# Patient Record
Sex: MALE | Race: WHITE | NOT HISPANIC OR LATINO | Employment: FULL TIME | URBAN - METROPOLITAN AREA
[De-identification: names, ages, dates, MRNs, and addresses within clinical notes are randomized per-mention and may not be internally consistent; named-entity substitution may affect disease eponyms.]

---

## 2019-02-21 ENCOUNTER — CONVERSION ENCOUNTER (OUTPATIENT)
Dept: SURGERY | Facility: CLINIC | Age: 54
End: 2019-02-21

## 2019-02-21 ENCOUNTER — HOSPITAL ENCOUNTER (OUTPATIENT)
Dept: ONCOLOGY | Facility: HOSPITAL | Age: 54
Discharge: HOME | End: 2019-02-21
Attending: THORACIC SURGERY (CARDIOTHORACIC VASCULAR SURGERY) | Admitting: THORACIC SURGERY (CARDIOTHORACIC VASCULAR SURGERY)

## 2019-02-25 ENCOUNTER — HOSPITAL ENCOUNTER (OUTPATIENT)
Dept: ONCOLOGY | Facility: HOSPITAL | Age: 54
Discharge: HOME | End: 2019-02-25
Attending: THORACIC SURGERY (CARDIOTHORACIC VASCULAR SURGERY) | Admitting: THORACIC SURGERY (CARDIOTHORACIC VASCULAR SURGERY)

## 2019-02-25 ENCOUNTER — HOSPITAL ENCOUNTER (OUTPATIENT)
Dept: RESPIRATORY THERAPY | Facility: HOSPITAL | Age: 54
Discharge: HOME | End: 2019-02-25
Attending: THORACIC SURGERY (CARDIOTHORACIC VASCULAR SURGERY) | Admitting: THORACIC SURGERY (CARDIOTHORACIC VASCULAR SURGERY)

## 2019-02-25 LAB — GLUCOSE BLD-MCNC: 105 MG/DL (ref 70–105)

## 2019-03-12 ENCOUNTER — CONVERSION ENCOUNTER (OUTPATIENT)
Dept: SURGERY | Facility: CLINIC | Age: 54
End: 2019-03-12

## 2019-03-15 ENCOUNTER — HOSPITAL ENCOUNTER (OUTPATIENT)
Dept: LAB | Facility: HOSPITAL | Age: 54
Discharge: HOME | End: 2019-03-15
Attending: THORACIC SURGERY (CARDIOTHORACIC VASCULAR SURGERY) | Admitting: THORACIC SURGERY (CARDIOTHORACIC VASCULAR SURGERY)

## 2019-03-15 LAB
ANION GAP SERPL CALC-SCNC: 15.5 MMOL/L (ref 10–20)
BUN SERPL-MCNC: 8 MG/DL (ref 8–20)
BUN/CREAT SERPL: 8.9 (ref 6.2–20.3)
CALCIUM SERPL-MCNC: 9.1 MG/DL (ref 8.9–10.3)
CHLORIDE SERPL-SCNC: 100 MMOL/L (ref 101–111)
CONV CO2: 25 MMOL/L (ref 22–32)
CREAT UR-MCNC: 0.9 MG/DL (ref 0.7–1.2)
GLUCOSE SERPL-MCNC: 92 MG/DL (ref 65–99)
POTASSIUM SERPL-SCNC: 3.5 MMOL/L (ref 3.6–5.1)
SODIUM SERPL-SCNC: 137 MMOL/L (ref 136–144)

## 2019-03-18 ENCOUNTER — HOSPITAL ENCOUNTER (OUTPATIENT)
Dept: GASTROENTEROLOGY | Facility: HOSPITAL | Age: 54
Setting detail: HOSPITAL OUTPATIENT SURGERY
Discharge: HOME | End: 2019-03-18
Attending: THORACIC SURGERY (CARDIOTHORACIC VASCULAR SURGERY) | Admitting: THORACIC SURGERY (CARDIOTHORACIC VASCULAR SURGERY)

## 2019-03-20 ENCOUNTER — HOSPITAL ENCOUNTER (OUTPATIENT)
Dept: PREOP | Facility: HOSPITAL | Age: 54
Setting detail: HOSPITAL OUTPATIENT SURGERY
Discharge: HOME | End: 2019-03-20
Attending: THORACIC SURGERY (CARDIOTHORACIC VASCULAR SURGERY) | Admitting: THORACIC SURGERY (CARDIOTHORACIC VASCULAR SURGERY)

## 2019-03-20 ENCOUNTER — HOSPITAL ENCOUNTER (OUTPATIENT)
Dept: MRI IMAGING | Facility: HOSPITAL | Age: 54
End: 2019-03-20
Attending: THORACIC SURGERY (CARDIOTHORACIC VASCULAR SURGERY) | Admitting: THORACIC SURGERY (CARDIOTHORACIC VASCULAR SURGERY)

## 2019-03-20 LAB
ABO + RH BLD: NORMAL
ARMBAND: NORMAL
BLD COMPONENT TYPE: NORMAL
BLD GP AB SCN SERPL QL: NEGATIVE
CROSSMATCH EXPIRATION: NORMAL

## 2019-03-26 ENCOUNTER — CLINICAL SUPPORT (OUTPATIENT)
Dept: ONCOLOGY | Facility: HOSPITAL | Age: 54
End: 2019-03-26

## 2019-03-26 ENCOUNTER — HOSPITAL ENCOUNTER (OUTPATIENT)
Dept: ONCOLOGY | Facility: CLINIC | Age: 54
Setting detail: INFUSION SERIES
Discharge: HOME | End: 2019-03-26
Attending: INTERNAL MEDICINE | Admitting: INTERNAL MEDICINE

## 2019-03-26 ENCOUNTER — HOSPITAL ENCOUNTER (OUTPATIENT)
Dept: ONCOLOGY | Facility: HOSPITAL | Age: 54
Discharge: HOME | End: 2019-03-26
Attending: INTERNAL MEDICINE | Admitting: INTERNAL MEDICINE

## 2019-03-26 LAB
ALBUMIN SERPL-MCNC: 4.3 G/DL (ref 3.5–4.8)
ALBUMIN/GLOB SERPL: 1.6 {RATIO} (ref 1–1.7)
ALP SERPL-CCNC: 68 IU/L (ref 32–91)
ALT SERPL-CCNC: 41 IU/L (ref 17–63)
ANION GAP SERPL CALC-SCNC: 15.7 MMOL/L (ref 10–20)
AST SERPL-CCNC: 36 IU/L (ref 15–41)
BILIRUB SERPL-MCNC: 0.5 MG/DL (ref 0.3–1.2)
BUN SERPL-MCNC: 7 MG/DL (ref 8–20)
BUN/CREAT SERPL: 7.8 (ref 6.2–20.3)
CALCIUM SERPL-MCNC: 9.3 MG/DL (ref 8.9–10.3)
CHLORIDE SERPL-SCNC: 100 MMOL/L (ref 101–111)
CONV CO2: 24 MMOL/L (ref 22–32)
CONV TOTAL PROTEIN: 7 G/DL (ref 6.1–7.9)
CREAT UR-MCNC: 0.9 MG/DL (ref 0.7–1.2)
GLOBULIN UR ELPH-MCNC: 2.7 G/DL (ref 2.5–3.8)
GLUCOSE SERPL-MCNC: 97 MG/DL (ref 65–99)
POTASSIUM SERPL-SCNC: 3.7 MMOL/L (ref 3.6–5.1)
SODIUM SERPL-SCNC: 136 MMOL/L (ref 136–144)

## 2019-03-26 NOTE — PROGRESS NOTES
PATIENTS ONCOLOGY RECORD LOCATED IN [facility]      Subjective     Name:  EPI MCBRIDE     Date:  2019  Address:  [address] [place] [address]  Home: [unfilled]  :  1965 AGE:  53 y.o.        RECORDS OBTAINED:  Patients Oncology Record is located in [facility]

## 2019-04-03 ENCOUNTER — HOSPITAL ENCOUNTER (OUTPATIENT)
Dept: RADIATION ONCOLOGY | Facility: HOSPITAL | Age: 54
Setting detail: RECURRING SERIES
End: 2019-04-03
Attending: RADIOLOGY | Admitting: RADIOLOGY

## 2019-04-05 ENCOUNTER — HOSPITAL ENCOUNTER (OUTPATIENT)
Dept: MRI IMAGING | Facility: HOSPITAL | Age: 54
Discharge: HOME | End: 2019-04-05
Attending: INTERNAL MEDICINE | Admitting: INTERNAL MEDICINE

## 2019-04-10 ENCOUNTER — HOSPITAL ENCOUNTER (OUTPATIENT)
Dept: ONCOLOGY | Facility: CLINIC | Age: 54
Setting detail: INFUSION SERIES
Discharge: HOME | End: 2019-04-10
Attending: INTERNAL MEDICINE | Admitting: INTERNAL MEDICINE

## 2019-04-10 ENCOUNTER — CLINICAL SUPPORT (OUTPATIENT)
Dept: ONCOLOGY | Facility: HOSPITAL | Age: 54
End: 2019-04-10

## 2019-04-10 NOTE — PROGRESS NOTES
PATIENTS ONCOLOGY RECORD LOCATED IN [facility]      Subjective     Name:  EPI MCBRIDE     Date:  04/10/2019  Address:  [address] [place] [address]  Home: [unfilled]  :  1965 AGE:  53 y.o.        RECORDS OBTAINED:  Patients Oncology Record is located in [facility]

## 2019-04-11 ENCOUNTER — HOSPITAL ENCOUNTER (OUTPATIENT)
Dept: ONCOLOGY | Facility: CLINIC | Age: 54
Setting detail: INFUSION SERIES
Discharge: HOME | End: 2019-04-11
Attending: INTERNAL MEDICINE | Admitting: INTERNAL MEDICINE

## 2019-04-11 ENCOUNTER — CLINICAL SUPPORT (OUTPATIENT)
Dept: ONCOLOGY | Facility: HOSPITAL | Age: 54
End: 2019-04-11

## 2019-04-11 NOTE — PROGRESS NOTES
PATIENTS ONCOLOGY RECORD LOCATED IN [facility]      Subjective     Name:  EPI MCBRIDE     Date:  2019  Address:  [address] [place] [address]  Home: [unfilled]  :  1965 AGE:  53 y.o.        RECORDS OBTAINED:  Patients Oncology Record is located in [facility]

## 2019-04-17 ENCOUNTER — HOSPITAL ENCOUNTER (OUTPATIENT)
Dept: ONCOLOGY | Facility: HOSPITAL | Age: 54
Discharge: HOME | End: 2019-04-17
Attending: INTERNAL MEDICINE | Admitting: INTERNAL MEDICINE

## 2019-04-17 ENCOUNTER — HOSPITAL ENCOUNTER (OUTPATIENT)
Dept: ONCOLOGY | Facility: CLINIC | Age: 54
Setting detail: INFUSION SERIES
Discharge: HOME | End: 2019-04-17
Attending: INTERNAL MEDICINE | Admitting: INTERNAL MEDICINE

## 2019-04-17 ENCOUNTER — CLINICAL SUPPORT (OUTPATIENT)
Dept: ONCOLOGY | Facility: HOSPITAL | Age: 54
End: 2019-04-17

## 2019-04-17 LAB
ALBUMIN SERPL-MCNC: 3.7 G/DL (ref 3.5–4.8)
ALBUMIN/GLOB SERPL: 1.4 {RATIO} (ref 1–1.7)
ALP SERPL-CCNC: 52 IU/L (ref 32–91)
ALT SERPL-CCNC: 35 IU/L (ref 17–63)
ANION GAP SERPL CALC-SCNC: 19.9 MMOL/L (ref 10–20)
AST SERPL-CCNC: 38 IU/L (ref 15–41)
BILIRUB SERPL-MCNC: 0.6 MG/DL (ref 0.3–1.2)
BUN SERPL-MCNC: 7 MG/DL (ref 8–20)
BUN/CREAT SERPL: 7.8 (ref 6.2–20.3)
CALCIUM SERPL-MCNC: 9.3 MG/DL (ref 8.9–10.3)
CHLORIDE SERPL-SCNC: 99 MMOL/L (ref 101–111)
CONV CO2: 20 MMOL/L (ref 22–32)
CONV TOTAL PROTEIN: 6.3 G/DL (ref 6.1–7.9)
CREAT BLDA-MCNC: 0.7 MG/DL (ref 0.6–1.3)
CREAT UR-MCNC: 0.9 MG/DL (ref 0.7–1.2)
GLOBULIN UR ELPH-MCNC: 2.6 G/DL (ref 2.5–3.8)
GLUCOSE SERPL-MCNC: 184 MG/DL (ref 65–99)
MAGNESIUM SERPL-MCNC: 1.9 MG/DL (ref 1.8–2.5)
POTASSIUM SERPL-SCNC: 3.9 MMOL/L (ref 3.6–5.1)
SODIUM SERPL-SCNC: 135 MMOL/L (ref 136–144)

## 2019-04-17 NOTE — PROGRESS NOTES
PATIENTS ONCOLOGY RECORD LOCATED IN [facility]      Subjective     Name:  EPI MCBRIDE     Date:  2019  Address:  [address] [place] [address]  Home: [unfilled]  :  1965 AGE:  53 y.o.        RECORDS OBTAINED:  Patients Oncology Record is located in [facility]

## 2019-04-18 ENCOUNTER — CLINICAL SUPPORT (OUTPATIENT)
Dept: ONCOLOGY | Facility: HOSPITAL | Age: 54
End: 2019-04-18

## 2019-04-18 ENCOUNTER — HOSPITAL ENCOUNTER (OUTPATIENT)
Dept: ONCOLOGY | Facility: CLINIC | Age: 54
Setting detail: INFUSION SERIES
Discharge: HOME | End: 2019-04-18
Attending: INTERNAL MEDICINE | Admitting: INTERNAL MEDICINE

## 2019-04-18 NOTE — PROGRESS NOTES
PATIENTS ONCOLOGY RECORD LOCATED IN [facility]      Subjective     Name:  EPI MCBRIDE     Date:  2019  Address:  [address] [place] [address]  Home: [unfilled]  :  1965 AGE:  53 y.o.        RECORDS OBTAINED:  Patients Oncology Record is located in [facility]

## 2019-04-22 ENCOUNTER — HOSPITAL ENCOUNTER (OUTPATIENT)
Dept: ONCOLOGY | Facility: HOSPITAL | Age: 54
Discharge: HOME | End: 2019-04-22
Attending: INTERNAL MEDICINE | Admitting: INTERNAL MEDICINE

## 2019-04-22 ENCOUNTER — HOSPITAL ENCOUNTER (OUTPATIENT)
Dept: ONCOLOGY | Facility: CLINIC | Age: 54
Setting detail: INFUSION SERIES
Discharge: HOME | End: 2019-04-22
Attending: INTERNAL MEDICINE | Admitting: INTERNAL MEDICINE

## 2019-04-22 ENCOUNTER — CLINICAL SUPPORT (OUTPATIENT)
Dept: ONCOLOGY | Facility: HOSPITAL | Age: 54
End: 2019-04-22

## 2019-04-22 LAB
ANION GAP SERPL CALC-SCNC: 15.2 MMOL/L (ref 10–20)
BUN SERPL-MCNC: 19 MG/DL (ref 8–20)
BUN/CREAT SERPL: 17.3 (ref 6.2–20.3)
CALCIUM SERPL-MCNC: 9 MG/DL (ref 8.9–10.3)
CHLORIDE SERPL-SCNC: 94 MMOL/L (ref 101–111)
CONV CO2: 26 MMOL/L (ref 22–32)
CREAT UR-MCNC: 1.1 MG/DL (ref 0.7–1.2)
GLUCOSE SERPL-MCNC: 109 MG/DL (ref 65–99)
MAGNESIUM SERPL-MCNC: 2.1 MG/DL (ref 1.8–2.5)
POTASSIUM SERPL-SCNC: 3.2 MMOL/L (ref 3.6–5.1)
SODIUM SERPL-SCNC: 132 MMOL/L (ref 136–144)

## 2019-04-22 NOTE — PROGRESS NOTES
PATIENTS ONCOLOGY RECORD LOCATED IN [facility]      Subjective     Name:  EPI MCBRIDE     Date:  2019  Address:  [address] [place] [address]  Home: [unfilled]  :  1965 AGE:  53 y.o.        RECORDS OBTAINED:  Patients Oncology Record is located in [facility]

## 2019-05-06 ENCOUNTER — HOSPITAL ENCOUNTER (OUTPATIENT)
Dept: ONCOLOGY | Facility: CLINIC | Age: 54
Setting detail: INFUSION SERIES
Discharge: HOME | End: 2019-05-06
Attending: INTERNAL MEDICINE | Admitting: INTERNAL MEDICINE

## 2019-05-06 ENCOUNTER — HOSPITAL ENCOUNTER (OUTPATIENT)
Dept: ONCOLOGY | Facility: HOSPITAL | Age: 54
Discharge: HOME | End: 2019-05-06
Attending: INTERNAL MEDICINE | Admitting: INTERNAL MEDICINE

## 2019-05-06 ENCOUNTER — CLINICAL SUPPORT (OUTPATIENT)
Dept: ONCOLOGY | Facility: HOSPITAL | Age: 54
End: 2019-05-06

## 2019-05-06 NOTE — PROGRESS NOTES
PATIENTS ONCOLOGY RECORD LOCATED IN [facility]      Subjective     Name:  EPI MCBRIDE     Date:  2019  Address:  [address] [place] [address]  Home: [unfilled]  :  1965 AGE:  53 y.o.        RECORDS OBTAINED:  Patients Oncology Record is located in [facility]

## 2019-05-08 ENCOUNTER — CLINICAL SUPPORT (OUTPATIENT)
Dept: ONCOLOGY | Facility: HOSPITAL | Age: 54
End: 2019-05-08

## 2019-05-08 ENCOUNTER — HOSPITAL ENCOUNTER (OUTPATIENT)
Dept: ONCOLOGY | Facility: CLINIC | Age: 54
Setting detail: INFUSION SERIES
Discharge: HOME | End: 2019-05-08
Attending: INTERNAL MEDICINE | Admitting: INTERNAL MEDICINE

## 2019-05-08 ENCOUNTER — HOSPITAL ENCOUNTER (OUTPATIENT)
Dept: ONCOLOGY | Facility: HOSPITAL | Age: 54
Discharge: HOME | End: 2019-05-08
Attending: INTERNAL MEDICINE | Admitting: INTERNAL MEDICINE

## 2019-05-08 LAB — CREAT BLDA-MCNC: 1 MG/DL (ref 0.6–1.3)

## 2019-05-08 NOTE — PROGRESS NOTES
PATIENTS ONCOLOGY RECORD LOCATED IN [facility]      Subjective     Name:  EPI MCBRIDE     Date:  2019  Address:  [address] [place] [address]  Home: [unfilled]  :  1965 AGE:  53 y.o.        RECORDS OBTAINED:  Patients Oncology Record is located in [facility]

## 2019-05-09 ENCOUNTER — HOSPITAL ENCOUNTER (OUTPATIENT)
Dept: ONCOLOGY | Facility: CLINIC | Age: 54
Setting detail: INFUSION SERIES
Discharge: HOME | End: 2019-05-09
Attending: INTERNAL MEDICINE | Admitting: INTERNAL MEDICINE

## 2019-05-09 ENCOUNTER — CLINICAL SUPPORT (OUTPATIENT)
Dept: ONCOLOGY | Facility: HOSPITAL | Age: 54
End: 2019-05-09

## 2019-05-09 LAB
ALBUMIN SERPL-MCNC: 3.6 G/DL (ref 3.5–4.8)
ALBUMIN/GLOB SERPL: 1.3 {RATIO} (ref 1–1.7)
ALP SERPL-CCNC: 57 IU/L (ref 32–91)
ALT SERPL-CCNC: 20 IU/L (ref 17–63)
ANION GAP SERPL CALC-SCNC: 14 MMOL/L (ref 10–20)
AST SERPL-CCNC: 19 IU/L (ref 15–41)
BILIRUB SERPL-MCNC: 0.3 MG/DL (ref 0.3–1.2)
BUN SERPL-MCNC: 12 MG/DL (ref 8–20)
BUN/CREAT SERPL: 12 (ref 6.2–20.3)
CALCIUM SERPL-MCNC: 8.7 MG/DL (ref 8.9–10.3)
CHLORIDE SERPL-SCNC: 101 MMOL/L (ref 101–111)
CONV CO2: 22 MMOL/L (ref 22–32)
CONV TOTAL PROTEIN: 6.3 G/DL (ref 6.1–7.9)
CREAT UR-MCNC: 1 MG/DL (ref 0.7–1.2)
GLOBULIN UR ELPH-MCNC: 2.7 G/DL (ref 2.5–3.8)
GLUCOSE SERPL-MCNC: 160 MG/DL (ref 65–99)
MAGNESIUM SERPL-MCNC: 1.8 MG/DL (ref 1.8–2.5)
POTASSIUM SERPL-SCNC: 4 MMOL/L (ref 3.6–5.1)
SODIUM SERPL-SCNC: 133 MMOL/L (ref 136–144)

## 2019-05-09 NOTE — PROGRESS NOTES
PATIENTS ONCOLOGY RECORD LOCATED IN [facility]      Subjective     Name:  EPI MCBRIDE     Date:  2019  Address:  [address] [place] [address]  Home: [unfilled]  :  1965 AGE:  53 y.o.        RECORDS OBTAINED:  Patients Oncology Record is located in [facility]

## 2019-05-15 ENCOUNTER — HOSPITAL ENCOUNTER (OUTPATIENT)
Dept: ONCOLOGY | Facility: HOSPITAL | Age: 54
Discharge: HOME | End: 2019-05-15
Attending: NURSE PRACTITIONER | Admitting: NURSE PRACTITIONER

## 2019-05-15 ENCOUNTER — CLINICAL SUPPORT (OUTPATIENT)
Dept: ONCOLOGY | Facility: HOSPITAL | Age: 54
End: 2019-05-15

## 2019-05-15 ENCOUNTER — HOSPITAL ENCOUNTER (OUTPATIENT)
Dept: ONCOLOGY | Facility: CLINIC | Age: 54
Setting detail: INFUSION SERIES
Discharge: HOME | End: 2019-05-15
Attending: INTERNAL MEDICINE | Admitting: INTERNAL MEDICINE

## 2019-05-15 LAB
ANION GAP SERPL CALC-SCNC: 15.2 MMOL/L (ref 10–20)
BUN SERPL-MCNC: 25 MG/DL (ref 8–20)
BUN/CREAT SERPL: 14.7 (ref 6.2–20.3)
CALCIUM SERPL-MCNC: 8.7 MG/DL (ref 8.9–10.3)
CHLORIDE SERPL-SCNC: 92 MMOL/L (ref 101–111)
CONV CO2: 26 MMOL/L (ref 22–32)
CREAT UR-MCNC: 1.7 MG/DL (ref 0.7–1.2)
GLUCOSE SERPL-MCNC: 109 MG/DL (ref 65–99)
MAGNESIUM SERPL-MCNC: 1.6 MG/DL (ref 1.8–2.5)
POTASSIUM SERPL-SCNC: 3.2 MMOL/L (ref 3.6–5.1)
SODIUM SERPL-SCNC: 130 MMOL/L (ref 136–144)

## 2019-05-16 ENCOUNTER — CLINICAL SUPPORT (OUTPATIENT)
Dept: ONCOLOGY | Facility: HOSPITAL | Age: 54
End: 2019-05-16

## 2019-05-16 ENCOUNTER — HOSPITAL ENCOUNTER (OUTPATIENT)
Dept: ONCOLOGY | Facility: CLINIC | Age: 54
Setting detail: INFUSION SERIES
Discharge: HOME | End: 2019-05-16
Attending: INTERNAL MEDICINE | Admitting: INTERNAL MEDICINE

## 2019-05-16 NOTE — PROGRESS NOTES
PATIENTS ONCOLOGY RECORD LOCATED IN [facility]      Subjective     Name:  EPI MCBRIDE     Date:  2019  Address:   [address] [place] [address]  Home: [unfilled]  :  1965 AGE:  53 y.o.        RECORDS OBTAINED:  Patients Oncology Record is located in [facility]

## 2019-05-17 ENCOUNTER — HOSPITAL ENCOUNTER (OUTPATIENT)
Dept: ONCOLOGY | Facility: CLINIC | Age: 54
Setting detail: INFUSION SERIES
Discharge: HOME | End: 2019-05-17
Attending: INTERNAL MEDICINE | Admitting: INTERNAL MEDICINE

## 2019-05-17 ENCOUNTER — HOSPITAL ENCOUNTER (OUTPATIENT)
Dept: ONCOLOGY | Facility: HOSPITAL | Age: 54
Discharge: HOME | End: 2019-05-17
Attending: INTERNAL MEDICINE | Admitting: INTERNAL MEDICINE

## 2019-05-17 ENCOUNTER — CLINICAL SUPPORT (OUTPATIENT)
Dept: ONCOLOGY | Facility: HOSPITAL | Age: 54
End: 2019-05-17

## 2019-05-17 LAB
ANION GAP SERPL CALC-SCNC: 16.2 MMOL/L (ref 10–20)
BUN SERPL-MCNC: 20 MG/DL (ref 8–20)
BUN/CREAT SERPL: 14.3 (ref 6.2–20.3)
CALCIUM SERPL-MCNC: 8.4 MG/DL (ref 8.9–10.3)
CHLORIDE SERPL-SCNC: 91 MMOL/L (ref 101–111)
CONV CO2: 24 MMOL/L (ref 22–32)
CREAT UR-MCNC: 1.4 MG/DL (ref 0.7–1.2)
GLUCOSE SERPL-MCNC: 105 MG/DL (ref 65–99)
MAGNESIUM SERPL-MCNC: 1.3 MG/DL (ref 1.8–2.5)
POTASSIUM SERPL-SCNC: 3.2 MMOL/L (ref 3.6–5.1)
SODIUM SERPL-SCNC: 128 MMOL/L (ref 136–144)

## 2019-05-17 NOTE — PROGRESS NOTES
PATIENTS ONCOLOGY RECORD LOCATED IN [facility]      Subjective     Name:  EPI MCBRIDE     Date:  2019  Address:   [address] [place] [address]  Home: [unfilled]  :  1965 AGE:  53 y.o.        RECORDS OBTAINED:  Patients Oncology Record is located in [facility]

## 2019-05-20 ENCOUNTER — CLINICAL SUPPORT (OUTPATIENT)
Dept: ONCOLOGY | Facility: HOSPITAL | Age: 54
End: 2019-05-20

## 2019-05-20 ENCOUNTER — HOSPITAL ENCOUNTER (OUTPATIENT)
Dept: ONCOLOGY | Facility: HOSPITAL | Age: 54
Discharge: HOME | End: 2019-05-20
Attending: INTERNAL MEDICINE | Admitting: INTERNAL MEDICINE

## 2019-05-20 ENCOUNTER — HOSPITAL ENCOUNTER (OUTPATIENT)
Dept: ONCOLOGY | Facility: CLINIC | Age: 54
Setting detail: INFUSION SERIES
Discharge: HOME | End: 2019-05-20
Attending: INTERNAL MEDICINE | Admitting: INTERNAL MEDICINE

## 2019-05-20 LAB
ANION GAP SERPL CALC-SCNC: 15.5 MMOL/L (ref 10–20)
BUN SERPL-MCNC: 10 MG/DL (ref 8–20)
BUN/CREAT SERPL: 8.3 (ref 6.2–20.3)
CALCIUM SERPL-MCNC: 8.5 MG/DL (ref 8.9–10.3)
CHLORIDE SERPL-SCNC: 97 MMOL/L (ref 101–111)
CONV CO2: 24 MMOL/L (ref 22–32)
CREAT BLDA-MCNC: 1.2 MG/DL (ref 0.6–1.3)
CREAT UR-MCNC: 1.2 MG/DL (ref 0.7–1.2)
GLUCOSE SERPL-MCNC: 118 MG/DL (ref 65–99)
POTASSIUM SERPL-SCNC: 3.5 MMOL/L (ref 3.6–5.1)
SODIUM SERPL-SCNC: 133 MMOL/L (ref 136–144)

## 2019-05-20 NOTE — PROGRESS NOTES
PATIENTS ONCOLOGY RECORD LOCATED IN [facility]      Subjective     Name:  EPI MCBRIDE     Date:  2019  Address:   [address] [place] [address]  Home: [unfilled]  :  1965 AGE:  53 y.o.        RECORDS OBTAINED:  Patients Oncology Record is located in [facility]

## 2019-05-22 ENCOUNTER — HOSPITAL ENCOUNTER (OUTPATIENT)
Dept: ONCOLOGY | Facility: HOSPITAL | Age: 54
Discharge: HOME | End: 2019-05-22
Attending: INTERNAL MEDICINE | Admitting: INTERNAL MEDICINE

## 2019-05-22 ENCOUNTER — CLINICAL SUPPORT (OUTPATIENT)
Dept: ONCOLOGY | Facility: HOSPITAL | Age: 54
End: 2019-05-22

## 2019-05-22 ENCOUNTER — HOSPITAL ENCOUNTER (OUTPATIENT)
Dept: ONCOLOGY | Facility: CLINIC | Age: 54
Setting detail: INFUSION SERIES
Discharge: HOME | End: 2019-05-22
Attending: INTERNAL MEDICINE | Admitting: INTERNAL MEDICINE

## 2019-05-22 LAB
ANION GAP SERPL CALC-SCNC: 16.8 MMOL/L (ref 10–20)
BUN SERPL-MCNC: 12 MG/DL (ref 8–20)
BUN/CREAT SERPL: 9.2 (ref 6.2–20.3)
CALCIUM SERPL-MCNC: 8.8 MG/DL (ref 8.9–10.3)
CHLORIDE SERPL-SCNC: 94 MMOL/L (ref 101–111)
CONV CO2: 26 MMOL/L (ref 22–32)
CREAT UR-MCNC: 1.3 MG/DL (ref 0.7–1.2)
GLUCOSE SERPL-MCNC: 98 MG/DL (ref 65–99)
MAGNESIUM SERPL-MCNC: 1.6 MG/DL (ref 1.8–2.5)
POTASSIUM SERPL-SCNC: 3.8 MMOL/L (ref 3.6–5.1)
SODIUM SERPL-SCNC: 133 MMOL/L (ref 136–144)

## 2019-05-23 ENCOUNTER — HOSPITAL ENCOUNTER (OUTPATIENT)
Dept: ONCOLOGY | Facility: HOSPITAL | Age: 54
Discharge: HOME | End: 2019-05-23
Attending: INTERNAL MEDICINE | Admitting: INTERNAL MEDICINE

## 2019-05-23 ENCOUNTER — CLINICAL SUPPORT (OUTPATIENT)
Dept: ONCOLOGY | Facility: HOSPITAL | Age: 54
End: 2019-05-23

## 2019-05-23 ENCOUNTER — HOSPITAL ENCOUNTER (OUTPATIENT)
Dept: ONCOLOGY | Facility: CLINIC | Age: 54
Setting detail: INFUSION SERIES
Discharge: HOME | End: 2019-05-23
Attending: INTERNAL MEDICINE | Admitting: INTERNAL MEDICINE

## 2019-05-23 LAB — MAGNESIUM SERPL-MCNC: 1.7 MG/DL (ref 1.8–2.5)

## 2019-05-23 NOTE — PROGRESS NOTES
PATIENTS ONCOLOGY RECORD LOCATED IN [facility]      Subjective     Name:  EPI MCBRIDE     Date:  2019  Address:   [address] [place] [address]  Home: [unfilled]  :  1965 AGE:  53 y.o.        RECORDS OBTAINED:  Patients Oncology Record is located in [facility]

## 2019-05-24 ENCOUNTER — CLINICAL SUPPORT (OUTPATIENT)
Dept: ONCOLOGY | Facility: HOSPITAL | Age: 54
End: 2019-05-24

## 2019-05-24 ENCOUNTER — HOSPITAL ENCOUNTER (OUTPATIENT)
Dept: RADIATION ONCOLOGY | Facility: HOSPITAL | Age: 54
Discharge: HOME | End: 2019-05-24
Attending: RADIOLOGY | Admitting: RADIOLOGY

## 2019-05-24 ENCOUNTER — HOSPITAL ENCOUNTER (OUTPATIENT)
Dept: ONCOLOGY | Facility: CLINIC | Age: 54
Setting detail: INFUSION SERIES
Discharge: HOME | End: 2019-05-24
Attending: INTERNAL MEDICINE | Admitting: INTERNAL MEDICINE

## 2019-05-26 ENCOUNTER — HOSPITAL ENCOUNTER (OUTPATIENT)
Dept: LAB | Facility: HOSPITAL | Age: 54
Discharge: HOME | End: 2019-05-26
Attending: INTERNAL MEDICINE | Admitting: INTERNAL MEDICINE

## 2019-05-26 LAB
ANION GAP SERPL CALC-SCNC: 19 MMOL/L (ref 10–20)
BUN SERPL-MCNC: 12 MG/DL (ref 8–20)
BUN/CREAT SERPL: 9.2 (ref 6.2–20.3)
CALCIUM SERPL-MCNC: 8.7 MG/DL (ref 8.9–10.3)
CHLORIDE SERPL-SCNC: 96 MMOL/L (ref 101–111)
CONV CO2: 25 MMOL/L (ref 22–32)
CREAT UR-MCNC: 1.3 MG/DL (ref 0.7–1.2)
GLUCOSE SERPL-MCNC: 97 MG/DL (ref 65–99)
POTASSIUM SERPL-SCNC: 4 MMOL/L (ref 3.6–5.1)
SODIUM SERPL-SCNC: 136 MMOL/L (ref 136–144)

## 2019-05-28 ENCOUNTER — CONVERSION ENCOUNTER (OUTPATIENT)
Dept: SURGERY | Facility: CLINIC | Age: 54
End: 2019-05-28

## 2019-06-04 ENCOUNTER — HOSPITAL ENCOUNTER (OUTPATIENT)
Dept: ONCOLOGY | Facility: CLINIC | Age: 54
Setting detail: INFUSION SERIES
Discharge: HOME | End: 2019-06-04
Attending: INTERNAL MEDICINE | Admitting: INTERNAL MEDICINE

## 2019-06-04 ENCOUNTER — HOSPITAL ENCOUNTER (OUTPATIENT)
Dept: ONCOLOGY | Facility: HOSPITAL | Age: 54
Discharge: HOME | End: 2019-06-04
Attending: INTERNAL MEDICINE | Admitting: INTERNAL MEDICINE

## 2019-06-04 VITALS
SYSTOLIC BLOOD PRESSURE: 131 MMHG | HEART RATE: 84 BPM | OXYGEN SATURATION: 97 % | BODY MASS INDEX: 23.62 KG/M2 | WEIGHT: 190 LBS | HEIGHT: 75 IN | DIASTOLIC BLOOD PRESSURE: 85 MMHG

## 2019-06-04 VITALS
HEIGHT: 75 IN | WEIGHT: 194 LBS | WEIGHT: 194 LBS | DIASTOLIC BLOOD PRESSURE: 94 MMHG | SYSTOLIC BLOOD PRESSURE: 143 MMHG | OXYGEN SATURATION: 98 % | BODY MASS INDEX: 24.12 KG/M2 | BODY MASS INDEX: 24.12 KG/M2 | DIASTOLIC BLOOD PRESSURE: 89 MMHG | OXYGEN SATURATION: 96 % | HEART RATE: 76 BPM | HEART RATE: 75 BPM | SYSTOLIC BLOOD PRESSURE: 138 MMHG | HEIGHT: 75 IN

## 2019-06-04 LAB
ALBUMIN SERPL-MCNC: 3.7 G/DL (ref 3.5–4.8)
ALBUMIN/GLOB SERPL: 1.6 {RATIO} (ref 1–1.7)
ALP SERPL-CCNC: 58 IU/L (ref 32–91)
ALT SERPL-CCNC: 20 IU/L (ref 17–63)
ANION GAP SERPL CALC-SCNC: 16.6 MMOL/L (ref 10–20)
AST SERPL-CCNC: 22 IU/L (ref 15–41)
BILIRUB SERPL-MCNC: 0.3 MG/DL (ref 0.3–1.2)
BUN SERPL-MCNC: 16 MG/DL (ref 8–20)
BUN/CREAT SERPL: 12.3 (ref 6.2–20.3)
CALCIUM SERPL-MCNC: 8.8 MG/DL (ref 8.9–10.3)
CHLORIDE SERPL-SCNC: 98 MMOL/L (ref 101–111)
CONV CO2: 22 MMOL/L (ref 22–32)
CONV TOTAL PROTEIN: 6 G/DL (ref 6.1–7.9)
CREAT UR-MCNC: 1.3 MG/DL (ref 0.7–1.2)
GLOBULIN UR ELPH-MCNC: 2.3 G/DL (ref 2.5–3.8)
GLUCOSE SERPL-MCNC: 85 MG/DL (ref 65–99)
MAGNESIUM SERPL-MCNC: 1.7 MG/DL (ref 1.8–2.5)
POTASSIUM SERPL-SCNC: 3.6 MMOL/L (ref 3.6–5.1)
SODIUM SERPL-SCNC: 133 MMOL/L (ref 136–144)

## 2019-06-06 NOTE — PROGRESS NOTES
Visit Type:  Consult  Referring Provider:  Dr. Cleaning  Primary Provider:  Lyle Bone MD    CC:  Follow up CT chest .    History of Present Illness:  Patient is seen in follow-up evaluation of his right lower lobe lung cancer with metastatic disease to the subcarinal and right paratracheal regions.  He has completed 45 Gy of radiation therapy any is completed 2 cycles of platinum base chemotherapy.  He   has had some renal dysfunction with a creatinine of 1.7.  He presents with a follow-up CT scan I personally examined.  There is been a great response both of the tumor and of the lymph nodes.  The radiology report confirms this.  The patient has no fevers   chills night sweats no cough no hemoptysis no significant pleuritic chest pain.  Weight is stable.  He has tolerated therapy relatively well but he is tired.  He has developed renal insufficiency.  The patient is a former smoker continuing to stay smoke-free  I personally discussed this case with Medical Oncology and with radiation oncology.  Both specialists agree that we should proceed with surgical resection at this time.  We are planning additional carboplatin and base chemotherapy following surgical   resection.  Impression lung cancer 2. chemo radiation therapy 3. Renal insufficiency  Plan follow-up in 2 weeks      Past Surgical History:     Reviewed history from 03/12/2019 and no changes required:        CT guided lung biopsy 3-8-19         mediport placement 3-20-19 Dr. Fuchs        Bronch with EBUS castrejon needle BX 3-18-19    Family History Summary:      Reviewed history Last on 03/12/2019 and no changes required:05/28/2019  Sister - Has Family History of Other Cancer - Entered On: 2/21/2019      Social History:     Reviewed history from 03/12/2019 and no changes required:        Patient is a former smoker.        Alcohol Use: Y                Vital Signs:    Patient Profile:    53 Years Old Male  Height:     75 inches  Weight:     190  pounds  BMI:        23.75     O2 Sat:     97 %  Temp:       98.0 degrees F oral  Pulse rate: 84 / minute  Pulse rhythm:   regular  BP Sittin / 85  (left arm)    Cuff size:  regular      Problems: Active problems were reviewed with the patient during this visit.  Medications: Medications were reviewed with the patient during this visit.  Allergies: Allergies were reviewed with the patient during this visit.  No Known Allergy.        Vitals Entered By: Leslee Tenorio CMA (May 28, 2019 3:24 PM)    Active Medications (reviewed today):  METOPROLOL SUCCINATE  MG ORAL TABLET EXTENDED RELEASE 24 HOUR (METOPROLOL SUCCINATE) Take one tablet daily  ESCITALOPRAM OXALATE 20 MG ORAL TABLET (ESCITALOPRAM OXALATE) ONE PILL A DAY  AMLODIPINE BESYLATE 10 MG ORAL TABLET (AMLODIPINE BESYLATE) ONE DAILY  BUPROPION HCL ER (XL) 300 MG ORAL TABLET EXTENDED RELEASE 24 HOUR (BUPROPION HCL) one tablet by mouth in am  PANTOPRAZOLE SODIUM 40 MG ORAL TABLET DELAYED RELEASE (PANTOPRAZOLE SODIUM) Take one by mouth daily  PRAVASTATIN SODIUM 10 MG ORAL TABLET (PRAVASTATIN SODIUM) ONE BY MOUTH AT BEDTIME    Current Allergies (reviewed today):  No known allergies      Risk Factors:     Smoked Tobacco Use:  Former smoker  Alcohol use:  yes    Previous Tobacco Use: Signed On 2019  Smoked Tobacco Use:  Former smoker    Previous Alcohol Use: Signed On 2019  Alcohol use:  yes          Blood Pressure:  Today's BP: 131/85 mm Hg            Plan   Updated Medication List:   POTASSIUM CHLORIDE LÁZARO ER 20 MEQ ORAL TABLET EXTENDED RELEASE (POTASSIUM CHLORIDE LÁZARO CR) one daily  FOLIC ACID 1 MG ORAL TABLET (FOLIC ACID) one tablet by mouth daily  METOPROLOL SUCCINATE  MG ORAL TABLET EXTENDED RELEASE 24 HOUR (METOPROLOL SUCCINATE) Take one tablet daily  ESCITALOPRAM OXALATE 20 MG ORAL TABLET (ESCITALOPRAM OXALATE) ONE PILL A DAY  AMLODIPINE BESYLATE 10 MG ORAL TABLET (AMLODIPINE BESYLATE) ONE DAILY  BUPROPION HCL ER (XL) 300 MG  ORAL TABLET EXTENDED RELEASE 24 HOUR (BUPROPION HCL) one tablet by mouth in am  PANTOPRAZOLE SODIUM 40 MG ORAL TABLET DELAYED RELEASE (PANTOPRAZOLE SODIUM) Take one by mouth daily  PRAVASTATIN SODIUM 10 MG ORAL TABLET (PRAVASTATIN SODIUM) ONE BY MOUTH AT BEDTIME    New Orders:   [id number] Vst-Est Level III [CPT-33431]        Plan   Updated Medication List:   POTASSIUM CHLORIDE LÁZARO ER 20 MEQ ORAL TABLET EXTENDED RELEASE (POTASSIUM CHLORIDE LÁZARO CR) one daily  FOLIC ACID 1 MG ORAL TABLET (FOLIC ACID) one tablet by mouth daily  METOPROLOL SUCCINATE  MG ORAL TABLET EXTENDED RELEASE 24 HOUR (METOPROLOL SUCCINATE) Take one tablet daily  ESCITALOPRAM OXALATE 20 MG ORAL TABLET (ESCITALOPRAM OXALATE) ONE PILL A DAY  AMLODIPINE BESYLATE 10 MG ORAL TABLET (AMLODIPINE BESYLATE) ONE DAILY  BUPROPION HCL ER (XL) 300 MG ORAL TABLET EXTENDED RELEASE 24 HOUR (BUPROPION HCL) one tablet by mouth in am  PANTOPRAZOLE SODIUM 40 MG ORAL TABLET DELAYED RELEASE (PANTOPRAZOLE SODIUM) Take one by mouth daily  PRAVASTATIN SODIUM 10 MG ORAL TABLET (PRAVASTATIN SODIUM) ONE BY MOUTH AT BEDTIME    New Orders:   [id number] Vst-Est Level III [[id number]]        Surgery Worksheet         ]      Electronically signed by Terrance Fuchs MD on 05/28/2019 at 3:56 PM  ________________________________________________________________________       Disclaimer: Converted Note message may not contain all data elements that existed in the legacy source system. Please see [employer] System for the original note details.

## 2019-06-19 ENCOUNTER — HOSPITAL ENCOUNTER (OUTPATIENT)
Dept: GENERAL RADIOLOGY | Facility: HOSPITAL | Age: 54
Discharge: HOME | End: 2019-06-19
Admitting: THORACIC SURGERY (CARDIOTHORACIC VASCULAR SURGERY)

## 2019-06-19 ENCOUNTER — APPOINTMENT (OUTPATIENT)
Dept: PREADMISSION TESTING | Facility: HOSPITAL | Age: 54
End: 2019-06-19

## 2019-06-19 VITALS
OXYGEN SATURATION: 96 % | SYSTOLIC BLOOD PRESSURE: 130 MMHG | WEIGHT: 193.5 LBS | HEIGHT: 75 IN | BODY MASS INDEX: 24.06 KG/M2 | DIASTOLIC BLOOD PRESSURE: 83 MMHG | HEART RATE: 76 BPM

## 2019-06-19 LAB
ANION GAP SERPL CALCULATED.3IONS-SCNC: 13 MMOL/L (ref 10–20)
APTT PPP: 24.1 SECONDS (ref 24–31)
BASOPHILS # BLD AUTO: 0.1 10*3/MM3 (ref 0–0.2)
BASOPHILS NFR BLD AUTO: 1.2 % (ref 0–1.5)
BILIRUB UR QL STRIP: NEGATIVE
BUN BLD-MCNC: 12 MG/DL (ref 8–20)
BUN/CREAT SERPL: 10 (ref 6.2–20.3)
CALCIUM SPEC-SCNC: 9.3 MG/DL (ref 8.9–10.3)
CHLORIDE SERPL-SCNC: 103 MMOL/L (ref 101–111)
CLARITY UR: CLEAR
CO2 SERPL-SCNC: 23 MMOL/L (ref 22–32)
COLOR UR: YELLOW
CREAT BLD-MCNC: 1.2 MG/DL (ref 0.7–1.2)
DEPRECATED RDW RBC AUTO: 52.9 FL (ref 37–54)
EOSINOPHIL # BLD AUTO: 0.3 10*3/MM3 (ref 0–0.4)
EOSINOPHIL NFR BLD AUTO: 5 % (ref 0.3–6.2)
ERYTHROCYTE [DISTWIDTH] IN BLOOD BY AUTOMATED COUNT: 15.7 % (ref 12.3–15.4)
GFR SERPL CREATININE-BSD FRML MDRD: 63 ML/MIN/1.73
GLUCOSE BLD-MCNC: 90 MG/DL (ref 65–99)
GLUCOSE UR STRIP-MCNC: NEGATIVE MG/DL
HCT VFR BLD AUTO: 31.9 % (ref 37.5–51)
HGB BLD-MCNC: 11 G/DL (ref 13–17.7)
HGB UR QL STRIP.AUTO: NEGATIVE
INR PPP: 1.04 (ref 0.9–1.1)
KETONES UR QL STRIP: NEGATIVE
LEUKOCYTE ESTERASE UR QL STRIP.AUTO: NEGATIVE
LYMPHOCYTES # BLD AUTO: 1.1 10*3/MM3 (ref 0.7–3.1)
LYMPHOCYTES NFR BLD AUTO: 18.1 % (ref 19.6–45.3)
MCH RBC QN AUTO: 33.7 PG (ref 26.6–33)
MCHC RBC AUTO-ENTMCNC: 34.4 G/DL (ref 31.5–35.7)
MCV RBC AUTO: 98 FL (ref 79–97)
MONOCYTES # BLD AUTO: 0.8 10*3/MM3 (ref 0.1–0.9)
MONOCYTES NFR BLD AUTO: 12.8 % (ref 5–12)
NEUTROPHILS # BLD AUTO: 3.8 10*3/MM3 (ref 1.7–7)
NEUTROPHILS NFR BLD AUTO: 62.9 % (ref 42.7–76)
NITRITE UR QL STRIP: NEGATIVE
NRBC BLD AUTO-RTO: 0 /100 WBC (ref 0–0.2)
PH UR STRIP.AUTO: 5.5 [PH] (ref 5–8)
PLATELET # BLD AUTO: 235 10*3/MM3 (ref 140–450)
PMV BLD AUTO: 7 FL (ref 6–12)
POTASSIUM BLD-SCNC: 3.7 MMOL/L (ref 3.6–5.1)
PROT UR QL STRIP: NEGATIVE
PROTHROMBIN TIME: 10.7 SECONDS (ref 9.6–11.7)
RBC # BLD AUTO: 3.25 10*6/MM3 (ref 4.14–5.8)
SODIUM BLD-SCNC: 139 MMOL/L (ref 136–144)
SP GR UR STRIP: 1.01 (ref 1–1.03)
UROBILINOGEN UR QL STRIP: NORMAL
WBC NRBC COR # BLD: 6.1 10*3/MM3 (ref 3.4–10.8)

## 2019-06-19 PROCEDURE — 80053 COMPREHEN METABOLIC PANEL: CPT | Performed by: THORACIC SURGERY (CARDIOTHORACIC VASCULAR SURGERY)

## 2019-06-19 PROCEDURE — 85025 COMPLETE CBC W/AUTO DIFF WBC: CPT | Performed by: THORACIC SURGERY (CARDIOTHORACIC VASCULAR SURGERY)

## 2019-06-19 PROCEDURE — 36415 COLL VENOUS BLD VENIPUNCTURE: CPT | Performed by: THORACIC SURGERY (CARDIOTHORACIC VASCULAR SURGERY)

## 2019-06-19 PROCEDURE — 71046 X-RAY EXAM CHEST 2 VIEWS: CPT

## 2019-06-19 PROCEDURE — 85610 PROTHROMBIN TIME: CPT | Performed by: THORACIC SURGERY (CARDIOTHORACIC VASCULAR SURGERY)

## 2019-06-19 PROCEDURE — 81003 URINALYSIS AUTO W/O SCOPE: CPT | Performed by: THORACIC SURGERY (CARDIOTHORACIC VASCULAR SURGERY)

## 2019-06-19 PROCEDURE — 85730 THROMBOPLASTIN TIME PARTIAL: CPT | Performed by: THORACIC SURGERY (CARDIOTHORACIC VASCULAR SURGERY)

## 2019-06-19 RX ORDER — BUPROPION HYDROCHLORIDE 150 MG/1
300 TABLET, EXTENDED RELEASE ORAL 2 TIMES DAILY
COMMUNITY
End: 2021-03-08

## 2019-06-19 RX ORDER — PANTOPRAZOLE SODIUM 40 MG/1
40 TABLET, DELAYED RELEASE ORAL DAILY PRN
COMMUNITY

## 2019-06-19 RX ORDER — METOPROLOL SUCCINATE 100 MG/1
100 TABLET, EXTENDED RELEASE ORAL DAILY
COMMUNITY

## 2019-06-19 RX ORDER — ESCITALOPRAM OXALATE 20 MG/1
20 TABLET ORAL DAILY
COMMUNITY
End: 2019-09-05

## 2019-06-19 RX ORDER — AMLODIPINE BESYLATE 10 MG/1
10 TABLET ORAL DAILY
COMMUNITY
End: 2022-12-05

## 2019-06-19 RX ORDER — POTASSIUM CHLORIDE 750 MG/1
10 TABLET, EXTENDED RELEASE ORAL DAILY
COMMUNITY
End: 2019-09-05

## 2019-06-19 RX ORDER — PRAVASTATIN SODIUM 10 MG
10 TABLET ORAL NIGHTLY
COMMUNITY
End: 2022-12-05

## 2019-06-20 ENCOUNTER — TELEPHONE (OUTPATIENT)
Dept: RADIATION ONCOLOGY | Facility: HOSPITAL | Age: 54
End: 2019-06-20

## 2019-06-20 LAB
ALBUMIN SERPL-MCNC: 4.1 G/DL (ref 3.5–4.8)
ALBUMIN/GLOB SERPL: 1.8 G/DL (ref 1–1.7)
ALP SERPL-CCNC: 55 U/L (ref 32–91)
ALT SERPL W P-5'-P-CCNC: 22 U/L (ref 17–63)
ANION GAP SERPL CALCULATED.3IONS-SCNC: 15 MMOL/L (ref 10–20)
AST SERPL-CCNC: 25 U/L (ref 15–41)
BILIRUB SERPL-MCNC: 0.4 MG/DL (ref 0.3–1.2)
BUN BLD-MCNC: 13 MG/DL (ref 8–20)
BUN/CREAT SERPL: 10.8 (ref 6.2–20.3)
CALCIUM SPEC-SCNC: 9.4 MG/DL (ref 8.9–10.3)
CHLORIDE SERPL-SCNC: 102 MMOL/L (ref 101–111)
CO2 SERPL-SCNC: 21 MMOL/L (ref 22–32)
CREAT BLD-MCNC: 1.2 MG/DL (ref 0.7–1.2)
GFR SERPL CREATININE-BSD FRML MDRD: 63 ML/MIN/1.73
GLOBULIN UR ELPH-MCNC: 2.3 GM/DL (ref 2.5–3.8)
GLUCOSE BLD-MCNC: 84 MG/DL (ref 65–99)
POTASSIUM BLD-SCNC: 3.8 MMOL/L (ref 3.6–5.1)
PROT SERPL-MCNC: 6.4 G/DL (ref 6.1–7.9)
SODIUM BLD-SCNC: 138 MMOL/L (ref 136–144)

## 2019-06-25 ENCOUNTER — APPOINTMENT (OUTPATIENT)
Dept: LAB | Facility: HOSPITAL | Age: 54
End: 2019-06-25

## 2019-06-25 LAB
ABO GROUP BLD: NORMAL
BLD GP AB SCN SERPL QL: NEGATIVE
RH BLD: POSITIVE
T&S EXPIRATION DATE: NORMAL

## 2019-06-25 PROCEDURE — 86850 RBC ANTIBODY SCREEN: CPT | Performed by: THORACIC SURGERY (CARDIOTHORACIC VASCULAR SURGERY)

## 2019-06-25 PROCEDURE — 86901 BLOOD TYPING SEROLOGIC RH(D): CPT

## 2019-06-25 PROCEDURE — 36415 COLL VENOUS BLD VENIPUNCTURE: CPT | Performed by: THORACIC SURGERY (CARDIOTHORACIC VASCULAR SURGERY)

## 2019-06-25 PROCEDURE — 86923 COMPATIBILITY TEST ELECTRIC: CPT

## 2019-06-25 PROCEDURE — 86900 BLOOD TYPING SEROLOGIC ABO: CPT | Performed by: THORACIC SURGERY (CARDIOTHORACIC VASCULAR SURGERY)

## 2019-06-25 PROCEDURE — 86900 BLOOD TYPING SEROLOGIC ABO: CPT

## 2019-06-25 PROCEDURE — 86901 BLOOD TYPING SEROLOGIC RH(D): CPT | Performed by: THORACIC SURGERY (CARDIOTHORACIC VASCULAR SURGERY)

## 2019-06-26 ENCOUNTER — ANESTHESIA EVENT (OUTPATIENT)
Dept: PERIOP | Facility: HOSPITAL | Age: 54
End: 2019-06-26

## 2019-06-26 ENCOUNTER — ANESTHESIA (OUTPATIENT)
Dept: PERIOP | Facility: HOSPITAL | Age: 54
End: 2019-06-26

## 2019-06-26 ENCOUNTER — HOSPITAL ENCOUNTER (INPATIENT)
Facility: HOSPITAL | Age: 54
LOS: 2 days | Discharge: HOME | End: 2019-06-28
Attending: THORACIC SURGERY (CARDIOTHORACIC VASCULAR SURGERY) | Admitting: THORACIC SURGERY (CARDIOTHORACIC VASCULAR SURGERY)

## 2019-06-26 DIAGNOSIS — C34.11 MALIGNANT NEOPLASM OF UPPER LOBE OF RIGHT LUNG (HCC): ICD-10-CM

## 2019-06-26 DIAGNOSIS — C34.90 LUNG CANCER (HCC): ICD-10-CM

## 2019-06-26 DIAGNOSIS — C34.31 CANCER OF LOWER LOBE OF RIGHT LUNG (HCC): ICD-10-CM

## 2019-06-26 LAB
ABO + RH BLD: NORMAL
ABO + RH BLD: NORMAL
ALBUMIN SERPL-MCNC: 3 G/DL (ref 3.5–4.8)
ALBUMIN/GLOB SERPL: 1.7 G/DL (ref 1–1.7)
ALP SERPL-CCNC: 37 U/L (ref 32–91)
ALT SERPL W P-5'-P-CCNC: 16 U/L (ref 17–63)
ANION GAP SERPL CALCULATED.3IONS-SCNC: 8 MMOL/L (ref 10–20)
AST SERPL-CCNC: 21 U/L (ref 15–41)
BH BB BLOOD EXPIRATION DATE: NORMAL
BH BB BLOOD EXPIRATION DATE: NORMAL
BH BB BLOOD TYPE BARCODE: 6200
BH BB BLOOD TYPE BARCODE: 6200
BH BB DISPENSE STATUS: NORMAL
BH BB DISPENSE STATUS: NORMAL
BH BB PRODUCT CODE: NORMAL
BH BB PRODUCT CODE: NORMAL
BH BB UNIT NUMBER: NORMAL
BH BB UNIT NUMBER: NORMAL
BILIRUB SERPL-MCNC: 0.6 MG/DL (ref 0.3–1.2)
BUN BLD-MCNC: 12 MG/DL (ref 8–20)
BUN/CREAT SERPL: 15 (ref 6.2–20.3)
CALCIUM SPEC-SCNC: 6.6 MG/DL (ref 8.9–10.3)
CHLORIDE SERPL-SCNC: 112 MMOL/L (ref 101–111)
CO2 SERPL-SCNC: 20 MMOL/L (ref 22–32)
CREAT BLD-MCNC: 0.8 MG/DL (ref 0.7–1.2)
DEPRECATED RDW RBC AUTO: 53.4 FL (ref 37–54)
ERYTHROCYTE [DISTWIDTH] IN BLOOD BY AUTOMATED COUNT: 15.8 % (ref 12.3–15.4)
GFR SERPL CREATININE-BSD FRML MDRD: 101 ML/MIN/1.73
GLOBULIN UR ELPH-MCNC: 1.8 GM/DL (ref 2.5–3.8)
GLUCOSE BLD-MCNC: 120 MG/DL (ref 65–99)
HCT VFR BLD AUTO: 23.3 % (ref 37.5–51)
HGB BLD-MCNC: 8 G/DL (ref 13–17.7)
MAGNESIUM SERPL-MCNC: 1.2 MG/DL (ref 1.8–2.5)
MAGNESIUM SERPL-MCNC: 2.1 MG/DL (ref 1.8–2.5)
MCH RBC QN AUTO: 33.9 PG (ref 26.6–33)
MCHC RBC AUTO-ENTMCNC: 34.4 G/DL (ref 31.5–35.7)
MCV RBC AUTO: 98.3 FL (ref 79–97)
PHOSPHATE SERPL-MCNC: 3.2 MG/DL (ref 2.4–4.7)
PLATELET # BLD AUTO: 154 10*3/MM3 (ref 140–450)
PMV BLD AUTO: 6.9 FL (ref 6–12)
POTASSIUM BLD-SCNC: 3 MMOL/L (ref 3.6–5.1)
POTASSIUM BLD-SCNC: 4.4 MMOL/L (ref 3.6–5.1)
PROT SERPL-MCNC: 4.8 G/DL (ref 6.1–7.9)
RBC # BLD AUTO: 2.37 10*6/MM3 (ref 4.14–5.8)
SODIUM BLD-SCNC: 140 MMOL/L (ref 136–144)
UNIT  ABO: NORMAL
UNIT  ABO: NORMAL
UNIT  RH: NORMAL
UNIT  RH: NORMAL
WBC NRBC COR # BLD: 8.1 10*3/MM3 (ref 3.4–10.8)

## 2019-06-26 PROCEDURE — 07B70ZX EXCISION OF THORAX LYMPHATIC, OPEN APPROACH, DIAGNOSTIC: ICD-10-PCS | Performed by: THORACIC SURGERY (CARDIOTHORACIC VASCULAR SURGERY)

## 2019-06-26 PROCEDURE — 88305 TISSUE EXAM BY PATHOLOGIST: CPT | Performed by: THORACIC SURGERY (CARDIOTHORACIC VASCULAR SURGERY)

## 2019-06-26 PROCEDURE — C9290 INJ, BUPIVACAINE LIPOSOME: HCPCS | Performed by: THORACIC SURGERY (CARDIOTHORACIC VASCULAR SURGERY)

## 2019-06-26 PROCEDURE — 88331 PATH CONSLTJ SURG 1 BLK 1SPC: CPT | Performed by: THORACIC SURGERY (CARDIOTHORACIC VASCULAR SURGERY)

## 2019-06-26 PROCEDURE — 84100 ASSAY OF PHOSPHORUS: CPT | Performed by: NURSE PRACTITIONER

## 2019-06-26 PROCEDURE — 0BJK4ZZ INSPECTION OF RIGHT LUNG, PERCUTANEOUS ENDOSCOPIC APPROACH: ICD-10-PCS | Performed by: THORACIC SURGERY (CARDIOTHORACIC VASCULAR SURGERY)

## 2019-06-26 PROCEDURE — 83735 ASSAY OF MAGNESIUM: CPT | Performed by: NURSE PRACTITIONER

## 2019-06-26 PROCEDURE — 25010000002 MAGNESIUM SULFATE 2 GM/50ML SOLUTION: Performed by: NURSE PRACTITIONER

## 2019-06-26 PROCEDURE — 94799 UNLISTED PULMONARY SVC/PX: CPT

## 2019-06-26 PROCEDURE — 25010000002 PROPOFOL 10 MG/ML EMULSION: Performed by: ANESTHESIOLOGY

## 2019-06-26 PROCEDURE — P9041 ALBUMIN (HUMAN),5%, 50ML: HCPCS | Performed by: ANESTHESIOLOGY

## 2019-06-26 PROCEDURE — 85027 COMPLETE CBC AUTOMATED: CPT | Performed by: NURSE PRACTITIONER

## 2019-06-26 PROCEDURE — 88309 TISSUE EXAM BY PATHOLOGIST: CPT | Performed by: THORACIC SURGERY (CARDIOTHORACIC VASCULAR SURGERY)

## 2019-06-26 PROCEDURE — 25010000002 KETOROLAC TROMETHAMINE PER 15 MG: Performed by: ANESTHESIOLOGY

## 2019-06-26 PROCEDURE — 25010000002 NEOSTIGMINE 10 MG/10ML SOLUTION: Performed by: ANESTHESIOLOGY

## 2019-06-26 PROCEDURE — 38746 REMOVE THORACIC LYMPH NODES: CPT | Performed by: THORACIC SURGERY (CARDIOTHORACIC VASCULAR SURGERY)

## 2019-06-26 PROCEDURE — 97163 PT EVAL HIGH COMPLEX 45 MIN: CPT

## 2019-06-26 PROCEDURE — 32505 WEDGE RESECT OF LUNG INITIAL: CPT | Performed by: THORACIC SURGERY (CARDIOTHORACIC VASCULAR SURGERY)

## 2019-06-26 PROCEDURE — 25010000002 ALBUMIN HUMAN 5% PER 50 ML: Performed by: ANESTHESIOLOGY

## 2019-06-26 PROCEDURE — 25010000003 CEFAZOLIN PER 500 MG: Performed by: ANESTHESIOLOGY

## 2019-06-26 PROCEDURE — 0BTF0ZZ RESECTION OF RIGHT LOWER LUNG LOBE, OPEN APPROACH: ICD-10-PCS | Performed by: THORACIC SURGERY (CARDIOTHORACIC VASCULAR SURGERY)

## 2019-06-26 PROCEDURE — 25010000002 HYDROMORPHONE PER 4 MG: Performed by: ANESTHESIOLOGY

## 2019-06-26 PROCEDURE — 25010000002 FENTANYL CITRATE (PF) 100 MCG/2ML SOLUTION: Performed by: ANESTHESIOLOGY

## 2019-06-26 PROCEDURE — 25010000002 DEXAMETHASONE PER 1 MG: Performed by: ANESTHESIOLOGY

## 2019-06-26 PROCEDURE — 25010000003 BUPIVACAINE LIPOSOME 1.3 % SUSPENSION 20 ML VIAL: Performed by: THORACIC SURGERY (CARDIOTHORACIC VASCULAR SURGERY)

## 2019-06-26 PROCEDURE — C2615 SEALANT, PULMONARY, LIQUID: HCPCS | Performed by: THORACIC SURGERY (CARDIOTHORACIC VASCULAR SURGERY)

## 2019-06-26 PROCEDURE — 0BBD0ZZ EXCISION OF RIGHT MIDDLE LUNG LOBE, OPEN APPROACH: ICD-10-PCS | Performed by: THORACIC SURGERY (CARDIOTHORACIC VASCULAR SURGERY)

## 2019-06-26 PROCEDURE — 25010000002 ONDANSETRON PER 1 MG: Performed by: ANESTHESIOLOGY

## 2019-06-26 PROCEDURE — 88307 TISSUE EXAM BY PATHOLOGIST: CPT | Performed by: THORACIC SURGERY (CARDIOTHORACIC VASCULAR SURGERY)

## 2019-06-26 PROCEDURE — 80053 COMPREHEN METABOLIC PANEL: CPT | Performed by: NURSE PRACTITIONER

## 2019-06-26 PROCEDURE — 88311 DECALCIFY TISSUE: CPT | Performed by: THORACIC SURGERY (CARDIOTHORACIC VASCULAR SURGERY)

## 2019-06-26 PROCEDURE — 84132 ASSAY OF SERUM POTASSIUM: CPT | Performed by: NURSE PRACTITIONER

## 2019-06-26 PROCEDURE — 25010000002 SUCCINYLCHOLINE PER 20 MG: Performed by: ANESTHESIOLOGY

## 2019-06-26 PROCEDURE — 88304 TISSUE EXAM BY PATHOLOGIST: CPT | Performed by: THORACIC SURGERY (CARDIOTHORACIC VASCULAR SURGERY)

## 2019-06-26 PROCEDURE — 32480 PARTIAL REMOVAL OF LUNG: CPT | Performed by: THORACIC SURGERY (CARDIOTHORACIC VASCULAR SURGERY)

## 2019-06-26 DEVICE — ARTICULATING RELOAD WITH TRI-STAPLE TECHNOLOGY
Type: IMPLANTABLE DEVICE | Site: CHEST | Status: FUNCTIONAL
Brand: ENDO GIA

## 2019-06-26 DEVICE — ARTICULATION RELOAD WITH TRI-STAPLE TECHNOLOGY
Type: IMPLANTABLE DEVICE | Site: CHEST | Status: FUNCTIONAL
Brand: ENDO GIA

## 2019-06-26 DEVICE — CURVED TIP INTELLIGENT RELOAD AND INTRODUCER
Type: IMPLANTABLE DEVICE | Site: CHEST | Status: FUNCTIONAL
Brand: TRI-STAPLE 2.0

## 2019-06-26 DEVICE — SEALANT PLURAL AIRLEAK PROGEL W/APPL 4ML: Type: IMPLANTABLE DEVICE | Site: CHEST | Status: FUNCTIONAL

## 2019-06-26 RX ORDER — DIPHENHYDRAMINE HCL 25 MG
25 TABLET ORAL
Status: DISCONTINUED | OUTPATIENT
Start: 2019-06-26 | End: 2019-06-26

## 2019-06-26 RX ORDER — SODIUM CHLORIDE 0.9 % (FLUSH) 0.9 %
3-10 SYRINGE (ML) INJECTION AS NEEDED
Status: DISCONTINUED | OUTPATIENT
Start: 2019-06-26 | End: 2019-06-28 | Stop reason: HOSPADM

## 2019-06-26 RX ORDER — SODIUM CHLORIDE 0.9 % (FLUSH) 0.9 %
3 SYRINGE (ML) INJECTION AS NEEDED
Status: DISCONTINUED | OUTPATIENT
Start: 2019-06-26 | End: 2019-06-26 | Stop reason: HOSPADM

## 2019-06-26 RX ORDER — HYDRALAZINE HYDROCHLORIDE 20 MG/ML
5 INJECTION INTRAMUSCULAR; INTRAVENOUS
Status: DISCONTINUED | OUTPATIENT
Start: 2019-06-26 | End: 2019-06-26

## 2019-06-26 RX ORDER — MORPHINE SULFATE 4 MG/ML
4 INJECTION, SOLUTION INTRAMUSCULAR; INTRAVENOUS EVERY 4 HOURS PRN
Status: DISCONTINUED | OUTPATIENT
Start: 2019-06-26 | End: 2019-06-28 | Stop reason: HOSPADM

## 2019-06-26 RX ORDER — EPHEDRINE SULFATE 50 MG/ML
5 INJECTION, SOLUTION INTRAVENOUS ONCE AS NEEDED
Status: COMPLETED | OUTPATIENT
Start: 2019-06-26 | End: 2019-06-26

## 2019-06-26 RX ORDER — METOPROLOL SUCCINATE 50 MG/1
100 TABLET, EXTENDED RELEASE ORAL DAILY
Status: DISCONTINUED | OUTPATIENT
Start: 2019-06-26 | End: 2019-06-26

## 2019-06-26 RX ORDER — NALOXONE HCL 0.4 MG/ML
0.4 VIAL (ML) INJECTION
Status: DISCONTINUED | OUTPATIENT
Start: 2019-06-26 | End: 2019-06-28 | Stop reason: HOSPADM

## 2019-06-26 RX ORDER — SODIUM CHLORIDE, SODIUM LACTATE, POTASSIUM CHLORIDE, CALCIUM CHLORIDE 600; 310; 30; 20 MG/100ML; MG/100ML; MG/100ML; MG/100ML
9 INJECTION, SOLUTION INTRAVENOUS CONTINUOUS
Status: DISCONTINUED | OUTPATIENT
Start: 2019-06-26 | End: 2019-06-28 | Stop reason: HOSPADM

## 2019-06-26 RX ORDER — SODIUM CHLORIDE 9 MG/ML
75 INJECTION, SOLUTION INTRAVENOUS CONTINUOUS
Status: DISPENSED | OUTPATIENT
Start: 2019-06-26 | End: 2019-06-27

## 2019-06-26 RX ORDER — ONDANSETRON 2 MG/ML
4 INJECTION INTRAMUSCULAR; INTRAVENOUS EVERY 6 HOURS PRN
Status: DISCONTINUED | OUTPATIENT
Start: 2019-06-26 | End: 2019-06-28 | Stop reason: HOSPADM

## 2019-06-26 RX ORDER — SODIUM CHLORIDE 9 MG/ML
INJECTION, SOLUTION INTRAVENOUS CONTINUOUS PRN
Status: DISCONTINUED | OUTPATIENT
Start: 2019-06-26 | End: 2019-06-26 | Stop reason: SURG

## 2019-06-26 RX ORDER — GLYCOPYRROLATE 0.2 MG/ML
INJECTION INTRAMUSCULAR; INTRAVENOUS AS NEEDED
Status: DISCONTINUED | OUTPATIENT
Start: 2019-06-26 | End: 2019-06-26 | Stop reason: SURG

## 2019-06-26 RX ORDER — PROMETHAZINE HYDROCHLORIDE 25 MG/1
25 TABLET ORAL ONCE AS NEEDED
Status: DISCONTINUED | OUTPATIENT
Start: 2019-06-26 | End: 2019-06-26

## 2019-06-26 RX ORDER — ONDANSETRON 2 MG/ML
4 INJECTION INTRAMUSCULAR; INTRAVENOUS ONCE AS NEEDED
Status: DISCONTINUED | OUTPATIENT
Start: 2019-06-26 | End: 2019-06-26

## 2019-06-26 RX ORDER — HYDROMORPHONE HCL 110MG/55ML
0.5 PATIENT CONTROLLED ANALGESIA SYRINGE INTRAVENOUS
Status: DISCONTINUED | OUTPATIENT
Start: 2019-06-26 | End: 2019-06-26

## 2019-06-26 RX ORDER — SODIUM CHLORIDE, SODIUM LACTATE, POTASSIUM CHLORIDE, CALCIUM CHLORIDE 600; 310; 30; 20 MG/100ML; MG/100ML; MG/100ML; MG/100ML
1000 INJECTION, SOLUTION INTRAVENOUS CONTINUOUS
Status: DISCONTINUED | OUTPATIENT
Start: 2019-06-26 | End: 2019-06-26

## 2019-06-26 RX ORDER — IBUPROFEN 600 MG/1
600 TABLET ORAL EVERY 8 HOURS SCHEDULED
Status: DISCONTINUED | OUTPATIENT
Start: 2019-06-26 | End: 2019-06-28 | Stop reason: HOSPADM

## 2019-06-26 RX ORDER — MAGNESIUM SULFATE HEPTAHYDRATE 40 MG/ML
2 INJECTION, SOLUTION INTRAVENOUS ONCE
Status: COMPLETED | OUTPATIENT
Start: 2019-06-26 | End: 2019-06-26

## 2019-06-26 RX ORDER — SODIUM CHLORIDE 0.9 % (FLUSH) 0.9 %
1-10 SYRINGE (ML) INJECTION AS NEEDED
Status: DISCONTINUED | OUTPATIENT
Start: 2019-06-26 | End: 2019-06-26 | Stop reason: HOSPADM

## 2019-06-26 RX ORDER — SODIUM CHLORIDE 0.9 % (FLUSH) 0.9 %
3 SYRINGE (ML) INJECTION EVERY 12 HOURS SCHEDULED
Status: DISCONTINUED | OUTPATIENT
Start: 2019-06-26 | End: 2019-06-28 | Stop reason: HOSPADM

## 2019-06-26 RX ORDER — PROMETHAZINE HYDROCHLORIDE 25 MG/1
25 SUPPOSITORY RECTAL ONCE AS NEEDED
Status: DISCONTINUED | OUTPATIENT
Start: 2019-06-26 | End: 2019-06-26

## 2019-06-26 RX ORDER — KETOROLAC TROMETHAMINE 30 MG/ML
INJECTION, SOLUTION INTRAMUSCULAR; INTRAVENOUS AS NEEDED
Status: DISCONTINUED | OUTPATIENT
Start: 2019-06-26 | End: 2019-06-26 | Stop reason: SURG

## 2019-06-26 RX ORDER — FENTANYL CITRATE 50 UG/ML
INJECTION, SOLUTION INTRAMUSCULAR; INTRAVENOUS AS NEEDED
Status: DISCONTINUED | OUTPATIENT
Start: 2019-06-26 | End: 2019-06-26 | Stop reason: SURG

## 2019-06-26 RX ORDER — ACETAMINOPHEN 325 MG/1
650 TABLET ORAL ONCE AS NEEDED
Status: DISCONTINUED | OUTPATIENT
Start: 2019-06-26 | End: 2019-06-26

## 2019-06-26 RX ORDER — LIDOCAINE HYDROCHLORIDE 10 MG/ML
0.5 INJECTION, SOLUTION INFILTRATION; PERINEURAL ONCE AS NEEDED
Status: DISCONTINUED | OUTPATIENT
Start: 2019-06-26 | End: 2019-06-26 | Stop reason: HOSPADM

## 2019-06-26 RX ORDER — ROCURONIUM BROMIDE 10 MG/ML
INJECTION, SOLUTION INTRAVENOUS AS NEEDED
Status: DISCONTINUED | OUTPATIENT
Start: 2019-06-26 | End: 2019-06-26 | Stop reason: SURG

## 2019-06-26 RX ORDER — ALBUMIN, HUMAN INJ 5% 5 %
SOLUTION INTRAVENOUS CONTINUOUS PRN
Status: DISCONTINUED | OUTPATIENT
Start: 2019-06-26 | End: 2019-06-26 | Stop reason: SURG

## 2019-06-26 RX ORDER — CEFAZOLIN SODIUM 1 G/3ML
INJECTION, POWDER, FOR SOLUTION INTRAMUSCULAR; INTRAVENOUS AS NEEDED
Status: DISCONTINUED | OUTPATIENT
Start: 2019-06-26 | End: 2019-06-26 | Stop reason: SURG

## 2019-06-26 RX ORDER — ONDANSETRON 2 MG/ML
INJECTION INTRAMUSCULAR; INTRAVENOUS AS NEEDED
Status: DISCONTINUED | OUTPATIENT
Start: 2019-06-26 | End: 2019-06-26 | Stop reason: SURG

## 2019-06-26 RX ORDER — ACETAMINOPHEN 650 MG/1
650 SUPPOSITORY RECTAL ONCE AS NEEDED
Status: DISCONTINUED | OUTPATIENT
Start: 2019-06-26 | End: 2019-06-26

## 2019-06-26 RX ORDER — POTASSIUM CHLORIDE 20 MEQ/1
40 TABLET, EXTENDED RELEASE ORAL ONCE
Status: COMPLETED | OUTPATIENT
Start: 2019-06-26 | End: 2019-06-26

## 2019-06-26 RX ORDER — PROPOFOL 10 MG/ML
VIAL (ML) INTRAVENOUS AS NEEDED
Status: DISCONTINUED | OUTPATIENT
Start: 2019-06-26 | End: 2019-06-26 | Stop reason: SURG

## 2019-06-26 RX ORDER — HYDROCODONE BITARTRATE AND ACETAMINOPHEN 7.5; 325 MG/1; MG/1
2 TABLET ORAL EVERY 4 HOURS PRN
Status: DISCONTINUED | OUTPATIENT
Start: 2019-06-26 | End: 2019-06-28 | Stop reason: HOSPADM

## 2019-06-26 RX ORDER — SUCCINYLCHOLINE CHLORIDE 20 MG/ML
INJECTION INTRAMUSCULAR; INTRAVENOUS AS NEEDED
Status: DISCONTINUED | OUTPATIENT
Start: 2019-06-26 | End: 2019-06-26 | Stop reason: SURG

## 2019-06-26 RX ORDER — LABETALOL HYDROCHLORIDE 5 MG/ML
5 INJECTION, SOLUTION INTRAVENOUS
Status: DISCONTINUED | OUTPATIENT
Start: 2019-06-26 | End: 2019-06-26

## 2019-06-26 RX ORDER — MIDAZOLAM HYDROCHLORIDE 1 MG/ML
2 INJECTION INTRAMUSCULAR; INTRAVENOUS
Status: DISCONTINUED | OUTPATIENT
Start: 2019-06-26 | End: 2019-06-26 | Stop reason: HOSPADM

## 2019-06-26 RX ORDER — LIDOCAINE HYDROCHLORIDE 20 MG/ML
INJECTION, SOLUTION EPIDURAL; INFILTRATION; INTRACAUDAL; PERINEURAL AS NEEDED
Status: DISCONTINUED | OUTPATIENT
Start: 2019-06-26 | End: 2019-06-26 | Stop reason: SURG

## 2019-06-26 RX ORDER — ONDANSETRON 4 MG/1
4 TABLET, FILM COATED ORAL EVERY 6 HOURS PRN
Status: DISCONTINUED | OUTPATIENT
Start: 2019-06-26 | End: 2019-06-28 | Stop reason: HOSPADM

## 2019-06-26 RX ORDER — ACETAMINOPHEN 325 MG/1
650 TABLET ORAL EVERY 4 HOURS PRN
Status: DISCONTINUED | OUTPATIENT
Start: 2019-06-26 | End: 2019-06-28 | Stop reason: HOSPADM

## 2019-06-26 RX ORDER — LIDOCAINE HYDROCHLORIDE 10 MG/ML
0.5 INJECTION, SOLUTION EPIDURAL; INFILTRATION; INTRACAUDAL; PERINEURAL ONCE AS NEEDED
Status: DISCONTINUED | OUTPATIENT
Start: 2019-06-26 | End: 2019-06-26 | Stop reason: HOSPADM

## 2019-06-26 RX ORDER — FLUMAZENIL 0.1 MG/ML
0.2 INJECTION INTRAVENOUS AS NEEDED
Status: DISCONTINUED | OUTPATIENT
Start: 2019-06-26 | End: 2019-06-26

## 2019-06-26 RX ORDER — HEPARIN SODIUM 5000 [USP'U]/ML
5000 INJECTION, SOLUTION INTRAVENOUS; SUBCUTANEOUS EVERY 12 HOURS SCHEDULED
Status: DISCONTINUED | OUTPATIENT
Start: 2019-06-27 | End: 2019-06-28 | Stop reason: HOSPADM

## 2019-06-26 RX ORDER — DIPHENHYDRAMINE HYDROCHLORIDE 50 MG/ML
12.5 INJECTION INTRAMUSCULAR; INTRAVENOUS
Status: DISCONTINUED | OUTPATIENT
Start: 2019-06-26 | End: 2019-06-26

## 2019-06-26 RX ORDER — NALOXONE HCL 0.4 MG/ML
0.2 VIAL (ML) INJECTION AS NEEDED
Status: DISCONTINUED | OUTPATIENT
Start: 2019-06-26 | End: 2019-06-26

## 2019-06-26 RX ORDER — PANTOPRAZOLE SODIUM 40 MG/1
40 TABLET, DELAYED RELEASE ORAL DAILY
Status: DISCONTINUED | OUTPATIENT
Start: 2019-06-26 | End: 2019-06-28 | Stop reason: HOSPADM

## 2019-06-26 RX ORDER — FENTANYL CITRATE 50 UG/ML
50 INJECTION, SOLUTION INTRAMUSCULAR; INTRAVENOUS
Status: DISCONTINUED | OUTPATIENT
Start: 2019-06-26 | End: 2019-06-26 | Stop reason: HOSPADM

## 2019-06-26 RX ORDER — PROMETHAZINE HYDROCHLORIDE 25 MG/ML
12.5 INJECTION, SOLUTION INTRAMUSCULAR; INTRAVENOUS ONCE AS NEEDED
Status: DISCONTINUED | OUTPATIENT
Start: 2019-06-26 | End: 2019-06-26

## 2019-06-26 RX ORDER — POTASSIUM CHLORIDE 750 MG/1
10 TABLET, EXTENDED RELEASE ORAL DAILY
Status: DISCONTINUED | OUTPATIENT
Start: 2019-06-26 | End: 2019-06-28 | Stop reason: HOSPADM

## 2019-06-26 RX ORDER — MORPHINE SULFATE 4 MG/ML
2 INJECTION, SOLUTION INTRAMUSCULAR; INTRAVENOUS EVERY 4 HOURS PRN
Status: DISCONTINUED | OUTPATIENT
Start: 2019-06-26 | End: 2019-06-28 | Stop reason: HOSPADM

## 2019-06-26 RX ORDER — NEOSTIGMINE METHYLSULFATE 1 MG/ML
INJECTION, SOLUTION INTRAVENOUS AS NEEDED
Status: DISCONTINUED | OUTPATIENT
Start: 2019-06-26 | End: 2019-06-26 | Stop reason: SURG

## 2019-06-26 RX ORDER — DEXAMETHASONE SODIUM PHOSPHATE 4 MG/ML
INJECTION, SOLUTION INTRA-ARTICULAR; INTRALESIONAL; INTRAMUSCULAR; INTRAVENOUS; SOFT TISSUE AS NEEDED
Status: DISCONTINUED | OUTPATIENT
Start: 2019-06-26 | End: 2019-06-26 | Stop reason: SURG

## 2019-06-26 RX ADMIN — SODIUM CHLORIDE 500 ML: 0.9 INJECTION, SOLUTION INTRAVENOUS at 16:41

## 2019-06-26 RX ADMIN — FENTANYL CITRATE 125 MCG: 50 INJECTION, SOLUTION INTRAMUSCULAR; INTRAVENOUS at 08:10

## 2019-06-26 RX ADMIN — ONDANSETRON 4 MG: 2 INJECTION INTRAMUSCULAR; INTRAVENOUS at 08:15

## 2019-06-26 RX ADMIN — ROCURONIUM BROMIDE 15 MG: 10 INJECTION, SOLUTION INTRAVENOUS at 09:15

## 2019-06-26 RX ADMIN — ONDANSETRON 4 MG: 2 INJECTION INTRAMUSCULAR; INTRAVENOUS at 10:00

## 2019-06-26 RX ADMIN — FENTANYL CITRATE 100 MCG: 50 INJECTION, SOLUTION INTRAMUSCULAR; INTRAVENOUS at 10:43

## 2019-06-26 RX ADMIN — ROCURONIUM BROMIDE 5 MG: 10 INJECTION, SOLUTION INTRAVENOUS at 07:40

## 2019-06-26 RX ADMIN — LIDOCAINE HYDROCHLORIDE 50 MG: 20 INJECTION, SOLUTION EPIDURAL; INFILTRATION; INTRACAUDAL; PERINEURAL at 07:40

## 2019-06-26 RX ADMIN — ROCURONIUM BROMIDE 25 MG: 10 INJECTION, SOLUTION INTRAVENOUS at 08:38

## 2019-06-26 RX ADMIN — SODIUM CHLORIDE 75 ML/HR: 900 INJECTION, SOLUTION INTRAVENOUS at 12:38

## 2019-06-26 RX ADMIN — HYDROMORPHONE HYDROCHLORIDE 0.5 MG: 2 INJECTION INTRAMUSCULAR; INTRAVENOUS; SUBCUTANEOUS at 11:55

## 2019-06-26 RX ADMIN — ROCURONIUM BROMIDE 35 MG: 10 INJECTION, SOLUTION INTRAVENOUS at 07:55

## 2019-06-26 RX ADMIN — ALBUMIN HUMAN: 0.05 INJECTION, SOLUTION INTRAVENOUS at 08:15

## 2019-06-26 RX ADMIN — DEXAMETHASONE SODIUM PHOSPHATE 8 MG: 4 INJECTION, SOLUTION INTRAMUSCULAR; INTRAVENOUS at 08:15

## 2019-06-26 RX ADMIN — FENTANYL CITRATE 125 MCG: 50 INJECTION, SOLUTION INTRAMUSCULAR; INTRAVENOUS at 08:15

## 2019-06-26 RX ADMIN — HYDROCODONE BITARTRATE AND ACETAMINOPHEN 2 TABLET: 7.5; 325 TABLET ORAL at 12:41

## 2019-06-26 RX ADMIN — POTASSIUM CHLORIDE 10 MEQ: 750 TABLET, EXTENDED RELEASE ORAL at 12:36

## 2019-06-26 RX ADMIN — METOPROLOL SUCCINATE 50 MG: 50 TABLET, FILM COATED, EXTENDED RELEASE ORAL at 12:36

## 2019-06-26 RX ADMIN — SUCCINYLCHOLINE CHLORIDE 120 MG: 20 INJECTION, SOLUTION INTRAMUSCULAR; INTRAVENOUS at 07:40

## 2019-06-26 RX ADMIN — IBUPROFEN 600 MG: 400 TABLET ORAL at 21:40

## 2019-06-26 RX ADMIN — FENTANYL CITRATE 125 MCG: 50 INJECTION, SOLUTION INTRAMUSCULAR; INTRAVENOUS at 08:45

## 2019-06-26 RX ADMIN — CEFAZOLIN SODIUM 2 G: 1 INJECTION, POWDER, FOR SOLUTION INTRAMUSCULAR; INTRAVENOUS at 07:40

## 2019-06-26 RX ADMIN — SODIUM CHLORIDE: 0.9 INJECTION, SOLUTION INTRAVENOUS at 07:50

## 2019-06-26 RX ADMIN — SODIUM CHLORIDE, PRESERVATIVE FREE 3 ML: 5 INJECTION INTRAVENOUS at 14:53

## 2019-06-26 RX ADMIN — POTASSIUM CHLORIDE 40 MEQ: 20 TABLET, EXTENDED RELEASE ORAL at 16:40

## 2019-06-26 RX ADMIN — EPHEDRINE SULFATE 12.5 MG: 50 INJECTION, SOLUTION INTRAVENOUS at 07:48

## 2019-06-26 RX ADMIN — SODIUM CHLORIDE, PRESERVATIVE FREE 3 ML: 5 INJECTION INTRAVENOUS at 12:37

## 2019-06-26 RX ADMIN — EPHEDRINE SULFATE 12.5 MG: 50 INJECTION, SOLUTION INTRAVENOUS at 07:43

## 2019-06-26 RX ADMIN — ALBUMIN HUMAN: 0.05 INJECTION, SOLUTION INTRAVENOUS at 08:05

## 2019-06-26 RX ADMIN — KETOROLAC TROMETHAMINE 30 MG: 30 INJECTION, SOLUTION INTRAMUSCULAR at 08:15

## 2019-06-26 RX ADMIN — NEOSTIGMINE METHYLSULFATE 3 MG: 1 INJECTION, SOLUTION INTRAVENOUS at 10:04

## 2019-06-26 RX ADMIN — EPHEDRINE SULFATE 12.5 MG: 50 INJECTION, SOLUTION INTRAVENOUS at 08:25

## 2019-06-26 RX ADMIN — FENTANYL CITRATE 125 MCG: 50 INJECTION, SOLUTION INTRAMUSCULAR; INTRAVENOUS at 07:40

## 2019-06-26 RX ADMIN — PANTOPRAZOLE SODIUM 40 MG: 40 TABLET, DELAYED RELEASE ORAL at 12:36

## 2019-06-26 RX ADMIN — GLYCOPYRROLATE 0.3 MG: 0.2 INJECTION, SOLUTION INTRAMUSCULAR; INTRAVENOUS at 10:04

## 2019-06-26 RX ADMIN — SODIUM CHLORIDE, SODIUM LACTATE, POTASSIUM CHLORIDE, AND CALCIUM CHLORIDE 1000 ML: .6; .31; .03; .02 INJECTION, SOLUTION INTRAVENOUS at 06:45

## 2019-06-26 RX ADMIN — SODIUM CHLORIDE 75 ML/HR: 900 INJECTION, SOLUTION INTRAVENOUS at 21:40

## 2019-06-26 RX ADMIN — MAGNESIUM SULFATE HEPTAHYDRATE 2 G: 40 INJECTION, SOLUTION INTRAVENOUS at 16:41

## 2019-06-26 RX ADMIN — PROPOFOL 200 MG: 10 INJECTION, EMULSION INTRAVENOUS at 07:40

## 2019-06-26 NOTE — ANESTHESIA PREPROCEDURE EVALUATION
Anesthesia Evaluation     Patient summary reviewed and Nursing notes reviewed   NPO Solid Status: > 8 hours  NPO Liquid Status: > 8 hours           Airway   Mallampati: III  TM distance: >3 FB  Neck ROM: full  No difficulty expected  Dental      Pulmonary - normal exam   (+) lung cancer,   Cardiovascular - normal exam    (+) hypertension well controlled, hyperlipidemia,       Neuro/Psych  (+) psychiatric history Depression,     GI/Hepatic/Renal/Endo      Musculoskeletal     Abdominal  - normal exam   Substance History      OB/GYN          Other      history of cancer    ROS/Med Hx Other: Hx lung ca; s/p CTX/RTX  Hgb 11.0; other labs reviewed  Telemetry NSR                Anesthesia Plan    ASA 3     general     intravenous induction   Anesthetic plan, all risks, benefits, and alternatives have been provided, discussed and informed consent has been obtained with: patient.  Use of blood products discussed with patient  Consented to blood products.

## 2019-06-26 NOTE — ANESTHESIA POSTPROCEDURE EVALUATION
Patient: Jc Driscoll    Procedure Summary     Date:  06/26/19 Room / Location:  [facility] OR  / [facility]IN OR    Anesthesia Start:  0731 Anesthesia Stop:  1059    Procedure:  RIGHT VATS, OPEN LOWER LOBECTOMY WITH LYMPH NODE DISECTION, WEDGE RESECTION OF RIGHT MIDDLE LOBE (Right Chest) Diagnosis:  (MEDIASTINAL LYMPHADENOPATHY, LUNG CANCER, RIGHT LOWER LOBE, PULMONARY NODULE, RIGHTLOWER)    Surgeon:  Terrance Fuchs MD Provider:  Matthew Chaney MD    Anesthesia Type:  general ASA Status:  3          Anesthesia Type: general  Last vitals  BP   117/76 (06/26/19 0730)   Temp   97.5 °F (36.4 °C) (06/26/19 0730)   Pulse   71 (06/26/19 0730)   Resp   18 (06/26/19 0730)     SpO2   95 % (06/26/19 0730)     Post Anesthesia Care and Evaluation    Patient location during evaluation: PACU  Patient participation: complete - patient participated  Level of consciousness: awake  Pain management: adequate  Airway patency: patent  Anesthetic complications: No anesthetic complications  PONV Status: none  Cardiovascular status: acceptable  Respiratory status: acceptable  Hydration status: acceptable    Comments: Patient seen and examined postoperatively; vital signs stable; SpO2 greater than or equal to 90%; cardiopulmonary status stable; nausea/vomiting adequately controlled; pain adequately controlled; no apparent anesthesia complications; patient discharged from anesthesia care when discharge criteria were met

## 2019-06-27 ENCOUNTER — APPOINTMENT (OUTPATIENT)
Dept: GENERAL RADIOLOGY | Facility: HOSPITAL | Age: 54
End: 2019-06-27

## 2019-06-27 PROBLEM — R33.9 RETENTION OF URINE: Status: ACTIVE | Noted: 2019-06-27

## 2019-06-27 LAB
ANION GAP SERPL CALCULATED.3IONS-SCNC: 13.2 MMOL/L (ref 10–20)
BUN BLD-MCNC: 10 MG/DL (ref 8–20)
BUN/CREAT SERPL: 10 (ref 6.2–20.3)
CALCIUM SPEC-SCNC: 8.2 MG/DL (ref 8.9–10.3)
CHLORIDE SERPL-SCNC: 106 MMOL/L (ref 101–111)
CO2 SERPL-SCNC: 22 MMOL/L (ref 22–32)
CREAT BLD-MCNC: 1 MG/DL (ref 0.7–1.2)
DEPRECATED RDW RBC AUTO: 54.3 FL (ref 37–54)
ERYTHROCYTE [DISTWIDTH] IN BLOOD BY AUTOMATED COUNT: 15.9 % (ref 12.3–15.4)
GFR SERPL CREATININE-BSD FRML MDRD: 78 ML/MIN/1.73
GLUCOSE BLD-MCNC: 122 MG/DL (ref 65–99)
HCT VFR BLD AUTO: 27.4 % (ref 37.5–51)
HGB BLD-MCNC: 9.3 G/DL (ref 13–17.7)
LAB AP CASE REPORT: NORMAL
LAB AP SYNOPTIC CHECKLIST: NORMAL
Lab: NORMAL
MCH RBC QN AUTO: 33.8 PG (ref 26.6–33)
MCHC RBC AUTO-ENTMCNC: 34 G/DL (ref 31.5–35.7)
MCV RBC AUTO: 99.3 FL (ref 79–97)
PATH REPORT.FINAL DX SPEC: NORMAL
PATH REPORT.GROSS SPEC: NORMAL
PLATELET # BLD AUTO: 189 10*3/MM3 (ref 140–450)
PMV BLD AUTO: 6.8 FL (ref 6–12)
POTASSIUM BLD-SCNC: 4.2 MMOL/L (ref 3.6–5.1)
RBC # BLD AUTO: 2.76 10*6/MM3 (ref 4.14–5.8)
SODIUM BLD-SCNC: 137 MMOL/L (ref 136–144)
WBC NRBC COR # BLD: 9.3 10*3/MM3 (ref 3.4–10.8)

## 2019-06-27 PROCEDURE — 51703 INSERT BLADDER CATH COMPLEX: CPT

## 2019-06-27 PROCEDURE — 25010000002 HEPARIN (PORCINE) PER 1000 UNITS: Performed by: THORACIC SURGERY (CARDIOTHORACIC VASCULAR SURGERY)

## 2019-06-27 PROCEDURE — 51798 US URINE CAPACITY MEASURE: CPT

## 2019-06-27 PROCEDURE — 25010000002 MORPHINE PER 10 MG: Performed by: THORACIC SURGERY (CARDIOTHORACIC VASCULAR SURGERY)

## 2019-06-27 PROCEDURE — 94799 UNLISTED PULMONARY SVC/PX: CPT

## 2019-06-27 PROCEDURE — 85027 COMPLETE CBC AUTOMATED: CPT | Performed by: THORACIC SURGERY (CARDIOTHORACIC VASCULAR SURGERY)

## 2019-06-27 PROCEDURE — 25010000002 HYDROMORPHONE PER 4 MG: Performed by: NURSE PRACTITIONER

## 2019-06-27 PROCEDURE — 80048 BASIC METABOLIC PNL TOTAL CA: CPT | Performed by: THORACIC SURGERY (CARDIOTHORACIC VASCULAR SURGERY)

## 2019-06-27 PROCEDURE — 51701 INSERT BLADDER CATHETER: CPT

## 2019-06-27 PROCEDURE — 99024 POSTOP FOLLOW-UP VISIT: CPT | Performed by: THORACIC SURGERY (CARDIOTHORACIC VASCULAR SURGERY)

## 2019-06-27 PROCEDURE — 71045 X-RAY EXAM CHEST 1 VIEW: CPT

## 2019-06-27 RX ORDER — MORPHINE SULFATE 4 MG/ML
2 INJECTION, SOLUTION INTRAMUSCULAR; INTRAVENOUS ONCE
Status: DISCONTINUED | OUTPATIENT
Start: 2019-06-27 | End: 2019-06-28 | Stop reason: HOSPADM

## 2019-06-27 RX ORDER — BACITRACIN, NEOMYCIN, POLYMYXIN B 400; 3.5; 5 [USP'U]/G; MG/G; [USP'U]/G
OINTMENT TOPICAL ONCE AS NEEDED
Status: COMPLETED | OUTPATIENT
Start: 2019-06-27 | End: 2019-06-27

## 2019-06-27 RX ORDER — TAMSULOSIN HYDROCHLORIDE 0.4 MG/1
0.4 CAPSULE ORAL DAILY
Status: DISCONTINUED | OUTPATIENT
Start: 2019-06-27 | End: 2019-06-28 | Stop reason: HOSPADM

## 2019-06-27 RX ORDER — HYDROMORPHONE HCL 110MG/55ML
1 PATIENT CONTROLLED ANALGESIA SYRINGE INTRAVENOUS ONCE
Status: COMPLETED | OUTPATIENT
Start: 2019-06-27 | End: 2019-06-27

## 2019-06-27 RX ADMIN — HYDROMORPHONE HYDROCHLORIDE 1 MG: 2 INJECTION INTRAMUSCULAR; INTRAVENOUS; SUBCUTANEOUS at 05:39

## 2019-06-27 RX ADMIN — HEPARIN SODIUM 5000 UNITS: 5000 INJECTION INTRAVENOUS; SUBCUTANEOUS at 08:31

## 2019-06-27 RX ADMIN — SODIUM CHLORIDE, PRESERVATIVE FREE 3 ML: 5 INJECTION INTRAVENOUS at 08:35

## 2019-06-27 RX ADMIN — PANTOPRAZOLE SODIUM 40 MG: 40 TABLET, DELAYED RELEASE ORAL at 08:31

## 2019-06-27 RX ADMIN — IBUPROFEN 600 MG: 400 TABLET ORAL at 22:36

## 2019-06-27 RX ADMIN — HYDROCODONE BITARTRATE AND ACETAMINOPHEN 2 TABLET: 7.5; 325 TABLET ORAL at 15:30

## 2019-06-27 RX ADMIN — TAMSULOSIN HYDROCHLORIDE 0.4 MG: 0.4 CAPSULE ORAL at 15:33

## 2019-06-27 RX ADMIN — HEPARIN SODIUM 5000 UNITS: 5000 INJECTION INTRAVENOUS; SUBCUTANEOUS at 22:36

## 2019-06-27 RX ADMIN — BACITRACIN ZINC, NEOMYCIN, POLYMYXIN B: 400; 3.5; 5 OINTMENT TOPICAL at 22:51

## 2019-06-27 RX ADMIN — HYDROCODONE BITARTRATE AND ACETAMINOPHEN 2 TABLET: 7.5; 325 TABLET ORAL at 08:31

## 2019-06-27 RX ADMIN — SODIUM CHLORIDE, PRESERVATIVE FREE 3 ML: 5 INJECTION INTRAVENOUS at 22:37

## 2019-06-27 RX ADMIN — IBUPROFEN 600 MG: 400 TABLET ORAL at 15:32

## 2019-06-27 RX ADMIN — SODIUM CHLORIDE, PRESERVATIVE FREE 3 ML: 5 INJECTION INTRAVENOUS at 08:34

## 2019-06-27 RX ADMIN — MORPHINE SULFATE 2 MG: 4 INJECTION INTRAVENOUS at 02:42

## 2019-06-27 RX ADMIN — POTASSIUM CHLORIDE 10 MEQ: 750 TABLET, EXTENDED RELEASE ORAL at 08:30

## 2019-06-27 RX ADMIN — HYDROCODONE BITARTRATE AND ACETAMINOPHEN 2 TABLET: 7.5; 325 TABLET ORAL at 01:51

## 2019-06-28 ENCOUNTER — APPOINTMENT (OUTPATIENT)
Dept: GENERAL RADIOLOGY | Facility: HOSPITAL | Age: 54
End: 2019-06-28

## 2019-06-28 VITALS
RESPIRATION RATE: 15 BRPM | OXYGEN SATURATION: 96 % | BODY MASS INDEX: 23.46 KG/M2 | DIASTOLIC BLOOD PRESSURE: 82 MMHG | SYSTOLIC BLOOD PRESSURE: 134 MMHG | HEIGHT: 75 IN | TEMPERATURE: 97.6 F | HEART RATE: 77 BPM | WEIGHT: 188.71 LBS

## 2019-06-28 PROCEDURE — 99024 POSTOP FOLLOW-UP VISIT: CPT | Performed by: PHYSICIAN ASSISTANT

## 2019-06-28 PROCEDURE — 71045 X-RAY EXAM CHEST 1 VIEW: CPT

## 2019-06-28 PROCEDURE — 99024 POSTOP FOLLOW-UP VISIT: CPT | Performed by: THORACIC SURGERY (CARDIOTHORACIC VASCULAR SURGERY)

## 2019-06-28 PROCEDURE — 0TJB8ZZ INSPECTION OF BLADDER, VIA NATURAL OR ARTIFICIAL OPENING ENDOSCOPIC: ICD-10-PCS | Performed by: INTERNAL MEDICINE

## 2019-06-28 PROCEDURE — 25010000002 HEPARIN (PORCINE) PER 1000 UNITS: Performed by: THORACIC SURGERY (CARDIOTHORACIC VASCULAR SURGERY)

## 2019-06-28 RX ORDER — ACETAMINOPHEN 325 MG/1
650 TABLET ORAL EVERY 4 HOURS PRN
COMMUNITY
Start: 2019-06-28 | End: 2021-06-01

## 2019-06-28 RX ORDER — IBUPROFEN 600 MG/1
600 TABLET ORAL EVERY 6 HOURS PRN
COMMUNITY
Start: 2019-06-28 | End: 2021-06-01

## 2019-06-28 RX ADMIN — SODIUM CHLORIDE, PRESERVATIVE FREE 3 ML: 5 INJECTION INTRAVENOUS at 09:38

## 2019-06-28 RX ADMIN — POTASSIUM CHLORIDE 10 MEQ: 750 TABLET, EXTENDED RELEASE ORAL at 09:35

## 2019-06-28 RX ADMIN — HEPARIN SODIUM 5000 UNITS: 5000 INJECTION INTRAVENOUS; SUBCUTANEOUS at 09:36

## 2019-06-28 RX ADMIN — PANTOPRAZOLE SODIUM 40 MG: 40 TABLET, DELAYED RELEASE ORAL at 09:35

## 2019-06-28 RX ADMIN — HYDROCODONE BITARTRATE AND ACETAMINOPHEN 2 TABLET: 7.5; 325 TABLET ORAL at 11:31

## 2019-06-28 RX ADMIN — HYDROCODONE BITARTRATE AND ACETAMINOPHEN 2 TABLET: 7.5; 325 TABLET ORAL at 05:38

## 2019-06-28 RX ADMIN — IBUPROFEN 600 MG: 400 TABLET ORAL at 05:38

## 2019-06-28 RX ADMIN — TAMSULOSIN HYDROCHLORIDE 0.4 MG: 0.4 CAPSULE ORAL at 09:35

## 2019-06-28 RX ADMIN — IBUPROFEN 600 MG: 400 TABLET ORAL at 14:38

## 2019-06-28 RX ADMIN — HEPARIN SODIUM (PORCINE) LOCK FLUSH IV SOLN 100 UNIT/ML 500 UNITS: 100 SOLUTION at 14:39

## 2019-06-29 ENCOUNTER — READMISSION MANAGEMENT (OUTPATIENT)
Dept: CALL CENTER | Facility: HOSPITAL | Age: 54
End: 2019-06-29

## 2019-06-29 NOTE — OUTREACH NOTE
Prep Survey      Responses   Facility patient discharged from?  [facility]   Is patient eligible?  Yes   Discharge diagnosis  Right lower lobe lung cancer,              RIGHT VATS, OPEN LOWER LOBECTOMY WITH LYMPH NODE DISECTION, WEDGE RESECTION OF RIGHT MIDDLE LOBE   Does the patient have one of the following disease processes/diagnoses(primary or secondary)?  Cardiothoracic surgery   Does the patient have Home health ordered?  No   Is there a DME ordered?  No   Prep survey completed?  Yes          Bere Escalona RN

## 2019-07-01 ENCOUNTER — READMISSION MANAGEMENT (OUTPATIENT)
Dept: CALL CENTER | Facility: HOSPITAL | Age: 54
End: 2019-07-01

## 2019-07-01 NOTE — PROGRESS NOTES
Case Management Discharge Note    Final Note: Routine D/C Home.               Final Discharge Disposition Code: 01 - home or self-care

## 2019-07-01 NOTE — OUTREACH NOTE
CT Surgery Week 1 Survey      Responses   Facility patient discharged from?  [facility]   Does the patient have one of the following disease processes/diagnoses(primary or secondary)?  Cardiothoracic surgery   Is there a successful TCM telephone encounter documented?  No   Week 1 attempt successful?  Yes   Call start time  1406   Call end time  1411   Discharge diagnosis  Right lower lobe lung cancer,              RIGHT VATS, OPEN LOWER LOBECTOMY WITH LYMPH NODE DISECTION, WEDGE RESECTION OF RIGHT MIDDLE LOBE   Meds reviewed with patient/caregiver?  Yes   Is the patient having any side effects they believe may be caused by any medication additions or changes?  No   Does the patient have all medications related to this admission filled (includes all antibiotics, pain medications, cardiac medications, etc.)  Yes   Is the patient taking all medications as directed (includes completed medication regime)?  Yes   Does the patient have a primary care provider?   Yes   Does the patient have an appointment scheduled with their C/T surgeon?  Yes   Has the patient kept scheduled appointments due by today?  N/A   Has home health visited the patient within 72 hours of discharge?  N/A   Psychosocial issues?  No   Did the patient receive a copy of their discharge instructions?  Yes   Nursing interventions  Reviewed instructions with patient   What is the patient's perception of their health status since discharge?  Improving   Nursing interventions  Nurse provided patient education   Is the patient/caregiver able to teach back normal signs of recovery?  Nausea and lack of appetite, Constipation   Is the patient /caregiver able to teach back basic post-op care?  Continue use of incentive spirometry at least 1 week post discharge, Practice 'cough and deep breath', Lifting as instructed by MD in discharge instructions, Keep incision areas clean, dry and protected, Take showers only when approved by MD-sponge bathe until then   Is the  patient/caregiver able to teach back signs and symptoms of incisional infection?  Increased redness, swelling or pain at the incisonal site, Increased drainage or bleeding, Incisional warmth, Pus or odor from incision, Fever   Is the patient/caregiver able to teach back steps to recovery at home?  Make a list of questions for surgeon's appointment, Eat a well-balance diet, Rest and rebuild strength, gradually increase activity, Set small, achievable goals for return to baseline health   Is the patient/caregiver able to teach back the hierarchy of who to call/visit for symptoms/problems? PCP, Specialist, Home health nurse, Urgent Care, ED, 911  Yes   Additional teach back comments  He is doing well, incisions are healed.  Difficult to sleep on his back but he is doing ok.   Week 1 call completed?  Yes            Nicole Neil RN

## 2019-07-08 ENCOUNTER — READMISSION MANAGEMENT (OUTPATIENT)
Dept: CALL CENTER | Facility: HOSPITAL | Age: 54
End: 2019-07-08

## 2019-07-08 NOTE — OUTREACH NOTE
CT Surgery Week 2 Survey      Responses   Facility patient discharged from?  [facility]   Does the patient have one of the following disease processes/diagnoses(primary or secondary)?  Cardiothoracic surgery   Week 2 attempt successful?  No   Rescheduled  Revoked   Revoke  Decline to participate          Flaquita Morton LPN

## 2019-07-09 ENCOUNTER — OFFICE VISIT (OUTPATIENT)
Dept: SURGERY | Facility: CLINIC | Age: 54
End: 2019-07-09

## 2019-07-09 VITALS
TEMPERATURE: 98.9 F | HEIGHT: 75 IN | DIASTOLIC BLOOD PRESSURE: 76 MMHG | OXYGEN SATURATION: 97 % | BODY MASS INDEX: 24.1 KG/M2 | HEART RATE: 100 BPM | WEIGHT: 193.8 LBS | SYSTOLIC BLOOD PRESSURE: 124 MMHG

## 2019-07-09 DIAGNOSIS — C34.31 CANCER OF LOWER LOBE OF RIGHT LUNG (HCC): Primary | ICD-10-CM

## 2019-07-09 PROBLEM — J32.4 CHRONIC PANSINUSITIS: Status: ACTIVE | Noted: 2019-04-08

## 2019-07-09 PROBLEM — R59.0 MEDIASTINAL LYMPHADENOPATHY: Status: ACTIVE | Noted: 2019-03-12

## 2019-07-09 PROBLEM — K22.10 EROSIVE ESOPHAGITIS: Status: ACTIVE | Noted: 2019-07-09

## 2019-07-09 PROBLEM — K21.9 GASTROESOPHAGEAL REFLUX DISEASE: Status: ACTIVE | Noted: 2019-02-21

## 2019-07-09 PROBLEM — E78.5 HYPERLIPIDEMIA: Status: ACTIVE | Noted: 2017-10-05

## 2019-07-09 PROBLEM — K57.90 DIVERTICULOSIS: Status: ACTIVE | Noted: 2019-07-09

## 2019-07-09 PROBLEM — F32.A DEPRESSION: Status: ACTIVE | Noted: 2019-02-21

## 2019-07-09 PROBLEM — C34.91 NON-SMALL CELL CARCINOMA OF LUNG, RIGHT: Status: ACTIVE | Noted: 2019-04-08

## 2019-07-09 PROBLEM — R91.8 LUNG MASS: Status: ACTIVE | Noted: 2019-02-21

## 2019-07-09 PROBLEM — K44.9 HIATAL HERNIA: Status: ACTIVE | Noted: 2019-07-09

## 2019-07-09 PROCEDURE — 99024 POSTOP FOLLOW-UP VISIT: CPT | Performed by: THORACIC SURGERY (CARDIOTHORACIC VASCULAR SURGERY)

## 2019-07-09 RX ORDER — HYDROCODONE BITARTRATE AND ACETAMINOPHEN 5; 325 MG/1; MG/1
1 TABLET ORAL AS NEEDED
COMMUNITY
Start: 2019-07-03 | End: 2021-06-01

## 2019-07-09 RX ORDER — TAMSULOSIN HYDROCHLORIDE 0.4 MG/1
1 CAPSULE ORAL
Refills: 0 | COMMUNITY
Start: 2019-06-28 | End: 2019-09-05

## 2019-07-09 NOTE — PROGRESS NOTES
Patient seen postop follow-up after right lower lobectomy.  Pathology reveals N 2+ residual disease.  He received neoadjuvant chemotherapy.  He is healed up well.  Plan is to follow-up with medical oncology.  We will follow-up with repeat CT scan in 4 months with me.

## 2019-07-15 ENCOUNTER — APPOINTMENT (OUTPATIENT)
Dept: LAB | Facility: HOSPITAL | Age: 54
End: 2019-07-15

## 2019-07-15 ENCOUNTER — OFFICE VISIT (OUTPATIENT)
Dept: ONCOLOGY | Facility: CLINIC | Age: 54
End: 2019-07-15

## 2019-07-15 VITALS
RESPIRATION RATE: 16 BRPM | TEMPERATURE: 97.8 F | SYSTOLIC BLOOD PRESSURE: 124 MMHG | DIASTOLIC BLOOD PRESSURE: 79 MMHG | WEIGHT: 190.8 LBS | HEART RATE: 90 BPM | BODY MASS INDEX: 23.72 KG/M2 | HEIGHT: 75 IN

## 2019-07-15 DIAGNOSIS — C34.31 CANCER OF LOWER LOBE OF RIGHT LUNG (HCC): Primary | ICD-10-CM

## 2019-07-15 LAB
ALBUMIN SERPL-MCNC: 3.1 G/DL (ref 3.5–4.8)
ALBUMIN/GLOB SERPL: 0.8 G/DL (ref 1–1.7)
ALP SERPL-CCNC: 110 U/L (ref 32–91)
ALT SERPL W P-5'-P-CCNC: 27 U/L (ref 17–63)
ANION GAP SERPL CALCULATED.3IONS-SCNC: 15.6 MMOL/L (ref 5–15)
AST SERPL-CCNC: 18 U/L (ref 15–41)
BASOPHILS # BLD AUTO: 0.06 10*3/MM3 (ref 0–0.2)
BASOPHILS NFR BLD AUTO: 0.8 % (ref 0–1.5)
BILIRUB SERPL-MCNC: 0.6 MG/DL (ref 0.3–1.2)
BUN BLD-MCNC: 10 MG/DL (ref 8–20)
BUN/CREAT SERPL: 10 (ref 6.2–20.3)
CALCIUM SPEC-SCNC: 9.1 MG/DL (ref 8.9–10.3)
CHLORIDE SERPL-SCNC: 95 MMOL/L (ref 101–111)
CO2 SERPL-SCNC: 24 MMOL/L (ref 22–32)
CREAT BLD-MCNC: 1 MG/DL (ref 0.7–1.2)
DEPRECATED RDW RBC AUTO: 42.9 FL (ref 37–54)
EOSINOPHIL # BLD AUTO: 0.74 10*3/MM3 (ref 0–0.4)
EOSINOPHIL NFR BLD AUTO: 10.3 % (ref 0.3–6.2)
ERYTHROCYTE [DISTWIDTH] IN BLOOD BY AUTOMATED COUNT: 12.4 % (ref 12.3–15.4)
GFR SERPL CREATININE-BSD FRML MDRD: 78 ML/MIN/1.73
GLOBULIN UR ELPH-MCNC: 3.8 GM/DL (ref 2.5–3.8)
GLUCOSE BLD-MCNC: 95 MG/DL (ref 65–99)
HCT VFR BLD AUTO: 28 % (ref 37.5–51)
HGB BLD-MCNC: 9.4 G/DL (ref 13–17.7)
LYMPHOCYTES # BLD AUTO: 0.81 10*3/MM3 (ref 0.7–3.1)
LYMPHOCYTES NFR BLD AUTO: 11.3 % (ref 19.6–45.3)
MAGNESIUM SERPL-MCNC: 1.8 MG/DL (ref 1.8–2.5)
MCH RBC QN AUTO: 33.2 PG (ref 26.6–33)
MCHC RBC AUTO-ENTMCNC: 33.6 G/DL (ref 31.5–35.7)
MCV RBC AUTO: 98.9 FL (ref 79–97)
MONOCYTES # BLD AUTO: 1.14 10*3/MM3 (ref 0.1–0.9)
MONOCYTES NFR BLD AUTO: 15.9 % (ref 5–12)
NEUTROPHILS # BLD AUTO: 4.41 10*3/MM3 (ref 1.7–7)
NEUTROPHILS NFR BLD AUTO: 61.7 % (ref 42.7–76)
PLATELET # BLD AUTO: 424 10*3/MM3 (ref 140–450)
PMV BLD AUTO: 8.1 FL (ref 6–12)
POTASSIUM BLD-SCNC: 3.6 MMOL/L (ref 3.6–5.1)
PROT SERPL-MCNC: 6.9 G/DL (ref 6.1–7.9)
RBC # BLD AUTO: 2.83 10*6/MM3 (ref 4.14–5.8)
SODIUM BLD-SCNC: 131 MMOL/L (ref 136–144)
WBC NRBC COR # BLD: 7.16 10*3/MM3 (ref 3.4–10.8)

## 2019-07-15 PROCEDURE — 85025 COMPLETE CBC W/AUTO DIFF WBC: CPT | Performed by: INTERNAL MEDICINE

## 2019-07-15 PROCEDURE — 80053 COMPREHEN METABOLIC PANEL: CPT | Performed by: INTERNAL MEDICINE

## 2019-07-15 PROCEDURE — 99215 OFFICE O/P EST HI 40 MIN: CPT | Performed by: INTERNAL MEDICINE

## 2019-07-15 PROCEDURE — 36415 COLL VENOUS BLD VENIPUNCTURE: CPT | Performed by: INTERNAL MEDICINE

## 2019-07-15 PROCEDURE — 83735 ASSAY OF MAGNESIUM: CPT | Performed by: INTERNAL MEDICINE

## 2019-07-15 NOTE — PROGRESS NOTES
Hematology/Oncology Outpatient Follow Up    PATIENT NAME:Jc Driscoll  :1965  MRN: [id number]  PRIMARY CARE PHYSICIAN: Reshma Alvarenga MD  REFERRING PHYSICIAN: Reshma Alvarenga MD    Chief Complaint   Patient presents with   • Follow-up     Lung cancer S/P right lower lobectomy with mediastinal lymph node dissection.   • Follow-up     anemia   • Follow-up     pathology        HISTORY OF PRESENT ILLNESS:   This is a 53-year-old male who developed left shoulder pain and for that reason he had a chest x-ray done on 19 which showed a 2.7 cm noncalcified right lower lobe nodule was identified.  For that reason a CT scan of the chest was recommended.  Review of his records shows patient had the CT scan on 19 done without contrast.  This basically showed a lobulated noncalcified mass in the posterior aspect of the right lower lobe measuring 3 cm close to the pleural surface.  There is a calcified 1.2 mm nodule in the lateral aspect of the right lower lobe consistent with a granuloma.  There were also calcified subcarinal and right hilar nodules.  There is a lower right side tracheal nodule measuring 1 cm.  Patient had a PET/CT scan done on 19 which showed increased metabolic activity on the right lower lobe mass that measures 2.5 cm with SUV of 4.4.  There was also increased metabolic activity in the subcarinal space measuring 2.8 cm in size with SUV of 3.4, increased activity in an enlarged right paratracheal lymph node that measures 1.9 cm, SUV of 5, also suspicious for metastatic adenopathy.  Patient had a CT-guided biopsy of the right lower lobe mass on 3/8/19.  This showed adenocarcinoma, TTF-1 positive.  On 3/18/19 patient underwent endoscopic ultrasound and biopsy of a subcarinal lymph node.  This was positive for malignant cells.  Patient was then taken back to surgery on 3/20/18 and had left Mediport placement by Dr. Fuchs.  He has been referred for further evaluation and  management of his stage 3 adenocarcinoma of the right lung.  He was accompanied by his spouse for the appointment on 3/26/19.  Patient was scheduled to have a brain MRI for complete staging, but he could not do this due to claustrophobia.  He is willing to try with the help of angiolytics.    • Patient had brain MRI done on 4/5/19.  He states it did not show any evidence of metastatic disease.  Evidence of chronic sinusitis was noted.    • Patient was seen by Dr. Escalona who has recommended concurrent chemotherapy and radiation.  Patient is scheduled to begin on 4/15/19.   • 4/17/19 - Patient was initiated on combined chemotherapy and radiation with Cisplatin and Alimta.  Patient received cycle 1.      • 5/8/19 - Patient received cycle 2 of chemotherapy with Cisplatin and Alimta.   • 5/15/19 - Chemistry panel showed creatinine of 1.7.  WBC 1.8, hemoglobin 11.6, platelet count 72,000.   • 5/20/19 - BUN 10, creatinine 1.2, potassium 3.5.    • 5/23/19 - CT scan of the chest with contrast showed interval decrease in size of the right lower lobe pulmonary nodule now measuring 2.1 cm in size.  There was no mediastinal or hilar adenopathy identified.  • 6/26/2019 patient underwent right lower lobectomy with hilar and mediastinal lymph node dissection performed by Dr. Terrance Mckeon.  • Pathology revealed residual residual 2.2 cm invasive moderately differentiated adenocarcinoma.  There were a total of 16 lymph nodes evaluated that 7 had metastatic disease.  There was evidence of lymphovascular invasion, extranodal involvement.  All the surgical margins were negative and distance of the closest margin was 2.5 cm.  Logic stage is T1c N2 M0.  • Patient is here today accompanied by his spouse for this appointment.  He continues to complain of some postop discomfort, numbness but his skin is intact.  There is no drainage.  Has had follow-up with Dr. Fuchs.    Past Medical History:   Diagnosis Date   • Dupuytren's contracture  of both hands    • Elevated cholesterol    • Hypertension    • Lung cancer (CMS/HCC)     right lung and in lymph nodes x2   • Retention of urine 6/27/2019       Past Surgical History:   Procedure Laterality Date   • BRONCHOSCOPY  03/18/2019    EBUS MONTERO NEEDLE BX   • COLONOSCOPY     • DUPUYTREN CONTRACTURE RELEASE Bilateral    • LUNG BIOPSY  03/08/2019    CT GUIDED   • PORTACATH PLACEMENT  03/20/2019    DR LONGORIA   • THORACOSCOPY Right 6/26/2019    Procedure: RIGHT VATS, OPEN LOWER LOBECTOMY WITH LYMPH NODE DISECTION, WEDGE RESECTION OF RIGHT MIDDLE LOBE;  Surgeon: Terrance Longoria MD;  Location: [facility] MAIN OR;  Service: Cardiothoracic         Current Outpatient Medications:   •  acetaminophen (TYLENOL) 325 MG tablet, Take 2 tablets by mouth Every 4 (Four) Hours As Needed for Mild Pain ., Disp: , Rfl:   •  amLODIPine (NORVASC) 10 MG tablet, Take 10 mg by mouth Daily., Disp: , Rfl:   •  buPROPion SR (WELLBUTRIN SR) 150 MG 12 hr tablet, Take 300 mg by mouth 2 (Two) Times a Day., Disp: , Rfl:   •  escitalopram (LEXAPRO) 20 MG tablet, Take 20 mg by mouth Daily., Disp: , Rfl:   •  metoprolol succinate XL (TOPROL-XL) 100 MG 24 hr tablet, Take 100 mg by mouth Daily., Disp: , Rfl:   •  pantoprazole (PROTONIX) 40 MG EC tablet, Take 40 mg by mouth Daily., Disp: , Rfl:   •  potassium chloride (K-DUR,KLOR-CON) 10 MEQ CR tablet, Take 10 mEq by mouth Daily., Disp: , Rfl:   •  pravastatin (PRAVACHOL) 10 MG tablet, Take 10 mg by mouth Daily., Disp: , Rfl:   •  HYDROcodone-acetaminophen (NORCO) 5-325 MG per tablet, Take 1 tablet by mouth., Disp: , Rfl:   •  ibuprofen (ADVIL,MOTRIN) 600 MG tablet, Take 1 tablet by mouth Every 6 (Six) Hours As Needed for Mild Pain ., Disp: , Rfl:   •  tamsulosin (FLOMAX) 0.4 MG capsule 24 hr capsule, Take 1 capsule by mouth every night at bedtime., Disp: , Rfl: 0    No Known Allergies    Family History   Problem Relation Age of Onset   • Cancer Sister        Cancer-related family history includes  "Cancer in his sister.    Social History     Tobacco Use   • Smoking status: Former Smoker   • Smokeless tobacco: Never Used   Substance Use Topics   • Alcohol use: Yes     Alcohol/week: 1.2 oz     Types: 2 Cans of beer per week   • Drug use: No       I have reviewed the history of present illness, past medical history, family history, social history, lab results, all notes and other records since the patient was last seen on Visit date not found.    SUBJECTIVE:  The patient is here today for follow up.  He is accompanied by his wife.  He states that he had right lower lobectomy with mediastinal lymph node dissection. with Dr. Fuchs on 6/26/2019.  He states that his is still sore but denies signs and symptoms of infection.          REVIEW OF SYSTEMS:  Review of Systems   Constitutional: Negative for chills and fever.   HENT: Negative for ear pain, mouth sores, nosebleeds and sore throat.    Eyes: Negative for photophobia and visual disturbance.   Respiratory: Negative for wheezing and stridor.    Cardiovascular: Negative for chest pain and palpitations.   Gastrointestinal: Negative for abdominal pain, diarrhea, nausea and vomiting.   Endocrine: Negative for cold intolerance and heat intolerance.   Genitourinary: Negative for dysuria and hematuria.   Musculoskeletal: Negative for joint swelling and neck stiffness.   Skin: Negative for color change and rash.        Incisions from right lower lobectomy with mediastinal lymph node dissection.   Neurological: Negative for seizures and syncope.   Hematological: Negative for adenopathy.        No obvious bleeding   Psychiatric/Behavioral: Negative for agitation, confusion and hallucinations.       OBJECTIVE:    Vitals:    07/15/19 1301   BP: 124/79   Pulse: 90   Resp: 16   Temp: 97.8 °F (36.6 °C)   Weight: 86.5 kg (190 lb 12.8 oz)   Height: 190.5 cm (75\")   PainSc:   6   PainLoc: Chest       ECOG  (1) Restricted in physically strenuous activity, ambulatory and able to do " work of light nature    Physical Exam   Constitutional: He is oriented to person, place, and time. No distress.   HENT:   Head: Normocephalic and atraumatic.   Eyes: Conjunctivae and EOM are normal. Right eye exhibits no discharge. Left eye exhibits no discharge. No scleral icterus.   Neck: Normal range of motion. Neck supple. No thyromegaly present.   Cardiovascular: Normal rate, regular rhythm and normal heart sounds. Exam reveals no gallop and no friction rub.   Pulmonary/Chest: Effort normal. No stridor. No respiratory distress. He has no wheezes. He exhibits laceration.   Healing incisions from right lower lobectomy with mediastinal lymph node dissection.  Dry and intact.   Abdominal: Soft. Bowel sounds are normal. He exhibits no mass. There is no tenderness. There is no rebound and no guarding.   Musculoskeletal: Normal range of motion. He exhibits no tenderness.   Lymphadenopathy:     He has no cervical adenopathy.   Neurological: He is alert and oriented to person, place, and time. He exhibits normal muscle tone.   Skin: Skin is warm. No rash noted. He is not diaphoretic. No erythema.   Psychiatric: He has a normal mood and affect. His behavior is normal.   Nursing note and vitals reviewed.      RECENT LABS  WBC   Date Value Ref Range Status   07/03/2019 8.99 4.5 - 11.0 10*3/uL Final     RBC   Date Value Ref Range Status   07/03/2019 3.24 (L) 4.5 - 5.9 10*6/uL Final     Hemoglobin   Date Value Ref Range Status   07/03/2019 10.4 (L) 13.5 - 17.5 g/dL Final     Hematocrit   Date Value Ref Range Status   07/03/2019 32.8 (L) 41.0 - 53.0 % Final     MCV   Date Value Ref Range Status   07/03/2019 101.2 (H) 80.0 - 100.0 fL Final     MCH   Date Value Ref Range Status   07/03/2019 32.1 26.0 - 34.0 pg Final     MCHC   Date Value Ref Range Status   07/03/2019 31.7 31.0 - 37.0 g/dL Final     RDW   Date Value Ref Range Status   07/03/2019 15.2 12.0 - 16.8 % Final     RDW-SD   Date Value Ref Range Status   06/27/2019 54.3  (H) 37.0 - 54.0 fl Final     MPV   Date Value Ref Range Status   07/03/2019 9.0 6.7 - 10.8 fL Final     Platelets   Date Value Ref Range Status   07/03/2019 302 140 - 440 10*3/uL Final     Neutrophil Rel %   Date Value Ref Range Status   07/03/2019 60.0 45 - 80 % Final     Lymphocyte Rel %   Date Value Ref Range Status   07/03/2019 14.8 (L) 15 - 50 % Final     Monocyte Rel %   Date Value Ref Range Status   07/03/2019 12.2 0 - 15 % Final     Eosinophil %   Date Value Ref Range Status   07/03/2019 10.4 (H) 0 - 7 % Final     Basophil Rel %   Date Value Ref Range Status   07/03/2019 1.7 0 - 2 % Final     Neutrophils Absolute   Date Value Ref Range Status   07/03/2019 5.39 1.5 - 7.5 /uL Final   07/03/2019 5.39 2.0 - 8.8 10*3/uL Final     Lymphocytes Absolute   Date Value Ref Range Status   07/03/2019 1.33 0.7 - 5.5 10*3/uL Final     Monocytes Absolute   Date Value Ref Range Status   07/03/2019 1.10 0.0 - 1.7 10*3/uL Final     Eosinophils Absolute   Date Value Ref Range Status   07/03/2019 0.93 (H) 0.0 - 0.8 10*3/uL Final     Basophils Absolute   Date Value Ref Range Status   07/03/2019 0.15 0.0 - 0.2 10*3/uL Final     nRBC   Date Value Ref Range Status   07/03/2019 0 0 /100(WBC) Final   06/19/2019 0.0 0.0 - 0.2 /100 WBC Final       Lab Results   Component Value Date    GLUCOSE 122 (H) 06/27/2019    BUN 14 07/03/2019    CREATININE 1.0 07/03/2019    EGFRIFNONA 78 06/27/2019    EGFRIFAFRI >60 05/20/2019    BCR 14.0 07/03/2019    K 5.3 07/03/2019    CO2 25 07/03/2019    CALCIUM 9.3 07/03/2019    ALBUMIN 4.1 07/03/2019    LABIL2 1.4 07/03/2019    AST 29 07/03/2019    ALT 32 07/03/2019         Assessment/Plan     Cancer of lower lobe of right lung (CMS/HCC)  - CBC & Differential  - Comprehensive Metabolic Panel  - Magnesium      ASSESSMENT:    1. Adenocarcinoma of the right lung, T1c N2 M0, consistent with clinical stage 3A disease.     2. S/P combined chemotherapy and radiation with Cisplatin and Alimta.    3. Status post  right lower lobectomy with mediastinal and hilar lymph node dissection with residual disease.  pT1 cN2 M0.  Patient with high risk features including lymphovascular invasion, extranodal capsular extension and persistent multiple lymph node disease.  4. Chemo-induced constipation, chemo-induced nausea, chemo-induced fatigue. Resolved  5. Monitoring for drug toxicity.   6. Hypokalemia secondary to chemotherapy. Resolved    7. Acute kidney injury. Resolved  8. Postop anemia      Discussion:     He  has had right lobectomy with mediastinal and hilar lymph node dissection.  Unfortunately he has still a significant amount of residual disease left.  So he has high risk features including lymphovascular invasion, extranodal capsular extension of disease and multiple lymph node involvement.  Patient had 2 cycles of cis-platinum and Alimta prior to surgery.  Since he has a significant amount of residual disease I have recommended switch therapy to cis-platinum and Taxotere.  Would recommend cis-platinum at 75 mg/m² IV day 1, Taxotere at 75 mg/m² IV day 1 both administered every 21 days for at least 4 cycles.  I have also recommended that he follows up with Dr. Escalona to discuss if there is any other rule for radiation given his high risk disease.  We discussed the side effects of chemotherapy to include but not limited to:    Chemotherapy side effects include, but not limited to, nausea, vomiting, bone marrow suppression, which can result in blood, platelet transfusion. There is also risk of permanent bone marrow destruction, which can cause myelodysplastic syndrome or leukemia years down the line. There is risk of infection which can result in hospitalization and even death. There is also risk of fatigue, asthenia, alopecia which could become permanent. Chemo will help to reduce risk of relapse of cancer, but does not eliminate risk completely.  Weekly Taxotere can cause hypersensitivity reaction, skin toxicity, peripheral  neuropathy.  Patient had received cis-platinum in the past.  Reviewed risk of nephrotoxicity, ototoxicity, electrolyte abnormalities.    To discuss possibility of enrollment in a clinical trial.  I have given him referral to see Dr. Brady the [facility], [place] for an evaluation as well.  The role of maintenance immunotherapy so far the indication is for patients with all resected stage III lung cancer that have responded to combined chemotherapy and radiation.  She may qualify for clinical trial with immunotherapy.  Await Dr. Brady's recommendation.      PLAN:    His postop anemia of recommended ferrous sulfate 325 mg p.o. twice a day.  I have given him referral to see  To discuss clinical trials that he may qualify for  Plan to see him again in 2 weeks  I will order cis-platinum and Taxotere and get authorization through his insurance but will hold onto his next visit in 2 weeks.  Patient and his spouse have  asked patient which have all been answered to the best of my abilities         I have reviewed labs results, imaging, vitals, and medications with the patient today. Will follow up in 2 weeks with me.          Patient verbalized understanding and is in agreement of the above plan.  Part of this document was scribed by Antionette James RN, BSN.        Much of the above report is an electronic transcription//translation of the spoken language to printed text using Dragon Software. As such, the subtleties and finesse of the spoken language may permit erroneous, or at times, nonsensical words or phrases to be inadvertently transcribed; thus changes may be made at a later date to rectify these errors.

## 2019-07-17 ENCOUNTER — TELEPHONE (OUTPATIENT)
Dept: RADIATION ONCOLOGY | Facility: HOSPITAL | Age: 54
End: 2019-07-17

## 2019-07-23 ENCOUNTER — OFFICE VISIT (OUTPATIENT)
Dept: RADIATION ONCOLOGY | Facility: HOSPITAL | Age: 54
End: 2019-07-23

## 2019-07-23 VITALS
TEMPERATURE: 98.6 F | HEIGHT: 75 IN | OXYGEN SATURATION: 95 % | DIASTOLIC BLOOD PRESSURE: 73 MMHG | BODY MASS INDEX: 23.6 KG/M2 | HEART RATE: 86 BPM | SYSTOLIC BLOOD PRESSURE: 111 MMHG | RESPIRATION RATE: 20 BRPM | WEIGHT: 189.8 LBS

## 2019-07-23 DIAGNOSIS — C34.31 CANCER OF LOWER LOBE OF RIGHT LUNG (HCC): ICD-10-CM

## 2019-07-23 PROCEDURE — G0463 HOSPITAL OUTPT CLINIC VISIT: HCPCS | Performed by: RADIOLOGY

## 2019-07-23 RX ORDER — FERROUS SULFATE 325(65) MG
325 TABLET ORAL
Status: ON HOLD | COMMUNITY
End: 2020-02-17

## 2019-07-30 ENCOUNTER — OFFICE VISIT (OUTPATIENT)
Dept: ONCOLOGY | Facility: CLINIC | Age: 54
End: 2019-07-30

## 2019-07-30 ENCOUNTER — APPOINTMENT (OUTPATIENT)
Dept: LAB | Facility: HOSPITAL | Age: 54
End: 2019-07-30

## 2019-07-30 ENCOUNTER — HOSPITAL ENCOUNTER (OUTPATIENT)
Dept: ONCOLOGY | Facility: HOSPITAL | Age: 54
Discharge: HOME | End: 2019-07-30
Admitting: INTERNAL MEDICINE

## 2019-07-30 VITALS
SYSTOLIC BLOOD PRESSURE: 103 MMHG | BODY MASS INDEX: 23.25 KG/M2 | WEIGHT: 187 LBS | RESPIRATION RATE: 18 BRPM | TEMPERATURE: 97.4 F | DIASTOLIC BLOOD PRESSURE: 65 MMHG | HEIGHT: 75 IN | HEART RATE: 84 BPM

## 2019-07-30 DIAGNOSIS — C34.31 CANCER OF LOWER LOBE OF RIGHT LUNG (HCC): Primary | ICD-10-CM

## 2019-07-30 DIAGNOSIS — Z45.2 ENCOUNTER FOR CARE RELATED TO VASCULAR ACCESS PORT: Primary | ICD-10-CM

## 2019-07-30 LAB
ALBUMIN SERPL-MCNC: 3 G/DL (ref 3.5–4.8)
ALBUMIN/GLOB SERPL: 0.8 G/DL (ref 1–1.7)
ALP SERPL-CCNC: 100 U/L (ref 32–91)
ALT SERPL W P-5'-P-CCNC: 39 U/L (ref 17–63)
ANION GAP SERPL CALCULATED.3IONS-SCNC: 16.1 MMOL/L (ref 5–15)
AST SERPL-CCNC: 27 U/L (ref 15–41)
BASOPHILS # BLD AUTO: 0.08 10*3/MM3 (ref 0–0.2)
BASOPHILS NFR BLD AUTO: 1.1 % (ref 0–1.5)
BILIRUB SERPL-MCNC: 0.3 MG/DL (ref 0.3–1.2)
BUN BLD-MCNC: 9 MG/DL (ref 8–20)
BUN/CREAT SERPL: 9 (ref 6.2–20.3)
CALCIUM SPEC-SCNC: 9 MG/DL (ref 8.9–10.3)
CHLORIDE SERPL-SCNC: 96 MMOL/L (ref 101–111)
CO2 SERPL-SCNC: 24 MMOL/L (ref 22–32)
CREAT BLD-MCNC: 1 MG/DL (ref 0.7–1.2)
DEPRECATED RDW RBC AUTO: 41.4 FL (ref 37–54)
EOSINOPHIL # BLD AUTO: 0.88 10*3/MM3 (ref 0–0.4)
EOSINOPHIL NFR BLD AUTO: 12.2 % (ref 0.3–6.2)
ERYTHROCYTE [DISTWIDTH] IN BLOOD BY AUTOMATED COUNT: 12.6 % (ref 12.3–15.4)
GFR SERPL CREATININE-BSD FRML MDRD: 78 ML/MIN/1.73
GLOBULIN UR ELPH-MCNC: 3.6 GM/DL (ref 2.5–3.8)
GLUCOSE BLD-MCNC: 94 MG/DL (ref 65–99)
HCT VFR BLD AUTO: 28.9 % (ref 37.5–51)
HGB BLD-MCNC: 9.4 G/DL (ref 13–17.7)
LYMPHOCYTES # BLD AUTO: 0.95 10*3/MM3 (ref 0.7–3.1)
LYMPHOCYTES NFR BLD AUTO: 13.1 % (ref 19.6–45.3)
MCH RBC QN AUTO: 30.7 PG (ref 26.6–33)
MCHC RBC AUTO-ENTMCNC: 32.5 G/DL (ref 31.5–35.7)
MCV RBC AUTO: 94.4 FL (ref 79–97)
MONOCYTES # BLD AUTO: 0.94 10*3/MM3 (ref 0.1–0.9)
MONOCYTES NFR BLD AUTO: 13 % (ref 5–12)
NEUTROPHILS # BLD AUTO: 4.39 10*3/MM3 (ref 1.7–7)
NEUTROPHILS NFR BLD AUTO: 60.6 % (ref 42.7–76)
PLATELET # BLD AUTO: 488 10*3/MM3 (ref 140–450)
PMV BLD AUTO: 8.2 FL (ref 6–12)
POTASSIUM BLD-SCNC: 4.1 MMOL/L (ref 3.6–5.1)
PROT SERPL-MCNC: 6.6 G/DL (ref 6.1–7.9)
RBC # BLD AUTO: 3.06 10*6/MM3 (ref 4.14–5.8)
SODIUM BLD-SCNC: 132 MMOL/L (ref 136–144)
WBC NRBC COR # BLD: 7.24 10*3/MM3 (ref 3.4–10.8)

## 2019-07-30 PROCEDURE — 85025 COMPLETE CBC W/AUTO DIFF WBC: CPT | Performed by: INTERNAL MEDICINE

## 2019-07-30 PROCEDURE — 96523 IRRIG DRUG DELIVERY DEVICE: CPT

## 2019-07-30 PROCEDURE — 99215 OFFICE O/P EST HI 40 MIN: CPT | Performed by: INTERNAL MEDICINE

## 2019-07-30 PROCEDURE — 80053 COMPREHEN METABOLIC PANEL: CPT | Performed by: NURSE PRACTITIONER

## 2019-07-30 RX ORDER — AMMONIUM LACTATE 12 G/100G
CREAM TOPICAL AS NEEDED
Qty: 1 EACH | Refills: 0 | Status: SHIPPED | OUTPATIENT
Start: 2019-07-30 | End: 2019-09-05

## 2019-07-30 RX ORDER — SODIUM CHLORIDE 0.9 % (FLUSH) 0.9 %
10 SYRINGE (ML) INJECTION AS NEEDED
Status: DISCONTINUED | OUTPATIENT
Start: 2019-07-30 | End: 2019-07-31 | Stop reason: HOSPADM

## 2019-07-30 RX ORDER — SODIUM CHLORIDE 0.9 % (FLUSH) 0.9 %
10 SYRINGE (ML) INJECTION AS NEEDED
Status: CANCELLED | OUTPATIENT
Start: 2019-07-30

## 2019-07-30 RX ADMIN — HEPARIN 500 UNITS: 100 SYRINGE at 15:56

## 2019-07-30 RX ADMIN — Medication 30 ML: at 14:42

## 2019-07-30 NOTE — PROGRESS NOTES
Hematology/Oncology Outpatient Follow Up    PATIENT NAME:Jc Driscoll  :1965  MRN: [id number]  PRIMARY CARE PHYSICIAN: Reshma Alvarenga MD  REFERRING PHYSICIAN: Azucena Gaspar,*    Chief Complaint   Patient presents with   • Follow-up     Lung cancer        HISTORY OF PRESENT ILLNESS:   This is a 53-year-old male who developed left shoulder pain and for that reason he had a chest x-ray done on 19 which showed a 2.7 cm noncalcified right lower lobe nodule was identified.  For that reason a CT scan of the chest was recommended.  Review of his records shows patient had the CT scan on 19 done without contrast.  This basically showed a lobulated noncalcified mass in the posterior aspect of the right lower lobe measuring 3 cm close to the pleural surface.  There is a calcified 1.2 mm nodule in the lateral aspect of the right lower lobe consistent with a granuloma.  There were also calcified subcarinal and right hilar nodules.  There is a lower right side tracheal nodule measuring 1 cm.  Patient had a PET/CT scan done on 19 which showed increased metabolic activity on the right lower lobe mass that measures 2.5 cm with SUV of 4.4.  There was also increased metabolic activity in the subcarinal space measuring 2.8 cm in size with SUV of 3.4, increased activity in an enlarged right paratracheal lymph node that measures 1.9 cm, SUV of 5, also suspicious for metastatic adenopathy.  Patient had a CT-guided biopsy of the right lower lobe mass on 3/8/19.  This showed adenocarcinoma, TTF-1 positive.  On 3/18/19 patient underwent endoscopic ultrasound and biopsy of a subcarinal lymph node.  This was positive for malignant cells.  Patient was then taken back to surgery on 3/20/18 and had left Mediport placement by Dr. Fuchs.  He has been referred for further evaluation and management of his stage 3 adenocarcinoma of the right lung.  He was accompanied by his spouse for the appointment on 3/26/19.   Patient was scheduled to have a brain MRI for complete staging, but he could not do this due to claustrophobia.  He is willing to try with the help of angiolytics.    • Patient had brain MRI done on 4/5/19.  He states it did not show any evidence of metastatic disease.  Evidence of chronic sinusitis was noted.    • Patient was seen by Dr. Escalona who has recommended concurrent chemotherapy and radiation.  Patient is scheduled to begin on 4/15/19.   • 4/17/19 - Patient was initiated on combined chemotherapy and radiation with Cisplatin and Alimta.  Patient received cycle 1.      • 5/8/19 - Patient received cycle 2 of chemotherapy with Cisplatin and Alimta.   • 5/15/19 - Chemistry panel showed creatinine of 1.7.  WBC 1.8, hemoglobin 11.6, platelet count 72,000.   • 5/20/19 - BUN 10, creatinine 1.2, potassium 3.5.    • 5/23/19 - CT scan of the chest with contrast showed interval decrease in size of the right lower lobe pulmonary nodule now measuring 2.1 cm in size.  There was no mediastinal or hilar adenopathy identified.  • 6/26/2019 patient underwent right lower lobectomy with hilar and mediastinal lymph node dissection performed by Dr. Terrance Mckeon.  • Pathology revealed residual residual 2.2 cm invasive moderately differentiated adenocarcinoma.  There were a total of 16 lymph nodes evaluated that 7 had metastatic disease.  There was evidence of lymphovascular invasion, extranodal involvement.  All the surgical margins were negative and distance of the closest margin was 2.5 cm.  Logic stage is T1c N2 M0.  • Patient is here today accompanied by his spouse for this appointment.  He continues to complain of some postop discomfort, numbness but his skin is intact.  There is no drainage.  Has had follow-up with Dr. Fuchs.    Past Medical History:   Diagnosis Date   • Dupuytren's contracture of both hands    • Elevated cholesterol    • Hypertension    • Lung cancer (CMS/HCC)     right lung and in lymph nodes x2   •  Retention of urine 6/27/2019       Past Surgical History:   Procedure Laterality Date   • BRONCHOSCOPY  03/18/2019    EBUS MONTERO NEEDLE BX   • COLONOSCOPY     • DUPUYTREN CONTRACTURE RELEASE Bilateral    • LUNG BIOPSY  03/08/2019    CT GUIDED   • PORTACATH PLACEMENT  03/20/2019    DR LONGORIA   • THORACOSCOPY Right 6/26/2019    Procedure: RIGHT VATS, OPEN LOWER LOBECTOMY WITH LYMPH NODE DISECTION, WEDGE RESECTION OF RIGHT MIDDLE LOBE;  Surgeon: Terrance Longoria MD;  Location: [facility] OR;  Service: Cardiothoracic         Current Outpatient Medications:   •  acetaminophen (TYLENOL) 325 MG tablet, Take 2 tablets by mouth Every 4 (Four) Hours As Needed for Mild Pain ., Disp: , Rfl:   •  amLODIPine (NORVASC) 10 MG tablet, Take 10 mg by mouth Daily., Disp: , Rfl:   •  buPROPion SR (WELLBUTRIN SR) 150 MG 12 hr tablet, Take 300 mg by mouth 2 (Two) Times a Day., Disp: , Rfl:   •  escitalopram (LEXAPRO) 20 MG tablet, Take 20 mg by mouth Daily., Disp: , Rfl:   •  ferrous sulfate 325 (65 FE) MG tablet, Take 325 mg by mouth Daily With Breakfast., Disp: , Rfl:   •  HYDROcodone-acetaminophen (NORCO) 5-325 MG per tablet, Take 1 tablet by mouth., Disp: , Rfl:   •  metoprolol succinate XL (TOPROL-XL) 100 MG 24 hr tablet, Take 100 mg by mouth Daily., Disp: , Rfl:   •  pantoprazole (PROTONIX) 40 MG EC tablet, Take 40 mg by mouth Daily., Disp: , Rfl:   •  potassium chloride (K-DUR,KLOR-CON) 10 MEQ CR tablet, Take 10 mEq by mouth Daily., Disp: , Rfl:   •  pravastatin (PRAVACHOL) 10 MG tablet, Take 10 mg by mouth Daily., Disp: , Rfl:   •  ibuprofen (ADVIL,MOTRIN) 600 MG tablet, Take 1 tablet by mouth Every 6 (Six) Hours As Needed for Mild Pain ., Disp: , Rfl:   •  tamsulosin (FLOMAX) 0.4 MG capsule 24 hr capsule, Take 1 capsule by mouth every night at bedtime., Disp: , Rfl: 0  No current facility-administered medications for this visit.     Facility-Administered Medications Ordered in Other Visits:   •  heparin flush (porcine) 100  UNIT/ML injection 500 Units, 500 Units, Intravenous, Chasity TIERNEY Ifeoma Roseline, MD  •  sodium chloride 0.9 % flush 10 mL, 10 mL, Intravenous, Chasity TIERNEY Ifeoma Roseline, MD, 30 mL at 07/30/19 1442    No Known Allergies    Family History   Problem Relation Age of Onset   • Cancer Sister        Cancer-related family history includes Cancer in his sister.    Social History     Tobacco Use   • Smoking status: Former Smoker   • Smokeless tobacco: Never Used   Substance Use Topics   • Alcohol use: Yes     Alcohol/week: 1.2 oz     Types: 2 Cans of beer per week   • Drug use: No       I have reviewed the history of present illness, past medical history, family history, social history, lab results, all notes and other records since the patient was last seen on Visit date not found.    SUBJECTIVE:  The patient is here today for follow up.  He is accompanied by his wife.  He states that he continues to have difficulty with his appetite.  He states that he will drink an Ensure supplement drink as needed.  He stated that he continues to have a lack of energy.          REVIEW OF SYSTEMS:  Review of Systems   Constitutional: Positive for appetite change and fatigue. Negative for chills and fever.   HENT: Negative for ear pain, mouth sores, nosebleeds and sore throat.    Eyes: Negative for photophobia and visual disturbance.   Respiratory: Negative for wheezing and stridor.    Cardiovascular: Negative for chest pain and palpitations.   Gastrointestinal: Negative for abdominal pain, diarrhea, nausea and vomiting.   Endocrine: Negative for cold intolerance and heat intolerance.   Genitourinary: Negative for dysuria and hematuria.   Musculoskeletal: Negative for joint swelling and neck stiffness.   Skin: Negative for color change and rash.        Incisions from right lower lobectomy with mediastinal lymph node dissection.   Neurological: Negative for seizures and syncope.   Hematological: Negative for adenopathy.        No obvious  "bleeding   Psychiatric/Behavioral: Negative for agitation, confusion and hallucinations.       OBJECTIVE:    Vitals:    07/30/19 1416   BP: 103/65   Pulse: 84   Resp: 18   Temp: 97.4 °F (36.3 °C)   Weight: 84.8 kg (187 lb)   Height: 190.5 cm (75\")   PainSc: 0-No pain       ECOG  (1) Restricted in physically strenuous activity, ambulatory and able to do work of light nature    Physical Exam   Constitutional: He is oriented to person, place, and time. No distress.   HENT:   Head: Normocephalic and atraumatic.   Eyes: Conjunctivae and EOM are normal. Right eye exhibits no discharge. Left eye exhibits no discharge. No scleral icterus.   Neck: Normal range of motion. Neck supple. No thyromegaly present.   Cardiovascular: Normal rate, regular rhythm and normal heart sounds. Exam reveals no gallop and no friction rub.   Pulmonary/Chest: Effort normal. No stridor. No respiratory distress. He has no wheezes. He exhibits laceration.   Healing incisions from right lower lobectomy with mediastinal lymph node dissection.  Dry and intact.   Abdominal: Soft. Bowel sounds are normal. He exhibits no mass. There is no tenderness. There is no rebound and no guarding.   Musculoskeletal: Normal range of motion. He exhibits no tenderness.   Lymphadenopathy:     He has no cervical adenopathy.   Neurological: He is alert and oriented to person, place, and time. He exhibits normal muscle tone.   Skin: Skin is warm. No rash noted. He is not diaphoretic. No erythema.   Psychiatric: He has a normal mood and affect. His behavior is normal.   Nursing note and vitals reviewed.      RECENT LABS  WBC   Date Value Ref Range Status   07/30/2019 7.24 3.40 - 10.80 10*3/mm3 Final   07/03/2019 8.99 4.5 - 11.0 10*3/uL Final     RBC   Date Value Ref Range Status   07/30/2019 3.06 (L) 4.14 - 5.80 10*6/mm3 Final   07/03/2019 3.24 (L) 4.5 - 5.9 10*6/uL Final     Hemoglobin   Date Value Ref Range Status   07/30/2019 9.4 (L) 13.0 - 17.7 g/dL Final   07/03/2019 " 10.4 (L) 13.5 - 17.5 g/dL Final     Hematocrit   Date Value Ref Range Status   07/30/2019 28.9 (L) 37.5 - 51.0 % Final   07/03/2019 32.8 (L) 41.0 - 53.0 % Final     MCV   Date Value Ref Range Status   07/30/2019 94.4 79.0 - 97.0 fL Final   07/03/2019 101.2 (H) 80.0 - 100.0 fL Final     MCH   Date Value Ref Range Status   07/30/2019 30.7 26.6 - 33.0 pg Final   07/03/2019 32.1 26.0 - 34.0 pg Final     MCHC   Date Value Ref Range Status   07/30/2019 32.5 31.5 - 35.7 g/dL Final   07/03/2019 31.7 31.0 - 37.0 g/dL Final     RDW   Date Value Ref Range Status   07/30/2019 12.6 12.3 - 15.4 % Final   07/03/2019 15.2 12.0 - 16.8 % Final     RDW-SD   Date Value Ref Range Status   07/30/2019 41.4 37.0 - 54.0 fl Final     MPV   Date Value Ref Range Status   07/30/2019 8.2 6.0 - 12.0 fL Final   07/03/2019 9.0 6.7 - 10.8 fL Final     Platelets   Date Value Ref Range Status   07/30/2019 488 (H) 140 - 450 10*3/mm3 Final   07/03/2019 302 140 - 440 10*3/uL Final     Neutrophil Rel %   Date Value Ref Range Status   07/03/2019 60.0 45 - 80 % Final     Neutrophil %   Date Value Ref Range Status   07/30/2019 60.6 42.7 - 76.0 % Final     Lymphocyte Rel %   Date Value Ref Range Status   07/03/2019 14.8 (L) 15 - 50 % Final     Lymphocyte %   Date Value Ref Range Status   07/30/2019 13.1 (L) 19.6 - 45.3 % Final     Monocyte Rel %   Date Value Ref Range Status   07/03/2019 12.2 0 - 15 % Final     Monocyte %   Date Value Ref Range Status   07/30/2019 13.0 (H) 5.0 - 12.0 % Final     Eosinophil %   Date Value Ref Range Status   07/30/2019 12.2 (H) 0.3 - 6.2 % Final   07/03/2019 10.4 (H) 0 - 7 % Final     Basophil Rel %   Date Value Ref Range Status   07/03/2019 1.7 0 - 2 % Final     Basophil %   Date Value Ref Range Status   07/30/2019 1.1 0.0 - 1.5 % Final     Neutrophils Absolute   Date Value Ref Range Status   07/03/2019 5.39 1.5 - 7.5 /uL Final   07/03/2019 5.39 2.0 - 8.8 10*3/uL Final     Neutrophils, Absolute   Date Value Ref Range Status    07/30/2019 4.39 1.70 - 7.00 10*3/mm3 Final     Lymphocytes Absolute   Date Value Ref Range Status   07/03/2019 1.33 0.7 - 5.5 10*3/uL Final     Lymphocytes, Absolute   Date Value Ref Range Status   07/30/2019 0.95 0.70 - 3.10 10*3/mm3 Final     Monocytes Absolute   Date Value Ref Range Status   07/03/2019 1.10 0.0 - 1.7 10*3/uL Final     Monocytes, Absolute   Date Value Ref Range Status   07/30/2019 0.94 (H) 0.10 - 0.90 10*3/mm3 Final     Eosinophils Absolute   Date Value Ref Range Status   07/03/2019 0.93 (H) 0.0 - 0.8 10*3/uL Final     Eosinophils, Absolute   Date Value Ref Range Status   07/30/2019 0.88 (H) 0.00 - 0.40 10*3/mm3 Final     Basophils Absolute   Date Value Ref Range Status   07/03/2019 0.15 0.0 - 0.2 10*3/uL Final     Basophils, Absolute   Date Value Ref Range Status   07/30/2019 0.08 0.00 - 0.20 10*3/mm3 Final     nRBC   Date Value Ref Range Status   07/03/2019 0 0 /100(WBC) Final   06/19/2019 0.0 0.0 - 0.2 /100 WBC Final       Lab Results   Component Value Date    GLUCOSE 95 07/15/2019    BUN 10 07/15/2019    CREATININE 1.00 07/15/2019    EGFRIFNONA 78 07/15/2019    EGFRIFAFRI >60 05/20/2019    BCR 10.0 07/15/2019    K 3.6 07/15/2019    CO2 24.0 07/15/2019    CALCIUM 9.1 07/15/2019    ALBUMIN 3.10 (L) 07/15/2019    LABIL2 1.4 07/03/2019    AST 18 07/15/2019    ALT 27 07/15/2019         Assessment/Plan     Cancer of lower lobe of right lung (CMS/HCC)  - CBC & Differential  - Comprehensive Metabolic Panel  - CBC Auto Differential      ASSESSMENT:    1. Adenocarcinoma of the right lung, T1c N2 M0, consistent with clinical stage 3A disease.     2. S/P combined chemotherapy and radiation with Cisplatin and Alimta.    3. Status post right lower lobectomy with mediastinal and hilar lymph node dissection with residual disease.  pT1 cN2 M0.  Patient with high risk features including lymphovascular invasion, extranodal capsular extension and persistent multiple lymph node disease.  4. Chemo-induced  constipation, chemo-induced nausea, chemo-induced fatigue. Resolved  5. Monitoring for drug toxicity.   6. Hypokalemia secondary to chemotherapy. Resolved    7. Acute kidney injury. Resolved  8. Postop anemia  9. Rash on both hands...guerra surfaces      Discussion:     He  has had right lobectomy with mediastinal and hilar lymph node dissection.  Unfortunately he has still a significant amount of residual disease left.  There are high risk features including lymphovascular invasion, extranodal capsular extension of disease and multiple lymph node involvement.  Patient had 2 cycles of cis-platinum and Alimta prior to surgery.  Since he has a significant amount of residual disease I have recommended switch therapy to cis-platinum and Taxotere.  Would recommend cis-platinum at 75 mg/m² IV day 1, Taxotere at 75 mg/m² IV day 1 both administered every 21 days for at least 4 cycles.  Dr. Escalona has not recommended additional radiation therapy.  We discussed the side effects of chemotherapy to include but not limited to:    Chemotherapy side effects include, but not limited to, nausea, vomiting, bone marrow suppression, which can result in blood, platelet transfusion. There is also risk of permanent bone marrow destruction, which can cause myelodysplastic syndrome or leukemia years down the line. There is risk of infection which can result in hospitalization and even death. There is also risk of fatigue, asthenia, alopecia which could become permanent. Chemo will help to reduce risk of relapse of cancer, but does not eliminate risk completely.  Weekly Taxotere can cause hypersensitivity reaction, skin toxicity, peripheral neuropathy.  Patient had received cis-platinum in the past.  Reviewed risk of nephrotoxicity, ototoxicity, electrolyte abnormalities.    We discussed possibility of enrollment in a clinical trial.  I have given him referral to see Dr. Brady the [facility] for an evaluation as well. Patient may be  a candidate to treat immunotherapy on clinical trial basis.  Will await Dr. Brady's recommendations.    He has postop anemia have asked patient to begin iron sulfate 325 mg twice a day      PLAN:    His postop anemia of recommended ferrous sulfate 325 mg p.o. twice a day.  I have given him referral to see  To discuss clinical trials that he may qualify for  Plan to see him again in 2 weeks  I will order cis-platinum and Taxotere and get authorization through his insurance but will hold onto his next visit in 2 weeks.  Patient and his spouse have  asked patient which have all been answered to the best of my abilities         I have reviewed labs results, imaging, vitals, and medications with the patient today. Will follow up in 2 weeks with me.          Patient verbalized understanding and is in agreement of the above plan.  Part of this document was scribed by Antionette James RN, BSN.      Face-to-face evaluation has been completed on patient.  Notes have been reviewed and edited.  I have also discussed his case with Shayy Montemayor nurse practitioner.  As above.    Much of the above report is an electronic transcription//translation of the spoken language to printed text using Dragon Software. As such, the subtleties and finesse of the spoken language may permit erroneous, or at times, nonsensical words or phrases to be inadvertently transcribed; thus changes may be made at a later date to rectify these errors.

## 2019-08-14 ENCOUNTER — TELEPHONE (OUTPATIENT)
Dept: ONCOLOGY | Facility: CLINIC | Age: 54
End: 2019-08-14

## 2019-08-15 ENCOUNTER — APPOINTMENT (OUTPATIENT)
Dept: ONCOLOGY | Facility: CLINIC | Age: 54
End: 2019-08-15

## 2019-08-15 ENCOUNTER — APPOINTMENT (OUTPATIENT)
Dept: LAB | Facility: HOSPITAL | Age: 54
End: 2019-08-15

## 2019-08-15 ENCOUNTER — APPOINTMENT (OUTPATIENT)
Dept: ONCOLOGY | Facility: HOSPITAL | Age: 54
End: 2019-08-15

## 2019-08-15 NOTE — PROGRESS NOTES
Hematology/Oncology Outpatient Follow Up    PATIENT NAME:Jc Driscoll  :1965  MRN: [id number]  PRIMARY CARE PHYSICIAN: Reshma Alvarenga MD  REFERRING PHYSICIAN: Reshma Alvarenga MD    No chief complaint on file.       HISTORY OF PRESENT ILLNESS:   This is a 53-year-old male who developed left shoulder pain and for that reason he had a chest x-ray done on 19 which showed a 2.7 cm noncalcified right lower lobe nodule was identified.  For that reason a CT scan of the chest was recommended.  Review of his records shows patient had the CT scan on 19 done without contrast.  This basically showed a lobulated noncalcified mass in the posterior aspect of the right lower lobe measuring 3 cm close to the pleural surface.  There is a calcified 1.2 mm nodule in the lateral aspect of the right lower lobe consistent with a granuloma.  There were also calcified subcarinal and right hilar nodules.  There is a lower right side tracheal nodule measuring 1 cm.  Patient had a PET/CT scan done on 19 which showed increased metabolic activity on the right lower lobe mass that measures 2.5 cm with SUV of 4.4.  There was also increased metabolic activity in the subcarinal space measuring 2.8 cm in size with SUV of 3.4, increased activity in an enlarged right paratracheal lymph node that measures 1.9 cm, SUV of 5, also suspicious for metastatic adenopathy.  Patient had a CT-guided biopsy of the right lower lobe mass on 3/8/19.  This showed adenocarcinoma, TTF-1 positive.  On 3/18/19 patient underwent endoscopic ultrasound and biopsy of a subcarinal lymph node.  This was positive for malignant cells.  Patient was then taken back to surgery on 3/20/18 and had left Mediport placement by Dr. Fuchs.  He has been referred for further evaluation and management of his stage 3 adenocarcinoma of the right lung.  He was accompanied by his spouse for the appointment on 3/26/19.  Patient was scheduled to have a brain MRI  for complete staging, but he could not do this due to claustrophobia.  He is willing to try with the help of angiolytics.    • Patient had brain MRI done on 4/5/19.  He states it did not show any evidence of metastatic disease.  Evidence of chronic sinusitis was noted.    • Patient was seen by Dr. Escalona who has recommended concurrent chemotherapy and radiation.  Patient is scheduled to begin on 4/15/19.   • 4/17/19 - Patient was initiated on combined chemotherapy and radiation with Cisplatin and Alimta.  Patient received cycle 1.      • 5/8/19 - Patient received cycle 2 of chemotherapy with Cisplatin and Alimta.   • 5/15/19 - Chemistry panel showed creatinine of 1.7.  WBC 1.8, hemoglobin 11.6, platelet count 72,000.   • 5/20/19 – BUN 10, creatinine 1.2, potassium 3.5.    • 5/23/19 – CT scan of the chest with contrast showed interval decrease in size of the right lower lobe pulmonary nodule now measuring 2.1 cm in size.  There was no mediastinal or hilar adenopathy identified.  • 6/26/2019 patient underwent right lower lobectomy with hilar and mediastinal lymph node dissection performed by Dr. Terrance Mckeon.  • Pathology revealed residual residual 2.2 cm invasive moderately differentiated adenocarcinoma.  There were a total of 16 lymph nodes evaluated that 7 had metastatic disease.  There was evidence of lymphovascular invasion, extranodal involvement.  All the surgical margins were negative and distance of the closest margin was 2.5 cm.  Logic stage is T1c N2 M0.  • Patient is here today accompanied by his spouse for this appointment.  He continues to complain of some postop discomfort, numbness but his skin is intact.  There is no drainage.  Has had follow-up with Dr. Fuchs.    Past Medical History:   Diagnosis Date   • Dupuytren's contracture of both hands    • Elevated cholesterol    • Hypertension    • Lung cancer (CMS/HCC)     right lung and in lymph nodes x2   • Retention of urine 6/27/2019       Past  Surgical History:   Procedure Laterality Date   • BRONCHOSCOPY  03/18/2019    EBUS MONTERO NEEDLE BX   • COLONOSCOPY     • DUPUYTREN CONTRACTURE RELEASE Bilateral    • LUNG BIOPSY  03/08/2019    CT GUIDED   • PORTACATH PLACEMENT  03/20/2019    DR LONGORIA   • THORACOSCOPY Right 6/26/2019    Procedure: RIGHT VATS, OPEN LOWER LOBECTOMY WITH LYMPH NODE DISECTION, WEDGE RESECTION OF RIGHT MIDDLE LOBE;  Surgeon: Terrance Longoria MD;  Location: [facility] MAIN OR;  Service: Cardiothoracic         Current Outpatient Medications:   •  acetaminophen (TYLENOL) 325 MG tablet, Take 2 tablets by mouth Every 4 (Four) Hours As Needed for Mild Pain ., Disp: , Rfl:   •  amLODIPine (NORVASC) 10 MG tablet, Take 10 mg by mouth Daily., Disp: , Rfl:   •  ammonium lactate (AMLACTIN) 12 % cream, Apply  topically to the appropriate area as directed As Needed for Dry Skin., Disp: 1 each, Rfl: 0  •  buPROPion SR (WELLBUTRIN SR) 150 MG 12 hr tablet, Take 300 mg by mouth 2 (Two) Times a Day., Disp: , Rfl:   •  escitalopram (LEXAPRO) 20 MG tablet, Take 20 mg by mouth Daily., Disp: , Rfl:   •  ferrous sulfate 325 (65 FE) MG tablet, Take 325 mg by mouth Daily With Breakfast., Disp: , Rfl:   •  HYDROcodone-acetaminophen (NORCO) 5-325 MG per tablet, Take 1 tablet by mouth., Disp: , Rfl:   •  ibuprofen (ADVIL,MOTRIN) 600 MG tablet, Take 1 tablet by mouth Every 6 (Six) Hours As Needed for Mild Pain ., Disp: , Rfl:   •  metoprolol succinate XL (TOPROL-XL) 100 MG 24 hr tablet, Take 100 mg by mouth Daily., Disp: , Rfl:   •  pantoprazole (PROTONIX) 40 MG EC tablet, Take 40 mg by mouth Daily., Disp: , Rfl:   •  potassium chloride (K-DUR,KLOR-CON) 10 MEQ CR tablet, Take 10 mEq by mouth Daily., Disp: , Rfl:   •  pravastatin (PRAVACHOL) 10 MG tablet, Take 10 mg by mouth Daily., Disp: , Rfl:   •  tamsulosin (FLOMAX) 0.4 MG capsule 24 hr capsule, Take 1 capsule by mouth every night at bedtime., Disp: , Rfl: 0    No Known Allergies    Family History   Problem  Relation Age of Onset   • Cancer Sister        Cancer-related family history includes Cancer in his sister.    Social History     Tobacco Use   • Smoking status: Former Smoker   • Smokeless tobacco: Never Used   Substance Use Topics   • Alcohol use: Yes     Alcohol/week: 1.2 oz     Types: 2 Cans of beer per week   • Drug use: No       I have reviewed the history of present illness, past medical history, family history, social history, lab results, all notes and other records since the patient was last seen on Visit date not found.    SUBJECTIVE:  The patient is here today for follow up.  He is accompanied by his wife.  He states that he continues to have difficulty with his appetite.  He states that he will drink an Ensure supplement drink as needed.  He stated that he continues to have a lack of energy.          REVIEW OF SYSTEMS:  Review of Systems   Constitutional: Positive for appetite change and fatigue. Negative for chills and fever.   HENT: Negative for ear pain, mouth sores, nosebleeds and sore throat.    Eyes: Negative for photophobia and visual disturbance.   Respiratory: Negative for wheezing and stridor.    Cardiovascular: Negative for chest pain and palpitations.   Gastrointestinal: Negative for abdominal pain, diarrhea, nausea and vomiting.   Endocrine: Negative for cold intolerance and heat intolerance.   Genitourinary: Negative for dysuria and hematuria.   Musculoskeletal: Negative for joint swelling and neck stiffness.   Skin: Negative for color change and rash.        Incisions from right lower lobectomy with mediastinal lymph node dissection.   Neurological: Negative for seizures and syncope.   Hematological: Negative for adenopathy.        No obvious bleeding   Psychiatric/Behavioral: Negative for agitation, confusion and hallucinations.       OBJECTIVE:    There were no vitals filed for this visit.    ECOG  (1) Restricted in physically strenuous activity, ambulatory and able to do work of light  nature    Physical Exam   Constitutional: He is oriented to person, place, and time. No distress.   HENT:   Head: Normocephalic and atraumatic.   Eyes: Conjunctivae and EOM are normal. Right eye exhibits no discharge. Left eye exhibits no discharge. No scleral icterus.   Neck: Normal range of motion. Neck supple. No thyromegaly present.   Cardiovascular: Normal rate, regular rhythm and normal heart sounds. Exam reveals no gallop and no friction rub.   Pulmonary/Chest: Effort normal. No stridor. No respiratory distress. He has no wheezes. He exhibits laceration.   Healing incisions from right lower lobectomy with mediastinal lymph node dissection.  Dry and intact.   Abdominal: Soft. Bowel sounds are normal. He exhibits no mass. There is no tenderness. There is no rebound and no guarding.   Musculoskeletal: Normal range of motion. He exhibits no tenderness.   Lymphadenopathy:     He has no cervical adenopathy.   Neurological: He is alert and oriented to person, place, and time. He exhibits normal muscle tone.   Skin: Skin is warm. No rash noted. He is not diaphoretic. No erythema.   Psychiatric: He has a normal mood and affect. His behavior is normal.   Nursing note and vitals reviewed.      RECENT LABS  WBC   Date Value Ref Range Status   07/30/2019 7.24 3.40 - 10.80 10*3/mm3 Final   07/03/2019 8.99 4.5 - 11.0 10*3/uL Final     RBC   Date Value Ref Range Status   07/30/2019 3.06 (L) 4.14 - 5.80 10*6/mm3 Final   07/03/2019 3.24 (L) 4.5 - 5.9 10*6/uL Final     Hemoglobin   Date Value Ref Range Status   07/30/2019 9.4 (L) 13.0 - 17.7 g/dL Final   07/03/2019 10.4 (L) 13.5 - 17.5 g/dL Final     Hematocrit   Date Value Ref Range Status   07/30/2019 28.9 (L) 37.5 - 51.0 % Final   07/03/2019 32.8 (L) 41.0 - 53.0 % Final     MCV   Date Value Ref Range Status   07/30/2019 94.4 79.0 - 97.0 fL Final   07/03/2019 101.2 (H) 80.0 - 100.0 fL Final     MCH   Date Value Ref Range Status   07/30/2019 30.7 26.6 - 33.0 pg Final    07/03/2019 32.1 26.0 - 34.0 pg Final     MCHC   Date Value Ref Range Status   07/30/2019 32.5 31.5 - 35.7 g/dL Final   07/03/2019 31.7 31.0 - 37.0 g/dL Final     RDW   Date Value Ref Range Status   07/30/2019 12.6 12.3 - 15.4 % Final   07/03/2019 15.2 12.0 - 16.8 % Final     RDW-SD   Date Value Ref Range Status   07/30/2019 41.4 37.0 - 54.0 fl Final     MPV   Date Value Ref Range Status   07/30/2019 8.2 6.0 - 12.0 fL Final   07/03/2019 9.0 6.7 - 10.8 fL Final     Platelets   Date Value Ref Range Status   07/30/2019 488 (H) 140 - 450 10*3/mm3 Final   07/03/2019 302 140 - 440 10*3/uL Final     Neutrophil Rel %   Date Value Ref Range Status   07/03/2019 60.0 45 - 80 % Final     Neutrophil %   Date Value Ref Range Status   07/30/2019 60.6 42.7 - 76.0 % Final     Lymphocyte Rel %   Date Value Ref Range Status   07/03/2019 14.8 (L) 15 - 50 % Final     Lymphocyte %   Date Value Ref Range Status   07/30/2019 13.1 (L) 19.6 - 45.3 % Final     Monocyte Rel %   Date Value Ref Range Status   07/03/2019 12.2 0 - 15 % Final     Monocyte %   Date Value Ref Range Status   07/30/2019 13.0 (H) 5.0 - 12.0 % Final     Eosinophil %   Date Value Ref Range Status   07/30/2019 12.2 (H) 0.3 - 6.2 % Final   07/03/2019 10.4 (H) 0 - 7 % Final     Basophil Rel %   Date Value Ref Range Status   07/03/2019 1.7 0 - 2 % Final     Basophil %   Date Value Ref Range Status   07/30/2019 1.1 0.0 - 1.5 % Final     Neutrophils Absolute   Date Value Ref Range Status   07/03/2019 5.39 1.5 - 7.5 /uL Final   07/03/2019 5.39 2.0 - 8.8 10*3/uL Final     Neutrophils, Absolute   Date Value Ref Range Status   07/30/2019 4.39 1.70 - 7.00 10*3/mm3 Final     Lymphocytes Absolute   Date Value Ref Range Status   07/03/2019 1.33 0.7 - 5.5 10*3/uL Final     Lymphocytes, Absolute   Date Value Ref Range Status   07/30/2019 0.95 0.70 - 3.10 10*3/mm3 Final     Monocytes Absolute   Date Value Ref Range Status   07/03/2019 1.10 0.0 - 1.7 10*3/uL Final     Monocytes, Absolute    Date Value Ref Range Status   07/30/2019 0.94 (H) 0.10 - 0.90 10*3/mm3 Final     Eosinophils Absolute   Date Value Ref Range Status   07/03/2019 0.93 (H) 0.0 - 0.8 10*3/uL Final     Eosinophils, Absolute   Date Value Ref Range Status   07/30/2019 0.88 (H) 0.00 - 0.40 10*3/mm3 Final     Basophils Absolute   Date Value Ref Range Status   07/03/2019 0.15 0.0 - 0.2 10*3/uL Final     Basophils, Absolute   Date Value Ref Range Status   07/30/2019 0.08 0.00 - 0.20 10*3/mm3 Final     nRBC   Date Value Ref Range Status   07/03/2019 0 0 /100(WBC) Final   06/19/2019 0.0 0.0 - 0.2 /100 WBC Final       Lab Results   Component Value Date    GLUCOSE 94 07/30/2019    BUN 9 07/30/2019    CREATININE 1.00 07/30/2019    EGFRIFNONA 78 07/30/2019    EGFRIFAFRI >60 05/20/2019    BCR 9.0 07/30/2019    K 4.1 07/30/2019    CO2 24.0 07/30/2019    CALCIUM 9.0 07/30/2019    ALBUMIN 3.00 (L) 07/30/2019    LABIL2 1.4 07/03/2019    AST 27 07/30/2019    ALT 39 07/30/2019         Assessment/Plan     There are no diagnoses linked to this encounter.    ASSESSMENT:    1. Adenocarcinoma of the right lung, T1c N2 M0, consistent with clinical stage 3A disease.     2. S/P combined chemotherapy and radiation with Cisplatin and Alimta.    3. Status post right lower lobectomy with mediastinal and hilar lymph node dissection with residual disease.  pT1 cN2 M0.  Patient with high risk features including lymphovascular invasion, extranodal capsular extension and persistent multiple lymph node disease.  4. Chemo-induced constipation, chemo-induced nausea, chemo-induced fatigue. Resolved  5. Monitoring for drug toxicity.   6. Hypokalemia secondary to chemotherapy. Resolved    7. Acute kidney injury. Resolved  8. Postop anemia  9. Rash on both hands...guerra surfaces      Discussion:     He  has had right lobectomy with mediastinal and hilar lymph node dissection.  Unfortunately he has still a significant amount of residual disease left.  There are high risk  features including lymphovascular invasion, extranodal capsular extension of disease and multiple lymph node involvement.  Patient had 2 cycles of cis-platinum and Alimta prior to surgery.  Since he has a significant amount of residual disease I have recommended switch therapy to cis-platinum and Taxotere.  Would recommend cis-platinum at 75 mg/m² IV day 1, Taxotere at 75 mg/m² IV day 1 both administered every 21 days for at least 4 cycles.  Dr. Escalona has not recommended additional radiation therapy.  We discussed the side effects of chemotherapy to include but not limited to:    Chemotherapy side effects include, but not limited to, nausea, vomiting, bone marrow suppression, which can result in blood, platelet transfusion. There is also risk of permanent bone marrow destruction, which can cause myelodysplastic syndrome or leukemia years down the line. There is risk of infection which can result in hospitalization and even death. There is also risk of fatigue, asthenia, alopecia which could become permanent. Chemo will help to reduce risk of relapse of cancer, but does not eliminate risk completely.  Weekly Taxotere can cause hypersensitivity reaction, skin toxicity, peripheral neuropathy.  Patient had received cis-platinum in the past.  Reviewed risk of nephrotoxicity, ototoxicity, electrolyte abnormalities.    We discussed possibility of enrollment in a clinical trial.  I have given him referral to see Dr. Brady the [facility] for an evaluation as well. Patient may be a candidate to treat immunotherapy on clinical trial basis.  Will await Dr. Brady's recommendations.    He has postop anemia have asked patient to begin iron sulfate 325 mg twice a day      PLAN:    His postop anemia of recommended ferrous sulfate 325 mg p.o. twice a day.  I have given him referral to see  To discuss clinical trials that he may qualify for  Plan to see him again in 2 weeks  I will order cis-platinum and  Taxotere and get authorization through his insurance but will hold onto his next visit in 2 weeks.  Patient and his spouse have  asked patient which have all been answered to the best of my abilities         I have reviewed labs results, imaging, vitals, and medications with the patient today. Will follow up in 2 weeks with me.          Patient verbalized understanding and is in agreement of the above plan.  Part of this document was scribed by Antionette James RN, BSN.      Face-to-face evaluation has been completed on patient.  Notes have been reviewed and edited.  I have also discussed his case with Shayy Montemayor nurse practitioner.  As above.    Much of the above report is an electronic transcription//translation of the spoken language to printed text using Dragon Software. As such, the subtleties and finesse of the spoken language may permit erroneous, or at times, nonsensical words or phrases to be inadvertently transcribed; thus changes may be made at a later date to rectify these errors.

## 2019-08-16 ENCOUNTER — TELEPHONE (OUTPATIENT)
Dept: ONCOLOGY | Facility: CLINIC | Age: 54
End: 2019-08-16

## 2019-08-16 ENCOUNTER — OFFICE VISIT (OUTPATIENT)
Dept: ONCOLOGY | Facility: CLINIC | Age: 54
End: 2019-08-16

## 2019-08-16 ENCOUNTER — HOSPITAL ENCOUNTER (OUTPATIENT)
Dept: ONCOLOGY | Facility: HOSPITAL | Age: 54
Discharge: HOME | End: 2019-08-16

## 2019-08-16 ENCOUNTER — APPOINTMENT (OUTPATIENT)
Dept: LAB | Facility: HOSPITAL | Age: 54
End: 2019-08-16

## 2019-08-16 VITALS
TEMPERATURE: 97.5 F | RESPIRATION RATE: 20 BRPM | SYSTOLIC BLOOD PRESSURE: 128 MMHG | HEART RATE: 96 BPM | WEIGHT: 190.4 LBS | BODY MASS INDEX: 24.43 KG/M2 | DIASTOLIC BLOOD PRESSURE: 84 MMHG | HEIGHT: 74 IN

## 2019-08-16 DIAGNOSIS — O28.0 LOW MATERNAL SERUM VITAMIN B12: Primary | ICD-10-CM

## 2019-08-16 DIAGNOSIS — E53.8 LOW SERUM VITAMIN B12: ICD-10-CM

## 2019-08-16 DIAGNOSIS — C34.31 CANCER OF LOWER LOBE OF RIGHT LUNG (HCC): Primary | ICD-10-CM

## 2019-08-16 PROCEDURE — 99215 OFFICE O/P EST HI 40 MIN: CPT | Performed by: INTERNAL MEDICINE

## 2019-08-16 PROCEDURE — G0463 HOSPITAL OUTPT CLINIC VISIT: HCPCS | Performed by: INTERNAL MEDICINE

## 2019-08-16 RX ORDER — VILAZODONE HYDROCHLORIDE 40 MG/1
40 TABLET ORAL DAILY
Refills: 0 | Status: ON HOLD | COMMUNITY
Start: 2019-08-08 | End: 2020-02-17

## 2019-08-16 NOTE — TELEPHONE ENCOUNTER
I called pt and let him know his b12 level is borderline low so NP has ordered an additional test (MMA). I let him know that he could come prior to his next MD appt to have that drawn or he could have the lab drawn at his N/V. I let him know that if he does want it drawn prior to N/V, he could give our office a call back so we could get him scheduled for a lab appt.

## 2019-08-16 NOTE — TELEPHONE ENCOUNTER
----- Message from MULUGETA Garcia sent at 8/16/2019  1:10 PM EDT -----  Patient had a borderline B12 level on lab work sent from [facility] facility.  I am entering an MMA to see if he is truly B12 deficient.  The patient can either have it drawn at his next appointment or prior to.  Thank you.

## 2019-08-16 NOTE — PROGRESS NOTES
Hematology/Oncology Outpatient Follow Up    PATIENT NAME:Jc Driscoll  :1965  MRN: [id number]  PRIMARY CARE PHYSICIAN: Reshma Alvarenga MD  REFERRING PHYSICIAN: Reshma Alvarenga MD    Chief Complaint   Patient presents with   • Follow-up     Lung cancer   • Follow-up     chemotherapy        HISTORY OF PRESENT ILLNESS:   This is a 53-year-old male who developed left shoulder pain and for that reason he had a chest x-ray done on 19 which showed a 2.7 cm noncalcified right lower lobe nodule was identified.  For that reason a CT scan of the chest was recommended.  Review of his records shows patient had the CT scan on 19 done without contrast.  This basically showed a lobulated noncalcified mass in the posterior aspect of the right lower lobe measuring 3 cm close to the pleural surface.  There is a calcified 1.2 mm nodule in the lateral aspect of the right lower lobe consistent with a granuloma.  There were also calcified subcarinal and right hilar nodules.  There is a lower right side tracheal nodule measuring 1 cm.  Patient had a PET/CT scan done on 19 which showed increased metabolic activity on the right lower lobe mass that measures 2.5 cm with SUV of 4.4.  There was also increased metabolic activity in the subcarinal space measuring 2.8 cm in size with SUV of 3.4, increased activity in an enlarged right paratracheal lymph node that measures 1.9 cm, SUV of 5, also suspicious for metastatic adenopathy.  Patient had a CT-guided biopsy of the right lower lobe mass on 3/8/19.  This showed adenocarcinoma, TTF-1 positive.  On 3/18/19 patient underwent endoscopic ultrasound and biopsy of a subcarinal lymph node.  This was positive for malignant cells.  Patient was then taken back to surgery on 3/20/18 and had left Mediport placement by Dr. Fuchs.  He has been referred for further evaluation and management of his stage 3 adenocarcinoma of the right lung.  He was accompanied by his spouse  for the appointment on 3/26/19.  Patient was scheduled to have a brain MRI for complete staging, but he could not do this due to claustrophobia.  He is willing to try with the help of angiolytics.    • Patient had brain MRI done on 4/5/19.  He states it did not show any evidence of metastatic disease.  Evidence of chronic sinusitis was noted.    • Patient was seen by Dr. Escalona who has recommended concurrent chemotherapy and radiation.  Patient is scheduled to begin on 4/15/19.   • 4/17/19 - Patient was initiated on combined chemotherapy and radiation with Cisplatin and Alimta.  Patient received cycle 1.      • 5/8/19 - Patient received cycle 2 of chemotherapy with Cisplatin and Alimta.   • 5/15/19 - Chemistry panel showed creatinine of 1.7.  WBC 1.8, hemoglobin 11.6, platelet count 72,000.   • 5/20/19 - BUN 10, creatinine 1.2, potassium 3.5.    • 5/23/19 - CT scan of the chest with contrast showed interval decrease in size of the right lower lobe pulmonary nodule now measuring 2.1 cm in size.  There was no mediastinal or hilar adenopathy identified.  • 6/26/2019 patient underwent right lower lobectomy with hilar and mediastinal lymph node dissection performed by Dr. Terrance Mckeon.  • Pathology revealed residual residual 2.2 cm invasive moderately differentiated adenocarcinoma.  There were a total of 16 lymph nodes evaluated that 7 had metastatic disease.  There was evidence of lymphovascular invasion, extranodal involvement.  All the surgical margins were negative and distance of the closest margin was 2.5 cm.  Logic stage is T1c N2 M0.  • Patient is here today accompanied by his spouse for this appointment.  He continues to complain of some postop discomfort, numbness but his skin is intact.  There is no drainage.  Has had follow-up with Dr. Fuchs.  • 8/14/2019-seen by Dr. Maria at the [facility].  Dr. Maria has recommended immunotherapy with durvalumab off label use as patient has not had significant  response to neoadjuvant chemotherapy and radiation.  Patient was given the option to have immunotherapy at the [facility] vs [place] and he has chosen to stay in [place]    Past Medical History:   Diagnosis Date   • Dupuytren's contracture of both hands    • Elevated cholesterol    • Hypertension    • Lung cancer (CMS/HCC)     right lung and in lymph nodes x2   • Retention of urine 6/27/2019       Past Surgical History:   Procedure Laterality Date   • BRONCHOSCOPY  03/18/2019    EBUS MONTERO NEEDLE BX   • COLONOSCOPY     • DUPUYTREN CONTRACTURE RELEASE Bilateral    • LUNG BIOPSY  03/08/2019    CT GUIDED   • PORTACATH PLACEMENT  03/20/2019    DR LONGORIA   • THORACOSCOPY Right 6/26/2019    Procedure: RIGHT VATS, OPEN LOWER LOBECTOMY WITH LYMPH NODE DISECTION, WEDGE RESECTION OF RIGHT MIDDLE LOBE;  Surgeon: Terrance Longoria MD;  Location: [facility] OR;  Service: Cardiothoracic         Current Outpatient Medications:   •  amLODIPine (NORVASC) 10 MG tablet, Take 10 mg by mouth Daily., Disp: , Rfl:   •  ammonium lactate (AMLACTIN) 12 % cream, Apply  topically to the appropriate area as directed As Needed for Dry Skin., Disp: 1 each, Rfl: 0  •  buPROPion SR (WELLBUTRIN SR) 150 MG 12 hr tablet, Take 300 mg by mouth 2 (Two) Times a Day., Disp: , Rfl:   •  ferrous sulfate 325 (65 FE) MG tablet, Take 325 mg by mouth Daily With Breakfast., Disp: , Rfl:   •  HYDROcodone-acetaminophen (NORCO) 5-325 MG per tablet, Take 1 tablet by mouth., Disp: , Rfl:   •  metoprolol succinate XL (TOPROL-XL) 100 MG 24 hr tablet, Take 100 mg by mouth Daily., Disp: , Rfl:   •  pantoprazole (PROTONIX) 40 MG EC tablet, Take 40 mg by mouth Daily., Disp: , Rfl:   •  pravastatin (PRAVACHOL) 10 MG tablet, Take 10 mg by mouth Daily., Disp: , Rfl:   •  acetaminophen (TYLENOL) 325 MG tablet, Take 2 tablets by mouth Every 4 (Four) Hours As Needed for Mild Pain ., Disp: , Rfl:   •  escitalopram (LEXAPRO) 20 MG tablet, Take 20 mg by mouth Daily., Disp: , Rfl:   •   ibuprofen (ADVIL,MOTRIN) 600 MG tablet, Take 1 tablet by mouth Every 6 (Six) Hours As Needed for Mild Pain ., Disp: , Rfl:   •  potassium chloride (K-DUR,KLOR-CON) 10 MEQ CR tablet, Take 10 mEq by mouth Daily., Disp: , Rfl:   •  tamsulosin (FLOMAX) 0.4 MG capsule 24 hr capsule, Take 1 capsule by mouth every night at bedtime., Disp: , Rfl: 0  •  VIIBRYD 40 MG tablet tablet, Take 40 mg by mouth Daily., Disp: , Rfl: 0    No Known Allergies    Family History   Problem Relation Age of Onset   • Cancer Sister        Cancer-related family history includes Cancer in his sister.    Social History     Tobacco Use   • Smoking status: Former Smoker   • Smokeless tobacco: Never Used   Substance Use Topics   • Alcohol use: Yes     Alcohol/week: 1.2 oz     Types: 2 Cans of beer per week   • Drug use: No       I have reviewed the history of present illness, past medical history, family history, social history, lab results, all notes and other records since the patient was last seen on Visit date not found.    SUBJECTIVE:  The patient is here today for follow up.  He is accompanied by his wife.  He states that he recently went to the [facility] and saw Dr. Maria.         REVIEW OF SYSTEMS:  Review of Systems   Constitutional: Negative for appetite change, chills, fatigue and fever.   HENT: Negative for ear pain, mouth sores, nosebleeds and sore throat.    Eyes: Negative for photophobia and visual disturbance.   Respiratory: Negative for wheezing and stridor.    Cardiovascular: Negative for chest pain and palpitations.   Gastrointestinal: Negative for abdominal pain, diarrhea, nausea and vomiting.   Endocrine: Negative for cold intolerance and heat intolerance.   Genitourinary: Negative for dysuria and hematuria.   Musculoskeletal: Negative for joint swelling and neck stiffness.   Skin: Negative for color change and rash.        Incisions from right lower lobectomy with mediastinal lymph node dissection.   Neurological:  "Negative for seizures and syncope.   Hematological: Negative for adenopathy.        No obvious bleeding   Psychiatric/Behavioral: Negative for agitation, confusion and hallucinations.       OBJECTIVE:    Vitals:    08/16/19 1045   BP: 128/84   Pulse: 96   Resp: 20   Temp: 97.5 °F (36.4 °C)   Weight: 86.4 kg (190 lb 6.4 oz)   Height: 188 cm (74\")   PainSc: 0-No pain       ECOG  (1) Restricted in physically strenuous activity, ambulatory and able to do work of light nature    Physical Exam   Constitutional: He is oriented to person, place, and time. No distress.   HENT:   Head: Normocephalic and atraumatic.   Eyes: Conjunctivae and EOM are normal. Right eye exhibits no discharge. Left eye exhibits no discharge. No scleral icterus.   Neck: Normal range of motion. Neck supple. No thyromegaly present.   Cardiovascular: Normal rate, regular rhythm and normal heart sounds. Exam reveals no gallop and no friction rub.   Pulmonary/Chest: Effort normal. No stridor. No respiratory distress. He has no wheezes. He exhibits laceration.   Healing incisions from right lower lobectomy with mediastinal lymph node dissection.  Dry and intact.   Abdominal: Soft. Bowel sounds are normal. He exhibits no mass. There is no tenderness. There is no rebound and no guarding.   Musculoskeletal: Normal range of motion. He exhibits no tenderness.   Lymphadenopathy:     He has no cervical adenopathy.   Neurological: He is alert and oriented to person, place, and time. He exhibits normal muscle tone.   Skin: Skin is warm. No rash noted. He is not diaphoretic. No erythema.   Psychiatric: He has a normal mood and affect. His behavior is normal.   Nursing note and vitals reviewed.      RECENT LABS  WBC   Date Value Ref Range Status   07/30/2019 7.24 3.40 - 10.80 10*3/mm3 Final   07/03/2019 8.99 4.5 - 11.0 10*3/uL Final     RBC   Date Value Ref Range Status   07/30/2019 3.06 (L) 4.14 - 5.80 10*6/mm3 Final   07/03/2019 3.24 (L) 4.5 - 5.9 10*6/uL Final "     Hemoglobin   Date Value Ref Range Status   07/30/2019 9.4 (L) 13.0 - 17.7 g/dL Final   07/03/2019 10.4 (L) 13.5 - 17.5 g/dL Final     Hematocrit   Date Value Ref Range Status   07/30/2019 28.9 (L) 37.5 - 51.0 % Final   07/03/2019 32.8 (L) 41.0 - 53.0 % Final     MCV   Date Value Ref Range Status   07/30/2019 94.4 79.0 - 97.0 fL Final   07/03/2019 101.2 (H) 80.0 - 100.0 fL Final     MCH   Date Value Ref Range Status   07/30/2019 30.7 26.6 - 33.0 pg Final   07/03/2019 32.1 26.0 - 34.0 pg Final     MCHC   Date Value Ref Range Status   07/30/2019 32.5 31.5 - 35.7 g/dL Final   07/03/2019 31.7 31.0 - 37.0 g/dL Final     RDW   Date Value Ref Range Status   07/30/2019 12.6 12.3 - 15.4 % Final   07/03/2019 15.2 12.0 - 16.8 % Final     RDW-SD   Date Value Ref Range Status   07/30/2019 41.4 37.0 - 54.0 fl Final     MPV   Date Value Ref Range Status   07/30/2019 8.2 6.0 - 12.0 fL Final   07/03/2019 9.0 6.7 - 10.8 fL Final     Platelets   Date Value Ref Range Status   07/30/2019 488 (H) 140 - 450 10*3/mm3 Final   07/03/2019 302 140 - 440 10*3/uL Final     Neutrophil Rel %   Date Value Ref Range Status   07/03/2019 60.0 45 - 80 % Final     Neutrophil %   Date Value Ref Range Status   07/30/2019 60.6 42.7 - 76.0 % Final     Lymphocyte Rel %   Date Value Ref Range Status   07/03/2019 14.8 (L) 15 - 50 % Final     Lymphocyte %   Date Value Ref Range Status   07/30/2019 13.1 (L) 19.6 - 45.3 % Final     Monocyte Rel %   Date Value Ref Range Status   07/03/2019 12.2 0 - 15 % Final     Monocyte %   Date Value Ref Range Status   07/30/2019 13.0 (H) 5.0 - 12.0 % Final     Eosinophil %   Date Value Ref Range Status   07/30/2019 12.2 (H) 0.3 - 6.2 % Final   07/03/2019 10.4 (H) 0 - 7 % Final     Basophil Rel %   Date Value Ref Range Status   07/03/2019 1.7 0 - 2 % Final     Basophil %   Date Value Ref Range Status   07/30/2019 1.1 0.0 - 1.5 % Final     Neutrophils Absolute   Date Value Ref Range Status   07/03/2019 5.39 1.5 - 7.5 /uL  Final   07/03/2019 5.39 2.0 - 8.8 10*3/uL Final     Neutrophils, Absolute   Date Value Ref Range Status   07/30/2019 4.39 1.70 - 7.00 10*3/mm3 Final     Lymphocytes Absolute   Date Value Ref Range Status   07/03/2019 1.33 0.7 - 5.5 10*3/uL Final     Lymphocytes, Absolute   Date Value Ref Range Status   07/30/2019 0.95 0.70 - 3.10 10*3/mm3 Final     Monocytes Absolute   Date Value Ref Range Status   07/03/2019 1.10 0.0 - 1.7 10*3/uL Final     Monocytes, Absolute   Date Value Ref Range Status   07/30/2019 0.94 (H) 0.10 - 0.90 10*3/mm3 Final     Eosinophils Absolute   Date Value Ref Range Status   07/03/2019 0.93 (H) 0.0 - 0.8 10*3/uL Final     Eosinophils, Absolute   Date Value Ref Range Status   07/30/2019 0.88 (H) 0.00 - 0.40 10*3/mm3 Final     Basophils Absolute   Date Value Ref Range Status   07/03/2019 0.15 0.0 - 0.2 10*3/uL Final     Basophils, Absolute   Date Value Ref Range Status   07/30/2019 0.08 0.00 - 0.20 10*3/mm3 Final     nRBC   Date Value Ref Range Status   07/03/2019 0 0 /100(WBC) Final   06/19/2019 0.0 0.0 - 0.2 /100 WBC Final       Lab Results   Component Value Date    GLUCOSE 94 07/30/2019    BUN 9 07/30/2019    CREATININE 1.00 07/30/2019    EGFRIFNONA 78 07/30/2019    EGFRIFAFRI >60 05/20/2019    BCR 9.0 07/30/2019    K 4.1 07/30/2019    CO2 24.0 07/30/2019    CALCIUM 9.0 07/30/2019    ALBUMIN 3.00 (L) 07/30/2019    LABIL2 1.4 07/03/2019    AST 27 07/30/2019    ALT 39 07/30/2019         Assessment/Plan     Cancer of lower lobe of right lung (CMS/HCC)  - CT Chest With Contrast      ASSESSMENT:    1. Adenocarcinoma of the right lung, T1c N2 M0, consistent with clinical stage 3A disease.     2. S/P combined chemotherapy and radiation with Cisplatin and Alimta.    3. Status post right lower lobectomy with mediastinal and hilar lymph node dissection with residual disease.  pT1 cN2 M0.  Patient with high risk features including lymphovascular invasion, extranodal capsular extension and persistent  multiple lymph node disease.  4. Chemo-induced constipation, chemo-induced nausea, chemo-induced fatigue. Resolved  5. Consolidation treatment with immunotherapy  6. Hypokalemia secondary to chemotherapy. Resolved    7. Acute kidney injury. Resolved  8. Postop anemia  9. Rash on both hands...guerra surfaces; improved      Discussion:     He  has had right lobectomy with mediastinal and hilar lymph node dissection.  Unfortunately he has still a significant amount of residual disease left.  There are high risk features including lymphovascular invasion, extranodal capsular extension of disease and multiple lymph node involvement.  Patient had 2 cycles of cis-platinum and Alimta prior to surgery.  Dr. Escalona has not recommended additional radiation therapy.  Patient is interested in immunotherapy understanding that it is an off label use.  Also discussed clinical trials where durvalumab has been used as consolidation therapy and was shown to improve progression free survivorship as much as 1 year or more in patients with unresected stage III lung cancer, unselected for PDL 1 mutation, but had responded to combination of chemotherapy and radiation.      We discussed the side effects of immunotherapy to include but not limited to:  Diarrhea, fatigue, pruritus and rash.  Also included pulmonary toxicity, hepatotoxicity, nephrotoxicity as well as neurotoxicity. There is potential for endocrine and metabolic abnormalities including hyperglycemia, hypertriglyceridemia, hyponatremia, phosphorus abnormalities, hypothyroidism or hyperthyroidism.  There could also be pancytopenia, risk of infection and peripheral neuropathy.  Discussed lab monitoring that will be needed while on continuous immunotherapy and medicines to help with supportive care.        We discussed possibility of enrollment in a clinical trial.  No clinical trial for him to participate in at the [facility].    He has postop anemia have asked patient to  begin iron sulfate 325 mg twice a day      PLAN:    He will continue ferrous sulfate 325 mg p.o. twice a day.  Will prescribe nivolumab consolidation therapy  Plan to see him again in 3 weeks  Restaging CT scan of the chest with contrast next week    Patient and his spouse have  asked patient which have all been answered to the best of my abilities         I have reviewed labs results, imaging, vitals, and medications with the patient today. Will follow up in 3 weeks with me.          Patient verbalized understanding and is in agreement of the above plan.  Part of this document was scribed by Antionette James RN, BSN.      Face-to-face evaluation has been completed on patient.  Notes have been reviewed and edited.  I have also discussed his case with Shayy Montemayor nurse practitioner.  As above.    Much of the above report is an electronic transcription//translation of the spoken language to printed text using Dragon Software. As such, the subtleties and finesse of the spoken language may permit erroneous, or at times, nonsensical words or phrases to be inadvertently transcribed; thus changes may be made at a later date to rectify these errors.

## 2019-08-19 ENCOUNTER — TELEPHONE (OUTPATIENT)
Dept: SURGERY | Facility: CLINIC | Age: 54
End: 2019-08-19

## 2019-08-19 NOTE — TELEPHONE ENCOUNTER
Mr Driscoll wants to know when he can return to work. His [employer] paperwork was filled out through 08/16/19, but he mundo a lot of heavy lifting and wants to know if this will be OK to go back to work. His surgery was on 06/26/19. Please call him at [phone]

## 2019-08-20 ENCOUNTER — OFFICE VISIT (OUTPATIENT)
Dept: SURGERY | Facility: CLINIC | Age: 54
End: 2019-08-20

## 2019-08-20 VITALS
DIASTOLIC BLOOD PRESSURE: 79 MMHG | TEMPERATURE: 97 F | SYSTOLIC BLOOD PRESSURE: 121 MMHG | OXYGEN SATURATION: 95 % | WEIGHT: 190 LBS | BODY MASS INDEX: 24.39 KG/M2 | HEART RATE: 82 BPM

## 2019-08-20 DIAGNOSIS — C34.31 CANCER OF LOWER LOBE OF RIGHT LUNG (HCC): Primary | ICD-10-CM

## 2019-08-20 PROBLEM — D64.9 NORMOCYTIC ANEMIA: Status: ACTIVE | Noted: 2019-08-08

## 2019-08-20 PROCEDURE — 99024 POSTOP FOLLOW-UP VISIT: CPT | Performed by: THORACIC SURGERY (CARDIOTHORACIC VASCULAR SURGERY)

## 2019-08-20 NOTE — PROGRESS NOTES
Patient is seen in postoperative follow-up after his lobectomy and lymph node dissection.  He is seen for return to work questions.  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  I think given his full thoracotomy that he should wait another month before engaging in full work activity.  He does not have the option of a light activity employment.

## 2019-08-26 ENCOUNTER — TELEPHONE (OUTPATIENT)
Dept: ONCOLOGY | Facility: CLINIC | Age: 54
End: 2019-08-26

## 2019-08-26 NOTE — TELEPHONE ENCOUNTER
Received call from patient wife.  She states CT scan, scheduled for 9-4-19 needs to be cancelled and scheduled at [facility] per their insurance company.  She also wants to know if the chemo has been approved.  ofelia Leesa

## 2019-08-29 DIAGNOSIS — C34.91 NON-SMALL CELL CARCINOMA OF LUNG, RIGHT (HCC): ICD-10-CM

## 2019-08-29 DIAGNOSIS — C34.31 CANCER OF LOWER LOBE OF RIGHT LUNG (HCC): ICD-10-CM

## 2019-08-29 RX ORDER — SODIUM CHLORIDE 9 MG/ML
250 INJECTION, SOLUTION INTRAVENOUS ONCE
Status: CANCELLED | OUTPATIENT
Start: 2019-09-09

## 2019-08-30 ENCOUNTER — TELEPHONE (OUTPATIENT)
Dept: ONCOLOGY | Facility: CLINIC | Age: 54
End: 2019-08-30

## 2019-08-30 NOTE — TELEPHONE ENCOUNTER
Patient states he had emergency come up and will not be able to make it in to for his immunotherapy today.  He states he wants to reschedule for next Wednesday 9-4-19.  Pharmacy and nursing notified and gigi/bill Sykes

## 2019-09-04 ENCOUNTER — HOSPITAL ENCOUNTER (OUTPATIENT)
Dept: PET IMAGING | Facility: HOSPITAL | Age: 54
Discharge: HOME | End: 2019-09-04
Admitting: INTERNAL MEDICINE

## 2019-09-04 DIAGNOSIS — C34.31 CANCER OF LOWER LOBE OF RIGHT LUNG (HCC): ICD-10-CM

## 2019-09-04 LAB — CREAT BLDA-MCNC: 1 MG/DL (ref 0.6–1.3)

## 2019-09-04 PROCEDURE — 0 IOPAMIDOL PER 1 ML: Performed by: INTERNAL MEDICINE

## 2019-09-04 PROCEDURE — 71260 CT THORAX DX C+: CPT

## 2019-09-04 PROCEDURE — 82565 ASSAY OF CREATININE: CPT

## 2019-09-04 RX ADMIN — IOPAMIDOL 100 ML: 755 INJECTION, SOLUTION INTRAVENOUS at 09:15

## 2019-09-05 ENCOUNTER — HOSPITAL ENCOUNTER (OUTPATIENT)
Dept: ONCOLOGY | Facility: HOSPITAL | Age: 54
Discharge: HOME | End: 2019-09-05

## 2019-09-05 ENCOUNTER — APPOINTMENT (OUTPATIENT)
Dept: LAB | Facility: HOSPITAL | Age: 54
End: 2019-09-05

## 2019-09-05 ENCOUNTER — OFFICE VISIT (OUTPATIENT)
Dept: ONCOLOGY | Facility: CLINIC | Age: 54
End: 2019-09-05

## 2019-09-05 VITALS
TEMPERATURE: 97.5 F | WEIGHT: 194.4 LBS | HEIGHT: 75 IN | SYSTOLIC BLOOD PRESSURE: 137 MMHG | BODY MASS INDEX: 24.17 KG/M2 | HEART RATE: 83 BPM | RESPIRATION RATE: 18 BRPM | DIASTOLIC BLOOD PRESSURE: 85 MMHG

## 2019-09-05 DIAGNOSIS — C34.80 MALIGNANT NEOPLASM OF OVERLAPPING SITES OF LUNG, UNSPECIFIED LATERALITY (HCC): Primary | ICD-10-CM

## 2019-09-05 PROCEDURE — 99214 OFFICE O/P EST MOD 30 MIN: CPT | Performed by: INTERNAL MEDICINE

## 2019-09-05 NOTE — PROGRESS NOTES
Hematology/Oncology Outpatient Follow Up    PATIENT NAME:Jc Driscoll  :1965  MRN: [id number]  PRIMARY CARE PHYSICIAN: Reshma Alvarenga MD  REFERRING PHYSICIAN: Reshma Alvarenga MD    Chief Complaint   Patient presents with   • Follow-up     Lung cancer        HISTORY OF PRESENT ILLNESS:     Past Medical History:   Diagnosis Date   • Dupuytren's contracture of both hands    • Elevated cholesterol    • Hypertension    • Lung cancer (CMS/HCC)     right lung and in lymph nodes x2   • Retention of urine 2019       Past Surgical History:   Procedure Laterality Date   • BRONCHOSCOPY  2019    EBUS MONTERO NEEDLE BX   • COLONOSCOPY     • DUPUYTREN CONTRACTURE RELEASE Bilateral    • LUNG BIOPSY  2019    CT GUIDED   • PORTACATH PLACEMENT  2019    DR LONGORIA   • THORACOSCOPY Right 2019    Procedure: RIGHT VATS, OPEN LOWER LOBECTOMY WITH LYMPH NODE DISECTION, WEDGE RESECTION OF RIGHT MIDDLE LOBE;  Surgeon: Terrance Longoria MD;  Location: [facility] MAIN OR;  Service: Cardiothoracic         Current Outpatient Medications:   •  amLODIPine (NORVASC) 10 MG tablet, Take 10 mg by mouth Daily., Disp: , Rfl:   •  buPROPion SR (WELLBUTRIN SR) 150 MG 12 hr tablet, Take 300 mg by mouth 2 (Two) Times a Day., Disp: , Rfl:   •  ferrous sulfate 325 (65 FE) MG tablet, Take 325 mg by mouth Daily With Breakfast., Disp: , Rfl:   •  metoprolol succinate XL (TOPROL-XL) 100 MG 24 hr tablet, Take 100 mg by mouth Daily., Disp: , Rfl:   •  pantoprazole (PROTONIX) 40 MG EC tablet, Take 40 mg by mouth Daily., Disp: , Rfl:   •  pravastatin (PRAVACHOL) 10 MG tablet, Take 10 mg by mouth Daily., Disp: , Rfl:   •  VIIBRYD 40 MG tablet tablet, Take 40 mg by mouth Daily., Disp: , Rfl: 0  •  acetaminophen (TYLENOL) 325 MG tablet, Take 2 tablets by mouth Every 4 (Four) Hours As Needed for Mild Pain ., Disp: , Rfl:   •  HYDROcodone-acetaminophen (NORCO) 5-325 MG per tablet, Take 1 tablet by mouth., Disp: , Rfl:   •   ibuprofen (ADVIL,MOTRIN) 600 MG tablet, Take 1 tablet by mouth Every 6 (Six) Hours As Needed for Mild Pain ., Disp: , Rfl:     No Known Allergies    Family History   Problem Relation Age of Onset   • Cancer Sister    • Cancer Mother    • Lung cancer Father        Cancer-related family history includes Cancer in his mother and sister; Lung cancer in his father.    Social History     Tobacco Use   • Smoking status: Former Smoker     Types: Cigarettes     Last attempt to quit: 9/5/2009     Years since quitting: 10.0   • Smokeless tobacco: Never Used   Substance Use Topics   • Alcohol use: Yes     Alcohol/week: 1.2 oz     Types: 2 Cans of beer per week     Frequency: Monthly or less     Drinks per session: 1 or 2   • Drug use: No       I have reviewed the history of present illness, past medical history, family history, social history, lab results, all notes and other records since the patient was last seen on 8/30/2019.    SUBJECTIVE:  The patient is here for a follow up appointment.  He is accompanied with his wife for this appointment.  He states that he is feeling well and eating well.            REVIEW OF SYSTEMS:  Review of Systems   Constitutional: Negative for chills and fever.   HENT: Negative for ear pain, mouth sores, nosebleeds and sore throat.    Eyes: Negative for photophobia and visual disturbance.   Respiratory: Negative for wheezing and stridor.    Cardiovascular: Negative for chest pain and palpitations.   Gastrointestinal: Negative for abdominal pain, diarrhea, nausea and vomiting.   Endocrine: Negative for cold intolerance and heat intolerance.   Genitourinary: Negative for dysuria and hematuria.   Musculoskeletal: Negative for joint swelling and neck stiffness.   Skin: Negative for color change and rash.   Neurological: Negative for seizures and syncope.   Hematological: Negative for adenopathy.        No obvious bleeding   Psychiatric/Behavioral: Negative for agitation, confusion and  "hallucinations.       OBJECTIVE:    Vitals:    09/05/19 1552   BP: 137/85   Pulse: 83   Resp: 18   Temp: 97.5 °F (36.4 °C)   Weight: 88.2 kg (194 lb 6.4 oz)   Height: 190.5 cm (75\")   PainSc: 0-No pain       ECOG  {[facility] ECOG Status:51476}    Physical Exam   Constitutional: He is oriented to person, place, and time. No distress.   HENT:   Head: Normocephalic and atraumatic.   Eyes: Conjunctivae and EOM are normal. Right eye exhibits no discharge. Left eye exhibits no discharge. No scleral icterus.   Neck: Normal range of motion. Neck supple. No thyromegaly present.   Cardiovascular: Normal rate, regular rhythm and normal heart sounds. Exam reveals no gallop and no friction rub.   Pulmonary/Chest: Effort normal. No stridor. No respiratory distress. He has no wheezes.   Abdominal: Soft. Bowel sounds are normal. He exhibits no mass. There is no tenderness. There is no rebound and no guarding.   Musculoskeletal: Normal range of motion. He exhibits no tenderness.   Lymphadenopathy:     He has no cervical adenopathy.   Neurological: He is alert and oriented to person, place, and time. He exhibits normal muscle tone.   Skin: Skin is warm. No rash noted. He is not diaphoretic. No erythema.   Psychiatric: He has a normal mood and affect. His behavior is normal.   Nursing note and vitals reviewed.      RECENT LABS  WBC   Date Value Ref Range Status   07/30/2019 7.24 3.40 - 10.80 10*3/mm3 Final   07/03/2019 8.99 4.5 - 11.0 10*3/uL Final     RBC   Date Value Ref Range Status   07/30/2019 3.06 (L) 4.14 - 5.80 10*6/mm3 Final   07/03/2019 3.24 (L) 4.5 - 5.9 10*6/uL Final     Hemoglobin   Date Value Ref Range Status   07/30/2019 9.4 (L) 13.0 - 17.7 g/dL Final   07/03/2019 10.4 (L) 13.5 - 17.5 g/dL Final     Hematocrit   Date Value Ref Range Status   07/30/2019 28.9 (L) 37.5 - 51.0 % Final   07/03/2019 32.8 (L) 41.0 - 53.0 % Final     MCV   Date Value Ref Range Status   07/30/2019 94.4 79.0 - 97.0 fL Final   07/03/2019 " 101.2 (H) 80.0 - 100.0 fL Final     MCH   Date Value Ref Range Status   07/30/2019 30.7 26.6 - 33.0 pg Final   07/03/2019 32.1 26.0 - 34.0 pg Final     MCHC   Date Value Ref Range Status   07/30/2019 32.5 31.5 - 35.7 g/dL Final   07/03/2019 31.7 31.0 - 37.0 g/dL Final     RDW   Date Value Ref Range Status   07/30/2019 12.6 12.3 - 15.4 % Final   07/03/2019 15.2 12.0 - 16.8 % Final     RDW-SD   Date Value Ref Range Status   07/30/2019 41.4 37.0 - 54.0 fl Final     MPV   Date Value Ref Range Status   07/30/2019 8.2 6.0 - 12.0 fL Final   07/03/2019 9.0 6.7 - 10.8 fL Final     Platelets   Date Value Ref Range Status   07/30/2019 488 (H) 140 - 450 10*3/mm3 Final   07/03/2019 302 140 - 440 10*3/uL Final     Neutrophil Rel %   Date Value Ref Range Status   07/03/2019 60.0 45 - 80 % Final     Neutrophil %   Date Value Ref Range Status   07/30/2019 60.6 42.7 - 76.0 % Final     Lymphocyte Rel %   Date Value Ref Range Status   07/03/2019 14.8 (L) 15 - 50 % Final     Lymphocyte %   Date Value Ref Range Status   07/30/2019 13.1 (L) 19.6 - 45.3 % Final     Monocyte Rel %   Date Value Ref Range Status   07/03/2019 12.2 0 - 15 % Final     Monocyte %   Date Value Ref Range Status   07/30/2019 13.0 (H) 5.0 - 12.0 % Final     Eosinophil %   Date Value Ref Range Status   07/30/2019 12.2 (H) 0.3 - 6.2 % Final   07/03/2019 10.4 (H) 0 - 7 % Final     Basophil Rel %   Date Value Ref Range Status   07/03/2019 1.7 0 - 2 % Final     Basophil %   Date Value Ref Range Status   07/30/2019 1.1 0.0 - 1.5 % Final     Neutrophils Absolute   Date Value Ref Range Status   07/03/2019 5.39 1.5 - 7.5 /uL Final   07/03/2019 5.39 2.0 - 8.8 10*3/uL Final     Neutrophils, Absolute   Date Value Ref Range Status   07/30/2019 4.39 1.70 - 7.00 10*3/mm3 Final     Lymphocytes Absolute   Date Value Ref Range Status   07/03/2019 1.33 0.7 - 5.5 10*3/uL Final     Lymphocytes, Absolute   Date Value Ref Range Status   07/30/2019 0.95 0.70 - 3.10 10*3/mm3 Final      Monocytes Absolute   Date Value Ref Range Status   07/03/2019 1.10 0.0 - 1.7 10*3/uL Final     Monocytes, Absolute   Date Value Ref Range Status   07/30/2019 0.94 (H) 0.10 - 0.90 10*3/mm3 Final     Eosinophils Absolute   Date Value Ref Range Status   07/03/2019 0.93 (H) 0.0 - 0.8 10*3/uL Final     Eosinophils, Absolute   Date Value Ref Range Status   07/30/2019 0.88 (H) 0.00 - 0.40 10*3/mm3 Final     Basophils Absolute   Date Value Ref Range Status   07/03/2019 0.15 0.0 - 0.2 10*3/uL Final     Basophils, Absolute   Date Value Ref Range Status   07/30/2019 0.08 0.00 - 0.20 10*3/mm3 Final     nRBC   Date Value Ref Range Status   07/03/2019 0 0 /100(WBC) Final   06/19/2019 0.0 0.0 - 0.2 /100 WBC Final       Lab Results   Component Value Date    GLUCOSE 94 07/30/2019    BUN 9 07/30/2019    CREATININE 1.00 09/04/2019    EGFRIFNONA 78 07/30/2019    EGFRIFAFRI >60 05/20/2019    BCR 9.0 07/30/2019    K 4.1 07/30/2019    CO2 24.0 07/30/2019    CALCIUM 9.0 07/30/2019    ALBUMIN 3.00 (L) 07/30/2019    LABIL2 1.4 07/03/2019    AST 27 07/30/2019    ALT 39 07/30/2019         Assessment/Plan     There are no diagnoses linked to this encounter.    ASSESSMENT:    1.     PLAN:     1.          I have reviewed labs results, imaging, vitals, and medications with the patient today. Will follow up in *** months with ***.         Patient verbalized understanding and is in agreement of the above plan.    Part of this document was scribed by Antionette James, RN, BSN.      Much of the above report is an electronic transcription/translation of the spoken language to printed text using Dragon Software. As such, the subtleties and finesse of the spoken language may permit erroneous, or at times, nonsensical words or phrases to be inadvertently transcribed; thus changes may be made at a later date to rectify these errors.

## 2019-09-05 NOTE — PROGRESS NOTES
Hematology/Oncology Outpatient Follow Up    PATIENT NAME:Jc Driscoll  :1965  MRN: [id number]  PRIMARY CARE PHYSICIAN: Reshma Alvarenga MD  REFERRING PHYSICIAN: Reshma Alvarenga MD    Chief Complaint   Patient presents with   • Follow-up     Lung cancer        HISTORY OF PRESENT ILLNESS:   This is a 53-year-old male who developed left shoulder pain and for that reason he had a chest x-ray done on 19 which showed a 2.7 cm noncalcified right lower lobe nodule was identified.  For that reason a CT scan of the chest was recommended.  Review of his records shows patient had the CT scan on 19 done without contrast.  This basically showed a lobulated noncalcified mass in the posterior aspect of the right lower lobe measuring 3 cm close to the pleural surface.  There is a calcified 1.2 mm nodule in the lateral aspect of the right lower lobe consistent with a granuloma.  There were also calcified subcarinal and right hilar nodules.  There is a lower right side tracheal nodule measuring 1 cm.  Patient had a PET/CT scan done on 19 which showed increased metabolic activity on the right lower lobe mass that measures 2.5 cm with SUV of 4.4.  There was also increased metabolic activity in the subcarinal space measuring 2.8 cm in size with SUV of 3.4, increased activity in an enlarged right paratracheal lymph node that measures 1.9 cm, SUV of 5, also suspicious for metastatic adenopathy.  Patient had a CT-guided biopsy of the right lower lobe mass on 3/8/19.  This showed adenocarcinoma, TTF-1 positive.  On 3/18/19 patient underwent endoscopic ultrasound and biopsy of a subcarinal lymph node.  This was positive for malignant cells.  Patient was then taken back to surgery on 3/20/18 and had left Mediport placement by Dr. Fuchs.  He has been referred for further evaluation and management of his stage 3 adenocarcinoma of the right lung.  He was accompanied by his spouse for the appointment on 3/26/19.   Patient was scheduled to have a brain MRI for complete staging, but he could not do this due to claustrophobia.  He is willing to try with the help of angiolytics.    • Patient had brain MRI done on 4/5/19.  He states it did not show any evidence of metastatic disease.  Evidence of chronic sinusitis was noted.    • Patient was seen by Dr. Escalona who has recommended concurrent chemotherapy and radiation.  Patient is scheduled to begin on 4/15/19.   • 4/17/19 - Patient was initiated on combined chemotherapy and radiation with Cisplatin and Alimta.  Patient received cycle 1.      • 5/8/19 - Patient received cycle 2 of chemotherapy with Cisplatin and Alimta.   • 5/15/19 - Chemistry panel showed creatinine of 1.7.  WBC 1.8, hemoglobin 11.6, platelet count 72,000.   • 5/20/19 - BUN 10, creatinine 1.2, potassium 3.5.    • 5/23/19 - CT scan of the chest with contrast showed interval decrease in size of the right lower lobe pulmonary nodule now measuring 2.1 cm in size.  There was no mediastinal or hilar adenopathy identified.  • 6/26/2019 patient underwent right lower lobectomy with hilar and mediastinal lymph node dissection performed by Dr. Terrance Mckeon.  • Pathology revealed residual residual 2.2 cm invasive moderately differentiated adenocarcinoma.  There were a total of 16 lymph nodes evaluated that 7 had metastatic disease.  There was evidence of lymphovascular invasion, extranodal involvement.  All the surgical margins were negative and distance of the closest margin was 2.5 cm.  Logic stage is T1c N2 M0.  • Patient is here today accompanied by his spouse for this appointment.  He continues to complain of some postop discomfort, numbness but his skin is intact.  There is no drainage.  Has had follow-up with Dr. Fuchs.  • 8/14/2019-seen by Dr. Maria at the [facility].  Dr. Maria has recommended immunotherapy with durvalumab off label use as patient has not had significant response to neoadjuvant  chemotherapy and radiation.  Patient was given the option to have immunotherapy at the [facility] vs [place] and he has chosen to stay in [place]  • 9/4/2019 patient had CT scan of the chest this shows a moderate size right pleural effusion.  There are areas of groundglass opacification in the right upper lobe without any evidence of significant septal thickening.  Volume loss noted there is also moderate pleural effusion.  There is no suspicious recurrent malignancy.  There were nonenlarged residual mediastinal lymph nodes.    Past Medical History:   Diagnosis Date   • Dupuytren's contracture of both hands    • Elevated cholesterol    • Hypertension    • Lung cancer (CMS/HCC)     right lung and in lymph nodes x2   • Retention of urine 6/27/2019       Past Surgical History:   Procedure Laterality Date   • BRONCHOSCOPY  03/18/2019    EBUS MONTERO NEEDLE BX   • COLONOSCOPY     • DUPUYTREN CONTRACTURE RELEASE Bilateral    • LUNG BIOPSY  03/08/2019    CT GUIDED   • PORTACATH PLACEMENT  03/20/2019    DR LONGORIA   • THORACOSCOPY Right 6/26/2019    Procedure: RIGHT VATS, OPEN LOWER LOBECTOMY WITH LYMPH NODE DISECTION, WEDGE RESECTION OF RIGHT MIDDLE LOBE;  Surgeon: Terrance Longoria MD;  Location: [facility] OR;  Service: Cardiothoracic         Current Outpatient Medications:   •  amLODIPine (NORVASC) 10 MG tablet, Take 10 mg by mouth Daily., Disp: , Rfl:   •  buPROPion SR (WELLBUTRIN SR) 150 MG 12 hr tablet, Take 300 mg by mouth 2 (Two) Times a Day., Disp: , Rfl:   •  ferrous sulfate 325 (65 FE) MG tablet, Take 325 mg by mouth Daily With Breakfast., Disp: , Rfl:   •  metoprolol succinate XL (TOPROL-XL) 100 MG 24 hr tablet, Take 100 mg by mouth Daily., Disp: , Rfl:   •  pantoprazole (PROTONIX) 40 MG EC tablet, Take 40 mg by mouth Daily., Disp: , Rfl:   •  pravastatin (PRAVACHOL) 10 MG tablet, Take 10 mg by mouth Daily., Disp: , Rfl:   •  VIIBRYD 40 MG tablet tablet, Take 40 mg by mouth Daily., Disp: , Rfl: 0  •  acetaminophen  (TYLENOL) 325 MG tablet, Take 2 tablets by mouth Every 4 (Four) Hours As Needed for Mild Pain ., Disp: , Rfl:   •  HYDROcodone-acetaminophen (NORCO) 5-325 MG per tablet, Take 1 tablet by mouth., Disp: , Rfl:   •  ibuprofen (ADVIL,MOTRIN) 600 MG tablet, Take 1 tablet by mouth Every 6 (Six) Hours As Needed for Mild Pain ., Disp: , Rfl:     No Known Allergies    Family History   Problem Relation Age of Onset   • Cancer Sister    • Cancer Mother    • Lung cancer Father        Cancer-related family history includes Cancer in his mother and sister; Lung cancer in his father.    Social History     Tobacco Use   • Smoking status: Former Smoker     Types: Cigarettes     Last attempt to quit: 9/5/2009     Years since quitting: 10.0   • Smokeless tobacco: Never Used   Substance Use Topics   • Alcohol use: Yes     Alcohol/week: 1.2 oz     Types: 2 Cans of beer per week     Frequency: Monthly or less     Drinks per session: 1 or 2   • Drug use: No       I have reviewed the history of present illness, past medical history, family history, social history, lab results, all notes and other records since the patient was last seen on Visit date not found.    SUBJECTIVE:  The patient is here for a follow up appointment.  He is accompanied with his wife for this appointment.  He states that he is feeling well and eating well.  His mood has also improved          REVIEW OF SYSTEMS:  Review of Systems   Constitutional: Negative for appetite change, chills, fatigue and fever.   HENT: Negative for ear pain, mouth sores, nosebleeds and sore throat.    Eyes: Negative for photophobia and visual disturbance.   Respiratory: Negative for wheezing and stridor.    Cardiovascular: Negative for chest pain and palpitations.   Gastrointestinal: Negative for abdominal pain, diarrhea, nausea and vomiting.   Endocrine: Negative for cold intolerance and heat intolerance.   Genitourinary: Negative for dysuria and hematuria.   Musculoskeletal: Negative for  "joint swelling and neck stiffness.   Skin: Negative for color change and rash.        Incisions from right lower lobectomy with mediastinal lymph node dissection.   Neurological: Negative for seizures and syncope.   Hematological: Negative for adenopathy.        No obvious bleeding   Psychiatric/Behavioral: Negative for agitation, confusion and hallucinations.       OBJECTIVE:    Vitals:    09/05/19 1552   BP: 137/85   Pulse: 83   Resp: 18   Temp: 97.5 °F (36.4 °C)   Weight: 88.2 kg (194 lb 6.4 oz)   Height: 190.5 cm (75\")   PainSc: 0-No pain       ECOG  (1) Restricted in physically strenuous activity, ambulatory and able to do work of light nature    Physical Exam   Constitutional: He is oriented to person, place, and time. No distress.   HENT:   Head: Normocephalic and atraumatic.   Eyes: Conjunctivae and EOM are normal. Right eye exhibits no discharge. Left eye exhibits no discharge. No scleral icterus.   Neck: Normal range of motion. Neck supple. No thyromegaly present.   Cardiovascular: Normal rate, regular rhythm and normal heart sounds. Exam reveals no gallop and no friction rub.   Pulmonary/Chest: Effort normal. No stridor. No respiratory distress. He has no wheezes. He exhibits laceration.   Healing incisions from right lower lobectomy with mediastinal lymph node dissection.  Dry and intact.   Abdominal: Soft. Bowel sounds are normal. He exhibits no mass. There is no tenderness. There is no rebound and no guarding.   Musculoskeletal: Normal range of motion. He exhibits no tenderness.   Lymphadenopathy:     He has no cervical adenopathy.   Neurological: He is alert and oriented to person, place, and time. He exhibits normal muscle tone.   Skin: Skin is warm. No rash noted. He is not diaphoretic. No erythema.   Psychiatric: He has a normal mood and affect. His behavior is normal.   Nursing note and vitals reviewed.      RECENT LABS  WBC   Date Value Ref Range Status   07/30/2019 7.24 3.40 - 10.80 10*3/mm3 " Final   07/03/2019 8.99 4.5 - 11.0 10*3/uL Final     RBC   Date Value Ref Range Status   07/30/2019 3.06 (L) 4.14 - 5.80 10*6/mm3 Final   07/03/2019 3.24 (L) 4.5 - 5.9 10*6/uL Final     Hemoglobin   Date Value Ref Range Status   07/30/2019 9.4 (L) 13.0 - 17.7 g/dL Final   07/03/2019 10.4 (L) 13.5 - 17.5 g/dL Final     Hematocrit   Date Value Ref Range Status   07/30/2019 28.9 (L) 37.5 - 51.0 % Final   07/03/2019 32.8 (L) 41.0 - 53.0 % Final     MCV   Date Value Ref Range Status   07/30/2019 94.4 79.0 - 97.0 fL Final   07/03/2019 101.2 (H) 80.0 - 100.0 fL Final     MCH   Date Value Ref Range Status   07/30/2019 30.7 26.6 - 33.0 pg Final   07/03/2019 32.1 26.0 - 34.0 pg Final     MCHC   Date Value Ref Range Status   07/30/2019 32.5 31.5 - 35.7 g/dL Final   07/03/2019 31.7 31.0 - 37.0 g/dL Final     RDW   Date Value Ref Range Status   07/30/2019 12.6 12.3 - 15.4 % Final   07/03/2019 15.2 12.0 - 16.8 % Final     RDW-SD   Date Value Ref Range Status   07/30/2019 41.4 37.0 - 54.0 fl Final     MPV   Date Value Ref Range Status   07/30/2019 8.2 6.0 - 12.0 fL Final   07/03/2019 9.0 6.7 - 10.8 fL Final     Platelets   Date Value Ref Range Status   07/30/2019 488 (H) 140 - 450 10*3/mm3 Final   07/03/2019 302 140 - 440 10*3/uL Final     Neutrophil Rel %   Date Value Ref Range Status   07/03/2019 60.0 45 - 80 % Final     Neutrophil %   Date Value Ref Range Status   07/30/2019 60.6 42.7 - 76.0 % Final     Lymphocyte Rel %   Date Value Ref Range Status   07/03/2019 14.8 (L) 15 - 50 % Final     Lymphocyte %   Date Value Ref Range Status   07/30/2019 13.1 (L) 19.6 - 45.3 % Final     Monocyte Rel %   Date Value Ref Range Status   07/03/2019 12.2 0 - 15 % Final     Monocyte %   Date Value Ref Range Status   07/30/2019 13.0 (H) 5.0 - 12.0 % Final     Eosinophil %   Date Value Ref Range Status   07/30/2019 12.2 (H) 0.3 - 6.2 % Final   07/03/2019 10.4 (H) 0 - 7 % Final     Basophil Rel %   Date Value Ref Range Status   07/03/2019 1.7 0 -  2 % Final     Basophil %   Date Value Ref Range Status   07/30/2019 1.1 0.0 - 1.5 % Final     Neutrophils Absolute   Date Value Ref Range Status   07/03/2019 5.39 1.5 - 7.5 /uL Final   07/03/2019 5.39 2.0 - 8.8 10*3/uL Final     Neutrophils, Absolute   Date Value Ref Range Status   07/30/2019 4.39 1.70 - 7.00 10*3/mm3 Final     Lymphocytes Absolute   Date Value Ref Range Status   07/03/2019 1.33 0.7 - 5.5 10*3/uL Final     Lymphocytes, Absolute   Date Value Ref Range Status   07/30/2019 0.95 0.70 - 3.10 10*3/mm3 Final     Monocytes Absolute   Date Value Ref Range Status   07/03/2019 1.10 0.0 - 1.7 10*3/uL Final     Monocytes, Absolute   Date Value Ref Range Status   07/30/2019 0.94 (H) 0.10 - 0.90 10*3/mm3 Final     Eosinophils Absolute   Date Value Ref Range Status   07/03/2019 0.93 (H) 0.0 - 0.8 10*3/uL Final     Eosinophils, Absolute   Date Value Ref Range Status   07/30/2019 0.88 (H) 0.00 - 0.40 10*3/mm3 Final     Basophils Absolute   Date Value Ref Range Status   07/03/2019 0.15 0.0 - 0.2 10*3/uL Final     Basophils, Absolute   Date Value Ref Range Status   07/30/2019 0.08 0.00 - 0.20 10*3/mm3 Final     nRBC   Date Value Ref Range Status   07/03/2019 0 0 /100(WBC) Final   06/19/2019 0.0 0.0 - 0.2 /100 WBC Final       Lab Results   Component Value Date    GLUCOSE 94 07/30/2019    BUN 9 07/30/2019    CREATININE 1.00 09/04/2019    EGFRIFNONA 78 07/30/2019    EGFRIFAFRI >60 05/20/2019    BCR 9.0 07/30/2019    K 4.1 07/30/2019    CO2 24.0 07/30/2019    CALCIUM 9.0 07/30/2019    ALBUMIN 3.00 (L) 07/30/2019    LABIL2 1.4 07/03/2019    AST 27 07/30/2019    ALT 39 07/30/2019         Assessment/Plan     There are no diagnoses linked to this encounter.    ASSESSMENT:    1. Adenocarcinoma of the right lung, T1c N2 M0, consistent with clinical stage 3A disease.     2. S/P combined chemotherapy and radiation with Cisplatin and Alimta.    3. Status post right lower lobectomy with mediastinal and hilar lymph node dissection  with residual disease.  pT1 cN2 M0.  Patient with high risk features including lymphovascular invasion, extranodal capsular extension and persistent multiple lymph node disease.  4. Chemo-induced constipation, chemo-induced nausea, chemo-induced fatigue. Resolved  5. Consolidation treatment with immunotherapy  6. Hypokalemia secondary to chemotherapy. Resolved    7. Acute kidney injury. Resolved  8. Postop anemia  9. Rash on both hands...guerra surfaces; improved      Discussion:     He  has had right lobectomy with mediastinal and hilar lymph node dissection.  Unfortunately he has still a significant amount of residual disease left.  There are high risk features including lymphovascular invasion, extranodal capsular extension of disease and multiple lymph node involvement.  Patient had 2 cycles of cis-platinum and Alimta prior to surgery.  Dr. Escalona has not recommended additional radiation therapy.  Patient is interested in immunotherapy understanding that it is an off label use.  Also discussed clinical trials where durvalumab has been used as consolidation therapy and was shown to improve progression free survivorship as much as 1 year or more in patients with unresected stage III lung cancer, unselected for PDL 1 mutation, but had responded to combination of chemotherapy and radiation.      We discussed the side effects of immunotherapy to include but not limited to:  Diarrhea, fatigue, pruritus and rash.  Also included pulmonary toxicity, hepatotoxicity, nephrotoxicity as well as neurotoxicity. There is potential for endocrine and metabolic abnormalities including hyperglycemia, hypertriglyceridemia, hyponatremia, phosphorus abnormalities, hypothyroidism or hyperthyroidism.  There could also be pancytopenia, risk of infection and peripheral neuropathy.  Discussed lab monitoring that will be needed while on continuous immunotherapy and medicines to help with supportive care.        We discussed possibility of  enrollment in a clinical trial.  No clinical trial for him to participate in at the [facility].    He has postop anemia have asked patient to begin iron sulfate 325 mg twice a day      PLAN:  I have reviewed his CT scan results with patient  He will continue ferrous sulfate 325 mg p.o. twice a day.  Proceed with nivolumab consolidation therapy  Plan to see him again in 3 weeks  Restaging CT scan of the chest in 3 months from now    Patient and his spouse have  asked patient which have all been answered to the best of my abilities         I have reviewed labs results, imaging, vitals, and medications with the patient today. Will follow up in 3 weeks with me.          Patient verbalized understanding and is in agreement of the above plan.  Part of this document was scribed by Antionette James RN, BSN.        Much of the above report is an electronic transcription//translation of the spoken language to printed text using Dragon Software. As such, the subtleties and finesse of the spoken language may permit erroneous, or at times, nonsensical words or phrases to be inadvertently transcribed; thus changes may be made at a later date to rectify these errors.

## 2019-09-09 ENCOUNTER — HOSPITAL ENCOUNTER (OUTPATIENT)
Dept: ONCOLOGY | Facility: HOSPITAL | Age: 54
Setting detail: INFUSION SERIES
Discharge: HOME | End: 2019-09-09

## 2019-09-09 VITALS
SYSTOLIC BLOOD PRESSURE: 123 MMHG | DIASTOLIC BLOOD PRESSURE: 82 MMHG | BODY MASS INDEX: 23.95 KG/M2 | TEMPERATURE: 98 F | WEIGHT: 192.6 LBS | RESPIRATION RATE: 18 BRPM | HEART RATE: 85 BPM | HEIGHT: 75 IN

## 2019-09-09 DIAGNOSIS — Z45.2 ENCOUNTER FOR CARE RELATED TO VASCULAR ACCESS PORT: ICD-10-CM

## 2019-09-09 DIAGNOSIS — C34.31 CANCER OF LOWER LOBE OF RIGHT LUNG (HCC): ICD-10-CM

## 2019-09-09 DIAGNOSIS — E53.8 LOW SERUM VITAMIN B12: Primary | ICD-10-CM

## 2019-09-09 DIAGNOSIS — C34.91 NON-SMALL CELL CARCINOMA OF LUNG, RIGHT (HCC): ICD-10-CM

## 2019-09-09 LAB
ALBUMIN SERPL-MCNC: 3.5 G/DL (ref 3.5–4.8)
ALBUMIN/GLOB SERPL: 1.3 G/DL (ref 1–1.7)
ALP SERPL-CCNC: 81 U/L (ref 32–91)
ALT SERPL W P-5'-P-CCNC: 25 U/L (ref 17–63)
ANION GAP SERPL CALCULATED.3IONS-SCNC: 12.8 MMOL/L (ref 5–15)
AST SERPL-CCNC: 25 U/L (ref 15–41)
BASOPHILS # BLD AUTO: 0.03 10*3/MM3 (ref 0–0.2)
BASOPHILS NFR BLD AUTO: 0.5 % (ref 0–1.5)
BILIRUB SERPL-MCNC: 0.4 MG/DL (ref 0.3–1.2)
BUN BLD-MCNC: 9 MG/DL (ref 8–20)
BUN/CREAT SERPL: 8.2 (ref 6.2–20.3)
CALCIUM SPEC-SCNC: 8.6 MG/DL (ref 8.9–10.3)
CHLORIDE SERPL-SCNC: 102 MMOL/L (ref 101–111)
CO2 SERPL-SCNC: 27 MMOL/L (ref 22–32)
CREAT BLD-MCNC: 1.1 MG/DL (ref 0.7–1.2)
DEPRECATED RDW RBC AUTO: 46 FL (ref 37–54)
EOSINOPHIL # BLD AUTO: 0.34 10*3/MM3 (ref 0–0.4)
EOSINOPHIL NFR BLD AUTO: 5.5 % (ref 0.3–6.2)
ERYTHROCYTE [DISTWIDTH] IN BLOOD BY AUTOMATED COUNT: 13.8 % (ref 12.3–15.4)
GFR SERPL CREATININE-BSD FRML MDRD: 69 ML/MIN/1.73
GLOBULIN UR ELPH-MCNC: 2.8 GM/DL (ref 2.5–3.8)
GLUCOSE BLD-MCNC: 113 MG/DL (ref 65–99)
HCT VFR BLD AUTO: 36.6 % (ref 37.5–51)
HGB BLD-MCNC: 11.7 G/DL (ref 13–17.7)
LYMPHOCYTES # BLD AUTO: 1.03 10*3/MM3 (ref 0.7–3.1)
LYMPHOCYTES NFR BLD AUTO: 16.5 % (ref 19.6–45.3)
MCH RBC QN AUTO: 29.8 PG (ref 26.6–33)
MCHC RBC AUTO-ENTMCNC: 32 G/DL (ref 31.5–35.7)
MCV RBC AUTO: 93.4 FL (ref 79–97)
MONOCYTES # BLD AUTO: 0.68 10*3/MM3 (ref 0.1–0.9)
MONOCYTES NFR BLD AUTO: 10.9 % (ref 5–12)
NEUTROPHILS # BLD AUTO: 4.15 10*3/MM3 (ref 1.7–7)
NEUTROPHILS NFR BLD AUTO: 66.6 % (ref 42.7–76)
PLATELET # BLD AUTO: 257 10*3/MM3 (ref 140–450)
PMV BLD AUTO: 8.6 FL (ref 6–12)
POTASSIUM BLD-SCNC: 3.8 MMOL/L (ref 3.6–5.1)
PROT SERPL-MCNC: 6.3 G/DL (ref 6.1–7.9)
RBC # BLD AUTO: 3.92 10*6/MM3 (ref 4.14–5.8)
SODIUM BLD-SCNC: 138 MMOL/L (ref 136–144)
T4 FREE SERPL-MCNC: 0.76 NG/DL (ref 0.58–1.64)
TSH SERPL DL<=0.05 MIU/L-ACNC: 2.98 UIU/ML (ref 0.34–5.6)
WBC NRBC COR # BLD: 6.23 10*3/MM3 (ref 3.4–10.8)

## 2019-09-09 PROCEDURE — 84443 ASSAY THYROID STIM HORMONE: CPT | Performed by: INTERNAL MEDICINE

## 2019-09-09 PROCEDURE — 85025 COMPLETE CBC W/AUTO DIFF WBC: CPT | Performed by: INTERNAL MEDICINE

## 2019-09-09 PROCEDURE — 25010000002 DURVALUMAB 120 MG/2.4ML SOLUTION 2.4 ML VIAL: Performed by: INTERNAL MEDICINE

## 2019-09-09 PROCEDURE — 80053 COMPREHEN METABOLIC PANEL: CPT | Performed by: INTERNAL MEDICINE

## 2019-09-09 PROCEDURE — 96413 CHEMO IV INFUSION 1 HR: CPT | Performed by: INTERNAL MEDICINE

## 2019-09-09 PROCEDURE — 25010000002 DURVALUMAB 500 MG/10ML SOLUTION 10 ML VIAL: Performed by: INTERNAL MEDICINE

## 2019-09-09 PROCEDURE — 84439 ASSAY OF FREE THYROXINE: CPT | Performed by: INTERNAL MEDICINE

## 2019-09-09 RX ORDER — SODIUM CHLORIDE 0.9 % (FLUSH) 0.9 %
10 SYRINGE (ML) INJECTION AS NEEDED
Status: CANCELLED | OUTPATIENT
Start: 2019-09-09

## 2019-09-09 RX ORDER — SODIUM CHLORIDE 0.9 % (FLUSH) 0.9 %
10 SYRINGE (ML) INJECTION AS NEEDED
Status: DISCONTINUED | OUTPATIENT
Start: 2019-09-09 | End: 2019-09-10 | Stop reason: HOSPADM

## 2019-09-09 RX ORDER — SODIUM CHLORIDE 9 MG/ML
250 INJECTION, SOLUTION INTRAVENOUS ONCE
Status: DISCONTINUED | OUTPATIENT
Start: 2019-09-09 | End: 2019-09-10 | Stop reason: HOSPADM

## 2019-09-09 RX ADMIN — HEPARIN 500 UNITS: 100 SYRINGE at 15:46

## 2019-09-09 RX ADMIN — SODIUM CHLORIDE, PRESERVATIVE FREE 10 ML: 5 INJECTION INTRAVENOUS at 15:45

## 2019-09-09 RX ADMIN — SODIUM CHLORIDE 860 MG: 900 INJECTION, SOLUTION INTRAVENOUS at 14:34

## 2019-09-09 NOTE — PROGRESS NOTES
Discussed patient's treatment with Dr. Gaspar.  Per Dr. Gaspar, patient is to receive Durvalumab (Imfinzi) as indicated in patient's treatment plan.

## 2019-09-11 LAB
Lab: ABNORMAL
METHYLMALONATE SERPL-SCNC: 423 NMOL/L (ref 0–378)

## 2019-09-12 PROBLEM — E53.8 B12 DEFICIENCY: Status: ACTIVE | Noted: 2019-09-12

## 2019-09-19 DIAGNOSIS — C34.91 NON-SMALL CELL CARCINOMA OF LUNG, RIGHT (HCC): ICD-10-CM

## 2019-09-19 DIAGNOSIS — E53.8 B12 DEFICIENCY: ICD-10-CM

## 2019-09-19 DIAGNOSIS — C34.31 CANCER OF LOWER LOBE OF RIGHT LUNG (HCC): ICD-10-CM

## 2019-09-19 RX ORDER — CYANOCOBALAMIN 1000 UG/ML
1000 INJECTION, SOLUTION INTRAMUSCULAR; SUBCUTANEOUS ONCE
Status: CANCELLED | OUTPATIENT
Start: 2019-09-26

## 2019-09-19 RX ORDER — CYANOCOBALAMIN 1000 UG/ML
1000 INJECTION, SOLUTION INTRAMUSCULAR; SUBCUTANEOUS ONCE
Status: CANCELLED | OUTPATIENT
Start: 2019-09-23

## 2019-09-19 RX ORDER — CYANOCOBALAMIN 1000 UG/ML
1000 INJECTION, SOLUTION INTRAMUSCULAR; SUBCUTANEOUS ONCE
Status: CANCELLED | OUTPATIENT
Start: 2019-09-27

## 2019-09-19 RX ORDER — SODIUM CHLORIDE 9 MG/ML
250 INJECTION, SOLUTION INTRAVENOUS ONCE
Status: CANCELLED | OUTPATIENT
Start: 2019-09-23

## 2019-09-19 RX ORDER — CYANOCOBALAMIN 1000 UG/ML
1000 INJECTION, SOLUTION INTRAMUSCULAR; SUBCUTANEOUS ONCE
Status: CANCELLED | OUTPATIENT
Start: 2019-10-23

## 2019-09-23 ENCOUNTER — HOSPITAL ENCOUNTER (OUTPATIENT)
Dept: ONCOLOGY | Facility: HOSPITAL | Age: 54
Setting detail: INFUSION SERIES
Discharge: HOME | End: 2019-09-23

## 2019-09-23 VITALS
TEMPERATURE: 97.9 F | RESPIRATION RATE: 18 BRPM | HEIGHT: 75 IN | BODY MASS INDEX: 23.96 KG/M2 | WEIGHT: 192.7 LBS | HEART RATE: 83 BPM | DIASTOLIC BLOOD PRESSURE: 81 MMHG | SYSTOLIC BLOOD PRESSURE: 120 MMHG

## 2019-09-23 DIAGNOSIS — C34.91 NON-SMALL CELL CARCINOMA OF LUNG, RIGHT (HCC): ICD-10-CM

## 2019-09-23 DIAGNOSIS — C34.31 CANCER OF LOWER LOBE OF RIGHT LUNG (HCC): Primary | ICD-10-CM

## 2019-09-23 DIAGNOSIS — E53.8 B12 DEFICIENCY: ICD-10-CM

## 2019-09-23 DIAGNOSIS — Z45.2 ENCOUNTER FOR CARE RELATED TO VASCULAR ACCESS PORT: ICD-10-CM

## 2019-09-23 LAB
ALBUMIN SERPL-MCNC: 3.6 G/DL (ref 3.5–4.8)
ALBUMIN/GLOB SERPL: 1.3 G/DL (ref 1–1.7)
ALP SERPL-CCNC: 73 U/L (ref 32–91)
ALT SERPL W P-5'-P-CCNC: 26 U/L (ref 17–63)
ANION GAP SERPL CALCULATED.3IONS-SCNC: 12.7 MMOL/L (ref 5–15)
AST SERPL-CCNC: 25 U/L (ref 15–41)
BASOPHILS # BLD AUTO: 0.05 10*3/MM3 (ref 0–0.2)
BASOPHILS NFR BLD AUTO: 0.8 % (ref 0–1.5)
BILIRUB SERPL-MCNC: 0.4 MG/DL (ref 0.3–1.2)
BUN BLD-MCNC: 14 MG/DL (ref 8–20)
BUN/CREAT SERPL: 12.7 (ref 6.2–20.3)
CALCIUM SPEC-SCNC: 8.8 MG/DL (ref 8.9–10.3)
CHLORIDE SERPL-SCNC: 102 MMOL/L (ref 101–111)
CO2 SERPL-SCNC: 26 MMOL/L (ref 22–32)
CREAT BLD-MCNC: 1.1 MG/DL (ref 0.7–1.2)
DEPRECATED RDW RBC AUTO: 45.1 FL (ref 37–54)
EOSINOPHIL # BLD AUTO: 0.29 10*3/MM3 (ref 0–0.4)
EOSINOPHIL NFR BLD AUTO: 4.7 % (ref 0.3–6.2)
ERYTHROCYTE [DISTWIDTH] IN BLOOD BY AUTOMATED COUNT: 13.9 % (ref 12.3–15.4)
GFR SERPL CREATININE-BSD FRML MDRD: 69 ML/MIN/1.73
GLOBULIN UR ELPH-MCNC: 2.8 GM/DL (ref 2.5–3.8)
GLUCOSE BLD-MCNC: 158 MG/DL (ref 65–99)
HCT VFR BLD AUTO: 37.9 % (ref 37.5–51)
HGB BLD-MCNC: 12.2 G/DL (ref 13–17.7)
LYMPHOCYTES # BLD AUTO: 1.01 10*3/MM3 (ref 0.7–3.1)
LYMPHOCYTES NFR BLD AUTO: 16.2 % (ref 19.6–45.3)
MCH RBC QN AUTO: 29.5 PG (ref 26.6–33)
MCHC RBC AUTO-ENTMCNC: 32.2 G/DL (ref 31.5–35.7)
MCV RBC AUTO: 91.5 FL (ref 79–97)
MONOCYTES # BLD AUTO: 0.67 10*3/MM3 (ref 0.1–0.9)
MONOCYTES NFR BLD AUTO: 10.8 % (ref 5–12)
NEUTROPHILS # BLD AUTO: 4.21 10*3/MM3 (ref 1.7–7)
NEUTROPHILS NFR BLD AUTO: 67.5 % (ref 42.7–76)
PLATELET # BLD AUTO: 287 10*3/MM3 (ref 140–450)
PMV BLD AUTO: 8.7 FL (ref 6–12)
POTASSIUM BLD-SCNC: 3.7 MMOL/L (ref 3.6–5.1)
PROT SERPL-MCNC: 6.4 G/DL (ref 6.1–7.9)
RBC # BLD AUTO: 4.14 10*6/MM3 (ref 4.14–5.8)
SODIUM BLD-SCNC: 137 MMOL/L (ref 136–144)
WBC NRBC COR # BLD: 6.23 10*3/MM3 (ref 3.4–10.8)

## 2019-09-23 PROCEDURE — 96413 CHEMO IV INFUSION 1 HR: CPT | Performed by: INTERNAL MEDICINE

## 2019-09-23 PROCEDURE — 25010000002 DURVALUMAB 500 MG/10ML SOLUTION 10 ML VIAL: Performed by: NURSE PRACTITIONER

## 2019-09-23 PROCEDURE — 25010000002 DURVALUMAB 120 MG/2.4ML SOLUTION 2.4 ML VIAL: Performed by: NURSE PRACTITIONER

## 2019-09-23 PROCEDURE — 25010000002 CYANOCOBALAMIN PER 1000 MCG: Performed by: INTERNAL MEDICINE

## 2019-09-23 PROCEDURE — 85025 COMPLETE CBC W/AUTO DIFF WBC: CPT | Performed by: NURSE PRACTITIONER

## 2019-09-23 PROCEDURE — 80053 COMPREHEN METABOLIC PANEL: CPT | Performed by: NURSE PRACTITIONER

## 2019-09-23 PROCEDURE — 96372 THER/PROPH/DIAG INJ SC/IM: CPT | Performed by: INTERNAL MEDICINE

## 2019-09-23 RX ORDER — SODIUM CHLORIDE 0.9 % (FLUSH) 0.9 %
10 SYRINGE (ML) INJECTION AS NEEDED
Status: DISCONTINUED | OUTPATIENT
Start: 2019-09-23 | End: 2019-09-24 | Stop reason: HOSPADM

## 2019-09-23 RX ORDER — CYANOCOBALAMIN 1000 UG/ML
1000 INJECTION, SOLUTION INTRAMUSCULAR; SUBCUTANEOUS ONCE
Status: COMPLETED | OUTPATIENT
Start: 2019-09-23 | End: 2019-09-23

## 2019-09-23 RX ORDER — SODIUM CHLORIDE 0.9 % (FLUSH) 0.9 %
10 SYRINGE (ML) INJECTION AS NEEDED
Status: CANCELLED | OUTPATIENT
Start: 2019-09-23

## 2019-09-23 RX ADMIN — HEPARIN 500 UNITS: 100 SYRINGE at 13:29

## 2019-09-23 RX ADMIN — CYANOCOBALAMIN 1000 MCG: 1000 INJECTION, SOLUTION INTRAMUSCULAR; SUBCUTANEOUS at 13:33

## 2019-09-23 RX ADMIN — SODIUM CHLORIDE 860 MG: 900 INJECTION, SOLUTION INTRAVENOUS at 12:17

## 2019-09-23 RX ADMIN — Medication 10 ML: at 13:28

## 2019-09-23 NOTE — PROGRESS NOTES
Patient to office today for cycle 2 imfinzi. Patient states that he is doing well with this chemo and has no complaints. Patient states that he is going back to work tomorrow.

## 2019-09-23 NOTE — PROGRESS NOTES
Patient going back to work tomorrow and can't come for subsequent b12 injection. Patient will come back on 9-25-19 and 9-26-19 for b12 injections. Dr Gaspar aware and ok with that.

## 2019-09-25 ENCOUNTER — HOSPITAL ENCOUNTER (OUTPATIENT)
Dept: ONCOLOGY | Facility: HOSPITAL | Age: 54
Discharge: HOME | End: 2019-09-25
Admitting: NURSE PRACTITIONER

## 2019-09-25 VITALS
RESPIRATION RATE: 18 BRPM | HEART RATE: 87 BPM | WEIGHT: 195.8 LBS | HEIGHT: 75 IN | BODY MASS INDEX: 24.34 KG/M2 | TEMPERATURE: 98.4 F | DIASTOLIC BLOOD PRESSURE: 70 MMHG | SYSTOLIC BLOOD PRESSURE: 124 MMHG

## 2019-09-25 DIAGNOSIS — E53.8 B12 DEFICIENCY: Primary | ICD-10-CM

## 2019-09-25 PROCEDURE — 96372 THER/PROPH/DIAG INJ SC/IM: CPT | Performed by: INTERNAL MEDICINE

## 2019-09-25 PROCEDURE — 25010000002 CYANOCOBALAMIN PER 1000 MCG: Performed by: INTERNAL MEDICINE

## 2019-09-25 RX ORDER — CYANOCOBALAMIN 1000 UG/ML
1000 INJECTION, SOLUTION INTRAMUSCULAR; SUBCUTANEOUS ONCE
Status: COMPLETED | OUTPATIENT
Start: 2019-09-25 | End: 2019-09-25

## 2019-09-25 RX ADMIN — CYANOCOBALAMIN 1000 MCG: 1000 INJECTION, SOLUTION INTRAMUSCULAR; SUBCUTANEOUS at 14:07

## 2019-09-26 ENCOUNTER — APPOINTMENT (OUTPATIENT)
Dept: LAB | Facility: HOSPITAL | Age: 54
End: 2019-09-26

## 2019-09-26 ENCOUNTER — APPOINTMENT (OUTPATIENT)
Dept: ONCOLOGY | Facility: CLINIC | Age: 54
End: 2019-09-26

## 2019-09-26 ENCOUNTER — HOSPITAL ENCOUNTER (OUTPATIENT)
Dept: ONCOLOGY | Facility: HOSPITAL | Age: 54
Discharge: HOME | End: 2019-09-26
Admitting: NURSE PRACTITIONER

## 2019-09-26 ENCOUNTER — OFFICE VISIT (OUTPATIENT)
Dept: ONCOLOGY | Facility: CLINIC | Age: 54
End: 2019-09-26

## 2019-09-26 VITALS
TEMPERATURE: 98.5 F | HEART RATE: 79 BPM | BODY MASS INDEX: 24.45 KG/M2 | SYSTOLIC BLOOD PRESSURE: 131 MMHG | WEIGHT: 196.6 LBS | DIASTOLIC BLOOD PRESSURE: 87 MMHG | HEIGHT: 75 IN | RESPIRATION RATE: 18 BRPM

## 2019-09-26 DIAGNOSIS — E53.8 B12 DEFICIENCY: ICD-10-CM

## 2019-09-26 DIAGNOSIS — C34.31 CANCER OF LOWER LOBE OF RIGHT LUNG (HCC): Primary | ICD-10-CM

## 2019-09-26 DIAGNOSIS — J32.4 CHRONIC PANSINUSITIS: ICD-10-CM

## 2019-09-26 PROCEDURE — 25010000002 CYANOCOBALAMIN PER 1000 MCG: Performed by: INTERNAL MEDICINE

## 2019-09-26 PROCEDURE — 99215 OFFICE O/P EST HI 40 MIN: CPT | Performed by: INTERNAL MEDICINE

## 2019-09-26 PROCEDURE — 96372 THER/PROPH/DIAG INJ SC/IM: CPT | Performed by: INTERNAL MEDICINE

## 2019-09-26 RX ORDER — ONDANSETRON HYDROCHLORIDE 8 MG/1
TABLET, FILM COATED ORAL
Refills: 3 | COMMUNITY
Start: 2019-09-12 | End: 2021-06-01

## 2019-09-26 RX ORDER — CYANOCOBALAMIN 1000 UG/ML
1000 INJECTION, SOLUTION INTRAMUSCULAR; SUBCUTANEOUS ONCE
Status: COMPLETED | OUTPATIENT
Start: 2019-09-26 | End: 2019-09-26

## 2019-09-26 RX ORDER — CYANOCOBALAMIN 1000 UG/ML
1000 INJECTION, SOLUTION INTRAMUSCULAR; SUBCUTANEOUS ONCE
Status: DISCONTINUED | OUTPATIENT
Start: 2019-09-26 | End: 2019-09-26

## 2019-09-26 RX ADMIN — CYANOCOBALAMIN 1000 MCG: 1000 INJECTION, SOLUTION INTRAMUSCULAR; SUBCUTANEOUS at 16:05

## 2019-09-26 NOTE — PROGRESS NOTES
Hematology/Oncology Outpatient Follow Up    PATIENT NAME:Jc Driscoll  :1965  MRN: [id number]  PRIMARY CARE PHYSICIAN: Reshma Alvarenga MD  REFERRING PHYSICIAN: Reshma Alvarenga MD    Chief Complaint   Patient presents with   • Follow-up     Malignant neoplasm of overlapping sites of lung, unspecified laterality        HISTORY OF PRESENT ILLNESS:   This is a 54 y.o. who developed left shoulder pain and for that reason he had a chest x-ray done on 19 which showed a 2.7 cm noncalcified right lower lobe nodule was identified.  For that reason a CT scan of the chest was recommended.  Review of his records shows patient had the CT scan on 19 done without contrast.  This basically showed a lobulated noncalcified mass in the posterior aspect of the right lower lobe measuring 3 cm close to the pleural surface.  There is a calcified 1.2 mm nodule in the lateral aspect of the right lower lobe consistent with a granuloma.  There were also calcified subcarinal and right hilar nodules.  There is a lower right side tracheal nodule measuring 1 cm.  Patient had a PET/CT scan done on 19 which showed increased metabolic activity on the right lower lobe mass that measures 2.5 cm with SUV of 4.4.  There was also increased metabolic activity in the subcarinal space measuring 2.8 cm in size with SUV of 3.4, increased activity in an enlarged right paratracheal lymph node that measures 1.9 cm, SUV of 5, also suspicious for metastatic adenopathy.  Patient had a CT-guided biopsy of the right lower lobe mass on 3/8/19.  This showed adenocarcinoma, TTF-1 positive.  On 3/18/19 patient underwent endoscopic ultrasound and biopsy of a subcarinal lymph node.  This was positive for malignant cells.  Patient was then taken back to surgery on 3/20/18 and had left Mediport placement by Dr. Fuchs.  He has been referred for further evaluation and management of his stage 3 adenocarcinoma of the right lung.  He was  accompanied by his spouse for the appointment on 3/26/19.  Patient was scheduled to have a brain MRI for complete staging, but he could not do this due to claustrophobia.  He is willing to try with the help of angiolytics.    • Patient had brain MRI done on 4/5/19.  He states it did not show any evidence of metastatic disease.  Evidence of chronic sinusitis was noted.    • Patient was seen by Dr. Escalona who has recommended concurrent chemotherapy and radiation.  Patient is scheduled to begin on 4/15/19.   • 4/17/19 - Patient was initiated on combined chemotherapy and radiation with Cisplatin and Alimta.  Patient received cycle 1.      • 5/8/19 - Patient received cycle 2 of chemotherapy with Cisplatin and Alimta.   • 5/15/19 - Chemistry panel showed creatinine of 1.7.  WBC 1.8, hemoglobin 11.6, platelet count 72,000.   • 5/20/19 - BUN 10, creatinine 1.2, potassium 3.5.    • 5/23/19 - CT scan of the chest with contrast showed interval decrease in size of the right lower lobe pulmonary nodule now measuring 2.1 cm in size.  There was no mediastinal or hilar adenopathy identified.  • 6/26/2019 patient underwent right lower lobectomy with hilar and mediastinal lymph node dissection performed by Dr. Terrance Mckeon.  • Pathology revealed residual residual 2.2 cm invasive moderately differentiated adenocarcinoma.  There were a total of 16 lymph nodes evaluated that 7 had metastatic disease.  There was evidence of lymphovascular invasion, extranodal involvement.  All the surgical margins were negative and distance of the closest margin was 2.5 cm.  Logic stage is T1c N2 M0.  • Patient is here today accompanied by his spouse for this appointment.  He continues to complain of some postop discomfort, numbness but his skin is intact.  There is no drainage.  Has had follow-up with Dr. Fuchs.  • 8/14/2019-seen by Dr. Maria at the [facility].  Dr. Maria has recommended immunotherapy with durvalumab off label use as  patient has not had significant response to neoadjuvant chemotherapy and radiation.  Patient was given the option to have immunotherapy at the [facility] vs [place] and he has chosen to stay in [place]  • 8/21/19 - Started cycle 1 day 1 Imfinzi  • 9/4/2019 patient had CT scan of the chest this shows a moderate size right pleural effusion.  There are areas of groundglass opacification in the right upper lobe without any evidence of significant septal thickening.  Volume loss noted there is also moderate pleural effusion.  There is no suspicious recurrent malignancy.  There were nonenlarged residual mediastinal lymph nodes.  • 9/9/19 - WBC 6.23, Hgb 11.7 Platelets 257,000, , BUN 9, Cr 1.1,Vitamin B12 318, Ferritin 437  • 9/23/19 - received cycle 2 day 1 Imfinzi    Past Medical History:   Diagnosis Date   • Dupuytren's contracture of both hands    • Elevated cholesterol    • History of colon polyps    • Hypertension    • Lung cancer (CMS/HCC)     right lung and in lymph nodes x2   • Retention of urine 6/27/2019       Past Surgical History:   Procedure Laterality Date   • BRONCHOSCOPY  03/18/2019    EBUS MONTERO NEEDLE BX   • COLONOSCOPY     • DUPUYTREN CONTRACTURE RELEASE Bilateral    • LUNG BIOPSY  03/08/2019    CT GUIDED   • PORTACATH PLACEMENT  03/20/2019    DR LONGORIA   • THORACOSCOPY Right 6/26/2019    Procedure: RIGHT VATS, OPEN LOWER LOBECTOMY WITH LYMPH NODE DISECTION, WEDGE RESECTION OF RIGHT MIDDLE LOBE;  Surgeon: Terrance Longoria MD;  Location: [facility];  Service: Cardiothoracic         Current Outpatient Medications:   •  amLODIPine (NORVASC) 10 MG tablet, Take 10 mg by mouth Daily., Disp: , Rfl:   •  buPROPion SR (WELLBUTRIN SR) 150 MG 12 hr tablet, Take 300 mg by mouth 2 (Two) Times a Day., Disp: , Rfl:   •  ferrous sulfate 325 (65 FE) MG tablet, Take 325 mg by mouth Daily With Breakfast., Disp: , Rfl:   •  metoprolol succinate XL (TOPROL-XL) 100 MG 24 hr tablet, Take 100 mg by mouth Daily., Disp:  , Rfl:   •  pantoprazole (PROTONIX) 40 MG EC tablet, Take 40 mg by mouth Daily., Disp: , Rfl:   •  pravastatin (PRAVACHOL) 10 MG tablet, Take 10 mg by mouth Daily., Disp: , Rfl:   •  VIIBRYD 40 MG tablet tablet, Take 40 mg by mouth Daily., Disp: , Rfl: 0  •  acetaminophen (TYLENOL) 325 MG tablet, Take 2 tablets by mouth Every 4 (Four) Hours As Needed for Mild Pain ., Disp: , Rfl:   •  HYDROcodone-acetaminophen (NORCO) 5-325 MG per tablet, Take 1 tablet by mouth., Disp: , Rfl:   •  ibuprofen (ADVIL,MOTRIN) 600 MG tablet, Take 1 tablet by mouth Every 6 (Six) Hours As Needed for Mild Pain ., Disp: , Rfl:   •  ondansetron (ZOFRAN) 8 MG tablet, TAKE 1 TABLET BY MOUTH EVERY 8 HOURS AS NEEDED NAUSEA, Disp: , Rfl: 3  No current facility-administered medications for this visit.     No Known Allergies    Family History   Problem Relation Age of Onset   • Cancer Sister    • Cancer Mother    • Lung cancer Father        Cancer-related family history includes Cancer in his mother and sister; Lung cancer in his father.    Social History     Tobacco Use   • Smoking status: Former Smoker     Types: Cigarettes     Last attempt to quit: 9/5/2009     Years since quitting: 10.0   • Smokeless tobacco: Never Used   Substance Use Topics   • Alcohol use: Yes     Alcohol/week: 1.2 oz     Types: 2 Cans of beer per week     Frequency: Monthly or less     Drinks per session: 1 or 2   • Drug use: No       I have reviewed the history of present illness, past medical history, family history, social history, lab results, all notes and other records since the patient was last seen on 9/5/19.     SUBJECTIVE:  The patient is here for a follow up appointment accompanied with his wife.  He had some mild fatigue and mild nausea the day after immunotherapy. Otherwise energy level is good. Walked at least six miles a day last week while on vacation.         REVIEW OF SYSTEMS:  Review of Systems   Constitutional: Positive for appetite change ( increased)  "and unexpected weight change ( gained 4 lbs ). Negative for chills, fatigue and fever.   HENT: Positive for sinus pressure ( chronic ) and sinus pain ( chronic ). Negative for ear pain, mouth sores, nosebleeds and sore throat.    Eyes: Negative for photophobia and visual disturbance.   Respiratory: Positive for cough ( mild ). Negative for wheezing and stridor.    Cardiovascular: Negative for chest pain, palpitations and leg swelling.   Gastrointestinal: Positive for nausea ( mild after treatment ). Negative for abdominal pain, diarrhea and vomiting.   Endocrine: Negative for cold intolerance and heat intolerance.   Genitourinary: Negative for dysuria and hematuria.   Musculoskeletal: Positive for arthralgias ( chronic ) and back pain ( chronic ). Negative for gait problem, joint swelling, neck pain and neck stiffness.   Skin: Negative for color change and rash.   Neurological: Negative for seizures and syncope.   Hematological: Negative for adenopathy.        No obvious bleeding   Psychiatric/Behavioral: Negative for agitation, confusion and hallucinations.     OBJECTIVE:  Vitals:    09/26/19 1514   BP: 131/87   Pulse: 79   Resp: 18   Temp: 98.5 °F (36.9 °C)   Weight: 89.2 kg (196 lb 9.6 oz)   Height: 190.5 cm (75\")   PainSc: 0-No pain     ECOG  Eastern Cooperative Oncology Group (ECOG, [place], World Health Organization) performance scale  0 Fully active; no performance restrictions.  1 Strenuous physical activity restricted; fully ambulatory and able to carry out light work.  2 Capable of all self-care but unable to carry out any work activities. Up and about >50% of waking hours.  3 Capable of only limited self-care; confined to bed or chair >50% of waking hours.  4 Completely disabled; cannot carry out any self-care; totally confined to bed or chair.    Physical Exam   Constitutional: He is oriented to person, place, and time. No distress.   HENT:   Head: Normocephalic and atraumatic.   Eyes: Conjunctivae and " EOM are normal. Right eye exhibits no discharge. Left eye exhibits no discharge. No scleral icterus.   Neck: Normal range of motion. Neck supple. No thyromegaly present.   Cardiovascular: Normal rate, regular rhythm and normal heart sounds. Exam reveals no gallop and no friction rub.   Pulmonary/Chest: Effort normal. No stridor. No respiratory distress. He has no wheezes. He exhibits laceration.   Healed incisions from right lower lobectomy with mediastinal lymph node dissection   Abdominal: Soft. Bowel sounds are normal. He exhibits no mass. There is no tenderness. There is no rebound and no guarding.   Musculoskeletal: Normal range of motion. He exhibits no tenderness.   Lymphadenopathy:     He has no cervical adenopathy.   Neurological: He is alert and oriented to person, place, and time. He exhibits normal muscle tone.   Skin: Skin is warm. No rash noted. He is not diaphoretic. No erythema.   Psychiatric: He has a normal mood and affect. His behavior is normal.   Nursing note and vitals reviewed.      RECENT LABS  WBC   Date Value Ref Range Status   09/23/2019 6.23 3.40 - 10.80 10*3/mm3 Final   07/03/2019 8.99 4.5 - 11.0 10*3/uL Final     RBC   Date Value Ref Range Status   09/23/2019 4.14 4.14 - 5.80 10*6/mm3 Final   07/03/2019 3.24 (L) 4.5 - 5.9 10*6/uL Final     Hemoglobin   Date Value Ref Range Status   09/23/2019 12.2 (L) 13.0 - 17.7 g/dL Final   07/03/2019 10.4 (L) 13.5 - 17.5 g/dL Final     Hematocrit   Date Value Ref Range Status   09/23/2019 37.9 37.5 - 51.0 % Final   07/03/2019 32.8 (L) 41.0 - 53.0 % Final     MCV   Date Value Ref Range Status   09/23/2019 91.5 79.0 - 97.0 fL Final   07/03/2019 101.2 (H) 80.0 - 100.0 fL Final     MCH   Date Value Ref Range Status   09/23/2019 29.5 26.6 - 33.0 pg Final   07/03/2019 32.1 26.0 - 34.0 pg Final     MCHC   Date Value Ref Range Status   09/23/2019 32.2 31.5 - 35.7 g/dL Final   07/03/2019 31.7 31.0 - 37.0 g/dL Final     RDW   Date Value Ref Range Status    09/23/2019 13.9 12.3 - 15.4 % Final   07/03/2019 15.2 12.0 - 16.8 % Final     RDW-SD   Date Value Ref Range Status   09/23/2019 45.1 37.0 - 54.0 fl Final     MPV   Date Value Ref Range Status   09/23/2019 8.7 6.0 - 12.0 fL Final   07/03/2019 9.0 6.7 - 10.8 fL Final     Platelets   Date Value Ref Range Status   09/23/2019 287 140 - 450 10*3/mm3 Final   07/03/2019 302 140 - 440 10*3/uL Final     Neutrophil Rel %   Date Value Ref Range Status   07/03/2019 60.0 45 - 80 % Final     Neutrophil %   Date Value Ref Range Status   09/23/2019 67.5 42.7 - 76.0 % Final     Lymphocyte Rel %   Date Value Ref Range Status   07/03/2019 14.8 (L) 15 - 50 % Final     Lymphocyte %   Date Value Ref Range Status   09/23/2019 16.2 (L) 19.6 - 45.3 % Final     Monocyte Rel %   Date Value Ref Range Status   07/03/2019 12.2 0 - 15 % Final     Monocyte %   Date Value Ref Range Status   09/23/2019 10.8 5.0 - 12.0 % Final     Eosinophil %   Date Value Ref Range Status   09/23/2019 4.7 0.3 - 6.2 % Final   07/03/2019 10.4 (H) 0 - 7 % Final     Basophil Rel %   Date Value Ref Range Status   07/03/2019 1.7 0 - 2 % Final     Basophil %   Date Value Ref Range Status   09/23/2019 0.8 0.0 - 1.5 % Final     Neutrophils Absolute   Date Value Ref Range Status   07/03/2019 5.39 1.5 - 7.5 /uL Final   07/03/2019 5.39 2.0 - 8.8 10*3/uL Final     Neutrophils, Absolute   Date Value Ref Range Status   09/23/2019 4.21 1.70 - 7.00 10*3/mm3 Final     Lymphocytes Absolute   Date Value Ref Range Status   07/03/2019 1.33 0.7 - 5.5 10*3/uL Final     Lymphocytes, Absolute   Date Value Ref Range Status   09/23/2019 1.01 0.70 - 3.10 10*3/mm3 Final     Monocytes Absolute   Date Value Ref Range Status   07/03/2019 1.10 0.0 - 1.7 10*3/uL Final     Monocytes, Absolute   Date Value Ref Range Status   09/23/2019 0.67 0.10 - 0.90 10*3/mm3 Final     Eosinophils Absolute   Date Value Ref Range Status   07/03/2019 0.93 (H) 0.0 - 0.8 10*3/uL Final     Eosinophils, Absolute   Date  Value Ref Range Status   09/23/2019 0.29 0.00 - 0.40 10*3/mm3 Final     Basophils Absolute   Date Value Ref Range Status   07/03/2019 0.15 0.0 - 0.2 10*3/uL Final     Basophils, Absolute   Date Value Ref Range Status   09/23/2019 0.05 0.00 - 0.20 10*3/mm3 Final     nRBC   Date Value Ref Range Status   07/03/2019 0 0 /100(WBC) Final   06/19/2019 0.0 0.0 - 0.2 /100 WBC Final       Lab Results   Component Value Date    GLUCOSE 158 (H) 09/23/2019    BUN 14 09/23/2019    CREATININE 1.10 09/23/2019    EGFRIFNONA 69 09/23/2019    EGFRIFAFRI >60 05/20/2019    BCR 12.7 09/23/2019    K 3.7 09/23/2019    CO2 26.0 09/23/2019    CALCIUM 8.8 (L) 09/23/2019    ALBUMIN 3.60 09/23/2019    LABIL2 1.4 07/03/2019    AST 25 09/23/2019    ALT 26 09/23/2019         Assessment/Plan     Cancer of lower lobe of right lung (CMS/HCC)    B12 deficiency    Chronic pansinusitis  - Ambulatory Referral to ENT (Otolaryngology)      ASSESSMENT:  1. Adenocarcinoma of the right lung, T1c N2 M0, consistent with clinical stage 3A disease.     2. S/P combined chemotherapy and radiation with cisplatin and Alimta.    3. Status post right lower lobectomy with mediastinal and hilar lymph node dissection with residual disease.  pT1 cN2 M0.  Patient with high risk features including lymphovascular invasion, extranodal capsular extension and persistent multiple lymph node disease.  4. Chemo-induced constipation, chemo-induced nausea, chemo-induced fatigue. Resolved  5. Consolidation treatment with immunotherapy with Imfinzi   6. Hypokalemia secondary to chemotherapy. Resolved    7. Acute kidney injury. Resolved  8. Postop anemia  9. Rash on both hands of palmar surfaces; Resolved. Mild peeling at times.     PLAN:  1. Continue ferrous sulfate 325 mg p.o. twice a day  2. Continue Imfinzi consolidation therapy  3. Continue B12 injections day 3/3 today, then monthly  4. OK for flu shot and pneumonia vaccines - patient to get free at [facility]   5. CT scan of the  chest 12/5/19 at [facility]   6. Refer to ENT for evaluation for sinus surgery   7. OK for colonoscopy   8. RTC in 3 weeks     Electronically signed by MULUGETA Porras, 09/26/19, 3:29 PM.         Discussed with Np,MULUGETA Coleman.  Patient seen and evaluated. Face-to-face evaluation the patient completed.  Notes reviewed and edited.  As above  I have reviewed his CT scan results with patient  He will continue ferrous sulfate 325 mg p.o. twice a day.  Continue with Imfinzi onsolidation therapy  Plan to see him again in 3 weeks  Restaging CT scan of the chest in 3 months from now    We discussed possibility of enrollment in a clinical trial.  No clinical trial for him to participate in at the [facility].    He has postop anemia have asked patient to begin iron sulfate 325 mg twice a day    Patient and his spouse have  asked patient which have all been answered to the best of my abilities    I have reviewed labs results, imaging, vitals, and medications with the patient today. Will follow up in 3 weeks with me.      Patient verbalized understanding and is in agreement of the above plan.  Part of this document was scribed by Antionette James, RN, BSN.        Much of the above report is an electronic transcription//translation of the spoken language to printed text using Dragon Software. As such, the subtleties and finesse of the spoken language may permit erroneous, or at times, nonsensical words or phrases to be inadvertently transcribed; thus changes may be made at a later date to rectify these errors.

## 2019-10-03 DIAGNOSIS — C34.91 NON-SMALL CELL CARCINOMA OF LUNG, RIGHT (HCC): ICD-10-CM

## 2019-10-03 DIAGNOSIS — C34.31 CANCER OF LOWER LOBE OF RIGHT LUNG (HCC): ICD-10-CM

## 2019-10-03 RX ORDER — SODIUM CHLORIDE 9 MG/ML
250 INJECTION, SOLUTION INTRAVENOUS ONCE
Status: CANCELLED | OUTPATIENT
Start: 2019-10-07

## 2019-10-07 ENCOUNTER — APPOINTMENT (OUTPATIENT)
Dept: ONCOLOGY | Facility: HOSPITAL | Age: 54
End: 2019-10-07

## 2019-10-07 ENCOUNTER — HOSPITAL ENCOUNTER (OUTPATIENT)
Dept: ONCOLOGY | Facility: HOSPITAL | Age: 54
Setting detail: INFUSION SERIES
Discharge: HOME | End: 2019-10-07

## 2019-10-07 ENCOUNTER — TELEPHONE (OUTPATIENT)
Dept: ONCOLOGY | Facility: CLINIC | Age: 54
End: 2019-10-07

## 2019-10-07 VITALS
HEIGHT: 75 IN | HEART RATE: 74 BPM | DIASTOLIC BLOOD PRESSURE: 77 MMHG | TEMPERATURE: 98.3 F | SYSTOLIC BLOOD PRESSURE: 123 MMHG | RESPIRATION RATE: 18 BRPM | BODY MASS INDEX: 23.87 KG/M2 | WEIGHT: 192 LBS

## 2019-10-07 DIAGNOSIS — C34.91 NON-SMALL CELL CARCINOMA OF LUNG, RIGHT (HCC): ICD-10-CM

## 2019-10-07 DIAGNOSIS — Z45.2 ENCOUNTER FOR CARE RELATED TO VASCULAR ACCESS PORT: ICD-10-CM

## 2019-10-07 DIAGNOSIS — C34.31 CANCER OF LOWER LOBE OF RIGHT LUNG (HCC): Primary | ICD-10-CM

## 2019-10-07 LAB
ALBUMIN SERPL-MCNC: 3.8 G/DL (ref 3.5–4.8)
ALBUMIN/GLOB SERPL: 1.2 G/DL (ref 1–1.7)
ALP SERPL-CCNC: 64 U/L (ref 32–91)
ALT SERPL W P-5'-P-CCNC: 29 U/L (ref 17–63)
ANION GAP SERPL CALCULATED.3IONS-SCNC: 12.2 MMOL/L (ref 5–15)
AST SERPL-CCNC: 24 U/L (ref 15–41)
BASOPHILS # BLD AUTO: 0.06 10*3/MM3 (ref 0–0.2)
BASOPHILS NFR BLD AUTO: 1 % (ref 0–1.5)
BILIRUB SERPL-MCNC: 0.6 MG/DL (ref 0.3–1.2)
BUN BLD-MCNC: 11 MG/DL (ref 8–20)
BUN/CREAT SERPL: 11 (ref 6.2–20.3)
CALCIUM SPEC-SCNC: 9 MG/DL (ref 8.9–10.3)
CHLORIDE SERPL-SCNC: 103 MMOL/L (ref 101–111)
CO2 SERPL-SCNC: 28 MMOL/L (ref 22–32)
CREAT BLD-MCNC: 1 MG/DL (ref 0.7–1.2)
DEPRECATED RDW RBC AUTO: 47.7 FL (ref 37–54)
EOSINOPHIL # BLD AUTO: 0.29 10*3/MM3 (ref 0–0.4)
EOSINOPHIL NFR BLD AUTO: 5 % (ref 0.3–6.2)
ERYTHROCYTE [DISTWIDTH] IN BLOOD BY AUTOMATED COUNT: 14.8 % (ref 12.3–15.4)
GFR SERPL CREATININE-BSD FRML MDRD: 78 ML/MIN/1.73
GLOBULIN UR ELPH-MCNC: 3.1 GM/DL (ref 2.5–3.8)
GLUCOSE BLD-MCNC: 89 MG/DL (ref 65–99)
HCT VFR BLD AUTO: 38.3 % (ref 37.5–51)
HGB BLD-MCNC: 12.8 G/DL (ref 13–17.7)
LYMPHOCYTES # BLD AUTO: 0.93 10*3/MM3 (ref 0.7–3.1)
LYMPHOCYTES NFR BLD AUTO: 16.1 % (ref 19.6–45.3)
MCH RBC QN AUTO: 30.2 PG (ref 26.6–33)
MCHC RBC AUTO-ENTMCNC: 33.4 G/DL (ref 31.5–35.7)
MCV RBC AUTO: 90.3 FL (ref 79–97)
MONOCYTES # BLD AUTO: 0.72 10*3/MM3 (ref 0.1–0.9)
MONOCYTES NFR BLD AUTO: 12.4 % (ref 5–12)
NEUTROPHILS # BLD AUTO: 3.79 10*3/MM3 (ref 1.7–7)
NEUTROPHILS NFR BLD AUTO: 65.5 % (ref 42.7–76)
PLATELET # BLD AUTO: 254 10*3/MM3 (ref 140–450)
PMV BLD AUTO: 8.4 FL (ref 6–12)
POTASSIUM BLD-SCNC: 4.2 MMOL/L (ref 3.6–5.1)
PROT SERPL-MCNC: 6.9 G/DL (ref 6.1–7.9)
RBC # BLD AUTO: 4.24 10*6/MM3 (ref 4.14–5.8)
SODIUM BLD-SCNC: 139 MMOL/L (ref 136–144)
WBC NRBC COR # BLD: 5.79 10*3/MM3 (ref 3.4–10.8)

## 2019-10-07 PROCEDURE — 25010000002 DURVALUMAB 50 MG/ML SOLUTION 10 ML VIAL: Performed by: INTERNAL MEDICINE

## 2019-10-07 PROCEDURE — 96413 CHEMO IV INFUSION 1 HR: CPT | Performed by: INTERNAL MEDICINE

## 2019-10-07 PROCEDURE — 85025 COMPLETE CBC W/AUTO DIFF WBC: CPT | Performed by: NURSE PRACTITIONER

## 2019-10-07 PROCEDURE — 80053 COMPREHEN METABOLIC PANEL: CPT | Performed by: NURSE PRACTITIONER

## 2019-10-07 PROCEDURE — 25010000002 DURVALUMAB 50 MG/ML SOLUTION 2.4 ML VIAL: Performed by: INTERNAL MEDICINE

## 2019-10-07 RX ORDER — SODIUM CHLORIDE 0.9 % (FLUSH) 0.9 %
10 SYRINGE (ML) INJECTION AS NEEDED
Status: DISCONTINUED | OUTPATIENT
Start: 2019-10-07 | End: 2019-10-08 | Stop reason: HOSPADM

## 2019-10-07 RX ORDER — SODIUM CHLORIDE 0.9 % (FLUSH) 0.9 %
10 SYRINGE (ML) INJECTION AS NEEDED
Status: CANCELLED | OUTPATIENT
Start: 2019-10-07

## 2019-10-07 RX ADMIN — Medication 10 ML: at 11:21

## 2019-10-07 RX ADMIN — SODIUM CHLORIDE 860 MG: 900 INJECTION, SOLUTION INTRAVENOUS at 10:12

## 2019-10-07 RX ADMIN — Medication 500 UNITS: at 11:21

## 2019-10-07 NOTE — TELEPHONE ENCOUNTER
Benefit Review.  Patient has active [place] coverage.  He is eligible for [place] copay assistance but has met his 2019 ded/oop.

## 2019-10-16 NOTE — PROGRESS NOTES
Hematology/Oncology Outpatient Follow Up    PATIENT NAME:Jc Driscoll  :1965  MRN: [id number]  PRIMARY CARE PHYSICIAN: Reshma Alvarenga MD  REFERRING PHYSICIAN: Reshma Alvarenga MD    No chief complaint on file.       HISTORY OF PRESENT ILLNESS:   This is a 54 y.o. who developed left shoulder pain and for that reason he had a chest x-ray done on 19 which showed a 2.7 cm noncalcified right lower lobe nodule was identified.  For that reason a CT scan of the chest was recommended.  Review of his records shows patient had the CT scan on 19 done without contrast.  This basically showed a lobulated noncalcified mass in the posterior aspect of the right lower lobe measuring 3 cm close to the pleural surface.  There is a calcified 1.2 mm nodule in the lateral aspect of the right lower lobe consistent with a granuloma.  There were also calcified subcarinal and right hilar nodules.  There is a lower right side tracheal nodule measuring 1 cm.  Patient had a PET/CT scan done on 19 which showed increased metabolic activity on the right lower lobe mass that measures 2.5 cm with SUV of 4.4.  There was also increased metabolic activity in the subcarinal space measuring 2.8 cm in size with SUV of 3.4, increased activity in an enlarged right paratracheal lymph node that measures 1.9 cm, SUV of 5, also suspicious for metastatic adenopathy.  Patient had a CT-guided biopsy of the right lower lobe mass on 3/8/19.  This showed adenocarcinoma, TTF-1 positive.  On 3/18/19 patient underwent endoscopic ultrasound and biopsy of a subcarinal lymph node.  This was positive for malignant cells.  Patient was then taken back to surgery on 3/20/18 and had left Mediport placement by Dr. Fuchs.  He has been referred for further evaluation and management of his stage 3 adenocarcinoma of the right lung.  He was accompanied by his spouse for the appointment on 3/26/19.  Patient was scheduled to have a brain MRI for  complete staging, but he could not do this due to claustrophobia.  He is willing to try with the help of angiolytics.    • Patient had brain MRI done on 4/5/19.  He states it did not show any evidence of metastatic disease.  Evidence of chronic sinusitis was noted.    • Patient was seen by Dr. Escalona who has recommended concurrent chemotherapy and radiation.  Patient is scheduled to begin on 4/15/19.   • 4/17/19 - Patient was initiated on combined chemotherapy and radiation with Cisplatin and Alimta.  Patient received cycle 1.      • 5/8/19 - Patient received cycle 2 of chemotherapy with Cisplatin and Alimta.   • 5/15/19 - Chemistry panel showed creatinine of 1.7.  WBC 1.8, hemoglobin 11.6, platelet count 72,000.   • 5/20/19 – BUN 10, creatinine 1.2, potassium 3.5.    • 5/23/19 – CT scan of the chest with contrast showed interval decrease in size of the right lower lobe pulmonary nodule now measuring 2.1 cm in size.  There was no mediastinal or hilar adenopathy identified.  • 6/26/2019 patient underwent right lower lobectomy with hilar and mediastinal lymph node dissection performed by Dr. Terrance Mckeon.  • Pathology revealed residual residual 2.2 cm invasive moderately differentiated adenocarcinoma.  There were a total of 16 lymph nodes evaluated that 7 had metastatic disease.  There was evidence of lymphovascular invasion, extranodal involvement.  All the surgical margins were negative and distance of the closest margin was 2.5 cm.  Logic stage is T1c N2 M0.  • Patient is here today accompanied by his spouse for this appointment.  He continues to complain of some postop discomfort, numbness but his skin is intact.  There is no drainage.  Has had follow-up with Dr. Fuchs.  • 8/14/2019-seen by Dr. Maria at the [facility].  Dr. Maria has recommended immunotherapy with durvalumab off label use as patient has not had significant response to neoadjuvant chemotherapy and radiation.  Patient was given the  option to have immunotherapy at the [facility] vs [place] and he has chosen to stay in [place]  • 8/21/19 - Started cycle 1 day 1 Imfinzi  • 9/4/2019 patient had CT scan of the chest this shows a moderate size right pleural effusion.  There are areas of groundglass opacification in the right upper lobe without any evidence of significant septal thickening.  Volume loss noted there is also moderate pleural effusion.  There is no suspicious recurrent malignancy.  There were nonenlarged residual mediastinal lymph nodes.  • 9/9/19 - WBC 6.23, Hgb 11.7 Platelets 257,000, , BUN 9, Cr 1.1,Vitamin B12 318, Ferritin 437  • 9/23/19 - received cycle 2 day 1 Imfinzi    Past Medical History:   Diagnosis Date   • Dupuytren's contracture of both hands    • Elevated cholesterol    • History of colon polyps    • Hypertension    • Lung cancer (CMS/HCC)     right lung and in lymph nodes x2   • Retention of urine 6/27/2019       Past Surgical History:   Procedure Laterality Date   • BRONCHOSCOPY  03/18/2019    EBUS MONTERO NEEDLE BX   • COLONOSCOPY     • DUPUYTREN CONTRACTURE RELEASE Bilateral    • LUNG BIOPSY  03/08/2019    CT GUIDED   • PORTACATH PLACEMENT  03/20/2019    DR LONGORIA   • THORACOSCOPY Right 6/26/2019    Procedure: RIGHT VATS, OPEN LOWER LOBECTOMY WITH LYMPH NODE DISECTION, WEDGE RESECTION OF RIGHT MIDDLE LOBE;  Surgeon: Terrance Longoria MD;  Location: [facility] OR;  Service: Cardiothoracic         Current Outpatient Medications:   •  acetaminophen (TYLENOL) 325 MG tablet, Take 2 tablets by mouth Every 4 (Four) Hours As Needed for Mild Pain ., Disp: , Rfl:   •  amLODIPine (NORVASC) 10 MG tablet, Take 10 mg by mouth Daily., Disp: , Rfl:   •  buPROPion SR (WELLBUTRIN SR) 150 MG 12 hr tablet, Take 300 mg by mouth 2 (Two) Times a Day., Disp: , Rfl:   •  ferrous sulfate 325 (65 FE) MG tablet, Take 325 mg by mouth Daily With Breakfast., Disp: , Rfl:   •  HYDROcodone-acetaminophen (NORCO) 5-325 MG per tablet, Take 1 tablet  by mouth., Disp: , Rfl:   •  ibuprofen (ADVIL,MOTRIN) 600 MG tablet, Take 1 tablet by mouth Every 6 (Six) Hours As Needed for Mild Pain ., Disp: , Rfl:   •  metoprolol succinate XL (TOPROL-XL) 100 MG 24 hr tablet, Take 100 mg by mouth Daily., Disp: , Rfl:   •  ondansetron (ZOFRAN) 8 MG tablet, TAKE 1 TABLET BY MOUTH EVERY 8 HOURS AS NEEDED NAUSEA, Disp: , Rfl: 3  •  pantoprazole (PROTONIX) 40 MG EC tablet, Take 40 mg by mouth Daily., Disp: , Rfl:   •  pravastatin (PRAVACHOL) 10 MG tablet, Take 10 mg by mouth Daily., Disp: , Rfl:   •  VIIBRYD 40 MG tablet tablet, Take 40 mg by mouth Daily., Disp: , Rfl: 0    No Known Allergies    Family History   Problem Relation Age of Onset   • Cancer Sister    • Cancer Mother    • Lung cancer Father        Cancer-related family history includes Cancer in his mother and sister; Lung cancer in his father.    Social History     Tobacco Use   • Smoking status: Former Smoker     Types: Cigarettes     Last attempt to quit: 9/5/2009     Years since quitting: 10.1   • Smokeless tobacco: Never Used   Substance Use Topics   • Alcohol use: Yes     Alcohol/week: 1.2 oz     Types: 2 Cans of beer per week     Frequency: Monthly or less     Drinks per session: 1 or 2   • Drug use: No       I have reviewed the history of present illness, past medical history, family history, social history, lab results, all notes and other records since the patient was last seen on 9/5/19.     SUBJECTIVE:  The patient is here for a follow up appointment accompanied with his wife.  He had some mild fatigue and mild nausea the day after immunotherapy. Otherwise energy level is good. Walked at least six miles a day last week while on vacation.         REVIEW OF SYSTEMS:  Review of Systems   Constitutional: Positive for appetite change ( increased) and unexpected weight change ( gained 4 lbs ). Negative for chills, fatigue and fever.   HENT: Positive for sinus pressure ( chronic ) and sinus pain ( chronic ). Negative  for ear pain, mouth sores, nosebleeds and sore throat.    Eyes: Negative for photophobia and visual disturbance.   Respiratory: Positive for cough ( mild ). Negative for wheezing and stridor.    Cardiovascular: Negative for chest pain, palpitations and leg swelling.   Gastrointestinal: Positive for nausea ( mild after treatment ). Negative for abdominal pain, diarrhea and vomiting.   Endocrine: Negative for cold intolerance and heat intolerance.   Genitourinary: Negative for dysuria and hematuria.   Musculoskeletal: Positive for arthralgias ( chronic ) and back pain ( chronic ). Negative for gait problem, joint swelling, neck pain and neck stiffness.   Skin: Negative for color change and rash.   Neurological: Negative for seizures and syncope.   Hematological: Negative for adenopathy.        No obvious bleeding   Psychiatric/Behavioral: Negative for agitation, confusion and hallucinations.     OBJECTIVE:  There were no vitals filed for this visit.  ECOG  Eastern Cooperative Oncology Group (ECOG, [place], World Health Organization) performance scale  0 Fully active; no performance restrictions.  1 Strenuous physical activity restricted; fully ambulatory and able to carry out light work.  2 Capable of all self-care but unable to carry out any work activities. Up and about >50% of waking hours.  3 Capable of only limited self-care; confined to bed or chair >50% of waking hours.  4 Completely disabled; cannot carry out any self-care; totally confined to bed or chair.    Physical Exam   Constitutional: He is oriented to person, place, and time. No distress.   HENT:   Head: Normocephalic and atraumatic.   Eyes: Conjunctivae and EOM are normal. Right eye exhibits no discharge. Left eye exhibits no discharge. No scleral icterus.   Neck: Normal range of motion. Neck supple. No thyromegaly present.   Cardiovascular: Normal rate, regular rhythm and normal heart sounds. Exam reveals no gallop and no friction rub.    Pulmonary/Chest: Effort normal. No stridor. No respiratory distress. He has no wheezes. He exhibits laceration.   Healed incisions from right lower lobectomy with mediastinal lymph node dissection   Abdominal: Soft. Bowel sounds are normal. He exhibits no mass. There is no tenderness. There is no rebound and no guarding.   Musculoskeletal: Normal range of motion. He exhibits no tenderness.   Lymphadenopathy:     He has no cervical adenopathy.   Neurological: He is alert and oriented to person, place, and time. He exhibits normal muscle tone.   Skin: Skin is warm. No rash noted. He is not diaphoretic. No erythema.   Psychiatric: He has a normal mood and affect. His behavior is normal.   Nursing note and vitals reviewed.      RECENT LABS  WBC   Date Value Ref Range Status   10/07/2019 5.79 3.40 - 10.80 10*3/mm3 Final   07/03/2019 8.99 4.5 - 11.0 10*3/uL Final     RBC   Date Value Ref Range Status   10/07/2019 4.24 4.14 - 5.80 10*6/mm3 Final   07/03/2019 3.24 (L) 4.5 - 5.9 10*6/uL Final     Hemoglobin   Date Value Ref Range Status   10/07/2019 12.8 (L) 13.0 - 17.7 g/dL Final   07/03/2019 10.4 (L) 13.5 - 17.5 g/dL Final     Hematocrit   Date Value Ref Range Status   10/07/2019 38.3 37.5 - 51.0 % Final   07/03/2019 32.8 (L) 41.0 - 53.0 % Final     MCV   Date Value Ref Range Status   10/07/2019 90.3 79.0 - 97.0 fL Final   07/03/2019 101.2 (H) 80.0 - 100.0 fL Final     MCH   Date Value Ref Range Status   10/07/2019 30.2 26.6 - 33.0 pg Final   07/03/2019 32.1 26.0 - 34.0 pg Final     MCHC   Date Value Ref Range Status   10/07/2019 33.4 31.5 - 35.7 g/dL Final   07/03/2019 31.7 31.0 - 37.0 g/dL Final     RDW   Date Value Ref Range Status   10/07/2019 14.8 12.3 - 15.4 % Final   07/03/2019 15.2 12.0 - 16.8 % Final     RDW-SD   Date Value Ref Range Status   10/07/2019 47.7 37.0 - 54.0 fl Final     MPV   Date Value Ref Range Status   10/07/2019 8.4 6.0 - 12.0 fL Final   07/03/2019 9.0 6.7 - 10.8 fL Final     Platelets   Date  Value Ref Range Status   10/07/2019 254 140 - 450 10*3/mm3 Final   07/03/2019 302 140 - 440 10*3/uL Final     Neutrophil Rel %   Date Value Ref Range Status   07/03/2019 60.0 45 - 80 % Final     Neutrophil %   Date Value Ref Range Status   10/07/2019 65.5 42.7 - 76.0 % Final     Lymphocyte Rel %   Date Value Ref Range Status   07/03/2019 14.8 (L) 15 - 50 % Final     Lymphocyte %   Date Value Ref Range Status   10/07/2019 16.1 (L) 19.6 - 45.3 % Final     Monocyte Rel %   Date Value Ref Range Status   07/03/2019 12.2 0 - 15 % Final     Monocyte %   Date Value Ref Range Status   10/07/2019 12.4 (H) 5.0 - 12.0 % Final     Eosinophil %   Date Value Ref Range Status   10/07/2019 5.0 0.3 - 6.2 % Final   07/03/2019 10.4 (H) 0 - 7 % Final     Basophil Rel %   Date Value Ref Range Status   07/03/2019 1.7 0 - 2 % Final     Basophil %   Date Value Ref Range Status   10/07/2019 1.0 0.0 - 1.5 % Final     Neutrophils Absolute   Date Value Ref Range Status   07/03/2019 5.39 1.5 - 7.5 /uL Final   07/03/2019 5.39 2.0 - 8.8 10*3/uL Final     Neutrophils, Absolute   Date Value Ref Range Status   10/07/2019 3.79 1.70 - 7.00 10*3/mm3 Final     Lymphocytes Absolute   Date Value Ref Range Status   07/03/2019 1.33 0.7 - 5.5 10*3/uL Final     Lymphocytes, Absolute   Date Value Ref Range Status   10/07/2019 0.93 0.70 - 3.10 10*3/mm3 Final     Monocytes Absolute   Date Value Ref Range Status   07/03/2019 1.10 0.0 - 1.7 10*3/uL Final     Monocytes, Absolute   Date Value Ref Range Status   10/07/2019 0.72 0.10 - 0.90 10*3/mm3 Final     Eosinophils Absolute   Date Value Ref Range Status   07/03/2019 0.93 (H) 0.0 - 0.8 10*3/uL Final     Eosinophils, Absolute   Date Value Ref Range Status   10/07/2019 0.29 0.00 - 0.40 10*3/mm3 Final     Basophils Absolute   Date Value Ref Range Status   07/03/2019 0.15 0.0 - 0.2 10*3/uL Final     Basophils, Absolute   Date Value Ref Range Status   10/07/2019 0.06 0.00 - 0.20 10*3/mm3 Final     nRBC   Date Value Ref  Range Status   07/03/2019 0 0 /100(WBC) Final   06/19/2019 0.0 0.0 - 0.2 /100 WBC Final       Lab Results   Component Value Date    GLUCOSE 89 10/07/2019    BUN 11 10/07/2019    CREATININE 1.00 10/07/2019    EGFRIFNONA 78 10/07/2019    EGFRIFAFRI >60 05/20/2019    BCR 11.0 10/07/2019    K 4.2 10/07/2019    CO2 28.0 10/07/2019    CALCIUM 9.0 10/07/2019    ALBUMIN 3.80 10/07/2019    LABIL2 1.4 07/03/2019    AST 24 10/07/2019    ALT 29 10/07/2019         Assessment/Plan     There are no diagnoses linked to this encounter.    ASSESSMENT:  1. Adenocarcinoma of the right lung, T1c N2 M0, consistent with clinical stage 3A disease.     2. S/P combined chemotherapy and radiation with cisplatin and Alimta.    3. Status post right lower lobectomy with mediastinal and hilar lymph node dissection with residual disease.  pT1 cN2 M0.  Patient with high risk features including lymphovascular invasion, extranodal capsular extension and persistent multiple lymph node disease.  4. Chemo-induced constipation, chemo-induced nausea, chemo-induced fatigue. Resolved  5. Consolidation treatment with immunotherapy with Imfinzi   6. Hypokalemia secondary to chemotherapy. Resolved    7. Acute kidney injury. Resolved  8. Postop anemia  9. Rash on both hands of palmar surfaces; Resolved. Mild peeling at times.     PLAN:  1. Continue ferrous sulfate 325 mg p.o. twice a day  2. Continue Imfinzi consolidation therapy  3. Continue B12 injections day 3/3 today, then monthly  4. OK for flu shot and pneumonia vaccines - patient to get free at [facility]   5. CT scan of the chest 12/5/19 at [facility]   6. Refer to ENT for evaluation for sinus surgery   7. OK for colonoscopy   8. RTC in 3 weeks     Electronically signed by MULUGETA Porras, 09/26/19, 3:29 PM.         Discussed with Np,MULUGETA Coleman.  Patient seen and evaluated. Face-to-face evaluation the patient completed.  Notes reviewed and edited.  As above  I have reviewed his CT  scan results with patient  He will continue ferrous sulfate 325 mg p.o. twice a day.  Continue with Imfinzi onsolidation therapy  Plan to see him again in 3 weeks  Restaging CT scan of the chest in 3 months from now    We discussed possibility of enrollment in a clinical trial.  No clinical trial for him to participate in at the [facility].    He has postop anemia have asked patient to begin iron sulfate 325 mg twice a day    Patient and his spouse have  asked patient which have all been answered to the best of my abilities    I have reviewed labs results, imaging, vitals, and medications with the patient today. Will follow up in 3 weeks with me.      Patient verbalized understanding and is in agreement of the above plan.  Part of this document was scribed by Antionette James RN, BSN.        Much of the above report is an electronic transcription//translation of the spoken language to printed text using Dragon Software. As such, the subtleties and finesse of the spoken language may permit erroneous, or at times, nonsensical words or phrases to be inadvertently transcribed; thus changes may be made at a later date to rectify these errors.

## 2019-10-17 ENCOUNTER — APPOINTMENT (OUTPATIENT)
Dept: LAB | Facility: HOSPITAL | Age: 54
End: 2019-10-17

## 2019-10-17 ENCOUNTER — TELEPHONE (OUTPATIENT)
Dept: ONCOLOGY | Facility: CLINIC | Age: 54
End: 2019-10-17

## 2019-10-17 ENCOUNTER — APPOINTMENT (OUTPATIENT)
Dept: ONCOLOGY | Facility: CLINIC | Age: 54
End: 2019-10-17

## 2019-10-17 DIAGNOSIS — C34.91 NON-SMALL CELL CARCINOMA OF LUNG, RIGHT (HCC): ICD-10-CM

## 2019-10-17 DIAGNOSIS — C34.31 CANCER OF LOWER LOBE OF RIGHT LUNG (HCC): ICD-10-CM

## 2019-10-17 RX ORDER — SODIUM CHLORIDE 9 MG/ML
250 INJECTION, SOLUTION INTRAVENOUS ONCE
Status: CANCELLED | OUTPATIENT
Start: 2019-10-21

## 2019-10-17 NOTE — TELEPHONE ENCOUNTER
Ms. Driscoll left message to cancel Mr. Driscoll' appt today at 2:15.  He will callback to reschedule at later time.

## 2019-10-21 ENCOUNTER — HOSPITAL ENCOUNTER (OUTPATIENT)
Dept: ONCOLOGY | Facility: HOSPITAL | Age: 54
Setting detail: INFUSION SERIES
Discharge: HOME | End: 2019-10-21

## 2019-10-21 VITALS
HEART RATE: 74 BPM | RESPIRATION RATE: 16 BRPM | SYSTOLIC BLOOD PRESSURE: 126 MMHG | WEIGHT: 196 LBS | TEMPERATURE: 97.6 F | BODY MASS INDEX: 24.5 KG/M2 | DIASTOLIC BLOOD PRESSURE: 77 MMHG

## 2019-10-21 DIAGNOSIS — E53.8 B12 DEFICIENCY: Primary | ICD-10-CM

## 2019-10-21 DIAGNOSIS — C34.31 CANCER OF LOWER LOBE OF RIGHT LUNG (HCC): ICD-10-CM

## 2019-10-21 DIAGNOSIS — C34.91 NON-SMALL CELL CARCINOMA OF LUNG, RIGHT (HCC): ICD-10-CM

## 2019-10-21 DIAGNOSIS — Z45.2 ENCOUNTER FOR CARE RELATED TO VASCULAR ACCESS PORT: ICD-10-CM

## 2019-10-21 LAB
ALBUMIN SERPL-MCNC: 4.2 G/DL (ref 3.5–5.2)
ALBUMIN/GLOB SERPL: 1.5 G/DL
ALP SERPL-CCNC: 86 U/L (ref 39–117)
ALT SERPL W P-5'-P-CCNC: 26 U/L (ref 1–41)
ANION GAP SERPL CALCULATED.3IONS-SCNC: 16 MMOL/L (ref 5–15)
AST SERPL-CCNC: 23 U/L (ref 1–40)
BASOPHILS # BLD AUTO: 0.06 10*3/MM3 (ref 0–0.2)
BASOPHILS NFR BLD AUTO: 0.9 % (ref 0–1.5)
BILIRUB SERPL-MCNC: 0.3 MG/DL (ref 0.2–1.2)
BUN BLD-MCNC: 13 MG/DL (ref 6–20)
BUN/CREAT SERPL: 12.9 (ref 7–25)
CALCIUM SPEC-SCNC: 9.3 MG/DL (ref 8.6–10.5)
CHLORIDE SERPL-SCNC: 99 MMOL/L (ref 98–107)
CO2 SERPL-SCNC: 24 MMOL/L (ref 22–29)
CREAT BLD-MCNC: 1.01 MG/DL (ref 0.76–1.27)
DEPRECATED RDW RBC AUTO: 48.5 FL (ref 37–54)
EOSINOPHIL # BLD AUTO: 0.29 10*3/MM3 (ref 0–0.4)
EOSINOPHIL NFR BLD AUTO: 4.2 % (ref 0.3–6.2)
ERYTHROCYTE [DISTWIDTH] IN BLOOD BY AUTOMATED COUNT: 14.9 % (ref 12.3–15.4)
GFR SERPL CREATININE-BSD FRML MDRD: 77 ML/MIN/1.73
GLOBULIN UR ELPH-MCNC: 2.8 GM/DL
GLUCOSE BLD-MCNC: 107 MG/DL (ref 65–99)
HCT VFR BLD AUTO: 38.2 % (ref 37.5–51)
HGB BLD-MCNC: 12.8 G/DL (ref 13–17.7)
LYMPHOCYTES # BLD AUTO: 1.13 10*3/MM3 (ref 0.7–3.1)
LYMPHOCYTES NFR BLD AUTO: 16.3 % (ref 19.6–45.3)
MCH RBC QN AUTO: 30.5 PG (ref 26.6–33)
MCHC RBC AUTO-ENTMCNC: 33.5 G/DL (ref 31.5–35.7)
MCV RBC AUTO: 91 FL (ref 79–97)
MONOCYTES # BLD AUTO: 0.82 10*3/MM3 (ref 0.1–0.9)
MONOCYTES NFR BLD AUTO: 11.8 % (ref 5–12)
NEUTROPHILS # BLD AUTO: 4.65 10*3/MM3 (ref 1.7–7)
NEUTROPHILS NFR BLD AUTO: 66.8 % (ref 42.7–76)
PLATELET # BLD AUTO: 273 10*3/MM3 (ref 140–450)
PMV BLD AUTO: 8.6 FL (ref 6–12)
POTASSIUM BLD-SCNC: 3.7 MMOL/L (ref 3.5–5.2)
PROT SERPL-MCNC: 7 G/DL (ref 6–8.5)
RBC # BLD AUTO: 4.2 10*6/MM3 (ref 4.14–5.8)
SODIUM BLD-SCNC: 139 MMOL/L (ref 136–145)
T4 FREE SERPL-MCNC: 1 NG/DL (ref 0.93–1.7)
TSH SERPL DL<=0.05 MIU/L-ACNC: 1.2 UIU/ML (ref 0.27–4.2)
WBC NRBC COR # BLD: 6.95 10*3/MM3 (ref 3.4–10.8)

## 2019-10-21 PROCEDURE — 96413 CHEMO IV INFUSION 1 HR: CPT | Performed by: INTERNAL MEDICINE

## 2019-10-21 PROCEDURE — 85025 COMPLETE CBC W/AUTO DIFF WBC: CPT | Performed by: NURSE PRACTITIONER

## 2019-10-21 PROCEDURE — 84443 ASSAY THYROID STIM HORMONE: CPT | Performed by: NURSE PRACTITIONER

## 2019-10-21 PROCEDURE — 25010000002 DURVALUMAB 120 MG/2.4ML SOLUTION 2.4 ML VIAL: Performed by: INTERNAL MEDICINE

## 2019-10-21 PROCEDURE — 96372 THER/PROPH/DIAG INJ SC/IM: CPT | Performed by: INTERNAL MEDICINE

## 2019-10-21 PROCEDURE — 25010000002 DURVALUMAB 500 MG/10ML SOLUTION 10 ML VIAL: Performed by: INTERNAL MEDICINE

## 2019-10-21 PROCEDURE — 80053 COMPREHEN METABOLIC PANEL: CPT | Performed by: NURSE PRACTITIONER

## 2019-10-21 PROCEDURE — 84439 ASSAY OF FREE THYROXINE: CPT | Performed by: NURSE PRACTITIONER

## 2019-10-21 PROCEDURE — 25010000002 CYANOCOBALAMIN PER 1000 MCG: Performed by: INTERNAL MEDICINE

## 2019-10-21 RX ORDER — SODIUM CHLORIDE 0.9 % (FLUSH) 0.9 %
10 SYRINGE (ML) INJECTION AS NEEDED
Status: CANCELLED | OUTPATIENT
Start: 2019-10-21

## 2019-10-21 RX ORDER — SODIUM CHLORIDE 0.9 % (FLUSH) 0.9 %
10 SYRINGE (ML) INJECTION AS NEEDED
Status: DISCONTINUED | OUTPATIENT
Start: 2019-10-21 | End: 2019-10-22 | Stop reason: HOSPADM

## 2019-10-21 RX ORDER — CYANOCOBALAMIN 1000 UG/ML
1000 INJECTION, SOLUTION INTRAMUSCULAR; SUBCUTANEOUS ONCE
Status: COMPLETED | OUTPATIENT
Start: 2019-10-21 | End: 2019-10-21

## 2019-10-21 RX ADMIN — Medication 500 UNITS: at 13:48

## 2019-10-21 RX ADMIN — CYANOCOBALAMIN 1000 MCG: 1000 INJECTION, SOLUTION INTRAMUSCULAR; SUBCUTANEOUS at 13:47

## 2019-10-21 RX ADMIN — SODIUM CHLORIDE 860 MG: 900 INJECTION, SOLUTION INTRAVENOUS at 12:26

## 2019-10-21 RX ADMIN — Medication 10 ML: at 13:48

## 2019-10-30 DIAGNOSIS — C34.91 NON-SMALL CELL CARCINOMA OF LUNG, RIGHT (HCC): ICD-10-CM

## 2019-10-30 DIAGNOSIS — C34.31 CANCER OF LOWER LOBE OF RIGHT LUNG (HCC): ICD-10-CM

## 2019-10-30 RX ORDER — SODIUM CHLORIDE 9 MG/ML
250 INJECTION, SOLUTION INTRAVENOUS ONCE
Status: CANCELLED | OUTPATIENT
Start: 2019-11-04

## 2019-11-04 ENCOUNTER — HOSPITAL ENCOUNTER (OUTPATIENT)
Dept: ONCOLOGY | Facility: HOSPITAL | Age: 54
Setting detail: INFUSION SERIES
Discharge: HOME | End: 2019-11-04

## 2019-11-04 VITALS
HEART RATE: 97 BPM | RESPIRATION RATE: 18 BRPM | WEIGHT: 195.9 LBS | TEMPERATURE: 98 F | BODY MASS INDEX: 24.36 KG/M2 | DIASTOLIC BLOOD PRESSURE: 84 MMHG | SYSTOLIC BLOOD PRESSURE: 134 MMHG | HEIGHT: 75 IN

## 2019-11-04 DIAGNOSIS — Z45.2 ENCOUNTER FOR CARE RELATED TO VASCULAR ACCESS PORT: ICD-10-CM

## 2019-11-04 DIAGNOSIS — C34.91 NON-SMALL CELL CARCINOMA OF LUNG, RIGHT (HCC): ICD-10-CM

## 2019-11-04 DIAGNOSIS — C34.31 CANCER OF LOWER LOBE OF RIGHT LUNG (HCC): Primary | ICD-10-CM

## 2019-11-04 LAB
ALBUMIN SERPL-MCNC: 4.2 G/DL (ref 3.5–5.2)
ALBUMIN/GLOB SERPL: 1.6 G/DL
ALP SERPL-CCNC: 87 U/L (ref 39–117)
ALT SERPL W P-5'-P-CCNC: 25 U/L (ref 1–41)
ANION GAP SERPL CALCULATED.3IONS-SCNC: 15 MMOL/L (ref 5–15)
AST SERPL-CCNC: 24 U/L (ref 1–40)
BASOPHILS # BLD AUTO: 0.05 10*3/MM3 (ref 0–0.2)
BASOPHILS NFR BLD AUTO: 0.6 % (ref 0–1.5)
BILIRUB SERPL-MCNC: 0.3 MG/DL (ref 0.2–1.2)
BUN BLD-MCNC: 15 MG/DL (ref 6–20)
BUN/CREAT SERPL: 13.9 (ref 7–25)
CALCIUM SPEC-SCNC: 9 MG/DL (ref 8.6–10.5)
CHLORIDE SERPL-SCNC: 98 MMOL/L (ref 98–107)
CO2 SERPL-SCNC: 24 MMOL/L (ref 22–29)
CREAT BLD-MCNC: 1.08 MG/DL (ref 0.76–1.27)
DEPRECATED RDW RBC AUTO: 48.3 FL (ref 37–54)
EOSINOPHIL # BLD AUTO: 0.26 10*3/MM3 (ref 0–0.4)
EOSINOPHIL NFR BLD AUTO: 3 % (ref 0.3–6.2)
ERYTHROCYTE [DISTWIDTH] IN BLOOD BY AUTOMATED COUNT: 15 % (ref 12.3–15.4)
GFR SERPL CREATININE-BSD FRML MDRD: 71 ML/MIN/1.73
GLOBULIN UR ELPH-MCNC: 2.7 GM/DL
GLUCOSE BLD-MCNC: 209 MG/DL (ref 65–99)
HCT VFR BLD AUTO: 37.9 % (ref 37.5–51)
HGB BLD-MCNC: 12.7 G/DL (ref 13–17.7)
LYMPHOCYTES # BLD AUTO: 0.91 10*3/MM3 (ref 0.7–3.1)
LYMPHOCYTES NFR BLD AUTO: 10.5 % (ref 19.6–45.3)
MCH RBC QN AUTO: 30.2 PG (ref 26.6–33)
MCHC RBC AUTO-ENTMCNC: 33.5 G/DL (ref 31.5–35.7)
MCV RBC AUTO: 90 FL (ref 79–97)
MONOCYTES # BLD AUTO: 0.76 10*3/MM3 (ref 0.1–0.9)
MONOCYTES NFR BLD AUTO: 8.8 % (ref 5–12)
NEUTROPHILS # BLD AUTO: 6.69 10*3/MM3 (ref 1.7–7)
NEUTROPHILS NFR BLD AUTO: 77.1 % (ref 42.7–76)
PLATELET # BLD AUTO: 282 10*3/MM3 (ref 140–450)
PMV BLD AUTO: 8.8 FL (ref 6–12)
POTASSIUM BLD-SCNC: 3.4 MMOL/L (ref 3.5–5.2)
PROT SERPL-MCNC: 6.9 G/DL (ref 6–8.5)
RBC # BLD AUTO: 4.21 10*6/MM3 (ref 4.14–5.8)
SODIUM BLD-SCNC: 137 MMOL/L (ref 136–145)
WBC NRBC COR # BLD: 8.67 10*3/MM3 (ref 3.4–10.8)

## 2019-11-04 PROCEDURE — 80053 COMPREHEN METABOLIC PANEL: CPT | Performed by: NURSE PRACTITIONER

## 2019-11-04 PROCEDURE — 25010000002 DURVALUMAB 50 MG/ML SOLUTION 10 ML VIAL: Performed by: NURSE PRACTITIONER

## 2019-11-04 PROCEDURE — 25010000002 DURVALUMAB 50 MG/ML SOLUTION 2.4 ML VIAL: Performed by: NURSE PRACTITIONER

## 2019-11-04 PROCEDURE — 96413 CHEMO IV INFUSION 1 HR: CPT | Performed by: INTERNAL MEDICINE

## 2019-11-04 PROCEDURE — 85025 COMPLETE CBC W/AUTO DIFF WBC: CPT | Performed by: NURSE PRACTITIONER

## 2019-11-04 RX ORDER — SODIUM CHLORIDE 0.9 % (FLUSH) 0.9 %
10 SYRINGE (ML) INJECTION AS NEEDED
Status: DISCONTINUED | OUTPATIENT
Start: 2019-11-04 | End: 2019-11-05 | Stop reason: HOSPADM

## 2019-11-04 RX ORDER — SODIUM CHLORIDE 0.9 % (FLUSH) 0.9 %
10 SYRINGE (ML) INJECTION AS NEEDED
Status: CANCELLED | OUTPATIENT
Start: 2019-11-04

## 2019-11-04 RX ADMIN — HEPARIN 500 UNITS: 100 SYRINGE at 13:11

## 2019-11-04 RX ADMIN — Medication 10 ML: at 13:11

## 2019-11-04 RX ADMIN — DURVALUMAB 860 MG: 500 INJECTION, SOLUTION INTRAVENOUS at 12:01

## 2019-11-11 ENCOUNTER — OFFICE VISIT (OUTPATIENT)
Dept: ONCOLOGY | Facility: CLINIC | Age: 54
End: 2019-11-11

## 2019-11-11 ENCOUNTER — APPOINTMENT (OUTPATIENT)
Dept: LAB | Facility: HOSPITAL | Age: 54
End: 2019-11-11

## 2019-11-11 VITALS
HEIGHT: 75 IN | HEART RATE: 82 BPM | BODY MASS INDEX: 24.37 KG/M2 | DIASTOLIC BLOOD PRESSURE: 78 MMHG | TEMPERATURE: 97.9 F | SYSTOLIC BLOOD PRESSURE: 113 MMHG | RESPIRATION RATE: 18 BRPM | WEIGHT: 196 LBS

## 2019-11-11 DIAGNOSIS — C34.31 CANCER OF LOWER LOBE OF RIGHT LUNG (HCC): Primary | ICD-10-CM

## 2019-11-11 PROCEDURE — G0463 HOSPITAL OUTPT CLINIC VISIT: HCPCS | Performed by: INTERNAL MEDICINE

## 2019-11-11 PROCEDURE — 99215 OFFICE O/P EST HI 40 MIN: CPT | Performed by: INTERNAL MEDICINE

## 2019-11-11 RX ORDER — LORAZEPAM 0.5 MG/1
0.5 TABLET ORAL 2 TIMES DAILY PRN
Refills: 0 | COMMUNITY
Start: 2019-10-07 | End: 2022-05-22 | Stop reason: HOSPADM

## 2019-11-11 NOTE — PROGRESS NOTES
Hematology/Oncology Outpatient Follow Up    PATIENT NAME:Jc Driscoll  :1965  MRN: [id number]  PRIMARY CARE PHYSICIAN: Reshma Alvarenga MD  REFERRING PHYSICIAN: Reshma Alvarenga MD    Chief Complaint   Patient presents with   • Follow-up     Lung cancer   • Follow-up     Monitor drug toxicity        HISTORY OF PRESENT ILLNESS:     This is a 54 y.o. who developed left shoulder pain and for that reason he had a chest x-ray done on 19 which showed a 2.7 cm noncalcified right lower lobe nodule was identified.  For that reason a CT scan of the chest was recommended.  Review of his records shows patient had the CT scan on 19 done without contrast.  This basically showed a lobulated noncalcified mass in the posterior aspect of the right lower lobe measuring 3 cm close to the pleural surface.  There is a calcified 1.2 mm nodule in the lateral aspect of the right lower lobe consistent with a granuloma.  There were also calcified subcarinal and right hilar nodules.  There is a lower right side tracheal nodule measuring 1 cm.  Patient had a PET/CT scan done on 19 which showed increased metabolic activity on the right lower lobe mass that measures 2.5 cm with SUV of 4.4.  There was also increased metabolic activity in the subcarinal space measuring 2.8 cm in size with SUV of 3.4, increased activity in an enlarged right paratracheal lymph node that measures 1.9 cm, SUV of 5, also suspicious for metastatic adenopathy.  Patient had a CT-guided biopsy of the right lower lobe mass on 3/8/19.  This showed adenocarcinoma, TTF-1 positive.  On 3/18/19 patient underwent endoscopic ultrasound and biopsy of a subcarinal lymph node.  This was positive for malignant cells.  Patient was then taken back to surgery on 3/20/18 and had left Mediport placement by Dr. Fuchs.  He has been referred for further evaluation and management of his stage 3 adenocarcinoma of the right lung.  He was accompanied by his spouse  for the appointment on 3/26/19.  Patient was scheduled to have a brain MRI for complete staging, but he could not do this due to claustrophobia.  He is willing to try with the help of angiolytics.    • Patient had brain MRI done on 4/5/19.  He states it did not show any evidence of metastatic disease.  Evidence of chronic sinusitis was noted.    • Patient was seen by Dr. Escalona who has recommended concurrent chemotherapy and radiation.  Patient is scheduled to begin on 4/15/19.   • 4/17/19 - Patient was initiated on combined chemotherapy and radiation with Cisplatin and Alimta.  Patient received cycle 1.      • 5/8/19 - Patient received cycle 2 of chemotherapy with Cisplatin and Alimta.   • 5/15/19 - Chemistry panel showed creatinine of 1.7.  WBC 1.8, hemoglobin 11.6, platelet count 72,000.   • 5/20/19 - BUN 10, creatinine 1.2, potassium 3.5.    • 5/23/19 - CT scan of the chest with contrast showed interval decrease in size of the right lower lobe pulmonary nodule now measuring 2.1 cm in size.  There was no mediastinal or hilar adenopathy identified.  • 6/26/2019 patient underwent right lower lobectomy with hilar and mediastinal lymph node dissection performed by Dr. Terrance Mckeon.  • Pathology revealed residual residual 2.2 cm invasive moderately differentiated adenocarcinoma.  There were a total of 16 lymph nodes evaluated that 7 had metastatic disease.  There was evidence of lymphovascular invasion, extranodal involvement.  All the surgical margins were negative and distance of the closest margin was 2.5 cm.  Logic stage is T1c N2 M0.  • Patient is here today accompanied by his spouse for this appointment.  He continues to complain of some postop discomfort, numbness but his skin is intact.  There is no drainage.  Has had follow-up with Dr. Fuchs.  • 8/14/2019-seen by Dr. Maria at the [facility].  Dr. Maria has recommended immunotherapy with durvalumab off label use as patient has not had significant  response to neoadjuvant chemotherapy and radiation.  Patient was given the option to have immunotherapy at [facility] vs [place] and he has chosen to stay in [place]  • 8/21/19 - Started cycle 1 day 1 Imfinzi  • 9/4/2019 patient had CT scan of the chest this shows a moderate size right pleural effusion.  There are areas of groundglass opacification in the right upper lobe without any evidence of significant septal thickening.  Volume loss noted there is also moderate pleural effusion.  There is no suspicious recurrent malignancy.  There were nonenlarged residual mediastinal lymph nodes.  • 9/9/19 - WBC 6.23, Hgb 11.7 Platelets 257,000, , BUN 9, Cr 1.1,Vitamin B12 318, Ferritin 437  • 9/23/19 - received cycle 2 day 1 Imfinzi  • 7/4/2019-patient received cycle 5 of Imfinzi    Past Medical History:   Diagnosis Date   • Dupuytren's contracture of both hands    • Elevated cholesterol    • History of colon polyps    • Hypertension    • Lung cancer (CMS/HCC)     right lung and in lymph nodes x2   • Retention of urine 6/27/2019       Past Surgical History:   Procedure Laterality Date   • BRONCHOSCOPY  03/18/2019    EBUS MONTERO NEEDLE BX   • COLONOSCOPY     • DUPUYTREN CONTRACTURE RELEASE Bilateral    • LUNG BIOPSY  03/08/2019    CT GUIDED   • PORTACATH PLACEMENT  03/20/2019    DR LONGORIA   • THORACOSCOPY Right 6/26/2019    Procedure: RIGHT VATS, OPEN LOWER LOBECTOMY WITH LYMPH NODE DISECTION, WEDGE RESECTION OF RIGHT MIDDLE LOBE;  Surgeon: Terrance Longoria MD;  Location: [facility] OR;  Service: Cardiothoracic         Current Outpatient Medications:   •  amLODIPine (NORVASC) 10 MG tablet, Take 10 mg by mouth Daily., Disp: , Rfl:   •  buPROPion SR (WELLBUTRIN SR) 150 MG 12 hr tablet, Take 300 mg by mouth 2 (Two) Times a Day., Disp: , Rfl:   •  ferrous sulfate 325 (65 FE) MG tablet, Take 325 mg by mouth Daily With Breakfast., Disp: , Rfl:   •  metoprolol succinate XL (TOPROL-XL) 100 MG 24 hr tablet, Take 100 mg by  mouth Daily., Disp: , Rfl:   •  ondansetron (ZOFRAN) 8 MG tablet, TAKE 1 TABLET BY MOUTH EVERY 8 HOURS AS NEEDED NAUSEA, Disp: , Rfl: 3  •  pantoprazole (PROTONIX) 40 MG EC tablet, Take 40 mg by mouth Daily., Disp: , Rfl:   •  pravastatin (PRAVACHOL) 10 MG tablet, Take 10 mg by mouth Daily., Disp: , Rfl:   •  VIIBRYD 40 MG tablet tablet, Take 40 mg by mouth Daily., Disp: , Rfl: 0  •  acetaminophen (TYLENOL) 325 MG tablet, Take 2 tablets by mouth Every 4 (Four) Hours As Needed for Mild Pain ., Disp: , Rfl:   •  HYDROcodone-acetaminophen (NORCO) 5-325 MG per tablet, Take 1 tablet by mouth., Disp: , Rfl:   •  ibuprofen (ADVIL,MOTRIN) 600 MG tablet, Take 1 tablet by mouth Every 6 (Six) Hours As Needed for Mild Pain ., Disp: , Rfl:   •  LORazepam (ATIVAN) 0.5 MG tablet, TAKE 1 TABLET BY MOUTH TWICE DAILY AS NEEDED FOR ANXIETY MAX OF 2 DAY, Disp: , Rfl: 0    No Known Allergies    Family History   Problem Relation Age of Onset   • Cancer Sister    • Cancer Mother    • Lung cancer Father        Cancer-related family history includes Cancer in his mother and sister; Lung cancer in his father.    Social History     Tobacco Use   • Smoking status: Former Smoker     Types: Cigarettes     Last attempt to quit: 9/5/2009     Years since quitting: 10.1   • Smokeless tobacco: Never Used   Substance Use Topics   • Alcohol use: Yes     Alcohol/week: 1.2 oz     Types: 2 Cans of beer per week     Frequency: Monthly or less     Drinks per session: 1 or 2   • Drug use: No       I have reviewed the history of present illness, past medical history, family history, social history, lab results, all notes and other records since the patient was last seen on 9/5/19.     SUBJECTIVE:  The patient is here for a follow up appointment accompanied with his wife.  He continues to tolerate immunotherapy without complaints.  The patient states that he has noticed a stiff neck for about a month now but that he is now working full time.  He states that  "his job is physically demanding.  He denies fevers, chills, nausea or vomiting or headaches.      REVIEW OF SYSTEMS:  Review of Systems   Constitutional: Negative for appetite change ( increased), chills, fatigue and fever. Unexpected weight change:  gained 4 lbs    HENT: Negative for ear pain, mouth sores, nosebleeds, sinus pressure ( chronic ) and sore throat. Sinus pain:  chronic     Eyes: Negative for photophobia and visual disturbance.   Respiratory: Negative for cough, wheezing and stridor.    Cardiovascular: Negative for chest pain, palpitations and leg swelling.   Gastrointestinal: Negative for abdominal pain, diarrhea, nausea ( mild after treatment ) and vomiting.   Endocrine: Negative for cold intolerance and heat intolerance.   Genitourinary: Negative for dysuria and hematuria.   Musculoskeletal: Positive for arthralgias ( chronic), back pain ( chronic ) and neck pain. Negative for gait problem, joint swelling and neck stiffness.   Skin: Negative for color change and rash.   Neurological: Negative for seizures and syncope.   Hematological: Negative for adenopathy.        No obvious bleeding   Psychiatric/Behavioral: Negative for agitation, confusion and hallucinations.     OBJECTIVE:  Vitals:    11/11/19 1327   BP: 113/78   Pulse: 82   Resp: 18   Temp: 97.9 °F (36.6 °C)   Weight: 88.9 kg (196 lb)   Height: 190.5 cm (75\")   PainSc: 0-No pain     ECOG  Eastern Cooperative Oncology Group (ECOG, [place], World Health Organization) performance scale  0 Fully active; no performance restrictions.  1 Strenuous physical activity restricted; fully ambulatory and able to carry out light work.  2 Capable of all self-care but unable to carry out any work activities. Up and about >50% of waking hours.  3 Capable of only limited self-care; confined to bed or chair >50% of waking hours.  4 Completely disabled; cannot carry out any self-care; totally confined to bed or chair.    Physical Exam   Constitutional: He is " oriented to person, place, and time. No distress.   HENT:   Head: Normocephalic and atraumatic.   Eyes: Conjunctivae and EOM are normal. Right eye exhibits no discharge. Left eye exhibits no discharge. No scleral icterus.   Neck: Normal range of motion. Neck supple. No thyromegaly present.   Cardiovascular: Normal rate, regular rhythm and normal heart sounds. Exam reveals no gallop and no friction rub.   Pulmonary/Chest: Effort normal. No stridor. No respiratory distress. He has no wheezes. He exhibits laceration.   Right chest wall port   Abdominal: Soft. Bowel sounds are normal. He exhibits no mass. There is no tenderness. There is no rebound and no guarding.   Musculoskeletal: Normal range of motion. He exhibits no tenderness.   Lymphadenopathy:     He has no cervical adenopathy.   Neurological: He is alert and oriented to person, place, and time. He exhibits normal muscle tone.   Skin: Skin is warm. No rash noted. He is not diaphoretic. No erythema.   Psychiatric: He has a normal mood and affect. His behavior is normal.   Nursing note and vitals reviewed.      RECENT LABS  WBC   Date Value Ref Range Status   11/04/2019 8.67 3.40 - 10.80 10*3/mm3 Final   07/03/2019 8.99 4.5 - 11.0 10*3/uL Final     RBC   Date Value Ref Range Status   11/04/2019 4.21 4.14 - 5.80 10*6/mm3 Final   07/03/2019 3.24 (L) 4.5 - 5.9 10*6/uL Final     Hemoglobin   Date Value Ref Range Status   11/04/2019 12.7 (L) 13.0 - 17.7 g/dL Final   07/03/2019 10.4 (L) 13.5 - 17.5 g/dL Final     Hematocrit   Date Value Ref Range Status   11/04/2019 37.9 37.5 - 51.0 % Final   07/03/2019 32.8 (L) 41.0 - 53.0 % Final     MCV   Date Value Ref Range Status   11/04/2019 90.0 79.0 - 97.0 fL Final   07/03/2019 101.2 (H) 80.0 - 100.0 fL Final     MCH   Date Value Ref Range Status   11/04/2019 30.2 26.6 - 33.0 pg Final   07/03/2019 32.1 26.0 - 34.0 pg Final     [facility]   Date Value Ref Range Status   11/04/2019 33.5 31.5 - 35.7 g/dL Final   07/03/2019 31.7 31.0 -  37.0 g/dL Final     RDW   Date Value Ref Range Status   11/04/2019 15.0 12.3 - 15.4 % Final   07/03/2019 15.2 12.0 - 16.8 % Final     RDW-SD   Date Value Ref Range Status   11/04/2019 48.3 37.0 - 54.0 fl Final     MPV   Date Value Ref Range Status   11/04/2019 8.8 6.0 - 12.0 fL Final   07/03/2019 9.0 6.7 - 10.8 fL Final     Platelets   Date Value Ref Range Status   11/04/2019 282 140 - 450 10*3/mm3 Final   07/03/2019 302 140 - 440 10*3/uL Final     Neutrophil Rel %   Date Value Ref Range Status   07/03/2019 60.0 45 - 80 % Final     Neutrophil %   Date Value Ref Range Status   11/04/2019 77.1 (H) 42.7 - 76.0 % Final     Lymphocyte Rel %   Date Value Ref Range Status   07/03/2019 14.8 (L) 15 - 50 % Final     Lymphocyte %   Date Value Ref Range Status   11/04/2019 10.5 (L) 19.6 - 45.3 % Final     Monocyte Rel %   Date Value Ref Range Status   07/03/2019 12.2 0 - 15 % Final     Monocyte %   Date Value Ref Range Status   11/04/2019 8.8 5.0 - 12.0 % Final     Eosinophil %   Date Value Ref Range Status   11/04/2019 3.0 0.3 - 6.2 % Final   07/03/2019 10.4 (H) 0 - 7 % Final     Basophil Rel %   Date Value Ref Range Status   07/03/2019 1.7 0 - 2 % Final     Basophil %   Date Value Ref Range Status   11/04/2019 0.6 0.0 - 1.5 % Final     Neutrophils Absolute   Date Value Ref Range Status   07/03/2019 5.39 1.5 - 7.5 /uL Final   07/03/2019 5.39 2.0 - 8.8 10*3/uL Final     Neutrophils, Absolute   Date Value Ref Range Status   11/04/2019 6.69 1.70 - 7.00 10*3/mm3 Final     Lymphocytes Absolute   Date Value Ref Range Status   07/03/2019 1.33 0.7 - 5.5 10*3/uL Final     Lymphocytes, Absolute   Date Value Ref Range Status   11/04/2019 0.91 0.70 - 3.10 10*3/mm3 Final     Monocytes Absolute   Date Value Ref Range Status   07/03/2019 1.10 0.0 - 1.7 10*3/uL Final     Monocytes, Absolute   Date Value Ref Range Status   11/04/2019 0.76 0.10 - 0.90 10*3/mm3 Final     Eosinophils Absolute   Date Value Ref Range Status   07/03/2019 0.93 (H)  0.0 - 0.8 10*3/uL Final     Eosinophils, Absolute   Date Value Ref Range Status   11/04/2019 0.26 0.00 - 0.40 10*3/mm3 Final     Basophils Absolute   Date Value Ref Range Status   07/03/2019 0.15 0.0 - 0.2 10*3/uL Final     Basophils, Absolute   Date Value Ref Range Status   11/04/2019 0.05 0.00 - 0.20 10*3/mm3 Final     nRBC   Date Value Ref Range Status   07/03/2019 0 0 /100(WBC) Final   06/19/2019 0.0 0.0 - 0.2 /100 WBC Final       Lab Results   Component Value Date    GLUCOSE 209 (H) 11/04/2019    BUN 15 11/04/2019    CREATININE 1.08 11/04/2019    EGFRIFNONA 71 11/04/2019    EGFRIFAFRI >60 05/20/2019    BCR 13.9 11/04/2019    K 3.4 (L) 11/04/2019    CO2 24.0 11/04/2019    CALCIUM 9.0 11/04/2019    ALBUMIN 4.20 11/04/2019    LABIL2 1.4 07/03/2019    AST 24 11/04/2019    ALT 25 11/04/2019         Assessment/Plan     Cancer of lower lobe of right lung (CMS/HCC)  - CT Abdomen Pelvis With Contrast      ASSESSMENT:  1. Adenocarcinoma of the right lung, T1c N2 M0, consistent with clinical stage 3A disease.     2. S/P combined chemotherapy and radiation with cisplatin and Alimta.    3. Status post right lower lobectomy with mediastinal and hilar lymph node dissection with residual disease.  pT1 cN2 M0.  Patient with high risk features including lymphovascular invasion, extranodal capsular extension and persistent multiple lymph node disease.  4. Chemo-induced constipation, chemo-induced nausea, chemo-induced fatigue. Resolved  5. Consolidation treatment with immunotherapy with Imfinzi   6. Hypokalemia secondary to chemotherapy. Resolved    7. Acute kidney injury. Resolved  8. Postop anemia  9. Rash on both hands of palmar surfaces; Resolved. Mild peeling at times.     PLAN:  1. Continue ferrous sulfate 325 mg p.o. twice a day  2. Continue Imfinzi consolidation therapy  3. Continue B12 injections day 3/3 today, then monthly  4. Status post flu shot and pneumonia vaccines   5. CT scan of the chest, abdomen and pelvis on  12/5/19 at [facility] .  Will schedule  6. Refer to ENT for evaluation for sinus surgery   7. OK for colonoscopy   8. RTC in 4 weeks          Patient and his spouse have  asked patient which have all been answered to the best of my abilities    I have reviewed labs results, imaging, vitals, and medications with the patient today. Will follow up in 34 weeks with me.      Patient verbalized understanding and is in agreement of the above plan.    Part of this document was scribed by Antionette James RN, BSN.

## 2019-11-15 DIAGNOSIS — C34.91 NON-SMALL CELL CARCINOMA OF LUNG, RIGHT (HCC): ICD-10-CM

## 2019-11-15 DIAGNOSIS — C34.31 CANCER OF LOWER LOBE OF RIGHT LUNG (HCC): ICD-10-CM

## 2019-11-15 RX ORDER — SODIUM CHLORIDE 9 MG/ML
250 INJECTION, SOLUTION INTRAVENOUS ONCE
Status: CANCELLED | OUTPATIENT
Start: 2019-11-18

## 2019-11-18 ENCOUNTER — HOSPITAL ENCOUNTER (OUTPATIENT)
Dept: ONCOLOGY | Facility: HOSPITAL | Age: 54
Setting detail: INFUSION SERIES
Discharge: HOME | End: 2019-11-18

## 2019-11-18 VITALS
RESPIRATION RATE: 18 BRPM | TEMPERATURE: 98.2 F | HEART RATE: 80 BPM | WEIGHT: 197.2 LBS | DIASTOLIC BLOOD PRESSURE: 78 MMHG | BODY MASS INDEX: 24.52 KG/M2 | HEIGHT: 75 IN | SYSTOLIC BLOOD PRESSURE: 148 MMHG

## 2019-11-18 DIAGNOSIS — C34.91 NON-SMALL CELL CARCINOMA OF LUNG, RIGHT (HCC): ICD-10-CM

## 2019-11-18 DIAGNOSIS — C34.31 CANCER OF LOWER LOBE OF RIGHT LUNG (HCC): Primary | ICD-10-CM

## 2019-11-18 LAB
ALBUMIN SERPL-MCNC: 4.1 G/DL (ref 3.5–5.2)
ALBUMIN/GLOB SERPL: 1.5 G/DL
ALP SERPL-CCNC: 84 U/L (ref 39–117)
ALT SERPL W P-5'-P-CCNC: 19 U/L (ref 1–41)
ANION GAP SERPL CALCULATED.3IONS-SCNC: 12 MMOL/L (ref 5–15)
AST SERPL-CCNC: 21 U/L (ref 1–40)
BASOPHILS # BLD AUTO: 0.06 10*3/MM3 (ref 0–0.2)
BASOPHILS NFR BLD AUTO: 0.9 % (ref 0–1.5)
BILIRUB SERPL-MCNC: 0.4 MG/DL (ref 0.2–1.2)
BUN BLD-MCNC: 11 MG/DL (ref 6–20)
BUN/CREAT SERPL: 12.4 (ref 7–25)
CALCIUM SPEC-SCNC: 9.1 MG/DL (ref 8.6–10.5)
CHLORIDE SERPL-SCNC: 102 MMOL/L (ref 98–107)
CO2 SERPL-SCNC: 26 MMOL/L (ref 22–29)
CREAT BLD-MCNC: 0.89 MG/DL (ref 0.76–1.27)
DEPRECATED RDW RBC AUTO: 45.3 FL (ref 37–54)
EOSINOPHIL # BLD AUTO: 0.39 10*3/MM3 (ref 0–0.4)
EOSINOPHIL NFR BLD AUTO: 5.8 % (ref 0.3–6.2)
ERYTHROCYTE [DISTWIDTH] IN BLOOD BY AUTOMATED COUNT: 13.9 % (ref 12.3–15.4)
GFR SERPL CREATININE-BSD FRML MDRD: 89 ML/MIN/1.73
GLOBULIN UR ELPH-MCNC: 2.8 GM/DL
GLUCOSE BLD-MCNC: 87 MG/DL (ref 65–99)
HCT VFR BLD AUTO: 36.6 % (ref 37.5–51)
HGB BLD-MCNC: 12.3 G/DL (ref 13–17.7)
LYMPHOCYTES # BLD AUTO: 1.08 10*3/MM3 (ref 0.7–3.1)
LYMPHOCYTES NFR BLD AUTO: 16 % (ref 19.6–45.3)
MCH RBC QN AUTO: 31.1 PG (ref 26.6–33)
MCHC RBC AUTO-ENTMCNC: 33.6 G/DL (ref 31.5–35.7)
MCV RBC AUTO: 92.4 FL (ref 79–97)
MONOCYTES # BLD AUTO: 0.79 10*3/MM3 (ref 0.1–0.9)
MONOCYTES NFR BLD AUTO: 11.7 % (ref 5–12)
NEUTROPHILS # BLD AUTO: 4.42 10*3/MM3 (ref 1.7–7)
NEUTROPHILS NFR BLD AUTO: 65.6 % (ref 42.7–76)
PLATELET # BLD AUTO: 269 10*3/MM3 (ref 140–450)
PMV BLD AUTO: 8.5 FL (ref 6–12)
POTASSIUM BLD-SCNC: 4.3 MMOL/L (ref 3.5–5.2)
PROT SERPL-MCNC: 6.9 G/DL (ref 6–8.5)
RBC # BLD AUTO: 3.96 10*6/MM3 (ref 4.14–5.8)
SODIUM BLD-SCNC: 140 MMOL/L (ref 136–145)
WBC NRBC COR # BLD: 6.74 10*3/MM3 (ref 3.4–10.8)

## 2019-11-18 PROCEDURE — 96413 CHEMO IV INFUSION 1 HR: CPT | Performed by: INTERNAL MEDICINE

## 2019-11-18 PROCEDURE — 80053 COMPREHEN METABOLIC PANEL: CPT | Performed by: NURSE PRACTITIONER

## 2019-11-18 PROCEDURE — 85025 COMPLETE CBC W/AUTO DIFF WBC: CPT | Performed by: NURSE PRACTITIONER

## 2019-11-18 PROCEDURE — 25010000002 DURVALUMAB 500 MG/10ML SOLUTION 10 ML VIAL: Performed by: INTERNAL MEDICINE

## 2019-11-18 PROCEDURE — 25010000002 DURVALUMAB 120 MG/2.4ML SOLUTION 2.4 ML VIAL: Performed by: INTERNAL MEDICINE

## 2019-11-18 RX ORDER — SODIUM CHLORIDE 9 MG/ML
250 INJECTION, SOLUTION INTRAVENOUS ONCE
Status: DISCONTINUED | OUTPATIENT
Start: 2019-11-18 | End: 2019-11-19 | Stop reason: HOSPADM

## 2019-11-18 RX ADMIN — SODIUM CHLORIDE 860 MG: 900 INJECTION, SOLUTION INTRAVENOUS at 13:11

## 2019-11-25 ENCOUNTER — TELEPHONE (OUTPATIENT)
Dept: ONCOLOGY | Facility: CLINIC | Age: 54
End: 2019-11-25

## 2019-11-25 NOTE — TELEPHONE ENCOUNTER
The pt called and states that he never got a call from [facility] about his scans. I spoke with Vanesa and she is going to take care of that and call and let the pt know.

## 2019-12-02 ENCOUNTER — APPOINTMENT (OUTPATIENT)
Dept: ONCOLOGY | Facility: HOSPITAL | Age: 54
End: 2019-12-02

## 2019-12-02 ENCOUNTER — HOSPITAL ENCOUNTER (OUTPATIENT)
Dept: ONCOLOGY | Facility: HOSPITAL | Age: 54
Setting detail: INFUSION SERIES
Discharge: HOME | End: 2019-12-02

## 2019-12-02 VITALS
HEIGHT: 75 IN | TEMPERATURE: 97.5 F | HEART RATE: 79 BPM | RESPIRATION RATE: 18 BRPM | BODY MASS INDEX: 24.12 KG/M2 | SYSTOLIC BLOOD PRESSURE: 117 MMHG | WEIGHT: 194 LBS | DIASTOLIC BLOOD PRESSURE: 77 MMHG

## 2019-12-02 DIAGNOSIS — C34.91 NON-SMALL CELL CARCINOMA OF LUNG, RIGHT (HCC): ICD-10-CM

## 2019-12-02 DIAGNOSIS — Z45.2 ENCOUNTER FOR CARE RELATED TO VASCULAR ACCESS PORT: ICD-10-CM

## 2019-12-02 DIAGNOSIS — E53.8 B12 DEFICIENCY: ICD-10-CM

## 2019-12-02 DIAGNOSIS — C34.31 CANCER OF LOWER LOBE OF RIGHT LUNG (HCC): Primary | ICD-10-CM

## 2019-12-02 LAB
ALBUMIN SERPL-MCNC: 4 G/DL (ref 3.5–5.2)
ALBUMIN/GLOB SERPL: 1.3 G/DL
ALP SERPL-CCNC: 86 U/L (ref 39–117)
ALT SERPL W P-5'-P-CCNC: 17 U/L (ref 1–41)
ANION GAP SERPL CALCULATED.3IONS-SCNC: 11 MMOL/L (ref 5–15)
AST SERPL-CCNC: 18 U/L (ref 1–40)
BASOPHILS # BLD AUTO: 0.01 10*3/MM3 (ref 0–0.2)
BASOPHILS NFR BLD AUTO: 0.2 % (ref 0–1.5)
BILIRUB SERPL-MCNC: 0.3 MG/DL (ref 0.2–1.2)
BUN BLD-MCNC: 8 MG/DL (ref 6–20)
BUN/CREAT SERPL: 8.2 (ref 7–25)
CALCIUM SPEC-SCNC: 9.1 MG/DL (ref 8.6–10.5)
CHLORIDE SERPL-SCNC: 101 MMOL/L (ref 98–107)
CO2 SERPL-SCNC: 25 MMOL/L (ref 22–29)
CREAT BLD-MCNC: 0.98 MG/DL (ref 0.76–1.27)
DEPRECATED RDW RBC AUTO: 46 FL (ref 37–54)
EOSINOPHIL # BLD AUTO: 0.43 10*3/MM3 (ref 0–0.4)
EOSINOPHIL NFR BLD AUTO: 9.2 % (ref 0.3–6.2)
ERYTHROCYTE [DISTWIDTH] IN BLOOD BY AUTOMATED COUNT: 13.6 % (ref 12.3–15.4)
GFR SERPL CREATININE-BSD FRML MDRD: 79 ML/MIN/1.73
GLOBULIN UR ELPH-MCNC: 3 GM/DL
GLUCOSE BLD-MCNC: 116 MG/DL (ref 65–99)
HCT VFR BLD AUTO: 38.4 % (ref 37.5–51)
HGB BLD-MCNC: 12.9 G/DL (ref 13–17.7)
LYMPHOCYTES # BLD AUTO: 0.57 10*3/MM3 (ref 0.7–3.1)
LYMPHOCYTES NFR BLD AUTO: 12.2 % (ref 19.6–45.3)
MCH RBC QN AUTO: 31.7 PG (ref 26.6–33)
MCHC RBC AUTO-ENTMCNC: 33.6 G/DL (ref 31.5–35.7)
MCV RBC AUTO: 94.3 FL (ref 79–97)
MONOCYTES # BLD AUTO: 0.74 10*3/MM3 (ref 0.1–0.9)
MONOCYTES NFR BLD AUTO: 15.9 % (ref 5–12)
NEUTROPHILS # BLD AUTO: 2.91 10*3/MM3 (ref 1.7–7)
NEUTROPHILS NFR BLD AUTO: 62.5 % (ref 42.7–76)
PLATELET # BLD AUTO: 225 10*3/MM3 (ref 140–450)
PMV BLD AUTO: 8.6 FL (ref 6–12)
POTASSIUM BLD-SCNC: 3.8 MMOL/L (ref 3.5–5.2)
PROT SERPL-MCNC: 7 G/DL (ref 6–8.5)
RBC # BLD AUTO: 4.07 10*6/MM3 (ref 4.14–5.8)
SODIUM BLD-SCNC: 137 MMOL/L (ref 136–145)
T4 FREE SERPL-MCNC: 1.16 NG/DL (ref 0.93–1.7)
TSH SERPL DL<=0.05 MIU/L-ACNC: 0.97 UIU/ML (ref 0.27–4.2)
WBC NRBC COR # BLD: 4.66 10*3/MM3 (ref 3.4–10.8)

## 2019-12-02 PROCEDURE — 84439 ASSAY OF FREE THYROXINE: CPT | Performed by: INTERNAL MEDICINE

## 2019-12-02 PROCEDURE — 25010000002 CYANOCOBALAMIN PER 1000 MCG: Performed by: INTERNAL MEDICINE

## 2019-12-02 PROCEDURE — 84443 ASSAY THYROID STIM HORMONE: CPT | Performed by: INTERNAL MEDICINE

## 2019-12-02 PROCEDURE — 85025 COMPLETE CBC W/AUTO DIFF WBC: CPT | Performed by: INTERNAL MEDICINE

## 2019-12-02 PROCEDURE — 96372 THER/PROPH/DIAG INJ SC/IM: CPT | Performed by: INTERNAL MEDICINE

## 2019-12-02 PROCEDURE — 25010000002 DURVALUMAB 120 MG/2.4ML SOLUTION 2.4 ML VIAL: Performed by: NURSE PRACTITIONER

## 2019-12-02 PROCEDURE — 96413 CHEMO IV INFUSION 1 HR: CPT | Performed by: INTERNAL MEDICINE

## 2019-12-02 PROCEDURE — 25010000002 DURVALUMAB 500 MG/10ML SOLUTION 10 ML VIAL: Performed by: NURSE PRACTITIONER

## 2019-12-02 PROCEDURE — 80053 COMPREHEN METABOLIC PANEL: CPT | Performed by: INTERNAL MEDICINE

## 2019-12-02 RX ORDER — SODIUM CHLORIDE 9 MG/ML
250 INJECTION, SOLUTION INTRAVENOUS ONCE
Status: CANCELLED | OUTPATIENT
Start: 2019-12-02

## 2019-12-02 RX ORDER — CYANOCOBALAMIN 1000 UG/ML
1000 INJECTION, SOLUTION INTRAMUSCULAR; SUBCUTANEOUS ONCE
Status: COMPLETED | OUTPATIENT
Start: 2019-12-02 | End: 2019-12-02

## 2019-12-02 RX ORDER — SODIUM CHLORIDE 0.9 % (FLUSH) 0.9 %
10 SYRINGE (ML) INJECTION AS NEEDED
Status: DISCONTINUED | OUTPATIENT
Start: 2019-12-02 | End: 2019-12-03 | Stop reason: HOSPADM

## 2019-12-02 RX ORDER — SODIUM CHLORIDE 0.9 % (FLUSH) 0.9 %
10 SYRINGE (ML) INJECTION AS NEEDED
Status: CANCELLED | OUTPATIENT
Start: 2019-12-02

## 2019-12-02 RX ADMIN — CYANOCOBALAMIN 1000 MCG: 1000 INJECTION INTRAMUSCULAR; SUBCUTANEOUS at 14:35

## 2019-12-02 RX ADMIN — Medication 10 ML: at 14:37

## 2019-12-02 RX ADMIN — HEPARIN 500 UNITS: 100 SYRINGE at 14:37

## 2019-12-02 RX ADMIN — SODIUM CHLORIDE 860 MG: 900 INJECTION, SOLUTION INTRAVENOUS at 13:25

## 2019-12-09 ENCOUNTER — APPOINTMENT (OUTPATIENT)
Dept: LAB | Facility: HOSPITAL | Age: 54
End: 2019-12-09

## 2019-12-09 ENCOUNTER — OFFICE VISIT (OUTPATIENT)
Dept: ONCOLOGY | Facility: CLINIC | Age: 54
End: 2019-12-09

## 2019-12-09 VITALS
RESPIRATION RATE: 20 BRPM | WEIGHT: 196.2 LBS | HEART RATE: 72 BPM | DIASTOLIC BLOOD PRESSURE: 89 MMHG | HEIGHT: 75 IN | TEMPERATURE: 98 F | SYSTOLIC BLOOD PRESSURE: 136 MMHG | BODY MASS INDEX: 24.39 KG/M2

## 2019-12-09 DIAGNOSIS — C34.01 MALIGNANT NEOPLASM OF HILUS OF RIGHT LUNG (HCC): Primary | ICD-10-CM

## 2019-12-09 PROBLEM — C34.81 MALIGNANT NEOPLASM OF OVERLAPPING SITES OF RIGHT LUNG: Status: ACTIVE | Noted: 2019-12-09

## 2019-12-09 PROCEDURE — G0463 HOSPITAL OUTPT CLINIC VISIT: HCPCS | Performed by: INTERNAL MEDICINE

## 2019-12-09 PROCEDURE — 99215 OFFICE O/P EST HI 40 MIN: CPT | Performed by: INTERNAL MEDICINE

## 2019-12-09 RX ORDER — DESVENLAFAXINE SUCCINATE 50 MG/1
50 TABLET, EXTENDED RELEASE ORAL DAILY
COMMUNITY
Start: 2019-11-25 | End: 2019-12-25

## 2019-12-09 NOTE — PROGRESS NOTES
Hematology/Oncology Outpatient Follow Up    PATIENT NAME:Jc Driscoll  :1965  MRN: [id number]  PRIMARY CARE PHYSICIAN: Reshma Alvarenga MD  REFERRING PHYSICIAN: Reshma Alvarenga MD    Chief Complaint   Patient presents with   • Follow-up     lung cancer   • Follow-up     immunotherapy        HISTORY OF PRESENT ILLNESS:     This is a 54 y.o. who developed left shoulder pain and for that reason he had a chest x-ray done on 19 which showed a 2.7 cm noncalcified right lower lobe nodule was identified.  For that reason a CT scan of the chest was recommended.  Review of his records shows patient had the CT scan on 19 done without contrast.  This basically showed a lobulated noncalcified mass in the posterior aspect of the right lower lobe measuring 3 cm close to the pleural surface.  There is a calcified 1.2 mm nodule in the lateral aspect of the right lower lobe consistent with a granuloma.  There were also calcified subcarinal and right hilar nodules.  There is a lower right side tracheal nodule measuring 1 cm.  Patient had a PET/CT scan done on 19 which showed increased metabolic activity on the right lower lobe mass that measures 2.5 cm with SUV of 4.4.  There was also increased metabolic activity in the subcarinal space measuring 2.8 cm in size with SUV of 3.4, increased activity in an enlarged right paratracheal lymph node that measures 1.9 cm, SUV of 5, also suspicious for metastatic adenopathy.  Patient had a CT-guided biopsy of the right lower lobe mass on 3/8/19.  This showed adenocarcinoma, TTF-1 positive.  On 3/18/19 patient underwent endoscopic ultrasound and biopsy of a subcarinal lymph node.  This was positive for malignant cells.  Patient was then taken back to surgery on 3/20/18 and had left Mediport placement by Dr. Fuchs.  He has been referred for further evaluation and management of his stage 3 adenocarcinoma of the right lung.  He was accompanied by his spouse for the  appointment on 3/26/19.  Patient was scheduled to have a brain MRI for complete staging, but he could not do this due to claustrophobia.  He is willing to try with the help of angiolytics.    • Patient had brain MRI done on 4/5/19.  He states it did not show any evidence of metastatic disease.  Evidence of chronic sinusitis was noted.    • Patient was seen by Dr. Escalona who has recommended concurrent chemotherapy and radiation.  Patient is scheduled to begin on 4/15/19.   • 4/17/19 - Patient was initiated on combined chemotherapy and radiation with Cisplatin and Alimta.  Patient received cycle 1.      • 5/8/19 - Patient received cycle 2 of chemotherapy with Cisplatin and Alimta.   • 5/15/19 - Chemistry panel showed creatinine of 1.7.  WBC 1.8, hemoglobin 11.6, platelet count 72,000.   • 5/20/19 - BUN 10, creatinine 1.2, potassium 3.5.    • 5/23/19 - CT scan of the chest with contrast showed interval decrease in size of the right lower lobe pulmonary nodule now measuring 2.1 cm in size.  There was no mediastinal or hilar adenopathy identified.  • 6/26/2019 patient underwent right lower lobectomy with hilar and mediastinal lymph node dissection performed by Dr. Terrance Mckeon.  • Pathology revealed residual residual 2.2 cm invasive moderately differentiated adenocarcinoma.  There were a total of 16 lymph nodes evaluated that 7 had metastatic disease.  There was evidence of lymphovascular invasion, extranodal involvement.  All the surgical margins were negative and distance of the closest margin was 2.5 cm.  Logic stage is T1c N2 M0.  • Patient is here today accompanied by his spouse for this appointment.  He continues to complain of some postop discomfort, numbness but his skin is intact.  There is no drainage.  Has had follow-up with Dr. Fuchs.  • 8/14/2019-seen by Dr. Maria at the [facility].  Dr. Maria has recommended immunotherapy with durvalumab off label use as patient has not had significant response  to neoadjuvant chemotherapy and radiation.  Patient was given the option to have immunotherapy at the [facility] vs [place] and he has chosen to stay in [place]  • 8/21/19 - Started cycle 1 day 1 Imfinzi  • 9/4/2019 patient had CT scan of the chest this shows a moderate size right pleural effusion.  There are areas of groundglass opacification in the right upper lobe without any evidence of significant septal thickening.  Volume loss noted there is also moderate pleural effusion.  There is no suspicious recurrent malignancy.  There were nonenlarged residual mediastinal lymph nodes.  • 9/9/19 - WBC 6.23, Hgb 11.7 Platelets 257,000, , BUN 9, Cr 1.1,Vitamin B12 318, Ferritin 437  • 9/23/19 - received cycle 2 day 1 Imfinzi  • 10/9/19- Seen by Dr rodríguez with ENT for sinus disease  • 11/4/2019-patient received cycle 5 of Imfinzi(durvalumab)  • 12/2/2019 patient had cycle 7 of durvalumab  • 12/5/2019-patient had CT scan of the  abdomen and pelvis with small right pleural sided pleural effusion.There is no evidence of metastatic disease    Past Medical History:   Diagnosis Date   • Dupuytren's contracture of both hands    • Elevated cholesterol    • History of colon polyps    • Hypertension    • Lung cancer (CMS/HCC)     right lung and in lymph nodes x2   • Retention of urine 6/27/2019       Past Surgical History:   Procedure Laterality Date   • BRONCHOSCOPY  03/18/2019    EBUS MONTERO NEEDLE BX   • COLONOSCOPY     • DUPUYTREN CONTRACTURE RELEASE Bilateral    • LUNG BIOPSY  03/08/2019    CT GUIDED   • PORTACATH PLACEMENT  03/20/2019    DR LONGORIA   • THORACOSCOPY Right 6/26/2019    Procedure: RIGHT VATS, OPEN LOWER LOBECTOMY WITH LYMPH NODE DISECTION, WEDGE RESECTION OF RIGHT MIDDLE LOBE;  Surgeon: Terrance Longoria MD;  Location: [facility] MAIN OR;  Service: Cardiothoracic         Current Outpatient Medications:   •  acetaminophen (TYLENOL) 325 MG tablet, Take 2 tablets by mouth Every 4 (Four) Hours As Needed for Mild Pain  ., Disp: , Rfl:   •  amLODIPine (NORVASC) 10 MG tablet, Take 10 mg by mouth Daily., Disp: , Rfl:   •  buPROPion SR (WELLBUTRIN SR) 150 MG 12 hr tablet, Take 300 mg by mouth 2 (Two) Times a Day., Disp: , Rfl:   •  desvenlafaxine (PRISTIQ) 50 MG 24 hr tablet, Take 50 mg by mouth Daily., Disp: , Rfl:   •  ferrous sulfate 325 (65 FE) MG tablet, Take 325 mg by mouth Daily With Breakfast., Disp: , Rfl:   •  HYDROcodone-acetaminophen (NORCO) 5-325 MG per tablet, Take 1 tablet by mouth As Needed., Disp: , Rfl:   •  ibuprofen (ADVIL,MOTRIN) 600 MG tablet, Take 1 tablet by mouth Every 6 (Six) Hours As Needed for Mild Pain ., Disp: , Rfl:   •  LORazepam (ATIVAN) 0.5 MG tablet, TAKE 1 TABLET BY MOUTH TWICE DAILY AS NEEDED FOR ANXIETY MAX OF 2 DAY, Disp: , Rfl: 0  •  metoprolol succinate XL (TOPROL-XL) 100 MG 24 hr tablet, Take 100 mg by mouth Daily., Disp: , Rfl:   •  ondansetron (ZOFRAN) 8 MG tablet, TAKE 1 TABLET BY MOUTH EVERY 8 HOURS AS NEEDED NAUSEA, Disp: , Rfl: 3  •  pantoprazole (PROTONIX) 40 MG EC tablet, Take 40 mg by mouth Daily., Disp: , Rfl:   •  pravastatin (PRAVACHOL) 10 MG tablet, Take 10 mg by mouth Daily., Disp: , Rfl:   •  VIIBRYD 40 MG tablet tablet, Take 40 mg by mouth Daily., Disp: , Rfl: 0    No Known Allergies    Family History   Problem Relation Age of Onset   • Cancer Sister    • Cancer Mother    • Lung cancer Father        Cancer-related family history includes Cancer in his mother and sister; Lung cancer in his father.    Social History     Tobacco Use   • Smoking status: Former Smoker     Types: Cigarettes     Last attempt to quit: 9/5/2009     Years since quitting: 10.2   • Smokeless tobacco: Never Used   Substance Use Topics   • Alcohol use: Yes     Alcohol/week: 2.0 standard drinks     Types: 2 Cans of beer per week     Frequency: Monthly or less     Drinks per session: 1 or 2     Comment: Occasional   • Drug use: No       I have reviewed the history of present illness, past medical history,  "family history, social history, lab results, all notes and other records since the patient was last seen on 9/5/19.     SUBJECTIVE:  The patient is here for a follow up appointment accompanied with his wife.  He continues to tolerate immunotherapy without complaints.  The patient states that he did see ENT and they would not continue to see him due to insurance coverage.   The patient states that he is going to see GI at the beginning of the year.      REVIEW OF SYSTEMS:  Review of Systems   Constitutional: Negative for appetite change ( increased), chills, fatigue and fever. Unexpected weight change:  gained 4 lbs    HENT: Negative for ear pain, mouth sores, nosebleeds, sinus pressure ( chronic ) and sore throat. Sinus pain:  chronic     Eyes: Negative for photophobia and visual disturbance.   Respiratory: Negative for cough, wheezing and stridor.    Cardiovascular: Negative for chest pain, palpitations and leg swelling.   Gastrointestinal: Negative for abdominal pain, diarrhea, nausea ( mild after treatment ) and vomiting.   Endocrine: Negative for cold intolerance and heat intolerance.   Genitourinary: Negative for dysuria and hematuria.   Musculoskeletal: Positive for arthralgias ( chronic) and back pain ( chronic ). Negative for gait problem, joint swelling, neck pain and neck stiffness.   Skin: Negative for color change and rash.   Neurological: Negative for seizures and syncope.   Hematological: Negative for adenopathy.        No obvious bleeding   Psychiatric/Behavioral: Negative for agitation, confusion and hallucinations.     OBJECTIVE:  Vitals:    12/09/19 1512   BP: 136/89   Pulse: 72   Resp: 20   Temp: 98 °F (36.7 °C)   Weight: 89 kg (196 lb 3.2 oz)   Height: 190.5 cm (75\")   PainSc: 0-No pain     ECOG  Eastern Cooperative Oncology Group (ECOG, [place], World Health Organization) performance scale  0 Fully active; no performance restrictions.  1 Strenuous physical activity restricted; fully ambulatory " and able to carry out light work.  2 Capable of all self-care but unable to carry out any work activities. Up and about >50% of waking hours.  3 Capable of only limited self-care; confined to bed or chair >50% of waking hours.  4 Completely disabled; cannot carry out any self-care; totally confined to bed or chair.    Physical Exam   Constitutional: He is oriented to person, place, and time. No distress.   HENT:   Head: Normocephalic and atraumatic.   Eyes: Conjunctivae and EOM are normal. Right eye exhibits no discharge. Left eye exhibits no discharge. No scleral icterus.   Neck: Normal range of motion. Neck supple. No thyromegaly present.   Cardiovascular: Normal rate, regular rhythm and normal heart sounds. Exam reveals no gallop and no friction rub.   Pulmonary/Chest: Effort normal. No stridor. No respiratory distress. He has no wheezes. He exhibits laceration.   Right chest wall port   Abdominal: Soft. Bowel sounds are normal. He exhibits no mass. There is no tenderness. There is no rebound and no guarding.   Musculoskeletal: Normal range of motion. He exhibits no tenderness.   Lymphadenopathy:     He has no cervical adenopathy.   Neurological: He is alert and oriented to person, place, and time. He exhibits normal muscle tone.   Skin: Skin is warm. No rash noted. He is not diaphoretic. No erythema.   Psychiatric: He has a normal mood and affect. His behavior is normal.   Nursing note and vitals reviewed.      RECENT LABS  WBC   Date Value Ref Range Status   12/02/2019 4.66 3.40 - 10.80 10*3/mm3 Final   07/03/2019 8.99 4.5 - 11.0 10*3/uL Final     RBC   Date Value Ref Range Status   12/02/2019 4.07 (L) 4.14 - 5.80 10*6/mm3 Final   07/03/2019 3.24 (L) 4.5 - 5.9 10*6/uL Final     Hemoglobin   Date Value Ref Range Status   12/02/2019 12.9 (L) 13.0 - 17.7 g/dL Final   07/03/2019 10.4 (L) 13.5 - 17.5 g/dL Final     Hematocrit   Date Value Ref Range Status   12/02/2019 38.4 37.5 - 51.0 % Final   07/03/2019 32.8 (L)  41.0 - 53.0 % Final     MCV   Date Value Ref Range Status   12/02/2019 94.3 79.0 - 97.0 fL Final   07/03/2019 101.2 (H) 80.0 - 100.0 fL Final     MCH   Date Value Ref Range Status   12/02/2019 31.7 26.6 - 33.0 pg Final   07/03/2019 32.1 26.0 - 34.0 pg Final     MCHC   Date Value Ref Range Status   12/02/2019 33.6 31.5 - 35.7 g/dL Final   07/03/2019 31.7 31.0 - 37.0 g/dL Final     RDW   Date Value Ref Range Status   12/02/2019 13.6 12.3 - 15.4 % Final   07/03/2019 15.2 12.0 - 16.8 % Final     RDW-SD   Date Value Ref Range Status   12/02/2019 46.0 37.0 - 54.0 fl Final     MPV   Date Value Ref Range Status   12/02/2019 8.6 6.0 - 12.0 fL Final   07/03/2019 9.0 6.7 - 10.8 fL Final     Platelets   Date Value Ref Range Status   12/02/2019 225 140 - 450 10*3/mm3 Final   07/03/2019 302 140 - 440 10*3/uL Final     Neutrophil Rel %   Date Value Ref Range Status   07/03/2019 60.0 45 - 80 % Final     Neutrophil %   Date Value Ref Range Status   12/02/2019 62.5 42.7 - 76.0 % Final     Lymphocyte Rel %   Date Value Ref Range Status   07/03/2019 14.8 (L) 15 - 50 % Final     Lymphocyte %   Date Value Ref Range Status   12/02/2019 12.2 (L) 19.6 - 45.3 % Final     Monocyte Rel %   Date Value Ref Range Status   07/03/2019 12.2 0 - 15 % Final     Monocyte %   Date Value Ref Range Status   12/02/2019 15.9 (H) 5.0 - 12.0 % Final     Eosinophil %   Date Value Ref Range Status   12/02/2019 9.2 (H) 0.3 - 6.2 % Final   07/03/2019 10.4 (H) 0 - 7 % Final     Basophil Rel %   Date Value Ref Range Status   07/03/2019 1.7 0 - 2 % Final     Basophil %   Date Value Ref Range Status   12/02/2019 0.2 0.0 - 1.5 % Final     Neutrophils Absolute   Date Value Ref Range Status   07/03/2019 5.39 1.5 - 7.5 /uL Final   07/03/2019 5.39 2.0 - 8.8 10*3/uL Final     Neutrophils, Absolute   Date Value Ref Range Status   12/02/2019 2.91 1.70 - 7.00 10*3/mm3 Final     Lymphocytes Absolute   Date Value Ref Range Status   07/03/2019 1.33 0.7 - 5.5 10*3/uL Final      Lymphocytes, Absolute   Date Value Ref Range Status   12/02/2019 0.57 (L) 0.70 - 3.10 10*3/mm3 Final     Monocytes Absolute   Date Value Ref Range Status   07/03/2019 1.10 0.0 - 1.7 10*3/uL Final     Monocytes, Absolute   Date Value Ref Range Status   12/02/2019 0.74 0.10 - 0.90 10*3/mm3 Final     Eosinophils Absolute   Date Value Ref Range Status   07/03/2019 0.93 (H) 0.0 - 0.8 10*3/uL Final     Eosinophils, Absolute   Date Value Ref Range Status   12/02/2019 0.43 (H) 0.00 - 0.40 10*3/mm3 Final     Basophils Absolute   Date Value Ref Range Status   07/03/2019 0.15 0.0 - 0.2 10*3/uL Final     Basophils, Absolute   Date Value Ref Range Status   12/02/2019 0.01 0.00 - 0.20 10*3/mm3 Final     nRBC   Date Value Ref Range Status   07/03/2019 0 0 /100(WBC) Final   06/19/2019 0.0 0.0 - 0.2 /100 WBC Final       Lab Results   Component Value Date    GLUCOSE 116 (H) 12/02/2019    BUN 8 12/02/2019    CREATININE 0.98 12/02/2019    EGFRIFNONA 79 12/02/2019    EGFRIFAFRI >60 05/20/2019    BCR 8.2 12/02/2019    K 3.8 12/02/2019    CO2 25.0 12/02/2019    CALCIUM 9.1 12/02/2019    ALBUMIN 4.00 12/02/2019    LABIL2 1.4 07/03/2019    AST 18 12/02/2019    ALT 17 12/02/2019         Assessment/Plan     There are no diagnoses linked to this encounter.    ASSESSMENT:  1. Adenocarcinoma of the right lung, T1c N2 M0, consistent with clinical stage 3A disease.     2. S/P combined chemotherapy and radiation with cisplatin and Alimta.    3. Status post right lower lobectomy with mediastinal and hilar lymph node dissection with residual disease.  pT1 cN2 M0.  Patient with high risk features including lymphovascular invasion, extranodal capsular extension and persistent multiple lymph node disease.  4. Chemo-induced constipation, chemo-induced nausea, chemo-induced fatigue. Resolved  5. Consolidation treatment with immunotherapy with Imfinzi   6. Hypokalemia secondary to chemotherapy. Resolved    7. Acute kidney injury. Resolved  8. Postop  anemia  9. Rash on both hands of palmar surfaces; Resolved. Mild peeling at times.     PLAN:  1. Continue ferrous sulfate 325 mg p.o. twice a day  2. Continue Imfinzi consolidation therapy  3. Continue B12 injections day 3/3 today, then monthly  4. Status post flu shot and pneumonia vaccines   5. CT scan of the chest, to complete restaging.  Will schedule  6. Refered to ENT for evaluation for sinus surgery : Not covered by insurance per pt.  7. Colonoscopy postponed by patient to January 8. RTC in 4 weeks   9. Continue supportive care         Patient and his spouse have  asked patient which have all been answered to the best of my abilities    I have reviewed labs results, imaging, vitals, and medications with the patient today. Will follow up in 4 weeks with me.      Patient verbalized understanding and is in agreement of the above plan.    Part of this document was scribed by Antionette James RN, BSN.

## 2019-12-12 DIAGNOSIS — C34.91 NON-SMALL CELL CARCINOMA OF LUNG, RIGHT (HCC): ICD-10-CM

## 2019-12-12 DIAGNOSIS — C34.31 CANCER OF LOWER LOBE OF RIGHT LUNG (HCC): ICD-10-CM

## 2019-12-12 RX ORDER — SODIUM CHLORIDE 9 MG/ML
250 INJECTION, SOLUTION INTRAVENOUS ONCE
Status: CANCELLED | OUTPATIENT
Start: 2019-12-16

## 2019-12-16 ENCOUNTER — HOSPITAL ENCOUNTER (OUTPATIENT)
Dept: ONCOLOGY | Facility: HOSPITAL | Age: 54
Setting detail: INFUSION SERIES
Discharge: HOME | End: 2019-12-16

## 2019-12-16 ENCOUNTER — HOSPITAL ENCOUNTER (OUTPATIENT)
Dept: ONCOLOGY | Facility: HOSPITAL | Age: 54
End: 2019-12-16

## 2019-12-16 VITALS
SYSTOLIC BLOOD PRESSURE: 155 MMHG | BODY MASS INDEX: 24.62 KG/M2 | TEMPERATURE: 98.1 F | DIASTOLIC BLOOD PRESSURE: 79 MMHG | RESPIRATION RATE: 18 BRPM | HEART RATE: 83 BPM | WEIGHT: 197 LBS

## 2019-12-16 DIAGNOSIS — C34.31 CANCER OF LOWER LOBE OF RIGHT LUNG (HCC): ICD-10-CM

## 2019-12-16 DIAGNOSIS — C34.91 NON-SMALL CELL CARCINOMA OF LUNG, RIGHT (HCC): Primary | ICD-10-CM

## 2019-12-16 LAB
ALBUMIN SERPL-MCNC: 4.4 G/DL (ref 3.5–5.2)
ALBUMIN/GLOB SERPL: 1.5 G/DL
ALP SERPL-CCNC: 79 U/L (ref 39–117)
ALT SERPL W P-5'-P-CCNC: 23 U/L (ref 1–41)
ANION GAP SERPL CALCULATED.3IONS-SCNC: 12 MMOL/L (ref 5–15)
AST SERPL-CCNC: 20 U/L (ref 1–40)
BASOPHILS # BLD AUTO: 0.07 10*3/MM3 (ref 0–0.2)
BASOPHILS NFR BLD AUTO: 1 % (ref 0–1.5)
BILIRUB SERPL-MCNC: 0.4 MG/DL (ref 0.2–1.2)
BUN BLD-MCNC: 9 MG/DL (ref 6–20)
BUN/CREAT SERPL: 10.5 (ref 7–25)
CALCIUM SPEC-SCNC: 9.7 MG/DL (ref 8.6–10.5)
CHLORIDE SERPL-SCNC: 101 MMOL/L (ref 98–107)
CO2 SERPL-SCNC: 27 MMOL/L (ref 22–29)
CREAT BLD-MCNC: 0.86 MG/DL (ref 0.76–1.27)
DEPRECATED RDW RBC AUTO: 43.3 FL (ref 37–54)
EOSINOPHIL # BLD AUTO: 0.54 10*3/MM3 (ref 0–0.4)
EOSINOPHIL NFR BLD AUTO: 7.8 % (ref 0.3–6.2)
ERYTHROCYTE [DISTWIDTH] IN BLOOD BY AUTOMATED COUNT: 13.1 % (ref 12.3–15.4)
GFR SERPL CREATININE-BSD FRML MDRD: 92 ML/MIN/1.73
GLOBULIN UR ELPH-MCNC: 2.9 GM/DL
GLUCOSE BLD-MCNC: 89 MG/DL (ref 65–99)
HCT VFR BLD AUTO: 38.7 % (ref 37.5–51)
HGB BLD-MCNC: 13 G/DL (ref 13–17.7)
LYMPHOCYTES # BLD AUTO: 1.05 10*3/MM3 (ref 0.7–3.1)
LYMPHOCYTES NFR BLD AUTO: 15.2 % (ref 19.6–45.3)
MCH RBC QN AUTO: 31.3 PG (ref 26.6–33)
MCHC RBC AUTO-ENTMCNC: 33.6 G/DL (ref 31.5–35.7)
MCV RBC AUTO: 93 FL (ref 79–97)
MONOCYTES # BLD AUTO: 0.69 10*3/MM3 (ref 0.1–0.9)
MONOCYTES NFR BLD AUTO: 10 % (ref 5–12)
NEUTROPHILS # BLD AUTO: 4.54 10*3/MM3 (ref 1.7–7)
NEUTROPHILS NFR BLD AUTO: 66 % (ref 42.7–76)
PLATELET # BLD AUTO: 288 10*3/MM3 (ref 140–450)
PMV BLD AUTO: 8.3 FL (ref 6–12)
POTASSIUM BLD-SCNC: 3.9 MMOL/L (ref 3.5–5.2)
PROT SERPL-MCNC: 7.3 G/DL (ref 6–8.5)
RBC # BLD AUTO: 4.16 10*6/MM3 (ref 4.14–5.8)
SODIUM BLD-SCNC: 140 MMOL/L (ref 136–145)
WBC NRBC COR # BLD: 6.89 10*3/MM3 (ref 3.4–10.8)

## 2019-12-16 PROCEDURE — 36415 COLL VENOUS BLD VENIPUNCTURE: CPT | Performed by: INTERNAL MEDICINE

## 2019-12-16 PROCEDURE — 80053 COMPREHEN METABOLIC PANEL: CPT | Performed by: NURSE PRACTITIONER

## 2019-12-16 PROCEDURE — 85025 COMPLETE CBC W/AUTO DIFF WBC: CPT | Performed by: NURSE PRACTITIONER

## 2019-12-16 PROCEDURE — 25010000002 DURVALUMAB 120 MG/2.4ML SOLUTION 2.4 ML VIAL: Performed by: NURSE PRACTITIONER

## 2019-12-16 PROCEDURE — 25010000002 DURVALUMAB 500 MG/10ML SOLUTION 10 ML VIAL: Performed by: NURSE PRACTITIONER

## 2019-12-16 PROCEDURE — 96413 CHEMO IV INFUSION 1 HR: CPT | Performed by: INTERNAL MEDICINE

## 2019-12-16 RX ORDER — SODIUM CHLORIDE 9 MG/ML
250 INJECTION, SOLUTION INTRAVENOUS ONCE
Status: DISCONTINUED | OUTPATIENT
Start: 2019-12-16 | End: 2019-12-17 | Stop reason: HOSPADM

## 2019-12-16 RX ADMIN — DURVALUMAB 860 MG: 500 INJECTION, SOLUTION INTRAVENOUS at 13:43

## 2019-12-23 DIAGNOSIS — C34.31 CANCER OF LOWER LOBE OF RIGHT LUNG (HCC): Primary | ICD-10-CM

## 2019-12-26 DIAGNOSIS — C34.91 NON-SMALL CELL CARCINOMA OF LUNG, RIGHT (HCC): ICD-10-CM

## 2019-12-26 DIAGNOSIS — C34.31 CANCER OF LOWER LOBE OF RIGHT LUNG (HCC): ICD-10-CM

## 2019-12-26 RX ORDER — SODIUM CHLORIDE 9 MG/ML
250 INJECTION, SOLUTION INTRAVENOUS ONCE
Status: CANCELLED | OUTPATIENT
Start: 2019-12-30

## 2019-12-30 ENCOUNTER — HOSPITAL ENCOUNTER (OUTPATIENT)
Dept: ONCOLOGY | Facility: HOSPITAL | Age: 54
Setting detail: INFUSION SERIES
Discharge: HOME | End: 2019-12-30

## 2019-12-30 VITALS
DIASTOLIC BLOOD PRESSURE: 87 MMHG | WEIGHT: 196 LBS | HEIGHT: 75 IN | HEART RATE: 85 BPM | SYSTOLIC BLOOD PRESSURE: 131 MMHG | BODY MASS INDEX: 24.37 KG/M2 | RESPIRATION RATE: 18 BRPM

## 2019-12-30 DIAGNOSIS — C34.31 CANCER OF LOWER LOBE OF RIGHT LUNG (HCC): ICD-10-CM

## 2019-12-30 DIAGNOSIS — C34.91 NON-SMALL CELL CARCINOMA OF LUNG, RIGHT (HCC): ICD-10-CM

## 2019-12-30 DIAGNOSIS — E53.8 B12 DEFICIENCY: Primary | ICD-10-CM

## 2019-12-30 DIAGNOSIS — Z45.2 ENCOUNTER FOR CARE RELATED TO VASCULAR ACCESS PORT: ICD-10-CM

## 2019-12-30 LAB
ALBUMIN SERPL-MCNC: 4.2 G/DL (ref 3.5–5.2)
ALBUMIN/GLOB SERPL: 1.5 G/DL
ALP SERPL-CCNC: 74 U/L (ref 39–117)
ALT SERPL W P-5'-P-CCNC: 21 U/L (ref 1–41)
ANION GAP SERPL CALCULATED.3IONS-SCNC: 10 MMOL/L (ref 5–15)
AST SERPL-CCNC: 19 U/L (ref 1–40)
BASOPHILS # BLD AUTO: 0.05 10*3/MM3 (ref 0–0.2)
BASOPHILS NFR BLD AUTO: 0.8 % (ref 0–1.5)
BILIRUB SERPL-MCNC: 0.4 MG/DL (ref 0.2–1.2)
BUN BLD-MCNC: 12 MG/DL (ref 6–20)
BUN/CREAT SERPL: 13.5 (ref 7–25)
CALCIUM SPEC-SCNC: 9.7 MG/DL (ref 8.6–10.5)
CHLORIDE SERPL-SCNC: 101 MMOL/L (ref 98–107)
CO2 SERPL-SCNC: 27 MMOL/L (ref 22–29)
CREAT BLD-MCNC: 0.89 MG/DL (ref 0.76–1.27)
DEPRECATED RDW RBC AUTO: 43.9 FL (ref 37–54)
EOSINOPHIL # BLD AUTO: 0.56 10*3/MM3 (ref 0–0.4)
EOSINOPHIL NFR BLD AUTO: 8.5 % (ref 0.3–6.2)
ERYTHROCYTE [DISTWIDTH] IN BLOOD BY AUTOMATED COUNT: 12.9 % (ref 12.3–15.4)
GFR SERPL CREATININE-BSD FRML MDRD: 89 ML/MIN/1.73
GLOBULIN UR ELPH-MCNC: 2.8 GM/DL
GLUCOSE BLD-MCNC: 93 MG/DL (ref 65–99)
HCT VFR BLD AUTO: 37.1 % (ref 37.5–51)
HGB BLD-MCNC: 12.6 G/DL (ref 13–17.7)
LYMPHOCYTES # BLD AUTO: 1.1 10*3/MM3 (ref 0.7–3.1)
LYMPHOCYTES NFR BLD AUTO: 16.7 % (ref 19.6–45.3)
MCH RBC QN AUTO: 32.2 PG (ref 26.6–33)
MCHC RBC AUTO-ENTMCNC: 34 G/DL (ref 31.5–35.7)
MCV RBC AUTO: 94.9 FL (ref 79–97)
MONOCYTES # BLD AUTO: 0.85 10*3/MM3 (ref 0.1–0.9)
MONOCYTES NFR BLD AUTO: 12.9 % (ref 5–12)
NEUTROPHILS # BLD AUTO: 4.02 10*3/MM3 (ref 1.7–7)
NEUTROPHILS NFR BLD AUTO: 61.1 % (ref 42.7–76)
PLATELET # BLD AUTO: 252 10*3/MM3 (ref 140–450)
PMV BLD AUTO: 8.4 FL (ref 6–12)
POTASSIUM BLD-SCNC: 4 MMOL/L (ref 3.5–5.2)
PROT SERPL-MCNC: 7 G/DL (ref 6–8.5)
RBC # BLD AUTO: 3.91 10*6/MM3 (ref 4.14–5.8)
SODIUM BLD-SCNC: 138 MMOL/L (ref 136–145)
WBC NRBC COR # BLD: 6.58 10*3/MM3 (ref 3.4–10.8)

## 2019-12-30 PROCEDURE — 25010000002 CYANOCOBALAMIN PER 1000 MCG: Performed by: INTERNAL MEDICINE

## 2019-12-30 PROCEDURE — 25010000002 DURVALUMAB 500 MG/10ML SOLUTION 10 ML VIAL: Performed by: NURSE PRACTITIONER

## 2019-12-30 PROCEDURE — 25010000002 DURVALUMAB 120 MG/2.4ML SOLUTION 2.4 ML VIAL: Performed by: NURSE PRACTITIONER

## 2019-12-30 PROCEDURE — 85025 COMPLETE CBC W/AUTO DIFF WBC: CPT | Performed by: NURSE PRACTITIONER

## 2019-12-30 PROCEDURE — 96413 CHEMO IV INFUSION 1 HR: CPT | Performed by: INTERNAL MEDICINE

## 2019-12-30 PROCEDURE — 96372 THER/PROPH/DIAG INJ SC/IM: CPT | Performed by: INTERNAL MEDICINE

## 2019-12-30 PROCEDURE — 80053 COMPREHEN METABOLIC PANEL: CPT | Performed by: NURSE PRACTITIONER

## 2019-12-30 RX ORDER — CYANOCOBALAMIN 1000 UG/ML
1000 INJECTION, SOLUTION INTRAMUSCULAR; SUBCUTANEOUS ONCE
Status: COMPLETED | OUTPATIENT
Start: 2019-12-30 | End: 2019-12-30

## 2019-12-30 RX ORDER — SODIUM CHLORIDE 0.9 % (FLUSH) 0.9 %
10 SYRINGE (ML) INJECTION AS NEEDED
Status: CANCELLED | OUTPATIENT
Start: 2019-12-30

## 2019-12-30 RX ORDER — SODIUM CHLORIDE 0.9 % (FLUSH) 0.9 %
10 SYRINGE (ML) INJECTION AS NEEDED
Status: DISCONTINUED | OUTPATIENT
Start: 2019-12-30 | End: 2019-12-31 | Stop reason: HOSPADM

## 2019-12-30 RX ADMIN — SODIUM CHLORIDE 860 MG: 900 INJECTION, SOLUTION INTRAVENOUS at 11:47

## 2019-12-30 RX ADMIN — CYANOCOBALAMIN 1000 MCG: 1000 INJECTION INTRAMUSCULAR; SUBCUTANEOUS at 12:59

## 2019-12-30 RX ADMIN — Medication 10 ML: at 12:54

## 2019-12-30 RX ADMIN — HEPARIN 500 UNITS: 100 SYRINGE at 12:54

## 2020-01-06 ENCOUNTER — OFFICE VISIT (OUTPATIENT)
Dept: LAB | Facility: HOSPITAL | Age: 55
End: 2020-01-06

## 2020-01-06 ENCOUNTER — OFFICE VISIT (OUTPATIENT)
Dept: ONCOLOGY | Facility: CLINIC | Age: 55
End: 2020-01-06

## 2020-01-06 VITALS
HEIGHT: 75 IN | TEMPERATURE: 98 F | BODY MASS INDEX: 24.45 KG/M2 | WEIGHT: 196.6 LBS | DIASTOLIC BLOOD PRESSURE: 85 MMHG | RESPIRATION RATE: 18 BRPM | SYSTOLIC BLOOD PRESSURE: 134 MMHG | HEART RATE: 76 BPM

## 2020-01-06 DIAGNOSIS — D64.9 ANEMIA, UNSPECIFIED TYPE: ICD-10-CM

## 2020-01-06 DIAGNOSIS — C34.31 CANCER OF LOWER LOBE OF RIGHT LUNG (HCC): Primary | ICD-10-CM

## 2020-01-06 DIAGNOSIS — E87.6 HYPOKALEMIA: Primary | ICD-10-CM

## 2020-01-06 PROBLEM — J90 PLEURAL EFFUSION: Status: ACTIVE | Noted: 2020-01-06

## 2020-01-06 LAB
ALBUMIN SERPL-MCNC: 4.5 G/DL (ref 3.5–5.2)
ALBUMIN/GLOB SERPL: 1.6 G/DL
ALP SERPL-CCNC: 74 U/L (ref 39–117)
ALT SERPL W P-5'-P-CCNC: 24 U/L (ref 1–41)
ANION GAP SERPL CALCULATED.3IONS-SCNC: 13 MMOL/L (ref 5–15)
AST SERPL-CCNC: 20 U/L (ref 1–40)
BASOPHILS # BLD AUTO: 0.05 10*3/MM3 (ref 0–0.2)
BASOPHILS NFR BLD AUTO: 0.7 % (ref 0–1.5)
BILIRUB SERPL-MCNC: 0.3 MG/DL (ref 0.2–1.2)
BUN BLD-MCNC: 9 MG/DL (ref 6–20)
BUN/CREAT SERPL: 10 (ref 7–25)
CALCIUM SPEC-SCNC: 9.4 MG/DL (ref 8.6–10.5)
CHLORIDE SERPL-SCNC: 99 MMOL/L (ref 98–107)
CO2 SERPL-SCNC: 29 MMOL/L (ref 22–29)
CREAT BLD-MCNC: 0.9 MG/DL (ref 0.76–1.27)
DEPRECATED RDW RBC AUTO: 44 FL (ref 37–54)
EOSINOPHIL # BLD AUTO: 0.61 10*3/MM3 (ref 0–0.4)
EOSINOPHIL NFR BLD AUTO: 8.7 % (ref 0.3–6.2)
ERYTHROCYTE [DISTWIDTH] IN BLOOD BY AUTOMATED COUNT: 13 % (ref 12.3–15.4)
GFR SERPL CREATININE-BSD FRML MDRD: 88 ML/MIN/1.73
GLOBULIN UR ELPH-MCNC: 2.9 GM/DL
GLUCOSE BLD-MCNC: 96 MG/DL (ref 65–99)
HCT VFR BLD AUTO: 38.9 % (ref 37.5–51)
HGB BLD-MCNC: 13.1 G/DL (ref 13–17.7)
LYMPHOCYTES # BLD AUTO: 1.06 10*3/MM3 (ref 0.7–3.1)
LYMPHOCYTES NFR BLD AUTO: 15 % (ref 19.6–45.3)
MCH RBC QN AUTO: 32 PG (ref 26.6–33)
MCHC RBC AUTO-ENTMCNC: 33.7 G/DL (ref 31.5–35.7)
MCV RBC AUTO: 95.1 FL (ref 79–97)
MONOCYTES # BLD AUTO: 0.83 10*3/MM3 (ref 0.1–0.9)
MONOCYTES NFR BLD AUTO: 11.8 % (ref 5–12)
NEUTROPHILS # BLD AUTO: 4.5 10*3/MM3 (ref 1.7–7)
NEUTROPHILS NFR BLD AUTO: 63.8 % (ref 42.7–76)
PLATELET # BLD AUTO: 286 10*3/MM3 (ref 140–450)
PMV BLD AUTO: 8.2 FL (ref 6–12)
POTASSIUM BLD-SCNC: 3.7 MMOL/L (ref 3.5–5.2)
PROT SERPL-MCNC: 7.4 G/DL (ref 6–8.5)
RBC # BLD AUTO: 4.09 10*6/MM3 (ref 4.14–5.8)
SODIUM BLD-SCNC: 141 MMOL/L (ref 136–145)
WBC NRBC COR # BLD: 7.05 10*3/MM3 (ref 3.4–10.8)

## 2020-01-06 PROCEDURE — 85025 COMPLETE CBC W/AUTO DIFF WBC: CPT

## 2020-01-06 PROCEDURE — 36415 COLL VENOUS BLD VENIPUNCTURE: CPT

## 2020-01-06 PROCEDURE — 99215 OFFICE O/P EST HI 40 MIN: CPT | Performed by: INTERNAL MEDICINE

## 2020-01-06 PROCEDURE — 80053 COMPREHEN METABOLIC PANEL: CPT | Performed by: NURSE PRACTITIONER

## 2020-01-06 RX ORDER — LIDOCAINE AND PRILOCAINE 25; 25 MG/G; MG/G
CREAM TOPICAL
Status: ON HOLD | COMMUNITY
Start: 2019-12-16 | End: 2022-05-18

## 2020-01-06 RX ORDER — DESVENLAFAXINE SUCCINATE 50 MG/1
50 TABLET, EXTENDED RELEASE ORAL DAILY
COMMUNITY
Start: 2019-12-31

## 2020-01-06 NOTE — PROGRESS NOTES
Hematology/Oncology Outpatient Follow Up    PATIENT NAME:Jc Driscoll  :1965  MRN: [id number]  PRIMARY CARE PHYSICIAN: Reshma Alvarenga MD  REFERRING PHYSICIAN: Reshma Alvarenga MD    Chief Complaint   Patient presents with   • Follow-up     Lung cancer   • Follow-up     Monitor drug toxicity        HISTORY OF PRESENT ILLNESS:     This is a 54 y.o. who developed left shoulder pain and for that reason he had a chest x-ray done on 19 which showed a 2.7 cm noncalcified right lower lobe nodule was identified.  For that reason a CT scan of the chest was recommended.  Review of his records shows patient had the CT scan on 19 done without contrast.  This basically showed a lobulated noncalcified mass in the posterior aspect of the right lower lobe measuring 3 cm close to the pleural surface.  There is a calcified 1.2 mm nodule in the lateral aspect of the right lower lobe consistent with a granuloma.  There were also calcified subcarinal and right hilar nodules.  There is a lower right side tracheal nodule measuring 1 cm.  Patient had a PET/CT scan done on 19 which showed increased metabolic activity on the right lower lobe mass that measures 2.5 cm with SUV of 4.4.  There was also increased metabolic activity in the subcarinal space measuring 2.8 cm in size with SUV of 3.4, increased activity in an enlarged right paratracheal lymph node that measures 1.9 cm, SUV of 5, also suspicious for metastatic adenopathy.  Patient had a CT-guided biopsy of the right lower lobe mass on 3/8/19.  This showed adenocarcinoma, TTF-1 positive.  On 3/18/19 patient underwent endoscopic ultrasound and biopsy of a subcarinal lymph node.  This was positive for malignant cells.  Patient was then taken back to surgery on 3/20/18 and had left Mediport placement by Dr. Fuchs.  He has been referred for further evaluation and management of his stage 3 adenocarcinoma of the right lung.  He was accompanied by his spouse  for the appointment on 3/26/19.  Patient was scheduled to have a brain MRI for complete staging, but he could not do this due to claustrophobia.  He is willing to try with the help of angiolytics.    • Patient had brain MRI done on 4/5/19.  He states it did not show any evidence of metastatic disease.  Evidence of chronic sinusitis was noted.    • Patient was seen by Dr. Escalona who has recommended concurrent chemotherapy and radiation.  Patient is scheduled to begin on 4/15/19.   • 4/17/19 - Patient was initiated on combined chemotherapy and radiation with Cisplatin and Alimta.  Patient received cycle 1.      • 5/8/19 - Patient received cycle 2 of chemotherapy with Cisplatin and Alimta.   • 5/15/19 - Chemistry panel showed creatinine of 1.7.  WBC 1.8, hemoglobin 11.6, platelet count 72,000.   • 5/20/19 - BUN 10, creatinine 1.2, potassium 3.5.    • 5/23/19 - CT scan of the chest with contrast showed interval decrease in size of the right lower lobe pulmonary nodule now measuring 2.1 cm in size.  There was no mediastinal or hilar adenopathy identified.  • 6/26/2019 patient underwent right lower lobectomy with hilar and mediastinal lymph node dissection performed by Dr. Terrance Mckeon.  • Pathology revealed residual residual 2.2 cm invasive moderately differentiated adenocarcinoma.  There were a total of 16 lymph nodes evaluated that 7 had metastatic disease.  There was evidence of lymphovascular invasion, extranodal involvement.  All the surgical margins were negative and distance of the closest margin was 2.5 cm.  Logic stage is T1c N2 M0.  • Patient is here today accompanied by his spouse for this appointment.  He continues to complain of some postop discomfort, numbness but his skin is intact.  There is no drainage.  Has had follow-up with Dr. Fuchs.  • 8/14/2019-seen by Dr. Maria at the [facility].  Dr. Maria has recommended immunotherapy with durvalumab off label use as patient has not had significant  response to neoadjuvant chemotherapy and radiation.  Patient was given the option to have immunotherapy at the [facility] vs [place] and he has chosen to stay in [place]  • 8/21/19 - Started cycle 1 day 1 Imfinzi  • 9/4/2019 patient had CT scan of the chest this shows a moderate size right pleural effusion.  There are areas of groundglass opacification in the right upper lobe without any evidence of significant septal thickening.  Volume loss noted there is also moderate pleural effusion.  There is no suspicious recurrent malignancy.  There were nonenlarged residual mediastinal lymph nodes.  • 9/9/19 - WBC 6.23, Hgb 11.7 Platelets 257,000, , BUN 9, Cr 1.1,Vitamin B12 318, Ferritin 437  • 9/23/19 - received cycle 2 day 1 Imfinzi  • 10/9/19- Seen by Dr rodríguez with ENT for sinus disease  • 11/4/2019-patient received cycle 5 of Imfinzi(durvalumab)  • 12/2/2019 patient had cycle 7 of durvalumab  • 12/5/2019-patient had CT scan of the  abdomen and pelvis with small right pleural sided pleural effusion.There is no evidence of metastatic disease  • 12/30/2019-patient received cycle 9 of durvalumab  • 12/31/2019-patient had CT scan of the chest showed small to moderate left pleural effusion.  Iglesias appearing right lower paratracheal and right peribronchial soft tissue thickening without any discrete pathologically enlarged mediastinal or hilar lymph nodes.    Past Medical History:   Diagnosis Date   • Dupuytren's contracture of both hands    • Elevated cholesterol    • History of colon polyps    • Hypertension    • Lung cancer (CMS/HCC)     right lung and in lymph nodes x2   • Retention of urine 6/27/2019       Past Surgical History:   Procedure Laterality Date   • BRONCHOSCOPY  03/18/2019    EBUS MONTERO NEEDLE BX   • COLONOSCOPY     • DUPUYTREN CONTRACTURE RELEASE Bilateral    • LUNG BIOPSY  03/08/2019    CT GUIDED   • PORTACATH PLACEMENT  03/20/2019    DR LONGORIA   • THORACOSCOPY Right 6/26/2019    Procedure: RIGHT VATS,  OPEN LOWER LOBECTOMY WITH LYMPH NODE DISECTION, WEDGE RESECTION OF RIGHT MIDDLE LOBE;  Surgeon: Terrance Fuchs MD;  Location: [facility] MAIN OR;  Service: Cardiothoracic         Current Outpatient Medications:   •  amLODIPine (NORVASC) 10 MG tablet, Take 10 mg by mouth Daily., Disp: , Rfl:   •  buPROPion SR (WELLBUTRIN SR) 150 MG 12 hr tablet, Take 300 mg by mouth 2 (Two) Times a Day., Disp: , Rfl:   •  desvenlafaxine (PRISTIQ) 50 MG 24 hr tablet, TAKE 1 TABLET BY MOUTH ONCE DAILY FOR 30 DAYS, Disp: , Rfl:   •  ferrous sulfate 325 (65 FE) MG tablet, Take 325 mg by mouth Daily With Breakfast., Disp: , Rfl:   •  lidocaine-prilocaine (EMLA) 2.5-2.5 % cream, , Disp: , Rfl:   •  LORazepam (ATIVAN) 0.5 MG tablet, TAKE 1 TABLET BY MOUTH TWICE DAILY AS NEEDED FOR ANXIETY MAX OF 2 DAY, Disp: , Rfl: 0  •  metoprolol succinate XL (TOPROL-XL) 100 MG 24 hr tablet, Take 100 mg by mouth Daily., Disp: , Rfl:   •  ondansetron (ZOFRAN) 8 MG tablet, TAKE 1 TABLET BY MOUTH EVERY 8 HOURS AS NEEDED NAUSEA, Disp: , Rfl: 3  •  pantoprazole (PROTONIX) 40 MG EC tablet, Take 40 mg by mouth Daily., Disp: , Rfl:   •  pravastatin (PRAVACHOL) 10 MG tablet, Take 10 mg by mouth Daily., Disp: , Rfl:   •  acetaminophen (TYLENOL) 325 MG tablet, Take 2 tablets by mouth Every 4 (Four) Hours As Needed for Mild Pain ., Disp: , Rfl:   •  HYDROcodone-acetaminophen (NORCO) 5-325 MG per tablet, Take 1 tablet by mouth As Needed., Disp: , Rfl:   •  ibuprofen (ADVIL,MOTRIN) 600 MG tablet, Take 1 tablet by mouth Every 6 (Six) Hours As Needed for Mild Pain ., Disp: , Rfl:   •  VIIBRYD 40 MG tablet tablet, Take 40 mg by mouth Daily., Disp: , Rfl: 0    No Known Allergies    Family History   Problem Relation Age of Onset   • Cancer Sister    • Cancer Mother    • Lung cancer Father        Cancer-related family history includes Cancer in his mother and sister; Lung cancer in his father.    Social History     Tobacco Use   • Smoking status: Former Smoker     Types:  "Cigarettes     Last attempt to quit: 9/5/2009     Years since quitting: 10.3   • Smokeless tobacco: Never Used   Substance Use Topics   • Alcohol use: Yes     Alcohol/week: 2.0 standard drinks     Types: 2 Cans of beer per week     Frequency: Monthly or less     Drinks per session: 1 or 2     Comment: Occasional   • Drug use: No       I have reviewed the history of present illness, past medical history, family history, social history, lab results, all notes and other records since the patient was last seen on 9/5/19.     SUBJECTIVE:  The patient is here for a follow up appointment accompanied with his wife.  He continues to tolerate immunotherapy without complaints.      REVIEW OF SYSTEMS:  Review of Systems   Constitutional: Negative for appetite change ( increased), chills, fatigue and fever. Unexpected weight change:  gained 4 lbs    HENT: Negative for ear pain, mouth sores, nosebleeds, sinus pressure ( chronic ) and sore throat. Sinus pain:  chronic     Eyes: Negative for photophobia and visual disturbance.   Respiratory: Negative for cough, wheezing and stridor.    Cardiovascular: Negative for chest pain, palpitations and leg swelling.   Gastrointestinal: Negative for abdominal pain, diarrhea, nausea ( mild after treatment ) and vomiting.   Endocrine: Negative for cold intolerance and heat intolerance.   Genitourinary: Negative for dysuria and hematuria.   Musculoskeletal: Positive for arthralgias ( chronic) and back pain ( chronic ). Negative for gait problem, joint swelling, neck pain and neck stiffness.   Skin: Negative for color change and rash.   Neurological: Negative for seizures and syncope.   Hematological: Negative for adenopathy.        No obvious bleeding   Psychiatric/Behavioral: Negative for agitation, confusion and hallucinations.     OBJECTIVE:  Vitals:    01/06/20 1510   BP: 134/85   Pulse: 76   Resp: 18   Temp: 98 °F (36.7 °C)   Weight: 89.2 kg (196 lb 9.6 oz)   Height: 190.5 cm (75\") "   PainSc: 0-No pain     ECOG  Eastern Cooperative Oncology Group (ECOG, [place], World Health Organization) performance scale  0 Fully active; no performance restrictions.  1 Strenuous physical activity restricted; fully ambulatory and able to carry out light work.  2 Capable of all self-care but unable to carry out any work activities. Up and about >50% of waking hours.  3 Capable of only limited self-care; confined to bed or chair >50% of waking hours.  4 Completely disabled; cannot carry out any self-care; totally confined to bed or chair.    Physical Exam   Constitutional: He is oriented to person, place, and time. No distress.   HENT:   Head: Normocephalic and atraumatic.   Eyes: Conjunctivae and EOM are normal. Right eye exhibits no discharge. Left eye exhibits no discharge. No scleral icterus.   Neck: Normal range of motion. Neck supple. No thyromegaly present.   Cardiovascular: Normal rate, regular rhythm and normal heart sounds. Exam reveals no gallop and no friction rub.   Pulmonary/Chest: Effort normal. No stridor. No respiratory distress. He has no wheezes. He exhibits laceration.   Right chest wall port   Abdominal: Soft. Bowel sounds are normal. He exhibits no mass. There is no tenderness. There is no rebound and no guarding.   Musculoskeletal: Normal range of motion. He exhibits no tenderness.   Lymphadenopathy:     He has no cervical adenopathy.   Neurological: He is alert and oriented to person, place, and time. He exhibits normal muscle tone.   Skin: Skin is warm. No rash noted. He is not diaphoretic. No erythema.   Psychiatric: He has a normal mood and affect. His behavior is normal.   Nursing note and vitals reviewed.      RECENT LABS  WBC   Date Value Ref Range Status   01/06/2020 7.05 3.40 - 10.80 10*3/mm3 Final   07/03/2019 8.99 4.5 - 11.0 10*3/uL Final     RBC   Date Value Ref Range Status   01/06/2020 4.09 (L) 4.14 - 5.80 10*6/mm3 Final   07/03/2019 3.24 (L) 4.5 - 5.9 10*6/uL Final      Hemoglobin   Date Value Ref Range Status   01/06/2020 13.1 13.0 - 17.7 g/dL Final   07/03/2019 10.4 (L) 13.5 - 17.5 g/dL Final     Hematocrit   Date Value Ref Range Status   01/06/2020 38.9 37.5 - 51.0 % Final   07/03/2019 32.8 (L) 41.0 - 53.0 % Final     MCV   Date Value Ref Range Status   01/06/2020 95.1 79.0 - 97.0 fL Final   07/03/2019 101.2 (H) 80.0 - 100.0 fL Final     MCH   Date Value Ref Range Status   01/06/2020 32.0 26.6 - 33.0 pg Final   07/03/2019 32.1 26.0 - 34.0 pg Final     MCHC   Date Value Ref Range Status   01/06/2020 33.7 31.5 - 35.7 g/dL Final   07/03/2019 31.7 31.0 - 37.0 g/dL Final     RDW   Date Value Ref Range Status   01/06/2020 13.0 12.3 - 15.4 % Final   07/03/2019 15.2 12.0 - 16.8 % Final     RDW-SD   Date Value Ref Range Status   01/06/2020 44.0 37.0 - 54.0 fl Final     MPV   Date Value Ref Range Status   01/06/2020 8.2 6.0 - 12.0 fL Final   07/03/2019 9.0 6.7 - 10.8 fL Final     Platelets   Date Value Ref Range Status   01/06/2020 286 140 - 450 10*3/mm3 Final   07/03/2019 302 140 - 440 10*3/uL Final     Neutrophil Rel %   Date Value Ref Range Status   07/03/2019 60.0 45 - 80 % Final     Neutrophil %   Date Value Ref Range Status   01/06/2020 63.8 42.7 - 76.0 % Final     Lymphocyte Rel %   Date Value Ref Range Status   07/03/2019 14.8 (L) 15 - 50 % Final     Lymphocyte %   Date Value Ref Range Status   01/06/2020 15.0 (L) 19.6 - 45.3 % Final     Monocyte Rel %   Date Value Ref Range Status   07/03/2019 12.2 0 - 15 % Final     Monocyte %   Date Value Ref Range Status   01/06/2020 11.8 5.0 - 12.0 % Final     Eosinophil %   Date Value Ref Range Status   01/06/2020 8.7 (H) 0.3 - 6.2 % Final   07/03/2019 10.4 (H) 0 - 7 % Final     Basophil Rel %   Date Value Ref Range Status   07/03/2019 1.7 0 - 2 % Final     Basophil %   Date Value Ref Range Status   01/06/2020 0.7 0.0 - 1.5 % Final     Neutrophils Absolute   Date Value Ref Range Status   07/03/2019 5.39 1.5 - 7.5 /uL Final   07/03/2019  5.39 2.0 - 8.8 10*3/uL Final     Neutrophils, Absolute   Date Value Ref Range Status   01/06/2020 4.50 1.70 - 7.00 10*3/mm3 Final     Lymphocytes Absolute   Date Value Ref Range Status   07/03/2019 1.33 0.7 - 5.5 10*3/uL Final     Lymphocytes, Absolute   Date Value Ref Range Status   01/06/2020 1.06 0.70 - 3.10 10*3/mm3 Final     Monocytes Absolute   Date Value Ref Range Status   07/03/2019 1.10 0.0 - 1.7 10*3/uL Final     Monocytes, Absolute   Date Value Ref Range Status   01/06/2020 0.83 0.10 - 0.90 10*3/mm3 Final     Eosinophils Absolute   Date Value Ref Range Status   07/03/2019 0.93 (H) 0.0 - 0.8 10*3/uL Final     Eosinophils, Absolute   Date Value Ref Range Status   01/06/2020 0.61 (H) 0.00 - 0.40 10*3/mm3 Final     Basophils Absolute   Date Value Ref Range Status   07/03/2019 0.15 0.0 - 0.2 10*3/uL Final     Basophils, Absolute   Date Value Ref Range Status   01/06/2020 0.05 0.00 - 0.20 10*3/mm3 Final     nRBC   Date Value Ref Range Status   07/03/2019 0 0 /100(WBC) Final   06/19/2019 0.0 0.0 - 0.2 /100 WBC Final       Lab Results   Component Value Date    GLUCOSE 93 12/30/2019    BUN 12 12/30/2019    CREATININE 0.89 12/30/2019    EGFRIFNONA 89 12/30/2019    EGFRIFAFRI >60 05/20/2019    BCR 13.5 12/30/2019    K 4.0 12/30/2019    CO2 27.0 12/30/2019    CALCIUM 9.7 12/30/2019    ALBUMIN 4.20 12/30/2019    LABIL2 1.4 07/03/2019    AST 19 12/30/2019    ALT 21 12/30/2019         Assessment/Plan     There are no diagnoses linked to this encounter.    ASSESSMENT:  1. Adenocarcinoma of the right lung, T1c N2 M0, consistent with clinical stage 3A disease.     2. S/P combined chemotherapy and radiation with cisplatin and Alimta.    3. Status post right lower lobectomy with mediastinal and hilar lymph node dissection with residual disease.  pT1 cN2 M0.  Patient with high risk features including lymphovascular invasion, extranodal capsular extension and persistent multiple lymph node disease.  4. Chemo-induced  constipation, chemo-induced nausea, chemo-induced fatigue. Resolved  5. Consolidation treatment with immunotherapy with Imfinzi   6. Hypokalemia secondary to chemotherapy. Resolved    7. Acute kidney injury. Resolved  8. Postop anemia  9. Rash on both hands of palmar surfaces; Resolved. Mild peeling at times.   10. Monitoring for drug toxicities.    PLAN:  1. Continue ferrous sulfate 325 mg p.o. twice a day  2. Continue Imfinzi consolidation therapy  3. Continue B12 injections day 3/3 today, then monthly  4. Status post flu shot and pneumonia vaccines   5. CT scan of the chest reviewed with patient.  6. Refered to ENT for evaluation for sinus surgery : Not covered by insurance per pt.  7. Colonoscopy postponed by patient to January 8. RTC in 4 weeks   9. Continue supportive care         Patient and his spouse have  asked patient which have all been answered to the best of my abilities    I have reviewed labs results, imaging, vitals, and medications with the patient today. Will follow up in 4 weeks with me.      Patient verbalized understanding and is in agreement of the above plan.    Part of this document was scribed by Antionette James RN, BSN.

## 2020-01-10 DIAGNOSIS — C34.31 CANCER OF LOWER LOBE OF RIGHT LUNG (HCC): ICD-10-CM

## 2020-01-10 DIAGNOSIS — C34.91 NON-SMALL CELL CARCINOMA OF LUNG, RIGHT (HCC): ICD-10-CM

## 2020-01-10 RX ORDER — SODIUM CHLORIDE 9 MG/ML
250 INJECTION, SOLUTION INTRAVENOUS ONCE
Status: CANCELLED | OUTPATIENT
Start: 2020-01-13

## 2020-01-13 ENCOUNTER — HOSPITAL ENCOUNTER (OUTPATIENT)
Dept: ONCOLOGY | Facility: HOSPITAL | Age: 55
Setting detail: INFUSION SERIES
Discharge: HOME | End: 2020-01-13

## 2020-01-13 ENCOUNTER — TELEPHONE (OUTPATIENT)
Dept: ONCOLOGY | Facility: CLINIC | Age: 55
End: 2020-01-13

## 2020-01-13 VITALS
WEIGHT: 196 LBS | RESPIRATION RATE: 18 BRPM | TEMPERATURE: 98.3 F | HEIGHT: 75 IN | DIASTOLIC BLOOD PRESSURE: 90 MMHG | SYSTOLIC BLOOD PRESSURE: 135 MMHG | HEART RATE: 92 BPM | BODY MASS INDEX: 24.37 KG/M2

## 2020-01-13 DIAGNOSIS — C34.91 NON-SMALL CELL CARCINOMA OF LUNG, RIGHT (HCC): Primary | ICD-10-CM

## 2020-01-13 DIAGNOSIS — C34.31 CANCER OF LOWER LOBE OF RIGHT LUNG (HCC): ICD-10-CM

## 2020-01-13 DIAGNOSIS — Z45.2 ENCOUNTER FOR CARE RELATED TO VASCULAR ACCESS PORT: ICD-10-CM

## 2020-01-13 LAB
ALBUMIN SERPL-MCNC: 4.2 G/DL (ref 3.5–5.2)
ALBUMIN/GLOB SERPL: 1.4 G/DL
ALP SERPL-CCNC: 65 U/L (ref 39–117)
ALT SERPL W P-5'-P-CCNC: 17 U/L (ref 1–41)
ANION GAP SERPL CALCULATED.3IONS-SCNC: 10 MMOL/L (ref 5–15)
AST SERPL-CCNC: 17 U/L (ref 1–40)
BASOPHILS # BLD AUTO: 0.05 10*3/MM3 (ref 0–0.2)
BASOPHILS NFR BLD AUTO: 0.7 % (ref 0–1.5)
BILIRUB SERPL-MCNC: 0.3 MG/DL (ref 0.2–1.2)
BUN BLD-MCNC: 12 MG/DL (ref 6–20)
BUN/CREAT SERPL: 14.6 (ref 7–25)
CALCIUM SPEC-SCNC: 9.4 MG/DL (ref 8.6–10.5)
CHLORIDE SERPL-SCNC: 100 MMOL/L (ref 98–107)
CO2 SERPL-SCNC: 27 MMOL/L (ref 22–29)
CREAT BLD-MCNC: 0.82 MG/DL (ref 0.76–1.27)
DEPRECATED RDW RBC AUTO: 43 FL (ref 37–54)
EOSINOPHIL # BLD AUTO: 0.39 10*3/MM3 (ref 0–0.4)
EOSINOPHIL NFR BLD AUTO: 5.5 % (ref 0.3–6.2)
ERYTHROCYTE [DISTWIDTH] IN BLOOD BY AUTOMATED COUNT: 12.6 % (ref 12.3–15.4)
GFR SERPL CREATININE-BSD FRML MDRD: 98 ML/MIN/1.73
GLOBULIN UR ELPH-MCNC: 2.9 GM/DL
GLUCOSE BLD-MCNC: 105 MG/DL (ref 65–99)
HCT VFR BLD AUTO: 36.9 % (ref 37.5–51)
HGB BLD-MCNC: 12.4 G/DL (ref 13–17.7)
LYMPHOCYTES # BLD AUTO: 0.99 10*3/MM3 (ref 0.7–3.1)
LYMPHOCYTES NFR BLD AUTO: 13.9 % (ref 19.6–45.3)
MCH RBC QN AUTO: 31.7 PG (ref 26.6–33)
MCHC RBC AUTO-ENTMCNC: 33.6 G/DL (ref 31.5–35.7)
MCV RBC AUTO: 94.4 FL (ref 79–97)
MONOCYTES # BLD AUTO: 0.88 10*3/MM3 (ref 0.1–0.9)
MONOCYTES NFR BLD AUTO: 12.3 % (ref 5–12)
NEUTROPHILS # BLD AUTO: 4.82 10*3/MM3 (ref 1.7–7)
NEUTROPHILS NFR BLD AUTO: 67.6 % (ref 42.7–76)
PLATELET # BLD AUTO: 257 10*3/MM3 (ref 140–450)
PMV BLD AUTO: 8.8 FL (ref 6–12)
POTASSIUM BLD-SCNC: 3.8 MMOL/L (ref 3.5–5.2)
PROT SERPL-MCNC: 7.1 G/DL (ref 6–8.5)
RBC # BLD AUTO: 3.91 10*6/MM3 (ref 4.14–5.8)
SODIUM BLD-SCNC: 137 MMOL/L (ref 136–145)
T4 FREE SERPL-MCNC: 1.25 NG/DL (ref 0.93–1.7)
TSH SERPL DL<=0.05 MIU/L-ACNC: 1.06 UIU/ML (ref 0.27–4.2)
WBC NRBC COR # BLD: 7.13 10*3/MM3 (ref 3.4–10.8)

## 2020-01-13 PROCEDURE — 25010000002 DURVALUMAB 50 MG/ML SOLUTION 10 ML VIAL: Performed by: NURSE PRACTITIONER

## 2020-01-13 PROCEDURE — 84439 ASSAY OF FREE THYROXINE: CPT | Performed by: NURSE PRACTITIONER

## 2020-01-13 PROCEDURE — 80053 COMPREHEN METABOLIC PANEL: CPT | Performed by: NURSE PRACTITIONER

## 2020-01-13 PROCEDURE — 84443 ASSAY THYROID STIM HORMONE: CPT | Performed by: NURSE PRACTITIONER

## 2020-01-13 PROCEDURE — 96413 CHEMO IV INFUSION 1 HR: CPT | Performed by: INTERNAL MEDICINE

## 2020-01-13 PROCEDURE — 25010000002 DURVALUMAB 50 MG/ML SOLUTION 2.4 ML VIAL: Performed by: NURSE PRACTITIONER

## 2020-01-13 PROCEDURE — 85025 COMPLETE CBC W/AUTO DIFF WBC: CPT | Performed by: NURSE PRACTITIONER

## 2020-01-13 RX ORDER — SODIUM CHLORIDE 0.9 % (FLUSH) 0.9 %
10 SYRINGE (ML) INJECTION AS NEEDED
Status: DISCONTINUED | OUTPATIENT
Start: 2020-01-13 | End: 2020-01-14 | Stop reason: HOSPADM

## 2020-01-13 RX ORDER — HEPARIN SODIUM (PORCINE) LOCK FLUSH IV SOLN 100 UNIT/ML 100 UNIT/ML
500 SOLUTION INTRAVENOUS AS NEEDED
Status: CANCELLED | OUTPATIENT
Start: 2020-01-13

## 2020-01-13 RX ORDER — SODIUM CHLORIDE 0.9 % (FLUSH) 0.9 %
10 SYRINGE (ML) INJECTION AS NEEDED
Status: CANCELLED | OUTPATIENT
Start: 2020-01-13

## 2020-01-13 RX ADMIN — HEPARIN 500 UNITS: 100 SYRINGE at 14:50

## 2020-01-13 RX ADMIN — SODIUM CHLORIDE 860 MG: 900 INJECTION, SOLUTION INTRAVENOUS at 13:46

## 2020-01-13 RX ADMIN — Medication 10 ML: at 14:50

## 2020-01-13 NOTE — TELEPHONE ENCOUNTER
Discussed a [employer] copay card for Infinzi with the patient.  He gave permission to enroll in the program.  A copay card was activated today.  ID#  [id number].

## 2020-01-27 ENCOUNTER — HOSPITAL ENCOUNTER (OUTPATIENT)
Dept: ONCOLOGY | Facility: HOSPITAL | Age: 55
Setting detail: INFUSION SERIES
Discharge: HOME | End: 2020-01-27

## 2020-01-27 VITALS
HEART RATE: 78 BPM | WEIGHT: 198 LBS | HEIGHT: 75 IN | TEMPERATURE: 97.7 F | DIASTOLIC BLOOD PRESSURE: 81 MMHG | SYSTOLIC BLOOD PRESSURE: 124 MMHG | BODY MASS INDEX: 24.62 KG/M2 | RESPIRATION RATE: 18 BRPM

## 2020-01-27 DIAGNOSIS — C34.31 CANCER OF LOWER LOBE OF RIGHT LUNG (HCC): ICD-10-CM

## 2020-01-27 DIAGNOSIS — C34.91 NON-SMALL CELL CARCINOMA OF LUNG, RIGHT (HCC): Primary | ICD-10-CM

## 2020-01-27 DIAGNOSIS — C34.91 NON-SMALL CELL CARCINOMA OF LUNG, RIGHT (HCC): ICD-10-CM

## 2020-01-27 DIAGNOSIS — Z45.2 ENCOUNTER FOR CARE RELATED TO VASCULAR ACCESS PORT: ICD-10-CM

## 2020-01-27 LAB
ALBUMIN SERPL-MCNC: 4.2 G/DL (ref 3.5–5.2)
ALBUMIN/GLOB SERPL: 1.7 G/DL
ALP SERPL-CCNC: 66 U/L (ref 39–117)
ALT SERPL W P-5'-P-CCNC: 26 U/L (ref 1–41)
ANION GAP SERPL CALCULATED.3IONS-SCNC: 10 MMOL/L (ref 5–15)
AST SERPL-CCNC: 24 U/L (ref 1–40)
BASOPHILS # BLD AUTO: 0.06 10*3/MM3 (ref 0–0.2)
BASOPHILS NFR BLD AUTO: 0.9 % (ref 0–1.5)
BILIRUB SERPL-MCNC: 0.2 MG/DL (ref 0.2–1.2)
BUN BLD-MCNC: 15 MG/DL (ref 6–20)
BUN/CREAT SERPL: 16.3 (ref 7–25)
CALCIUM SPEC-SCNC: 9.4 MG/DL (ref 8.6–10.5)
CHLORIDE SERPL-SCNC: 100 MMOL/L (ref 98–107)
CO2 SERPL-SCNC: 25 MMOL/L (ref 22–29)
CREAT BLD-MCNC: 0.92 MG/DL (ref 0.76–1.27)
DEPRECATED RDW RBC AUTO: 42.5 FL (ref 37–54)
EOSINOPHIL # BLD AUTO: 0.58 10*3/MM3 (ref 0–0.4)
EOSINOPHIL NFR BLD AUTO: 8.8 % (ref 0.3–6.2)
ERYTHROCYTE [DISTWIDTH] IN BLOOD BY AUTOMATED COUNT: 12.4 % (ref 12.3–15.4)
GFR SERPL CREATININE-BSD FRML MDRD: 85 ML/MIN/1.73
GLOBULIN UR ELPH-MCNC: 2.5 GM/DL
GLUCOSE BLD-MCNC: 95 MG/DL (ref 65–99)
HCT VFR BLD AUTO: 37.3 % (ref 37.5–51)
HGB BLD-MCNC: 12.6 G/DL (ref 13–17.7)
LYMPHOCYTES # BLD AUTO: 1.17 10*3/MM3 (ref 0.7–3.1)
LYMPHOCYTES NFR BLD AUTO: 17.7 % (ref 19.6–45.3)
MCH RBC QN AUTO: 32 PG (ref 26.6–33)
MCHC RBC AUTO-ENTMCNC: 33.8 G/DL (ref 31.5–35.7)
MCV RBC AUTO: 94.7 FL (ref 79–97)
MONOCYTES # BLD AUTO: 0.83 10*3/MM3 (ref 0.1–0.9)
MONOCYTES NFR BLD AUTO: 12.6 % (ref 5–12)
NEUTROPHILS # BLD AUTO: 3.96 10*3/MM3 (ref 1.7–7)
NEUTROPHILS NFR BLD AUTO: 60 % (ref 42.7–76)
PLATELET # BLD AUTO: 268 10*3/MM3 (ref 140–450)
PMV BLD AUTO: 8.6 FL (ref 6–12)
POTASSIUM BLD-SCNC: 3.8 MMOL/L (ref 3.5–5.2)
PROT SERPL-MCNC: 6.7 G/DL (ref 6–8.5)
RBC # BLD AUTO: 3.94 10*6/MM3 (ref 4.14–5.8)
SODIUM BLD-SCNC: 135 MMOL/L (ref 136–145)
WBC NRBC COR # BLD: 6.6 10*3/MM3 (ref 3.4–10.8)

## 2020-01-27 PROCEDURE — 80053 COMPREHEN METABOLIC PANEL: CPT | Performed by: INTERNAL MEDICINE

## 2020-01-27 PROCEDURE — 25010000002 DURVALUMAB 50 MG/ML SOLUTION 2.4 ML VIAL: Performed by: INTERNAL MEDICINE

## 2020-01-27 PROCEDURE — 25010000003 HEPARIN LOCK FLUCH PER 10 UNITS: Performed by: INTERNAL MEDICINE

## 2020-01-27 PROCEDURE — 96413 CHEMO IV INFUSION 1 HR: CPT | Performed by: INTERNAL MEDICINE

## 2020-01-27 PROCEDURE — 85025 COMPLETE CBC W/AUTO DIFF WBC: CPT | Performed by: INTERNAL MEDICINE

## 2020-01-27 PROCEDURE — 25010000002 DURVALUMAB 50 MG/ML SOLUTION 10 ML VIAL: Performed by: INTERNAL MEDICINE

## 2020-01-27 RX ORDER — SODIUM CHLORIDE 9 MG/ML
250 INJECTION, SOLUTION INTRAVENOUS ONCE
Status: CANCELLED | OUTPATIENT
Start: 2020-01-27

## 2020-01-27 RX ORDER — SODIUM CHLORIDE 9 MG/ML
250 INJECTION, SOLUTION INTRAVENOUS ONCE
Status: DISCONTINUED | OUTPATIENT
Start: 2020-01-27 | End: 2020-01-28 | Stop reason: HOSPADM

## 2020-01-27 RX ORDER — SODIUM CHLORIDE 0.9 % (FLUSH) 0.9 %
10 SYRINGE (ML) INJECTION AS NEEDED
Status: DISCONTINUED | OUTPATIENT
Start: 2020-01-27 | End: 2020-01-28 | Stop reason: HOSPADM

## 2020-01-27 RX ORDER — HEPARIN SODIUM (PORCINE) LOCK FLUSH IV SOLN 100 UNIT/ML 100 UNIT/ML
500 SOLUTION INTRAVENOUS AS NEEDED
Status: CANCELLED | OUTPATIENT
Start: 2020-01-27

## 2020-01-27 RX ORDER — HEPARIN SODIUM (PORCINE) LOCK FLUSH IV SOLN 100 UNIT/ML 100 UNIT/ML
500 SOLUTION INTRAVENOUS AS NEEDED
Status: DISCONTINUED | OUTPATIENT
Start: 2020-01-27 | End: 2020-01-28 | Stop reason: HOSPADM

## 2020-01-27 RX ORDER — SODIUM CHLORIDE 0.9 % (FLUSH) 0.9 %
10 SYRINGE (ML) INJECTION AS NEEDED
Status: CANCELLED | OUTPATIENT
Start: 2020-01-27

## 2020-01-27 RX ADMIN — SODIUM CHLORIDE 860 MG: 900 INJECTION, SOLUTION INTRAVENOUS at 14:02

## 2020-01-27 RX ADMIN — HEPARIN 500 UNITS: 100 SYRINGE at 15:28

## 2020-01-27 RX ADMIN — Medication 10 ML: at 15:28

## 2020-01-31 DIAGNOSIS — C34.91 NON-SMALL CELL CARCINOMA OF LUNG, RIGHT (HCC): Primary | ICD-10-CM

## 2020-02-03 ENCOUNTER — OFFICE VISIT (OUTPATIENT)
Dept: ONCOLOGY | Facility: CLINIC | Age: 55
End: 2020-02-03

## 2020-02-03 ENCOUNTER — APPOINTMENT (OUTPATIENT)
Dept: LAB | Facility: HOSPITAL | Age: 55
End: 2020-02-03

## 2020-02-03 VITALS
DIASTOLIC BLOOD PRESSURE: 82 MMHG | HEIGHT: 75 IN | HEART RATE: 76 BPM | TEMPERATURE: 97.9 F | RESPIRATION RATE: 18 BRPM | SYSTOLIC BLOOD PRESSURE: 133 MMHG | WEIGHT: 199.4 LBS | BODY MASS INDEX: 24.79 KG/M2

## 2020-02-03 DIAGNOSIS — J90 PLEURAL EFFUSION: Primary | ICD-10-CM

## 2020-02-03 PROCEDURE — 99214 OFFICE O/P EST MOD 30 MIN: CPT | Performed by: INTERNAL MEDICINE

## 2020-02-03 NOTE — PROGRESS NOTES
Hematology/Oncology Outpatient Follow Up    PATIENT NAME:Jc Driscoll  :1965  MRN: [id number]  PRIMARY CARE PHYSICIAN: Reshma Alvarenga MD  REFERRING PHYSICIAN: Reshma Alvarenga MD    Chief Complaint   Patient presents with   • Follow-up     Lung cancer   • Follow-up     Monitor drug toxicity        HISTORY OF PRESENT ILLNESS:     This is a 54 y.o. who developed left shoulder pain and for that reason he had a chest x-ray done on 19 which showed a 2.7 cm noncalcified right lower lobe nodule was identified.  For that reason a CT scan of the chest was recommended.  Review of his records shows patient had the CT scan on 19 done without contrast.  This basically showed a lobulated noncalcified mass in the posterior aspect of the right lower lobe measuring 3 cm close to the pleural surface.  There is a calcified 1.2 mm nodule in the lateral aspect of the right lower lobe consistent with a granuloma.  There were also calcified subcarinal and right hilar nodules.  There is a lower right side tracheal nodule measuring 1 cm.  Patient had a PET/CT scan done on 19 which showed increased metabolic activity on the right lower lobe mass that measures 2.5 cm with SUV of 4.4.  There was also increased metabolic activity in the subcarinal space measuring 2.8 cm in size with SUV of 3.4, increased activity in an enlarged right paratracheal lymph node that measures 1.9 cm, SUV of 5, also suspicious for metastatic adenopathy.  Patient had a CT-guided biopsy of the right lower lobe mass on 3/8/19.  This showed adenocarcinoma, TTF-1 positive.  On 3/18/19 patient underwent endoscopic ultrasound and biopsy of a subcarinal lymph node.  This was positive for malignant cells.  Patient was then taken back to surgery on 3/20/18 and had left Mediport placement by Dr. Fuchs.  He has been referred for further evaluation and management of his stage 3 adenocarcinoma of the right lung.  He was accompanied by his spouse  for the appointment on 3/26/19.  Patient was scheduled to have a brain MRI for complete staging, but he could not do this due to claustrophobia.  He is willing to try with the help of angiolytics.    • Patient had brain MRI done on 4/5/19.  He states it did not show any evidence of metastatic disease.  Evidence of chronic sinusitis was noted.    • Patient was seen by Dr. Escalona who has recommended concurrent chemotherapy and radiation.  Patient is scheduled to begin on 4/15/19.   • 4/17/19 - Patient was initiated on combined chemotherapy and radiation with Cisplatin and Alimta.  Patient received cycle 1.      • 5/8/19 - Patient received cycle 2 of chemotherapy with Cisplatin and Alimta.   • 5/15/19 - Chemistry panel showed creatinine of 1.7.  WBC 1.8, hemoglobin 11.6, platelet count 72,000.   • 5/20/19 - BUN 10, creatinine 1.2, potassium 3.5.    • 5/23/19 - CT scan of the chest with contrast showed interval decrease in size of the right lower lobe pulmonary nodule now measuring 2.1 cm in size.  There was no mediastinal or hilar adenopathy identified.  • 6/26/2019 patient underwent right lower lobectomy with hilar and mediastinal lymph node dissection performed by Dr. Terrance Mckeon.  • Pathology revealed residual residual 2.2 cm invasive moderately differentiated adenocarcinoma.  There were a total of 16 lymph nodes evaluated that 7 had metastatic disease.  There was evidence of lymphovascular invasion, extranodal involvement.  All the surgical margins were negative and distance of the closest margin was 2.5 cm.  Logic stage is T1c N2 M0.  • Patient is here today accompanied by his spouse for this appointment.  He continues to complain of some postop discomfort, numbness but his skin is intact.  There is no drainage.  Has had follow-up with Dr. Fuchs.  • 8/14/2019-seen by Dr. Maria at the [facility].  Dr. Maria has recommended immunotherapy with durvalumab off label use as patient has not had significant  response to neoadjuvant chemotherapy and radiation.  Patient was given the option to have immunotherapy at the [facility] vs [place] and he has chosen to stay in [place]  • 8/21/19 - Started cycle 1 day 1 Imfinzi  • 9/4/2019 patient had CT scan of the chest this shows a moderate size right pleural effusion.  There are areas of groundglass opacification in the right upper lobe without any evidence of significant septal thickening.  Volume loss noted there is also moderate pleural effusion.  There is no suspicious recurrent malignancy.  There were nonenlarged residual mediastinal lymph nodes.  • 9/9/19 - WBC 6.23, Hgb 11.7 Platelets 257,000, , BUN 9, Cr 1.1,Vitamin B12 318, Ferritin 437  • 9/23/19 - received cycle 2 day 1 Imfinzi  • 10/9/19- Seen by Dr rodríguez with ENT for sinus disease  • 11/4/2019-patient received cycle 5 of Imfinzi(durvalumab)  • 12/2/2019 patient had cycle 7 of durvalumab  • 12/5/2019-patient had CT scan of the  abdomen and pelvis with small right pleural sided pleural effusion.There is no evidence of metastatic disease  • 12/30/2019-patient received cycle 9 of durvalumab  • 12/31/2019-patient had CT scan of the chest showed small to moderate left pleural effusion.  Iglesias appearing right lower paratracheal and right peribronchial soft tissue thickening without any discrete pathologically enlarged mediastinal or hilar lymph nodes.  • 1/27/20-patient received cycle 11 of durvalumab    Past Medical History:   Diagnosis Date   • Dupuytren's contracture of both hands    • Elevated cholesterol    • History of colon polyps    • Hypertension    • Lung cancer (CMS/HCC)     right lung and in lymph nodes x2   • Retention of urine 6/27/2019       Past Surgical History:   Procedure Laterality Date   • BRONCHOSCOPY  03/18/2019    EBUS MONTERO NEEDLE BX   • COLONOSCOPY     • DUPUYTREN CONTRACTURE RELEASE Bilateral    • LUNG BIOPSY  03/08/2019    CT GUIDED   • PORTACATH PLACEMENT  03/20/2019    DR LONGORIA   •  THORACOSCOPY Right 6/26/2019    Procedure: RIGHT VATS, OPEN LOWER LOBECTOMY WITH LYMPH NODE DISECTION, WEDGE RESECTION OF RIGHT MIDDLE LOBE;  Surgeon: Terrance Fuchs MD;  Location: [facility] MAIN OR;  Service: Cardiothoracic         Current Outpatient Medications:   •  amLODIPine (NORVASC) 10 MG tablet, Take 10 mg by mouth Daily., Disp: , Rfl:   •  buPROPion SR (WELLBUTRIN SR) 150 MG 12 hr tablet, Take 300 mg by mouth 2 (Two) Times a Day., Disp: , Rfl:   •  desvenlafaxine (PRISTIQ) 50 MG 24 hr tablet, TAKE 1 TABLET BY MOUTH ONCE DAILY FOR 30 DAYS, Disp: , Rfl:   •  ferrous sulfate 325 (65 FE) MG tablet, Take 325 mg by mouth Daily With Breakfast., Disp: , Rfl:   •  metoprolol succinate XL (TOPROL-XL) 100 MG 24 hr tablet, Take 100 mg by mouth Daily., Disp: , Rfl:   •  pantoprazole (PROTONIX) 40 MG EC tablet, Take 40 mg by mouth Daily., Disp: , Rfl:   •  pravastatin (PRAVACHOL) 10 MG tablet, Take 10 mg by mouth Daily., Disp: , Rfl:   •  VIIBRYD 40 MG tablet tablet, Take 40 mg by mouth Daily., Disp: , Rfl: 0  •  acetaminophen (TYLENOL) 325 MG tablet, Take 2 tablets by mouth Every 4 (Four) Hours As Needed for Mild Pain ., Disp: , Rfl:   •  HYDROcodone-acetaminophen (NORCO) 5-325 MG per tablet, Take 1 tablet by mouth As Needed., Disp: , Rfl:   •  ibuprofen (ADVIL,MOTRIN) 600 MG tablet, Take 1 tablet by mouth Every 6 (Six) Hours As Needed for Mild Pain ., Disp: , Rfl:   •  lidocaine-prilocaine (EMLA) 2.5-2.5 % cream, , Disp: , Rfl:   •  LORazepam (ATIVAN) 0.5 MG tablet, TAKE 1 TABLET BY MOUTH TWICE DAILY AS NEEDED FOR ANXIETY MAX OF 2 DAY, Disp: , Rfl: 0  •  ondansetron (ZOFRAN) 8 MG tablet, TAKE 1 TABLET BY MOUTH EVERY 8 HOURS AS NEEDED NAUSEA, Disp: , Rfl: 3    No Known Allergies    Family History   Problem Relation Age of Onset   • Cancer Sister    • Cancer Mother    • Lung cancer Father        Cancer-related family history includes Cancer in his mother and sister; Lung cancer in his father.    Social History      Tobacco Use   • Smoking status: Former Smoker     Types: Cigarettes     Last attempt to quit: 9/5/2009     Years since quitting: 10.4   • Smokeless tobacco: Never Used   Substance Use Topics   • Alcohol use: Yes     Alcohol/week: 2.0 standard drinks     Types: 2 Cans of beer per week     Frequency: Monthly or less     Drinks per session: 1 or 2     Comment: Occasional   • Drug use: No       I have reviewed the history of present illness, past medical history, family history, social history, lab results, all notes and other records since the patient was last seen on 9/5/19.     SUBJECTIVE:  The patient is here for a follow up appointment accompanied with his wife.  He continues to tolerate immunotherapy without complaints.  The patient states that at times he is fatigued but otherwise is able to continue activities as prior to diagnosis.        REVIEW OF SYSTEMS:  Review of Systems   Constitutional: Negative for appetite change ( increased), chills, fatigue and fever. Unexpected weight change:  gained 4 lbs    HENT: Negative for ear pain, mouth sores, nosebleeds, sinus pressure ( chronic ) and sore throat. Sinus pain:  chronic     Eyes: Negative for photophobia and visual disturbance.   Respiratory: Negative for cough, wheezing and stridor.    Cardiovascular: Negative for chest pain, palpitations and leg swelling.   Gastrointestinal: Negative for abdominal pain, diarrhea, nausea ( mild after treatment ) and vomiting.   Endocrine: Negative for cold intolerance and heat intolerance.   Genitourinary: Negative for dysuria and hematuria.   Musculoskeletal: Positive for arthralgias ( chronic) and back pain ( chronic ). Negative for gait problem, joint swelling, neck pain and neck stiffness.   Skin: Negative for color change and rash.   Neurological: Negative for seizures and syncope.   Hematological: Negative for adenopathy.        No obvious bleeding   Psychiatric/Behavioral: Negative for agitation, confusion and  "hallucinations.     OBJECTIVE:  Vitals:    02/03/20 1523   BP: 133/82   Pulse: 76   Resp: 18   Temp: 97.9 °F (36.6 °C)   Weight: 90.4 kg (199 lb 6.4 oz)   Height: 190.5 cm (75\")   PainSc: 0-No pain     ECOG  Eastern Cooperative Oncology Group (ECOG, [place], World Health Organization) performance scale  0 Fully active; no performance restrictions.  1 Strenuous physical activity restricted; fully ambulatory and able to carry out light work.  2 Capable of all self-care but unable to carry out any work activities. Up and about >50% of waking hours.  3 Capable of only limited self-care; confined to bed or chair >50% of waking hours.  4 Completely disabled; cannot carry out any self-care; totally confined to bed or chair.    Physical Exam   Constitutional: He is oriented to person, place, and time. No distress.   HENT:   Head: Normocephalic and atraumatic.   Eyes: Conjunctivae and EOM are normal. Right eye exhibits no discharge. Left eye exhibits no discharge. No scleral icterus.   Neck: Normal range of motion. Neck supple. No thyromegaly present.   Cardiovascular: Normal rate, regular rhythm and normal heart sounds. Exam reveals no gallop and no friction rub.   Pulmonary/Chest: Effort normal. No stridor. No respiratory distress. He has no wheezes. He exhibits laceration.   Right chest wall port   Abdominal: Soft. Bowel sounds are normal. He exhibits no mass. There is no tenderness. There is no rebound and no guarding.   Musculoskeletal: Normal range of motion. He exhibits no tenderness.   Lymphadenopathy:     He has no cervical adenopathy.   Neurological: He is alert and oriented to person, place, and time. He exhibits normal muscle tone.   Skin: Skin is warm. No rash noted. He is not diaphoretic. No erythema.   Psychiatric: He has a normal mood and affect. His behavior is normal.   Nursing note and vitals reviewed.      RECENT LABS  WBC   Date Value Ref Range Status   01/27/2020 6.60 3.40 - 10.80 10*3/mm3 Final "   07/03/2019 8.99 4.5 - 11.0 10*3/uL Final     RBC   Date Value Ref Range Status   01/27/2020 3.94 (L) 4.14 - 5.80 10*6/mm3 Final   07/03/2019 3.24 (L) 4.5 - 5.9 10*6/uL Final     Hemoglobin   Date Value Ref Range Status   01/27/2020 12.6 (L) 13.0 - 17.7 g/dL Final   07/03/2019 10.4 (L) 13.5 - 17.5 g/dL Final     Hematocrit   Date Value Ref Range Status   01/27/2020 37.3 (L) 37.5 - 51.0 % Final   07/03/2019 32.8 (L) 41.0 - 53.0 % Final     MCV   Date Value Ref Range Status   01/27/2020 94.7 79.0 - 97.0 fL Final   07/03/2019 101.2 (H) 80.0 - 100.0 fL Final     MCH   Date Value Ref Range Status   01/27/2020 32.0 26.6 - 33.0 pg Final   07/03/2019 32.1 26.0 - 34.0 pg Final     MCHC   Date Value Ref Range Status   01/27/2020 33.8 31.5 - 35.7 g/dL Final   07/03/2019 31.7 31.0 - 37.0 g/dL Final     RDW   Date Value Ref Range Status   01/27/2020 12.4 12.3 - 15.4 % Final   07/03/2019 15.2 12.0 - 16.8 % Final     RDW-SD   Date Value Ref Range Status   01/27/2020 42.5 37.0 - 54.0 fl Final     MPV   Date Value Ref Range Status   01/27/2020 8.6 6.0 - 12.0 fL Final   07/03/2019 9.0 6.7 - 10.8 fL Final     Platelets   Date Value Ref Range Status   01/27/2020 268 140 - 450 10*3/mm3 Final   07/03/2019 302 140 - 440 10*3/uL Final     Neutrophil Rel %   Date Value Ref Range Status   07/03/2019 60.0 45 - 80 % Final     Neutrophil %   Date Value Ref Range Status   01/27/2020 60.0 42.7 - 76.0 % Final     Lymphocyte Rel %   Date Value Ref Range Status   07/03/2019 14.8 (L) 15 - 50 % Final     Lymphocyte %   Date Value Ref Range Status   01/27/2020 17.7 (L) 19.6 - 45.3 % Final     Monocyte Rel %   Date Value Ref Range Status   07/03/2019 12.2 0 - 15 % Final     Monocyte %   Date Value Ref Range Status   01/27/2020 12.6 (H) 5.0 - 12.0 % Final     Eosinophil %   Date Value Ref Range Status   01/27/2020 8.8 (H) 0.3 - 6.2 % Final   07/03/2019 10.4 (H) 0 - 7 % Final     Basophil Rel %   Date Value Ref Range Status   07/03/2019 1.7 0 - 2 % Final      Basophil %   Date Value Ref Range Status   01/27/2020 0.9 0.0 - 1.5 % Final     Neutrophils Absolute   Date Value Ref Range Status   07/03/2019 5.39 1.5 - 7.5 /uL Final   07/03/2019 5.39 2.0 - 8.8 10*3/uL Final     Neutrophils, Absolute   Date Value Ref Range Status   01/27/2020 3.96 1.70 - 7.00 10*3/mm3 Final     Lymphocytes Absolute   Date Value Ref Range Status   07/03/2019 1.33 0.7 - 5.5 10*3/uL Final     Lymphocytes, Absolute   Date Value Ref Range Status   01/27/2020 1.17 0.70 - 3.10 10*3/mm3 Final     Monocytes Absolute   Date Value Ref Range Status   07/03/2019 1.10 0.0 - 1.7 10*3/uL Final     Monocytes, Absolute   Date Value Ref Range Status   01/27/2020 0.83 0.10 - 0.90 10*3/mm3 Final     Eosinophils Absolute   Date Value Ref Range Status   07/03/2019 0.93 (H) 0.0 - 0.8 10*3/uL Final     Eosinophils, Absolute   Date Value Ref Range Status   01/27/2020 0.58 (H) 0.00 - 0.40 10*3/mm3 Final     Basophils Absolute   Date Value Ref Range Status   07/03/2019 0.15 0.0 - 0.2 10*3/uL Final     Basophils, Absolute   Date Value Ref Range Status   01/27/2020 0.06 0.00 - 0.20 10*3/mm3 Final     nRBC   Date Value Ref Range Status   07/03/2019 0 0 /100(WBC) Final   06/19/2019 0.0 0.0 - 0.2 /100 WBC Final       Lab Results   Component Value Date    GLUCOSE 95 01/27/2020    BUN 15 01/27/2020    CREATININE 0.92 01/27/2020    EGFRIFNONA 85 01/27/2020    EGFRIFAFRI >60 05/20/2019    BCR 16.3 01/27/2020    K 3.8 01/27/2020    CO2 25.0 01/27/2020    CALCIUM 9.4 01/27/2020    ALBUMIN 4.20 01/27/2020    LABIL2 1.4 07/03/2019    AST 24 01/27/2020    ALT 26 01/27/2020         Assessment/Plan     There are no diagnoses linked to this encounter.    ASSESSMENT:  1. Adenocarcinoma of the right lung, T1c N2 M0, consistent with clinical stage 3A disease.     2. S/P combined chemotherapy and radiation with cisplatin and Alimta.    3. Status post right lower lobectomy with mediastinal and hilar lymph node dissection with residual  disease.  pT1 cN2 M0.  Patient with high risk features including lymphovascular invasion, extranodal capsular extension and persistent multiple lymph node disease.  4. Chemo-induced constipation, chemo-induced nausea, chemo-induced fatigue. Resolved  5. Consolidation treatment with immunotherapy with Imfinzi   6. Hypokalemia secondary to chemotherapy. Resolved    7. Acute kidney injury. Resolved  8. Postop anemia  9. Rash on both hands of palmar surfaces; Resolved. Mild peeling at times.   10. Monitoring for drug toxicities.    PLAN:  1. Discontinue ferrous sulfate 325 mg   2. Continue Imfinzi consolidation therapy  3. Continue B12 injections day 3/3 today, then monthly  4. Status post flu shot and pneumonia vaccines   5. CT scan of the chest reviewed with patient at last visit  6. Refered to ENT for evaluation for sinus surgery : Not covered by insurance per pt.  7. Colonoscopy postponed by patient to January 8. RTC in 5 weeks   9. Continue supportive care         Patient and his spouse have  asked patient which have all been answered to the best of my abilities    I have reviewed labs results, imaging, vitals, and medications with the patient today. Will follow up in 4 weeks with me.      Patient verbalized understanding and is in agreement of the above plan.    Part of this document was scribed by Antionette James RN, BSN.        I spent greater than 40 minutes in face-to-face time with the patient and 95% of that time were spent in counseling and coordination of care, including review of imaging and pathology; indications for treatment, goals of therapy, alternatives and risks - both common and rare - as well as surveillance and potential outcomes.

## 2020-02-10 ENCOUNTER — APPOINTMENT (OUTPATIENT)
Dept: ONCOLOGY | Facility: HOSPITAL | Age: 55
End: 2020-02-10

## 2020-02-13 DIAGNOSIS — C34.91 NON-SMALL CELL CARCINOMA OF LUNG, RIGHT (HCC): ICD-10-CM

## 2020-02-13 DIAGNOSIS — C34.31 CANCER OF LOWER LOBE OF RIGHT LUNG (HCC): ICD-10-CM

## 2020-02-13 RX ORDER — SODIUM CHLORIDE 9 MG/ML
250 INJECTION, SOLUTION INTRAVENOUS ONCE
Status: CANCELLED | OUTPATIENT
Start: 2020-03-02

## 2020-02-17 ENCOUNTER — HOSPITAL ENCOUNTER (OUTPATIENT)
Facility: HOSPITAL | Age: 55
Setting detail: OBSERVATION
Discharge: HOME | End: 2020-02-18
Attending: INTERNAL MEDICINE | Admitting: INTERNAL MEDICINE

## 2020-02-17 ENCOUNTER — APPOINTMENT (OUTPATIENT)
Dept: NUCLEAR MEDICINE | Facility: HOSPITAL | Age: 55
End: 2020-02-17

## 2020-02-17 ENCOUNTER — TELEPHONE (OUTPATIENT)
Dept: ONCOLOGY | Facility: HOSPITAL | Age: 55
End: 2020-02-17

## 2020-02-17 PROBLEM — C34.91 NON-SMALL CELL CARCINOMA OF LUNG, RIGHT: Chronic | Status: ACTIVE | Noted: 2019-04-08

## 2020-02-17 PROBLEM — J32.4 CHRONIC PANSINUSITIS: Chronic | Status: ACTIVE | Noted: 2019-04-08

## 2020-02-17 PROBLEM — E78.5 HYPERLIPIDEMIA: Chronic | Status: ACTIVE | Noted: 2017-10-05

## 2020-02-17 PROBLEM — K21.9 GASTROESOPHAGEAL REFLUX DISEASE: Chronic | Status: ACTIVE | Noted: 2019-02-21

## 2020-02-17 PROBLEM — C34.81 MALIGNANT NEOPLASM OF OVERLAPPING SITES OF RIGHT LUNG (HCC): Chronic | Status: ACTIVE | Noted: 2019-12-09

## 2020-02-17 PROBLEM — C34.31 CANCER OF LOWER LOBE OF RIGHT LUNG (HCC): Chronic | Status: ACTIVE | Noted: 2019-06-26

## 2020-02-17 PROBLEM — R07.9 ACUTE CHEST PAIN: Status: ACTIVE | Noted: 2020-02-17

## 2020-02-17 LAB
ALBUMIN SERPL-MCNC: 4.1 G/DL (ref 3.5–5.2)
ALBUMIN/GLOB SERPL: 1.6 G/DL
ALP SERPL-CCNC: 74 U/L (ref 39–117)
ALT SERPL W P-5'-P-CCNC: 16 U/L (ref 1–41)
ANION GAP SERPL CALCULATED.3IONS-SCNC: 12 MMOL/L (ref 5–15)
AST SERPL-CCNC: 17 U/L (ref 1–40)
BH CV NUCLEAR PRIOR STUDY: 3
BH CV STRESS BP STAGE 1: NORMAL
BH CV STRESS BP STAGE 2: NORMAL
BH CV STRESS COMMENTS STAGE 1: NORMAL
BH CV STRESS COMMENTS STAGE 2: NORMAL
BH CV STRESS DOSE REGADENOSON STAGE 1: 0.4
BH CV STRESS DURATION MIN STAGE 1: 0
BH CV STRESS DURATION MIN STAGE 2: 4
BH CV STRESS DURATION SEC STAGE 1: 10
BH CV STRESS DURATION SEC STAGE 2: 0
BH CV STRESS HR STAGE 1: 109
BH CV STRESS HR STAGE 2: 110
BH CV STRESS PROTOCOL 1: NORMAL
BH CV STRESS RECOVERY BP: NORMAL MMHG
BH CV STRESS RECOVERY HR: 110 BPM
BH CV STRESS STAGE 1: 1
BH CV STRESS STAGE 2: 2
BILIRUB SERPL-MCNC: 0.6 MG/DL (ref 0.2–1.2)
BUN BLD-MCNC: 12 MG/DL (ref 6–20)
BUN/CREAT SERPL: 13.8 (ref 7–25)
CALCIUM SPEC-SCNC: 9 MG/DL (ref 8.6–10.5)
CHLORIDE SERPL-SCNC: 96 MMOL/L (ref 98–107)
CO2 SERPL-SCNC: 26 MMOL/L (ref 22–29)
CREAT BLD-MCNC: 0.87 MG/DL (ref 0.76–1.27)
DEPRECATED RDW RBC AUTO: 42 FL (ref 37–54)
ERYTHROCYTE [DISTWIDTH] IN BLOOD BY AUTOMATED COUNT: 12.6 % (ref 12.3–15.4)
GFR SERPL CREATININE-BSD FRML MDRD: 91 ML/MIN/1.73
GLOBULIN UR ELPH-MCNC: 2.5 GM/DL
GLUCOSE BLD-MCNC: 145 MG/DL (ref 65–99)
HCT VFR BLD AUTO: 35.4 % (ref 37.5–51)
HGB BLD-MCNC: 12.2 G/DL (ref 13–17.7)
MAXIMAL PREDICTED HEART RATE: 165 BPM
MCH RBC QN AUTO: 32.5 PG (ref 26.6–33)
MCHC RBC AUTO-ENTMCNC: 34.5 G/DL (ref 31.5–35.7)
MCV RBC AUTO: 94.2 FL (ref 79–97)
PERCENT MAX PREDICTED HR: 66.06 %
PLATELET # BLD AUTO: 243 10*3/MM3 (ref 140–450)
PMV BLD AUTO: 7.1 FL (ref 6–12)
POTASSIUM BLD-SCNC: 4.3 MMOL/L (ref 3.5–5.2)
PROT SERPL-MCNC: 6.6 G/DL (ref 6–8.5)
RBC # BLD AUTO: 3.76 10*6/MM3 (ref 4.14–5.8)
SODIUM BLD-SCNC: 134 MMOL/L (ref 136–145)
STRESS BASELINE BP: NORMAL MMHG
STRESS BASELINE HR: 103 BPM
STRESS PERCENT HR: 78 %
STRESS POST PEAK BP: NORMAL MMHG
STRESS POST PEAK HR: 109 BPM
STRESS TARGET HR: 140 BPM
TROPONIN T SERPL-MCNC: <0.01 NG/ML (ref 0–0.03)
TROPONIN T SERPL-MCNC: <0.01 NG/ML (ref 0–0.03)
WBC NRBC COR # BLD: 10.3 10*3/MM3 (ref 3.4–10.8)

## 2020-02-17 PROCEDURE — 85027 COMPLETE CBC AUTOMATED: CPT | Performed by: INTERNAL MEDICINE

## 2020-02-17 PROCEDURE — 93005 ELECTROCARDIOGRAM TRACING: CPT | Performed by: NURSE PRACTITIONER

## 2020-02-17 PROCEDURE — 25010000002 ONDANSETRON PER 1 MG: Performed by: NURSE PRACTITIONER

## 2020-02-17 PROCEDURE — 99220 PR INITIAL OBSERVATION CARE/DAY 70 MINUTES: CPT | Performed by: INTERNAL MEDICINE

## 2020-02-17 PROCEDURE — 78452 HT MUSCLE IMAGE SPECT MULT: CPT

## 2020-02-17 PROCEDURE — G0378 HOSPITAL OBSERVATION PER HR: HCPCS

## 2020-02-17 PROCEDURE — 96374 THER/PROPH/DIAG INJ IV PUSH: CPT

## 2020-02-17 PROCEDURE — 96375 TX/PRO/DX INJ NEW DRUG ADDON: CPT

## 2020-02-17 PROCEDURE — 84484 ASSAY OF TROPONIN QUANT: CPT | Performed by: NURSE PRACTITIONER

## 2020-02-17 PROCEDURE — 93018 CV STRESS TEST I&R ONLY: CPT | Performed by: INTERNAL MEDICINE

## 2020-02-17 PROCEDURE — 25010000002 ENOXAPARIN PER 10 MG: Performed by: NURSE PRACTITIONER

## 2020-02-17 PROCEDURE — 0 TECHNETIUM SESTAMIBI: Performed by: INTERNAL MEDICINE

## 2020-02-17 PROCEDURE — 96372 THER/PROPH/DIAG INJ SC/IM: CPT

## 2020-02-17 PROCEDURE — 96376 TX/PRO/DX INJ SAME DRUG ADON: CPT

## 2020-02-17 PROCEDURE — G0379 DIRECT REFER HOSPITAL OBSERV: HCPCS

## 2020-02-17 PROCEDURE — A9500 TC99M SESTAMIBI: HCPCS | Performed by: INTERNAL MEDICINE

## 2020-02-17 PROCEDURE — 93017 CV STRESS TEST TRACING ONLY: CPT

## 2020-02-17 PROCEDURE — 93016 CV STRESS TEST SUPVJ ONLY: CPT | Performed by: INTERNAL MEDICINE

## 2020-02-17 PROCEDURE — 25010000002 REGADENOSON 0.4 MG/5ML SOLUTION: Performed by: INTERNAL MEDICINE

## 2020-02-17 PROCEDURE — 25010000002 KETOROLAC TROMETHAMINE PER 15 MG: Performed by: NURSE PRACTITIONER

## 2020-02-17 PROCEDURE — 78452 HT MUSCLE IMAGE SPECT MULT: CPT | Performed by: INTERNAL MEDICINE

## 2020-02-17 PROCEDURE — 80053 COMPREHEN METABOLIC PANEL: CPT | Performed by: INTERNAL MEDICINE

## 2020-02-17 RX ORDER — ATORVASTATIN CALCIUM 10 MG/1
10 TABLET, FILM COATED ORAL NIGHTLY
Status: DISCONTINUED | OUTPATIENT
Start: 2020-02-17 | End: 2020-02-18 | Stop reason: HOSPADM

## 2020-02-17 RX ORDER — ALUMINA, MAGNESIA, AND SIMETHICONE 2400; 2400; 240 MG/30ML; MG/30ML; MG/30ML
15 SUSPENSION ORAL EVERY 6 HOURS PRN
Status: DISCONTINUED | OUTPATIENT
Start: 2020-02-17 | End: 2020-02-18 | Stop reason: HOSPADM

## 2020-02-17 RX ORDER — LORAZEPAM 0.5 MG/1
0.5 TABLET ORAL 2 TIMES DAILY PRN
Status: DISCONTINUED | OUTPATIENT
Start: 2020-02-17 | End: 2020-02-18 | Stop reason: HOSPADM

## 2020-02-17 RX ORDER — CETIRIZINE HYDROCHLORIDE 10 MG/1
10 TABLET ORAL DAILY
COMMUNITY
End: 2021-06-01

## 2020-02-17 RX ORDER — DESVENLAFAXINE SUCCINATE 50 MG/1
50 TABLET, EXTENDED RELEASE ORAL DAILY
Status: DISCONTINUED | OUTPATIENT
Start: 2020-02-17 | End: 2020-02-18 | Stop reason: HOSPADM

## 2020-02-17 RX ORDER — ONDANSETRON 2 MG/ML
4 INJECTION INTRAMUSCULAR; INTRAVENOUS EVERY 6 HOURS PRN
Status: DISCONTINUED | OUTPATIENT
Start: 2020-02-17 | End: 2020-02-18 | Stop reason: HOSPADM

## 2020-02-17 RX ORDER — ONDANSETRON 4 MG/1
4 TABLET, FILM COATED ORAL EVERY 6 HOURS PRN
Status: DISCONTINUED | OUTPATIENT
Start: 2020-02-17 | End: 2020-02-18 | Stop reason: HOSPADM

## 2020-02-17 RX ORDER — NITROGLYCERIN 0.4 MG/1
0.4 TABLET SUBLINGUAL
Status: DISCONTINUED | OUTPATIENT
Start: 2020-02-17 | End: 2020-02-18 | Stop reason: HOSPADM

## 2020-02-17 RX ORDER — SODIUM CHLORIDE 0.9 % (FLUSH) 0.9 %
10 SYRINGE (ML) INJECTION EVERY 12 HOURS SCHEDULED
Status: DISCONTINUED | OUTPATIENT
Start: 2020-02-17 | End: 2020-02-18 | Stop reason: HOSPADM

## 2020-02-17 RX ORDER — SODIUM CHLORIDE 0.9 % (FLUSH) 0.9 %
10 SYRINGE (ML) INJECTION AS NEEDED
Status: DISCONTINUED | OUTPATIENT
Start: 2020-02-17 | End: 2020-02-18 | Stop reason: HOSPADM

## 2020-02-17 RX ORDER — MAGNESIUM SULFATE 1 G/100ML
1 INJECTION INTRAVENOUS AS NEEDED
Status: DISCONTINUED | OUTPATIENT
Start: 2020-02-17 | End: 2020-02-18 | Stop reason: HOSPADM

## 2020-02-17 RX ORDER — AMLODIPINE BESYLATE 5 MG/1
10 TABLET ORAL DAILY
Status: DISCONTINUED | OUTPATIENT
Start: 2020-02-17 | End: 2020-02-18 | Stop reason: HOSPADM

## 2020-02-17 RX ORDER — KETOROLAC TROMETHAMINE 30 MG/ML
30 INJECTION, SOLUTION INTRAMUSCULAR; INTRAVENOUS ONCE
Status: COMPLETED | OUTPATIENT
Start: 2020-02-17 | End: 2020-02-17

## 2020-02-17 RX ORDER — ACETAMINOPHEN 650 MG/1
650 SUPPOSITORY RECTAL EVERY 4 HOURS PRN
Status: DISCONTINUED | OUTPATIENT
Start: 2020-02-17 | End: 2020-02-18 | Stop reason: HOSPADM

## 2020-02-17 RX ORDER — CHOLECALCIFEROL (VITAMIN D3) 125 MCG
5 CAPSULE ORAL NIGHTLY PRN
Status: DISCONTINUED | OUTPATIENT
Start: 2020-02-17 | End: 2020-02-18 | Stop reason: HOSPADM

## 2020-02-17 RX ORDER — KETOROLAC TROMETHAMINE 15 MG/ML
15 INJECTION, SOLUTION INTRAMUSCULAR; INTRAVENOUS EVERY 6 HOURS PRN
Status: DISCONTINUED | OUTPATIENT
Start: 2020-02-17 | End: 2020-02-18 | Stop reason: HOSPADM

## 2020-02-17 RX ORDER — METOPROLOL SUCCINATE 50 MG/1
100 TABLET, EXTENDED RELEASE ORAL DAILY
Status: DISCONTINUED | OUTPATIENT
Start: 2020-02-17 | End: 2020-02-18 | Stop reason: HOSPADM

## 2020-02-17 RX ORDER — BUPROPION HYDROCHLORIDE 150 MG/1
300 TABLET ORAL
Status: DISCONTINUED | OUTPATIENT
Start: 2020-02-17 | End: 2020-02-18 | Stop reason: HOSPADM

## 2020-02-17 RX ORDER — BISACODYL 10 MG
10 SUPPOSITORY, RECTAL RECTAL DAILY PRN
Status: DISCONTINUED | OUTPATIENT
Start: 2020-02-17 | End: 2020-02-18 | Stop reason: HOSPADM

## 2020-02-17 RX ORDER — MAGNESIUM SULFATE HEPTAHYDRATE 40 MG/ML
2 INJECTION, SOLUTION INTRAVENOUS AS NEEDED
Status: DISCONTINUED | OUTPATIENT
Start: 2020-02-17 | End: 2020-02-18 | Stop reason: HOSPADM

## 2020-02-17 RX ORDER — ACETAMINOPHEN 325 MG/1
650 TABLET ORAL EVERY 4 HOURS PRN
Status: DISCONTINUED | OUTPATIENT
Start: 2020-02-17 | End: 2020-02-18 | Stop reason: HOSPADM

## 2020-02-17 RX ORDER — PANTOPRAZOLE SODIUM 40 MG/1
40 TABLET, DELAYED RELEASE ORAL DAILY
Status: DISCONTINUED | OUTPATIENT
Start: 2020-02-18 | End: 2020-02-18 | Stop reason: HOSPADM

## 2020-02-17 RX ORDER — POTASSIUM CHLORIDE 20 MEQ/1
40 TABLET, EXTENDED RELEASE ORAL AS NEEDED
Status: DISCONTINUED | OUTPATIENT
Start: 2020-02-17 | End: 2020-02-18 | Stop reason: HOSPADM

## 2020-02-17 RX ORDER — CETIRIZINE HYDROCHLORIDE 10 MG/1
10 TABLET ORAL DAILY
Status: DISCONTINUED | OUTPATIENT
Start: 2020-02-18 | End: 2020-02-18 | Stop reason: HOSPADM

## 2020-02-17 RX ORDER — ACETAMINOPHEN 160 MG/5ML
650 SOLUTION ORAL EVERY 4 HOURS PRN
Status: DISCONTINUED | OUTPATIENT
Start: 2020-02-17 | End: 2020-02-18 | Stop reason: HOSPADM

## 2020-02-17 RX ADMIN — Medication 10 ML: at 20:03

## 2020-02-17 RX ADMIN — KETOROLAC TROMETHAMINE 15 MG: 15 INJECTION, SOLUTION INTRAMUSCULAR; INTRAVENOUS at 22:33

## 2020-02-17 RX ADMIN — ONDANSETRON 4 MG: 2 INJECTION INTRAMUSCULAR; INTRAVENOUS at 10:11

## 2020-02-17 RX ADMIN — TECHNETIUM TC 99M SESTAMIBI 1 DOSE: 1 INJECTION INTRAVENOUS at 09:30

## 2020-02-17 RX ADMIN — ENOXAPARIN SODIUM 40 MG: 40 INJECTION SUBCUTANEOUS at 16:31

## 2020-02-17 RX ADMIN — TECHNETIUM TC 99M SESTAMIBI 1 DOSE: 1 INJECTION INTRAVENOUS at 11:40

## 2020-02-17 RX ADMIN — REGADENOSON 0.4 MG: 0.08 INJECTION, SOLUTION INTRAVENOUS at 11:40

## 2020-02-17 RX ADMIN — DESVENLAFAXINE 50 MG: 50 TABLET, FILM COATED, EXTENDED RELEASE ORAL at 18:08

## 2020-02-17 RX ADMIN — BUPROPION HYDROCHLORIDE 300 MG: 150 TABLET, EXTENDED RELEASE ORAL at 20:03

## 2020-02-17 RX ADMIN — KETOROLAC TROMETHAMINE 15 MG: 15 INJECTION, SOLUTION INTRAMUSCULAR; INTRAVENOUS at 16:33

## 2020-02-17 RX ADMIN — KETOROLAC TROMETHAMINE 30 MG: 30 INJECTION, SOLUTION INTRAMUSCULAR at 10:11

## 2020-02-17 RX ADMIN — AMLODIPINE BESYLATE 10 MG: 5 TABLET ORAL at 18:08

## 2020-02-17 RX ADMIN — ATORVASTATIN CALCIUM 10 MG: 10 TABLET, FILM COATED ORAL at 20:03

## 2020-02-17 RX ADMIN — METOPROLOL SUCCINATE 100 MG: 50 TABLET, EXTENDED RELEASE ORAL at 18:08

## 2020-02-17 RX ADMIN — Medication 10 ML: at 10:15

## 2020-02-17 NOTE — H&P
"      [facility] Medicine Services      Patient Name: Jc Driscoll  : 1965  MRN: [id number]  Primary Care Physician: Reshma Alvarenga MD  Date of admission: 2020    Patient Care Team:  Reshma Alvarenga MD as PCP - General (Family Medicine)  Reshma Alvarenga MD as PCP - Family Medicine          Subjective   History Present Illness     Chief Complaint: chest pain      Mr. Driscoll is a 54 y.o. male with a history of right lung cancer status post right lower lobectomy, chemotherapy, radiation, and currently on immunotherapy every other Monday (due today), HTN, and HLD. The patient initially presented to the ER at [facility] on 2020 complaining of chest pain. The patient states he started with left shoulder pain and substernal chest pain last night. He describes the pain as sharp and constant. He reports some mild shortness of breath and difficulty taking a deep breath. He denies diaphoresis, dizziness, palpitations, leg swelling, syncope, nausea, or vomiting. He states he was working on a [employer] yesterday and thinks he pulled something in his chest. He states he works as a  and lifts heavy equipment on a daily basis. He states he is normally very active. He denies a history of coronary heart disease or diabetes. He states he had a normal stress test several years ago.     The patient is a former smoker; he quit in . He reports a family history of lung cancer in his father. His mother and sister also have a history of cancer.         Review of records from [facility] 2020:    Medications given:  Sublingual nitroglycerin x 1, aspirin 324 mg PO, morphine 4 mg IV, Zofran 4 mg IV, Dilaudid 1 mg IV, Ativan 0.5 mg IV, 0.5\" nitro paste, 500 cc normal saline, Dilaudid 1 mg IV    EKG:  NSR    Significant Labs:  BNP 84, troponin <0.02, Dimer 5.29, hgb 12.7    CTA chest:  No pulmonary embolism. Post-procedural changes. Right pleural " effusion and atelectasis. Chronic lung changes. Coronary arterial calcifications.         The patient was transferred to [facility] on 02/17/2020 for further evaluation and observation.         Review of Systems   Constitution: Negative for diaphoresis.   Cardiovascular: Positive for chest pain. Negative for dyspnea on exertion, leg swelling, palpitations and syncope.   Respiratory: Positive for shortness of breath.    Gastrointestinal: Negative for nausea and vomiting.   Neurological: Negative for headaches.   All other systems reviewed and are negative.        Personal History     Past Medical History:   Past Medical History:   Diagnosis Date   • Allergic rhinitis 7/25/2013    Overview:  4/2/2018 - still zyrtec 10 daily (if stops, gets dermatitis again).    • Anxiety and depression 6/5/2012    Overview:  4/2/2018 -   C/w well 300 xr and Lito 20.  4/8/2019 -   Doing ok even with new lung cancer - lito 20 & well 300xr.    • Chronic pansinusitis 4/8/2019    Overview:  4/8/2019 -   MRI showed chronic thick in frontal, maxillary, ethmoidal ---> to Dr. COLLAZO to est since abx ICC didn't help.  Does netipot bid.    • Diastolic CHF (CMS/HCC) 7/9/2014    Overview:  7/4/14 - impaired LV relaxation on [place] ECHO.  EF 60%. Rest normal   • Dupuytren's contracture of both hands    • Elevated cholesterol    • Erosive esophagitis 7/9/2019    Overview:  EGD 2016 4/2/2018 - nexium OTC daily for few week.  Qod before that.  Now carbonation hurts, epigastric pain 4/8/2019 -   protonix 40 doing well.    • Gastroesophageal reflux disease 2/21/2019   • Hiatal hernia 7/9/2019   • History of colon polyps    • Hypertension    • Lung cancer (CMS/HCC)     right lung and in lymph nodes x2   • Mediastinal lymphadenopathy 3/12/2019   • Normocytic anemia 8/8/2019    Overview:  6/2019 - dropped to 9's postop 7/2019 - rebounded to 10's.  8/8/2019 -   Back to 9's - check ferritin, b12 and thyroid.    • Prediabetes 7/9/2014    Overview:  7/4/14 -  a1c 6.1 at [place] admission   • Retention of urine 6/27/2019       Surgical History:      Past Surgical History:   Procedure Laterality Date   • BRONCHOSCOPY  03/18/2019    EBUS MONTERO NEEDLE BX   • COLONOSCOPY     • DUPUYTREN CONTRACTURE RELEASE Bilateral    • LUNG BIOPSY  03/08/2019    CT GUIDED   • LUNG REMOVAL, PARTIAL Right     right lower   • PORTACATH PLACEMENT  03/20/2019    DR LONGORIA   • THORACOSCOPY Right 6/26/2019    Procedure: RIGHT VATS, OPEN LOWER LOBECTOMY WITH LYMPH NODE DISECTION, WEDGE RESECTION OF RIGHT MIDDLE LOBE;  Surgeon: Terrance Longoria MD;  Location: [facility];  Service: Cardiothoracic       Family History: family history includes Cancer in his mother and sister; Lung cancer in his father. Otherwise pertinent FHx was reviewed and unremarkable.     Social History:  reports that he quit smoking about 10 years ago. His smoking use included cigarettes. He has never used smokeless tobacco. He reports that he drinks about 2.0 standard drinks of alcohol per week. He reports that he does not use drugs.      Medications:  Prior to Admission medications    Medication Sig Start Date End Date Taking? Authorizing Provider   acetaminophen (TYLENOL) 325 MG tablet Take 2 tablets by mouth Every 4 (Four) Hours As Needed for Mild Pain . 6/28/19   Lance Mooney PA   amLODIPine (NORVASC) 10 MG tablet Take 10 mg by mouth Daily.    Lloyd Rasmussen MD   buPROPion SR (WELLBUTRIN SR) 150 MG 12 hr tablet Take 300 mg by mouth 2 (Two) Times a Day.    Lloyd Rasmussen MD   desvenlafaxine (PRISTIQ) 50 MG 24 hr tablet TAKE 1 TABLET BY MOUTH ONCE DAILY FOR 30 DAYS 12/31/19   Lloyd Rasmussen MD   HYDROcodone-acetaminophen (NORCO) 5-325 MG per tablet Take 1 tablet by mouth As Needed. 7/3/19   Lloyd Rasmussen MD   ibuprofen (ADVIL,MOTRIN) 600 MG tablet Take 1 tablet by mouth Every 6 (Six) Hours As Needed for Mild Pain . 6/28/19   Lance Mooney PA   lidocaine-prilocaine (EMLA)  2.5-2.5 % cream  12/16/19   Lloyd Rasmussen MD   LORazepam (ATIVAN) 0.5 MG tablet TAKE 1 TABLET BY MOUTH TWICE DAILY AS NEEDED FOR ANXIETY MAX OF 2 DAY 10/7/19   Lloyd Rasmussen MD   metoprolol succinate XL (TOPROL-XL) 100 MG 24 hr tablet Take 100 mg by mouth Daily.    Lloyd Rasmussen MD   ondansetron (ZOFRAN) 8 MG tablet TAKE 1 TABLET BY MOUTH EVERY 8 HOURS AS NEEDED NAUSEA 9/12/19   Lloyd Rasmussen MD   pantoprazole (PROTONIX) 40 MG EC tablet Take 40 mg by mouth Daily.    Lloyd Rasmussen MD   pravastatin (PRAVACHOL) 10 MG tablet Take 10 mg by mouth Daily.    Lloyd Rasmussen MD   ferrous sulfate 325 (65 FE) MG tablet Take 325 mg by mouth Daily With Breakfast.  2/17/20  Lloyd Rasmussen MD   VIIBRYD 40 MG tablet tablet Take 40 mg by mouth Daily. 8/8/19 2/17/20  Lloyd Rasmussen MD       Allergies:  No Known Allergies        Objective   Objective     Vital Signs  Temp:  [97.5 °F (36.4 °C)-97.8 °F (36.6 °C)] 97.8 °F (36.6 °C)  Heart Rate:  [93-94] 93  Resp:  [16-17] 16  BP: (108-120)/(68-74) 120/74  SpO2:  [95 %-97 %] 97 %  on  Flow (L/min):  [2.5] 2.5;   Device (Oxygen Therapy): room air  Body mass index is 24.92 kg/m².    Physical Exam   Constitutional: He is oriented to person, place, and time. He appears well-developed and well-nourished.   HENT:   Head: Normocephalic and atraumatic.   Mouth/Throat: Oropharynx is clear and moist.   Eyes: Pupils are equal, round, and reactive to light. Conjunctivae and EOM are normal.   Neck: Normal range of motion. Neck supple.   Cardiovascular: Normal rate, regular rhythm, normal heart sounds and intact distal pulses.   Pulmonary/Chest: Effort normal and breath sounds normal.   RLL breath sounds absent (h/o lobectomy)  O2 @ 2 L per NC   Abdominal: Soft. Bowel sounds are normal. He exhibits no distension. There is no tenderness.   Musculoskeletal: Normal range of motion. He exhibits no edema.   Neurological: He is alert and oriented to  person, place, and time.   Skin: Skin is warm and dry. Capillary refill takes less than 2 seconds.   Psychiatric: He has a normal mood and affect. His behavior is normal. Judgment and thought content normal.   Vitals reviewed.      Results Review:  I have personally reviewed most recent cardiac tracings, lab results and radiology images and interpretations and agree with findings.    Estimated Creatinine Clearance: 122.7 mL/min (by C-G formula based on SCr of 0.87 mg/dL).  Brief Urine Lab Results  (Last result in the past 365 days)      Color   Clarity   Blood   Leuk Est   Nitrite   Protein   CREAT   Urine HCG        06/19/19 1634 Yellow Clear Negative Negative Negative Negative               Microbiology Results (last 10 days)     ** No results found for the last 240 hours. **          ECG/EMG Results (most recent)     Procedure Component Value Units Date/Time    ECG 12 Lead [[id number]] Collected:  02/17/20 1104     Updated:  02/17/20 1105    Narrative:       HEART RATE= 90  bpm  RR Interval= 668  ms  IA Interval= 136  ms  P Horizontal Axis= 58  deg  P Front Axis= 34  deg  QRSD Interval= 89  ms  QT Interval= 363  ms  QRS Axis= 45  deg  T Wave Axis= 34  deg  - NORMAL ECG -  Sinus rhythm  Electronically Signed By:   Date and Time of Study: 2020-02-17 11:04:09            No radiology results for the last 7 days      Estimated Creatinine Clearance: 122.7 mL/min (by C-G formula based on SCr of 0.87 mg/dL).    Assessment/Plan   Assessment/Plan       Active Hospital Problems    Diagnosis  POA   • **Acute chest pain [R07.9]  Yes   • Chronic pansinusitis [J32.4]  Yes   • Non-small cell carcinoma of lung, right (CMS/HCC) [C34.91]  Yes   • Gastroesophageal reflux disease [K21.9]  Yes   • Hyperlipidemia [E78.5]  Yes   • Anxiety and depression [F41.9, F32.9]  Yes   • Hypertension [I10]  Yes      Resolved Hospital Problems   No resolved problems to display.         Acute chest pain  -r/o ACS, suspect musculoskeletal   -initial  "troponin at [facility] <0.02; check serial troponin  -EKG at [facility] NSR  -Dimer elevated, but CTA chest at [facility] negative for PE  -stress Myoview given coronary arterial calcifications noted on CTA chest done at [facility]  -continuous cardiac monitoring  -given aspirin 324 mg PO and 0.5\" nitro paste at [facility]  -Toradol 30 mg IV now x 1, then 15 mg q6h PRN  -Ketamine PRN    Anxiety and depression  -continue home meds once list verified    Chronic pansinusitis, allergic rhinitis   -patient has previously been referred to ENT for possible surgical treatment    Gastroesophageal reflux disease  -continue PPI    Hypertension, chronic, controlled  -continue home meds once list verified  -monitor BP    Hyperlipidemia  -continue home meds once list verified    Non-small cell carcinoma of lung, right / H/o right lower lobectomy  -previously completed chemotherapy and radiation  -now on immunotherapy every other Monday (due today)  -followed as outpatient by oncology, Dr. Gaspar    Former tobacco abuse        VTE Prophylaxis -   Mechanical Order History:     None      Pharmalogical Order History:     Ordered     Dose Route Frequency Stop    02/17/20 0858  enoxaparin (LOVENOX) syringe 40 mg      40 mg SC Every 24 Hours --          CODE STATUS:    Code Status and Medical Interventions:   Ordered at: 02/17/20 0858     Code Status:    CPR     Medical Interventions (Level of Support Prior to Arrest):    Full       Admission Status:  I believe this patient meets observation criteria.      I discussed the patient's findings and my recommendations with patient.        Electronically signed by MULUGETA Mayorga, 02/17/20, 9:11 AM.  [facility] Hospitalist Team      I have reviewed the notes, assessments, and/or procedures performed by nurse practitioner Janeen, I concur with her/his documentation of Jc Driscoll.    I have personally seen and examined patient today.  Patient has history of lung cancer and now with chest pain.  Patient will be kept in the " hospital will be ruled out for MI as well as we will do further evaluation.  Patient will be managed as outlined above in the problem list.

## 2020-02-17 NOTE — TELEPHONE ENCOUNTER
Patient's wife called and said that she took the patient to the ER this morning and he was being admitted so he would not be here for his infusion appt.

## 2020-02-17 NOTE — PROGRESS NOTES
PA Student Note     Primary Care : Reshma Alvarenga MD    CHIEF COMPLAINT: Chest pain    HISTORY OF PRESENT ILLNESS:    Jc Driscoll is a 53 yo male with a history of non-small cell lung cancer s/p lobectomy with chemotherapy and radiation, anxiety and depression, chronic pansinusitis, allergies, hyperlipidemia, GERD, and hypertension. He presents to [facility] today from [facility]. He states that he began having severe pain in his left sided chest and shoulder muscles which started Saturday night (2/15). He states that he recently returned from vacation and was feeling fine. Upon returning home Friday, he began to work in his garage on a car, which involved lifting and pushing significant weight. He states that he first noticed pain in his left shoulder region Friday and Saturday. However, after working on his car on Sunday evening, his pain became significantly worse. This prompted him to go to the ED at [facility] early this AM (2/17). He describes the pain as a sharp, stabbing, and cramping pain. The pain did not radiate to his arms or face, but was associated with some shortness of breath and difficulty breathing deeply. He denies any associated symptoms of abdominal pain, leg swelling, nausea, or vomiting. He states that he has been in his normal state of health without fevers or acute sick feelings such as fever or congestion. He states that he does have a mild cough with his immunotherapy for his lung cancer, but says that this is not significant.     The patient states that he is a former smoker (last smoked 2009).     In the ED at [facility]: EKG NSR, BNP 84, troponin <0.02, Dimer 5.29, hgb 12.7, CTA chest: No pulmonary embolism. Post-procedural changes. Right pleural effusion and atelectasis. Chronic lung changes. Coronary arterial calcifications.     2/17: Patient was seen at bedside today. He states that his pain has decreased significantly since being given Toradol. He  denies any acute changes or worsening chest pain. Labs remarkable for Hg 12.2 and Hct 35.4. Troponin continues to be negative.     Past Medical History:   Diagnosis Date   • Allergic rhinitis 7/25/2013    Overview:  4/2/2018 - still zyrtec 10 daily (if stops, gets dermatitis again).    • Anxiety and depression 6/5/2012    Overview:  4/2/2018 -   C/w well 300 xr and Lito 20.  4/8/2019 -   Doing ok even with new lung cancer - lito 20 & well 300xr.    • Chronic pansinusitis 4/8/2019    Overview:  4/8/2019 -   MRI showed chronic thick in frontal, maxillary, ethmoidal ---> to Dr. COLLAZO to est since abx ICC didn't help.  Does netipot bid.    • Diastolic CHF (CMS/HCC) 7/9/2014    Overview:  7/4/14 - impaired LV relaxation on [facility].  EF 60%. Rest normal   • Dupuytren's contracture of both hands    • Elevated cholesterol    • Erosive esophagitis 7/9/2019    Overview:  EGD 2016 4/2/2018 - nexium OTC daily for few week.  Qod before that.  Now carbonation hurts, epigastric pain 4/8/2019 -   protonix 40 doing well.    • Gastroesophageal reflux disease 2/21/2019   • Hiatal hernia 7/9/2019   • History of colon polyps    • Hypertension    • Lung cancer (CMS/HCC)     right lung and in lymph nodes x2   • Mediastinal lymphadenopathy 3/12/2019   • Normocytic anemia 8/8/2019    Overview:  6/2019 - dropped to 9's postop 7/2019 - rebounded to 10's.  8/8/2019 -   Back to 9's - check ferritin, b12 and thyroid.    • Prediabetes 7/9/2014    Overview:  7/4/14 - a1c 6.1 at [facility] admission   • Retention of urine 6/27/2019       Past Surgical History:   Procedure Laterality Date   • BRONCHOSCOPY  03/18/2019    EBUS MONTERO NEEDLE BX   • COLONOSCOPY     • DUPUYTREN CONTRACTURE RELEASE Bilateral    • LUNG BIOPSY  03/08/2019    CT GUIDED   • LUNG REMOVAL, PARTIAL Right     right lower   • PORTACATH PLACEMENT  03/20/2019    DR LONGORIA   • THORACOSCOPY Right 6/26/2019    Procedure: RIGHT VATS, OPEN LOWER LOBECTOMY WITH LYMPH NODE DISECTION, WEDGE  RESECTION OF RIGHT MIDDLE LOBE;  Surgeon: Terrance Fuchs MD;  Location: [facility] MAIN OR;  Service: Cardiothoracic       Family History   Problem Relation Age of Onset   • Cancer Sister    • Cancer Mother    • Lung cancer Father        Social History     Tobacco Use   • Smoking status: Former Smoker     Types: Cigarettes     Last attempt to quit: 9/5/2009     Years since quitting: 10.4   • Smokeless tobacco: Never Used   Substance Use Topics   • Alcohol use: Yes     Alcohol/week: 2.0 standard drinks     Types: 2 Cans of beer per week     Frequency: Monthly or less     Drinks per session: 1 or 2     Comment: Occasional   • Drug use: No       Medications Prior to Admission   Medication Sig Dispense Refill Last Dose   • amLODIPine (NORVASC) 10 MG tablet Take 10 mg by mouth Daily.   2/16/2020 at Unknown time   • buPROPion SR (WELLBUTRIN SR) 150 MG 12 hr tablet Take 300 mg by mouth 2 (Two) Times a Day.   2/16/2020 at Unknown time   • cetirizine (zyrTEC) 10 MG tablet Take 10 mg by mouth Daily.   2/16/2020 at Unknown time   • desvenlafaxine (PRISTIQ) 50 MG 24 hr tablet TAKE 1 TABLET BY MOUTH ONCE DAILY FOR 30 DAYS   2/16/2020 at Unknown time   • HYDROcodone-acetaminophen (NORCO) 5-325 MG per tablet Take 1 tablet by mouth As Needed.   2/16/2020 at Unknown time   • LORazepam (ATIVAN) 0.5 MG tablet TAKE 1 TABLET BY MOUTH TWICE DAILY AS NEEDED FOR ANXIETY MAX OF 2 DAY  0 2/16/2020 at Unknown time   • metoprolol succinate XL (TOPROL-XL) 100 MG 24 hr tablet Take 100 mg by mouth Daily.   2/16/2020 at Unknown time   • pantoprazole (PROTONIX) 40 MG EC tablet Take 40 mg by mouth Daily.   2/16/2020 at Unknown time   • pravastatin (PRAVACHOL) 10 MG tablet Take 10 mg by mouth Daily.   2/16/2020 at Unknown time   • acetaminophen (TYLENOL) 325 MG tablet Take 2 tablets by mouth Every 4 (Four) Hours As Needed for Mild Pain .   Unknown at Unknown time   • ibuprofen (ADVIL,MOTRIN) 600 MG tablet Take 1 tablet by mouth Every 6 (Six) Hours As  Needed for Mild Pain .   Unknown at Unknown time   • lidocaine-prilocaine (EMLA) 2.5-2.5 % cream    Unknown at Unknown time   • ondansetron (ZOFRAN) 8 MG tablet TAKE 1 TABLET BY MOUTH EVERY 8 HOURS AS NEEDED NAUSEA  3 Not Taking       Allergies:  Patient has no known allergies.      There is no immunization history on file for this patient.    REVIEW OF SYSTEMS:   Review of Systems   Constitution: Negative for decreased appetite, fever, malaise/fatigue, weight gain and weight loss.   HENT: Negative for congestion.    Cardiovascular: Positive for chest pain. Negative for leg swelling and palpitations.   Respiratory: Positive for cough and shortness of breath.    Gastrointestinal: Negative for abdominal pain, constipation, diarrhea, nausea and vomiting.       Vital Signs  Temp:  [97.5 °F (36.4 °C)-97.8 °F (36.6 °C)] 97.8 °F (36.6 °C)  Heart Rate:  [93-94] 93  Resp:  [16-17] 16  BP: (108-120)/(68-74) 120/74         Results Review:      Lab Results (most recent)     Procedure Component Value Units Date/Time    Troponin [[id number]]  (Normal) Collected:  02/17/20 1429    Specimen:  Blood Updated:  02/17/20 1526     Troponin T <0.010 ng/mL     Narrative:       Troponin T Reference Range:  <= 0.03 ng/mL-   Negative for AMI  >0.03 ng/mL-     Abnormal for myocardial necrosis.  Clinicians would have to utilize clinical acumen, EKG, Troponin and serial changes to determine if it is an Acute Myocardial Infarction or myocardial injury due to an underlying chronic condition.       Results may be falsely decreased if patient taking Biotin.      CBC (No Diff) [[id number]]  (Abnormal) Collected:  02/17/20 1101    Specimen:  Blood Updated:  02/17/20 1135     WBC 10.30 10*3/mm3      RBC 3.76 10*6/mm3      Hemoglobin 12.2 g/dL      Hematocrit 35.4 %      MCV 94.2 fL      MCH 32.5 pg      MCHC 34.5 g/dL      RDW 12.6 %      RDW-SD 42.0 fl      MPV 7.1 fL      Platelets 243 10*3/mm3     Comprehensive Metabolic Panel [[id number]]  (Abnormal)  Collected:  02/17/20 1013    Specimen:  Blood Updated:  02/17/20 1111     Glucose 145 mg/dL      BUN 12 mg/dL      Creatinine 0.87 mg/dL      Sodium 134 mmol/L      Potassium 4.3 mmol/L      Chloride 96 mmol/L      CO2 26.0 mmol/L      Calcium 9.0 mg/dL      Total Protein 6.6 g/dL      Albumin 4.10 g/dL      ALT (SGPT) 16 U/L      AST (SGOT) 17 U/L      Alkaline Phosphatase 74 U/L      Total Bilirubin 0.6 mg/dL      eGFR Non African Amer 91 mL/min/1.73      Globulin 2.5 gm/dL      A/G Ratio 1.6 g/dL      BUN/Creatinine Ratio 13.8     Anion Gap 12.0 mmol/L     Narrative:       GFR Normal >60  Chronic Kidney Disease <60  Kidney Failure <15      Troponin [[id number]]  (Normal) Collected:  02/17/20 1013    Specimen:  Blood Updated:  02/17/20 1056     Troponin T <0.010 ng/mL     Narrative:       Troponin T Reference Range:  <= 0.03 ng/mL-   Negative for AMI  >0.03 ng/mL-     Abnormal for myocardial necrosis.  Clinicians would have to utilize clinical acumen, EKG, Troponin and serial changes to determine if it is an Acute Myocardial Infarction or myocardial injury due to an underlying chronic condition.       Results may be falsely decreased if patient taking Biotin.            Imaging Results (Most Recent)     None            ECG/EMG Results (most recent)     Procedure Component Value Units Date/Time    ECG 12 Lead [[id number]] Collected:  02/17/20 1104     Updated:  02/17/20 1105    Narrative:       HEART RATE= 90  bpm  RR Interval= 668  ms  NJ Interval= 136  ms  P Horizontal Axis= 58  deg  P Front Axis= 34  deg  QRSD Interval= 89  ms  QT Interval= 363  ms  QRS Axis= 45  deg  T Wave Axis= 34  deg  - NORMAL ECG -  Sinus rhythm  Electronically Signed By:   Date and Time of Study: 2020-02-17 11:04:09      Physical Exam   Constitutional: He is oriented to person, place, and time. He appears well-developed and well-nourished. No distress.   HENT:   Head: Normocephalic and atraumatic.   Eyes: Conjunctivae are normal.   Neck: No  JVD present.   Cardiovascular: Normal rate, regular rhythm and normal heart sounds. Exam reveals no gallop and no friction rub.   No murmur heard.  Pulmonary/Chest: Effort normal and breath sounds normal. He has no wheezes. He has no rales. He exhibits no tenderness.   Abdominal: Soft. Bowel sounds are normal. There is no tenderness. There is no guarding.   Musculoskeletal: He exhibits no tenderness.   Tightness of shoulder and left pectoralis muscles   Lymphadenopathy:     He has no cervical adenopathy.   Neurological: He is alert and oriented to person, place, and time.   Skin: Skin is warm and dry. He is not diaphoretic.         Assessment/Plan     Active Hospital Problems    Diagnosis  POA   • **Acute chest pain [R07.9]  Yes   • Chronic pansinusitis [J32.4]  Yes   • Non-small cell carcinoma of lung, right (CMS/HCC) [C34.91]  Yes   • Gastroesophageal reflux disease [K21.9]  Yes   • Hyperlipidemia [E78.5]  Yes   • Anxiety and depression [F41.9, F32.9]  Yes   • Hypertension [I10]  Yes      Resolved Hospital Problems   No resolved problems to display.       1. Left shoulder/chest pain  The patient's labs, imaging, and ECG are reassuring that his pain is not caused by PE or cardiac ischemia. PE ruled out with CTA chest. His significant pain improvement with NSAIDs, history of exacerbation with physical use of localized shoulder/pectoralis muscles, and muscle tightness on exam make muscle strain most likely. However, due to calcification of coronary arteries seen on chest CT, along with a history of HTN and HLD, stress test was completed. Awaiting results.   --Continue to trend troponin  --Continue cardiac monitoring   --Continue ketorolac, with transition to naproxen outpatient     2. Anxiety/Depression  Patient endorses a history of worsening anxiety and depression with his cancer diagnosis. Well-controlled with medication.  --Continue bupropion   --Continue desvenlafaxine     3. GERD  Stable.  --Continue  pantoprazole     4. Allergies/pansinusitis   Patient states that he has a history of severe allergies and recurrent sinus infections. Sinus surgery was recommended, but denied by insurance per patient. He takes OTC cetirizine as needed.  --Continue cetrizine     5. Hypertension  BP stable.   --Continue metoprolol   --Continue amlodipine     6. Hyperlipidemia   April 2019.   --Continue atorvastatin   --Evaluate lipid panel.     7. History of non-small cell lung cancer of right lower lobe, s/p RLL lobectomy  Patient has a history of chemotherapy and radiation. He follows with outpatient oncology Dr. Gaspar and receives immunotherapy every other Monday.   --Follow-up with Dr. Gaspar     Signed,   CAROLINA ChilelS

## 2020-02-17 NOTE — PLAN OF CARE
Problem: Patient Care Overview  Goal: Plan of Care Review  Outcome: Ongoing (interventions implemented as appropriate)  Flowsheets (Taken 2/17/2020 5727)  Progress: improving  Plan of Care Reviewed With: patient  Outcome Summary: new admit from [facility]; a/o x3; remains on room air; IV pain medicine given x2, effective; myoview results negative; will continue to monitor

## 2020-02-18 VITALS
WEIGHT: 199 LBS | RESPIRATION RATE: 16 BRPM | BODY MASS INDEX: 24.74 KG/M2 | HEIGHT: 75 IN | TEMPERATURE: 98 F | HEART RATE: 92 BPM | OXYGEN SATURATION: 92 % | DIASTOLIC BLOOD PRESSURE: 70 MMHG | SYSTOLIC BLOOD PRESSURE: 105 MMHG

## 2020-02-18 LAB
ANION GAP SERPL CALCULATED.3IONS-SCNC: 12 MMOL/L (ref 5–15)
BASOPHILS # BLD AUTO: 0.1 10*3/MM3 (ref 0–0.2)
BASOPHILS NFR BLD AUTO: 0.9 % (ref 0–1.5)
BUN BLD-MCNC: 11 MG/DL (ref 6–20)
BUN/CREAT SERPL: 11.7 (ref 7–25)
CALCIUM SPEC-SCNC: 8.9 MG/DL (ref 8.6–10.5)
CHLORIDE SERPL-SCNC: 97 MMOL/L (ref 98–107)
CO2 SERPL-SCNC: 28 MMOL/L (ref 22–29)
CREAT BLD-MCNC: 0.94 MG/DL (ref 0.76–1.27)
DEPRECATED RDW RBC AUTO: 42.9 FL (ref 37–54)
EOSINOPHIL # BLD AUTO: 0.2 10*3/MM3 (ref 0–0.4)
EOSINOPHIL NFR BLD AUTO: 2.8 % (ref 0.3–6.2)
ERYTHROCYTE [DISTWIDTH] IN BLOOD BY AUTOMATED COUNT: 13 % (ref 12.3–15.4)
GFR SERPL CREATININE-BSD FRML MDRD: 83 ML/MIN/1.73
GLUCOSE BLD-MCNC: 121 MG/DL (ref 65–99)
HCT VFR BLD AUTO: 34.7 % (ref 37.5–51)
HGB BLD-MCNC: 12 G/DL (ref 13–17.7)
LYMPHOCYTES # BLD AUTO: 0.7 10*3/MM3 (ref 0.7–3.1)
LYMPHOCYTES NFR BLD AUTO: 9 % (ref 19.6–45.3)
MAGNESIUM SERPL-MCNC: 1.9 MG/DL (ref 1.6–2.6)
MCH RBC QN AUTO: 32.4 PG (ref 26.6–33)
MCHC RBC AUTO-ENTMCNC: 34.5 G/DL (ref 31.5–35.7)
MCV RBC AUTO: 94 FL (ref 79–97)
MONOCYTES # BLD AUTO: 1.2 10*3/MM3 (ref 0.1–0.9)
MONOCYTES NFR BLD AUTO: 14.8 % (ref 5–12)
NEUTROPHILS # BLD AUTO: 5.7 10*3/MM3 (ref 1.7–7)
NEUTROPHILS NFR BLD AUTO: 72.5 % (ref 42.7–76)
NRBC BLD AUTO-RTO: 0 /100 WBC (ref 0–0.2)
PLATELET # BLD AUTO: 237 10*3/MM3 (ref 140–450)
PMV BLD AUTO: 7.1 FL (ref 6–12)
POTASSIUM BLD-SCNC: 4.2 MMOL/L (ref 3.5–5.2)
RBC # BLD AUTO: 3.69 10*6/MM3 (ref 4.14–5.8)
SODIUM BLD-SCNC: 137 MMOL/L (ref 136–145)
WBC NRBC COR # BLD: 7.8 10*3/MM3 (ref 3.4–10.8)

## 2020-02-18 PROCEDURE — 85025 COMPLETE CBC W/AUTO DIFF WBC: CPT | Performed by: NURSE PRACTITIONER

## 2020-02-18 PROCEDURE — 99217 PR OBSERVATION CARE DISCHARGE MANAGEMENT: CPT | Performed by: INTERNAL MEDICINE

## 2020-02-18 PROCEDURE — G0378 HOSPITAL OBSERVATION PER HR: HCPCS

## 2020-02-18 PROCEDURE — 83735 ASSAY OF MAGNESIUM: CPT | Performed by: NURSE PRACTITIONER

## 2020-02-18 PROCEDURE — 80048 BASIC METABOLIC PNL TOTAL CA: CPT | Performed by: NURSE PRACTITIONER

## 2020-02-18 RX ADMIN — CETIRIZINE HYDROCHLORIDE 10 MG: 10 TABLET, FILM COATED ORAL at 08:14

## 2020-02-18 RX ADMIN — Medication 10 ML: at 08:15

## 2020-02-18 RX ADMIN — AMLODIPINE BESYLATE 10 MG: 5 TABLET ORAL at 08:14

## 2020-02-18 RX ADMIN — DESVENLAFAXINE 50 MG: 50 TABLET, FILM COATED, EXTENDED RELEASE ORAL at 08:14

## 2020-02-18 RX ADMIN — METOPROLOL SUCCINATE 100 MG: 50 TABLET, EXTENDED RELEASE ORAL at 08:14

## 2020-02-18 RX ADMIN — PANTOPRAZOLE SODIUM 40 MG: 40 TABLET, DELAYED RELEASE ORAL at 08:14

## 2020-02-18 NOTE — PROGRESS NOTES
[facility] Medicine Services  DISCHARGE SUMMARY        Prepared For PCP:  Reshma Alvarenga MD    Patient Name: Jc Driscoll  : 1965  MRN: [id number]      Date of Admission:   2020    Date of Discharge:  2020    Length of stay:  LOS: 0 days     Hospital Course     Presenting Problem:   Acute chest pain [R07.9]      Active Hospital Problems    Diagnosis  POA   • **Acute chest pain [R07.9]  Yes   • Chronic pansinusitis [J32.4]  Yes   • Non-small cell carcinoma of lung, right (CMS/HCC) [C34.91]  Yes   • Gastroesophageal reflux disease [K21.9]  Yes   • Hyperlipidemia [E78.5]  Yes   • Anxiety and depression [F41.9, F32.9]  Yes   • Hypertension [I10]  Yes      Resolved Hospital Problems   No resolved problems to display.           Hospital Course:  Jc Driscoll is a 54 y.o. male with a past medical history of non-small cell lung cancer s/p lobectomy with chemotherapy and radiation, anxiety and depression, chronic pansinusitis, allergies, hyperlipidemia, GERD, and hypertension who presented to [facility] on  from [facility]. He presented for worsening chest pain which started on the evening of . The pain was described as a sharp, stabbing, and cramping pain. He was denying any associated symptoms of abdominal pain, leg swelling, nausea, or vomiting. He stated that he had been in his normal state of health without fevers or acute sick feelings such as fever or congestion. He stated that he did have a mild cough with his immunotherapy for his lung cancer, but said that this was not significant. The patient is a former smoker who quit smoking in .      In the ED at [facility]: EKG NSR, BNP 84, troponin <0.02, Dimer 5.29, hgb 12.7, CTA chest: No pulmonary embolism. Post-procedural changes. Right pleural effusion and atelectasis. Chronic lung changes. Coronary arterial calcifications.      The patient was transferred to [facility] for further work-up and continuous cardiac monitoring due to calcification of coronary arteries seen on CT. Troponin continued to trend negatively. CBC with diff, CMP, TSH, and magnesium were largely unremarkable. ECG showed NSR. A stress test was completed and was unremarkable. Musculoskeletal cause was suspected, and ACS was ruled out. Patient was started on Toradol, which remarkably improved his symptoms. His acute chest pain was determined to be caused by muscle strain/costochondritis.   Home medications were continued throughout his stay.       Recommendation for Outpatient Providers:     Follow-up as needed. Careful physical activity and rest to prevent recurrent injury was suggested to the patient.     Reasons For Change In Medications and Indications for New Medications:    Patient recommended to take acetaminophen/ibuprofen as needed.     Day of Discharge     HPI:   Patient was seen at bedside. He endorses mild continued shoulder/chest pain, but is not in acute distress. He was informed of the results of his stress test, and was comfortable returning home.       Vital Signs:   Temp:  [97.6 °F (36.4 °C)-98.8 °F (37.1 °C)] 98 °F (36.7 °C)  Heart Rate:  [87-98] 92  Resp:  [15-18] 16  BP: (105-118)/(70-73) 105/70     Physical Exam:  Physical Exam   Constitutional: He is oriented to person, place, and time. He appears well-developed and well-nourished.   HENT:   Head: Normocephalic and atraumatic.   Eyes: Conjunctivae are normal.   Cardiovascular: Normal rate, regular rhythm and normal heart sounds. Exam reveals no gallop and no friction rub.   No murmur heard.  Pulmonary/Chest: Effort normal and breath sounds normal. He has no wheezes. He has no rales. He exhibits tenderness.   Abdominal: Soft. Bowel sounds are normal. He exhibits no mass. There is no tenderness. There is no guarding.   Neurological: He is alert and oriented to person, place, and time.   Skin: Skin is warm and dry.   Psychiatric:  He has a normal mood and affect.       Pertinent  and/or Most Recent Results     Results from last 7 days   Lab Units 02/18/20  0328 02/17/20  1101 02/17/20  1013   WBC 10*3/mm3 7.80 10.30  --    HEMOGLOBIN g/dL 12.0* 12.2*  --    HEMATOCRIT % 34.7* 35.4*  --    PLATELETS 10*3/mm3 237 243  --    SODIUM mmol/L 137  --  134*   POTASSIUM mmol/L 4.2  --  4.3   CHLORIDE mmol/L 97*  --  96*   CO2 mmol/L 28.0  --  26.0   BUN mg/dL 11  --  12   CREATININE mg/dL 0.94  --  0.87   GLUCOSE mg/dL 121*  --  145*   CALCIUM mg/dL 8.9  --  9.0     Results from last 7 days   Lab Units 02/17/20  1013   BILIRUBIN mg/dL 0.6   ALK PHOS U/L 74   ALT (SGPT) U/L 16   AST (SGOT) U/L 17           Invalid input(s): TG, LDLCALC, LDLREALC  Results from last 7 days   Lab Units 02/17/20  1429 02/17/20  1013   TROPONIN T ng/mL <0.010 <0.010       Brief Urine Lab Results  (Last result in the past 365 days)      Color   Clarity   Blood   Leuk Est   Nitrite   Protein   CREAT   Urine HCG        06/19/19 1634 Yellow Clear Negative Negative Negative Negative               Microbiology Results Abnormal     None                                      Test Results Pending at Discharge  None      Procedures Performed  Stress test 2/17 normal         Consults:   Consults     No orders found for last 30 day(s).            Discharge Details        Discharge Medications      Continue These Medications      Instructions Start Date   acetaminophen 325 MG tablet  Commonly known as:  TYLENOL   650 mg, Oral, Every 4 Hours PRN      amLODIPine 10 MG tablet  Commonly known as:  NORVASC   10 mg, Oral, Daily      buPROPion  MG 12 hr tablet  Commonly known as:  WELLBUTRIN SR   300 mg, Oral, 2 Times Daily      cetirizine 10 MG tablet  Commonly known as:  zyrTEC   10 mg, Oral, Daily      desvenlafaxine 50 MG 24 hr tablet  Commonly known as:  PRISTIQ   TAKE 1 TABLET BY MOUTH ONCE DAILY FOR 30 DAYS      ibuprofen 600 MG tablet  Commonly known as:  ADVIL,MOTRIN   600 mg,  Oral, Every 6 Hours PRN      lidocaine-prilocaine 2.5-2.5 % cream  Commonly known as:  EMLA   No dose, route, or frequency recorded.      LORazepam 0.5 MG tablet  Commonly known as:  ATIVAN   TAKE 1 TABLET BY MOUTH TWICE DAILY AS NEEDED FOR ANXIETY MAX OF 2 DAY      metoprolol succinate  MG 24 hr tablet  Commonly known as:  TOPROL-XL   100 mg, Oral, Daily      NORCO 5-325 MG per tablet  Generic drug:  HYDROcodone-acetaminophen   1 tablet, Oral, As Needed      ondansetron 8 MG tablet  Commonly known as:  ZOFRAN   TAKE 1 TABLET BY MOUTH EVERY 8 HOURS AS NEEDED NAUSEA      pantoprazole 40 MG EC tablet  Commonly known as:  PROTONIX   40 mg, Oral, Daily      pravastatin 10 MG tablet  Commonly known as:  PRAVACHOL   10 mg, Oral, Daily             No Known Allergies      Discharge Disposition:  Home or Self Care    Diet:  Hospital:  Diet Order   Procedures   • Diet Cardiac; Healthy Heart         Discharge Activity:   No restrictions      CODE STATUS:    Code Status and Medical Interventions:   Ordered at: 02/17/20 0858     Code Status:    CPR     Medical Interventions (Level of Support Prior to Arrest):    Full         Follow-up Appointments  Future Appointments   Date Time Provider Department Center   3/2/2020  9:30 AM INF ROOM 21 - CHAIR A  [facility]   3/9/2020  3:30 PM Azucena Gaspar MD [facility] NA [facility]   3/9/2020  3:30 PM LAB MD [facility] LAB NA  [facility]       [unfilled]      Condition on Discharge:      Stable          Electronically signed by GURDEEP Chilel Student, 02/18/20, 2:24 PM.    Time: I spent  30  minutes on this discharge activity which included face-to-face encounter with the patient/reviewing the data in the system/coordination of the care with the nursing staff as well as consultants/documentation/entering orders.

## 2020-02-18 NOTE — PROGRESS NOTES
Discharge Planning Assessment  [facility]     Patient Name: Jc Driscoll  MRN: [id number]  Today's Date: 2/18/2020    Admit Date: 2/17/2020    Discharge Needs Assessment     Row Name 02/18/20 0912       Living Environment    Lives With  spouse    Current Living Arrangements  home/apartment/condo    Primary Care Provided by  self    Provides Primary Care For  no one    Family Caregiver if Needed  spouse    Able to Return to Prior Arrangements  yes       Resource/Environmental Concerns    Resource/Environmental Concerns  none    Transportation Concerns  car, none       Transition Planning    Patient/Family Anticipates Transition to  home with family    Patient/Family Anticipated Services at Transition  none    Transportation Anticipated  car, drives self;family or friend will provide       Discharge Needs Assessment    Readmission Within the Last 30 Days  no previous admission in last 30 days    Concerns to be Addressed  no discharge needs identified;denies needs/concerns at this time    Equipment Currently Used at Home  none    Anticipated Changes Related to Illness  none    Equipment Needed After Discharge  none        Discharge Plan     Row Name 02/18/20 0913       Plan    Plan  Anticipate routine home.     Patient/Family in Agreement with Plan  yes    Plan Comments  Met with patient at bedside who reports no anticipated needs at discharge. Barrier: monitoring cardiac enzymes.         Expected Discharge Date and Time     Expected Discharge Date Expected Discharge Time    Feb 19, 2020         Demographic Summary     Row Name 02/18/20 0912       General Information    Admission Type  observation    Arrived From  home    Referral Source  admission list    Reason for Consult  discharge planning        Functional Status     Row Name 02/18/20 0912       Functional Status    Usual Activity Tolerance  good    Current Activity Tolerance  good       Functional Status, IADL    Medications  independent    Meal Preparation   independent    Housekeeping  independent    Laundry  independent    Shopping  independent       Mental Status    General Appearance WDL  WDL       Mental Status Summary    Recent Changes in Mental Status/Cognitive Functioning  no changes        Janeen Barretorish  [phone]

## 2020-02-18 NOTE — PLAN OF CARE
Problem: Patient Care Overview  Goal: Plan of Care Review  Flowsheets (Taken 2/18/2020 0427)  Progress: improving  Plan of Care Reviewed With: patient  Note:   Patient rested well throughout the night without any complaints.  Will continue to monitor.

## 2020-02-19 ENCOUNTER — READMISSION MANAGEMENT (OUTPATIENT)
Dept: CALL CENTER | Facility: HOSPITAL | Age: 55
End: 2020-02-19

## 2020-02-19 ENCOUNTER — TELEPHONE (OUTPATIENT)
Dept: ONCOLOGY | Facility: CLINIC | Age: 55
End: 2020-02-19

## 2020-02-19 NOTE — DISCHARGE SUMMARY
[facility] Medicine Services  DISCHARGE SUMMARY        Prepared For PCP:  Reshma Alvarenga MD    Patient Name: Jc Driscoll  : 1965  MRN: [id number]      Date of Admission:   2020    Date of Discharge:  2020    Length of stay:  LOS: 0 days     Hospital Course     Presenting Problem:   Acute chest pain [R07.9]      Active Hospital Problems    Diagnosis  POA   • **Acute chest pain [R07.9]  Yes   • Chronic pansinusitis [J32.4]  Yes   • Non-small cell carcinoma of lung, right (CMS/HCC) [C34.91]  Yes   • Gastroesophageal reflux disease [K21.9]  Yes   • Hyperlipidemia [E78.5]  Yes   • Anxiety and depression [F41.9, F32.9]  Yes   • Hypertension [I10]  Yes      Resolved Hospital Problems   No resolved problems to display.           Hospital Course:    Mr. Driscoll is a 54 y.o. male with a history of right lung cancer status post right lower lobectomy, chemotherapy, radiation, and currently on immunotherapy every other Monday (due today), HTN, and HLD. The patient initially presented to the ER at [facility] on 2020 complaining of chest pain. The patient states he started with left shoulder pain and substernal chest pain last night. He describes the pain as sharp and constant. He reports some mild shortness of breath and difficulty taking a deep breath. He denies diaphoresis, dizziness, palpitations, leg swelling, syncope, nausea, or vomiting. He states he was working on a Jeep yesterday and thinks he pulled something in his chest. He states he works as a  and lifts heavy equipment on a daily basis. He states he is normally very active. He denies a history of coronary heart disease or diabetes. He states he had a normal stress test several years ago.      The patient is a former smoker; he quit in . He reports a family history of lung cancer in his father. His mother and sister also have a history of cancer.         Review of records from [facility]  "[facility] 02/17/2020:     Medications given:  Sublingual nitroglycerin x 1, aspirin 324 mg PO, morphine 4 mg IV, Zofran 4 mg IV, Dilaudid 1 mg IV, Ativan 0.5 mg IV, 0.5\" nitro paste, 500 cc normal saline, Dilaudid 1 mg IV     EKG:  NSR     Significant Labs:  BNP 84, troponin <0.02, Dimer 5.29, hgb 12.7     CTA chest:  No pulmonary embolism. Post-procedural changes. Right pleural effusion and atelectasis. Chronic lung changes. Coronary arterial calcifications.         The patient was transferred to [facility] on 02/17/2020 for further evaluation and observation.    Patient underwent stress Myoview came out to be negative and being discharged home today.          Recommendation for Outpatient Providers:             Reasons For Change In Medications and Indications for New Medications:        Day of Discharge     HPI:       Vital Signs:   Temp:  [97.6 °F (36.4 °C)-98.8 °F (37.1 °C)] 98 °F (36.7 °C)  Heart Rate:  [87-92] 92  Resp:  [15-18] 16  BP: (105-115)/(70-73) 105/70     Physical Exam:  Physical Exam   Cardiovascular: Normal rate and regular rhythm.   Pulmonary/Chest: Effort normal and breath sounds normal.   Abdominal: Soft. Bowel sounds are normal.       Pertinent  and/or Most Recent Results     Results from last 7 days   Lab Units 02/18/20  0328 02/17/20  1101 02/17/20  1013   WBC 10*3/mm3 7.80 10.30  --    HEMOGLOBIN g/dL 12.0* 12.2*  --    HEMATOCRIT % 34.7* 35.4*  --    PLATELETS 10*3/mm3 237 243  --    SODIUM mmol/L 137  --  134*   POTASSIUM mmol/L 4.2  --  4.3   CHLORIDE mmol/L 97*  --  96*   CO2 mmol/L 28.0  --  26.0   BUN mg/dL 11  --  12   CREATININE mg/dL 0.94  --  0.87   GLUCOSE mg/dL 121*  --  145*   CALCIUM mg/dL 8.9  --  9.0     Results from last 7 days   Lab Units 02/17/20  1013   BILIRUBIN mg/dL 0.6   ALK PHOS U/L 74   ALT (SGPT) U/L 16   AST (SGOT) U/L 17           Invalid input(s): TG, LDLCALC, LDLREALC  Results from last 7 days   Lab Units 02/17/20  1429 02/17/20  1013   "   TROPONIN T ng/mL <0.010 <0.010       Brief Urine Lab Results  (Last result in the past 365 days)      Color   Clarity   Blood   Leuk Est   Nitrite   Protein   CREAT   Urine HCG        06/19/19 1634 Yellow Clear Negative Negative Negative Negative               Microbiology Results Abnormal     None                                      Test Results Pending at Discharge        Procedures Performed           Consults:   Consults     No orders found for last 30 day(s).            Discharge Details        Discharge Medications      Continue These Medications      Instructions Start Date   acetaminophen 325 MG tablet  Commonly known as:  TYLENOL   650 mg, Oral, Every 4 Hours PRN      amLODIPine 10 MG tablet  Commonly known as:  NORVASC   10 mg, Oral, Daily      buPROPion  MG 12 hr tablet  Commonly known as:  WELLBUTRIN SR   300 mg, Oral, 2 Times Daily      cetirizine 10 MG tablet  Commonly known as:  zyrTEC   10 mg, Oral, Daily      desvenlafaxine 50 MG 24 hr tablet  Commonly known as:  PRISTIQ   TAKE 1 TABLET BY MOUTH ONCE DAILY FOR 30 DAYS      ibuprofen 600 MG tablet  Commonly known as:  ADVIL,MOTRIN   600 mg, Oral, Every 6 Hours PRN      lidocaine-prilocaine 2.5-2.5 % cream  Commonly known as:  EMLA   No dose, route, or frequency recorded.      LORazepam 0.5 MG tablet  Commonly known as:  ATIVAN   TAKE 1 TABLET BY MOUTH TWICE DAILY AS NEEDED FOR ANXIETY MAX OF 2 DAY      metoprolol succinate  MG 24 hr tablet  Commonly known as:  TOPROL-XL   100 mg, Oral, Daily      NORCO 5-325 MG per tablet  Generic drug:  HYDROcodone-acetaminophen   1 tablet, Oral, As Needed      ondansetron 8 MG tablet  Commonly known as:  ZOFRAN   TAKE 1 TABLET BY MOUTH EVERY 8 HOURS AS NEEDED NAUSEA      pantoprazole 40 MG EC tablet  Commonly known as:  PROTONIX   40 mg, Oral, Daily      pravastatin 10 MG tablet  Commonly known as:  PRAVACHOL   10 mg, Oral, Daily             No Known Allergies      Discharge Disposition:  Home or  Self Care    Diet:  Hospital:No active diet order        Discharge Activity:         CODE STATUS:    Code Status and Medical Interventions:   Ordered at: 02/17/20 0858     Code Status:    CPR     Medical Interventions (Level of Support Prior to Arrest):    Full         Follow-up Appointments  Future Appointments   Date Time Provider Department Center   3/2/2020  9:30 AM INF ROOM 21 - CHAIR A  [facility]   3/9/2020  3:30 PM Azucena Gaspar MD [facility] ON[facility] NA [facility]   3/9/2020  3:30 PM LAB MD [facility] ONC LAB NA [facility]             Condition on Discharge:      Stable          Electronically signed by Sam Banegas MD, 02/18/20, 7:41 PM.    Time: I spent  34  minutes on this discharge activity which included face-to-face encounter with the patient/reviewing the data in the system/coordination of the care with the nursing staff as well as consultants/documentation/entering orders.

## 2020-02-19 NOTE — OUTREACH NOTE
Prep Survey      Responses   Facility patient discharged from?  [facility]   Is patient eligible?  Yes   Discharge diagnosis  Acute chest pain    Does the patient have one of the following disease processes/diagnoses(primary or secondary)?  Other   Does the patient have Home health ordered?  No   Is there a DME ordered?  No   Prep survey completed?  Yes          Bere Escalona RN

## 2020-02-21 ENCOUNTER — READMISSION MANAGEMENT (OUTPATIENT)
Dept: CALL CENTER | Facility: HOSPITAL | Age: 55
End: 2020-02-21

## 2020-02-21 NOTE — OUTREACH NOTE
Medical Week 1 Survey      Responses   Facility patient discharged from?  [facility]   Does the patient have one of the following disease processes/diagnoses(primary or secondary)?  Other   Is there a successful TCM telephone encounter documented?  No   Week 1 attempt successful?  Yes   Call start time  1042   Call end time  1045   Discharge diagnosis  Acute chest pain    Meds reviewed with patient/caregiver?  Yes   Does the patient have all medications ordered at discharge?  N/A   Is the patient taking all medications as directed (includes completed medication regime)?  Yes   Does the patient have a primary care provider?   Yes   Does the patient have an appointment with their PCP within 7 days of discharge?  No   What is preventing the patient from scheduling follow up appointments within 7 days of discharge?  Haven't had time   Nursing Interventions  Educated patient on importance of making appointment, Advised patient to make appointment   Has the patient kept scheduled appointments due by today?  N/A   Comments  Patient states he will call his PCP and make an appt.   Has home health visited the patient within 72 hours of discharge?  N/A   Did the patient receive a copy of their discharge instructions?  Yes   Nursing interventions  Reviewed instructions with patient   What is the patient's perception of their health status since discharge?  Improving   Is the patient/caregiver able to teach back the hierarchy of who to call/visit for symptoms/problems? PCP, Specialist, Home health nurse, Urgent Care, ED, 911  Yes   Additional teach back comments  States he is doing well and will call his PCP for an appt.     Week 1 call completed?  Yes   Graduated  Yes   Did the patient feel the follow up calls were helpful during their recovery period?  Yes   Was the number of calls appropriate?  Yes   Graduated/Revoked comments  No questions or needs at this time          Flaquita Morton LPN

## 2020-02-22 PROCEDURE — 93010 ELECTROCARDIOGRAM REPORT: CPT | Performed by: INTERNAL MEDICINE

## 2020-02-24 ENCOUNTER — TELEPHONE (OUTPATIENT)
Dept: ONCOLOGY | Facility: CLINIC | Age: 55
End: 2020-02-24

## 2020-02-24 NOTE — TELEPHONE ENCOUNTER
Recd notice from [facility] that the patient's Alimta copay card was expiring.  Medication list does not have Alimta.  I will allow the card to .

## 2020-03-02 ENCOUNTER — HOSPITAL ENCOUNTER (OUTPATIENT)
Dept: ONCOLOGY | Facility: HOSPITAL | Age: 55
Setting detail: INFUSION SERIES
Discharge: HOME | End: 2020-03-02

## 2020-03-02 VITALS
WEIGHT: 195.8 LBS | HEIGHT: 75 IN | RESPIRATION RATE: 20 BRPM | DIASTOLIC BLOOD PRESSURE: 79 MMHG | BODY MASS INDEX: 24.34 KG/M2 | HEART RATE: 81 BPM | SYSTOLIC BLOOD PRESSURE: 137 MMHG

## 2020-03-02 DIAGNOSIS — C34.91 NON-SMALL CELL CARCINOMA OF LUNG, RIGHT (HCC): Primary | ICD-10-CM

## 2020-03-02 DIAGNOSIS — Z45.2 ENCOUNTER FOR CARE RELATED TO VASCULAR ACCESS PORT: ICD-10-CM

## 2020-03-02 DIAGNOSIS — C34.31 CANCER OF LOWER LOBE OF RIGHT LUNG (HCC): ICD-10-CM

## 2020-03-02 LAB
ALBUMIN SERPL-MCNC: 4.1 G/DL (ref 3.5–5.2)
ALBUMIN/GLOB SERPL: 1.4 G/DL
ALP SERPL-CCNC: 97 U/L (ref 39–117)
ALT SERPL W P-5'-P-CCNC: 32 U/L (ref 1–41)
ANION GAP SERPL CALCULATED.3IONS-SCNC: 12 MMOL/L (ref 5–15)
AST SERPL-CCNC: 22 U/L (ref 1–40)
BASOPHILS # BLD AUTO: 0.09 10*3/MM3 (ref 0–0.2)
BASOPHILS NFR BLD AUTO: 1.3 % (ref 0–1.5)
BILIRUB SERPL-MCNC: 0.3 MG/DL (ref 0.2–1.2)
BUN BLD-MCNC: 11 MG/DL (ref 6–20)
BUN/CREAT SERPL: 12.5 (ref 7–25)
CALCIUM SPEC-SCNC: 9.4 MG/DL (ref 8.6–10.5)
CHLORIDE SERPL-SCNC: 101 MMOL/L (ref 98–107)
CO2 SERPL-SCNC: 25 MMOL/L (ref 22–29)
CREAT BLD-MCNC: 0.88 MG/DL (ref 0.76–1.27)
DEPRECATED RDW RBC AUTO: 39.8 FL (ref 37–54)
EOSINOPHIL # BLD AUTO: 0.4 10*3/MM3 (ref 0–0.4)
EOSINOPHIL NFR BLD AUTO: 5.8 % (ref 0.3–6.2)
ERYTHROCYTE [DISTWIDTH] IN BLOOD BY AUTOMATED COUNT: 12.2 % (ref 12.3–15.4)
GFR SERPL CREATININE-BSD FRML MDRD: 90 ML/MIN/1.73
GLOBULIN UR ELPH-MCNC: 2.9 GM/DL
GLUCOSE BLD-MCNC: 87 MG/DL (ref 65–99)
HCT VFR BLD AUTO: 39.1 % (ref 37.5–51)
HGB BLD-MCNC: 13.2 G/DL (ref 13–17.7)
LYMPHOCYTES # BLD AUTO: 0.97 10*3/MM3 (ref 0.7–3.1)
LYMPHOCYTES NFR BLD AUTO: 14 % (ref 19.6–45.3)
MCH RBC QN AUTO: 31 PG (ref 26.6–33)
MCHC RBC AUTO-ENTMCNC: 33.8 G/DL (ref 31.5–35.7)
MCV RBC AUTO: 91.8 FL (ref 79–97)
MONOCYTES # BLD AUTO: 0.71 10*3/MM3 (ref 0.1–0.9)
MONOCYTES NFR BLD AUTO: 10.3 % (ref 5–12)
NEUTROPHILS # BLD AUTO: 4.75 10*3/MM3 (ref 1.7–7)
NEUTROPHILS NFR BLD AUTO: 68.6 % (ref 42.7–76)
PLATELET # BLD AUTO: 383 10*3/MM3 (ref 140–450)
PMV BLD AUTO: 8.3 FL (ref 6–12)
POTASSIUM BLD-SCNC: 4.4 MMOL/L (ref 3.5–5.2)
PROT SERPL-MCNC: 7 G/DL (ref 6–8.5)
RBC # BLD AUTO: 4.26 10*6/MM3 (ref 4.14–5.8)
SODIUM BLD-SCNC: 138 MMOL/L (ref 136–145)
WBC NRBC COR # BLD: 6.92 10*3/MM3 (ref 3.4–10.8)

## 2020-03-02 PROCEDURE — 25010000002 DURVALUMAB 50 MG/ML SOLUTION 10 ML VIAL: Performed by: NURSE PRACTITIONER

## 2020-03-02 PROCEDURE — 25010000003 HEPARIN LOCK FLUCH PER 10 UNITS: Performed by: INTERNAL MEDICINE

## 2020-03-02 PROCEDURE — 85025 COMPLETE CBC W/AUTO DIFF WBC: CPT | Performed by: NURSE PRACTITIONER

## 2020-03-02 PROCEDURE — 80053 COMPREHEN METABOLIC PANEL: CPT | Performed by: NURSE PRACTITIONER

## 2020-03-02 PROCEDURE — 25010000002 ALTEPLASE 2 MG RECONSTITUTED SOLUTION: Performed by: NURSE PRACTITIONER

## 2020-03-02 PROCEDURE — 36591 DRAW BLOOD OFF VENOUS DEVICE: CPT

## 2020-03-02 PROCEDURE — 96413 CHEMO IV INFUSION 1 HR: CPT

## 2020-03-02 PROCEDURE — 25010000002 DURVALUMAB 50 MG/ML SOLUTION 2.4 ML VIAL: Performed by: NURSE PRACTITIONER

## 2020-03-02 PROCEDURE — 36593 DECLOT VASCULAR DEVICE: CPT

## 2020-03-02 RX ORDER — HEPARIN SODIUM (PORCINE) LOCK FLUSH IV SOLN 100 UNIT/ML 100 UNIT/ML
500 SOLUTION INTRAVENOUS AS NEEDED
Status: CANCELLED | OUTPATIENT
Start: 2020-03-02

## 2020-03-02 RX ORDER — HEPARIN SODIUM (PORCINE) LOCK FLUSH IV SOLN 100 UNIT/ML 100 UNIT/ML
500 SOLUTION INTRAVENOUS AS NEEDED
Status: DISCONTINUED | OUTPATIENT
Start: 2020-03-02 | End: 2020-03-03 | Stop reason: HOSPADM

## 2020-03-02 RX ORDER — SODIUM CHLORIDE 0.9 % (FLUSH) 0.9 %
10 SYRINGE (ML) INJECTION AS NEEDED
Status: CANCELLED | OUTPATIENT
Start: 2020-03-02

## 2020-03-02 RX ORDER — SODIUM CHLORIDE 0.9 % (FLUSH) 0.9 %
10 SYRINGE (ML) INJECTION AS NEEDED
Status: DISCONTINUED | OUTPATIENT
Start: 2020-03-02 | End: 2020-03-03 | Stop reason: HOSPADM

## 2020-03-02 RX ORDER — SODIUM CHLORIDE 9 MG/ML
250 INJECTION, SOLUTION INTRAVENOUS ONCE
Status: DISCONTINUED | OUTPATIENT
Start: 2020-03-02 | End: 2020-03-03 | Stop reason: HOSPADM

## 2020-03-02 RX ADMIN — SODIUM CHLORIDE 860 MG: 900 INJECTION, SOLUTION INTRAVENOUS at 10:53

## 2020-03-02 RX ADMIN — Medication 10 ML: at 12:09

## 2020-03-02 RX ADMIN — HEPARIN 500 UNITS: 100 SYRINGE at 12:09

## 2020-03-02 RX ADMIN — ALTEPLASE: 2.2 INJECTION, POWDER, LYOPHILIZED, FOR SOLUTION INTRAVENOUS at 10:44

## 2020-03-09 ENCOUNTER — OFFICE VISIT (OUTPATIENT)
Dept: ONCOLOGY | Facility: CLINIC | Age: 55
End: 2020-03-09

## 2020-03-09 ENCOUNTER — APPOINTMENT (OUTPATIENT)
Dept: LAB | Facility: HOSPITAL | Age: 55
End: 2020-03-09

## 2020-03-09 VITALS
DIASTOLIC BLOOD PRESSURE: 92 MMHG | RESPIRATION RATE: 16 BRPM | HEART RATE: 87 BPM | SYSTOLIC BLOOD PRESSURE: 135 MMHG | BODY MASS INDEX: 24.12 KG/M2 | WEIGHT: 194 LBS | TEMPERATURE: 97.7 F | HEIGHT: 75 IN

## 2020-03-09 DIAGNOSIS — Z29.8 ENCOUNTER FOR IMMUNOTHERAPY: Primary | ICD-10-CM

## 2020-03-09 PROCEDURE — 99214 OFFICE O/P EST MOD 30 MIN: CPT | Performed by: INTERNAL MEDICINE

## 2020-03-09 NOTE — PROGRESS NOTES
Hematology/Oncology Outpatient Follow Up    PATIENT NAME:Jc Driscoll  :1965  MRN: [id number]  PRIMARY CARE PHYSICIAN: Reshma Alvarenga MD  REFERRING PHYSICIAN: No ref. provider found    Chief Complaint   Patient presents with   • Follow-up     Pleural Effusion   • Follow-up     Hypokalemia        HISTORY OF PRESENT ILLNESS:     This is a 54 y.o. who developed left shoulder pain and for that reason he had a chest x-ray done on 19 which showed a 2.7 cm noncalcified right lower lobe nodule was identified.  For that reason a CT scan of the chest was recommended.  Review of his records shows patient had the CT scan on 19 done without contrast.  This basically showed a lobulated noncalcified mass in the posterior aspect of the right lower lobe measuring 3 cm close to the pleural surface.  There is a calcified 1.2 mm nodule in the lateral aspect of the right lower lobe consistent with a granuloma.  There were also calcified subcarinal and right hilar nodules.  There is a lower right side tracheal nodule measuring 1 cm.  Patient had a PET/CT scan done on 19 which showed increased metabolic activity on the right lower lobe mass that measures 2.5 cm with SUV of 4.4.  There was also increased metabolic activity in the subcarinal space measuring 2.8 cm in size with SUV of 3.4, increased activity in an enlarged right paratracheal lymph node that measures 1.9 cm, SUV of 5, also suspicious for metastatic adenopathy.  Patient had a CT-guided biopsy of the right lower lobe mass on 3/8/19.  This showed adenocarcinoma, TTF-1 positive.  On 3/18/19 patient underwent endoscopic ultrasound and biopsy of a subcarinal lymph node.  This was positive for malignant cells.  Patient was then taken back to surgery on 3/20/18 and had left Mediport placement by Dr. Fuchs.  He has been referred for further evaluation and management of his stage 3 adenocarcinoma of the right lung.  He was accompanied by his spouse for  the appointment on 3/26/19.  Patient was scheduled to have a brain MRI for complete staging, but he could not do this due to claustrophobia.  He is willing to try with the help of angiolytics.    • Patient had brain MRI done on 4/5/19.  He states it did not show any evidence of metastatic disease.  Evidence of chronic sinusitis was noted.    • Patient was seen by Dr. Escalona who has recommended concurrent chemotherapy and radiation.  Patient is scheduled to begin on 4/15/19.   • 4/17/19 - Patient was initiated on combined chemotherapy and radiation with Cisplatin and Alimta.  Patient received cycle 1.      • 5/8/19 - Patient received cycle 2 of chemotherapy with Cisplatin and Alimta.   • 5/15/19 - Chemistry panel showed creatinine of 1.7.  WBC 1.8, hemoglobin 11.6, platelet count 72,000.   • 5/20/19 - BUN 10, creatinine 1.2, potassium 3.5.    • 5/23/19 - CT scan of the chest with contrast showed interval decrease in size of the right lower lobe pulmonary nodule now measuring 2.1 cm in size.  There was no mediastinal or hilar adenopathy identified.  • 6/26/2019 patient underwent right lower lobectomy with hilar and mediastinal lymph node dissection performed by Dr. Terrance Mckeon.  • Pathology revealed residual residual 2.2 cm invasive moderately differentiated adenocarcinoma.  There were a total of 16 lymph nodes evaluated that 7 had metastatic disease.  There was evidence of lymphovascular invasion, extranodal involvement.  All the surgical margins were negative and distance of the closest margin was 2.5 cm.  Logic stage is T1c N2 M0.  • Patient is here today accompanied by his spouse for this appointment.  He continues to complain of some postop discomfort, numbness but his skin is intact.  There is no drainage.  Has had follow-up with Dr. Fuchs.  • 8/14/2019-seen by Dr. Maria at the [facility].  Dr. Maria has recommended immunotherapy with durvalumab off label use as patient has not had significant  response to neoadjuvant chemotherapy and radiation.  Patient was given the option to have immunotherapy at the [facility] vs [place] and he has chosen to stay in [place]  • 8/21/19 - Started cycle 1 day 1 Imfinzi  • 9/4/2019 patient had CT scan of the chest this shows a moderate size right pleural effusion.  There are areas of groundglass opacification in the right upper lobe without any evidence of significant septal thickening.  Volume loss noted there is also moderate pleural effusion.  There is no suspicious recurrent malignancy.  There were nonenlarged residual mediastinal lymph nodes.  • 9/9/19 - WBC 6.23, Hgb 11.7 Platelets 257,000, , BUN 9, Cr 1.1,Vitamin B12 318, Ferritin 437  • 9/23/19 - received cycle 2 day 1 Imfinzi  • 10/9/19- Seen by Dr rodríguez with ENT for sinus disease  • 11/4/2019-patient received cycle 5 of Imfinzi(durvalumab)  • 12/2/2019 patient had cycle 7 of durvalumab  • 12/5/2019-patient had CT scan of the  abdomen and pelvis with small right pleural sided pleural effusion.There is no evidence of metastatic disease  • 12/30/2019-patient received cycle 9 of durvalumab  • 12/31/2019-patient had CT scan of the chest showed small to moderate left pleural effusion.  Iglesias appearing right lower paratracheal and right peribronchial soft tissue thickening without any discrete pathologically enlarged mediastinal or hilar lymph nodes.  • 1/27/20-patient received cycle 11 of durvalumab  • 2/17/20-patient had a stress test which was negative for ischemia  • 2/17/2020-patient had CT of the chest which showed postop changes on the right lung, right pleural effusion but no enlarging mass was noted  • 3/2/2020-patient received cycle 12 of durvalumab    Past Medical History:   Diagnosis Date   • Allergic rhinitis 7/25/2013    Overview:  4/2/2018 - still zyrtec 10 daily (if stops, gets dermatitis again).    • Anxiety and depression 6/5/2012    Overview:  4/2/2018 -   C/w well 300 xr and Lito 20.  4/8/2019 -    Doing ok even with new lung cancer - jalil 20 & well 300xr.    • Chronic pansinusitis 4/8/2019    Overview:  4/8/2019 -   MRI showed chronic thick in frontal, maxillary, ethmoidal ---> to Dr. COLLAZO to est since abx ICC didn't help.  Does netipot bid.    • Diastolic CHF (CMS/HCC) 7/9/2014    Overview:  7/4/14 - impaired LV relaxation on [facility] E[facility].  EF 60%. Rest normal   • Dupuytren's contracture of both hands    • Elevated cholesterol    • Erosive esophagitis 7/9/2019    Overview:  EGD 2016 4/2/2018 - nexium OTC daily for few week.  Qod before that.  Now carbonation hurts, epigastric pain 4/8/2019 -   protonix 40 doing well.    • Gastroesophageal reflux disease 2/21/2019   • Hiatal hernia 7/9/2019   • History of colon polyps    • Hypertension    • Lung cancer (CMS/HCC)     right lung and in lymph nodes x2   • Mediastinal lymphadenopathy 3/12/2019   • Normocytic anemia 8/8/2019    Overview:  6/2019 - dropped to 9's postop 7/2019 - rebounded to 10's.  8/8/2019 -   Back to 9's - check ferritin, b12 and thyroid.    • Prediabetes 7/9/2014    Overview:  7/4/14 - a1c 6.1 at [facility] admission   • Retention of urine 6/27/2019       Past Surgical History:   Procedure Laterality Date   • BRONCHOSCOPY  03/18/2019    EBUS MONTERO NEEDLE BX   • COLONOSCOPY     • DUPUYTREN CONTRACTURE RELEASE Bilateral    • LUNG BIOPSY  03/08/2019    CT GUIDED   • LUNG REMOVAL, PARTIAL Right     right lower   • PORTACATH PLACEMENT  03/20/2019    DR LONGORIA   • THORACOSCOPY Right 6/26/2019    Procedure: RIGHT VATS, OPEN LOWER LOBECTOMY WITH LYMPH NODE DISECTION, WEDGE RESECTION OF RIGHT MIDDLE LOBE;  Surgeon: Terrance Longoria MD;  Location: [facility] OR;  Service: Cardiothoracic         Current Outpatient Medications:   •  acetaminophen (TYLENOL) 325 MG tablet, Take 2 tablets by mouth Every 4 (Four) Hours As Needed for Mild Pain ., Disp: , Rfl:   •  amLODIPine (NORVASC) 10 MG tablet, Take 10 mg by mouth Daily., Disp: , Rfl:   •  buPROPion SR (WELLBUTRIN  SR) 150 MG 12 hr tablet, Take 300 mg by mouth 2 (Two) Times a Day., Disp: , Rfl:   •  cetirizine (zyrTEC) 10 MG tablet, Take 10 mg by mouth Daily., Disp: , Rfl:   •  desvenlafaxine (PRISTIQ) 50 MG 24 hr tablet, TAKE 1 TABLET BY MOUTH ONCE DAILY FOR 30 DAYS, Disp: , Rfl:   •  HYDROcodone-acetaminophen (NORCO) 5-325 MG per tablet, Take 1 tablet by mouth As Needed., Disp: , Rfl:   •  ibuprofen (ADVIL,MOTRIN) 600 MG tablet, Take 1 tablet by mouth Every 6 (Six) Hours As Needed for Mild Pain ., Disp: , Rfl:   •  lidocaine-prilocaine (EMLA) 2.5-2.5 % cream, , Disp: , Rfl:   •  LORazepam (ATIVAN) 0.5 MG tablet, TAKE 1 TABLET BY MOUTH TWICE DAILY AS NEEDED FOR ANXIETY MAX OF 2 DAY, Disp: , Rfl: 0  •  metoprolol succinate XL (TOPROL-XL) 100 MG 24 hr tablet, Take 100 mg by mouth Daily., Disp: , Rfl:   •  ondansetron (ZOFRAN) 8 MG tablet, TAKE 1 TABLET BY MOUTH EVERY 8 HOURS AS NEEDED NAUSEA, Disp: , Rfl: 3  •  pantoprazole (PROTONIX) 40 MG EC tablet, Take 40 mg by mouth Daily., Disp: , Rfl:   •  pravastatin (PRAVACHOL) 10 MG tablet, Take 10 mg by mouth Daily., Disp: , Rfl:     No Known Allergies    Family History   Problem Relation Age of Onset   • Cancer Sister    • Cancer Mother    • Lung cancer Father        Cancer-related family history includes Cancer in his mother and sister; Lung cancer in his father.    Social History     Tobacco Use   • Smoking status: Former Smoker     Types: Cigarettes     Last attempt to quit: 9/5/2009     Years since quitting: 10.5   • Smokeless tobacco: Never Used   Substance Use Topics   • Alcohol use: Yes     Alcohol/week: 2.0 standard drinks     Types: 2 Cans of beer per week     Frequency: Monthly or less     Drinks per session: 1 or 2     Comment: Occasional   • Drug use: No       I have reviewed the history of present illness, past medical history, family history, social history, lab results, all notes and other records since the patient was last seen on 9/5/19.     SUBJECTIVE:  The patient  "is here for a follow up appointment accompanied with his wife.  He continues to tolerate immunotherapy without complaints.  The patient states that at times he is fatigued but otherwise is able to continue activities as prior to diagnosis. The patient said he got hurt a week or so ago by doing too heavy of lifting and had scans and x-rays done. He stated it had nothing to do with his heart but they insisted on checking it out anyway. He said he feels much better now.      REVIEW OF SYSTEMS:  Review of Systems   Constitutional: Negative for appetite change ( increased), chills, fatigue and fever. Unexpected weight change:  gained 4 lbs    HENT: Negative for ear pain, mouth sores, nosebleeds, sinus pressure ( chronic ) and sore throat. Sinus pain:  chronic     Eyes: Negative for photophobia and visual disturbance.   Respiratory: Negative for cough, wheezing and stridor.    Cardiovascular: Negative for chest pain, palpitations and leg swelling.   Gastrointestinal: Negative for abdominal pain, diarrhea, nausea ( mild after treatment ) and vomiting.   Endocrine: Negative for cold intolerance and heat intolerance.   Genitourinary: Negative for dysuria and hematuria.   Musculoskeletal: Negative for arthralgias, back pain, gait problem, joint swelling, neck pain and neck stiffness.   Skin: Negative for color change and rash.   Neurological: Negative for seizures and syncope.   Hematological: Negative for adenopathy.        No obvious bleeding   Psychiatric/Behavioral: Negative for agitation, confusion and hallucinations.   All other systems reviewed and are negative.    OBJECTIVE:  Vitals:    03/09/20 1554   BP: 135/92   Pulse: 87   Resp: 16   Temp: 97.7 °F (36.5 °C)   TempSrc: Oral   Weight: 88 kg (194 lb)   Height: 190.5 cm (75\")   PainSc: 0-No pain     ECOG  Eastern Cooperative Oncology Group (ECOG, [place], World Health Organization) performance scale  0 Fully active; no performance restrictions.  1 Strenuous physical " activity restricted; fully ambulatory and able to carry out light work.  2 Capable of all self-care but unable to carry out any work activities. Up and about >50% of waking hours.  3 Capable of only limited self-care; confined to bed or chair >50% of waking hours.  4 Completely disabled; cannot carry out any self-care; totally confined to bed or chair.    Physical Exam   Constitutional: He is oriented to person, place, and time. No distress.   HENT:   Head: Normocephalic and atraumatic.   Eyes: Conjunctivae and EOM are normal. Right eye exhibits no discharge. Left eye exhibits no discharge. No scleral icterus.   Neck: Normal range of motion. Neck supple. No thyromegaly present.   Cardiovascular: Normal rate, regular rhythm and normal heart sounds. Exam reveals no gallop and no friction rub.   Pulmonary/Chest: Effort normal and breath sounds normal. No stridor. No respiratory distress. He has no wheezes. He exhibits no laceration.   Right chest wall port   Abdominal: Soft. Bowel sounds are normal. He exhibits no mass. There is no tenderness. There is no rebound and no guarding.   Musculoskeletal: Normal range of motion. He exhibits no tenderness.   Lymphadenopathy:     He has no cervical adenopathy.   Neurological: He is alert and oriented to person, place, and time. He exhibits normal muscle tone.   Skin: Skin is warm. No rash noted. He is not diaphoretic. No erythema.   Psychiatric: He has a normal mood and affect. His behavior is normal.   Nursing note and vitals reviewed.      RECENT LABS  WBC   Date Value Ref Range Status   03/02/2020 6.92 3.40 - 10.80 10*3/mm3 Final   07/03/2019 8.99 4.5 - 11.0 10*3/uL Final     RBC   Date Value Ref Range Status   03/02/2020 4.26 4.14 - 5.80 10*6/mm3 Final   07/03/2019 3.24 (L) 4.5 - 5.9 10*6/uL Final     Hemoglobin   Date Value Ref Range Status   03/02/2020 13.2 13.0 - 17.7 g/dL Final   07/03/2019 10.4 (L) 13.5 - 17.5 g/dL Final     Hematocrit   Date Value Ref Range Status    03/02/2020 39.1 37.5 - 51.0 % Final   07/03/2019 32.8 (L) 41.0 - 53.0 % Final     MCV   Date Value Ref Range Status   03/02/2020 91.8 79.0 - 97.0 fL Final   07/03/2019 101.2 (H) 80.0 - 100.0 fL Final     MCH   Date Value Ref Range Status   03/02/2020 31.0 26.6 - 33.0 pg Final   07/03/2019 32.1 26.0 - 34.0 pg Final     MCHC   Date Value Ref Range Status   03/02/2020 33.8 31.5 - 35.7 g/dL Final   07/03/2019 31.7 31.0 - 37.0 g/dL Final     RDW   Date Value Ref Range Status   03/02/2020 12.2 (L) 12.3 - 15.4 % Final   07/03/2019 15.2 12.0 - 16.8 % Final     RDW-SD   Date Value Ref Range Status   03/02/2020 39.8 37.0 - 54.0 fl Final     MPV   Date Value Ref Range Status   03/02/2020 8.3 6.0 - 12.0 fL Final   07/03/2019 9.0 6.7 - 10.8 fL Final     Platelets   Date Value Ref Range Status   03/02/2020 383 140 - 450 10*3/mm3 Final   07/03/2019 302 140 - 440 10*3/uL Final     Neutrophil Rel %   Date Value Ref Range Status   07/03/2019 60.0 45 - 80 % Final     Neutrophil %   Date Value Ref Range Status   03/02/2020 68.6 42.7 - 76.0 % Final     Lymphocyte Rel %   Date Value Ref Range Status   07/03/2019 14.8 (L) 15 - 50 % Final     Lymphocyte %   Date Value Ref Range Status   03/02/2020 14.0 (L) 19.6 - 45.3 % Final     Monocyte Rel %   Date Value Ref Range Status   07/03/2019 12.2 0 - 15 % Final     Monocyte %   Date Value Ref Range Status   03/02/2020 10.3 5.0 - 12.0 % Final     Eosinophil %   Date Value Ref Range Status   03/02/2020 5.8 0.3 - 6.2 % Final   07/03/2019 10.4 (H) 0 - 7 % Final     Basophil Rel %   Date Value Ref Range Status   07/03/2019 1.7 0 - 2 % Final     Basophil %   Date Value Ref Range Status   03/02/2020 1.3 0.0 - 1.5 % Final     Neutrophils Absolute   Date Value Ref Range Status   07/03/2019 5.39 1.5 - 7.5 /uL Final   07/03/2019 5.39 2.0 - 8.8 10*3/uL Final     Neutrophils, Absolute   Date Value Ref Range Status   03/02/2020 4.75 1.70 - 7.00 10*3/mm3 Final     Lymphocytes Absolute   Date Value Ref Range  Status   07/03/2019 1.33 0.7 - 5.5 10*3/uL Final     Lymphocytes, Absolute   Date Value Ref Range Status   03/02/2020 0.97 0.70 - 3.10 10*3/mm3 Final     Monocytes Absolute   Date Value Ref Range Status   07/03/2019 1.10 0.0 - 1.7 10*3/uL Final     Monocytes, Absolute   Date Value Ref Range Status   03/02/2020 0.71 0.10 - 0.90 10*3/mm3 Final     Eosinophils Absolute   Date Value Ref Range Status   07/03/2019 0.93 (H) 0.0 - 0.8 10*3/uL Final     Eosinophils, Absolute   Date Value Ref Range Status   03/02/2020 0.40 0.00 - 0.40 10*3/mm3 Final     Basophils Absolute   Date Value Ref Range Status   07/03/2019 0.15 0.0 - 0.2 10*3/uL Final     Basophils, Absolute   Date Value Ref Range Status   03/02/2020 0.09 0.00 - 0.20 10*3/mm3 Final     nRBC   Date Value Ref Range Status   02/18/2020 0.0 0.0 - 0.2 /100 WBC Final       Lab Results   Component Value Date    GLUCOSE 87 03/02/2020    BUN 11 03/02/2020    CREATININE 0.88 03/02/2020    EGFRIFNONA 90 03/02/2020    EGFRIFAFRI >60 05/20/2019    BCR 12.5 03/02/2020    K 4.4 03/02/2020    CO2 25.0 03/02/2020    CALCIUM 9.4 03/02/2020    ALBUMIN 4.10 03/02/2020    LABIL2 1.4 07/03/2019    AST 22 03/02/2020    ALT 32 03/02/2020         Assessment/Plan     There are no diagnoses linked to this encounter.    ASSESSMENT:    1. Adenocarcinoma of the right lung, T1c N2 M0, consistent with clinical stage 3A disease.     2. S/P combined chemotherapy and radiation with cisplatin and Alimta.    3. Status post right lower lobectomy with mediastinal and hilar lymph node dissection with residual disease.  pT1 cN2 M0.  Patient with high risk features including lymphovascular invasion, extranodal capsular extension and persistent multiple lymph node disease.  4. Chemo-induced constipation, chemo-induced nausea, chemo-induced fatigue. Resolved  5. Consolidation treatment with immunotherapy with Imfinzi   6. Hypokalemia secondary to chemotherapy. Resolved    7. Acute kidney injury.  Resolved  8. Postop anemia  9. Rash on both hands of palmar surfaces; Resolved. Mild peeling at times.   10. Monitoring for drug toxicities.    PLAN:    1. Patient's CT scan from February 17, 2020 is stable  2. Continue Imfinzi consolidation therapy  3. Continue B12 injections day 3/3 today, then monthly  4. Status post flu shot and pneumonia vaccines   5. Refered to ENT for evaluation for sinus surgery : Not covered by insurance per pt.  6. Colonoscopy postponed by patient to January.  Encouraged to complete  7. RTC in 4 weeks   8. Continue supportive care  9. All questions answered to the best of my abilities         Patient and his spouse have  asked patient which have all been answered to the best of my abilities    I have reviewed labs results, imaging, vitals, and medications with the patient today. Will follow up in 4 weeks with me.      Patient verbalized understanding and is in agreement of the above plan.    Part of this document was scribed by Antionette James RN, BSN.        I spent greater than 40 minutes in face-to-face time with the patient and 95% of that time were spent in counseling and coordination of care, including review of imaging and pathology; indications for treatment, goals of therapy, alternatives and risks - both common and rare - as well as surveillance and potential outcomes.

## 2020-03-13 DIAGNOSIS — C34.31 CANCER OF LOWER LOBE OF RIGHT LUNG (HCC): ICD-10-CM

## 2020-03-13 DIAGNOSIS — C34.91 NON-SMALL CELL CARCINOMA OF LUNG, RIGHT (HCC): ICD-10-CM

## 2020-03-13 RX ORDER — SODIUM CHLORIDE 9 MG/ML
250 INJECTION, SOLUTION INTRAVENOUS ONCE
Status: CANCELLED | OUTPATIENT
Start: 2020-03-16

## 2020-03-16 ENCOUNTER — HOSPITAL ENCOUNTER (OUTPATIENT)
Dept: GENERAL RADIOLOGY | Facility: HOSPITAL | Age: 55
Discharge: HOME | End: 2020-03-16
Admitting: NURSE PRACTITIONER

## 2020-03-16 ENCOUNTER — HOSPITAL ENCOUNTER (OUTPATIENT)
Dept: ONCOLOGY | Facility: HOSPITAL | Age: 55
Setting detail: INFUSION SERIES
End: 2020-03-16

## 2020-03-16 ENCOUNTER — HOSPITAL ENCOUNTER (OUTPATIENT)
Dept: INTERVENTIONAL RADIOLOGY/VASCULAR | Facility: HOSPITAL | Age: 55
Discharge: HOME | End: 2020-03-16

## 2020-03-16 ENCOUNTER — HOSPITAL ENCOUNTER (OUTPATIENT)
Dept: ONCOLOGY | Facility: HOSPITAL | Age: 55
Setting detail: INFUSION SERIES
Discharge: HOME | End: 2020-03-16

## 2020-03-16 ENCOUNTER — HOSPITAL ENCOUNTER (OUTPATIENT)
Dept: GENERAL RADIOLOGY | Facility: HOSPITAL | Age: 55
Discharge: HOME | End: 2020-03-16

## 2020-03-16 VITALS
HEART RATE: 101 BPM | WEIGHT: 195 LBS | BODY MASS INDEX: 24.25 KG/M2 | SYSTOLIC BLOOD PRESSURE: 115 MMHG | DIASTOLIC BLOOD PRESSURE: 78 MMHG | HEIGHT: 75 IN | RESPIRATION RATE: 20 BRPM

## 2020-03-16 VITALS — SYSTOLIC BLOOD PRESSURE: 104 MMHG | HEART RATE: 88 BPM | OXYGEN SATURATION: 98 % | DIASTOLIC BLOOD PRESSURE: 63 MMHG

## 2020-03-16 DIAGNOSIS — J90 PLEURAL EFFUSION: ICD-10-CM

## 2020-03-16 DIAGNOSIS — R07.81 CHEST PAIN, PLEURITIC: Primary | ICD-10-CM

## 2020-03-16 DIAGNOSIS — J90 PLEURAL EFFUSION: Primary | ICD-10-CM

## 2020-03-16 DIAGNOSIS — C34.91 NON-SMALL CELL CARCINOMA OF LUNG, RIGHT (HCC): Chronic | ICD-10-CM

## 2020-03-16 DIAGNOSIS — C34.31 CANCER OF LOWER LOBE OF RIGHT LUNG (HCC): ICD-10-CM

## 2020-03-16 DIAGNOSIS — C34.91 NON-SMALL CELL CARCINOMA OF LUNG, RIGHT (HCC): Primary | Chronic | ICD-10-CM

## 2020-03-16 DIAGNOSIS — Z45.2 ENCOUNTER FOR CARE RELATED TO VASCULAR ACCESS PORT: ICD-10-CM

## 2020-03-16 DIAGNOSIS — R07.9 ACUTE CHEST PAIN: Primary | ICD-10-CM

## 2020-03-16 DIAGNOSIS — C34.91 NON-SMALL CELL CARCINOMA OF LUNG, RIGHT (HCC): Primary | ICD-10-CM

## 2020-03-16 LAB
ALBUMIN SERPL-MCNC: 3.6 G/DL (ref 3.5–5.2)
ALBUMIN/GLOB SERPL: 1.1 G/DL
ALP SERPL-CCNC: 107 U/L (ref 39–117)
ALT SERPL W P-5'-P-CCNC: 14 U/L (ref 1–41)
ANION GAP SERPL CALCULATED.3IONS-SCNC: 15 MMOL/L (ref 5–15)
AST SERPL-CCNC: 15 U/L (ref 1–40)
BASOPHILS # BLD AUTO: 0.03 10*3/MM3 (ref 0–0.2)
BASOPHILS NFR BLD AUTO: 0.3 % (ref 0–1.5)
BILIRUB SERPL-MCNC: 0.5 MG/DL (ref 0.2–1.2)
BUN BLD-MCNC: 10 MG/DL (ref 6–20)
BUN/CREAT SERPL: 9.3 (ref 7–25)
CALCIUM SPEC-SCNC: 8.7 MG/DL (ref 8.6–10.5)
CHLORIDE SERPL-SCNC: 96 MMOL/L (ref 98–107)
CO2 SERPL-SCNC: 24 MMOL/L (ref 22–29)
CREAT BLD-MCNC: 1.08 MG/DL (ref 0.76–1.27)
DEPRECATED RDW RBC AUTO: 41.1 FL (ref 37–54)
EOSINOPHIL # BLD AUTO: 0.42 10*3/MM3 (ref 0–0.4)
EOSINOPHIL NFR BLD AUTO: 4.4 % (ref 0.3–6.2)
ERYTHROCYTE [DISTWIDTH] IN BLOOD BY AUTOMATED COUNT: 12.4 % (ref 12.3–15.4)
GFR SERPL CREATININE-BSD FRML MDRD: 71 ML/MIN/1.73
GLOBULIN UR ELPH-MCNC: 3.3 GM/DL
GLUCOSE BLD-MCNC: 163 MG/DL (ref 65–99)
HCT VFR BLD AUTO: 36.4 % (ref 37.5–51)
HGB BLD-MCNC: 12.2 G/DL (ref 13–17.7)
LYMPHOCYTES # BLD AUTO: 0.6 10*3/MM3 (ref 0.7–3.1)
LYMPHOCYTES NFR BLD AUTO: 6.3 % (ref 19.6–45.3)
MCH RBC QN AUTO: 31.3 PG (ref 26.6–33)
MCHC RBC AUTO-ENTMCNC: 33.5 G/DL (ref 31.5–35.7)
MCV RBC AUTO: 93.3 FL (ref 79–97)
MONOCYTES # BLD AUTO: 0.96 10*3/MM3 (ref 0.1–0.9)
MONOCYTES NFR BLD AUTO: 10.1 % (ref 5–12)
NEUTROPHILS # BLD AUTO: 7.52 10*3/MM3 (ref 1.7–7)
NEUTROPHILS NFR BLD AUTO: 78.9 % (ref 42.7–76)
PLATELET # BLD AUTO: 287 10*3/MM3 (ref 140–450)
PMV BLD AUTO: 8.7 FL (ref 6–12)
POTASSIUM BLD-SCNC: 3.4 MMOL/L (ref 3.5–5.2)
PROT SERPL-MCNC: 6.9 G/DL (ref 6–8.5)
RBC # BLD AUTO: 3.9 10*6/MM3 (ref 4.14–5.8)
SODIUM BLD-SCNC: 135 MMOL/L (ref 136–145)
T4 FREE SERPL-MCNC: 1.39 NG/DL (ref 0.93–1.7)
TSH SERPL DL<=0.05 MIU/L-ACNC: 0.73 UIU/ML (ref 0.27–4.2)
WBC NRBC COR # BLD: 9.53 10*3/MM3 (ref 3.4–10.8)

## 2020-03-16 PROCEDURE — 87015 SPECIMEN INFECT AGNT CONCNTJ: CPT | Performed by: NURSE PRACTITIONER

## 2020-03-16 PROCEDURE — 84443 ASSAY THYROID STIM HORMONE: CPT | Performed by: NURSE PRACTITIONER

## 2020-03-16 PROCEDURE — 36591 DRAW BLOOD OFF VENOUS DEVICE: CPT

## 2020-03-16 PROCEDURE — 88108 CYTOPATH CONCENTRATE TECH: CPT | Performed by: NURSE PRACTITIONER

## 2020-03-16 PROCEDURE — 87205 SMEAR GRAM STAIN: CPT | Performed by: NURSE PRACTITIONER

## 2020-03-16 PROCEDURE — 25010000002 DURVALUMAB 50 MG/ML SOLUTION 2.4 ML VIAL: Performed by: NURSE PRACTITIONER

## 2020-03-16 PROCEDURE — 25010000002 DURVALUMAB 50 MG/ML SOLUTION 10 ML VIAL: Performed by: NURSE PRACTITIONER

## 2020-03-16 PROCEDURE — 87206 SMEAR FLUORESCENT/ACID STAI: CPT | Performed by: NURSE PRACTITIONER

## 2020-03-16 PROCEDURE — 71045 X-RAY EXAM CHEST 1 VIEW: CPT

## 2020-03-16 PROCEDURE — 80053 COMPREHEN METABOLIC PANEL: CPT | Performed by: NURSE PRACTITIONER

## 2020-03-16 PROCEDURE — 71046 X-RAY EXAM CHEST 2 VIEWS: CPT

## 2020-03-16 PROCEDURE — 87116 MYCOBACTERIA CULTURE: CPT | Performed by: NURSE PRACTITIONER

## 2020-03-16 PROCEDURE — 25010000003 HEPARIN LOCK FLUCH PER 10 UNITS: Performed by: INTERNAL MEDICINE

## 2020-03-16 PROCEDURE — 84439 ASSAY OF FREE THYROXINE: CPT | Performed by: NURSE PRACTITIONER

## 2020-03-16 PROCEDURE — 87070 CULTURE OTHR SPECIMN AEROBIC: CPT | Performed by: NURSE PRACTITIONER

## 2020-03-16 PROCEDURE — 96413 CHEMO IV INFUSION 1 HR: CPT

## 2020-03-16 PROCEDURE — 76942 ECHO GUIDE FOR BIOPSY: CPT

## 2020-03-16 PROCEDURE — C1729 CATH, DRAINAGE: HCPCS

## 2020-03-16 PROCEDURE — 85025 COMPLETE CBC W/AUTO DIFF WBC: CPT | Performed by: NURSE PRACTITIONER

## 2020-03-16 RX ORDER — SODIUM CHLORIDE 0.9 % (FLUSH) 0.9 %
10 SYRINGE (ML) INJECTION AS NEEDED
Status: CANCELLED | OUTPATIENT
Start: 2020-03-16

## 2020-03-16 RX ORDER — SODIUM CHLORIDE 0.9 % (FLUSH) 0.9 %
10 SYRINGE (ML) INJECTION AS NEEDED
Status: DISCONTINUED | OUTPATIENT
Start: 2020-03-16 | End: 2020-03-17 | Stop reason: HOSPADM

## 2020-03-16 RX ORDER — HEPARIN SODIUM (PORCINE) LOCK FLUSH IV SOLN 100 UNIT/ML 100 UNIT/ML
500 SOLUTION INTRAVENOUS AS NEEDED
Status: CANCELLED | OUTPATIENT
Start: 2020-03-16

## 2020-03-16 RX ORDER — ACETAMINOPHEN 500 MG
1000 TABLET ORAL ONCE
Qty: 2 TABLET | Refills: 0 | Status: SHIPPED | OUTPATIENT
Start: 2020-03-16 | End: 2020-03-16

## 2020-03-16 RX ORDER — HEPARIN SODIUM (PORCINE) LOCK FLUSH IV SOLN 100 UNIT/ML 100 UNIT/ML
500 SOLUTION INTRAVENOUS AS NEEDED
Status: DISCONTINUED | OUTPATIENT
Start: 2020-03-16 | End: 2020-03-17 | Stop reason: HOSPADM

## 2020-03-16 RX ORDER — HYDROCODONE BITARTRATE AND ACETAMINOPHEN 5; 325 MG/1; MG/1
1 TABLET ORAL EVERY 6 HOURS PRN
Qty: 20 TABLET | Refills: 0 | Status: SHIPPED | OUTPATIENT
Start: 2020-03-16 | End: 2021-06-01

## 2020-03-16 RX ORDER — ACETAMINOPHEN 500 MG
TABLET ORAL
Status: COMPLETED
Start: 2020-03-16 | End: 2020-03-16

## 2020-03-16 RX ORDER — SODIUM CHLORIDE 9 MG/ML
250 INJECTION, SOLUTION INTRAVENOUS ONCE
Status: COMPLETED | OUTPATIENT
Start: 2020-03-16 | End: 2020-03-16

## 2020-03-16 RX ADMIN — Medication 10 ML: at 14:55

## 2020-03-16 RX ADMIN — HEPARIN 500 UNITS: 100 SYRINGE at 14:55

## 2020-03-16 RX ADMIN — ACETAMINOPHEN 1000 MG: 500 TABLET ORAL at 12:14

## 2020-03-16 RX ADMIN — SODIUM CHLORIDE 250 ML: 900 INJECTION, SOLUTION INTRAVENOUS at 13:20

## 2020-03-16 RX ADMIN — SODIUM CHLORIDE 860 MG: 900 INJECTION, SOLUTION INTRAVENOUS at 13:19

## 2020-03-16 NOTE — PROGRESS NOTES
Pt here today for Imfinzi treatment. He is complaining of pain that starts in his upper stomach lower sternum that radiates through his chest into his left shoulder. Pt states also that it worsens with deep inhale breaths. He is unable to lay flat due to the intense pain that takes his breathe away. I spoke with Luciana Gallardo NP she states to do a STAT chest xray, give pt tylenol 1000mg now, and ok to continue with treatment. Pt was given tylenol and sent to [facility] for chest xray than will return to office for Imfinzi treatment.     Xray was reviewed with Luciana Gallardo NP and order received for pt to have right thoracentesis ASAP.

## 2020-03-16 NOTE — DISCHARGE INSTRUCTIONS
Take it easy at home.  No lifting more than 10 lbs for 48 hours.  Keep dressing clean and dry, it can be removed in 2 days.  Seek immediate medical attention for sudden and severe pain, shortness of air or racing heartbeat.

## 2020-03-18 LAB
LAB AP CASE REPORT: NORMAL
PATH REPORT.FINAL DX SPEC: NORMAL
PATH REPORT.GROSS SPEC: NORMAL

## 2020-03-19 LAB
BACTERIA FLD CULT: NORMAL
GRAM STN SPEC: NORMAL
GRAM STN SPEC: NORMAL

## 2020-03-27 DIAGNOSIS — C34.31 CANCER OF LOWER LOBE OF RIGHT LUNG (HCC): ICD-10-CM

## 2020-03-27 DIAGNOSIS — C34.91 NON-SMALL CELL CARCINOMA OF LUNG, RIGHT (HCC): ICD-10-CM

## 2020-03-27 RX ORDER — SODIUM CHLORIDE 9 MG/ML
250 INJECTION, SOLUTION INTRAVENOUS ONCE
Status: CANCELLED | OUTPATIENT
Start: 2020-04-13

## 2020-03-27 RX ORDER — SODIUM CHLORIDE 9 MG/ML
250 INJECTION, SOLUTION INTRAVENOUS ONCE
Status: CANCELLED | OUTPATIENT
Start: 2020-03-30

## 2020-03-30 ENCOUNTER — HOSPITAL ENCOUNTER (OUTPATIENT)
Dept: ONCOLOGY | Facility: HOSPITAL | Age: 55
Setting detail: INFUSION SERIES
Discharge: HOME | End: 2020-03-30

## 2020-03-30 VITALS
HEIGHT: 75 IN | RESPIRATION RATE: 20 BRPM | WEIGHT: 195.5 LBS | SYSTOLIC BLOOD PRESSURE: 111 MMHG | BODY MASS INDEX: 24.31 KG/M2 | HEART RATE: 88 BPM | DIASTOLIC BLOOD PRESSURE: 71 MMHG | TEMPERATURE: 97.6 F

## 2020-03-30 DIAGNOSIS — C34.91 NON-SMALL CELL CARCINOMA OF LUNG, RIGHT (HCC): Primary | ICD-10-CM

## 2020-03-30 DIAGNOSIS — C34.31 CANCER OF LOWER LOBE OF RIGHT LUNG (HCC): ICD-10-CM

## 2020-03-30 DIAGNOSIS — Z45.2 ENCOUNTER FOR CARE RELATED TO VASCULAR ACCESS PORT: ICD-10-CM

## 2020-03-30 LAB
ALBUMIN SERPL-MCNC: 3.6 G/DL (ref 3.5–5.2)
ALBUMIN/GLOB SERPL: 1.2 G/DL
ALP SERPL-CCNC: 104 U/L (ref 39–117)
ALT SERPL W P-5'-P-CCNC: 20 U/L (ref 1–41)
ANION GAP SERPL CALCULATED.3IONS-SCNC: 12 MMOL/L (ref 5–15)
AST SERPL-CCNC: 15 U/L (ref 1–40)
BASOPHILS # BLD AUTO: 0.05 10*3/MM3 (ref 0–0.2)
BASOPHILS NFR BLD AUTO: 0.7 % (ref 0–1.5)
BILIRUB SERPL-MCNC: <0.2 MG/DL (ref 0.2–1.2)
BUN BLD-MCNC: 11 MG/DL (ref 6–20)
BUN/CREAT SERPL: 12.6 (ref 7–25)
CALCIUM SPEC-SCNC: 9.3 MG/DL (ref 8.6–10.5)
CHLORIDE SERPL-SCNC: 95 MMOL/L (ref 98–107)
CO2 SERPL-SCNC: 26 MMOL/L (ref 22–29)
CREAT BLD-MCNC: 0.87 MG/DL (ref 0.76–1.27)
DEPRECATED RDW RBC AUTO: 40.9 FL (ref 37–54)
EOSINOPHIL # BLD AUTO: 0.4 10*3/MM3 (ref 0–0.4)
EOSINOPHIL NFR BLD AUTO: 5.4 % (ref 0.3–6.2)
ERYTHROCYTE [DISTWIDTH] IN BLOOD BY AUTOMATED COUNT: 12.5 % (ref 12.3–15.4)
GFR SERPL CREATININE-BSD FRML MDRD: 91 ML/MIN/1.73
GLOBULIN UR ELPH-MCNC: 3.1 GM/DL
GLUCOSE BLD-MCNC: 99 MG/DL (ref 65–99)
HCT VFR BLD AUTO: 33.8 % (ref 37.5–51)
HGB BLD-MCNC: 11.1 G/DL (ref 13–17.7)
LYMPHOCYTES # BLD AUTO: 0.92 10*3/MM3 (ref 0.7–3.1)
LYMPHOCYTES NFR BLD AUTO: 12.4 % (ref 19.6–45.3)
MAGNESIUM SERPL-MCNC: 2 MG/DL (ref 1.6–2.6)
MCH RBC QN AUTO: 30.3 PG (ref 26.6–33)
MCHC RBC AUTO-ENTMCNC: 32.8 G/DL (ref 31.5–35.7)
MCV RBC AUTO: 92.3 FL (ref 79–97)
MONOCYTES # BLD AUTO: 0.91 10*3/MM3 (ref 0.1–0.9)
MONOCYTES NFR BLD AUTO: 12.3 % (ref 5–12)
NEUTROPHILS # BLD AUTO: 5.11 10*3/MM3 (ref 1.7–7)
NEUTROPHILS NFR BLD AUTO: 69.2 % (ref 42.7–76)
PLATELET # BLD AUTO: 430 10*3/MM3 (ref 140–450)
PMV BLD AUTO: 8 FL (ref 6–12)
POTASSIUM BLD-SCNC: 4.2 MMOL/L (ref 3.5–5.2)
PROT SERPL-MCNC: 6.7 G/DL (ref 6–8.5)
RBC # BLD AUTO: 3.66 10*6/MM3 (ref 4.14–5.8)
SODIUM BLD-SCNC: 133 MMOL/L (ref 136–145)
WBC NRBC COR # BLD: 7.39 10*3/MM3 (ref 3.4–10.8)

## 2020-03-30 PROCEDURE — 25010000002 ALTEPLASE 2 MG RECONSTITUTED SOLUTION: Performed by: NURSE PRACTITIONER

## 2020-03-30 PROCEDURE — 25010000003 HEPARIN LOCK FLUCH PER 10 UNITS: Performed by: INTERNAL MEDICINE

## 2020-03-30 PROCEDURE — 85025 COMPLETE CBC W/AUTO DIFF WBC: CPT | Performed by: NURSE PRACTITIONER

## 2020-03-30 PROCEDURE — 36593 DECLOT VASCULAR DEVICE: CPT

## 2020-03-30 PROCEDURE — 96375 TX/PRO/DX INJ NEW DRUG ADDON: CPT

## 2020-03-30 PROCEDURE — 83735 ASSAY OF MAGNESIUM: CPT | Performed by: NURSE PRACTITIONER

## 2020-03-30 PROCEDURE — 80053 COMPREHEN METABOLIC PANEL: CPT | Performed by: NURSE PRACTITIONER

## 2020-03-30 PROCEDURE — 25010000002 DURVALUMAB 50 MG/ML SOLUTION 10 ML VIAL: Performed by: NURSE PRACTITIONER

## 2020-03-30 PROCEDURE — 96413 CHEMO IV INFUSION 1 HR: CPT

## 2020-03-30 PROCEDURE — 25010000002 DURVALUMAB 50 MG/ML SOLUTION 2.4 ML VIAL: Performed by: NURSE PRACTITIONER

## 2020-03-30 PROCEDURE — 36591 DRAW BLOOD OFF VENOUS DEVICE: CPT

## 2020-03-30 RX ORDER — HEPARIN SODIUM (PORCINE) LOCK FLUSH IV SOLN 100 UNIT/ML 100 UNIT/ML
500 SOLUTION INTRAVENOUS AS NEEDED
Status: DISCONTINUED | OUTPATIENT
Start: 2020-03-30 | End: 2020-03-31 | Stop reason: HOSPADM

## 2020-03-30 RX ORDER — SODIUM CHLORIDE 9 MG/ML
250 INJECTION, SOLUTION INTRAVENOUS ONCE
Status: COMPLETED | OUTPATIENT
Start: 2020-03-30 | End: 2020-03-30

## 2020-03-30 RX ORDER — HEPARIN SODIUM (PORCINE) LOCK FLUSH IV SOLN 100 UNIT/ML 100 UNIT/ML
500 SOLUTION INTRAVENOUS AS NEEDED
Status: CANCELLED | OUTPATIENT
Start: 2020-03-30

## 2020-03-30 RX ORDER — SODIUM CHLORIDE 0.9 % (FLUSH) 0.9 %
10 SYRINGE (ML) INJECTION AS NEEDED
Status: CANCELLED | OUTPATIENT
Start: 2020-03-30

## 2020-03-30 RX ORDER — SODIUM CHLORIDE 0.9 % (FLUSH) 0.9 %
10 SYRINGE (ML) INJECTION AS NEEDED
Status: DISCONTINUED | OUTPATIENT
Start: 2020-03-30 | End: 2020-03-31 | Stop reason: HOSPADM

## 2020-03-30 RX ADMIN — HEPARIN 500 UNITS: 100 SYRINGE at 13:42

## 2020-03-30 RX ADMIN — ALTEPLASE: 2.2 INJECTION, POWDER, LYOPHILIZED, FOR SOLUTION INTRAVENOUS at 11:08

## 2020-03-30 RX ADMIN — SODIUM CHLORIDE 250 ML: 900 INJECTION, SOLUTION INTRAVENOUS at 11:55

## 2020-03-30 RX ADMIN — SODIUM CHLORIDE 860 MG: 900 INJECTION, SOLUTION INTRAVENOUS at 12:14

## 2020-03-30 RX ADMIN — Medication 10 ML: at 13:41

## 2020-04-06 ENCOUNTER — TELEPHONE (OUTPATIENT)
Dept: ONCOLOGY | Facility: CLINIC | Age: 55
End: 2020-04-06

## 2020-04-06 NOTE — TELEPHONE ENCOUNTER
I called and spoke with the patient's wife and she stated that the patient had called and cancelled his appointment.  She stated that the patient was hoping to see Dr. Gaspar when he comes in for his next appointment.  I let her know that we are trying to limit contact and that I would speak with Dr. Gaspar and let her know.

## 2020-04-13 ENCOUNTER — HOSPITAL ENCOUNTER (OUTPATIENT)
Dept: ONCOLOGY | Facility: HOSPITAL | Age: 55
Setting detail: INFUSION SERIES
Discharge: HOME | End: 2020-04-13

## 2020-04-13 ENCOUNTER — HOSPITAL ENCOUNTER (OUTPATIENT)
Dept: ONCOLOGY | Facility: HOSPITAL | Age: 55
Setting detail: INFUSION SERIES
End: 2020-04-13

## 2020-04-13 VITALS
DIASTOLIC BLOOD PRESSURE: 72 MMHG | TEMPERATURE: 97.8 F | RESPIRATION RATE: 20 BRPM | HEIGHT: 75 IN | WEIGHT: 198.1 LBS | HEART RATE: 82 BPM | SYSTOLIC BLOOD PRESSURE: 116 MMHG | BODY MASS INDEX: 24.63 KG/M2

## 2020-04-13 DIAGNOSIS — C34.31 CANCER OF LOWER LOBE OF RIGHT LUNG (HCC): ICD-10-CM

## 2020-04-13 DIAGNOSIS — C34.91 NON-SMALL CELL CARCINOMA OF LUNG, RIGHT (HCC): ICD-10-CM

## 2020-04-13 DIAGNOSIS — Z45.2 ENCOUNTER FOR CARE RELATED TO VASCULAR ACCESS PORT: Primary | ICD-10-CM

## 2020-04-13 LAB
BASOPHILS # BLD AUTO: 0.12 10*3/MM3 (ref 0–0.2)
BASOPHILS NFR BLD AUTO: 1.4 % (ref 0–1.5)
DEPRECATED RDW RBC AUTO: 43.1 FL (ref 37–54)
EOSINOPHIL # BLD AUTO: 0.61 10*3/MM3 (ref 0–0.4)
EOSINOPHIL NFR BLD AUTO: 7.3 % (ref 0.3–6.2)
ERYTHROCYTE [DISTWIDTH] IN BLOOD BY AUTOMATED COUNT: 13.3 % (ref 12.3–15.4)
HCT VFR BLD AUTO: 37 % (ref 37.5–51)
HGB BLD-MCNC: 12.2 G/DL (ref 13–17.7)
LYMPHOCYTES # BLD AUTO: 1.22 10*3/MM3 (ref 0.7–3.1)
LYMPHOCYTES NFR BLD AUTO: 14.6 % (ref 19.6–45.3)
MCH RBC QN AUTO: 29.9 PG (ref 26.6–33)
MCHC RBC AUTO-ENTMCNC: 33 G/DL (ref 31.5–35.7)
MCV RBC AUTO: 90.7 FL (ref 79–97)
MONOCYTES # BLD AUTO: 0.84 10*3/MM3 (ref 0.1–0.9)
MONOCYTES NFR BLD AUTO: 10 % (ref 5–12)
NEUTROPHILS # BLD AUTO: 5.57 10*3/MM3 (ref 1.7–7)
NEUTROPHILS NFR BLD AUTO: 66.7 % (ref 42.7–76)
PLATELET # BLD AUTO: 320 10*3/MM3 (ref 140–450)
PMV BLD AUTO: 8.4 FL (ref 6–12)
RBC # BLD AUTO: 4.08 10*6/MM3 (ref 4.14–5.8)
WBC NRBC COR # BLD: 8.36 10*3/MM3 (ref 3.4–10.8)

## 2020-04-13 PROCEDURE — 25010000002 DURVALUMAB 50 MG/ML SOLUTION 2.4 ML VIAL: Performed by: NURSE PRACTITIONER

## 2020-04-13 PROCEDURE — 85025 COMPLETE CBC W/AUTO DIFF WBC: CPT | Performed by: NURSE PRACTITIONER

## 2020-04-13 PROCEDURE — 25010000002 DURVALUMAB 50 MG/ML SOLUTION 10 ML VIAL: Performed by: NURSE PRACTITIONER

## 2020-04-13 PROCEDURE — 36591 DRAW BLOOD OFF VENOUS DEVICE: CPT

## 2020-04-13 PROCEDURE — 25010000003 HEPARIN LOCK FLUCH PER 10 UNITS: Performed by: INTERNAL MEDICINE

## 2020-04-13 PROCEDURE — 96413 CHEMO IV INFUSION 1 HR: CPT

## 2020-04-13 RX ORDER — SODIUM CHLORIDE 9 MG/ML
250 INJECTION, SOLUTION INTRAVENOUS ONCE
Status: DISCONTINUED | OUTPATIENT
Start: 2020-04-13 | End: 2020-04-14 | Stop reason: HOSPADM

## 2020-04-13 RX ORDER — SODIUM CHLORIDE 0.9 % (FLUSH) 0.9 %
10 SYRINGE (ML) INJECTION AS NEEDED
Status: CANCELLED | OUTPATIENT
Start: 2020-04-13

## 2020-04-13 RX ORDER — HEPARIN SODIUM (PORCINE) LOCK FLUSH IV SOLN 100 UNIT/ML 100 UNIT/ML
500 SOLUTION INTRAVENOUS AS NEEDED
Status: DISCONTINUED | OUTPATIENT
Start: 2020-04-13 | End: 2020-04-14 | Stop reason: HOSPADM

## 2020-04-13 RX ORDER — SODIUM CHLORIDE 0.9 % (FLUSH) 0.9 %
10 SYRINGE (ML) INJECTION AS NEEDED
Status: DISCONTINUED | OUTPATIENT
Start: 2020-04-13 | End: 2020-04-14 | Stop reason: HOSPADM

## 2020-04-13 RX ORDER — HEPARIN SODIUM (PORCINE) LOCK FLUSH IV SOLN 100 UNIT/ML 100 UNIT/ML
500 SOLUTION INTRAVENOUS AS NEEDED
Status: CANCELLED | OUTPATIENT
Start: 2020-04-13

## 2020-04-13 RX ADMIN — Medication 10 ML: at 15:27

## 2020-04-13 RX ADMIN — Medication 30 ML: at 13:31

## 2020-04-13 RX ADMIN — HEPARIN 500 UNITS: 100 SYRINGE at 15:28

## 2020-04-13 RX ADMIN — SODIUM CHLORIDE 860 MG: 900 INJECTION, SOLUTION INTRAVENOUS at 14:06

## 2020-04-15 ENCOUNTER — TELEMEDICINE (OUTPATIENT)
Dept: ONCOLOGY | Facility: CLINIC | Age: 55
End: 2020-04-15

## 2020-04-15 DIAGNOSIS — J90 PLEURAL EFFUSION: ICD-10-CM

## 2020-04-15 DIAGNOSIS — C34.91 NON-SMALL CELL CARCINOMA OF LUNG, RIGHT (HCC): Primary | ICD-10-CM

## 2020-04-15 DIAGNOSIS — Z29.8 ENCOUNTER FOR IMMUNOTHERAPY: ICD-10-CM

## 2020-04-15 PROCEDURE — 99214 OFFICE O/P EST MOD 30 MIN: CPT | Performed by: INTERNAL MEDICINE

## 2020-04-15 NOTE — PROGRESS NOTES
Hematology/Oncology Outpatient Follow Up    PATIENT NAME:Jc Driscoll  :1965  MRN: [id number]  PRIMARY CARE PHYSICIAN: Reshma Alvarenga MD  REFERRING PHYSICIAN: Reshma Alvarenga MD    Chief Complaint   Patient presents with   • Follow-up   • Lung Cancer     immunotherapy   • Results        This was an audio and video enabled telemedicine encounter.      HISTORY OF PRESENT ILLNESS:     This is a 54 y.o. who developed left shoulder pain and for that reason he had a chest x-ray done on 19 which showed a 2.7 cm noncalcified right lower lobe nodule was identified.  For that reason a CT scan of the chest was recommended.  Review of his records shows patient had the CT scan on 19 done without contrast.  This basically showed a lobulated noncalcified mass in the posterior aspect of the right lower lobe measuring 3 cm close to the pleural surface.  There is a calcified 1.2 mm nodule in the lateral aspect of the right lower lobe consistent with a granuloma.  There were also calcified subcarinal and right hilar nodules.  There is a lower right side tracheal nodule measuring 1 cm.  Patient had a PET/CT scan done on 19 which showed increased metabolic activity on the right lower lobe mass that measures 2.5 cm with SUV of 4.4.  There was also increased metabolic activity in the subcarinal space measuring 2.8 cm in size with SUV of 3.4, increased activity in an enlarged right paratracheal lymph node that measures 1.9 cm, SUV of 5, also suspicious for metastatic adenopathy.  Patient had a CT-guided biopsy of the right lower lobe mass on 3/8/19.  This showed adenocarcinoma, TTF-1 positive.  On 3/18/19 patient underwent endoscopic ultrasound and biopsy of a subcarinal lymph node.  This was positive for malignant cells.  Patient was then taken back to surgery on 3/20/18 and had left Mediport placement by Dr. Fuchs.  He has been referred for further evaluation and management of his stage 3  adenocarcinoma of the right lung.  He was accompanied by his spouse for the appointment on 3/26/19.  Patient was scheduled to have a brain MRI for complete staging, but he could not do this due to claustrophobia.  He is willing to try with the help of angiolytics.    • Patient had brain MRI done on 4/5/19.  He states it did not show any evidence of metastatic disease.  Evidence of chronic sinusitis was noted.    • Patient was seen by Dr. Escalona who has recommended concurrent chemotherapy and radiation.  Patient is scheduled to begin on 4/15/19.   • 4/17/19 - Patient was initiated on combined chemotherapy and radiation with Cisplatin and Alimta.  Patient received cycle 1.      • 5/8/19 - Patient received cycle 2 of chemotherapy with Cisplatin and Alimta.   • 5/15/19 - Chemistry panel showed creatinine of 1.7.  WBC 1.8, hemoglobin 11.6, platelet count 72,000.   • 5/20/19 - BUN 10, creatinine 1.2, potassium 3.5.    • 5/23/19 - CT scan of the chest with contrast showed interval decrease in size of the right lower lobe pulmonary nodule now measuring 2.1 cm in size.  There was no mediastinal or hilar adenopathy identified.  • 6/26/2019 patient underwent right lower lobectomy with hilar and mediastinal lymph node dissection performed by Dr. Terrance Mckeon.  • Pathology revealed residual residual 2.2 cm invasive moderately differentiated adenocarcinoma.  There were a total of 16 lymph nodes evaluated that 7 had metastatic disease.  There was evidence of lymphovascular invasion, extranodal involvement.  All the surgical margins were negative and distance of the closest margin was 2.5 cm.  Logic stage is T1c N2 M0.  • Patient is here today accompanied by his spouse for this appointment.  He continues to complain of some postop discomfort, numbness but his skin is intact.  There is no drainage.  Has had follow-up with Dr. Fuchs.  • 8/14/2019-seen by Dr. Maria at the [facility].  Dr. Maria has recommended  immunotherapy with durvalumab off label use as patient has not had significant response to neoadjuvant chemotherapy and radiation.  Patient was given the option to have immunotherapy at the [facility] vs [place] and he has chosen to stay in [place]  • 8/21/19 - Started cycle 1 day 1 Imfinzi  • 9/4/2019 patient had CT scan of the chest this shows a moderate size right pleural effusion.  There are areas of groundglass opacification in the right upper lobe without any evidence of significant septal thickening.  Volume loss noted there is also moderate pleural effusion.  There is no suspicious recurrent malignancy.  There were nonenlarged residual mediastinal lymph nodes.  • 9/9/19 - WBC 6.23, Hgb 11.7 Platelets 257,000, , BUN 9, Cr 1.1,Vitamin B12 318, Ferritin 437  • 9/23/19 - received cycle 2 day 1 Imfinzi  • 10/9/19- Seen by Dr rodríguez with ENT for sinus disease  • 11/4/2019-patient received cycle 5 of Imfinzi(durvalumab)  • 12/2/2019 patient had cycle 7 of durvalumab  • 12/5/2019-patient had CT scan of the  abdomen and pelvis with small right pleural sided pleural effusion.There is no evidence of metastatic disease  • 12/30/2019-patient received cycle 9 of durvalumab  • 12/31/2019-patient had CT scan of the chest showed small to moderate left pleural effusion.  Iglesias appearing right lower paratracheal and right peribronchial soft tissue thickening without any discrete pathologically enlarged mediastinal or hilar lymph nodes.  • 1/27/20-patient received cycle 11 of durvalumab  • 2/17/20-patient had a stress test which was negative for ischemia  • 2/17/2020-patient had CT of the chest which showed postop changes on the right lung, right pleural effusion but no enlarging mass was noted  • 3/2/2020-patient received cycle 12 of durvalumab  • 3/16/2020-patient had ultrasound-guided right thoracentesis.  Pathology was negative for malignancy  • 4/13/2020-patient received cycle 15 of immunotherapy with durvalumab    Past  Medical History:   Diagnosis Date   • Allergic rhinitis 7/25/2013    Overview:  4/2/2018 - still zyrtec 10 daily (if stops, gets dermatitis again).    • Anxiety and depression 6/5/2012    Overview:  4/2/2018 -   C/w well 300 xr and Lito 20.  4/8/2019 -   Doing ok even with new lung cancer - lito 20 & well 300xr.    • Chronic pansinusitis 4/8/2019    Overview:  4/8/2019 -   MRI showed chronic thick in frontal, maxillary, ethmoidal ---> to Dr. COLLAZO to est since abx ICC didn't help.  Does netipot bid.    • Diastolic CHF (CMS/HCC) 7/9/2014    Overview:  7/4/14 - impaired LV relaxation on [place] ECHO.  EF 60%. Rest normal   • Dupuytren's contracture of both hands    • Elevated cholesterol    • Erosive esophagitis 7/9/2019    Overview:  EGD 2016 4/2/2018 - nexium OTC daily for few week.  Qod before that.  Now carbonation hurts, epigastric pain 4/8/2019 -   protonix 40 doing well.    • Gastroesophageal reflux disease 2/21/2019   • Hiatal hernia 7/9/2019   • History of colon polyps    • Hypertension    • Lung cancer (CMS/HCC)     right lung and in lymph nodes x2   • Mediastinal lymphadenopathy 3/12/2019   • Normocytic anemia 8/8/2019    Overview:  6/2019 - dropped to 9's postop 7/2019 - rebounded to 10's.  8/8/2019 -   Back to 9's - check ferritin, b12 and thyroid.    • Prediabetes 7/9/2014    Overview:  7/4/14 - a1c 6.1 at [place] admission   • Retention of urine 6/27/2019       Past Surgical History:   Procedure Laterality Date   • BRONCHOSCOPY  03/18/2019    EBUS MONTERO NEEDLE BX   • COLONOSCOPY     • DUPUYTREN CONTRACTURE RELEASE Bilateral    • LUNG BIOPSY  03/08/2019    CT GUIDED   • LUNG REMOVAL, PARTIAL Right     right lower   • PORTACATH PLACEMENT  03/20/2019    DR LONGORIA   • THORACOSCOPY Right 6/26/2019    Procedure: RIGHT VATS, OPEN LOWER LOBECTOMY WITH LYMPH NODE DISECTION, WEDGE RESECTION OF RIGHT MIDDLE LOBE;  Surgeon: Terrance Longoria MD;  Location: [facility] OR;  Service: Cardiothoracic         Current Outpatient  Medications:   •  acetaminophen (TYLENOL) 325 MG tablet, Take 2 tablets by mouth Every 4 (Four) Hours As Needed for Mild Pain ., Disp: , Rfl:   •  amLODIPine (NORVASC) 10 MG tablet, Take 10 mg by mouth Daily., Disp: , Rfl:   •  buPROPion SR (WELLBUTRIN SR) 150 MG 12 hr tablet, Take 300 mg by mouth 2 (Two) Times a Day., Disp: , Rfl:   •  cetirizine (zyrTEC) 10 MG tablet, Take 10 mg by mouth Daily., Disp: , Rfl:   •  desvenlafaxine (PRISTIQ) 50 MG 24 hr tablet, TAKE 1 TABLET BY MOUTH ONCE DAILY FOR 30 DAYS, Disp: , Rfl:   •  HYDROcodone-acetaminophen (NORCO) 5-325 MG per tablet, Take 1 tablet by mouth As Needed., Disp: , Rfl:   •  HYDROcodone-acetaminophen (NORCO) 5-325 MG per tablet, Take 1 tablet by mouth Every 6 (Six) Hours As Needed for Moderate Pain ., Disp: 20 tablet, Rfl: 0  •  ibuprofen (ADVIL,MOTRIN) 600 MG tablet, Take 1 tablet by mouth Every 6 (Six) Hours As Needed for Mild Pain ., Disp: , Rfl:   •  lidocaine-prilocaine (EMLA) 2.5-2.5 % cream, , Disp: , Rfl:   •  LORazepam (ATIVAN) 0.5 MG tablet, TAKE 1 TABLET BY MOUTH TWICE DAILY AS NEEDED FOR ANXIETY MAX OF 2 DAY, Disp: , Rfl: 0  •  metoprolol succinate XL (TOPROL-XL) 100 MG 24 hr tablet, Take 100 mg by mouth Daily., Disp: , Rfl:   •  ondansetron (ZOFRAN) 8 MG tablet, TAKE 1 TABLET BY MOUTH EVERY 8 HOURS AS NEEDED NAUSEA, Disp: , Rfl: 3  •  pantoprazole (PROTONIX) 40 MG EC tablet, Take 40 mg by mouth Daily., Disp: , Rfl:   •  pravastatin (PRAVACHOL) 10 MG tablet, Take 10 mg by mouth Daily., Disp: , Rfl:     No Known Allergies    Family History   Problem Relation Age of Onset   • Cancer Sister    • Cancer Mother    • Lung cancer Father        Cancer-related family history includes Cancer in his mother and sister; Lung cancer in his father.    Social History     Tobacco Use   • Smoking status: Former Smoker     Types: Cigarettes     Last attempt to quit: 9/5/2009     Years since quitting: 10.6   • Smokeless tobacco: Never Used   Substance Use Topics   •  Alcohol use: Yes     Alcohol/week: 2.0 standard drinks     Types: 2 Cans of beer per week     Frequency: Monthly or less     Drinks per session: 1 or 2     Comment: Occasional   • Drug use: No     I have reviewed and confirmed the accuracy of the patient's history:  as entered by the MA/LPN/RN. Azucena Gaspar MD 04/15/20     SUBJECTIVE:  Patient has no specific complaints.  He remains on immunotherapy which is her routine well.  He denies nausea, vomiting or diarrhea.  He had fluid removed from the right lung.      REVIEW OF SYSTEMS:    Review of Systems   Constitutional: Negative for chills and fever.   HENT: Negative for ear pain, mouth sores, nosebleeds and sore throat.    Eyes: Negative for photophobia and visual disturbance.   Respiratory: Negative for wheezing and stridor.    Cardiovascular: Negative for chest pain and palpitations.   Gastrointestinal: Negative for abdominal pain, diarrhea, nausea and vomiting.   Endocrine: Negative for cold intolerance and heat intolerance.   Genitourinary: Negative for dysuria and hematuria.   Musculoskeletal: Negative for joint swelling and neck stiffness.   Skin: Negative for color change and rash.   Neurological: Negative for seizures and syncope.   Hematological: Negative for adenopathy.        No obvious bleeding   Psychiatric/Behavioral: Negative for agitation, confusion and hallucinations.     OBJECTIVE:  There were no vitals filed for this visit.  ECOG  Eastern Cooperative Oncology Group (ECOG, [place], World Health Organization) performance scale  0 Fully active; no performance restrictions.  1 Strenuous physical activity restricted; fully ambulatory and able to carry out light work.  2 Capable of all self-care but unable to carry out any work activities. Up and about >50% of waking hours.  3 Capable of only limited self-care; confined to bed or chair >50% of waking hours.  4 Completely disabled; cannot carry out any self-care; totally confined to bed or  chair.    Physical Exam   Constitutional: He is oriented to person, place, and time. He appears well-developed and well-nourished.   HENT:   Head: Normocephalic.   Right Ear: External ear normal.   Left Ear: External ear normal.   Nose: Nose normal.   Eyes: Pupils are equal, round, and reactive to light.   Neck: Normal range of motion.   Pulmonary/Chest: Effort normal.   Musculoskeletal: Normal range of motion.   Neurological: He is alert and oriented to person, place, and time.   Psychiatric: He has a normal mood and affect. His behavior is normal. Judgment and thought content normal.       RECENT LABS  WBC   Date Value Ref Range Status   04/13/2020 8.36 3.40 - 10.80 10*3/mm3 Final   07/03/2019 8.99 4.5 - 11.0 10*3/uL Final     RBC   Date Value Ref Range Status   04/13/2020 4.08 (L) 4.14 - 5.80 10*6/mm3 Final   07/03/2019 3.24 (L) 4.5 - 5.9 10*6/uL Final     Hemoglobin   Date Value Ref Range Status   04/13/2020 12.2 (L) 13.0 - 17.7 g/dL Final   07/03/2019 10.4 (L) 13.5 - 17.5 g/dL Final     Hematocrit   Date Value Ref Range Status   04/13/2020 37.0 (L) 37.5 - 51.0 % Final   07/03/2019 32.8 (L) 41.0 - 53.0 % Final     MCV   Date Value Ref Range Status   04/13/2020 90.7 79.0 - 97.0 fL Final   07/03/2019 101.2 (H) 80.0 - 100.0 fL Final     MCH   Date Value Ref Range Status   04/13/2020 29.9 26.6 - 33.0 pg Final   07/03/2019 32.1 26.0 - 34.0 pg Final     MCHC   Date Value Ref Range Status   04/13/2020 33.0 31.5 - 35.7 g/dL Final   07/03/2019 31.7 31.0 - 37.0 g/dL Final     RDW   Date Value Ref Range Status   04/13/2020 13.3 12.3 - 15.4 % Final   07/03/2019 15.2 12.0 - 16.8 % Final     RDW-SD   Date Value Ref Range Status   04/13/2020 43.1 37.0 - 54.0 fl Final     MPV   Date Value Ref Range Status   04/13/2020 8.4 6.0 - 12.0 fL Final   07/03/2019 9.0 6.7 - 10.8 fL Final     Platelets   Date Value Ref Range Status   04/13/2020 320 140 - 450 10*3/mm3 Final   07/03/2019 302 140 - 440 10*3/uL Final     Neutrophil Rel %    Date Value Ref Range Status   07/03/2019 60.0 45 - 80 % Final     Neutrophil %   Date Value Ref Range Status   04/13/2020 66.7 42.7 - 76.0 % Final     Lymphocyte Rel %   Date Value Ref Range Status   07/03/2019 14.8 (L) 15 - 50 % Final     Lymphocyte %   Date Value Ref Range Status   04/13/2020 14.6 (L) 19.6 - 45.3 % Final     Monocyte Rel %   Date Value Ref Range Status   07/03/2019 12.2 0 - 15 % Final     Monocyte %   Date Value Ref Range Status   04/13/2020 10.0 5.0 - 12.0 % Final     Eosinophil %   Date Value Ref Range Status   04/13/2020 7.3 (H) 0.3 - 6.2 % Final   07/03/2019 10.4 (H) 0 - 7 % Final     Basophil Rel %   Date Value Ref Range Status   07/03/2019 1.7 0 - 2 % Final     Basophil %   Date Value Ref Range Status   04/13/2020 1.4 0.0 - 1.5 % Final     Neutrophils Absolute   Date Value Ref Range Status   07/03/2019 5.39 1.5 - 7.5 /uL Final   07/03/2019 5.39 2.0 - 8.8 10*3/uL Final     Neutrophils, Absolute   Date Value Ref Range Status   04/13/2020 5.57 1.70 - 7.00 10*3/mm3 Final     Lymphocytes Absolute   Date Value Ref Range Status   07/03/2019 1.33 0.7 - 5.5 10*3/uL Final     Lymphocytes, Absolute   Date Value Ref Range Status   04/13/2020 1.22 0.70 - 3.10 10*3/mm3 Final     Monocytes Absolute   Date Value Ref Range Status   07/03/2019 1.10 0.0 - 1.7 10*3/uL Final     Monocytes, Absolute   Date Value Ref Range Status   04/13/2020 0.84 0.10 - 0.90 10*3/mm3 Final     Eosinophils Absolute   Date Value Ref Range Status   07/03/2019 0.93 (H) 0.0 - 0.8 10*3/uL Final     Eosinophils, Absolute   Date Value Ref Range Status   04/13/2020 0.61 (H) 0.00 - 0.40 10*3/mm3 Final     Basophils Absolute   Date Value Ref Range Status   07/03/2019 0.15 0.0 - 0.2 10*3/uL Final     Basophils, Absolute   Date Value Ref Range Status   04/13/2020 0.12 0.00 - 0.20 10*3/mm3 Final     nRBC   Date Value Ref Range Status   02/18/2020 0.0 0.0 - 0.2 /100 WBC Final       Lab Results   Component Value Date    GLUCOSE 99 03/30/2020     BUN 11 03/30/2020    CREATININE 0.87 03/30/2020    EGFRIFNONA 91 03/30/2020    EGFRIFAFRI >60 05/20/2019    BCR 12.6 03/30/2020    K 4.2 03/30/2020    CO2 26.0 03/30/2020    CALCIUM 9.3 03/30/2020    ALBUMIN 3.60 03/30/2020    LABIL2 1.4 07/03/2019    AST 15 03/30/2020    ALT 20 03/30/2020         Assessment/Plan     There are no diagnoses linked to this encounter.    ASSESSMENT:    1. Adenocarcinoma of the right lung, T1c N2 M0, consistent with clinical stage 3A disease.     2. S/P combined chemotherapy and radiation with cisplatin and Alimta.    3. Status post right lower lobectomy with mediastinal and hilar lymph node dissection with residual disease.  pT1 cN2 M0.  Patient with high risk features including lymphovascular invasion, extranodal capsular extension and persistent multiple lymph node disease.  4. Consolidation treatment with immunotherapy with Imfinzi   5. Satus post right thoracentesis with cytology negative for malignancy  6. Postop anemia: Stable  7. Monitoring for drug toxicities while on immunotherapy: Ongoing    PLAN:    1. Patient's CT scan from February 17, 2020 is stable  2. Continue Imfinzi consolidation therapy: Monitor for side effects and complications  3. Continue B12 injections  monthly  4. Status post flu shot and pneumonia vaccines   5. Refered to ENT for evaluation for sinus surgery : Not covered by insurance per pt.  6. Colonoscopy postponed by patient to January.  Encouraged to complete  7. RTC in 4 weeks   8. Continue supportive care  9. All questions answered to the best of my abilities         Patient has  questions which have all been answered to the best of my abilities    I have reviewed labs results, imaging, vitals, and medications with the patient today. Will follow up in 4 weeks with me.      Patient verbalized understanding and is in agreement of the above plan.    I spent more than 30 minutes discussing with patient management recommendations, reviewing imaging  studies, lab results, progress notes and formulating management decisions.  Time was also spent answering all his/ her questions to the best of my abilities.

## 2020-04-24 ENCOUNTER — TELEPHONE (OUTPATIENT)
Dept: ONCOLOGY | Facility: HOSPITAL | Age: 55
End: 2020-04-24

## 2020-04-24 NOTE — TELEPHONE ENCOUNTER
Per Izzy Tyson RN I called pt to move his chemo appointment on 4/27/2020 Monday to 1300. Pt verbalized understanding of this.

## 2020-04-27 ENCOUNTER — HOSPITAL ENCOUNTER (OUTPATIENT)
Dept: ONCOLOGY | Facility: HOSPITAL | Age: 55
Setting detail: INFUSION SERIES
Discharge: HOME | End: 2020-04-27

## 2020-04-27 VITALS
WEIGHT: 199.9 LBS | TEMPERATURE: 98.7 F | DIASTOLIC BLOOD PRESSURE: 83 MMHG | HEART RATE: 82 BPM | HEIGHT: 75 IN | BODY MASS INDEX: 24.85 KG/M2 | SYSTOLIC BLOOD PRESSURE: 148 MMHG

## 2020-04-27 DIAGNOSIS — C34.31 CANCER OF LOWER LOBE OF RIGHT LUNG (HCC): ICD-10-CM

## 2020-04-27 DIAGNOSIS — C34.91 NON-SMALL CELL CARCINOMA OF LUNG, RIGHT (HCC): ICD-10-CM

## 2020-04-27 DIAGNOSIS — C34.91 NON-SMALL CELL CARCINOMA OF LUNG, RIGHT (HCC): Primary | ICD-10-CM

## 2020-04-27 DIAGNOSIS — Z45.2 ENCOUNTER FOR CARE RELATED TO VASCULAR ACCESS PORT: ICD-10-CM

## 2020-04-27 LAB
ALBUMIN SERPL-MCNC: 4.2 G/DL (ref 3.5–5.2)
ALBUMIN/GLOB SERPL: 1.6 G/DL
ALP SERPL-CCNC: 83 U/L (ref 39–117)
ALT SERPL W P-5'-P-CCNC: 18 U/L (ref 1–41)
ANION GAP SERPL CALCULATED.3IONS-SCNC: 15 MMOL/L (ref 5–15)
AST SERPL-CCNC: 21 U/L (ref 1–40)
BASOPHILS # BLD AUTO: 0.05 10*3/MM3 (ref 0–0.2)
BASOPHILS NFR BLD AUTO: 0.8 % (ref 0–1.5)
BILIRUB SERPL-MCNC: 0.3 MG/DL (ref 0.2–1.2)
BUN BLD-MCNC: 10 MG/DL (ref 6–20)
BUN/CREAT SERPL: 10.4 (ref 7–25)
CALCIUM SPEC-SCNC: 9 MG/DL (ref 8.6–10.5)
CHLORIDE SERPL-SCNC: 98 MMOL/L (ref 98–107)
CO2 SERPL-SCNC: 23 MMOL/L (ref 22–29)
CREAT BLD-MCNC: 0.96 MG/DL (ref 0.76–1.27)
DEPRECATED RDW RBC AUTO: 46.3 FL (ref 37–54)
EOSINOPHIL # BLD AUTO: 0.6 10*3/MM3 (ref 0–0.4)
EOSINOPHIL NFR BLD AUTO: 9 % (ref 0.3–6.2)
ERYTHROCYTE [DISTWIDTH] IN BLOOD BY AUTOMATED COUNT: 14.4 % (ref 12.3–15.4)
GFR SERPL CREATININE-BSD FRML MDRD: 81 ML/MIN/1.73
GLOBULIN UR ELPH-MCNC: 2.6 GM/DL
GLUCOSE BLD-MCNC: 206 MG/DL (ref 65–99)
HCT VFR BLD AUTO: 36.4 % (ref 37.5–51)
HGB BLD-MCNC: 12 G/DL (ref 13–17.7)
LYMPHOCYTES # BLD AUTO: 1.01 10*3/MM3 (ref 0.7–3.1)
LYMPHOCYTES NFR BLD AUTO: 15.2 % (ref 19.6–45.3)
MCH RBC QN AUTO: 30.2 PG (ref 26.6–33)
MCHC RBC AUTO-ENTMCNC: 33 G/DL (ref 31.5–35.7)
MCV RBC AUTO: 91.7 FL (ref 79–97)
MONOCYTES # BLD AUTO: 0.65 10*3/MM3 (ref 0.1–0.9)
MONOCYTES NFR BLD AUTO: 9.8 % (ref 5–12)
MYCOBACTERIUM SPEC CULT: NORMAL
NEUTROPHILS # BLD AUTO: 4.35 10*3/MM3 (ref 1.7–7)
NEUTROPHILS NFR BLD AUTO: 65.2 % (ref 42.7–76)
NIGHT BLUE STAIN TISS: NORMAL
PLATELET # BLD AUTO: 271 10*3/MM3 (ref 140–450)
PMV BLD AUTO: 8.5 FL (ref 6–12)
POTASSIUM BLD-SCNC: 3.6 MMOL/L (ref 3.5–5.2)
PROT SERPL-MCNC: 6.8 G/DL (ref 6–8.5)
RBC # BLD AUTO: 3.97 10*6/MM3 (ref 4.14–5.8)
SODIUM BLD-SCNC: 136 MMOL/L (ref 136–145)
T4 FREE SERPL-MCNC: 1.04 NG/DL (ref 0.93–1.7)
TSH SERPL DL<=0.05 MIU/L-ACNC: 2.47 UIU/ML (ref 0.27–4.2)
WBC NRBC COR # BLD: 6.66 10*3/MM3 (ref 3.4–10.8)

## 2020-04-27 PROCEDURE — 84439 ASSAY OF FREE THYROXINE: CPT | Performed by: NURSE PRACTITIONER

## 2020-04-27 PROCEDURE — 25010000002 DURVALUMAB 50 MG/ML SOLUTION 10 ML VIAL: Performed by: NURSE PRACTITIONER

## 2020-04-27 PROCEDURE — 80053 COMPREHEN METABOLIC PANEL: CPT | Performed by: NURSE PRACTITIONER

## 2020-04-27 PROCEDURE — 25010000003 HEPARIN LOCK FLUCH PER 10 UNITS: Performed by: INTERNAL MEDICINE

## 2020-04-27 PROCEDURE — 36591 DRAW BLOOD OFF VENOUS DEVICE: CPT

## 2020-04-27 PROCEDURE — 25010000002 DURVALUMAB 50 MG/ML SOLUTION 2.4 ML VIAL: Performed by: NURSE PRACTITIONER

## 2020-04-27 PROCEDURE — 96413 CHEMO IV INFUSION 1 HR: CPT

## 2020-04-27 PROCEDURE — 84443 ASSAY THYROID STIM HORMONE: CPT | Performed by: NURSE PRACTITIONER

## 2020-04-27 PROCEDURE — 85025 COMPLETE CBC W/AUTO DIFF WBC: CPT | Performed by: NURSE PRACTITIONER

## 2020-04-27 RX ORDER — SODIUM CHLORIDE 9 MG/ML
250 INJECTION, SOLUTION INTRAVENOUS ONCE
Status: DISCONTINUED | OUTPATIENT
Start: 2020-04-27 | End: 2020-04-28 | Stop reason: HOSPADM

## 2020-04-27 RX ORDER — HEPARIN SODIUM (PORCINE) LOCK FLUSH IV SOLN 100 UNIT/ML 100 UNIT/ML
500 SOLUTION INTRAVENOUS AS NEEDED
Status: CANCELLED | OUTPATIENT
Start: 2020-04-27

## 2020-04-27 RX ORDER — SODIUM CHLORIDE 9 MG/ML
250 INJECTION, SOLUTION INTRAVENOUS ONCE
Status: CANCELLED | OUTPATIENT
Start: 2020-04-27

## 2020-04-27 RX ORDER — SODIUM CHLORIDE 0.9 % (FLUSH) 0.9 %
10 SYRINGE (ML) INJECTION AS NEEDED
Status: CANCELLED | OUTPATIENT
Start: 2020-04-27

## 2020-04-27 RX ORDER — HEPARIN SODIUM (PORCINE) LOCK FLUSH IV SOLN 100 UNIT/ML 100 UNIT/ML
500 SOLUTION INTRAVENOUS AS NEEDED
Status: DISCONTINUED | OUTPATIENT
Start: 2020-04-27 | End: 2020-04-28 | Stop reason: HOSPADM

## 2020-04-27 RX ORDER — SODIUM CHLORIDE 0.9 % (FLUSH) 0.9 %
10 SYRINGE (ML) INJECTION AS NEEDED
Status: DISCONTINUED | OUTPATIENT
Start: 2020-04-27 | End: 2020-04-28 | Stop reason: HOSPADM

## 2020-04-27 RX ADMIN — Medication 10 ML: at 14:41

## 2020-04-27 RX ADMIN — SODIUM CHLORIDE 900 MG: 900 INJECTION, SOLUTION INTRAVENOUS at 13:34

## 2020-04-27 RX ADMIN — HEPARIN 500 UNITS: 100 SYRINGE at 14:41

## 2020-05-11 ENCOUNTER — HOSPITAL ENCOUNTER (OUTPATIENT)
Dept: ONCOLOGY | Facility: HOSPITAL | Age: 55
Setting detail: INFUSION SERIES
Discharge: HOME | End: 2020-05-11

## 2020-05-11 VITALS
SYSTOLIC BLOOD PRESSURE: 129 MMHG | DIASTOLIC BLOOD PRESSURE: 76 MMHG | BODY MASS INDEX: 24.8 KG/M2 | TEMPERATURE: 97.5 F | HEART RATE: 83 BPM | WEIGHT: 198.4 LBS

## 2020-05-11 DIAGNOSIS — C34.91 NON-SMALL CELL CARCINOMA OF LUNG, RIGHT (HCC): ICD-10-CM

## 2020-05-11 DIAGNOSIS — Z45.2 ENCOUNTER FOR CARE RELATED TO VASCULAR ACCESS PORT: ICD-10-CM

## 2020-05-11 DIAGNOSIS — C34.31 CANCER OF LOWER LOBE OF RIGHT LUNG (HCC): ICD-10-CM

## 2020-05-11 DIAGNOSIS — C34.91 NON-SMALL CELL CARCINOMA OF LUNG, RIGHT (HCC): Primary | ICD-10-CM

## 2020-05-11 LAB
ALBUMIN SERPL-MCNC: 4.1 G/DL (ref 3.5–5.2)
ALBUMIN/GLOB SERPL: 1.4 G/DL
ALP SERPL-CCNC: 91 U/L (ref 39–117)
ALT SERPL W P-5'-P-CCNC: 23 U/L (ref 1–41)
ANION GAP SERPL CALCULATED.3IONS-SCNC: 13 MMOL/L (ref 5–15)
AST SERPL-CCNC: 23 U/L (ref 1–40)
BASOPHILS # BLD AUTO: 0.07 10*3/MM3 (ref 0–0.2)
BASOPHILS NFR BLD AUTO: 1 % (ref 0–1.5)
BILIRUB SERPL-MCNC: 0.3 MG/DL (ref 0.2–1.2)
BUN BLD-MCNC: 10 MG/DL (ref 6–20)
BUN/CREAT SERPL: 10.5 (ref 7–25)
CALCIUM SPEC-SCNC: 9.4 MG/DL (ref 8.6–10.5)
CHLORIDE SERPL-SCNC: 100 MMOL/L (ref 98–107)
CO2 SERPL-SCNC: 26 MMOL/L (ref 22–29)
CREAT BLD-MCNC: 0.95 MG/DL (ref 0.76–1.27)
DEPRECATED RDW RBC AUTO: 46.4 FL (ref 37–54)
EOSINOPHIL # BLD AUTO: 0.52 10*3/MM3 (ref 0–0.4)
EOSINOPHIL NFR BLD AUTO: 7.6 % (ref 0.3–6.2)
ERYTHROCYTE [DISTWIDTH] IN BLOOD BY AUTOMATED COUNT: 14.5 % (ref 12.3–15.4)
GFR SERPL CREATININE-BSD FRML MDRD: 82 ML/MIN/1.73
GLOBULIN UR ELPH-MCNC: 3 GM/DL
GLUCOSE BLD-MCNC: 105 MG/DL (ref 65–99)
HCT VFR BLD AUTO: 37 % (ref 37.5–51)
HGB BLD-MCNC: 12.5 G/DL (ref 13–17.7)
LYMPHOCYTES # BLD AUTO: 1.19 10*3/MM3 (ref 0.7–3.1)
LYMPHOCYTES NFR BLD AUTO: 17.5 % (ref 19.6–45.3)
MCH RBC QN AUTO: 30.6 PG (ref 26.6–33)
MCHC RBC AUTO-ENTMCNC: 33.8 G/DL (ref 31.5–35.7)
MCV RBC AUTO: 90.7 FL (ref 79–97)
MONOCYTES # BLD AUTO: 0.93 10*3/MM3 (ref 0.1–0.9)
MONOCYTES NFR BLD AUTO: 13.7 % (ref 5–12)
NEUTROPHILS # BLD AUTO: 4.1 10*3/MM3 (ref 1.7–7)
NEUTROPHILS NFR BLD AUTO: 60.2 % (ref 42.7–76)
PLATELET # BLD AUTO: 282 10*3/MM3 (ref 140–450)
PMV BLD AUTO: 8.5 FL (ref 6–12)
POTASSIUM BLD-SCNC: 4.1 MMOL/L (ref 3.5–5.2)
PROT SERPL-MCNC: 7.1 G/DL (ref 6–8.5)
RBC # BLD AUTO: 4.08 10*6/MM3 (ref 4.14–5.8)
SODIUM BLD-SCNC: 139 MMOL/L (ref 136–145)
WBC NRBC COR # BLD: 6.81 10*3/MM3 (ref 3.4–10.8)

## 2020-05-11 PROCEDURE — 25010000002 DURVALUMAB 50 MG/ML SOLUTION 2.4 ML VIAL: Performed by: NURSE PRACTITIONER

## 2020-05-11 PROCEDURE — 36591 DRAW BLOOD OFF VENOUS DEVICE: CPT

## 2020-05-11 PROCEDURE — 85025 COMPLETE CBC W/AUTO DIFF WBC: CPT | Performed by: NURSE PRACTITIONER

## 2020-05-11 PROCEDURE — 96413 CHEMO IV INFUSION 1 HR: CPT

## 2020-05-11 PROCEDURE — 25010000003 HEPARIN LOCK FLUSH PER 10 UNITS: Performed by: INTERNAL MEDICINE

## 2020-05-11 PROCEDURE — 25010000002 DURVALUMAB 50 MG/ML SOLUTION 10 ML VIAL: Performed by: NURSE PRACTITIONER

## 2020-05-11 PROCEDURE — 80053 COMPREHEN METABOLIC PANEL: CPT | Performed by: NURSE PRACTITIONER

## 2020-05-11 RX ORDER — SODIUM CHLORIDE 0.9 % (FLUSH) 0.9 %
10 SYRINGE (ML) INJECTION AS NEEDED
Status: CANCELLED | OUTPATIENT
Start: 2020-05-11

## 2020-05-11 RX ORDER — HEPARIN SODIUM (PORCINE) LOCK FLUSH IV SOLN 100 UNIT/ML 100 UNIT/ML
500 SOLUTION INTRAVENOUS AS NEEDED
Status: CANCELLED | OUTPATIENT
Start: 2020-05-11

## 2020-05-11 RX ORDER — HEPARIN SODIUM (PORCINE) LOCK FLUSH IV SOLN 100 UNIT/ML 100 UNIT/ML
500 SOLUTION INTRAVENOUS AS NEEDED
Status: DISCONTINUED | OUTPATIENT
Start: 2020-05-11 | End: 2020-05-12 | Stop reason: HOSPADM

## 2020-05-11 RX ORDER — SODIUM CHLORIDE 9 MG/ML
250 INJECTION, SOLUTION INTRAVENOUS ONCE
Status: CANCELLED | OUTPATIENT
Start: 2020-05-11

## 2020-05-11 RX ORDER — SODIUM CHLORIDE 9 MG/ML
250 INJECTION, SOLUTION INTRAVENOUS ONCE
Status: COMPLETED | OUTPATIENT
Start: 2020-05-11 | End: 2020-05-11

## 2020-05-11 RX ORDER — SODIUM CHLORIDE 0.9 % (FLUSH) 0.9 %
10 SYRINGE (ML) INJECTION AS NEEDED
Status: DISCONTINUED | OUTPATIENT
Start: 2020-05-11 | End: 2020-05-12 | Stop reason: HOSPADM

## 2020-05-11 RX ADMIN — HEPARIN 500 UNITS: 100 SYRINGE at 13:03

## 2020-05-11 RX ADMIN — SODIUM CHLORIDE 900 MG: 900 INJECTION, SOLUTION INTRAVENOUS at 11:52

## 2020-05-11 RX ADMIN — SODIUM CHLORIDE 250 ML: 900 INJECTION, SOLUTION INTRAVENOUS at 11:51

## 2020-05-11 RX ADMIN — Medication 10 ML: at 13:03

## 2020-05-13 ENCOUNTER — TELEMEDICINE (OUTPATIENT)
Dept: ONCOLOGY | Facility: CLINIC | Age: 55
End: 2020-05-13

## 2020-05-13 DIAGNOSIS — C34.91 NON-SMALL CELL CARCINOMA OF LUNG, RIGHT (HCC): Primary | ICD-10-CM

## 2020-05-13 DIAGNOSIS — J90 PLEURAL EFFUSION: ICD-10-CM

## 2020-05-13 PROCEDURE — 99214 OFFICE O/P EST MOD 30 MIN: CPT | Performed by: INTERNAL MEDICINE

## 2020-05-13 NOTE — PROGRESS NOTES
Hematology/Oncology Outpatient Follow Up    PATIENT NAME:Jc Driscoll  :1965  MRN: [id number]  PRIMARY CARE PHYSICIAN: Reshma Alvarenga MD  REFERRING PHYSICIAN: Reshma Alvarenga MD    Chief Complaint   Patient presents with   • Follow-up   • Lung Cancer     immunotherapy        This was an audio and video enabled telemedicine encounter. Consented      HISTORY OF PRESENT ILLNESS:     This is a 54 y.o. who developed left shoulder pain and for that reason he had a chest x-ray done on 19 which showed a 2.7 cm noncalcified right lower lobe nodule was identified.  For that reason a CT scan of the chest was recommended.  Review of his records shows patient had the CT scan on 19 done without contrast.  This basically showed a lobulated noncalcified mass in the posterior aspect of the right lower lobe measuring 3 cm close to the pleural surface.  There is a calcified 1.2 mm nodule in the lateral aspect of the right lower lobe consistent with a granuloma.  There were also calcified subcarinal and right hilar nodules.  There is a lower right side tracheal nodule measuring 1 cm.  Patient had a PET/CT scan done on 19 which showed increased metabolic activity on the right lower lobe mass that measures 2.5 cm with SUV of 4.4.  There was also increased metabolic activity in the subcarinal space measuring 2.8 cm in size with SUV of 3.4, increased activity in an enlarged right paratracheal lymph node that measures 1.9 cm, SUV of 5, also suspicious for metastatic adenopathy.  Patient had a CT-guided biopsy of the right lower lobe mass on 3/8/19.  This showed adenocarcinoma, TTF-1 positive.  On 3/18/19 patient underwent endoscopic ultrasound and biopsy of a subcarinal lymph node.  This was positive for malignant cells.  Patient was then taken back to surgery on 3/20/18 and had left Mediport placement by Dr. Fuchs.  He has been referred for further evaluation and management of his stage 3 adenocarcinoma  of the right lung.  He was accompanied by his spouse for the appointment on 3/26/19.  Patient was scheduled to have a brain MRI for complete staging, but he could not do this due to claustrophobia.  He is willing to try with the help of angiolytics.    • Patient had brain MRI done on 4/5/19.  He states it did not show any evidence of metastatic disease.  Evidence of chronic sinusitis was noted.    • Patient was seen by Dr. Escalona who has recommended concurrent chemotherapy and radiation.  Patient is scheduled to begin on 4/15/19.   • 4/17/19 - Patient was initiated on combined chemotherapy and radiation with Cisplatin and Alimta.  Patient received cycle 1.      • 5/8/19 - Patient received cycle 2 of chemotherapy with Cisplatin and Alimta.   • 5/15/19 - Chemistry panel showed creatinine of 1.7.  WBC 1.8, hemoglobin 11.6, platelet count 72,000.   • 5/20/19 - BUN 10, creatinine 1.2, potassium 3.5.    • 5/23/19 - CT scan of the chest with contrast showed interval decrease in size of the right lower lobe pulmonary nodule now measuring 2.1 cm in size.  There was no mediastinal or hilar adenopathy identified.  • 6/26/2019 patient underwent right lower lobectomy with hilar and mediastinal lymph node dissection performed by Dr. Terrance Mckeon.  • Pathology revealed residual residual 2.2 cm invasive moderately differentiated adenocarcinoma.  There were a total of 16 lymph nodes evaluated that 7 had metastatic disease.  There was evidence of lymphovascular invasion, extranodal involvement.  All the surgical margins were negative and distance of the closest margin was 2.5 cm.  Logic stage is T1c N2 M0.  • Patient is here today accompanied by his spouse for this appointment.  He continues to complain of some postop discomfort, numbness but his skin is intact.  There is no drainage.  Has had follow-up with Dr. Fuchs.  • 8/14/2019-seen by Dr. Maria at the [facility].  Dr. Maria has recommended immunotherapy with  durvalumab off label use as patient has not had significant response to neoadjuvant chemotherapy and radiation.  Patient was given the option to have immunotherapy at the [facility] vs [place] and he has chosen to stay in [place]  • 8/21/19 - Started cycle 1 day 1 Imfinzi  • 9/4/2019 patient had CT scan of the chest this shows a moderate size right pleural effusion.  There are areas of groundglass opacification in the right upper lobe without any evidence of significant septal thickening.  Volume loss noted there is also moderate pleural effusion.  There is no suspicious recurrent malignancy.  There were nonenlarged residual mediastinal lymph nodes.  • 9/9/19 - WBC 6.23, Hgb 11.7 Platelets 257,000, , BUN 9, Cr 1.1,Vitamin B12 318, Ferritin 437  • 9/23/19 - received cycle 2 day 1 Imfinzi  • 10/9/19- Seen by Dr rodríguez with ENT for sinus disease  • 11/4/2019-patient received cycle 5 of Imfinzi(durvalumab)  • 12/2/2019 patient had cycle 7 of durvalumab  • 12/5/2019-patient had CT scan of the  abdomen and pelvis with small right pleural sided pleural effusion.There is no evidence of metastatic disease  • 12/30/2019-patient received cycle 9 of durvalumab  • 12/31/2019-patient had CT scan of the chest showed small to moderate left pleural effusion.  Iglesias appearing right lower paratracheal and right peribronchial soft tissue thickening without any discrete pathologically enlarged mediastinal or hilar lymph nodes.  • 1/27/20-patient received cycle 11 of durvalumab  • 2/17/20-patient had a stress test which was negative for ischemia  • 2/17/2020-patient had CT of the chest which showed postop changes on the right lung, right pleural effusion but no enlarging mass was noted  • 3/2/2020-patient received cycle 12 of durvalumab  • 3/16/2020-patient had ultrasound-guided right thoracentesis.  Pathology was negative for malignancy  • 4/13/2020-patient received cycle 15 of immunotherapy with durvalumab  • 5/11/2020-patient  received cycle 17 of immunotherapy with durvalumab    Past Medical History:   Diagnosis Date   • Allergic rhinitis 7/25/2013    Overview:  4/2/2018 - still zyrtec 10 daily (if stops, gets dermatitis again).    • Anxiety and depression 6/5/2012    Overview:  4/2/2018 -   C/w well 300 xr and Lito 20.  4/8/2019 -   Doing ok even with new lung cancer - lito 20 & well 300xr.    • Chronic pansinusitis 4/8/2019    Overview:  4/8/2019 -   MRI showed chronic thick in frontal, maxillary, ethmoidal ---> to Dr. COLLAZO to est since abx ICC didn't help.  Does netipot bid.    • Diastolic CHF (CMS/HCC) 7/9/2014    Overview:  7/4/14 - impaired LV relaxation on [facility].  EF 60%. Rest normal   • Dupuytren's contracture of both hands    • Elevated cholesterol    • Erosive esophagitis 7/9/2019    Overview:  EGD 2016 4/2/2018 - nexium OTC daily for few week.  Qod before that.  Now carbonation hurts, epigastric pain 4/8/2019 -   protonix 40 doing well.    • Gastroesophageal reflux disease 2/21/2019   • Hiatal hernia 7/9/2019   • History of colon polyps    • Hypertension    • Lung cancer (CMS/HCC)     right lung and in lymph nodes x2   • Mediastinal lymphadenopathy 3/12/2019   • Normocytic anemia 8/8/2019    Overview:  6/2019 - dropped to 9's postop 7/2019 - rebounded to 10's.  8/8/2019 -   Back to 9's - check ferritin, b12 and thyroid.    • Prediabetes 7/9/2014    Overview:  7/4/14 - a1c 6.1 at [facility] admission   • Retention of urine 6/27/2019       Past Surgical History:   Procedure Laterality Date   • BRONCHOSCOPY  03/18/2019    EBUS MONTERO NEEDLE BX   • COLONOSCOPY     • DUPUYTREN CONTRACTURE RELEASE Bilateral    • LUNG BIOPSY  03/08/2019    CT GUIDED   • LUNG REMOVAL, PARTIAL Right     right lower   • PORTACATH PLACEMENT  03/20/2019    DR LONGORIA   • THORACOSCOPY Right 6/26/2019    Procedure: RIGHT VATS, OPEN LOWER LOBECTOMY WITH LYMPH NODE DISECTION, WEDGE RESECTION OF RIGHT MIDDLE LOBE;  Surgeon: Terrance Longoria MD;  Location: [facility]  MAIN OR;  Service: Cardiothoracic         Current Outpatient Medications:   •  acetaminophen (TYLENOL) 325 MG tablet, Take 2 tablets by mouth Every 4 (Four) Hours As Needed for Mild Pain ., Disp: , Rfl:   •  amLODIPine (NORVASC) 10 MG tablet, Take 10 mg by mouth Daily., Disp: , Rfl:   •  buPROPion SR (WELLBUTRIN SR) 150 MG 12 hr tablet, Take 300 mg by mouth 2 (Two) Times a Day., Disp: , Rfl:   •  cetirizine (zyrTEC) 10 MG tablet, Take 10 mg by mouth Daily., Disp: , Rfl:   •  desvenlafaxine (PRISTIQ) 50 MG 24 hr tablet, TAKE 1 TABLET BY MOUTH ONCE DAILY FOR 30 DAYS, Disp: , Rfl:   •  HYDROcodone-acetaminophen (NORCO) 5-325 MG per tablet, Take 1 tablet by mouth As Needed., Disp: , Rfl:   •  HYDROcodone-acetaminophen (NORCO) 5-325 MG per tablet, Take 1 tablet by mouth Every 6 (Six) Hours As Needed for Moderate Pain ., Disp: 20 tablet, Rfl: 0  •  ibuprofen (ADVIL,MOTRIN) 600 MG tablet, Take 1 tablet by mouth Every 6 (Six) Hours As Needed for Mild Pain ., Disp: , Rfl:   •  lidocaine-prilocaine (EMLA) 2.5-2.5 % cream, , Disp: , Rfl:   •  LORazepam (ATIVAN) 0.5 MG tablet, TAKE 1 TABLET BY MOUTH TWICE DAILY AS NEEDED FOR ANXIETY MAX OF 2 DAY, Disp: , Rfl: 0  •  metoprolol succinate XL (TOPROL-XL) 100 MG 24 hr tablet, Take 100 mg by mouth Daily., Disp: , Rfl:   •  ondansetron (ZOFRAN) 8 MG tablet, TAKE 1 TABLET BY MOUTH EVERY 8 HOURS AS NEEDED NAUSEA, Disp: , Rfl: 3  •  pantoprazole (PROTONIX) 40 MG EC tablet, Take 40 mg by mouth Daily., Disp: , Rfl:   •  pravastatin (PRAVACHOL) 10 MG tablet, Take 10 mg by mouth Daily., Disp: , Rfl:     No Known Allergies    Family History   Problem Relation Age of Onset   • Cancer Sister    • Cancer Mother    • Lung cancer Father        Cancer-related family history includes Cancer in his mother and sister; Lung cancer in his father.    Social History     Tobacco Use   • Smoking status: Former Smoker     Types: Cigarettes     Last attempt to quit: 9/5/2009     Years since quitting: 10.6   •  Smokeless tobacco: Never Used   Substance Use Topics   • Alcohol use: Yes     Alcohol/week: 2.0 standard drinks     Types: 2 Cans of beer per week     Frequency: Monthly or less     Drinks per session: 1 or 2     Comment: Occasional   • Drug use: No     I have reviewed and confirmed the accuracy of the patient's history:  as entered by the MA/LPN/RN. Azucena Gaspar MD 05/13/20     SUBJECTIVE:    Patient has no specific complaints.  Denies rash, diarrhea, fevers or chills. He denies breathing issues, cough or congestion.    REVIEW OF SYSTEMS:    Review of Systems   Constitutional: Negative for chills and fever.   HENT: Negative for ear pain, mouth sores, nosebleeds and sore throat.    Eyes: Negative for photophobia and visual disturbance.   Respiratory: Negative for wheezing and stridor.    Cardiovascular: Negative for chest pain and palpitations.   Gastrointestinal: Negative for abdominal pain, diarrhea, nausea and vomiting.   Endocrine: Negative for cold intolerance and heat intolerance.   Genitourinary: Negative for dysuria and hematuria.   Musculoskeletal: Negative for joint swelling and neck stiffness.   Skin: Negative for color change and rash.   Neurological: Negative for seizures and syncope.   Hematological: Negative for adenopathy.        No obvious bleeding   Psychiatric/Behavioral: Negative for agitation, confusion and hallucinations.     OBJECTIVE:  There were no vitals filed for this visit.  ECOG  Eastern Cooperative Oncology Group (ECOG, [place], World Health Organization) performance scale  0 Fully active; no performance restrictions.  1 Strenuous physical activity restricted; fully ambulatory and able to carry out light work.  2 Capable of all self-care but unable to carry out any work activities. Up and about >50% of waking hours.  3 Capable of only limited self-care; confined to bed or chair >50% of waking hours.  4 Completely disabled; cannot carry out any self-care; totally confined to bed  or chair.    Physical Exam   Constitutional: He is oriented to person, place, and time. He appears well-developed and well-nourished.   HENT:   Head: Normocephalic.   Right Ear: External ear normal.   Left Ear: External ear normal.   Nose: Nose normal.   Eyes: Pupils are equal, round, and reactive to light.   Neck: Normal range of motion.   Pulmonary/Chest: Effort normal.   Musculoskeletal: Normal range of motion.   Neurological: He is alert and oriented to person, place, and time.   Psychiatric: He has a normal mood and affect. His behavior is normal. Judgment and thought content normal.       RECENT LABS  WBC   Date Value Ref Range Status   05/11/2020 6.81 3.40 - 10.80 10*3/mm3 Final   07/03/2019 8.99 4.5 - 11.0 10*3/uL Final     RBC   Date Value Ref Range Status   05/11/2020 4.08 (L) 4.14 - 5.80 10*6/mm3 Final   07/03/2019 3.24 (L) 4.5 - 5.9 10*6/uL Final     Hemoglobin   Date Value Ref Range Status   05/11/2020 12.5 (L) 13.0 - 17.7 g/dL Final   07/03/2019 10.4 (L) 13.5 - 17.5 g/dL Final     Hematocrit   Date Value Ref Range Status   05/11/2020 37.0 (L) 37.5 - 51.0 % Final   07/03/2019 32.8 (L) 41.0 - 53.0 % Final     MCV   Date Value Ref Range Status   05/11/2020 90.7 79.0 - 97.0 fL Final   07/03/2019 101.2 (H) 80.0 - 100.0 fL Final     MCH   Date Value Ref Range Status   05/11/2020 30.6 26.6 - 33.0 pg Final   07/03/2019 32.1 26.0 - 34.0 pg Final     MCHC   Date Value Ref Range Status   05/11/2020 33.8 31.5 - 35.7 g/dL Final   07/03/2019 31.7 31.0 - 37.0 g/dL Final     RDW   Date Value Ref Range Status   05/11/2020 14.5 12.3 - 15.4 % Final   07/03/2019 15.2 12.0 - 16.8 % Final     RDW-SD   Date Value Ref Range Status   05/11/2020 46.4 37.0 - 54.0 fl Final     MPV   Date Value Ref Range Status   05/11/2020 8.5 6.0 - 12.0 fL Final   07/03/2019 9.0 6.7 - 10.8 fL Final     Platelets   Date Value Ref Range Status   05/11/2020 282 140 - 450 10*3/mm3 Final   07/03/2019 302 140 - 440 10*3/uL Final     Neutrophil Rel %    Date Value Ref Range Status   07/03/2019 60.0 45 - 80 % Final     Neutrophil %   Date Value Ref Range Status   05/11/2020 60.2 42.7 - 76.0 % Final     Lymphocyte Rel %   Date Value Ref Range Status   07/03/2019 14.8 (L) 15 - 50 % Final     Lymphocyte %   Date Value Ref Range Status   05/11/2020 17.5 (L) 19.6 - 45.3 % Final     Monocyte Rel %   Date Value Ref Range Status   07/03/2019 12.2 0 - 15 % Final     Monocyte %   Date Value Ref Range Status   05/11/2020 13.7 (H) 5.0 - 12.0 % Final     Eosinophil %   Date Value Ref Range Status   05/11/2020 7.6 (H) 0.3 - 6.2 % Final   07/03/2019 10.4 (H) 0 - 7 % Final     Basophil Rel %   Date Value Ref Range Status   07/03/2019 1.7 0 - 2 % Final     Basophil %   Date Value Ref Range Status   05/11/2020 1.0 0.0 - 1.5 % Final     Neutrophils Absolute   Date Value Ref Range Status   07/03/2019 5.39 1.5 - 7.5 /uL Final   07/03/2019 5.39 2.0 - 8.8 10*3/uL Final     Neutrophils, Absolute   Date Value Ref Range Status   05/11/2020 4.10 1.70 - 7.00 10*3/mm3 Final     Lymphocytes Absolute   Date Value Ref Range Status   07/03/2019 1.33 0.7 - 5.5 10*3/uL Final     Lymphocytes, Absolute   Date Value Ref Range Status   05/11/2020 1.19 0.70 - 3.10 10*3/mm3 Final     Monocytes Absolute   Date Value Ref Range Status   07/03/2019 1.10 0.0 - 1.7 10*3/uL Final     Monocytes, Absolute   Date Value Ref Range Status   05/11/2020 0.93 (H) 0.10 - 0.90 10*3/mm3 Final     Eosinophils Absolute   Date Value Ref Range Status   07/03/2019 0.93 (H) 0.0 - 0.8 10*3/uL Final     Eosinophils, Absolute   Date Value Ref Range Status   05/11/2020 0.52 (H) 0.00 - 0.40 10*3/mm3 Final     Basophils Absolute   Date Value Ref Range Status   07/03/2019 0.15 0.0 - 0.2 10*3/uL Final     Basophils, Absolute   Date Value Ref Range Status   05/11/2020 0.07 0.00 - 0.20 10*3/mm3 Final     nRBC   Date Value Ref Range Status   02/18/2020 0.0 0.0 - 0.2 /100 WBC Final       Lab Results   Component Value Date    GLUCOSE 105 (H)  05/11/2020    BUN 10 05/11/2020    CREATININE 0.95 05/11/2020    EGFRIFNONA 82 05/11/2020    EGFRIFAFRI >60 05/20/2019    BCR 10.5 05/11/2020    K 4.1 05/11/2020    CO2 26.0 05/11/2020    CALCIUM 9.4 05/11/2020    ALBUMIN 4.10 05/11/2020    LABIL2 1.4 07/03/2019    AST 23 05/11/2020    ALT 23 05/11/2020         Assessment/Plan     There are no diagnoses linked to this encounter.    ASSESSMENT:    1. Adenocarcinoma of the right lung, T1c N2 M0, consistent with clinical stage 3A disease.     2. S/P combined chemotherapy and radiation with cisplatin and Alimta.    3. Status post right lower lobectomy with mediastinal and hilar lymph node dissection with residual disease.  pT1 cN2 M0.  Patient with high risk features including lymphovascular invasion, extranodal capsular extension and persistent multiple lymph node disease.  4. Consolidation treatment with immunotherapy with Imfinzi   5. Satus post right thoracentesis with cytology negative for malignancy  6. Postop anemia: Stable  7. Monitoring for drug toxicities while on immunotherapy: Ongoing    PLAN:    1. Patient's CT scan from February 17, 2020 is stable  2. Continue Imfinzi consolidation therapy: Monitor for side effects and complications  3. Continue B12 injections  monthly  4. Status post flu shot and pneumonia vaccines   5. Refered to ENT for evaluation for sinus surgery : Not covered by insurance per pt.  6. Colonoscopy postponed by patient to January.  Encouraged to complete  7. RTC in 4 weeks with CT scans done a few days before.  8. Continue supportive care  9. All questions answered to the best of my abilities         Patient has  questions which have all been answered to the best of my abilities    I have reviewed labs results, imaging, vitals, and medications with the patient today. Will follow up in 4 weeks with me.      Patient verbalized understanding and is in agreement of the above plan.    I spent more than 30 minutes discussing with patient  management recommendations, reviewing imaging studies, lab results, progress notes and formulating management decisions.  Time was also spent answering all his/ her questions to the best of my abilities.

## 2020-05-24 DIAGNOSIS — C34.31 CANCER OF LOWER LOBE OF RIGHT LUNG (HCC): ICD-10-CM

## 2020-05-24 DIAGNOSIS — C34.91 NON-SMALL CELL CARCINOMA OF LUNG, RIGHT (HCC): ICD-10-CM

## 2020-05-24 RX ORDER — SODIUM CHLORIDE 9 MG/ML
250 INJECTION, SOLUTION INTRAVENOUS ONCE
Status: CANCELLED | OUTPATIENT
Start: 2020-05-26

## 2020-05-26 ENCOUNTER — HOSPITAL ENCOUNTER (OUTPATIENT)
Dept: ONCOLOGY | Facility: HOSPITAL | Age: 55
Setting detail: INFUSION SERIES
Discharge: HOME | End: 2020-05-26

## 2020-05-26 VITALS
OXYGEN SATURATION: 96 % | SYSTOLIC BLOOD PRESSURE: 120 MMHG | TEMPERATURE: 98 F | DIASTOLIC BLOOD PRESSURE: 74 MMHG | HEART RATE: 82 BPM

## 2020-05-26 DIAGNOSIS — Z45.2 ENCOUNTER FOR CARE RELATED TO VASCULAR ACCESS PORT: ICD-10-CM

## 2020-05-26 DIAGNOSIS — C34.91 NON-SMALL CELL CARCINOMA OF LUNG, RIGHT (HCC): Primary | ICD-10-CM

## 2020-05-26 DIAGNOSIS — C34.31 CANCER OF LOWER LOBE OF RIGHT LUNG (HCC): ICD-10-CM

## 2020-05-26 LAB
ALBUMIN SERPL-MCNC: 4.1 G/DL (ref 3.5–5.2)
ALBUMIN/GLOB SERPL: 1.4 G/DL
ALP SERPL-CCNC: 103 U/L (ref 39–117)
ALT SERPL W P-5'-P-CCNC: 31 U/L (ref 1–41)
ANION GAP SERPL CALCULATED.3IONS-SCNC: 14 MMOL/L (ref 5–15)
AST SERPL-CCNC: 26 U/L (ref 1–40)
BASOPHILS # BLD AUTO: 0.08 10*3/MM3 (ref 0–0.2)
BASOPHILS NFR BLD AUTO: 1 % (ref 0–1.5)
BILIRUB SERPL-MCNC: 0.2 MG/DL (ref 0.2–1.2)
BUN BLD-MCNC: 10 MG/DL (ref 6–20)
BUN/CREAT SERPL: 10.6 (ref 7–25)
CALCIUM SPEC-SCNC: 9 MG/DL (ref 8.6–10.5)
CHLORIDE SERPL-SCNC: 97 MMOL/L (ref 98–107)
CO2 SERPL-SCNC: 24 MMOL/L (ref 22–29)
CREAT BLD-MCNC: 0.94 MG/DL (ref 0.76–1.27)
DEPRECATED RDW RBC AUTO: 45 FL (ref 37–54)
EOSINOPHIL # BLD AUTO: 0.62 10*3/MM3 (ref 0–0.4)
EOSINOPHIL NFR BLD AUTO: 7.4 % (ref 0.3–6.2)
ERYTHROCYTE [DISTWIDTH] IN BLOOD BY AUTOMATED COUNT: 14.2 % (ref 12.3–15.4)
GFR SERPL CREATININE-BSD FRML MDRD: 83 ML/MIN/1.73
GLOBULIN UR ELPH-MCNC: 3 GM/DL
GLUCOSE BLD-MCNC: 93 MG/DL (ref 65–99)
HCT VFR BLD AUTO: 34 % (ref 37.5–51)
HGB BLD-MCNC: 11.4 G/DL (ref 13–17.7)
LYMPHOCYTES # BLD AUTO: 1.13 10*3/MM3 (ref 0.7–3.1)
LYMPHOCYTES NFR BLD AUTO: 13.5 % (ref 19.6–45.3)
MCH RBC QN AUTO: 30.3 PG (ref 26.6–33)
MCHC RBC AUTO-ENTMCNC: 33.5 G/DL (ref 31.5–35.7)
MCV RBC AUTO: 90.4 FL (ref 79–97)
MONOCYTES # BLD AUTO: 0.88 10*3/MM3 (ref 0.1–0.9)
MONOCYTES NFR BLD AUTO: 10.5 % (ref 5–12)
NEUTROPHILS # BLD AUTO: 5.64 10*3/MM3 (ref 1.7–7)
NEUTROPHILS NFR BLD AUTO: 67.6 % (ref 42.7–76)
PLATELET # BLD AUTO: 338 10*3/MM3 (ref 140–450)
PMV BLD AUTO: 8.3 FL (ref 6–12)
POTASSIUM BLD-SCNC: 4 MMOL/L (ref 3.5–5.2)
PROT SERPL-MCNC: 7.1 G/DL (ref 6–8.5)
RBC # BLD AUTO: 3.76 10*6/MM3 (ref 4.14–5.8)
SODIUM BLD-SCNC: 135 MMOL/L (ref 136–145)
WBC NRBC COR # BLD: 8.35 10*3/MM3 (ref 3.4–10.8)

## 2020-05-26 PROCEDURE — 80053 COMPREHEN METABOLIC PANEL: CPT | Performed by: NURSE PRACTITIONER

## 2020-05-26 PROCEDURE — 85025 COMPLETE CBC W/AUTO DIFF WBC: CPT | Performed by: NURSE PRACTITIONER

## 2020-05-26 PROCEDURE — 25010000002 DURVALUMAB 50 MG/ML SOLUTION 2.4 ML VIAL: Performed by: NURSE PRACTITIONER

## 2020-05-26 PROCEDURE — 36591 DRAW BLOOD OFF VENOUS DEVICE: CPT

## 2020-05-26 PROCEDURE — 25010000003 HEPARIN LOCK FLUSH PER 10 UNITS: Performed by: INTERNAL MEDICINE

## 2020-05-26 PROCEDURE — 25010000002 DURVALUMAB 50 MG/ML SOLUTION 10 ML VIAL: Performed by: NURSE PRACTITIONER

## 2020-05-26 PROCEDURE — 96413 CHEMO IV INFUSION 1 HR: CPT

## 2020-05-26 RX ORDER — SODIUM CHLORIDE 0.9 % (FLUSH) 0.9 %
10 SYRINGE (ML) INJECTION AS NEEDED
Status: DISCONTINUED | OUTPATIENT
Start: 2020-05-26 | End: 2020-05-27 | Stop reason: HOSPADM

## 2020-05-26 RX ORDER — HEPARIN SODIUM (PORCINE) LOCK FLUSH IV SOLN 100 UNIT/ML 100 UNIT/ML
500 SOLUTION INTRAVENOUS AS NEEDED
Status: DISCONTINUED | OUTPATIENT
Start: 2020-05-26 | End: 2020-05-27 | Stop reason: HOSPADM

## 2020-05-26 RX ORDER — SODIUM CHLORIDE 0.9 % (FLUSH) 0.9 %
10 SYRINGE (ML) INJECTION AS NEEDED
Status: CANCELLED | OUTPATIENT
Start: 2020-05-26

## 2020-05-26 RX ORDER — SODIUM CHLORIDE 9 MG/ML
250 INJECTION, SOLUTION INTRAVENOUS ONCE
Status: DISCONTINUED | OUTPATIENT
Start: 2020-05-26 | End: 2020-05-27 | Stop reason: HOSPADM

## 2020-05-26 RX ORDER — HEPARIN SODIUM (PORCINE) LOCK FLUSH IV SOLN 100 UNIT/ML 100 UNIT/ML
500 SOLUTION INTRAVENOUS AS NEEDED
Status: CANCELLED | OUTPATIENT
Start: 2020-05-26

## 2020-05-26 RX ADMIN — Medication 10 ML: at 14:53

## 2020-05-26 RX ADMIN — SODIUM CHLORIDE 900 MG: 900 INJECTION, SOLUTION INTRAVENOUS at 13:43

## 2020-05-26 RX ADMIN — HEPARIN 500 UNITS: 100 SYRINGE at 14:53

## 2020-05-29 ENCOUNTER — TELEPHONE (OUTPATIENT)
Dept: ONCOLOGY | Facility: CLINIC | Age: 55
End: 2020-05-29

## 2020-05-29 NOTE — TELEPHONE ENCOUNTER
Informed patient that his CT CAP had been scheduled at [facility] on Tuesday 6-9-2020 and that he needed to be there at 11:00.  He v/u.

## 2020-06-05 DIAGNOSIS — C34.91 NON-SMALL CELL CARCINOMA OF LUNG, RIGHT (HCC): ICD-10-CM

## 2020-06-05 DIAGNOSIS — C34.31 CANCER OF LOWER LOBE OF RIGHT LUNG (HCC): ICD-10-CM

## 2020-06-05 RX ORDER — SODIUM CHLORIDE 9 MG/ML
250 INJECTION, SOLUTION INTRAVENOUS ONCE
Status: CANCELLED | OUTPATIENT
Start: 2020-06-08

## 2020-06-08 ENCOUNTER — HOSPITAL ENCOUNTER (OUTPATIENT)
Dept: ONCOLOGY | Facility: HOSPITAL | Age: 55
Setting detail: INFUSION SERIES
Discharge: HOME | End: 2020-06-08

## 2020-06-08 VITALS
OXYGEN SATURATION: 96 % | WEIGHT: 195.7 LBS | HEIGHT: 75 IN | SYSTOLIC BLOOD PRESSURE: 115 MMHG | BODY MASS INDEX: 24.33 KG/M2 | DIASTOLIC BLOOD PRESSURE: 78 MMHG | HEART RATE: 81 BPM | TEMPERATURE: 98.6 F | RESPIRATION RATE: 20 BRPM

## 2020-06-08 DIAGNOSIS — E53.8 B12 DEFICIENCY: ICD-10-CM

## 2020-06-08 DIAGNOSIS — Z45.2 ENCOUNTER FOR CARE RELATED TO VASCULAR ACCESS PORT: ICD-10-CM

## 2020-06-08 DIAGNOSIS — C34.31 CANCER OF LOWER LOBE OF RIGHT LUNG (HCC): ICD-10-CM

## 2020-06-08 DIAGNOSIS — C34.91 NON-SMALL CELL CARCINOMA OF LUNG, RIGHT (HCC): Primary | ICD-10-CM

## 2020-06-08 LAB
ALBUMIN SERPL-MCNC: 3.9 G/DL (ref 3.5–5.2)
ALBUMIN/GLOB SERPL: 1.4 G/DL
ALP SERPL-CCNC: 91 U/L (ref 39–117)
ALT SERPL W P-5'-P-CCNC: 25 U/L (ref 1–41)
ANION GAP SERPL CALCULATED.3IONS-SCNC: 12 MMOL/L (ref 5–15)
AST SERPL-CCNC: 26 U/L (ref 1–40)
BASOPHILS # BLD AUTO: 0.1 10*3/MM3 (ref 0–0.2)
BASOPHILS NFR BLD AUTO: 1.5 % (ref 0–1.5)
BILIRUB SERPL-MCNC: 0.3 MG/DL (ref 0.2–1.2)
BUN BLD-MCNC: 12 MG/DL (ref 6–20)
BUN BLD-MCNC: ABNORMAL MG/DL
BUN/CREAT SERPL: ABNORMAL
CALCIUM SPEC-SCNC: 9 MG/DL (ref 8.6–10.5)
CHLORIDE SERPL-SCNC: 99 MMOL/L (ref 98–107)
CO2 SERPL-SCNC: 23 MMOL/L (ref 22–29)
CREAT BLD-MCNC: 0.95 MG/DL (ref 0.76–1.27)
DEPRECATED RDW RBC AUTO: 47.2 FL (ref 37–54)
EOSINOPHIL # BLD AUTO: 0.39 10*3/MM3 (ref 0–0.4)
EOSINOPHIL NFR BLD AUTO: 5.8 % (ref 0.3–6.2)
ERYTHROCYTE [DISTWIDTH] IN BLOOD BY AUTOMATED COUNT: 14.5 % (ref 12.3–15.4)
GFR SERPL CREATININE-BSD FRML MDRD: 82 ML/MIN/1.73
GLOBULIN UR ELPH-MCNC: 2.8 GM/DL
GLUCOSE BLD-MCNC: 125 MG/DL (ref 65–99)
HCT VFR BLD AUTO: 36.9 % (ref 37.5–51)
HGB BLD-MCNC: 12.2 G/DL (ref 13–17.7)
LYMPHOCYTES # BLD AUTO: 0.9 10*3/MM3 (ref 0.7–3.1)
LYMPHOCYTES NFR BLD AUTO: 13.5 % (ref 19.6–45.3)
MCH RBC QN AUTO: 30.2 PG (ref 26.6–33)
MCHC RBC AUTO-ENTMCNC: 33.1 G/DL (ref 31.5–35.7)
MCV RBC AUTO: 91.3 FL (ref 79–97)
MONOCYTES # BLD AUTO: 0.68 10*3/MM3 (ref 0.1–0.9)
MONOCYTES NFR BLD AUTO: 10.2 % (ref 5–12)
NEUTROPHILS # BLD AUTO: 4.62 10*3/MM3 (ref 1.7–7)
NEUTROPHILS NFR BLD AUTO: 69 % (ref 42.7–76)
PLATELET # BLD AUTO: 128 10*3/MM3 (ref 140–450)
PMV BLD AUTO: 9.4 FL (ref 6–12)
POTASSIUM BLD-SCNC: 3.9 MMOL/L (ref 3.5–5.2)
PROT SERPL-MCNC: 6.7 G/DL (ref 6–8.5)
RBC # BLD AUTO: 4.04 10*6/MM3 (ref 4.14–5.8)
SODIUM BLD-SCNC: 134 MMOL/L (ref 136–145)
T4 FREE SERPL-MCNC: 1.01 NG/DL (ref 0.93–1.7)
TSH SERPL DL<=0.05 MIU/L-ACNC: 3.88 UIU/ML (ref 0.27–4.2)
WBC NRBC COR # BLD: 6.69 10*3/MM3 (ref 3.4–10.8)

## 2020-06-08 PROCEDURE — 84443 ASSAY THYROID STIM HORMONE: CPT | Performed by: INTERNAL MEDICINE

## 2020-06-08 PROCEDURE — 25010000002 CYANOCOBALAMIN PER 1000 MCG: Performed by: INTERNAL MEDICINE

## 2020-06-08 PROCEDURE — 25010000002 DURVALUMAB 50 MG/ML SOLUTION 10 ML VIAL: Performed by: INTERNAL MEDICINE

## 2020-06-08 PROCEDURE — 85025 COMPLETE CBC W/AUTO DIFF WBC: CPT | Performed by: INTERNAL MEDICINE

## 2020-06-08 PROCEDURE — 96413 CHEMO IV INFUSION 1 HR: CPT

## 2020-06-08 PROCEDURE — 36591 DRAW BLOOD OFF VENOUS DEVICE: CPT

## 2020-06-08 PROCEDURE — 25010000003 HEPARIN LOCK FLUSH PER 10 UNITS: Performed by: INTERNAL MEDICINE

## 2020-06-08 PROCEDURE — 96372 THER/PROPH/DIAG INJ SC/IM: CPT

## 2020-06-08 PROCEDURE — 80053 COMPREHEN METABOLIC PANEL: CPT | Performed by: INTERNAL MEDICINE

## 2020-06-08 PROCEDURE — 84439 ASSAY OF FREE THYROXINE: CPT | Performed by: INTERNAL MEDICINE

## 2020-06-08 RX ORDER — SODIUM CHLORIDE 0.9 % (FLUSH) 0.9 %
10 SYRINGE (ML) INJECTION AS NEEDED
Status: DISCONTINUED | OUTPATIENT
Start: 2020-06-08 | End: 2020-06-09 | Stop reason: HOSPADM

## 2020-06-08 RX ORDER — CYANOCOBALAMIN 1000 UG/ML
1000 INJECTION, SOLUTION INTRAMUSCULAR; SUBCUTANEOUS ONCE
Status: COMPLETED | OUTPATIENT
Start: 2020-06-08 | End: 2020-06-08

## 2020-06-08 RX ORDER — HEPARIN SODIUM (PORCINE) LOCK FLUSH IV SOLN 100 UNIT/ML 100 UNIT/ML
500 SOLUTION INTRAVENOUS AS NEEDED
Status: DISCONTINUED | OUTPATIENT
Start: 2020-06-08 | End: 2020-06-09 | Stop reason: HOSPADM

## 2020-06-08 RX ORDER — SODIUM CHLORIDE 9 MG/ML
250 INJECTION, SOLUTION INTRAVENOUS ONCE
Status: DISCONTINUED | OUTPATIENT
Start: 2020-06-08 | End: 2020-06-09 | Stop reason: HOSPADM

## 2020-06-08 RX ORDER — HEPARIN SODIUM (PORCINE) LOCK FLUSH IV SOLN 100 UNIT/ML 100 UNIT/ML
500 SOLUTION INTRAVENOUS AS NEEDED
Status: CANCELLED | OUTPATIENT
Start: 2020-06-08

## 2020-06-08 RX ORDER — SODIUM CHLORIDE 0.9 % (FLUSH) 0.9 %
10 SYRINGE (ML) INJECTION AS NEEDED
Status: CANCELLED | OUTPATIENT
Start: 2020-06-08

## 2020-06-08 RX ADMIN — HEPARIN 500 UNITS: 100 SYRINGE at 13:44

## 2020-06-08 RX ADMIN — CYANOCOBALAMIN 1000 MCG: 1000 INJECTION INTRAMUSCULAR; SUBCUTANEOUS at 12:17

## 2020-06-08 RX ADMIN — SODIUM CHLORIDE 900 MG: 900 INJECTION, SOLUTION INTRAVENOUS at 12:34

## 2020-06-08 RX ADMIN — Medication 10 ML: at 13:44

## 2020-06-15 ENCOUNTER — APPOINTMENT (OUTPATIENT)
Dept: LAB | Facility: HOSPITAL | Age: 55
End: 2020-06-15

## 2020-06-15 ENCOUNTER — OFFICE VISIT (OUTPATIENT)
Dept: ONCOLOGY | Facility: CLINIC | Age: 55
End: 2020-06-15

## 2020-06-15 VITALS
HEART RATE: 80 BPM | DIASTOLIC BLOOD PRESSURE: 95 MMHG | RESPIRATION RATE: 16 BRPM | SYSTOLIC BLOOD PRESSURE: 138 MMHG | TEMPERATURE: 98.9 F | HEIGHT: 75 IN | BODY MASS INDEX: 24.49 KG/M2 | WEIGHT: 197 LBS

## 2020-06-15 DIAGNOSIS — C34.91 NON-SMALL CELL CARCINOMA OF LUNG, RIGHT (HCC): Primary | ICD-10-CM

## 2020-06-15 DIAGNOSIS — D64.9 ANEMIA, UNSPECIFIED TYPE: ICD-10-CM

## 2020-06-15 PROCEDURE — 99214 OFFICE O/P EST MOD 30 MIN: CPT | Performed by: INTERNAL MEDICINE

## 2020-06-15 NOTE — PROGRESS NOTES
Hematology/Oncology Outpatient Follow Up    PATIENT NAME:Jc Driscoll  :1965  MRN: [id number]  PRIMARY CARE PHYSICIAN: Reshma Alvarenga MD  REFERRING PHYSICIAN: Reshma Alvarenga MD    Chief Complaint   Patient presents with   • Follow-up     lung cancer, on immunotherapy        HISTORY OF PRESENT ILLNESS:     This is a 54 y.o. who developed left shoulder pain and for that reason he had a chest x-ray done on 19 which showed a 2.7 cm noncalcified right lower lobe nodule was identified.  For that reason a CT scan of the chest was recommended.  Review of his records shows patient had the CT scan on 19 done without contrast.  This basically showed a lobulated noncalcified mass in the posterior aspect of the right lower lobe measuring 3 cm close to the pleural surface.  There is a calcified 1.2 mm nodule in the lateral aspect of the right lower lobe consistent with a granuloma.  There were also calcified subcarinal and right hilar nodules.  There is a lower right side tracheal nodule measuring 1 cm.  Patient had a PET/CT scan done on 19 which showed increased metabolic activity on the right lower lobe mass that measures 2.5 cm with SUV of 4.4.  There was also increased metabolic activity in the subcarinal space measuring 2.8 cm in size with SUV of 3.4, increased activity in an enlarged right paratracheal lymph node that measures 1.9 cm, SUV of 5, also suspicious for metastatic adenopathy.  Patient had a CT-guided biopsy of the right lower lobe mass on 3/8/19.  This showed adenocarcinoma, TTF-1 positive.  On 3/18/19 patient underwent endoscopic ultrasound and biopsy of a subcarinal lymph node.  This was positive for malignant cells.  Patient was then taken back to surgery on 3/20/18 and had left Mediport placement by Dr. Fuchs.  He has been referred for further evaluation and management of his stage 3 adenocarcinoma of the right lung.  He was accompanied by his spouse for the appointment  on 3/26/19.  Patient was scheduled to have a brain MRI for complete staging, but he could not do this due to claustrophobia.  He is willing to try with the help of angiolytics.    • Patient had brain MRI done on 4/5/19.  He states it did not show any evidence of metastatic disease.  Evidence of chronic sinusitis was noted.    • Patient was seen by Dr. Escalona who has recommended concurrent chemotherapy and radiation.  Patient is scheduled to begin on 4/15/19.   • 4/17/19 - Patient was initiated on combined chemotherapy and radiation with Cisplatin and Alimta.  Patient received cycle 1.      • 5/8/19 - Patient received cycle 2 of chemotherapy with Cisplatin and Alimta.   • 5/15/19 - Chemistry panel showed creatinine of 1.7.  WBC 1.8, hemoglobin 11.6, platelet count 72,000.   • 5/20/19 - BUN 10, creatinine 1.2, potassium 3.5.    • 5/23/19 - CT scan of the chest with contrast showed interval decrease in size of the right lower lobe pulmonary nodule now measuring 2.1 cm in size.  There was no mediastinal or hilar adenopathy identified.  • 6/26/2019 patient underwent right lower lobectomy with hilar and mediastinal lymph node dissection performed by Dr. Terrance Mckeon.  • Pathology revealed residual residual 2.2 cm invasive moderately differentiated adenocarcinoma.  There were a total of 16 lymph nodes evaluated that 7 had metastatic disease.  There was evidence of lymphovascular invasion, extranodal involvement.  All the surgical margins were negative and distance of the closest margin was 2.5 cm.  Logic stage is T1c N2 M0.  • Patient is here today accompanied by his spouse for this appointment.  He continues to complain of some postop discomfort, numbness but his skin is intact.  There is no drainage.  Has had follow-up with Dr. Fuchs.  • 8/14/2019-seen by Dr. Maria at the [facility].  Dr. Maria has recommended immunotherapy with durvalumab off label use as patient has not had significant response to  neoadjuvant chemotherapy and radiation.  Patient was given the option to have immunotherapy at the [facility] vs [place] and he has chosen to stay in [place]  • 8/21/19 - Started cycle 1 day 1 Imfinzi  • 9/4/2019 patient had CT scan of the chest this shows a moderate size right pleural effusion.  There are areas of groundglass opacification in the right upper lobe without any evidence of significant septal thickening.  Volume loss noted there is also moderate pleural effusion.  There is no suspicious recurrent malignancy.  There were nonenlarged residual mediastinal lymph nodes.  • 9/9/19 - WBC 6.23, Hgb 11.7 Platelets 257,000, , BUN 9, Cr 1.1,Vitamin B12 318, Ferritin 437  • 9/23/19 - received cycle 2 day 1 Imfinzi  • 10/9/19- Seen by Dr rodríguez with ENT for sinus disease  • 11/4/2019-patient received cycle 5 of Imfinzi(durvalumab)  • 12/2/2019 patient had cycle 7 of durvalumab  • 12/5/2019-patient had CT scan of the  abdomen and pelvis with small right pleural sided pleural effusion.There is no evidence of metastatic disease  • 12/30/2019-patient received cycle 9 of durvalumab  • 12/31/2019-patient had CT scan of the chest showed small to moderate left pleural effusion.  Iglesias appearing right lower paratracheal and right peribronchial soft tissue thickening without any discrete pathologically enlarged mediastinal or hilar lymph nodes.  • 1/27/20-patient received cycle 11 of durvalumab  • 2/17/20-patient had a stress test which was negative for ischemia  • 2/17/2020-patient had CT of the chest which showed postop changes on the right lung, right pleural effusion but no enlarging mass was noted  • 3/2/2020-patient received cycle 12 of durvalumab  • 3/16/2020-patient had ultrasound-guided right thoracentesis.  Pathology was negative for malignancy  • 4/13/2020-patient received cycle 15 of immunotherapy with durvalumab  • 5/11/2020-patient received cycle 17 of immunotherapy with durvalumab  • 6/9/2020-patient had CT  scan of the chest, abdomen and pelvis for lung cancer restaging.  There is stable small chronic loculated right basilar pleural effusion suggesting chronic pleuritis.  There is no evidence of metastatic disease in the abdomen or pelvis    Past Medical History:   Diagnosis Date   • Allergic rhinitis 7/25/2013    Overview:  4/2/2018 - still zyrtec 10 daily (if stops, gets dermatitis again).    • Anxiety and depression 6/5/2012    Overview:  4/2/2018 -   C/w well 300 xr and Lito 20.  4/8/2019 -   Doing ok even with new lung cancer - lito 20 & well 300xr.    • Chronic pansinusitis 4/8/2019    Overview:  4/8/2019 -   MRI showed chronic thick in frontal, maxillary, ethmoidal ---> to Dr. COLLAZO to est since abx ICC didn't help.  Does netipot bid.    • Diastolic CHF (CMS/HCC) 7/9/2014    Overview:  7/4/14 - impaired LV relaxation on [facility] E[facility].  EF 60%. Rest normal   • Dupuytren's contracture of both hands    • Elevated cholesterol    • Erosive esophagitis 7/9/2019    Overview:  EGD 2016 4/2/2018 - nexium OTC daily for few week.  Qod before that.  Now carbonation hurts, epigastric pain 4/8/2019 -   protonix 40 doing well.    • Gastroesophageal reflux disease 2/21/2019   • Hiatal hernia 7/9/2019   • History of colon polyps    • Hypertension    • Lung cancer (CMS/HCC)     right lung and in lymph nodes x2   • Mediastinal lymphadenopathy 3/12/2019   • Normocytic anemia 8/8/2019    Overview:  6/2019 - dropped to 9's postop 7/2019 - rebounded to 10's.  8/8/2019 -   Back to 9's - check ferritin, b12 and thyroid.    • Prediabetes 7/9/2014    Overview:  7/4/14 - a1c 6.1 at [facility] admission   • Retention of urine 6/27/2019       Past Surgical History:   Procedure Laterality Date   • BRONCHOSCOPY  03/18/2019    EBUS MONTERO NEEDLE BX   • COLONOSCOPY     • DUPUYTREN CONTRACTURE RELEASE Bilateral    • LUNG BIOPSY  03/08/2019    CT GUIDED   • LUNG REMOVAL, PARTIAL Right     right lower   • PORTACATH PLACEMENT  03/20/2019    DR LONGORIA   •  THORACOSCOPY Right 6/26/2019    Procedure: RIGHT VATS, OPEN LOWER LOBECTOMY WITH LYMPH NODE DISECTION, WEDGE RESECTION OF RIGHT MIDDLE LOBE;  Surgeon: Terrance Fuchs MD;  Location: [facility] MAIN OR;  Service: Cardiothoracic         Current Outpatient Medications:   •  acetaminophen (TYLENOL) 325 MG tablet, Take 2 tablets by mouth Every 4 (Four) Hours As Needed for Mild Pain ., Disp: , Rfl:   •  amLODIPine (NORVASC) 10 MG tablet, Take 10 mg by mouth Daily., Disp: , Rfl:   •  buPROPion SR (WELLBUTRIN SR) 150 MG 12 hr tablet, Take 300 mg by mouth 2 (Two) Times a Day., Disp: , Rfl:   •  cetirizine (zyrTEC) 10 MG tablet, Take 10 mg by mouth Daily., Disp: , Rfl:   •  desvenlafaxine (PRISTIQ) 50 MG 24 hr tablet, TAKE 1 TABLET BY MOUTH ONCE DAILY FOR 30 DAYS, Disp: , Rfl:   •  HYDROcodone-acetaminophen (NORCO) 5-325 MG per tablet, Take 1 tablet by mouth As Needed., Disp: , Rfl:   •  ibuprofen (ADVIL,MOTRIN) 600 MG tablet, Take 1 tablet by mouth Every 6 (Six) Hours As Needed for Mild Pain ., Disp: , Rfl:   •  lidocaine-prilocaine (EMLA) 2.5-2.5 % cream, , Disp: , Rfl:   •  LORazepam (ATIVAN) 0.5 MG tablet, TAKE 1 TABLET BY MOUTH TWICE DAILY AS NEEDED FOR ANXIETY MAX OF 2 DAY, Disp: , Rfl: 0  •  metoprolol succinate XL (TOPROL-XL) 100 MG 24 hr tablet, Take 100 mg by mouth Daily., Disp: , Rfl:   •  ondansetron (ZOFRAN) 8 MG tablet, TAKE 1 TABLET BY MOUTH EVERY 8 HOURS AS NEEDED NAUSEA, Disp: , Rfl: 3  •  pantoprazole (PROTONIX) 40 MG EC tablet, Take 40 mg by mouth Daily., Disp: , Rfl:   •  pravastatin (PRAVACHOL) 10 MG tablet, Take 10 mg by mouth Daily., Disp: , Rfl:   •  HYDROcodone-acetaminophen (NORCO) 5-325 MG per tablet, Take 1 tablet by mouth Every 6 (Six) Hours As Needed for Moderate Pain ., Disp: 20 tablet, Rfl: 0    No Known Allergies    Family History   Problem Relation Age of Onset   • Cancer Sister    • Cancer Mother    • Lung cancer Father        Cancer-related family history includes Cancer in his mother and  "sister; Lung cancer in his father.    Social History     Tobacco Use   • Smoking status: Former Smoker     Types: Cigarettes     Last attempt to quit: 9/5/2009     Years since quitting: 10.7   • Smokeless tobacco: Never Used   Substance Use Topics   • Alcohol use: Yes     Alcohol/week: 2.0 standard drinks     Types: 2 Cans of beer per week     Frequency: Monthly or less     Drinks per session: 1 or 2     Comment: Occasional   • Drug use: No     I have reviewed and confirmed the accuracy of the patient's history:  as entered by the MA/LPN/RN. Azucena Gaspar MD 06/15/20     SUBJECTIVE:    Patient has no specific complaints.  Denies rash, diarrhea, fevers or chills. He denies breathing issues, cough or congestion.  Continues to work full-time.    REVIEW OF SYSTEMS:    Review of Systems   Constitutional: Negative for chills and fever.   HENT: Negative for ear pain, mouth sores, nosebleeds and sore throat.    Eyes: Negative for photophobia and visual disturbance.   Respiratory: Negative for wheezing and stridor.    Cardiovascular: Negative for chest pain and palpitations.   Gastrointestinal: Negative for abdominal pain, diarrhea, nausea and vomiting.   Endocrine: Negative for cold intolerance and heat intolerance.   Genitourinary: Negative for dysuria and hematuria.   Musculoskeletal: Negative for joint swelling and neck stiffness.   Skin: Negative for color change and rash.   Neurological: Negative for seizures and syncope.   Hematological: Negative for adenopathy.        No obvious bleeding   Psychiatric/Behavioral: Negative for agitation, confusion and hallucinations.     OBJECTIVE:  Vitals:    06/15/20 1330   BP: 138/95   Pulse: 80   Resp: 16   Temp: 98.9 °F (37.2 °C)   Weight: 89.4 kg (197 lb)   Height: 190.5 cm (75\")   PainSc: 0-No pain     ECOG  Eastern Cooperative Oncology Group (ECOG, [place], World Health Organization) performance scale  0 Fully active; no performance restrictions.  1 Strenuous physical " activity restricted; fully ambulatory and able to carry out light work.  2 Capable of all self-care but unable to carry out any work activities. Up and about >50% of waking hours.  3 Capable of only limited self-care; confined to bed or chair >50% of waking hours.  4 Completely disabled; cannot carry out any self-care; totally confined to bed or chair.    Physical Exam   Constitutional: He is oriented to person, place, and time. He appears well-developed and well-nourished. No distress.   HENT:   Head: Normocephalic and atraumatic.   Right Ear: External ear normal.   Left Ear: External ear normal.   Nose: Nose normal.   Eyes: Pupils are equal, round, and reactive to light. Conjunctivae and EOM are normal. Right eye exhibits no discharge. Left eye exhibits no discharge. No scleral icterus.   Neck: Normal range of motion. Neck supple. No thyromegaly present.   Cardiovascular: Normal rate, regular rhythm and normal heart sounds. Exam reveals no gallop and no friction rub.   Pulmonary/Chest: Effort normal. No stridor. No respiratory distress. He has no wheezes.   Abdominal: Soft. Bowel sounds are normal. He exhibits no mass. There is no tenderness. There is no rebound and no guarding.   Musculoskeletal: Normal range of motion. He exhibits no tenderness.   Lymphadenopathy:     He has no cervical adenopathy.   Neurological: He is alert and oriented to person, place, and time. He exhibits normal muscle tone.   Skin: Skin is warm. No rash noted. He is not diaphoretic. No erythema.   Psychiatric: He has a normal mood and affect. His behavior is normal. Judgment and thought content normal.   Nursing note and vitals reviewed.      RECENT LABS  WBC   Date Value Ref Range Status   06/08/2020 6.69 3.40 - 10.80 10*3/mm3 Final   07/03/2019 8.99 4.5 - 11.0 10*3/uL Final     RBC   Date Value Ref Range Status   06/08/2020 4.04 (L) 4.14 - 5.80 10*6/mm3 Final   07/03/2019 3.24 (L) 4.5 - 5.9 10*6/uL Final     Hemoglobin   Date Value Ref  Range Status   06/08/2020 12.2 (L) 13.0 - 17.7 g/dL Final   07/03/2019 10.4 (L) 13.5 - 17.5 g/dL Final     Hematocrit   Date Value Ref Range Status   06/08/2020 36.9 (L) 37.5 - 51.0 % Final   07/03/2019 32.8 (L) 41.0 - 53.0 % Final     MCV   Date Value Ref Range Status   06/08/2020 91.3 79.0 - 97.0 fL Final   07/03/2019 101.2 (H) 80.0 - 100.0 fL Final     MCH   Date Value Ref Range Status   06/08/2020 30.2 26.6 - 33.0 pg Final   07/03/2019 32.1 26.0 - 34.0 pg Final     MCHC   Date Value Ref Range Status   06/08/2020 33.1 31.5 - 35.7 g/dL Final   07/03/2019 31.7 31.0 - 37.0 g/dL Final     RDW   Date Value Ref Range Status   06/08/2020 14.5 12.3 - 15.4 % Final   07/03/2019 15.2 12.0 - 16.8 % Final     RDW-SD   Date Value Ref Range Status   06/08/2020 47.2 37.0 - 54.0 fl Final     MPV   Date Value Ref Range Status   06/08/2020 9.4 6.0 - 12.0 fL Final   07/03/2019 9.0 6.7 - 10.8 fL Final     Platelets   Date Value Ref Range Status   06/08/2020 128 (L) 140 - 450 10*3/mm3 Final   07/03/2019 302 140 - 440 10*3/uL Final     Neutrophil Rel %   Date Value Ref Range Status   07/03/2019 60.0 45 - 80 % Final     Neutrophil %   Date Value Ref Range Status   06/08/2020 69.0 42.7 - 76.0 % Final     Lymphocyte Rel %   Date Value Ref Range Status   07/03/2019 14.8 (L) 15 - 50 % Final     Lymphocyte %   Date Value Ref Range Status   06/08/2020 13.5 (L) 19.6 - 45.3 % Final     Monocyte Rel %   Date Value Ref Range Status   07/03/2019 12.2 0 - 15 % Final     Monocyte %   Date Value Ref Range Status   06/08/2020 10.2 5.0 - 12.0 % Final     Eosinophil %   Date Value Ref Range Status   06/08/2020 5.8 0.3 - 6.2 % Final   07/03/2019 10.4 (H) 0 - 7 % Final     Basophil Rel %   Date Value Ref Range Status   07/03/2019 1.7 0 - 2 % Final     Basophil %   Date Value Ref Range Status   06/08/2020 1.5 0.0 - 1.5 % Final     Neutrophils Absolute   Date Value Ref Range Status   07/03/2019 5.39 1.5 - 7.5 /uL Final   07/03/2019 5.39 2.0 - 8.8 10*3/uL  Final     Neutrophils, Absolute   Date Value Ref Range Status   06/08/2020 4.62 1.70 - 7.00 10*3/mm3 Final     Lymphocytes Absolute   Date Value Ref Range Status   07/03/2019 1.33 0.7 - 5.5 10*3/uL Final     Lymphocytes, Absolute   Date Value Ref Range Status   06/08/2020 0.90 0.70 - 3.10 10*3/mm3 Final     Monocytes Absolute   Date Value Ref Range Status   07/03/2019 1.10 0.0 - 1.7 10*3/uL Final     Monocytes, Absolute   Date Value Ref Range Status   06/08/2020 0.68 0.10 - 0.90 10*3/mm3 Final     Eosinophils Absolute   Date Value Ref Range Status   07/03/2019 0.93 (H) 0.0 - 0.8 10*3/uL Final     Eosinophils, Absolute   Date Value Ref Range Status   06/08/2020 0.39 0.00 - 0.40 10*3/mm3 Final     Basophils Absolute   Date Value Ref Range Status   07/03/2019 0.15 0.0 - 0.2 10*3/uL Final     Basophils, Absolute   Date Value Ref Range Status   06/08/2020 0.10 0.00 - 0.20 10*3/mm3 Final     nRBC   Date Value Ref Range Status   02/18/2020 0.0 0.0 - 0.2 /100 WBC Final       Lab Results   Component Value Date    GLUCOSE 125 (H) 06/08/2020    BUN  06/08/2020      Comment:      Testing performed by alternate method    BUN 12 06/08/2020    CREATININE 0.95 06/08/2020    EGFRIFNONA 82 06/08/2020    EGFRIFAFRI >60 05/20/2019    BCR  06/08/2020      Comment:      Testing not performed.    K 3.9 06/08/2020    CO2 23.0 06/08/2020    CALCIUM 9.0 06/08/2020    ALBUMIN 3.90 06/08/2020    LABIL2 1.4 07/03/2019    AST 26 06/08/2020    ALT 25 06/08/2020         Assessment/Plan     Non-small cell carcinoma of lung, right (CMS/HCC)  - CBC & Differential  - Comprehensive Metabolic Panel    Anemia, unspecified type  - CBC & Differential  - Comprehensive Metabolic Panel      ASSESSMENT:    1. Adenocarcinoma of the right lung, T1c N2 M0, consistent with clinical stage 3A disease.     2. S/P combined chemotherapy and radiation with cisplatin and Alimta.  Ongoing management  3. Status post right lower lobectomy with mediastinal and hilar lymph  node dissection with residual disease.  pT1 cN2 M0.  Patient with high risk features including lymphovascular invasion, extranodal capsular extension and persistent multiple lymph node disease.  Ongoing management  4. Consolidation treatment with immunotherapy with Imfinzi: Ongoing managment  5. Satus post right thoracentesis with cytology negative for malignancy  6. Postop anemia: Stable  7. Monitoring for drug toxicities while on immunotherapy: Ongoing    PLAN:    1. Patient's CT scans are stable  2. Continue Imfinzi consolidation therapy: Monitor for side effects and complications  3. Continue B12 injections  monthly: Schedule  4. Status post flu shot and pneumonia vaccines   5. Refered to ENT for evaluation for sinus surgery : Not covered by insurance per pt.  6. Colonoscopy postponed by patient to January.  Encouraged to complete  7. RTC in 4 weeks  8. Continue supportive care  9. All questions answered to the best of my abilities         Patient has  questions which have all been answered to the best of my abilities    I have reviewed labs results, imaging, vitals, and medications with the patient today. Will follow up in 4 weeks with me.      Patient verbalized understanding and is in agreement of the above plan.

## 2020-06-19 DIAGNOSIS — C34.31 CANCER OF LOWER LOBE OF RIGHT LUNG (HCC): ICD-10-CM

## 2020-06-19 DIAGNOSIS — C34.91 NON-SMALL CELL CARCINOMA OF LUNG, RIGHT (HCC): ICD-10-CM

## 2020-06-19 RX ORDER — SODIUM CHLORIDE 9 MG/ML
250 INJECTION, SOLUTION INTRAVENOUS ONCE
Status: CANCELLED | OUTPATIENT
Start: 2020-06-22

## 2020-06-22 ENCOUNTER — HOSPITAL ENCOUNTER (OUTPATIENT)
Dept: ONCOLOGY | Facility: HOSPITAL | Age: 55
Setting detail: INFUSION SERIES
Discharge: HOME | End: 2020-06-22

## 2020-06-22 VITALS
BODY MASS INDEX: 24.45 KG/M2 | TEMPERATURE: 98.9 F | HEART RATE: 79 BPM | WEIGHT: 196.6 LBS | SYSTOLIC BLOOD PRESSURE: 125 MMHG | HEIGHT: 75 IN | DIASTOLIC BLOOD PRESSURE: 81 MMHG | RESPIRATION RATE: 18 BRPM

## 2020-06-22 DIAGNOSIS — C34.31 CANCER OF LOWER LOBE OF RIGHT LUNG (HCC): ICD-10-CM

## 2020-06-22 DIAGNOSIS — Z45.2 ENCOUNTER FOR CARE RELATED TO VASCULAR ACCESS PORT: ICD-10-CM

## 2020-06-22 DIAGNOSIS — C34.91 NON-SMALL CELL CARCINOMA OF LUNG, RIGHT (HCC): Primary | ICD-10-CM

## 2020-06-22 LAB
ALBUMIN SERPL-MCNC: 4.2 G/DL (ref 3.5–5.2)
ALBUMIN/GLOB SERPL: 1.4 G/DL
ALP SERPL-CCNC: 85 U/L (ref 39–117)
ALT SERPL W P-5'-P-CCNC: 18 U/L (ref 1–41)
ANION GAP SERPL CALCULATED.3IONS-SCNC: 14 MMOL/L (ref 5–15)
AST SERPL-CCNC: 25 U/L (ref 1–40)
BASOPHILS # BLD AUTO: 0.08 10*3/MM3 (ref 0–0.2)
BASOPHILS NFR BLD AUTO: 1.1 % (ref 0–1.5)
BILIRUB SERPL-MCNC: 0.3 MG/DL (ref 0.2–1.2)
BUN BLD-MCNC: 8 MG/DL (ref 6–20)
BUN BLD-MCNC: ABNORMAL MG/DL
BUN/CREAT SERPL: ABNORMAL
CALCIUM SPEC-SCNC: 8.9 MG/DL (ref 8.6–10.5)
CHLORIDE SERPL-SCNC: 100 MMOL/L (ref 98–107)
CO2 SERPL-SCNC: 23 MMOL/L (ref 22–29)
CREAT BLD-MCNC: 0.88 MG/DL (ref 0.76–1.27)
DEPRECATED RDW RBC AUTO: 45.5 FL (ref 37–54)
EOSINOPHIL # BLD AUTO: 0.62 10*3/MM3 (ref 0–0.4)
EOSINOPHIL NFR BLD AUTO: 8.5 % (ref 0.3–6.2)
ERYTHROCYTE [DISTWIDTH] IN BLOOD BY AUTOMATED COUNT: 14.5 % (ref 12.3–15.4)
GFR SERPL CREATININE-BSD FRML MDRD: 90 ML/MIN/1.73
GLOBULIN UR ELPH-MCNC: 2.9 GM/DL
GLUCOSE BLD-MCNC: 117 MG/DL (ref 65–99)
HCT VFR BLD AUTO: 38.3 % (ref 37.5–51)
HGB BLD-MCNC: 13 G/DL (ref 13–17.7)
LYMPHOCYTES # BLD AUTO: 1.07 10*3/MM3 (ref 0.7–3.1)
LYMPHOCYTES NFR BLD AUTO: 14.6 % (ref 19.6–45.3)
MCH RBC QN AUTO: 30.1 PG (ref 26.6–33)
MCHC RBC AUTO-ENTMCNC: 33.9 G/DL (ref 31.5–35.7)
MCV RBC AUTO: 88.7 FL (ref 79–97)
MONOCYTES # BLD AUTO: 0.69 10*3/MM3 (ref 0.1–0.9)
MONOCYTES NFR BLD AUTO: 9.4 % (ref 5–12)
NEUTROPHILS # BLD AUTO: 4.85 10*3/MM3 (ref 1.7–7)
NEUTROPHILS NFR BLD AUTO: 66.4 % (ref 42.7–76)
PLATELET # BLD AUTO: 208 10*3/MM3 (ref 140–450)
PMV BLD AUTO: 8.6 FL (ref 6–12)
POTASSIUM BLD-SCNC: 4 MMOL/L (ref 3.5–5.2)
PROT SERPL-MCNC: 7.1 G/DL (ref 6–8.5)
RBC # BLD AUTO: 4.32 10*6/MM3 (ref 4.14–5.8)
SODIUM BLD-SCNC: 137 MMOL/L (ref 136–145)
WBC NRBC COR # BLD: 7.31 10*3/MM3 (ref 3.4–10.8)

## 2020-06-22 PROCEDURE — 80053 COMPREHEN METABOLIC PANEL: CPT | Performed by: INTERNAL MEDICINE

## 2020-06-22 PROCEDURE — 96413 CHEMO IV INFUSION 1 HR: CPT

## 2020-06-22 PROCEDURE — 85025 COMPLETE CBC W/AUTO DIFF WBC: CPT | Performed by: INTERNAL MEDICINE

## 2020-06-22 PROCEDURE — 36591 DRAW BLOOD OFF VENOUS DEVICE: CPT

## 2020-06-22 PROCEDURE — 25010000003 HEPARIN LOCK FLUSH PER 10 UNITS: Performed by: INTERNAL MEDICINE

## 2020-06-22 PROCEDURE — 25010000002 DURVALUMAB 50 MG/ML SOLUTION 2.4 ML VIAL: Performed by: INTERNAL MEDICINE

## 2020-06-22 PROCEDURE — 25010000002 DURVALUMAB 50 MG/ML SOLUTION 10 ML VIAL: Performed by: INTERNAL MEDICINE

## 2020-06-22 RX ORDER — HEPARIN SODIUM (PORCINE) LOCK FLUSH IV SOLN 100 UNIT/ML 100 UNIT/ML
500 SOLUTION INTRAVENOUS AS NEEDED
Status: DISCONTINUED | OUTPATIENT
Start: 2020-06-22 | End: 2020-06-23 | Stop reason: HOSPADM

## 2020-06-22 RX ORDER — HEPARIN SODIUM (PORCINE) LOCK FLUSH IV SOLN 100 UNIT/ML 100 UNIT/ML
500 SOLUTION INTRAVENOUS AS NEEDED
Status: CANCELLED | OUTPATIENT
Start: 2020-06-22

## 2020-06-22 RX ORDER — SODIUM CHLORIDE 0.9 % (FLUSH) 0.9 %
10 SYRINGE (ML) INJECTION AS NEEDED
Status: DISCONTINUED | OUTPATIENT
Start: 2020-06-22 | End: 2020-06-23 | Stop reason: HOSPADM

## 2020-06-22 RX ORDER — SODIUM CHLORIDE 0.9 % (FLUSH) 0.9 %
10 SYRINGE (ML) INJECTION AS NEEDED
Status: CANCELLED | OUTPATIENT
Start: 2020-06-22

## 2020-06-22 RX ORDER — SODIUM CHLORIDE 9 MG/ML
250 INJECTION, SOLUTION INTRAVENOUS ONCE
Status: DISCONTINUED | OUTPATIENT
Start: 2020-06-22 | End: 2020-06-23 | Stop reason: HOSPADM

## 2020-06-22 RX ADMIN — Medication 10 ML: at 12:44

## 2020-06-22 RX ADMIN — HEPARIN 500 UNITS: 100 SYRINGE at 12:44

## 2020-06-22 RX ADMIN — SODIUM CHLORIDE 900 MG: 900 INJECTION, SOLUTION INTRAVENOUS at 11:43

## 2020-07-01 DIAGNOSIS — C34.31 CANCER OF LOWER LOBE OF RIGHT LUNG (HCC): ICD-10-CM

## 2020-07-01 DIAGNOSIS — C34.91 NON-SMALL CELL CARCINOMA OF LUNG, RIGHT (HCC): ICD-10-CM

## 2020-07-01 RX ORDER — SODIUM CHLORIDE 9 MG/ML
250 INJECTION, SOLUTION INTRAVENOUS ONCE
Status: CANCELLED | OUTPATIENT
Start: 2020-07-06

## 2020-07-01 RX ORDER — SODIUM CHLORIDE 9 MG/ML
250 INJECTION, SOLUTION INTRAVENOUS ONCE
Status: CANCELLED | OUTPATIENT
Start: 2020-07-20

## 2020-07-06 ENCOUNTER — HOSPITAL ENCOUNTER (OUTPATIENT)
Dept: ONCOLOGY | Facility: HOSPITAL | Age: 55
Setting detail: INFUSION SERIES
Discharge: HOME | End: 2020-07-06

## 2020-07-06 VITALS
WEIGHT: 195.3 LBS | RESPIRATION RATE: 18 BRPM | HEART RATE: 80 BPM | BODY MASS INDEX: 24.28 KG/M2 | TEMPERATURE: 98.2 F | SYSTOLIC BLOOD PRESSURE: 126 MMHG | DIASTOLIC BLOOD PRESSURE: 78 MMHG | HEIGHT: 75 IN

## 2020-07-06 DIAGNOSIS — Z45.2 ENCOUNTER FOR CARE RELATED TO VASCULAR ACCESS PORT: ICD-10-CM

## 2020-07-06 DIAGNOSIS — C34.91 NON-SMALL CELL CARCINOMA OF LUNG, RIGHT (HCC): Primary | ICD-10-CM

## 2020-07-06 DIAGNOSIS — E53.8 B12 DEFICIENCY: ICD-10-CM

## 2020-07-06 DIAGNOSIS — C34.31 CANCER OF LOWER LOBE OF RIGHT LUNG (HCC): ICD-10-CM

## 2020-07-06 LAB
ALBUMIN SERPL-MCNC: 4.1 G/DL (ref 3.5–5.2)
ALBUMIN/GLOB SERPL: 1.6 G/DL
ALP SERPL-CCNC: 86 U/L (ref 39–117)
ALT SERPL W P-5'-P-CCNC: 21 U/L (ref 1–41)
ANION GAP SERPL CALCULATED.3IONS-SCNC: 12 MMOL/L (ref 5–15)
AST SERPL-CCNC: 24 U/L (ref 1–40)
BASOPHILS # BLD AUTO: 0.1 10*3/MM3 (ref 0–0.2)
BASOPHILS NFR BLD AUTO: 1.2 % (ref 0–1.5)
BILIRUB SERPL-MCNC: 0.4 MG/DL (ref 0–1.2)
BUN SERPL-MCNC: 13 MG/DL (ref 6–20)
BUN SERPL-MCNC: ABNORMAL MG/DL
BUN/CREAT SERPL: ABNORMAL
CALCIUM SPEC-SCNC: 9 MG/DL (ref 8.6–10.5)
CHLORIDE SERPL-SCNC: 98 MMOL/L (ref 98–107)
CO2 SERPL-SCNC: 24 MMOL/L (ref 22–29)
CREAT SERPL-MCNC: 1 MG/DL (ref 0.76–1.27)
DEPRECATED RDW RBC AUTO: 45.7 FL (ref 37–54)
EOSINOPHIL # BLD AUTO: 0.6 10*3/MM3 (ref 0–0.4)
EOSINOPHIL NFR BLD AUTO: 7.4 % (ref 0.3–6.2)
ERYTHROCYTE [DISTWIDTH] IN BLOOD BY AUTOMATED COUNT: 14.1 % (ref 12.3–15.4)
GFR SERPL CREATININE-BSD FRML MDRD: 78 ML/MIN/1.73
GLOBULIN UR ELPH-MCNC: 2.6 GM/DL
GLUCOSE SERPL-MCNC: 176 MG/DL (ref 65–99)
HCT VFR BLD AUTO: 38.3 % (ref 37.5–51)
HGB BLD-MCNC: 12.8 G/DL (ref 13–17.7)
LYMPHOCYTES # BLD AUTO: 1 10*3/MM3 (ref 0.7–3.1)
LYMPHOCYTES NFR BLD AUTO: 12.3 % (ref 19.6–45.3)
MCH RBC QN AUTO: 30.4 PG (ref 26.6–33)
MCHC RBC AUTO-ENTMCNC: 33.4 G/DL (ref 31.5–35.7)
MCV RBC AUTO: 91 FL (ref 79–97)
MONOCYTES # BLD AUTO: 0.8 10*3/MM3 (ref 0.1–0.9)
MONOCYTES NFR BLD AUTO: 9.8 % (ref 5–12)
NEUTROPHILS NFR BLD AUTO: 5.66 10*3/MM3 (ref 1.7–7)
NEUTROPHILS NFR BLD AUTO: 69.3 % (ref 42.7–76)
PLATELET # BLD AUTO: 246 10*3/MM3 (ref 140–450)
PMV BLD AUTO: 8.6 FL (ref 6–12)
POTASSIUM SERPL-SCNC: 3.9 MMOL/L (ref 3.5–5.2)
PROT SERPL-MCNC: 6.7 G/DL (ref 6–8.5)
RBC # BLD AUTO: 4.21 10*6/MM3 (ref 4.14–5.8)
SODIUM SERPL-SCNC: 134 MMOL/L (ref 136–145)
WBC # BLD AUTO: 8.16 10*3/MM3 (ref 3.4–10.8)

## 2020-07-06 PROCEDURE — 25010000003 HEPARIN LOCK FLUSH PER 10 UNITS: Performed by: INTERNAL MEDICINE

## 2020-07-06 PROCEDURE — 25010000002 DURVALUMAB 50 MG/ML SOLUTION 2.4 ML VIAL: Performed by: INTERNAL MEDICINE

## 2020-07-06 PROCEDURE — 96372 THER/PROPH/DIAG INJ SC/IM: CPT

## 2020-07-06 PROCEDURE — 85025 COMPLETE CBC W/AUTO DIFF WBC: CPT | Performed by: INTERNAL MEDICINE

## 2020-07-06 PROCEDURE — 25010000002 DURVALUMAB 50 MG/ML SOLUTION 10 ML VIAL: Performed by: INTERNAL MEDICINE

## 2020-07-06 PROCEDURE — 80053 COMPREHEN METABOLIC PANEL: CPT | Performed by: INTERNAL MEDICINE

## 2020-07-06 PROCEDURE — 25010000002 CYANOCOBALAMIN PER 1000 MCG: Performed by: INTERNAL MEDICINE

## 2020-07-06 PROCEDURE — 36591 DRAW BLOOD OFF VENOUS DEVICE: CPT

## 2020-07-06 PROCEDURE — 96413 CHEMO IV INFUSION 1 HR: CPT

## 2020-07-06 RX ORDER — CYANOCOBALAMIN 1000 UG/ML
1000 INJECTION, SOLUTION INTRAMUSCULAR; SUBCUTANEOUS ONCE
Status: COMPLETED | OUTPATIENT
Start: 2020-07-06 | End: 2020-07-06

## 2020-07-06 RX ORDER — SODIUM CHLORIDE 0.9 % (FLUSH) 0.9 %
10 SYRINGE (ML) INJECTION AS NEEDED
Status: CANCELLED | OUTPATIENT
Start: 2020-07-06

## 2020-07-06 RX ORDER — HEPARIN SODIUM (PORCINE) LOCK FLUSH IV SOLN 100 UNIT/ML 100 UNIT/ML
500 SOLUTION INTRAVENOUS AS NEEDED
Status: CANCELLED | OUTPATIENT
Start: 2020-07-06

## 2020-07-06 RX ORDER — SODIUM CHLORIDE 0.9 % (FLUSH) 0.9 %
10 SYRINGE (ML) INJECTION AS NEEDED
Status: DISCONTINUED | OUTPATIENT
Start: 2020-07-06 | End: 2020-07-07 | Stop reason: HOSPADM

## 2020-07-06 RX ORDER — HEPARIN SODIUM (PORCINE) LOCK FLUSH IV SOLN 100 UNIT/ML 100 UNIT/ML
500 SOLUTION INTRAVENOUS AS NEEDED
Status: DISCONTINUED | OUTPATIENT
Start: 2020-07-06 | End: 2020-07-07 | Stop reason: HOSPADM

## 2020-07-06 RX ORDER — SODIUM CHLORIDE 9 MG/ML
250 INJECTION, SOLUTION INTRAVENOUS ONCE
Status: DISCONTINUED | OUTPATIENT
Start: 2020-07-06 | End: 2020-07-07 | Stop reason: HOSPADM

## 2020-07-06 RX ADMIN — CYANOCOBALAMIN 1000 MCG: 1000 INJECTION INTRAMUSCULAR; SUBCUTANEOUS at 11:54

## 2020-07-06 RX ADMIN — HEPARIN 500 UNITS: 100 SYRINGE at 12:53

## 2020-07-06 RX ADMIN — Medication 10 ML: at 12:53

## 2020-07-06 RX ADMIN — SODIUM CHLORIDE 900 MG: 900 INJECTION, SOLUTION INTRAVENOUS at 11:52

## 2020-07-16 NOTE — PROGRESS NOTES
Hematology/Oncology Outpatient Follow Up    PATIENT NAME:Jc Driscoll  :1965  MRN: [id number]  PRIMARY CARE PHYSICIAN: Reshma Alvarenga MD  REFERRING PHYSICIAN: Reshma Alvarenga MD    Chief Complaint   Patient presents with   • Follow-up     Non small cell carcinoma of right lung        HISTORY OF PRESENT ILLNESS:     This is a 54 y.o. who developed left shoulder pain and for that reason he had a chest x-ray done on 19 which showed a 2.7 cm noncalcified right lower lobe nodule was identified.  For that reason a CT scan of the chest was recommended.  Review of his records shows patient had the CT scan on 19 done without contrast.  This basically showed a lobulated noncalcified mass in the posterior aspect of the right lower lobe measuring 3 cm close to the pleural surface.  There is a calcified 1.2 mm nodule in the lateral aspect of the right lower lobe consistent with a granuloma.  There were also calcified subcarinal and right hilar nodules.  There is a lower right side tracheal nodule measuring 1 cm.  Patient had a PET/CT scan done on 19 which showed increased metabolic activity on the right lower lobe mass that measures 2.5 cm with SUV of 4.4.  There was also increased metabolic activity in the subcarinal space measuring 2.8 cm in size with SUV of 3.4, increased activity in an enlarged right paratracheal lymph node that measures 1.9 cm, SUV of 5, also suspicious for metastatic adenopathy.  Patient had a CT-guided biopsy of the right lower lobe mass on 3/8/19.  This showed adenocarcinoma, TTF-1 positive.  On 3/18/19 patient underwent endoscopic ultrasound and biopsy of a subcarinal lymph node.  This was positive for malignant cells.  Patient was then taken back to surgery on 3/20/18 and had left Mediport placement by Dr. Fuchs.  He has been referred for further evaluation and management of his stage 3 adenocarcinoma of the right lung.  He was accompanied by his spouse for the  appointment on 3/26/19.  Patient was scheduled to have a brain MRI for complete staging, but he could not do this due to claustrophobia.  He is willing to try with the help of angiolytics.    • Patient had brain MRI done on 4/5/19.  He states it did not show any evidence of metastatic disease.  Evidence of chronic sinusitis was noted.    • Patient was seen by Dr. Escalona who has recommended concurrent chemotherapy and radiation.  Patient is scheduled to begin on 4/15/19.   • 4/17/19 - Patient was initiated on combined chemotherapy and radiation with Cisplatin and Alimta.  Patient received cycle 1.      • 5/8/19 - Patient received cycle 2 of chemotherapy with Cisplatin and Alimta.   • 5/15/19 - Chemistry panel showed creatinine of 1.7.  WBC 1.8, hemoglobin 11.6, platelet count 72,000.   • 5/20/19 - BUN 10, creatinine 1.2, potassium 3.5.    • 5/23/19 - CT scan of the chest with contrast showed interval decrease in size of the right lower lobe pulmonary nodule now measuring 2.1 cm in size.  There was no mediastinal or hilar adenopathy identified.  • 6/26/2019 patient underwent right lower lobectomy with hilar and mediastinal lymph node dissection performed by Dr. Terrance Mckeon.  • Pathology revealed residual residual 2.2 cm invasive moderately differentiated adenocarcinoma.  There were a total of 16 lymph nodes evaluated that 7 had metastatic disease.  There was evidence of lymphovascular invasion, extranodal involvement.  All the surgical margins were negative and distance of the closest margin was 2.5 cm.  Logic stage is T1c N2 M0.  • Patient is here today accompanied by his spouse for this appointment.  He continues to complain of some postop discomfort, numbness but his skin is intact.  There is no drainage.  Has had follow-up with Dr. Fuchs.  • 8/14/2019-seen by Dr. Maria at the [facility].  Dr. Maria has recommended immunotherapy with durvalumab off label use as patient has not had significant response  to neoadjuvant chemotherapy and radiation.  Patient was given the option to have immunotherapy at the [facility] vs [place] and he has chosen to stay in [place]  • 8/21/19 - Started cycle 1 day 1 Imfinzi  • 9/4/2019 patient had CT scan of the chest this shows a moderate size right pleural effusion.  There are areas of groundglass opacification in the right upper lobe without any evidence of significant septal thickening.  Volume loss noted there is also moderate pleural effusion.  There is no suspicious recurrent malignancy.  There were nonenlarged residual mediastinal lymph nodes.  • 9/9/19 - WBC 6.23, Hgb 11.7 Platelets 257,000, , BUN 9, Cr 1.1,Vitamin B12 318, Ferritin 437  • 9/23/19 - received cycle 2 day 1 Imfinzi  • 10/9/19- Seen by Dr rodríguez with ENT for sinus disease  • 11/4/2019-patient received cycle 5 of Imfinzi(durvalumab)  • 12/2/2019 patient had cycle 7 of durvalumab  • 12/5/2019-patient had CT scan of the  abdomen and pelvis with small right pleural sided pleural effusion.There is no evidence of metastatic disease  • 12/30/2019-patient received cycle 9 of durvalumab  • 12/31/2019-patient had CT scan of the chest showed small to moderate left pleural effusion.  Iglesias appearing right lower paratracheal and right peribronchial soft tissue thickening without any discrete pathologically enlarged mediastinal or hilar lymph nodes.  • 1/27/20-patient received cycle 11 of durvalumab  • 2/17/20-patient had a stress test which was negative for ischemia  • 2/17/2020-patient had CT of the chest which showed postop changes on the right lung, right pleural effusion but no enlarging mass was noted  • 3/2/2020-patient received cycle 12 of durvalumab  • 3/16/2020-patient had ultrasound-guided right thoracentesis.  Pathology was negative for malignancy  • 4/13/2020-patient received cycle 15 of immunotherapy with durvalumab  • 5/11/2020-patient received cycle 17 of immunotherapy with durvalumab  • 6/9/2020-patient had  CT scan of the chest, abdomen and pelvis for lung cancer restaging.  There is stable small chronic loculated right basilar pleural effusion suggesting chronic pleuritis.  There is no evidence of metastatic disease in the abdomen or pelvis  • 7/20/2020 patient received cycle 22 of Durvalumab    Past Medical History:   Diagnosis Date   • Allergic rhinitis 7/25/2013    Overview:  4/2/2018 - still zyrtec 10 daily (if stops, gets dermatitis again).    • Anxiety and depression 6/5/2012    Overview:  4/2/2018 -   C/w well 300 xr and Lito 20.  4/8/2019 -   Doing ok even with new lung cancer - lito 20 & well 300xr.    • Chronic pansinusitis 4/8/2019    Overview:  4/8/2019 -   MRI showed chronic thick in frontal, maxillary, ethmoidal ---> to Dr. ZAY knowles est since abx ICC didn't help.  Does netipot bid.    • Diastolic CHF (CMS/HCC) 7/9/2014    Overview:  7/4/14 - impaired LV relaxation on [facility].  EF 60%. Rest normal   • Dupuytren's contracture of both hands    • Elevated cholesterol    • Erosive esophagitis 7/9/2019    Overview:  EGD 2016 4/2/2018 - nexium OTC daily for few week.  Qod before that.  Now carbonation hurts, epigastric pain 4/8/2019 -   protonix 40 doing well.    • Gastroesophageal reflux disease 2/21/2019   • Hiatal hernia 7/9/2019   • History of colon polyps    • Hypertension    • Lung cancer (CMS/HCC)     right lung and in lymph nodes x2   • Mediastinal lymphadenopathy 3/12/2019   • Normocytic anemia 8/8/2019    Overview:  6/2019 - dropped to 9's postop 7/2019 - rebounded to 10's.  8/8/2019 -   Back to 9's - check ferritin, b12 and thyroid.    • Prediabetes 7/9/2014    Overview:  7/4/14 - a1c 6.1 at [facility]   • Retention of urine 6/27/2019       Past Surgical History:   Procedure Laterality Date   • BRONCHOSCOPY  03/18/2019    EBUS MONTERO NEEDLE BX   • COLONOSCOPY     • DUPUYTREN CONTRACTURE RELEASE Bilateral    • LUNG BIOPSY  03/08/2019    CT GUIDED   • LUNG REMOVAL, PARTIAL Right     right lower   •  PORTACATH PLACEMENT  03/20/2019    DR LONGORIA   • THORACOSCOPY Right 6/26/2019    Procedure: RIGHT VATS, OPEN LOWER LOBECTOMY WITH LYMPH NODE DISECTION, WEDGE RESECTION OF RIGHT MIDDLE LOBE;  Surgeon: Terrance Longoria MD;  Location: [facility] MAIN OR;  Service: Cardiothoracic         Current Outpatient Medications:   •  acetaminophen (TYLENOL) 325 MG tablet, Take 2 tablets by mouth Every 4 (Four) Hours As Needed for Mild Pain ., Disp: , Rfl:   •  amLODIPine (NORVASC) 10 MG tablet, Take 10 mg by mouth Daily., Disp: , Rfl:   •  buPROPion SR (WELLBUTRIN SR) 150 MG 12 hr tablet, Take 300 mg by mouth 2 (Two) Times a Day., Disp: , Rfl:   •  cetirizine (zyrTEC) 10 MG tablet, Take 10 mg by mouth Daily., Disp: , Rfl:   •  desvenlafaxine (PRISTIQ) 50 MG 24 hr tablet, TAKE 1 TABLET BY MOUTH ONCE DAILY FOR 30 DAYS, Disp: , Rfl:   •  HYDROcodone-acetaminophen (NORCO) 5-325 MG per tablet, Take 1 tablet by mouth As Needed., Disp: , Rfl:   •  HYDROcodone-acetaminophen (NORCO) 5-325 MG per tablet, Take 1 tablet by mouth Every 6 (Six) Hours As Needed for Moderate Pain ., Disp: 20 tablet, Rfl: 0  •  ibuprofen (ADVIL,MOTRIN) 600 MG tablet, Take 1 tablet by mouth Every 6 (Six) Hours As Needed for Mild Pain ., Disp: , Rfl:   •  lidocaine-prilocaine (EMLA) 2.5-2.5 % cream, , Disp: , Rfl:   •  LORazepam (ATIVAN) 0.5 MG tablet, TAKE 1 TABLET BY MOUTH TWICE DAILY AS NEEDED FOR ANXIETY MAX OF 2 DAY, Disp: , Rfl: 0  •  metoprolol succinate XL (TOPROL-XL) 100 MG 24 hr tablet, Take 100 mg by mouth Daily., Disp: , Rfl:   •  ondansetron (ZOFRAN) 8 MG tablet, TAKE 1 TABLET BY MOUTH EVERY 8 HOURS AS NEEDED NAUSEA, Disp: , Rfl: 3  •  pantoprazole (PROTONIX) 40 MG EC tablet, Take 40 mg by mouth Daily., Disp: , Rfl:   •  pravastatin (PRAVACHOL) 10 MG tablet, Take 10 mg by mouth Daily., Disp: , Rfl:   No current facility-administered medications for this visit.     Facility-Administered Medications Ordered in Other Visits:   •  durvalumab (IMFINZI) 900  mg in sodium chloride 0.9 % 268 mL chemo IVPB, 10 mg/kg (Treatment Plan Recorded), Intravenous, Once, Azucena Gaspar MD, Last Rate: 290 mL/hr at 07/20/20 1341, 900 mg at 07/20/20 1341  •  heparin injection 500 Units, 500 Units, Intravenous, PRN, Azucena Gaspar MD  •  sodium chloride 0.9 % flush 10 mL, 10 mL, Intravenous, PRN, Azucena Gaspar MD    No Known Allergies    Family History   Problem Relation Age of Onset   • Cancer Sister    • Cancer Mother    • Lung cancer Father        Cancer-related family history includes Cancer in his mother and sister; Lung cancer in his father.    Social History     Tobacco Use   • Smoking status: Former Smoker     Types: Cigarettes     Last attempt to quit: 9/5/2009     Years since quitting: 10.8   • Smokeless tobacco: Never Used   Substance Use Topics   • Alcohol use: Yes     Alcohol/week: 2.0 standard drinks     Types: 2 Cans of beer per week     Frequency: Monthly or less     Drinks per session: 1 or 2     Comment: Occasional   • Drug use: No     I have reviewed and confirmed the accuracy of the patient's history:  as entered by the MA/LPN/RN. Azucena Gaspar MD 07/20/20     SUBJECTIVE:    Patient has no specific complaints.  Patient has developed rash involving the right medial knee.  He denies any itching or any discomfort associated with it.      REVIEW OF SYSTEMS:    Review of Systems   Constitutional: Negative for chills and fever.   HENT: Negative for ear pain, mouth sores, nosebleeds and sore throat.    Eyes: Negative for photophobia and visual disturbance.   Respiratory: Negative for wheezing and stridor.    Cardiovascular: Negative for chest pain and palpitations.   Gastrointestinal: Negative for abdominal pain, diarrhea, nausea and vomiting.   Endocrine: Negative for cold intolerance and heat intolerance.   Genitourinary: Negative for dysuria and hematuria.   Musculoskeletal: Negative for joint swelling and neck stiffness.   Skin:  "Positive for rash. Negative for color change.        Right medial knee rash   Neurological: Negative for seizures and syncope.   Hematological: Negative for adenopathy.   Psychiatric/Behavioral: Negative for agitation, confusion and hallucinations.       ROS as above      OBJECTIVE:  Vitals:    07/20/20 1338   BP: 119/81   Pulse: 101   Resp: 20   Temp: 98.2 °F (36.8 °C)   TempSrc: Oral   Weight: 88.5 kg (195 lb)   Height: 190.5 cm (75\")   PainSc: 0-No pain     ECOG  Eastern Cooperative Oncology Group (ECOG, [place], World Health Organization) performance scale  0 Fully active; no performance restrictions.  1 Strenuous physical activity restricted; fully ambulatory and able to carry out light work.  2 Capable of all self-care but unable to carry out any work activities. Up and about >50% of waking hours.  3 Capable of only limited self-care; confined to bed or chair >50% of waking hours.  4 Completely disabled; cannot carry out any self-care; totally confined to bed or chair.    Physical Exam   Constitutional: He is oriented to person, place, and time. He appears well-developed and well-nourished. No distress.   HENT:   Head: Normocephalic and atraumatic.   Right Ear: External ear normal.   Left Ear: External ear normal.   Nose: Nose normal.   Eyes: Pupils are equal, round, and reactive to light. Conjunctivae and EOM are normal. Right eye exhibits no discharge. Left eye exhibits no discharge. No scleral icterus.   Neck: Normal range of motion. Neck supple. No thyromegaly present.   Cardiovascular: Normal rate, regular rhythm and normal heart sounds. Exam reveals no gallop and no friction rub.   Pulmonary/Chest: Effort normal. No stridor. No respiratory distress. He has no wheezes.   Abdominal: Soft. Bowel sounds are normal. He exhibits no mass. There is no tenderness. There is no rebound and no guarding.   Musculoskeletal: Normal range of motion. He exhibits no tenderness.   Lymphadenopathy:     He has no cervical " adenopathy.   Neurological: He is alert and oriented to person, place, and time. He exhibits normal muscle tone.   Skin: Skin is warm. No rash noted. He is not diaphoretic. No erythema.   Urticarial-like rash, right medial knee likely due to immunotherapy   Psychiatric: He has a normal mood and affect. His behavior is normal. Judgment and thought content normal.   Nursing note and vitals reviewed.    PE as above    RECENT LABS  WBC   Date Value Ref Range Status   07/20/2020 7.74 3.40 - 10.80 10*3/mm3 Final   07/03/2019 8.99 4.5 - 11.0 10*3/uL Final     RBC   Date Value Ref Range Status   07/20/2020 4.24 4.14 - 5.80 10*6/mm3 Final   07/03/2019 3.24 (L) 4.5 - 5.9 10*6/uL Final     Hemoglobin   Date Value Ref Range Status   07/20/2020 13.1 13.0 - 17.7 g/dL Final   07/03/2019 10.4 (L) 13.5 - 17.5 g/dL Final     Hematocrit   Date Value Ref Range Status   07/20/2020 38.8 37.5 - 51.0 % Final   07/03/2019 32.8 (L) 41.0 - 53.0 % Final     MCV   Date Value Ref Range Status   07/20/2020 91.5 79.0 - 97.0 fL Final   07/03/2019 101.2 (H) 80.0 - 100.0 fL Final     MCH   Date Value Ref Range Status   07/20/2020 30.9 26.6 - 33.0 pg Final   07/03/2019 32.1 26.0 - 34.0 pg Final     MCHC   Date Value Ref Range Status   07/20/2020 33.8 31.5 - 35.7 g/dL Final   07/03/2019 31.7 31.0 - 37.0 g/dL Final     RDW   Date Value Ref Range Status   07/20/2020 14.0 12.3 - 15.4 % Final   07/03/2019 15.2 12.0 - 16.8 % Final     RDW-SD   Date Value Ref Range Status   07/20/2020 45.6 37.0 - 54.0 fl Final     MPV   Date Value Ref Range Status   07/20/2020 8.4 6.0 - 12.0 fL Final   07/03/2019 9.0 6.7 - 10.8 fL Final     Platelets   Date Value Ref Range Status   07/20/2020 255 140 - 450 10*3/mm3 Final   07/03/2019 302 140 - 440 10*3/uL Final     Neutrophil Rel %   Date Value Ref Range Status   07/03/2019 60.0 45 - 80 % Final     Neutrophil %   Date Value Ref Range Status   07/20/2020 67.9 42.7 - 76.0 % Final     Lymphocyte Rel %   Date Value Ref Range  Status   07/03/2019 14.8 (L) 15 - 50 % Final     Lymphocyte %   Date Value Ref Range Status   07/20/2020 14.9 (L) 19.6 - 45.3 % Final     Monocyte Rel %   Date Value Ref Range Status   07/03/2019 12.2 0 - 15 % Final     Monocyte %   Date Value Ref Range Status   07/20/2020 8.5 5.0 - 12.0 % Final     Eosinophil %   Date Value Ref Range Status   07/20/2020 7.8 (H) 0.3 - 6.2 % Final   07/03/2019 10.4 (H) 0 - 7 % Final     Basophil Rel %   Date Value Ref Range Status   07/03/2019 1.7 0 - 2 % Final     Basophil %   Date Value Ref Range Status   07/20/2020 0.9 0.0 - 1.5 % Final     Neutrophils Absolute   Date Value Ref Range Status   07/03/2019 5.39 1.5 - 7.5 /uL Final   07/03/2019 5.39 2.0 - 8.8 10*3/uL Final     Neutrophils, Absolute   Date Value Ref Range Status   07/20/2020 5.26 1.70 - 7.00 10*3/mm3 Final     Lymphocytes Absolute   Date Value Ref Range Status   07/03/2019 1.33 0.7 - 5.5 10*3/uL Final     Lymphocytes, Absolute   Date Value Ref Range Status   07/20/2020 1.15 0.70 - 3.10 10*3/mm3 Final     Monocytes Absolute   Date Value Ref Range Status   07/03/2019 1.10 0.0 - 1.7 10*3/uL Final     Monocytes, Absolute   Date Value Ref Range Status   07/20/2020 0.66 0.10 - 0.90 10*3/mm3 Final     Eosinophils Absolute   Date Value Ref Range Status   07/03/2019 0.93 (H) 0.0 - 0.8 10*3/uL Final     Eosinophils, Absolute   Date Value Ref Range Status   07/20/2020 0.60 (H) 0.00 - 0.40 10*3/mm3 Final     Basophils Absolute   Date Value Ref Range Status   07/03/2019 0.15 0.0 - 0.2 10*3/uL Final     Basophils, Absolute   Date Value Ref Range Status   07/20/2020 0.07 0.00 - 0.20 10*3/mm3 Final     nRBC   Date Value Ref Range Status   02/18/2020 0.0 0.0 - 0.2 /100 WBC Final       Lab Results   Component Value Date    GLUCOSE 176 (H) 07/06/2020    BUN  07/06/2020      Comment:      Testing performed by alternate method    BUN 13 07/06/2020    CREATININE 1.00 07/06/2020    EGFRIFNONA 78 07/06/2020    EGFRIFAFRI >60 05/20/2019    BCR   07/06/2020      Comment:      Testing not performed    K 3.9 07/06/2020    CO2 24.0 07/06/2020    CALCIUM 9.0 07/06/2020    ALBUMIN 4.10 07/06/2020    LABIL2 1.4 07/03/2019    AST 24 07/06/2020    ALT 21 07/06/2020         Assessment/Plan     Anemia, unspecified type  - CBC & Differential      ASSESSMENT:    1. Adenocarcinoma of the right lung, T1c N2 M0, consistent with clinical stage 3A disease.     2. S/P combined chemotherapy and radiation with cisplatin and Alimta.  Ongoing management  3. Status post right lower lobectomy with mediastinal and hilar lymph node dissection with residual disease.  pT1 cN2 M0.  Patient with high risk features including lymphovascular invasion, extranodal capsular extension and persistent multiple lymph node disease.  Ongoing management  4. Consolidation treatment with immunotherapy with Imfinzi: Ongoing management.  Scan will be done in August 2020  5. Satus post right thoracentesis with cytology negative for malignancy  6. Postop anemia: Stable  7. New rash right medial knee likely secondary to immunotherapy  8. Monitoring for drug toxicities while on immunotherapy: Ongoing    PLAN:    1. Patient's CT scans are stable  2. Continue Imfinzi consolidation therapy: Monitor for side effects and complications  3. Patient to use hydrocortisone 1% on the skin for rash and call me if symptoms worsen at any point in time  4. Continue B12 injections  monthly: Schedule  5. Status post flu shot and pneumonia vaccines   6. Refered to ENT for evaluation for sinus surgery : Not covered by insurance per pt.  7. Colonoscopy postponed by patient to January.  Encouraged to complete  8. RTC in 4 weeks  9. Continue supportive care  10. All questions answered to the best of my abilities         Patient has  questions which have all been answered to the best of my abilities    I have reviewed labs results, imaging, vitals, and medications with the patient today. Will follow up in 4 weeks with me.       Patient verbalized understanding and is in agreement of the above plan.

## 2020-07-20 ENCOUNTER — APPOINTMENT (OUTPATIENT)
Dept: LAB | Facility: HOSPITAL | Age: 55
End: 2020-07-20

## 2020-07-20 ENCOUNTER — HOSPITAL ENCOUNTER (OUTPATIENT)
Dept: ONCOLOGY | Facility: HOSPITAL | Age: 55
Setting detail: INFUSION SERIES
Discharge: HOME | End: 2020-07-20

## 2020-07-20 ENCOUNTER — OFFICE VISIT (OUTPATIENT)
Dept: ONCOLOGY | Facility: CLINIC | Age: 55
End: 2020-07-20

## 2020-07-20 VITALS
BODY MASS INDEX: 24.25 KG/M2 | HEIGHT: 75 IN | RESPIRATION RATE: 20 BRPM | HEART RATE: 101 BPM | SYSTOLIC BLOOD PRESSURE: 119 MMHG | WEIGHT: 195 LBS | TEMPERATURE: 98.2 F | DIASTOLIC BLOOD PRESSURE: 81 MMHG

## 2020-07-20 VITALS
BODY MASS INDEX: 24.32 KG/M2 | HEIGHT: 75 IN | RESPIRATION RATE: 20 BRPM | TEMPERATURE: 98.2 F | WEIGHT: 195.6 LBS | HEART RATE: 101 BPM | DIASTOLIC BLOOD PRESSURE: 81 MMHG | SYSTOLIC BLOOD PRESSURE: 119 MMHG

## 2020-07-20 DIAGNOSIS — Z45.2 ENCOUNTER FOR CARE RELATED TO VASCULAR ACCESS PORT: ICD-10-CM

## 2020-07-20 DIAGNOSIS — C34.91 NON-SMALL CELL CARCINOMA OF LUNG, RIGHT (HCC): Primary | ICD-10-CM

## 2020-07-20 DIAGNOSIS — C34.31 CANCER OF LOWER LOBE OF RIGHT LUNG (HCC): ICD-10-CM

## 2020-07-20 DIAGNOSIS — D64.9 ANEMIA, UNSPECIFIED TYPE: Primary | ICD-10-CM

## 2020-07-20 LAB
ALBUMIN SERPL-MCNC: 4.2 G/DL (ref 3.5–5.2)
ALBUMIN/GLOB SERPL: 1.5 G/DL
ALP SERPL-CCNC: 84 U/L (ref 39–117)
ALT SERPL W P-5'-P-CCNC: 23 U/L (ref 1–41)
ANION GAP SERPL CALCULATED.3IONS-SCNC: 16 MMOL/L (ref 5–15)
AST SERPL-CCNC: 23 U/L (ref 1–40)
BASOPHILS # BLD AUTO: 0.07 10*3/MM3 (ref 0–0.2)
BASOPHILS NFR BLD AUTO: 0.9 % (ref 0–1.5)
BILIRUB SERPL-MCNC: 0.5 MG/DL (ref 0–1.2)
BUN SERPL-MCNC: 9 MG/DL (ref 6–20)
BUN SERPL-MCNC: ABNORMAL MG/DL
BUN/CREAT SERPL: ABNORMAL
CALCIUM SPEC-SCNC: 8.9 MG/DL (ref 8.6–10.5)
CHLORIDE SERPL-SCNC: 99 MMOL/L (ref 98–107)
CO2 SERPL-SCNC: 23 MMOL/L (ref 22–29)
CREAT SERPL-MCNC: 0.99 MG/DL (ref 0.76–1.27)
DEPRECATED RDW RBC AUTO: 45.6 FL (ref 37–54)
EOSINOPHIL # BLD AUTO: 0.6 10*3/MM3 (ref 0–0.4)
EOSINOPHIL NFR BLD AUTO: 7.8 % (ref 0.3–6.2)
ERYTHROCYTE [DISTWIDTH] IN BLOOD BY AUTOMATED COUNT: 14 % (ref 12.3–15.4)
GFR SERPL CREATININE-BSD FRML MDRD: 78 ML/MIN/1.73
GLOBULIN UR ELPH-MCNC: 2.8 GM/DL
GLUCOSE SERPL-MCNC: 202 MG/DL (ref 65–99)
HCT VFR BLD AUTO: 38.8 % (ref 37.5–51)
HGB BLD-MCNC: 13.1 G/DL (ref 13–17.7)
LYMPHOCYTES # BLD AUTO: 1.15 10*3/MM3 (ref 0.7–3.1)
LYMPHOCYTES NFR BLD AUTO: 14.9 % (ref 19.6–45.3)
MCH RBC QN AUTO: 30.9 PG (ref 26.6–33)
MCHC RBC AUTO-ENTMCNC: 33.8 G/DL (ref 31.5–35.7)
MCV RBC AUTO: 91.5 FL (ref 79–97)
MONOCYTES # BLD AUTO: 0.66 10*3/MM3 (ref 0.1–0.9)
MONOCYTES NFR BLD AUTO: 8.5 % (ref 5–12)
NEUTROPHILS NFR BLD AUTO: 5.26 10*3/MM3 (ref 1.7–7)
NEUTROPHILS NFR BLD AUTO: 67.9 % (ref 42.7–76)
PLATELET # BLD AUTO: 255 10*3/MM3 (ref 140–450)
PMV BLD AUTO: 8.4 FL (ref 6–12)
POTASSIUM SERPL-SCNC: 3.7 MMOL/L (ref 3.5–5.2)
PROT SERPL-MCNC: 7 G/DL (ref 6–8.5)
RBC # BLD AUTO: 4.24 10*6/MM3 (ref 4.14–5.8)
SODIUM SERPL-SCNC: 138 MMOL/L (ref 136–145)
T4 FREE SERPL-MCNC: 0.91 NG/DL (ref 0.93–1.7)
TSH SERPL DL<=0.05 MIU/L-ACNC: 5.23 UIU/ML (ref 0.27–4.2)
WBC # BLD AUTO: 7.74 10*3/MM3 (ref 3.4–10.8)

## 2020-07-20 PROCEDURE — 80053 COMPREHEN METABOLIC PANEL: CPT | Performed by: INTERNAL MEDICINE

## 2020-07-20 PROCEDURE — 36591 DRAW BLOOD OFF VENOUS DEVICE: CPT

## 2020-07-20 PROCEDURE — 25010000002 DURVALUMAB 50 MG/ML SOLUTION 2.4 ML VIAL: Performed by: INTERNAL MEDICINE

## 2020-07-20 PROCEDURE — 99214 OFFICE O/P EST MOD 30 MIN: CPT | Performed by: INTERNAL MEDICINE

## 2020-07-20 PROCEDURE — 25010000003 HEPARIN LOCK FLUSH PER 10 UNITS: Performed by: INTERNAL MEDICINE

## 2020-07-20 PROCEDURE — 25010000002 DURVALUMAB 50 MG/ML SOLUTION 10 ML VIAL: Performed by: INTERNAL MEDICINE

## 2020-07-20 PROCEDURE — 96413 CHEMO IV INFUSION 1 HR: CPT

## 2020-07-20 PROCEDURE — 84439 ASSAY OF FREE THYROXINE: CPT | Performed by: INTERNAL MEDICINE

## 2020-07-20 PROCEDURE — 85025 COMPLETE CBC W/AUTO DIFF WBC: CPT | Performed by: INTERNAL MEDICINE

## 2020-07-20 PROCEDURE — 84443 ASSAY THYROID STIM HORMONE: CPT | Performed by: INTERNAL MEDICINE

## 2020-07-20 RX ORDER — SODIUM CHLORIDE 0.9 % (FLUSH) 0.9 %
10 SYRINGE (ML) INJECTION AS NEEDED
Status: CANCELLED | OUTPATIENT
Start: 2020-07-20

## 2020-07-20 RX ORDER — HEPARIN SODIUM (PORCINE) LOCK FLUSH IV SOLN 100 UNIT/ML 100 UNIT/ML
500 SOLUTION INTRAVENOUS AS NEEDED
Status: CANCELLED | OUTPATIENT
Start: 2020-07-20

## 2020-07-20 RX ORDER — HEPARIN SODIUM (PORCINE) LOCK FLUSH IV SOLN 100 UNIT/ML 100 UNIT/ML
500 SOLUTION INTRAVENOUS AS NEEDED
Status: DISCONTINUED | OUTPATIENT
Start: 2020-07-20 | End: 2020-07-21 | Stop reason: HOSPADM

## 2020-07-20 RX ORDER — SODIUM CHLORIDE 9 MG/ML
250 INJECTION, SOLUTION INTRAVENOUS ONCE
Status: COMPLETED | OUTPATIENT
Start: 2020-07-20 | End: 2020-07-20

## 2020-07-20 RX ORDER — SODIUM CHLORIDE 0.9 % (FLUSH) 0.9 %
10 SYRINGE (ML) INJECTION AS NEEDED
Status: DISCONTINUED | OUTPATIENT
Start: 2020-07-20 | End: 2020-07-21 | Stop reason: HOSPADM

## 2020-07-20 RX ADMIN — SODIUM CHLORIDE 250 ML: 900 INJECTION, SOLUTION INTRAVENOUS at 13:41

## 2020-07-20 RX ADMIN — HEPARIN 500 UNITS: 100 SYRINGE at 14:53

## 2020-07-20 RX ADMIN — Medication 10 ML: at 14:53

## 2020-07-20 RX ADMIN — SODIUM CHLORIDE 900 MG: 900 INJECTION, SOLUTION INTRAVENOUS at 13:41

## 2020-07-28 DIAGNOSIS — C34.91 NON-SMALL CELL CARCINOMA OF LUNG, RIGHT (HCC): ICD-10-CM

## 2020-07-28 DIAGNOSIS — C34.31 CANCER OF LOWER LOBE OF RIGHT LUNG (HCC): ICD-10-CM

## 2020-07-28 RX ORDER — SODIUM CHLORIDE 9 MG/ML
250 INJECTION, SOLUTION INTRAVENOUS ONCE
Status: CANCELLED | OUTPATIENT
Start: 2020-08-17

## 2020-07-28 RX ORDER — SODIUM CHLORIDE 9 MG/ML
250 INJECTION, SOLUTION INTRAVENOUS ONCE
Status: CANCELLED | OUTPATIENT
Start: 2020-08-03

## 2020-08-03 ENCOUNTER — HOSPITAL ENCOUNTER (OUTPATIENT)
Dept: ONCOLOGY | Facility: HOSPITAL | Age: 55
Setting detail: INFUSION SERIES
Discharge: HOME | End: 2020-08-03

## 2020-08-03 VITALS
WEIGHT: 198 LBS | SYSTOLIC BLOOD PRESSURE: 127 MMHG | DIASTOLIC BLOOD PRESSURE: 76 MMHG | HEIGHT: 75 IN | TEMPERATURE: 99.1 F | HEART RATE: 86 BPM | BODY MASS INDEX: 24.62 KG/M2 | RESPIRATION RATE: 18 BRPM

## 2020-08-03 DIAGNOSIS — C34.31 CANCER OF LOWER LOBE OF RIGHT LUNG (HCC): ICD-10-CM

## 2020-08-03 DIAGNOSIS — Z45.2 ENCOUNTER FOR CARE RELATED TO VASCULAR ACCESS PORT: ICD-10-CM

## 2020-08-03 DIAGNOSIS — C34.91 NON-SMALL CELL CARCINOMA OF LUNG, RIGHT (HCC): Primary | ICD-10-CM

## 2020-08-03 DIAGNOSIS — E53.8 B12 DEFICIENCY: ICD-10-CM

## 2020-08-03 LAB
ALBUMIN SERPL-MCNC: 4.1 G/DL (ref 3.5–5.2)
ALBUMIN/GLOB SERPL: 1.9 G/DL
ALP SERPL-CCNC: 78 U/L (ref 39–117)
ALT SERPL W P-5'-P-CCNC: 17 U/L (ref 1–41)
ANION GAP SERPL CALCULATED.3IONS-SCNC: 12 MMOL/L (ref 5–15)
AST SERPL-CCNC: 19 U/L (ref 1–40)
BASOPHILS # BLD AUTO: 0.06 10*3/MM3 (ref 0–0.2)
BASOPHILS NFR BLD AUTO: 0.8 % (ref 0–1.5)
BILIRUB SERPL-MCNC: 0.4 MG/DL (ref 0–1.2)
BUN SERPL-MCNC: 9 MG/DL (ref 6–20)
BUN SERPL-MCNC: ABNORMAL MG/DL
BUN/CREAT SERPL: ABNORMAL
CALCIUM SPEC-SCNC: 8.6 MG/DL (ref 8.6–10.5)
CHLORIDE SERPL-SCNC: 96 MMOL/L (ref 98–107)
CO2 SERPL-SCNC: 25 MMOL/L (ref 22–29)
CREAT SERPL-MCNC: 1 MG/DL (ref 0.76–1.27)
DEPRECATED RDW RBC AUTO: 44.8 FL (ref 37–54)
EOSINOPHIL # BLD AUTO: 0.48 10*3/MM3 (ref 0–0.4)
EOSINOPHIL NFR BLD AUTO: 6.4 % (ref 0.3–6.2)
ERYTHROCYTE [DISTWIDTH] IN BLOOD BY AUTOMATED COUNT: 13.5 % (ref 12.3–15.4)
GFR SERPL CREATININE-BSD FRML MDRD: 77 ML/MIN/1.73
GLOBULIN UR ELPH-MCNC: 2.2 GM/DL
GLUCOSE SERPL-MCNC: 209 MG/DL (ref 65–99)
HCT VFR BLD AUTO: 37.7 % (ref 37.5–51)
HGB BLD-MCNC: 12.5 G/DL (ref 13–17.7)
LYMPHOCYTES # BLD AUTO: 1 10*3/MM3 (ref 0.7–3.1)
LYMPHOCYTES NFR BLD AUTO: 13.4 % (ref 19.6–45.3)
MCH RBC QN AUTO: 30.7 PG (ref 26.6–33)
MCHC RBC AUTO-ENTMCNC: 33.2 G/DL (ref 31.5–35.7)
MCV RBC AUTO: 92.6 FL (ref 79–97)
MONOCYTES # BLD AUTO: 0.65 10*3/MM3 (ref 0.1–0.9)
MONOCYTES NFR BLD AUTO: 8.7 % (ref 5–12)
NEUTROPHILS NFR BLD AUTO: 5.26 10*3/MM3 (ref 1.7–7)
NEUTROPHILS NFR BLD AUTO: 70.7 % (ref 42.7–76)
PLATELET # BLD AUTO: 252 10*3/MM3 (ref 140–450)
PMV BLD AUTO: 8.5 FL (ref 6–12)
POTASSIUM SERPL-SCNC: 3.5 MMOL/L (ref 3.5–5.2)
PROT SERPL-MCNC: 6.3 G/DL (ref 6–8.5)
RBC # BLD AUTO: 4.07 10*6/MM3 (ref 4.14–5.8)
SODIUM SERPL-SCNC: 133 MMOL/L (ref 136–145)
T4 FREE SERPL-MCNC: 0.92 NG/DL (ref 0.93–1.7)
TSH SERPL DL<=0.05 MIU/L-ACNC: 7.2 UIU/ML (ref 0.27–4.2)
WBC # BLD AUTO: 7.45 10*3/MM3 (ref 3.4–10.8)

## 2020-08-03 PROCEDURE — 96413 CHEMO IV INFUSION 1 HR: CPT

## 2020-08-03 PROCEDURE — 36591 DRAW BLOOD OFF VENOUS DEVICE: CPT

## 2020-08-03 PROCEDURE — 96372 THER/PROPH/DIAG INJ SC/IM: CPT

## 2020-08-03 PROCEDURE — 25010000003 HEPARIN LOCK FLUSH PER 10 UNITS: Performed by: INTERNAL MEDICINE

## 2020-08-03 PROCEDURE — 25010000002 CYANOCOBALAMIN PER 1000 MCG: Performed by: INTERNAL MEDICINE

## 2020-08-03 PROCEDURE — 25010000002 DURVALUMAB 50 MG/ML SOLUTION 2.4 ML VIAL: Performed by: INTERNAL MEDICINE

## 2020-08-03 PROCEDURE — 80053 COMPREHEN METABOLIC PANEL: CPT | Performed by: INTERNAL MEDICINE

## 2020-08-03 PROCEDURE — 25010000002 DURVALUMAB 50 MG/ML SOLUTION 10 ML VIAL: Performed by: INTERNAL MEDICINE

## 2020-08-03 PROCEDURE — 85025 COMPLETE CBC W/AUTO DIFF WBC: CPT | Performed by: INTERNAL MEDICINE

## 2020-08-03 PROCEDURE — 84439 ASSAY OF FREE THYROXINE: CPT | Performed by: INTERNAL MEDICINE

## 2020-08-03 PROCEDURE — 84443 ASSAY THYROID STIM HORMONE: CPT | Performed by: INTERNAL MEDICINE

## 2020-08-03 RX ORDER — HEPARIN SODIUM (PORCINE) LOCK FLUSH IV SOLN 100 UNIT/ML 100 UNIT/ML
500 SOLUTION INTRAVENOUS AS NEEDED
Status: CANCELLED | OUTPATIENT
Start: 2020-08-03

## 2020-08-03 RX ORDER — SODIUM CHLORIDE 0.9 % (FLUSH) 0.9 %
10 SYRINGE (ML) INJECTION AS NEEDED
Status: DISCONTINUED | OUTPATIENT
Start: 2020-08-03 | End: 2020-08-04 | Stop reason: HOSPADM

## 2020-08-03 RX ORDER — CYANOCOBALAMIN 1000 UG/ML
1000 INJECTION, SOLUTION INTRAMUSCULAR; SUBCUTANEOUS ONCE
Status: COMPLETED | OUTPATIENT
Start: 2020-08-03 | End: 2020-08-03

## 2020-08-03 RX ORDER — SODIUM CHLORIDE 9 MG/ML
250 INJECTION, SOLUTION INTRAVENOUS ONCE
Status: DISCONTINUED | OUTPATIENT
Start: 2020-08-03 | End: 2020-08-04 | Stop reason: HOSPADM

## 2020-08-03 RX ORDER — SODIUM CHLORIDE 0.9 % (FLUSH) 0.9 %
10 SYRINGE (ML) INJECTION AS NEEDED
Status: CANCELLED | OUTPATIENT
Start: 2020-08-03

## 2020-08-03 RX ORDER — HEPARIN SODIUM (PORCINE) LOCK FLUSH IV SOLN 100 UNIT/ML 100 UNIT/ML
500 SOLUTION INTRAVENOUS AS NEEDED
Status: DISCONTINUED | OUTPATIENT
Start: 2020-08-03 | End: 2020-08-04 | Stop reason: HOSPADM

## 2020-08-03 RX ADMIN — HEPARIN 500 UNITS: 100 SYRINGE at 14:47

## 2020-08-03 RX ADMIN — CYANOCOBALAMIN 1000 MCG: 1000 INJECTION INTRAMUSCULAR; SUBCUTANEOUS at 14:47

## 2020-08-03 RX ADMIN — Medication 10 ML: at 14:47

## 2020-08-03 RX ADMIN — SODIUM CHLORIDE 900 MG: 900 INJECTION, SOLUTION INTRAVENOUS at 13:43

## 2020-08-14 NOTE — PROGRESS NOTES
Hematology/Oncology Outpatient Follow Up    PATIENT NAME:Jc Driscoll  :1965  MRN: [id number]  PRIMARY CARE PHYSICIAN: Reshma Alvarenga MD  REFERRING PHYSICIAN: Reshma Alvarenga MD    Chief Complaint   Patient presents with   • Follow-up     Non small cell carcinoma of right lung, anemia        HISTORY OF PRESENT ILLNESS:     This is a 55 y.o. who developed left shoulder pain and for that reason he had a chest x-ray done on 19 which showed a 2.7 cm noncalcified right lower lobe nodule was identified.  For that reason a CT scan of the chest was recommended.  Review of his records shows patient had the CT scan on 19 done without contrast.  This basically showed a lobulated noncalcified mass in the posterior aspect of the right lower lobe measuring 3 cm close to the pleural surface.  There is a calcified 1.2 mm nodule in the lateral aspect of the right lower lobe consistent with a granuloma.  There were also calcified subcarinal and right hilar nodules.  There is a lower right side tracheal nodule measuring 1 cm.  Patient had a PET/CT scan done on 19 which showed increased metabolic activity on the right lower lobe mass that measures 2.5 cm with SUV of 4.4.  There was also increased metabolic activity in the subcarinal space measuring 2.8 cm in size with SUV of 3.4, increased activity in an enlarged right paratracheal lymph node that measures 1.9 cm, SUV of 5, also suspicious for metastatic adenopathy.  Patient had a CT-guided biopsy of the right lower lobe mass on 3/8/19.  This showed adenocarcinoma, TTF-1 positive.  On 3/18/19 patient underwent endoscopic ultrasound and biopsy of a subcarinal lymph node.  This was positive for malignant cells.  Patient was then taken back to surgery on 3/20/18 and had left Mediport placement by Dr. Fuchs.  He has been referred for further evaluation and management of his stage 3 adenocarcinoma of the right lung.  He was accompanied by his spouse for  the appointment on 3/26/19.  Patient was scheduled to have a brain MRI for complete staging, but he could not do this due to claustrophobia.  He is willing to try with the help of angiolytics.    • Patient had brain MRI done on 4/5/19.  He states it did not show any evidence of metastatic disease.  Evidence of chronic sinusitis was noted.    • Patient was seen by Dr. Escalona who has recommended concurrent chemotherapy and radiation.  Patient is scheduled to begin on 4/15/19.   • 4/17/19 - Patient was initiated on combined chemotherapy and radiation with Cisplatin and Alimta.  Patient received cycle 1.      • 5/8/19 - Patient received cycle 2 of chemotherapy with Cisplatin and Alimta.   • 5/15/19 - Chemistry panel showed creatinine of 1.7.  WBC 1.8, hemoglobin 11.6, platelet count 72,000.   • 5/20/19 - BUN 10, creatinine 1.2, potassium 3.5.    • 5/23/19 - CT scan of the chest with contrast showed interval decrease in size of the right lower lobe pulmonary nodule now measuring 2.1 cm in size.  There was no mediastinal or hilar adenopathy identified.  • 6/26/2019 patient underwent right lower lobectomy with hilar and mediastinal lymph node dissection performed by Dr. Terrance Mckeon.  • Pathology revealed residual residual 2.2 cm invasive moderately differentiated adenocarcinoma.  There were a total of 16 lymph nodes evaluated that 7 had metastatic disease.  There was evidence of lymphovascular invasion, extranodal involvement.  All the surgical margins were negative and distance of the closest margin was 2.5 cm.  Logic stage is T1c N2 M0.  • Patient is here today accompanied by his spouse for this appointment.  He continues to complain of some postop discomfort, numbness but his skin is intact.  There is no drainage.  Has had follow-up with Dr. Fuchs.  • 8/14/2019-seen by Dr. Maria at the [facility].  Dr. Maria has recommended immunotherapy with durvalumab off label use as patient has not had significant  response to neoadjuvant chemotherapy and radiation.  Patient was given the option to have immunotherapy at the [facility] vs [place] and he has chosen to stay in [place]  • 8/21/19 - Started cycle 1 day 1 Imfinzi  • 9/4/2019 patient had CT scan of the chest this shows a moderate size right pleural effusion.  There are areas of groundglass opacification in the right upper lobe without any evidence of significant septal thickening.  Volume loss noted there is also moderate pleural effusion.  There is no suspicious recurrent malignancy.  There were nonenlarged residual mediastinal lymph nodes.  • 9/9/19 - WBC 6.23, Hgb 11.7 Platelets 257,000, , BUN 9, Cr 1.1,Vitamin B12 318, Ferritin 437  • 9/23/19 - received cycle 2 day 1 Imfinzi  • 10/9/19- Seen by Dr rodríguez with ENT for sinus disease  • 11/4/2019-patient received cycle 5 of Imfinzi(durvalumab)  • 12/2/2019 patient had cycle 7 of durvalumab  • 12/5/2019-patient had CT scan of the  abdomen and pelvis with small right pleural sided pleural effusion.There is no evidence of metastatic disease  • 12/30/2019-patient received cycle 9 of durvalumab  • 12/31/2019-patient had CT scan of the chest showed small to moderate left pleural effusion.  Iglesias appearing right lower paratracheal and right peribronchial soft tissue thickening without any discrete pathologically enlarged mediastinal or hilar lymph nodes.  • 1/27/20-patient received cycle 11 of durvalumab  • 2/17/20-patient had a stress test which was negative for ischemia  • 2/17/2020-patient had CT of the chest which showed postop changes on the right lung, right pleural effusion but no enlarging mass was noted  • 3/2/2020-patient received cycle 12 of durvalumab  • 3/16/2020-patient had ultrasound-guided right thoracentesis.  Pathology was negative for malignancy  • 4/13/2020-patient received cycle 15 of immunotherapy with durvalumab  • 5/11/2020-patient received cycle 17 of immunotherapy with  durvalumab  • 6/9/2020-patient had CT scan of the chest, abdomen and pelvis for lung cancer restaging.  There is stable small chronic loculated right basilar pleural effusion suggesting chronic pleuritis.  There is no evidence of metastatic disease in the abdomen or pelvis  • 7/20/2020 patient received cycle 22 of Durvalumab  • 8/17/2020 patient received cycle 24 and last cycle of durvalumab    Past Medical History:   Diagnosis Date   • Allergic rhinitis 7/25/2013    Overview:  4/2/2018 - still zyrtec 10 daily (if stops, gets dermatitis again).    • Anxiety and depression 6/5/2012    Overview:  4/2/2018 -   C/w well 300 xr and Lito 20.  4/8/2019 -   Doing ok even with new lung cancer - lito 20 & well 300xr.    • Chronic pansinusitis 4/8/2019    Overview:  4/8/2019 -   MRI showed chronic thick in frontal, maxillary, ethmoidal ---> to Dr. COLLAZO to est since abx ICC didn't help.  Does netipot bid.    • Diastolic CHF (CMS/HCC) 7/9/2014    Overview:  7/4/14 - impaired LV relaxation on [facility] ECHO.  EF 60%. Rest normal   • Dupuytren's contracture of both hands    • Elevated cholesterol    • Erosive esophagitis 7/9/2019    Overview:  EGD 2016 4/2/2018 - nexium OTC daily for few week.  Qod before that.  Now carbonation hurts, epigastric pain 4/8/2019 -   protonix 40 doing well.    • Gastroesophageal reflux disease 2/21/2019   • Hiatal hernia 7/9/2019   • History of colon polyps    • Hypertension    • Lung cancer (CMS/HCC)     right lung and in lymph nodes x2   • Mediastinal lymphadenopathy 3/12/2019   • Normocytic anemia 8/8/2019    Overview:  6/2019 - dropped to 9's postop 7/2019 - rebounded to 10's.  8/8/2019 -   Back to 9's - check ferritin, b12 and thyroid.    • Prediabetes 7/9/2014    Overview:  7/4/14 - a1c 6.1 at [facility] admission   • Retention of urine 6/27/2019       Past Surgical History:   Procedure Laterality Date   • BRONCHOSCOPY  03/18/2019    EBUS MONTERO NEEDLE BX   • COLONOSCOPY     • DUPUYTREN CONTRACTURE RELEASE  Bilateral    • LUNG BIOPSY  03/08/2019    CT GUIDED   • LUNG REMOVAL, PARTIAL Right     right lower   • PORTACATH PLACEMENT  03/20/2019    DR LONGORIA   • THORACOSCOPY Right 6/26/2019    Procedure: RIGHT VATS, OPEN LOWER LOBECTOMY WITH LYMPH NODE DISECTION, WEDGE RESECTION OF RIGHT MIDDLE LOBE;  Surgeon: Terrance Longoria MD;  Location: [facility] MAIN OR;  Service: Cardiothoracic         Current Outpatient Medications:   •  acetaminophen (TYLENOL) 325 MG tablet, Take 2 tablets by mouth Every 4 (Four) Hours As Needed for Mild Pain ., Disp: , Rfl:   •  amLODIPine (NORVASC) 10 MG tablet, Take 10 mg by mouth Daily., Disp: , Rfl:   •  buPROPion SR (WELLBUTRIN SR) 150 MG 12 hr tablet, Take 300 mg by mouth 2 (Two) Times a Day., Disp: , Rfl:   •  cetirizine (zyrTEC) 10 MG tablet, Take 10 mg by mouth Daily., Disp: , Rfl:   •  desvenlafaxine (PRISTIQ) 50 MG 24 hr tablet, TAKE 1 TABLET BY MOUTH ONCE DAILY FOR 30 DAYS, Disp: , Rfl:   •  HYDROcodone-acetaminophen (NORCO) 5-325 MG per tablet, Take 1 tablet by mouth As Needed., Disp: , Rfl:   •  HYDROcodone-acetaminophen (NORCO) 5-325 MG per tablet, Take 1 tablet by mouth Every 6 (Six) Hours As Needed for Moderate Pain ., Disp: 20 tablet, Rfl: 0  •  ibuprofen (ADVIL,MOTRIN) 600 MG tablet, Take 1 tablet by mouth Every 6 (Six) Hours As Needed for Mild Pain ., Disp: , Rfl:   •  lidocaine-prilocaine (EMLA) 2.5-2.5 % cream, , Disp: , Rfl:   •  LORazepam (ATIVAN) 0.5 MG tablet, TAKE 1 TABLET BY MOUTH TWICE DAILY AS NEEDED FOR ANXIETY MAX OF 2 DAY, Disp: , Rfl: 0  •  metoprolol succinate XL (TOPROL-XL) 100 MG 24 hr tablet, Take 100 mg by mouth Daily., Disp: , Rfl:   •  ondansetron (ZOFRAN) 8 MG tablet, TAKE 1 TABLET BY MOUTH EVERY 8 HOURS AS NEEDED NAUSEA, Disp: , Rfl: 3  •  pantoprazole (PROTONIX) 40 MG EC tablet, Take 40 mg by mouth Daily., Disp: , Rfl:   •  pravastatin (PRAVACHOL) 10 MG tablet, Take 10 mg by mouth Daily., Disp: , Rfl:   No current facility-administered medications for  this visit.     Facility-Administered Medications Ordered in Other Visits:   •  durvalumab (IMFINZI) 900 mg in sodium chloride 0.9 % 268 mL chemo IVPB, 10 mg/kg (Treatment Plan Recorded), Intravenous, Once, Azucena Gaspar MD, Last Rate: 280 mL/hr at 08/17/20 1401, 900 mg at 08/17/20 1401  •  heparin injection 500 Units, 500 Units, Intravenous, PRN, Azucena Gaspar MD  •  sodium chloride 0.9 % flush 10 mL, 10 mL, Intravenous, PRN, Azucena aGspar MD  •  sodium chloride 0.9 % infusion 250 mL, 250 mL, Intravenous, Once, Azucena Gaspar MD    No Known Allergies    Family History   Problem Relation Age of Onset   • Cancer Sister    • Cancer Mother    • Lung cancer Father        Cancer-related family history includes Cancer in his mother and sister; Lung cancer in his father.    Social History     Tobacco Use   • Smoking status: Former Smoker     Types: Cigarettes     Last attempt to quit: 9/5/2009     Years since quitting: 10.9   • Smokeless tobacco: Never Used   Substance Use Topics   • Alcohol use: Yes     Alcohol/week: 2.0 standard drinks     Types: 2 Cans of beer per week     Frequency: Monthly or less     Drinks per session: 1 or 2     Comment: Occasional   • Drug use: No     I have reviewed and confirmed the accuracy of the patient's history:  as entered by the MA/LPN/RN. Azucena Gaspar MD 08/17/20     SUBJECTIVE:    Patient has no specific complaints. Patient has no specific issues today. He feels well. Remains active. Denies fevers or chills, nausea or vomiting.    REVIEW OF SYSTEMS:    Review of Systems   Constitutional: Negative for chills and fever.   HENT: Negative for ear pain, mouth sores, nosebleeds and sore throat.    Eyes: Negative for photophobia and visual disturbance.   Respiratory: Negative for wheezing and stridor.    Cardiovascular: Negative for chest pain and palpitations.   Gastrointestinal: Negative for abdominal pain, diarrhea, nausea and vomiting.    Endocrine: Negative for cold intolerance and heat intolerance.   Genitourinary: Negative for dysuria and hematuria.   Musculoskeletal: Negative for joint swelling and neck stiffness.   Skin: Negative for color change and rash.   Neurological: Negative for seizures and syncope.   Hematological: Negative for adenopathy.   Psychiatric/Behavioral: Negative for agitation, confusion and hallucinations.       ROS as documented above      OBJECTIVE:  There were no vitals filed for this visit.  ECOG  Eastern Cooperative Oncology Group (ECOG, [place], World Health Organization) performance scale  0 Fully active; no performance restrictions.  1 Strenuous physical activity restricted; fully ambulatory and able to carry out light work.  2 Capable of all self-care but unable to carry out any work activities. Up and about >50% of waking hours.  3 Capable of only limited self-care; confined to bed or chair >50% of waking hours.  4 Completely disabled; cannot carry out any self-care; totally confined to bed or chair.    Physical Exam   Constitutional: He is oriented to person, place, and time. He appears well-developed and well-nourished. No distress.   HENT:   Head: Normocephalic and atraumatic.   Right Ear: External ear normal.   Left Ear: External ear normal.   Nose: Nose normal.   Eyes: Pupils are equal, round, and reactive to light. Conjunctivae and EOM are normal. Right eye exhibits no discharge. Left eye exhibits no discharge. No scleral icterus.   Neck: Normal range of motion. Neck supple. No thyromegaly present.   Cardiovascular: Normal rate, regular rhythm and normal heart sounds. Exam reveals no gallop and no friction rub.   Pulmonary/Chest: Effort normal. No stridor. No respiratory distress. He has no wheezes.   Abdominal: Soft. Bowel sounds are normal. He exhibits no mass. There is no tenderness. There is no rebound and no guarding.   Musculoskeletal: Normal range of motion. He exhibits no tenderness.    Lymphadenopathy:     He has no cervical adenopathy.   Neurological: He is alert and oriented to person, place, and time. He exhibits normal muscle tone.   Skin: Skin is warm. No rash noted. He is not diaphoretic. No erythema.   Urticarial-like rash, right medial knee likely due to immunotherapy   Psychiatric: He has a normal mood and affect. His behavior is normal. Judgment and thought content normal.   Nursing note and vitals reviewed.    I have reexamined the patient and the results are consistent with the previously documented exam. Azucena Livia Gaspar MD     RECENT LABS  WBC   Date Value Ref Range Status   08/17/2020 7.40 3.40 - 10.80 10*3/mm3 Final   07/03/2019 8.99 4.5 - 11.0 10*3/uL Final     RBC   Date Value Ref Range Status   08/17/2020 4.06 (L) 4.14 - 5.80 10*6/mm3 Final   07/03/2019 3.24 (L) 4.5 - 5.9 10*6/uL Final     Hemoglobin   Date Value Ref Range Status   08/17/2020 12.8 (L) 13.0 - 17.7 g/dL Final   07/03/2019 10.4 (L) 13.5 - 17.5 g/dL Final     Hematocrit   Date Value Ref Range Status   08/17/2020 37.8 37.5 - 51.0 % Final   07/03/2019 32.8 (L) 41.0 - 53.0 % Final     MCV   Date Value Ref Range Status   08/17/2020 93.1 79.0 - 97.0 fL Final   07/03/2019 101.2 (H) 80.0 - 100.0 fL Final     MCH   Date Value Ref Range Status   08/17/2020 31.5 26.6 - 33.0 pg Final   07/03/2019 32.1 26.0 - 34.0 pg Final     MCHC   Date Value Ref Range Status   08/17/2020 33.9 31.5 - 35.7 g/dL Final   07/03/2019 31.7 31.0 - 37.0 g/dL Final     RDW   Date Value Ref Range Status   08/17/2020 13.7 12.3 - 15.4 % Final   07/03/2019 15.2 12.0 - 16.8 % Final     RDW-SD   Date Value Ref Range Status   08/17/2020 46.0 37.0 - 54.0 fl Final     MPV   Date Value Ref Range Status   08/17/2020 8.6 6.0 - 12.0 fL Final   07/03/2019 9.0 6.7 - 10.8 fL Final     Platelets   Date Value Ref Range Status   08/17/2020 296 140 - 450 10*3/mm3 Final   07/03/2019 302 140 - 440 10*3/uL Final     Neutrophil Rel %   Date Value Ref Range Status    07/03/2019 60.0 45 - 80 % Final     Neutrophil %   Date Value Ref Range Status   08/17/2020 65.5 42.7 - 76.0 % Final     Lymphocyte Rel %   Date Value Ref Range Status   07/03/2019 14.8 (L) 15 - 50 % Final     Lymphocyte %   Date Value Ref Range Status   08/17/2020 15.4 (L) 19.6 - 45.3 % Final     Monocyte Rel %   Date Value Ref Range Status   07/03/2019 12.2 0 - 15 % Final     Monocyte %   Date Value Ref Range Status   08/17/2020 9.2 5.0 - 12.0 % Final     Eosinophil %   Date Value Ref Range Status   08/17/2020 8.5 (H) 0.3 - 6.2 % Final   07/03/2019 10.4 (H) 0 - 7 % Final     Basophil Rel %   Date Value Ref Range Status   07/03/2019 1.7 0 - 2 % Final     Basophil %   Date Value Ref Range Status   08/17/2020 1.4 0.0 - 1.5 % Final     Neutrophils Absolute   Date Value Ref Range Status   07/03/2019 5.39 1.5 - 7.5 /uL Final   07/03/2019 5.39 2.0 - 8.8 10*3/uL Final     Neutrophils, Absolute   Date Value Ref Range Status   08/17/2020 4.85 1.70 - 7.00 10*3/mm3 Final     Lymphocytes Absolute   Date Value Ref Range Status   07/03/2019 1.33 0.7 - 5.5 10*3/uL Final     Lymphocytes, Absolute   Date Value Ref Range Status   08/17/2020 1.14 0.70 - 3.10 10*3/mm3 Final     Monocytes Absolute   Date Value Ref Range Status   07/03/2019 1.10 0.0 - 1.7 10*3/uL Final     Monocytes, Absolute   Date Value Ref Range Status   08/17/2020 0.68 0.10 - 0.90 10*3/mm3 Final     Eosinophils Absolute   Date Value Ref Range Status   07/03/2019 0.93 (H) 0.0 - 0.8 10*3/uL Final     Eosinophils, Absolute   Date Value Ref Range Status   08/17/2020 0.63 (H) 0.00 - 0.40 10*3/mm3 Final     Basophils Absolute   Date Value Ref Range Status   07/03/2019 0.15 0.0 - 0.2 10*3/uL Final     Basophils, Absolute   Date Value Ref Range Status   08/17/2020 0.10 0.00 - 0.20 10*3/mm3 Final     nRBC   Date Value Ref Range Status   02/18/2020 0.0 0.0 - 0.2 /100 WBC Final       Lab Results   Component Value Date    GLUCOSE 209 (H) 08/03/2020    BUN  08/03/2020       Comment:      Testing performed by alternate method    BUN 9 08/03/2020    CREATININE 1.00 08/03/2020    EGFRIFNONA 77 08/03/2020    EGFRIFAFRI >60 05/20/2019    BCR  08/03/2020      Comment:      Testing not performed    K 3.5 08/03/2020    CO2 25.0 08/03/2020    CALCIUM 8.6 08/03/2020    ALBUMIN 4.10 08/03/2020    LABIL2 1.4 07/03/2019    AST 19 08/03/2020    ALT 17 08/03/2020         Assessment/Plan     Non-small cell carcinoma of lung, right (CMS/HCC)  - CBC & Differential    Anemia, unspecified type  - CBC & Differential      ASSESSMENT:    1. Adenocarcinoma of the right lung, T1c N2 M0, consistent with clinical stage 3A disease.     2. S/P combined chemotherapy and radiation with cisplatin and Alimta.  Ongoing management  3. Status post right lower lobectomy with mediastinal and hilar lymph node dissection with residual disease.  pT1 cN2 M0.  Patient with high risk features including lymphovascular invasion, extranodal capsular extension and persistent multiple lymph node disease.  Ongoing management  4. Consolidation treatment with immunotherapy with Imfinzi: Ongoing management.  Scan will be done in September 2020  5. Satus post right thoracentesis with cytology negative for malignancy  6. Postop anemia: Stable  7. New rash right medial knee likely secondary to immunotherapy: Resolved  8. Monitoring for drug toxicities while on immunotherapy: Ongoing    PLAN:    1. Patient's CT scans are due September 2020: We will go ahead and order  2. Patient will be completing Imfinzi consolidation therapy today 8/17/2020: Monitoring for side effects and complications  3. Patient to use hydrocortisone 1% on the skin for rash and call me if symptoms worsen at any point in time: Reviewed rash resolved  4. Continue B12 injections  monthly: Schedule  5. Status post flu shot and pneumonia vaccines 2019  6. Refered to ENT for evaluation for sinus surgery : Not covered by insurance per pt.  7. Colonoscopy postponed by patient  to January.  Encouraged to complete  8. RTC in 4 weeks  9. Continue supportive care  10. All questions answered to the best of my abilities         Patient has  questions which have all been answered to the best of my abilities    I have reviewed labs results, imaging, vitals, and medications with the patient today. Will follow up in 4 weeks with me.      Patient verbalized understanding and is in agreement of the above plan.

## 2020-08-17 ENCOUNTER — HOSPITAL ENCOUNTER (OUTPATIENT)
Dept: ONCOLOGY | Facility: HOSPITAL | Age: 55
Setting detail: INFUSION SERIES
Discharge: HOME | End: 2020-08-17

## 2020-08-17 ENCOUNTER — OFFICE VISIT (OUTPATIENT)
Dept: ONCOLOGY | Facility: CLINIC | Age: 55
End: 2020-08-17

## 2020-08-17 VITALS
HEIGHT: 75 IN | RESPIRATION RATE: 18 BRPM | HEART RATE: 69 BPM | BODY MASS INDEX: 24.29 KG/M2 | WEIGHT: 195.4 LBS | SYSTOLIC BLOOD PRESSURE: 137 MMHG | DIASTOLIC BLOOD PRESSURE: 87 MMHG | TEMPERATURE: 97.7 F

## 2020-08-17 DIAGNOSIS — Z29.8 ENCOUNTER FOR IMMUNOTHERAPY: ICD-10-CM

## 2020-08-17 DIAGNOSIS — C34.91 NON-SMALL CELL CARCINOMA OF LUNG, RIGHT (HCC): Primary | ICD-10-CM

## 2020-08-17 DIAGNOSIS — C34.31 CANCER OF LOWER LOBE OF RIGHT LUNG (HCC): ICD-10-CM

## 2020-08-17 DIAGNOSIS — Z45.2 ENCOUNTER FOR CARE RELATED TO VASCULAR ACCESS PORT: ICD-10-CM

## 2020-08-17 DIAGNOSIS — D64.9 ANEMIA, UNSPECIFIED TYPE: ICD-10-CM

## 2020-08-17 LAB
ALBUMIN SERPL-MCNC: 4.1 G/DL (ref 3.5–5.2)
ALBUMIN/GLOB SERPL: 1.8 G/DL
ALP SERPL-CCNC: 74 U/L (ref 39–117)
ALT SERPL W P-5'-P-CCNC: 14 U/L (ref 1–41)
ANION GAP SERPL CALCULATED.3IONS-SCNC: 11 MMOL/L (ref 5–15)
AST SERPL-CCNC: 18 U/L (ref 1–40)
BASOPHILS # BLD AUTO: 0.1 10*3/MM3 (ref 0–0.2)
BASOPHILS NFR BLD AUTO: 1.4 % (ref 0–1.5)
BILIRUB SERPL-MCNC: 0.2 MG/DL (ref 0–1.2)
BUN SERPL-MCNC: 14 MG/DL (ref 6–20)
BUN SERPL-MCNC: ABNORMAL MG/DL
BUN/CREAT SERPL: ABNORMAL
CALCIUM SPEC-SCNC: 8.9 MG/DL (ref 8.6–10.5)
CHLORIDE SERPL-SCNC: 99 MMOL/L (ref 98–107)
CO2 SERPL-SCNC: 26 MMOL/L (ref 22–29)
CREAT SERPL-MCNC: 1.02 MG/DL (ref 0.76–1.27)
DEPRECATED RDW RBC AUTO: 46 FL (ref 37–54)
EOSINOPHIL # BLD AUTO: 0.63 10*3/MM3 (ref 0–0.4)
EOSINOPHIL NFR BLD AUTO: 8.5 % (ref 0.3–6.2)
ERYTHROCYTE [DISTWIDTH] IN BLOOD BY AUTOMATED COUNT: 13.7 % (ref 12.3–15.4)
GFR SERPL CREATININE-BSD FRML MDRD: 76 ML/MIN/1.73
GLOBULIN UR ELPH-MCNC: 2.3 GM/DL
GLUCOSE SERPL-MCNC: 125 MG/DL (ref 65–99)
HCT VFR BLD AUTO: 37.8 % (ref 37.5–51)
HGB BLD-MCNC: 12.8 G/DL (ref 13–17.7)
LYMPHOCYTES # BLD AUTO: 1.14 10*3/MM3 (ref 0.7–3.1)
LYMPHOCYTES NFR BLD AUTO: 15.4 % (ref 19.6–45.3)
MCH RBC QN AUTO: 31.5 PG (ref 26.6–33)
MCHC RBC AUTO-ENTMCNC: 33.9 G/DL (ref 31.5–35.7)
MCV RBC AUTO: 93.1 FL (ref 79–97)
MONOCYTES # BLD AUTO: 0.68 10*3/MM3 (ref 0.1–0.9)
MONOCYTES NFR BLD AUTO: 9.2 % (ref 5–12)
NEUTROPHILS NFR BLD AUTO: 4.85 10*3/MM3 (ref 1.7–7)
NEUTROPHILS NFR BLD AUTO: 65.5 % (ref 42.7–76)
PLATELET # BLD AUTO: 296 10*3/MM3 (ref 140–450)
PMV BLD AUTO: 8.6 FL (ref 6–12)
POTASSIUM SERPL-SCNC: 3.7 MMOL/L (ref 3.5–5.2)
PROT SERPL-MCNC: 6.4 G/DL (ref 6–8.5)
RBC # BLD AUTO: 4.06 10*6/MM3 (ref 4.14–5.8)
SODIUM SERPL-SCNC: 136 MMOL/L (ref 136–145)
T3FREE SERPL-MCNC: 2.61 PG/ML (ref 2–4.4)
T4 FREE SERPL-MCNC: 0.8 NG/DL (ref 0.93–1.7)
TSH SERPL DL<=0.05 MIU/L-ACNC: 9.53 UIU/ML (ref 0.27–4.2)
WBC # BLD AUTO: 7.4 10*3/MM3 (ref 3.4–10.8)

## 2020-08-17 PROCEDURE — 84481 FREE ASSAY (FT-3): CPT | Performed by: INTERNAL MEDICINE

## 2020-08-17 PROCEDURE — 25010000003 HEPARIN LOCK FLUSH PER 10 UNITS: Performed by: INTERNAL MEDICINE

## 2020-08-17 PROCEDURE — 25010000002 DURVALUMAB 50 MG/ML SOLUTION 10 ML VIAL: Performed by: INTERNAL MEDICINE

## 2020-08-17 PROCEDURE — 25010000002 DURVALUMAB 50 MG/ML SOLUTION 2.4 ML VIAL: Performed by: INTERNAL MEDICINE

## 2020-08-17 PROCEDURE — 80053 COMPREHEN METABOLIC PANEL: CPT | Performed by: INTERNAL MEDICINE

## 2020-08-17 PROCEDURE — 84443 ASSAY THYROID STIM HORMONE: CPT | Performed by: INTERNAL MEDICINE

## 2020-08-17 PROCEDURE — 99214 OFFICE O/P EST MOD 30 MIN: CPT | Performed by: INTERNAL MEDICINE

## 2020-08-17 PROCEDURE — 96413 CHEMO IV INFUSION 1 HR: CPT

## 2020-08-17 PROCEDURE — 84439 ASSAY OF FREE THYROXINE: CPT | Performed by: INTERNAL MEDICINE

## 2020-08-17 PROCEDURE — 85025 COMPLETE CBC W/AUTO DIFF WBC: CPT | Performed by: INTERNAL MEDICINE

## 2020-08-17 PROCEDURE — 36591 DRAW BLOOD OFF VENOUS DEVICE: CPT

## 2020-08-17 RX ORDER — SODIUM CHLORIDE 9 MG/ML
250 INJECTION, SOLUTION INTRAVENOUS ONCE
Status: DISCONTINUED | OUTPATIENT
Start: 2020-08-17 | End: 2020-08-18 | Stop reason: HOSPADM

## 2020-08-17 RX ORDER — SODIUM CHLORIDE 0.9 % (FLUSH) 0.9 %
10 SYRINGE (ML) INJECTION AS NEEDED
Status: DISCONTINUED | OUTPATIENT
Start: 2020-08-17 | End: 2020-08-18 | Stop reason: HOSPADM

## 2020-08-17 RX ORDER — HEPARIN SODIUM (PORCINE) LOCK FLUSH IV SOLN 100 UNIT/ML 100 UNIT/ML
500 SOLUTION INTRAVENOUS AS NEEDED
Status: CANCELLED | OUTPATIENT
Start: 2020-08-17

## 2020-08-17 RX ORDER — HEPARIN SODIUM (PORCINE) LOCK FLUSH IV SOLN 100 UNIT/ML 100 UNIT/ML
500 SOLUTION INTRAVENOUS AS NEEDED
Status: DISCONTINUED | OUTPATIENT
Start: 2020-08-17 | End: 2020-08-18 | Stop reason: HOSPADM

## 2020-08-17 RX ORDER — SODIUM CHLORIDE 0.9 % (FLUSH) 0.9 %
10 SYRINGE (ML) INJECTION AS NEEDED
Status: CANCELLED | OUTPATIENT
Start: 2020-08-17

## 2020-08-17 RX ADMIN — Medication 10 ML: at 15:10

## 2020-08-17 RX ADMIN — HEPARIN 500 UNITS: 100 SYRINGE at 15:10

## 2020-08-17 RX ADMIN — SODIUM CHLORIDE 900 MG: 900 INJECTION, SOLUTION INTRAVENOUS at 14:01

## 2020-09-15 ENCOUNTER — TELEPHONE (OUTPATIENT)
Dept: ONCOLOGY | Facility: HOSPITAL | Age: 55
End: 2020-09-15

## 2020-09-15 ENCOUNTER — TELEPHONE (OUTPATIENT)
Dept: ONCOLOGY | Facility: CLINIC | Age: 55
End: 2020-09-15

## 2020-09-15 NOTE — TELEPHONE ENCOUNTER
Pts wife called the order for the pt to have his CT scan at [facility] has not been sent over, can this be sent today, he has appt with the doctor on 9/21

## 2020-09-15 NOTE — TELEPHONE ENCOUNTER
I called pt to let him know that I resent orders for ct scans to [facility] for him to follow up with them to get scheduled. Pt has appointment with MD on Monday. Pt verbalized understanding along with calling us back if scans don't get completed by the end of this week we will need to push MD appointment out.

## 2020-09-15 NOTE — TELEPHONE ENCOUNTER
Pt called his CT scan could not be scheduled until 9/24 with [facility] he ask that his appt with the doctor on 9/21 be rescheduled.  Best call back number [phone]

## 2020-09-21 ENCOUNTER — APPOINTMENT (OUTPATIENT)
Dept: LAB | Facility: HOSPITAL | Age: 55
End: 2020-09-21

## 2020-09-23 NOTE — PROGRESS NOTES
Hematology/Oncology Outpatient Follow Up    PATIENT NAME:Jc Driscoll  :1965  MRN: [id number]  PRIMARY CARE PHYSICIAN: Reshma Alvarenga MD  REFERRING PHYSICIAN: Reshma Alvarenga MD    Chief Complaint   Patient presents with   • Follow-up     Non-small cell carcinoma of right lung        HISTORY OF PRESENT ILLNESS:     This is a 55 y.o. who developed left shoulder pain and for that reason he had a chest x-ray done on 19 which showed a 2.7 cm noncalcified right lower lobe nodule was identified.  For that reason a CT scan of the chest was recommended.  Review of his records shows patient had the CT scan on 19 done without contrast.  This basically showed a lobulated noncalcified mass in the posterior aspect of the right lower lobe measuring 3 cm close to the pleural surface.  There is a calcified 1.2 mm nodule in the lateral aspect of the right lower lobe consistent with a granuloma.  There were also calcified subcarinal and right hilar nodules.  There is a lower right side tracheal nodule measuring 1 cm.  Patient had a PET/CT scan done on 19 which showed increased metabolic activity on the right lower lobe mass that measures 2.5 cm with SUV of 4.4.  There was also increased metabolic activity in the subcarinal space measuring 2.8 cm in size with SUV of 3.4, increased activity in an enlarged right paratracheal lymph node that measures 1.9 cm, SUV of 5, also suspicious for metastatic adenopathy.  Patient had a CT-guided biopsy of the right lower lobe mass on 3/8/19.  This showed adenocarcinoma, TTF-1 positive.  On 3/18/19 patient underwent endoscopic ultrasound and biopsy of a subcarinal lymph node.  This was positive for malignant cells.  Patient was then taken back to surgery on 3/20/18 and had left Mediport placement by Dr. Fuchs.  He has been referred for further evaluation and management of his stage 3 adenocarcinoma of the right lung.  He was accompanied by his spouse for the  appointment on 3/26/19.  Patient was scheduled to have a brain MRI for complete staging, but he could not do this due to claustrophobia.  He is willing to try with the help of angiolytics.    • Patient had brain MRI done on 4/5/19.  He states it did not show any evidence of metastatic disease.  Evidence of chronic sinusitis was noted.    • Patient was seen by Dr. Escalona who has recommended concurrent chemotherapy and radiation.  Patient is scheduled to begin on 4/15/19.   • 4/17/19 - Patient was initiated on combined chemotherapy and radiation with Cisplatin and Alimta.  Patient received cycle 1.      • 5/8/19 - Patient received cycle 2 of chemotherapy with Cisplatin and Alimta.   • 5/15/19 - Chemistry panel showed creatinine of 1.7.  WBC 1.8, hemoglobin 11.6, platelet count 72,000.   • 5/20/19 - BUN 10, creatinine 1.2, potassium 3.5.    • 5/23/19 - CT scan of the chest with contrast showed interval decrease in size of the right lower lobe pulmonary nodule now measuring 2.1 cm in size.  There was no mediastinal or hilar adenopathy identified.  • 6/26/2019 patient underwent right lower lobectomy with hilar and mediastinal lymph node dissection performed by Dr. Terrance Mckeon.  • Pathology revealed residual residual 2.2 cm invasive moderately differentiated adenocarcinoma.  There were a total of 16 lymph nodes evaluated that 7 had metastatic disease.  There was evidence of lymphovascular invasion, extranodal involvement.  All the surgical margins were negative and distance of the closest margin was 2.5 cm.  Logic stage is T1c N2 M0.  • Patient is here today accompanied by his spouse for this appointment.  He continues to complain of some postop discomfort, numbness but his skin is intact.  There is no drainage.  Has had follow-up with Dr. Fuchs.  • 8/14/2019-seen by Dr. Maria at the [facility].  Dr. Maria has recommended immunotherapy with durvalumab off label use as patient has not had significant response  to neoadjuvant chemotherapy and radiation.  Patient was given the option to have immunotherapy at the [facility] vs [place] and he has chosen to stay in [place]  • 8/21/19 - Started cycle 1 day 1 Imfinzi  • 9/4/2019 patient had CT scan of the chest this shows a moderate size right pleural effusion.  There are areas of groundglass opacification in the right upper lobe without any evidence of significant septal thickening.  Volume loss noted there is also moderate pleural effusion.  There is no suspicious recurrent malignancy.  There were nonenlarged residual mediastinal lymph nodes.  • 9/9/19 - WBC 6.23, Hgb 11.7 Platelets 257,000, , BUN 9, Cr 1.1,Vitamin B12 318, Ferritin 437  • 9/23/19 - received cycle 2 day 1 Imfinzi  • 10/9/19- Seen by Dr rodríguez with ENT for sinus disease  • 11/4/2019-patient received cycle 5 of Imfinzi(durvalumab)  • 12/2/2019 patient had cycle 7 of durvalumab  • 12/5/2019-patient had CT scan of the  abdomen and pelvis with small right pleural sided pleural effusion.There is no evidence of metastatic disease  • 12/30/2019-patient received cycle 9 of durvalumab  • 12/31/2019-patient had CT scan of the chest showed small to moderate left pleural effusion.  Iglesias appearing right lower paratracheal and right peribronchial soft tissue thickening without any discrete pathologically enlarged mediastinal or hilar lymph nodes.  • 1/27/20-patient received cycle 11 of durvalumab  • 2/17/20-patient had a stress test which was negative for ischemia  • 2/17/2020-patient had CT of the chest which showed postop changes on the right lung, right pleural effusion but no enlarging mass was noted  • 3/2/2020-patient received cycle 12 of durvalumab  • 3/16/2020-patient had ultrasound-guided right thoracentesis.  Pathology was negative for malignancy  • 4/13/2020-patient received cycle 15 of immunotherapy with durvalumab  • 5/11/2020-patient received cycle 17 of immunotherapy with durvalumab  • 6/9/2020-patient had  CT scan of the chest, abdomen and pelvis for lung cancer restaging.  There is stable small chronic loculated right basilar pleural effusion suggesting chronic pleuritis.  There is no evidence of metastatic disease in the abdomen or pelvis  • 7/20/2020 patient received cycle 22 of Durvalumab  • 8/17/2020 patient received cycle 24 and last cycle of durvalumab  • 9/24/2020 patient had CT scan of the chest, abdomen and pelvis for cancer restaging there was no evidence of local recurrence or metastatic disease within the abdomen and pelvis.  He has stable chronic small right pleural effusion.  Mild sigmoid diverticulosis was noted.    Past Medical History:   Diagnosis Date   • Allergic rhinitis 7/25/2013    Overview:  4/2/2018 - still zyrtec 10 daily (if stops, gets dermatitis again).    • Anxiety and depression 6/5/2012    Overview:  4/2/2018 -   C/w well 300 xr and Lito 20.  4/8/2019 -   Doing ok even with new lung cancer - lito 20 & well 300xr.    • Chronic pansinusitis 4/8/2019    Overview:  4/8/2019 -   MRI showed chronic thick in frontal, maxillary, ethmoidal ---> to Dr. COLLAZO to est since abx ICC didn't help.  Does netipot bid.    • Diastolic CHF (CMS/HCC) 7/9/2014    Overview:  7/4/14 - impaired LV relaxation on Zhou ECHO.  EF 60%. Rest normal   • Dupuytren's contracture of both hands    • Elevated cholesterol    • Erosive esophagitis 7/9/2019    Overview:  EGD 2016 4/2/2018 - nexium OTC daily for few week.  Qod before that.  Now carbonation hurts, epigastric pain 4/8/2019 -   protonix 40 doing well.    • Gastroesophageal reflux disease 2/21/2019   • Hiatal hernia 7/9/2019   • History of colon polyps    • Hypertension    • Lung cancer (CMS/HCC)     right lung and in lymph nodes x2   • Mediastinal lymphadenopathy 3/12/2019   • Normocytic anemia 8/8/2019    Overview:  6/2019 - dropped to 9's postop 7/2019 - rebounded to 10's.  8/8/2019 -   Back to 9's - check ferritin, b12 and thyroid.    • Prediabetes 7/9/2014     Overview:  7/4/14 - a1c 6.1 at [place] admission   • Retention of urine 6/27/2019       Past Surgical History:   Procedure Laterality Date   • BRONCHOSCOPY  03/18/2019    EBUS MONTERO NEEDLE BX   • COLONOSCOPY     • DUPUYTREN CONTRACTURE RELEASE Bilateral    • LUNG BIOPSY  03/08/2019    CT GUIDED   • LUNG REMOVAL, PARTIAL Right     right lower   • PORTACATH PLACEMENT  03/20/2019    DR LONGORIA   • THORACOSCOPY Right 6/26/2019    Procedure: RIGHT VATS, OPEN LOWER LOBECTOMY WITH LYMPH NODE DISECTION, WEDGE RESECTION OF RIGHT MIDDLE LOBE;  Surgeon: Terrance Longoria MD;  Location: [facility] OR;  Service: Cardiothoracic         Current Outpatient Medications:   •  acetaminophen (TYLENOL) 325 MG tablet, Take 2 tablets by mouth Every 4 (Four) Hours As Needed for Mild Pain ., Disp: , Rfl:   •  amLODIPine (NORVASC) 10 MG tablet, Take 10 mg by mouth Daily., Disp: , Rfl:   •  buPROPion SR (WELLBUTRIN SR) 150 MG 12 hr tablet, Take 300 mg by mouth 2 (Two) Times a Day., Disp: , Rfl:   •  cetirizine (zyrTEC) 10 MG tablet, Take 10 mg by mouth Daily., Disp: , Rfl:   •  desvenlafaxine (PRISTIQ) 50 MG 24 hr tablet, TAKE 1 TABLET BY MOUTH ONCE DAILY FOR 30 DAYS, Disp: , Rfl:   •  HYDROcodone-acetaminophen (NORCO) 5-325 MG per tablet, Take 1 tablet by mouth As Needed., Disp: , Rfl:   •  HYDROcodone-acetaminophen (NORCO) 5-325 MG per tablet, Take 1 tablet by mouth Every 6 (Six) Hours As Needed for Moderate Pain ., Disp: 20 tablet, Rfl: 0  •  ibuprofen (ADVIL,MOTRIN) 600 MG tablet, Take 1 tablet by mouth Every 6 (Six) Hours As Needed for Mild Pain ., Disp: , Rfl:   •  lidocaine-prilocaine (EMLA) 2.5-2.5 % cream, , Disp: , Rfl:   •  LORazepam (ATIVAN) 0.5 MG tablet, TAKE 1 TABLET BY MOUTH TWICE DAILY AS NEEDED FOR ANXIETY MAX OF 2 DAY, Disp: , Rfl: 0  •  metoprolol succinate XL (TOPROL-XL) 100 MG 24 hr tablet, Take 100 mg by mouth Daily., Disp: , Rfl:   •  ondansetron (ZOFRAN) 8 MG tablet, TAKE 1 TABLET BY MOUTH EVERY 8 HOURS AS NEEDED  NAUSEA, Disp: , Rfl: 3  •  pantoprazole (PROTONIX) 40 MG EC tablet, Take 40 mg by mouth Daily., Disp: , Rfl:   •  pravastatin (PRAVACHOL) 10 MG tablet, Take 10 mg by mouth Daily., Disp: , Rfl:   No current facility-administered medications for this visit.     Facility-Administered Medications Ordered in Other Visits:   •  heparin injection 500 Units, 500 Units, Intravenous, Chasity TIERNEY Ifeoma Roseline, MD, 500 Units at 20 1700  •  sodium chloride 0.9 % flush 10 mL, 10 mL, Intravenous, PRNChasity Ifeoma Roseline, MD, 30 mL at 20 1559    No Known Allergies    Family History   Problem Relation Age of Onset   • Cancer Sister    • Cancer Mother    • Lung cancer Father        Cancer-related family history includes Cancer in his mother and sister; Lung cancer in his father.    Social History     Tobacco Use   • Smoking status: Former Smoker     Types: Cigarettes     Quit date: 2009     Years since quittin.0   • Smokeless tobacco: Never Used   Substance Use Topics   • Alcohol use: Yes     Alcohol/week: 2.0 standard drinks     Types: 2 Cans of beer per week     Frequency: Monthly or less     Drinks per session: 1 or 2     Comment: Occasional   • Drug use: No     I have reviewed and confirmed the accuracy of the patient's history:  as entered by the MA/LPN/RN. Azucena Gaspar MD 20     SUBJECTIVE:    Patient has no specific complaints.  Patient has completed all planned cycles of  immunotherapy      REVIEW OF SYSTEMS:    Review of Systems   Constitutional: Negative for chills and fever.   HENT: Negative for ear pain, mouth sores, nosebleeds and sore throat.    Eyes: Negative for photophobia and visual disturbance.   Respiratory: Negative for wheezing and stridor.    Cardiovascular: Negative for chest pain and palpitations.   Gastrointestinal: Negative for abdominal pain, diarrhea, nausea and vomiting.   Endocrine: Negative for cold intolerance and heat intolerance.   Genitourinary: Negative  "for dysuria and hematuria.   Musculoskeletal: Negative for joint swelling and neck stiffness.   Skin: Negative for color change and rash.   Neurological: Negative for seizures and syncope.   Hematological: Negative for adenopathy.   Psychiatric/Behavioral: Negative for agitation, confusion and hallucinations.       ROS as documented above      OBJECTIVE:  Vitals:    09/28/20 1606   BP: 130/89   Pulse: 76   Resp: 18   Temp: 97.8 °F (36.6 °C)   TempSrc: Temporal   Weight: 89.8 kg (198 lb)   Height: 190.5 cm (75\")   PainSc: 0-No pain     ECOG  Eastern Cooperative Oncology Group (ECOG, [place], World Health Organization) performance scale  0 Fully active; no performance restrictions.  1 Strenuous physical activity restricted; fully ambulatory and able to carry out light work.  2 Capable of all self-care but unable to carry out any work activities. Up and about >50% of waking hours.  3 Capable of only limited self-care; confined to bed or chair >50% of waking hours.  4 Completely disabled; cannot carry out any self-care; totally confined to bed or chair.    Physical Exam   Constitutional: He is oriented to person, place, and time. He appears well-developed. No distress.   HENT:   Head: Normocephalic and atraumatic.   Right Ear: External ear normal.   Left Ear: External ear normal.   Nose: Nose normal.   Eyes: Pupils are equal, round, and reactive to light. Conjunctivae are normal. Right eye exhibits no discharge. Left eye exhibits no discharge. No scleral icterus.   Neck: Normal range of motion. Neck supple. No thyromegaly present.   Cardiovascular: Normal rate, regular rhythm and normal heart sounds. Exam reveals no gallop and no friction rub.   Pulmonary/Chest: Effort normal. No stridor. No respiratory distress. He has no wheezes.   Abdominal: Soft. Bowel sounds are normal. He exhibits no mass. There is no abdominal tenderness. There is no rebound and no guarding.   Musculoskeletal: Normal range of motion. No " tenderness.   Lymphadenopathy:     He has no cervical adenopathy.   Neurological: He is alert and oriented to person, place, and time. He exhibits normal muscle tone.   Skin: Skin is warm. No rash noted. He is not diaphoretic. No erythema.   Urticarial-like rash, right medial knee likely due to immunotherapy   Psychiatric: His behavior is normal. Judgment and thought content normal.   Nursing note and vitals reviewed.    I have reexamined the patient and the results are consistent with the previously documented exam. Azucena Livia Gaspar MD     RECENT LABS  WBC   Date Value Ref Range Status   09/28/2020 7.32 3.40 - 10.80 10*3/mm3 Final   07/03/2019 8.99 4.5 - 11.0 10*3/uL Final     RBC   Date Value Ref Range Status   09/28/2020 4.30 4.14 - 5.80 10*6/mm3 Final   07/03/2019 3.24 (L) 4.5 - 5.9 10*6/uL Final     Hemoglobin   Date Value Ref Range Status   09/28/2020 13.6 13.0 - 17.7 g/dL Final   07/03/2019 10.4 (L) 13.5 - 17.5 g/dL Final     Hematocrit   Date Value Ref Range Status   09/28/2020 40.1 37.5 - 51.0 % Final   07/03/2019 32.8 (L) 41.0 - 53.0 % Final     MCV   Date Value Ref Range Status   09/28/2020 93.3 79.0 - 97.0 fL Final   07/03/2019 101.2 (H) 80.0 - 100.0 fL Final     MCH   Date Value Ref Range Status   09/28/2020 31.6 26.6 - 33.0 pg Final   07/03/2019 32.1 26.0 - 34.0 pg Final     MCHC   Date Value Ref Range Status   09/28/2020 33.9 31.5 - 35.7 g/dL Final   07/03/2019 31.7 31.0 - 37.0 g/dL Final     RDW   Date Value Ref Range Status   09/28/2020 13.6 12.3 - 15.4 % Final   07/03/2019 15.2 12.0 - 16.8 % Final     RDW-SD   Date Value Ref Range Status   09/28/2020 45.3 37.0 - 54.0 fl Final     MPV   Date Value Ref Range Status   09/28/2020 9.4 6.0 - 12.0 fL Final   07/03/2019 9.0 6.7 - 10.8 fL Final     Platelets   Date Value Ref Range Status   09/28/2020 153 140 - 450 10*3/mm3 Final   07/03/2019 302 140 - 440 10*3/uL Final     Neutrophil Rel %   Date Value Ref Range Status   07/03/2019 60.0 45 - 80 %  Final     Neutrophil %   Date Value Ref Range Status   09/28/2020 62.4 42.7 - 76.0 % Final     Lymphocyte Rel %   Date Value Ref Range Status   07/03/2019 14.8 (L) 15 - 50 % Final     Lymphocyte %   Date Value Ref Range Status   09/28/2020 17.2 (L) 19.6 - 45.3 % Final     Monocyte Rel %   Date Value Ref Range Status   07/03/2019 12.2 0 - 15 % Final     Monocyte %   Date Value Ref Range Status   09/28/2020 11.2 5.0 - 12.0 % Final     Eosinophil %   Date Value Ref Range Status   09/28/2020 8.1 (H) 0.3 - 6.2 % Final   07/03/2019 10.4 (H) 0 - 7 % Final     Basophil Rel %   Date Value Ref Range Status   07/03/2019 1.7 0 - 2 % Final     Basophil %   Date Value Ref Range Status   09/28/2020 1.1 0.0 - 1.5 % Final     Neutrophils Absolute   Date Value Ref Range Status   07/03/2019 5.39 1.5 - 7.5 /uL Final   07/03/2019 5.39 2.0 - 8.8 10*3/uL Final     Neutrophils, Absolute   Date Value Ref Range Status   09/28/2020 4.57 1.70 - 7.00 10*3/mm3 Final     Lymphocytes Absolute   Date Value Ref Range Status   07/03/2019 1.33 0.7 - 5.5 10*3/uL Final     Lymphocytes, Absolute   Date Value Ref Range Status   09/28/2020 1.26 0.70 - 3.10 10*3/mm3 Final     Monocytes Absolute   Date Value Ref Range Status   07/03/2019 1.10 0.0 - 1.7 10*3/uL Final     Monocytes, Absolute   Date Value Ref Range Status   09/28/2020 0.82 0.10 - 0.90 10*3/mm3 Final     Eosinophils Absolute   Date Value Ref Range Status   07/03/2019 0.93 (H) 0.0 - 0.8 10*3/uL Final     Eosinophils, Absolute   Date Value Ref Range Status   09/28/2020 0.59 (H) 0.00 - 0.40 10*3/mm3 Final     Basophils Absolute   Date Value Ref Range Status   07/03/2019 0.15 0.0 - 0.2 10*3/uL Final     Basophils, Absolute   Date Value Ref Range Status   09/28/2020 0.08 0.00 - 0.20 10*3/mm3 Final     nRBC   Date Value Ref Range Status   02/18/2020 0.0 0.0 - 0.2 /100 WBC Final       Lab Results   Component Value Date    GLUCOSE 125 (H) 08/17/2020    BUN  08/17/2020      Comment:      Testing  performed by alternate method    BUN 14 08/17/2020    CREATININE 1.02 08/17/2020    EGFRIFNONA 76 08/17/2020    EGFRIFAFRI >60 05/20/2019    BCR  08/17/2020      Comment:      Testing not performed    K 3.7 08/17/2020    CO2 26.0 08/17/2020    CALCIUM 8.9 08/17/2020    ALBUMIN 4.10 08/17/2020    LABIL2 1.4 07/03/2019    AST 18 08/17/2020    ALT 14 08/17/2020         Assessment/Plan     Anemia, unspecified type  - CBC & Differential  - CBC & Differential  - CBC Auto Differential      ASSESSMENT:    1. Adenocarcinoma of the right lung, T1c N2 M0, consistent with clinical stage 3A disease.     2. S/P combined chemotherapy and radiation with cisplatin and Alimta.  Ongoing surveillance  3. Status post right lower lobectomy with mediastinal and hilar lymph node dissection with residual disease.  pT1 cN2 M0.  Patient with high risk features including lymphovascular invasion, extranodal capsular extension and persistent multiple lymph node disease.  Ongoing management  4. Consolidation treatment with immunotherapy with Imfinzi: Ongoing management.   5. Satus post right thoracentesis with cytology negative for malignancy  6. Postop anemia: Stable  7. New rash right medial knee likely secondary to immunotherapy: Resolved  8. Monitoring for drug toxicities while on immunotherapy: Ongoing    PLAN:    1. I reviewed his CT scans of the chest abdomen and pelvis done September 25, 2020  2. He has completed all planned cycles of immunotherapy.  He has no evidence of disease relapse at this time  3. Schedule monthly port flushes  4. Continue B12 injections  monthly: Patient did have elevated methylmalonic acid level during the early phases of his treatments.  Continue the same  5. Patient has had his flu vaccination  6. Refered to ENT for evaluation for sinus surgery : Not covered by insurance per pt.  7. Colonoscopy postponed by patient to January.  Encouraged to complete  8. RTC in 4 weeks with NP for survivorship  evaluation  9. Continue supportive care  10. All questions answered to the best of my abilities         Patient has  questions which have all been answered to the best of my abilities    I have reviewed labs results, imaging, vitals, and medications with the patient today. Will follow up in 4 weeks with NP and see me in 3 months      Patient verbalized understanding and is in agreement of the above plan.

## 2020-09-28 ENCOUNTER — HOSPITAL ENCOUNTER (OUTPATIENT)
Dept: ONCOLOGY | Facility: HOSPITAL | Age: 55
Setting detail: INFUSION SERIES
Discharge: HOME | End: 2020-09-28

## 2020-09-28 ENCOUNTER — OFFICE VISIT (OUTPATIENT)
Dept: ONCOLOGY | Facility: CLINIC | Age: 55
End: 2020-09-28

## 2020-09-28 ENCOUNTER — LAB (OUTPATIENT)
Dept: LAB | Facility: HOSPITAL | Age: 55
End: 2020-09-28

## 2020-09-28 VITALS
TEMPERATURE: 97.8 F | HEIGHT: 75 IN | RESPIRATION RATE: 18 BRPM | HEART RATE: 76 BPM | SYSTOLIC BLOOD PRESSURE: 130 MMHG | WEIGHT: 198 LBS | BODY MASS INDEX: 24.62 KG/M2 | DIASTOLIC BLOOD PRESSURE: 89 MMHG

## 2020-09-28 DIAGNOSIS — Z45.2 ENCOUNTER FOR CARE RELATED TO VASCULAR ACCESS PORT: Primary | ICD-10-CM

## 2020-09-28 DIAGNOSIS — D64.9 ANEMIA, UNSPECIFIED TYPE: ICD-10-CM

## 2020-09-28 DIAGNOSIS — E53.8 B12 DEFICIENCY: Primary | ICD-10-CM

## 2020-09-28 DIAGNOSIS — D64.9 ANEMIA, UNSPECIFIED TYPE: Primary | ICD-10-CM

## 2020-09-28 LAB
BASOPHILS # BLD AUTO: 0.08 10*3/MM3 (ref 0–0.2)
BASOPHILS NFR BLD AUTO: 1.1 % (ref 0–1.5)
DEPRECATED RDW RBC AUTO: 45.3 FL (ref 37–54)
EOSINOPHIL # BLD AUTO: 0.59 10*3/MM3 (ref 0–0.4)
EOSINOPHIL NFR BLD AUTO: 8.1 % (ref 0.3–6.2)
ERYTHROCYTE [DISTWIDTH] IN BLOOD BY AUTOMATED COUNT: 13.6 % (ref 12.3–15.4)
HCT VFR BLD AUTO: 40.1 % (ref 37.5–51)
HGB BLD-MCNC: 13.6 G/DL (ref 13–17.7)
LYMPHOCYTES # BLD AUTO: 1.26 10*3/MM3 (ref 0.7–3.1)
LYMPHOCYTES NFR BLD AUTO: 17.2 % (ref 19.6–45.3)
MCH RBC QN AUTO: 31.6 PG (ref 26.6–33)
MCHC RBC AUTO-ENTMCNC: 33.9 G/DL (ref 31.5–35.7)
MCV RBC AUTO: 93.3 FL (ref 79–97)
MONOCYTES # BLD AUTO: 0.82 10*3/MM3 (ref 0.1–0.9)
MONOCYTES NFR BLD AUTO: 11.2 % (ref 5–12)
NEUTROPHILS NFR BLD AUTO: 4.57 10*3/MM3 (ref 1.7–7)
NEUTROPHILS NFR BLD AUTO: 62.4 % (ref 42.7–76)
PLATELET # BLD AUTO: 153 10*3/MM3 (ref 140–450)
PMV BLD AUTO: 9.4 FL (ref 6–12)
RBC # BLD AUTO: 4.3 10*6/MM3 (ref 4.14–5.8)
WBC # BLD AUTO: 7.32 10*3/MM3 (ref 3.4–10.8)

## 2020-09-28 PROCEDURE — 85025 COMPLETE CBC W/AUTO DIFF WBC: CPT | Performed by: INTERNAL MEDICINE

## 2020-09-28 PROCEDURE — 25010000003 HEPARIN LOCK FLUSH PER 10 UNITS: Performed by: INTERNAL MEDICINE

## 2020-09-28 PROCEDURE — 96372 THER/PROPH/DIAG INJ SC/IM: CPT

## 2020-09-28 PROCEDURE — 25010000002 CYANOCOBALAMIN PER 1000 MCG: Performed by: INTERNAL MEDICINE

## 2020-09-28 PROCEDURE — 99214 OFFICE O/P EST MOD 30 MIN: CPT | Performed by: INTERNAL MEDICINE

## 2020-09-28 RX ORDER — SODIUM CHLORIDE 0.9 % (FLUSH) 0.9 %
10 SYRINGE (ML) INJECTION AS NEEDED
Status: CANCELLED | OUTPATIENT
Start: 2020-09-28

## 2020-09-28 RX ORDER — HEPARIN SODIUM (PORCINE) LOCK FLUSH IV SOLN 100 UNIT/ML 100 UNIT/ML
500 SOLUTION INTRAVENOUS AS NEEDED
Status: CANCELLED | OUTPATIENT
Start: 2020-09-28

## 2020-09-28 RX ORDER — CYANOCOBALAMIN 1000 UG/ML
1000 INJECTION, SOLUTION INTRAMUSCULAR; SUBCUTANEOUS ONCE
Status: COMPLETED | OUTPATIENT
Start: 2020-09-28 | End: 2020-09-28

## 2020-09-28 RX ORDER — HEPARIN SODIUM (PORCINE) LOCK FLUSH IV SOLN 100 UNIT/ML 100 UNIT/ML
500 SOLUTION INTRAVENOUS AS NEEDED
Status: DISCONTINUED | OUTPATIENT
Start: 2020-09-28 | End: 2020-09-29 | Stop reason: HOSPADM

## 2020-09-28 RX ORDER — SODIUM CHLORIDE 0.9 % (FLUSH) 0.9 %
10 SYRINGE (ML) INJECTION AS NEEDED
Status: DISCONTINUED | OUTPATIENT
Start: 2020-09-28 | End: 2020-09-29 | Stop reason: HOSPADM

## 2020-09-28 RX ADMIN — CYANOCOBALAMIN 1000 MCG: 1000 INJECTION INTRAMUSCULAR; SUBCUTANEOUS at 17:09

## 2020-09-28 RX ADMIN — HEPARIN 500 UNITS: 100 SYRINGE at 17:00

## 2020-09-28 RX ADMIN — Medication 30 ML: at 15:59

## 2020-10-26 ENCOUNTER — HOSPITAL ENCOUNTER (OUTPATIENT)
Dept: ONCOLOGY | Facility: HOSPITAL | Age: 55
Setting detail: INFUSION SERIES
Discharge: HOME | End: 2020-10-26

## 2020-10-26 ENCOUNTER — OFFICE VISIT (OUTPATIENT)
Dept: ONCOLOGY | Facility: CLINIC | Age: 55
End: 2020-10-26

## 2020-10-26 VITALS
SYSTOLIC BLOOD PRESSURE: 147 MMHG | DIASTOLIC BLOOD PRESSURE: 95 MMHG | HEIGHT: 75 IN | TEMPERATURE: 97.5 F | HEART RATE: 80 BPM | BODY MASS INDEX: 25.07 KG/M2 | WEIGHT: 201.6 LBS

## 2020-10-26 DIAGNOSIS — C34.91 NON-SMALL CELL CARCINOMA OF LUNG, RIGHT (HCC): Primary | ICD-10-CM

## 2020-10-26 DIAGNOSIS — Z45.2 ENCOUNTER FOR CARE RELATED TO VASCULAR ACCESS PORT: Primary | ICD-10-CM

## 2020-10-26 DIAGNOSIS — D64.9 ANEMIA, UNSPECIFIED TYPE: Primary | ICD-10-CM

## 2020-10-26 LAB
BASOPHILS # BLD AUTO: 0.07 10*3/MM3 (ref 0–0.2)
BASOPHILS NFR BLD AUTO: 1.2 % (ref 0–1.5)
DEPRECATED RDW RBC AUTO: 47.1 FL (ref 37–54)
EOSINOPHIL # BLD AUTO: 0.39 10*3/MM3 (ref 0–0.4)
EOSINOPHIL NFR BLD AUTO: 6.5 % (ref 0.3–6.2)
ERYTHROCYTE [DISTWIDTH] IN BLOOD BY AUTOMATED COUNT: 13.8 % (ref 12.3–15.4)
HCT VFR BLD AUTO: 38.6 % (ref 37.5–51)
HGB BLD-MCNC: 12.9 G/DL (ref 13–17.7)
LYMPHOCYTES # BLD AUTO: 1.05 10*3/MM3 (ref 0.7–3.1)
LYMPHOCYTES NFR BLD AUTO: 17.6 % (ref 19.6–45.3)
MCH RBC QN AUTO: 32 PG (ref 26.6–33)
MCHC RBC AUTO-ENTMCNC: 33.4 G/DL (ref 31.5–35.7)
MCV RBC AUTO: 95.8 FL (ref 79–97)
MONOCYTES # BLD AUTO: 0.5 10*3/MM3 (ref 0.1–0.9)
MONOCYTES NFR BLD AUTO: 8.4 % (ref 5–12)
NEUTROPHILS NFR BLD AUTO: 3.95 10*3/MM3 (ref 1.7–7)
NEUTROPHILS NFR BLD AUTO: 66.3 % (ref 42.7–76)
PLATELET # BLD AUTO: 252 10*3/MM3 (ref 140–450)
PMV BLD AUTO: 9 FL (ref 6–12)
RBC # BLD AUTO: 4.03 10*6/MM3 (ref 4.14–5.8)
WBC # BLD AUTO: 5.96 10*3/MM3 (ref 3.4–10.8)

## 2020-10-26 PROCEDURE — 25010000003 HEPARIN LOCK FLUSH PER 10 UNITS: Performed by: INTERNAL MEDICINE

## 2020-10-26 PROCEDURE — 85025 COMPLETE CBC W/AUTO DIFF WBC: CPT | Performed by: NURSE PRACTITIONER

## 2020-10-26 PROCEDURE — G0463 HOSPITAL OUTPT CLINIC VISIT: HCPCS

## 2020-10-26 PROCEDURE — 99215 OFFICE O/P EST HI 40 MIN: CPT | Performed by: NURSE PRACTITIONER

## 2020-10-26 RX ORDER — SODIUM CHLORIDE 0.9 % (FLUSH) 0.9 %
10 SYRINGE (ML) INJECTION AS NEEDED
Status: CANCELLED | OUTPATIENT
Start: 2020-10-26

## 2020-10-26 RX ORDER — SODIUM CHLORIDE 0.9 % (FLUSH) 0.9 %
10 SYRINGE (ML) INJECTION AS NEEDED
Status: DISCONTINUED | OUTPATIENT
Start: 2020-10-26 | End: 2020-10-27 | Stop reason: HOSPADM

## 2020-10-26 RX ORDER — HEPARIN SODIUM (PORCINE) LOCK FLUSH IV SOLN 100 UNIT/ML 100 UNIT/ML
500 SOLUTION INTRAVENOUS AS NEEDED
Status: CANCELLED | OUTPATIENT
Start: 2020-10-26

## 2020-10-26 RX ORDER — HEPARIN SODIUM (PORCINE) LOCK FLUSH IV SOLN 100 UNIT/ML 100 UNIT/ML
500 SOLUTION INTRAVENOUS AS NEEDED
Status: DISCONTINUED | OUTPATIENT
Start: 2020-10-26 | End: 2020-10-27 | Stop reason: HOSPADM

## 2020-10-26 RX ORDER — AZITHROMYCIN 250 MG/1
TABLET, FILM COATED ORAL
Qty: 6 TABLET | Refills: 0 | Status: SHIPPED | OUTPATIENT
Start: 2020-10-29 | End: 2021-01-25

## 2020-10-26 RX ADMIN — Medication 30 ML: at 11:40

## 2020-10-26 RX ADMIN — HEPARIN 500 UNITS: 100 SYRINGE at 12:57

## 2020-10-26 NOTE — PROGRESS NOTES
Patient reported after N.P. visit that he is to return in 3 months. After patient left noted he had been getting B12 injections monthly Ask Mis MEJIA. if she still wanted patient to receive B12 injections and she wants patient to return next week for monthly B12 injection. Will contact patient.

## 2020-10-26 NOTE — PROGRESS NOTES
SURVIVORSHIP OFFICE VISIT    PATIENT NAME:Jc Driscoll  :1965  MRN: [id number]    Chief Complaint   Patient presents with   • Appointment     Non-small cell carcinoma of the lung survivorship appointment     SUBJECTIVE:  Jc Driscoll is a 55 y.o. male being followed by *Azucena Gaspar MD   for lung cancer. The patient is here today in our Cancer Survivorship Clinic, to review his survivorship care plan.  Patient reports he is doing well.  He denied negative effects following chemotherapy treatment.  His only complaint today is fatigue.      REVIEW OF SYSTEMS:  Review of Systems   Constitutional: Positive for fatigue. Negative for chills, fever and unexpected weight change.   HENT: Positive for congestion (Taking Mucinex every 12 hours). Negative for mouth sores.    Eyes: Negative for photophobia and visual disturbance.   Respiratory: Negative for shortness of breath.    Cardiovascular: Negative for chest pain.   Gastrointestinal: Negative for diarrhea, nausea and vomiting.   Endocrine: Negative for cold intolerance and heat intolerance.   Musculoskeletal: Negative for arthralgias and myalgias.   Skin: Positive for rash (Mild dry, itchy rash to legs).   Neurological: Negative for dizziness and weakness.   Psychiatric/Behavioral: Negative for confusion. The patient is not nervous/anxious.      Past Medical History:   Diagnosis Date   • Allergic rhinitis 2013    Overview:  2018 - still zyrtec 10 daily (if stops, gets dermatitis again).    • Anxiety and depression 2012    Overview:  2018 -   C/w well 300 xr and Lito 20.  2019 -   Doing ok even with new lung cancer - lito 20 & well 300xr.    • Chronic pansinusitis 2019    Overview:  2019 -   MRI showed chronic thick in frontal, maxillary, ethmoidal ---> to Dr. COLLAZO to est since abx ICC didn't help.  Does netipot bid.    • Diastolic CHF (CMS/HCC) 2014    Overview:  14 - impaired LV relaxation on Zhou ECHO.  EF 60%. Rest  normal   • Dupuytren's contracture of both hands    • Elevated cholesterol    • Erosive esophagitis 7/9/2019    Overview:  EGD 2016 4/2/2018 - nexium OTC daily for few week.  Qod before that.  Now carbonation hurts, epigastric pain 4/8/2019 -   protonix 40 doing well.    • Gastroesophageal reflux disease 2/21/2019   • Hiatal hernia 7/9/2019   • History of colon polyps    • Hypertension    • Lung cancer (CMS/HCC)     right lung and in lymph nodes x2   • Mediastinal lymphadenopathy 3/12/2019   • Normocytic anemia 8/8/2019    Overview:  6/2019 - dropped to 9's postop 7/2019 - rebounded to 10's.  8/8/2019 -   Back to 9's - check ferritin, b12 and thyroid.    • Prediabetes 7/9/2014    Overview:  7/4/14 - a1c 6.1 at [place] admission   • Retention of urine 6/27/2019       Past Surgical History:   Procedure Laterality Date   • BRONCHOSCOPY  03/18/2019    EBUS MONTERO NEEDLE BX   • COLONOSCOPY     • DUPUYTREN CONTRACTURE RELEASE Bilateral    • LUNG BIOPSY  03/08/2019    CT GUIDED   • LUNG REMOVAL, PARTIAL Right     right lower   • PORTACATH PLACEMENT  03/20/2019    DR LONGORIA   • THORACOSCOPY Right 6/26/2019    Procedure: RIGHT VATS, OPEN LOWER LOBECTOMY WITH LYMPH NODE DISECTION, WEDGE RESECTION OF RIGHT MIDDLE LOBE;  Surgeon: Terrance Longoria MD;  Location: [facility];  Service: Cardiothoracic         Current Outpatient Medications:   •  amLODIPine (NORVASC) 10 MG tablet, Take 10 mg by mouth Daily., Disp: , Rfl:   •  buPROPion SR (WELLBUTRIN SR) 150 MG 12 hr tablet, Take 300 mg by mouth 2 (Two) Times a Day., Disp: , Rfl:   •  cetirizine (zyrTEC) 10 MG tablet, Take 10 mg by mouth Daily., Disp: , Rfl:   •  desvenlafaxine (PRISTIQ) 50 MG 24 hr tablet, TAKE 1 TABLET BY MOUTH ONCE DAILY FOR 30 DAYS, Disp: , Rfl:   •  HYDROcodone-acetaminophen (NORCO) 5-325 MG per tablet, Take 1 tablet by mouth As Needed., Disp: , Rfl:   •  HYDROcodone-acetaminophen (NORCO) 5-325 MG per tablet, Take 1 tablet by mouth Every 6 (Six) Hours As  Needed for Moderate Pain ., Disp: 20 tablet, Rfl: 0  •  ibuprofen (ADVIL,MOTRIN) 600 MG tablet, Take 1 tablet by mouth Every 6 (Six) Hours As Needed for Mild Pain ., Disp: , Rfl:   •  LORazepam (ATIVAN) 0.5 MG tablet, TAKE 1 TABLET BY MOUTH TWICE DAILY AS NEEDED FOR ANXIETY MAX OF 2 DAY, Disp: , Rfl: 0  •  metoprolol succinate XL (TOPROL-XL) 100 MG 24 hr tablet, Take 100 mg by mouth Daily., Disp: , Rfl:   •  pantoprazole (PROTONIX) 40 MG EC tablet, Take 40 mg by mouth Daily., Disp: , Rfl:   •  pravastatin (PRAVACHOL) 10 MG tablet, Take 10 mg by mouth Daily., Disp: , Rfl:   •  acetaminophen (TYLENOL) 325 MG tablet, Take 2 tablets by mouth Every 4 (Four) Hours As Needed for Mild Pain ., Disp: , Rfl:   •  azithromycin (ZITHROMAX) 250 MG tablet, Take 2 tablets once on day 1, then 1 tablet daily x 4 days if symptoms persist, Disp: 6 tablet, Rfl: 0  •  lidocaine-prilocaine (EMLA) 2.5-2.5 % cream, , Disp: , Rfl:   •  ondansetron (ZOFRAN) 8 MG tablet, TAKE 1 TABLET BY MOUTH EVERY 8 HOURS AS NEEDED NAUSEA, Disp: , Rfl: 3    No Known Allergies    Family History   Problem Relation Age of Onset   • Cancer Sister    • Cancer Mother    • Lung cancer Father        Cancer-related family history includes Cancer in his mother and sister; Lung cancer in his father.    Social History     Tobacco Use   • Smoking status: Former Smoker     Types: Cigarettes     Quit date: 2009     Years since quittin.1   • Smokeless tobacco: Never Used   Substance Use Topics   • Alcohol use: Yes     Alcohol/week: 2.0 standard drinks     Types: 2 Cans of beer per week     Frequency: Monthly or less     Drinks per session: 1 or 2     Comment: Occasional   • Drug use: No     HPI, ROS and PFSH have been reviewed and confirmed on 10/26/2020.      SURVIVORSHIP TREATMENT SUMMARY:     HEALTHCARE PROVIDERS  MEDICAL ONCOLOGIST: Azucena Gaspar MD  ([facility], [place], [place]) phone: [phone]  SURGEON:   RADIATION ONCOLOGIST:    REFERRING PHYSICIAN: Reshma Alvarenga MD  OTHER PROVIDERS:  PRIMARY CARE PHYSICIAN: Reshma Alvarenga MD    BACKGROUND INFORMATION  Oncology/Hematology History   Cancer of lower lobe of right lung (CMS/HCC)   6/26/2019 Initial Diagnosis    Cancer of lower lobe of right lung (CMS/HCC)     9/9/2019 -  Chemotherapy    OP LUNG Durvalumab     Non-small cell carcinoma of lung, right (CMS/HCC)   4/8/2019 Initial Diagnosis    Non-small cell carcinoma of lung, right (CMS/HCC)     4/17/2019 - 8/20/2019 Chemotherapy    Cisplatin and Alimta       9/9/2019 -  Chemotherapy    OP LUNG Durvalumab      Radiation    Radiation OncologyTreatment Course:  Jc Driscoll received  cGy in 25 fractions to right lung via External Beam Radiation - EBRT.      Surgery    Surgery       Procedure: RIGHT VATS, OPEN LOWER LOBECTOMY WITH LYMPH NODE DISECTION, WEDGE RESECTION OF RIGHT MIDDLE LOBE       Completeness of resection:  No evidence of residual tumor           Allergies as of 10/26/2020   • (No Known Allergies)       Diagnosis:   Cancer Type/Location/Histology Subtype:     Diagnosis date:  03/2019  Stage I []    II    []        III    [x]          IV  []        Not applicable []       TREATMENT  Surgery  Yes [x]   No   []        Surgery date(s):  06/26/2019    Surgical procedure/location on body/findings: right lung    Radiation:     Yes   [x]   No   []   Body area treated: Right lung     End date: 5/21/2019    Systemic Therapy (chemotherapy, immunotherapy, hormonal therapy, other)     Yes   [x]   No []  Names of Agents Used:   · Cisplatin and Alimta: End date: 2019  · Durvalumab- End date: 08/17/2020         End date: 08/2020    Persistent symptoms of side effects at completion of treatment    Yes  []    No [x]    POTENTIAL SIDE EFFECTS OF ABOVE TREATMENT   - Peripheral neuropathy (numbness and tingling in your fingers or toes)  - Fatigue  - Lymphedema (swelling in your arms)  - Emotional changes (anxiety, depression, worry)  -  Memory/concentration problems  - Skin and nail changes  - Cardiac changes  - Pain/tenderness at surgical site    GENETICS:  Familial Cancer Risk Assessment   Genetic/hereditary risk factor(s) or predisposing conditions    Genetic Counseling:    Yes   []   No  [x]    Genetic testing results:     FOLLOW UP CARE PLAN  Need for ongoing (adjuvant) treatment for cancer Yes      No   Additional treatment name Start date  Planned duration  Possible side effects                        Schedule of clinic visits   · Coordinating provider: Dr. Azucena Gaspar     · When/How often: Plan to follow-up with oncologist, Dr.Ifeoma Gaspar every 3 to 6 months for the first 3 years, then every 6 months for 2 years, then annually    SURVEILLANCE FOR RECURRENCE  Cancer surveillance tests for recurrence or other recommended related tests   · Coordinating provider: Dr. Azucena Gaspar  · What/When/How often: Plan for CT of the chest with or without contrast every 3 to 6 months for 3 years, then chest CT with and without contrast every 6 months for 2 years, then low-dose noncontrast enhanced chest CT annually    GENERAL HEALTH FOLLOW UP   The patient was encouraged to maintain a therapeutic relationship with a primary care physician throughout his lifetime. The patient was instructed to continue all standard non-cancer related health care with his primary care provider, Reshma Alvarenga MD. The patient will see their PCP for all general health care recommended for a person their age, including routine cancer screening testing.The patient was advised to discuss with his primary care physician about when and how frequently to have routine screenings done for other types of cancer.  Additional health monitoring and routine immunizations should be provided under the care of a primary care physician.      RECOMMENDED LIFESTYLE MODIFICATIONS    Discussed NCCN recommendations for all cancer survivors of:   • Engage in 150 minutes/week moderate  intensity exercise  • Achieve and maintain a healthy BMI  • Eat a healthy plant-based whole-food diet  • Avoid tobacco and second hand smoke  • Avoid alcohol or minimize alcohol intake - no more than 1 drink in a day for women, 2 drinks in a day for men  • Use sunscreen of at least SPF 30, wear hats and sunglasses   • Have routine colonoscopies and prostate or gynecological examinations     SIGNS/SYMPTOMS OF DISEASE RECURRENCE  Any new, persistent or concerning symptoms should be brought to the attention of his provider:  • Anything that represents a brand-new symptom  • Anything that represents a persistent symptom  • Anything the patient is worried about that might be related to the cancer coming back    Cancer survivors may experience issues with the following:   Emotional changes Memory loss  Health insurance  Work   Mental health  Reduced concentration  Parenting Hearing loss    Smoking cessation  Financial advice/assistance Sexual functioning Other   Weight changes  School  Insurance  Other    Physical functioning Fatigue  Fertility  Other   The patient was counseled to speak with his doctors or nurses to find out how to get help with the above issues.     RESOURCES  Below is a list of resources that he may find helpful when it comes to the above listed topics. The patient can also talk to a member of his healthcare team about any questions or concerns he may have so that we can help.      *[employer]: http://www.[place].org/  *American Cancer Society: https://www.cancer.org/health-care-professionals/national-cancer-survivorship-resource-center/tools-for-cancer-survivors-and-caregivers.html  *Smokefree.gov: https://smokefree.gov/  *My[employer]: https://www.choosemyplate.gov/  *[employer]:https://www.livestrong.org/what-we-do/program/livestrong-at-the-ymca  *ASCO: https://www.cancer.net/survivorship    A number of lifestyle/behaviors can affect ongoing health, including the risk for cancer coming back or  "developing another cancer.  The patient was instructed to discuss these recommendations with his doctor or nurse:   Tobacco use/cessation  Diet  Physical activity    Alcohol use  Sunscreen use  Weight management (gain/loss)     After a review of the Survivorship Treatment Summary & Care Plan, the patient verbalized understanding of recommendations for follow-up. As outlined in the care plan, he was advised to continue with follow-up care in accordance with the NCCN surveillance guidelines while transitioning back to their primary care physician for continued general preventive and healthcare needs. We discussed the importance of healthy diet, exercise, smoking cessation and alcohol use reduction. We reviewed current guidelines for routine screening of other cancers.     A copy of the Survivorship Treatment Summary & Care Plan for Mr. Driscoll (see below) was provided to and forwarded to the providers identified on the care team.    Advance Care Planning   ADVANCE CARE PLANNING DISCUSSION: Will follow-up as needed with        SURVIVORSHIP DISCUSSION:   Primary patient goal(s): late and long-term effects of dz/tx, options for recurrent dz management and wellness     Management of disease and treatment related effects: Establishing a healthy lifestyle including a nutritious diet, staying physically active, and keeping up-to-date on cancer screenings.    Psychosocial and spiritual:   · Distress score: 0  · GAD7: 1  · PHQ9: 3    OBJECTIVE:    Vitals:    10/26/20 1201   BP: 147/95   Pulse: 80   Temp: 97.5 °F (36.4 °C)   Weight: 91.4 kg (201 lb 9.6 oz)   Height: 190.5 cm (75\")   PainSc: 0-No pain     Wt Readings from Last 3 Encounters:   10/26/20 91.4 kg (201 lb 9.6 oz)   09/28/20 89.8 kg (198 lb)   08/17/20 88.6 kg (195 lb 6.4 oz)     Pain Score    10/26/20 1201   PainSc: 0-No pain     Body mass index is 25.2 kg/m².    ECOG  (0) Fully active, able to carry on all predisease performance without " restriction    Physical Exam  Constitutional:       General: He is not in acute distress.     Appearance: Normal appearance. He is not ill-appearing, toxic-appearing or diaphoretic.   HENT:      Head: Normocephalic and atraumatic.   Eyes:      General: No scleral icterus.        Right eye: No discharge.         Left eye: No discharge.      Extraocular Movements: Extraocular movements intact.   Pulmonary:      Effort: Pulmonary effort is normal. No respiratory distress.   Musculoskeletal: Normal range of motion.   Skin:     Comments: 2 cm dry, erythematous rash to left leg   Neurological:      General: No focal deficit present.      Mental Status: He is alert and oriented to person, place, and time.      Motor: No weakness.      Gait: Gait normal.   Psychiatric:         Mood and Affect: Mood normal. Mood is not anxious or depressed. Affect is not flat or tearful.         Behavior: Behavior normal.       RECENT LABS  WBC   Date Value Ref Range Status   10/26/2020 5.96 3.40 - 10.80 10*3/mm3 Final   07/03/2019 8.99 4.5 - 11.0 10*3/uL Final     RBC   Date Value Ref Range Status   10/26/2020 4.03 (L) 4.14 - 5.80 10*6/mm3 Final   07/03/2019 3.24 (L) 4.5 - 5.9 10*6/uL Final     Hemoglobin   Date Value Ref Range Status   10/26/2020 12.9 (L) 13.0 - 17.7 g/dL Final   07/03/2019 10.4 (L) 13.5 - 17.5 g/dL Final     Hematocrit   Date Value Ref Range Status   10/26/2020 38.6 37.5 - 51.0 % Final   07/03/2019 32.8 (L) 41.0 - 53.0 % Final     MCV   Date Value Ref Range Status   10/26/2020 95.8 79.0 - 97.0 fL Final   07/03/2019 101.2 (H) 80.0 - 100.0 fL Final     MCH   Date Value Ref Range Status   10/26/2020 32.0 26.6 - 33.0 pg Final   07/03/2019 32.1 26.0 - 34.0 pg Final     MCHC   Date Value Ref Range Status   10/26/2020 33.4 31.5 - 35.7 g/dL Final   07/03/2019 31.7 31.0 - 37.0 g/dL Final     RDW   Date Value Ref Range Status   10/26/2020 13.8 12.3 - 15.4 % Final   07/03/2019 15.2 12.0 - 16.8 % Final     RDW-SD   Date Value Ref  Range Status   10/26/2020 47.1 37.0 - 54.0 fl Final     MPV   Date Value Ref Range Status   10/26/2020 9.0 6.0 - 12.0 fL Final   07/03/2019 9.0 6.7 - 10.8 fL Final     Platelets   Date Value Ref Range Status   10/26/2020 252 140 - 450 10*3/mm3 Final   07/03/2019 302 140 - 440 10*3/uL Final     Neutrophil Rel %   Date Value Ref Range Status   07/03/2019 60.0 45 - 80 % Final     Neutrophil %   Date Value Ref Range Status   10/26/2020 66.3 42.7 - 76.0 % Final     Lymphocyte Rel %   Date Value Ref Range Status   07/03/2019 14.8 (L) 15 - 50 % Final     Lymphocyte %   Date Value Ref Range Status   10/26/2020 17.6 (L) 19.6 - 45.3 % Final     Monocyte Rel %   Date Value Ref Range Status   07/03/2019 12.2 0 - 15 % Final     Monocyte %   Date Value Ref Range Status   10/26/2020 8.4 5.0 - 12.0 % Final     Eosinophil %   Date Value Ref Range Status   10/26/2020 6.5 (H) 0.3 - 6.2 % Final   07/03/2019 10.4 (H) 0 - 7 % Final     Basophil Rel %   Date Value Ref Range Status   07/03/2019 1.7 0 - 2 % Final     Basophil %   Date Value Ref Range Status   10/26/2020 1.2 0.0 - 1.5 % Final     Neutrophils Absolute   Date Value Ref Range Status   07/03/2019 5.39 1.5 - 7.5 /uL Final   07/03/2019 5.39 2.0 - 8.8 10*3/uL Final     Neutrophils, Absolute   Date Value Ref Range Status   10/26/2020 3.95 1.70 - 7.00 10*3/mm3 Final     Lymphocytes Absolute   Date Value Ref Range Status   07/03/2019 1.33 0.7 - 5.5 10*3/uL Final     Lymphocytes, Absolute   Date Value Ref Range Status   10/26/2020 1.05 0.70 - 3.10 10*3/mm3 Final     Monocytes Absolute   Date Value Ref Range Status   07/03/2019 1.10 0.0 - 1.7 10*3/uL Final     Monocytes, Absolute   Date Value Ref Range Status   10/26/2020 0.50 0.10 - 0.90 10*3/mm3 Final     Eosinophils Absolute   Date Value Ref Range Status   07/03/2019 0.93 (H) 0.0 - 0.8 10*3/uL Final     Eosinophils, Absolute   Date Value Ref Range Status   10/26/2020 0.39 0.00 - 0.40 10*3/mm3 Final     Basophils Absolute   Date Value  Ref Range Status   07/03/2019 0.15 0.0 - 0.2 10*3/uL Final     Basophils, Absolute   Date Value Ref Range Status   10/26/2020 0.07 0.00 - 0.20 10*3/mm3 Final     nRBC   Date Value Ref Range Status   02/18/2020 0.0 0.0 - 0.2 /100 WBC Final       Lab Results   Component Value Date    GLUCOSE 125 (H) 08/17/2020    BUN  08/17/2020      Comment:      Testing performed by alternate method    BUN 14 08/17/2020    CREATININE 1.02 08/17/2020    EGFRIFNONA 76 08/17/2020    EGFRIFAFRI >60 05/20/2019    BCR  08/17/2020      Comment:      Testing not performed    K 3.7 08/17/2020    CO2 26.0 08/17/2020    CALCIUM 8.9 08/17/2020    ALBUMIN 4.10 08/17/2020    LABIL2 1.4 07/03/2019    AST 18 08/17/2020    ALT 14 08/17/2020     Assessment/Plan     Non-small cell carcinoma of lung, right (CMS/HCC)  - CT Chest With Contrast  - CT Abdomen Pelvis With Contrast  - CBC & Differential  - CBC Auto Differential    ASSESSMENT:  1. Encounter for survivorship visit   2. Adenocarcinoma of the right lung: S/p combined chemotherapy and radiation with cisplatin and Alimta and durvalumab  3. Rash to left leg    PLAN:  1. Return to clinic in 3 months for follow-up with Dr. Gaspar   2. Ordered CT chest, abdomen, and pelvis in 3 months for surveillance  3. E-scribed Azithromycin 2 tablets once on day 1, then 1 tablet daily x 4 days if congestion persists past 1 week  4. Educated patient to use hydrocortisone cream OTC for rash to left leg  5. Call for temperature of 100.4° F or chills  6. Follow-up with    7. Plans per NCCN guidelines: CT chest every 3-6 months for 3 years, then every 6 months for 2 years, then LDCT annually         Orders Placed This Encounter   Procedures   • CT Chest With Contrast     Standing Status:   Future     Standing Expiration Date:   10/26/2021     Scheduling Instructions:      [facility]   • CT Abdomen Pelvis With Contrast     Standing Status:   Future     Standing Expiration Date:   10/26/2021      Scheduling Instructions:      [facility]     Order Specific Question:   Will Oral Contrast be needed for this procedure?     Answer:   Yes   • CBC Auto Differential   • CBC & Differential     Order Specific Question:   Manual Differential     Answer:   No       I have reviewed labs results, imaging, vitals, and medications with the patient today. Will follow up in 3 months with Azucena Gaspar MD      Greater than 40 minutes spent with patient, more than fifty percent of that was spent face-to-face counseling patient extensively on treatment summary, surveillance for recurrence, late and long-term effects of disease and treatment, intimacy and self-image, preventative screening for other cancers, achieving/maintaining healthy BMI and link to risk reduction, adherence to current therapies, management of anxiety/uncertainty and self care strategies.     Electronically signed by MULUGETA Ryder, 10/29/20, 5:26 PM EDT.

## 2020-10-26 NOTE — ADDENDUM NOTE
Encounter addended by: Antionette Cooper LPN on: 10/26/2020 4:39 PM   Actions taken: Clinical Note Signed

## 2020-10-27 ENCOUNTER — HOSPITAL ENCOUNTER (OUTPATIENT)
Dept: ONCOLOGY | Facility: HOSPITAL | Age: 55
Setting detail: INFUSION SERIES
Discharge: HOME | End: 2020-10-27

## 2020-10-27 DIAGNOSIS — E53.8 B12 DEFICIENCY: Primary | ICD-10-CM

## 2020-10-27 PROCEDURE — 25010000002 CYANOCOBALAMIN PER 1000 MCG: Performed by: INTERNAL MEDICINE

## 2020-10-27 PROCEDURE — 96372 THER/PROPH/DIAG INJ SC/IM: CPT

## 2020-10-27 RX ORDER — CYANOCOBALAMIN 1000 UG/ML
1000 INJECTION, SOLUTION INTRAMUSCULAR; SUBCUTANEOUS ONCE
Status: COMPLETED | OUTPATIENT
Start: 2020-10-27 | End: 2020-10-27

## 2020-10-27 RX ADMIN — CYANOCOBALAMIN 1000 MCG: 1000 INJECTION INTRAMUSCULAR; SUBCUTANEOUS at 14:35

## 2020-11-18 ENCOUNTER — TELEPHONE (OUTPATIENT)
Dept: ONCOLOGY | Facility: CLINIC | Age: 55
End: 2020-11-18

## 2020-11-18 NOTE — TELEPHONE ENCOUNTER
PATIENT'S WIFE VERN (ON  [address]) CALLING, WANTS TO SPEAK WITH [employer] , PLEASE ADVISE?    CALL BACK # [phone]

## 2020-11-30 ENCOUNTER — HOSPITAL ENCOUNTER (OUTPATIENT)
Dept: ONCOLOGY | Facility: HOSPITAL | Age: 55
Setting detail: INFUSION SERIES
Discharge: HOME | End: 2020-11-30

## 2020-11-30 VITALS — WEIGHT: 210 LBS | BODY MASS INDEX: 26.11 KG/M2 | HEIGHT: 75 IN

## 2020-11-30 DIAGNOSIS — Z45.2 ENCOUNTER FOR CARE RELATED TO VASCULAR ACCESS PORT: ICD-10-CM

## 2020-11-30 DIAGNOSIS — E53.8 B12 DEFICIENCY: Primary | ICD-10-CM

## 2020-11-30 PROCEDURE — 96372 THER/PROPH/DIAG INJ SC/IM: CPT

## 2020-11-30 PROCEDURE — 25010000002 CYANOCOBALAMIN PER 1000 MCG: Performed by: INTERNAL MEDICINE

## 2020-11-30 PROCEDURE — 25010000003 HEPARIN LOCK FLUSH PER 10 UNITS: Performed by: INTERNAL MEDICINE

## 2020-11-30 RX ORDER — SODIUM CHLORIDE 0.9 % (FLUSH) 0.9 %
10 SYRINGE (ML) INJECTION AS NEEDED
Status: CANCELLED | OUTPATIENT
Start: 2020-11-30

## 2020-11-30 RX ORDER — SODIUM CHLORIDE 0.9 % (FLUSH) 0.9 %
10 SYRINGE (ML) INJECTION AS NEEDED
Status: DISCONTINUED | OUTPATIENT
Start: 2020-11-30 | End: 2020-12-01 | Stop reason: HOSPADM

## 2020-11-30 RX ORDER — HEPARIN SODIUM (PORCINE) LOCK FLUSH IV SOLN 100 UNIT/ML 100 UNIT/ML
500 SOLUTION INTRAVENOUS AS NEEDED
Status: CANCELLED | OUTPATIENT
Start: 2020-11-30

## 2020-11-30 RX ORDER — CYANOCOBALAMIN 1000 UG/ML
1000 INJECTION, SOLUTION INTRAMUSCULAR; SUBCUTANEOUS ONCE
Status: COMPLETED | OUTPATIENT
Start: 2020-11-30 | End: 2020-11-30

## 2020-11-30 RX ORDER — HEPARIN SODIUM (PORCINE) LOCK FLUSH IV SOLN 100 UNIT/ML 100 UNIT/ML
500 SOLUTION INTRAVENOUS AS NEEDED
Status: DISCONTINUED | OUTPATIENT
Start: 2020-11-30 | End: 2020-12-01 | Stop reason: HOSPADM

## 2020-11-30 RX ADMIN — Medication 30 ML: at 11:18

## 2020-11-30 RX ADMIN — HEPARIN 500 UNITS: 100 SYRINGE at 11:20

## 2020-11-30 RX ADMIN — CYANOCOBALAMIN 1000 MCG: 1000 INJECTION INTRAMUSCULAR; SUBCUTANEOUS at 11:23

## 2020-12-04 ENCOUNTER — TELEPHONE (OUTPATIENT)
Dept: ONCOLOGY | Facility: CLINIC | Age: 55
End: 2020-12-04

## 2020-12-04 NOTE — TELEPHONE ENCOUNTER
Per Franko Jacobson, CT scans scheduled for Monday 1- at 10:00 with arrival time of 8:30AM. Orders and records faxed. Attempted to contact patient but LMV informing him of appt date and time. Instructed him to call me if there were any questions or concerns.

## 2020-12-28 ENCOUNTER — HOSPITAL ENCOUNTER (OUTPATIENT)
Dept: ONCOLOGY | Facility: HOSPITAL | Age: 55
Setting detail: INFUSION SERIES
Discharge: HOME | End: 2020-12-28

## 2020-12-28 VITALS — BODY MASS INDEX: 26.04 KG/M2 | HEIGHT: 75 IN | WEIGHT: 209.4 LBS

## 2020-12-28 DIAGNOSIS — Z45.2 ENCOUNTER FOR CARE RELATED TO VASCULAR ACCESS PORT: Primary | ICD-10-CM

## 2020-12-28 PROCEDURE — 25010000003 HEPARIN LOCK FLUSH PER 10 UNITS: Performed by: INTERNAL MEDICINE

## 2020-12-28 PROCEDURE — 96523 IRRIG DRUG DELIVERY DEVICE: CPT

## 2020-12-28 RX ORDER — SODIUM CHLORIDE 0.9 % (FLUSH) 0.9 %
10 SYRINGE (ML) INJECTION AS NEEDED
Status: DISCONTINUED | OUTPATIENT
Start: 2020-12-28 | End: 2020-12-29 | Stop reason: HOSPADM

## 2020-12-28 RX ORDER — HEPARIN SODIUM (PORCINE) LOCK FLUSH IV SOLN 100 UNIT/ML 100 UNIT/ML
500 SOLUTION INTRAVENOUS AS NEEDED
Status: CANCELLED | OUTPATIENT
Start: 2020-12-28

## 2020-12-28 RX ORDER — HEPARIN SODIUM (PORCINE) LOCK FLUSH IV SOLN 100 UNIT/ML 100 UNIT/ML
500 SOLUTION INTRAVENOUS AS NEEDED
Status: DISCONTINUED | OUTPATIENT
Start: 2020-12-28 | End: 2020-12-29 | Stop reason: HOSPADM

## 2020-12-28 RX ORDER — SODIUM CHLORIDE 0.9 % (FLUSH) 0.9 %
10 SYRINGE (ML) INJECTION AS NEEDED
Status: CANCELLED | OUTPATIENT
Start: 2020-12-28

## 2020-12-28 RX ADMIN — Medication 30 ML: at 11:31

## 2020-12-28 RX ADMIN — HEPARIN 500 UNITS: 100 SYRINGE at 11:32

## 2021-01-18 ENCOUNTER — TELEPHONE (OUTPATIENT)
Dept: ONCOLOGY | Facility: CLINIC | Age: 56
End: 2021-01-18

## 2021-01-18 NOTE — TELEPHONE ENCOUNTER
----- Message from MULUGETA Beckett sent at 1/18/2021 12:41 PM EST -----  This patient's CT chest showed findings that could indicate COVID-19 infection. Could you ask the patient if he is currently experiencing symptoms or if he has been around anyone with COVID-19?     Electronically signed by MULUGETA Ryder, 01/18/21, 12:41 PM EST.

## 2021-01-18 NOTE — TELEPHONE ENCOUNTER
Called pt back to let him know that if he develops any symptoms of COVID to let his PCP know. The pt asked what was seen on his CT. I told him he had ground glass opacities which could signal COVID, pneumonia, or other respiratory issues. I educated the pt to be as active as he can be, drink plenty of fluids, and to cough and deep breathe as tolerated. Pt stated he has an appointment with Dr. Gaspar next week. I told him we would follow-up then, but in the meantime if he develops symptoms to contact his PCP. Pt verbalized understanding.

## 2021-01-18 NOTE — TELEPHONE ENCOUNTER
Called pt to see if he has been exposed to COVID-19 or if he is experiencing any symptoms. Pt denied both of these. He also stated that he was tested a week ago and was negative.

## 2021-01-21 NOTE — PROGRESS NOTES
Hematology/Oncology Outpatient Follow Up    PATIENT NAME:Jc Driscoll  :1965  MRN: [id number]  PRIMARY CARE PHYSICIAN: Reshma Alvarenga MD  REFERRING PHYSICIAN: Reshma Alvarenga MD    Chief Complaint   Patient presents with   • Follow-up     Non small cell carcinoma of right lung        HISTORY OF PRESENT ILLNESS:     This is a 55 y.o. who developed left shoulder pain and for that reason he had a chest x-ray done on 19 which showed a 2.7 cm noncalcified right lower lobe nodule was identified.  For that reason a CT scan of the chest was recommended.  Review of his records shows patient had the CT scan on 19 done without contrast.  This basically showed a lobulated noncalcified mass in the posterior aspect of the right lower lobe measuring 3 cm close to the pleural surface.  There is a calcified 1.2 mm nodule in the lateral aspect of the right lower lobe consistent with a granuloma.  There were also calcified subcarinal and right hilar nodules.  There is a lower right side tracheal nodule measuring 1 cm.  Patient had a PET/CT scan done on 19 which showed increased metabolic activity on the right lower lobe mass that measures 2.5 cm with SUV of 4.4.  There was also increased metabolic activity in the subcarinal space measuring 2.8 cm in size with SUV of 3.4, increased activity in an enlarged right paratracheal lymph node that measures 1.9 cm, SUV of 5, also suspicious for metastatic adenopathy.  Patient had a CT-guided biopsy of the right lower lobe mass on 3/8/19.  This showed adenocarcinoma, TTF-1 positive.  On 3/18/19 patient underwent endoscopic ultrasound and biopsy of a subcarinal lymph node.  This was positive for malignant cells.  Patient was then taken back to surgery on 3/20/18 and had left Mediport placement by Dr. Fuchs.  He has been referred for further evaluation and management of his stage 3 adenocarcinoma of the right lung.  He was accompanied by his spouse for the  appointment on 3/26/19.  Patient was scheduled to have a brain MRI for complete staging, but he could not do this due to claustrophobia.  He is willing to try with the help of angiolytics.    • Patient had brain MRI done on 4/5/19.  He states it did not show any evidence of metastatic disease.  Evidence of chronic sinusitis was noted.    • Patient was seen by Dr. Escalona who has recommended concurrent chemotherapy and radiation.  Patient is scheduled to begin on 4/15/19.   • 4/17/19 - Patient was initiated on combined chemotherapy and radiation with Cisplatin and Alimta.  Patient received cycle 1.      • 5/8/19 - Patient received cycle 2 of chemotherapy with Cisplatin and Alimta.   • 5/15/19 - Chemistry panel showed creatinine of 1.7.  WBC 1.8, hemoglobin 11.6, platelet count 72,000.   • 5/20/19 - BUN 10, creatinine 1.2, potassium 3.5.    • 5/23/19 - CT scan of the chest with contrast showed interval decrease in size of the right lower lobe pulmonary nodule now measuring 2.1 cm in size.  There was no mediastinal or hilar adenopathy identified.  • 6/26/2019 patient underwent right lower lobectomy with hilar and mediastinal lymph node dissection performed by Dr. Terrance Mckeon.  • Pathology revealed residual residual 2.2 cm invasive moderately differentiated adenocarcinoma.  There were a total of 16 lymph nodes evaluated that 7 had metastatic disease.  There was evidence of lymphovascular invasion, extranodal involvement.  All the surgical margins were negative and distance of the closest margin was 2.5 cm.  Logic stage is T1c N2 M0.  • Patient is here today accompanied by his spouse for this appointment.  He continues to complain of some postop discomfort, numbness but his skin is intact.  There is no drainage.  Has had follow-up with Dr. Fuchs.  • 8/14/2019-seen by Dr. Maria at the [facility].  Dr. Maria has recommended immunotherapy with durvalumab off label use as patient has not had significant response  to neoadjuvant chemotherapy and radiation.  Patient was given the option to have immunotherapy at the [facility] vs [place] and he has chosen to stay in [place]  • 8/21/19 - Started cycle 1 day 1 Imfinzi  • 9/4/2019 patient had CT scan of the chest this shows a moderate size right pleural effusion.  There are areas of groundglass opacification in the right upper lobe without any evidence of significant septal thickening.  Volume loss noted there is also moderate pleural effusion.  There is no suspicious recurrent malignancy.  There were nonenlarged residual mediastinal lymph nodes.  • 9/9/19 - WBC 6.23, Hgb 11.7 Platelets 257,000, , BUN 9, Cr 1.1,Vitamin B12 318, Ferritin 437  • 9/23/19 - received cycle 2 day 1 Imfinzi  • 10/9/19- Seen by Dr rodríguez with ENT for sinus disease  • 11/4/2019-patient received cycle 5 of Imfinzi(durvalumab)  • 12/2/2019 patient had cycle 7 of durvalumab  • 12/5/2019-patient had CT scan of the  abdomen and pelvis with small right pleural sided pleural effusion.There is no evidence of metastatic disease  • 12/30/2019-patient received cycle 9 of durvalumab  • 12/31/2019-patient had CT scan of the chest showed small to moderate left pleural effusion.  Iglesias appearing right lower paratracheal and right peribronchial soft tissue thickening without any discrete pathologically enlarged mediastinal or hilar lymph nodes.  • 1/27/20-patient received cycle 11 of durvalumab  • 2/17/20-patient had a stress test which was negative for ischemia  • 2/17/2020-patient had CT of the chest which showed postop changes on the right lung, right pleural effusion but no enlarging mass was noted  • 3/2/2020-patient received cycle 12 of durvalumab  • 3/16/2020-patient had ultrasound-guided right thoracentesis.  Pathology was negative for malignancy  • 4/13/2020-patient received cycle 15 of immunotherapy with durvalumab  • 5/11/2020-patient received cycle 17 of immunotherapy with durvalumab  • 6/9/2020-patient had  CT scan of the chest, abdomen and pelvis for lung cancer restaging.  There is stable small chronic loculated right basilar pleural effusion suggesting chronic pleuritis.  There is no evidence of metastatic disease in the abdomen or pelvis  • 7/20/2020 patient received cycle 22 of Durvalumab  • 8/17/2020 patient received cycle 24 and last cycle of durvalumab  • 9/24/2020 patient had CT scan of the chest, abdomen and pelvis for cancer restaging there was no evidence of local recurrence or metastatic disease within the abdomen and pelvis.  He has stable chronic small right pleural effusion.  Mild sigmoid diverticulosis was noted.  • 1/18/2021 patient had CT scan of the chest, abdomen and pelvis reviewed patient    Past Medical History:   Diagnosis Date   • Allergic rhinitis 7/25/2013    Overview:  4/2/2018 - still zyrtec 10 daily (if stops, gets dermatitis again).    • Anxiety and depression 6/5/2012    Overview:  4/2/2018 -   C/w well 300 xr and Lito 20.  4/8/2019 -   Doing ok even with new lung cancer - lito 20 & well 300xr.    • Chronic pansinusitis 4/8/2019    Overview:  4/8/2019 -   MRI showed chronic thick in frontal, maxillary, ethmoidal ---> to Dr. COLLAZO to est since abx ICC didn't help.  Does netipot bid.    • Diastolic CHF (CMS/HCC) 7/9/2014    Overview:  7/4/14 - impaired LV relaxation on Zhou ECHO.  EF 60%. Rest normal   • Dupuytren's contracture of both hands    • Elevated cholesterol    • Erosive esophagitis 7/9/2019    Overview:  EGD 2016 4/2/2018 - nexium OTC daily for few week.  Qod before that.  Now carbonation hurts, epigastric pain 4/8/2019 -   protonix 40 doing well.    • Gastroesophageal reflux disease 2/21/2019   • Hiatal hernia 7/9/2019   • History of colon polyps    • Hypertension    • Lung cancer (CMS/HCC)     right lung and in lymph nodes x2   • Mediastinal lymphadenopathy 3/12/2019   • Normocytic anemia 8/8/2019    Overview:  6/2019 - dropped to 9's postop 7/2019 - rebounded to 10's.  8/8/2019 -    Back to 9's - check ferritin, b12 and thyroid.    • Prediabetes 7/9/2014    Overview:  7/4/14 - a1c 6.1 at [place] admission   • Retention of urine 6/27/2019       Past Surgical History:   Procedure Laterality Date   • BRONCHOSCOPY  03/18/2019    EBUS MONTERO NEEDLE BX   • COLONOSCOPY     • DUPUYTREN CONTRACTURE RELEASE Bilateral    • LUNG BIOPSY  03/08/2019    CT GUIDED   • LUNG REMOVAL, PARTIAL Right     right lower   • PORTACATH PLACEMENT  03/20/2019    DR LONGORIA   • THORACOSCOPY Right 6/26/2019    Procedure: RIGHT VATS, OPEN LOWER LOBECTOMY WITH LYMPH NODE DISECTION, WEDGE RESECTION OF RIGHT MIDDLE LOBE;  Surgeon: Terrance Longoria MD;  Location: [facility] OR;  Service: Cardiothoracic         Current Outpatient Medications:   •  acetaminophen (TYLENOL) 325 MG tablet, Take 2 tablets by mouth Every 4 (Four) Hours As Needed for Mild Pain ., Disp: , Rfl:   •  amLODIPine (NORVASC) 10 MG tablet, Take 10 mg by mouth Daily., Disp: , Rfl:   •  buPROPion SR (WELLBUTRIN SR) 150 MG 12 hr tablet, Take 300 mg by mouth 2 (Two) Times a Day., Disp: , Rfl:   •  cetirizine (zyrTEC) 10 MG tablet, Take 10 mg by mouth Daily., Disp: , Rfl:   •  desvenlafaxine (PRISTIQ) 50 MG 24 hr tablet, TAKE 1 TABLET BY MOUTH ONCE DAILY FOR 30 DAYS, Disp: , Rfl:   •  HYDROcodone-acetaminophen (NORCO) 5-325 MG per tablet, Take 1 tablet by mouth As Needed., Disp: , Rfl:   •  HYDROcodone-acetaminophen (NORCO) 5-325 MG per tablet, Take 1 tablet by mouth Every 6 (Six) Hours As Needed for Moderate Pain ., Disp: 20 tablet, Rfl: 0  •  ibuprofen (ADVIL,MOTRIN) 600 MG tablet, Take 1 tablet by mouth Every 6 (Six) Hours As Needed for Mild Pain ., Disp: , Rfl:   •  lidocaine-prilocaine (EMLA) 2.5-2.5 % cream, , Disp: , Rfl:   •  LORazepam (ATIVAN) 0.5 MG tablet, TAKE 1 TABLET BY MOUTH TWICE DAILY AS NEEDED FOR ANXIETY MAX OF 2 DAY, Disp: , Rfl: 0  •  metoprolol succinate XL (TOPROL-XL) 100 MG 24 hr tablet, Take 100 mg by mouth Daily., Disp: , Rfl:   •   ondansetron (ZOFRAN) 8 MG tablet, TAKE 1 TABLET BY MOUTH EVERY 8 HOURS AS NEEDED NAUSEA, Disp: , Rfl: 3  •  pantoprazole (PROTONIX) 40 MG EC tablet, Take 40 mg by mouth Daily., Disp: , Rfl:   •  pravastatin (PRAVACHOL) 10 MG tablet, Take 10 mg by mouth Daily., Disp: , Rfl:   •  azithromycin (Zithromax Z-Eliel) 250 MG tablet, Take 2 tablets by mouth on day 1, then 1 tablet daily on days 2-5, Disp: 6 tablet, Rfl: 0  No current facility-administered medications for this visit.     Facility-Administered Medications Ordered in Other Visits:   •  heparin injection 500 Units, 500 Units, Intravenous, PRN, Azucena Gaspar MD, 500 Units at 21 1235  •  sodium chloride 0.9 % flush 10 mL, 10 mL, Intravenous, PRN, Azucena Gaspar MD, 30 mL at 21 1118    No Known Allergies    Family History   Problem Relation Age of Onset   • Cancer Sister    • Cancer Mother    • Lung cancer Father        Cancer-related family history includes Cancer in his mother and sister; Lung cancer in his father.    Social History     Tobacco Use   • Smoking status: Former Smoker     Types: Cigarettes     Quit date: 2009     Years since quittin.3   • Smokeless tobacco: Never Used   Substance Use Topics   • Alcohol use: Yes     Alcohol/week: 2.0 standard drinks     Types: 2 Cans of beer per week     Frequency: Monthly or less     Drinks per session: 1 or 2     Comment: Occasional   • Drug use: No     I have reviewed and confirmed the accuracy of the patient's history:  as entered by the MA/LPN/RN. Azucena Gaspar MD 21     SUBJECTIVE:    Patient has no specific complaints.  He has not had any recent infection.  He denies fevers chills nausea vomiting.  He has chronic nasal stuffiness      REVIEW OF SYSTEMS:    Review of Systems   Constitutional: Negative for chills and fever.   HENT: Negative for ear pain, mouth sores, nosebleeds and sore throat.    Eyes: Negative for photophobia and visual disturbance.  "  Respiratory: Negative for wheezing and stridor.    Cardiovascular: Negative for chest pain and palpitations.   Gastrointestinal: Negative for abdominal pain, diarrhea, nausea and vomiting.   Endocrine: Negative for cold intolerance and heat intolerance.   Genitourinary: Negative for dysuria and hematuria.   Musculoskeletal: Negative for joint swelling and neck stiffness.   Skin: Negative for color change and rash.   Neurological: Negative for seizures and syncope.   Hematological: Negative for adenopathy.   Psychiatric/Behavioral: Negative for agitation, confusion and hallucinations.       I have reviewed and confirmed the accuracy of the ROS as documented by the MA/LPN/RN Azucena Livia Gaspar MD        OBJECTIVE:  Vitals:    01/25/21 1126   BP: 132/84   Pulse: 80   Resp: 18   Temp: 96.9 °F (36.1 °C)   TempSrc: Temporal   Weight: 97.1 kg (214 lb)   Height: 190.5 cm (75\")   PainSc: 0-No pain     ECOG  Eastern Cooperative Oncology Group (ECOG, [place], World Health Organization) performance scale  0 Fully active; no performance restrictions.  1 Strenuous physical activity restricted; fully ambulatory and able to carry out light work.  2 Capable of all self-care but unable to carry out any work activities. Up and about >50% of waking hours.  3 Capable of only limited self-care; confined to bed or chair >50% of waking hours.  4 Completely disabled; cannot carry out any self-care; totally confined to bed or chair.    Physical Exam   Constitutional: He is oriented to person, place, and time. He appears well-developed. No distress.   HENT:   Head: Normocephalic and atraumatic.   Right Ear: External ear normal.   Left Ear: External ear normal.   Nose: Nose normal.   Eyes: Pupils are equal, round, and reactive to light. Conjunctivae are normal. Right eye exhibits no discharge. Left eye exhibits no discharge. No scleral icterus.   Neck: Normal range of motion. Neck supple. No thyromegaly present.   Cardiovascular: Normal " rate, regular rhythm and normal heart sounds. Exam reveals no gallop and no friction rub.   Pulmonary/Chest: Effort normal. No stridor. No respiratory distress. He has no wheezes.   Abdominal: Soft. Bowel sounds are normal. He exhibits no mass. There is no abdominal tenderness. There is no rebound and no guarding.   Musculoskeletal: Normal range of motion. No tenderness.   Lymphadenopathy:     He has no cervical adenopathy.   Neurological: He is alert and oriented to person, place, and time. He exhibits normal muscle tone.   Skin: Skin is warm. No rash noted. He is not diaphoretic. No erythema.   Urticarial-like rash, right medial knee likely due to immunotherapy   Psychiatric: His behavior is normal. Judgment and thought content normal.   Nursing note and vitals reviewed.    I have reexamined the patient and the results are consistent with the previously documented exam. Azucena Gaspar MD     RECENT LABS  WBC   Date Value Ref Range Status   01/25/2021 5.87 3.40 - 10.80 10*3/mm3 Final   07/03/2019 8.99 4.5 - 11.0 10*3/uL Final     RBC   Date Value Ref Range Status   01/25/2021 3.85 (L) 4.14 - 5.80 10*6/mm3 Final   07/03/2019 3.24 (L) 4.5 - 5.9 10*6/uL Final     Hemoglobin   Date Value Ref Range Status   01/25/2021 12.9 (L) 13.0 - 17.7 g/dL Final   07/03/2019 10.4 (L) 13.5 - 17.5 g/dL Final     Hematocrit   Date Value Ref Range Status   01/25/2021 37.6 37.5 - 51.0 % Final   07/03/2019 32.8 (L) 41.0 - 53.0 % Final     MCV   Date Value Ref Range Status   01/25/2021 97.7 (H) 79.0 - 97.0 fL Final   07/03/2019 101.2 (H) 80.0 - 100.0 fL Final     MCH   Date Value Ref Range Status   01/25/2021 33.5 (H) 26.6 - 33.0 pg Final   07/03/2019 32.1 26.0 - 34.0 pg Final     MCHC   Date Value Ref Range Status   01/25/2021 34.3 31.5 - 35.7 g/dL Final   07/03/2019 31.7 31.0 - 37.0 g/dL Final     RDW   Date Value Ref Range Status   01/25/2021 13.7 12.3 - 15.4 % Final   07/03/2019 15.2 12.0 - 16.8 % Final     RDW-SD   Date Value  Ref Range Status   01/25/2021 48.8 37.0 - 54.0 fl Final     MPV   Date Value Ref Range Status   01/25/2021 8.3 6.0 - 12.0 fL Final   07/03/2019 9.0 6.7 - 10.8 fL Final     Platelets   Date Value Ref Range Status   01/25/2021 250 140 - 450 10*3/mm3 Final   07/03/2019 302 140 - 440 10*3/uL Final     Neutrophil Rel %   Date Value Ref Range Status   07/03/2019 60.0 45 - 80 % Final     Neutrophil %   Date Value Ref Range Status   01/25/2021 59.9 42.7 - 76.0 % Final     Lymphocyte Rel %   Date Value Ref Range Status   07/03/2019 14.8 (L) 15 - 50 % Final     Lymphocyte %   Date Value Ref Range Status   01/25/2021 19.9 19.6 - 45.3 % Final     Monocyte Rel %   Date Value Ref Range Status   07/03/2019 12.2 0 - 15 % Final     Monocyte %   Date Value Ref Range Status   01/25/2021 8.5 5.0 - 12.0 % Final     Eosinophil %   Date Value Ref Range Status   01/25/2021 9.7 (H) 0.3 - 6.2 % Final   07/03/2019 10.4 (H) 0 - 7 % Final     Basophil Rel %   Date Value Ref Range Status   07/03/2019 1.7 0 - 2 % Final     Basophil %   Date Value Ref Range Status   01/25/2021 2.0 (H) 0.0 - 1.5 % Final     Neutrophils Absolute   Date Value Ref Range Status   07/03/2019 5.39 1.5 - 7.5 /uL Final   07/03/2019 5.39 2.0 - 8.8 10*3/uL Final     Neutrophils, Absolute   Date Value Ref Range Status   01/25/2021 3.51 1.70 - 7.00 10*3/mm3 Final     Lymphocytes Absolute   Date Value Ref Range Status   07/03/2019 1.33 0.7 - 5.5 10*3/uL Final     Lymphocytes, Absolute   Date Value Ref Range Status   01/25/2021 1.17 0.70 - 3.10 10*3/mm3 Final     Monocytes Absolute   Date Value Ref Range Status   07/03/2019 1.10 0.0 - 1.7 10*3/uL Final     Monocytes, Absolute   Date Value Ref Range Status   01/25/2021 0.50 0.10 - 0.90 10*3/mm3 Final     Eosinophils Absolute   Date Value Ref Range Status   07/03/2019 0.93 (H) 0.0 - 0.8 10*3/uL Final     Eosinophils, Absolute   Date Value Ref Range Status   01/25/2021 0.57 (H) 0.00 - 0.40 10*3/mm3 Final     Basophils Absolute    Date Value Ref Range Status   07/03/2019 0.15 0.0 - 0.2 10*3/uL Final     Basophils, Absolute   Date Value Ref Range Status   01/25/2021 0.12 0.00 - 0.20 10*3/mm3 Final     nRBC   Date Value Ref Range Status   02/18/2020 0.0 0.0 - 0.2 /100 WBC Final       Lab Results   Component Value Date    GLUCOSE 174 (H) 01/25/2021    BUN 13 01/25/2021    CREATININE 1.25 01/25/2021    EGFRIFNONA 60 (L) 01/25/2021    EGFRIFAFRI >60 05/20/2019    BCR 10.4 01/25/2021    K 3.7 01/25/2021    CO2 26.0 01/25/2021    CALCIUM 9.5 01/25/2021    ALBUMIN 4.60 01/25/2021    LABIL2 1.4 07/03/2019    AST 38 01/25/2021    ALT 18 01/25/2021         Assessment/Plan     Anemia, unspecified type  - CBC & Differential  - Comprehensive Metabolic Panel  - Comprehensive Metabolic Panel    Non-small cell carcinoma of lung, right (CMS/HCC)  - CT Chest Without Contrast  - Comprehensive Metabolic Panel  - Comprehensive Metabolic Panel      ASSESSMENT:    1. Adenocarcinoma of the right lung, T1c N2 M0, consistent with clinical stage 3A disease.     2. S/P combined chemotherapy and radiation with cisplatin and Alimta.  Surveillance CT scan  3.  CT scans show possibly pneumonic infiltration but patient really remains largely asymptomatic.  Will obtain a follow-up CT in 3 months to reevaluate.  This will be scheduled for first week in March 2021  4. Status post right lower lobectomy with mediastinal and hilar lymph node dissection with residual disease.  pT1 cN2 M0.  Patient with high risk features including lymphovascular invasion, extranodal capsular extension and persistent multiple lymph node disease.  Ongoing management  5. Status post consolidation treatment with immunotherapy with Imfinzi: Ongoing management.   6. Satus post right thoracentesis with cytology negative for malignancy  7. Postop anemia: Stable  8. New rash right medial knee likely secondary to immunotherapy: Resolved      PLAN:    1. I reviewed his CT scans of the chest abdomen and  pelvis done recently  2. Z-Eliel trial  3. He has completed all planned cycles of immunotherapy.  He has no evidence of disease relapse at this time  4. Continue monthly port flushes  5. Continue B12 injections  monthly: Patient did have elevated methylmalonic acid level during the early phases of his treatments.  Continue the same  6. Patient has had his flu vaccination  7. Refered to ENT for evaluation for sinus surgery : Not covered by insurance per pt.  8. Colonoscopy postponed by patient to January.  Encouraged to complete  9. CT scan of the chest without contrast due first week in March 2021  10. Continue supportive care  11. Follow-up with me second week March 2021  12. All questions answered to the best of my abilities         Patient has  questions which have all been answered to the best of my abilities    I have reviewed labs results, imaging, vitals, and medications with the patient today.     Patient verbalized understanding and is in agreement of the above plan.

## 2021-01-25 ENCOUNTER — OFFICE VISIT (OUTPATIENT)
Dept: ONCOLOGY | Facility: CLINIC | Age: 56
End: 2021-01-25

## 2021-01-25 ENCOUNTER — HOSPITAL ENCOUNTER (OUTPATIENT)
Dept: ONCOLOGY | Facility: HOSPITAL | Age: 56
Setting detail: INFUSION SERIES
Discharge: HOME | End: 2021-01-25

## 2021-01-25 VITALS
BODY MASS INDEX: 26.61 KG/M2 | WEIGHT: 214 LBS | HEART RATE: 80 BPM | HEIGHT: 75 IN | TEMPERATURE: 96.9 F | DIASTOLIC BLOOD PRESSURE: 84 MMHG | SYSTOLIC BLOOD PRESSURE: 132 MMHG | RESPIRATION RATE: 18 BRPM

## 2021-01-25 DIAGNOSIS — Z45.2 ENCOUNTER FOR CARE RELATED TO VASCULAR ACCESS PORT: Primary | ICD-10-CM

## 2021-01-25 DIAGNOSIS — E53.8 B12 DEFICIENCY: ICD-10-CM

## 2021-01-25 DIAGNOSIS — C34.91 NON-SMALL CELL CARCINOMA OF LUNG, RIGHT (HCC): ICD-10-CM

## 2021-01-25 DIAGNOSIS — D64.9 ANEMIA, UNSPECIFIED TYPE: Primary | ICD-10-CM

## 2021-01-25 DIAGNOSIS — D64.9 ANEMIA, UNSPECIFIED TYPE: ICD-10-CM

## 2021-01-25 LAB
ALBUMIN SERPL-MCNC: 4.6 G/DL (ref 3.5–5.2)
ALBUMIN/GLOB SERPL: 1.6 G/DL
ALP SERPL-CCNC: 67 U/L (ref 39–117)
ALT SERPL W P-5'-P-CCNC: 18 U/L (ref 1–41)
ANION GAP SERPL CALCULATED.3IONS-SCNC: 12 MMOL/L (ref 5–15)
AST SERPL-CCNC: 38 U/L (ref 1–40)
BASOPHILS # BLD AUTO: 0.12 10*3/MM3 (ref 0–0.2)
BASOPHILS NFR BLD AUTO: 2 % (ref 0–1.5)
BILIRUB SERPL-MCNC: 0.4 MG/DL (ref 0–1.2)
BUN SERPL-MCNC: 13 MG/DL (ref 6–20)
BUN/CREAT SERPL: 10.4 (ref 7–25)
CALCIUM SPEC-SCNC: 9.5 MG/DL (ref 8.6–10.5)
CHLORIDE SERPL-SCNC: 96 MMOL/L (ref 98–107)
CO2 SERPL-SCNC: 26 MMOL/L (ref 22–29)
CREAT SERPL-MCNC: 1.25 MG/DL (ref 0.76–1.27)
DEPRECATED RDW RBC AUTO: 48.8 FL (ref 37–54)
EOSINOPHIL # BLD AUTO: 0.57 10*3/MM3 (ref 0–0.4)
EOSINOPHIL NFR BLD AUTO: 9.7 % (ref 0.3–6.2)
ERYTHROCYTE [DISTWIDTH] IN BLOOD BY AUTOMATED COUNT: 13.7 % (ref 12.3–15.4)
GFR SERPL CREATININE-BSD FRML MDRD: 60 ML/MIN/1.73
GLOBULIN UR ELPH-MCNC: 2.8 GM/DL
GLUCOSE SERPL-MCNC: 174 MG/DL (ref 65–99)
HCT VFR BLD AUTO: 37.6 % (ref 37.5–51)
HGB BLD-MCNC: 12.9 G/DL (ref 13–17.7)
LYMPHOCYTES # BLD AUTO: 1.17 10*3/MM3 (ref 0.7–3.1)
LYMPHOCYTES NFR BLD AUTO: 19.9 % (ref 19.6–45.3)
MCH RBC QN AUTO: 33.5 PG (ref 26.6–33)
MCHC RBC AUTO-ENTMCNC: 34.3 G/DL (ref 31.5–35.7)
MCV RBC AUTO: 97.7 FL (ref 79–97)
MONOCYTES # BLD AUTO: 0.5 10*3/MM3 (ref 0.1–0.9)
MONOCYTES NFR BLD AUTO: 8.5 % (ref 5–12)
NEUTROPHILS NFR BLD AUTO: 3.51 10*3/MM3 (ref 1.7–7)
NEUTROPHILS NFR BLD AUTO: 59.9 % (ref 42.7–76)
PLATELET # BLD AUTO: 250 10*3/MM3 (ref 140–450)
PMV BLD AUTO: 8.3 FL (ref 6–12)
POTASSIUM SERPL-SCNC: 3.7 MMOL/L (ref 3.5–5.2)
PROT SERPL-MCNC: 7.4 G/DL (ref 6–8.5)
RBC # BLD AUTO: 3.85 10*6/MM3 (ref 4.14–5.8)
SODIUM SERPL-SCNC: 134 MMOL/L (ref 136–145)
WBC # BLD AUTO: 5.87 10*3/MM3 (ref 3.4–10.8)

## 2021-01-25 PROCEDURE — 25010000002 CYANOCOBALAMIN PER 1000 MCG: Performed by: INTERNAL MEDICINE

## 2021-01-25 PROCEDURE — 96372 THER/PROPH/DIAG INJ SC/IM: CPT

## 2021-01-25 PROCEDURE — 85025 COMPLETE CBC W/AUTO DIFF WBC: CPT | Performed by: INTERNAL MEDICINE

## 2021-01-25 PROCEDURE — 80053 COMPREHEN METABOLIC PANEL: CPT | Performed by: INTERNAL MEDICINE

## 2021-01-25 PROCEDURE — 99214 OFFICE O/P EST MOD 30 MIN: CPT | Performed by: INTERNAL MEDICINE

## 2021-01-25 PROCEDURE — 25010000003 HEPARIN LOCK FLUSH PER 10 UNITS: Performed by: INTERNAL MEDICINE

## 2021-01-25 RX ORDER — CYANOCOBALAMIN 1000 UG/ML
1000 INJECTION, SOLUTION INTRAMUSCULAR; SUBCUTANEOUS ONCE
Status: COMPLETED | OUTPATIENT
Start: 2021-01-25 | End: 2021-01-25

## 2021-01-25 RX ORDER — HEPARIN SODIUM (PORCINE) LOCK FLUSH IV SOLN 100 UNIT/ML 100 UNIT/ML
500 SOLUTION INTRAVENOUS AS NEEDED
Status: CANCELLED | OUTPATIENT
Start: 2021-01-25

## 2021-01-25 RX ORDER — HEPARIN SODIUM (PORCINE) LOCK FLUSH IV SOLN 100 UNIT/ML 100 UNIT/ML
500 SOLUTION INTRAVENOUS AS NEEDED
Status: DISCONTINUED | OUTPATIENT
Start: 2021-01-25 | End: 2021-01-26 | Stop reason: HOSPADM

## 2021-01-25 RX ORDER — SODIUM CHLORIDE 0.9 % (FLUSH) 0.9 %
10 SYRINGE (ML) INJECTION AS NEEDED
Status: CANCELLED | OUTPATIENT
Start: 2021-01-25

## 2021-01-25 RX ORDER — SODIUM CHLORIDE 0.9 % (FLUSH) 0.9 %
10 SYRINGE (ML) INJECTION AS NEEDED
Status: DISCONTINUED | OUTPATIENT
Start: 2021-01-25 | End: 2021-01-26 | Stop reason: HOSPADM

## 2021-01-25 RX ORDER — AZITHROMYCIN 250 MG/1
TABLET, FILM COATED ORAL
Qty: 6 TABLET | Refills: 0 | Status: SHIPPED | OUTPATIENT
Start: 2021-01-25 | End: 2021-06-01

## 2021-01-25 RX ADMIN — CYANOCOBALAMIN 1000 MCG: 1000 INJECTION INTRAMUSCULAR; SUBCUTANEOUS at 12:37

## 2021-01-25 RX ADMIN — HEPARIN 500 UNITS: 100 SYRINGE at 12:35

## 2021-01-25 RX ADMIN — Medication 30 ML: at 11:18

## 2021-01-25 NOTE — ADDENDUM NOTE
Encounter addended by: Antionette Cooper LPN on: 1/25/2021 1:45 PM   Actions taken: Flowsheet accepted

## 2021-02-22 ENCOUNTER — HOSPITAL ENCOUNTER (OUTPATIENT)
Dept: ONCOLOGY | Facility: HOSPITAL | Age: 56
Setting detail: INFUSION SERIES
Discharge: HOME | End: 2021-02-22

## 2021-02-22 VITALS — HEIGHT: 75 IN | BODY MASS INDEX: 26.71 KG/M2 | WEIGHT: 214.8 LBS

## 2021-02-22 DIAGNOSIS — E53.8 B12 DEFICIENCY: ICD-10-CM

## 2021-02-22 DIAGNOSIS — Z45.2 ENCOUNTER FOR CARE RELATED TO VASCULAR ACCESS PORT: Primary | ICD-10-CM

## 2021-02-22 PROCEDURE — 25010000003 HEPARIN LOCK FLUSH PER 10 UNITS: Performed by: INTERNAL MEDICINE

## 2021-02-22 PROCEDURE — 96372 THER/PROPH/DIAG INJ SC/IM: CPT

## 2021-02-22 PROCEDURE — 25010000002 CYANOCOBALAMIN PER 1000 MCG: Performed by: INTERNAL MEDICINE

## 2021-02-22 RX ORDER — CYANOCOBALAMIN 1000 UG/ML
1000 INJECTION, SOLUTION INTRAMUSCULAR; SUBCUTANEOUS ONCE
Status: COMPLETED | OUTPATIENT
Start: 2021-02-22 | End: 2021-02-22

## 2021-02-22 RX ORDER — HEPARIN SODIUM (PORCINE) LOCK FLUSH IV SOLN 100 UNIT/ML 100 UNIT/ML
500 SOLUTION INTRAVENOUS AS NEEDED
Status: CANCELLED | OUTPATIENT
Start: 2021-02-22

## 2021-02-22 RX ORDER — HEPARIN SODIUM (PORCINE) LOCK FLUSH IV SOLN 100 UNIT/ML 100 UNIT/ML
500 SOLUTION INTRAVENOUS AS NEEDED
Status: DISCONTINUED | OUTPATIENT
Start: 2021-02-22 | End: 2021-02-23 | Stop reason: HOSPADM

## 2021-02-22 RX ORDER — SODIUM CHLORIDE 0.9 % (FLUSH) 0.9 %
10 SYRINGE (ML) INJECTION AS NEEDED
Status: CANCELLED | OUTPATIENT
Start: 2021-02-22

## 2021-02-22 RX ORDER — SODIUM CHLORIDE 0.9 % (FLUSH) 0.9 %
10 SYRINGE (ML) INJECTION AS NEEDED
Status: DISCONTINUED | OUTPATIENT
Start: 2021-02-22 | End: 2021-02-23 | Stop reason: HOSPADM

## 2021-02-22 RX ADMIN — CYANOCOBALAMIN 1000 MCG: 1000 INJECTION INTRAMUSCULAR; SUBCUTANEOUS at 11:48

## 2021-02-22 RX ADMIN — Medication 30 ML: at 11:35

## 2021-02-22 RX ADMIN — HEPARIN 500 UNITS: 100 SYRINGE at 11:37

## 2021-02-22 NOTE — PROGRESS NOTES
When patient here for Port Flush/B12 injection he was concerned that he had called [facility] a couple times and they have no orders for scan from  from his last visit with .  I notified 's assistant Jerod LOMELI and she replied she would check into it.

## 2021-03-01 ENCOUNTER — TELEPHONE (OUTPATIENT)
Dept: ONCOLOGY | Facility: CLINIC | Age: 56
End: 2021-03-01

## 2021-03-01 NOTE — TELEPHONE ENCOUNTER
Provider: GENET  Caller: EPI MCBRIDE  Relationship to Patient: SELF    Phone Number: [phone]  Reason for Call: PT HAS APT ON Monday, AND WAS SUPPOSE TO HAVE A CT SCAN OF THE CHEST, AND FU WITH DR DE LEÓN  THE CT SCAN WAS NEVER SCHEDULED  PT STATES NO ONE HAS CALLED HIM ABOUT IT.

## 2021-03-03 ENCOUNTER — TELEPHONE (OUTPATIENT)
Dept: ONCOLOGY | Facility: CLINIC | Age: 56
End: 2021-03-03

## 2021-03-03 NOTE — TELEPHONE ENCOUNTER
Spoke with Bowen, with [employer], regarding prior authorization for CT chest with out contrast.  CT approved for [facility].  Auth#[id number], starting 3-3-2021 and expires on 5-.  Spoke with Leyda, [facility].  Patient scheduled for Friday, 3-5-2021 at 4:30PM.  Patient's wife, Rachel, notified and v/u.  Orders faxed.

## 2021-03-05 NOTE — PROGRESS NOTES
Hematology/Oncology Outpatient Follow Up    PATIENT NAME:Jc Driscoll  :1965  MRN: [id number]  PRIMARY CARE PHYSICIAN: Reshma Alvarenga MD  REFERRING PHYSICIAN: Reshma Alvarenga MD    Chief Complaint   Patient presents with   • Appointment     Anemia, unspecified type;Non-small cell carcinoma of lung, right (CMS/HCC)   • Follow-up        HISTORY OF PRESENT ILLNESS:     This is a 55 y.o. who developed left shoulder pain and for that reason he had a chest x-ray done on 19 which showed a 2.7 cm noncalcified right lower lobe nodule was identified.  For that reason a CT scan of the chest was recommended.  Review of his records shows patient had the CT scan on 19 done without contrast.  This basically showed a lobulated noncalcified mass in the posterior aspect of the right lower lobe measuring 3 cm close to the pleural surface.  There is a calcified 1.2 mm nodule in the lateral aspect of the right lower lobe consistent with a granuloma.  There were also calcified subcarinal and right hilar nodules.  There is a lower right side tracheal nodule measuring 1 cm.  Patient had a PET/CT scan done on 19 which showed increased metabolic activity on the right lower lobe mass that measures 2.5 cm with SUV of 4.4.  There was also increased metabolic activity in the subcarinal space measuring 2.8 cm in size with SUV of 3.4, increased activity in an enlarged right paratracheal lymph node that measures 1.9 cm, SUV of 5, also suspicious for metastatic adenopathy.  Patient had a CT-guided biopsy of the right lower lobe mass on 3/8/19.  This showed adenocarcinoma, TTF-1 positive.  On 3/18/19 patient underwent endoscopic ultrasound and biopsy of a subcarinal lymph node.  This was positive for malignant cells.  Patient was then taken back to surgery on 3/20/18 and had left Mediport placement by Dr. Fuchs.  He has been referred for further evaluation and management of his stage 3 adenocarcinoma of the right  lung.  He was accompanied by his spouse for the appointment on 3/26/19.  Patient was scheduled to have a brain MRI for complete staging, but he could not do this due to claustrophobia.  He is willing to try with the help of angiolytics.    • Patient had brain MRI done on 4/5/19.  He states it did not show any evidence of metastatic disease.  Evidence of chronic sinusitis was noted.    • Patient was seen by Dr. Escalona who has recommended concurrent chemotherapy and radiation.  Patient is scheduled to begin on 4/15/19.   • 4/17/19 - Patient was initiated on combined chemotherapy and radiation with Cisplatin and Alimta.  Patient received cycle 1.      • 5/8/19 - Patient received cycle 2 of chemotherapy with Cisplatin and Alimta.   • 5/15/19 - Chemistry panel showed creatinine of 1.7.  WBC 1.8, hemoglobin 11.6, platelet count 72,000.   • 5/20/19 - BUN 10, creatinine 1.2, potassium 3.5.    • 5/23/19 - CT scan of the chest with contrast showed interval decrease in size of the right lower lobe pulmonary nodule now measuring 2.1 cm in size.  There was no mediastinal or hilar adenopathy identified.  • 6/26/2019 patient underwent right lower lobectomy with hilar and mediastinal lymph node dissection performed by Dr. Terrance Mckeon.  • Pathology revealed residual residual 2.2 cm invasive moderately differentiated adenocarcinoma.  There were a total of 16 lymph nodes evaluated that 7 had metastatic disease.  There was evidence of lymphovascular invasion, extranodal involvement.  All the surgical margins were negative and distance of the closest margin was 2.5 cm.  Logic stage is T1c N2 M0.  • Patient is here today accompanied by his spouse for this appointment.  He continues to complain of some postop discomfort, numbness but his skin is intact.  There is no drainage.  Has had follow-up with Dr. Fuchs.  • 8/14/2019-seen by Dr. Maria at the [facility].  Dr. Maria has recommended immunotherapy with durvalumab off label  use as patient has not had significant response to neoadjuvant chemotherapy and radiation.  Patient was given the option to have immunotherapy at the [facility] vs [place] and he has chosen to stay in [place]  • 8/21/19 - Started cycle 1 day 1 Imfinzi  • 9/4/2019 patient had CT scan of the chest this shows a moderate size right pleural effusion.  There are areas of groundglass opacification in the right upper lobe without any evidence of significant septal thickening.  Volume loss noted there is also moderate pleural effusion.  There is no suspicious recurrent malignancy.  There were nonenlarged residual mediastinal lymph nodes.  • 9/9/19 - WBC 6.23, Hgb 11.7 Platelets 257,000, , BUN 9, Cr 1.1,Vitamin B12 318, Ferritin 437  • 9/23/19 - received cycle 2 day 1 Imfinzi  • 10/9/19- Seen by Dr rodríguez with ENT for sinus disease  • 11/4/2019-patient received cycle 5 of Imfinzi(durvalumab)  • 12/2/2019 patient had cycle 7 of durvalumab  • 12/5/2019-patient had CT scan of the  abdomen and pelvis with small right pleural sided pleural effusion.There is no evidence of metastatic disease  • 12/30/2019-patient received cycle 9 of durvalumab  • 12/31/2019-patient had CT scan of the chest showed small to moderate left pleural effusion.  Iglesias appearing right lower paratracheal and right peribronchial soft tissue thickening without any discrete pathologically enlarged mediastinal or hilar lymph nodes.  • 1/27/20-patient received cycle 11 of durvalumab  • 2/17/20-patient had a stress test which was negative for ischemia  • 2/17/2020-patient had CT of the chest which showed postop changes on the right lung, right pleural effusion but no enlarging mass was noted  • 3/2/2020-patient received cycle 12 of durvalumab  • 3/16/2020-patient had ultrasound-guided right thoracentesis.  Pathology was negative for malignancy  • 4/13/2020-patient received cycle 15 of immunotherapy with durvalumab  • 5/11/2020-patient received cycle 17 of  immunotherapy with durvalumab  • 6/9/2020-patient had CT scan of the chest, abdomen and pelvis for lung cancer restaging.  There is stable small chronic loculated right basilar pleural effusion suggesting chronic pleuritis.  There is no evidence of metastatic disease in the abdomen or pelvis  • 7/20/2020 patient received cycle 22 of Durvalumab  • 8/17/2020 patient received cycle 24 and last cycle of durvalumab  • 9/24/2020 patient had CT scan of the chest, abdomen and pelvis for cancer restaging there was no evidence of local recurrence or metastatic disease within the abdomen and pelvis.  He has stable chronic small right pleural effusion.  Mild sigmoid diverticulosis was noted.  • 1/18/2021 patient had CT scan of the chest, abdomen and pelvis reviewed patient    Past Medical History:   Diagnosis Date   • Allergic rhinitis 7/25/2013    Overview:  4/2/2018 - still zyrtec 10 daily (if stops, gets dermatitis again).    • Anxiety and depression 6/5/2012    Overview:  4/2/2018 -   C/w well 300 xr and Lito 20.  4/8/2019 -   Doing ok even with new lung cancer - lito 20 & well 300xr.    • Chronic pansinusitis 4/8/2019    Overview:  4/8/2019 -   MRI showed chronic thick in frontal, maxillary, ethmoidal ---> to Dr. COLLAZO to est since abx ICC didn't help.  Does netipot bid.    • Diastolic CHF (CMS/HCC) 7/9/2014    Overview:  7/4/14 - impaired LV relaxation on Zhou ECHO.  EF 60%. Rest normal   • Dupuytren's contracture of both hands    • Elevated cholesterol    • Erosive esophagitis 7/9/2019    Overview:  EGD 2016 4/2/2018 - nexium OTC daily for few week.  Qod before that.  Now carbonation hurts, epigastric pain 4/8/2019 -   protonix 40 doing well.    • Gastroesophageal reflux disease 2/21/2019   • Hiatal hernia 7/9/2019   • History of colon polyps    • Hypertension    • Lung cancer (CMS/HCC)     right lung and in lymph nodes x2   • Mediastinal lymphadenopathy 3/12/2019   • Normocytic anemia 8/8/2019    Overview:  6/2019 - dropped  to 9's postop 7/2019 - rebounded to 10's.  8/8/2019 -   Back to 9's - check ferritin, b12 and thyroid.    • Prediabetes 7/9/2014    Overview:  7/4/14 - a1c 6.1 at [place] admission   • Retention of urine 6/27/2019       Past Surgical History:   Procedure Laterality Date   • BRONCHOSCOPY  03/18/2019    EBUS MONTERO NEEDLE BX   • COLONOSCOPY     • DUPUYTREN CONTRACTURE RELEASE Bilateral    • LUNG BIOPSY  03/08/2019    CT GUIDED   • LUNG REMOVAL, PARTIAL Right     right lower   • PORTACATH PLACEMENT  03/20/2019    DR LONGORIA   • THORACOSCOPY Right 6/26/2019    Procedure: RIGHT VATS, OPEN LOWER LOBECTOMY WITH LYMPH NODE DISECTION, WEDGE RESECTION OF RIGHT MIDDLE LOBE;  Surgeon: Terrance Longoria MD;  Location: [facility];  Service: Cardiothoracic         Current Outpatient Medications:   •  amLODIPine (NORVASC) 10 MG tablet, Take 10 mg by mouth Daily., Disp: , Rfl:   •  buPROPion XL (WELLBUTRIN XL) 300 MG 24 hr tablet, Take 300 mg by mouth Every Morning., Disp: , Rfl:   •  cetirizine (zyrTEC) 10 MG tablet, Take 10 mg by mouth Daily., Disp: , Rfl:   •  desvenlafaxine (PRISTIQ) 50 MG 24 hr tablet, TAKE 1 TABLET BY MOUTH ONCE DAILY FOR 30 DAYS, Disp: , Rfl:   •  HYDROcodone-acetaminophen (NORCO) 5-325 MG per tablet, Take 1 tablet by mouth As Needed., Disp: , Rfl:   •  LORazepam (ATIVAN) 0.5 MG tablet, TAKE 1 TABLET BY MOUTH TWICE DAILY AS NEEDED FOR ANXIETY MAX OF 2 DAY, Disp: , Rfl: 0  •  metoprolol succinate XL (TOPROL-XL) 100 MG 24 hr tablet, Take 100 mg by mouth Daily., Disp: , Rfl:   •  pantoprazole (PROTONIX) 40 MG EC tablet, Take 40 mg by mouth Daily., Disp: , Rfl:   •  pravastatin (PRAVACHOL) 10 MG tablet, Take 10 mg by mouth Daily., Disp: , Rfl:   •  acetaminophen (TYLENOL) 325 MG tablet, Take 2 tablets by mouth Every 4 (Four) Hours As Needed for Mild Pain ., Disp: , Rfl:   •  azithromycin (Zithromax Z-Eliel) 250 MG tablet, Take 2 tablets by mouth on day 1, then 1 tablet daily on days 2-5, Disp: 6 tablet, Rfl: 0  •   HYDROcodone-acetaminophen (NORCO) 5-325 MG per tablet, Take 1 tablet by mouth Every 6 (Six) Hours As Needed for Moderate Pain ., Disp: 20 tablet, Rfl: 0  •  ibuprofen (ADVIL,MOTRIN) 600 MG tablet, Take 1 tablet by mouth Every 6 (Six) Hours As Needed for Mild Pain ., Disp: , Rfl:   •  lidocaine-prilocaine (EMLA) 2.5-2.5 % cream, , Disp: , Rfl:   •  ondansetron (ZOFRAN) 8 MG tablet, TAKE 1 TABLET BY MOUTH EVERY 8 HOURS AS NEEDED NAUSEA, Disp: , Rfl: 3  No current facility-administered medications for this visit.    Facility-Administered Medications Ordered in Other Visits:   •  heparin injection 500 Units, 500 Units, Intravenous, PRN, Azucena Gaspar MD  •  sodium chloride 0.9 % flush 10 mL, 10 mL, Intravenous, PRN, Azucena Gaspar MD, 30 mL at 21 1115    No Known Allergies    Family History   Problem Relation Age of Onset   • Cancer Sister    • Cancer Mother    • Lung cancer Father        Cancer-related family history includes Cancer in his mother and sister; Lung cancer in his father.    Social History     Tobacco Use   • Smoking status: Former Smoker     Types: Cigarettes     Quit date: 2009     Years since quittin.5   • Smokeless tobacco: Never Used   Substance Use Topics   • Alcohol use: Yes     Alcohol/week: 2.0 standard drinks     Types: 2 Cans of beer per week     Comment: Occasional   • Drug use: No     I have reviewed and confirmed the accuracy of the patient's history:  as entered by the MA/LPN/RN. Azucena Gaspar MD 21     SUBJECTIVE:    Patient is here today for routine follow-up.  He has a CT of the chest done 3/5/2021      REVIEW OF SYSTEMS:    Review of Systems   Constitutional: Negative for chills and fever.   HENT: Negative for ear pain, mouth sores, nosebleeds and sore throat.    Eyes: Negative for photophobia and visual disturbance.   Respiratory: Negative for wheezing and stridor.    Cardiovascular: Negative for chest pain and palpitations.  "  Gastrointestinal: Negative for abdominal pain, diarrhea, nausea and vomiting.   Endocrine: Negative for cold intolerance and heat intolerance.   Genitourinary: Negative for dysuria and hematuria.   Musculoskeletal: Negative for joint swelling and neck stiffness.   Skin: Negative for color change and rash.   Neurological: Negative for seizures and syncope.   Hematological: Negative for adenopathy.   Psychiatric/Behavioral: Negative for agitation, confusion and hallucinations.       I have reviewed and confirmed the accuracy of the ROS as documented by the MA/LPN/RN Azucenarufus Gaspar MD        OBJECTIVE:  Vitals:    03/08/21 1123   BP: 133/87   Pulse: 73   Resp: 20   Temp: 97.5 °F (36.4 °C)   Weight: 95.3 kg (210 lb)   Height: 190.5 cm (75\")   PainSc: 0-No pain     ECOG  Eastern Cooperative Oncology Group (ECOG, [place], World Health Organization) performance scale  0 Fully active; no performance restrictions.  1 Strenuous physical activity restricted; fully ambulatory and able to carry out light work.  2 Capable of all self-care but unable to carry out any work activities. Up and about >50% of waking hours.  3 Capable of only limited self-care; confined to bed or chair >50% of waking hours.  4 Completely disabled; cannot carry out any self-care; totally confined to bed or chair.    Physical Exam   Constitutional: He is oriented to person, place, and time. He appears well-developed. No distress.   HENT:   Head: Normocephalic and atraumatic.   Right Ear: External ear normal.   Left Ear: External ear normal.   Nose: Nose normal.   Eyes: Pupils are equal, round, and reactive to light. Conjunctivae are normal. Right eye exhibits no discharge. Left eye exhibits no discharge. No scleral icterus.   Neck: Normal range of motion. Neck supple. No thyromegaly present.   Cardiovascular: Normal rate, regular rhythm and normal heart sounds. Exam reveals no gallop and no friction rub.   Pulmonary/Chest: Effort normal. No " stridor. No respiratory distress. He has no wheezes.   Abdominal: Soft. Bowel sounds are normal. He exhibits no mass. There is no abdominal tenderness. There is no rebound and no guarding.   Musculoskeletal: Normal range of motion. No tenderness.   Lymphadenopathy:     He has no cervical adenopathy.   Neurological: He is alert and oriented to person, place, and time. He exhibits normal muscle tone.   Skin: Skin is warm. No rash noted. He is not diaphoretic. No erythema.   Urticarial-like rash, right medial knee likely due to immunotherapy   Psychiatric: His behavior is normal. Judgment and thought content normal.   Nursing note and vitals reviewed.    I have reexamined the patient and the results are consistent with the previously documented exam. Azucena Gaspar MD     RECENT LABS  WBC   Date Value Ref Range Status   03/08/2021 5.90 3.40 - 10.80 10*3/mm3 Final   07/03/2019 8.99 4.5 - 11.0 10*3/uL Final     RBC   Date Value Ref Range Status   03/08/2021 3.73 (L) 4.14 - 5.80 10*6/mm3 Final   07/03/2019 3.24 (L) 4.5 - 5.9 10*6/uL Final     Hemoglobin   Date Value Ref Range Status   03/08/2021 12.7 (L) 13.0 - 17.7 g/dL Final   07/03/2019 10.4 (L) 13.5 - 17.5 g/dL Final     Hematocrit   Date Value Ref Range Status   03/08/2021 37.5 37.5 - 51.0 % Final   07/03/2019 32.8 (L) 41.0 - 53.0 % Final     MCV   Date Value Ref Range Status   03/08/2021 100.5 (H) 79.0 - 97.0 fL Final   07/03/2019 101.2 (H) 80.0 - 100.0 fL Final     MCH   Date Value Ref Range Status   03/08/2021 34.0 (H) 26.6 - 33.0 pg Final   07/03/2019 32.1 26.0 - 34.0 pg Final     MCHC   Date Value Ref Range Status   03/08/2021 33.9 31.5 - 35.7 g/dL Final   07/03/2019 31.7 31.0 - 37.0 g/dL Final     RDW   Date Value Ref Range Status   03/08/2021 14.2 12.3 - 15.4 % Final   07/03/2019 15.2 12.0 - 16.8 % Final     RDW-SD   Date Value Ref Range Status   03/08/2021 50.9 37.0 - 54.0 fl Final     MPV   Date Value Ref Range Status   03/08/2021 8.2 6.0 - 12.0 fL  Final   07/03/2019 9.0 6.7 - 10.8 fL Final     Platelets   Date Value Ref Range Status   03/08/2021 295 140 - 450 10*3/mm3 Final   07/03/2019 302 140 - 440 10*3/uL Final     Neutrophil Rel %   Date Value Ref Range Status   07/03/2019 60.0 45 - 80 % Final     Neutrophil %   Date Value Ref Range Status   03/08/2021 60.5 42.7 - 76.0 % Final     Lymphocyte Rel %   Date Value Ref Range Status   07/03/2019 14.8 (L) 15 - 50 % Final     Lymphocyte %   Date Value Ref Range Status   03/08/2021 18.3 (L) 19.6 - 45.3 % Final     Monocyte Rel %   Date Value Ref Range Status   07/03/2019 12.2 0 - 15 % Final     Monocyte %   Date Value Ref Range Status   03/08/2021 10.7 5.0 - 12.0 % Final     Eosinophil %   Date Value Ref Range Status   03/08/2021 8.3 (H) 0.3 - 6.2 % Final   07/03/2019 10.4 (H) 0 - 7 % Final     Basophil Rel %   Date Value Ref Range Status   07/03/2019 1.7 0 - 2 % Final     Basophil %   Date Value Ref Range Status   03/08/2021 2.2 (H) 0.0 - 1.5 % Final     Neutrophils Absolute   Date Value Ref Range Status   07/03/2019 5.39 1.5 - 7.5 /uL Final   07/03/2019 5.39 2.0 - 8.8 10*3/uL Final     Neutrophils, Absolute   Date Value Ref Range Status   03/08/2021 3.57 1.70 - 7.00 10*3/mm3 Final     Lymphocytes Absolute   Date Value Ref Range Status   07/03/2019 1.33 0.7 - 5.5 10*3/uL Final     Lymphocytes, Absolute   Date Value Ref Range Status   03/08/2021 1.08 0.70 - 3.10 10*3/mm3 Final     Monocytes Absolute   Date Value Ref Range Status   07/03/2019 1.10 0.0 - 1.7 10*3/uL Final     Monocytes, Absolute   Date Value Ref Range Status   03/08/2021 0.63 0.10 - 0.90 10*3/mm3 Final     Eosinophils Absolute   Date Value Ref Range Status   07/03/2019 0.93 (H) 0.0 - 0.8 10*3/uL Final     Eosinophils, Absolute   Date Value Ref Range Status   03/08/2021 0.49 (H) 0.00 - 0.40 10*3/mm3 Final     Basophils Absolute   Date Value Ref Range Status   07/03/2019 0.15 0.0 - 0.2 10*3/uL Final     Basophils, Absolute   Date Value Ref Range  Status   03/08/2021 0.13 0.00 - 0.20 10*3/mm3 Final     nRBC   Date Value Ref Range Status   02/18/2020 0.0 0.0 - 0.2 /100 WBC Final       Lab Results   Component Value Date    GLUCOSE 174 (H) 01/25/2021    BUN 13 01/25/2021    CREATININE 1.25 01/25/2021    EGFRIFNONA 60 (L) 01/25/2021    EGFRIFAFRI >60 05/20/2019    BCR 10.4 01/25/2021    K 3.7 01/25/2021    CO2 26.0 01/25/2021    CALCIUM 9.5 01/25/2021    ALBUMIN 4.60 01/25/2021    LABIL2 1.4 07/03/2019    AST 38 01/25/2021    ALT 18 01/25/2021         Assessment/Plan     Anemia, unspecified type  - CBC & Differential    Non-small cell carcinoma of lung, right (CMS/HCC)  - CBC & Differential  - CT Chest With Contrast      ASSESSMENT:    1. Adenocarcinoma of the right lung, T1c N2 M0, consistent with clinical stage 3A disease.     2. S/P combined chemotherapy and radiation with cisplatin and Alimta.  Surveillance CT scan  3.  CT scans show possibly pneumonic infiltration but patient really remains largely asymptomatic.  Will obtain a follow-up CT in 3 months to reevaluate.  I have reviewed his CT of the chest which overall shows stable findings.  He does have new 3 mm noncalcified pulmonary nodule within the posterior left lower lobe likely infectious or inflammatory in nature.  Follow-up CT scan has been recommended which we will obtain in 3 months  4. Status post right lower lobectomy with mediastinal and hilar lymph node dissection with residual disease.  pT1 cN2 M0.  Patient with high risk features including lymphovascular invasion, extranodal capsular extension and persistent multiple lymph node disease.  Ongoing management  5. Status post consolidation treatment with immunotherapy with Imfinzi: Ongoing management.   6. Satus post right thoracentesis with cytology negative for malignancy  7. Postop anemia: Reviewed  8. Assessment has been reviewed and updated      PLANS:    1. I reviewed his CT scans of the chest done 3/5/2021.  Patient will need to have  another follow-up CT of the chest in 3 months due in June 2021  2. Continue monthly port flushes  3. He has completed all planned cycles of immunotherapy.  He has no evidence of disease relapse at this time  4. Continue B12 injections  monthly: Patient did have elevated methylmalonic acid level during the early phases of his treatments.  Continue the same  5. Refered to ENT for evaluation for sinus surgery : Not covered by insurance per pt.  6. Colonoscopy postponed by patient to January.  Encouraged to complete  7. Continue supportive care  8. Follow-up with me in 3 months  9. All questions answered to the best of my abilities         Patient has  questions which have all been answered to the best of my abilities    I have reviewed labs results, imaging, vitals, and medications with the patient today.     Patient verbalized understanding and is in agreement of the above plan.    I spent 30 total minutes, face-to-face, caring for Jc today.  90% of this time involved counseling and/or coordination of care as documented within this note.

## 2021-03-08 ENCOUNTER — HOSPITAL ENCOUNTER (OUTPATIENT)
Dept: ONCOLOGY | Facility: HOSPITAL | Age: 56
Setting detail: INFUSION SERIES
Discharge: HOME | End: 2021-03-08

## 2021-03-08 ENCOUNTER — OFFICE VISIT (OUTPATIENT)
Dept: ONCOLOGY | Facility: CLINIC | Age: 56
End: 2021-03-08

## 2021-03-08 VITALS
WEIGHT: 210 LBS | BODY MASS INDEX: 26.11 KG/M2 | SYSTOLIC BLOOD PRESSURE: 133 MMHG | TEMPERATURE: 97.5 F | HEART RATE: 73 BPM | RESPIRATION RATE: 20 BRPM | HEIGHT: 75 IN | DIASTOLIC BLOOD PRESSURE: 87 MMHG

## 2021-03-08 DIAGNOSIS — Z45.2 ENCOUNTER FOR CARE RELATED TO VASCULAR ACCESS PORT: Primary | ICD-10-CM

## 2021-03-08 DIAGNOSIS — D64.9 ANEMIA, UNSPECIFIED TYPE: ICD-10-CM

## 2021-03-08 DIAGNOSIS — C34.91 NON-SMALL CELL CARCINOMA OF LUNG, RIGHT (HCC): ICD-10-CM

## 2021-03-08 DIAGNOSIS — D64.9 ANEMIA, UNSPECIFIED TYPE: Primary | ICD-10-CM

## 2021-03-08 LAB
BASOPHILS # BLD AUTO: 0.13 10*3/MM3 (ref 0–0.2)
BASOPHILS NFR BLD AUTO: 2.2 % (ref 0–1.5)
DEPRECATED RDW RBC AUTO: 50.9 FL (ref 37–54)
EOSINOPHIL # BLD AUTO: 0.49 10*3/MM3 (ref 0–0.4)
EOSINOPHIL NFR BLD AUTO: 8.3 % (ref 0.3–6.2)
ERYTHROCYTE [DISTWIDTH] IN BLOOD BY AUTOMATED COUNT: 14.2 % (ref 12.3–15.4)
HCT VFR BLD AUTO: 37.5 % (ref 37.5–51)
HGB BLD-MCNC: 12.7 G/DL (ref 13–17.7)
LYMPHOCYTES # BLD AUTO: 1.08 10*3/MM3 (ref 0.7–3.1)
LYMPHOCYTES NFR BLD AUTO: 18.3 % (ref 19.6–45.3)
MCH RBC QN AUTO: 34 PG (ref 26.6–33)
MCHC RBC AUTO-ENTMCNC: 33.9 G/DL (ref 31.5–35.7)
MCV RBC AUTO: 100.5 FL (ref 79–97)
MONOCYTES # BLD AUTO: 0.63 10*3/MM3 (ref 0.1–0.9)
MONOCYTES NFR BLD AUTO: 10.7 % (ref 5–12)
NEUTROPHILS NFR BLD AUTO: 3.57 10*3/MM3 (ref 1.7–7)
NEUTROPHILS NFR BLD AUTO: 60.5 % (ref 42.7–76)
PLATELET # BLD AUTO: 295 10*3/MM3 (ref 140–450)
PMV BLD AUTO: 8.2 FL (ref 6–12)
RBC # BLD AUTO: 3.73 10*6/MM3 (ref 4.14–5.8)
WBC # BLD AUTO: 5.9 10*3/MM3 (ref 3.4–10.8)

## 2021-03-08 PROCEDURE — 99214 OFFICE O/P EST MOD 30 MIN: CPT | Performed by: INTERNAL MEDICINE

## 2021-03-08 PROCEDURE — G0463 HOSPITAL OUTPT CLINIC VISIT: HCPCS

## 2021-03-08 PROCEDURE — 85025 COMPLETE CBC W/AUTO DIFF WBC: CPT | Performed by: INTERNAL MEDICINE

## 2021-03-08 PROCEDURE — 25010000003 HEPARIN LOCK FLUSH PER 10 UNITS: Performed by: INTERNAL MEDICINE

## 2021-03-08 RX ORDER — HEPARIN SODIUM (PORCINE) LOCK FLUSH IV SOLN 100 UNIT/ML 100 UNIT/ML
500 SOLUTION INTRAVENOUS AS NEEDED
Status: DISCONTINUED | OUTPATIENT
Start: 2021-03-08 | End: 2021-03-09 | Stop reason: HOSPADM

## 2021-03-08 RX ORDER — SODIUM CHLORIDE 0.9 % (FLUSH) 0.9 %
10 SYRINGE (ML) INJECTION AS NEEDED
Status: CANCELLED | OUTPATIENT
Start: 2021-03-08

## 2021-03-08 RX ORDER — SODIUM CHLORIDE 0.9 % (FLUSH) 0.9 %
10 SYRINGE (ML) INJECTION AS NEEDED
Status: DISCONTINUED | OUTPATIENT
Start: 2021-03-08 | End: 2021-03-09 | Stop reason: HOSPADM

## 2021-03-08 RX ORDER — HEPARIN SODIUM (PORCINE) LOCK FLUSH IV SOLN 100 UNIT/ML 100 UNIT/ML
500 SOLUTION INTRAVENOUS AS NEEDED
Status: CANCELLED | OUTPATIENT
Start: 2021-03-08

## 2021-03-08 RX ORDER — BUPROPION HYDROCHLORIDE 300 MG/1
300 TABLET ORAL EVERY MORNING
COMMUNITY
Start: 2020-12-14 | End: 2022-12-11 | Stop reason: HOSPADM

## 2021-03-08 RX ADMIN — HEPARIN 500 UNITS: 100 SYRINGE at 12:40

## 2021-03-08 RX ADMIN — Medication 30 ML: at 11:15

## 2021-04-05 ENCOUNTER — HOSPITAL ENCOUNTER (OUTPATIENT)
Dept: ONCOLOGY | Facility: HOSPITAL | Age: 56
Setting detail: INFUSION SERIES
Discharge: HOME | End: 2021-04-05

## 2021-04-05 VITALS — HEIGHT: 75 IN | WEIGHT: 208.2 LBS | BODY MASS INDEX: 25.89 KG/M2

## 2021-04-05 DIAGNOSIS — Z45.2 ENCOUNTER FOR CARE RELATED TO VASCULAR ACCESS PORT: Primary | ICD-10-CM

## 2021-04-05 PROCEDURE — 25010000003 HEPARIN LOCK FLUSH PER 10 UNITS: Performed by: INTERNAL MEDICINE

## 2021-04-05 PROCEDURE — 96523 IRRIG DRUG DELIVERY DEVICE: CPT

## 2021-04-05 RX ORDER — SODIUM CHLORIDE 0.9 % (FLUSH) 0.9 %
10 SYRINGE (ML) INJECTION AS NEEDED
Status: DISCONTINUED | OUTPATIENT
Start: 2021-04-05 | End: 2021-04-06 | Stop reason: HOSPADM

## 2021-04-05 RX ORDER — SODIUM CHLORIDE 0.9 % (FLUSH) 0.9 %
10 SYRINGE (ML) INJECTION AS NEEDED
Status: CANCELLED | OUTPATIENT
Start: 2021-04-05

## 2021-04-05 RX ORDER — HEPARIN SODIUM (PORCINE) LOCK FLUSH IV SOLN 100 UNIT/ML 100 UNIT/ML
500 SOLUTION INTRAVENOUS AS NEEDED
Status: DISCONTINUED | OUTPATIENT
Start: 2021-04-05 | End: 2021-04-06 | Stop reason: HOSPADM

## 2021-04-05 RX ORDER — HEPARIN SODIUM (PORCINE) LOCK FLUSH IV SOLN 100 UNIT/ML 100 UNIT/ML
500 SOLUTION INTRAVENOUS AS NEEDED
Status: CANCELLED | OUTPATIENT
Start: 2021-04-05

## 2021-04-05 RX ADMIN — HEPARIN 500 UNITS: 100 SYRINGE at 12:57

## 2021-04-05 RX ADMIN — Medication 30 ML: at 12:55

## 2021-05-03 ENCOUNTER — HOSPITAL ENCOUNTER (OUTPATIENT)
Dept: ONCOLOGY | Facility: HOSPITAL | Age: 56
Setting detail: INFUSION SERIES
Discharge: HOME | End: 2021-05-03

## 2021-05-03 VITALS — BODY MASS INDEX: 25.71 KG/M2 | WEIGHT: 206.8 LBS | HEIGHT: 75 IN

## 2021-05-03 DIAGNOSIS — Z45.2 ENCOUNTER FOR CARE RELATED TO VASCULAR ACCESS PORT: Primary | ICD-10-CM

## 2021-05-03 PROCEDURE — 96523 IRRIG DRUG DELIVERY DEVICE: CPT

## 2021-05-03 PROCEDURE — 25010000002 HEPARIN LOCK FLUSH PER 10 UNITS: Performed by: INTERNAL MEDICINE

## 2021-05-03 RX ORDER — SODIUM CHLORIDE 0.9 % (FLUSH) 0.9 %
10 SYRINGE (ML) INJECTION AS NEEDED
Status: DISCONTINUED | OUTPATIENT
Start: 2021-05-03 | End: 2021-05-04 | Stop reason: HOSPADM

## 2021-05-03 RX ORDER — HEPARIN SODIUM (PORCINE) LOCK FLUSH IV SOLN 100 UNIT/ML 100 UNIT/ML
500 SOLUTION INTRAVENOUS AS NEEDED
Status: DISCONTINUED | OUTPATIENT
Start: 2021-05-03 | End: 2021-05-04 | Stop reason: HOSPADM

## 2021-05-03 RX ORDER — SODIUM CHLORIDE 0.9 % (FLUSH) 0.9 %
10 SYRINGE (ML) INJECTION AS NEEDED
Status: CANCELLED | OUTPATIENT
Start: 2021-05-03

## 2021-05-03 RX ORDER — HEPARIN SODIUM (PORCINE) LOCK FLUSH IV SOLN 100 UNIT/ML 100 UNIT/ML
500 SOLUTION INTRAVENOUS AS NEEDED
Status: CANCELLED | OUTPATIENT
Start: 2021-05-03

## 2021-05-03 RX ADMIN — HEPARIN 500 UNITS: 100 SYRINGE at 10:53

## 2021-05-03 RX ADMIN — Medication 10 ML: at 10:51

## 2021-05-17 ENCOUNTER — TELEPHONE (OUTPATIENT)
Dept: ONCOLOGY | Facility: HOSPITAL | Age: 56
End: 2021-05-17

## 2021-05-17 ENCOUNTER — TELEPHONE (OUTPATIENT)
Dept: ONCOLOGY | Facility: CLINIC | Age: 56
End: 2021-05-17

## 2021-05-17 NOTE — TELEPHONE ENCOUNTER
Provider: GENET  Caller: EPI MCBRIDE   Relationship to Patient: SELF    Phone Number: [phone]  Reason for Call: PT STATES HE IS SUPPOSE TO BE SCHEDULED FOR A CT SCAN AND THE ORDERS HAVE NOT BEEN SENT OVER YET, NEEDS THIS DONE ASAP AS HE HAS APT COMING UP WITH DR DE LEÓN, NEEDS THIS DONE BEFORE APT     ORDER NEEDS TO GO TO [facility]

## 2021-06-01 ENCOUNTER — OFFICE VISIT (OUTPATIENT)
Dept: ONCOLOGY | Facility: CLINIC | Age: 56
End: 2021-06-01

## 2021-06-01 ENCOUNTER — HOSPITAL ENCOUNTER (OUTPATIENT)
Dept: ONCOLOGY | Facility: HOSPITAL | Age: 56
Setting detail: INFUSION SERIES
Discharge: HOME | End: 2021-06-01

## 2021-06-01 VITALS
TEMPERATURE: 97.7 F | RESPIRATION RATE: 18 BRPM | BODY MASS INDEX: 25.49 KG/M2 | DIASTOLIC BLOOD PRESSURE: 89 MMHG | HEIGHT: 75 IN | WEIGHT: 205 LBS | SYSTOLIC BLOOD PRESSURE: 129 MMHG | HEART RATE: 84 BPM

## 2021-06-01 DIAGNOSIS — D64.9 ANEMIA, UNSPECIFIED TYPE: ICD-10-CM

## 2021-06-01 DIAGNOSIS — C34.91 NON-SMALL CELL CARCINOMA OF LUNG, RIGHT (HCC): ICD-10-CM

## 2021-06-01 DIAGNOSIS — Z45.2 ENCOUNTER FOR CARE RELATED TO VASCULAR ACCESS PORT: Primary | ICD-10-CM

## 2021-06-01 DIAGNOSIS — D64.9 ANEMIA, UNSPECIFIED TYPE: Primary | ICD-10-CM

## 2021-06-01 LAB
ALBUMIN SERPL-MCNC: 4.6 G/DL (ref 3.5–5.2)
ALBUMIN/GLOB SERPL: 1.8 G/DL
ALP SERPL-CCNC: 69 U/L (ref 39–117)
ALT SERPL W P-5'-P-CCNC: 13 U/L (ref 1–41)
ANION GAP SERPL CALCULATED.3IONS-SCNC: 13 MMOL/L (ref 5–15)
AST SERPL-CCNC: 21 U/L (ref 1–40)
BASOPHILS # BLD AUTO: 0.1 10*3/MM3 (ref 0–0.2)
BASOPHILS NFR BLD AUTO: 1.4 % (ref 0–1.5)
BILIRUB SERPL-MCNC: 0.4 MG/DL (ref 0–1.2)
BUN SERPL-MCNC: 16 MG/DL (ref 6–20)
BUN/CREAT SERPL: 13 (ref 7–25)
CALCIUM SPEC-SCNC: 8.9 MG/DL (ref 8.6–10.5)
CHLORIDE SERPL-SCNC: 97 MMOL/L (ref 98–107)
CO2 SERPL-SCNC: 24 MMOL/L (ref 22–29)
CREAT SERPL-MCNC: 1.23 MG/DL (ref 0.76–1.27)
DEPRECATED RDW RBC AUTO: 44 FL (ref 37–54)
EOSINOPHIL # BLD AUTO: 0.48 10*3/MM3 (ref 0–0.4)
EOSINOPHIL NFR BLD AUTO: 6.9 % (ref 0.3–6.2)
ERYTHROCYTE [DISTWIDTH] IN BLOOD BY AUTOMATED COUNT: 12.1 % (ref 12.3–15.4)
GFR SERPL CREATININE-BSD FRML MDRD: 61 ML/MIN/1.73
GLOBULIN UR ELPH-MCNC: 2.6 GM/DL
GLUCOSE SERPL-MCNC: 100 MG/DL (ref 65–99)
HCT VFR BLD AUTO: 39.1 % (ref 37.5–51)
HGB BLD-MCNC: 13 G/DL (ref 13–17.7)
LYMPHOCYTES # BLD AUTO: 1.14 10*3/MM3 (ref 0.7–3.1)
LYMPHOCYTES NFR BLD AUTO: 16.4 % (ref 19.6–45.3)
MCH RBC QN AUTO: 33.4 PG (ref 26.6–33)
MCHC RBC AUTO-ENTMCNC: 33.2 G/DL (ref 31.5–35.7)
MCV RBC AUTO: 100.5 FL (ref 79–97)
MONOCYTES # BLD AUTO: 0.81 10*3/MM3 (ref 0.1–0.9)
MONOCYTES NFR BLD AUTO: 11.7 % (ref 5–12)
NEUTROPHILS NFR BLD AUTO: 4.41 10*3/MM3 (ref 1.7–7)
NEUTROPHILS NFR BLD AUTO: 63.6 % (ref 42.7–76)
PLATELET # BLD AUTO: 271 10*3/MM3 (ref 140–450)
PMV BLD AUTO: 8.4 FL (ref 6–12)
POTASSIUM SERPL-SCNC: 4.2 MMOL/L (ref 3.5–5.2)
PROT SERPL-MCNC: 7.2 G/DL (ref 6–8.5)
RBC # BLD AUTO: 3.89 10*6/MM3 (ref 4.14–5.8)
SODIUM SERPL-SCNC: 134 MMOL/L (ref 136–145)
T3FREE SERPL-MCNC: 1.47 PG/ML (ref 2–4.4)
T4 FREE SERPL-MCNC: 0.31 NG/DL (ref 0.93–1.7)
TSH SERPL DL<=0.05 MIU/L-ACNC: 62.81 UIU/ML (ref 0.27–4.2)
WBC # BLD AUTO: 6.94 10*3/MM3 (ref 3.4–10.8)

## 2021-06-01 PROCEDURE — 84443 ASSAY THYROID STIM HORMONE: CPT | Performed by: INTERNAL MEDICINE

## 2021-06-01 PROCEDURE — 36591 DRAW BLOOD OFF VENOUS DEVICE: CPT

## 2021-06-01 PROCEDURE — 25010000002 HEPARIN LOCK FLUSH PER 10 UNITS: Performed by: INTERNAL MEDICINE

## 2021-06-01 PROCEDURE — 99214 OFFICE O/P EST MOD 30 MIN: CPT | Performed by: INTERNAL MEDICINE

## 2021-06-01 PROCEDURE — 84481 FREE ASSAY (FT-3): CPT | Performed by: INTERNAL MEDICINE

## 2021-06-01 PROCEDURE — 80053 COMPREHEN METABOLIC PANEL: CPT | Performed by: INTERNAL MEDICINE

## 2021-06-01 PROCEDURE — 84439 ASSAY OF FREE THYROXINE: CPT | Performed by: INTERNAL MEDICINE

## 2021-06-01 PROCEDURE — 85025 COMPLETE CBC W/AUTO DIFF WBC: CPT | Performed by: INTERNAL MEDICINE

## 2021-06-01 RX ORDER — SODIUM CHLORIDE 0.9 % (FLUSH) 0.9 %
10 SYRINGE (ML) INJECTION AS NEEDED
Status: DISCONTINUED | OUTPATIENT
Start: 2021-06-01 | End: 2021-06-02 | Stop reason: HOSPADM

## 2021-06-01 RX ORDER — SODIUM CHLORIDE 0.9 % (FLUSH) 0.9 %
10 SYRINGE (ML) INJECTION AS NEEDED
Status: CANCELLED | OUTPATIENT
Start: 2021-06-01

## 2021-06-01 RX ORDER — HEPARIN SODIUM (PORCINE) LOCK FLUSH IV SOLN 100 UNIT/ML 100 UNIT/ML
500 SOLUTION INTRAVENOUS AS NEEDED
Status: DISCONTINUED | OUTPATIENT
Start: 2021-06-01 | End: 2021-06-02 | Stop reason: HOSPADM

## 2021-06-01 RX ORDER — HEPARIN SODIUM (PORCINE) LOCK FLUSH IV SOLN 100 UNIT/ML 100 UNIT/ML
500 SOLUTION INTRAVENOUS AS NEEDED
Status: CANCELLED | OUTPATIENT
Start: 2021-06-01

## 2021-06-01 RX ADMIN — HEPARIN 500 UNITS: 100 SYRINGE at 11:55

## 2021-06-01 RX ADMIN — Medication 30 ML: at 10:48

## 2021-06-01 NOTE — PROGRESS NOTES
Hematology/Oncology Outpatient Follow Up    PATIENT NAME:Jc Driscoll  :1965  MRN: [id number]  PRIMARY CARE PHYSICIAN: Reshma Alvarenga MD  REFERRING PHYSICIAN: Reshma Alvarenga MD    Chief Complaint   Patient presents with   • Follow-up     anemia, Non small cell carcinoma of lung, right        HISTORY OF PRESENT ILLNESS:     This is a 55 y.o. who developed left shoulder pain and for that reason he had a chest x-ray done on 19 which showed a 2.7 cm noncalcified right lower lobe nodule was identified.  For that reason a CT scan of the chest was recommended.  Review of his records shows patient had the CT scan on 19 done without contrast.  This basically showed a lobulated noncalcified mass in the posterior aspect of the right lower lobe measuring 3 cm close to the pleural surface.  There is a calcified 1.2 mm nodule in the lateral aspect of the right lower lobe consistent with a granuloma.  There were also calcified subcarinal and right hilar nodules.  There is a lower right side tracheal nodule measuring 1 cm.  Patient had a PET/CT scan done on 19 which showed increased metabolic activity on the right lower lobe mass that measures 2.5 cm with SUV of 4.4.  There was also increased metabolic activity in the subcarinal space measuring 2.8 cm in size with SUV of 3.4, increased activity in an enlarged right paratracheal lymph node that measures 1.9 cm, SUV of 5, also suspicious for metastatic adenopathy.  Patient had a CT-guided biopsy of the right lower lobe mass on 3/8/19.  This showed adenocarcinoma, TTF-1 positive.  On 3/18/19 patient underwent endoscopic ultrasound and biopsy of a subcarinal lymph node.  This was positive for malignant cells.  Patient was then taken back to surgery on 3/20/18 and had left Mediport placement by Dr. Fuchs.  He has been referred for further evaluation and management of his stage 3 adenocarcinoma of the right lung.  He was accompanied by his spouse for  the appointment on 3/26/19.  Patient was scheduled to have a brain MRI for complete staging, but he could not do this due to claustrophobia.  He is willing to try with the help of angiolytics.    • Patient had brain MRI done on 4/5/19.  He states it did not show any evidence of metastatic disease.  Evidence of chronic sinusitis was noted.    • Patient was seen by Dr. Escalona who has recommended concurrent chemotherapy and radiation.  Patient is scheduled to begin on 4/15/19.   • 4/17/19 - Patient was initiated on combined chemotherapy and radiation with Cisplatin and Alimta.  Patient received cycle 1.      • 5/8/19 - Patient received cycle 2 of chemotherapy with Cisplatin and Alimta.   • 5/15/19 - Chemistry panel showed creatinine of 1.7.  WBC 1.8, hemoglobin 11.6, platelet count 72,000.   • 5/20/19 - BUN 10, creatinine 1.2, potassium 3.5.    • 5/23/19 - CT scan of the chest with contrast showed interval decrease in size of the right lower lobe pulmonary nodule now measuring 2.1 cm in size.  There was no mediastinal or hilar adenopathy identified.  • 6/26/2019 patient underwent right lower lobectomy with hilar and mediastinal lymph node dissection performed by Dr. Terrance Mckeon.  • Pathology revealed residual residual 2.2 cm invasive moderately differentiated adenocarcinoma.  There were a total of 16 lymph nodes evaluated that 7 had metastatic disease.  There was evidence of lymphovascular invasion, extranodal involvement.  All the surgical margins were negative and distance of the closest margin was 2.5 cm.  Logic stage is T1c N2 M0.  • Patient is here today accompanied by his spouse for this appointment.  He continues to complain of some postop discomfort, numbness but his skin is intact.  There is no drainage.  Has had follow-up with Dr. Fuchs.  • 8/14/2019-seen by Dr. Maria at the [facility].  Dr. Maria has recommended immunotherapy with durvalumab off label use as patient has not had significant  response to neoadjuvant chemotherapy and radiation.  Patient was given the option to have immunotherapy at the [facility] vs [place] and he has chosen to stay in [place]  • 8/21/19 - Started cycle 1 day 1 Imfinzi  • 9/4/2019 patient had CT scan of the chest this shows a moderate size right pleural effusion.  There are areas of groundglass opacification in the right upper lobe without any evidence of significant septal thickening.  Volume loss noted there is also moderate pleural effusion.  There is no suspicious recurrent malignancy.  There were nonenlarged residual mediastinal lymph nodes.  • 9/9/19 - WBC 6.23, Hgb 11.7 Platelets 257,000, , BUN 9, Cr 1.1,Vitamin B12 318, Ferritin 437  • 9/23/19 - received cycle 2 day 1 Imfinzi  • 10/9/19- Seen by Dr rodríguez with ENT for sinus disease  • 11/4/2019-patient received cycle 5 of Imfinzi(durvalumab)  • 12/2/2019 patient had cycle 7 of durvalumab  • 12/5/2019-patient had CT scan of the  abdomen and pelvis with small right pleural sided pleural effusion.There is no evidence of metastatic disease  • 12/30/2019-patient received cycle 9 of durvalumab  • 12/31/2019-patient had CT scan of the chest showed small to moderate left pleural effusion.  Iglesias appearing right lower paratracheal and right peribronchial soft tissue thickening without any discrete pathologically enlarged mediastinal or hilar lymph nodes.  • 1/27/20-patient received cycle 11 of durvalumab  • 2/17/20-patient had a stress test which was negative for ischemia  • 2/17/2020-patient had CT of the chest which showed postop changes on the right lung, right pleural effusion but no enlarging mass was noted  • 3/2/2020-patient received cycle 12 of durvalumab  • 3/16/2020-patient had ultrasound-guided right thoracentesis.  Pathology was negative for malignancy  • 4/13/2020-patient received cycle 15 of immunotherapy with durvalumab  • 5/11/2020-patient received cycle 17 of immunotherapy with  durvalumab  • 6/9/2020-patient had CT scan of the chest, abdomen and pelvis for lung cancer restaging.  There is stable small chronic loculated right basilar pleural effusion suggesting chronic pleuritis.  There is no evidence of metastatic disease in the abdomen or pelvis  • 7/20/2020 patient received cycle 22 of Durvalumab  • 8/17/2020 patient received cycle 24 and last cycle of durvalumab  • 9/24/2020 patient had CT scan of the chest, abdomen and pelvis for cancer restaging there was no evidence of local recurrence or metastatic disease within the abdomen and pelvis.  He has stable chronic small right pleural effusion.  Mild sigmoid diverticulosis was noted.  • 1/18/2021 patient had CT scan of the chest, abdomen and pelvis reviewed patient  • 5/24/2021 patient had CT scan of the chest calcified subcarinal lymph node consistent with changes of prior granulomatous disease.  Chronic small right pleural effusion is noted similar to prior exam.  Previously shown 4 mm nodule in the left lower lobe has nearly completely resolved.  The subpleural 3 mm nodule located within the posterior left lower lobe With resolved.    Past Medical History:   Diagnosis Date   • Allergic rhinitis 7/25/2013    Overview:  4/2/2018 - still zyrtec 10 daily (if stops, gets dermatitis again).    • Anxiety and depression 6/5/2012    Overview:  4/2/2018 -   C/w well 300 xr and Lito 20.  4/8/2019 -   Doing ok even with new lung cancer - ltio 20 & well 300xr.    • Chronic pansinusitis 4/8/2019    Overview:  4/8/2019 -   MRI showed chronic thick in frontal, maxillary, ethmoidal ---> to Dr. COLLAZO to est since abx ICC didn't help.  Does netipot bid.    • Diastolic CHF (CMS/HCC) 7/9/2014    Overview:  7/4/14 - impaired LV relaxation on Zhou ECHO.  EF 60%. Rest normal   • Dupuytren's contracture of both hands    • Elevated cholesterol    • Erosive esophagitis 7/9/2019    Overview:  EGD 2016 4/2/2018 - nexium OTC daily for few week.  Qod before that.  Now  carbonation hurts, epigastric pain 4/8/2019 -   protonix 40 doing well.    • Gastroesophageal reflux disease 2/21/2019   • Hiatal hernia 7/9/2019   • History of colon polyps    • Hypertension    • Lung cancer (CMS/HCC)     right lung and in lymph nodes x2   • Mediastinal lymphadenopathy 3/12/2019   • Normocytic anemia 8/8/2019    Overview:  6/2019 - dropped to 9's postop 7/2019 - rebounded to 10's.  8/8/2019 -   Back to 9's - check ferritin, b12 and thyroid.    • Prediabetes 7/9/2014    Overview:  7/4/14 - a1c 6.1 at [facility] admission   • Retention of urine 6/27/2019       Past Surgical History:   Procedure Laterality Date   • BRONCHOSCOPY  03/18/2019    EBUS MONTERO NEEDLE BX   • COLONOSCOPY     • DUPUYTREN CONTRACTURE RELEASE Bilateral    • LUNG BIOPSY  03/08/2019    CT GUIDED   • LUNG REMOVAL, PARTIAL Right     right lower   • PORTACATH PLACEMENT  03/20/2019    DR LONGORIA   • THORACOSCOPY Right 6/26/2019    Procedure: RIGHT VATS, OPEN LOWER LOBECTOMY WITH LYMPH NODE DISECTION, WEDGE RESECTION OF RIGHT MIDDLE LOBE;  Surgeon: Terrance Longoria MD;  Location: [facility];  Service: Cardiothoracic         Current Outpatient Medications:   •  amLODIPine (NORVASC) 10 MG tablet, Take 10 mg by mouth Daily., Disp: , Rfl:   •  buPROPion XL (WELLBUTRIN XL) 300 MG 24 hr tablet, Take 300 mg by mouth Every Morning., Disp: , Rfl:   •  desvenlafaxine (PRISTIQ) 50 MG 24 hr tablet, TAKE 1 TABLET BY MOUTH ONCE DAILY FOR 30 DAYS, Disp: , Rfl:   •  lidocaine-prilocaine (EMLA) 2.5-2.5 % cream, , Disp: , Rfl:   •  LORazepam (ATIVAN) 0.5 MG tablet, TAKE 1 TABLET BY MOUTH TWICE DAILY AS NEEDED FOR ANXIETY MAX OF 2 DAY, Disp: , Rfl: 0  •  metoprolol succinate XL (TOPROL-XL) 100 MG 24 hr tablet, Take 100 mg by mouth Daily., Disp: , Rfl:   •  pantoprazole (PROTONIX) 40 MG EC tablet, Take 40 mg by mouth Daily., Disp: , Rfl:   •  pravastatin (PRAVACHOL) 10 MG tablet, Take 10 mg by mouth Daily., Disp: , Rfl:   No current facility-administered  medications for this visit.    Facility-Administered Medications Ordered in Other Visits:   •  heparin injection 500 Units, 500 Units, Intravenous, Chasity TIERNEY Ifeoma Roseline, MD  •  sodium chloride 0.9 % flush 10 mL, 10 mL, Intravenous, PRNChasity Ifeoma Roseline, MD, 30 mL at 21 1048    No Known Allergies    Family History   Problem Relation Age of Onset   • Cancer Sister    • Cancer Mother    • Lung cancer Father        Cancer-related family history includes Cancer in his mother and sister; Lung cancer in his father.    Social History     Tobacco Use   • Smoking status: Former Smoker     Types: Cigarettes     Quit date: 2009     Years since quittin.7   • Smokeless tobacco: Never Used   Substance Use Topics   • Alcohol use: Yes     Alcohol/week: 2.0 standard drinks     Types: 2 Cans of beer per week     Comment: Occasional   • Drug use: No     I have reviewed and confirmed the accuracy of the patient's history:  as entered by the MA/LPN/RN. Azucena Gaspar MD 21     SUBJECTIVE:    Patient is here today for routine follow-up.  He does not have any specific complaints today other than fatigue      REVIEW OF SYSTEMS:    Review of Systems   Constitutional: Negative for chills and fever.   HENT: Negative for ear pain, mouth sores, nosebleeds and sore throat.    Eyes: Negative for photophobia and visual disturbance.   Respiratory: Negative for wheezing and stridor.    Cardiovascular: Negative for chest pain and palpitations.   Gastrointestinal: Negative for abdominal pain, diarrhea, nausea and vomiting.   Endocrine: Negative for cold intolerance and heat intolerance.   Genitourinary: Negative for dysuria and hematuria.   Musculoskeletal: Negative for joint swelling and neck stiffness.   Skin: Negative for color change and rash.   Neurological: Negative for seizures and syncope.   Hematological: Negative for adenopathy.   Psychiatric/Behavioral: Negative for agitation, confusion and  "hallucinations.       I have reviewed and confirmed the accuracy of the ROS as documented by the MA/LPN/RN Azucena Livia Gaspar MD        OBJECTIVE:  Vitals:    06/01/21 1053   BP: 129/89   Pulse: 84   Resp: 18   Temp: 97.7 °F (36.5 °C)   TempSrc: Temporal   Weight: 93 kg (205 lb)   Height: 190.5 cm (75\")   PainSc: 0-No pain     ECOG  Eastern Cooperative Oncology Group (ECOG, [place], World Health Organization) performance scale  0 Fully active; no performance restrictions.  1 Strenuous physical activity restricted; fully ambulatory and able to carry out light work.  2 Capable of all self-care but unable to carry out any work activities. Up and about >50% of waking hours.  3 Capable of only limited self-care; confined to bed or chair >50% of waking hours.  4 Completely disabled; cannot carry out any self-care; totally confined to bed or chair.    Physical Exam   Constitutional: He is oriented to person, place, and time. He appears well-developed. No distress.   HENT:   Head: Normocephalic and atraumatic.   Right Ear: External ear normal.   Left Ear: External ear normal.   Nose: Nose normal.   Eyes: Pupils are equal, round, and reactive to light. Conjunctivae are normal. Right eye exhibits no discharge. Left eye exhibits no discharge. No scleral icterus.   Neck: No thyromegaly present.   Cardiovascular: Normal rate, regular rhythm and normal heart sounds. Exam reveals no gallop and no friction rub.   Pulmonary/Chest: Effort normal. No stridor. No respiratory distress. He has no wheezes.   Abdominal: Soft. Bowel sounds are normal. He exhibits no mass. There is no abdominal tenderness. There is no rebound and no guarding.   Musculoskeletal: Normal range of motion. No tenderness.   Lymphadenopathy:     He has no cervical adenopathy.   Neurological: He is alert and oriented to person, place, and time. He exhibits normal muscle tone.   Skin: Skin is warm. No rash noted. He is not diaphoretic. No erythema. "   Urticarial-like rash, right medial knee likely due to immunotherapy   Psychiatric: His behavior is normal. Judgment and thought content normal.   Nursing note and vitals reviewed.    I have reexamined the patient and the results are consistent with the previously documented exam. Azucena Gaspar MD     RECENT LABS    WBC   Date Value Ref Range Status   06/01/2021 6.94 3.40 - 10.80 10*3/mm3 Final   07/03/2019 8.99 4.5 - 11.0 10*3/uL Final     RBC   Date Value Ref Range Status   06/01/2021 3.89 (L) 4.14 - 5.80 10*6/mm3 Final   07/03/2019 3.24 (L) 4.5 - 5.9 10*6/uL Final     Hemoglobin   Date Value Ref Range Status   06/01/2021 13.0 13.0 - 17.7 g/dL Final   07/03/2019 10.4 (L) 13.5 - 17.5 g/dL Final     Hematocrit   Date Value Ref Range Status   06/01/2021 39.1 37.5 - 51.0 % Final   07/03/2019 32.8 (L) 41.0 - 53.0 % Final     MCV   Date Value Ref Range Status   06/01/2021 100.5 (H) 79.0 - 97.0 fL Final   07/03/2019 101.2 (H) 80.0 - 100.0 fL Final     MCH   Date Value Ref Range Status   06/01/2021 33.4 (H) 26.6 - 33.0 pg Final   07/03/2019 32.1 26.0 - 34.0 pg Final     MCHC   Date Value Ref Range Status   06/01/2021 33.2 31.5 - 35.7 g/dL Final   07/03/2019 31.7 31.0 - 37.0 g/dL Final     RDW   Date Value Ref Range Status   06/01/2021 12.1 (L) 12.3 - 15.4 % Final   07/03/2019 15.2 12.0 - 16.8 % Final     RDW-SD   Date Value Ref Range Status   06/01/2021 44.0 37.0 - 54.0 fl Final     MPV   Date Value Ref Range Status   06/01/2021 8.4 6.0 - 12.0 fL Final   07/03/2019 9.0 6.7 - 10.8 fL Final     Platelets   Date Value Ref Range Status   06/01/2021 271 140 - 450 10*3/mm3 Final   07/03/2019 302 140 - 440 10*3/uL Final     Neutrophil Rel %   Date Value Ref Range Status   07/03/2019 60.0 45 - 80 % Final     Neutrophil %   Date Value Ref Range Status   06/01/2021 63.6 42.7 - 76.0 % Final     Lymphocyte Rel %   Date Value Ref Range Status   07/03/2019 14.8 (L) 15 - 50 % Final     Lymphocyte %   Date Value Ref Range  Status   06/01/2021 16.4 (L) 19.6 - 45.3 % Final     Monocyte Rel %   Date Value Ref Range Status   07/03/2019 12.2 0 - 15 % Final     Monocyte %   Date Value Ref Range Status   06/01/2021 11.7 5.0 - 12.0 % Final     Eosinophil %   Date Value Ref Range Status   06/01/2021 6.9 (H) 0.3 - 6.2 % Final   07/03/2019 10.4 (H) 0 - 7 % Final     Basophil Rel %   Date Value Ref Range Status   07/03/2019 1.7 0 - 2 % Final     Basophil %   Date Value Ref Range Status   06/01/2021 1.4 0.0 - 1.5 % Final     Neutrophils Absolute   Date Value Ref Range Status   07/03/2019 5.39 1.5 - 7.5 /uL Final   07/03/2019 5.39 2.0 - 8.8 10*3/uL Final     Neutrophils, Absolute   Date Value Ref Range Status   06/01/2021 4.41 1.70 - 7.00 10*3/mm3 Final     Lymphocytes Absolute   Date Value Ref Range Status   07/03/2019 1.33 0.7 - 5.5 10*3/uL Final     Lymphocytes, Absolute   Date Value Ref Range Status   06/01/2021 1.14 0.70 - 3.10 10*3/mm3 Final     Monocytes Absolute   Date Value Ref Range Status   07/03/2019 1.10 0.0 - 1.7 10*3/uL Final     Monocytes, Absolute   Date Value Ref Range Status   06/01/2021 0.81 0.10 - 0.90 10*3/mm3 Final     Eosinophils Absolute   Date Value Ref Range Status   07/03/2019 0.93 (H) 0.0 - 0.8 10*3/uL Final     Eosinophils, Absolute   Date Value Ref Range Status   06/01/2021 0.48 (H) 0.00 - 0.40 10*3/mm3 Final     Basophils Absolute   Date Value Ref Range Status   07/03/2019 0.15 0.0 - 0.2 10*3/uL Final     Basophils, Absolute   Date Value Ref Range Status   06/01/2021 0.10 0.00 - 0.20 10*3/mm3 Final     nRBC   Date Value Ref Range Status   02/18/2020 0.0 0.0 - 0.2 /100 WBC Final       Lab Results   Component Value Date    GLUCOSE 174 (H) 01/25/2021    BUN 13 01/25/2021    CREATININE 1.25 01/25/2021    EGFRIFNONA 60 (L) 01/25/2021    EGFRIFAFRI >60 05/20/2019    BCR 10.4 01/25/2021    K 3.7 01/25/2021    CO2 26.0 01/25/2021    CALCIUM 9.5 01/25/2021    ALBUMIN 4.60 01/25/2021    LABIL2 1.4 07/03/2019    AST 38  01/25/2021    ALT 18 01/25/2021         Assessment/Plan     Anemia, unspecified type  - CBC & Differential    Non-small cell carcinoma of lung, right (CMS/HCC)  - CBC & Differential      ASSESSMENT:    1. Adenocarcinoma of the right lung, T1c N2 M0, consistent with clinical stage 3A disease.  Continue surveillance CT scans  2. S/P combined chemotherapy and radiation with cisplatin and Alimta.  Surveillance CT scan  3. CT scan of the chest done May 2021 does not show any evidence of progressive disease or relapsed disease.  We will continue surveillance CT scan in 4 months which will be due September 2021  4. Status post right lower lobectomy with mediastinal and hilar lymph node dissection with residual disease.  pT1 cN2 M0.  Patient with high risk features including lymphovascular invasion, extranodal capsular extension and persistent multiple lymph node disease.  Ongoing management  5. Status post consolidation treatment with immunotherapy with Imfinzi:  6. Port maintenance: Patient still wants to keep port in little longer.  Will reevaluate after 3 years which will be due February 2020  7. fatigue: We will check thyroid function tests  8. Satus post right thoracentesis with cytology negative for malignancy  9. Postop anemia: Reviewed  10. Assessment has been reviewed and updated      PLANS:    1. I reviewed his CT scans of the chest done May 2021 with no evidence of progressive disease.    2. Continue monthly port flushes  3. CMP, free T3, free T4 and TSH levels  4. rescan CT scan of the chest, abdomen and pelvis without IV contrast due end of September 2021 if needed for the next appointment  5. He has completed all planned cycles of immunotherapy.  He has no evidence of disease relapse at this time  6. Continue B12 injections  monthly: Patient did have elevated methylmalonic acid level during the early phases of his treatments.  Continue the same  7. Refered to ENT for evaluation for sinus surgery : Not covered  by insurance per pt.  8. Colonoscopy postponed by patient to January.  Encouraged to complete  9. Continue supportive care  10. Follow-up with me in 4 months  11. All questions answered to the best of my abilities         Patient has  questions which have all been answered to the best of my abilities    I have reviewed labs results, imaging, vitals, and medications with the patient today.     Patient verbalized understanding and is in agreement of the above plan.    I spent 30 total minutes, face-to-face, caring for Jc today.  90% of this time involved counseling and/or coordination of care as documented within this note.

## 2021-06-02 ENCOUNTER — TELEPHONE (OUTPATIENT)
Dept: ONCOLOGY | Facility: CLINIC | Age: 56
End: 2021-06-02

## 2021-06-02 NOTE — TELEPHONE ENCOUNTER
Called pt to let him know that he has significant hypothyroidism and that he should follow-up with his PCP as soon as possible to begin thyroid replacement therapy. Pt verbalized understanding and said he would make an appointment.

## 2021-06-02 NOTE — TELEPHONE ENCOUNTER
----- Message from Azucena Gaspar MD sent at 6/2/2021  7:34 AM EDT -----  He has significant hypothyroidism.  He needs to follow-up with his primary care physician as soon as possible to begin thyroid replacement therapy.

## 2021-07-09 ENCOUNTER — TELEPHONE (OUTPATIENT)
Dept: ONCOLOGY | Facility: CLINIC | Age: 56
End: 2021-07-09

## 2021-07-09 NOTE — TELEPHONE ENCOUNTER
Caller: Jc Driscoll    Relationship: Self    Best call back number: [phone]    What was the call regarding: JC CALLED TO SEE IF HE COULD GET HIS FLUSH APPT ON Monday 07/12 MOVED TO AN EARLIER TIME. HE SAYS IF THAT IS NOT POSSIBLE, HE WOULD LIKE IT MOVED TO THE FOLLOWING Monday. PLEASE CALL BACK TO LET HIM KNOW.    Do you require a callback: YES

## 2021-07-12 ENCOUNTER — HOSPITAL ENCOUNTER (OUTPATIENT)
Dept: ONCOLOGY | Facility: HOSPITAL | Age: 56
Setting detail: INFUSION SERIES
Discharge: HOME | End: 2021-07-12

## 2021-07-12 VITALS — WEIGHT: 201.6 LBS | HEIGHT: 75 IN | BODY MASS INDEX: 25.07 KG/M2

## 2021-07-12 DIAGNOSIS — Z45.2 ENCOUNTER FOR CARE RELATED TO VASCULAR ACCESS PORT: Primary | ICD-10-CM

## 2021-07-12 PROCEDURE — 25010000002 HEPARIN LOCK FLUSH PER 10 UNITS: Performed by: INTERNAL MEDICINE

## 2021-07-12 PROCEDURE — 96523 IRRIG DRUG DELIVERY DEVICE: CPT

## 2021-07-12 RX ORDER — SODIUM CHLORIDE 0.9 % (FLUSH) 0.9 %
10 SYRINGE (ML) INJECTION AS NEEDED
Status: CANCELLED | OUTPATIENT
Start: 2021-07-12

## 2021-07-12 RX ORDER — HEPARIN SODIUM (PORCINE) LOCK FLUSH IV SOLN 100 UNIT/ML 100 UNIT/ML
500 SOLUTION INTRAVENOUS AS NEEDED
Status: DISCONTINUED | OUTPATIENT
Start: 2021-07-12 | End: 2021-07-13 | Stop reason: HOSPADM

## 2021-07-12 RX ORDER — HEPARIN SODIUM (PORCINE) LOCK FLUSH IV SOLN 100 UNIT/ML 100 UNIT/ML
500 SOLUTION INTRAVENOUS AS NEEDED
Status: CANCELLED | OUTPATIENT
Start: 2021-07-12

## 2021-07-12 RX ORDER — SODIUM CHLORIDE 0.9 % (FLUSH) 0.9 %
10 SYRINGE (ML) INJECTION AS NEEDED
Status: DISCONTINUED | OUTPATIENT
Start: 2021-07-12 | End: 2021-07-13 | Stop reason: HOSPADM

## 2021-07-12 RX ADMIN — Medication 30 ML: at 10:20

## 2021-07-12 RX ADMIN — HEPARIN 500 UNITS: 100 SYRINGE at 10:22

## 2021-07-12 NOTE — TELEPHONE ENCOUNTER
PT CALLED BK TO TRY TO GET EARLIER APPT HUB TRIED TO CL OFFICE BUT NO ANSWER. PLZ CALL PT BK WITH EITHER EARLIER APPT OR SCHEDULE FOR NEXT Monday IF POSSIBLE.

## 2021-08-09 ENCOUNTER — HOSPITAL ENCOUNTER (OUTPATIENT)
Dept: ONCOLOGY | Facility: HOSPITAL | Age: 56
Setting detail: INFUSION SERIES
Discharge: HOME | End: 2021-08-09

## 2021-08-09 VITALS — BODY MASS INDEX: 24.94 KG/M2 | WEIGHT: 200.6 LBS | HEIGHT: 75 IN

## 2021-08-09 DIAGNOSIS — Z45.2 ENCOUNTER FOR CARE RELATED TO VASCULAR ACCESS PORT: Primary | ICD-10-CM

## 2021-08-09 PROCEDURE — 96523 IRRIG DRUG DELIVERY DEVICE: CPT

## 2021-08-09 PROCEDURE — 25010000002 HEPARIN LOCK FLUSH PER 10 UNITS: Performed by: INTERNAL MEDICINE

## 2021-08-09 RX ORDER — SODIUM CHLORIDE 0.9 % (FLUSH) 0.9 %
20 SYRINGE (ML) INJECTION AS NEEDED
Status: CANCELLED | OUTPATIENT
Start: 2021-08-09

## 2021-08-09 RX ORDER — HEPARIN SODIUM (PORCINE) LOCK FLUSH IV SOLN 100 UNIT/ML 100 UNIT/ML
500 SOLUTION INTRAVENOUS AS NEEDED
Status: CANCELLED | OUTPATIENT
Start: 2021-08-09

## 2021-08-09 RX ORDER — SODIUM CHLORIDE 0.9 % (FLUSH) 0.9 %
20 SYRINGE (ML) INJECTION AS NEEDED
Status: DISCONTINUED | OUTPATIENT
Start: 2021-08-09 | End: 2021-08-10 | Stop reason: HOSPADM

## 2021-08-09 RX ORDER — HEPARIN SODIUM (PORCINE) LOCK FLUSH IV SOLN 100 UNIT/ML 100 UNIT/ML
500 SOLUTION INTRAVENOUS AS NEEDED
Status: DISCONTINUED | OUTPATIENT
Start: 2021-08-09 | End: 2021-08-10 | Stop reason: HOSPADM

## 2021-08-09 RX ADMIN — HEPARIN 500 UNITS: 100 SYRINGE at 10:02

## 2021-08-09 RX ADMIN — Medication 30 ML: at 10:00

## 2021-09-02 ENCOUNTER — TELEPHONE (OUTPATIENT)
Dept: ONCOLOGY | Facility: CLINIC | Age: 56
End: 2021-09-02

## 2021-09-02 NOTE — TELEPHONE ENCOUNTER
Provider: GENET    Caller: EPI    Relationship to Patient: SELF    Phone Number: [phone]    Reason for Call: PT CALLED STATING HE NEEDS ORDER FAX TO [facility] TO SCHEDULE CT SCAN W CONTRAST. PLEASE FAX OVER ORDER AND SCHEDULE CT SCAN WITH CONTRAST FOR PT. CALL PT BACK TO [facility]. [phone]

## 2021-09-03 ENCOUNTER — TELEPHONE (OUTPATIENT)
Dept: ONCOLOGY | Facility: CLINIC | Age: 56
End: 2021-09-03

## 2021-09-03 NOTE — TELEPHONE ENCOUNTER
Caller: Jc Driscoll    Relationship: Self    Best call back number: [phone]    What orders are you requesting (i.e. lab or imaging): CT SCAN W/ CONTRAST    In what timeframe would the patient need to come in: BEFORE 9/27/21 APPT W/ MD DE LEÓN     Where will you receive your lab/imaging services: [facility]    Additional notes:

## 2021-09-24 NOTE — PROGRESS NOTES
Hematology/Oncology Outpatient Follow Up    PATIENT NAME:Jc Driscoll  :1965  MRN: [id number]  PRIMARY CARE PHYSICIAN: Reshma Alvarenga MD  REFERRING PHYSICIAN: Reshma Alvarenga MD    Chief Complaint   Patient presents with   • Follow-up     Anemia, Non small cell carcinoma of lung, right        HISTORY OF PRESENT ILLNESS:     This is a 56 y.o. who developed left shoulder pain and for that reason he had a chest x-ray done on 19 which showed a 2.7 cm noncalcified right lower lobe nodule was identified.  For that reason a CT scan of the chest was recommended.  Review of his records shows patient had the CT scan on 19 done without contrast.  This basically showed a lobulated noncalcified mass in the posterior aspect of the right lower lobe measuring 3 cm close to the pleural surface.  There is a calcified 1.2 mm nodule in the lateral aspect of the right lower lobe consistent with a granuloma.  There were also calcified subcarinal and right hilar nodules.  There is a lower right side tracheal nodule measuring 1 cm.  Patient had a PET/CT scan done on 19 which showed increased metabolic activity on the right lower lobe mass that measures 2.5 cm with SUV of 4.4.  There was also increased metabolic activity in the subcarinal space measuring 2.8 cm in size with SUV of 3.4, increased activity in an enlarged right paratracheal lymph node that measures 1.9 cm, SUV of 5, also suspicious for metastatic adenopathy.  Patient had a CT-guided biopsy of the right lower lobe mass on 3/8/19.  This showed adenocarcinoma, TTF-1 positive.  On 3/18/19 patient underwent endoscopic ultrasound and biopsy of a subcarinal lymph node.  This was positive for malignant cells.  Patient was then taken back to surgery on 3/20/18 and had left Mediport placement by Dr. Fuchs.  He has been referred for further evaluation and management of his stage 3 adenocarcinoma of the right lung.  He was accompanied by his spouse for  the appointment on 3/26/19.  Patient was scheduled to have a brain MRI for complete staging, but he could not do this due to claustrophobia.  He is willing to try with the help of angiolytics.    • Patient had brain MRI done on 4/5/19.  He states it did not show any evidence of metastatic disease.  Evidence of chronic sinusitis was noted.    • Patient was seen by Dr. Escalona who has recommended concurrent chemotherapy and radiation.  Patient is scheduled to begin on 4/15/19.   • 4/17/19 - Patient was initiated on combined chemotherapy and radiation with Cisplatin and Alimta.  Patient received cycle 1.      • 5/8/19 - Patient received cycle 2 of chemotherapy with Cisplatin and Alimta.   • 5/15/19 - Chemistry panel showed creatinine of 1.7.  WBC 1.8, hemoglobin 11.6, platelet count 72,000.   • 5/20/19 - BUN 10, creatinine 1.2, potassium 3.5.    • 5/23/19 - CT scan of the chest with contrast showed interval decrease in size of the right lower lobe pulmonary nodule now measuring 2.1 cm in size.  There was no mediastinal or hilar adenopathy identified.  • 6/26/2019 patient underwent right lower lobectomy with hilar and mediastinal lymph node dissection performed by Dr. Terrance Mckeon.  • Pathology revealed residual residual 2.2 cm invasive moderately differentiated adenocarcinoma.  There were a total of 16 lymph nodes evaluated that 7 had metastatic disease.  There was evidence of lymphovascular invasion, extranodal involvement.  All the surgical margins were negative and distance of the closest margin was 2.5 cm.  Logic stage is T1c N2 M0.  • Patient is here today accompanied by his spouse for this appointment.  He continues to complain of some postop discomfort, numbness but his skin is intact.  There is no drainage.  Has had follow-up with Dr. Fuchs.  • 8/14/2019-seen by Dr. Maria at the [facility].  Dr. Maria has recommended immunotherapy with durvalumab off label use as patient has not had significant  response to neoadjuvant chemotherapy and radiation.  Patient was given the option to have immunotherapy at the [facility] vs [place] and he has chosen to stay in [place]  • 8/21/19 - Started cycle 1 day 1 Imfinzi  • 9/4/2019 patient had CT scan of the chest this shows a moderate size right pleural effusion.  There are areas of groundglass opacification in the right upper lobe without any evidence of significant septal thickening.  Volume loss noted there is also moderate pleural effusion.  There is no suspicious recurrent malignancy.  There were nonenlarged residual mediastinal lymph nodes.  • 9/9/19 - WBC 6.23, Hgb 11.7 Platelets 257,000, , BUN 9, Cr 1.1,Vitamin B12 318, Ferritin 437  • 9/23/19 - received cycle 2 day 1 Imfinzi  • 10/9/19- Seen by Dr rodríguez with ENT for sinus disease  • 11/4/2019-patient received cycle 5 of Imfinzi(durvalumab)  • 12/2/2019 patient had cycle 7 of durvalumab  • 12/5/2019-patient had CT scan of the  abdomen and pelvis with small right pleural sided pleural effusion.There is no evidence of metastatic disease  • 12/30/2019-patient received cycle 9 of durvalumab  • 12/31/2019-patient had CT scan of the chest showed small to moderate left pleural effusion.  Iglesias appearing right lower paratracheal and right peribronchial soft tissue thickening without any discrete pathologically enlarged mediastinal or hilar lymph nodes.  • 1/27/20-patient received cycle 11 of durvalumab  • 2/17/20-patient had a stress test which was negative for ischemia  • 2/17/2020-patient had CT of the chest which showed postop changes on the right lung, right pleural effusion but no enlarging mass was noted  • 3/2/2020-patient received cycle 12 of durvalumab  • 3/16/2020-patient had ultrasound-guided right thoracentesis.  Pathology was negative for malignancy  • 4/13/2020-patient received cycle 15 of immunotherapy with durvalumab  • 5/11/2020-patient received cycle 17 of immunotherapy with  durvalumab  • 6/9/2020-patient had CT scan of the chest, abdomen and pelvis for lung cancer restaging.  There is stable small chronic loculated right basilar pleural effusion suggesting chronic pleuritis.  There is no evidence of metastatic disease in the abdomen or pelvis  • 7/20/2020 patient received cycle 22 of Durvalumab  • 8/17/2020 patient received cycle 24 and last cycle of durvalumab  • 9/24/2020 patient had CT scan of the chest, abdomen and pelvis for cancer restaging there was no evidence of local recurrence or metastatic disease within the abdomen and pelvis.  He has stable chronic small right pleural effusion.  Mild sigmoid diverticulosis was noted.  • 1/18/2021 patient had CT scan of the chest, abdomen and pelvis reviewed patient  • 5/24/2021 patient had CT scan of the chest calcified subcarinal lymph node consistent with changes of prior granulomatous disease.  Chronic small right pleural effusion is noted similar to prior exam.  Previously shown 4 mm nodule in the left lower lobe has nearly completely resolved.  The subpleural 3 mm nodule located within the posterior left lower lobe With resolved.  • 9/24/2021 patient had a CT scan of the chest, abdomen and pelvis there is soft tissue attenuation within the right paratracheal area which has been suggested to reflect sequela to previous treatment.  There is slight thickening of the bladder wall otherwise there is no evidence of metastatic disease.    Past Medical History:   Diagnosis Date   • Allergic rhinitis 7/25/2013    Overview:  4/2/2018 - still zyrtec 10 daily (if stops, gets dermatitis again).    • Anxiety and depression 6/5/2012    Overview:  4/2/2018 -   C/w well 300 xr and Lito 20.  4/8/2019 -   Doing ok even with new lung cancer - lito 20 & well 300xr.    • Chronic pansinusitis 4/8/2019    Overview:  4/8/2019 -   MRI showed chronic thick in frontal, maxillary, ethmoidal ---> to Dr. ZAY knowles est since abx ICC didn't help.  Does netipot bid.    •  Diastolic CHF (CMS/HCC) 7/9/2014    Overview:  7/4/14 - impaired LV relaxation on [facility] ECHO.  EF 60%. Rest normal   • Dupuytren's contracture of both hands    • Elevated cholesterol    • Erosive esophagitis 7/9/2019    Overview:  EGD 2016 4/2/2018 - nexium OTC daily for few week.  Qod before that.  Now carbonation hurts, epigastric pain 4/8/2019 -   protonix 40 doing well.    • Gastroesophageal reflux disease 2/21/2019   • Hiatal hernia 7/9/2019   • History of colon polyps    • Hypertension    • Lung cancer (CMS/HCC)     right lung and in lymph nodes x2   • Mediastinal lymphadenopathy 3/12/2019   • Normocytic anemia 8/8/2019    Overview:  6/2019 - dropped to 9's postop 7/2019 - rebounded to 10's.  8/8/2019 -   Back to 9's - check ferritin, b12 and thyroid.    • Prediabetes 7/9/2014    Overview:  7/4/14 - a1c 6.1 at [facility] admission   • Retention of urine 6/27/2019       Past Surgical History:   Procedure Laterality Date   • BRONCHOSCOPY  03/18/2019    EBUS MONTERO NEEDLE BX   • COLONOSCOPY     • DUPUYTREN CONTRACTURE RELEASE Bilateral    • LUNG BIOPSY  03/08/2019    CT GUIDED   • LUNG REMOVAL, PARTIAL Right     right lower   • PORTACATH PLACEMENT  03/20/2019    DR LONGORIA   • THORACOSCOPY Right 6/26/2019    Procedure: RIGHT VATS, OPEN LOWER LOBECTOMY WITH LYMPH NODE DISECTION, WEDGE RESECTION OF RIGHT MIDDLE LOBE;  Surgeon: Terrance Longoria MD;  Location[facility];  Service: Cardiothoracic         Current Outpatient Medications:   •  famotidine (PEPCID) 40 MG tablet, Take 40 mg by mouth., Disp: , Rfl:   •  levothyroxine (Euthyrox) 100 MCG tablet, Take 1 tablet by mouth once daily, Disp: , Rfl:   •  amLODIPine (NORVASC) 10 MG tablet, Take 10 mg by mouth Daily., Disp: , Rfl:   •  buPROPion XL (WELLBUTRIN XL) 300 MG 24 hr tablet, Take 300 mg by mouth Every Morning., Disp: , Rfl:   •  desvenlafaxine (PRISTIQ) 50 MG 24 hr tablet, TAKE 1 TABLET BY MOUTH ONCE DAILY FOR 30 DAYS, Disp: , Rfl:   •  lidocaine-prilocaine  (EMLA) 2.5-2.5 % cream, , Disp: , Rfl:   •  LORazepam (ATIVAN) 0.5 MG tablet, TAKE 1 TABLET BY MOUTH TWICE DAILY AS NEEDED FOR ANXIETY MAX OF 2 DAY, Disp: , Rfl: 0  •  metoprolol succinate XL (TOPROL-XL) 100 MG 24 hr tablet, Take 100 mg by mouth Daily., Disp: , Rfl:   •  pantoprazole (PROTONIX) 40 MG EC tablet, Take 40 mg by mouth Daily., Disp: , Rfl:   •  pravastatin (PRAVACHOL) 10 MG tablet, Take 10 mg by mouth Daily., Disp: , Rfl:   No current facility-administered medications for this visit.    Facility-Administered Medications Ordered in Other Visits:   •  heparin injection 500 Units, 500 Units, Intravenous, PRN, Azucena Gaspar MD  •  sodium chloride 0.9 % flush 20 mL, 20 mL, Intravenous, PRN, Azucena Gaspar MD    No Known Allergies    Family History   Problem Relation Age of Onset   • Cancer Sister    • Cancer Mother    • Lung cancer Father        Cancer-related family history includes Cancer in his mother and sister; Lung cancer in his father.    Social History     Tobacco Use   • Smoking status: Former Smoker     Types: Cigarettes     Quit date: 2009     Years since quittin.0   • Smokeless tobacco: Never Used   Substance Use Topics   • Alcohol use: Yes     Alcohol/week: 2.0 standard drinks     Types: 2 Cans of beer per week     Comment: Occasional   • Drug use: No     I have reviewed and confirmed the accuracy of the patient's history:  as entered by the MA/LPN/RN. Azucena Gaspar MD 21     SUBJECTIVE:    Patient is here today for routine follow-up.  He was recently diagnosed with hypothyroidism and currently on thyroid replacement therapy and feels much better.      REVIEW OF SYSTEMS:    Review of Systems   Constitutional: Negative for chills and fever.   HENT: Negative for ear pain, mouth sores, nosebleeds and sore throat.    Eyes: Negative for photophobia and visual disturbance.   Respiratory: Negative for wheezing and stridor.    Cardiovascular: Negative for chest  "pain and palpitations.   Gastrointestinal: Negative for abdominal pain, diarrhea, nausea and vomiting.   Endocrine: Negative for cold intolerance and heat intolerance.   Genitourinary: Negative for dysuria and hematuria.   Musculoskeletal: Negative for joint swelling and neck stiffness.   Skin: Negative for color change and rash.   Neurological: Negative for seizures and syncope.   Hematological: Negative for adenopathy.   Psychiatric/Behavioral: Negative for agitation, confusion and hallucinations.       I have reviewed and confirmed the accuracy of the ROS as documented by the MA/LPN/RN Azucena Livia Gaspar MD        OBJECTIVE:  Vitals:    09/27/21 1008   Resp: 18   Temp: 97.5 °F (36.4 °C)   TempSrc: Infrared   Weight: 91.2 kg (201 lb)   Height: 190.5 cm (75\")   PainSc: 0-No pain     ECOG  Eastern Cooperative Oncology Group (ECOG, [place], World Health Organization) performance scale  0 Fully active; no performance restrictions.  1 Strenuous physical activity restricted; fully ambulatory and able to carry out light work.  2 Capable of all self-care but unable to carry out any work activities. Up and about >50% of waking hours.  3 Capable of only limited self-care; confined to bed or chair >50% of waking hours.  4 Completely disabled; cannot carry out any self-care; totally confined to bed or chair.    Physical Exam   Constitutional: He is oriented to person, place, and time. He appears well-developed. No distress.   HENT:   Head: Normocephalic and atraumatic.   Right Ear: External ear normal.   Left Ear: External ear normal.   Nose: Nose normal.   Eyes: Pupils are equal, round, and reactive to light. Conjunctivae are normal. Right eye exhibits no discharge. Left eye exhibits no discharge. No scleral icterus.   Neck: No thyromegaly present.   Cardiovascular: Normal rate, regular rhythm and normal heart sounds. Exam reveals no gallop and no friction rub.   Pulmonary/Chest: Effort normal. No stridor. No respiratory " distress. He has no wheezes.   Abdominal: Soft. Bowel sounds are normal. He exhibits no mass. There is no abdominal tenderness. There is no rebound and no guarding.   Musculoskeletal: Normal range of motion. No tenderness.   Lymphadenopathy:     He has no cervical adenopathy.   Neurological: He is alert and oriented to person, place, and time. He exhibits normal muscle tone.   Skin: Skin is warm. No rash noted. He is not diaphoretic. No erythema.   Urticarial-like rash, right medial knee likely due to immunotherapy   Psychiatric: His behavior is normal. Judgment and thought content normal.   Nursing note and vitals reviewed.    I have reexamined the patient and the results are consistent with the previously documented exam. Azucena Gaspar MD     RECENT LABS    WBC   Date Value Ref Range Status   06/01/2021 6.94 3.40 - 10.80 10*3/mm3 Final   07/03/2019 8.99 4.5 - 11.0 10*3/uL Final     RBC   Date Value Ref Range Status   06/01/2021 3.89 (L) 4.14 - 5.80 10*6/mm3 Final   07/03/2019 3.24 (L) 4.5 - 5.9 10*6/uL Final     Hemoglobin   Date Value Ref Range Status   06/01/2021 13.0 13.0 - 17.7 g/dL Final   07/03/2019 10.4 (L) 13.5 - 17.5 g/dL Final     Hematocrit   Date Value Ref Range Status   06/01/2021 39.1 37.5 - 51.0 % Final   07/03/2019 32.8 (L) 41.0 - 53.0 % Final     MCV   Date Value Ref Range Status   06/01/2021 100.5 (H) 79.0 - 97.0 fL Final   07/03/2019 101.2 (H) 80.0 - 100.0 fL Final     MCH   Date Value Ref Range Status   06/01/2021 33.4 (H) 26.6 - 33.0 pg Final   07/03/2019 32.1 26.0 - 34.0 pg Final     MCHC   Date Value Ref Range Status   06/01/2021 33.2 31.5 - 35.7 g/dL Final   07/03/2019 31.7 31.0 - 37.0 g/dL Final     RDW   Date Value Ref Range Status   06/01/2021 12.1 (L) 12.3 - 15.4 % Final   07/03/2019 15.2 12.0 - 16.8 % Final     RDW-SD   Date Value Ref Range Status   06/01/2021 44.0 37.0 - 54.0 fl Final     MPV   Date Value Ref Range Status   06/01/2021 8.4 6.0 - 12.0 fL Final   07/03/2019 9.0  6.7 - 10.8 fL Final     Platelets   Date Value Ref Range Status   06/01/2021 271 140 - 450 10*3/mm3 Final   07/03/2019 302 140 - 440 10*3/uL Final     Neutrophil Rel %   Date Value Ref Range Status   07/03/2019 60.0 45 - 80 % Final     Neutrophil %   Date Value Ref Range Status   06/01/2021 63.6 42.7 - 76.0 % Final     Lymphocyte Rel %   Date Value Ref Range Status   07/03/2019 14.8 (L) 15 - 50 % Final     Lymphocyte %   Date Value Ref Range Status   06/01/2021 16.4 (L) 19.6 - 45.3 % Final     Monocyte Rel %   Date Value Ref Range Status   07/03/2019 12.2 0 - 15 % Final     Monocyte %   Date Value Ref Range Status   06/01/2021 11.7 5.0 - 12.0 % Final     Eosinophil %   Date Value Ref Range Status   06/01/2021 6.9 (H) 0.3 - 6.2 % Final   07/03/2019 10.4 (H) 0 - 7 % Final     Basophil Rel %   Date Value Ref Range Status   07/03/2019 1.7 0 - 2 % Final     Basophil %   Date Value Ref Range Status   06/01/2021 1.4 0.0 - 1.5 % Final     Neutrophils Absolute   Date Value Ref Range Status   07/03/2019 5.39 1.5 - 7.5 /uL Final   07/03/2019 5.39 2.0 - 8.8 10*3/uL Final     Neutrophils, Absolute   Date Value Ref Range Status   06/01/2021 4.41 1.70 - 7.00 10*3/mm3 Final     Lymphocytes Absolute   Date Value Ref Range Status   07/03/2019 1.33 0.7 - 5.5 10*3/uL Final     Lymphocytes, Absolute   Date Value Ref Range Status   06/01/2021 1.14 0.70 - 3.10 10*3/mm3 Final     Monocytes Absolute   Date Value Ref Range Status   07/03/2019 1.10 0.0 - 1.7 10*3/uL Final     Monocytes, Absolute   Date Value Ref Range Status   06/01/2021 0.81 0.10 - 0.90 10*3/mm3 Final     Eosinophils Absolute   Date Value Ref Range Status   07/03/2019 0.93 (H) 0.0 - 0.8 10*3/uL Final     Eosinophils, Absolute   Date Value Ref Range Status   06/01/2021 0.48 (H) 0.00 - 0.40 10*3/mm3 Final     Basophils Absolute   Date Value Ref Range Status   07/03/2019 0.15 0.0 - 0.2 10*3/uL Final     Basophils, Absolute   Date Value Ref Range Status   06/01/2021 0.10 0.00 -  0.20 10*3/mm3 Final     [facility]   Date Value Ref Range Status   02/18/2020 0.0 0.0 - 0.2 /100 WBC Final       Lab Results   Component Value Date    GLUCOSE 100 (H) 06/01/2021    BUN 16 06/01/2021    CREATININE 1.23 06/01/2021    EGFRIFNONA 61 06/01/2021    EGFRIFAFRI >60 05/20/2019    BCR 13.0 06/01/2021    K 4.2 06/01/2021    CO2 24.0 06/01/2021    CALCIUM 8.9 06/01/2021    ALBUMIN 4.60 06/01/2021    LABIL2 1.4 07/03/2019    AST 21 06/01/2021    ALT 13 06/01/2021         Assessment/Plan     Anemia, unspecified type  - CBC & Differential    Non-small cell carcinoma of lung, right (CMS/HCC)  - CBC & Differential      ASSESSMENT:    1. Adenocarcinoma of the right lung, T1c N2 M0, consistent with clinical stage 3A disease.  Continue surveillance CT scans  2. S/P combined chemotherapy and radiation with cisplatin and Alimta.  Surveillance CT scan  3. Reviewed his CT scan of the chest, abdomen and pelvis completed September 2021 which does not show any evidence of relapsed disease but he has bladder wall thickening but totally asymptomatic.  We will check a UA with C&S today  4. Recent diagnosis of hypothyroidism, on thyroid replacement therapy  5. Status post right lower lobectomy with mediastinal and hilar lymph node dissection with residual disease.  pT1 cN2 M0.  Patient with high risk features including lymphovascular invasion, extranodal capsular extension and persistent multiple lymph node disease.  Ongoing management  6. Status post consolidation treatment with immunotherapy with Imfinzi:  7. Port maintenance: Patient still wants to keep port in little longer.  8. fatigue: We will check thyroid function tests  9. Satus post right thoracentesis with cytology negative for malignancy  10. Postop anemia: Reviewed  11. Assessment has been reviewed and updated      PLANS:    1. I reviewed his CT scans of the chest done September 2021 with no evidence of progressive disease.    2. UA with C&S today  3. We will continue  monthly port flushes  4. Continue thyroid replacement therapy through his PCP  5. Follow-up in 4 months with CT scan of the chest done a few days before  6. He has completed all planned cycles of immunotherapy.  He has no evidence of disease relapse at this time  7. Continue B12 injections  monthly: Patient did have elevated methylmalonic acid level during the early phases of his treatments.  Continue the same  8. Continue supportive care  9. Follow-up with me in 4 months or earlier as needed for problems  10. All questions answered to the best of my abilities         Patient has  questions which have all been answered to the best of my abilities    I have reviewed labs results, imaging, vitals, and medications with the patient today.     Patient verbalized understanding and is in agreement of the above plan.    I spent 30 total minutes, face-to-face, caring for Jc today.  90% of this time involved counseling and/or coordination of care as documented within this note.

## 2021-09-27 ENCOUNTER — HOSPITAL ENCOUNTER (OUTPATIENT)
Dept: ONCOLOGY | Facility: HOSPITAL | Age: 56
Setting detail: INFUSION SERIES
Discharge: HOME | End: 2021-09-27

## 2021-09-27 ENCOUNTER — OFFICE VISIT (OUTPATIENT)
Dept: ONCOLOGY | Facility: CLINIC | Age: 56
End: 2021-09-27

## 2021-09-27 VITALS
HEIGHT: 75 IN | DIASTOLIC BLOOD PRESSURE: 84 MMHG | BODY MASS INDEX: 24.99 KG/M2 | RESPIRATION RATE: 18 BRPM | TEMPERATURE: 97.5 F | SYSTOLIC BLOOD PRESSURE: 126 MMHG | HEART RATE: 88 BPM | WEIGHT: 201 LBS

## 2021-09-27 VITALS — HEIGHT: 75 IN | BODY MASS INDEX: 25.09 KG/M2 | WEIGHT: 201.8 LBS

## 2021-09-27 DIAGNOSIS — D64.9 ANEMIA, UNSPECIFIED TYPE: ICD-10-CM

## 2021-09-27 DIAGNOSIS — C34.91 NON-SMALL CELL CARCINOMA OF LUNG, RIGHT (HCC): ICD-10-CM

## 2021-09-27 DIAGNOSIS — D64.9 ANEMIA, UNSPECIFIED TYPE: Primary | ICD-10-CM

## 2021-09-27 DIAGNOSIS — Z45.2 ENCOUNTER FOR CARE RELATED TO VASCULAR ACCESS PORT: Primary | ICD-10-CM

## 2021-09-27 PROBLEM — E06.3 HASHIMOTO'S THYROIDITIS: Status: ACTIVE | Noted: 2021-06-14

## 2021-09-27 LAB
BACTERIA UR QL AUTO: NORMAL /HPF
BASOPHILS # BLD AUTO: 0.07 10*3/MM3 (ref 0–0.2)
BASOPHILS NFR BLD AUTO: 1 % (ref 0–1.5)
BILIRUB UR QL STRIP: NEGATIVE
CLARITY UR: CLEAR
COLOR UR: YELLOW
DEPRECATED RDW RBC AUTO: 42.1 FL (ref 37–54)
EOSINOPHIL # BLD AUTO: 0.59 10*3/MM3 (ref 0–0.4)
EOSINOPHIL NFR BLD AUTO: 8 % (ref 0.3–6.2)
ERYTHROCYTE [DISTWIDTH] IN BLOOD BY AUTOMATED COUNT: 12.8 % (ref 12.3–15.4)
GLUCOSE UR STRIP-MCNC: NEGATIVE MG/DL
HCT VFR BLD AUTO: 40.5 % (ref 37.5–51)
HGB BLD-MCNC: 13.4 G/DL (ref 13–17.7)
HGB UR QL STRIP.AUTO: ABNORMAL
HYALINE CASTS UR QL AUTO: NORMAL /LPF
KETONES UR QL STRIP: NEGATIVE
LEUKOCYTE ESTERASE UR QL STRIP.AUTO: NEGATIVE
LYMPHOCYTES # BLD AUTO: 1.11 10*3/MM3 (ref 0.7–3.1)
LYMPHOCYTES NFR BLD AUTO: 15.1 % (ref 19.6–45.3)
MCH RBC QN AUTO: 30.6 PG (ref 26.6–33)
MCHC RBC AUTO-ENTMCNC: 33.1 G/DL (ref 31.5–35.7)
MCV RBC AUTO: 92.5 FL (ref 79–97)
MONOCYTES # BLD AUTO: 0.86 10*3/MM3 (ref 0.1–0.9)
MONOCYTES NFR BLD AUTO: 11.7 % (ref 5–12)
NEUTROPHILS NFR BLD AUTO: 4.7 10*3/MM3 (ref 1.7–7)
NEUTROPHILS NFR BLD AUTO: 64.2 % (ref 42.7–76)
NITRITE UR QL STRIP: NEGATIVE
PH UR STRIP.AUTO: 6 [PH] (ref 5–8)
PLATELET # BLD AUTO: 340 10*3/MM3 (ref 140–450)
PMV BLD AUTO: 8.4 FL (ref 6–12)
PROT UR QL STRIP: NEGATIVE
RBC # BLD AUTO: 4.38 10*6/MM3 (ref 4.14–5.8)
RBC # UR: NORMAL /HPF
REF LAB TEST METHOD: NORMAL
SP GR UR STRIP: 1.01 (ref 1–1.03)
SQUAMOUS #/AREA URNS HPF: NORMAL /HPF
UROBILINOGEN UR QL STRIP: ABNORMAL
WBC # BLD AUTO: 7.33 10*3/MM3 (ref 3.4–10.8)
WBC UR QL AUTO: NORMAL /HPF

## 2021-09-27 PROCEDURE — 81001 URINALYSIS AUTO W/SCOPE: CPT | Performed by: INTERNAL MEDICINE

## 2021-09-27 PROCEDURE — 25010000002 HEPARIN LOCK FLUSH PER 10 UNITS: Performed by: INTERNAL MEDICINE

## 2021-09-27 PROCEDURE — G0463 HOSPITAL OUTPT CLINIC VISIT: HCPCS

## 2021-09-27 PROCEDURE — 99214 OFFICE O/P EST MOD 30 MIN: CPT | Performed by: INTERNAL MEDICINE

## 2021-09-27 PROCEDURE — 85025 COMPLETE CBC W/AUTO DIFF WBC: CPT | Performed by: INTERNAL MEDICINE

## 2021-09-27 RX ORDER — LEVOTHYROXINE SODIUM 0.1 MG/1
100 TABLET ORAL
COMMUNITY
Start: 2021-08-30

## 2021-09-27 RX ORDER — HEPARIN SODIUM (PORCINE) LOCK FLUSH IV SOLN 100 UNIT/ML 100 UNIT/ML
500 SOLUTION INTRAVENOUS AS NEEDED
Status: DISCONTINUED | OUTPATIENT
Start: 2021-09-27 | End: 2021-09-28 | Stop reason: HOSPADM

## 2021-09-27 RX ORDER — HEPARIN SODIUM (PORCINE) LOCK FLUSH IV SOLN 100 UNIT/ML 100 UNIT/ML
500 SOLUTION INTRAVENOUS AS NEEDED
Status: CANCELLED | OUTPATIENT
Start: 2021-09-27

## 2021-09-27 RX ORDER — FAMOTIDINE 40 MG/1
40 TABLET, FILM COATED ORAL DAILY
COMMUNITY
Start: 2021-07-28 | End: 2022-05-22 | Stop reason: HOSPADM

## 2021-09-27 RX ORDER — SODIUM CHLORIDE 0.9 % (FLUSH) 0.9 %
20 SYRINGE (ML) INJECTION AS NEEDED
Status: DISCONTINUED | OUTPATIENT
Start: 2021-09-27 | End: 2021-09-28 | Stop reason: HOSPADM

## 2021-09-27 RX ORDER — SODIUM CHLORIDE 0.9 % (FLUSH) 0.9 %
20 SYRINGE (ML) INJECTION AS NEEDED
Status: CANCELLED | OUTPATIENT
Start: 2021-09-27

## 2021-09-27 RX ADMIN — Medication 30 ML: at 09:57

## 2021-09-27 RX ADMIN — HEPARIN 500 UNITS: 100 SYRINGE at 11:07

## 2021-10-01 ENCOUNTER — TELEPHONE (OUTPATIENT)
Dept: ONCOLOGY | Facility: CLINIC | Age: 56
End: 2021-10-01

## 2021-10-01 DIAGNOSIS — R31.29 OTHER MICROSCOPIC HEMATURIA: Primary | ICD-10-CM

## 2021-10-01 NOTE — TELEPHONE ENCOUNTER
----- Message from Azucena Gaspar MD sent at 9/27/2021  3:02 PM EDT -----  He has trace amount of blood in the urine.  Send it for culture and repeat UA in 1 month.  Please let patient know

## 2021-10-01 NOTE — TELEPHONE ENCOUNTER
Attempted to call pt to let him know that there was trace amounts of blood in his urine and a urinalysis will need to be repeated in one month. No answer, unable to leave a VM.

## 2021-10-04 ENCOUNTER — HOSPITAL ENCOUNTER (OUTPATIENT)
Dept: ONCOLOGY | Facility: HOSPITAL | Age: 56
Setting detail: INFUSION SERIES
Discharge: HOME | End: 2021-10-04

## 2021-10-04 VITALS — WEIGHT: 211.2 LBS | BODY MASS INDEX: 26.26 KG/M2 | HEIGHT: 75 IN

## 2021-10-04 DIAGNOSIS — Z45.2 ENCOUNTER FOR CARE RELATED TO VASCULAR ACCESS PORT: Primary | ICD-10-CM

## 2021-10-04 PROCEDURE — 96523 IRRIG DRUG DELIVERY DEVICE: CPT

## 2021-10-04 PROCEDURE — 25010000002 HEPARIN LOCK FLUSH PER 10 UNITS: Performed by: INTERNAL MEDICINE

## 2021-10-04 RX ORDER — SODIUM CHLORIDE 0.9 % (FLUSH) 0.9 %
20 SYRINGE (ML) INJECTION AS NEEDED
Status: DISCONTINUED | OUTPATIENT
Start: 2021-10-04 | End: 2021-10-05 | Stop reason: HOSPADM

## 2021-10-04 RX ORDER — HEPARIN SODIUM (PORCINE) LOCK FLUSH IV SOLN 100 UNIT/ML 100 UNIT/ML
500 SOLUTION INTRAVENOUS AS NEEDED
Status: DISCONTINUED | OUTPATIENT
Start: 2021-10-04 | End: 2021-10-05 | Stop reason: HOSPADM

## 2021-10-04 RX ORDER — HEPARIN SODIUM (PORCINE) LOCK FLUSH IV SOLN 100 UNIT/ML 100 UNIT/ML
500 SOLUTION INTRAVENOUS AS NEEDED
Status: CANCELLED | OUTPATIENT
Start: 2021-10-04

## 2021-10-04 RX ORDER — SODIUM CHLORIDE 0.9 % (FLUSH) 0.9 %
20 SYRINGE (ML) INJECTION AS NEEDED
Status: CANCELLED | OUTPATIENT
Start: 2021-10-04

## 2021-10-04 RX ADMIN — HEPARIN 500 UNITS: 100 SYRINGE at 10:02

## 2021-10-04 RX ADMIN — Medication 30 ML: at 10:00

## 2021-10-04 NOTE — PROGRESS NOTES
Notified  assistant Felicity  of blood return from port and that appointment was for evaluation for blood return from port,since last week unable to obtain blood return from port

## 2021-10-25 ENCOUNTER — LAB (OUTPATIENT)
Dept: LAB | Facility: HOSPITAL | Age: 56
End: 2021-10-25

## 2021-10-25 ENCOUNTER — HOSPITAL ENCOUNTER (OUTPATIENT)
Dept: ONCOLOGY | Facility: HOSPITAL | Age: 56
Setting detail: INFUSION SERIES
Discharge: HOME | End: 2021-10-25

## 2021-10-25 DIAGNOSIS — Z45.2 ENCOUNTER FOR CARE RELATED TO VASCULAR ACCESS PORT: Primary | ICD-10-CM

## 2021-10-25 DIAGNOSIS — R31.29 OTHER MICROSCOPIC HEMATURIA: ICD-10-CM

## 2021-10-25 LAB
BILIRUB UR QL STRIP: NEGATIVE
CLARITY UR: CLEAR
COLOR UR: YELLOW
GLUCOSE UR STRIP-MCNC: NEGATIVE MG/DL
HGB UR QL STRIP.AUTO: NEGATIVE
KETONES UR QL STRIP: NEGATIVE
LEUKOCYTE ESTERASE UR QL STRIP.AUTO: NEGATIVE
NITRITE UR QL STRIP: NEGATIVE
PH UR STRIP.AUTO: 7 [PH] (ref 5–8)
PROT UR QL STRIP: NEGATIVE
SP GR UR STRIP: 1.02 (ref 1–1.03)
UROBILINOGEN UR QL STRIP: NORMAL

## 2021-10-25 PROCEDURE — 25010000002 HEPARIN LOCK FLUSH PER 10 UNITS: Performed by: INTERNAL MEDICINE

## 2021-10-25 PROCEDURE — 81003 URINALYSIS AUTO W/O SCOPE: CPT

## 2021-10-25 PROCEDURE — G0463 HOSPITAL OUTPT CLINIC VISIT: HCPCS

## 2021-10-25 RX ORDER — HEPARIN SODIUM (PORCINE) LOCK FLUSH IV SOLN 100 UNIT/ML 100 UNIT/ML
500 SOLUTION INTRAVENOUS AS NEEDED
Status: DISCONTINUED | OUTPATIENT
Start: 2021-10-25 | End: 2021-10-26 | Stop reason: HOSPADM

## 2021-10-25 RX ORDER — HEPARIN SODIUM (PORCINE) LOCK FLUSH IV SOLN 100 UNIT/ML 100 UNIT/ML
500 SOLUTION INTRAVENOUS AS NEEDED
Status: CANCELLED | OUTPATIENT
Start: 2021-10-25

## 2021-10-25 RX ORDER — SODIUM CHLORIDE 0.9 % (FLUSH) 0.9 %
20 SYRINGE (ML) INJECTION AS NEEDED
Status: DISCONTINUED | OUTPATIENT
Start: 2021-10-25 | End: 2021-10-26 | Stop reason: HOSPADM

## 2021-10-25 RX ORDER — SODIUM CHLORIDE 0.9 % (FLUSH) 0.9 %
20 SYRINGE (ML) INJECTION AS NEEDED
Status: CANCELLED | OUTPATIENT
Start: 2021-10-25

## 2021-10-25 RX ADMIN — Medication 10 ML: at 10:44

## 2021-10-25 RX ADMIN — HEPARIN 500 UNITS: 100 SYRINGE at 10:44

## 2021-10-25 NOTE — PROGRESS NOTES
Port flush tried several positions not blood return since pt had return last week we will try again next week and he is aware we will  Need to do activase if no blood return in two tries. PT VU appt given to pt. With no further complaints.

## 2021-11-22 ENCOUNTER — HOSPITAL ENCOUNTER (OUTPATIENT)
Dept: ONCOLOGY | Facility: HOSPITAL | Age: 56
Setting detail: INFUSION SERIES
Discharge: HOME | End: 2021-11-22

## 2021-11-22 VITALS — BODY MASS INDEX: 25.19 KG/M2 | HEIGHT: 75 IN | WEIGHT: 202.6 LBS

## 2021-11-22 DIAGNOSIS — Z45.2 ENCOUNTER FOR CARE RELATED TO VASCULAR ACCESS PORT: Primary | ICD-10-CM

## 2021-11-22 PROCEDURE — 25010000002 HEPARIN LOCK FLUSH PER 10 UNITS: Performed by: INTERNAL MEDICINE

## 2021-11-22 PROCEDURE — 96523 IRRIG DRUG DELIVERY DEVICE: CPT

## 2021-11-22 RX ORDER — SODIUM CHLORIDE 0.9 % (FLUSH) 0.9 %
20 SYRINGE (ML) INJECTION AS NEEDED
Status: DISCONTINUED | OUTPATIENT
Start: 2021-11-22 | End: 2021-11-23 | Stop reason: HOSPADM

## 2021-11-22 RX ORDER — SODIUM CHLORIDE 0.9 % (FLUSH) 0.9 %
20 SYRINGE (ML) INJECTION AS NEEDED
Status: CANCELLED | OUTPATIENT
Start: 2021-11-22

## 2021-11-22 RX ORDER — HEPARIN SODIUM (PORCINE) LOCK FLUSH IV SOLN 100 UNIT/ML 100 UNIT/ML
500 SOLUTION INTRAVENOUS AS NEEDED
Status: DISCONTINUED | OUTPATIENT
Start: 2021-11-22 | End: 2021-11-23 | Stop reason: HOSPADM

## 2021-11-22 RX ORDER — HEPARIN SODIUM (PORCINE) LOCK FLUSH IV SOLN 100 UNIT/ML 100 UNIT/ML
500 SOLUTION INTRAVENOUS AS NEEDED
Status: CANCELLED | OUTPATIENT
Start: 2021-11-22

## 2021-11-22 RX ADMIN — Medication 30 ML: at 11:39

## 2021-11-22 RX ADMIN — HEPARIN 500 UNITS: 100 SYRINGE at 11:41

## 2021-12-20 ENCOUNTER — HOSPITAL ENCOUNTER (OUTPATIENT)
Dept: ONCOLOGY | Facility: HOSPITAL | Age: 56
Setting detail: INFUSION SERIES
Discharge: HOME | End: 2021-12-20

## 2021-12-20 VITALS — BODY MASS INDEX: 25.22 KG/M2 | WEIGHT: 202.8 LBS | HEIGHT: 75 IN

## 2021-12-20 DIAGNOSIS — Z45.2 ENCOUNTER FOR CARE RELATED TO VASCULAR ACCESS PORT: Primary | ICD-10-CM

## 2021-12-20 PROCEDURE — 96523 IRRIG DRUG DELIVERY DEVICE: CPT

## 2021-12-20 PROCEDURE — 25010000002 HEPARIN LOCK FLUSH PER 10 UNITS: Performed by: INTERNAL MEDICINE

## 2021-12-20 RX ORDER — HEPARIN SODIUM (PORCINE) LOCK FLUSH IV SOLN 100 UNIT/ML 100 UNIT/ML
500 SOLUTION INTRAVENOUS AS NEEDED
Status: DISCONTINUED | OUTPATIENT
Start: 2021-12-20 | End: 2021-12-21 | Stop reason: HOSPADM

## 2021-12-20 RX ORDER — SODIUM CHLORIDE 0.9 % (FLUSH) 0.9 %
20 SYRINGE (ML) INJECTION AS NEEDED
Status: DISCONTINUED | OUTPATIENT
Start: 2021-12-20 | End: 2021-12-21 | Stop reason: HOSPADM

## 2021-12-20 RX ORDER — SODIUM CHLORIDE 0.9 % (FLUSH) 0.9 %
20 SYRINGE (ML) INJECTION AS NEEDED
Status: CANCELLED | OUTPATIENT
Start: 2021-12-20

## 2021-12-20 RX ORDER — HEPARIN SODIUM (PORCINE) LOCK FLUSH IV SOLN 100 UNIT/ML 100 UNIT/ML
500 SOLUTION INTRAVENOUS AS NEEDED
Status: CANCELLED | OUTPATIENT
Start: 2021-12-20

## 2021-12-20 RX ADMIN — HEPARIN 500 UNITS: 100 SYRINGE at 10:56

## 2021-12-20 RX ADMIN — Medication 30 ML: at 10:54

## 2022-01-01 ENCOUNTER — TELEPHONE (OUTPATIENT)
Dept: ONCOLOGY | Facility: CLINIC | Age: 57
End: 2022-01-01

## 2022-01-14 ENCOUNTER — TELEPHONE (OUTPATIENT)
Dept: ONCOLOGY | Facility: CLINIC | Age: 57
End: 2022-01-14

## 2022-01-14 DIAGNOSIS — R91.1 LUNG NODULE: Primary | ICD-10-CM

## 2022-01-14 NOTE — TELEPHONE ENCOUNTER
Called pt about CT scan. I told him that a nodule was seen in the [facility] and that Dr. Gaspar has ordered a PET scan. Pt verbalized understanding.

## 2022-01-14 NOTE — TELEPHONE ENCOUNTER
----- Message from Azucena Gaspar MD sent at 1/14/2022  3:36 PM EST -----  CT scan shows irregular 7 mm nodule on the left upper lobe.  Given his history of lung cancer, radiologist has recommended that we get a PET scan to reevaluate.  Please go ahead and order a PET CT scan and will review the results once available.

## 2022-01-20 ENCOUNTER — TELEPHONE (OUTPATIENT)
Dept: ONCOLOGY | Facility: CLINIC | Age: 57
End: 2022-01-20

## 2022-01-24 ENCOUNTER — APPOINTMENT (OUTPATIENT)
Dept: ONCOLOGY | Facility: HOSPITAL | Age: 57
End: 2022-01-24

## 2022-01-25 NOTE — PROGRESS NOTES
Hematology/Oncology Outpatient Follow Up    PATIENT NAME:Jc Driscoll  :1965  MRN: [id number]  PRIMARY CARE PHYSICIAN: Reshma Alvarenga MD  REFERRING PHYSICIAN: Reshma Alvarenga MD    Chief Complaint   Patient presents with   • Follow-up     Anemia, unspecified type        HISTORY OF PRESENT ILLNESS:     This is a 56 y.o. who developed left shoulder pain and for that reason he had a chest x-ray done on 19 which showed a 2.7 cm noncalcified right lower lobe nodule was identified.  For that reason a CT scan of the chest was recommended.  Review of his records shows patient had the CT scan on 19 done without contrast.  This basically showed a lobulated noncalcified mass in the posterior aspect of the right lower lobe measuring 3 cm close to the pleural surface.  There is a calcified 1.2 mm nodule in the lateral aspect of the right lower lobe consistent with a granuloma.  There were also calcified subcarinal and right hilar nodules.  There is a lower right side tracheal nodule measuring 1 cm.  Patient had a PET/CT scan done on 19 which showed increased metabolic activity on the right lower lobe mass that measures 2.5 cm with SUV of 4.4.  There was also increased metabolic activity in the subcarinal space measuring 2.8 cm in size with SUV of 3.4, increased activity in an enlarged right paratracheal lymph node that measures 1.9 cm, SUV of 5, also suspicious for metastatic adenopathy.  Patient had a CT-guided biopsy of the right lower lobe mass on 3/8/19.  This showed adenocarcinoma, TTF-1 positive.  On 3/18/19 patient underwent endoscopic ultrasound and biopsy of a subcarinal lymph node.  This was positive for malignant cells.  Patient was then taken back to surgery on 3/20/18 and had left Mediport placement by Dr. Fuchs.  He has been referred for further evaluation and management of his stage 3 adenocarcinoma of the right lung.  He was accompanied by his spouse for the appointment on  3/26/19.  Patient was scheduled to have a brain MRI for complete staging, but he could not do this due to claustrophobia.  He is willing to try with the help of angiolytics.    • Patient had brain MRI done on 4/5/19.  He states it did not show any evidence of metastatic disease.  Evidence of chronic sinusitis was noted.    • Patient was seen by Dr. Escalona who has recommended concurrent chemotherapy and radiation.  Patient is scheduled to begin on 4/15/19.   • 4/17/19 - Patient was initiated on combined chemotherapy and radiation with Cisplatin and Alimta.  Patient received cycle 1.      • 5/8/19 - Patient received cycle 2 of chemotherapy with Cisplatin and Alimta.   • 5/15/19 - Chemistry panel showed creatinine of 1.7.  WBC 1.8, hemoglobin 11.6, platelet count 72,000.   • 5/20/19 - BUN 10, creatinine 1.2, potassium 3.5.    • 5/23/19 - CT scan of the chest with contrast showed interval decrease in size of the right lower lobe pulmonary nodule now measuring 2.1 cm in size.  There was no mediastinal or hilar adenopathy identified.  • 6/26/2019 patient underwent right lower lobectomy with hilar and mediastinal lymph node dissection performed by Dr. Terrance Mckeon.  • Pathology revealed residual residual 2.2 cm invasive moderately differentiated adenocarcinoma.  There were a total of 16 lymph nodes evaluated that 7 had metastatic disease.  There was evidence of lymphovascular invasion, extranodal involvement.  All the surgical margins were negative and distance of the closest margin was 2.5 cm.  Logic stage is T1c N2 M0.  • Patient is here today accompanied by his spouse for this appointment.  He continues to complain of some postop discomfort, numbness but his skin is intact.  There is no drainage.  Has had follow-up with Dr. Fuchs.  • 8/14/2019-seen by Dr. Maria at the [facility].  Dr. Maria has recommended immunotherapy with durvalumab off label use as patient has not had significant response to neoadjuvant  chemotherapy and radiation.  Patient was given the option to have immunotherapy at the [facility] and he has chosen to stay in [place]  • 8/21/19 - Started cycle 1 day 1 Imfinzi  • 9/4/2019 patient had CT scan of the chest this shows a moderate size right pleural effusion.  There are areas of groundglass opacification in the right upper lobe without any evidence of significant septal thickening.  Volume loss noted there is also moderate pleural effusion.  There is no suspicious recurrent malignancy.  There were nonenlarged residual mediastinal lymph nodes.  • 9/9/19 - WBC 6.23, Hgb 11.7 Platelets 257,000, , BUN 9, Cr 1.1,Vitamin B12 318, Ferritin 437  • 9/23/19 - received cycle 2 day 1 Imfinzi  • 10/9/19- Seen by Dr rodríguez with ENT for sinus disease  • 11/4/2019-patient received cycle 5 of Imfinzi(durvalumab)  • 12/2/2019 patient had cycle 7 of durvalumab  • 12/5/2019-patient had CT scan of the  abdomen and pelvis with small right pleural sided pleural effusion.There is no evidence of metastatic disease  • 12/30/2019-patient received cycle 9 of durvalumab  • 12/31/2019-patient had CT scan of the chest showed small to moderate left pleural effusion.  Iglesias appearing right lower paratracheal and right peribronchial soft tissue thickening without any discrete pathologically enlarged mediastinal or hilar lymph nodes.  • 1/27/20-patient received cycle 11 of durvalumab  • 2/17/20-patient had a stress test which was negative for ischemia  • 2/17/2020-patient had CT of the chest which showed postop changes on the right lung, right pleural effusion but no enlarging mass was noted  • 3/2/2020-patient received cycle 12 of durvalumab  • 3/16/2020-patient had ultrasound-guided right thoracentesis.  Pathology was negative for malignancy  • 4/13/2020-patient received cycle 15 of immunotherapy with durvalumab  • 5/11/2020-patient received cycle 17 of immunotherapy with durvalumab  • 6/9/2020-patient had CT scan of the  chest, abdomen and pelvis for lung cancer restaging.  There is stable small chronic loculated right basilar pleural effusion suggesting chronic pleuritis.  There is no evidence of metastatic disease in the abdomen or pelvis  • 7/20/2020 patient received cycle 22 of Durvalumab  • 8/17/2020 patient received cycle 24 and last cycle of durvalumab  • 9/24/2020 patient had CT scan of the chest, abdomen and pelvis for cancer restaging there was no evidence of local recurrence or metastatic disease within the abdomen and pelvis.  He has stable chronic small right pleural effusion.  Mild sigmoid diverticulosis was noted.  • 1/18/2021 patient had CT scan of the chest, abdomen and pelvis reviewed patient  • 5/24/2021 patient had CT scan of the chest calcified subcarinal lymph node consistent with changes of prior granulomatous disease.  Chronic small right pleural effusion is noted similar to prior exam.  Previously shown 4 mm nodule in the left lower lobe has nearly completely resolved.  The subpleural 3 mm nodule located within the posterior left lower lobe With resolved.  • 9/24/2021 patient had a CT scan of the chest, abdomen and pelvis there is soft tissue attenuation within the right paratracheal area which has been suggested to reflect sequela to previous treatment.  There is slight thickening of the bladder wall otherwise there is no evidence of metastatic disease.  • 12/27/2021 patient had CT scan of the chest with contrast this basically showed irregular shaped noncalcified 7 mm left upper lobe pulmonary nodule.  PET CT scan was recommended to further evaluate.  • 1/26/2022 patient had a PET CT scan done which basically showed evidence of mild uptake corresponding to the nodule within the left upper lobe which is new worrisome for developing metastatic focus.  There was mild radiopharmaceutical activity corresponding to pleural thickening throughout the right lung base with associated pleural effusion likely related  to post therapeutic changes and radiation changes in this region.  Metastatic malignancy involving the pleura could not be completely eliminated.    Past Medical History:   Diagnosis Date   • Allergic rhinitis 7/25/2013    Overview:  4/2/2018 - still zyrtec 10 daily (if stops, gets dermatitis again).    • Anxiety and depression 6/5/2012    Overview:  4/2/2018 -   C/w well 300 xr and Lito 20.  4/8/2019 -   Doing ok even with new lung cancer - lito 20 & well 300xr.    • Chronic pansinusitis 4/8/2019    Overview:  4/8/2019 -   MRI showed chronic thick in frontal, maxillary, ethmoidal ---> to Dr. COLLAZO to est since abx ICC didn't help.  Does netipot bid.    • Diastolic CHF (HCC) 7/9/2014    Overview:  7/4/14 - impaired LV relaxation on [place] ECHO.  EF 60%. Rest normal   • Dupuytren's contracture of both hands    • Elevated cholesterol    • Erosive esophagitis 7/9/2019    Overview:  EGD 2016 4/2/2018 - nexium OTC daily for few week.  Qod before that.  Now carbonation hurts, epigastric pain 4/8/2019 -   protonix 40 doing well.    • Gastroesophageal reflux disease 2/21/2019   • Hiatal hernia 7/9/2019   • History of colon polyps    • Hypertension    • Lung cancer (HCC)     right lung and in lymph nodes x2   • Mediastinal lymphadenopathy 3/12/2019   • Normocytic anemia 8/8/2019    Overview:  6/2019 - dropped to 9's postop 7/2019 - rebounded to 10's.  8/8/2019 -   Back to 9's - check ferritin, b12 and thyroid.    • Prediabetes 7/9/2014    Overview:  7/4/14 - a1c 6.1 at [place] admission   • Retention of urine 6/27/2019       Past Surgical History:   Procedure Laterality Date   • BRONCHOSCOPY  03/18/2019    EBUS MONTERO NEEDLE BX   • COLONOSCOPY     • DUPUYTREN CONTRACTURE RELEASE Bilateral    • LUNG BIOPSY  03/08/2019    CT GUIDED   • LUNG REMOVAL, PARTIAL Right     right lower   • PORTACATH PLACEMENT  03/20/2019    DR LONGORIA   • THORACOSCOPY Right 6/26/2019    Procedure: RIGHT VATS, OPEN LOWER LOBECTOMY WITH LYMPH NODE DISECTION,  WEDGE RESECTION OF RIGHT MIDDLE LOBE;  Surgeon: Terrance Fuchs MD;  Location: [facility] MAIN OR;  Service: Cardiothoracic         Current Outpatient Medications:   •  amLODIPine (NORVASC) 10 MG tablet, Take 10 mg by mouth Daily., Disp: , Rfl:   •  buPROPion XL (WELLBUTRIN XL) 300 MG 24 hr tablet, Take 300 mg by mouth Every Morning., Disp: , Rfl:   •  desvenlafaxine (PRISTIQ) 50 MG 24 hr tablet, TAKE 1 TABLET BY MOUTH ONCE DAILY FOR 30 DAYS, Disp: , Rfl:   •  famotidine (PEPCID) 40 MG tablet, Take 40 mg by mouth., Disp: , Rfl:   •  levothyroxine (Euthyrox) 100 MCG tablet, Take 1 tablet by mouth once daily, Disp: , Rfl:   •  lidocaine-prilocaine (EMLA) 2.5-2.5 % cream, , Disp: , Rfl:   •  LORazepam (ATIVAN) 0.5 MG tablet, TAKE 1 TABLET BY MOUTH TWICE DAILY AS NEEDED FOR ANXIETY MAX OF 2 DAY, Disp: , Rfl: 0  •  metoprolol succinate XL (TOPROL-XL) 100 MG 24 hr tablet, Take 100 mg by mouth Daily., Disp: , Rfl:   •  pantoprazole (PROTONIX) 40 MG EC tablet, Take 40 mg by mouth Daily., Disp: , Rfl:   •  pravastatin (PRAVACHOL) 10 MG tablet, Take 10 mg by mouth Daily., Disp: , Rfl:   No current facility-administered medications for this visit.    Facility-Administered Medications Ordered in Other Visits:   •  heparin injection 500 Units, 500 Units, Intravenous, PRN, Azucena Gaspar MD, 500 Units at 22 0832  •  sodium chloride 0.9 % flush 20 mL, 20 mL, Intravenous, PRNChasity Ifeoma Roseline, MD, 20 mL at 22 0832    No Known Allergies    Family History   Problem Relation Age of Onset   • Cancer Sister    • Cancer Mother    • Lung cancer Father        Cancer-related family history includes Cancer in his mother and sister; Lung cancer in his father.    Social History     Tobacco Use   • Smoking status: Former Smoker     Types: Cigarettes     Quit date: 2009     Years since quittin.4   • Smokeless tobacco: Never Used   Substance Use Topics   • Alcohol use: Yes     Alcohol/week: 2.0 standard drinks  "    Types: 2 Cans of beer per week     Comment: Occasional   • Drug use: No     I have reviewed and confirmed the accuracy of the patient's history:  as entered by the MA/BELKIS/JOEY. Azucena Gaspar MD 01/28/22     SUBJECTIVE:    Patient is here today for routine follow-up.  He was recently diagnosed with hypothyroidism and currently on thyroid replacement therapy and feels much better.    He is here today for routine follow-up accompanied by his spouse for this appointment.  Patient remains totally asymptomatic from the lung findings.    REVIEW OF SYSTEMS:    Review of Systems   Constitutional: Negative for chills and fever.   HENT: Negative for ear pain, mouth sores, nosebleeds and sore throat.    Eyes: Negative for photophobia and visual disturbance.   Respiratory: Negative for wheezing and stridor.    Cardiovascular: Negative for chest pain and palpitations.   Gastrointestinal: Negative for abdominal pain, diarrhea, nausea and vomiting.   Endocrine: Negative for cold intolerance and heat intolerance.   Genitourinary: Negative for dysuria and hematuria.   Musculoskeletal: Negative for joint swelling and neck stiffness.   Skin: Negative for color change and rash.   Neurological: Negative for seizures and syncope.   Hematological: Negative for adenopathy.   Psychiatric/Behavioral: Negative for agitation, confusion and hallucinations.       I have reviewed and confirmed the accuracy of the ROS as documented by the MA/DESEANN/RN Azucena Gaspar MD        OBJECTIVE:  Vitals:    01/28/22 0836   BP: 142/82   Pulse: 88   Resp: 18   Temp: 97.3 °F (36.3 °C)   SpO2: 100%   Weight: 91.7 kg (202 lb 3.2 oz)   Height: 190.5 cm (75\")     ECOG  Eastern Cooperative Oncology Group (ECOG, [place], World Health Organization) performance scale  0 Fully active; no performance restrictions.  1 Strenuous physical activity restricted; fully ambulatory and able to carry out light work.  2 Capable of all self-care but unable to carry " out any work activities. Up and about >50% of waking hours.  3 Capable of only limited self-care; confined to bed or chair >50% of waking hours.  4 Completely disabled; cannot carry out any self-care; totally confined to bed or chair.    Physical Exam   Constitutional: He is oriented to person, place, and time. He appears well-developed. No distress.   HENT:   Head: Normocephalic and atraumatic.   Right Ear: External ear normal.   Left Ear: External ear normal.   Nose: Nose normal.   Eyes: Pupils are equal, round, and reactive to light. Conjunctivae are normal. Right eye exhibits no discharge. Left eye exhibits no discharge. No scleral icterus.   Neck: No thyromegaly present.   Cardiovascular: Normal rate, regular rhythm and normal heart sounds. Exam reveals no gallop and no friction rub.   Pulmonary/Chest: Effort normal. No stridor. No respiratory distress. He has no wheezes.   Abdominal: Soft. Bowel sounds are normal. He exhibits no mass. There is no abdominal tenderness. There is no rebound and no guarding.   Musculoskeletal: Normal range of motion. No tenderness.   Lymphadenopathy:     He has no cervical adenopathy.   Neurological: He is alert and oriented to person, place, and time. He exhibits normal muscle tone.   Skin: Skin is warm. No rash noted. He is not diaphoretic. No erythema.   Urticarial-like rash, right medial knee likely due to immunotherapy   Psychiatric: His behavior is normal. Judgment and thought content normal.   Nursing note and vitals reviewed.    I have reexamined the patient and the results are consistent with the previously documented exam. Azucena Gaspar MD     RECENT LABS    WBC   Date Value Ref Range Status   01/28/2022 13.12 (H) 3.40 - 10.80 10*3/mm3 Final   07/03/2019 8.99 4.5 - 11.0 10*3/uL Final     RBC   Date Value Ref Range Status   01/28/2022 4.41 4.14 - 5.80 10*6/mm3 Final   07/03/2019 3.24 (L) 4.5 - 5.9 10*6/uL Final     Hemoglobin   Date Value Ref Range Status    01/28/2022 13.1 13.0 - 17.7 g/dL Final   07/03/2019 10.4 (L) 13.5 - 17.5 g/dL Final     Hematocrit   Date Value Ref Range Status   01/28/2022 39.7 37.5 - 51.0 % Final   07/03/2019 32.8 (L) 41.0 - 53.0 % Final     MCV   Date Value Ref Range Status   01/28/2022 90.0 79.0 - 97.0 fL Final   07/03/2019 101.2 (H) 80.0 - 100.0 fL Final     MCH   Date Value Ref Range Status   01/28/2022 29.7 26.6 - 33.0 pg Final   07/03/2019 32.1 26.0 - 34.0 pg Final     MCHC   Date Value Ref Range Status   01/28/2022 33.0 31.5 - 35.7 g/dL Final   07/03/2019 31.7 31.0 - 37.0 g/dL Final     RDW   Date Value Ref Range Status   01/28/2022 14.5 12.3 - 15.4 % Final   07/03/2019 15.2 12.0 - 16.8 % Final     RDW-SD   Date Value Ref Range Status   01/28/2022 46.4 37.0 - 54.0 fl Final     MPV   Date Value Ref Range Status   01/28/2022 8.6 6.0 - 12.0 fL Final   07/03/2019 9.0 6.7 - 10.8 fL Final     Platelets   Date Value Ref Range Status   01/28/2022 294 140 - 450 10*3/mm3 Final   07/03/2019 302 140 - 440 10*3/uL Final     Neutrophil Rel %   Date Value Ref Range Status   07/03/2019 60.0 45 - 80 % Final     Neutrophil %   Date Value Ref Range Status   01/28/2022 82.6 (H) 42.7 - 76.0 % Final     Lymphocyte Rel %   Date Value Ref Range Status   07/03/2019 14.8 (L) 15 - 50 % Final     Lymphocyte %   Date Value Ref Range Status   01/28/2022 5.7 (L) 19.6 - 45.3 % Final     Monocyte Rel %   Date Value Ref Range Status   07/03/2019 12.2 0 - 15 % Final     Monocyte %   Date Value Ref Range Status   01/28/2022 8.4 5.0 - 12.0 % Final     Eosinophil %   Date Value Ref Range Status   01/28/2022 3.0 0.3 - 6.2 % Final   07/03/2019 10.4 (H) 0 - 7 % Final     Basophil Rel %   Date Value Ref Range Status   07/03/2019 1.7 0 - 2 % Final     Basophil %   Date Value Ref Range Status   01/28/2022 0.3 0.0 - 1.5 % Final     Neutrophils Absolute   Date Value Ref Range Status   07/03/2019 5.39 1.5 - 7.5 /uL Final   07/03/2019 5.39 2.0 - 8.8 10*3/uL Final     Neutrophils,  Absolute   Date Value Ref Range Status   01/28/2022 10.84 (H) 1.70 - 7.00 10*3/mm3 Final     Lymphocytes Absolute   Date Value Ref Range Status   07/03/2019 1.33 0.7 - 5.5 10*3/uL Final     Lymphocytes, Absolute   Date Value Ref Range Status   01/28/2022 0.75 0.70 - 3.10 10*3/mm3 Final     Monocytes Absolute   Date Value Ref Range Status   07/03/2019 1.10 0.0 - 1.7 10*3/uL Final     Monocytes, Absolute   Date Value Ref Range Status   01/28/2022 1.10 (H) 0.10 - 0.90 10*3/mm3 Final     Eosinophils Absolute   Date Value Ref Range Status   07/03/2019 0.93 (H) 0.0 - 0.8 10*3/uL Final     Eosinophils, Absolute   Date Value Ref Range Status   01/28/2022 0.39 0.00 - 0.40 10*3/mm3 Final     Basophils Absolute   Date Value Ref Range Status   07/03/2019 0.15 0.0 - 0.2 10*3/uL Final     Basophils, Absolute   Date Value Ref Range Status   01/28/2022 0.04 0.00 - 0.20 10*3/mm3 Final     nRBC   Date Value Ref Range Status   02/18/2020 0.0 0.0 - 0.2 /100 WBC Final       Lab Results   Component Value Date    GLUCOSE 140 (H) 01/28/2022    BUN 12 01/28/2022    CREATININE 0.80 01/28/2022    EGFRIFNONA 100 01/28/2022    EGFRIFAFRI >60 05/20/2019    BCR 15.0 01/28/2022    K 4.2 01/28/2022    CO2 26.0 01/28/2022    CALCIUM 9.2 01/28/2022    ALBUMIN 4.00 01/28/2022    LABIL2 1.4 07/03/2019    AST 19 01/28/2022    ALT 17 01/28/2022           ASSESSMENT:    1. Adenocarcinoma of the right lung, T1c N2 M0, consistent with clinical stage 3A disease.  Continue surveillance CT scans  2. New 1.2 cm left upper lobe that is mildly PET avid worrisome for new focus of metastatic disease.  I recommended CT-guided biopsy of this left upper lobe nodule for better characterization of the etiology.  Patient has been informed  3. S/P combined chemotherapy and radiation with cisplatin and Alimta.  Surveillance CT scan  4. Reviewed his CT scan of the chest, abdomen and pelvis completed September 2021 which does not show any evidence of relapsed disease but he  has bladder wall thickening but totally asymptomatic.  We will check a UA with C&S today  5. Recent diagnosis of hypothyroidism, on thyroid replacement therapy  6. Status post right lower lobectomy with mediastinal and hilar lymph node dissection with residual disease.  pT1 cN2 M0.  Patient with high risk features including lymphovascular invasion, extranodal capsular extension and persistent multiple lymph node disease.  Ongoing management  7. Status post consolidation treatment with immunotherapy with Imfinzi:  8. Port maintenance: Patient still wants to keep port in little longer.  9. fatigue: We will check thyroid function tests  10. Satus post right thoracentesis with cytology negative for malignancy  11. Postop anemia: Reviewed  12. Assessment has been reviewed and updated      PLANS:    1. Schedule CT-guided biopsy of the left upper lobe nodule to be performed by interventional radiologist  2. CBC reviewed  3. CMP and CEA level today  4. We will continue monthly port flushes  5. Continue thyroid replacement therapy through his PCP  6. Follow-up in 2 weeks  7. He has completed all planned cycles of immunotherapy.  He has no evidence of disease relapse at this time  8. Continue B12 injections  monthly: Patient did have elevated methylmalonic acid level during the early phases of his treatments.  Continue the same  9. Continue supportive care  10. All questions answered to the best of my abilities         Patient has  questions which have all been answered to the best of my abilities    I have reviewed labs results, imaging, vitals, and medications with the patient today.     Patient verbalized understanding and is in agreement of the above plan.      I spent 30 total minutes, face-to-face, caring for Jc today.  90% of this time involved counseling and/or coordination of care as documented within this note.

## 2022-01-26 ENCOUNTER — HOSPITAL ENCOUNTER (OUTPATIENT)
Dept: PET IMAGING | Facility: HOSPITAL | Age: 57
Discharge: HOME | End: 2022-01-26
Admitting: INTERNAL MEDICINE

## 2022-01-26 DIAGNOSIS — R91.1 LUNG NODULE: ICD-10-CM

## 2022-01-26 LAB — GLUCOSE BLDC GLUCOMTR-MCNC: 105 MG/DL (ref 70–105)

## 2022-01-26 PROCEDURE — A9552 F18 FDG: HCPCS | Performed by: INTERNAL MEDICINE

## 2022-01-26 PROCEDURE — 82962 GLUCOSE BLOOD TEST: CPT

## 2022-01-26 PROCEDURE — 0 FLUDEOXYGLUCOSE F18 SOLUTION: Performed by: INTERNAL MEDICINE

## 2022-01-26 PROCEDURE — 78815 PET IMAGE W/CT SKULL-THIGH: CPT

## 2022-01-26 RX ADMIN — FLUDEOXYGLUCOSE F18 1 DOSE: 300 INJECTION INTRAVENOUS at 12:20

## 2022-01-28 ENCOUNTER — HOSPITAL ENCOUNTER (OUTPATIENT)
Dept: ONCOLOGY | Facility: HOSPITAL | Age: 57
Setting detail: INFUSION SERIES
Discharge: HOME | End: 2022-01-28

## 2022-01-28 ENCOUNTER — OFFICE VISIT (OUTPATIENT)
Dept: ONCOLOGY | Facility: CLINIC | Age: 57
End: 2022-01-28

## 2022-01-28 VITALS
BODY MASS INDEX: 25.14 KG/M2 | HEIGHT: 75 IN | TEMPERATURE: 97.3 F | WEIGHT: 202.2 LBS | DIASTOLIC BLOOD PRESSURE: 82 MMHG | RESPIRATION RATE: 18 BRPM | HEART RATE: 88 BPM | OXYGEN SATURATION: 100 % | SYSTOLIC BLOOD PRESSURE: 142 MMHG

## 2022-01-28 DIAGNOSIS — C34.91 NON-SMALL CELL CARCINOMA OF LUNG, RIGHT: Primary | ICD-10-CM

## 2022-01-28 DIAGNOSIS — R91.1 LUNG NODULE: ICD-10-CM

## 2022-01-28 DIAGNOSIS — Z45.2 ENCOUNTER FOR CARE RELATED TO VASCULAR ACCESS PORT: ICD-10-CM

## 2022-01-28 LAB
ALBUMIN SERPL-MCNC: 4 G/DL (ref 3.5–5.2)
ALBUMIN/GLOB SERPL: 1.5 G/DL
ALP SERPL-CCNC: 77 U/L (ref 39–117)
ALT SERPL W P-5'-P-CCNC: 17 U/L (ref 1–41)
ANION GAP SERPL CALCULATED.3IONS-SCNC: 12 MMOL/L (ref 5–15)
AST SERPL-CCNC: 19 U/L (ref 1–40)
BASOPHILS # BLD AUTO: 0.04 10*3/MM3 (ref 0–0.2)
BASOPHILS NFR BLD AUTO: 0.3 % (ref 0–1.5)
BILIRUB SERPL-MCNC: 0.3 MG/DL (ref 0–1.2)
BUN SERPL-MCNC: 12 MG/DL (ref 6–20)
BUN/CREAT SERPL: 15 (ref 7–25)
CALCIUM SPEC-SCNC: 9.2 MG/DL (ref 8.6–10.5)
CEA SERPL-MCNC: <0.6 NG/ML
CHLORIDE SERPL-SCNC: 95 MMOL/L (ref 98–107)
CO2 SERPL-SCNC: 26 MMOL/L (ref 22–29)
CREAT SERPL-MCNC: 0.8 MG/DL (ref 0.76–1.27)
DEPRECATED RDW RBC AUTO: 46.4 FL (ref 37–54)
EOSINOPHIL # BLD AUTO: 0.39 10*3/MM3 (ref 0–0.4)
EOSINOPHIL NFR BLD AUTO: 3 % (ref 0.3–6.2)
ERYTHROCYTE [DISTWIDTH] IN BLOOD BY AUTOMATED COUNT: 14.5 % (ref 12.3–15.4)
GFR SERPL CREATININE-BSD FRML MDRD: 100 ML/MIN/1.73
GLOBULIN UR ELPH-MCNC: 2.7 GM/DL
GLUCOSE SERPL-MCNC: 140 MG/DL (ref 65–99)
HCT VFR BLD AUTO: 39.7 % (ref 37.5–51)
HGB BLD-MCNC: 13.1 G/DL (ref 13–17.7)
LYMPHOCYTES # BLD AUTO: 0.75 10*3/MM3 (ref 0.7–3.1)
LYMPHOCYTES NFR BLD AUTO: 5.7 % (ref 19.6–45.3)
MCH RBC QN AUTO: 29.7 PG (ref 26.6–33)
MCHC RBC AUTO-ENTMCNC: 33 G/DL (ref 31.5–35.7)
MCV RBC AUTO: 90 FL (ref 79–97)
MONOCYTES # BLD AUTO: 1.1 10*3/MM3 (ref 0.1–0.9)
MONOCYTES NFR BLD AUTO: 8.4 % (ref 5–12)
NEUTROPHILS NFR BLD AUTO: 10.84 10*3/MM3 (ref 1.7–7)
NEUTROPHILS NFR BLD AUTO: 82.6 % (ref 42.7–76)
PLATELET # BLD AUTO: 294 10*3/MM3 (ref 140–450)
PMV BLD AUTO: 8.6 FL (ref 6–12)
POTASSIUM SERPL-SCNC: 4.2 MMOL/L (ref 3.5–5.2)
PROT SERPL-MCNC: 6.7 G/DL (ref 6–8.5)
RBC # BLD AUTO: 4.41 10*6/MM3 (ref 4.14–5.8)
SODIUM SERPL-SCNC: 133 MMOL/L (ref 136–145)
WBC NRBC COR # BLD: 13.12 10*3/MM3 (ref 3.4–10.8)

## 2022-01-28 PROCEDURE — 80053 COMPREHEN METABOLIC PANEL: CPT | Performed by: INTERNAL MEDICINE

## 2022-01-28 PROCEDURE — 82378 CARCINOEMBRYONIC ANTIGEN: CPT | Performed by: INTERNAL MEDICINE

## 2022-01-28 PROCEDURE — 99214 OFFICE O/P EST MOD 30 MIN: CPT | Performed by: INTERNAL MEDICINE

## 2022-01-28 PROCEDURE — 25010000002 HEPARIN LOCK FLUSH PER 10 UNITS: Performed by: INTERNAL MEDICINE

## 2022-01-28 PROCEDURE — 85025 COMPLETE CBC W/AUTO DIFF WBC: CPT | Performed by: INTERNAL MEDICINE

## 2022-01-28 PROCEDURE — 36591 DRAW BLOOD OFF VENOUS DEVICE: CPT

## 2022-01-28 RX ORDER — HEPARIN SODIUM (PORCINE) LOCK FLUSH IV SOLN 100 UNIT/ML 100 UNIT/ML
500 SOLUTION INTRAVENOUS AS NEEDED
Status: DISCONTINUED | OUTPATIENT
Start: 2022-01-28 | End: 2022-01-29 | Stop reason: HOSPADM

## 2022-01-28 RX ORDER — SODIUM CHLORIDE 0.9 % (FLUSH) 0.9 %
20 SYRINGE (ML) INJECTION AS NEEDED
Status: DISCONTINUED | OUTPATIENT
Start: 2022-01-28 | End: 2022-01-29 | Stop reason: HOSPADM

## 2022-01-28 RX ORDER — SODIUM CHLORIDE 0.9 % (FLUSH) 0.9 %
20 SYRINGE (ML) INJECTION AS NEEDED
Status: CANCELLED | OUTPATIENT
Start: 2022-01-28

## 2022-01-28 RX ORDER — HEPARIN SODIUM (PORCINE) LOCK FLUSH IV SOLN 100 UNIT/ML 100 UNIT/ML
500 SOLUTION INTRAVENOUS AS NEEDED
Status: CANCELLED | OUTPATIENT
Start: 2022-01-28

## 2022-01-28 RX ADMIN — SODIUM CHLORIDE, PRESERVATIVE FREE 20 ML: 5 INJECTION INTRAVENOUS at 08:32

## 2022-01-28 RX ADMIN — HEPARIN 500 UNITS: 100 SYRINGE at 08:32

## 2022-01-28 NOTE — PROGRESS NOTES
Pt tot he clinic for port flush and MD visit. Port accessed and flushed with good blood return noted. 10cc of blood wasted prior to specimen collection. Blood specimen obtained and sent to lab for processing per protocol.  Port flushed with saline and heparin prior to needle removal.

## 2022-02-01 ENCOUNTER — TRANSCRIBE ORDERS (OUTPATIENT)
Dept: INTERVENTIONAL RADIOLOGY/VASCULAR | Facility: HOSPITAL | Age: 57
End: 2022-02-01

## 2022-02-01 DIAGNOSIS — Z01.812 ENCOUNTER FOR PREPROCEDURE SCREENING LABORATORY TESTING FOR COVID-19: Primary | ICD-10-CM

## 2022-02-01 DIAGNOSIS — Z20.822 ENCOUNTER FOR PREPROCEDURE SCREENING LABORATORY TESTING FOR COVID-19: Primary | ICD-10-CM

## 2022-02-03 ENCOUNTER — TELEPHONE (OUTPATIENT)
Dept: ONCOLOGY | Facility: CLINIC | Age: 57
End: 2022-02-03

## 2022-02-05 ENCOUNTER — LAB (OUTPATIENT)
Dept: LAB | Facility: HOSPITAL | Age: 57
End: 2022-02-05

## 2022-02-05 DIAGNOSIS — Z01.812 ENCOUNTER FOR PREPROCEDURE SCREENING LABORATORY TESTING FOR COVID-19: ICD-10-CM

## 2022-02-05 DIAGNOSIS — Z20.822 ENCOUNTER FOR PREPROCEDURE SCREENING LABORATORY TESTING FOR COVID-19: ICD-10-CM

## 2022-02-05 LAB — SARS-COV-2 ORF1AB RESP QL NAA+PROBE: NOT DETECTED

## 2022-02-05 PROCEDURE — C9803 HOPD COVID-19 SPEC COLLECT: HCPCS

## 2022-02-05 PROCEDURE — U0004 COV-19 TEST NON-CDC HGH THRU: HCPCS

## 2022-02-07 ENCOUNTER — HOSPITAL ENCOUNTER (OUTPATIENT)
Dept: CT IMAGING | Facility: HOSPITAL | Age: 57
Discharge: HOME | End: 2022-02-07
Admitting: RADIOLOGY

## 2022-02-07 VITALS
WEIGHT: 202 LBS | OXYGEN SATURATION: 95 % | HEIGHT: 75 IN | HEART RATE: 78 BPM | BODY MASS INDEX: 25.12 KG/M2 | DIASTOLIC BLOOD PRESSURE: 79 MMHG | SYSTOLIC BLOOD PRESSURE: 112 MMHG | TEMPERATURE: 98.2 F | RESPIRATION RATE: 11 BRPM

## 2022-02-07 DIAGNOSIS — R91.1 LUNG NODULE: ICD-10-CM

## 2022-02-07 LAB
APTT PPP: 28.5 SECONDS (ref 24–31)
BASOPHILS # BLD AUTO: 0.1 10*3/MM3 (ref 0–0.2)
BASOPHILS NFR BLD AUTO: 1.3 % (ref 0–1.5)
DEPRECATED RDW RBC AUTO: 45.9 FL (ref 37–54)
EOSINOPHIL # BLD AUTO: 0.4 10*3/MM3 (ref 0–0.4)
EOSINOPHIL NFR BLD AUTO: 8 % (ref 0.3–6.2)
ERYTHROCYTE [DISTWIDTH] IN BLOOD BY AUTOMATED COUNT: 14.9 % (ref 12.3–15.4)
HCT VFR BLD AUTO: 38.8 % (ref 37.5–51)
HGB BLD-MCNC: 13.4 G/DL (ref 13–17.7)
INR PPP: 1.07 (ref 0.93–1.1)
LYMPHOCYTES # BLD AUTO: 0.9 10*3/MM3 (ref 0.7–3.1)
LYMPHOCYTES NFR BLD AUTO: 16.7 % (ref 19.6–45.3)
MCH RBC QN AUTO: 30.6 PG (ref 26.6–33)
MCHC RBC AUTO-ENTMCNC: 34.5 G/DL (ref 31.5–35.7)
MCV RBC AUTO: 88.6 FL (ref 79–97)
MONOCYTES # BLD AUTO: 0.6 10*3/MM3 (ref 0.1–0.9)
MONOCYTES NFR BLD AUTO: 12.1 % (ref 5–12)
NEUTROPHILS NFR BLD AUTO: 3.3 10*3/MM3 (ref 1.7–7)
NEUTROPHILS NFR BLD AUTO: 61.9 % (ref 42.7–76)
NRBC BLD AUTO-RTO: 0 /100 WBC (ref 0–0.2)
PLATELET # BLD AUTO: 321 10*3/MM3 (ref 140–450)
PMV BLD AUTO: 6.5 FL (ref 6–12)
PROTHROMBIN TIME: 11.8 SECONDS (ref 9.6–11.7)
RBC # BLD AUTO: 4.38 10*6/MM3 (ref 4.14–5.8)
WBC NRBC COR # BLD: 5.3 10*3/MM3 (ref 3.4–10.8)

## 2022-02-07 PROCEDURE — 76380 CAT SCAN FOLLOW-UP STUDY: CPT

## 2022-02-07 PROCEDURE — 25010000002 HEPARIN LOCK FLUSH PER 10 UNITS

## 2022-02-07 PROCEDURE — 85025 COMPLETE CBC W/AUTO DIFF WBC: CPT | Performed by: RADIOLOGY

## 2022-02-07 PROCEDURE — 85730 THROMBOPLASTIN TIME PARTIAL: CPT | Performed by: RADIOLOGY

## 2022-02-07 PROCEDURE — 25010000002 FENTANYL CITRATE (PF) 50 MCG/ML SOLUTION: Performed by: RADIOLOGY

## 2022-02-07 PROCEDURE — 99152 MOD SED SAME PHYS/QHP 5/>YRS: CPT

## 2022-02-07 PROCEDURE — 25010000002 ONDANSETRON PER 1 MG: Performed by: RADIOLOGY

## 2022-02-07 PROCEDURE — 25010000002 MIDAZOLAM PER 1 MG: Performed by: RADIOLOGY

## 2022-02-07 PROCEDURE — 85610 PROTHROMBIN TIME: CPT | Performed by: RADIOLOGY

## 2022-02-07 RX ORDER — HEPARIN SODIUM (PORCINE) LOCK FLUSH IV SOLN 100 UNIT/ML 100 UNIT/ML
500 SOLUTION INTRAVENOUS ONCE
Status: COMPLETED | OUTPATIENT
Start: 2022-02-07 | End: 2022-02-07

## 2022-02-07 RX ORDER — SODIUM CHLORIDE 0.9 % (FLUSH) 0.9 %
10 SYRINGE (ML) INJECTION AS NEEDED
Status: DISCONTINUED | OUTPATIENT
Start: 2022-02-07 | End: 2022-02-07 | Stop reason: HOSPADM

## 2022-02-07 RX ORDER — HEPARIN SODIUM (PORCINE) LOCK FLUSH IV SOLN 100 UNIT/ML 100 UNIT/ML
SOLUTION INTRAVENOUS
Status: COMPLETED
Start: 2022-02-07 | End: 2022-02-07

## 2022-02-07 RX ORDER — SODIUM CHLORIDE 0.9 % (FLUSH) 0.9 %
10 SYRINGE (ML) INJECTION EVERY 12 HOURS SCHEDULED
Status: DISCONTINUED | OUTPATIENT
Start: 2022-02-07 | End: 2022-02-07 | Stop reason: HOSPADM

## 2022-02-07 RX ORDER — SODIUM CHLORIDE 9 MG/ML
75 INJECTION, SOLUTION INTRAVENOUS CONTINUOUS
Status: DISCONTINUED | OUTPATIENT
Start: 2022-02-07 | End: 2022-02-08 | Stop reason: HOSPADM

## 2022-02-07 RX ORDER — MIDAZOLAM HYDROCHLORIDE 1 MG/ML
INJECTION INTRAMUSCULAR; INTRAVENOUS
Status: COMPLETED | OUTPATIENT
Start: 2022-02-07 | End: 2022-02-07

## 2022-02-07 RX ORDER — ONDANSETRON 2 MG/ML
INJECTION INTRAMUSCULAR; INTRAVENOUS
Status: COMPLETED | OUTPATIENT
Start: 2022-02-07 | End: 2022-02-07

## 2022-02-07 RX ORDER — FENTANYL CITRATE 50 UG/ML
INJECTION, SOLUTION INTRAMUSCULAR; INTRAVENOUS
Status: COMPLETED | OUTPATIENT
Start: 2022-02-07 | End: 2022-02-07

## 2022-02-07 RX ADMIN — FENTANYL CITRATE 50 MCG: 50 INJECTION, SOLUTION INTRAMUSCULAR; INTRAVENOUS at 08:56

## 2022-02-07 RX ADMIN — Medication 10 ML: at 08:06

## 2022-02-07 RX ADMIN — HEPARIN SODIUM (PORCINE) LOCK FLUSH IV SOLN 100 UNIT/ML 500 UNITS: 100 SOLUTION at 08:07

## 2022-02-07 RX ADMIN — ONDANSETRON 4 MG: 2 INJECTION INTRAMUSCULAR; INTRAVENOUS at 08:54

## 2022-02-07 RX ADMIN — Medication 500 UNITS: at 08:07

## 2022-02-07 RX ADMIN — SODIUM CHLORIDE 75 ML/HR: 0.9 INJECTION, SOLUTION INTRAVENOUS at 08:06

## 2022-02-07 RX ADMIN — FENTANYL CITRATE 50 MCG: 50 INJECTION, SOLUTION INTRAMUSCULAR; INTRAVENOUS at 08:58

## 2022-02-07 RX ADMIN — MIDAZOLAM 0.5 MG: 1 INJECTION INTRAMUSCULAR; INTRAVENOUS at 08:58

## 2022-02-07 RX ADMIN — MIDAZOLAM 0.5 MG: 1 INJECTION INTRAMUSCULAR; INTRAVENOUS at 08:56

## 2022-02-08 NOTE — PROGRESS NOTES
Hematology/Oncology Outpatient Follow Up    PATIENT NAME:Jc Driscoll  :1965  MRN: [id number]  PRIMARY CARE PHYSICIAN: Reshma Alvarenga MD  REFERRING PHYSICIAN: Reshma Alvarenga MD    Chief Complaint   Patient presents with   • Follow-up     Non small cell carcinoma of lung, right        HISTORY OF PRESENT ILLNESS:     This is a 56 y.o. who developed left shoulder pain and for that reason he had a chest x-ray done on 19 which showed a 2.7 cm noncalcified right lower lobe nodule was identified.  For that reason a CT scan of the chest was recommended.  Review of his records shows patient had the CT scan on 19 done without contrast.  This basically showed a lobulated noncalcified mass in the posterior aspect of the right lower lobe measuring 3 cm close to the pleural surface.  There is a calcified 1.2 mm nodule in the lateral aspect of the right lower lobe consistent with a granuloma.  There were also calcified subcarinal and right hilar nodules.  There is a lower right side tracheal nodule measuring 1 cm.  Patient had a PET/CT scan done on 19 which showed increased metabolic activity on the right lower lobe mass that measures 2.5 cm with SUV of 4.4.  There was also increased metabolic activity in the subcarinal space measuring 2.8 cm in size with SUV of 3.4, increased activity in an enlarged right paratracheal lymph node that measures 1.9 cm, SUV of 5, also suspicious for metastatic adenopathy.  Patient had a CT-guided biopsy of the right lower lobe mass on 3/8/19.  This showed adenocarcinoma, TTF-1 positive.  On 3/18/19 patient underwent endoscopic ultrasound and biopsy of a subcarinal lymph node.  This was positive for malignant cells.  Patient was then taken back to surgery on 3/20/18 and had left Mediport placement by Dr. Fuchs.  He has been referred for further evaluation and management of his stage 3 adenocarcinoma of the right lung.  He was accompanied by his spouse for the  appointment on 3/26/19.  Patient was scheduled to have a brain MRI for complete staging, but he could not do this due to claustrophobia.  He is willing to try with the help of angiolytics.    • Patient had brain MRI done on 4/5/19.  He states it did not show any evidence of metastatic disease.  Evidence of chronic sinusitis was noted.    • Patient was seen by Dr. Escalona who has recommended concurrent chemotherapy and radiation.  Patient is scheduled to begin on 4/15/19.   • 4/17/19 - Patient was initiated on combined chemotherapy and radiation with Cisplatin and Alimta.  Patient received cycle 1.      • 5/8/19 - Patient received cycle 2 of chemotherapy with Cisplatin and Alimta.   • 5/15/19 - Chemistry panel showed creatinine of 1.7.  WBC 1.8, hemoglobin 11.6, platelet count 72,000.   • 5/20/19 - BUN 10, creatinine 1.2, potassium 3.5.    • 5/23/19 - CT scan of the chest with contrast showed interval decrease in size of the right lower lobe pulmonary nodule now measuring 2.1 cm in size.  There was no mediastinal or hilar adenopathy identified.  • 6/26/2019 patient underwent right lower lobectomy with hilar and mediastinal lymph node dissection performed by Dr. Terrance Mckeon.  • Pathology revealed residual residual 2.2 cm invasive moderately differentiated adenocarcinoma.  There were a total of 16 lymph nodes evaluated that 7 had metastatic disease.  There was evidence of lymphovascular invasion, extranodal involvement.  All the surgical margins were negative and distance of the closest margin was 2.5 cm.  Logic stage is T1c N2 M0.  • Patient is here today accompanied by his spouse for this appointment.  He continues to complain of some postop discomfort, numbness but his skin is intact.  There is no drainage.  Has had follow-up with Dr. Fuchs.  • 8/14/2019-seen by Dr. Maria at the [facility].  Dr. Maria has recommended immunotherapy with durvalumab off label use as patient has not had significant response  to neoadjuvant chemotherapy and radiation.  Patient was given the option to have immunotherapy at the [facility] vs [place] and he has chosen to stay in [place]  • 8/21/19 - Started cycle 1 day 1 Imfinzi  • 9/4/2019 patient had CT scan of the chest this shows a moderate size right pleural effusion.  There are areas of groundglass opacification in the right upper lobe without any evidence of significant septal thickening.  Volume loss noted there is also moderate pleural effusion.  There is no suspicious recurrent malignancy.  There were nonenlarged residual mediastinal lymph nodes.  • 9/9/19 - WBC 6.23, Hgb 11.7 Platelets 257,000, , BUN 9, Cr 1.1,Vitamin B12 318, Ferritin 437  • 9/23/19 - received cycle 2 day 1 Imfinzi  • 10/9/19- Seen by Dr rodríguez with ENT for sinus disease  • 11/4/2019-patient received cycle 5 of Imfinzi(durvalumab)  • 12/2/2019 patient had cycle 7 of durvalumab  • 12/5/2019-patient had CT scan of the  abdomen and pelvis with small right pleural sided pleural effusion.There is no evidence of metastatic disease  • 12/30/2019-patient received cycle 9 of durvalumab  • 12/31/2019-patient had CT scan of the chest showed small to moderate left pleural effusion.  Iglesias appearing right lower paratracheal and right peribronchial soft tissue thickening without any discrete pathologically enlarged mediastinal or hilar lymph nodes.  • 1/27/20-patient received cycle 11 of durvalumab  • 2/17/20-patient had a stress test which was negative for ischemia  • 2/17/2020-patient had CT of the chest which showed postop changes on the right lung, right pleural effusion but no enlarging mass was noted  • 3/2/2020-patient received cycle 12 of durvalumab  • 3/16/2020-patient had ultrasound-guided right thoracentesis.  Pathology was negative for malignancy  • 4/13/2020-patient received cycle 15 of immunotherapy with durvalumab  • 5/11/2020-patient received cycle 17 of immunotherapy with durvalumab  • 6/9/2020-patient had  CT scan of the chest, abdomen and pelvis for lung cancer restaging.  There is stable small chronic loculated right basilar pleural effusion suggesting chronic pleuritis.  There is no evidence of metastatic disease in the abdomen or pelvis  • 7/20/2020 patient received cycle 22 of Durvalumab  • 8/17/2020 patient received cycle 24 and last cycle of durvalumab  • 9/24/2020 patient had CT scan of the chest, abdomen and pelvis for cancer restaging there was no evidence of local recurrence or metastatic disease within the abdomen and pelvis.  He has stable chronic small right pleural effusion.  Mild sigmoid diverticulosis was noted.  • 1/18/2021 patient had CT scan of the chest, abdomen and pelvis reviewed patient  • 5/24/2021 patient had CT scan of the chest calcified subcarinal lymph node consistent with changes of prior granulomatous disease.  Chronic small right pleural effusion is noted similar to prior exam.  Previously shown 4 mm nodule in the left lower lobe has nearly completely resolved.  The subpleural 3 mm nodule located within the posterior left lower lobe With resolved.  • 9/24/2021 patient had a CT scan of the chest, abdomen and pelvis there is soft tissue attenuation within the right paratracheal area which has been suggested to reflect sequela to previous treatment.  There is slight thickening of the bladder wall otherwise there is no evidence of metastatic disease.  • 12/27/2021 patient had CT scan of the chest with contrast this basically showed irregular shaped noncalcified 7 mm left upper lobe pulmonary nodule.  PET CT scan was recommended to further evaluate.  • 1/26/2022 patient had a PET CT scan done which basically showed evidence of mild uptake corresponding to the nodule within the left upper lobe which is new worrisome for developing metastatic focus.  There was mild radiopharmaceutical activity corresponding to pleural thickening throughout the right lung base with associated pleural effusion  likely related to post therapeutic changes and radiation changes in this region.  Metastatic malignancy involving the pleura could not be completely eliminated.  • 1/26/2022: CT-guided biopsy was aborted due to finding by the radiologist that the lung nodularity was actually a clump of tiny nodules of which the largest was 6 mm and therefore biopsy could not be completed.  Also the area was in the vicinity of multiple blood vessels with increased risk of bleeding.  Follow-up CT of the chest was recommended for continued surveillance    Past Medical History:   Diagnosis Date   • Allergic rhinitis 7/25/2013    Overview:  4/2/2018 - still zyrtec 10 daily (if stops, gets dermatitis again).    • Anxiety and depression 6/5/2012    Overview:  4/2/2018 -   C/w well 300 xr and Lito 20.  4/8/2019 -   Doing ok even with new lung cancer - lito 20 & well 300xr.    • Chronic pansinusitis 4/8/2019    Overview:  4/8/2019 -   MRI showed chronic thick in frontal, maxillary, ethmoidal ---> to Dr. COLLAZO to est since abx ICC didn't help.  Does netipot bid.    • Diastolic CHF (HCC) 7/9/2014    Overview:  7/4/14 - impaired LV relaxation on [facility].  EF 60%. Rest normal   • Dupuytren's contracture of both hands    • Elevated cholesterol    • Erosive esophagitis 7/9/2019    Overview:  EGD 2016 4/2/2018 - nexium OTC daily for few week.  Qod before that.  Now carbonation hurts, epigastric pain 4/8/2019 -   protonix 40 doing well.    • Gastroesophageal reflux disease 2/21/2019   • Hiatal hernia 7/9/2019   • History of colon polyps    • Hypertension    • Lung cancer (HCC)     right lung and in lymph nodes x2   • Mediastinal lymphadenopathy 3/12/2019   • Normocytic anemia 8/8/2019    Overview:  6/2019 - dropped to 9's postop 7/2019 - rebounded to 10's.  8/8/2019 -   Back to 9's - check ferritin, b12 and thyroid.    • Prediabetes 7/9/2014    Overview:  7/4/14 - a1c 6.1 at [facility] admission   • Retention of urine 6/27/2019       Past Surgical History:    Procedure Laterality Date   • BRONCHOSCOPY  2019    EBUS MONTERO NEEDLE BX   • COLONOSCOPY     • DUPUYTREN CONTRACTURE RELEASE Bilateral    • LUNG BIOPSY  2019    CT GUIDED   • LUNG REMOVAL, PARTIAL Right     right lower   • PORTACATH PLACEMENT  2019    DR LONGORIA   • THORACOSCOPY Right 2019    Procedure: RIGHT VATS, OPEN LOWER LOBECTOMY WITH LYMPH NODE DISECTION, WEDGE RESECTION OF RIGHT MIDDLE LOBE;  Surgeon: Terrance Longoria MD;  Location: [facility] OR;  Service: Cardiothoracic         Current Outpatient Medications:   •  amLODIPine (NORVASC) 10 MG tablet, Take 10 mg by mouth Daily., Disp: , Rfl:   •  buPROPion XL (WELLBUTRIN XL) 300 MG 24 hr tablet, Take 300 mg by mouth Every Morning., Disp: , Rfl:   •  desvenlafaxine (PRISTIQ) 50 MG 24 hr tablet, TAKE 1 TABLET BY MOUTH ONCE DAILY FOR 30 DAYS, Disp: , Rfl:   •  famotidine (PEPCID) 40 MG tablet, Take 40 mg by mouth., Disp: , Rfl:   •  levothyroxine (Euthyrox) 100 MCG tablet, Take 1 tablet by mouth once daily, Disp: , Rfl:   •  lidocaine-prilocaine (EMLA) 2.5-2.5 % cream, , Disp: , Rfl:   •  LORazepam (ATIVAN) 0.5 MG tablet, TAKE 1 TABLET BY MOUTH TWICE DAILY AS NEEDED FOR ANXIETY MAX OF 2 DAY, Disp: , Rfl: 0  •  metoprolol succinate XL (TOPROL-XL) 100 MG 24 hr tablet, Take 100 mg by mouth Daily., Disp: , Rfl:   •  pantoprazole (PROTONIX) 40 MG EC tablet, Take 40 mg by mouth Daily., Disp: , Rfl:   •  pravastatin (PRAVACHOL) 10 MG tablet, Take 10 mg by mouth Daily., Disp: , Rfl:     No Known Allergies    Family History   Problem Relation Age of Onset   • Cancer Sister    • Cancer Mother    • Lung cancer Father        Cancer-related family history includes Cancer in his mother and sister; Lung cancer in his father.    Social History     Tobacco Use   • Smoking status: Former Smoker     Types: Cigarettes     Quit date: 2009     Years since quittin.4   • Smokeless tobacco: Never Used   Substance Use Topics   • Alcohol use: Yes      "Alcohol/week: 2.0 standard drinks     Types: 2 Cans of beer per week     Comment: Occasional   • Drug use: No     I have reviewed and confirmed the accuracy of the patient's history:  as entered by the MA/BELKIS/JOEY. Azucena Gaspar MD 02/10/22     SUBJECTIVE:    Patient is here today for routine follow-up.  He was recently diagnosed with hypothyroidism and currently on thyroid replacement therapy and feels much better.    Patient comes in today for routine follow-up and does not have any specific complaints    REVIEW OF SYSTEMS:    Review of Systems   Constitutional: Negative for chills and fever.   HENT: Negative for ear pain, mouth sores, nosebleeds and sore throat.    Eyes: Negative for photophobia and visual disturbance.   Respiratory: Negative for wheezing and stridor.    Cardiovascular: Negative for chest pain and palpitations.   Gastrointestinal: Negative for abdominal pain, diarrhea, nausea and vomiting.   Endocrine: Negative for cold intolerance and heat intolerance.   Genitourinary: Negative for dysuria and hematuria.   Musculoskeletal: Negative for joint swelling and neck stiffness.   Skin: Negative for color change and rash.   Neurological: Negative for seizures and syncope.   Hematological: Negative for adenopathy.   Psychiatric/Behavioral: Negative for agitation, confusion and hallucinations.       I have reviewed and confirmed the accuracy of the ROS as documented by the MA/BELKIS/RN Azucena Gaspar MD        OBJECTIVE:  Vitals:    02/10/22 0849   BP: 125/77   Pulse: 76   Resp: 18   Temp: 96.8 °F (36 °C)   TempSrc: Infrared   Weight: 91.6 kg (202 lb)   Height: 190.5 cm (75\")   PainSc: 0-No pain     ECOG  Eastern Cooperative Oncology Group (ECOG, [place], World Health Organization) performance scale  0 Fully active; no performance restrictions.  1 Strenuous physical activity restricted; fully ambulatory and able to carry out light work.  2 Capable of all self-care but unable to carry out any " work activities. Up and about >50% of waking hours.  3 Capable of only limited self-care; confined to bed or chair >50% of waking hours.  4 Completely disabled; cannot carry out any self-care; totally confined to bed or chair.    Physical Exam   Constitutional: He is oriented to person, place, and time. He appears well-developed. No distress.   HENT:   Head: Normocephalic and atraumatic.   Right Ear: External ear normal.   Left Ear: External ear normal.   Nose: Nose normal.   Eyes: Pupils are equal, round, and reactive to light. Conjunctivae are normal. Right eye exhibits no discharge. Left eye exhibits no discharge. No scleral icterus.   Neck: No thyromegaly present.   Cardiovascular: Normal rate, regular rhythm and normal heart sounds. Exam reveals no gallop and no friction rub.   Pulmonary/Chest: Effort normal. No stridor. No respiratory distress. He has no wheezes.   Abdominal: Soft. Bowel sounds are normal. He exhibits no mass. There is no abdominal tenderness. There is no rebound and no guarding.   Musculoskeletal: Normal range of motion. No tenderness.   Lymphadenopathy:     He has no cervical adenopathy.   Neurological: He is alert and oriented to person, place, and time. He exhibits normal muscle tone.   Skin: Skin is warm. No rash noted. He is not diaphoretic. No erythema.   Urticarial-like rash, right medial knee likely due to immunotherapy   Psychiatric: His behavior is normal. Judgment and thought content normal.   Nursing note and vitals reviewed.    I have reexamined the patient and the results are consistent with the previously documented exam. Azucena Gaspar MD     RECENT LABS    WBC   Date Value Ref Range Status   02/07/2022 5.30 3.40 - 10.80 10*3/mm3 Final   07/03/2019 8.99 4.5 - 11.0 10*3/uL Final     RBC   Date Value Ref Range Status   02/07/2022 4.38 4.14 - 5.80 10*6/mm3 Final   07/03/2019 3.24 (L) 4.5 - 5.9 10*6/uL Final     Hemoglobin   Date Value Ref Range Status   02/07/2022 13.4  13.0 - 17.7 g/dL Final   07/03/2019 10.4 (L) 13.5 - 17.5 g/dL Final     Hematocrit   Date Value Ref Range Status   02/07/2022 38.8 37.5 - 51.0 % Final   07/03/2019 32.8 (L) 41.0 - 53.0 % Final     MCV   Date Value Ref Range Status   02/07/2022 88.6 79.0 - 97.0 fL Final   07/03/2019 101.2 (H) 80.0 - 100.0 fL Final     MCH   Date Value Ref Range Status   02/07/2022 30.6 26.6 - 33.0 pg Final   07/03/2019 32.1 26.0 - 34.0 pg Final     MCHC   Date Value Ref Range Status   02/07/2022 34.5 31.5 - 35.7 g/dL Final   07/03/2019 31.7 31.0 - 37.0 g/dL Final     RDW   Date Value Ref Range Status   02/07/2022 14.9 12.3 - 15.4 % Final   07/03/2019 15.2 12.0 - 16.8 % Final     RDW-SD   Date Value Ref Range Status   02/07/2022 45.9 37.0 - 54.0 fl Final     MPV   Date Value Ref Range Status   02/07/2022 6.5 6.0 - 12.0 fL Final   07/03/2019 9.0 6.7 - 10.8 fL Final     Platelets   Date Value Ref Range Status   02/07/2022 321 140 - 450 10*3/mm3 Final   07/03/2019 302 140 - 440 10*3/uL Final     Neutrophil Rel %   Date Value Ref Range Status   07/03/2019 60.0 45 - 80 % Final     Neutrophil %   Date Value Ref Range Status   02/07/2022 61.9 42.7 - 76.0 % Final     Lymphocyte Rel %   Date Value Ref Range Status   07/03/2019 14.8 (L) 15 - 50 % Final     Lymphocyte %   Date Value Ref Range Status   02/07/2022 16.7 (L) 19.6 - 45.3 % Final     Monocyte Rel %   Date Value Ref Range Status   07/03/2019 12.2 0 - 15 % Final     Monocyte %   Date Value Ref Range Status   02/07/2022 12.1 (H) 5.0 - 12.0 % Final     Eosinophil %   Date Value Ref Range Status   02/07/2022 8.0 (H) 0.3 - 6.2 % Final   07/03/2019 10.4 (H) 0 - 7 % Final     Basophil Rel %   Date Value Ref Range Status   07/03/2019 1.7 0 - 2 % Final     Basophil %   Date Value Ref Range Status   02/07/2022 1.3 0.0 - 1.5 % Final     Neutrophils Absolute   Date Value Ref Range Status   07/03/2019 5.39 1.5 - 7.5 /uL Final   07/03/2019 5.39 2.0 - 8.8 10*3/uL Final     Neutrophils, Absolute    Date Value Ref Range Status   02/07/2022 3.30 1.70 - 7.00 10*3/mm3 Final     Lymphocytes Absolute   Date Value Ref Range Status   07/03/2019 1.33 0.7 - 5.5 10*3/uL Final     Lymphocytes, Absolute   Date Value Ref Range Status   02/07/2022 0.90 0.70 - 3.10 10*3/mm3 Final     Monocytes Absolute   Date Value Ref Range Status   07/03/2019 1.10 0.0 - 1.7 10*3/uL Final     Monocytes, Absolute   Date Value Ref Range Status   02/07/2022 0.60 0.10 - 0.90 10*3/mm3 Final     Eosinophils Absolute   Date Value Ref Range Status   07/03/2019 0.93 (H) 0.0 - 0.8 10*3/uL Final     Eosinophils, Absolute   Date Value Ref Range Status   02/07/2022 0.40 0.00 - 0.40 10*3/mm3 Final     Basophils Absolute   Date Value Ref Range Status   07/03/2019 0.15 0.0 - 0.2 10*3/uL Final     Basophils, Absolute   Date Value Ref Range Status   02/07/2022 0.10 0.00 - 0.20 10*3/mm3 Final     nRBC   Date Value Ref Range Status   02/07/2022 0.0 0.0 - 0.2 /100 WBC Final       Lab Results   Component Value Date    GLUCOSE 140 (H) 01/28/2022    BUN 12 01/28/2022    CREATININE 0.80 01/28/2022    EGFRIFNONA 100 01/28/2022    EGFRIFAFRI >60 05/20/2019    BCR 15.0 01/28/2022    K 4.2 01/28/2022    CO2 26.0 01/28/2022    CALCIUM 9.2 01/28/2022    ALBUMIN 4.00 01/28/2022    LABIL2 1.4 07/03/2019    AST 19 01/28/2022    ALT 17 01/28/2022           ASSESSMENT:    1. Adenocarcinoma of the right lung, T1c N2 M0, consistent with clinical stage 3A disease.  Continue surveillance CT scans  2. New 1.2 cm left upper lobe that is mildly PET avid worrisome for new focus of metastatic disease.  I recommended CT-guided biopsy of this left upper lobe nodule for better characterization of the etiology.  Patient has been informed  3. S/P combined chemotherapy and radiation with cisplatin and Alimta.  Surveillance CT scan  4. Reviewed his CT scan of the chest, abdomen and pelvis completed September 2021 which does not show any evidence of relapsed disease but he has bladder wall  thickening but totally asymptomatic.  We will check a UA with C&S today  5. Recent diagnosis of hypothyroidism, on thyroid replacement therapy  6. Status post right lower lobectomy with mediastinal and hilar lymph node dissection with residual disease.  pT1 cN2 M0.  Patient with high risk features including lymphovascular invasion, extranodal capsular extension and persistent multiple lymph node disease.  Ongoing management  7. Status post consolidation treatment with immunotherapy with Imfinzi:  8. Port maintenance: Patient still wants to keep port in little longer.  9. fatigue: We will check thyroid function tests  10. Satus post right thoracentesis with cytology negative for malignancy  11. Postop anemia: Reviewed  12. Assessment has been reviewed and updated      PLANS:    1.  CT-guided biopsy of the left upper lobe nodule was aborted due to finding of multiple clumps of nodules with the largest measuring 6 mm and also in the location surrounded by multiple blood vessels therefore procedure was aborted.  Surveillance CT scan has been recommended.  We will plan to obtain a CT of the chest with contrast in 3 months  2. CBC reviewed  3. CMP and CEA level reviewed  4. Continue monthly port flushes  5. Continue thyroid replacement therapy through his PCP  6. Follow-up in 3 months a few days after the next CT of the chest  7. He has completed all planned cycles of immunotherapy.  He has no evidence of disease relapse at this time  8. Continue B12 injections  monthly: Patient did have elevated methylmalonic acid level during the early phases of his treatments.  Continue the same  9. Continue supportive care  10. All questions answered to the best of my abilities         Patient has  questions which have all been answered to the best of my abilities    I have reviewed labs results, imaging, vitals, and medications with the patient today.     Patient verbalized understanding and is in agreement of the above  plan.

## 2022-02-10 ENCOUNTER — OFFICE VISIT (OUTPATIENT)
Dept: ONCOLOGY | Facility: CLINIC | Age: 57
End: 2022-02-10

## 2022-02-10 ENCOUNTER — APPOINTMENT (OUTPATIENT)
Dept: LAB | Facility: HOSPITAL | Age: 57
End: 2022-02-10

## 2022-02-10 VITALS
HEIGHT: 75 IN | RESPIRATION RATE: 18 BRPM | DIASTOLIC BLOOD PRESSURE: 77 MMHG | BODY MASS INDEX: 25.12 KG/M2 | WEIGHT: 202 LBS | TEMPERATURE: 96.8 F | SYSTOLIC BLOOD PRESSURE: 125 MMHG | HEART RATE: 76 BPM

## 2022-02-10 DIAGNOSIS — C34.91 NON-SMALL CELL CARCINOMA OF LUNG, RIGHT: Primary | ICD-10-CM

## 2022-02-10 PROCEDURE — 99213 OFFICE O/P EST LOW 20 MIN: CPT | Performed by: INTERNAL MEDICINE

## 2022-02-11 ENCOUNTER — APPOINTMENT (OUTPATIENT)
Dept: LAB | Facility: HOSPITAL | Age: 57
End: 2022-02-11

## 2022-03-07 ENCOUNTER — HOSPITAL ENCOUNTER (OUTPATIENT)
Dept: ONCOLOGY | Facility: HOSPITAL | Age: 57
Setting detail: INFUSION SERIES
Discharge: HOME | End: 2022-03-07

## 2022-03-07 VITALS — BODY MASS INDEX: 25.22 KG/M2 | WEIGHT: 202.8 LBS | HEIGHT: 75 IN

## 2022-03-07 DIAGNOSIS — Z45.2 ENCOUNTER FOR CARE RELATED TO VASCULAR ACCESS PORT: Primary | ICD-10-CM

## 2022-03-07 PROCEDURE — 96523 IRRIG DRUG DELIVERY DEVICE: CPT

## 2022-03-07 PROCEDURE — 25010000002 HEPARIN LOCK FLUSH PER 10 UNITS: Performed by: INTERNAL MEDICINE

## 2022-03-07 RX ORDER — HEPARIN SODIUM (PORCINE) LOCK FLUSH IV SOLN 100 UNIT/ML 100 UNIT/ML
500 SOLUTION INTRAVENOUS AS NEEDED
Status: CANCELLED | OUTPATIENT
Start: 2022-03-07

## 2022-03-07 RX ORDER — HEPARIN SODIUM (PORCINE) LOCK FLUSH IV SOLN 100 UNIT/ML 100 UNIT/ML
500 SOLUTION INTRAVENOUS AS NEEDED
Status: DISCONTINUED | OUTPATIENT
Start: 2022-03-07 | End: 2022-03-08 | Stop reason: HOSPADM

## 2022-03-07 RX ORDER — SODIUM CHLORIDE 0.9 % (FLUSH) 0.9 %
20 SYRINGE (ML) INJECTION AS NEEDED
Status: CANCELLED | OUTPATIENT
Start: 2022-03-07

## 2022-03-07 RX ORDER — SODIUM CHLORIDE 0.9 % (FLUSH) 0.9 %
20 SYRINGE (ML) INJECTION AS NEEDED
Status: DISCONTINUED | OUTPATIENT
Start: 2022-03-07 | End: 2022-03-08 | Stop reason: HOSPADM

## 2022-03-07 RX ADMIN — HEPARIN 500 UNITS: 100 SYRINGE at 09:00

## 2022-03-07 RX ADMIN — Medication 30 ML: at 08:58

## 2022-03-07 NOTE — ADDENDUM NOTE
Encounter addended by: Antionette Cooper LPN on: 3/7/2022 9:11 AM   Actions taken: Flowsheet accepted

## 2022-04-11 ENCOUNTER — HOSPITAL ENCOUNTER (OUTPATIENT)
Dept: ONCOLOGY | Facility: HOSPITAL | Age: 57
Setting detail: INFUSION SERIES
Discharge: HOME | End: 2022-04-11

## 2022-04-11 DIAGNOSIS — Z45.2 ENCOUNTER FOR CARE RELATED TO VASCULAR ACCESS PORT: Primary | ICD-10-CM

## 2022-04-11 PROCEDURE — 25010000002 HEPARIN LOCK FLUSH PER 10 UNITS: Performed by: INTERNAL MEDICINE

## 2022-04-11 PROCEDURE — 96523 IRRIG DRUG DELIVERY DEVICE: CPT

## 2022-04-11 RX ORDER — SODIUM CHLORIDE 0.9 % (FLUSH) 0.9 %
20 SYRINGE (ML) INJECTION AS NEEDED
Status: DISCONTINUED | OUTPATIENT
Start: 2022-04-11 | End: 2022-04-12 | Stop reason: HOSPADM

## 2022-04-11 RX ORDER — HEPARIN SODIUM (PORCINE) LOCK FLUSH IV SOLN 100 UNIT/ML 100 UNIT/ML
500 SOLUTION INTRAVENOUS AS NEEDED
Status: CANCELLED | OUTPATIENT
Start: 2022-04-11

## 2022-04-11 RX ORDER — SODIUM CHLORIDE 0.9 % (FLUSH) 0.9 %
20 SYRINGE (ML) INJECTION AS NEEDED
Status: CANCELLED | OUTPATIENT
Start: 2022-04-11

## 2022-04-11 RX ORDER — HEPARIN SODIUM (PORCINE) LOCK FLUSH IV SOLN 100 UNIT/ML 100 UNIT/ML
500 SOLUTION INTRAVENOUS AS NEEDED
Status: DISCONTINUED | OUTPATIENT
Start: 2022-04-11 | End: 2022-04-12 | Stop reason: HOSPADM

## 2022-04-11 RX ADMIN — Medication 20 ML: at 08:51

## 2022-04-11 RX ADMIN — HEPARIN 500 UNITS: 100 SYRINGE at 08:52

## 2022-04-11 NOTE — PROGRESS NOTES
Patient came in without complaints for PF. Port flushed with 10cc normal saline, blood return noted, hep locked. Next apt confirmed.

## 2022-04-28 ENCOUNTER — TELEPHONE (OUTPATIENT)
Dept: ONCOLOGY | Facility: CLINIC | Age: 57
End: 2022-04-28

## 2022-04-28 NOTE — TELEPHONE ENCOUNTER
Called pt to let him know that I am working on getting a radiologist to look at his CT of the chest from [facility] to see if they think a biopsy can be done. I told him I will let him know when I have more information. He verbalized understanding.

## 2022-05-06 NOTE — PROGRESS NOTES
Hematology/Oncology Outpatient Follow Up    PATIENT NAME:Jc Driscoll  :1965  MRN: [id number]  PRIMARY CARE PHYSICIAN: Reshma Alvarenga MD  REFERRING PHYSICIAN: Reshma Alvarenga MD    Chief Complaint   Patient presents with   • Follow-up     Non-small cell carcinoma of lung, right (HCC)        HISTORY OF PRESENT ILLNESS:     This is a 56 y.o. who developed left shoulder pain and for that reason he had a chest x-ray done on 19 which showed a 2.7 cm noncalcified right lower lobe nodule was identified.  For that reason a CT scan of the chest was recommended.  Review of his records shows patient had the CT scan on 19 done without contrast.  This basically showed a lobulated noncalcified mass in the posterior aspect of the right lower lobe measuring 3 cm close to the pleural surface.  There is a calcified 1.2 mm nodule in the lateral aspect of the right lower lobe consistent with a granuloma.  There were also calcified subcarinal and right hilar nodules.  There is a lower right side tracheal nodule measuring 1 cm.  Patient had a PET/CT scan done on 19 which showed increased metabolic activity on the right lower lobe mass that measures 2.5 cm with SUV of 4.4.  There was also increased metabolic activity in the subcarinal space measuring 2.8 cm in size with SUV of 3.4, increased activity in an enlarged right paratracheal lymph node that measures 1.9 cm, SUV of 5, also suspicious for metastatic adenopathy.  Patient had a CT-guided biopsy of the right lower lobe mass on 3/8/19.  This showed adenocarcinoma, TTF-1 positive.  On 3/18/19 patient underwent endoscopic ultrasound and biopsy of a subcarinal lymph node.  This was positive for malignant cells.  Patient was then taken back to surgery on 3/20/18 and had left Mediport placement by Dr. Fuchs.  He has been referred for further evaluation and management of his stage 3 adenocarcinoma of the right lung.  He was accompanied by his spouse for  the appointment on 3/26/19.  Patient was scheduled to have a brain MRI for complete staging, but he could not do this due to claustrophobia.  He is willing to try with the help of angiolytics.    • Patient had brain MRI done on 4/5/19.  He states it did not show any evidence of metastatic disease.  Evidence of chronic sinusitis was noted.    • Patient was seen by Dr. Escalona who has recommended concurrent chemotherapy and radiation.  Patient is scheduled to begin on 4/15/19.   • 4/17/19 - Patient was initiated on combined chemotherapy and radiation with Cisplatin and Alimta.  Patient received cycle 1.      • 5/8/19 - Patient received cycle 2 of chemotherapy with Cisplatin and Alimta.   • 5/15/19 - Chemistry panel showed creatinine of 1.7.  WBC 1.8, hemoglobin 11.6, platelet count 72,000.   • 5/20/19 - BUN 10, creatinine 1.2, potassium 3.5.    • 5/23/19 - CT scan of the chest with contrast showed interval decrease in size of the right lower lobe pulmonary nodule now measuring 2.1 cm in size.  There was no mediastinal or hilar adenopathy identified.  • 6/26/2019 patient underwent right lower lobectomy with hilar and mediastinal lymph node dissection performed by Dr. Terrance Mckeon.  • Pathology revealed residual residual 2.2 cm invasive moderately differentiated adenocarcinoma.  There were a total of 16 lymph nodes evaluated that 7 had metastatic disease.  There was evidence of lymphovascular invasion, extranodal involvement.  All the surgical margins were negative and distance of the closest margin was 2.5 cm.  Logic stage is T1c N2 M0.  • Patient is here today accompanied by his spouse for this appointment.  He continues to complain of some postop discomfort, numbness but his skin is intact.  There is no drainage.  Has had follow-up with Dr. Fuchs.  • 8/14/2019-seen by Dr. Maria at the [facility].  Dr. Maria has recommended immunotherapy with durvalumab off label use as patient has not had significant  response to neoadjuvant chemotherapy and radiation.  Patient was given the option to have immunotherapy at the [facility] vs [place] and he has chosen to stay in [place]  • 8/21/19 - Started cycle 1 day 1 Imfinzi  • 9/4/2019 patient had CT scan of the chest this shows a moderate size right pleural effusion.  There are areas of groundglass opacification in the right upper lobe without any evidence of significant septal thickening.  Volume loss noted there is also moderate pleural effusion.  There is no suspicious recurrent malignancy.  There were nonenlarged residual mediastinal lymph nodes.  • 9/9/19 - WBC 6.23, Hgb 11.7 Platelets 257,000, , BUN 9, Cr 1.1,Vitamin B12 318, Ferritin 437  • 9/23/19 - received cycle 2 day 1 Imfinzi  • 10/9/19- Seen by Dr rodríguez with ENT for sinus disease  • 11/4/2019-patient received cycle 5 of Imfinzi(durvalumab)  • 12/2/2019 patient had cycle 7 of durvalumab  • 12/5/2019-patient had CT scan of the  abdomen and pelvis with small right pleural sided pleural effusion.There is no evidence of metastatic disease  • 12/30/2019-patient received cycle 9 of durvalumab  • 12/31/2019-patient had CT scan of the chest showed small to moderate left pleural effusion.  Iglesias appearing right lower paratracheal and right peribronchial soft tissue thickening without any discrete pathologically enlarged mediastinal or hilar lymph nodes.  • 1/27/20-patient received cycle 11 of durvalumab  • 2/17/20-patient had a stress test which was negative for ischemia  • 2/17/2020-patient had CT of the chest which showed postop changes on the right lung, right pleural effusion but no enlarging mass was noted  • 3/2/2020-patient received cycle 12 of durvalumab  • 3/16/2020-patient had ultrasound-guided right thoracentesis.  Pathology was negative for malignancy  • 4/13/2020-patient received cycle 15 of immunotherapy with durvalumab  • 5/11/2020-patient received cycle 17 of immunotherapy with  durvalumab  • 6/9/2020-patient had CT scan of the chest, abdomen and pelvis for lung cancer restaging.  There is stable small chronic loculated right basilar pleural effusion suggesting chronic pleuritis.  There is no evidence of metastatic disease in the abdomen or pelvis  • 7/20/2020 patient received cycle 22 of Durvalumab  • 8/17/2020 patient received cycle 24 and last cycle of durvalumab  • 9/24/2020 patient had CT scan of the chest, abdomen and pelvis for cancer restaging there was no evidence of local recurrence or metastatic disease within the abdomen and pelvis.  He has stable chronic small right pleural effusion.  Mild sigmoid diverticulosis was noted.  • 1/18/2021 patient had CT scan of the chest, abdomen and pelvis reviewed patient  • 5/24/2021 patient had CT scan of the chest calcified subcarinal lymph node consistent with changes of prior granulomatous disease.  Chronic small right pleural effusion is noted similar to prior exam.  Previously shown 4 mm nodule in the left lower lobe has nearly completely resolved.  The subpleural 3 mm nodule located within the posterior left lower lobe With resolved.  • 9/24/2021 patient had a CT scan of the chest, abdomen and pelvis there is soft tissue attenuation within the right paratracheal area which has been suggested to reflect sequela to previous treatment.  There is slight thickening of the bladder wall otherwise there is no evidence of metastatic disease.  • 12/27/2021 patient had CT scan of the chest with contrast this basically showed irregular shaped noncalcified 7 mm left upper lobe pulmonary nodule.  PET CT scan was recommended to further evaluate.  • 1/26/2022 patient had a PET CT scan done which basically showed evidence of mild uptake corresponding to the nodule within the left upper lobe which is new worrisome for developing metastatic focus.  There was mild radiopharmaceutical activity corresponding to pleural thickening throughout the right lung  base with associated pleural effusion likely related to post therapeutic changes and radiation changes in this region.  Metastatic malignancy involving the pleura could not be completely eliminated.  • 1/26/2022: CT-guided biopsy was aborted due to finding by the radiologist that the lung nodularity was actually a clump of tiny nodules of which the largest was 6 mm and therefore biopsy could not be completed.  Also the area was in the vicinity of multiple blood vessels with increased risk of bleeding.  Follow-up CT of the chest was recommended for continued surveillance    Past Medical History:   Diagnosis Date   • Allergic rhinitis 7/25/2013    Overview:  4/2/2018 - still zyrtec 10 daily (if stops, gets dermatitis again).    • Anxiety and depression 6/5/2012    Overview:  4/2/2018 -   C/w well 300 xr and Lito 20.  4/8/2019 -   Doing ok even with new lung cancer - lito 20 & well 300xr.    • Chronic pansinusitis 4/8/2019    Overview:  4/8/2019 -   MRI showed chronic thick in frontal, maxillary, ethmoidal ---> to Dr. COLLAZO to est since abx ICC didn't help.  Does netipot bid.    • Diastolic CHF (HCC) 7/9/2014    Overview:  7/4/14 - impaired LV relaxation on [place] ECHO.  EF 60%. Rest normal   • Dupuytren's contracture of both hands    • Elevated cholesterol    • Erosive esophagitis 7/9/2019    Overview:  EGD 2016 4/2/2018 - nexium OTC daily for few week.  Qod before that.  Now carbonation hurts, epigastric pain 4/8/2019 -   protonix 40 doing well.    • Gastroesophageal reflux disease 2/21/2019   • Hiatal hernia 7/9/2019   • History of colon polyps    • Hypertension    • Lung cancer (HCC)     right lung and in lymph nodes x2   • Mediastinal lymphadenopathy 3/12/2019   • Normocytic anemia 8/8/2019    Overview:  6/2019 - dropped to 9's postop 7/2019 - rebounded to 10's.  8/8/2019 -   Back to 9's - check ferritin, b12 and thyroid.    • Prediabetes 7/9/2014    Overview:  7/4/14 - a1c 6.1 at [place] admission   • Retention of urine  6/27/2019       Past Surgical History:   Procedure Laterality Date   • BRONCHOSCOPY  03/18/2019    EBUS MONTERO NEEDLE BX   • COLONOSCOPY     • DUPUYTREN CONTRACTURE RELEASE Bilateral    • LUNG BIOPSY  03/08/2019    CT GUIDED   • LUNG REMOVAL, PARTIAL Right     right lower   • PORTACATH PLACEMENT  03/20/2019    DR LONGORIA   • THORACOSCOPY Right 6/26/2019    Procedure: RIGHT VATS, OPEN LOWER LOBECTOMY WITH LYMPH NODE DISECTION, WEDGE RESECTION OF RIGHT MIDDLE LOBE;  Surgeon: Terrance Longoria MD;  Location: [facility] MAIN OR;  Service: Cardiothoracic         Current Outpatient Medications:   •  amLODIPine (NORVASC) 10 MG tablet, Take 1 tablet by mouth Daily., Disp: , Rfl:   •  buPROPion XL (WELLBUTRIN XL) 300 MG 24 hr tablet, Take 300 mg by mouth Every Morning., Disp: , Rfl:   •  desvenlafaxine (PRISTIQ) 50 MG 24 hr tablet, TAKE 1 TABLET BY MOUTH ONCE DAILY FOR 30 DAYS, Disp: , Rfl:   •  famotidine (PEPCID) 40 MG tablet, Take 40 mg by mouth., Disp: , Rfl:   •  levothyroxine (SYNTHROID, LEVOTHROID) 100 MCG tablet, Take 1 tablet by mouth Daily., Disp: , Rfl:   •  liothyronine (CYTOMEL) 5 MCG tablet, Take 5 mcg by mouth Daily., Disp: , Rfl:   •  metoprolol succinate XL (TOPROL-XL) 100 MG 24 hr tablet, Take 100 mg by mouth Daily., Disp: , Rfl:   •  pantoprazole (PROTONIX) 40 MG EC tablet, Take 40 mg by mouth Daily., Disp: , Rfl:   •  pravastatin (PRAVACHOL) 10 MG tablet, Take 10 mg by mouth Daily., Disp: , Rfl:   •  amLODIPine (NORVASC) 10 MG tablet, Take 10 mg by mouth Daily., Disp: , Rfl:   •  levothyroxine (SYNTHROID, LEVOTHROID) 100 MCG tablet, Take 1 tablet by mouth once daily, Disp: , Rfl:   •  lidocaine-prilocaine (EMLA) 2.5-2.5 % cream, , Disp: , Rfl:   •  LORazepam (ATIVAN) 0.5 MG tablet, TAKE 1 TABLET BY MOUTH TWICE DAILY AS NEEDED FOR ANXIETY MAX OF 2 DAY, Disp: , Rfl: 0  No current facility-administered medications for this visit.    Facility-Administered Medications Ordered in Other Visits:   •  heparin  injection 500 Units, 500 Units, Intravenous, Chasity TIERNEY Ifeoma Roseline, MD  •  sodium chloride 0.9 % flush 20 mL, 20 mL, Intravenous, Chasity TIERNEY Ifeoma Roseline, MD, 20 mL at 22 1120    No Known Allergies    Family History   Problem Relation Age of Onset   • Cancer Sister    • Cancer Mother    • Lung cancer Father        Cancer-related family history includes Cancer in his mother and sister; Lung cancer in his father.    Social History     Tobacco Use   • Smoking status: Former Smoker     Types: Cigarettes     Quit date: 2009     Years since quittin.6   • Smokeless tobacco: Never Used   Substance Use Topics   • Alcohol use: Yes     Alcohol/week: 2.0 standard drinks     Types: 2 Cans of beer per week     Comment: Occasional   • Drug use: No     I have reviewed and confirmed the accuracy of the patient's history:  as entered by the MA/LPN/RN. Azucena Gaspar MD 22     SUBJECTIVE:    Patient is here today for routine follow-up.  He was recently diagnosed with hypothyroidism and currently on thyroid replacement therapy and feels much better.    Patient comes in today for routine follow-up and does not have any specific complaints    Discussed his CT scans with patient.  Radiologist reviewing to determine whether CT-guided biopsy will be appropriate at this time.    REVIEW OF SYSTEMS:    Review of Systems   Constitutional: Negative for chills and fever.   HENT: Negative for ear pain, mouth sores, nosebleeds and sore throat.    Eyes: Negative for photophobia and visual disturbance.   Respiratory: Negative for wheezing and stridor.    Cardiovascular: Negative for chest pain and palpitations.   Gastrointestinal: Negative for abdominal pain, diarrhea, nausea and vomiting.   Endocrine: Negative for cold intolerance and heat intolerance.   Genitourinary: Negative for dysuria and hematuria.   Musculoskeletal: Negative for joint swelling and neck stiffness.   Skin: Negative for color change and  "rash.   Neurological: Negative for seizures and syncope.   Hematological: Negative for adenopathy.   Psychiatric/Behavioral: Negative for agitation, confusion and hallucinations.       I have reviewed and confirmed the accuracy of the ROS as documented by the MA/LPN/RN Azucena Gaspar MD        OBJECTIVE:  Vitals:    05/09/22 1132   BP: 111/72   Pulse: 79   Resp: 18   Temp: 97.3 °F (36.3 °C)   Weight: 91.1 kg (200 lb 12.8 oz)   Height: 190.5 cm (75\")   PainSc: 0-No pain     ECOG  Eastern Cooperative Oncology Group (ECOG, [place], World Health Organization) performance scale  0 Fully active; no performance restrictions.  1 Strenuous physical activity restricted; fully ambulatory and able to carry out light work.  2 Capable of all self-care but unable to carry out any work activities. Up and about >50% of waking hours.  3 Capable of only limited self-care; confined to bed or chair >50% of waking hours.  4 Completely disabled; cannot carry out any self-care; totally confined to bed or chair.    Physical Exam   Constitutional: He is oriented to person, place, and time. He appears well-developed. No distress.   HENT:   Head: Normocephalic and atraumatic.   Right Ear: External ear normal.   Left Ear: External ear normal.   Nose: Nose normal.   Eyes: Pupils are equal, round, and reactive to light. Conjunctivae are normal. Right eye exhibits no discharge. Left eye exhibits no discharge. No scleral icterus.   Neck: No thyromegaly present.   Cardiovascular: Normal rate, regular rhythm and normal heart sounds. Exam reveals no gallop and no friction rub.   Pulmonary/Chest: Effort normal. No stridor. No respiratory distress. He has no wheezes.   Abdominal: Soft. Bowel sounds are normal. He exhibits no mass. There is no abdominal tenderness. There is no rebound and no guarding.   Musculoskeletal: Normal range of motion. No tenderness.   Lymphadenopathy:     He has no cervical adenopathy.   Neurological: He is alert and " oriented to person, place, and time. He exhibits normal muscle tone.   Skin: Skin is warm. No rash noted. He is not diaphoretic. No erythema.   Urticarial-like rash, right medial knee likely due to immunotherapy   Psychiatric: His behavior is normal. Judgment and thought content normal.   Nursing note and vitals reviewed.    I have reexamined the patient and the results are consistent with the previously documented exam. Azucena Gaspar MD     RECENT LABS    WBC   Date Value Ref Range Status   05/09/2022 7.28 3.40 - 10.80 10*3/mm3 Final   03/22/2022 7.84 4.5 - 11.0 10*3/uL Final     RBC   Date Value Ref Range Status   05/09/2022 4.36 4.14 - 5.80 10*6/mm3 Final   03/22/2022 4.23 (L) 4.5 - 5.9 10*6/uL Final     Hemoglobin   Date Value Ref Range Status   05/09/2022 13.4 13.0 - 17.7 g/dL Final   03/22/2022 12.9 (L) 13.5 - 17.5 g/dL Final     Hematocrit   Date Value Ref Range Status   05/09/2022 40.3 37.5 - 51.0 % Final   03/22/2022 38.8 (L) 41.0 - 53.0 % Final     MCV   Date Value Ref Range Status   05/09/2022 92.4 79.0 - 97.0 fL Final   03/22/2022 91.7 80.0 - 100.0 fL Final     MCH   Date Value Ref Range Status   05/09/2022 30.7 26.6 - 33.0 pg Final   03/22/2022 30.5 26.0 - 34.0 pg Final     MCHC   Date Value Ref Range Status   05/09/2022 33.3 31.5 - 35.7 g/dL Final   03/22/2022 33.2 31.0 - 37.0 g/dL Final     RDW   Date Value Ref Range Status   05/09/2022 13.4 12.3 - 15.4 % Final   03/22/2022 14.2 12.0 - 16.8 % Final     RDW-SD   Date Value Ref Range Status   05/09/2022 44.3 37.0 - 54.0 fl Final     MPV   Date Value Ref Range Status   05/09/2022 8.6 6.0 - 12.0 fL Final   03/22/2022 9.3 8.4 - 12.4 fL Final     Platelets   Date Value Ref Range Status   05/09/2022 301 140 - 450 10*3/mm3 Final   03/22/2022 324 140 - 440 10*3/uL Final     Neutrophil Rel %   Date Value Ref Range Status   03/22/2022 63.1 45 - 80 % Final     Neutrophil %   Date Value Ref Range Status   05/09/2022 60.7 42.7 - 76.0 % Final     Lymphocyte  Rel %   Date Value Ref Range Status   03/22/2022 16.2 15 - 50 % Final     Lymphocyte %   Date Value Ref Range Status   05/09/2022 18.1 (L) 19.6 - 45.3 % Final     Monocyte Rel %   Date Value Ref Range Status   03/22/2022 13.6 0 - 15 % Final     Monocyte %   Date Value Ref Range Status   05/09/2022 13.6 (H) 5.0 - 12.0 % Final     Eosinophil %   Date Value Ref Range Status   05/09/2022 6.6 (H) 0.3 - 6.2 % Final   03/22/2022 5.5 0 - 7 % Final     Basophil Rel %   Date Value Ref Range Status   03/22/2022 1.1 0 - 2 % Final     Basophil %   Date Value Ref Range Status   05/09/2022 1.0 0.0 - 1.5 % Final     Immature Grans %   Date Value Ref Range Status   03/22/2022 0.5 0.0 - 1.0 % Final     Neutrophils Absolute   Date Value Ref Range Status   03/22/2022 4.94 2.0 - 8.8 10*3/uL Final     Neutrophils, Absolute   Date Value Ref Range Status   05/09/2022 4.42 1.70 - 7.00 10*3/mm3 Final     Lymphocytes Absolute   Date Value Ref Range Status   03/22/2022 1.27 0.7 - 5.5 10*3/uL Final     Lymphocytes, Absolute   Date Value Ref Range Status   05/09/2022 1.32 0.70 - 3.10 10*3/mm3 Final     Monocytes Absolute   Date Value Ref Range Status   03/22/2022 1.07 0.0 - 1.7 10*3/uL Final     Monocytes, Absolute   Date Value Ref Range Status   05/09/2022 0.99 (H) 0.10 - 0.90 10*3/mm3 Final     Eosinophils Absolute   Date Value Ref Range Status   03/22/2022 0.43 0.0 - 0.8 10*3/uL Final     Eosinophils, Absolute   Date Value Ref Range Status   05/09/2022 0.48 (H) 0.00 - 0.40 10*3/mm3 Final     Basophils Absolute   Date Value Ref Range Status   03/22/2022 0.09 0.0 - 0.2 10*3/uL Final     Basophils, Absolute   Date Value Ref Range Status   05/09/2022 0.07 0.00 - 0.20 10*3/mm3 Final     Immature Grans, Absolute   Date Value Ref Range Status   03/22/2022 0.04 0.00 - 0.10 10*3/uL Final     nRBC   Date Value Ref Range Status   03/22/2022 0 0 /100(WBC) Final   02/07/2022 0.0 0.0 - 0.2 /100 WBC Final       Lab Results   Component Value Date    GLUCOSE  140 (H) 01/28/2022    BUN 13 03/22/2022    CREATININE 1.05 03/22/2022    EGFRIFNONA 100 01/28/2022    EGFRIFAFRI >60 05/20/2019    BCR 11.9 03/22/2022    K 4.2 03/22/2022    CO2 26 03/22/2022    CALCIUM 9.8 03/22/2022    ALBUMIN 4.6 03/22/2022    LABIL2 1.5 03/22/2022    AST 24 03/22/2022    ALT 18 03/22/2022         ASSESSMENT:    1. Adenocarcinoma of the right lung, T1c N2 M0, consistent with clinical stage 3A disease.  Continue surveillance CT scans  2. Enlarging left upper lobe nodule 2.2 cm concerning for malignancy  3. S/P combined chemotherapy and radiation with cisplatin and Alimta.  Surveillance CT scan  4. Reviewed his CT scan of the chest, abdomen and pelvis completed September 2021 which does not show any evidence of relapsed disease but he has bladder wall thickening but totally asymptomatic.  We will check a UA with C&S today  5. Recent diagnosis of hypothyroidism, on thyroid replacement therapy  6. Status post right lower lobectomy with mediastinal and hilar lymph node dissection with residual disease.  pT1 cN2 M0.  Patient with high risk features including lymphovascular invasion, extranodal capsular extension and persistent multiple lymph node disease.  Ongoing management  7. Status post consolidation treatment with immunotherapy with Imfinzi:  8. Port maintenance: Patient still wants to keep port in little longer.  9. fatigue: We will check thyroid function tests  10. Satus post right thoracentesis with cytology negative for malignancy  11. Postop anemia: Reviewed  12. Assessment has been reviewed and updated      PLANS:    1. Reviewed his CT scan   2. Discussed with Dr. Gallegos radiologist per patient request who states that the nodule is amenable to CT-guided biopsy.  Patient informed  3. Continue monthly port flushes  4. Follow-up after biopsy completed  5. Continue thyroid replacement therapy through his PCP  6. He has completed all planned cycles of immunotherapy.  He has no evidence of disease  relapse at this time  7. Continue B12 injections  monthly: Patient did have elevated methylmalonic acid level during the early phases of his treatments.  Continue the same  8. Continue supportive care  9. All questions answered to the best of my abilities         Patient has  questions which have all been answered to the best of my abilities    I have reviewed labs results, imaging, vitals, and medications with the patient today.     Patient verbalized understanding and is in agreement of the above plan.      I spent 30 total minutes, face-to-face, caring for Jc today.  80% of this time involved counseling and/or coordination of care as documented within this note.  Reviewing his films with the radiologist and formulating management plan.

## 2022-05-09 ENCOUNTER — OFFICE VISIT (OUTPATIENT)
Dept: ONCOLOGY | Facility: CLINIC | Age: 57
End: 2022-05-09

## 2022-05-09 ENCOUNTER — HOSPITAL ENCOUNTER (OUTPATIENT)
Dept: ONCOLOGY | Facility: HOSPITAL | Age: 57
Setting detail: INFUSION SERIES
Discharge: HOME | End: 2022-05-09

## 2022-05-09 VITALS
HEIGHT: 75 IN | HEART RATE: 79 BPM | SYSTOLIC BLOOD PRESSURE: 111 MMHG | DIASTOLIC BLOOD PRESSURE: 72 MMHG | WEIGHT: 200.8 LBS | RESPIRATION RATE: 18 BRPM | TEMPERATURE: 97.3 F | BODY MASS INDEX: 24.97 KG/M2

## 2022-05-09 DIAGNOSIS — C34.91 NON-SMALL CELL CARCINOMA OF LUNG, RIGHT: Primary | ICD-10-CM

## 2022-05-09 DIAGNOSIS — C34.91 NON-SMALL CELL CARCINOMA OF LUNG, RIGHT: ICD-10-CM

## 2022-05-09 DIAGNOSIS — Z45.2 ENCOUNTER FOR CARE RELATED TO VASCULAR ACCESS PORT: Primary | ICD-10-CM

## 2022-05-09 DIAGNOSIS — R91.1 LUNG NODULE: Primary | ICD-10-CM

## 2022-05-09 LAB
BASOPHILS # BLD AUTO: 0.07 10*3/MM3 (ref 0–0.2)
BASOPHILS NFR BLD AUTO: 1 % (ref 0–1.5)
DEPRECATED RDW RBC AUTO: 44.3 FL (ref 37–54)
EOSINOPHIL # BLD AUTO: 0.48 10*3/MM3 (ref 0–0.4)
EOSINOPHIL NFR BLD AUTO: 6.6 % (ref 0.3–6.2)
ERYTHROCYTE [DISTWIDTH] IN BLOOD BY AUTOMATED COUNT: 13.4 % (ref 12.3–15.4)
HCT VFR BLD AUTO: 40.3 % (ref 37.5–51)
HGB BLD-MCNC: 13.4 G/DL (ref 13–17.7)
LYMPHOCYTES # BLD AUTO: 1.32 10*3/MM3 (ref 0.7–3.1)
LYMPHOCYTES NFR BLD AUTO: 18.1 % (ref 19.6–45.3)
MCH RBC QN AUTO: 30.7 PG (ref 26.6–33)
MCHC RBC AUTO-ENTMCNC: 33.3 G/DL (ref 31.5–35.7)
MCV RBC AUTO: 92.4 FL (ref 79–97)
MONOCYTES # BLD AUTO: 0.99 10*3/MM3 (ref 0.1–0.9)
MONOCYTES NFR BLD AUTO: 13.6 % (ref 5–12)
NEUTROPHILS NFR BLD AUTO: 4.42 10*3/MM3 (ref 1.7–7)
NEUTROPHILS NFR BLD AUTO: 60.7 % (ref 42.7–76)
PLATELET # BLD AUTO: 301 10*3/MM3 (ref 140–450)
PMV BLD AUTO: 8.6 FL (ref 6–12)
RBC # BLD AUTO: 4.36 10*6/MM3 (ref 4.14–5.8)
WBC NRBC COR # BLD: 7.28 10*3/MM3 (ref 3.4–10.8)

## 2022-05-09 PROCEDURE — 36591 DRAW BLOOD OFF VENOUS DEVICE: CPT

## 2022-05-09 PROCEDURE — 99214 OFFICE O/P EST MOD 30 MIN: CPT | Performed by: INTERNAL MEDICINE

## 2022-05-09 PROCEDURE — 85025 COMPLETE CBC W/AUTO DIFF WBC: CPT | Performed by: INTERNAL MEDICINE

## 2022-05-09 RX ORDER — SODIUM CHLORIDE 0.9 % (FLUSH) 0.9 %
20 SYRINGE (ML) INJECTION AS NEEDED
Status: DISCONTINUED | OUTPATIENT
Start: 2022-05-09 | End: 2022-05-10 | Stop reason: HOSPADM

## 2022-05-09 RX ORDER — HEPARIN SODIUM (PORCINE) LOCK FLUSH IV SOLN 100 UNIT/ML 100 UNIT/ML
500 SOLUTION INTRAVENOUS AS NEEDED
Status: CANCELLED | OUTPATIENT
Start: 2022-05-09

## 2022-05-09 RX ORDER — HEPARIN SODIUM (PORCINE) LOCK FLUSH IV SOLN 100 UNIT/ML 100 UNIT/ML
500 SOLUTION INTRAVENOUS AS NEEDED
Status: DISCONTINUED | OUTPATIENT
Start: 2022-05-09 | End: 2022-05-10 | Stop reason: HOSPADM

## 2022-05-09 RX ORDER — AMLODIPINE BESYLATE 10 MG/1
1 TABLET ORAL DAILY
Status: ON HOLD | COMMUNITY
Start: 2022-04-12 | End: 2022-05-18

## 2022-05-09 RX ORDER — LEVOTHYROXINE SODIUM 0.1 MG/1
1 TABLET ORAL DAILY
Status: ON HOLD | COMMUNITY
Start: 2022-03-02 | End: 2022-05-18

## 2022-05-09 RX ORDER — SODIUM CHLORIDE 0.9 % (FLUSH) 0.9 %
20 SYRINGE (ML) INJECTION AS NEEDED
Status: CANCELLED | OUTPATIENT
Start: 2022-05-09

## 2022-05-09 RX ORDER — LIOTHYRONINE SODIUM 5 UG/1
5 TABLET ORAL DAILY
COMMUNITY
Start: 2022-03-25

## 2022-05-09 RX ADMIN — Medication 20 ML: at 11:20

## 2022-05-16 ENCOUNTER — TELEPHONE (OUTPATIENT)
Dept: ONCOLOGY | Facility: CLINIC | Age: 57
End: 2022-05-16

## 2022-05-16 ENCOUNTER — LAB (OUTPATIENT)
Dept: LAB | Facility: HOSPITAL | Age: 57
End: 2022-05-16

## 2022-05-16 DIAGNOSIS — R91.1 LUNG NODULE: ICD-10-CM

## 2022-05-16 PROCEDURE — C9803 HOPD COVID-19 SPEC COLLECT: HCPCS

## 2022-05-16 PROCEDURE — U0004 COV-19 TEST NON-CDC HGH THRU: HCPCS

## 2022-05-16 NOTE — TELEPHONE ENCOUNTER
PATIENT SCHEDULED FOR LUNG BIOPSY ON 5-18-22.  MD F/U APPT SCHEDULED FOR 5-27-22.  PATIENT V/U ON DATE AND TIME.

## 2022-05-17 LAB — SARS-COV-2 ORF1AB RESP QL NAA+PROBE: NOT DETECTED

## 2022-05-18 ENCOUNTER — HOSPITAL ENCOUNTER (INPATIENT)
Dept: CT IMAGING | Facility: HOSPITAL | Age: 57
LOS: 3 days | Discharge: HOME | End: 2022-05-22
Attending: INTERNAL MEDICINE | Admitting: INTERNAL MEDICINE

## 2022-05-18 ENCOUNTER — HOSPITAL ENCOUNTER (OUTPATIENT)
Dept: INTERVENTIONAL RADIOLOGY/VASCULAR | Facility: HOSPITAL | Age: 57
Discharge: HOME | End: 2022-05-18

## 2022-05-18 ENCOUNTER — HOSPITAL ENCOUNTER (OUTPATIENT)
Dept: GENERAL RADIOLOGY | Facility: HOSPITAL | Age: 57
Discharge: HOME | End: 2022-05-18

## 2022-05-18 ENCOUNTER — HOSPITAL ENCOUNTER (OUTPATIENT)
Dept: GENERAL RADIOLOGY | Facility: HOSPITAL | Age: 57
End: 2022-05-18

## 2022-05-18 VITALS
DIASTOLIC BLOOD PRESSURE: 85 MMHG | OXYGEN SATURATION: 95 % | HEART RATE: 81 BPM | SYSTOLIC BLOOD PRESSURE: 129 MMHG | RESPIRATION RATE: 13 BRPM

## 2022-05-18 DIAGNOSIS — C34.91 NON-SMALL CELL CARCINOMA OF LUNG, RIGHT: ICD-10-CM

## 2022-05-18 DIAGNOSIS — J93.12 SECONDARY SPONTANEOUS PNEUMOTHORAX: Primary | ICD-10-CM

## 2022-05-18 PROBLEM — J93.9 PNEUMOTHORAX: Status: ACTIVE | Noted: 2022-05-18

## 2022-05-18 LAB
ANION GAP SERPL CALCULATED.3IONS-SCNC: 13 MMOL/L (ref 5–15)
APTT PPP: 30.3 SECONDS (ref 24–31)
BASOPHILS # BLD AUTO: 0.1 10*3/MM3 (ref 0–0.2)
BASOPHILS # BLD AUTO: 0.1 10*3/MM3 (ref 0–0.2)
BASOPHILS NFR BLD AUTO: 0.7 % (ref 0–1.5)
BASOPHILS NFR BLD AUTO: 1.1 % (ref 0–1.5)
BUN SERPL-MCNC: 8 MG/DL (ref 6–20)
BUN/CREAT SERPL: 10.3 (ref 7–25)
CALCIUM SPEC-SCNC: 9.3 MG/DL (ref 8.6–10.5)
CHLORIDE SERPL-SCNC: 92 MMOL/L (ref 98–107)
CO2 SERPL-SCNC: 26 MMOL/L (ref 22–29)
CREAT SERPL-MCNC: 0.78 MG/DL (ref 0.76–1.27)
DEPRECATED RDW RBC AUTO: 44.2 FL (ref 37–54)
DEPRECATED RDW RBC AUTO: 45.1 FL (ref 37–54)
EGFRCR SERPLBLD CKD-EPI 2021: 104 ML/MIN/1.73
EOSINOPHIL # BLD AUTO: 0.3 10*3/MM3 (ref 0–0.4)
EOSINOPHIL # BLD AUTO: 0.4 10*3/MM3 (ref 0–0.4)
EOSINOPHIL NFR BLD AUTO: 3.4 % (ref 0.3–6.2)
EOSINOPHIL NFR BLD AUTO: 7.7 % (ref 0.3–6.2)
ERYTHROCYTE [DISTWIDTH] IN BLOOD BY AUTOMATED COUNT: 14 % (ref 12.3–15.4)
ERYTHROCYTE [DISTWIDTH] IN BLOOD BY AUTOMATED COUNT: 14.2 % (ref 12.3–15.4)
GLUCOSE SERPL-MCNC: 124 MG/DL (ref 65–99)
HCT VFR BLD AUTO: 39 % (ref 37.5–51)
HCT VFR BLD AUTO: 39.3 % (ref 37.5–51)
HGB BLD-MCNC: 13 G/DL (ref 13–17.7)
HGB BLD-MCNC: 13.2 G/DL (ref 13–17.7)
INR PPP: 1.06 (ref 0.93–1.1)
LYMPHOCYTES # BLD AUTO: 0.7 10*3/MM3 (ref 0.7–3.1)
LYMPHOCYTES # BLD AUTO: 1 10*3/MM3 (ref 0.7–3.1)
LYMPHOCYTES NFR BLD AUTO: 19 % (ref 19.6–45.3)
LYMPHOCYTES NFR BLD AUTO: 9 % (ref 19.6–45.3)
MCH RBC QN AUTO: 30.1 PG (ref 26.6–33)
MCH RBC QN AUTO: 30.5 PG (ref 26.6–33)
MCHC RBC AUTO-ENTMCNC: 33.3 G/DL (ref 31.5–35.7)
MCHC RBC AUTO-ENTMCNC: 33.7 G/DL (ref 31.5–35.7)
MCV RBC AUTO: 90.3 FL (ref 79–97)
MCV RBC AUTO: 90.5 FL (ref 79–97)
MONOCYTES # BLD AUTO: 0.6 10*3/MM3 (ref 0.1–0.9)
MONOCYTES # BLD AUTO: 0.7 10*3/MM3 (ref 0.1–0.9)
MONOCYTES NFR BLD AUTO: 11 % (ref 5–12)
MONOCYTES NFR BLD AUTO: 8.7 % (ref 5–12)
NEUTROPHILS NFR BLD AUTO: 3.4 10*3/MM3 (ref 1.7–7)
NEUTROPHILS NFR BLD AUTO: 6.4 10*3/MM3 (ref 1.7–7)
NEUTROPHILS NFR BLD AUTO: 61.2 % (ref 42.7–76)
NEUTROPHILS NFR BLD AUTO: 78.2 % (ref 42.7–76)
NRBC BLD AUTO-RTO: 0 /100 WBC (ref 0–0.2)
NRBC BLD AUTO-RTO: 0.1 /100 WBC (ref 0–0.2)
PLATELET # BLD AUTO: 316 10*3/MM3 (ref 140–450)
PLATELET # BLD AUTO: 317 10*3/MM3 (ref 140–450)
PMV BLD AUTO: 6.2 FL (ref 6–12)
PMV BLD AUTO: 6.5 FL (ref 6–12)
POTASSIUM SERPL-SCNC: 4.1 MMOL/L (ref 3.5–5.2)
PROTHROMBIN TIME: 10.9 SECONDS (ref 9.6–11.7)
RBC # BLD AUTO: 4.32 10*6/MM3 (ref 4.14–5.8)
RBC # BLD AUTO: 4.34 10*6/MM3 (ref 4.14–5.8)
SODIUM SERPL-SCNC: 131 MMOL/L (ref 136–145)
WBC NRBC COR # BLD: 5.5 10*3/MM3 (ref 3.4–10.8)
WBC NRBC COR # BLD: 8.2 10*3/MM3 (ref 3.4–10.8)

## 2022-05-18 PROCEDURE — 0W9B30Z DRAINAGE OF LEFT PLEURAL CAVITY WITH DRAINAGE DEVICE, PERCUTANEOUS APPROACH: ICD-10-PCS | Performed by: RADIOLOGY

## 2022-05-18 PROCEDURE — 71045 X-RAY EXAM CHEST 1 VIEW: CPT

## 2022-05-18 PROCEDURE — 99152 MOD SED SAME PHYS/QHP 5/>YRS: CPT

## 2022-05-18 PROCEDURE — 88334 PATH CONSLTJ SURG CYTO XM EA: CPT | Performed by: INTERNAL MEDICINE

## 2022-05-18 PROCEDURE — 77002 NEEDLE LOCALIZATION BY XRAY: CPT

## 2022-05-18 PROCEDURE — C1769 GUIDE WIRE: HCPCS

## 2022-05-18 PROCEDURE — 80048 BASIC METABOLIC PNL TOTAL CA: CPT | Performed by: NURSE PRACTITIONER

## 2022-05-18 PROCEDURE — 88333 PATH CONSLTJ SURG CYTO XM 1: CPT | Performed by: INTERNAL MEDICINE

## 2022-05-18 PROCEDURE — 85610 PROTHROMBIN TIME: CPT | Performed by: RADIOLOGY

## 2022-05-18 PROCEDURE — 88341 IMHCHEM/IMCYTCHM EA ADD ANTB: CPT | Performed by: INTERNAL MEDICINE

## 2022-05-18 PROCEDURE — 0 LIDOCAINE 1 % SOLUTION: Performed by: RADIOLOGY

## 2022-05-18 PROCEDURE — G0378 HOSPITAL OBSERVATION PER HR: HCPCS

## 2022-05-18 PROCEDURE — 85025 COMPLETE CBC W/AUTO DIFF WBC: CPT | Performed by: NURSE PRACTITIONER

## 2022-05-18 PROCEDURE — 85730 THROMBOPLASTIN TIME PARTIAL: CPT | Performed by: RADIOLOGY

## 2022-05-18 PROCEDURE — 25010000002 MIDAZOLAM PER 1 MG: Performed by: RADIOLOGY

## 2022-05-18 PROCEDURE — 88305 TISSUE EXAM BY PATHOLOGIST: CPT | Performed by: INTERNAL MEDICINE

## 2022-05-18 PROCEDURE — 88342 IMHCHEM/IMCYTCHM 1ST ANTB: CPT | Performed by: INTERNAL MEDICINE

## 2022-05-18 PROCEDURE — C1729 CATH, DRAINAGE: HCPCS

## 2022-05-18 PROCEDURE — C1819 TISSUE LOCALIZATION-EXCISION: HCPCS

## 2022-05-18 PROCEDURE — 0BBG3ZX EXCISION OF LEFT UPPER LUNG LOBE, PERCUTANEOUS APPROACH, DIAGNOSTIC: ICD-10-PCS | Performed by: RADIOLOGY

## 2022-05-18 PROCEDURE — 99222 1ST HOSP IP/OBS MODERATE 55: CPT | Performed by: NURSE PRACTITIONER

## 2022-05-18 PROCEDURE — 25010000002 FENTANYL CITRATE (PF) 50 MCG/ML SOLUTION: Performed by: RADIOLOGY

## 2022-05-18 PROCEDURE — 99153 MOD SED SAME PHYS/QHP EA: CPT

## 2022-05-18 PROCEDURE — 85025 COMPLETE CBC W/AUTO DIFF WBC: CPT | Performed by: RADIOLOGY

## 2022-05-18 RX ORDER — SODIUM CHLORIDE 0.9 % (FLUSH) 0.9 %
10 SYRINGE (ML) INJECTION EVERY 12 HOURS SCHEDULED
Status: DISCONTINUED | OUTPATIENT
Start: 2022-05-18 | End: 2022-05-22 | Stop reason: HOSPADM

## 2022-05-18 RX ORDER — MIDAZOLAM HYDROCHLORIDE 1 MG/ML
INJECTION INTRAMUSCULAR; INTRAVENOUS
Status: COMPLETED | OUTPATIENT
Start: 2022-05-18 | End: 2022-05-18

## 2022-05-18 RX ORDER — POTASSIUM CHLORIDE 1.5 G/1.77G
40 POWDER, FOR SOLUTION ORAL AS NEEDED
Status: DISCONTINUED | OUTPATIENT
Start: 2022-05-18 | End: 2022-05-22 | Stop reason: HOSPADM

## 2022-05-18 RX ORDER — CHOLECALCIFEROL (VITAMIN D3) 125 MCG
5 CAPSULE ORAL NIGHTLY PRN
Status: DISCONTINUED | OUTPATIENT
Start: 2022-05-18 | End: 2022-05-22 | Stop reason: HOSPADM

## 2022-05-18 RX ORDER — DESVENLAFAXINE SUCCINATE 50 MG/1
50 TABLET, EXTENDED RELEASE ORAL DAILY
Status: DISCONTINUED | OUTPATIENT
Start: 2022-05-19 | End: 2022-05-22 | Stop reason: HOSPADM

## 2022-05-18 RX ORDER — ALUMINA, MAGNESIA, AND SIMETHICONE 2400; 2400; 240 MG/30ML; MG/30ML; MG/30ML
15 SUSPENSION ORAL EVERY 6 HOURS PRN
Status: DISCONTINUED | OUTPATIENT
Start: 2022-05-18 | End: 2022-05-22 | Stop reason: HOSPADM

## 2022-05-18 RX ORDER — BUPROPION HYDROCHLORIDE 150 MG/1
300 TABLET ORAL EVERY MORNING
Status: DISCONTINUED | OUTPATIENT
Start: 2022-05-19 | End: 2022-05-22 | Stop reason: HOSPADM

## 2022-05-18 RX ORDER — SODIUM CHLORIDE 0.9 % (FLUSH) 0.9 %
10 SYRINGE (ML) INJECTION AS NEEDED
Status: DISCONTINUED | OUTPATIENT
Start: 2022-05-18 | End: 2022-05-22 | Stop reason: HOSPADM

## 2022-05-18 RX ORDER — ONDANSETRON 4 MG/1
4 TABLET, FILM COATED ORAL EVERY 6 HOURS PRN
Status: DISCONTINUED | OUTPATIENT
Start: 2022-05-18 | End: 2022-05-22 | Stop reason: HOSPADM

## 2022-05-18 RX ORDER — ONDANSETRON 2 MG/ML
4 INJECTION INTRAMUSCULAR; INTRAVENOUS EVERY 6 HOURS PRN
Status: DISCONTINUED | OUTPATIENT
Start: 2022-05-18 | End: 2022-05-22 | Stop reason: HOSPADM

## 2022-05-18 RX ORDER — POTASSIUM CHLORIDE 20 MEQ/1
40 TABLET, EXTENDED RELEASE ORAL AS NEEDED
Status: DISCONTINUED | OUTPATIENT
Start: 2022-05-18 | End: 2022-05-22 | Stop reason: HOSPADM

## 2022-05-18 RX ORDER — LIDOCAINE HYDROCHLORIDE 10 MG/ML
INJECTION, SOLUTION INFILTRATION; PERINEURAL
Status: COMPLETED | OUTPATIENT
Start: 2022-05-18 | End: 2022-05-18

## 2022-05-18 RX ORDER — SODIUM CHLORIDE 9 MG/ML
75 INJECTION, SOLUTION INTRAVENOUS CONTINUOUS
Status: DISCONTINUED | OUTPATIENT
Start: 2022-05-18 | End: 2022-05-20

## 2022-05-18 RX ORDER — FENTANYL CITRATE 50 UG/ML
INJECTION, SOLUTION INTRAMUSCULAR; INTRAVENOUS
Status: COMPLETED | OUTPATIENT
Start: 2022-05-18 | End: 2022-05-18

## 2022-05-18 RX ORDER — FAMOTIDINE 20 MG/1
40 TABLET, FILM COATED ORAL DAILY
Status: DISCONTINUED | OUTPATIENT
Start: 2022-05-18 | End: 2022-05-18

## 2022-05-18 RX ORDER — LEVOTHYROXINE SODIUM 0.1 MG/1
100 TABLET ORAL DAILY
Status: DISCONTINUED | OUTPATIENT
Start: 2022-05-19 | End: 2022-05-22 | Stop reason: HOSPADM

## 2022-05-18 RX ORDER — ACETAMINOPHEN 650 MG/1
650 SUPPOSITORY RECTAL EVERY 4 HOURS PRN
Status: DISCONTINUED | OUTPATIENT
Start: 2022-05-18 | End: 2022-05-22 | Stop reason: HOSPADM

## 2022-05-18 RX ORDER — ACETAMINOPHEN 325 MG/1
650 TABLET ORAL EVERY 4 HOURS PRN
Status: DISCONTINUED | OUTPATIENT
Start: 2022-05-18 | End: 2022-05-22 | Stop reason: HOSPADM

## 2022-05-18 RX ORDER — HYDROCODONE BITARTRATE AND ACETAMINOPHEN 5; 325 MG/1; MG/1
2 TABLET ORAL EVERY 6 HOURS PRN
Status: DISCONTINUED | OUTPATIENT
Start: 2022-05-18 | End: 2022-05-19 | Stop reason: HOSPADM

## 2022-05-18 RX ORDER — ATORVASTATIN CALCIUM 10 MG/1
10 TABLET, FILM COATED ORAL DAILY
Status: DISCONTINUED | OUTPATIENT
Start: 2022-05-19 | End: 2022-05-22 | Stop reason: HOSPADM

## 2022-05-18 RX ORDER — ACETAMINOPHEN 160 MG/5ML
650 SOLUTION ORAL EVERY 4 HOURS PRN
Status: DISCONTINUED | OUTPATIENT
Start: 2022-05-18 | End: 2022-05-22 | Stop reason: HOSPADM

## 2022-05-18 RX ORDER — POTASSIUM CHLORIDE 7.45 MG/ML
10 INJECTION INTRAVENOUS
Status: DISCONTINUED | OUTPATIENT
Start: 2022-05-18 | End: 2022-05-22 | Stop reason: HOSPADM

## 2022-05-18 RX ORDER — AMLODIPINE BESYLATE 5 MG/1
10 TABLET ORAL DAILY
Status: DISCONTINUED | OUTPATIENT
Start: 2022-05-19 | End: 2022-05-22 | Stop reason: HOSPADM

## 2022-05-18 RX ORDER — METOPROLOL SUCCINATE 50 MG/1
100 TABLET, EXTENDED RELEASE ORAL DAILY
Status: DISCONTINUED | OUTPATIENT
Start: 2022-05-19 | End: 2022-05-22 | Stop reason: HOSPADM

## 2022-05-18 RX ORDER — TRAMADOL HYDROCHLORIDE 50 MG/1
50 TABLET ORAL EVERY 6 HOURS PRN
Status: DISCONTINUED | OUTPATIENT
Start: 2022-05-18 | End: 2022-05-22 | Stop reason: HOSPADM

## 2022-05-18 RX ORDER — LIOTHYRONINE SODIUM 5 UG/1
5 TABLET ORAL DAILY
Status: DISCONTINUED | OUTPATIENT
Start: 2022-05-19 | End: 2022-05-22 | Stop reason: HOSPADM

## 2022-05-18 RX ORDER — PANTOPRAZOLE SODIUM 40 MG/1
40 TABLET, DELAYED RELEASE ORAL DAILY
Status: DISCONTINUED | OUTPATIENT
Start: 2022-05-19 | End: 2022-05-22 | Stop reason: HOSPADM

## 2022-05-18 RX ADMIN — LIDOCAINE HYDROCHLORIDE 6 ML: 10 INJECTION, SOLUTION INFILTRATION; PERINEURAL at 13:50

## 2022-05-18 RX ADMIN — Medication 10 ML: at 08:00

## 2022-05-18 RX ADMIN — MIDAZOLAM 1 MG: 1 INJECTION INTRAMUSCULAR; INTRAVENOUS at 09:23

## 2022-05-18 RX ADMIN — FENTANYL CITRATE 100 MCG: 50 INJECTION, SOLUTION INTRAMUSCULAR; INTRAVENOUS at 09:19

## 2022-05-18 RX ADMIN — SODIUM CHLORIDE 75 ML/HR: 9 INJECTION, SOLUTION INTRAVENOUS at 07:59

## 2022-05-18 RX ADMIN — FENTANYL CITRATE 50 MCG: 50 INJECTION, SOLUTION INTRAMUSCULAR; INTRAVENOUS at 09:30

## 2022-05-18 RX ADMIN — TRAMADOL HYDROCHLORIDE 50 MG: 50 TABLET, COATED ORAL at 21:39

## 2022-05-18 RX ADMIN — Medication 5 MG: at 21:39

## 2022-05-18 RX ADMIN — LIDOCAINE HYDROCHLORIDE 4 ML: 10 INJECTION, SOLUTION INFILTRATION; PERINEURAL at 13:51

## 2022-05-18 RX ADMIN — Medication 10 ML: at 21:40

## 2022-05-18 RX ADMIN — MIDAZOLAM 1 MG: 1 INJECTION INTRAMUSCULAR; INTRAVENOUS at 13:48

## 2022-05-18 RX ADMIN — TRAMADOL HYDROCHLORIDE 50 MG: 50 TABLET, COATED ORAL at 15:53

## 2022-05-18 RX ADMIN — MIDAZOLAM 1 MG: 1 INJECTION INTRAMUSCULAR; INTRAVENOUS at 09:19

## 2022-05-18 RX ADMIN — MIDAZOLAM 1 MG: 1 INJECTION INTRAMUSCULAR; INTRAVENOUS at 13:40

## 2022-05-18 RX ADMIN — LIDOCAINE HYDROCHLORIDE 2 ML: 10 INJECTION, SOLUTION INFILTRATION; PERINEURAL at 09:30

## 2022-05-18 RX ADMIN — LIDOCAINE HYDROCHLORIDE 7 ML: 10 INJECTION, SOLUTION INFILTRATION; PERINEURAL at 09:26

## 2022-05-18 RX ADMIN — FENTANYL CITRATE 100 MCG: 50 INJECTION, SOLUTION INTRAMUSCULAR; INTRAVENOUS at 13:40

## 2022-05-18 NOTE — POST-PROCEDURE NOTE
IR POST OP NOTE    Procedure:L thoracostomy tube placement      Pre Op DX:Delayed post L lung biopsy pneumothorax      Post Op DX:same      Anesthesia: Local, CS      Findings:8fr pigtail thoracostomy tube placed. PTX resolved.     Will admit to Hospitalist.     **Recommend Pulmonology consultation for management of chest tube/PTX.      Complications:None immediate.       Provider Signature: Dr. Remi Bonilla

## 2022-05-19 ENCOUNTER — APPOINTMENT (OUTPATIENT)
Dept: GENERAL RADIOLOGY | Facility: HOSPITAL | Age: 57
End: 2022-05-19

## 2022-05-19 PROCEDURE — 99232 SBSQ HOSP IP/OBS MODERATE 35: CPT | Performed by: INTERNAL MEDICINE

## 2022-05-19 PROCEDURE — 71045 X-RAY EXAM CHEST 1 VIEW: CPT

## 2022-05-19 RX ADMIN — Medication 10 ML: at 20:32

## 2022-05-19 RX ADMIN — LIOTHYRONINE SODIUM 5 MCG: 5 TABLET ORAL at 08:12

## 2022-05-19 RX ADMIN — METOPROLOL SUCCINATE 100 MG: 50 TABLET, EXTENDED RELEASE ORAL at 08:12

## 2022-05-19 RX ADMIN — ACETAMINOPHEN 650 MG: 325 TABLET ORAL at 21:34

## 2022-05-19 RX ADMIN — PANTOPRAZOLE SODIUM 40 MG: 40 TABLET, DELAYED RELEASE ORAL at 08:12

## 2022-05-19 RX ADMIN — TRAMADOL HYDROCHLORIDE 50 MG: 50 TABLET, COATED ORAL at 18:06

## 2022-05-19 RX ADMIN — LEVOTHYROXINE SODIUM 100 MCG: 0.1 TABLET ORAL at 08:12

## 2022-05-19 RX ADMIN — Medication 10 ML: at 08:12

## 2022-05-19 RX ADMIN — AMLODIPINE BESYLATE 10 MG: 5 TABLET ORAL at 08:12

## 2022-05-19 RX ADMIN — ATORVASTATIN CALCIUM 10 MG: 10 TABLET, FILM COATED ORAL at 08:12

## 2022-05-19 RX ADMIN — DESVENLAFAXINE SUCCINATE 50 MG: 50 TABLET, EXTENDED RELEASE ORAL at 08:12

## 2022-05-19 RX ADMIN — Medication 10 ML: at 08:13

## 2022-05-20 ENCOUNTER — APPOINTMENT (OUTPATIENT)
Dept: GENERAL RADIOLOGY | Facility: HOSPITAL | Age: 57
End: 2022-05-20

## 2022-05-20 PROCEDURE — 99222 1ST HOSP IP/OBS MODERATE 55: CPT | Performed by: INTERNAL MEDICINE

## 2022-05-20 PROCEDURE — 71045 X-RAY EXAM CHEST 1 VIEW: CPT

## 2022-05-20 PROCEDURE — 99232 SBSQ HOSP IP/OBS MODERATE 35: CPT | Performed by: INTERNAL MEDICINE

## 2022-05-20 PROCEDURE — 25010000002 ENOXAPARIN PER 10 MG: Performed by: INTERNAL MEDICINE

## 2022-05-20 RX ORDER — ENOXAPARIN SODIUM 100 MG/ML
40 INJECTION SUBCUTANEOUS DAILY
Status: DISCONTINUED | OUTPATIENT
Start: 2022-05-20 | End: 2022-05-22 | Stop reason: HOSPADM

## 2022-05-20 RX ORDER — SODIUM CHLORIDE 9 MG/ML
50 INJECTION, SOLUTION INTRAVENOUS CONTINUOUS
Status: DISCONTINUED | OUTPATIENT
Start: 2022-05-20 | End: 2022-05-22 | Stop reason: HOSPADM

## 2022-05-20 RX ADMIN — LEVOTHYROXINE SODIUM 100 MCG: 0.1 TABLET ORAL at 07:52

## 2022-05-20 RX ADMIN — SODIUM CHLORIDE 50 ML/HR: 9 INJECTION, SOLUTION INTRAVENOUS at 14:11

## 2022-05-20 RX ADMIN — METOPROLOL SUCCINATE 100 MG: 50 TABLET, EXTENDED RELEASE ORAL at 08:24

## 2022-05-20 RX ADMIN — AMLODIPINE BESYLATE 10 MG: 5 TABLET ORAL at 08:24

## 2022-05-20 RX ADMIN — Medication 10 ML: at 20:51

## 2022-05-20 RX ADMIN — ATORVASTATIN CALCIUM 10 MG: 10 TABLET, FILM COATED ORAL at 08:24

## 2022-05-20 RX ADMIN — Medication 5 MG: at 20:51

## 2022-05-20 RX ADMIN — Medication 10 ML: at 08:24

## 2022-05-20 RX ADMIN — PANTOPRAZOLE SODIUM 40 MG: 40 TABLET, DELAYED RELEASE ORAL at 08:24

## 2022-05-20 RX ADMIN — DESVENLAFAXINE SUCCINATE 50 MG: 50 TABLET, EXTENDED RELEASE ORAL at 08:24

## 2022-05-20 RX ADMIN — LIOTHYRONINE SODIUM 5 MCG: 5 TABLET ORAL at 08:24

## 2022-05-20 RX ADMIN — ENOXAPARIN SODIUM 40 MG: 100 INJECTION SUBCUTANEOUS at 15:57

## 2022-05-20 RX ADMIN — BUPROPION HYDROCHLORIDE 300 MG: 150 TABLET, EXTENDED RELEASE ORAL at 06:18

## 2022-05-21 ENCOUNTER — APPOINTMENT (OUTPATIENT)
Dept: MRI IMAGING | Facility: HOSPITAL | Age: 57
End: 2022-05-21

## 2022-05-21 ENCOUNTER — APPOINTMENT (OUTPATIENT)
Dept: GENERAL RADIOLOGY | Facility: HOSPITAL | Age: 57
End: 2022-05-21

## 2022-05-21 LAB
ANION GAP SERPL CALCULATED.3IONS-SCNC: 12 MMOL/L (ref 5–15)
BASOPHILS # BLD AUTO: 0.1 10*3/MM3 (ref 0–0.2)
BASOPHILS NFR BLD AUTO: 0.7 % (ref 0–1.5)
BUN SERPL-MCNC: 9 MG/DL (ref 6–20)
BUN/CREAT SERPL: 10.3 (ref 7–25)
CALCIUM SPEC-SCNC: 9.4 MG/DL (ref 8.6–10.5)
CHLORIDE SERPL-SCNC: 98 MMOL/L (ref 98–107)
CO2 SERPL-SCNC: 25 MMOL/L (ref 22–29)
CREAT SERPL-MCNC: 0.87 MG/DL (ref 0.76–1.27)
DEPRECATED RDW RBC AUTO: 43.3 FL (ref 37–54)
EGFRCR SERPLBLD CKD-EPI 2021: 100.6 ML/MIN/1.73
EOSINOPHIL # BLD AUTO: 0.3 10*3/MM3 (ref 0–0.4)
EOSINOPHIL NFR BLD AUTO: 4.2 % (ref 0.3–6.2)
ERYTHROCYTE [DISTWIDTH] IN BLOOD BY AUTOMATED COUNT: 13.7 % (ref 12.3–15.4)
GLUCOSE SERPL-MCNC: 106 MG/DL (ref 65–99)
HCT VFR BLD AUTO: 41.7 % (ref 37.5–51)
HGB BLD-MCNC: 13.7 G/DL (ref 13–17.7)
LYMPHOCYTES # BLD AUTO: 1 10*3/MM3 (ref 0.7–3.1)
LYMPHOCYTES NFR BLD AUTO: 13.3 % (ref 19.6–45.3)
MCH RBC QN AUTO: 29.9 PG (ref 26.6–33)
MCHC RBC AUTO-ENTMCNC: 32.8 G/DL (ref 31.5–35.7)
MCV RBC AUTO: 91.3 FL (ref 79–97)
MONOCYTES # BLD AUTO: 0.7 10*3/MM3 (ref 0.1–0.9)
MONOCYTES NFR BLD AUTO: 9.7 % (ref 5–12)
NEUTROPHILS NFR BLD AUTO: 5.3 10*3/MM3 (ref 1.7–7)
NEUTROPHILS NFR BLD AUTO: 72.1 % (ref 42.7–76)
NRBC BLD AUTO-RTO: 0.1 /100 WBC (ref 0–0.2)
PLATELET # BLD AUTO: 327 10*3/MM3 (ref 140–450)
PMV BLD AUTO: 6.6 FL (ref 6–12)
POTASSIUM SERPL-SCNC: 3.8 MMOL/L (ref 3.5–5.2)
RBC # BLD AUTO: 4.57 10*6/MM3 (ref 4.14–5.8)
SODIUM SERPL-SCNC: 135 MMOL/L (ref 136–145)
WBC NRBC COR # BLD: 7.3 10*3/MM3 (ref 3.4–10.8)

## 2022-05-21 PROCEDURE — 25010000002 LORAZEPAM PER 2 MG: Performed by: INTERNAL MEDICINE

## 2022-05-21 PROCEDURE — 71045 X-RAY EXAM CHEST 1 VIEW: CPT

## 2022-05-21 PROCEDURE — 80048 BASIC METABOLIC PNL TOTAL CA: CPT | Performed by: INTERNAL MEDICINE

## 2022-05-21 PROCEDURE — 85025 COMPLETE CBC W/AUTO DIFF WBC: CPT | Performed by: INTERNAL MEDICINE

## 2022-05-21 PROCEDURE — A9579 GAD-BASE MR CONTRAST NOS,1ML: HCPCS | Performed by: INTERNAL MEDICINE

## 2022-05-21 PROCEDURE — 25010000002 ENOXAPARIN PER 10 MG: Performed by: INTERNAL MEDICINE

## 2022-05-21 PROCEDURE — 99233 SBSQ HOSP IP/OBS HIGH 50: CPT | Performed by: INTERNAL MEDICINE

## 2022-05-21 PROCEDURE — 99232 SBSQ HOSP IP/OBS MODERATE 35: CPT | Performed by: INTERNAL MEDICINE

## 2022-05-21 PROCEDURE — 70553 MRI BRAIN STEM W/O & W/DYE: CPT

## 2022-05-21 PROCEDURE — 25010000002 GADOTERIDOL PER 1 ML: Performed by: INTERNAL MEDICINE

## 2022-05-21 RX ORDER — LORAZEPAM 2 MG/ML
1 INJECTION INTRAMUSCULAR ONCE
Status: COMPLETED | OUTPATIENT
Start: 2022-05-21 | End: 2022-05-21

## 2022-05-21 RX ORDER — LORAZEPAM 2 MG/ML
1 INJECTION INTRAMUSCULAR EVERY 4 HOURS PRN
Status: DISCONTINUED | OUTPATIENT
Start: 2022-05-21 | End: 2022-05-21

## 2022-05-21 RX ORDER — LORAZEPAM 0.5 MG/1
0.5 TABLET ORAL ONCE
Status: DISCONTINUED | OUTPATIENT
Start: 2022-05-21 | End: 2022-05-21

## 2022-05-21 RX ORDER — LORAZEPAM 1 MG/1
1 TABLET ORAL ONCE
Status: DISCONTINUED | OUTPATIENT
Start: 2022-05-21 | End: 2022-05-21

## 2022-05-21 RX ADMIN — PANTOPRAZOLE SODIUM 40 MG: 40 TABLET, DELAYED RELEASE ORAL at 08:11

## 2022-05-21 RX ADMIN — LEVOTHYROXINE SODIUM 100 MCG: 0.1 TABLET ORAL at 08:11

## 2022-05-21 RX ADMIN — LORAZEPAM 1 MG: 2 INJECTION INTRAMUSCULAR; INTRAVENOUS at 16:24

## 2022-05-21 RX ADMIN — ENOXAPARIN SODIUM 40 MG: 100 INJECTION SUBCUTANEOUS at 08:11

## 2022-05-21 RX ADMIN — DESVENLAFAXINE SUCCINATE 50 MG: 50 TABLET, EXTENDED RELEASE ORAL at 08:11

## 2022-05-21 RX ADMIN — BUPROPION HYDROCHLORIDE 300 MG: 150 TABLET, EXTENDED RELEASE ORAL at 05:47

## 2022-05-21 RX ADMIN — ATORVASTATIN CALCIUM 10 MG: 10 TABLET, FILM COATED ORAL at 08:11

## 2022-05-21 RX ADMIN — Medication 10 ML: at 20:48

## 2022-05-21 RX ADMIN — METOPROLOL SUCCINATE 100 MG: 50 TABLET, EXTENDED RELEASE ORAL at 08:11

## 2022-05-21 RX ADMIN — SODIUM CHLORIDE 50 ML/HR: 9 INJECTION, SOLUTION INTRAVENOUS at 08:28

## 2022-05-21 RX ADMIN — AMLODIPINE BESYLATE 10 MG: 5 TABLET ORAL at 08:11

## 2022-05-21 RX ADMIN — Medication 10 ML: at 08:11

## 2022-05-21 RX ADMIN — LIOTHYRONINE SODIUM 5 MCG: 5 TABLET ORAL at 08:11

## 2022-05-21 RX ADMIN — GADOTERIDOL 20 ML: 279.3 INJECTION, SOLUTION INTRAVENOUS at 17:28

## 2022-05-22 ENCOUNTER — APPOINTMENT (OUTPATIENT)
Dept: GENERAL RADIOLOGY | Facility: HOSPITAL | Age: 57
End: 2022-05-22

## 2022-05-22 VITALS
BODY MASS INDEX: 24.18 KG/M2 | DIASTOLIC BLOOD PRESSURE: 82 MMHG | WEIGHT: 194.45 LBS | HEIGHT: 75 IN | OXYGEN SATURATION: 95 % | TEMPERATURE: 97.4 F | SYSTOLIC BLOOD PRESSURE: 124 MMHG | HEART RATE: 73 BPM | RESPIRATION RATE: 16 BRPM

## 2022-05-22 LAB
ANION GAP SERPL CALCULATED.3IONS-SCNC: 12 MMOL/L (ref 5–15)
BASOPHILS # BLD AUTO: 0 10*3/MM3 (ref 0–0.2)
BASOPHILS NFR BLD AUTO: 0.2 % (ref 0–1.5)
BUN SERPL-MCNC: 11 MG/DL (ref 6–20)
BUN/CREAT SERPL: 11.5 (ref 7–25)
CALCIUM SPEC-SCNC: 9 MG/DL (ref 8.6–10.5)
CHLORIDE SERPL-SCNC: 100 MMOL/L (ref 98–107)
CO2 SERPL-SCNC: 25 MMOL/L (ref 22–29)
CREAT SERPL-MCNC: 0.96 MG/DL (ref 0.76–1.27)
DEPRECATED RDW RBC AUTO: 44.2 FL (ref 37–54)
EGFRCR SERPLBLD CKD-EPI 2021: 92.2 ML/MIN/1.73
EOSINOPHIL # BLD AUTO: 0.4 10*3/MM3 (ref 0–0.4)
EOSINOPHIL NFR BLD AUTO: 5.5 % (ref 0.3–6.2)
ERYTHROCYTE [DISTWIDTH] IN BLOOD BY AUTOMATED COUNT: 13.8 % (ref 12.3–15.4)
GLUCOSE SERPL-MCNC: 166 MG/DL (ref 65–99)
HCT VFR BLD AUTO: 39.4 % (ref 37.5–51)
HGB BLD-MCNC: 13.3 G/DL (ref 13–17.7)
LYMPHOCYTES # BLD AUTO: 1.4 10*3/MM3 (ref 0.7–3.1)
LYMPHOCYTES NFR BLD AUTO: 18.6 % (ref 19.6–45.3)
MCH RBC QN AUTO: 30.4 PG (ref 26.6–33)
MCHC RBC AUTO-ENTMCNC: 33.7 G/DL (ref 31.5–35.7)
MCV RBC AUTO: 90.2 FL (ref 79–97)
MONOCYTES # BLD AUTO: 0.9 10*3/MM3 (ref 0.1–0.9)
MONOCYTES NFR BLD AUTO: 11.6 % (ref 5–12)
NEUTROPHILS NFR BLD AUTO: 4.9 10*3/MM3 (ref 1.7–7)
NEUTROPHILS NFR BLD AUTO: 64.1 % (ref 42.7–76)
NRBC BLD AUTO-RTO: 0.2 /100 WBC (ref 0–0.2)
PLATELET # BLD AUTO: 349 10*3/MM3 (ref 140–450)
PMV BLD AUTO: 6.9 FL (ref 6–12)
POTASSIUM SERPL-SCNC: 4.2 MMOL/L (ref 3.5–5.2)
RBC # BLD AUTO: 4.37 10*6/MM3 (ref 4.14–5.8)
SODIUM SERPL-SCNC: 137 MMOL/L (ref 136–145)
WBC NRBC COR # BLD: 7.7 10*3/MM3 (ref 3.4–10.8)

## 2022-05-22 PROCEDURE — 99239 HOSP IP/OBS DSCHRG MGMT >30: CPT | Performed by: INTERNAL MEDICINE

## 2022-05-22 PROCEDURE — 80048 BASIC METABOLIC PNL TOTAL CA: CPT | Performed by: INTERNAL MEDICINE

## 2022-05-22 PROCEDURE — 25010000002 ENOXAPARIN PER 10 MG: Performed by: INTERNAL MEDICINE

## 2022-05-22 PROCEDURE — 85025 COMPLETE CBC W/AUTO DIFF WBC: CPT | Performed by: INTERNAL MEDICINE

## 2022-05-22 PROCEDURE — 99232 SBSQ HOSP IP/OBS MODERATE 35: CPT | Performed by: INTERNAL MEDICINE

## 2022-05-22 PROCEDURE — 71045 X-RAY EXAM CHEST 1 VIEW: CPT

## 2022-05-22 RX ORDER — TRAMADOL HYDROCHLORIDE 50 MG/1
50 TABLET ORAL EVERY 6 HOURS PRN
Qty: 20 TABLET | Refills: 0 | Status: SHIPPED | OUTPATIENT
Start: 2022-05-22 | End: 2022-05-27

## 2022-05-22 RX ADMIN — ENOXAPARIN SODIUM 40 MG: 100 INJECTION SUBCUTANEOUS at 08:47

## 2022-05-22 RX ADMIN — Medication 10 ML: at 08:47

## 2022-05-22 RX ADMIN — DESVENLAFAXINE SUCCINATE 50 MG: 50 TABLET, EXTENDED RELEASE ORAL at 08:46

## 2022-05-22 RX ADMIN — AMLODIPINE BESYLATE 10 MG: 5 TABLET ORAL at 08:47

## 2022-05-22 RX ADMIN — LEVOTHYROXINE SODIUM 100 MCG: 0.1 TABLET ORAL at 08:46

## 2022-05-22 RX ADMIN — ATORVASTATIN CALCIUM 10 MG: 10 TABLET, FILM COATED ORAL at 08:47

## 2022-05-22 RX ADMIN — METOPROLOL SUCCINATE 100 MG: 50 TABLET, EXTENDED RELEASE ORAL at 08:46

## 2022-05-22 RX ADMIN — LIOTHYRONINE SODIUM 5 MCG: 5 TABLET ORAL at 08:46

## 2022-05-22 RX ADMIN — BUPROPION HYDROCHLORIDE 300 MG: 150 TABLET, EXTENDED RELEASE ORAL at 06:08

## 2022-05-22 RX ADMIN — PANTOPRAZOLE SODIUM 40 MG: 40 TABLET, DELAYED RELEASE ORAL at 08:46

## 2022-05-23 ENCOUNTER — READMISSION MANAGEMENT (OUTPATIENT)
Dept: CALL CENTER | Facility: HOSPITAL | Age: 57
End: 2022-05-23

## 2022-05-23 DIAGNOSIS — C34.91 NON-SMALL CELL CARCINOMA OF LUNG, RIGHT: Primary | ICD-10-CM

## 2022-05-26 ENCOUNTER — READMISSION MANAGEMENT (OUTPATIENT)
Dept: CALL CENTER | Facility: HOSPITAL | Age: 57
End: 2022-05-26

## 2022-05-26 NOTE — OUTREACH NOTE
General Surgery Week 1 Survey    Flowsheet Row Responses   [facility] facility patient discharged from? [facility]   Does the patient have one of the following disease processes/diagnoses(primary or secondary)? General Surgery   Week 1 attempt successful? No   Unsuccessful attempts Attempt 1          LESLI Parra Licensed Nurse

## 2022-05-27 ENCOUNTER — HOSPITAL ENCOUNTER (OUTPATIENT)
Dept: PET IMAGING | Facility: HOSPITAL | Age: 57
Discharge: HOME | End: 2022-05-27
Admitting: INTERNAL MEDICINE

## 2022-05-27 ENCOUNTER — TELEPHONE (OUTPATIENT)
Dept: ONCOLOGY | Facility: CLINIC | Age: 57
End: 2022-05-27

## 2022-05-27 ENCOUNTER — APPOINTMENT (OUTPATIENT)
Dept: LAB | Facility: HOSPITAL | Age: 57
End: 2022-05-27

## 2022-05-27 DIAGNOSIS — C34.91 NON-SMALL CELL CARCINOMA OF LUNG, RIGHT: Primary | ICD-10-CM

## 2022-05-27 DIAGNOSIS — C34.91 NON-SMALL CELL CARCINOMA OF LUNG, RIGHT: ICD-10-CM

## 2022-05-27 LAB — GLUCOSE BLDC GLUCOMTR-MCNC: 77 MG/DL (ref 70–105)

## 2022-05-27 PROCEDURE — 78815 PET IMAGE W/CT SKULL-THIGH: CPT

## 2022-05-27 PROCEDURE — A9552 F18 FDG: HCPCS | Performed by: INTERNAL MEDICINE

## 2022-05-27 PROCEDURE — 82962 GLUCOSE BLOOD TEST: CPT

## 2022-05-27 PROCEDURE — 0 FLUDEOXYGLUCOSE F18 SOLUTION: Performed by: INTERNAL MEDICINE

## 2022-05-27 RX ADMIN — FLUDEOXYGLUCOSE F18 1 DOSE: 300 INJECTION INTRAVENOUS at 08:34

## 2022-05-27 NOTE — TELEPHONE ENCOUNTER
Left message on identifying VM letting pt know that his PET scan did not show any other areas of abnormality except the left upper lobe nodule and that Dr. Gaspar is referring him to Dr. Reyna to evaluate for surgical resection. I told him that I will be out of the office next week, but left the main office number and told him that he can call back and speak to one of the other nurses or Dr. Gaspar's MA if he has any further questions or needs.

## 2022-05-27 NOTE — TELEPHONE ENCOUNTER
----- Message from Azucenarufus Gaspar MD sent at 5/27/2022 10:29 AM EDT -----  His PET CT scan does not show any other areas of abnormality except the left upper lobe nodule.  Please let patient know I referred him to see Dr. Miryam Reyna as soon as possible to evaluate for surgical resection of the left upper lobe mass.

## 2022-06-02 ENCOUNTER — TRANSCRIBE ORDERS (OUTPATIENT)
Dept: ADMINISTRATIVE | Facility: HOSPITAL | Age: 57
End: 2022-06-02

## 2022-06-02 ENCOUNTER — OFFICE VISIT (OUTPATIENT)
Dept: SURGERY | Facility: CLINIC | Age: 57
End: 2022-06-02

## 2022-06-02 VITALS
HEART RATE: 73 BPM | DIASTOLIC BLOOD PRESSURE: 88 MMHG | RESPIRATION RATE: 18 BRPM | TEMPERATURE: 99.1 F | HEIGHT: 75 IN | WEIGHT: 200 LBS | BODY MASS INDEX: 24.87 KG/M2 | SYSTOLIC BLOOD PRESSURE: 132 MMHG | OXYGEN SATURATION: 97 %

## 2022-06-02 DIAGNOSIS — Z01.818 PRE-OP TESTING: Primary | ICD-10-CM

## 2022-06-02 DIAGNOSIS — C34.12 MALIGNANT NEOPLASM OF UPPER LOBE OF LEFT LUNG: Primary | ICD-10-CM

## 2022-06-02 PROCEDURE — 99215 OFFICE O/P EST HI 40 MIN: CPT | Performed by: THORACIC SURGERY (CARDIOTHORACIC VASCULAR SURGERY)

## 2022-06-02 RX ORDER — LORAZEPAM 0.5 MG/1
0.5 TABLET ORAL EVERY 8 HOURS PRN
COMMUNITY
Start: 2022-05-25

## 2022-06-06 NOTE — PROGRESS NOTES
Hematology/Oncology Outpatient Follow Up    PATIENT NAME:Jc Driscoll  :1965  MRN: [id number]  PRIMARY CARE PHYSICIAN: Reshma Alvarenga MD  REFERRING PHYSICIAN: Reshma Alvarenga MD    Chief Complaint   Patient presents with   • Follow-up     Non-small cell carcinoma of lung, right (HCC)        HISTORY OF PRESENT ILLNESS:     This is a 56 y.o. who developed left shoulder pain and for that reason he had a chest x-ray done on 19 which showed a 2.7 cm noncalcified right lower lobe nodule was identified.  For that reason a CT scan of the chest was recommended.  Review of his records shows patient had the CT scan on 19 done without contrast.  This basically showed a lobulated noncalcified mass in the posterior aspect of the right lower lobe measuring 3 cm close to the pleural surface.  There is a calcified 1.2 mm nodule in the lateral aspect of the right lower lobe consistent with a granuloma.  There were also calcified subcarinal and right hilar nodules.  There is a lower right side tracheal nodule measuring 1 cm.  Patient had a PET/CT scan done on 19 which showed increased metabolic activity on the right lower lobe mass that measures 2.5 cm with SUV of 4.4.  There was also increased metabolic activity in the subcarinal space measuring 2.8 cm in size with SUV of 3.4, increased activity in an enlarged right paratracheal lymph node that measures 1.9 cm, SUV of 5, also suspicious for metastatic adenopathy.  Patient had a CT-guided biopsy of the right lower lobe mass on 3/8/19.  This showed adenocarcinoma, TTF-1 positive.  On 3/18/19 patient underwent endoscopic ultrasound and biopsy of a subcarinal lymph node.  This was positive for malignant cells.  Patient was then taken back to surgery on 3/20/18 and had left Mediport placement by Dr. Fuchs.  He has been referred for further evaluation and management of his stage 3 adenocarcinoma of the right lung.  He was accompanied by his spouse for  the appointment on 3/26/19.  Patient was scheduled to have a brain MRI for complete staging, but he could not do this due to claustrophobia.  He is willing to try with the help of angiolytics.    • Patient had brain MRI done on 4/5/19.  He states it did not show any evidence of metastatic disease.  Evidence of chronic sinusitis was noted.    • Patient was seen by Dr. Escalona who has recommended concurrent chemotherapy and radiation.  Patient is scheduled to begin on 4/15/19.   • 4/17/19 - Patient was initiated on combined chemotherapy and radiation with Cisplatin and Alimta.  Patient received cycle 1.      • 5/8/19 - Patient received cycle 2 of chemotherapy with Cisplatin and Alimta.   • 5/15/19 - Chemistry panel showed creatinine of 1.7.  WBC 1.8, hemoglobin 11.6, platelet count 72,000.   • 5/20/19 - BUN 10, creatinine 1.2, potassium 3.5.    • 5/23/19 - CT scan of the chest with contrast showed interval decrease in size of the right lower lobe pulmonary nodule now measuring 2.1 cm in size.  There was no mediastinal or hilar adenopathy identified.  • 6/26/2019 patient underwent right lower lobectomy with hilar and mediastinal lymph node dissection performed by Dr. Terrance Mckeon.  • Pathology revealed residual residual 2.2 cm invasive moderately differentiated adenocarcinoma.  There were a total of 16 lymph nodes evaluated that 7 had metastatic disease.  There was evidence of lymphovascular invasion, extranodal involvement.  All the surgical margins were negative and distance of the closest margin was 2.5 cm.  Logic stage is T1c N2 M0.  • Patient is here today accompanied by his spouse for this appointment.  He continues to complain of some postop discomfort, numbness but his skin is intact.  There is no drainage.  Has had follow-up with Dr. Fuchs.  • 8/14/2019-seen by Dr. Maria at the [facility].  Dr. Maria has recommended immunotherapy with durvalumab off label use as patient has not had significant  response to neoadjuvant chemotherapy and radiation.  Patient was given the option to have immunotherapy at the [facility] vs [place] and he has chosen to stay in [place]  • 8/21/19 - Started cycle 1 day 1 Imfinzi  • 9/4/2019 patient had CT scan of the chest this shows a moderate size right pleural effusion.  There are areas of groundglass opacification in the right upper lobe without any evidence of significant septal thickening.  Volume loss noted there is also moderate pleural effusion.  There is no suspicious recurrent malignancy.  There were nonenlarged residual mediastinal lymph nodes.  • 9/9/19 - WBC 6.23, Hgb 11.7 Platelets 257,000, , BUN 9, Cr 1.1,Vitamin B12 318, Ferritin 437  • 9/23/19 - received cycle 2 day 1 Imfinzi  • 10/9/19- Seen by Dr rodríguez with ENT for sinus disease  • 11/4/2019-patient received cycle 5 of Imfinzi(durvalumab)  • 12/2/2019 patient had cycle 7 of durvalumab  • 12/5/2019-patient had CT scan of the  abdomen and pelvis with small right pleural sided pleural effusion.There is no evidence of metastatic disease  • 12/30/2019-patient received cycle 9 of durvalumab  • 12/31/2019-patient had CT scan of the chest showed small to moderate left pleural effusion.  Iglesias appearing right lower paratracheal and right peribronchial soft tissue thickening without any discrete pathologically enlarged mediastinal or hilar lymph nodes.  • 1/27/20-patient received cycle 11 of durvalumab  • 2/17/20-patient had a stress test which was negative for ischemia  • 2/17/2020-patient had CT of the chest which showed postop changes on the right lung, right pleural effusion but no enlarging mass was noted  • 3/2/2020-patient received cycle 12 of durvalumab  • 3/16/2020-patient had ultrasound-guided right thoracentesis.  Pathology was negative for malignancy  • 4/13/2020-patient received cycle 15 of immunotherapy with durvalumab  • 5/11/2020-patient received cycle 17 of immunotherapy with  durvalumab  • 6/9/2020-patient had CT scan of the chest, abdomen and pelvis for lung cancer restaging.  There is stable small chronic loculated right basilar pleural effusion suggesting chronic pleuritis.  There is no evidence of metastatic disease in the abdomen or pelvis  • 7/20/2020 patient received cycle 22 of Durvalumab  • 8/17/2020 patient received cycle 24 and last cycle of durvalumab  • 9/24/2020 patient had CT scan of the chest, abdomen and pelvis for cancer restaging there was no evidence of local recurrence or metastatic disease within the abdomen and pelvis.  He has stable chronic small right pleural effusion.  Mild sigmoid diverticulosis was noted.  • 1/18/2021 patient had CT scan of the chest, abdomen and pelvis reviewed patient  • 5/24/2021 patient had CT scan of the chest calcified subcarinal lymph node consistent with changes of prior granulomatous disease.  Chronic small right pleural effusion is noted similar to prior exam.  Previously shown 4 mm nodule in the left lower lobe has nearly completely resolved.  The subpleural 3 mm nodule located within the posterior left lower lobe With resolved.  • 9/24/2021 patient had a CT scan of the chest, abdomen and pelvis there is soft tissue attenuation within the right paratracheal area which has been suggested to reflect sequela to previous treatment.  There is slight thickening of the bladder wall otherwise there is no evidence of metastatic disease.  • 12/27/2021 patient had CT scan of the chest with contrast this basically showed irregular shaped noncalcified 7 mm left upper lobe pulmonary nodule.  PET CT scan was recommended to further evaluate.  • 1/26/2022 patient had a PET CT scan done which basically showed evidence of mild uptake corresponding to the nodule within the left upper lobe which is new worrisome for developing metastatic focus.  There was mild radiopharmaceutical activity corresponding to pleural thickening throughout the right lung  base with associated pleural effusion likely related to post therapeutic changes and radiation changes in this region.  Metastatic malignancy involving the pleura could not be completely eliminated.  • 1/26/2022: CT-guided biopsy was aborted due to finding by the radiologist that the lung nodularity was actually a clump of tiny nodules of which the largest was 6 mm and therefore biopsy could not be completed.  Also the area was in the vicinity of multiple blood vessels with increased risk of bleeding.  Follow-up CT of the chest was recommended for continued surveillance  • 5/18/2022 patient had CT-guided biopsy of the left enlarging nodule, pathology was positive for invasive poorly differentiated adenocarcinoma most consistent with adenocarcinoma of pulmonary origin.  • 5/27/2022 patient had a PET CT scan which showed a 2.3 cm hypermetabolic left upper lobe nodule which has increased in size no convincing evidence of metastatic disease elsewhere within the chest.  • 5/21/2022 patient had brain MRI which did not show any evidence of intracranial metastasis  • 6/2/2022 patient was seen by Dr. Miryam Reyna pulmonary function test is being done to assess surgical candidacy    Past Medical History:   Diagnosis Date   • Allergic rhinitis 7/25/2013    Overview:  4/2/2018 - still zyrtec 10 daily (if stops, gets dermatitis again).    • Anxiety and depression 6/5/2012    Overview:  4/2/2018 -   C/w well 300 xr and Lito 20.  4/8/2019 -   Doing ok even with new lung cancer - lito 20 & well 300xr.    • Chronic pansinusitis 4/8/2019    Overview:  4/8/2019 -   MRI showed chronic thick in frontal, maxillary, ethmoidal ---> to Dr. COLLAZO to est since abx ICC didn't help.  Does netipot bid.    • Diastolic CHF (HCC) 7/9/2014    Overview:  7/4/14 - impaired LV relaxation on Zhou ECHO.  EF 60%. Rest normal   • Dupuytren's contracture of both hands    • Elevated cholesterol    • Erosive esophagitis 7/9/2019    Overview:  EGD 2016 4/2/2018 -  nexium OTC daily for few week.  Qod before that.  Now carbonation hurts, epigastric pain 4/8/2019 -   protonix 40 doing well.    • Gastroesophageal reflux disease 2/21/2019   • Hiatal hernia 7/9/2019   • History of colon polyps    • Hypertension    • Lung cancer (HCC)     right lung and in lymph nodes x2   • Mediastinal lymphadenopathy 3/12/2019   • Normocytic anemia 8/8/2019    Overview:  6/2019 - dropped to 9's postop 7/2019 - rebounded to 10's.  8/8/2019 -   Back to 9's - check ferritin, b12 and thyroid.    • Prediabetes 7/9/2014    Overview:  7/4/14 - a1c 6.1 at [place] admission   • Retention of urine 6/27/2019       Past Surgical History:   Procedure Laterality Date   • BRONCHOSCOPY  03/18/2019    EBUS MONTERO NEEDLE BX   • COLONOSCOPY     • DUPUYTREN CONTRACTURE RELEASE Bilateral    • LUNG BIOPSY  03/08/2019    CT GUIDED   • LUNG REMOVAL, PARTIAL Right     right lower   • PORTACATH PLACEMENT  03/20/2019    DR LONGORIA   • THORACOSCOPY Right 6/26/2019    Procedure: RIGHT VATS, OPEN LOWER LOBECTOMY WITH LYMPH NODE DISECTION, WEDGE RESECTION OF RIGHT MIDDLE LOBE;  Surgeon: Terrance Longoria MD;  Location: [facility] OR;  Service: Cardiothoracic         Current Outpatient Medications:   •  amLODIPine (NORVASC) 10 MG tablet, Take 10 mg by mouth Daily., Disp: , Rfl:   •  buPROPion XL (WELLBUTRIN XL) 300 MG 24 hr tablet, Take 300 mg by mouth Every Morning., Disp: , Rfl:   •  desvenlafaxine (PRISTIQ) 50 MG 24 hr tablet, Take 50 mg by mouth Daily., Disp: , Rfl:   •  levothyroxine (SYNTHROID, LEVOTHROID) 100 MCG tablet, Take 100 mcg by mouth Every Morning., Disp: , Rfl:   •  liothyronine (CYTOMEL) 5 MCG tablet, Take 5 mcg by mouth Daily., Disp: , Rfl:   •  LORazepam (ATIVAN) 0.5 MG tablet, TAKE 1 TABLET BY MOUTH ONCE DAILY AS NEEDED FOR ANXIETY . DO NOT EXCEED 1 PER 24 HOURS, Disp: , Rfl:   •  metoprolol succinate XL (TOPROL-XL) 100 MG 24 hr tablet, Take 100 mg by mouth Daily., Disp: , Rfl:   •  pantoprazole (PROTONIX) 40  MG EC tablet, Take 40 mg by mouth Daily., Disp: , Rfl:   •  pravastatin (PRAVACHOL) 10 MG tablet, Take 10 mg by mouth Daily., Disp: , Rfl:     No Known Allergies    Family History   Problem Relation Age of Onset   • Cancer Mother         cervical cancer   • Cervical cancer Mother    • Cancer Father         lung cancer   • Lung cancer Father    • Lung cancer Sister    • Cancer Sister         lung cancer       Cancer-related family history includes Cancer in his father, mother, and sister; Cervical cancer in his mother; Lung cancer in his father and sister.    Social History     Tobacco Use   • Smoking status: Former Smoker     Types: Cigarettes     Quit date: 2009     Years since quittin.7   • Smokeless tobacco: Never Used   Substance Use Topics   • Alcohol use: Yes     Alcohol/week: 2.0 standard drinks     Types: 2 Cans of beer per week     Comment: Occasional   • Drug use: No     I have reviewed and confirmed the accuracy of the patient's history:  as entered by the MA/LPN/RN. Azucena Gaspar MD 22     SUBJECTIVE:    He has no new issues.  He scheduled for pulmonary function test on 2022    REVIEW OF SYSTEMS:    Review of Systems   Constitutional: Negative for chills and fever.   HENT: Negative for ear pain, mouth sores, nosebleeds and sore throat.    Eyes: Negative for photophobia and visual disturbance.   Respiratory: Negative for wheezing and stridor.    Cardiovascular: Negative for chest pain and palpitations.   Gastrointestinal: Negative for abdominal pain, diarrhea, nausea and vomiting.   Endocrine: Negative for cold intolerance and heat intolerance.   Genitourinary: Negative for dysuria and hematuria.   Musculoskeletal: Negative for joint swelling and neck stiffness.   Skin: Negative for color change and rash.   Neurological: Negative for seizures and syncope.   Hematological: Negative for adenopathy.   Psychiatric/Behavioral: Negative for agitation, confusion and hallucinations.  "      I have reviewed and confirmed the accuracy of the ROS as documented by the MA/LPN/RN Azucena Livia Gaspar MD        OBJECTIVE:  Vitals:    06/08/22 1529   BP: 126/81   Pulse: 77   Resp: 18   Temp: 97.1 °F (36.2 °C)   TempSrc: Infrared   Weight: 89.8 kg (198 lb)   Height: 190.5 cm (75\")   PainSc: 0-No pain     ECOG  Eastern Cooperative Oncology Group (ECOG, [place], World Health Organization) performance scale  0 Fully active; no performance restrictions.  1 Strenuous physical activity restricted; fully ambulatory and able to carry out light work.  2 Capable of all self-care but unable to carry out any work activities. Up and about >50% of waking hours.  3 Capable of only limited self-care; confined to bed or chair >50% of waking hours.  4 Completely disabled; cannot carry out any self-care; totally confined to bed or chair.    Physical Exam   Constitutional: He is oriented to person, place, and time. He appears well-developed. No distress.   HENT:   Head: Normocephalic and atraumatic.   Right Ear: External ear normal.   Left Ear: External ear normal.   Nose: Nose normal.   Eyes: Pupils are equal, round, and reactive to light. Conjunctivae are normal. Right eye exhibits no discharge. Left eye exhibits no discharge. No scleral icterus.   Neck: No thyromegaly present.   Cardiovascular: Normal rate, regular rhythm and normal heart sounds. Exam reveals no gallop and no friction rub.   Pulmonary/Chest: Effort normal. No stridor. No respiratory distress. He has no wheezes.   Abdominal: Soft. Bowel sounds are normal. He exhibits no mass. There is no abdominal tenderness. There is no rebound and no guarding.   Musculoskeletal: Normal range of motion. No tenderness.   Lymphadenopathy:     He has no cervical adenopathy.   Neurological: He is alert and oriented to person, place, and time. He exhibits normal muscle tone.   Skin: Skin is warm. No rash noted. He is not diaphoretic. No erythema.   Urticarial-like rash, right " medial knee likely due to immunotherapy   Psychiatric: His behavior is normal. Judgment and thought content normal.   Nursing note and vitals reviewed.    I have reexamined the patient and the results are consistent with the previously documented exam. Azucena Gaspar MD     RECENT LABS    WBC   Date Value Ref Range Status   06/08/2022 7.37 3.40 - 10.80 10*3/mm3 Final   03/22/2022 7.84 4.5 - 11.0 10*3/uL Final     RBC   Date Value Ref Range Status   06/08/2022 3.82 (L) 4.14 - 5.80 10*6/mm3 Final   03/22/2022 4.23 (L) 4.5 - 5.9 10*6/uL Final     Hemoglobin   Date Value Ref Range Status   06/08/2022 11.9 (L) 13.0 - 17.7 g/dL Final   03/22/2022 12.9 (L) 13.5 - 17.5 g/dL Final     Hematocrit   Date Value Ref Range Status   06/08/2022 35.1 (L) 37.5 - 51.0 % Final   03/22/2022 38.8 (L) 41.0 - 53.0 % Final     MCV   Date Value Ref Range Status   06/08/2022 91.9 79.0 - 97.0 fL Final   03/22/2022 91.7 80.0 - 100.0 fL Final     MCH   Date Value Ref Range Status   06/08/2022 31.2 26.6 - 33.0 pg Final   03/22/2022 30.5 26.0 - 34.0 pg Final     MCHC   Date Value Ref Range Status   06/08/2022 33.9 31.5 - 35.7 g/dL Final   03/22/2022 33.2 31.0 - 37.0 g/dL Final     RDW   Date Value Ref Range Status   06/08/2022 12.8 12.3 - 15.4 % Final   03/22/2022 14.2 12.0 - 16.8 % Final     RDW-SD   Date Value Ref Range Status   06/08/2022 41.8 37.0 - 54.0 fl Final     MPV   Date Value Ref Range Status   06/08/2022 8.2 6.0 - 12.0 fL Final   03/22/2022 9.3 8.4 - 12.4 fL Final     Platelets   Date Value Ref Range Status   06/08/2022 274 140 - 450 10*3/mm3 Final   03/22/2022 324 140 - 440 10*3/uL Final     Neutrophil Rel %   Date Value Ref Range Status   03/22/2022 63.1 45 - 80 % Final     Neutrophil %   Date Value Ref Range Status   06/08/2022 61.4 42.7 - 76.0 % Final     Lymphocyte Rel %   Date Value Ref Range Status   03/22/2022 16.2 15 - 50 % Final     Lymphocyte %   Date Value Ref Range Status   06/08/2022 18.0 (L) 19.6 - 45.3 % Final      Monocyte Rel %   Date Value Ref Range Status   03/22/2022 13.6 0 - 15 % Final     Monocyte %   Date Value Ref Range Status   06/08/2022 13.4 (H) 5.0 - 12.0 % Final     Eosinophil %   Date Value Ref Range Status   06/08/2022 6.4 (H) 0.3 - 6.2 % Final   03/22/2022 5.5 0 - 7 % Final     Basophil Rel %   Date Value Ref Range Status   03/22/2022 1.1 0 - 2 % Final     Basophil %   Date Value Ref Range Status   06/08/2022 0.8 0.0 - 1.5 % Final     Immature Grans %   Date Value Ref Range Status   03/22/2022 0.5 0.0 - 1.0 % Final     Neutrophils Absolute   Date Value Ref Range Status   03/22/2022 4.94 2.0 - 8.8 10*3/uL Final     Neutrophils, Absolute   Date Value Ref Range Status   06/08/2022 4.52 1.70 - 7.00 10*3/mm3 Final     Lymphocytes Absolute   Date Value Ref Range Status   03/22/2022 1.27 0.7 - 5.5 10*3/uL Final     Lymphocytes, Absolute   Date Value Ref Range Status   06/08/2022 1.33 0.70 - 3.10 10*3/mm3 Final     Monocytes Absolute   Date Value Ref Range Status   03/22/2022 1.07 0.0 - 1.7 10*3/uL Final     Monocytes, Absolute   Date Value Ref Range Status   06/08/2022 0.99 (H) 0.10 - 0.90 10*3/mm3 Final     Eosinophils Absolute   Date Value Ref Range Status   03/22/2022 0.43 0.0 - 0.8 10*3/uL Final     Eosinophils, Absolute   Date Value Ref Range Status   06/08/2022 0.47 (H) 0.00 - 0.40 10*3/mm3 Final     Basophils Absolute   Date Value Ref Range Status   03/22/2022 0.09 0.0 - 0.2 10*3/uL Final     Basophils, Absolute   Date Value Ref Range Status   06/08/2022 0.06 0.00 - 0.20 10*3/mm3 Final     Immature Grans, Absolute   Date Value Ref Range Status   03/22/2022 0.04 0.00 - 0.10 10*3/uL Final     nRBC   Date Value Ref Range Status   05/22/2022 0.2 0.0 - 0.2 /100 WBC Final       Lab Results   Component Value Date    GLUCOSE 166 (H) 05/22/2022    BUN 11 05/22/2022    CREATININE 0.96 05/22/2022    EGFRIFNONA 100 01/28/2022    EGFRIFAFRI >60 05/20/2019    BCR 11.5 05/22/2022    K 4.2 05/22/2022    CO2 25.0  05/22/2022    CALCIUM 9.0 05/22/2022    ALBUMIN 4.6 03/22/2022    LABIL2 1.5 03/22/2022    AST 24 03/22/2022    ALT 18 03/22/2022         ASSESSMENT:    1. Poorly differentiated adenocarcinoma of the left lung status post biopsy, found on surveillance CT scans.  Patient is being evaluated for surgical candidacy by Dr. Reyna.  Brain MRI was negative  2. Adenocarcinoma of the right lung, T1c N2 M0, consistent with clinical stage 3A disease.    3. Enlarging left upper lobe nodule 2.2 cm concerning for malignancy, biopsy-proven  4. Pneumothorax following left lung biopsy status post hospitalization  5. S/P combined chemotherapy and radiation with cisplatin and Alimta.  Surveillance CT scan  6. Recent diagnosis of hypothyroidism, on thyroid replacement therapy  7. Status post right lower lobectomy with mediastinal and hilar lymph node dissection with residual disease.  pT1 cN2 M0.  Patient with high risk features including lymphovascular invasion, extranodal capsular extension and persistent multiple lymph node disease.  Ongoing management  8. Status post consolidation treatment with immunotherapy with Imfinzi:  9. Port maintenance: Patient still wants to keep port in little longer.  10. fatigue: We will check thyroid function tests  11. Satus post right thoracentesis with cytology negative for malignancy  12. Postop anemia: Reviewed  13. Assessment has been reviewed and updated      PLANS:    1. Reviewed pathology results, PET CT scans  2. Currently being evaluated for  surgical candidacy  3. Send left lung mass for PD-L1 and foundation 1 testing  4. Continue monthly port flushes  5. If surgery is not an option for the left lung cancer, will refer to Dr. Escalona for SRS  6. Continue thyroid replacement therapy through his PCP  7. He has completed all planned cycles of immunotherapy.  He has no evidence of disease relapse at this time  8. Continue B12 injections  monthly: Patient did have elevated methylmalonic acid level  during the early phases of his treatments.  Continue the same  9. Continue supportive care  10. All questions answered  11. Follow up in 6 weeks         Patient has  questions which have all been answered to the best of my abilities    I have reviewed labs results, imaging, vitals, and medications with the patient today.     Patient verbalized understanding and is in agreement of the above plan.      I spent 30 total minutes, face-to-face, caring for Jc today.  90% of this time involved counseling and/or coordination of care as documented within this note.

## 2022-06-07 ENCOUNTER — PATIENT ROUNDING (BHMG ONLY) (OUTPATIENT)
Dept: SURGERY | Facility: CLINIC | Age: 57
End: 2022-06-07

## 2022-06-07 ENCOUNTER — LAB (OUTPATIENT)
Dept: LAB | Facility: HOSPITAL | Age: 57
End: 2022-06-07

## 2022-06-07 DIAGNOSIS — Z01.818 PRE-OP TESTING: ICD-10-CM

## 2022-06-07 PROCEDURE — C9803 HOPD COVID-19 SPEC COLLECT: HCPCS

## 2022-06-07 PROCEDURE — U0004 COV-19 TEST NON-CDC HGH THRU: HCPCS

## 2022-06-07 NOTE — PROGRESS NOTES
June 7, 2022    Hello, may I speak with Jc Driscoll?    My name is Celina     I am  with [facility]  [address] [place] [address].    Before we get started may I verify your date of birth? 1965    I am calling to officially welcome you to our practice and ask about your recent visit. Is this a good time to talk? yes    Tell me about your visit with us. What things went well?  pt was very satisfied with his visit       We're always looking for ways to make our patients' experiences even better. Do you have recommendations on ways we may improve?  no    Overall were you satisfied with your first visit to our practice? yes       I appreciate you taking the time to speak with me today. Is there anything else I can do for you? no      Thank you, and have a great day.

## 2022-06-08 ENCOUNTER — OFFICE VISIT (OUTPATIENT)
Dept: ONCOLOGY | Facility: CLINIC | Age: 57
End: 2022-06-08

## 2022-06-08 ENCOUNTER — LAB (OUTPATIENT)
Dept: LAB | Facility: HOSPITAL | Age: 57
End: 2022-06-08

## 2022-06-08 VITALS
SYSTOLIC BLOOD PRESSURE: 126 MMHG | HEIGHT: 75 IN | BODY MASS INDEX: 24.62 KG/M2 | HEART RATE: 77 BPM | WEIGHT: 198 LBS | RESPIRATION RATE: 18 BRPM | TEMPERATURE: 97.1 F | DIASTOLIC BLOOD PRESSURE: 81 MMHG

## 2022-06-08 DIAGNOSIS — C34.91 NON-SMALL CELL CARCINOMA OF LUNG, RIGHT: Primary | ICD-10-CM

## 2022-06-08 LAB
BASOPHILS # BLD AUTO: 0.06 10*3/MM3 (ref 0–0.2)
BASOPHILS NFR BLD AUTO: 0.8 % (ref 0–1.5)
DEPRECATED RDW RBC AUTO: 41.8 FL (ref 37–54)
EOSINOPHIL # BLD AUTO: 0.47 10*3/MM3 (ref 0–0.4)
EOSINOPHIL NFR BLD AUTO: 6.4 % (ref 0.3–6.2)
ERYTHROCYTE [DISTWIDTH] IN BLOOD BY AUTOMATED COUNT: 12.8 % (ref 12.3–15.4)
HCT VFR BLD AUTO: 35.1 % (ref 37.5–51)
HGB BLD-MCNC: 11.9 G/DL (ref 13–17.7)
HOLD SPECIMEN: NORMAL
HOLD SPECIMEN: NORMAL
LYMPHOCYTES # BLD AUTO: 1.33 10*3/MM3 (ref 0.7–3.1)
LYMPHOCYTES NFR BLD AUTO: 18 % (ref 19.6–45.3)
MCH RBC QN AUTO: 31.2 PG (ref 26.6–33)
MCHC RBC AUTO-ENTMCNC: 33.9 G/DL (ref 31.5–35.7)
MCV RBC AUTO: 91.9 FL (ref 79–97)
MONOCYTES # BLD AUTO: 0.99 10*3/MM3 (ref 0.1–0.9)
MONOCYTES NFR BLD AUTO: 13.4 % (ref 5–12)
NEUTROPHILS NFR BLD AUTO: 4.52 10*3/MM3 (ref 1.7–7)
NEUTROPHILS NFR BLD AUTO: 61.4 % (ref 42.7–76)
PLATELET # BLD AUTO: 274 10*3/MM3 (ref 140–450)
PMV BLD AUTO: 8.2 FL (ref 6–12)
RBC # BLD AUTO: 3.82 10*6/MM3 (ref 4.14–5.8)
SARS-COV-2 ORF1AB RESP QL NAA+PROBE: NOT DETECTED
WBC NRBC COR # BLD: 7.37 10*3/MM3 (ref 3.4–10.8)

## 2022-06-08 PROCEDURE — 85025 COMPLETE CBC W/AUTO DIFF WBC: CPT

## 2022-06-08 PROCEDURE — 36415 COLL VENOUS BLD VENIPUNCTURE: CPT

## 2022-06-08 PROCEDURE — 99214 OFFICE O/P EST MOD 30 MIN: CPT | Performed by: INTERNAL MEDICINE

## 2022-06-09 ENCOUNTER — HOSPITAL ENCOUNTER (OUTPATIENT)
Dept: RESPIRATORY THERAPY | Facility: HOSPITAL | Age: 57
Discharge: HOME | End: 2022-06-09
Admitting: THORACIC SURGERY (CARDIOTHORACIC VASCULAR SURGERY)

## 2022-06-09 DIAGNOSIS — C34.12 MALIGNANT NEOPLASM OF UPPER LOBE OF LEFT LUNG: ICD-10-CM

## 2022-06-09 PROCEDURE — 94729 DIFFUSING CAPACITY: CPT

## 2022-06-09 PROCEDURE — 94060 EVALUATION OF WHEEZING: CPT

## 2022-06-09 PROCEDURE — 94726 PLETHYSMOGRAPHY LUNG VOLUMES: CPT

## 2022-06-09 RX ORDER — ALBUTEROL SULFATE 90 UG/1
2 AEROSOL, METERED RESPIRATORY (INHALATION) ONCE
Status: COMPLETED | OUTPATIENT
Start: 2022-06-09 | End: 2022-06-09

## 2022-06-09 RX ADMIN — ALBUTEROL SULFATE 2 PUFF: 108 INHALANT RESPIRATORY (INHALATION) at 10:06

## 2022-06-11 NOTE — DISCHARGE SUMMARY
[facility] Medicine Services  DISCHARGE SUMMARY    Patient Name: Jc Driscoll  : 1965  MRN: [id number]    Date of Admission: 2022  Discharge Diagnosis: Pneumothorax following lung biopsy  Date of Discharge: 2022  Primary Care Physician: Reshma Alvarenga MD      Presenting Problem:   Non-small cell carcinoma of lung, right (HCC) [C34.91]  Pneumothorax [J93.9]    Active and Resolved Hospital Problems:  Active Hospital Problems    Diagnosis POA   • **Pneumothorax [J93.9] Yes     Priority: High   • Non-small cell carcinoma of lung, right (HCC) [C34.91] Yes     Priority: High   • Gastroesophageal reflux disease [K21.9] Yes     Priority: Medium   • Hyperlipidemia [E78.5] Yes     Priority: Medium   • Anxiety and depression [F41.9, F32.A] Yes     Priority: Medium   • Hypertension [I10] Yes     Priority: Medium      Resolved Hospital Problems   No resolved problems to display.         Hospital Course     Hospital Course:  Jc Driscoll is a 56 y.o. male who had non-small cell carcinoma of the lung on the right.  The patient unfortunately had a lesion in his left upper lung was increasing in size and biopsy was recommended.  The patient underwent a CT needle aspiration biopsy but unfortunately developed a pneumothorax.  The patient had a small bore chest tube placed to reinflate the lung.  Pulmonary was consulted to manage the chest tube.  The patient had some difficulty resolving the pneumothorax and had to be reconnected to suction a couple of times during his stay.  The patient wanted to have an MRI done while he was here which eventually was able to be done as he has a fear of MRI.  He was sedated with Ativan for that procedure.  The patient was seen by pulmonary and his chest tube was placed to waterseal.  After 24 hours the chest tube was removed and there was no evidence of recurrent pneumothorax.    The patient is a full code at the time of discharge.  The patient's  medications are as listed in that section of this report.  The patient is on a regular diet.  The patient has a biopsy report pending from the biopsy that he had done associated with a pneumothorax.  The patient was seen by oncology and needs to follow-up with them in the early part of next week.  The patient will have an outpatient PET scan done through their office.  The patient was doing well and was discharged in satisfactory condition.        DISCHARGE Follow Up Recommendations for labs and diagnostics:       Reasons For Change In Medications and Indications for New Medications:      Day of Discharge     Vital Signs:       Physical Exam:  Physical Exam  Vitals and nursing note reviewed.   Constitutional:       General: He is not in acute distress.     Appearance: Normal appearance. He is well-developed. He is not ill-appearing, toxic-appearing or diaphoretic.   HENT:      Head: Normocephalic and atraumatic.      Right Ear: Ear canal and external ear normal.      Left Ear: Ear canal and external ear normal.      Nose: Nose normal. No congestion or rhinorrhea.      Mouth/Throat:      Mouth: Mucous membranes are moist.      Pharynx: No oropharyngeal exudate.   Eyes:      General: No scleral icterus.        Right eye: No discharge.         Left eye: No discharge.      Extraocular Movements: Extraocular movements intact.      Conjunctiva/sclera: Conjunctivae normal.      Pupils: Pupils are equal, round, and reactive to light.   Neck:      Thyroid: No thyromegaly.      Vascular: No carotid bruit or JVD.      Trachea: No tracheal deviation.   Cardiovascular:      Rate and Rhythm: Normal rate and regular rhythm.      Pulses: Normal pulses.      Heart sounds: Normal heart sounds. No murmur heard.    No friction rub. No gallop.   Pulmonary:      Effort: Pulmonary effort is normal. No respiratory distress.      Breath sounds: Normal breath sounds. No stridor. No wheezing, rhonchi or rales.   Chest:      Chest wall: No  tenderness.   Abdominal:      General: Bowel sounds are normal. There is no distension.      Palpations: Abdomen is soft. There is no mass.      Tenderness: There is no abdominal tenderness. There is no guarding or rebound.      Hernia: No hernia is present.   Musculoskeletal:         General: No swelling, tenderness, deformity or signs of injury. Normal range of motion.      Cervical back: Normal range of motion and neck supple. No rigidity. No muscular tenderness.      Right lower leg: No edema.      Left lower leg: No edema.   Lymphadenopathy:      Cervical: No cervical adenopathy.   Skin:     General: Skin is warm and dry.      Coloration: Skin is not jaundiced or pale.      Findings: No bruising, erythema or rash.   Neurological:      General: No focal deficit present.      Mental Status: He is alert and oriented to person, place, and time. Mental status is at baseline.      Cranial Nerves: No cranial nerve deficit.      Sensory: No sensory deficit.      Motor: No weakness or abnormal muscle tone.      Coordination: Coordination normal.   Psychiatric:         Mood and Affect: Mood normal.         Behavior: Behavior normal.         Thought Content: Thought content normal.         Judgment: Judgment normal.            Pertinent  and/or Most Recent Results     LAB RESULTS:      Lab 06/08/22  1451   WBC 7.37   HEMOGLOBIN 11.9*   HEMATOCRIT 35.1*   PLATELETS 274   NEUTROS ABS 4.52   LYMPHS ABS 1.33   MONOS ABS 0.99*   EOS ABS 0.47*   MCV 91.9                             Brief Urine Lab Results  (Last result in the past 365 days)      Color   Clarity   Blood   Leuk Est   Nitrite   Protein   CREAT   Urine HCG        10/25/21 1041 Yellow   Clear   Negative   Negative   Negative   Negative               Microbiology Results (last 10 days)     Procedure Component Value - Date/Time    COVID PRE-OP / PRE-PROCEDURE SCREENING ORDER (NO ISOLATION) - Swab, Nasopharynx [[id number]]  (Normal) Collected: 06/07/22 1417    Lab  Status: Final result Specimen: Swab from Nasopharynx Updated: 06/08/22 0025    Narrative:      The following orders were created for panel order COVID PRE-OP / PRE-PROCEDURE SCREENING ORDER (NO ISOLATION) - Swab, Nasopharynx.  Procedure                               Abnormality         Status                     ---------                               -----------         ------                     COVID-19,APTIMA PANTHER(...[[id number]]  Normal              Final result                 Please view results for these tests on the individual orders.    COVID-19,APTIMA PANTHER(EJ),[facility]/ PATTIE, NP/OP SWAB IN UTM/VTM/SALINE TRANSPORT MEDIA,24 HR TAT - Swab, Nasopharynx [[id number]]  (Normal) Collected: 06/07/22 1417    Lab Status: Final result Specimen: Swab from Nasopharynx Updated: 06/08/22 0025     COVID19 Not Detected    Narrative:      Fact sheet for providers: https://www.fda.gov/media/534050/download     Fact sheet for patients: https://www.fda.gov/media/565142/download    Test performed by RT PCR.          MRI Brain With & Without Contrast    Result Date: 5/21/2022  Impression: 1. No findings of intracranial metastatic disease. 2. Negative for acute intracranial abnormality. 3. Chronic findings above.  Electronically Signed By-Antoni Deal MD On:5/21/2022 6:15 PM This report was finalized on [id number] by  Antoni Deal MD.    IR Guided Chest Tube Placement    Result Date: 5/18/2022  Impression: Placement of 8 Urdu detail thoracostomy tube under fluoroscopic guidance for treatment of delayed post left lung nodule biopsy pneumothorax.  Electronically Signed By-Remi Bonilla MD On:5/18/2022 3:48 PM This report was finalized on [id number] by  Remi Bonilla MD.    XR Chest 1 View    Result Date: 5/22/2022  Impression: 1. Removal of left chest tube with small left apical pneumothorax unchanged. Pleural air component at left lung base is decreased. 2. Persistent small right pleural effusion with right basilar  airspace disease likely atelectasis.  Electronically Signed By-Antoni Deal MD On:5/22/2022 9:50 AM This report was finalized on [id number] by  Antoni Deal MD.    XR Chest 1 View    Result Date: 5/21/2022  Impression: 1. Improved appearance of left pneumothorax with small residual amount of pleural air at the left apex and left lung base as described above. Chest tube overlies the medial left lung base. 2. Right basilar airspace disease and small right pleural effusion unchanged.  Electronically Signed By-Antoni Deal MD On:5/21/2022 2:39 PM This report was finalized on [id number] by  Antoni Deal MD.    XR Chest 1 View    Result Date: 5/21/2022  Impression:  IMPRESSION: 1. Persistent large left pneumothorax not significantly changed from prior study with chest tube overlying the left lung base. 2. Additional findings above.  Electronically Signed By-Antoni Deal MD On:5/21/2022 9:06 AM This report was finalized on [id number] by  Antoni Deal MD.    XR Chest 1 View    Result Date: 5/20/2022  Impression: Increased size of large left pneumothorax. Left basilar pleural catheter remains in place.  I called this result to the patient's nurse Valarie at 2:20 PM on 05/20/2022.  Electronically Signed By-Teena Galicia MD On:5/20/2022 2:24 PM This report was finalized on [id number] by  Teena Galicia MD.    XR Chest 1 View    Result Date: 5/20/2022  Impression: 1.Slightly decreased size of persistent, probably moderate-sized left pneumothorax. Left basilar pleural catheter remains in place. 2.Stable small right pleural effusion with underlying right basilar atelectasis and/or pneumonia.  Electronically Signed By-Teena Galicia MD On:5/20/2022 10:20 AM This report was finalized on [id number] by  Teena Galicia MD.    XR Chest 1 View    Result Date: 5/19/2022  Impression: Persistent left-sided pneumothorax with small bore thoracostomy tube in place.  Electronically Signed By-Remi Perea MD On:5/19/2022  11:57 AM This report was finalized on [id number] by  Remi Perea MD.    XR Chest 1 View    Result Date: 5/18/2022  Impression: Interval development of moderate to large left pneumothorax. Chest tube will be placed.  Electronically Signed By-Remi Bonilla MD On:5/18/2022 12:59 PM This report was finalized on [id number] by  Remi Bonilla MD.    XR Chest 1 View    Result Date: 5/18/2022  Impression: No evidence of postbiopsy pneumothorax.  Electronically Signed By-Remi Bonilla MD On:5/18/2022 10:11 AM This report was finalized on [id number] by  Remi Bonilla MD.    CT Needle Biopsy Lung    Result Date: 5/18/2022  Impression: CT-guided core biopsy of left upper lobe pulmonary nodule.  Electronically Signed By-Remi Bonilla MD On:5/18/2022 3:43 PM This report was finalized on [id number] by  Remi Bonilla MD.    NM PET/CT Skull Base to Mid Thigh    Result Date: 5/27/2022  Impression: 1. 2.3 cm hypermetabolic left upper lobe lung nodule has increased in size and has increased in FDG accumulation since the prior PET/CT of 1/26/2022, and is consistent with malignancy. 2. There is no convincing evidence of metastatic disease elsewhere within the chest. There is no evidence of primary malignancy or metastatic disease in the neck, abdomen, pelvis or skeleton. 3. Chronic right basilar loculated pleural fluid and pleural thickening. Stable rind of low-level FDG pleural uptake at this location, favored to represent chronic inflammatory response rectum that of residual or recurrent malignancy.  Electronically Signed By-Margie Bass MD On:5/27/2022 9:51 AM This report was finalized on [id number] by  Margie Bass MD.          Labs Pending at Discharge:      Procedures Performed  * No procedures listed *         Consults:   Consults     Date and Time Order Name Status Description    5/18/2022  2:53 PM Inpatient Pulmonology Consult Completed             Discharge Details        Discharge Medications      Continue  These Medications      Instructions Start Date   amLODIPine 10 MG tablet  Commonly known as: NORVASC   10 mg, Oral, Daily      buPROPion  MG 24 hr tablet  Commonly known as: WELLBUTRIN XL   300 mg, Oral, Every Morning      desvenlafaxine 50 MG 24 hr tablet  Commonly known as: PRISTIQ   50 mg, Oral, Daily      levothyroxine 100 MCG tablet  Commonly known as: SYNTHROID, LEVOTHROID   100 mcg, Oral, Every Early Morning      liothyronine 5 MCG tablet  Commonly known as: CYTOMEL   5 mcg, Oral, Daily      metoprolol succinate  MG 24 hr tablet  Commonly known as: TOPROL-XL   100 mg, Oral, Daily      pantoprazole 40 MG EC tablet  Commonly known as: PROTONIX   40 mg, Oral, Daily      pravastatin 10 MG tablet  Commonly known as: PRAVACHOL   10 mg, Oral, Daily         Stop These Medications    famotidine 40 MG tablet  Commonly known as: PEPCID     LORazepam 0.5 MG tablet  Commonly known as: ATIVAN        ASK your doctor about these medications      Instructions Start Date   traMADol 50 MG tablet  Commonly known as: ULTRAM  Ask about: Should I take this medication?   50 mg, Oral, Every 6 Hours PRN             No Known Allergies      Discharge Disposition:   Home or Self Care    Diet:  Hospital:No active diet order        Discharge Activity:   Activity Instructions     Activity as Tolerated      Driving Restrictions      Type of Restriction: Driving    Driving Restrictions: No Driving While Taking Narcotics            CODE STATUS:  Code Status and Medical Interventions:   Ordered at: 05/18/22 1459     Level Of Support Discussed With:    Patient     Code Status (Patient has no pulse and is not breathing):    CPR (Attempt to Resuscitate)     Medical Interventions (Patient has pulse or is breathing):    Full Support         Future Appointments   Date Time Provider Department Center   6/16/2022  2:45 PM Miryam Reyna MD [facility]   7/20/2022  3:15 PM Azucena Gaspar MD [facility]   7/20/2022  3:15 PM  LAB MD [facility]       Additional Instructions for the Follow-ups that You Need to Schedule     Discharge Follow-up with PCP   As directed       Currently Documented PCP:    Reshma Alvarenga MD    PCP Phone Number:    [phone]     Follow Up Details: In 1 week         Discharge Follow-up with Specialty: Follow-up with oncology within the next week; 1 Week   As directed      Specialty: Follow-up with oncology within the next week    Follow Up: 1 Week               Time spent on Discharge including face to face service:  40 minutes    This patient has been examined wearing appropriate Personal Protective Equipment and discussed with hospital infection control department. 06/11/22      Signature: Electronically signed by Dolly Sales MD, 06/11/22, 6:44 PM EDT.

## 2022-06-13 NOTE — PROGRESS NOTES
Chief Complaint  New Patient (Ref Dr. Gaspar, Rt Lung Ca)    Subjective          Jc Driscoll presents to [facility] THORACIC SURGERY  History of Present Illness  Mr. Driscoll is a pleasant 56-year-old gentleman with a history of a stage III non-small cell lung cancer treated with neoadjuvant chemo radiotherapy followed by right lower lobectomy in 2018. He presents in follow-up with a new left upper lobe mass. He has undergone a CT-guided biopsy. The patient consents to having our nurse navigator present.    The patient confirms that Dr. Fuchs performed a right lower lobectomy and removed some lymph nodes. He also confirms that he had chemotherapy prior to the surgery, and then he did a year of immunotherapy after that. He reports that Dr. Gaspar was following him along and found a new lung nodule on the left side. The patient reports that he had a biopsy, and then he had a pneumothorax. He reports that he was in the hospital for about 5 days. He reports that he is otherwise healthy. The patient reports that he does not have trouble breathing. He reports that he worked every day. He reports that he did not have any trouble with Dr. Fuchs doing his resection the last time. The patient reports that he has not smoked for over 10 years. He reports that every aunt and uncle on his father's side has  of lung cancer. He reports that 90% of his family had lung cancer. The patient reports that he had a stress test in the past, and it was normal. He reports that he is very claustrophobic. He reports that he does not do well with a brain MRI. The patient reports that he has done regular radiation, and he did not have a lot of side effects from it. He reports that he has done all right through it, and it makes him a little tired sometimes.    Objective   Vital Signs:   /88 (BP Location: Right arm, Patient Position: Sitting, Cuff Size: Adult)   Pulse 73   Temp 99.1 °F (37.3 °C) (Infrared)   Resp  "18   Ht 190.5 cm (75\")   Wt 90.7 kg (200 lb)   SpO2 97%   BMI 25.00 kg/m²     Physical Exam  Vitals and nursing note reviewed.   Constitutional:       Appearance: He is well-developed.   HENT:      Head: Normocephalic and atraumatic.      Nose: Nose normal.   Eyes:      Conjunctiva/sclera: Conjunctivae normal.   Pulmonary:      Effort: Pulmonary effort is normal.   Musculoskeletal:         General: Normal range of motion.      Cervical back: Normal range of motion and neck supple.   Skin:     General: Skin is warm and dry.   Neurological:      Mental Status: He is alert and oriented to person, place, and time.   Psychiatric:         Behavior: Behavior normal.         Thought Content: Thought content normal.         Judgment: Judgment normal.          Result Review :                I have reviewed the CT of the chest performed on 04/21/2022 which demonstrates a 2 cm left upper lobe lung nodule with spiculated margins. There is no mediastinal or hilar lymphadenopathy. There is scarring consistent with a right lower lobectomy with a small right pleural effusion. There are no other conspicuous nodules.    I have independently reviewed the PET CT scan performed on 05/27/2022 which demonstrates a 2.3 cm left upper lobe hypermetabolic lung nodule. There is no other evidence of FDG avidity throughout the rest of the body.     Assessment and Plan    Diagnoses and all orders for this visit:    1. Malignant neoplasm of upper lobe of left lung (HCC) (Primary)  -     Full Pulmonary Function Test With Bronchodilator; Future    Mr. Driscoll is a pleasant 56-year-old gentleman with a new diagnosis of a left upper lobe adenocarcinoma with a history of a right lower lobectomy, stage 3 T1N2 adenocarcinoma in 2019. This new left upper lobe lung lesion likely represents a new primary adenocarcinoma stage [id number]. He will need pulmonary function studies for risk stratification prior to consideration for resection with a left upper " lobectomy versus radiation, depending on his lung functions. He will plan to follow up with me after lung functions.    1. Left upper lobe adenocarcinoma  - Schedule lung function tests.  - Return following lung function testing.    I spent 45 minutes caring for Jc on this date of service. This time includes time spent by me in the following activities:preparing for the visit, reviewing tests, obtaining and/or reviewing a separately obtained history, performing a medically appropriate examination and/or evaluation , counseling and educating the patient/family/caregiver, ordering medications, tests, or procedures, documenting information in the medical record and independently interpreting results and communicating that information with the patient/family/caregiver  Follow Up   No follow-ups on file.  Patient was given instructions and counseling regarding his condition or for health maintenance advice. Please see specific information pulled into the AVS if appropriate.       Transcribed from ambient dictation for Miryam Reyna MD by Renu Alan.  06/13/22   10:43 EDT    Patient verbalized consent to the visit recording.

## 2022-06-16 ENCOUNTER — OFFICE VISIT (OUTPATIENT)
Dept: SURGERY | Facility: CLINIC | Age: 57
End: 2022-06-16

## 2022-06-16 ENCOUNTER — PREP FOR SURGERY (OUTPATIENT)
Dept: OTHER | Facility: HOSPITAL | Age: 57
End: 2022-06-16

## 2022-06-16 ENCOUNTER — NURSE NAVIGATOR (OUTPATIENT)
Dept: ONCOLOGY | Facility: CLINIC | Age: 57
End: 2022-06-16

## 2022-06-16 VITALS
RESPIRATION RATE: 16 BRPM | DIASTOLIC BLOOD PRESSURE: 82 MMHG | HEART RATE: 77 BPM | HEIGHT: 75 IN | BODY MASS INDEX: 24.57 KG/M2 | TEMPERATURE: 98.4 F | OXYGEN SATURATION: 97 % | WEIGHT: 197.6 LBS | SYSTOLIC BLOOD PRESSURE: 126 MMHG

## 2022-06-16 DIAGNOSIS — C34.12 MALIGNANT NEOPLASM OF UPPER LOBE OF LEFT LUNG: Primary | ICD-10-CM

## 2022-06-16 DIAGNOSIS — C34.92 NSCLC OF LEFT LUNG: ICD-10-CM

## 2022-06-16 PROCEDURE — 99214 OFFICE O/P EST MOD 30 MIN: CPT | Performed by: THORACIC SURGERY (CARDIOTHORACIC VASCULAR SURGERY)

## 2022-06-16 RX ORDER — LEVOTHYROXINE SODIUM 0.1 MG/1
1 TABLET ORAL DAILY
COMMUNITY
Start: 2022-06-09 | End: 2022-06-29

## 2022-06-16 RX ORDER — SODIUM CHLORIDE 0.9 % (FLUSH) 0.9 %
3 SYRINGE (ML) INJECTION EVERY 12 HOURS SCHEDULED
Status: CANCELLED | OUTPATIENT
Start: 2022-07-06

## 2022-06-16 RX ORDER — HEPARIN SODIUM 5000 [USP'U]/ML
5000 INJECTION, SOLUTION INTRAVENOUS; SUBCUTANEOUS ONCE
Status: CANCELLED | OUTPATIENT
Start: 2022-07-06 | End: 2022-06-16

## 2022-06-16 RX ORDER — ACETAMINOPHEN 500 MG
1000 TABLET ORAL ONCE
Status: CANCELLED | OUTPATIENT
Start: 2022-07-06 | End: 2022-06-16

## 2022-06-16 RX ORDER — GABAPENTIN 300 MG/1
600 CAPSULE ORAL ONCE
Status: CANCELLED | OUTPATIENT
Start: 2022-07-06 | End: 2022-06-16

## 2022-06-16 RX ORDER — CELECOXIB 200 MG/1
200 CAPSULE ORAL ONCE
Status: CANCELLED | OUTPATIENT
Start: 2022-07-06 | End: 2022-06-16

## 2022-06-16 RX ORDER — CEFAZOLIN SODIUM 2 G/100ML
2 INJECTION, SOLUTION INTRAVENOUS ONCE
Status: CANCELLED | OUTPATIENT
Start: 2022-07-06 | End: 2022-06-16

## 2022-06-16 RX ORDER — SODIUM CHLORIDE 0.9 % (FLUSH) 0.9 %
3-10 SYRINGE (ML) INJECTION AS NEEDED
Status: CANCELLED | OUTPATIENT
Start: 2022-07-06

## 2022-06-16 NOTE — PROGRESS NOTES
"Chief Complaint  Follow-up (2 wk fu PFT )    Subjective        Jc Driscoll presents to [facility] THORACIC SURGERY  History of Present Illness     Mr. 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 He presents today after undergoing pulmonary function studies.    The patient reports he feels good. He says his lung function studies went well. He states he worked all day today in the heat and did not experience any difficulty breathing. He says he was sore after the last surgery and continues to have some discomfort.     Objective   Vital Signs:  /82 (BP Location: Right arm, Patient Position: Sitting, Cuff Size: Adult)   Pulse 77   Temp 98.4 °F (36.9 °C) (Infrared)   Resp 16   Ht 190.5 cm (75\")   Wt 89.6 kg (197 lb 9.6 oz)   SpO2 97%   BMI 24.70 kg/m²   Estimated body mass index is 24.7 kg/m² as calculated from the following:    Height as of this encounter: 190.5 cm (75\").    Weight as of this encounter: 89.6 kg (197 lb 9.6 oz).    BMI is within normal parameters. No other follow-up for BMI required.      Physical Exam  Vitals and nursing note reviewed.   Constitutional:       Appearance: He is well-developed.   HENT:      Head: Normocephalic and atraumatic.      Nose: Nose normal.   Eyes:      Conjunctiva/sclera: Conjunctivae normal.   Pulmonary:      Effort: Pulmonary effort is normal.   Musculoskeletal:         General: Normal range of motion.      Cervical back: Normal range of motion and neck supple.   Skin:     General: Skin is warm and dry.   Neurological:      Mental Status: He is alert and oriented to person, place, and time.   Psychiatric:         Behavior: Behavior normal.         Thought Content: Thought content normal.         Judgment: Judgment normal.        Result Review :           Pulmonary function studies: FEV1 3.13 or 71 percent of " predicted. DLCO 20 or 54 percent predicted.         Assessment and Plan   Diagnoses and all orders for this visit:    Mr. Driscoll is a pleasant 56-year-old gentleman with a left upper lobe non-small cell lung cancer, [id number]. He qualifies for a surgical resection of this left upper lobe lung nodule. Risks and benefits of a surgical procedure were discussed with patient today and we will proceed with resection in the near future.    1. Malignant neoplasm of upper lobe of left lung (HCC) (Primary)    2. NSCLC of left lung (HCC)     I spent 30 minutes caring for Jc on this date of service. This time includes time spent by me in the following activities:preparing for the visit, reviewing tests, obtaining and/or reviewing a separately obtained history, performing a medically appropriate examination and/or evaluation , counseling and educating the patient/family/caregiver, ordering medications, tests, or procedures, documenting information in the medical record and independently interpreting results and communicating that information with the patient/family/caregiver  Follow Up   No follow-ups on file.  Patient was given instructions and counseling regarding his condition or for health maintenance advice. Please see specific information pulled into the AVS if appropriate.     Transcribed from ambient dictation for Miryam Reyna MD by LETICIA WRIGHT.  06/16/22   16:43 EDT    Patient verbalized consent to the visit recording.

## 2022-06-17 NOTE — PROGRESS NOTES
I accompanied patient and wife to Dr. Reyna's office visit.     Dr. Reyna reviewed PFT results. Discuss plan for BACILIO removal July 6th with education on side effects and what to expect. No questions or concerns at this time.

## 2022-06-27 LAB
LAB AP CASE REPORT: NORMAL
LAB AP DIAGNOSIS COMMENT: NORMAL
Lab: NORMAL
PATH REPORT.FINAL DX SPEC: NORMAL
PATH REPORT.GROSS SPEC: NORMAL

## 2022-06-29 ENCOUNTER — TRANSCRIBE ORDERS (OUTPATIENT)
Dept: ADMINISTRATIVE | Facility: HOSPITAL | Age: 57
End: 2022-06-29

## 2022-06-29 ENCOUNTER — PRE-ADMISSION TESTING (OUTPATIENT)
Dept: PREADMISSION TESTING | Facility: HOSPITAL | Age: 57
End: 2022-06-29

## 2022-06-29 VITALS
DIASTOLIC BLOOD PRESSURE: 72 MMHG | HEART RATE: 80 BPM | SYSTOLIC BLOOD PRESSURE: 114 MMHG | OXYGEN SATURATION: 99 % | TEMPERATURE: 97.5 F | RESPIRATION RATE: 16 BRPM | BODY MASS INDEX: 24.62 KG/M2 | HEIGHT: 75 IN | WEIGHT: 198 LBS

## 2022-06-29 DIAGNOSIS — C34.12 MALIGNANT NEOPLASM OF UPPER LOBE OF LEFT LUNG: ICD-10-CM

## 2022-06-29 DIAGNOSIS — Z01.818 PRE-OP TESTING: Primary | ICD-10-CM

## 2022-06-29 LAB
ABO GROUP BLD: NORMAL
ANION GAP SERPL CALCULATED.3IONS-SCNC: 11 MMOL/L (ref 5–15)
APTT PPP: 30.3 SECONDS (ref 22.7–35.4)
BASOPHILS # BLD AUTO: 0.11 10*3/MM3 (ref 0–0.2)
BASOPHILS NFR BLD AUTO: 1.9 % (ref 0–1.5)
BLD GP AB SCN SERPL QL: NEGATIVE
BUN SERPL-MCNC: 8 MG/DL (ref 6–20)
BUN/CREAT SERPL: 9.5 (ref 7–25)
CALCIUM SPEC-SCNC: 9.1 MG/DL (ref 8.6–10.5)
CHLORIDE SERPL-SCNC: 97 MMOL/L (ref 98–107)
CO2 SERPL-SCNC: 27 MMOL/L (ref 22–29)
CREAT SERPL-MCNC: 0.84 MG/DL (ref 0.76–1.27)
DEPRECATED RDW RBC AUTO: 44 FL (ref 37–54)
EGFRCR SERPLBLD CKD-EPI 2021: 101.7 ML/MIN/1.73
EOSINOPHIL # BLD AUTO: 0.52 10*3/MM3 (ref 0–0.4)
EOSINOPHIL NFR BLD AUTO: 9.1 % (ref 0.3–6.2)
ERYTHROCYTE [DISTWIDTH] IN BLOOD BY AUTOMATED COUNT: 13.4 % (ref 12.3–15.4)
GLUCOSE SERPL-MCNC: 137 MG/DL (ref 65–99)
HCT VFR BLD AUTO: 38.6 % (ref 37.5–51)
HGB BLD-MCNC: 12.8 G/DL (ref 13–17.7)
IMM GRANULOCYTES # BLD AUTO: 0.03 10*3/MM3 (ref 0–0.05)
IMM GRANULOCYTES NFR BLD AUTO: 0.5 % (ref 0–0.5)
INR PPP: 1.1 (ref 0.9–1.1)
LYMPHOCYTES # BLD AUTO: 0.94 10*3/MM3 (ref 0.7–3.1)
LYMPHOCYTES NFR BLD AUTO: 16.5 % (ref 19.6–45.3)
MCH RBC QN AUTO: 30.1 PG (ref 26.6–33)
MCHC RBC AUTO-ENTMCNC: 33.2 G/DL (ref 31.5–35.7)
MCV RBC AUTO: 90.8 FL (ref 79–97)
MONOCYTES # BLD AUTO: 0.59 10*3/MM3 (ref 0.1–0.9)
MONOCYTES NFR BLD AUTO: 10.3 % (ref 5–12)
NEUTROPHILS NFR BLD AUTO: 3.52 10*3/MM3 (ref 1.7–7)
NEUTROPHILS NFR BLD AUTO: 61.7 % (ref 42.7–76)
NRBC BLD AUTO-RTO: 0 /100 WBC (ref 0–0.2)
PLATELET # BLD AUTO: 288 10*3/MM3 (ref 140–450)
PMV BLD AUTO: 8.5 FL (ref 6–12)
POTASSIUM SERPL-SCNC: 4.2 MMOL/L (ref 3.5–5.2)
PROTHROMBIN TIME: 14.1 SECONDS (ref 11.7–14.2)
QT INTERVAL: 387 MS
RBC # BLD AUTO: 4.25 10*6/MM3 (ref 4.14–5.8)
RH BLD: POSITIVE
SODIUM SERPL-SCNC: 135 MMOL/L (ref 136–145)
T&S EXPIRATION DATE: NORMAL
WBC NRBC COR # BLD: 5.71 10*3/MM3 (ref 3.4–10.8)

## 2022-06-29 PROCEDURE — 85730 THROMBOPLASTIN TIME PARTIAL: CPT

## 2022-06-29 PROCEDURE — 85025 COMPLETE CBC W/AUTO DIFF WBC: CPT

## 2022-06-29 PROCEDURE — 36415 COLL VENOUS BLD VENIPUNCTURE: CPT

## 2022-06-29 PROCEDURE — 86900 BLOOD TYPING SEROLOGIC ABO: CPT

## 2022-06-29 PROCEDURE — 93005 ELECTROCARDIOGRAM TRACING: CPT

## 2022-06-29 PROCEDURE — 85610 PROTHROMBIN TIME: CPT

## 2022-06-29 PROCEDURE — 86901 BLOOD TYPING SEROLOGIC RH(D): CPT

## 2022-06-29 PROCEDURE — 93010 ELECTROCARDIOGRAM REPORT: CPT | Performed by: INTERNAL MEDICINE

## 2022-06-29 PROCEDURE — 86850 RBC ANTIBODY SCREEN: CPT

## 2022-06-29 PROCEDURE — 80048 BASIC METABOLIC PNL TOTAL CA: CPT

## 2022-07-05 ENCOUNTER — APPOINTMENT (OUTPATIENT)
Dept: LAB | Facility: HOSPITAL | Age: 57
End: 2022-07-05

## 2022-07-05 ENCOUNTER — LAB (OUTPATIENT)
Dept: LAB | Facility: HOSPITAL | Age: 57
End: 2022-07-05

## 2022-07-05 DIAGNOSIS — Z01.818 PRE-OP TESTING: ICD-10-CM

## 2022-07-05 LAB — SARS-COV-2 RNA RESP QL NAA+PROBE: NOT DETECTED

## 2022-07-05 PROCEDURE — U0003 INFECTIOUS AGENT DETECTION BY NUCLEIC ACID (DNA OR RNA); SEVERE ACUTE RESPIRATORY SYNDROME CORONAVIRUS 2 (SARS-COV-2) (CORONAVIRUS DISEASE [COVID-19]), AMPLIFIED PROBE TECHNIQUE, MAKING USE OF HIGH THROUGHPUT TECHNOLOGIES AS DESCRIBED BY CMS-2020-01-R: HCPCS

## 2022-07-05 PROCEDURE — C9803 HOPD COVID-19 SPEC COLLECT: HCPCS

## 2022-07-06 ENCOUNTER — APPOINTMENT (OUTPATIENT)
Dept: GENERAL RADIOLOGY | Facility: HOSPITAL | Age: 57
End: 2022-07-06

## 2022-07-06 ENCOUNTER — ANESTHESIA EVENT (OUTPATIENT)
Dept: PERIOP | Facility: HOSPITAL | Age: 57
End: 2022-07-06

## 2022-07-06 ENCOUNTER — HOSPITAL ENCOUNTER (INPATIENT)
Facility: HOSPITAL | Age: 57
LOS: 2 days | Discharge: HOME | End: 2022-07-08
Attending: THORACIC SURGERY (CARDIOTHORACIC VASCULAR SURGERY) | Admitting: THORACIC SURGERY (CARDIOTHORACIC VASCULAR SURGERY)

## 2022-07-06 ENCOUNTER — ANESTHESIA (OUTPATIENT)
Dept: PERIOP | Facility: HOSPITAL | Age: 57
End: 2022-07-06

## 2022-07-06 DIAGNOSIS — C34.12 MALIGNANT NEOPLASM OF UPPER LOBE OF LEFT LUNG: ICD-10-CM

## 2022-07-06 DIAGNOSIS — C34.92 NSCLC OF LEFT LUNG: Primary | ICD-10-CM

## 2022-07-06 PROCEDURE — C9290 INJ, BUPIVACAINE LIPOSOME: HCPCS | Performed by: ANESTHESIOLOGY

## 2022-07-06 PROCEDURE — 25010000002 MIDAZOLAM PER 1 MG: Performed by: ANESTHESIOLOGY

## 2022-07-06 PROCEDURE — 0 BUPIVACAINE LIPOSOME 1.3 % SUSPENSION: Performed by: THORACIC SURGERY (CARDIOTHORACIC VASCULAR SURGERY)

## 2022-07-06 PROCEDURE — 25010000002 FENTANYL CITRATE (PF) 50 MCG/ML SOLUTION: Performed by: ANESTHESIOLOGY

## 2022-07-06 PROCEDURE — 0 BUPIVACAINE LIPOSOME 1.3 % SUSPENSION: Performed by: ANESTHESIOLOGY

## 2022-07-06 PROCEDURE — 25010000002 PHENYLEPHRINE 10 MG/ML SOLUTION 5 ML VIAL: Performed by: NURSE ANESTHETIST, CERTIFIED REGISTERED

## 2022-07-06 PROCEDURE — 25010000002 HEPARIN (PORCINE) PER 1000 UNITS: Performed by: THORACIC SURGERY (CARDIOTHORACIC VASCULAR SURGERY)

## 2022-07-06 PROCEDURE — 94799 UNLISTED PULMONARY SVC/PX: CPT

## 2022-07-06 PROCEDURE — 25010000002 MAGNESIUM SULFATE PER 500 MG OF MAGNESIUM: Performed by: NURSE ANESTHETIST, CERTIFIED REGISTERED

## 2022-07-06 PROCEDURE — 88342 IMHCHEM/IMCYTCHM 1ST ANTB: CPT | Performed by: THORACIC SURGERY (CARDIOTHORACIC VASCULAR SURGERY)

## 2022-07-06 PROCEDURE — 0BTG4ZZ RESECTION OF LEFT UPPER LUNG LOBE, PERCUTANEOUS ENDOSCOPIC APPROACH: ICD-10-PCS | Performed by: THORACIC SURGERY (CARDIOTHORACIC VASCULAR SURGERY)

## 2022-07-06 PROCEDURE — P9041 ALBUMIN (HUMAN),5%, 50ML: HCPCS | Performed by: NURSE ANESTHETIST, CERTIFIED REGISTERED

## 2022-07-06 PROCEDURE — 94760 N-INVAS EAR/PLS OXIMETRY 1: CPT

## 2022-07-06 PROCEDURE — 25010000002 ALBUMIN HUMAN 5% PER 50 ML: Performed by: NURSE ANESTHETIST, CERTIFIED REGISTERED

## 2022-07-06 PROCEDURE — 3E0T3BZ INTRODUCTION OF ANESTHETIC AGENT INTO PERIPHERAL NERVES AND PLEXI, PERCUTANEOUS APPROACH: ICD-10-PCS | Performed by: THORACIC SURGERY (CARDIOTHORACIC VASCULAR SURGERY)

## 2022-07-06 PROCEDURE — 07B74ZX EXCISION OF THORAX LYMPHATIC, PERCUTANEOUS ENDOSCOPIC APPROACH, DIAGNOSTIC: ICD-10-PCS | Performed by: THORACIC SURGERY (CARDIOTHORACIC VASCULAR SURGERY)

## 2022-07-06 PROCEDURE — 32674 THORACOSCOPY LYMPH NODE EXC: CPT | Performed by: REGISTERED NURSE

## 2022-07-06 PROCEDURE — 25010000002 CEFAZOLIN IN DEXTROSE 2-4 GM/100ML-% SOLUTION: Performed by: THORACIC SURGERY (CARDIOTHORACIC VASCULAR SURGERY)

## 2022-07-06 PROCEDURE — 25010000002 HYDROMORPHONE PER 4 MG: Performed by: NURSE ANESTHETIST, CERTIFIED REGISTERED

## 2022-07-06 PROCEDURE — 0BJ08ZZ INSPECTION OF TRACHEOBRONCHIAL TREE, VIA NATURAL OR ARTIFICIAL OPENING ENDOSCOPIC: ICD-10-PCS | Performed by: THORACIC SURGERY (CARDIOTHORACIC VASCULAR SURGERY)

## 2022-07-06 PROCEDURE — 88360 TUMOR IMMUNOHISTOCHEM/MANUAL: CPT

## 2022-07-06 PROCEDURE — 25010000002 PHENYLEPHRINE 10 MG/ML SOLUTION: Performed by: NURSE ANESTHETIST, CERTIFIED REGISTERED

## 2022-07-06 PROCEDURE — 76942 ECHO GUIDE FOR BIOPSY: CPT | Performed by: THORACIC SURGERY (CARDIOTHORACIC VASCULAR SURGERY)

## 2022-07-06 PROCEDURE — 25010000002 DEXAMETHASONE PER 1 MG: Performed by: NURSE ANESTHETIST, CERTIFIED REGISTERED

## 2022-07-06 PROCEDURE — 88341 IMHCHEM/IMCYTCHM EA ADD ANTB: CPT | Performed by: THORACIC SURGERY (CARDIOTHORACIC VASCULAR SURGERY)

## 2022-07-06 PROCEDURE — 25010000002 ONDANSETRON PER 1 MG: Performed by: NURSE ANESTHETIST, CERTIFIED REGISTERED

## 2022-07-06 PROCEDURE — 94640 AIRWAY INHALATION TREATMENT: CPT

## 2022-07-06 PROCEDURE — 25010000002 FENTANYL CITRATE (PF) 50 MCG/ML SOLUTION: Performed by: NURSE ANESTHETIST, CERTIFIED REGISTERED

## 2022-07-06 PROCEDURE — 32674 THORACOSCOPY LYMPH NODE EXC: CPT | Performed by: THORACIC SURGERY (CARDIOTHORACIC VASCULAR SURGERY)

## 2022-07-06 PROCEDURE — 94664 DEMO&/EVAL PT USE INHALER: CPT

## 2022-07-06 PROCEDURE — 94761 N-INVAS EAR/PLS OXIMETRY MLT: CPT

## 2022-07-06 PROCEDURE — 32663 THORACOSCOPY W/LOBECTOMY: CPT | Performed by: THORACIC SURGERY (CARDIOTHORACIC VASCULAR SURGERY)

## 2022-07-06 PROCEDURE — 88305 TISSUE EXAM BY PATHOLOGIST: CPT | Performed by: THORACIC SURGERY (CARDIOTHORACIC VASCULAR SURGERY)

## 2022-07-06 PROCEDURE — 32663 THORACOSCOPY W/LOBECTOMY: CPT | Performed by: REGISTERED NURSE

## 2022-07-06 PROCEDURE — 25010000002 PROPOFOL 10 MG/ML EMULSION: Performed by: NURSE ANESTHETIST, CERTIFIED REGISTERED

## 2022-07-06 PROCEDURE — 25010000002 ONDANSETRON PER 1 MG: Performed by: THORACIC SURGERY (CARDIOTHORACIC VASCULAR SURGERY)

## 2022-07-06 PROCEDURE — 71045 X-RAY EXAM CHEST 1 VIEW: CPT

## 2022-07-06 PROCEDURE — 88309 TISSUE EXAM BY PATHOLOGIST: CPT | Performed by: THORACIC SURGERY (CARDIOTHORACIC VASCULAR SURGERY)

## 2022-07-06 PROCEDURE — C9290 INJ, BUPIVACAINE LIPOSOME: HCPCS | Performed by: THORACIC SURGERY (CARDIOTHORACIC VASCULAR SURGERY)

## 2022-07-06 DEVICE — CURVED TIP INTELLIGENT RELOAD AND INTRODUCER
Type: IMPLANTABLE DEVICE | Site: CHEST | Status: FUNCTIONAL
Brand: TRI-STAPLE 2.0

## 2022-07-06 DEVICE — SMALL DIAMETER CURVED TIP INTELLIGENT RELOAD VASCULAR/THIN; FOR USE WITH 8 MM CANNULA OR LARGER
Type: IMPLANTABLE DEVICE | Site: CHEST | Status: FUNCTIONAL
Brand: SIGNIA

## 2022-07-06 DEVICE — ARTICULATION RELOAD WITH TRI-STAPLE TECHNOLOGY
Type: IMPLANTABLE DEVICE | Site: CHEST | Status: FUNCTIONAL
Brand: ENDO GIA

## 2022-07-06 DEVICE — ABSORBABLE HEMOSTAT (OXIDIZED REGENERATED CELLULOSE, U.S.P.)
Type: IMPLANTABLE DEVICE | Site: CHEST | Status: FUNCTIONAL
Brand: SURGICEL

## 2022-07-06 RX ORDER — ONDANSETRON 2 MG/ML
4 INJECTION INTRAMUSCULAR; INTRAVENOUS ONCE AS NEEDED
Status: DISCONTINUED | OUTPATIENT
Start: 2022-07-06 | End: 2022-07-06 | Stop reason: HOSPADM

## 2022-07-06 RX ORDER — MAGNESIUM HYDROXIDE 1200 MG/15ML
LIQUID ORAL AS NEEDED
Status: DISCONTINUED | OUTPATIENT
Start: 2022-07-06 | End: 2022-07-06 | Stop reason: HOSPADM

## 2022-07-06 RX ORDER — LIDOCAINE HYDROCHLORIDE 10 MG/ML
0.5 INJECTION, SOLUTION EPIDURAL; INFILTRATION; INTRACAUDAL; PERINEURAL ONCE AS NEEDED
Status: DISCONTINUED | OUTPATIENT
Start: 2022-07-06 | End: 2022-07-06 | Stop reason: HOSPADM

## 2022-07-06 RX ORDER — LIDOCAINE HYDROCHLORIDE 20 MG/ML
INJECTION, SOLUTION INFILTRATION; PERINEURAL AS NEEDED
Status: DISCONTINUED | OUTPATIENT
Start: 2022-07-06 | End: 2022-07-06 | Stop reason: SURG

## 2022-07-06 RX ORDER — PROMETHAZINE HYDROCHLORIDE 25 MG/1
25 SUPPOSITORY RECTAL ONCE AS NEEDED
Status: DISCONTINUED | OUTPATIENT
Start: 2022-07-06 | End: 2022-07-06 | Stop reason: HOSPADM

## 2022-07-06 RX ORDER — CELECOXIB 200 MG/1
200 CAPSULE ORAL ONCE
Status: COMPLETED | OUTPATIENT
Start: 2022-07-06 | End: 2022-07-06

## 2022-07-06 RX ORDER — HEPARIN SODIUM 5000 [USP'U]/ML
5000 INJECTION, SOLUTION INTRAVENOUS; SUBCUTANEOUS EVERY 8 HOURS SCHEDULED
Status: DISCONTINUED | OUTPATIENT
Start: 2022-07-07 | End: 2022-07-08 | Stop reason: HOSPADM

## 2022-07-06 RX ORDER — DIPHENHYDRAMINE HYDROCHLORIDE 50 MG/ML
25 INJECTION INTRAMUSCULAR; INTRAVENOUS EVERY 4 HOURS PRN
Status: DISCONTINUED | OUTPATIENT
Start: 2022-07-06 | End: 2022-07-06 | Stop reason: HOSPADM

## 2022-07-06 RX ORDER — PROMETHAZINE HYDROCHLORIDE 25 MG/1
25 TABLET ORAL ONCE AS NEEDED
Status: DISCONTINUED | OUTPATIENT
Start: 2022-07-06 | End: 2022-07-06 | Stop reason: HOSPADM

## 2022-07-06 RX ORDER — PHENYLEPHRINE HYDROCHLORIDE 10 MG/ML
INJECTION INTRAVENOUS AS NEEDED
Status: DISCONTINUED | OUTPATIENT
Start: 2022-07-06 | End: 2022-07-06 | Stop reason: SURG

## 2022-07-06 RX ORDER — IPRATROPIUM BROMIDE AND ALBUTEROL SULFATE 2.5; .5 MG/3ML; MG/3ML
3 SOLUTION RESPIRATORY (INHALATION)
Status: DISPENSED | OUTPATIENT
Start: 2022-07-06 | End: 2022-07-08

## 2022-07-06 RX ORDER — CELECOXIB 100 MG/1
100 CAPSULE ORAL EVERY 12 HOURS SCHEDULED
Status: DISCONTINUED | OUTPATIENT
Start: 2022-07-06 | End: 2022-07-08 | Stop reason: HOSPADM

## 2022-07-06 RX ORDER — HYDRALAZINE HYDROCHLORIDE 20 MG/ML
5 INJECTION INTRAMUSCULAR; INTRAVENOUS
Status: DISCONTINUED | OUTPATIENT
Start: 2022-07-06 | End: 2022-07-06 | Stop reason: HOSPADM

## 2022-07-06 RX ORDER — FENTANYL CITRATE 50 UG/ML
50 INJECTION, SOLUTION INTRAMUSCULAR; INTRAVENOUS
Status: DISCONTINUED | OUTPATIENT
Start: 2022-07-06 | End: 2022-07-06 | Stop reason: HOSPADM

## 2022-07-06 RX ORDER — LEVOTHYROXINE SODIUM 0.1 MG/1
100 TABLET ORAL
Status: DISCONTINUED | OUTPATIENT
Start: 2022-07-07 | End: 2022-07-08 | Stop reason: HOSPADM

## 2022-07-06 RX ORDER — HYDROMORPHONE HYDROCHLORIDE 1 MG/ML
0.25 INJECTION, SOLUTION INTRAMUSCULAR; INTRAVENOUS; SUBCUTANEOUS
Status: DISCONTINUED | OUTPATIENT
Start: 2022-07-06 | End: 2022-07-06 | Stop reason: HOSPADM

## 2022-07-06 RX ORDER — ROCURONIUM BROMIDE 10 MG/ML
INJECTION, SOLUTION INTRAVENOUS AS NEEDED
Status: DISCONTINUED | OUTPATIENT
Start: 2022-07-06 | End: 2022-07-06 | Stop reason: SURG

## 2022-07-06 RX ORDER — SODIUM CHLORIDE 0.9 % (FLUSH) 0.9 %
3-10 SYRINGE (ML) INJECTION AS NEEDED
Status: DISCONTINUED | OUTPATIENT
Start: 2022-07-06 | End: 2022-07-06 | Stop reason: HOSPADM

## 2022-07-06 RX ORDER — PRAVASTATIN SODIUM 10 MG
10 TABLET ORAL NIGHTLY
Status: DISCONTINUED | OUTPATIENT
Start: 2022-07-06 | End: 2022-07-08 | Stop reason: HOSPADM

## 2022-07-06 RX ORDER — PROPOFOL 10 MG/ML
VIAL (ML) INTRAVENOUS AS NEEDED
Status: DISCONTINUED | OUTPATIENT
Start: 2022-07-06 | End: 2022-07-06 | Stop reason: SURG

## 2022-07-06 RX ORDER — ONDANSETRON 4 MG/1
4 TABLET, FILM COATED ORAL EVERY 6 HOURS PRN
Status: DISCONTINUED | OUTPATIENT
Start: 2022-07-06 | End: 2022-07-08 | Stop reason: HOSPADM

## 2022-07-06 RX ORDER — SODIUM CHLORIDE, SODIUM LACTATE, POTASSIUM CHLORIDE, CALCIUM CHLORIDE 600; 310; 30; 20 MG/100ML; MG/100ML; MG/100ML; MG/100ML
9 INJECTION, SOLUTION INTRAVENOUS CONTINUOUS
Status: DISCONTINUED | OUTPATIENT
Start: 2022-07-06 | End: 2022-07-08

## 2022-07-06 RX ORDER — HEPARIN SODIUM 5000 [USP'U]/ML
5000 INJECTION, SOLUTION INTRAVENOUS; SUBCUTANEOUS ONCE
Status: COMPLETED | OUTPATIENT
Start: 2022-07-06 | End: 2022-07-06

## 2022-07-06 RX ORDER — KETAMINE HYDROCHLORIDE 10 MG/ML
INJECTION INTRAMUSCULAR; INTRAVENOUS AS NEEDED
Status: DISCONTINUED | OUTPATIENT
Start: 2022-07-06 | End: 2022-07-06 | Stop reason: SURG

## 2022-07-06 RX ORDER — MIDAZOLAM HYDROCHLORIDE 1 MG/ML
1 INJECTION INTRAMUSCULAR; INTRAVENOUS
Status: DISCONTINUED | OUTPATIENT
Start: 2022-07-06 | End: 2022-07-06 | Stop reason: HOSPADM

## 2022-07-06 RX ORDER — LANOLIN ALCOHOL/MO/W.PET/CERES
1000 CREAM (GRAM) TOPICAL DAILY
COMMUNITY

## 2022-07-06 RX ORDER — BUPIVACAINE HYDROCHLORIDE 2.5 MG/ML
INJECTION, SOLUTION EPIDURAL; INFILTRATION; INTRACAUDAL AS NEEDED
Status: DISCONTINUED | OUTPATIENT
Start: 2022-07-06 | End: 2022-07-06 | Stop reason: HOSPADM

## 2022-07-06 RX ORDER — CEFAZOLIN SODIUM 2 G/100ML
2 INJECTION, SOLUTION INTRAVENOUS ONCE
Status: COMPLETED | OUTPATIENT
Start: 2022-07-06 | End: 2022-07-06

## 2022-07-06 RX ORDER — MAGNESIUM SULFATE HEPTAHYDRATE 500 MG/ML
INJECTION, SOLUTION INTRAMUSCULAR; INTRAVENOUS AS NEEDED
Status: DISCONTINUED | OUTPATIENT
Start: 2022-07-06 | End: 2022-07-06 | Stop reason: SURG

## 2022-07-06 RX ORDER — HYDROMORPHONE HYDROCHLORIDE 1 MG/ML
0.5 INJECTION, SOLUTION INTRAMUSCULAR; INTRAVENOUS; SUBCUTANEOUS
Status: DISCONTINUED | OUTPATIENT
Start: 2022-07-06 | End: 2022-07-06 | Stop reason: HOSPADM

## 2022-07-06 RX ORDER — BUPIVACAINE HYDROCHLORIDE AND EPINEPHRINE 2.5; 5 MG/ML; UG/ML
INJECTION, SOLUTION EPIDURAL; INFILTRATION; INTRACAUDAL; PERINEURAL
Status: COMPLETED | OUTPATIENT
Start: 2022-07-06 | End: 2022-07-06

## 2022-07-06 RX ORDER — PANTOPRAZOLE SODIUM 40 MG/1
40 TABLET, DELAYED RELEASE ORAL
Status: DISCONTINUED | OUTPATIENT
Start: 2022-07-07 | End: 2022-07-08 | Stop reason: HOSPADM

## 2022-07-06 RX ORDER — NITROGLYCERIN 0.4 MG/1
0.4 TABLET SUBLINGUAL
Status: DISCONTINUED | OUTPATIENT
Start: 2022-07-06 | End: 2022-07-08 | Stop reason: HOSPADM

## 2022-07-06 RX ORDER — BUPROPION HYDROCHLORIDE 300 MG/1
300 TABLET ORAL EVERY MORNING
Status: DISCONTINUED | OUTPATIENT
Start: 2022-07-07 | End: 2022-07-08 | Stop reason: HOSPADM

## 2022-07-06 RX ORDER — DEXTROSE, SODIUM CHLORIDE, AND POTASSIUM CHLORIDE 5; .45; .15 G/100ML; G/100ML; G/100ML
75 INJECTION INTRAVENOUS CONTINUOUS
Status: DISCONTINUED | OUTPATIENT
Start: 2022-07-06 | End: 2022-07-07

## 2022-07-06 RX ORDER — FENTANYL CITRATE 50 UG/ML
INJECTION, SOLUTION INTRAMUSCULAR; INTRAVENOUS
Status: COMPLETED | OUTPATIENT
Start: 2022-07-06 | End: 2022-07-06

## 2022-07-06 RX ORDER — ONDANSETRON 2 MG/ML
4 INJECTION INTRAMUSCULAR; INTRAVENOUS EVERY 6 HOURS PRN
Status: DISCONTINUED | OUTPATIENT
Start: 2022-07-06 | End: 2022-07-08 | Stop reason: HOSPADM

## 2022-07-06 RX ORDER — OXYCODONE AND ACETAMINOPHEN 7.5; 325 MG/1; MG/1
1 TABLET ORAL EVERY 4 HOURS PRN
Status: DISCONTINUED | OUTPATIENT
Start: 2022-07-06 | End: 2022-07-06 | Stop reason: HOSPADM

## 2022-07-06 RX ORDER — FLUMAZENIL 0.1 MG/ML
0.2 INJECTION INTRAVENOUS AS NEEDED
Status: DISCONTINUED | OUTPATIENT
Start: 2022-07-06 | End: 2022-07-06 | Stop reason: HOSPADM

## 2022-07-06 RX ORDER — ALBUMIN, HUMAN INJ 5% 5 %
SOLUTION INTRAVENOUS CONTINUOUS PRN
Status: DISCONTINUED | OUTPATIENT
Start: 2022-07-06 | End: 2022-07-06 | Stop reason: SURG

## 2022-07-06 RX ORDER — LIOTHYRONINE SODIUM 5 UG/1
5 TABLET ORAL DAILY
Status: DISCONTINUED | OUTPATIENT
Start: 2022-07-07 | End: 2022-07-08 | Stop reason: HOSPADM

## 2022-07-06 RX ORDER — FENTANYL CITRATE 50 UG/ML
INJECTION, SOLUTION INTRAMUSCULAR; INTRAVENOUS AS NEEDED
Status: DISCONTINUED | OUTPATIENT
Start: 2022-07-06 | End: 2022-07-06 | Stop reason: SURG

## 2022-07-06 RX ORDER — DIPHENHYDRAMINE HCL 25 MG
25 CAPSULE ORAL EVERY 4 HOURS PRN
Status: DISCONTINUED | OUTPATIENT
Start: 2022-07-06 | End: 2022-07-06 | Stop reason: HOSPADM

## 2022-07-06 RX ORDER — DESVENLAFAXINE SUCCINATE 50 MG/1
50 TABLET, EXTENDED RELEASE ORAL NIGHTLY
Status: DISCONTINUED | OUTPATIENT
Start: 2022-07-06 | End: 2022-07-08 | Stop reason: HOSPADM

## 2022-07-06 RX ORDER — MIDAZOLAM HYDROCHLORIDE 1 MG/ML
INJECTION INTRAMUSCULAR; INTRAVENOUS
Status: COMPLETED | OUTPATIENT
Start: 2022-07-06 | End: 2022-07-06

## 2022-07-06 RX ORDER — FAMOTIDINE 10 MG/ML
20 INJECTION, SOLUTION INTRAVENOUS ONCE
Status: COMPLETED | OUTPATIENT
Start: 2022-07-06 | End: 2022-07-06

## 2022-07-06 RX ORDER — LORAZEPAM 0.5 MG/1
0.5 TABLET ORAL EVERY 6 HOURS PRN
Status: DISCONTINUED | OUTPATIENT
Start: 2022-07-06 | End: 2022-07-08 | Stop reason: HOSPADM

## 2022-07-06 RX ORDER — LABETALOL HYDROCHLORIDE 5 MG/ML
5 INJECTION, SOLUTION INTRAVENOUS
Status: DISCONTINUED | OUTPATIENT
Start: 2022-07-06 | End: 2022-07-06 | Stop reason: HOSPADM

## 2022-07-06 RX ORDER — GABAPENTIN 300 MG/1
300 CAPSULE ORAL EVERY 8 HOURS SCHEDULED
Status: DISCONTINUED | OUTPATIENT
Start: 2022-07-06 | End: 2022-07-08 | Stop reason: HOSPADM

## 2022-07-06 RX ORDER — EPHEDRINE SULFATE 50 MG/ML
5 INJECTION, SOLUTION INTRAVENOUS ONCE AS NEEDED
Status: DISCONTINUED | OUTPATIENT
Start: 2022-07-06 | End: 2022-07-06 | Stop reason: HOSPADM

## 2022-07-06 RX ORDER — OXYCODONE HYDROCHLORIDE 5 MG/1
5 TABLET ORAL EVERY 4 HOURS PRN
Status: DISCONTINUED | OUTPATIENT
Start: 2022-07-06 | End: 2022-07-08 | Stop reason: HOSPADM

## 2022-07-06 RX ORDER — DEXAMETHASONE SODIUM PHOSPHATE 10 MG/ML
INJECTION INTRAMUSCULAR; INTRAVENOUS AS NEEDED
Status: DISCONTINUED | OUTPATIENT
Start: 2022-07-06 | End: 2022-07-06 | Stop reason: SURG

## 2022-07-06 RX ORDER — DIPHENHYDRAMINE HYDROCHLORIDE 50 MG/ML
12.5 INJECTION INTRAMUSCULAR; INTRAVENOUS
Status: DISCONTINUED | OUTPATIENT
Start: 2022-07-06 | End: 2022-07-06 | Stop reason: HOSPADM

## 2022-07-06 RX ORDER — DIPHENHYDRAMINE HCL 25 MG
25 CAPSULE ORAL
Status: DISCONTINUED | OUTPATIENT
Start: 2022-07-06 | End: 2022-07-06 | Stop reason: HOSPADM

## 2022-07-06 RX ORDER — NALOXONE HCL 0.4 MG/ML
0.2 VIAL (ML) INJECTION AS NEEDED
Status: DISCONTINUED | OUTPATIENT
Start: 2022-07-06 | End: 2022-07-06 | Stop reason: HOSPADM

## 2022-07-06 RX ORDER — METOCLOPRAMIDE HYDROCHLORIDE 5 MG/ML
10 INJECTION INTRAMUSCULAR; INTRAVENOUS ONCE AS NEEDED
Status: DISCONTINUED | OUTPATIENT
Start: 2022-07-06 | End: 2022-07-06 | Stop reason: HOSPADM

## 2022-07-06 RX ORDER — OXYCODONE HYDROCHLORIDE 5 MG/1
5 TABLET ORAL ONCE AS NEEDED
Status: COMPLETED | OUTPATIENT
Start: 2022-07-06 | End: 2022-07-06

## 2022-07-06 RX ORDER — ONDANSETRON 2 MG/ML
INJECTION INTRAMUSCULAR; INTRAVENOUS AS NEEDED
Status: DISCONTINUED | OUTPATIENT
Start: 2022-07-06 | End: 2022-07-06 | Stop reason: SURG

## 2022-07-06 RX ORDER — SODIUM CHLORIDE 0.9 % (FLUSH) 0.9 %
3 SYRINGE (ML) INJECTION EVERY 12 HOURS SCHEDULED
Status: DISCONTINUED | OUTPATIENT
Start: 2022-07-06 | End: 2022-07-06 | Stop reason: HOSPADM

## 2022-07-06 RX ORDER — SODIUM CHLORIDE, SODIUM LACTATE, POTASSIUM CHLORIDE, CALCIUM CHLORIDE 600; 310; 30; 20 MG/100ML; MG/100ML; MG/100ML; MG/100ML
INJECTION, SOLUTION INTRAVENOUS CONTINUOUS PRN
Status: DISCONTINUED | OUTPATIENT
Start: 2022-07-06 | End: 2022-07-06 | Stop reason: SURG

## 2022-07-06 RX ORDER — ACETAMINOPHEN 500 MG
1000 TABLET ORAL ONCE
Status: COMPLETED | OUTPATIENT
Start: 2022-07-06 | End: 2022-07-06

## 2022-07-06 RX ORDER — BISACODYL 10 MG
10 SUPPOSITORY, RECTAL RECTAL DAILY PRN
Status: DISCONTINUED | OUTPATIENT
Start: 2022-07-06 | End: 2022-07-08 | Stop reason: HOSPADM

## 2022-07-06 RX ORDER — IPRATROPIUM BROMIDE AND ALBUTEROL SULFATE 2.5; .5 MG/3ML; MG/3ML
3 SOLUTION RESPIRATORY (INHALATION) ONCE AS NEEDED
Status: DISCONTINUED | OUTPATIENT
Start: 2022-07-06 | End: 2022-07-06 | Stop reason: HOSPADM

## 2022-07-06 RX ORDER — BUPIVACAINE HYDROCHLORIDE AND EPINEPHRINE 2.5; 5 MG/ML; UG/ML
INJECTION, SOLUTION EPIDURAL; INFILTRATION; INTRACAUDAL; PERINEURAL
Status: COMPLETED
Start: 2022-07-06 | End: 2022-07-06

## 2022-07-06 RX ORDER — HYDROCODONE BITARTRATE AND ACETAMINOPHEN 7.5; 325 MG/1; MG/1
1 TABLET ORAL ONCE AS NEEDED
Status: DISCONTINUED | OUTPATIENT
Start: 2022-07-06 | End: 2022-07-06 | Stop reason: HOSPADM

## 2022-07-06 RX ORDER — ACETAMINOPHEN 500 MG
1000 TABLET ORAL 3 TIMES DAILY
Status: DISCONTINUED | OUTPATIENT
Start: 2022-07-06 | End: 2022-07-08 | Stop reason: HOSPADM

## 2022-07-06 RX ORDER — AMOXICILLIN 250 MG
2 CAPSULE ORAL NIGHTLY
Status: DISCONTINUED | OUTPATIENT
Start: 2022-07-06 | End: 2022-07-08 | Stop reason: HOSPADM

## 2022-07-06 RX ORDER — HYDROMORPHONE HYDROCHLORIDE 1 MG/ML
0.5 INJECTION, SOLUTION INTRAMUSCULAR; INTRAVENOUS; SUBCUTANEOUS
Status: DISCONTINUED | OUTPATIENT
Start: 2022-07-06 | End: 2022-07-08 | Stop reason: HOSPADM

## 2022-07-06 RX ORDER — NALOXONE HCL 0.4 MG/ML
0.4 VIAL (ML) INJECTION
Status: DISCONTINUED | OUTPATIENT
Start: 2022-07-06 | End: 2022-07-06 | Stop reason: HOSPADM

## 2022-07-06 RX ORDER — GABAPENTIN 300 MG/1
600 CAPSULE ORAL ONCE
Status: COMPLETED | OUTPATIENT
Start: 2022-07-06 | End: 2022-07-06

## 2022-07-06 RX ADMIN — SUGAMMADEX 200 MG: 100 INJECTION, SOLUTION INTRAVENOUS at 12:53

## 2022-07-06 RX ADMIN — ONDANSETRON 4 MG: 2 INJECTION INTRAMUSCULAR; INTRAVENOUS at 20:09

## 2022-07-06 RX ADMIN — CELECOXIB 100 MG: 100 CAPSULE ORAL at 20:07

## 2022-07-06 RX ADMIN — DOCUSATE SODIUM 50MG AND SENNOSIDES 8.6MG 2 TABLET: 8.6; 5 TABLET, FILM COATED ORAL at 20:07

## 2022-07-06 RX ADMIN — PHENYLEPHRINE HYDROCHLORIDE 0.5 MCG/KG/MIN: 10 INJECTION INTRAVENOUS at 10:24

## 2022-07-06 RX ADMIN — CEFAZOLIN SODIUM 2 G: 2 INJECTION, SOLUTION INTRAVENOUS at 09:25

## 2022-07-06 RX ADMIN — PRAVASTATIN SODIUM 10 MG: 10 TABLET ORAL at 20:07

## 2022-07-06 RX ADMIN — MAGNESIUM SULFATE HEPTAHYDRATE 2 G: 500 INJECTION, SOLUTION INTRAMUSCULAR; INTRAVENOUS at 10:15

## 2022-07-06 RX ADMIN — KETAMINE HYDROCHLORIDE 10 MG: 10 INJECTION INTRAMUSCULAR; INTRAVENOUS at 10:07

## 2022-07-06 RX ADMIN — GABAPENTIN 600 MG: 300 CAPSULE ORAL at 08:13

## 2022-07-06 RX ADMIN — FENTANYL CITRATE 25 MCG: 50 INJECTION INTRAMUSCULAR; INTRAVENOUS at 12:59

## 2022-07-06 RX ADMIN — FAMOTIDINE 20 MG: 10 INJECTION, SOLUTION INTRAVENOUS at 08:30

## 2022-07-06 RX ADMIN — KETAMINE HYDROCHLORIDE 10 MG: 10 INJECTION INTRAMUSCULAR; INTRAVENOUS at 11:33

## 2022-07-06 RX ADMIN — PROPOFOL 200 MG: 10 INJECTION, EMULSION INTRAVENOUS at 09:44

## 2022-07-06 RX ADMIN — GABAPENTIN 300 MG: 300 CAPSULE ORAL at 20:07

## 2022-07-06 RX ADMIN — LIDOCAINE HYDROCHLORIDE 50 MG: 20 INJECTION, SOLUTION INFILTRATION; PERINEURAL at 09:43

## 2022-07-06 RX ADMIN — ROCURONIUM BROMIDE 50 MG: 50 INJECTION INTRAVENOUS at 09:44

## 2022-07-06 RX ADMIN — KETAMINE HYDROCHLORIDE 10 MG: 10 INJECTION INTRAMUSCULAR; INTRAVENOUS at 12:36

## 2022-07-06 RX ADMIN — ROCURONIUM BROMIDE 10 MG: 50 INJECTION INTRAVENOUS at 10:34

## 2022-07-06 RX ADMIN — ACETAMINOPHEN 1000 MG: 500 TABLET ORAL at 20:07

## 2022-07-06 RX ADMIN — CEFAZOLIN SODIUM 2 G: 2 INJECTION, SOLUTION INTRAVENOUS at 13:03

## 2022-07-06 RX ADMIN — FENTANYL CITRATE 25 MCG: 50 INJECTION INTRAMUSCULAR; INTRAVENOUS at 12:55

## 2022-07-06 RX ADMIN — IPRATROPIUM BROMIDE AND ALBUTEROL SULFATE 3 ML: .5; 3 SOLUTION RESPIRATORY (INHALATION) at 16:50

## 2022-07-06 RX ADMIN — FENTANYL CITRATE 50 MCG: 50 INJECTION INTRAMUSCULAR; INTRAVENOUS at 09:43

## 2022-07-06 RX ADMIN — SODIUM CHLORIDE, POTASSIUM CHLORIDE, SODIUM LACTATE AND CALCIUM CHLORIDE 9 ML/HR: 600; 310; 30; 20 INJECTION, SOLUTION INTRAVENOUS at 09:25

## 2022-07-06 RX ADMIN — ROCURONIUM BROMIDE 10 MG: 50 INJECTION INTRAVENOUS at 12:35

## 2022-07-06 RX ADMIN — HYDROMORPHONE HYDROCHLORIDE 0.25 MG: 1 INJECTION, SOLUTION INTRAMUSCULAR; INTRAVENOUS; SUBCUTANEOUS at 13:42

## 2022-07-06 RX ADMIN — POTASSIUM CHLORIDE, DEXTROSE MONOHYDRATE AND SODIUM CHLORIDE 75 ML/HR: 150; 5; 450 INJECTION, SOLUTION INTRAVENOUS at 14:14

## 2022-07-06 RX ADMIN — OXYCODONE 5 MG: 5 TABLET ORAL at 23:41

## 2022-07-06 RX ADMIN — KETAMINE HYDROCHLORIDE 10 MG: 10 INJECTION INTRAMUSCULAR; INTRAVENOUS at 10:00

## 2022-07-06 RX ADMIN — PHENYLEPHRINE HYDROCHLORIDE 100 MCG: 10 INJECTION, SOLUTION INTRAVENOUS at 10:24

## 2022-07-06 RX ADMIN — PHENYLEPHRINE HYDROCHLORIDE 100 MCG: 10 INJECTION, SOLUTION INTRAVENOUS at 12:55

## 2022-07-06 RX ADMIN — MIDAZOLAM 1 MG: 1 INJECTION INTRAMUSCULAR; INTRAVENOUS at 08:52

## 2022-07-06 RX ADMIN — HYDROMORPHONE HYDROCHLORIDE 0.25 MG: 1 INJECTION, SOLUTION INTRAMUSCULAR; INTRAVENOUS; SUBCUTANEOUS at 14:10

## 2022-07-06 RX ADMIN — PHENYLEPHRINE HYDROCHLORIDE 200 MCG: 10 INJECTION, SOLUTION INTRAVENOUS at 12:59

## 2022-07-06 RX ADMIN — ROCURONIUM BROMIDE 20 MG: 50 INJECTION INTRAVENOUS at 11:59

## 2022-07-06 RX ADMIN — ALBUMIN HUMAN: 0.05 INJECTION, SOLUTION INTRAVENOUS at 12:01

## 2022-07-06 RX ADMIN — DEXAMETHASONE SODIUM PHOSPHATE 8 MG: 10 INJECTION INTRAMUSCULAR; INTRAVENOUS at 10:06

## 2022-07-06 RX ADMIN — MIDAZOLAM 1 MG: 1 INJECTION INTRAMUSCULAR; INTRAVENOUS at 08:55

## 2022-07-06 RX ADMIN — OXYCODONE 5 MG: 5 TABLET ORAL at 14:44

## 2022-07-06 RX ADMIN — BUPIVACAINE HYDROCHLORIDE AND EPINEPHRINE BITARTRATE 20 ML: 2.5; .005 INJECTION, SOLUTION EPIDURAL; INFILTRATION; INTRACAUDAL; PERINEURAL at 09:05

## 2022-07-06 RX ADMIN — FENTANYL CITRATE 50 MCG: 50 INJECTION INTRAMUSCULAR; INTRAVENOUS at 08:55

## 2022-07-06 RX ADMIN — CELECOXIB 200 MG: 200 CAPSULE ORAL at 08:13

## 2022-07-06 RX ADMIN — BUPIVACAINE 10 ML: 13.3 INJECTION, SUSPENSION, LIPOSOMAL INFILTRATION at 09:05

## 2022-07-06 RX ADMIN — ACETAMINOPHEN 1000 MG: 500 TABLET ORAL at 08:13

## 2022-07-06 RX ADMIN — DESVENLAFAXINE SUCCINATE 50 MG: 50 TABLET, EXTENDED RELEASE ORAL at 20:07

## 2022-07-06 RX ADMIN — HEPARIN SODIUM 5000 UNITS: 5000 INJECTION INTRAVENOUS; SUBCUTANEOUS at 09:25

## 2022-07-06 RX ADMIN — METOPROLOL TARTRATE 25 MG: 25 TABLET, FILM COATED ORAL at 20:07

## 2022-07-06 RX ADMIN — IPRATROPIUM BROMIDE AND ALBUTEROL SULFATE 3 ML: .5; 3 SOLUTION RESPIRATORY (INHALATION) at 23:11

## 2022-07-06 RX ADMIN — ONDANSETRON 4 MG: 2 INJECTION INTRAMUSCULAR; INTRAVENOUS at 12:48

## 2022-07-06 RX ADMIN — SODIUM CHLORIDE, POTASSIUM CHLORIDE, SODIUM LACTATE AND CALCIUM CHLORIDE: 600; 310; 30; 20 INJECTION, SOLUTION INTRAVENOUS at 09:33

## 2022-07-06 RX ADMIN — ROCURONIUM BROMIDE 10 MG: 50 INJECTION INTRAVENOUS at 11:27

## 2022-07-06 NOTE — ANESTHESIA PROCEDURE NOTES
Airway  Urgency: elective    Date/Time: 7/6/2022 9:52 AM  Airway not difficult    General Information and Staff    Patient location during procedure: OR    Indications and Patient Condition  Indications for airway management: airway protection    Preoxygenated: yes  Mask difficulty assessment: 1 - vent by mask    Final Airway Details  Final airway type: endotracheal airway      Successful airway: EBT - double lumen left  Cuffed: yes   Successful intubation technique: direct laryngoscopy  Facilitating devices/methods: intubating stylet  Endotracheal tube insertion site: oral  Blade: Danial  Blade size: 4  EBT DL size (fr): 39  Cormack-Lehane Classification: grade IIb - view of arytenoids or posterior of glottis only  Placement verified by: bronchoscopy and capnometry   Measured from: lips  ETT/EBT  to lips (cm): 31  Number of attempts at approach: 2  Assessment: lips, teeth, and gum same as pre-op and atraumatic intubation

## 2022-07-06 NOTE — OP NOTE
THORACOSCOPY VIDEO ASSISTED WITH LOBECTOMY  Procedure Report    Patient Name:  Jc Driscoll  YOB: 1965    Date of Surgery:  7/6/2022     Indications:  Left upper lobe NSCLC    Pre-op Diagnosis:   Malignant neoplasm of upper lobe of left lung (HCC) [C34.12]       Post-Op Diagnosis Codes:     * Malignant neoplasm of upper lobe of left lung (HCC) [C34.12]    Procedure/CPT® Codes:      Procedure(s):  Left THORACOSCOPY VIDEO ASSISTED WITH upper LOBECTOMY, mediastinal lymph node dissection, intercostal nerve block    Staff:  Surgeon(s):  Miryam Reyna MD    Assistant: Nel Villatoro CRNFA was responsible for performing the following activities: Retraction, Suction, Closing, Placing Dressing and Held/Positioned Camera and their skilled assistance was necessary for the success of this case.     Anesthesia: General with Block    Estimated Blood Loss: 75 mL    Implants:    Implant Name Type Inv. Item Serial No.  Lot No. LRB No. Used Action   HEMOST ABS SURGICEL 2X14 - [id number] Implant HEMOST ABS SURGICEL 2X14  [employer]  [employer] J [employer] J . Left 1 Implanted   RELOAD STPLR ENDO ROSALIND TRISTAPLE 2.0 45 ART LÁZARO GAMBLE CRV - [id number] Implant RELOAD STPLR ENDO ROSALIND TRISTAPLE 2.0 45 ART LÁZARO CUEVAS  COVIDIEN [id number] Left 1 Implanted   RELOAD STPLR ENDO ROSALIND TRISTAPLE 2.0 45 ART LÁZARO CUEVAS - [id number] Implant RELOAD STPLR ENDO ROSALIND TRISTAPLE 2.0 45 ART VASEDER CUEVAS  COVIDIEN [id number] Left 1 Implanted   RELOAD STPLR ENDO ROSALIND SM/DIAMTR 30MM WHT - [id number] Implant RELOAD STPLR ENDO ROSALIND SM/DIAMTR 30MM WHT  COVIDIEN [id number] Left 1 Implanted   RELOAD STPLR SIGNIA TRISTAPLE 2.0 60MM CRV ART MD/CARIE - [id number] Implant RELOAD STPLR SIGNIA TRISTAPLE 2.0 60MM CRV ART MD/CARIE  COVZULEMA [id number] Left 1 Implanted   RELOAD STPLR ENDO ROSALIND SM/DIAMTR 30MM WHT - [id number] Implant RELOAD STPLR ENDO ROSALIND SM/DIAMTR 30MM WHT  COVIDI [id number] Left 1 Implanted   RELOAD STPLR ENDO ROSALIND TRISTAPLE 45 ART LÁZARO GAMBLE - [id number]  Implant RELOAD STPLR ENDO ROSALIND TRISTAPLE 45 ART LÁZARO MARKS [id number] Left 1 Implanted   RELOAD STPLR ENDO ROSALIND TRISTAPLE 2.0 45 ART LÁZARO CUEVAS - [id number] Implant RELOAD STPLR ENDO ROSALIND TRISTAPLE 2.0 45 ART LÁZARO MARKS [id number] Left 1 Implanted       Specimen:          Specimens     ID Source Type Tests Collected By Collected At Frozen?    A Lymph Node Tissue  TISSUE PATHOLOGY EXAM   Miryam Reyna MD 7/6/22 1032 No    Description: level 10#1    B Lymph Node Tissue  TISSUE PATHOLOGY EXAM   Miryam Reyna MD 7/6/22 1056 No    Description: lymph node level 10 #2    C Lymph Node Tissue  TISSUE PATHOLOGY EXAM   Miryam Reyna MD 7/6/22 1107 No    Description: lymph node level 5    D Lymph Node Tissue  TISSUE PATHOLOGY EXAM   Miryam Reyna MD 7/6/22 1237 No    Description: lymph node level 8    E Lymph Node Tissue  TISSUE PATHOLOGY EXAM   Miryam Reyna MD 7/6/22 1239 No    Description: lymph node level 7    F Lymph Node Tissue  TISSUE PATHOLOGY EXAM   Miryam Reyna MD 7/6/22 1250 No    Description: lymph node level 11    G Lymph Node Tissue  TISSUE PATHOLOGY EXAM   Miryam Reyna MD 7/6/22 1251 No    Description: lymph node level 12    H Lymph Node Tissue  TISSUE PATHOLOGY EXAM   Miryam Reyna MD 7/6/22 1251 No    Description: lymph node level 13    I Lymph Node Tissue  TISSUE PATHOLOGY EXAM   Miryam Reyna MD 7/6/22 1253 No    Description: lymph node level 14    J Lung, Left Upper Lobe Tissue  TISSUE PATHOLOGY EXAM   Miryam Reyna MD 7/6/22 1254 No    Description: left upper lobe            Findings: Left upper lobe mass    Complications: None apparent    Synoptic portion:    Intent:  curative    Surgical procedure performed:  Resection of at least one lobe or bilobectomy - Lobectomy WITH mediastinal lymph node dissection    Mediastinal lymph node stations resected:  5 Subaortic, 7 Subcarinal, 8 Paraesophageal (below mellisa) and 9 Pulmonary ligament    Hilar lymph node station(s)  resected:  10 Hilar, 11 Interlobar, 12 Lobar, 13 Segmental and 14 Subsegmental     Description of Procedure: Jc Driscoll was identified in the preoperative holding area and again his consent for the procedure was verified.  The patient was transported to the operating room and placed on the operating room table in supine position.  A general anesthetic was successfully administered and He was intubated with a double lumen endotracheal tube without difficulty.  Placement of the double lumen was confirmed with a video bronchoscope examination.  A time out was performed to verify correct patient, correct procedure and the correct administration of IV antibiotics per [facility] protocol.     The patient was placed in right lateral decubitus position, left side up and all pressure points were padded.  The patient was prepped and draped in standard sterile fashion.  An approximately 2cm incision was made in the 8th intercostal space midaxillary line, dissection was carried down into the chest cavity using Bovie electrocautery under direct vision.  An juventino wound protector was placed into the incision.  The camera was inserted into the chest and the lung appears normal.    A second approximately 2cm incision was made in the anterior axillary line 5th intercostal space and the chest was entered and the rib space was opened under direct visualization.     The left upper lobe was examined and the mass was isolated in the upper lobe.  The fissural tissue was minimal and was opened with bovie electrocautery.  The hilar pleural was taken down with blunt dissection.  The superior pulmonary veins was identified, encircled and divided using an endoGIA vascular tissue load stapler.  The pulmonary artery was uncovered in the fissure.   A small branch to the upper lobe was encircled and divided using Endo ROSALIND vascular tissue load stapler.  A lymph node was identified at the bifurcation of the upper and lower lobe bronchi and was removed a  dissection..  A lymph node was also identified posterior to the bronchus to the upper lobe between the bronchus and the main pulmonary artery.  This lymph node was removed to a dissection.  The bronchus was encircled and divided using Endo ROSALIND purple tissue load stapler.  A bronchoscopy was performed prior to division of the bronchus to verify that the lower lobe bronchus was not compromised or narrowed.  There were 3 remaining branches of the pulmonary artery which were encircled sequentially and divided using Endo ROSALIND vascular tissue load stapler.  The lobe was removed from the chest cavity with an Endo Catch bag.  The inferior pulmonary ligament was mobilized and an inferior pulmonary ligament node was retrieved.  The AP window was opened and an AP window lymph node was retrieved.  The subcarinal space was opened and a subcarinal lymph node was retrieved.  The chest cavity was irrigated and there was no evidence of air leak from the bronchial stump.    A 21 gauge needle was inserted into the chest under direct visualization and 20mL of Exparel was inserted directly into the 3rd-9th rib spaces for a rib block.  A 24 [place] chest tube was placed in the 8th intercostal space incision. The lung was reexpanded under direct visualization.  The incisions were closed using deep vicryl sutures and Monocryl for the skin in standard fashion.  The chest tube was secured to the skin.      The patient tolerated this procedure well and was extubated and transported to the recovery room in stable condition.  The counts were correct at the end of the case.           Miryam Reyna MD     Date: 7/6/2022  Time: 13:10 EDT

## 2022-07-06 NOTE — PLAN OF CARE
Goal Outcome Evaluation:  Plan of Care Reviewed With: patient        Progress: no change  Outcome Evaluation: VSS, admitted from PACU. L CT to -20sx. Pain treated with PRN meds.

## 2022-07-06 NOTE — ANESTHESIA PROCEDURE NOTES
Peripheral Block      Patient reassessed immediately prior to procedure    Patient location during procedure: pre-op  Start time: 7/6/2022 9:00 AM  Stop time: 7/6/2022 9:05 AM  Reason for block: at surgeon's request and post-op pain management  Performed by  Anesthesiologist: Moses Luo MD  Preanesthetic Checklist  Completed: patient identified, risks and benefits discussed, surgical consent, pre-op evaluation and timeout performed  Prep:  Pt Position: sitting  Sterile barriers:cap, gloves, mask and washed/disinfected hands  Prep: ChloraPrep  Patient monitoring: blood pressure monitoring, continuous pulse oximetry and EKG  Procedure    Sedation: yes    Guidance:ultrasound guided    ULTRASOUND INTERPRETATION. Using ultrasound guidance a 20 G gauge needle was placed in close proximity to the nerve, at which point, under ultrasound guidance anesthetic was injected in the area of the nerve and spread of the anesthesia was seen on ultrasound in close proximity thereto.  There were no abnormalities seen on ultrasound; a digital image was taken; and the patient tolerated the procedure with no complications. Images:still images obtained, printed/placed on chart    Laterality:left  Block Type:erector spinae block  Injection Technique:single-shot  Needle Type:echogenic  Needle Gauge:20 G      Medications Used: bupivacaine-EPINEPHrine PF (MARCAINE w/EPI) 0.25% -1:[id number] injection, 20 mL  bupivacaine liposome (EXPAREL) 1.3 % injection, 10 mL  Med administered at 7/6/2022 9:05 AM      Post Assessment  Injection Assessment: negative aspiration for heme, no paresthesia on injection and incremental injection  Patient Tolerance:comfortable throughout block  Complications:no  Additional Notes  ULTRASOUND INTERPRETATION. Using ultrasound guidance theneedle was placed in close proximity to the nerve, at which point, under ultrasound guidance, local anesthetic was injected around but not in the nerve and spread of the  anesthesia was seen on ultrasound in close proximity thereto.  There were no abnormalities seen on ultrasound; a digital image was taken; and the patient tolerated the procedure with no complications.

## 2022-07-06 NOTE — ANESTHESIA POSTPROCEDURE EVALUATION
Patient: Jc Driscoll    Procedure Summary     Date: 07/06/22 Room / Location: [facility] OR 09 / [facility] MAIN OR    Anesthesia Start: 0933 Anesthesia Stop: 1326    Procedure: THORACOSCOPY VIDEO ASSISTED WITH LOBECTOMY (Left Chest) Diagnosis:       Malignant neoplasm of upper lobe of left lung (HCC)      (Malignant neoplasm of upper lobe of left lung (HCC) [C34.12])    Surgeons: Miryam Reyna MD Provider: Moses Luo MD    Anesthesia Type: general ASA Status: 3          Anesthesia Type: general    Vitals  Vitals Value Taken Time   /66 07/06/22 1501   Temp 36.3 °C (97.4 °F) 07/06/22 1320   Pulse 73 07/06/22 1508   Resp 14 07/06/22 1430   SpO2 100 % 07/06/22 1508   Vitals shown include unvalidated device data.        Post Anesthesia Care and Evaluation    Patient location during evaluation: PACU  Patient participation: complete - patient participated  Level of consciousness: awake  Pain management: satisfactory to patient    Airway patency: patent  Anesthetic complications: No anesthetic complications  PONV Status: controlled  Cardiovascular status: acceptable  Respiratory status: acceptable  Hydration status: acceptable

## 2022-07-06 NOTE — ANESTHESIA PREPROCEDURE EVALUATION
Anesthesia Evaluation     NPO Solid Status: > 8 hours             Airway   Mallampati: II  TM distance: >3 FB  Neck ROM: full  Dental - normal exam         Pulmonary - normal exam   (+) lung cancer,   Cardiovascular - normal exam    (+) hypertension, hyperlipidemia,       Neuro/Psych  (+) psychiatric history Anxiety and Depression,    GI/Hepatic/Renal/Endo    (+)  hiatal hernia, GERD, PUD,      Musculoskeletal     Abdominal    Substance History      OB/GYN          Other                        Anesthesia Plan    ASA 3     general     (Arterial line, LIZZY block for postop pain management per surgeon request.    I have reviewed the patient's history with the patient and the chart, including all pertinent laboratory results and imaging. I have explained the risks of anesthesia including but not limited to dental damage, sore throat, nausea, corneal abrasion, nerve injury, MI, stroke, and death.  )  intravenous induction     Anesthetic plan, risks, benefits, and alternatives have been provided, discussed and informed consent has been obtained with: patient.        CODE STATUS:

## 2022-07-06 NOTE — NURSING NOTE
Spoke to radiology twice. CXR to be read now by radiologist. If it does not populate on Epic, RN to call Xray back.

## 2022-07-06 NOTE — ANESTHESIA PROCEDURE NOTES
Arterial Line      Patient reassessed immediately prior to procedure    Patient location during procedure: pre-op  Start time: 7/6/2022 9:15 AM  Stop Time:7/6/2022 9:18 AM       Performed By   Anesthesiologist: Moses Luo MD  Preanesthetic Checklist  Completed: patient identified, risks and benefits discussed, surgical consent, pre-op evaluation and timeout performed  Arterial Line Prep   Sterile Tech: cap, gloves and mask  Prep: alcohol swabs and ChloraPrep  Patient monitoring: blood pressure monitoring, continuous pulse oximetry and EKG  Arterial Line Procedure   Laterality:left  Location:  radial artery  Catheter size: 20 G   Guidance: ultrasound guided  PROCEDURE NOTE/ULTRASOUND INTERPRETATION.  Using ultrasound guidance the potential vascular sites for insertion of the catheter were visualized to determine the patency of the vessel to be used for vascular access.  After selecting the appropriate site for insertion, the needle was visualized under ultrasound being inserted into the radial artery, followed by ultrasound confirmation of wire and catheter placement. There were no abnormalities seen on ultrasound; an image was taken; and the patient tolerated the procedure with no complications.   Number of attempts: 1  Successful placement: yes  Post Assessment   Dressing Type: occlusive dressing applied and secured with tape.   Complications no  Patient Tolerance: patient tolerated the procedure well with no apparent complications  Additional Notes  Ultrasound Interpretation:  Using ultrasound guidance the potential vascular sites for insertion of the catheter were visualized to determine the patency of the vessel to be used for vascular access.  After selecting the appropriate site for insertion, the needle was visualized under ultrasound being inserted into the vessel, followed by ultrasound confirmation of wire and catheter placement.  There were no abnormalities seen on ultrasound; an image was  taken/ and the patient tolerated the procedure with no complications.

## 2022-07-07 ENCOUNTER — APPOINTMENT (OUTPATIENT)
Dept: GENERAL RADIOLOGY | Facility: HOSPITAL | Age: 57
End: 2022-07-07

## 2022-07-07 PROBLEM — E87.1 HYPONATREMIA: Status: ACTIVE | Noted: 2022-07-07

## 2022-07-07 LAB
ANION GAP SERPL CALCULATED.3IONS-SCNC: 10 MMOL/L (ref 5–15)
BASOPHILS # BLD AUTO: 0.02 10*3/MM3 (ref 0–0.2)
BASOPHILS NFR BLD AUTO: 0.2 % (ref 0–1.5)
BUN SERPL-MCNC: 6 MG/DL (ref 6–20)
BUN/CREAT SERPL: 7.9 (ref 7–25)
CALCIUM SPEC-SCNC: 8.4 MG/DL (ref 8.6–10.5)
CHLORIDE SERPL-SCNC: 94 MMOL/L (ref 98–107)
CO2 SERPL-SCNC: 24 MMOL/L (ref 22–29)
CREAT SERPL-MCNC: 0.76 MG/DL (ref 0.76–1.27)
DEPRECATED RDW RBC AUTO: 40.3 FL (ref 37–54)
EGFRCR SERPLBLD CKD-EPI 2021: 104.8 ML/MIN/1.73
EOSINOPHIL # BLD AUTO: 0.01 10*3/MM3 (ref 0–0.4)
EOSINOPHIL NFR BLD AUTO: 0.1 % (ref 0.3–6.2)
ERYTHROCYTE [DISTWIDTH] IN BLOOD BY AUTOMATED COUNT: 13 % (ref 12.3–15.4)
GLUCOSE SERPL-MCNC: 151 MG/DL (ref 65–99)
HCT VFR BLD AUTO: 31.3 % (ref 37.5–51)
HGB BLD-MCNC: 11.1 G/DL (ref 13–17.7)
IMM GRANULOCYTES # BLD AUTO: 0.05 10*3/MM3 (ref 0–0.05)
IMM GRANULOCYTES NFR BLD AUTO: 0.4 % (ref 0–0.5)
LYMPHOCYTES # BLD AUTO: 0.68 10*3/MM3 (ref 0.7–3.1)
LYMPHOCYTES NFR BLD AUTO: 6 % (ref 19.6–45.3)
MCH RBC QN AUTO: 30.7 PG (ref 26.6–33)
MCHC RBC AUTO-ENTMCNC: 35.5 G/DL (ref 31.5–35.7)
MCV RBC AUTO: 86.7 FL (ref 79–97)
MONOCYTES # BLD AUTO: 0.96 10*3/MM3 (ref 0.1–0.9)
MONOCYTES NFR BLD AUTO: 8.5 % (ref 5–12)
NEUTROPHILS NFR BLD AUTO: 84.8 % (ref 42.7–76)
NEUTROPHILS NFR BLD AUTO: 9.52 10*3/MM3 (ref 1.7–7)
NRBC BLD AUTO-RTO: 0 /100 WBC (ref 0–0.2)
PLATELET # BLD AUTO: 254 10*3/MM3 (ref 140–450)
PMV BLD AUTO: 8.6 FL (ref 6–12)
POTASSIUM SERPL-SCNC: 4.1 MMOL/L (ref 3.5–5.2)
RBC # BLD AUTO: 3.61 10*6/MM3 (ref 4.14–5.8)
SODIUM SERPL-SCNC: 128 MMOL/L (ref 136–145)
WBC NRBC COR # BLD: 11.24 10*3/MM3 (ref 3.4–10.8)

## 2022-07-07 PROCEDURE — 25010000002 HEPARIN (PORCINE) PER 1000 UNITS: Performed by: THORACIC SURGERY (CARDIOTHORACIC VASCULAR SURGERY)

## 2022-07-07 PROCEDURE — 94664 DEMO&/EVAL PT USE INHALER: CPT

## 2022-07-07 PROCEDURE — 94761 N-INVAS EAR/PLS OXIMETRY MLT: CPT

## 2022-07-07 PROCEDURE — 85025 COMPLETE CBC W/AUTO DIFF WBC: CPT | Performed by: THORACIC SURGERY (CARDIOTHORACIC VASCULAR SURGERY)

## 2022-07-07 PROCEDURE — 99024 POSTOP FOLLOW-UP VISIT: CPT | Performed by: NURSE PRACTITIONER

## 2022-07-07 PROCEDURE — 94799 UNLISTED PULMONARY SVC/PX: CPT

## 2022-07-07 PROCEDURE — 71045 X-RAY EXAM CHEST 1 VIEW: CPT

## 2022-07-07 PROCEDURE — 80048 BASIC METABOLIC PNL TOTAL CA: CPT | Performed by: THORACIC SURGERY (CARDIOTHORACIC VASCULAR SURGERY)

## 2022-07-07 RX ADMIN — HEPARIN SODIUM 5000 UNITS: 5000 INJECTION INTRAVENOUS; SUBCUTANEOUS at 13:53

## 2022-07-07 RX ADMIN — OXYCODONE 5 MG: 5 TABLET ORAL at 20:45

## 2022-07-07 RX ADMIN — ACETAMINOPHEN 1000 MG: 500 TABLET ORAL at 15:41

## 2022-07-07 RX ADMIN — ACETAMINOPHEN 1000 MG: 500 TABLET ORAL at 08:21

## 2022-07-07 RX ADMIN — METOPROLOL TARTRATE 25 MG: 25 TABLET, FILM COATED ORAL at 08:21

## 2022-07-07 RX ADMIN — HEPARIN SODIUM 5000 UNITS: 5000 INJECTION INTRAVENOUS; SUBCUTANEOUS at 20:46

## 2022-07-07 RX ADMIN — HEPARIN SODIUM 5000 UNITS: 5000 INJECTION INTRAVENOUS; SUBCUTANEOUS at 06:23

## 2022-07-07 RX ADMIN — IPRATROPIUM BROMIDE AND ALBUTEROL SULFATE 3 ML: .5; 3 SOLUTION RESPIRATORY (INHALATION) at 07:28

## 2022-07-07 RX ADMIN — BUPROPION HYDROCHLORIDE 300 MG: 300 TABLET, EXTENDED RELEASE ORAL at 06:23

## 2022-07-07 RX ADMIN — GABAPENTIN 300 MG: 300 CAPSULE ORAL at 06:23

## 2022-07-07 RX ADMIN — IPRATROPIUM BROMIDE AND ALBUTEROL SULFATE 3 ML: .5; 3 SOLUTION RESPIRATORY (INHALATION) at 15:24

## 2022-07-07 RX ADMIN — DESVENLAFAXINE SUCCINATE 50 MG: 50 TABLET, EXTENDED RELEASE ORAL at 20:47

## 2022-07-07 RX ADMIN — OXYCODONE 5 MG: 5 TABLET ORAL at 08:21

## 2022-07-07 RX ADMIN — CELECOXIB 100 MG: 100 CAPSULE ORAL at 20:43

## 2022-07-07 RX ADMIN — LIOTHYRONINE SODIUM 5 MCG: 5 TABLET ORAL at 08:21

## 2022-07-07 RX ADMIN — OXYCODONE 5 MG: 5 TABLET ORAL at 13:53

## 2022-07-07 RX ADMIN — CELECOXIB 100 MG: 100 CAPSULE ORAL at 08:21

## 2022-07-07 RX ADMIN — GABAPENTIN 300 MG: 300 CAPSULE ORAL at 13:53

## 2022-07-07 RX ADMIN — ACETAMINOPHEN 1000 MG: 500 TABLET ORAL at 20:43

## 2022-07-07 RX ADMIN — LEVOTHYROXINE SODIUM 100 MCG: 0.1 TABLET ORAL at 06:23

## 2022-07-07 RX ADMIN — GABAPENTIN 300 MG: 300 CAPSULE ORAL at 20:48

## 2022-07-07 RX ADMIN — PRAVASTATIN SODIUM 10 MG: 10 TABLET ORAL at 20:44

## 2022-07-07 RX ADMIN — METOPROLOL TARTRATE 25 MG: 25 TABLET, FILM COATED ORAL at 20:45

## 2022-07-07 RX ADMIN — PANTOPRAZOLE SODIUM 40 MG: 40 TABLET, DELAYED RELEASE ORAL at 06:23

## 2022-07-07 NOTE — PLAN OF CARE
Goal Outcome Evaluation:  VSS. Pain controlled with oxycodone. CT to be placed to water seal at 0300, no air leak or subq air noted. Pulmonary hygiene encouraged. Voiding well with urinal. Chest xray this am. Will continue to monitor.

## 2022-07-07 NOTE — PROGRESS NOTES
Name: Jc Driscoll ADMIT: 2022   : 1965  PCP: Reshma Alvarenga MD    MRN: [id number] LOS: 1 days   AGE/SEX: 56 y.o. male  ROOM: [facility]     Subjective   Subjective   Doing okay other than expected discomfort at source of chest tube.     Objective   Objective   Vital Signs  Temp:  [97.3 °F (36.3 °C)-98.2 °F (36.8 °C)] 98.2 °F (36.8 °C)  Heart Rate:  [69-81] 76  Resp:  [16-18] 18  BP: (103-113)/(68-79) 108/79  Arterial Line BP: (108-117)/(52-56) 117/56  SpO2:  [94 %-98 %] 94 %  on   ;   Device (Oxygen Therapy): room air  Body mass index is 24.3 kg/m².  Physical Exam  Constitutional:       General: He is not in acute distress.     Appearance: He is not diaphoretic.   Cardiovascular:      Rate and Rhythm: Normal rate and regular rhythm.      Heart sounds: Normal heart sounds.   Pulmonary:      Effort: Pulmonary effort is normal.      Breath sounds: Normal breath sounds.   Chest:      Comments: Chest tube present  Abdominal:      General: Bowel sounds are normal.      Palpations: Abdomen is soft.      Tenderness: There is no abdominal tenderness.   Musculoskeletal:         General: No swelling.   Skin:     General: Skin is warm and dry.   Neurological:      Mental Status: He is alert and oriented to person, place, and time.       Results Review:       I reviewed the patient's new clinical results.  Results from last 7 days   Lab Units 22  0414   WBC 10*3/mm3 11.24*   HEMOGLOBIN g/dL 11.1*   PLATELETS 10*3/mm3 254     Results from last 7 days   Lab Units 22  0414   SODIUM mmol/L 128*   POTASSIUM mmol/L 4.1   CHLORIDE mmol/L 94*   CO2 mmol/L 24.0   BUN mg/dL 6   CREATININE mg/dL 0.76   GLUCOSE mg/dL 151*   EGFR mL/min/1.73 104.8       Results from last 7 days   Lab Units 22  0414   CALCIUM mg/dL 8.4*       No results found for: HGBA1C, POCGLU    acetaminophen, 1,000 mg, Oral, TID  buPROPion XL, 300 mg, Oral, QAM  celecoxib, 100 mg, Oral, Q12H  desvenlafaxine, 50 mg, Oral,  Nightly  gabapentin, 300 mg, Oral, Q8H  heparin (porcine), 5,000 Units, Subcutaneous, Q8H  ipratropium-albuterol, 3 mL, Nebulization, Q8H - RT  levothyroxine, 100 mcg, Oral, Q AM  liothyronine, 5 mcg, Oral, Daily  metoprolol tartrate, 25 mg, Oral, Q12H  pantoprazole, 40 mg, Oral, Q AM  pravastatin, 10 mg, Oral, Nightly  senna-docusate sodium, 2 tablet, Oral, Nightly      lactated ringers, 9 mL/hr, Last Rate: 9 mL/hr (07/06/22 0925)    Diet Regular; Cardiac       Assessment/Plan     Active Hospital Problems    Diagnosis  POA   • **Malignant neoplasm of upper lobe of left lung (HCC) [C34.12]  Yes   • Hyponatremia [E87.1]  Clinically Undetermined   • NSCLC of left lung (HCC) [C34.92]  Yes   • Gastroesophageal reflux disease [K21.9]  Yes   • Hyperlipidemia [E78.5]  Yes   • Anxiety and depression [F41.9, F32.A]  Yes   • Hypertension [I10]  Yes      Resolved Hospital Problems   No resolved problems to display.       56 y.o. male with history of hypertension and non-small cell lung cancer who was admitted after thoracoscopy with left upper lobectomy and mediastinal lymph node dissection.       Non-small cell lung cancer, malignant left upper lung nodule:  -He is  S/p THORACOSCOPY VIDEO ASSISTED WITH LOBECTOMY on 7/6.  -Postoperative management per CT surgery.    Note plans for chest tube removal tomorrow and possible discharge.     Hypertension  Blood pressure still on the lower side.      Tolerating Lopressor     Hyperlipidemia  Continue statin    Hyponatremia  Suspect underlying SIADH due to pulmonary process.  He is asymptomatic.  Reassess in a.m.      Jose Thorpe MD  [facility]  07/07/22  16:55 EDT

## 2022-07-07 NOTE — PLAN OF CARE
Goal Outcome Evaluation:  Plan of Care Reviewed With: patient        Progress: improving  Outcome Evaluation: VSS, L CT to waterseal. Pain treated with PRN meds. Pt ambulating.

## 2022-07-07 NOTE — CONSULTS
"    Patient Name:  Jc Driscoll  YOB: 1965  MRN:  [id number]  Date of Admission:  7/6/2022  Date of Consult:  7/6/2022  Patient Care Team:  Reshma Alvarenga MD as PCP - General (Family Medicine)  Reshma Alvarenga MD as PCP - Family Medicine  Miryam Reyna MD as Surgeon (Thoracic Surgery)    Consults  Subjective   History of Present Illness  Mr. Driscoll is a 56 y.o. male with history of hypertension and non-small cell lung cancer who was admitted after thoracoscopy with left upper lobectomy and mediastinal lymph node dissection.  He was diagnosed with stage III lung adenocarcinoma in 2019.  He had chemo, radiation and right lower lobectomy.  In December 2021, he was found to have an enlarging left lung nodule on screening PET/CT.  CT-guided biopsy positive for adenocarcinoma.  He was admitted for surgical resection of the lung nodule by thoracic surgery.  Postoperatively he is doing well, feeling \"not too bad\".  He has some discomfort but this is tolerable.  He has no other complaints      Past Medical History:   Diagnosis Date   • Allergic rhinitis 07/25/2013    Overview:  4/2/2018 - still zyrtec 10 daily (if stops, gets dermatitis again).    • Anxiety and depression 06/05/2012    Overview:  4/2/2018 -   C/w well 300 xr and Lito 20.  4/8/2019 -   Doing ok even with new lung cancer - lito 20 & well 300xr.    • Chronic pansinusitis 04/08/2019    Overview:  4/8/2019 -   MRI showed chronic thick in frontal, maxillary, ethmoidal ---> to Dr. COLLAZO to est since abx ICC didn't help.  Does netipot bid.    • Diastolic CHF (HCC) 07/09/2014    Overview:  7/4/14 - impaired LV relaxation on Zhou ECHO.  EF 60%. Rest normal   • Dupuytren's contracture of both hands    • Elevated cholesterol    • Erosive esophagitis 07/09/2019    Overview:  EGD 2016 4/2/2018 - nexium OTC daily for few week.  Qod before that.  Now carbonation hurts, epigastric pain 4/8/2019 -   protonix 40 doing well.    • Gastroesophageal reflux " disease 2019   • Hiatal hernia 2019   • History of colon polyps    • Hypertension    • Lung cancer (HCC)     right lung and in lymph nodes x2   • Mediastinal lymphadenopathy 2019   • Normocytic anemia 2019    Overview:  2019 - dropped to 9's postop 2019 - rebounded to 10's.  2019 -   Back to 9's - check ferritin, b12 and thyroid.    • Prediabetes 2014    Overview:  14 - a1c 6.1 at [facility] admission   • Retention of urine 2019    PSOT OP, RESOLVED     Past Surgical History:   Procedure Laterality Date   • BRONCHOSCOPY  2019    EBUS MONTERO NEEDLE BX   • COLONOSCOPY     • DUPUYTREN CONTRACTURE RELEASE Bilateral    • LUNG BIOPSY  2019    CT GUIDED   • LUNG REMOVAL, PARTIAL Right     right lower   • PORTACATH PLACEMENT  2019    DR LONGORIA   • THORACOSCOPY Right 2019    Procedure: RIGHT VATS, OPEN LOWER LOBECTOMY WITH LYMPH NODE DISECTION, WEDGE RESECTION OF RIGHT MIDDLE LOBE;  Surgeon: Terrance Longoria MD;  Location: [facility] OR;  Service: Cardiothoracic     Family History   Problem Relation Age of Onset   • Cancer Mother         cervical cancer   • Cervical cancer Mother    • Cancer Father         lung cancer   • Lung cancer Father    • Lung cancer Sister    • Cancer Sister         lung cancer   • Malig Hyperthermia Neg Hx      Social History     Tobacco Use   • Smoking status: Former Smoker     Types: Cigarettes     Quit date: 2009     Years since quittin.8   • Smokeless tobacco: Never Used   Vaping Use   • Vaping Use: Never used   Substance Use Topics   • Alcohol use: Yes     Alcohol/week: 2.0 standard drinks     Types: 2 Cans of beer per week     Comment: Occasional   • Drug use: No     Medications Prior to Admission   Medication Sig Dispense Refill Last Dose   • amLODIPine (NORVASC) 10 MG tablet Take 10 mg by mouth Daily.   2022 at 0530   • buPROPion XL (WELLBUTRIN XL) 300 MG 24 hr tablet Take 300 mg by mouth Every Morning.    7/6/2022 at 0530   • desvenlafaxine (PRISTIQ) 50 MG 24 hr tablet Take 50 mg by mouth Daily.   7/5/2022 at 2000   • levothyroxine (SYNTHROID, LEVOTHROID) 100 MCG tablet Take 100 mcg by mouth Every Morning.   7/6/2022 at 0530   • liothyronine (CYTOMEL) 5 MCG tablet Take 5 mcg by mouth Daily.   7/6/2022 at 0530   • LORazepam (ATIVAN) 0.5 MG tablet Take 0.5 mg by mouth Every 8 (Eight) Hours As Needed.   Past Month at Unknown time   • metoprolol succinate XL (TOPROL-XL) 100 MG 24 hr tablet Take 100 mg by mouth Daily.   7/6/2022 at 0530   • pantoprazole (PROTONIX) 40 MG EC tablet Take 40 mg by mouth Daily As Needed.   Past Month at Unknown time   • pravastatin (PRAVACHOL) 10 MG tablet Take 10 mg by mouth Every Night.   7/5/2022 at 2000   • vitamin B-12 (CYANOCOBALAMIN) 1000 MCG tablet Take 1,000 mcg by mouth Daily.   7/6/2022 at 0530     Allergies:  Patient has no known allergies.    Review of Systems   Constitutional: Negative for chills and fever.   HENT: Negative for sore throat and trouble swallowing.    Eyes: Negative for pain and redness.   Respiratory: Negative for cough and shortness of breath.    Cardiovascular: Negative for chest pain, palpitations and leg swelling.   Gastrointestinal: Negative for abdominal pain, constipation, diarrhea, nausea and vomiting.   Endocrine: Negative for polydipsia and polyuria.   Genitourinary: Negative for difficulty urinating and dysuria.   Musculoskeletal: Negative for joint swelling.   Skin: Negative for rash and wound.   Neurological: Negative for dizziness and syncope.   Hematological: Negative for adenopathy.   Psychiatric/Behavioral: Negative for confusion.       Objective      Vital Signs  Temp:  [97.3 °F (36.3 °C)-97.7 °F (36.5 °C)] 97.3 °F (36.3 °C)  Heart Rate:  [71-79] 75  Resp:  [12-20] 16  BP: ()/(60-86) 106/69  Arterial Line BP: ()/(48-68) 118/54  Body mass index is 24.3 kg/m².    Physical Exam  Constitutional:       General: He is not in acute  distress.     Appearance: He is not ill-appearing.   HENT:      Head: Normocephalic and atraumatic.      Mouth/Throat:      Mouth: Mucous membranes are moist.   Eyes:      General: No scleral icterus.     Extraocular Movements: Extraocular movements intact.      Pupils: Pupils are equal, round, and reactive to light.   Cardiovascular:      Rate and Rhythm: Normal rate and regular rhythm.      Pulses: Normal pulses.      Heart sounds: No murmur heard.    No gallop.   Pulmonary:      Effort: Pulmonary effort is normal. No respiratory distress.      Breath sounds: No wheezing or rhonchi.   Abdominal:      General: There is no distension.      Palpations: Abdomen is soft.      Tenderness: There is no abdominal tenderness. There is no guarding.   Musculoskeletal:         General: No swelling, tenderness or deformity.   Skin:     General: Skin is warm and dry.      Findings: No lesion or rash.   Neurological:      General: No focal deficit present.      Mental Status: He is alert and oriented to person, place, and time.   Psychiatric:         Mood and Affect: Mood normal.         Behavior: Behavior normal.         Results Review:   I reviewed the patient's new clinical results.  I reviewed the patient's new imaging results and agree with the interpretation.  I reviewed the patient's other test results and agree with the interpretation         Assessment/Plan     Active Hospital Problems    Diagnosis  POA   • **Malignant neoplasm of upper lobe of left lung (HCC) [C34.12]  Yes   • NSCLC of left lung (HCC) [C34.92]  Yes   • Gastroesophageal reflux disease [K21.9]  Yes   • Hyperlipidemia [E78.5]  Yes   • Anxiety and depression [F41.9, F32.A]  Yes   • Hypertension [I10]  Yes      Resolved Hospital Problems   No resolved problems to display.       56 y.o. male with history of hypertension and non-small cell lung cancer who was admitted after thoracoscopy with left upper lobectomy and mediastinal lymph node dissection.  [facility] is  consulted for medical management of hypertension, hypothyroid, hyperlipidemia and GERD.    Non-small cell lung cancer, malignant left upper lung nodule:  -Status post resection and mediastinal lymph node dissection  -Postoperative management per CT surgery.  Very small left-sided pneumothorax noted on chest x-ray today  -outpatient oncologist is Dr Gaspar     Hypertension: Blood pressure on the lower side postoperatively.  Okay to resume home metoprolol with holding parameters    Hypothyroid: Acquired after radiation therapy to the chest. Continue Synthroid and liothyronine    Hyperlipidemia: Resume statin    GERD: Resume PPI    Charito Reddy [facility]  07/06/22  20:08 EDT

## 2022-07-07 NOTE — PROGRESS NOTES
"  POST-OPERATIVE NOTE     Chief Complaint: Malignant neoplasm of upper lobe of left lung, postoperative care  S/P: Left video-assisted thoracoscopy with left upper lobectomy, mediastinal lymph node dissection and intercostal nerve block  POD # 1    Subjective:  Symptoms:  Stable.  No shortness of breath.    Diet:  No nausea or vomiting.    Activity level: Returning to normal.    Pain:  He complains of pain that is moderate.  Pain is partially controlled.        Objective:  General Appearance:  Comfortable and in no acute distress.    Vital signs: (most recent): Blood pressure 108/79, pulse 76, temperature 98.2 °F (36.8 °C), temperature source Oral, resp. rate 18, height 190.5 cm (75\"), weight 88.2 kg (194 lb 7.1 oz), SpO2 94 %.  Vital signs are normal.  No fever.    Lungs:  Normal effort and normal respiratory rate.  He is not in respiratory distress.    Heart: Normal rate.  Regular rhythm.    Chest: Chest wall tenderness present.    Abdomen: Abdomen is soft.  There is no abdominal tenderness.     Pulses: Distal pulses are intact.    Neurological: Patient is alert and oriented to person, place and time.    Skin:  Warm and dry.              Chest tube:   Site: Left, Clean, Dry, Intact and Securement device intact  Suction: waterseal  Air Leak: negative  24 Hour Total: 110cc    Results Review:     I reviewed the patient's new clinical results.  I reviewed the patient's new imaging results and agree with the interpretation.  I reviewed the patient's other test results and agree with the interpretation  Discussed with Patient, spouse at bedside, RN and Dr. Reyna.    Assessment & Plan      Mr. Driscoll is a pleasant 56-year-old gentleman who underwent a left VAT with left upper lobectomy by Dr. Reyna yesterday.  He is doing very well postoperatively.  Chest tube is to waterseal today with no evidence of air leak.  Output is not significant.  We will plan to leave chest tube to waterseal today and recheck x-ray in " AM.  Hopefully tube can be removed tomorrow and patient may discharge home.  Encourage patient to take pain medication as needed so that he may continue to participate in good pulmonary hygiene and ambulate frequently.      MULUGETA Stephens  [facility]  07/07/22  15:34 EDT    Patient was seen and assessed while wearing personal protective equipment including facemask, protective eyewear and gloves.  Hand hygiene performed prior to entering the room and upon exiting with doffing of gloves.

## 2022-07-07 NOTE — CASE MANAGEMENT/SOCIAL WORK
Discharge Planning Assessment  [facility]     Patient Name: Jc Driscoll  MRN: [id number]  Today's Date: 7/7/2022    Admit Date: 7/6/2022     Discharge Needs Assessment     Row Name 07/07/22 1005       Living Environment    People in Home spouse    Current Living Arrangements home    Primary Care Provided by self    Provides Primary Care For no one    Family Caregiver if Needed spouse    Quality of Family Relationships involved;helpful    Able to Return to Prior Arrangements yes       Resource/Environmental Concerns    Resource/Environmental Concerns none       Transition Planning    Patient/Family Anticipates Transition to home    Patient/Family Anticipated Services at Transition none    Transportation Anticipated family or friend will provide       Discharge Needs Assessment    Readmission Within the Last 30 Days no previous admission in last 30 days    Equipment Currently Used at Home none    Concerns to be Addressed no discharge needs identified;denies needs/concerns at this time    Anticipated Changes Related to Illness none    Equipment Needed After Discharge none    Provided Post Acute Provider List? Refused    Refused Provider List Comment declined               Discharge Plan     Row Name 07/07/22 1006       Plan    Plan home with wife; follow for d/c needs    Patient/Family in Agreement with Plan yes    Plan Comments Spoke with pt bedside. Confirmed facesheet correct. Pt lives with his wife. Pt is IADLs  no DME used to ambulate. Pt still drives. Pt's PCP is Dr. Hdz and uses [facility] for prescriptions. Pt has no history of HH or SNF. Pt reports he plans home with wife assist as needed. Pt denies d/c needs at this time. CCP to follow.              Continued Care and Services - Admitted Since 7/6/2022    Coordination has not been started for this encounter.          Demographic Summary    No documentation.                Functional Status     Row Name 07/07/22 1005       Functional Status    Usual  Activity Tolerance good    Current Activity Tolerance moderate       Functional Status, IADL    Medications independent    Meal Preparation independent    Housekeeping independent    Laundry independent    Shopping independent               Psychosocial    No documentation.                Abuse/Neglect    No documentation.                Legal    No documentation.                Substance Abuse    No documentation.                Patient Forms    No documentation.                   IRENE Young

## 2022-07-08 ENCOUNTER — APPOINTMENT (OUTPATIENT)
Dept: GENERAL RADIOLOGY | Facility: HOSPITAL | Age: 57
End: 2022-07-08

## 2022-07-08 ENCOUNTER — READMISSION MANAGEMENT (OUTPATIENT)
Dept: CALL CENTER | Facility: HOSPITAL | Age: 57
End: 2022-07-08

## 2022-07-08 VITALS
HEIGHT: 75 IN | BODY MASS INDEX: 24.18 KG/M2 | HEART RATE: 71 BPM | OXYGEN SATURATION: 96 % | RESPIRATION RATE: 16 BRPM | SYSTOLIC BLOOD PRESSURE: 123 MMHG | DIASTOLIC BLOOD PRESSURE: 90 MMHG | WEIGHT: 194.45 LBS | TEMPERATURE: 97.3 F

## 2022-07-08 LAB
ANION GAP SERPL CALCULATED.3IONS-SCNC: 10 MMOL/L (ref 5–15)
BUN SERPL-MCNC: 7 MG/DL (ref 6–20)
BUN/CREAT SERPL: 7.4 (ref 7–25)
CALCIUM SPEC-SCNC: 8.8 MG/DL (ref 8.6–10.5)
CHLORIDE SERPL-SCNC: 99 MMOL/L (ref 98–107)
CO2 SERPL-SCNC: 27 MMOL/L (ref 22–29)
CREAT SERPL-MCNC: 0.94 MG/DL (ref 0.76–1.27)
DEPRECATED RDW RBC AUTO: 44.4 FL (ref 37–54)
EGFRCR SERPLBLD CKD-EPI 2021: 94.6 ML/MIN/1.73
ERYTHROCYTE [DISTWIDTH] IN BLOOD BY AUTOMATED COUNT: 13.3 % (ref 12.3–15.4)
GLUCOSE SERPL-MCNC: 96 MG/DL (ref 65–99)
HCT VFR BLD AUTO: 34.2 % (ref 37.5–51)
HGB BLD-MCNC: 11.3 G/DL (ref 13–17.7)
MCH RBC QN AUTO: 30.2 PG (ref 26.6–33)
MCHC RBC AUTO-ENTMCNC: 33 G/DL (ref 31.5–35.7)
MCV RBC AUTO: 91.4 FL (ref 79–97)
PLATELET # BLD AUTO: 252 10*3/MM3 (ref 140–450)
PMV BLD AUTO: 8.9 FL (ref 6–12)
POTASSIUM SERPL-SCNC: 3.7 MMOL/L (ref 3.5–5.2)
RBC # BLD AUTO: 3.74 10*6/MM3 (ref 4.14–5.8)
SODIUM SERPL-SCNC: 136 MMOL/L (ref 136–145)
WBC NRBC COR # BLD: 7.52 10*3/MM3 (ref 3.4–10.8)

## 2022-07-08 PROCEDURE — 94760 N-INVAS EAR/PLS OXIMETRY 1: CPT

## 2022-07-08 PROCEDURE — 85027 COMPLETE CBC AUTOMATED: CPT | Performed by: NURSE PRACTITIONER

## 2022-07-08 PROCEDURE — 25010000002 HEPARIN (PORCINE) PER 1000 UNITS: Performed by: THORACIC SURGERY (CARDIOTHORACIC VASCULAR SURGERY)

## 2022-07-08 PROCEDURE — 80048 BASIC METABOLIC PNL TOTAL CA: CPT | Performed by: NURSE PRACTITIONER

## 2022-07-08 PROCEDURE — 94664 DEMO&/EVAL PT USE INHALER: CPT

## 2022-07-08 PROCEDURE — 94799 UNLISTED PULMONARY SVC/PX: CPT

## 2022-07-08 PROCEDURE — 99024 POSTOP FOLLOW-UP VISIT: CPT | Performed by: NURSE PRACTITIONER

## 2022-07-08 PROCEDURE — 94761 N-INVAS EAR/PLS OXIMETRY MLT: CPT

## 2022-07-08 PROCEDURE — 71045 X-RAY EXAM CHEST 1 VIEW: CPT

## 2022-07-08 RX ORDER — GABAPENTIN 300 MG/1
300 CAPSULE ORAL EVERY 8 HOURS SCHEDULED
Qty: 90 CAPSULE | Refills: 0 | Status: ON HOLD | OUTPATIENT
Start: 2022-07-08 | End: 2022-12-09

## 2022-07-08 RX ORDER — CELECOXIB 100 MG/1
100 CAPSULE ORAL 2 TIMES DAILY
Qty: 60 CAPSULE | Refills: 0 | Status: SHIPPED | OUTPATIENT
Start: 2022-07-08 | End: 2022-08-07

## 2022-07-08 RX ORDER — ACETAMINOPHEN 500 MG
1000 TABLET ORAL 3 TIMES DAILY
Qty: 80 TABLET | Refills: 0 | Status: SHIPPED | OUTPATIENT
Start: 2022-07-08 | End: 2022-07-22

## 2022-07-08 RX ORDER — OXYCODONE HYDROCHLORIDE 5 MG/1
5 TABLET ORAL EVERY 4 HOURS PRN
Qty: 18 TABLET | Refills: 0 | Status: SHIPPED | OUTPATIENT
Start: 2022-07-08 | End: 2022-07-08 | Stop reason: SDUPTHER

## 2022-07-08 RX ORDER — OXYCODONE HYDROCHLORIDE 5 MG/1
5 TABLET ORAL EVERY 6 HOURS PRN
Qty: 45 TABLET | Refills: 0 | Status: SHIPPED | OUTPATIENT
Start: 2022-07-08 | End: 2022-12-05

## 2022-07-08 RX ADMIN — HEPARIN SODIUM 5000 UNITS: 5000 INJECTION INTRAVENOUS; SUBCUTANEOUS at 05:29

## 2022-07-08 RX ADMIN — PANTOPRAZOLE SODIUM 40 MG: 40 TABLET, DELAYED RELEASE ORAL at 05:29

## 2022-07-08 RX ADMIN — ACETAMINOPHEN 1000 MG: 500 TABLET ORAL at 09:59

## 2022-07-08 RX ADMIN — OXYCODONE 5 MG: 5 TABLET ORAL at 10:04

## 2022-07-08 RX ADMIN — ACETAMINOPHEN 1000 MG: 500 TABLET ORAL at 15:54

## 2022-07-08 RX ADMIN — OXYCODONE 5 MG: 5 TABLET ORAL at 05:28

## 2022-07-08 RX ADMIN — GABAPENTIN 300 MG: 300 CAPSULE ORAL at 05:28

## 2022-07-08 RX ADMIN — IPRATROPIUM BROMIDE AND ALBUTEROL SULFATE 3 ML: .5; 3 SOLUTION RESPIRATORY (INHALATION) at 07:58

## 2022-07-08 RX ADMIN — LIOTHYRONINE SODIUM 5 MCG: 5 TABLET ORAL at 10:00

## 2022-07-08 RX ADMIN — BUPROPION HYDROCHLORIDE 300 MG: 300 TABLET, EXTENDED RELEASE ORAL at 05:29

## 2022-07-08 RX ADMIN — HEPARIN SODIUM 5000 UNITS: 5000 INJECTION INTRAVENOUS; SUBCUTANEOUS at 14:00

## 2022-07-08 RX ADMIN — GABAPENTIN 300 MG: 300 CAPSULE ORAL at 15:54

## 2022-07-08 RX ADMIN — CELECOXIB 100 MG: 100 CAPSULE ORAL at 10:00

## 2022-07-08 RX ADMIN — LEVOTHYROXINE SODIUM 100 MCG: 0.1 TABLET ORAL at 05:43

## 2022-07-08 NOTE — PLAN OF CARE
Goal Outcome Evaluation:  Plan of Care Reviewed With: patient        Progress: improving   Vital signs stable. Alert and oriented x 4. Up ad pam in room. On room air. Normal sinus cardiac rhythm. Denies severe discomfort. Prn oxycodone given for complaint of mild pain. Left chest tube removed per thoracic nurse practitioner. Duoderm dressing applied. Tolerating regular diet well. Discharged home in stable condition. Will continue to monitor.

## 2022-07-08 NOTE — PLAN OF CARE
Problem: Adult Inpatient Plan of Care  Goal: Plan of Care Review  Flowsheets (Taken 7/8/2022 1780)  Progress: improving  Plan of Care Reviewed With: patient  Outcome Evaluation: chest tube to water seal minimal drainage pain meds given for pain with good results dressing to right chest tube saturated with serosanguinous drainage dressing  changed ambulated in oliva and tolerated well   Goal Outcome Evaluation:  Plan of Care Reviewed With: patient        Progress: improving  Outcome Evaluation: chest tube to water seal minimal drainage pain meds given for pain with good results dressing to right chest tube saturated with serosanguinous drainage dressing  changed ambulated in oliva and tolerated well

## 2022-07-08 NOTE — DISCHARGE INSTRUCTIONS
Post-op VAT / Thoracotomy Discharge Instructions    1. Activity:  Climb stairs as tolerated  May drive car after 2 weeks or as directed by Physician  Walk at least 3-4 times a day  Limit lifting for first 6 weeks. No lifting, pushing, or pulling greater than 10 pounds till seen by MD.  Continue to use your incentive spirometer at least 4 times per day.    2. Nutrition:  Resume previous diet  Eat well balanced meals  Avoid constipation by eating fresh fruits, vegetables, whole grain foods and increasing fluid intake.    3. Incision (wound) Care:  Remove dressing after 48 hours  If continued drainage, change dressing every 24 hours and as needed.  May shower after discharge.  Wash around incision area with soap and water daily.  No lotions or creams on incision area.  Take temperature daily for the first week after discharge.     4. When to call for Medical Advise:  Fever greater than 101 degrees  Unusual or severe pain  Difficulty breathing  Incision changes (redness, swelling, or increased drainage)  Any questions or problems    5. Medication Instructions:  Take Pain medications as directed to stay comfortable  No driving or drinking alcohol when taking prescribed pain meds  Laxative of choice if needed for constipation.    6. Medication and dosages:  See discharge medication instruction form

## 2022-07-08 NOTE — DISCHARGE SUMMARY
Patient Care Team:  Reshma Alvarenga MD as PCP - General (Family Medicine)  Reshma Alvarenga MD as PCP - Family Medicine  Miryam Reyna MD as Surgeon (Thoracic Surgery)    Date of Admission: 7/6/2022   Date of Discharge:  7/8/2022    Discharge Diagnosis: Malignant neoplasm of upper lobe of left lung, non-small cell lung cancer of left lung, T1cN1 adenocarcinoma of the lung    Presenting Problem  Malignant neoplasm of upper lobe of left lung (HCC) [C34.12]  NSCLC of left lung (HCC) [C34.92]     History of Present Illness  Jc Driscoll is a 56 y.o. male who presented to [facility] thoracic surgery for evaluation of a newly diagnosed adenocarcinoma of the left upper lobe of the lung with a past history of a stage III adenocarcinoma on the right side.  Dr. Reyna evaluated the patient, radiographic imaging, pulmonary function tests and recommended a left video-assisted thoracoscopy for resection of the left upper lobe lung nodule with a left upper lobectomy.  She explained the procedure in detail to the patient, including the risks and benefits.  After answering all the patient's questions to his satisfaction, he requested she proceed with surgery.    Hospital Course  Jc Driscoll was admitted to [facility] on 7/6/2022 for a scheduled left video-assisted thoracoscopy with left upper lobectomy by Dr. Miryam Reyna.  Patient tolerated the procedure well, was extubated in the operating room and after initial recovery in the postanesthesia care he was transferred to [facility] for convalescence.  Mr. Driscoll has had a satisfactory postoperative course.  His chest tube was removed on postoperative day 2.  Follow-up chest x-ray demonstrates no significant pneumothorax.  Patient is hemodynamically stable.  His pain is well controlled and he is eager to discharge home.  Prescription medication has been sent to his pharmacy.  Discharge and postoperative care instructions have been provided to the patient  in detail.  He verbalizes understanding.  We will arrange for him to follow-up to see Dr. Reyna in 2 weeks with a hospital perform chest x-ray.  Final pathology is indicative of T1CN1 adenocarcinoma of the lung.    Procedures Performed  Procedure(s):  THORACOSCOPY VIDEO ASSISTED WITH LOBECTOMY       Consults:   Consults     Date and Time Order Name Status Description    7/6/2022  4:18 PM Inpatient Hospitalist Consult Completed           Pertinent Test Results:     Imaging Results (Last 24 Hours)     Procedure Component Value Units Date/Time    XR Chest 1 View [[id number]] Resulted: 07/08/22 1502     Updated: 07/08/22 1520    XR Chest 1 View [[id number]] Collected: 07/08/22 0846     Updated: 07/08/22 0850    Narrative:      XR CHEST 1 VW-clinical: Chest tube management     COMPARISON 7/7/2022     FINDINGS: Mediport catheter and left-sided chest tube in position, no  pneumothorax. Small amount subcutaneous emphysema left chest wall.  Cardiomediastinal silhouette is stable. Vague opacities demonstrated at  the right lung base, infiltrate and/or small effusion as before. No  vascular congestion. No acute airspace disease has developed.     CONCLUSION: Stable chest     This report was finalized on 7/8/2022 8:47 AM by Dr. Maximiliano Arreguin M.D.             Lab Results (last 24 hours)     Procedure Component Value Units Date/Time    Tissue Pathology Exam [[id number]] Collected: 07/06/22 1032    Specimen: Tissue from Lymph Node; Tissue from Lymph Node; Tissue from Lymph Node; Tissue from Lymph Node; Tissue from Lymph Node; Tissue from Lymph Node; Tissue from Lymph Node; Tissue from Lymph Node; Tissue from Lymph Node; Tissue from Lung, Left Upper Lobe Updated: 07/08/22 0737     Case Report --     Surgical Pathology Report                         Case: [id number]                                  Authorizing Provider:  Miryam Reyna MD        Collected:           07/06/2022 10:32 AM          Ordering Location:     [facility]  [place]  Received:            07/06/2022 01:35 PM                                 MAIN OR                                                                      Pathologist:           Shakeel Larsen MD                                                         Specimens:   1) - Lymph Node, level 10#1                                                                         2) - Lymph Node, lymph node level 10 #2                                                             3) - Lymph Node, lymph node level 5                                                                 4) - Lymph Node, lymph node level 8                                                                 5) - Lymph Node, lymph node level 7                                                                 6) - Lymph Node, lymph node level 11                                                                7) - Lymph Node, lymph node level 12                                                                8) - Lymph Node, lymph node level 13                                                                9) - Lymph Node, lymph node level 14                                                                10) - Lung, Left Upper Lobe, left upper lobe                                                Final Diagnosis --     1. Level 10 (#1) Lymph Node:   A. Lymph node with sinus histiocytosis and anthracosis, negative for metastatic carcinoma by routine staining       (0/1).    2. Lymph Node Level 10 (#2):     A. Lymph node with sinus histiocytosis and anthracosis, negative for metastatic carcinoma by routine staining       (0/1).    3. Lymph Node Level 5:   A. Lymph node with sinus histiocytosis and anthracosis, negative for metastatic carcinoma by routine staining       (0/1).    4. Lymph Node Level 8:   A. Lymph node with sinus histiocytosis and anthracosis, negative for metastatic carcinoma by routine staining       (0/1).    5. Lymph Node Level 6:   A. Lymph node  with sinus histiocytosis and anthracosis, negative for metastatic carcinoma by routine staining       (0/1).    6. Lymph Node Level 11:   A. Lymph node with sinus histiocytosis and anthracosis, negative for metastatic carcinoma by routine staining       (0/1).    7. Lymph Node Level 12:   A. One lymph node with metastatic adenocarcinoma (1/1).    1. Focus of metastasis measures 6 mm maximally.    2. No definitive extranodal extension identified.    8. Lymph Node Level 13:   A. Lymph node with sinus histiocytosis and anthracosis, negative for metastatic carcinoma by routine staining       (0/1).    9. Lymph Node Level 14:   A. One lymph node with sinus histiocytosis and anthracosis.   B. No metastatic carcinoma identified by routine and immunostaining (see comment) (0/1).    10. Left Upper Lobe, Lobectomy:     A. Poorly differentiated acinar predominant adenocarcinoma with solid component (45%)                          and micropapillary component (5%) with extensive necrosis.   B. Invasive carcinoma measures 2.5 mm maximally.   C. Rare spread through air spaces noted.   D. Carcinoma is confined to pulmonary parenchyma and does not extend to involve the visceral pleural surface.   E. Focal lymphovascular space invasion is identified.     F. All margins appear negative for involvement by carcinoma:    1. Carcinoma comes to within 2.5 cm of the nearest vascular margin grossly.    2. Carcinoma comes to within 4.0 cm of the nearest bronchial margin grossly.    3. Carcinoma comes to within 2.5 cm of the nearest parenchymal margin grossly.   G. Non-neoplastic lung tissue with mild to moderate emphysematous change and minimal                       chronic interstitial inflammation.    See Synoptic For Tumor Details    jat/clm       Synoptic Checklist --     LUNG  LUNG: RESECTION - All Specimens  8th Edition - Protocol posted: 11/10/2021    SPECIMEN     Procedure:    Lobectomy      Specimen Laterality:    Left     TUMOR      Tumor Focality:    Single focus      Tumor Site:    Upper lobe of lung      Tumor Size:           Total Tumor Size (size of entire tumor):    Greatest Dimension (Centimeters): 2.5 cm       Size of Invasive Component:    Greatest Dimension (Centimeters): 2.5 cm     Histologic Type:    Invasive acinar adenocarcinoma        Histologic Patterns Present:    Acinar: 50 %       Histologic Patterns Present:    Solid: 45 %       Histologic Patterns Present:    Micropapillary: 5 %     Histologic Grade:    G3, poorly differentiated      Spread Through Air Spaces (YAIR):    Present      Visceral Pleura Invasion:    Not identified      Direct Invasion of Adjacent Structures:    Not applicable (no adjacent structures present)      Treatment Effect:    No known presurgical therapy      Lymphovascular Invasion:    Present: focal     MARGINS     Margin Status for Invasive Carcinoma:    All margins negative for invasive carcinoma        Closest Margin(s) to Invasive Carcinoma:    Vascular: 2.5        Closest Margin(s) to Invasive Carcinoma:    Parenchymal: 2.5        Distance from Invasive Carcinoma to Closest Margin:    2.5 cm     Margin Status for Non-Invasive Tumor:    All margins negative for non-invasive tumor     REGIONAL LYMPH NODES     Lymph Node(s) from Prior Procedures:    No known prior lymph node sampling performed      Regional Lymph Node Status:           :    Tumor present in regional lymph node(s)          Number of Lymph Nodes with Tumor:    1          Peña Site(s) with Tumor:    12L: Lobar        Number of Lymph Nodes Examined:    9          Peña Site(s) Examined:    5: Subaortic / aortopulmonary (AP) / AP window          Peña Site(s) Examined:    6: Para-aortic (ascending aorta or phrenic)          Peña Site(s) Examined:    8L: Para-esophageal          Peña Site(s) Examined:    10L: Hilar          Peña Site(s) Examined:    11L: Interlobar          Peña Site(s) Examined:    12L: Lobar          Peña Site(s)  "Examined:    13L: Segmental          Peña Site(s) Examined:    14L: Subsegmental     DISTANT METASTASIS    PATHOLOGIC STAGE CLASSIFICATION (pTNM, AJCC 8th Edition)     The suffix m (or a specific number) should only be used in the setting of multifocal ground-glass / lepidic nodules that histologically present as adenocarcinomas with prominent lepidic component or multifocal tumors of same histologic type that are too numerous for individual separate synoptic report and that are not better classified as intrapulmonary metastases (e.g. numerous carcinoid tumors). Multiple primary lung cancers showing different histologic type or different morphology based on comprehensive histologic subtyping are better staged as independent tumors without m suffix.     pT Category:    pT1c      pN Category:    pN1     ADDITIONAL FINDINGS     Additional Findings:    Emphysema        Comment(s):    PD-L1 testing ([facility])on Block10 G pending        Comment --     Immunostains for CK7, P40, CK5/6, TTF1 and P63 are performed on block 10F utilizing appropriate controls. Tumor shows strong immunoreactivity for CK7 and TTF1, but is judged negative for P40, CK5/6 and P63. The results of immunostaining support a diagnosis of poorly differentiated adenocarcinoma of lung with extensive necrosis.  Predominantly acinar components with solid components are appreciated. Rare focal micropapillary architectural pattern is also present.  Lymph node from level 12 (part 7) is involved by metastatic adenocarcinoma.  AE1/AE3 stain was performed on lymph node from part 9 utilizing appropriate controls. No metastatic carcinoma is identified in this section of lymph node.  Tumor block 10G will be sent to [facility] for PD-L1 testing with separate report to follow.        Gross Description --     1. Lymph Node.  Received in saline labeled \"level 10 #1 lymph node\" is a 0.5 x 0.5 x 0.5 cm anthracotic lymph node which is submitted en toto as 1A.    2. Lymph " "Node.  Received in saline labeled \"lymph node level 10 #2\" is a 1.5 x 1.5 x 0.5 cm aggregate of anthracotic lymph node fragments which is submitted en toto as 2A.    3. Lymph Node.  Received in saline labeled \"lymph node level 5\" is a 1.2 x 1.0 x 0.5 cm aggregate of anthracotic lymph node fragments which is submitted en toto as 3A.    4. Lymph Node.  Received in saline labeled \"lymph node level 8\" is a 1.5 x 1.0 x 0.5 cm aggregate of anthracotic lymph node fragments which is submitted en toto as 4A.    5. Lymph Node.  Received in saline labeled \"lymph node level 7\" is a 0.5 x 0.4 x 0.3 cm aggregate of anthracotic lymph node fragments which is submitted en toto as 5A.    6. Lymph Node.  Received in saline labeled \"lymph node level 11\" is a 1.5 x 1.3 x 0.5 cm aggregate of anthracotic lymph node fragments which is submitted en toto as 6A.    7. Lymph Node.  Received in saline labeled \"lymph node level 12\" is a 1.5 x 1.0 x 0.8 cm anthracotic lymph node which is serially sectioned and entirely submitted as 7A.    8. Lymph Node.  Received in saline labeled \"lymph node level 13\" is a 0.8 x 0.5 x 0.3 cm aggregate of anthracotic lymph node fragments which is submitted en toto as 8A.    9. Lymph Node.  Received in formalin labeled \"lymph node level 14\" is a 0.8 x 0.4 x 0.3 cm anthracotic lymph node which is submitted en toto as 9A.    jap/uso/jat/jse     10. Lung, Left Upper Lobe.  Received fresh and subsequently placed in formalin labeled \"left upper lobe\" is a 283 gram, 26.5 x 12.0 x 5.5 cm lung lobectomy specimen with a smooth tan red intact pleural surface.  The bronchus measures 1.5 cm in length and the hilar vessels average 0.5 cm in length.  The hilar margins are inked orange.  There is a palpable mass located towards the antihilar surface.  The pleural surface overlying the mass is inked blue.  Sectioning reveals that the mass is well-defined solid tan white, measuring 2.5 x 2.3 x 2.0 cm and coming to within 4 cm of " the bronchus and parenchymal margin and 2.5 cm of the nearest vascular margin.  The mass also comes to within 1 cm of the antihilar pleural surface.  The remaining parenchyma is spongy tan red.  Sectioning through the bronchial tree reveals no obvious lymph nodes.  Representative sections are submitted as follows:  10A - vascular margins en face  10B - bronchus margin en face  10C-10D - mass to the antihilar pleural surface  10E - cross section of the mass  10F - mass to normal adjacent parenchyma  10G - additional random section of the mass  10H - random normal parenchyma    jap/uso/jat/jse       CBC (No Diff) [[id number]]  (Abnormal) Collected: 07/08/22 0554    Specimen: Blood Updated: 07/08/22 0728     WBC 7.52 10*3/mm3      RBC 3.74 10*6/mm3      Hemoglobin 11.3 g/dL      Hematocrit 34.2 %      MCV 91.4 fL      MCH 30.2 pg      MCHC 33.0 g/dL      RDW 13.3 %      RDW-SD 44.4 fl      MPV 8.9 fL      Platelets 252 10*3/mm3     Basic Metabolic Panel [[id number]]  (Normal) Collected: 07/08/22 0554    Specimen: Blood Updated: 07/08/22 0715     Glucose 96 mg/dL      BUN 7 mg/dL      Creatinine 0.94 mg/dL      Sodium 136 mmol/L      Potassium 3.7 mmol/L      Chloride 99 mmol/L      CO2 27.0 mmol/L      Calcium 8.8 mg/dL      BUN/Creatinine Ratio 7.4     Anion Gap 10.0 mmol/L      eGFR 94.6 mL/min/1.73      Comment: National Kidney Foundation and American Society of Nephrology (ASN) Task Force recommended calculation based on the Chronic Kidney Disease Epidemiology Collaboration (CKD-EPI) equation refit without adjustment for race.       Narrative:      GFR Normal >60  Chronic Kidney Disease <60  Kidney Failure <15              Condition on Discharge:  good    Vital Signs  Temp:  [97.2 °F (36.2 °C)-97.9 °F (36.6 °C)] 97.3 °F (36.3 °C)  Heart Rate:  [69-79] 71  Resp:  [16-18] 16  BP: ()/(72-90) 123/90    Physical Exam:    General Appearance:    Alert, cooperative, in no acute distress   Head:    Normocephalic,  without obvious abnormality, atraumatic   Eyes:            Lids and lashes normal, conjunctivae and sclerae normal, no   icterus, no pallor, corneas clear, PERRLA   Ears:    Ears appear intact with no abnormalities noted   Throat:   No oral lesions, no thrush, oral mucosa moist   Neck:   No adenopathy, supple, trachea midline, no thyromegaly, no   carotid bruit, no JVD   Back:     No kyphosis present, no scoliosis present, no skin lesions,      erythema or scars, no tenderness to percussion or                   palpation,   range of motion normal   Lungs:     Clear to auscultation,respirations regular, even and                  unlabored    Heart:    Regular rhythm and normal rate, normal S1 and S2, no            murmur, no gallop, no rub, no click   Chest Wall:   Surgical incisions are clean, dry and intact with Dermabond.  Chest tube site covered with gauze and DuoDERM.  There is no drainage.   Abdomen:     Normal bowel sounds, no masses, no organomegaly, soft        non-tender, non-distended, no guarding, no rebound                tenderness   Rectal:     Deferred   Extremities:   Moves all extremities well, no edema, no cyanosis, no             redness   Pulses:   Pulses palpable and equal bilaterally   Skin:   No bleeding, bruising or rash   Lymph nodes:   No palpable adenopathy   Neurologic:   Cranial nerves 2 - 12 grossly intact, sensation intact, DTR       present and equal bilaterally       Discharge Disposition  Home today    Discharge Medications     Discharge Medications      New Medications      Instructions Start Date   Acetaminophen Extra Strength 500 MG tablet  Generic drug: acetaminophen   1,000 mg, Oral, 3 Times Daily      celecoxib 100 MG capsule  Commonly known as: CeleBREX   100 mg, Oral, 2 Times Daily      gabapentin 300 MG capsule  Commonly known as: NEURONTIN   300 mg, Oral, Every 8 Hours Scheduled      oxyCODONE 5 MG immediate release tablet  Commonly known as: ROXICODONE   5 mg, Oral, Every  4 Hours PRN         Continue These Medications      Instructions Start Date   amLODIPine 10 MG tablet  Commonly known as: NORVASC   10 mg, Oral, Daily      buPROPion  MG 24 hr tablet  Commonly known as: WELLBUTRIN XL   300 mg, Oral, Every Morning      desvenlafaxine 50 MG 24 hr tablet  Commonly known as: PRISTIQ   50 mg, Oral, Daily      levothyroxine 100 MCG tablet  Commonly known as: SYNTHROID, LEVOTHROID   100 mcg, Oral, Every Early Morning      liothyronine 5 MCG tablet  Commonly known as: CYTOMEL   5 mcg, Oral, Daily      LORazepam 0.5 MG tablet  Commonly known as: ATIVAN   0.5 mg, Oral, Every 8 Hours PRN      metoprolol succinate  MG 24 hr tablet  Commonly known as: TOPROL-XL   100 mg, Oral, Daily      pantoprazole 40 MG EC tablet  Commonly known as: PROTONIX   40 mg, Oral, Daily PRN      pravastatin 10 MG tablet  Commonly known as: PRAVACHOL   10 mg, Oral, Nightly      vitamin B-12 1000 MCG tablet  Commonly known as: CYANOCOBALAMIN   1,000 mcg, Oral, Daily             Discharge Instructions:  · No heavy lifting, pushing, pulling greater than 10 pounds.  · No driving up until 2 weeks after surgery and no longer taking narcotics.  · Resume home diet as tolerated.  · Continue incentive spirometer at least 4 times per day.  · Remove dressing from post chest tube site after 48 hours, may shower and clean surgical sites with antibacterial soap or hydrogen peroxide, and apply gauze dressing or band-aid as needed for any drainage.  No dressing needed once no longer draining.          Follow-up Appointments  Future Appointments   Date Time Provider Department Center   7/20/2022  3:15 PM Azucena Gaspar MD [facility] NA [facility]   7/20/2022  3:15 PM LAB MD [facility] LAB NA  [facility]   7/21/2022  1:15 PM Miryam Reyna MD [facility] NA [facility]     Additional Instructions for the Follow-ups that You Need to Schedule     XR Chest 2 View    Jul 22, 2022 (Approximate)      Exam reason: POST-OP                Test Results Pending at Discharge  Pending Labs     Order Current Status    Tissue Pathology Exam Edited          For any questions regarding patient's stay, please refer to patient's chart.    MULUGETA Stephens  [facility]  07/08/22  15:24 EDT    Patient was seen and assessed while wearing personal protective equipment including facemask, protective eyewear and gloves.  Hand hygiene performed prior to entering the room and upon exiting with doffing of gloves.    Greater than 30 minutes was spent on the unit discharging this patient, with more than 50% of time spent assessing the patient, counseling the patient on postoperative care and discharge planning.

## 2022-07-09 NOTE — OUTREACH NOTE
Prep Survey    Flowsheet Row Responses   [facility] facility patient discharged from? [facility]   Is LACE score < 7 ? No   Emergency Room discharge w/ pulse ox? No   Eligibility Readm Mgmt   Discharge diagnosis left video-assisted thoracoscopy with left upper lobectomy   Does the patient have one of the following disease processes/diagnoses(primary or secondary)? Cardiothoracic surgery   Does the patient have Home health ordered? No   Is there a DME ordered? No   Prep survey completed? Yes          GRABIEL HART - Registered Nurse

## 2022-07-11 NOTE — PAYOR COMM NOTE
"Jc Driscoll (56 y.o. Male)     ATTN: DISCHARGE SUMMARY AND OP NOTE:  REF# [id number]    [facility] DEPT: [phone], [phone]    [facility]: NPI [id number]              Date of Birth   1965    Social Security Number       Address   [address] [place] [address]    Home Phone   [phone]    MRN   [id number]       Restoration   None    Marital Status                               Admission Date   7/6/22    Admission Type   Elective    Admitting Provider   Miryam Reyna MD    Attending Provider       Department, Room/Bed   [facility] E[facility], E566/1       Discharge Date   7/8/2022    Discharge Disposition   Home or Self Care    Discharge Destination                               Attending Provider: (none)   Allergies: No Known Allergies    Isolation: None   Infection: COVID Screen (preop/placement) (07/06/22)   Code Status: Prior   Advance Care Planning Activity    Ht: 190.5 cm (75\")   Wt: 88.2 kg (194 lb 7.1 oz)    Admission Cmt: None   Principal Problem: Malignant neoplasm of upper lobe of left lung (HCC) [C34.12] More...                 Active Insurance as of 7/6/2022     Primary Coverage     Payor Plan Insurance Group Employer/Plan Group    [facility] [employer] [facility] [employer] [facility] PPO [id number]     Payor Plan Address Payor Plan Phone Number Payor Plan Fax Number Effective Dates    [address] [phone]  1/1/2014 - None Entered    [place] [address]       Subscriber Name Subscriber Birth Date Member ID       JC DRISCOLL 1965 [id number]                 Emergency Contacts      (Rel.) Home Phone Work Phone Mobile Phone    VERN DRISCOLL (Spouse) [phone] -- [phone]               History & Physical      Miryam Reyna MD at 07/06/22 0833          H&P reviewed. The patient was examined and there are no changes to the H&P.          Electronically signed by Miryam Reyna MD at 07/06/22 0834   Source Note        Chief " "Complaint  Follow-up (2 wk fu PFT )    Subjective        Jc Driscoll presents to [facility] THORACIC SURGERY  History of Present Illness     Mr. 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 He presents today after undergoing pulmonary function studies.    The patient reports he feels good. He says his lung function studies went well. He states he worked all day today in the heat and did not experience any difficulty breathing. He says he was sore after the last surgery and continues to have some discomfort.     Objective   Vital Signs:  /82 (BP Location: Right arm, Patient Position: Sitting, Cuff Size: Adult)   Pulse 77   Temp 98.4 °F (36.9 °C) (Infrared)   Resp 16   Ht 190.5 cm (75\")   Wt 89.6 kg (197 lb 9.6 oz)   SpO2 97%   BMI 24.70 kg/m²   Estimated body mass index is 24.7 kg/m² as calculated from the following:    Height as of this encounter: 190.5 cm (75\").    Weight as of this encounter: 89.6 kg (197 lb 9.6 oz).    BMI is within normal parameters. No other follow-up for BMI required.      Physical Exam  Vitals and nursing note reviewed.   Constitutional:       Appearance: He is well-developed.   HENT:      Head: Normocephalic and atraumatic.      Nose: Nose normal.   Eyes:      Conjunctiva/sclera: Conjunctivae normal.   Pulmonary:      Effort: Pulmonary effort is normal.   Musculoskeletal:         General: Normal range of motion.      Cervical back: Normal range of motion and neck supple.   Skin:     General: Skin is warm and dry.   Neurological:      Mental Status: He is alert and oriented to person, place, and time.   Psychiatric:         Behavior: Behavior normal.         Thought Content: Thought content normal.         Judgment: Judgment normal.        Result Review :           Pulmonary function studies: FEV1 3.13 or 71 percent of predicted. DLCO " 20 or 54 percent predicted.         Assessment and Plan   Diagnoses and all orders for this visit:    Mr. Driscoll is a pleasant 56-year-old gentleman with a left upper lobe non-small cell lung cancer, [id number]. He qualifies for a surgical resection of this left upper lobe lung nodule. Risks and benefits of a surgical procedure were discussed with patient today and we will proceed with resection in the near future.    1. Malignant neoplasm of upper lobe of left lung (HCC) (Primary)    2. NSCLC of left lung (HCC)     I spent 30 minutes caring for Jc on this date of service. This time includes time spent by me in the following activities:preparing for the visit, reviewing tests, obtaining and/or reviewing a separately obtained history, performing a medically appropriate examination and/or evaluation , counseling and educating the patient/family/caregiver, ordering medications, tests, or procedures, documenting information in the medical record and independently interpreting results and communicating that information with the patient/family/caregiver  Follow Up   No follow-ups on file.  Patient was given instructions and counseling regarding his condition or for health maintenance advice. Please see specific information pulled into the AVS if appropriate.     Transcribed from ambient dictation for Miryam Reyna MD by LETICIA WRIGHT.  06/16/22   16:43 EDT    Patient verbalized consent to the visit recording.        Electronically signed by Miryam Reyna MD at 06/24/22 1206             Miryam Reyna MD at 06/16/22 6806        Chief Complaint  Follow-up (2 wk fu PFT )    Subjective        Jc Driscoll presents to [facility] THORACIC SURGERY  History of Present Illness     Mr. 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 He presents today after undergoing pulmonary  "function studies.    The patient reports he feels good. He says his lung function studies went well. He states he worked all day today in the heat and did not experience any difficulty breathing. He says he was sore after the last surgery and continues to have some discomfort.     Objective   Vital Signs:  /82 (BP Location: Right arm, Patient Position: Sitting, Cuff Size: Adult)   Pulse 77   Temp 98.4 °F (36.9 °C) (Infrared)   Resp 16   Ht 190.5 cm (75\")   Wt 89.6 kg (197 lb 9.6 oz)   SpO2 97%   BMI 24.70 kg/m²   Estimated body mass index is 24.7 kg/m² as calculated from the following:    Height as of this encounter: 190.5 cm (75\").    Weight as of this encounter: 89.6 kg (197 lb 9.6 oz).    BMI is within normal parameters. No other follow-up for BMI required.      Physical Exam  Vitals and nursing note reviewed.   Constitutional:       Appearance: He is well-developed.   HENT:      Head: Normocephalic and atraumatic.      Nose: Nose normal.   Eyes:      Conjunctiva/sclera: Conjunctivae normal.   Pulmonary:      Effort: Pulmonary effort is normal.   Musculoskeletal:         General: Normal range of motion.      Cervical back: Normal range of motion and neck supple.   Skin:     General: Skin is warm and dry.   Neurological:      Mental Status: He is alert and oriented to person, place, and time.   Psychiatric:         Behavior: Behavior normal.         Thought Content: Thought content normal.         Judgment: Judgment normal.        Result Review :           Pulmonary function studies: FEV1 3.13 or 71 percent of predicted. DLCO 20 or 54 percent predicted.         Assessment and Plan   Diagnoses and all orders for this visit:    Mr. Driscoll is a pleasant 56-year-old gentleman with a left upper lobe non-small cell lung cancer, [id number]. He qualifies for a surgical resection of this left upper lobe lung nodule. Risks and benefits of a surgical procedure were discussed with patient today and we will proceed " with resection in the near future.    1. Malignant neoplasm of upper lobe of left lung (HCC) (Primary)    2. NSCLC of left lung (HCC)     I spent 30 minutes caring for Jc on this date of service. This time includes time spent by me in the following activities:preparing for the visit, reviewing tests, obtaining and/or reviewing a separately obtained history, performing a medically appropriate examination and/or evaluation , counseling and educating the patient/family/caregiver, ordering medications, tests, or procedures, documenting information in the medical record and independently interpreting results and communicating that information with the patient/family/caregiver  Follow Up   No follow-ups on file.  Patient was given instructions and counseling regarding his condition or for health maintenance advice. Please see specific information pulled into the AVS if appropriate.     Transcribed from ambient dictation for Miryam Reyna MD by LETICIA WRIGHT.  06/16/22   16:43 EDT    Patient verbalized consent to the visit recording.        Electronically signed by Miryam Reyna MD at 06/24/22 1206          Operative/Procedure Notes (all)      Miryam Reyna MD at 07/06/22 1004  Version 2 of 2         THORACOSCOPY VIDEO ASSISTED WITH LOBECTOMY  Procedure Report    Patient Name:  Jc Driscoll  YOB: 1965    Date of Surgery:  7/6/2022     Indications:  Left upper lobe NSCLC    Pre-op Diagnosis:   Malignant neoplasm of upper lobe of left lung (HCC) [C34.12]       Post-Op Diagnosis Codes:     * Malignant neoplasm of upper lobe of left lung (HCC) [C34.12]    Procedure/CPT® Codes:      Procedure(s):  Left THORACOSCOPY VIDEO ASSISTED WITH upper LOBECTOMY, mediastinal lymph node dissection, intercostal nerve block    Staff:  Surgeon(s):  Miryam Reyna MD    Assistant: Nel Villatoro CRNFA was responsible for performing the following activities: Retraction, Suction, Closing, Placing Dressing and  Held/Positioned Camera and their skilled assistance was necessary for the success of this case.     Anesthesia: General with Block    Estimated Blood Loss: 75 mL    Implants:    Implant Name Type Inv. Item Serial No.  Lot No. LRB No. Used Action   HEMOST ABS SURGICEL 2X14 - [id number] Implant HEMOST ABS SURGICEL 2X14  [employer] . Left 1 Implanted   RELOAD STPLR ENDO ROSALIND TRISTAPLE 2.0 45 ART LÁZARO CUEVAS - [id number] Implant RELOAD STPLR ENDO ROSALIND TRISTAPLE 2.0 45 ART LÁZARO CUEVAS  COVZULEMA [id number] Left 1 Implanted   RELOAD STPLR ENDO ROSALIND TRISTAPLE 2.0 45 ART LÁZARO CUEVAS - [id number] Implant RELOAD STPLR ENDO ROSALIND TRISTAPLE 2.0 45 ART LÁZARO CUEVAS  COVZULEMA [id number] Left 1 Implanted   RELOAD STPLR ENDO ROSALIND SM/DIAMTR 30MM WHT - [id number] Implant RELOAD STPLR ENDO ROSALIND SM/DIAMTR 30MM WHT  COVIDILIBERTAD [id number] Left 1 Implanted   RELOAD STPLR SIGNIA TRISTAPLE 2.0 60MM CRV ART MD/CARIE - [id number] Implant RELOAD STPLR SIGNIA TRISTAPLE 2.0 60MM CRV ART MD/CARIE MARKS [id number] Left 1 Implanted   RELOAD STPLR ENDO ROSALIND SM/DIAMTR 30MM WHT - [id number] Implant RELOAD STPLR ENDO ROSALIND SM/DIAMTR 30MM WHT  COVZULEMA [id number] Left 1 Implanted   RELOAD STPLR ENDO ROSALIND TRISTAPLE 45 ART LÁZARO GAMBLE - [id number] Implant RELOAD STPLR ENDO ROSALIND TRISTAPLE 45 ART LÁZARO MARKS [id number] Left 1 Implanted   RELOAD STPLR ENDO ROSALIND TRISTAPLE 2.0 45 ART LÁZARO CUEVAS - [id number] Implant RELOAD STPLR ENDO ROSALIND TRISTAPLE 2.0 45 ART LÁZARO MARKS [id number] Left 1 Implanted       Specimen:          Specimens     ID Source Type Tests Collected By Collected At Frozen?    A Lymph Node Tissue  TISSUE PATHOLOGY EXAM   Miryam Reyna MD 7/6/22 1032 No    Description: level 10#1    B Lymph Node Tissue  TISSUE PATHOLOGY EXAM   Miryam Reyna MD 7/6/22 1056 No    Description: lymph node level 10 #2    C Lymph Node Tissue  TISSUE PATHOLOGY EXAM   Miryam Reyna MD 7/6/22 1107 No    Description: lymph node level 5    D Lymph Node  Tissue  TISSUE PATHOLOGY EXAM   Miryam Reyna MD 7/6/22 1237 No    Description: lymph node level 8    E Lymph Node Tissue  TISSUE PATHOLOGY EXAM   Miryam Reyna MD 7/6/22 1239 No    Description: lymph node level 7    F Lymph Node Tissue  TISSUE PATHOLOGY EXAM   Miryam Reyna MD 7/6/22 1250 No    Description: lymph node level 11    G Lymph Node Tissue  TISSUE PATHOLOGY EXAM   Miryam Reyna MD 7/6/22 1251 No    Description: lymph node level 12    H Lymph Node Tissue  TISSUE PATHOLOGY EXAM   Miryam Reyna MD 7/6/22 1251 No    Description: lymph node level 13    I Lymph Node Tissue  TISSUE PATHOLOGY EXAM   Miryam Reyna MD 7/6/22 1253 No    Description: lymph node level 14    J Lung, Left Upper Lobe Tissue  TISSUE PATHOLOGY EXAM   Miryam Reyna MD 7/6/22 1254 No    Description: left upper lobe            Findings: Left upper lobe mass    Complications: None apparent    Synoptic portion:    Intent:  curative    Surgical procedure performed:  Resection of at least one lobe or bilobectomy - Lobectomy WITH mediastinal lymph node dissection    Mediastinal lymph node stations resected:  5 Subaortic, 7 Subcarinal, 8 Paraesophageal (below mellisa) and 9 Pulmonary ligament    Hilar lymph node station(s) resected:  10 Hilar, 11 Interlobar, 12 Lobar, 13 Segmental and 14 Subsegmental     Description of Procedure: Jc Driscoll was identified in the preoperative holding area and again his consent for the procedure was verified.  The patient was transported to the operating room and placed on the operating room table in supine position.  A general anesthetic was successfully administered and He was intubated with a double lumen endotracheal tube without difficulty.  Placement of the double lumen was confirmed with a video bronchoscope examination.  A time out was performed to verify correct patient, correct procedure and the correct administration of IV antibiotics per [facility] protocol.     The patient was placed in  right lateral decubitus position, left side up and all pressure points were padded.  The patient was prepped and draped in standard sterile fashion.  An approximately 2cm incision was made in the 8th intercostal space midaxillary line, dissection was carried down into the chest cavity using Bovie electrocautery under direct vision.  An juventino wound protector was placed into the incision.  The camera was inserted into the chest and the lung appears normal.    A second approximately 2cm incision was made in the anterior axillary line 5th intercostal space and the chest was entered and the rib space was opened under direct visualization.     The left upper lobe was examined and the mass was isolated in the upper lobe.  The fissural tissue was minimal and was opened with bovie electrocautery.  The hilar pleural was taken down with blunt dissection.  The superior pulmonary veins was identified, encircled and divided using an endoGIA vascular tissue load stapler.  The pulmonary artery was uncovered in the fissure.   A small branch to the upper lobe was encircled and divided using Endo ROSALIND vascular tissue load stapler.  A lymph node was identified at the bifurcation of the upper and lower lobe bronchi and was removed a dissection..  A lymph node was also identified posterior to the bronchus to the upper lobe between the bronchus and the main pulmonary artery.  This lymph node was removed to a dissection.  The bronchus was encircled and divided using Endo ROSALIND purple tissue load stapler.  A bronchoscopy was performed prior to division of the bronchus to verify that the lower lobe bronchus was not compromised or narrowed.  There were 3 remaining branches of the pulmonary artery which were encircled sequentially and divided using Endo ROSALIND vascular tissue load stapler.  The lobe was removed from the chest cavity with an Endo Catch bag.  The inferior pulmonary ligament was mobilized and an inferior pulmonary ligament node was  retrieved.  The AP window was opened and an AP window lymph node was retrieved.  The subcarinal space was opened and a subcarinal lymph node was retrieved.  The chest cavity was irrigated and there was no evidence of air leak from the bronchial stump.    A 21 gauge needle was inserted into the chest under direct visualization and 20mL of Exparel was inserted directly into the 3rd-9th rib spaces for a rib block.  A 24 Tajik chest tube was placed in the 8th intercostal space incision. The lung was reexpanded under direct visualization.  The incisions were closed using deep vicryl sutures and Monocryl for the skin in standard fashion.  The chest tube was secured to the skin.      The patient tolerated this procedure well and was extubated and transported to the recovery room in stable condition.  The counts were correct at the end of the case.           Miryam Reyna MD     Date: 7/6/2022  Time: 13:10 EDT      Electronically signed by Miryam Reyna MD at 07/06/22 1323     Miryam Reyna MD at 07/06/22 1004  Version 1 of 2         THORACOSCOPY VIDEO ASSISTED WITH LOBECTOMY  Procedure Report    Patient Name:  Jc Driscoll  YOB: 1965    Date of Surgery:  7/6/2022     Indications:  Left upper lobe NSCLC    Pre-op Diagnosis:   Malignant neoplasm of upper lobe of left lung (HCC) [C34.12]       Post-Op Diagnosis Codes:     * Malignant neoplasm of upper lobe of left lung (HCC) [C34.12]    Procedure/CPT® Codes:      Procedure(s):  Left THORACOSCOPY VIDEO ASSISTED WITH upper LOBECTOMY, mediastinal lymph node dissection, intercostal nerve block    Staff:  Surgeon(s):  Miryam Reyna MD    Assistant: Nel Villatoro CRNFA was responsible for performing the following activities: Retraction, Suction, Closing, Placing Dressing and Held/Positioned Camera and their skilled assistance was necessary for the success of this case.     Anesthesia: General with Block    Estimated Blood Loss: 75 mL    Implants:     Implant Name Type Inv. Item Serial No.  Lot No. LRB No. Used Action   HEMOST ABS SURGICEL 2X14 - [id number] Implant HEMOST ABS SURGICEL 2X14  [facility]  [employer] J [employer] J . Left 1 Implanted   RELOAD STPLR ENDO ROSALIND TRISTAPLE 2.0 45 ART LÁZARO CUEVAS - [id number] Implant RELOAD STPLR ENDO ROSALIND TRISTAPLE 2.0 45 ART LÁZARO CUEVAS  COVZULEMA [id number] Left 1 Implanted   RELOAD STPLR ENDO ROSALIND TRISTAPLE 2.0 45 ART LÁZARO CUEVAS - [id number] Implant RELOAD STPLR ENDO ROSALIND TRISTAPLE 2.0 45 ART LÁZARO CUEVAS  COVZULEMA [id number] Left 1 Implanted   RELOAD STPLR ENDO ROSALIND SM/DIAMTR 30MM WHT - [id number] Implant RELOAD STPLR ENDO ROSALIND SM/DIAMTR 30MM WHT  COVIDILIBERTAD [id number] Left 1 Implanted   RELOAD STPLR SIGNIA TRISTAPLE 2.0 60MM CRV ART MD/CARIE - [id number] Implant RELOAD STPLR SIGNIA TRISTAPLE 2.0 60MM CRV ART MD/CARIE MARKS [id number] Left 1 Implanted   RELOAD STPLR ENDO ROSALIND SM/DIAMTR 30MM WHT - [id number] Implant RELOAD STPLR ENDO ROSALIND SM/DIAMTR 30MM WHT  COVZULEMA [id number] Left 1 Implanted   RELOAD STPLR ENDO ROSALIND TRISTAPLE 45 ART LÁZARO GAMBLE - [id number] Implant RELOAD STPLR ENDO ROSALIND TRISTAPLE 45 ART LÁZARO MARKS [id number] Left 1 Implanted   RELOAD STPLR ENDO ROSALIND TRISTAPLE 2.0 45 ART LÁZARO CUEVAS - [id number] Implant RELOAD STPLR ENDO ROSALIND TRISTAPLE 2.0 45 ART LÁZARO MARKS [id number] Left 1 Implanted       Specimen:          Specimens     ID Source Type Tests Collected By Collected At Frozen?    A Lymph Node Tissue  TISSUE PATHOLOGY EXAM   Miryam Reyna MD 7/6/22 1032 No    Description: level 10#1    B Lymph Node Tissue  TISSUE PATHOLOGY EXAM   Miryam Reyna MD 7/6/22 1056 No    Description: lymph node level 10 #2    C Lymph Node Tissue  TISSUE PATHOLOGY EXAM   Miryam Reyna MD 7/6/22 1107 No    Description: lymph node level 5    D Lymph Node Tissue  TISSUE PATHOLOGY EXAM   Miryam Reyna MD 7/6/22 1237 No    Description: lymph node level 8    E Lymph Node Tissue  TISSUE PATHOLOGY EXAM   Miryam Reyna MD 7/6/22  1239 No    Description: lymph node level 7    F Lymph Node Tissue  TISSUE PATHOLOGY EXAM   Miryam Reyna MD 7/6/22 1250 No    Description: lymph node level 11    G Lymph Node Tissue  TISSUE PATHOLOGY EXAM   Miryam Reyna MD 7/6/22 1251 No    Description: lymph node level 12    H Lymph Node Tissue  TISSUE PATHOLOGY EXAM   Miryam Reyna MD 7/6/22 1251 No    Description: lymph node level 13    I Lymph Node Tissue  TISSUE PATHOLOGY EXAM   Miryam Reyna MD 7/6/22 1253 No    Description: lymph node level 14    J Lung, Left Upper Lobe Tissue  TISSUE PATHOLOGY EXAM   Miryam Reyna MD 7/6/22 1254 No    Description: left upper lobe            Findings: Left upper lobe mass    Complications: None apparent    Description of Procedure: Jc Driscoll was identified in the preoperative holding area and again his consent for the procedure was verified.  The patient was transported to the operating room and placed on the operating room table in supine position.  A general anesthetic was successfully administered and He was intubated with a double lumen endotracheal tube without difficulty.  Placement of the double lumen was confirmed with a video bronchoscope examination.  A time out was performed to verify correct patient, correct procedure and the correct administration of IV antibiotics per [facility] protocol.     The patient was placed in right lateral decubitus position, left side up and all pressure points were padded.  The patient was prepped and draped in standard sterile fashion.  An approximately 2cm incision was made in the 8th intercostal space midaxillary line, dissection was carried down into the chest cavity using Bovie electrocautery under direct vision.  An juventino wound protector was placed into the incision.  The camera was inserted into the chest and the lung appears normal.    A second approximately 2cm incision was made in the anterior axillary line 5th intercostal space and the chest was entered and the  rib space was opened under direct visualization.     The left upper lobe was examined and the mass was isolated in the upper lobe.  The fissural tissue was minimal and was opened with bovie electrocautery.  The hilar pleural was taken down with blunt dissection.  The superior pulmonary veins was identified, encircled and divided using an endoGIA vascular tissue load stapler.  The pulmonary artery was uncovered in the fissure.   A small branch to the upper lobe was encircled and divided using Endo ROSALIND vascular tissue load stapler.  A lymph node was identified at the bifurcation of the upper and lower lobe bronchi and was removed a dissection..  A lymph node was also identified posterior to the bronchus to the upper lobe between the bronchus and the main pulmonary artery.  This lymph node was removed to a dissection.  The bronchus was encircled and divided using Endo ROSALIND purple tissue load stapler.  A bronchoscopy was performed prior to division of the bronchus to verify that the lower lobe bronchus was not compromised or narrowed.  There were 3 remaining branches of the pulmonary artery which were encircled sequentially and divided using Endo ROSALIND vascular tissue load stapler.  The lobe was removed from the chest cavity with an Endo Catch bag.  The inferior pulmonary ligament was mobilized and an inferior pulmonary ligament node was retrieved.  The AP window was opened and an AP window lymph node was retrieved.  The subcarinal space was opened and a subcarinal lymph node was retrieved.  The chest cavity was irrigated and there was no evidence of air leak from the bronchial stump.    A 21 gauge needle was inserted into the chest under direct visualization and 20mL of Exparel was inserted directly into the 3rd-9th rib spaces for a rib block.  A 24 Maori chest tube was placed in the 8th intercostal space incision. The lung was reexpanded under direct visualization.  The incisions were closed using deep vicryl sutures  and Monocryl for the skin in standard fashion.  The chest tube was secured to the skin.      The patient tolerated this procedure well and was extubated and transported to the recovery room in stable condition.  The counts were correct at the end of the case.           Miryam Reyna MD     Date: 7/6/2022  Time: 13:10 EDT      Electronically signed by Miryam Reyna MD at 07/06/22 1323          Discharge Summary      Lauren Reddy APRN at 07/08/22 1158     Attestation signed by Miryam Reyna MD at 07/08/22 2970    I have reviewed this documentation and agree.                       Patient Care Team:  Reshma Alvarenga MD as PCP - General (Family Medicine)  Reshma Alvarenga MD as PCP - Family Medicine  Miryam Reyna MD as Surgeon (Thoracic Surgery)    Date of Admission: 7/6/2022   Date of Discharge:  7/8/2022    Discharge Diagnosis: Malignant neoplasm of upper lobe of left lung, non-small cell lung cancer of left lung, T1cN1 adenocarcinoma of the lung    Presenting Problem  Malignant neoplasm of upper lobe of left lung (HCC) [C34.12]  NSCLC of left lung (HCC) [C34.92]     History of Present Illness  Jc Driscoll is a 56 y.o. male who presented to [facility] thoracic surgery for evaluation of a newly diagnosed adenocarcinoma of the left upper lobe of the lung with a past history of a stage III adenocarcinoma on the right side.  Dr. Reyna evaluated the patient, radiographic imaging, pulmonary function tests and recommended a left video-assisted thoracoscopy for resection of the left upper lobe lung nodule with a left upper lobectomy.  She explained the procedure in detail to the patient, including the risks and benefits.  After answering all the patient's questions to his satisfaction, he requested she proceed with surgery.    Hospital Course  Jc Driscoll was admitted to [facility] on 7/6/2022 for a scheduled left video-assisted thoracoscopy with left upper lobectomy by Dr. Witt  Axel.  Patient tolerated the procedure well, was extubated in the operating room and after initial recovery in the postanesthesia care he was transferred to [facility] for convalescence.  Mr. Driscoll has had a satisfactory postoperative course.  His chest tube was removed on postoperative day 2.  Follow-up chest x-ray demonstrates no significant pneumothorax.  Patient is hemodynamically stable.  His pain is well controlled and he is eager to discharge home.  Prescription medication has been sent to his pharmacy.  Discharge and postoperative care instructions have been provided to the patient in detail.  He verbalizes understanding.  We will arrange for him to follow-up to see Dr. Reyna in 2 weeks with a hospital perform chest x-ray.  Final pathology is indicative of T1CN1 adenocarcinoma of the lung.    Procedures Performed  Procedure(s):  THORACOSCOPY VIDEO ASSISTED WITH LOBECTOMY       Consults:   Consults     Date and Time Order Name Status Description    7/6/2022  4:18 PM Inpatient Hospitalist Consult Completed           Pertinent Test Results:     Imaging Results (Last 24 Hours)     Procedure Component Value Units Date/Time    XR Chest 1 View [[id number]] Resulted: 07/08/22 1502     Updated: 07/08/22 1520    XR Chest 1 View [[id number]] Collected: 07/08/22 0846     Updated: 07/08/22 0850    Narrative:      XR CHEST 1 VW-clinical: Chest tube management     COMPARISON 7/7/2022     FINDINGS: Mediport catheter and left-sided chest tube in position, no  pneumothorax. Small amount subcutaneous emphysema left chest wall.  Cardiomediastinal silhouette is stable. Vague opacities demonstrated at  the right lung base, infiltrate and/or small effusion as before. No  vascular congestion. No acute airspace disease has developed.     CONCLUSION: Stable chest     This report was finalized on 7/8/2022 8:47 AM by Dr. Maximiliano Arreguin M.D.             Lab Results (last 24 hours)     Procedure Component Value Units Date/Time    Tissue  Pathology Exam [[id number]] Collected: 07/06/22 1032    Specimen: Tissue from Lymph Node; Tissue from Lymph Node; Tissue from Lymph Node; Tissue from Lymph Node; Tissue from Lymph Node; Tissue from Lymph Node; Tissue from Lymph Node; Tissue from Lymph Node; Tissue from Lymph Node; Tissue from Lung, Left Upper Lobe Updated: 07/08/22 0737     Case Report --     Surgical Pathology Report                         Case: [id number]                                  Authorizing Provider:  Miryam Reyna MD        Collected:           07/06/2022 10:32 AM          Ordering Location:     [facility]  Received:            07/06/2022 01:35 PM                                 MAIN OR                                                                      Pathologist:           Shakeel Larsen MD                                                         Specimens:   1) - Lymph Node, level 10#1                                                                         2) - Lymph Node, lymph node level 10 #2                                                             3) - Lymph Node, lymph node level 5                                                                 4) - Lymph Node, lymph node level 8                                                                 5) - Lymph Node, lymph node level 7                                                                 6) - Lymph Node, lymph node level 11                                                                7) - Lymph Node, lymph node level 12                                                                8) - Lymph Node, lymph node level 13                                                                9) - Lymph Node, lymph node level 14                                                                10) - Lung, Left Upper Lobe, left upper lobe                                                Final Diagnosis --     1. Level 10 (#1) Lymph Node:   A. Lymph node with sinus  histiocytosis and anthracosis, negative for metastatic carcinoma by routine staining       (0/1).    2. Lymph Node Level 10 (#2):     A. Lymph node with sinus histiocytosis and anthracosis, negative for metastatic carcinoma by routine staining       (0/1).    3. Lymph Node Level 5:   A. Lymph node with sinus histiocytosis and anthracosis, negative for metastatic carcinoma by routine staining       (0/1).    4. Lymph Node Level 8:   A. Lymph node with sinus histiocytosis and anthracosis, negative for metastatic carcinoma by routine staining       (0/1).    5. Lymph Node Level 6:   A. Lymph node with sinus histiocytosis and anthracosis, negative for metastatic carcinoma by routine staining       (0/1).    6. Lymph Node Level 11:   A. Lymph node with sinus histiocytosis and anthracosis, negative for metastatic carcinoma by routine staining       (0/1).    7. Lymph Node Level 12:   A. One lymph node with metastatic adenocarcinoma (1/1).    1. Focus of metastasis measures 6 mm maximally.    2. No definitive extranodal extension identified.    8. Lymph Node Level 13:   A. Lymph node with sinus histiocytosis and anthracosis, negative for metastatic carcinoma by routine staining       (0/1).    9. Lymph Node Level 14:   A. One lymph node with sinus histiocytosis and anthracosis.   B. No metastatic carcinoma identified by routine and immunostaining (see comment) (0/1).    10. Left Upper Lobe, Lobectomy:     A. Poorly differentiated acinar predominant adenocarcinoma with solid component (45%)                          and micropapillary component (5%) with extensive necrosis.   B. Invasive carcinoma measures 2.5 mm maximally.   C. Rare spread through air spaces noted.   D. Carcinoma is confined to pulmonary parenchyma and does not extend to involve the visceral pleural surface.   E. Focal lymphovascular space invasion is identified.     F. All margins appear negative for involvement by carcinoma:    1. Carcinoma comes to  within 2.5 cm of the nearest vascular margin grossly.    2. Carcinoma comes to within 4.0 cm of the nearest bronchial margin grossly.    3. Carcinoma comes to within 2.5 cm of the nearest parenchymal margin grossly.   G. Non-neoplastic lung tissue with mild to moderate emphysematous change and minimal                       chronic interstitial inflammation.    See Synoptic For Tumor Details    jat/clm       Synoptic Checklist --     LUNG  LUNG: RESECTION - All Specimens  8th Edition - Protocol posted: 11/10/2021    SPECIMEN     Procedure:    Lobectomy      Specimen Laterality:    Left     TUMOR     Tumor Focality:    Single focus      Tumor Site:    Upper lobe of lung      Tumor Size:           Total Tumor Size (size of entire tumor):    Greatest Dimension (Centimeters): 2.5 cm       Size of Invasive Component:    Greatest Dimension (Centimeters): 2.5 cm     Histologic Type:    Invasive acinar adenocarcinoma        Histologic Patterns Present:    Acinar: 50 %       Histologic Patterns Present:    Solid: 45 %       Histologic Patterns Present:    Micropapillary: 5 %     Histologic Grade:    G3, poorly differentiated      Spread Through Air Spaces (YAIR):    Present      Visceral Pleura Invasion:    Not identified      Direct Invasion of Adjacent Structures:    Not applicable (no adjacent structures present)      Treatment Effect:    No known presurgical therapy      Lymphovascular Invasion:    Present: focal     MARGINS     Margin Status for Invasive Carcinoma:    All margins negative for invasive carcinoma        Closest Margin(s) to Invasive Carcinoma:    Vascular: 2.5        Closest Margin(s) to Invasive Carcinoma:    Parenchymal: 2.5        Distance from Invasive Carcinoma to Closest Margin:    2.5 cm     Margin Status for Non-Invasive Tumor:    All margins negative for non-invasive tumor     REGIONAL LYMPH NODES     Lymph Node(s) from Prior Procedures:    No known prior lymph node sampling performed       Regional Lymph Node Status:           :    Tumor present in regional lymph node(s)          Number of Lymph Nodes with Tumor:    1          Peña Site(s) with Tumor:    12L: Lobar        Number of Lymph Nodes Examined:    9          Peña Site(s) Examined:    5: Subaortic / aortopulmonary (AP) / AP window          Peña Site(s) Examined:    6: Para-aortic (ascending aorta or phrenic)          Peña Site(s) Examined:    8L: Para-esophageal          Peña Site(s) Examined:    10L: Hilar          Peña Site(s) Examined:    11L: Interlobar          Peña Site(s) Examined:    12L: Lobar          Peña Site(s) Examined:    13L: Segmental          Peña Site(s) Examined:    14L: Subsegmental     DISTANT METASTASIS    PATHOLOGIC STAGE CLASSIFICATION (pTNM, AJCC 8th Edition)     The suffix m (or a specific number) should only be used in the setting of multifocal ground-glass / lepidic nodules that histologically present as adenocarcinomas with prominent lepidic component or multifocal tumors of same histologic type that are too numerous for individual separate synoptic report and that are not better classified as intrapulmonary metastases (e.g. numerous carcinoid tumors). Multiple primary lung cancers showing different histologic type or different morphology based on comprehensive histologic subtyping are better staged as independent tumors without m suffix.     pT Category:    pT1c      pN Category:    pN1     ADDITIONAL FINDINGS     Additional Findings:    Emphysema        Comment(s):    PD-L1 testing ([facility])on Block10 G pending        Comment --     Immunostains for CK7, P40, CK5/6, TTF1 and P63 are performed on block 10F utilizing appropriate controls. Tumor shows strong immunoreactivity for CK7 and TTF1, but is judged negative for P40, CK5/6 and P63. The results of immunostaining support a diagnosis of poorly differentiated adenocarcinoma of lung with extensive necrosis.  Predominantly acinar components with solid  "components are appreciated. Rare focal micropapillary architectural pattern is also present.  Lymph node from level 12 (part 7) is involved by metastatic adenocarcinoma.  AE1/AE3 stain was performed on lymph node from part 9 utilizing appropriate controls. No metastatic carcinoma is identified in this section of lymph node.  Tumor block 10G will be sent to [facility] for PD-L1 testing with separate report to follow.        Gross Description --     1. Lymph Node.  Received in saline labeled \"level 10 #1 lymph node\" is a 0.5 x 0.5 x 0.5 cm anthracotic lymph node which is submitted en toto as 1A.    2. Lymph Node.  Received in saline labeled \"lymph node level 10 #2\" is a 1.5 x 1.5 x 0.5 cm aggregate of anthracotic lymph node fragments which is submitted en toto as 2A.    3. Lymph Node.  Received in saline labeled \"lymph node level 5\" is a 1.2 x 1.0 x 0.5 cm aggregate of anthracotic lymph node fragments which is submitted en toto as 3A.    4. Lymph Node.  Received in saline labeled \"lymph node level 8\" is a 1.5 x 1.0 x 0.5 cm aggregate of anthracotic lymph node fragments which is submitted en toto as 4A.    5. Lymph Node.  Received in saline labeled \"lymph node level 7\" is a 0.5 x 0.4 x 0.3 cm aggregate of anthracotic lymph node fragments which is submitted en toto as 5A.    6. Lymph Node.  Received in saline labeled \"lymph node level 11\" is a 1.5 x 1.3 x 0.5 cm aggregate of anthracotic lymph node fragments which is submitted en toto as 6A.    7. Lymph Node.  Received in saline labeled \"lymph node level 12\" is a 1.5 x 1.0 x 0.8 cm anthracotic lymph node which is serially sectioned and entirely submitted as 7A.    8. Lymph Node.  Received in saline labeled \"lymph node level 13\" is a 0.8 x 0.5 x 0.3 cm aggregate of anthracotic lymph node fragments which is submitted en toto as 8A.    9. Lymph Node.  Received in formalin labeled \"lymph node level 14\" is a 0.8 x 0.4 x 0.3 cm anthracotic lymph node which is submitted en toto as " "9A.    nicolas/hannah/orlando/cheikh     10. Lung, Left Upper Lobe.  Received fresh and subsequently placed in formalin labeled \"left upper lobe\" is a 283 gram, 26.5 x 12.0 x 5.5 cm lung lobectomy specimen with a smooth tan red intact pleural surface.  The bronchus measures 1.5 cm in length and the hilar vessels average 0.5 cm in length.  The hilar margins are inked orange.  There is a palpable mass located towards the antihilar surface.  The pleural surface overlying the mass is inked blue.  Sectioning reveals that the mass is well-defined solid tan white, measuring 2.5 x 2.3 x 2.0 cm and coming to within 4 cm of the bronchus and parenchymal margin and 2.5 cm of the nearest vascular margin.  The mass also comes to within 1 cm of the antihilar pleural surface.  The remaining parenchyma is spongy tan red.  Sectioning through the bronchial tree reveals no obvious lymph nodes.  Representative sections are submitted as follows:  10A - vascular margins en face  10B - bronchus margin en face  10C-10D - mass to the antihilar pleural surface  10E - cross section of the mass  10F - mass to normal adjacent parenchyma  10G - additional random section of the mass  10H - random normal parenchyma    nicolas/hannah/orlando/cheikh       CBC (No Diff) [[id number]]  (Abnormal) Collected: 07/08/22 0554    Specimen: Blood Updated: 07/08/22 0728     WBC 7.52 10*3/mm3      RBC 3.74 10*6/mm3      Hemoglobin 11.3 g/dL      Hematocrit 34.2 %      MCV 91.4 fL      MCH 30.2 pg      MCHC 33.0 g/dL      RDW 13.3 %      RDW-SD 44.4 fl      MPV 8.9 fL      Platelets 252 10*3/mm3     Basic Metabolic Panel [[id number]]  (Normal) Collected: 07/08/22 0554    Specimen: Blood Updated: 07/08/22 0715     Glucose 96 mg/dL      BUN 7 mg/dL      Creatinine 0.94 mg/dL      Sodium 136 mmol/L      Potassium 3.7 mmol/L      Chloride 99 mmol/L      CO2 27.0 mmol/L      Calcium 8.8 mg/dL      BUN/Creatinine Ratio 7.4     Anion Gap 10.0 mmol/L      eGFR 94.6 mL/min/1.73      Comment: National " Kidney Foundation and American Society of Nephrology (ASN) Task Force recommended calculation based on the Chronic Kidney Disease Epidemiology Collaboration (CKD-EPI) equation refit without adjustment for race.       Narrative:      GFR Normal >60  Chronic Kidney Disease <60  Kidney Failure <15              Condition on Discharge:  good    Vital Signs  Temp:  [97.2 °F (36.2 °C)-97.9 °F (36.6 °C)] 97.3 °F (36.3 °C)  Heart Rate:  [69-79] 71  Resp:  [16-18] 16  BP: ()/(72-90) 123/90    Physical Exam:    General Appearance:    Alert, cooperative, in no acute distress   Head:    Normocephalic, without obvious abnormality, atraumatic   Eyes:            Lids and lashes normal, conjunctivae and sclerae normal, no   icterus, no pallor, corneas clear, PERRLA   Ears:    Ears appear intact with no abnormalities noted   Throat:   No oral lesions, no thrush, oral mucosa moist   Neck:   No adenopathy, supple, trachea midline, no thyromegaly, no   carotid bruit, no JVD   Back:     No kyphosis present, no scoliosis present, no skin lesions,      erythema or scars, no tenderness to percussion or                   palpation,   range of motion normal   Lungs:     Clear to auscultation,respirations regular, even and                  unlabored    Heart:    Regular rhythm and normal rate, normal S1 and S2, no            murmur, no gallop, no rub, no click   Chest Wall:   Surgical incisions are clean, dry and intact with Dermabond.  Chest tube site covered with gauze and DuoDERM.  There is no drainage.   Abdomen:     Normal bowel sounds, no masses, no organomegaly, soft        non-tender, non-distended, no guarding, no rebound                tenderness   Rectal:     Deferred   Extremities:   Moves all extremities well, no edema, no cyanosis, no             redness   Pulses:   Pulses palpable and equal bilaterally   Skin:   No bleeding, bruising or rash   Lymph nodes:   No palpable adenopathy   Neurologic:   Cranial nerves 2 - 12  grossly intact, sensation intact, DTR       present and equal bilaterally       Discharge Disposition  Home today    Discharge Medications     Discharge Medications      New Medications      Instructions Start Date   Acetaminophen Extra Strength 500 MG tablet  Generic drug: acetaminophen   1,000 mg, Oral, 3 Times Daily      celecoxib 100 MG capsule  Commonly known as: CeleBREX   100 mg, Oral, 2 Times Daily      gabapentin 300 MG capsule  Commonly known as: NEURONTIN   300 mg, Oral, Every 8 Hours Scheduled      oxyCODONE 5 MG immediate release tablet  Commonly known as: ROXICODONE   5 mg, Oral, Every 4 Hours PRN         Continue These Medications      Instructions Start Date   amLODIPine 10 MG tablet  Commonly known as: NORVASC   10 mg, Oral, Daily      buPROPion  MG 24 hr tablet  Commonly known as: WELLBUTRIN XL   300 mg, Oral, Every Morning      desvenlafaxine 50 MG 24 hr tablet  Commonly known as: PRISTIQ   50 mg, Oral, Daily      levothyroxine 100 MCG tablet  Commonly known as: SYNTHROID, LEVOTHROID   100 mcg, Oral, Every Early Morning      liothyronine 5 MCG tablet  Commonly known as: CYTOMEL   5 mcg, Oral, Daily      LORazepam 0.5 MG tablet  Commonly known as: ATIVAN   0.5 mg, Oral, Every 8 Hours PRN      metoprolol succinate  MG 24 hr tablet  Commonly known as: TOPROL-XL   100 mg, Oral, Daily      pantoprazole 40 MG EC tablet  Commonly known as: PROTONIX   40 mg, Oral, Daily PRN      pravastatin 10 MG tablet  Commonly known as: PRAVACHOL   10 mg, Oral, Nightly      vitamin B-12 1000 MCG tablet  Commonly known as: CYANOCOBALAMIN   1,000 mcg, Oral, Daily             Discharge Instructions:  · No heavy lifting, pushing, pulling greater than 10 pounds.  · No driving up until 2 weeks after surgery and no longer taking narcotics.  · Resume home diet as tolerated.  · Continue incentive spirometer at least 4 times per day.  · Remove dressing from post chest tube site after 48 hours, may shower and clean  surgical sites with antibacterial soap or hydrogen peroxide, and apply gauze dressing or band-aid as needed for any drainage.  No dressing needed once no longer draining.          Follow-up Appointments  Future Appointments   Date Time Provider Department Center   7/20/2022  3:15 PM Azucena Gaspar MD [facility]   7/20/2022  3:15 PM LAB MD [facility]   7/21/2022  1:15 PM Miryam Reyna MD [facility]     Additional Instructions for the Follow-ups that You Need to Schedule     XR Chest 2 View    Jul 22, 2022 (Approximate)      Exam reason: POST-OP               Test Results Pending at Discharge  Pending Labs     Order Current Status    Tissue Pathology Exam Edited          For any questions regarding patient's stay, please refer to patient's chart.    MULUGETA Stephens  [facility]  07/08/22  15:24 EDT    Patient was seen and assessed while wearing personal protective equipment including facemask, protective eyewear and gloves.  Hand hygiene performed prior to entering the room and upon exiting with doffing of gloves.    Greater than 30 minutes was spent on the unit discharging this patient, with more than 50% of time spent assessing the patient, counseling the patient on postoperative care and discharge planning.        Electronically signed by Miryam Reyna MD at 07/08/22 8764

## 2022-07-11 NOTE — CASE MANAGEMENT/SOCIAL WORK
Case Management Discharge Note      Final Note: Pt discharged home, no known needs.  ANSHU Monte RN    Provided Post Acute Provider List?: Refused  Refused Provider List Comment: declined    Selected Continued Care - Discharged on 7/8/2022 Admission date: 7/6/2022 - Discharge disposition: Home or Self Care    Destination    No services have been selected for the patient.              Durable Medical Equipment    No services have been selected for the patient.              Dialysis/Infusion    No services have been selected for the patient.              Home Medical Care    No services have been selected for the patient.              Therapy    No services have been selected for the patient.              Community Resources    No services have been selected for the patient.              Community & DME    No services have been selected for the patient.                  Transportation Services  Private: Car    Final Discharge Disposition Code: 01 - home or self-care

## 2022-07-20 ENCOUNTER — APPOINTMENT (OUTPATIENT)
Dept: LAB | Facility: HOSPITAL | Age: 57
End: 2022-07-20

## 2022-07-21 ENCOUNTER — HOSPITAL ENCOUNTER (OUTPATIENT)
Dept: GENERAL RADIOLOGY | Facility: HOSPITAL | Age: 57
Discharge: HOME | End: 2022-07-21
Admitting: NURSE PRACTITIONER

## 2022-07-21 ENCOUNTER — OFFICE VISIT (OUTPATIENT)
Dept: SURGERY | Facility: CLINIC | Age: 57
End: 2022-07-21

## 2022-07-21 VITALS
HEART RATE: 82 BPM | BODY MASS INDEX: 24.37 KG/M2 | DIASTOLIC BLOOD PRESSURE: 56 MMHG | HEIGHT: 75 IN | WEIGHT: 196 LBS | OXYGEN SATURATION: 94 % | RESPIRATION RATE: 18 BRPM | SYSTOLIC BLOOD PRESSURE: 106 MMHG | TEMPERATURE: 98 F

## 2022-07-21 DIAGNOSIS — C34.92 NSCLC OF LEFT LUNG: ICD-10-CM

## 2022-07-21 DIAGNOSIS — C34.12 MALIGNANT NEOPLASM OF UPPER LOBE OF LEFT LUNG: Primary | ICD-10-CM

## 2022-07-21 PROCEDURE — 71046 X-RAY EXAM CHEST 2 VIEWS: CPT

## 2022-07-21 PROCEDURE — 99024 POSTOP FOLLOW-UP VISIT: CPT | Performed by: NURSE PRACTITIONER

## 2022-07-21 NOTE — PROGRESS NOTES
"Chief Complaint  Post-op Follow-up (2 wk VATS w/ lobectomy 7/6/22 w/ CXR)    Subjective        Jc Driscoll presents to [facility] THORACIC SURGERY  History of Present Illness     Mr. Barajas is a pleasant 56-year-old gentleman who is status post left VATS with upper lobectomy on 7/6/2022 by Dr. Miryam Reyna.  He had a stable postoperative course and continues to do very well since discharge.  He is returning to his daily activities and is feeling well overall.  He is looking forward to returning to work at the end of August.  He has no new complaints today.    Objective   Vital Signs:  /56 (BP Location: Right arm, Patient Position: Sitting, Cuff Size: Adult)   Pulse 82   Temp 98 °F (36.7 °C) (Infrared)   Resp 18   Ht 190.5 cm (75\")   Wt 88.9 kg (196 lb)   SpO2 94%   BMI 24.50 kg/m²   Estimated body mass index is 24.5 kg/m² as calculated from the following:    Height as of this encounter: 190.5 cm (75\").    Weight as of this encounter: 88.9 kg (196 lb).    BMI is within normal parameters. No other follow-up for BMI required.      Physical Exam  Constitutional:       Appearance: Normal appearance.   HENT:      Head: Normocephalic.   Cardiovascular:      Rate and Rhythm: Normal rate.      Pulses: Normal pulses.   Pulmonary:      Effort: Pulmonary effort is normal. No respiratory distress.   Chest:      Chest wall: No tenderness.      Comments: Postoperative incisions well approximated with Dermabond intact.  Musculoskeletal:         General: Normal range of motion.      Cervical back: Normal range of motion.   Skin:     General: Skin is warm.   Neurological:      General: No focal deficit present.      Mental Status: He is alert and oriented to person, place, and time.   Psychiatric:         Mood and Affect: Mood normal.         Thought Content: Thought content normal.        Result Review :                 I have independently reviewed the chest x-ray performed prior to his appointment " today which demonstrates expected postoperative finding.  Lungs are adequately expanded bilaterally.  No pneumothorax or pleural effusion.    Assessment and Plan   Diagnoses and all orders for this visit:    1. Malignant neoplasm of upper lobe of left lung (HCC) (Primary)  -     Cancel: Ambulatory Referral to Oncology  -     CT Chest Without Contrast; Future      Mr. Driscoll is status post left VATS with left upper lobectomy on 7/6/2022.  He has recovered well.  Final pathology revealed a T1CN1 adenocarcinoma.  We will request him to follow-up with Dr. Gaspar as scheduled.  He may return to his daily activities without restriction.  We will plan to begin his pulmonary cancer surveillance with a CT of the chest 3 months from now followed by an office visit to review these findings.  Patient is advised to be seen sooner should any problems arise.         I spent 20 minutes caring for Jc on this date of service. This time includes time spent by me in the following activities:performing a medically appropriate examination and/or evaluation  and documenting information in the medical record  Follow Up   Return in about 3 months (around 10/21/2022) for Next scheduled follow up.  Patient was given instructions and counseling regarding his condition or for health maintenance advice. Please see specific information pulled into the AVS if appropriate.     Current outpatient and discharge medications have been reconciled for the patient.  Reviewed by: Samreen Decker, DNP, APRN

## 2022-07-25 NOTE — PROGRESS NOTES
Hematology/Oncology Outpatient Follow Up    PATIENT NAME:Jc Driscoll  :1965  MRN: [id number]  PRIMARY CARE PHYSICIAN: Reshma Alvarenga MD  REFERRING PHYSICIAN: Reshma Alvarenga MD    Chief Complaint   Patient presents with   • Follow-up     Non-small cell carcinoma of lung, right (HCC)        HISTORY OF PRESENT ILLNESS:     This is a 56 y.o. who developed left shoulder pain and for that reason he had a chest x-ray done on 19 which showed a 2.7 cm noncalcified right lower lobe nodule was identified.  For that reason a CT scan of the chest was recommended.  Review of his records shows patient had the CT scan on 19 done without contrast.  This basically showed a lobulated noncalcified mass in the posterior aspect of the right lower lobe measuring 3 cm close to the pleural surface.  There is a calcified 1.2 mm nodule in the lateral aspect of the right lower lobe consistent with a granuloma.  There were also calcified subcarinal and right hilar nodules.  There is a lower right side tracheal nodule measuring 1 cm.  Patient had a PET/CT scan done on 19 which showed increased metabolic activity on the right lower lobe mass that measures 2.5 cm with SUV of 4.4.  There was also increased metabolic activity in the subcarinal space measuring 2.8 cm in size with SUV of 3.4, increased activity in an enlarged right paratracheal lymph node that measures 1.9 cm, SUV of 5, also suspicious for metastatic adenopathy.  Patient had a CT-guided biopsy of the right lower lobe mass on 3/8/19.  This showed adenocarcinoma, TTF-1 positive.  On 3/18/19 patient underwent endoscopic ultrasound and biopsy of a subcarinal lymph node.  This was positive for malignant cells.  Patient was then taken back to surgery on 3/20/18 and had left Mediport placement by Dr. Fuchs.  He has been referred for further evaluation and management of his stage 3 adenocarcinoma of the right lung.  He was accompanied by his spouse for  the appointment on 3/26/19.  Patient was scheduled to have a brain MRI for complete staging, but he could not do this due to claustrophobia.  He is willing to try with the help of angiolytics.    • Patient had brain MRI done on 4/5/19.  He states it did not show any evidence of metastatic disease.  Evidence of chronic sinusitis was noted.    • Patient was seen by Dr. Escalona who has recommended concurrent chemotherapy and radiation.  Patient is scheduled to begin on 4/15/19.   • 4/17/19 - Patient was initiated on combined chemotherapy and radiation with Cisplatin and Alimta.  Patient received cycle 1.      • 5/8/19 - Patient received cycle 2 of chemotherapy with Cisplatin and Alimta.   • 5/15/19 - Chemistry panel showed creatinine of 1.7.  WBC 1.8, hemoglobin 11.6, platelet count 72,000.   • 5/20/19 - BUN 10, creatinine 1.2, potassium 3.5.    • 5/23/19 - CT scan of the chest with contrast showed interval decrease in size of the right lower lobe pulmonary nodule now measuring 2.1 cm in size.  There was no mediastinal or hilar adenopathy identified.  • 6/26/2019 patient underwent right lower lobectomy with hilar and mediastinal lymph node dissection performed by Dr. Terrance Mckeon.  • Pathology revealed residual residual 2.2 cm invasive moderately differentiated adenocarcinoma.  There were a total of 16 lymph nodes evaluated that 7 had metastatic disease.  There was evidence of lymphovascular invasion, extranodal involvement.  All the surgical margins were negative and distance of the closest margin was 2.5 cm.  Logic stage is T1c N2 M0.  • Patient is here today accompanied by his spouse for this appointment.  He continues to complain of some postop discomfort, numbness but his skin is intact.  There is no drainage.  Has had follow-up with Dr. Fuchs.  • 8/14/2019-seen by Dr. Maria at the [facility].  Dr. Maria has recommended immunotherapy with durvalumab off label use as patient has not had significant  response to neoadjuvant chemotherapy and radiation.  Patient was given the option to have immunotherapy at the [facility] vs [place] and he has chosen to stay in [place]  • 8/21/19 - Started cycle 1 day 1 Imfinzi  • 9/4/2019 patient had CT scan of the chest this shows a moderate size right pleural effusion.  There are areas of groundglass opacification in the right upper lobe without any evidence of significant septal thickening.  Volume loss noted there is also moderate pleural effusion.  There is no suspicious recurrent malignancy.  There were nonenlarged residual mediastinal lymph nodes.  • 9/9/19 - WBC 6.23, Hgb 11.7 Platelets 257,000, , BUN 9, Cr 1.1,Vitamin B12 318, Ferritin 437  • 9/23/19 - received cycle 2 day 1 Imfinzi  • 10/9/19- Seen by Dr rodríguez with ENT for sinus disease  • 11/4/2019-patient received cycle 5 of Imfinzi(durvalumab)  • 12/2/2019 patient had cycle 7 of durvalumab  • 12/5/2019-patient had CT scan of the  abdomen and pelvis with small right pleural sided pleural effusion.There is no evidence of metastatic disease  • 12/30/2019-patient received cycle 9 of durvalumab  • 12/31/2019-patient had CT scan of the chest showed small to moderate left pleural effusion.  Iglesias appearing right lower paratracheal and right peribronchial soft tissue thickening without any discrete pathologically enlarged mediastinal or hilar lymph nodes.  • 1/27/20-patient received cycle 11 of durvalumab  • 2/17/20-patient had a stress test which was negative for ischemia  • 2/17/2020-patient had CT of the chest which showed postop changes on the right lung, right pleural effusion but no enlarging mass was noted  • 3/2/2020-patient received cycle 12 of durvalumab  • 3/16/2020-patient had ultrasound-guided right thoracentesis.  Pathology was negative for malignancy  • 4/13/2020-patient received cycle 15 of immunotherapy with durvalumab  • 5/11/2020-patient received cycle 17 of immunotherapy with  durvalumab  • 6/9/2020-patient had CT scan of the chest, abdomen and pelvis for lung cancer restaging.  There is stable small chronic loculated right basilar pleural effusion suggesting chronic pleuritis.  There is no evidence of metastatic disease in the abdomen or pelvis  • 7/20/2020 patient received cycle 22 of Durvalumab  • 8/17/2020 patient received cycle 24 and last cycle of durvalumab  • 9/24/2020 patient had CT scan of the chest, abdomen and pelvis for cancer restaging there was no evidence of local recurrence or metastatic disease within the abdomen and pelvis.  He has stable chronic small right pleural effusion.  Mild sigmoid diverticulosis was noted.  • 1/18/2021 patient had CT scan of the chest, abdomen and pelvis reviewed patient  • 5/24/2021 patient had CT scan of the chest calcified subcarinal lymph node consistent with changes of prior granulomatous disease.  Chronic small right pleural effusion is noted similar to prior exam.  Previously shown 4 mm nodule in the left lower lobe has nearly completely resolved.  The subpleural 3 mm nodule located within the posterior left lower lobe With resolved.  • 9/24/2021 patient had a CT scan of the chest, abdomen and pelvis there is soft tissue attenuation within the right paratracheal area which has been suggested to reflect sequela to previous treatment.  There is slight thickening of the bladder wall otherwise there is no evidence of metastatic disease.  • 12/27/2021 patient had CT scan of the chest with contrast this basically showed irregular shaped noncalcified 7 mm left upper lobe pulmonary nodule.  PET CT scan was recommended to further evaluate.  • 1/26/2022 patient had a PET CT scan done which basically showed evidence of mild uptake corresponding to the nodule within the left upper lobe which is new worrisome for developing metastatic focus.  There was mild radiopharmaceutical activity corresponding to pleural thickening throughout the right lung  base with associated pleural effusion likely related to post therapeutic changes and radiation changes in this region.  Metastatic malignancy involving the pleura could not be completely eliminated.  • 1/26/2022: CT-guided biopsy was aborted due to finding by the radiologist that the lung nodularity was actually a clump of tiny nodules of which the largest was 6 mm and therefore biopsy could not be completed.  Also the area was in the vicinity of multiple blood vessels with increased risk of bleeding.  Follow-up CT of the chest was recommended for continued surveillance  • 5/18/2022 patient had CT-guided biopsy of the left enlarging nodule, pathology was positive for invasive poorly differentiated adenocarcinoma most consistent with adenocarcinoma of pulmonary origin.  • 5/27/2022 patient had a PET CT scan which showed a 2.3 cm hypermetabolic left upper lobe nodule which has increased in size no convincing evidence of metastatic disease elsewhere within the chest.  • 5/21/2022 patient had brain MRI which did not show any evidence of intracranial metastasis  • 6/2/2022 patient was seen by Dr. Miryam Reyna pulmonary function test is being done to assess surgical candidacy  • 7/6/2022 patient underwent left heart [place] P video-assisted with upper lobectomy mediastinal lymph node dissection performed by Dr. Miryam Roach  • Final pathology revealed poorly differentiated  • A predominant adenocarcinoma with solid component presenting 45% and micropapillary component 5% with extensive necrosis, invasive carcinoma measures 2.5 maximally there was focal lymphovascular space invasion all margins were negative for malignancy discussed with the closest margin was 2.5 cm pathology stage is pT1C pN1.  PD-L1 showed a tumor proportion score of 95%    Past Medical History:   Diagnosis Date   • Allergic rhinitis 07/25/2013    Overview:  4/2/2018 - still zyrtec 10 daily (if stops, gets dermatitis again).    • Anxiety and depression  06/05/2012    Overview:  4/2/2018 -   C/w well 300 xr and Lito 20.  4/8/2019 -   Doing ok even with new lung cancer - lito 20 & well 300xr.    • Chronic pansinusitis 04/08/2019    Overview:  4/8/2019 -   MRI showed chronic thick in frontal, maxillary, ethmoidal ---> to Dr. COLLAZO to est since abx ICC didn't help.  Does netipot bid.    • Diastolic CHF (HCC) 07/09/2014    Overview:  7/4/14 - impaired LV relaxation on [place] ECHO.  EF 60%. Rest normal   • Dupuytren's contracture of both hands    • Elevated cholesterol    • Erosive esophagitis 07/09/2019    Overview:  EGD 2016 4/2/2018 - nexium OTC daily for few week.  Qod before that.  Now carbonation hurts, epigastric pain 4/8/2019 -   protonix 40 doing well.    • Gastroesophageal reflux disease 02/21/2019   • Hiatal hernia 07/09/2019   • History of colon polyps    • Hypertension    • Lung cancer (HCC)     right lung and in lymph nodes x2   • Mediastinal lymphadenopathy 03/12/2019   • Normocytic anemia 08/08/2019    Overview:  6/2019 - dropped to 9's postop 7/2019 - rebounded to 10's.  8/8/2019 -   Back to 9's - check ferritin, b12 and thyroid.    • Prediabetes 07/09/2014    Overview:  7/4/14 - a1c 6.1 at [place] admission   • Retention of urine 06/27/2019    PSOT OP, RESOLVED       Past Surgical History:   Procedure Laterality Date   • BRONCHOSCOPY  03/18/2019    EBUS MONTERO NEEDLE BX   • COLONOSCOPY     • DUPUYTREN CONTRACTURE RELEASE Bilateral    • LUNG BIOPSY  03/08/2019    CT GUIDED   • LUNG REMOVAL, PARTIAL Right     right lower   • PORTACATH PLACEMENT  03/20/2019    DR LONGORIA   • THORACOSCOPY Right 6/26/2019    Procedure: RIGHT VATS, OPEN LOWER LOBECTOMY WITH LYMPH NODE DISECTION, WEDGE RESECTION OF RIGHT MIDDLE LOBE;  Surgeon: Terrance Longoria MD;  Location: [facility] OR;  Service: Cardiothoracic   • THORACOSCOPY VIDEO ASSISTED WITH LOBECTOMY Left 7/6/2022    Procedure: THORACOSCOPY VIDEO ASSISTED WITH LOBECTOMY;  Surgeon: Miryam Reyna MD;  Location: [facility]  OR;  Service: Thoracic;  Laterality: Left;         Current Outpatient Medications:   •  amLODIPine (NORVASC) 10 MG tablet, Take 10 mg by mouth Daily., Disp: , Rfl:   •  amLODIPine (NORVASC) 5 MG tablet, Take 5 mg by mouth Daily., Disp: , Rfl:   •  buPROPion XL (WELLBUTRIN XL) 300 MG 24 hr tablet, Take 300 mg by mouth Every Morning., Disp: , Rfl:   •  celecoxib (CeleBREX) 100 MG capsule, Take 1 capsule by mouth 2 (Two) Times a Day for 30 days., Disp: 60 capsule, Rfl: 0  •  desvenlafaxine (PRISTIQ) 50 MG 24 hr tablet, Take 50 mg by mouth Daily., Disp: , Rfl:   •  gabapentin (NEURONTIN) 300 MG capsule, Take 1 capsule by mouth Every 8 (Eight) Hours for 30 days., Disp: 90 capsule, Rfl: 0  •  levothyroxine (SYNTHROID, LEVOTHROID) 100 MCG tablet, Take 100 mcg by mouth Every Morning., Disp: , Rfl:   •  liothyronine (CYTOMEL) 5 MCG tablet, Take 5 mcg by mouth Daily., Disp: , Rfl:   •  LORazepam (ATIVAN) 0.5 MG tablet, Take 0.5 mg by mouth Every 8 (Eight) Hours As Needed., Disp: , Rfl:   •  metoprolol succinate XL (TOPROL-XL) 100 MG 24 hr tablet, Take 100 mg by mouth Daily., Disp: , Rfl:   •  oxyCODONE (Roxicodone) 5 MG immediate release tablet, Take 1 tablet by mouth Every 6 (Six) Hours As Needed for Moderate Pain ., Disp: 45 tablet, Rfl: 0  •  pantoprazole (PROTONIX) 40 MG EC tablet, Take 40 mg by mouth Daily As Needed., Disp: , Rfl:   •  pravastatin (PRAVACHOL) 10 MG tablet, Take 10 mg by mouth Every Night., Disp: , Rfl:   •  vitamin B-12 (CYANOCOBALAMIN) 1000 MCG tablet, Take 1,000 mcg by mouth Daily., Disp: , Rfl:   •  lidocaine-prilocaine (EMLA) 2.5-2.5 % cream, Apply 1 application topically to the appropriate area as directed Take As Directed. Apply to port area 30 mins prior to port access, Disp: 30 g, Rfl: 6  No current facility-administered medications for this visit.    Facility-Administered Medications Ordered in Other Visits:   •  alteplase (CATHFLO/ACTIVASE) injection 2 mg, 2 mg, Intracatheter, Q2H PRN, Chasity,  Azucena Bhakta MD, New Syringe/Cartridge at 22 1032  •  heparin injection 500 Units, 500 Units, Intravenous, PRChasity LONGO Ifeoma Roseline, MD, 500 Units at 22 1113  •  sodium chloride 0.9 % flush 20 mL, 20 mL, Intravenous, PRN, Azucena Gaspar MD, 20 mL at 22 1113    No Known Allergies    Family History   Problem Relation Age of Onset   • Cancer Mother         cervical cancer   • Cervical cancer Mother    • Cancer Father         lung cancer   • Lung cancer Father    • Lung cancer Sister    • Cancer Sister         lung cancer   • Malig Hyperthermia Neg Hx        Cancer-related family history includes Cancer in his father, mother, and sister; Cervical cancer in his mother; Lung cancer in his father and sister.    Social History     Tobacco Use   • Smoking status: Former Smoker     Types: Cigarettes     Quit date: 2009     Years since quittin.8   • Smokeless tobacco: Never Used   Vaping Use   • Vaping Use: Never used   Substance Use Topics   • Alcohol use: Yes     Alcohol/week: 2.0 standard drinks     Types: 2 Cans of beer per week     Comment: Occasional   • Drug use: No     I have reviewed and confirmed the accuracy of the patient's history:  as entered by the MA/LPN/RN. Azucena Gaspar MD 22     SUBJECTIVE:      Patient is here today for routine follow-up.  He has healed well from his surgical procedure      REVIEW OF SYSTEMS:    Review of Systems   Constitutional: Negative for chills and fever.   HENT: Negative for ear pain, mouth sores, nosebleeds and sore throat.    Eyes: Negative for photophobia and visual disturbance.   Respiratory: Negative for wheezing and stridor.    Cardiovascular: Negative for chest pain and palpitations.   Gastrointestinal: Negative for abdominal pain, diarrhea, nausea and vomiting.   Endocrine: Negative for cold intolerance and heat intolerance.   Genitourinary: Negative for dysuria and hematuria.   Musculoskeletal: Negative for joint  "swelling and neck stiffness.   Skin: Negative for color change and rash.   Neurological: Negative for seizures and syncope.   Hematological: Negative for adenopathy.   Psychiatric/Behavioral: Negative for agitation, confusion and hallucinations.       I have reviewed and confirmed the accuracy of the ROS as documented by the MA/LPN/RN Azucena Gaspar MD        OBJECTIVE:  Vitals:    07/26/22 0909   BP: 119/79   Pulse: 85   Temp: 96.8 °F (36 °C)   SpO2: 98%   Weight: 87.9 kg (193 lb 12.8 oz)   Height: 190.5 cm (75\")   PainSc: 0-No pain     ECOG    Eastern Cooperative Oncology Group (ECOG, [place], World Health Organization) performance scale  0 Fully active; no performance restrictions.  1 Strenuous physical activity restricted; fully ambulatory and able to carry out light work.  2 Capable of all self-care but unable to carry out any work activities. Up and about >50% of waking hours.  3 Capable of only limited self-care; confined to bed or chair >50% of waking hours.  4 Completely disabled; cannot carry out any self-care; totally confined to bed or chair.    Physical Exam   Constitutional: He is oriented to person, place, and time. He appears well-developed. No distress.   HENT:   Head: Normocephalic and atraumatic.   Right Ear: External ear normal.   Left Ear: External ear normal.   Nose: Nose normal.   Eyes: Pupils are equal, round, and reactive to light. Conjunctivae are normal. Right eye exhibits no discharge. Left eye exhibits no discharge. No scleral icterus.   Neck: No thyromegaly present.   Cardiovascular: Normal rate, regular rhythm and normal heart sounds. Exam reveals no gallop and no friction rub.   Pulmonary/Chest: Effort normal. No stridor. No respiratory distress. He has no wheezes.   Abdominal: Soft. Bowel sounds are normal. He exhibits no mass. There is no abdominal tenderness. There is no rebound and no guarding.   Musculoskeletal: Normal range of motion. No tenderness.   Lymphadenopathy:     " He has no cervical adenopathy.   Neurological: He is alert and oriented to person, place, and time. He exhibits normal muscle tone.   Skin: Skin is warm. No rash noted. He is not diaphoretic. No erythema.   Urticarial-like rash, right medial knee likely due to immunotherapy   Psychiatric: His behavior is normal. Judgment and thought content normal.   Nursing note and vitals reviewed.    I have reexamined the patient and the results are consistent with the previously documented exam. Azucenatamar Gaspar MD     RECENT LABS    WBC   Date Value Ref Range Status   07/26/2022 9.41 3.40 - 10.80 10*3/mm3 Final   03/22/2022 7.84 4.5 - 11.0 10*3/uL Final     RBC   Date Value Ref Range Status   07/26/2022 4.16 4.14 - 5.80 10*6/mm3 Final   03/22/2022 4.23 (L) 4.5 - 5.9 10*6/uL Final     Hemoglobin   Date Value Ref Range Status   07/26/2022 12.7 (L) 13.0 - 17.7 g/dL Final   03/22/2022 12.9 (L) 13.5 - 17.5 g/dL Final     Hematocrit   Date Value Ref Range Status   07/26/2022 38.5 37.5 - 51.0 % Final   03/22/2022 38.8 (L) 41.0 - 53.0 % Final     MCV   Date Value Ref Range Status   07/26/2022 92.5 79.0 - 97.0 fL Final   03/22/2022 91.7 80.0 - 100.0 fL Final     MCH   Date Value Ref Range Status   07/26/2022 30.5 26.6 - 33.0 pg Final   03/22/2022 30.5 26.0 - 34.0 pg Final     MCHC   Date Value Ref Range Status   07/26/2022 33.0 31.5 - 35.7 g/dL Final   03/22/2022 33.2 31.0 - 37.0 g/dL Final     RDW   Date Value Ref Range Status   07/26/2022 13.5 12.3 - 15.4 % Final   03/22/2022 14.2 12.0 - 16.8 % Final     RDW-SD   Date Value Ref Range Status   07/26/2022 44.3 37.0 - 54.0 fl Final     MPV   Date Value Ref Range Status   07/26/2022 8.4 6.0 - 12.0 fL Final   03/22/2022 9.3 8.4 - 12.4 fL Final     Platelets   Date Value Ref Range Status   07/26/2022 390 140 - 450 10*3/mm3 Final   03/22/2022 324 140 - 440 10*3/uL Final     Neutrophil Rel %   Date Value Ref Range Status   03/22/2022 63.1 45 - 80 % Final     Neutrophil %   Date Value  Ref Range Status   07/26/2022 53.3 42.7 - 76.0 % Final     Lymphocyte Rel %   Date Value Ref Range Status   03/22/2022 16.2 15 - 50 % Final     Lymphocyte %   Date Value Ref Range Status   07/26/2022 10.4 (L) 19.6 - 45.3 % Final     Monocyte Rel %   Date Value Ref Range Status   03/22/2022 13.6 0 - 15 % Final     Monocyte %   Date Value Ref Range Status   07/26/2022 13.4 (H) 5.0 - 12.0 % Final     Eosinophil %   Date Value Ref Range Status   07/26/2022 22.0 (H) 0.3 - 6.2 % Final   03/22/2022 5.5 0 - 7 % Final     Basophil Rel %   Date Value Ref Range Status   03/22/2022 1.1 0 - 2 % Final     Basophil %   Date Value Ref Range Status   07/26/2022 0.9 0.0 - 1.5 % Final     Immature Grans %   Date Value Ref Range Status   07/07/2022 0.4 0.0 - 0.5 % Final   03/22/2022 0.5 0.0 - 1.0 % Final     Neutrophils Absolute   Date Value Ref Range Status   03/22/2022 4.94 2.0 - 8.8 10*3/uL Final     Neutrophils, Absolute   Date Value Ref Range Status   07/26/2022 5.02 1.70 - 7.00 10*3/mm3 Final     Lymphocytes Absolute   Date Value Ref Range Status   03/22/2022 1.27 0.7 - 5.5 10*3/uL Final     Lymphocytes, Absolute   Date Value Ref Range Status   07/26/2022 0.98 0.70 - 3.10 10*3/mm3 Final     Monocytes Absolute   Date Value Ref Range Status   03/22/2022 1.07 0.0 - 1.7 10*3/uL Final     Monocytes, Absolute   Date Value Ref Range Status   07/26/2022 1.26 (H) 0.10 - 0.90 10*3/mm3 Final     Eosinophils Absolute   Date Value Ref Range Status   03/22/2022 0.43 0.0 - 0.8 10*3/uL Final     Eosinophils, Absolute   Date Value Ref Range Status   07/26/2022 2.07 (H) 0.00 - 0.40 10*3/mm3 Final     Basophils Absolute   Date Value Ref Range Status   03/22/2022 0.09 0.0 - 0.2 10*3/uL Final     Basophils, Absolute   Date Value Ref Range Status   07/26/2022 0.08 0.00 - 0.20 10*3/mm3 Final     Immature Grans, Absolute   Date Value Ref Range Status   07/07/2022 0.05 0.00 - 0.05 10*3/mm3 Final   03/22/2022 0.04 0.00 - 0.10 10*3/uL Final     nRBC   Date  Value Ref Range Status   07/07/2022 0.0 0.0 - 0.2 /100 WBC Final       Lab Results   Component Value Date    GLUCOSE 96 07/08/2022    BUN 7 07/08/2022    CREATININE 0.94 07/08/2022    EGFRIFNONA 100 01/28/2022    EGFRIFAFRI >60 05/20/2019    BCR 7.4 07/08/2022    K 3.7 07/08/2022    CO2 27.0 07/08/2022    CALCIUM 8.8 07/08/2022    ALBUMIN 4.6 03/22/2022    LABIL2 1.5 03/22/2022    AST 24 03/22/2022    ALT 18 03/22/2022         ASSESSMENT:    1. 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  pT1 cpN1 M0 consistent with stage IIb disease, found on surveillance CT scans. Brain MRI was negative.we will recommend platinum doublet followed by Tecentriq unless he has EGFR mutation for which adjuvant osimertinib will be considered.  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  Patient was treated with cisplatin and Alimta in the adjuvant setting for his original malignancy  2. High PD-L1 expression at 95%.  We will complete NGS testing to find other molecular markers    3. Adenocarcinoma of the right lung, T1c N2 M0, consistent with clinical stage 3A disease.    4. Enlarging left upper lobe nodule 2.2 cm concerning for malignancy, biopsy-proven  5. Pneumothorax following left lung biopsy status post hospitalization  6. S/P combined chemotherapy and radiation with cisplatin and Alimta.  Surveillance CT scan  7. Recent diagnosis of hypothyroidism, on thyroid replacement therapy  8. Status post right lower lobectomy with mediastinal and hilar lymph node dissection with residual disease.  pT1 cN2 M0.  Patient with high risk features including lymphovascular invasion, extranodal capsular extension and persistent multiple lymph node disease.  Ongoing management  9. Status post consolidation treatment with  immunotherapy with Imfinzi:  10. Port maintenance: Patient still wants to keep port in little longer.  11. fatigue: We will check thyroid function tests  12. Satus post right thoracentesis with cytology negative for malignancy  13. Postop anemia: Reviewed  14. Assessment has been reviewed and updated      PLANS:    1. Patient already has a port  2. Platinum doublet followed by Tecentriq or targeted therapy depending on NGS testing followed by adjuvant durvalumab.  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  Patient was treated with cisplatin and Alimta in the adjuvant setting for his original malignancy  3. Send tissue out for PD-L1 testing, look for EGFR mutation which could mean targeted therapy in the adjuvant setting for him  4. Reviewed side effects benefits of chemotherapy with him.  Patient will be scheduled for chemo education with nurse practitioner  5. Patient was given information on cisplatin and paclitaxel chemotherapy along with Neulasta  6. Emla cream to port  7. Continue thyroid replacement therapy through his PCP  8. Continue B12 injections  monthly: Patient did have elevated methylmalonic acid level during the early phases of his treatments.  Continue the same  9. Continue supportive care  10. All questions answered  11. Follow up in 2 weeks or earlier as needed         Patient has  questions which have all been answered to the best of my abilities    I have reviewed labs results, imaging, vitals, and medications with the patient today.     Patient verbalized understanding and is in agreement of the above plan.        I spent 40 total minutes, face-to-face, caring for Jc today.  90% of this time involved counseling and/or coordination of care as documented within this note.

## 2022-07-26 ENCOUNTER — TELEPHONE (OUTPATIENT)
Dept: ONCOLOGY | Facility: CLINIC | Age: 57
End: 2022-07-26

## 2022-07-26 ENCOUNTER — HOSPITAL ENCOUNTER (OUTPATIENT)
Dept: ONCOLOGY | Facility: HOSPITAL | Age: 57
Setting detail: INFUSION SERIES
Discharge: HOME | End: 2022-07-26

## 2022-07-26 ENCOUNTER — OFFICE VISIT (OUTPATIENT)
Dept: ONCOLOGY | Facility: CLINIC | Age: 57
End: 2022-07-26

## 2022-07-26 ENCOUNTER — LAB (OUTPATIENT)
Dept: LAB | Facility: HOSPITAL | Age: 57
End: 2022-07-26

## 2022-07-26 ENCOUNTER — READMISSION MANAGEMENT (OUTPATIENT)
Dept: CALL CENTER | Facility: HOSPITAL | Age: 57
End: 2022-07-26

## 2022-07-26 VITALS
WEIGHT: 193.8 LBS | DIASTOLIC BLOOD PRESSURE: 79 MMHG | BODY MASS INDEX: 24.1 KG/M2 | TEMPERATURE: 96.8 F | SYSTOLIC BLOOD PRESSURE: 119 MMHG | OXYGEN SATURATION: 98 % | HEART RATE: 85 BPM | HEIGHT: 75 IN

## 2022-07-26 DIAGNOSIS — C34.91 NON-SMALL CELL CARCINOMA OF LUNG, RIGHT: Primary | ICD-10-CM

## 2022-07-26 DIAGNOSIS — C34.91 NON-SMALL CELL CARCINOMA OF LUNG, RIGHT: ICD-10-CM

## 2022-07-26 DIAGNOSIS — C34.12 MALIGNANT NEOPLASM OF UPPER LOBE OF LEFT LUNG: ICD-10-CM

## 2022-07-26 DIAGNOSIS — Z45.2 ENCOUNTER FOR CARE RELATED TO VASCULAR ACCESS PORT: Primary | ICD-10-CM

## 2022-07-26 LAB
BASOPHILS # BLD AUTO: 0.08 10*3/MM3 (ref 0–0.2)
BASOPHILS NFR BLD AUTO: 0.9 % (ref 0–1.5)
DEPRECATED RDW RBC AUTO: 44.3 FL (ref 37–54)
EOSINOPHIL # BLD AUTO: 2.07 10*3/MM3 (ref 0–0.4)
EOSINOPHIL NFR BLD AUTO: 22 % (ref 0.3–6.2)
ERYTHROCYTE [DISTWIDTH] IN BLOOD BY AUTOMATED COUNT: 13.5 % (ref 12.3–15.4)
HCT VFR BLD AUTO: 38.5 % (ref 37.5–51)
HGB BLD-MCNC: 12.7 G/DL (ref 13–17.7)
LYMPHOCYTES # BLD AUTO: 0.98 10*3/MM3 (ref 0.7–3.1)
LYMPHOCYTES NFR BLD AUTO: 10.4 % (ref 19.6–45.3)
MCH RBC QN AUTO: 30.5 PG (ref 26.6–33)
MCHC RBC AUTO-ENTMCNC: 33 G/DL (ref 31.5–35.7)
MCV RBC AUTO: 92.5 FL (ref 79–97)
MONOCYTES # BLD AUTO: 1.26 10*3/MM3 (ref 0.1–0.9)
MONOCYTES NFR BLD AUTO: 13.4 % (ref 5–12)
NEUTROPHILS NFR BLD AUTO: 5.02 10*3/MM3 (ref 1.7–7)
NEUTROPHILS NFR BLD AUTO: 53.3 % (ref 42.7–76)
PLATELET # BLD AUTO: 390 10*3/MM3 (ref 140–450)
PMV BLD AUTO: 8.4 FL (ref 6–12)
RBC # BLD AUTO: 4.16 10*6/MM3 (ref 4.14–5.8)
WBC NRBC COR # BLD: 9.41 10*3/MM3 (ref 3.4–10.8)

## 2022-07-26 PROCEDURE — 25010000002 HEPARIN LOCK FLUSH PER 10 UNITS: Performed by: INTERNAL MEDICINE

## 2022-07-26 PROCEDURE — 25010000002 ALTEPLASE 2 MG RECONSTITUTED SOLUTION

## 2022-07-26 PROCEDURE — 85025 COMPLETE CBC W/AUTO DIFF WBC: CPT

## 2022-07-26 PROCEDURE — 99215 OFFICE O/P EST HI 40 MIN: CPT | Performed by: INTERNAL MEDICINE

## 2022-07-26 PROCEDURE — 36593 DECLOT VASCULAR DEVICE: CPT

## 2022-07-26 RX ORDER — SODIUM CHLORIDE 0.9 % (FLUSH) 0.9 %
20 SYRINGE (ML) INJECTION AS NEEDED
Status: DISCONTINUED | OUTPATIENT
Start: 2022-07-26 | End: 2022-07-28 | Stop reason: HOSPADM

## 2022-07-26 RX ORDER — SODIUM CHLORIDE 0.9 % (FLUSH) 0.9 %
20 SYRINGE (ML) INJECTION AS NEEDED
Status: CANCELLED | OUTPATIENT
Start: 2022-07-26

## 2022-07-26 RX ORDER — LIDOCAINE AND PRILOCAINE 25; 25 MG/G; MG/G
1 CREAM TOPICAL TAKE AS DIRECTED
Qty: 30 G | Refills: 6 | Status: SHIPPED | OUTPATIENT
Start: 2022-07-26 | End: 2022-12-05

## 2022-07-26 RX ORDER — HEPARIN SODIUM (PORCINE) LOCK FLUSH IV SOLN 100 UNIT/ML 100 UNIT/ML
500 SOLUTION INTRAVENOUS AS NEEDED
Status: DISCONTINUED | OUTPATIENT
Start: 2022-07-26 | End: 2022-07-28 | Stop reason: HOSPADM

## 2022-07-26 RX ORDER — AMLODIPINE BESYLATE 5 MG/1
5 TABLET ORAL DAILY
COMMUNITY
Start: 2022-07-25 | End: 2022-12-05

## 2022-07-26 RX ORDER — HEPARIN SODIUM (PORCINE) LOCK FLUSH IV SOLN 100 UNIT/ML 100 UNIT/ML
500 SOLUTION INTRAVENOUS AS NEEDED
Status: CANCELLED | OUTPATIENT
Start: 2022-07-26

## 2022-07-26 RX ADMIN — Medication 20 ML: at 11:13

## 2022-07-26 RX ADMIN — ALTEPLASE: 2.2 INJECTION, POWDER, LYOPHILIZED, FOR SOLUTION INTRAVENOUS at 10:32

## 2022-07-26 RX ADMIN — HEPARIN 500 UNITS: 100 SYRINGE at 11:13

## 2022-07-26 RX ADMIN — Medication 30 ML: at 09:32

## 2022-07-26 NOTE — OUTREACH NOTE
CT Surgery Week 3 Survey    Flowsheet Row Responses   [facility] patient discharged from? [place]   Does the patient have one of the following disease processes/diagnoses(primary or secondary)? Cardiothoracic surgery   Week 3 attempt successful? Yes   Call start time [id number]   Call end time 1635   Discharge diagnosis left video-assisted thoracoscopy with left upper lobectomy   Meds reviewed with patient/caregiver? Yes   Is the patient having any side effects they believe may be caused by any medication additions or changes? No   Is the patient taking all medications as directed (includes completed medication regime)? Yes   Has the patient kept scheduled appointments due by today? Yes   Psychosocial issues? No   Comments Pt has some SOA but is improving. No other issues.   What is the patient's perception of their health status since discharge? Returned to baseline/stable   Nursing interventions Reassured on normal signs of recovery   If the patient is a current smoker, are they able to teach back resources for cessation? Not a smoker   Week 3 call completed? Yes   Revoked No further contact(revokes)-requires comment   Is the patient interested in additional calls from an ambulatory ?  NOTE:  applies to high risk patients requiring additional follow-up. No   Graduated/Revoked comments debbie PONCE - Registered Nurse

## 2022-07-26 NOTE — PATIENT INSTRUCTIONS
"Cisplatin        (SIS raffi tin)    Trade Names: Platinol®, Platinol®-AQ    Other Name: CDDP    Cisplatin is the generic name for the trade name drug Platinol® and Platinol®-AQ. In some cases, health care professionals may use the trade name Platinol® and Platinol®-AQ, or other names such as CDDP, when referring to the generic drug name cisplatin.    Drug Type:    Cisplatin is an anti-cancer (\"antineoplastic\" or \"cytotoxic\") chemotherapy drug. This medication is classified as an \"alkylating agent.\" (For more detail, see \"How Cisplatin Works\" section below).    What Cisplatin Is Used For:  Treatment of advanced bladder cancer, metastatic ovarian cancer, and metastatic testicular cancer. Testicular, ovarian, bladder, head and neck, esophageal, small and non-small cell lung, breast, cervical, stomach and prostate cancers. Also to treat Hodgkin's and non-Hodgkin's lymphomas, neuroblastoma, sarcomas, multiple myeloma, melanoma, and mesothelioma.  Note: If a drug has been approved for one use, physicians may elect to use this same drug for other problems if they believe it may be helpful.    How Cisplatin Is Given:  Cisplatin is administered through a vein (intravenously or IV) as an infusion.  There is no pill form of cisplatin.  Cisplatin is an irritant. An irritant is a chemical that can cause inflammation of the vein through which it is given.  If cisplatin escapes from the vein it can cause tissue damage. The nurse or doctor who gives cisplatin must be carefully trained. If you experience pain or notice redness or swelling at the IV site while you are receiving cisplatin, alert your health care professional immediately.  Before and/or after the cisplatin infusion, extra IV fluids are given and care is taken to ensure adequate hydration before both during and after cisplatin, to protect your kidney function.  Cisplatin also has been used as an infusion into the abdominal cavity (contains the abdominal organs).  The " amount of cisplatin that you receive depends on many factors, including your height and weight, your general health or other health problems, and the type of cancer that you have. Your doctor will determine your dose and schedule.    Side Effects:  Important things to remember about cisplatin side effects:    Most people do not experience all of the cisplatin side effects listed.  Cisplatin side effects are often predictable in terms of their onset, duration, and severity.  Cisplatin side effects will improve after therapy is complete.  Cisplatin side effects may be quite manageable. There are many options to help minimize or prevent the side effects of cisplatin.  There is no relationship between the presence or severity of Cisplatin side effects and effectiveness of cisplatin.  The following side effects are common (occurring in greater than 30%) for patients taking Cisplatin:    Nausea and vomiting. Nausea may last up to 1 week after therapy. Anti-nausea medication is given before the infusion, and a prescription is also given for use after.  Low blood counts. Your white and red blood cells and platelets may temporarily decrease. This can put you at increased risk for infection, anemia, and/or bleeding. King: Meaning low point, is the point in time between chemotherapy cycles in which you experience low blood counts.  King: 18-23 days. Recovery: 39 days  Kidney toxicity. Effects on kidney function are dose related, observed 10-20 days after therapy, and are generally reversible.  Ototoxicity hearing loss, ringing in the ears.  Blood test abnormalities (low magnesium, low calcium, low potassium)  These are less common side effects (occurring in 10-29%) for patients receiving Cisplatin:    Peripheral neuropathy: Although less common, a serious side effect of decreased sensation and paresthesia (numbness and tingling of the extremities) may be noted. Sensory loss, numbness and tingling, and difficulty in walking  may last for at least as long as therapy is continued. These side effects may become progressively more severe with continued treatment, and your doctor may decide to decrease your dose. Neurologic effects may be irreversible.  Loss of appetite  Taste changes, metallic taste  Increases in blood tests measuring liver function. These return to normal once treatment is discontinued (see liver problems).  Hair loss may cause hair loss; however, this side effect is uncommon.  Not all cisplatin side effects are listed above, some that are rare (occurring in less than 10% of patients) are not listed here. However, you should always inform your health care provider if you experience any unusual symptoms.    When To Contact Your Doctor or Health Care Provider:  Contact your health care provide immediately, day or night, if you should experience any of the following symptoms:    Fever of 100.4°F (38°C) or higher or chills (possible signs of infection)  The following symptoms require medical attention, but are not an emergency. Contact your health care provider within 24 hours of noticing any of the following:    Nausea (interferes with ability to eat and unrelieved with prescribed medication)  Vomiting (vomiting more than 4-5 times in a 24 hour period)  Diarrhea (4-6 episodes in a 24 hour period)  No urine output in a 12 hour period  Blood in the urine  Pain or burning with urination  Unusual bleeding or bruising  Black or tarry stools, or blood in your stools or urine  Extreme fatigue (unable to carry on self-care activities)  Swelling, redness and pain in one leg or arm and not the other  Yellowing of the skin or eyes  Mouth sores (painful redness, swelling or ulcers)  Always inform your health care provider if you experience any unusual symptoms.    Precautions:  Before starting cisplatin treatment, make sure you tell your doctor about any other medications you are taking (including prescription, over-the-counter,  vitamins, herbal remedies, etc.). Do not take aspirin, products containing aspirin unless your doctor specifically permits this.  Cisplatin may be inadvisable if you have a history of severe allergic reaction to cisplatin, carboplatin, other platinum-containing formulations or mannitol.  Do not receive any kind of immunization or vaccination without your doctor's approval while taking cisplatin.  Your fertility, meaning your ability to conceive or father a child, may be affected by cisplatin. Please discuss this issue with your health care provider.  Inform your health care professional if you are pregnant or may be pregnant prior to starting this treatment. Pregnancy category D (Cisplatin may be hazardous to the fetus. Women who are pregnant or become pregnant must be advised of the potential hazard to the fetus).  For both men and women: Use contraceptives, and do not conceive a child (get pregnant) while taking cisplatin. Barrier methods of contraception, such as condoms, are recommended. Discuss with your doctor when you may safely become pregnant or conceive a child after therapy.  Do not breast feed while taking cisplatin.  Self-Care Tips:  To reduce nausea, take anti-nausea medications as prescribed by your doctor, eat small amounts of food frequently.  Try dry cereal, toast, or crackers, especially in the morning, to help curb nausea.  Maintaining a good fluid intake is very important to help to avoid kidney damage. Drink at least two to three quarts of fluid every 24 hours, unless you are instructed otherwise.  You may be at risk of infection so try to avoid crowds or people with colds, and report fever or any other signs of infection immediately to your health care provider.  Wash your hands often.  To help treat/prevent mouth sores, use a soft toothbrush, and rinse three times a day with 1 teaspoon of baking soda mixed with 8 ounces of water.  Use an electric razor and a soft toothbrush to minimize  "bleeding.  In general, drinking alcoholic beverages should be kept to a minimum or avoided completely. You should discuss this with your doctor.  Avoid sun exposure. Wear SPF 30 (or higher) sunblock and protective clothing.  Get plenty of rest.  Maintain good nutrition.  If you experience symptoms or side effects, be sure to discuss them with your health care team. They can prescribe medications and/or offer other suggestions that are effective in managing such problems.  Monitoring and Testing While Taking Cisplatin:  You will be checked regularly by your doctor while you are taking cisplatin, to monitor side effects and check your response to therapy. Periodic blood work will be obtained to monitor your complete blood count (CBC) your electrolytes (such as calcium, magnesium, potassium, and sodium levels) as well as the function of other organs (such as your kidneys and liver) will also be ordered by your doctor.  Because drug toxicity is seen as numbness and tingling of fingers and toes, a periodic physical examination, which includes a check of your reflexes, is necessary to detect the need for decreased dosages.  With high dose therapy hearing tests may be ordered as baseline and monitored at times during therapy.  How Cisplatin Works:  Cancerous tumors are characterized by cell division, which is no longer controlled as it is in normal tissue. \"Normal\" cell stop dividing when they come into contact with like cells, a mechanism known as contact inhibition. Cancerous cells lose this ability. Cancer cells no longer have the normal checks and balances in place that control and limit cell division. The process of cell division, whether normal or cancerous cells, is through the cell cycle. The cell cycle goes from the resting phase, through active growing phases, and then to mitosis (division).    The ability of chemotherapy to kill cancer cells depends on its ability to halt cell division. Usually, the drugs work " "by damaging the RNA or DNA that tells the cell how to copy itself in division. If the cells are unable to divide, they die. The faster the cells are dividing, the more likely it is that chemotherapy will kill the cells, causing the tumor to shrink. They also induce cell suicide (self-death or apoptosis).    Chemotherapy drugs that affect cells only when they are dividing are called cell-cycle specific. Chemotherapy drugs that affect cells when they are at rest are called cell-cycle non-specific. The scheduling of chemotherapy is set based on the type of cells, rate at which they divide, and the time at which a given drug is likely to be effective. This is why chemotherapy is typically given in cycles.    Chemotherapy is most effective at killing cells that are rapidly dividing. Unfortunately, chemotherapy does not know the difference between the cancerous cells and the normal cells. The \"normal\" cells will grow back and be healthy but in the meantime, side effects occur. The \"normal\" cells most commonly affected by chemotherapy are the blood cells, the cells in the mouth, stomach and bowel, and the hair follicles; resulting in low blood counts, mouth sores, nausea, diarrhea, and/or hair loss. Different drugs may affect different parts of the body.    Cisplatin is classified as an alkylating agent. Alkylating agents are most active in the resting phase of the cell. These drugs are cell cycle non-specific.    Note: We strongly encourage you to talk with your health care professional about your specific medical condition and treatments. The information contained in this website is meant to be helpful and educational, but is not a substitute for medical advice.                      Paclitaxel        Trade Names: Taxol®, OnxalTM    Drug Type:    Paclitaxel is an anti-cancer (\"antineoplastic\" or \"cytotoxic\") chemotherapy drug. Paclitaxel is classified as a \"plant alkaloid,\" a \"taxane\" and an \"antimicrotubule agent.\" (For " "more detail, see \"How Paclitaxel Works\" section below).    What Paclitaxel Is Used For:  Treatment of breast, ovarian, lung, bladder, prostate, melanoma, esophageal, as well as other types of solid tumor cancers. It has also been used in Kaposi's sarcoma.  Note: If a drug has been approved for one use, physicians may elect to use this same drug for other problems if they believe it may be helpful.    How Paclitaxel Is Given:  Paclitaxel is given as an injection or infusion into the vein (intravenous, IV).  Paclitaxel is an irritant. An irritant is a chemical that can cause inflammation of the vein through which it is given. If the medication escapes from the vein it can cause tissue damage. The nurse or doctor who gives Paclitaxel must be carefully trained. If you experience pain or notice redness or swelling at the IV site while you are receiving Paclitaxel, alert your health care professional immediately.  Because severe allergic reactions have occurred in some people taking Paclitaxel, you will be asked to take medications to help prevent a reaction. Your doctor will prescribe the exact regimen.  Paclitaxel is given over various amounts of times and in various schedules.  There is no pill form of Paclitaxel.  The amount of Paclitaxel and the schedule that it is given will receive depend on many factors, including your height and weight, your general health or other health problems, and the type of cancer or condition being treated. Your doctor will determine your dose and schedule.  Side Effects:  Important things to remember about the side effects of Paclitaxel include:    Most people do not experience all of the side effects listed.  Side effects are often predictable in terms of their onset and duration.  Side effects are almost always reversible and will go away after treatment is complete.  There are many options to help minimize or prevent side effects.  There is no relationship between the presence or " severity of side effects and the effectiveness of the medication.  The side effects of Paclitaxel and their severity vary depending on how much of the drug is given, and/or the schedule in which it is given.  The following side effects are common (occurring in greater than 30%) for patients taking Paclitaxel:    Low blood counts. Your white and red blood cells and platelets may temporarily decrease. This can put you at increased risk for infection, anemia and/or bleeding.  Hair loss  Arthralgias and myalgias, pain in the joints and muscles. (see pain) Usually temporary occurring 2 to 3 days after Paclitaxel, and resolve within a few days.  Peripheral neuropathy (numbness and tingling of the hands and feet)  Nausea and vomiting (usually mild)  Diarrhea  Mouth sores  Hypersensitivity reaction. Fever, facial flushing, chills, shortness of breath, or hives after Paclitaxel is given (see allergic reaction). The majority of these reactions occur within the first 10 minutes of an infusion. Notify your healthcare provider immediately (premedication regimen has significantly decreased the incidence of this reaction).  The following are less common side effects (occurring in 10-29%) for patients receiving Paclitaxel:    Swelling of the feet or ankles (edema).  Increases in blood tests measuring liver function. These return to normal once treatment is discontinued (see liver problems).  Low blood pressure (occurring during the first 3 hours of infusion).  Darkening of the skin where previous radiation treatment has been given (radiation recall - see skin reactions).  Nail changes (discoloration of nail beds - rare) (see skin reactions).  King: 15-21 days    This list includes common and less common side effects for individuals taking Paclitaxel. Side effects that are very rare, occurring in less than 10% of patients, are not listed here. However, you should always inform your health care provider if you experience any unusual  symptoms.    When to contact your doctor or health care provider:  Contact your health care provider immediately, day or night, if you should experience any of the following symptoms:    Fever of 100.4° F (38° C), chills (possible signs of infection)  Shortness of breath, wheezing, difficulty breathing, closing up of the throat, swelling of facial features, hives (possible allergic reaction).  The following symptoms require medical attention, but are not an emergency. Contact your health care provider within 24 hours of noticing any of the following:    If you notice any redness or pain at the site of injection  Nausea (interferes with ability to eat and unrelieved with prescribed medication)  Vomiting (vomiting more than 4-5 times in a 24 hour period)  Diarrhea (4-6 episodes in a 24-hour period)  Unusual bleeding or bruising  Black or tarry stools, or blood in your stools or urine  Extreme fatigue (unable to carry on self-care activities)  Mouth sores (painful redness, swelling or ulcers)  Yellowing of the skin or eyes  Swelling of the feet or ankles. Sudden weight gain  Signs of infection such as redness or swelling, pain on swallowing, coughing up mucous, or painful urination.  Always inform your health care provider if you experience any unusual symptoms.    Precautions:  Before starting Paclitaxel treatment, make sure you tell your doctor about any other medications you are taking (including prescription, over-the-counter, vitamins, herbal remedies, etc.). Do not take aspirin, or products containing aspirin unless your doctor specifically permits this.  Do not receive any kind of immunization or vaccination without your doctor's approval while taking Paclitaxel.  Inform your health care professional if you are pregnant or may be pregnant prior to starting this treatment. Pregnancy category D (Paclitaxel may be hazardous to the fetus. Women who are pregnant or become pregnant must be advised of the potential  hazard to the fetus).  For both men and women: Do not conceive a child (get pregnant) while taking Paclitaxel. Barrier methods of contraception, such as condoms, are recommended. Discuss with your doctor when you may safely become pregnant or conceive a child after therapy.  Do not breast feed while taking Paclitaxel.  Self-Care Tips:  Paclitaxel, or the medications that you take with Paclitaxel may cause you to feel dizzy or drowsy. Do not operate any heavy machinery until you know how you respond to Paclitaxel.  If you notice any redness or pain at the injection site, place a warm compress, and notify your healthcare provider.  Drink at least two to three quarts of fluid every 24 hours, unless you are instructed otherwise.  You may be at risk of infection so try to avoid crowds or people with colds and those not feeling well, and report fever or any other signs of infection immediately to your health care provider.  Wash your hands often.  To help treat/prevent mouth sores, use a soft toothbrush, and rinse three times a day with 1/2 to 1 teaspoon of baking soda and/or 1/2 to 1 teaspoon of salt mixed with 8 ounces of water.  Use an electric razor and a soft toothbrush to minimize bleeding.  Avoid contact sports or activities that could cause injury.  Paclitaxel causes little nausea. But if you should experience nausea, take anti-nausea medications as prescribed by your doctor, and eat small frequent meals. Sucking on lozenges and chewing gum may also help.  Acetaminophen or ibuprofen may help relieve discomfort from fever, headache and/or generalized aches and pains. However, be sure to talk with your doctor before taking it.  You may experience drowsiness or dizziness; avoid driving or engaging in tasks that require alertness until your response to the drug is known.  Paclitaxel will make you sensitive to sunlight. You must wear sunglasses when outside, and avoid sun exposure. Wear protective clothing, and also  "wear SPF 15 (or higher) sun block.  In general, drinking alcoholic beverages should be kept to a minimum or avoided completely. You should discuss this with your doctor.  Get plenty of rest.  Maintain good nutrition.  If you experience symptoms or side effects, be sure to discuss them with your health care team. They can prescribe medications and/or offer other suggestions that are effective in managing such problems.  Monitoring and Testing:  You will be checked regularly by your health care professional while you are taking Paclitaxel, to monitor side effects and check your response to therapy. Periodic blood work to monitor your complete blood count (CBC) as well as the function of other organs (such as your kidneys and liver) will also be ordered by your doctor.    How Paclitaxel Works:  Cancerous tumors are characterized by cell division, which is no longer controlled as it is in normal tissue. \"Normal\" cells stop dividing when they come into contact with like cells, a mechanism known as contact inhibition. Cancerous cells lose this ability. Cancer cells no longer have the normal checks and balances in place that control and limit cell division. The process of cell division, whether normal or cancerous cells, is through the cell cycle. The cell cycle goes from the resting phase, through active growing phases, and then to mitosis (division).    The ability of chemotherapy to kill cancer cells depends on its ability to halt cell division. Usually, the drugs work by damaging the RNA or DNA that tells the cell how to copy itself in division. If the cells are unable to divide, they die. The faster the cells are dividing, the more likely it is that chemotherapy will kill the cells, causing the tumor to shrink. They also induce cell suicide (self-death or apoptosis).    Chemotherapy drugs that affect cells only when they are dividing are called cell-cycle specific. Chemotherapy drugs that affect cells when they are at " "rest are called cell-cycle non-specific. The scheduling of chemotherapy is set based on the type of cells, rate at which they divide, and the time at which a given drug is likely to be effective. This is why chemotherapy is typically given in cycles.    Chemotherapy is most effective at killing cells that are rapidly dividing. Unfortunately, chemotherapy does not know the difference between the cancerous cells and the normal cells. The \"normal\" cells will grow back and be healthy but in the meantime, side effects occur. The \"normal\" cells most commonly affected by chemotherapy are the blood cells, the cells in the mouth, stomach and bowel, and the hair follicles; resulting in low blood counts, mouth sores, nausea, diarrhea, and/or hair loss. Different drugs may affect different parts of the body.    Paclitaxel belongs to a class of chemotherapy drugs called plant alkaloids. Plant alkaloids are made from plants. The vinca alkaloids are made from the periwinkle plant (catharanthus rosea). The taxanes are made from the bark of the CBRITE tree (taxus). The vinca alkaloids and taxanes are also known as antimicrotubule agents. The podophyllotoxins are derived from the May Apple plant. Camptothecan analogs are derived from the  \"Happy Tree\" (Camptotheca acuminata). Podophyllotoxins and camptothecan analogs are also known as topoisomerase inhibitors. The plant alkaloids are cell-cycle specific. This means they attack the cells during various phases of division.    Vinca alkaloids: Vincristine, Vinblastine and Vinorelbine.  Taxanes: Paclitaxel and Docetaxel.  Podophyllotoxins: Etoposide and Tenisopide.  Camptothecan analogs: Irinotecan and Topotecan.  Antimicrotubule agents (such as Paclitaxel), inhibit the microtubule structures within the cell. Microtubules are part of the cell's apparatus for dividing and replicating itself. Inhibition of these structures ultimately results in cell death.    Note: We strongly " encourage you to talk with your health care professional about your specific medical condition and treatments. The information contained in this website is meant to be helpful and educational, but is not a substitute for medical advice.

## 2022-07-26 NOTE — PROGRESS NOTES
Patient here to see M.D. and have labs drawn from port and port flushed. After accessed patient port I was unable to obtain a blood return. Labs drawn peirpherally. Notified Dr. Ramires nurse Talya ALLEN of no blood return  from port and Lozano Needle intact to right subclavian area,will recheck for blood return after M.D. visit.

## 2022-07-26 NOTE — PROGRESS NOTES
Patient port checked for blood return after M.D. visit and still unable to obtain a blood return. Patient taken to chemo area for further treatment .

## 2022-07-26 NOTE — PROGRESS NOTES
Pt here to see Dr Gaspar. Pt's port accessed by another nurse, but no blood return noted. Pt sent back to infusion for alteplase, per Dr Gaspar's order. At 1113, port aspirated and positive blood return noted. Wasted 10 ml blood and then flushed port with 20 ml NS followed by 500 units heparin. Needle removed and bandaid applied to site.

## 2022-07-27 ENCOUNTER — OFFICE VISIT (OUTPATIENT)
Dept: ONCOLOGY | Facility: CLINIC | Age: 57
End: 2022-07-27

## 2022-07-27 ENCOUNTER — DOCUMENTATION (OUTPATIENT)
Dept: ONCOLOGY | Facility: CLINIC | Age: 57
End: 2022-07-27

## 2022-07-27 ENCOUNTER — APPOINTMENT (OUTPATIENT)
Dept: LAB | Facility: HOSPITAL | Age: 57
End: 2022-07-27

## 2022-07-27 ENCOUNTER — CLINICAL SUPPORT (OUTPATIENT)
Dept: ONCOLOGY | Facility: CLINIC | Age: 57
End: 2022-07-27

## 2022-07-27 VITALS
RESPIRATION RATE: 18 BRPM | BODY MASS INDEX: 24 KG/M2 | TEMPERATURE: 96.9 F | WEIGHT: 193 LBS | SYSTOLIC BLOOD PRESSURE: 131 MMHG | HEART RATE: 89 BPM | DIASTOLIC BLOOD PRESSURE: 83 MMHG | HEIGHT: 75 IN | OXYGEN SATURATION: 98 %

## 2022-07-27 DIAGNOSIS — C34.91 NON-SMALL CELL CARCINOMA OF LUNG, RIGHT: ICD-10-CM

## 2022-07-27 DIAGNOSIS — C34.31 CANCER OF LOWER LOBE OF RIGHT LUNG: ICD-10-CM

## 2022-07-27 DIAGNOSIS — C34.92 NSCLC OF LEFT LUNG: Primary | ICD-10-CM

## 2022-07-27 DIAGNOSIS — Z71.9 ENCOUNTER FOR EDUCATION: ICD-10-CM

## 2022-07-27 PROCEDURE — 98960 EDU&TRN PT SELF-MGMT NQHP 1: CPT | Performed by: NURSE PRACTITIONER

## 2022-07-27 RX ORDER — ONDANSETRON HYDROCHLORIDE 8 MG/1
8 TABLET, FILM COATED ORAL 3 TIMES DAILY PRN
Qty: 30 TABLET | Refills: 5 | Status: SHIPPED | OUTPATIENT
Start: 2022-07-27 | End: 2023-03-27

## 2022-07-27 RX ORDER — OLANZAPINE 5 MG/1
5 TABLET ORAL NIGHTLY
Qty: 3 TABLET | Refills: 5 | Status: SHIPPED | OUTPATIENT
Start: 2022-07-27 | End: 2022-12-20

## 2022-07-27 NOTE — PROGRESS NOTES
OSW met w/ patient and his wife, Rachel, in exam room after chemo teaching. Patient presented as A&O x 4 in NAD.     OSW discussed various services available here including: OSW, financial counselor, dietician, massage therapy and volunteer chaplaincy program. Also provided a packet of resources including a welcome letter, community, transportation and oncology resources.     OSW also discussed the Foundation and Financial Counselor Carl, and provided an application should the patient need it.     Patient reports he is currently working  - all basic needs are met at this time.     OSW also inquired about ACP. Patient has no documents at this time. OSW discussed [place]'s laws/regulations re: NOK. Gave patient and wife information on ACP.     OSW will remain available.     JONATAN Johnston, CSW, MSW  Oncology MSW  [facility] [place]

## 2022-07-27 NOTE — PROGRESS NOTES
[facility] Medical Oncology     Education for Administration of Chemotherapy and/or Biotherapy     07/27/22    Jc Driscoll  [id number]    Mr.Gary COURTNEY Driscoll is here today for education on their upcoming Chemotherapy and/or Biotherapy.     I will be going over their treatment options, obtain signed consent and answer any questions that they may have in regards to the administration of Cisplatin, Taxol.     Jc Driscoll has already consulted with Dr. Gaspar for the treatment of Lung cancer. The provider has gone over the same treatment options with the patient and answered their question prior to today's visit.     The goal of the treatment is to:    [x] Cure my cancer - means treatment that kills cancer cells to the point my doctor                                     cannot find them in my body and they will not grow back.    [] Control my cancer - means treatment that keeps cancer from spreading or growing.    [] Relieve my cancer symptoms - means treatment that helps problems such as pain or pressure.     This treatment has been explained to Jc Driscoll. Alternative methods of treatment, if any, have been explained to Jc Driscoll as have the benefits and risks of each. Based on the physician's explanation of the benefits and risks of this treatment and any alternatives available, The patient agrees that the potential benefit's out weighs the risks involved. I have explained to the patient the most likely complications that might occur from this treatment. The patient understands that along with the treatment additional medications may be necessary to lesson the side effects. Possible side effect may include but are not limited to, any of the following, or a combination of the following:          [x]  Abdominal pain  [x]  Fatigue []  Insomnia [x]  Petechiae   [x]  Allergic Reaction []  Fertility effects  []  Itching []  Photosensitivity    [x]  Anemia [x]  Fevers [x]  Joint pain []  Pleural effusion    []   Anxiety []  Fistula formation [x] Kidney damage/toxicity []  Proteinuria    []  Back pain []  Flu-like symptoms [x]  LFT imbalances []  Rash   [x]  Blood clots (DVT/PE) [x]  Fluid retention [x]  Liver damage []  Secondary malignancies   [x]  Bleeding []  Forgetfulness [x]  Loss of appetite []  Sexual side effects    []  Bone pain []  Gastrointestinal perforation [x]  Low blood pressure [x]  Shortness of breath   [x]  Bruising []  Hand foot syndrome []  Lung damage [x]  Skin changes   []  Cardiovascular events  [x]  Hair loss/discoloration []  Menopausal symptoms [x]  Sore throat   []  Central neurotoxicity []  Headaches []  Menstrual irregularities [x]  Swelling   [x]  Chest pain [x] Hearing loss/change []  Mood changes [x]  Taste changes   [x]  Chills []  Heart damage [x]  Mouth sores [x]  Temperature sensitivity   []  Confusion [x]  Hematuria [x]  Muscle aches  [x]    Thrombocytopenia   []  Congestive heart failure [x]  Hemorrhage []  Nephrotic syndrome []  Thyroid changes   [x]  Constipation []  Hypertension [x]  Nail changes [x]  Tinnitus   [x]  Cough []  Hypertensive crisis [x]  Nausea  [x]  Upper respiratory tract infection    []  Depression []  Hypertriglyceridemia  []  Neck pain  []  Visual changes   [x]  Diarrhea []  Hypoglycemia [x]  Neutropenia [x]  Vomiting   [x]  Dizziness []  Immune-mediated reaction [x]  Nosebleeds []  Watery eyes   [x]  Dry skin [x]  Infection  []  Pain in arms/legs []  Weakness   [x]  Electrolyte imbalances [x]  Infusion reaction  []  Pericardial effusion  []  Weight changes   [x]  Elevated LDH []  Injection site reaction  [x]  Peripheral neuropathy []  Wound healing complication   []  Eye irritation []  Insomnia       While receiving treatment, it has been explained to the patient with regards to their blood counts. This may include but not limited to CBC, Neutropenia ,Anemia, Thrombocytopenia. This handout has been explained and given to the patient.     It was explained to the  patient about nutrition and how important it is while undergoing Chemotherapy and/or Biotherapy. Certain medications will be prescribed during the treatment which may change the way foods taste or smell. These changes may cause poor or no appetite. Food is fuel for your body, and if it does not get the fuel it needs, your body may become mal-nourished, which can lead to sever fatigue.   Information was given to Jc Driscoll in regards to some good protein sources to help maintain muscle mass and help to prevent malnutrition   We also discussed with the patient how important it is to drink/eat every 2-3 hours while awake regardless of hunger. We discussed fluid intake of at least 64 ounces liquids per day to stay hydrated.     It has been discussed with the patient the risks of becoming pregnant while receiving Chemotherapy and/or Biotherapy. We also discussed the importance of using reliable barrier methods while participating in intimate activities as this may expose their partners to a potentially harmful drug.     Further home instructions were given to the patient in regards to symptoms, treatment and how to handle those situations as well as when to contact the treatment team or the providers office.     Jc Driscoll was given handouts on:  1. Home Instructions: Take temp twice daily and report 100.4 or higher  2. Tips from your chemotherapy nurse  3. Nutrition during cancer therapy: Keep proteins up in diet; use boost or nutritional recipes provided  4. Cancer related fatigue: Stay active with rest.  5. Fluids/Dehydration: Drink 8-10, 8 ounce glasses of noncaffeinated fluids daily; discussed signs of dehydration-report for IV fluids  6. Hair and Prosthesis solutions  7. Mouth care  8. Nausea/Appetite: Discussed Zofran 3 times daily as needed for nausea; discussed taking Zyprexa for 3 evenings after chemotherapy  9. Constipation and Diarrhea: Discussed use of Senokot and Colace for constipation; discussed use  of Imodium for diarrhea  10. Understanding your blood: Discussed function of WBC, Hgb, platelets in the body; symptoms when each are low; what to report  11. Discussed signs and symptoms of infection in detail  12. Cisplatin, Taxol information from [facility] printed, discussed, and sent with patient for reference  13. American Cancer Society chemotherapy safety printed, discussed, and sent with patient for reference  14. American Cancer Society watching for and preventing infections printed, discussed, and sent with patient for reference  15. Discussed signs of bleeding-report  16. Discussed how to contact the office during business hours Monday through Friday 8-4 30; discussed how to contact the provider on-call using the answering service after hours, on weekends, and holidays- call back if no answer in 30 minutes  17. Discussion of how infusion reactions are handled in treatment room, if an infusion reaction occurs    I have discussed and gone over the full consent with the patient and answered all their questions regarding the medication they are to receive.  Written information has been provided and reviewed with Jc Driscoll. The patient and their family had a chance to ask any questions about the treatment medications and are satisfied with the information that was provided to them.     The patient has read and completed the consent form. They understand the possible risks and benefits of the recommended treatment plan and voluntarily agree to undergo the planned treatment. Should they change their mind and decide to stop treatment at any time, they will notify the providers office.       MULUGETA Torrez  07/27/2022  07:38 EDT    I spent 40 minutes in teaching about Taxol, Carboplatin; side affects of each drug-more common-less common-rare; preparation of teaching packet; ordering; discussion of all information above; answering questions; addressing concerns; and documentation    Electronically  signed by Brittni Holloway, APRN, 07/27/22, 9:11 AM EDT.  Dictated using Dragon software

## 2022-07-27 NOTE — PROGRESS NOTES
"                 Nutrition Assessment  Jc Driscoll    Anthropometrics  Height: 5'3\"  Weight: 192.9#  BMI: 24.1  Weight Hx:   Wt Readings from Last 20 Encounters:   07/27/22 87.5 kg (193 lb)   07/26/22 87.9 kg (193 lb 12.8 oz)   07/21/22 88.9 kg (196 lb)   07/20/22 88 kg (194 lb)   07/06/22 88.2 kg (194 lb 7.1 oz)   06/29/22 89.8 kg (198 lb)   06/16/22 89.6 kg (197 lb 9.6 oz)   06/08/22 89.8 kg (198 lb)   06/02/22 90.7 kg (200 lb)   05/22/22 88.2 kg (194 lb 7.1 oz)   05/09/22 91.1 kg (200 lb 12.8 oz)   03/07/22 92 kg (202 lb 12.8 oz)   02/10/22 91.6 kg (202 lb)   02/07/22 91.6 kg (202 lb)   01/28/22 91.7 kg (202 lb 3.2 oz)   12/20/21 92 kg (202 lb 12.8 oz)   11/22/21 91.9 kg (202 lb 9.6 oz)   10/04/21 95.8 kg (211 lb 3.2 oz)   09/27/21 91.5 kg (201 lb 12.8 oz)   09/27/21 91.2 kg (201 lb)     IBW: 196# (98.4%)  UBW: 194# - 198# (97.4% - 99.4%)    Nutrition Questionnaire    Food Allergies: Pt denied allergies at this time    Chewing/Swallowing Problems: Pt denied chewing and swallowing problems at this time.    Who does the cooking & shopping: Pt's wife    Nausea/Vomiting/Diarrhea: Pt denies n/v/d/c    Appetite: (poor) 1 2 3 4 5 6 7 8 9 10 (excellent): 8    24-Hour Diet Recall:   Breakfast - oatmeal cream pie, 2% milk  Lunch - Lunchable, water  Dinner - 2 large pre-fried frozen egg rolls cooked in air fryer, water  Snacks - mixed nuts  Water > 64 oz/day    Discussion: Met the pt and his wife to provide new-pt diet education. We reviewed possible side effects of his treatment and how it may impact his ability to eat and tolerate food. We discussed the importance of weight maintenance, consuming adequate calories and protein, even when appetite is low, taste changes occur, and energy is low, pt verbalized understanding. We reviewed ways to manage side effects with diet, pt verbalized understanding.    Pt said he has already gone through this treatment three years ago when he had cancer on the other side of his lungs. Pt " said he feels slightly better going into this treatment because he knows what to expect. Last time the pt dealt with constipation and is prepared for it again and understands plenty of fluids, fiber, and stool softeners should help if it becomes an issue again.    All questions and concerns were addressed and answered. I provided my contact information and encouraged him to call or schedule an appointment as needed.    Nay Braga, MS,RD,LD-[place],CD-[place]  Registered Dietitian

## 2022-07-29 ENCOUNTER — TELEPHONE (OUTPATIENT)
Dept: ONCOLOGY | Facility: CLINIC | Age: 57
End: 2022-07-29

## 2022-07-31 DIAGNOSIS — C34.92 NSCLC OF LEFT LUNG: Primary | ICD-10-CM

## 2022-07-31 DIAGNOSIS — C34.91 NON-SMALL CELL CARCINOMA OF LUNG, RIGHT: ICD-10-CM

## 2022-07-31 DIAGNOSIS — C34.31 CANCER OF LOWER LOBE OF RIGHT LUNG: ICD-10-CM

## 2022-07-31 RX ORDER — OLANZAPINE 5 MG/1
5 TABLET ORAL ONCE
Status: CANCELLED | OUTPATIENT
Start: 2022-08-03 | End: 2022-08-03

## 2022-07-31 RX ORDER — PALONOSETRON 0.05 MG/ML
0.25 INJECTION, SOLUTION INTRAVENOUS ONCE
Status: CANCELLED | OUTPATIENT
Start: 2022-08-03

## 2022-07-31 RX ORDER — SODIUM CHLORIDE 9 MG/ML
250 INJECTION, SOLUTION INTRAVENOUS ONCE
Status: CANCELLED | OUTPATIENT
Start: 2022-08-03

## 2022-07-31 RX ORDER — FAMOTIDINE 10 MG/ML
20 INJECTION, SOLUTION INTRAVENOUS AS NEEDED
Status: CANCELLED | OUTPATIENT
Start: 2022-08-03

## 2022-07-31 RX ORDER — DIPHENHYDRAMINE HYDROCHLORIDE 50 MG/ML
50 INJECTION INTRAMUSCULAR; INTRAVENOUS AS NEEDED
Status: CANCELLED | OUTPATIENT
Start: 2022-08-03

## 2022-07-31 RX ORDER — FAMOTIDINE 10 MG/ML
20 INJECTION, SOLUTION INTRAVENOUS ONCE
Status: CANCELLED | OUTPATIENT
Start: 2022-08-03

## 2022-08-03 ENCOUNTER — HOSPITAL ENCOUNTER (OUTPATIENT)
Dept: ONCOLOGY | Facility: HOSPITAL | Age: 57
Setting detail: INFUSION SERIES
Discharge: HOME | End: 2022-08-03

## 2022-08-03 VITALS
HEART RATE: 77 BPM | SYSTOLIC BLOOD PRESSURE: 111 MMHG | BODY MASS INDEX: 24.22 KG/M2 | TEMPERATURE: 96.9 F | WEIGHT: 194.8 LBS | HEIGHT: 75 IN | OXYGEN SATURATION: 95 % | RESPIRATION RATE: 18 BRPM | DIASTOLIC BLOOD PRESSURE: 74 MMHG

## 2022-08-03 DIAGNOSIS — C34.92 NSCLC OF LEFT LUNG: ICD-10-CM

## 2022-08-03 DIAGNOSIS — Z45.2 ENCOUNTER FOR CARE RELATED TO VASCULAR ACCESS PORT: ICD-10-CM

## 2022-08-03 DIAGNOSIS — C34.31 CANCER OF LOWER LOBE OF RIGHT LUNG: ICD-10-CM

## 2022-08-03 DIAGNOSIS — C34.91 NON-SMALL CELL CARCINOMA OF LUNG, RIGHT: Primary | ICD-10-CM

## 2022-08-03 LAB
ALBUMIN SERPL-MCNC: 3.8 G/DL (ref 3.5–5.2)
ALBUMIN/GLOB SERPL: 1.4 G/DL
ALP SERPL-CCNC: 105 U/L (ref 39–117)
ALT SERPL W P-5'-P-CCNC: 22 U/L (ref 1–41)
ANION GAP SERPL CALCULATED.3IONS-SCNC: 13 MMOL/L (ref 5–15)
AST SERPL-CCNC: 16 U/L (ref 1–40)
BASOPHILS # BLD AUTO: 0.09 10*3/MM3 (ref 0–0.2)
BASOPHILS NFR BLD AUTO: 0.8 % (ref 0–1.5)
BILIRUB SERPL-MCNC: 0.3 MG/DL (ref 0–1.2)
BUN SERPL-MCNC: 6 MG/DL (ref 6–20)
BUN/CREAT SERPL: 8.3 (ref 7–25)
CALCIUM SPEC-SCNC: 9 MG/DL (ref 8.6–10.5)
CHLORIDE SERPL-SCNC: 91 MMOL/L (ref 98–107)
CO2 SERPL-SCNC: 27 MMOL/L (ref 22–29)
CREAT BLDA-MCNC: 0.7 MG/DL (ref 0.6–1.3)
CREAT SERPL-MCNC: 0.72 MG/DL (ref 0.76–1.27)
DEPRECATED RDW RBC AUTO: 40.4 FL (ref 37–54)
EGFRCR SERPLBLD CKD-EPI 2021: 105.9 ML/MIN/1.73
EGFRCR SERPLBLD CKD-EPI 2021: 106.8 ML/MIN/1.73
EOSINOPHIL # BLD AUTO: 2.48 10*3/MM3 (ref 0–0.4)
EOSINOPHIL NFR BLD AUTO: 23.4 % (ref 0.3–6.2)
ERYTHROCYTE [DISTWIDTH] IN BLOOD BY AUTOMATED COUNT: 12.8 % (ref 12.3–15.4)
GLOBULIN UR ELPH-MCNC: 2.8 GM/DL
GLUCOSE SERPL-MCNC: 95 MG/DL (ref 65–99)
HCT VFR BLD AUTO: 36.3 % (ref 37.5–51)
HGB BLD-MCNC: 12.4 G/DL (ref 13–17.7)
LYMPHOCYTES # BLD AUTO: 0.9 10*3/MM3 (ref 0.7–3.1)
LYMPHOCYTES NFR BLD AUTO: 8.5 % (ref 19.6–45.3)
MAGNESIUM SERPL-MCNC: 1.7 MG/DL (ref 1.6–2.6)
MCH RBC QN AUTO: 30.2 PG (ref 26.6–33)
MCHC RBC AUTO-ENTMCNC: 34.2 G/DL (ref 31.5–35.7)
MCV RBC AUTO: 88.5 FL (ref 79–97)
MONOCYTES # BLD AUTO: 1.31 10*3/MM3 (ref 0.1–0.9)
MONOCYTES NFR BLD AUTO: 12.4 % (ref 5–12)
NEUTROPHILS NFR BLD AUTO: 5.81 10*3/MM3 (ref 1.7–7)
NEUTROPHILS NFR BLD AUTO: 54.9 % (ref 42.7–76)
PLATELET # BLD AUTO: 391 10*3/MM3 (ref 140–450)
PMV BLD AUTO: 8 FL (ref 6–12)
POTASSIUM SERPL-SCNC: 4.5 MMOL/L (ref 3.5–5.2)
PROT SERPL-MCNC: 6.6 G/DL (ref 6–8.5)
RBC # BLD AUTO: 4.1 10*6/MM3 (ref 4.14–5.8)
SODIUM SERPL-SCNC: 131 MMOL/L (ref 136–145)
WBC NRBC COR # BLD: 10.59 10*3/MM3 (ref 3.4–10.8)

## 2022-08-03 PROCEDURE — 96375 TX/PRO/DX INJ NEW DRUG ADDON: CPT

## 2022-08-03 PROCEDURE — 25010000002 DEXAMETHASONE SODIUM PHOSPHATE 120 MG/30ML SOLUTION: Performed by: INTERNAL MEDICINE

## 2022-08-03 PROCEDURE — 25010000002 HEPARIN LOCK FLUSH PER 10 UNITS: Performed by: INTERNAL MEDICINE

## 2022-08-03 PROCEDURE — 96365 THER/PROPH/DIAG IV INF INIT: CPT

## 2022-08-03 PROCEDURE — 80053 COMPREHEN METABOLIC PANEL: CPT | Performed by: INTERNAL MEDICINE

## 2022-08-03 PROCEDURE — 83735 ASSAY OF MAGNESIUM: CPT | Performed by: INTERNAL MEDICINE

## 2022-08-03 PROCEDURE — 96415 CHEMO IV INFUSION ADDL HR: CPT

## 2022-08-03 PROCEDURE — 25010000002 PALONOSETRON 0.25 MG/5ML SOLUTION PREFILLED SYRINGE: Performed by: INTERNAL MEDICINE

## 2022-08-03 PROCEDURE — 25010000002 DIPHENHYDRAMINE PER 50 MG: Performed by: INTERNAL MEDICINE

## 2022-08-03 PROCEDURE — 96361 HYDRATE IV INFUSION ADD-ON: CPT

## 2022-08-03 PROCEDURE — 85025 COMPLETE CBC W/AUTO DIFF WBC: CPT | Performed by: INTERNAL MEDICINE

## 2022-08-03 PROCEDURE — 25010000002 FOSAPREPITANT PER 1 MG: Performed by: INTERNAL MEDICINE

## 2022-08-03 PROCEDURE — 82565 ASSAY OF CREATININE: CPT

## 2022-08-03 PROCEDURE — 96360 HYDRATION IV INFUSION INIT: CPT

## 2022-08-03 PROCEDURE — 96366 THER/PROPH/DIAG IV INF ADDON: CPT

## 2022-08-03 PROCEDURE — 96413 CHEMO IV INFUSION 1 HR: CPT

## 2022-08-03 PROCEDURE — 96367 TX/PROPH/DG ADDL SEQ IV INF: CPT

## 2022-08-03 PROCEDURE — 25010000002 PACLITAXEL PER 1 MG: Performed by: INTERNAL MEDICINE

## 2022-08-03 PROCEDURE — 25010000002 MAGNESIUM SULFATE PER 500 MG OF MAGNESIUM: Performed by: INTERNAL MEDICINE

## 2022-08-03 PROCEDURE — 25010000002 POTASSIUM CHLORIDE PER 2 MEQ OF POTASSIUM: Performed by: INTERNAL MEDICINE

## 2022-08-03 RX ORDER — FAMOTIDINE 10 MG/ML
20 INJECTION, SOLUTION INTRAVENOUS ONCE
Status: COMPLETED | OUTPATIENT
Start: 2022-08-03 | End: 2022-08-03

## 2022-08-03 RX ORDER — PALONOSETRON 0.05 MG/ML
0.25 INJECTION, SOLUTION INTRAVENOUS ONCE
Status: COMPLETED | OUTPATIENT
Start: 2022-08-03 | End: 2022-08-03

## 2022-08-03 RX ORDER — OLANZAPINE 5 MG/1
5 TABLET ORAL ONCE
Status: COMPLETED | OUTPATIENT
Start: 2022-08-03 | End: 2022-08-03

## 2022-08-03 RX ORDER — SODIUM CHLORIDE 0.9 % (FLUSH) 0.9 %
20 SYRINGE (ML) INJECTION AS NEEDED
Status: DISCONTINUED | OUTPATIENT
Start: 2022-08-03 | End: 2022-08-04 | Stop reason: HOSPADM

## 2022-08-03 RX ORDER — HEPARIN SODIUM (PORCINE) LOCK FLUSH IV SOLN 100 UNIT/ML 100 UNIT/ML
500 SOLUTION INTRAVENOUS AS NEEDED
Status: DISCONTINUED | OUTPATIENT
Start: 2022-08-03 | End: 2022-08-04 | Stop reason: HOSPADM

## 2022-08-03 RX ORDER — FAMOTIDINE 10 MG/ML
20 INJECTION, SOLUTION INTRAVENOUS AS NEEDED
Status: DISCONTINUED | OUTPATIENT
Start: 2022-08-03 | End: 2022-08-04 | Stop reason: HOSPADM

## 2022-08-03 RX ORDER — SODIUM CHLORIDE 0.9 % (FLUSH) 0.9 %
20 SYRINGE (ML) INJECTION AS NEEDED
Status: CANCELLED | OUTPATIENT
Start: 2022-08-03

## 2022-08-03 RX ORDER — SODIUM CHLORIDE 9 MG/ML
250 INJECTION, SOLUTION INTRAVENOUS ONCE
Status: COMPLETED | OUTPATIENT
Start: 2022-08-03 | End: 2022-08-03

## 2022-08-03 RX ORDER — HEPARIN SODIUM (PORCINE) LOCK FLUSH IV SOLN 100 UNIT/ML 100 UNIT/ML
500 SOLUTION INTRAVENOUS AS NEEDED
Status: CANCELLED | OUTPATIENT
Start: 2022-08-03

## 2022-08-03 RX ORDER — DIPHENHYDRAMINE HYDROCHLORIDE 50 MG/ML
50 INJECTION INTRAMUSCULAR; INTRAVENOUS AS NEEDED
Status: COMPLETED | OUTPATIENT
Start: 2022-08-03 | End: 2022-08-03

## 2022-08-03 RX ADMIN — DIPHENHYDRAMINE HYDROCHLORIDE 25 MG: 50 INJECTION INTRAMUSCULAR; INTRAVENOUS at 12:06

## 2022-08-03 RX ADMIN — POTASSIUM CHLORIDE: 2 INJECTION, SOLUTION, CONCENTRATE INTRAVENOUS at 08:37

## 2022-08-03 RX ADMIN — PALONOSETRON 0.25 MG: 0.25 INJECTION, SOLUTION INTRAVENOUS at 10:37

## 2022-08-03 RX ADMIN — PACLITAXEL 295 MG: 6 INJECTION, SOLUTION, CONCENTRATE INTRAVENOUS at 11:43

## 2022-08-03 RX ADMIN — SODIUM CHLORIDE 100 ML: 9 INJECTION, SOLUTION INTRAVENOUS at 10:36

## 2022-08-03 RX ADMIN — Medication 10 ML: at 15:26

## 2022-08-03 RX ADMIN — DEXAMETHASONE SODIUM PHOSPHATE 20 MG: 4 INJECTION, SOLUTION INTRA-ARTICULAR; INTRALESIONAL; INTRAMUSCULAR; INTRAVENOUS; SOFT TISSUE at 11:08

## 2022-08-03 RX ADMIN — OLANZAPINE 5 MG: 5 TABLET, FILM COATED ORAL at 10:39

## 2022-08-03 RX ADMIN — DIPHENHYDRAMINE HYDROCHLORIDE 50 MG: 50 INJECTION, SOLUTION INTRAMUSCULAR; INTRAVENOUS at 11:26

## 2022-08-03 RX ADMIN — HEPARIN 500 UNITS: 100 SYRINGE at 15:26

## 2022-08-03 RX ADMIN — SODIUM CHLORIDE 250 ML: 9 INJECTION, SOLUTION INTRAVENOUS at 11:45

## 2022-08-03 RX ADMIN — FAMOTIDINE 20 MG: 10 INJECTION, SOLUTION INTRAVENOUS at 10:37

## 2022-08-03 NOTE — PROGRESS NOTES
Pt here for C1D1 Cisplatin/ Taxol.  Port accessed and positive blood return noted.  Blood drawn from port.  10 cc blood wasted prior to specimen collection.  Specimens sent to lab for processing.  Pt with no complaints at this time.      Pt received pre hydration and pre meds.  First time Taxol was then started at 1143.  At 1154 Taxol was stopped due to pt complaining of being hot, flushed, red faced, dizziness, slight SOA and lower back pain.  Vitals at 1155:  143/81, HR 77 and O2 sat 86% on RA.  Josseline COOK spoke w/ Shayy Montemayor NP and orders received to give an additional 25mg Benadryl IV, wait 15 minutes and then resume.  At 1158 symptoms were improved.  Vitals were 125/80, HR 81 and O2 sats 98% on 2L nasal cannula.      At 1229 Taxol was restarted.  Pt tolerated the remainder of his Taxol.  Per Dr. Gaspar it is okay for pt to come back tomorrow for his Cisplatin and Post hydration fluids.  Gift card given to pt because he is having to come back for day two.  Taxol completed.  Port flushed and needle removed.  Pt will return to clinic tomorrow for the remainder of the treatment.

## 2022-08-04 ENCOUNTER — HOSPITAL ENCOUNTER (OUTPATIENT)
Dept: ONCOLOGY | Facility: HOSPITAL | Age: 57
Setting detail: INFUSION SERIES
Discharge: HOME | End: 2022-08-04

## 2022-08-04 VITALS
HEART RATE: 80 BPM | BODY MASS INDEX: 24.37 KG/M2 | RESPIRATION RATE: 18 BRPM | OXYGEN SATURATION: 100 % | DIASTOLIC BLOOD PRESSURE: 80 MMHG | SYSTOLIC BLOOD PRESSURE: 129 MMHG | TEMPERATURE: 96.9 F | WEIGHT: 195 LBS

## 2022-08-04 DIAGNOSIS — C34.31 CANCER OF LOWER LOBE OF RIGHT LUNG: ICD-10-CM

## 2022-08-04 DIAGNOSIS — Z45.2 ENCOUNTER FOR CARE RELATED TO VASCULAR ACCESS PORT: ICD-10-CM

## 2022-08-04 DIAGNOSIS — C34.91 NON-SMALL CELL CARCINOMA OF LUNG, RIGHT: ICD-10-CM

## 2022-08-04 DIAGNOSIS — C34.92 NSCLC OF LEFT LUNG: Primary | ICD-10-CM

## 2022-08-04 PROCEDURE — 25010000002 MAGNESIUM SULFATE PER 500 MG OF MAGNESIUM: Performed by: INTERNAL MEDICINE

## 2022-08-04 PROCEDURE — 25010000002 CISPLATIN PER 50 MG: Performed by: INTERNAL MEDICINE

## 2022-08-04 PROCEDURE — 25010000002 PEGFILGRASTIM 6 MG/0.6ML PREFILLED SYRINGE KIT: Performed by: INTERNAL MEDICINE

## 2022-08-04 PROCEDURE — 96415 CHEMO IV INFUSION ADDL HR: CPT

## 2022-08-04 PROCEDURE — 25010000002 DEXAMETHASONE SODIUM PHOSPHATE 120 MG/30ML SOLUTION: Performed by: INTERNAL MEDICINE

## 2022-08-04 PROCEDURE — 96413 CHEMO IV INFUSION 1 HR: CPT

## 2022-08-04 PROCEDURE — 96377 APPLICATON ON-BODY INJECTOR: CPT

## 2022-08-04 PROCEDURE — 25010000002 DIPHENHYDRAMINE PER 50 MG: Performed by: INTERNAL MEDICINE

## 2022-08-04 PROCEDURE — 96375 TX/PRO/DX INJ NEW DRUG ADDON: CPT

## 2022-08-04 PROCEDURE — 25010000002 HEPARIN LOCK FLUSH PER 10 UNITS: Performed by: INTERNAL MEDICINE

## 2022-08-04 PROCEDURE — 96367 TX/PROPH/DG ADDL SEQ IV INF: CPT

## 2022-08-04 PROCEDURE — 96368 THER/DIAG CONCURRENT INF: CPT

## 2022-08-04 PROCEDURE — 25010000002 POTASSIUM CHLORIDE PER 2 MEQ OF POTASSIUM: Performed by: INTERNAL MEDICINE

## 2022-08-04 RX ORDER — OLANZAPINE 5 MG/1
5 TABLET ORAL ONCE
Status: COMPLETED | OUTPATIENT
Start: 2022-08-04 | End: 2022-08-04

## 2022-08-04 RX ORDER — FAMOTIDINE 10 MG/ML
20 INJECTION, SOLUTION INTRAVENOUS ONCE
Status: COMPLETED | OUTPATIENT
Start: 2022-08-04 | End: 2022-08-04

## 2022-08-04 RX ORDER — SODIUM CHLORIDE 0.9 % (FLUSH) 0.9 %
20 SYRINGE (ML) INJECTION AS NEEDED
Status: CANCELLED | OUTPATIENT
Start: 2022-08-04

## 2022-08-04 RX ORDER — HEPARIN SODIUM (PORCINE) LOCK FLUSH IV SOLN 100 UNIT/ML 100 UNIT/ML
500 SOLUTION INTRAVENOUS AS NEEDED
Status: CANCELLED | OUTPATIENT
Start: 2022-08-04

## 2022-08-04 RX ORDER — HEPARIN SODIUM (PORCINE) LOCK FLUSH IV SOLN 100 UNIT/ML 100 UNIT/ML
500 SOLUTION INTRAVENOUS AS NEEDED
Status: DISCONTINUED | OUTPATIENT
Start: 2022-08-04 | End: 2022-08-05 | Stop reason: HOSPADM

## 2022-08-04 RX ORDER — SODIUM CHLORIDE 0.9 % (FLUSH) 0.9 %
20 SYRINGE (ML) INJECTION AS NEEDED
Status: DISCONTINUED | OUTPATIENT
Start: 2022-08-04 | End: 2022-08-05 | Stop reason: HOSPADM

## 2022-08-04 RX ADMIN — OLANZAPINE 5 MG: 5 TABLET, FILM COATED ORAL at 08:06

## 2022-08-04 RX ADMIN — Medication 20 ML: at 12:12

## 2022-08-04 RX ADMIN — POTASSIUM CHLORIDE: 2 INJECTION, SOLUTION, CONCENTRATE INTRAVENOUS at 09:06

## 2022-08-04 RX ADMIN — PEGFILGRASTIM 6 MG: KIT SUBCUTANEOUS at 12:15

## 2022-08-04 RX ADMIN — DEXAMETHASONE SODIUM PHOSPHATE 20 MG: 4 INJECTION, SOLUTION INTRA-ARTICULAR; INTRALESIONAL; INTRAMUSCULAR; INTRAVENOUS; SOFT TISSUE at 08:43

## 2022-08-04 RX ADMIN — CISPLATIN 162 MG: 1 INJECTION, SOLUTION INTRAVENOUS at 09:11

## 2022-08-04 RX ADMIN — Medication 500 UNITS: at 12:12

## 2022-08-04 RX ADMIN — FAMOTIDINE 20 MG: 10 INJECTION, SOLUTION INTRAVENOUS at 08:09

## 2022-08-04 RX ADMIN — DIPHENHYDRAMINE HYDROCHLORIDE 50 MG: 50 INJECTION, SOLUTION INTRAMUSCULAR; INTRAVENOUS at 08:15

## 2022-08-04 NOTE — PROGRESS NOTES
Pt. Here at clinic for C1D2 Cisplatin and hydration fluids.   Pt. Has no complaints today.   Treatment given as ordered and pt. Tolerated treatment well.   Cisplatin and post hydration infusing concurrently per MD orders.   Neulasta on body placed on pt's CHRIS, reviewed medication delivery time and care instructions with pt. And handout/package insert given to pt.   Pt. And pt's wife verbalized understanding.   Pt. Discharged from clinic with no complaints and AVS was given.

## 2022-08-05 NOTE — PROGRESS NOTES
Hematology/Oncology Outpatient Follow Up    PATIENT NAME:Jc Driscoll  :1965  MRN: [id number]  PRIMARY CARE PHYSICIAN: Reshma Alvarenga MD  REFERRING PHYSICIAN: Reshma Alvarenga MD    Chief Complaint   Patient presents with   • Follow-up     Non-small cell carcinoma of lung, right (HCC)        HISTORY OF PRESENT ILLNESS:     This is a 57 y.o. who developed left shoulder pain and for that reason he had a chest x-ray done on 19 which showed a 2.7 cm noncalcified right lower lobe nodule was identified.  For that reason a CT scan of the chest was recommended.  Review of his records shows patient had the CT scan on 19 done without contrast.  This basically showed a lobulated noncalcified mass in the posterior aspect of the right lower lobe measuring 3 cm close to the pleural surface.  There is a calcified 1.2 mm nodule in the lateral aspect of the right lower lobe consistent with a granuloma.  There were also calcified subcarinal and right hilar nodules.  There is a lower right side tracheal nodule measuring 1 cm.  Patient had a PET/CT scan done on 19 which showed increased metabolic activity on the right lower lobe mass that measures 2.5 cm with SUV of 4.4.  There was also increased metabolic activity in the subcarinal space measuring 2.8 cm in size with SUV of 3.4, increased activity in an enlarged right paratracheal lymph node that measures 1.9 cm, SUV of 5, also suspicious for metastatic adenopathy.  Patient had a CT-guided biopsy of the right lower lobe mass on 3/8/19.  This showed adenocarcinoma, TTF-1 positive.  On 3/18/19 patient underwent endoscopic ultrasound and biopsy of a subcarinal lymph node.  This was positive for malignant cells.  Patient was then taken back to surgery on 3/20/18 and had left Mediport placement by Dr. Fuchs.  He has been referred for further evaluation and management of his stage 3 adenocarcinoma of the right lung.  He was accompanied by his spouse for  the appointment on 3/26/19.  Patient was scheduled to have a brain MRI for complete staging, but he could not do this due to claustrophobia.  He is willing to try with the help of angiolytics.    • Patient had brain MRI done on 4/5/19.  He states it did not show any evidence of metastatic disease.  Evidence of chronic sinusitis was noted.    • Patient was seen by Dr. Escalona who has recommended concurrent chemotherapy and radiation.  Patient is scheduled to begin on 4/15/19.   • 4/17/19 - Patient was initiated on combined chemotherapy and radiation with Cisplatin and Alimta.  Patient received cycle 1.      • 5/8/19 - Patient received cycle 2 of chemotherapy with Cisplatin and Alimta.   • 5/15/19 - Chemistry panel showed creatinine of 1.7.  WBC 1.8, hemoglobin 11.6, platelet count 72,000.   • 5/20/19 - BUN 10, creatinine 1.2, potassium 3.5.    • 5/23/19 - CT scan of the chest with contrast showed interval decrease in size of the right lower lobe pulmonary nodule now measuring 2.1 cm in size.  There was no mediastinal or hilar adenopathy identified.  • 6/26/2019 patient underwent right lower lobectomy with hilar and mediastinal lymph node dissection performed by Dr. Terrance Mckeon.  • Pathology revealed residual residual 2.2 cm invasive moderately differentiated adenocarcinoma.  There were a total of 16 lymph nodes evaluated that 7 had metastatic disease.  There was evidence of lymphovascular invasion, extranodal involvement.  All the surgical margins were negative and distance of the closest margin was 2.5 cm.  Logic stage is T1c N2 M0.  • Patient is here today accompanied by his spouse for this appointment.  He continues to complain of some postop discomfort, numbness but his skin is intact.  There is no drainage.  Has had follow-up with Dr. Fuchs.  • 8/14/2019-seen by Dr. Maria at the [facility].  Dr. Maria has recommended immunotherapy with durvalumab off label use as patient has not had significant  response to neoadjuvant chemotherapy and radiation.  Patient was given the option to have immunotherapy at the [facility] vs [place] and he has chosen to stay in [place]  • 8/21/19 - Started cycle 1 day 1 Imfinzi  • 9/4/2019 patient had CT scan of the chest this shows a moderate size right pleural effusion.  There are areas of groundglass opacification in the right upper lobe without any evidence of significant septal thickening.  Volume loss noted there is also moderate pleural effusion.  There is no suspicious recurrent malignancy.  There were nonenlarged residual mediastinal lymph nodes.  • 9/9/19 - WBC 6.23, Hgb 11.7 Platelets 257,000, , BUN 9, Cr 1.1,Vitamin B12 318, Ferritin 437  • 9/23/19 - received cycle 2 day 1 Imfinzi  • 10/9/19- Seen by Dr rodríguez with ENT for sinus disease  • 11/4/2019-patient received cycle 5 of Imfinzi(durvalumab)  • 12/2/2019 patient had cycle 7 of durvalumab  • 12/5/2019-patient had CT scan of the  abdomen and pelvis with small right pleural sided pleural effusion.There is no evidence of metastatic disease  • 12/30/2019-patient received cycle 9 of durvalumab  • 12/31/2019-patient had CT scan of the chest showed small to moderate left pleural effusion.  Iglesias appearing right lower paratracheal and right peribronchial soft tissue thickening without any discrete pathologically enlarged mediastinal or hilar lymph nodes.  • 1/27/20-patient received cycle 11 of durvalumab  • 2/17/20-patient had a stress test which was negative for ischemia  • 2/17/2020-patient had CT of the chest which showed postop changes on the right lung, right pleural effusion but no enlarging mass was noted  • 3/2/2020-patient received cycle 12 of durvalumab  • 3/16/2020-patient had ultrasound-guided right thoracentesis.  Pathology was negative for malignancy  • 4/13/2020-patient received cycle 15 of immunotherapy with durvalumab  • 5/11/2020-patient received cycle 17 of immunotherapy with  durvalumab  • 6/9/2020-patient had CT scan of the chest, abdomen and pelvis for lung cancer restaging.  There is stable small chronic loculated right basilar pleural effusion suggesting chronic pleuritis.  There is no evidence of metastatic disease in the abdomen or pelvis  • 7/20/2020 patient received cycle 22 of Durvalumab  • 8/17/2020 patient received cycle 24 and last cycle of durvalumab  • 9/24/2020 patient had CT scan of the chest, abdomen and pelvis for cancer restaging there was no evidence of local recurrence or metastatic disease within the abdomen and pelvis.  He has stable chronic small right pleural effusion.  Mild sigmoid diverticulosis was noted.  • 1/18/2021 patient had CT scan of the chest, abdomen and pelvis reviewed patient  • 5/24/2021 patient had CT scan of the chest calcified subcarinal lymph node consistent with changes of prior granulomatous disease.  Chronic small right pleural effusion is noted similar to prior exam.  Previously shown 4 mm nodule in the left lower lobe has nearly completely resolved.  The subpleural 3 mm nodule located within the posterior left lower lobe With resolved.  • 9/24/2021 patient had a CT scan of the chest, abdomen and pelvis there is soft tissue attenuation within the right paratracheal area which has been suggested to reflect sequela to previous treatment.  There is slight thickening of the bladder wall otherwise there is no evidence of metastatic disease.  • 12/27/2021 patient had CT scan of the chest with contrast this basically showed irregular shaped noncalcified 7 mm left upper lobe pulmonary nodule.  PET CT scan was recommended to further evaluate.  • 1/26/2022 patient had a PET CT scan done which basically showed evidence of mild uptake corresponding to the nodule within the left upper lobe which is new worrisome for developing metastatic focus.  There was mild radiopharmaceutical activity corresponding to pleural thickening throughout the right lung  base with associated pleural effusion likely related to post therapeutic changes and radiation changes in this region.  Metastatic malignancy involving the pleura could not be completely eliminated.  • 1/26/2022: CT-guided biopsy was aborted due to finding by the radiologist that the lung nodularity was actually a clump of tiny nodules of which the largest was 6 mm and therefore biopsy could not be completed.  Also the area was in the vicinity of multiple blood vessels with increased risk of bleeding.  Follow-up CT of the chest was recommended for continued surveillance  • 5/18/2022 patient had CT-guided biopsy of the left enlarging nodule, pathology was positive for invasive poorly differentiated adenocarcinoma most consistent with adenocarcinoma of pulmonary origin.  • 5/27/2022 patient had a PET CT scan which showed a 2.3 cm hypermetabolic left upper lobe nodule which has increased in size no convincing evidence of metastatic disease elsewhere within the chest.  • 5/21/2022 patient had brain MRI which did not show any evidence of intracranial metastasis  • 6/2/2022 patient was seen by Dr. Miryam Reyna pulmonary function test is being done to assess surgical candidacy  • 7/6/2022 patient underwent left heart [place] P video-assisted with upper lobectomy mediastinal lymph node dissection performed by Dr. Miryam Roach  • Final pathology revealed poorly differentiated  • A predominant adenocarcinoma with solid component presenting 45% and micropapillary component 5% with extensive necrosis, invasive carcinoma measures 2.5 maximally there was focal lymphovascular space invasion all margins were negative for malignancy discussed with the closest margin was 2.5 cm pathology stage is pT1C pN1.  PD-L1 showed a tumor proportion score of 95%  • 8/3/2022 patient started adjuvant chemotherapy with cisplatin, Taxol    Past Medical History:   Diagnosis Date   • Allergic rhinitis 07/25/2013    Overview:  4/2/2018 - still zyrtec  10 daily (if stops, gets dermatitis again).    • Anxiety and depression 06/05/2012    Overview:  4/2/2018 -   C/w well 300 xr and Lito 20.  4/8/2019 -   Doing ok even with new lung cancer - lito 20 & well 300xr.    • Chronic pansinusitis 04/08/2019    Overview:  4/8/2019 -   MRI showed chronic thick in frontal, maxillary, ethmoidal ---> to Dr. COLLAZO to est since abx ICC didn't help.  Does netipot bid.    • Diastolic CHF (HCC) 07/09/2014    Overview:  7/4/14 - impaired LV relaxation on [facility] E[facility].  EF 60%. Rest normal   • Dupuytren's contracture of both hands    • Elevated cholesterol    • Erosive esophagitis 07/09/2019    Overview:  EGD 2016 4/2/2018 - nexium OTC daily for few week.  Qod before that.  Now carbonation hurts, epigastric pain 4/8/2019 -   protonix 40 doing well.    • Gastroesophageal reflux disease 02/21/2019   • Hiatal hernia 07/09/2019   • History of colon polyps    • Hypertension    • Lung cancer (HCC)     right lung and in lymph nodes x2   • Mediastinal lymphadenopathy 03/12/2019   • Normocytic anemia 08/08/2019    Overview:  6/2019 - dropped to 9's postop 7/2019 - rebounded to 10's.  8/8/2019 -   Back to 9's - check ferritin, b12 and thyroid.    • Prediabetes 07/09/2014    Overview:  7/4/14 - a1c 6.1 at [facility] admission   • Retention of urine 06/27/2019    PSOT OP, RESOLVED       Past Surgical History:   Procedure Laterality Date   • BRONCHOSCOPY  03/18/2019    EBUS MONTERO NEEDLE BX   • COLONOSCOPY     • DUPUYTREN CONTRACTURE RELEASE Bilateral    • LUNG BIOPSY  03/08/2019    CT GUIDED   • LUNG REMOVAL, PARTIAL Right     right lower   • PORTACATH PLACEMENT  03/20/2019    DR LONGORIA   • THORACOSCOPY Right 6/26/2019    Procedure: RIGHT VATS, OPEN LOWER LOBECTOMY WITH LYMPH NODE DISECTION, WEDGE RESECTION OF RIGHT MIDDLE LOBE;  Surgeon: Terrance Longoria MD;  Location: [facility];  Service: Cardiothoracic   • THORACOSCOPY VIDEO ASSISTED WITH LOBECTOMY Left 7/6/2022    Procedure: THORACOSCOPY VIDEO  ASSISTED WITH LOBECTOMY;  Surgeon: Miryam Reyna MD;  Location: [facility] OR;  Service: Thoracic;  Laterality: Left;         Current Outpatient Medications:   •  amLODIPine (NORVASC) 10 MG tablet, Take 10 mg by mouth Daily., Disp: , Rfl:   •  amLODIPine (NORVASC) 5 MG tablet, Take 5 mg by mouth Daily., Disp: , Rfl:   •  buPROPion XL (WELLBUTRIN XL) 300 MG 24 hr tablet, Take 300 mg by mouth Every Morning., Disp: , Rfl:   •  desvenlafaxine (PRISTIQ) 50 MG 24 hr tablet, Take 50 mg by mouth Daily., Disp: , Rfl:   •  gabapentin (NEURONTIN) 300 MG capsule, Take 1 capsule by mouth Every 8 (Eight) Hours for 30 days., Disp: 90 capsule, Rfl: 0  •  levothyroxine (SYNTHROID, LEVOTHROID) 100 MCG tablet, Take 100 mcg by mouth Every Morning., Disp: , Rfl:   •  lidocaine-prilocaine (EMLA) 2.5-2.5 % cream, Apply 1 application topically to the appropriate area as directed Take As Directed. Apply to port area 30 mins prior to port access, Disp: 30 g, Rfl: 6  •  liothyronine (CYTOMEL) 5 MCG tablet, Take 5 mcg by mouth Daily., Disp: , Rfl:   •  LORazepam (ATIVAN) 0.5 MG tablet, Take 0.5 mg by mouth Every 8 (Eight) Hours As Needed., Disp: , Rfl:   •  metoprolol succinate XL (TOPROL-XL) 100 MG 24 hr tablet, Take 100 mg by mouth Daily., Disp: , Rfl:   •  OLANZapine (zyPREXA) 5 MG tablet, Take 1 tablet by mouth Every Night. Take on days 2, 3 and 4 after chemotherapy., Disp: 3 tablet, Rfl: 5  •  ondansetron (ZOFRAN) 8 MG tablet, Take 1 tablet by mouth 3 (Three) Times a Day As Needed for Nausea or Vomiting., Disp: 30 tablet, Rfl: 5  •  oxyCODONE (Roxicodone) 5 MG immediate release tablet, Take 1 tablet by mouth Every 6 (Six) Hours As Needed for Moderate Pain ., Disp: 45 tablet, Rfl: 0  •  pantoprazole (PROTONIX) 40 MG EC tablet, Take 40 mg by mouth Daily As Needed., Disp: , Rfl:   •  pravastatin (PRAVACHOL) 10 MG tablet, Take 10 mg by mouth Every Night., Disp: , Rfl:   •  vitamin B-12 (CYANOCOBALAMIN) 1000 MCG tablet, Take 1,000 mcg by  mouth Daily., Disp: , Rfl:     No Known Allergies    Family History   Problem Relation Age of Onset   • Cancer Mother         cervical cancer   • Cervical cancer Mother    • Cancer Father         lung cancer   • Lung cancer Father    • Lung cancer Sister    • Cancer Sister         lung cancer   • Malig Hyperthermia Neg Hx        Cancer-related family history includes Cancer in his father, mother, and sister; Cervical cancer in his mother; Lung cancer in his father and sister.    Social History     Tobacco Use   • Smoking status: Former Smoker     Types: Cigarettes     Quit date: 2009     Years since quittin.9   • Smokeless tobacco: Never Used   Vaping Use   • Vaping Use: Never used   Substance Use Topics   • Alcohol use: Yes     Alcohol/week: 2.0 standard drinks     Types: 2 Cans of beer per week     Comment: Occasional   • Drug use: No     I have reviewed and confirmed the accuracy of the patient's history:  as entered by the MA/LPN/RN. Azucena Gaspar MD 22     SUBJECTIVE:      Patient denies any significant issues following his first cycle of chemotherapy      REVIEW OF SYSTEMS:    Review of Systems   Constitutional: Negative for chills and fever.   HENT: Negative for ear pain, mouth sores, nosebleeds and sore throat.    Eyes: Negative for photophobia and visual disturbance.   Respiratory: Negative for wheezing and stridor.    Cardiovascular: Negative for chest pain and palpitations.   Gastrointestinal: Negative for abdominal pain, diarrhea, nausea and vomiting.   Endocrine: Negative for cold intolerance and heat intolerance.   Genitourinary: Negative for dysuria and hematuria.   Musculoskeletal: Negative for joint swelling and neck stiffness.   Skin: Negative for color change and rash.   Neurological: Negative for seizures and syncope.   Hematological: Negative for adenopathy.   Psychiatric/Behavioral: Negative for agitation, confusion and hallucinations.       I have reviewed and confirmed  "the accuracy of the ROS as documented by the MA/LPN/RN Azucena Livia Gaspar MD      OBJECTIVE:    Vitals:    08/08/22 1511   BP: 130/80   Pulse: 82   Resp: 18   Temp: 97.1 °F (36.2 °C)   TempSrc: Infrared   Weight: 87.7 kg (193 lb 6.4 oz)   Height: 190.5 cm (75\")   PainSc: 0-No pain     ECOG    Eastern Cooperative Oncology Group (ECOG, [place], World Health Organization) performance scale  0 Fully active; no performance restrictions.  1 Strenuous physical activity restricted; fully ambulatory and able to carry out light work.  2 Capable of all self-care but unable to carry out any work activities. Up and about >50% of waking hours.  3 Capable of only limited self-care; confined to bed or chair >50% of waking hours.  4 Completely disabled; cannot carry out any self-care; totally confined to bed or chair.    Physical Exam   Constitutional: He is oriented to person, place, and time. He appears well-developed. No distress.   HENT:   Head: Normocephalic and atraumatic.   Right Ear: External ear normal.   Left Ear: External ear normal.   Nose: Nose normal.   Eyes: Pupils are equal, round, and reactive to light. Conjunctivae are normal. Right eye exhibits no discharge. Left eye exhibits no discharge. No scleral icterus.   Neck: No thyromegaly present.   Cardiovascular: Normal rate, regular rhythm and normal heart sounds. Exam reveals no gallop and no friction rub.   Pulmonary/Chest: Effort normal. No stridor. No respiratory distress. He has no wheezes.   Abdominal: Soft. Bowel sounds are normal. He exhibits no mass. There is no abdominal tenderness. There is no rebound and no guarding.   Musculoskeletal: Normal range of motion. No tenderness.   Lymphadenopathy:     He has no cervical adenopathy.   Neurological: He is alert and oriented to person, place, and time. He exhibits normal muscle tone.   Skin: Skin is warm. No rash noted. He is not diaphoretic. No erythema.   Urticarial-like rash, right medial knee likely due to " immunotherapy   Psychiatric: His behavior is normal. Judgment and thought content normal.   Nursing note and vitals reviewed.    I have reexamined the patient and the results are consistent with the previously documented exam. Azucena Gaspar MD     RECENT LABS    WBC   Date Value Ref Range Status   08/08/2022 15.43 (H) 3.40 - 10.80 10*3/mm3 Final   03/22/2022 7.84 4.5 - 11.0 10*3/uL Final     RBC   Date Value Ref Range Status   08/08/2022 3.94 (L) 4.14 - 5.80 10*6/mm3 Final   03/22/2022 4.23 (L) 4.5 - 5.9 10*6/uL Final     Hemoglobin   Date Value Ref Range Status   08/08/2022 11.9 (L) 13.0 - 17.7 g/dL Final   03/22/2022 12.9 (L) 13.5 - 17.5 g/dL Final     Hematocrit   Date Value Ref Range Status   08/08/2022 35.9 (L) 37.5 - 51.0 % Final   03/22/2022 38.8 (L) 41.0 - 53.0 % Final     MCV   Date Value Ref Range Status   08/08/2022 91.1 79.0 - 97.0 fL Final   03/22/2022 91.7 80.0 - 100.0 fL Final     MCH   Date Value Ref Range Status   08/08/2022 30.2 26.6 - 33.0 pg Final   03/22/2022 30.5 26.0 - 34.0 pg Final     MCHC   Date Value Ref Range Status   08/08/2022 33.1 31.5 - 35.7 g/dL Final   03/22/2022 33.2 31.0 - 37.0 g/dL Final     RDW   Date Value Ref Range Status   08/08/2022 12.8 12.3 - 15.4 % Final   03/22/2022 14.2 12.0 - 16.8 % Final     RDW-SD   Date Value Ref Range Status   08/08/2022 42.1 37.0 - 54.0 fl Final     MPV   Date Value Ref Range Status   08/08/2022 8.9 6.0 - 12.0 fL Final   03/22/2022 9.3 8.4 - 12.4 fL Final     Platelets   Date Value Ref Range Status   08/08/2022 276 140 - 450 10*3/mm3 Final   03/22/2022 324 140 - 440 10*3/uL Final     Neutrophil Rel %   Date Value Ref Range Status   03/22/2022 63.1 45 - 80 % Final     Neutrophil %   Date Value Ref Range Status   08/08/2022 79.6 (H) 42.7 - 76.0 % Final     Lymphocyte Rel %   Date Value Ref Range Status   03/22/2022 16.2 15 - 50 % Final     Lymphocyte %   Date Value Ref Range Status   08/08/2022 5.4 (L) 19.6 - 45.3 % Final     Monocyte Rel %    Date Value Ref Range Status   03/22/2022 13.6 0 - 15 % Final     Monocyte %   Date Value Ref Range Status   08/08/2022 2.5 (L) 5.0 - 12.0 % Final     Eosinophil %   Date Value Ref Range Status   08/08/2022 12.2 (H) 0.3 - 6.2 % Final   03/22/2022 5.5 0 - 7 % Final     Basophil Rel %   Date Value Ref Range Status   03/22/2022 1.1 0 - 2 % Final     Basophil %   Date Value Ref Range Status   08/08/2022 0.3 0.0 - 1.5 % Final     Immature Grans %   Date Value Ref Range Status   07/07/2022 0.4 0.0 - 0.5 % Final   03/22/2022 0.5 0.0 - 1.0 % Final     Neutrophils Absolute   Date Value Ref Range Status   03/22/2022 4.94 2.0 - 8.8 10*3/uL Final     Neutrophils, Absolute   Date Value Ref Range Status   08/08/2022 12.28 (H) 1.70 - 7.00 10*3/mm3 Final     Lymphocytes Absolute   Date Value Ref Range Status   03/22/2022 1.27 0.7 - 5.5 10*3/uL Final     Lymphocytes, Absolute   Date Value Ref Range Status   08/08/2022 0.84 0.70 - 3.10 10*3/mm3 Final     Monocytes Absolute   Date Value Ref Range Status   03/22/2022 1.07 0.0 - 1.7 10*3/uL Final     Monocytes, Absolute   Date Value Ref Range Status   08/08/2022 0.38 0.10 - 0.90 10*3/mm3 Final     Eosinophils Absolute   Date Value Ref Range Status   03/22/2022 0.43 0.0 - 0.8 10*3/uL Final     Eosinophils, Absolute   Date Value Ref Range Status   08/08/2022 1.89 (H) 0.00 - 0.40 10*3/mm3 Final     Basophils Absolute   Date Value Ref Range Status   03/22/2022 0.09 0.0 - 0.2 10*3/uL Final     Basophils, Absolute   Date Value Ref Range Status   08/08/2022 0.04 0.00 - 0.20 10*3/mm3 Final     Immature Grans, Absolute   Date Value Ref Range Status   07/07/2022 0.05 0.00 - 0.05 10*3/mm3 Final   03/22/2022 0.04 0.00 - 0.10 10*3/uL Final     nRBC   Date Value Ref Range Status   07/07/2022 0.0 0.0 - 0.2 /100 WBC Final       Lab Results   Component Value Date    GLUCOSE 95 08/03/2022    BUN 6 08/03/2022    CREATININE 0.70 08/03/2022    EGFRIFNONA 100 01/28/2022    EGFRIFAFRI >60 05/20/2019    BCR  8.3 08/03/2022    K 4.5 08/03/2022    CO2 27.0 08/03/2022    CALCIUM 9.0 08/03/2022    ALBUMIN 3.80 08/03/2022    LABIL2 1.5 03/22/2022    AST 16 08/03/2022    ALT 22 08/03/2022         ASSESSMENT:    1. 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  pT1 cpN1 M0 consistent with stage IIb disease, found on surveillance CT scans. Brain MRI was negative.we will recommend platinum doublet followed by Tecentriq unless he has EGFR mutation for which adjuvant osimertinib will be considered.  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  Patient was treated with cisplatin and Alimta in the adjuvant setting for his original malignancy.  He is currently on cisplatin and Taxol  2. High PD-L1 expression at 95%.  We will complete NGS testing to find other molecular markers.  This is still pending    3. Adenocarcinoma of the right lung, T1c N2 M0, consistent with clinical stage 3A disease.    4. Enlarging left upper lobe nodule 2.2 cm concerning for malignancy, biopsy-proven  5. Pneumothorax following left lung biopsy status post hospitalization  6. S/P combined chemotherapy and radiation with cisplatin and Alimta.  Surveillance CT scan  7. Recent diagnosis of hypothyroidism, on thyroid replacement therapy  8. Status post right lower lobectomy with mediastinal and hilar lymph node dissection with residual disease.  pT1 cN2 M0.  Patient with high risk features including lymphovascular invasion, extranodal capsular extension and persistent multiple lymph node disease.  Ongoing management  9. Status post consolidation treatment with immunotherapy with Imfinzi:  10. Port maintenance: Patient still wants to keep port in little longer.  11. fatigue: We will check thyroid function tests  12. Satus post right thoracentesis  with cytology negative for malignancy  13. Postop anemia: Reviewed  14. Assessment has been reviewed and updated      PLANS:    1. Continue with chemotherapy  2. Weekly BMP and mag levels  3. Encouraged p.o. fluid intake  4. Patient already has a port  5. Platinum doublet followed by Tecentriq or targeted therapy depending on NGS testing followed by adjuvant durvalumab.  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  Patient was treated with cisplatin and Alimta in the adjuvant setting for his original malignancy  6. Send tissue out for PD-L1 testing, look for EGFR mutation which could mean targeted therapy in the adjuvant setting for him  7. Reviewed side effects benefits of chemotherapy with him.  Patient will be scheduled for chemo education with nurse practitioner  8. Patient was given information on cisplatin and paclitaxel chemotherapy along with Neulasta  9. Emla cream to port  10. Continue thyroid replacement therapy through his PCP  11. Continue B12 injections  monthly: Patient did have elevated methylmalonic acid level during the early phases of his treatments.  Continue the same  12. Continue supportive care  13. All questions answered  14. Follow up in 4 weeks         Patient has  questions which have all been answered to the best of my abilities    I have reviewed labs results, imaging, vitals, and medications with the patient today.     Patient verbalized understanding and is in agreement of the above plan.          I spent 30 total minutes, face-to-face, caring for Jc today.  90% of this time involved counseling and/or coordination of care as documented within this note.

## 2022-08-08 ENCOUNTER — OFFICE VISIT (OUTPATIENT)
Dept: ONCOLOGY | Facility: CLINIC | Age: 57
End: 2022-08-08

## 2022-08-08 ENCOUNTER — LAB (OUTPATIENT)
Dept: LAB | Facility: HOSPITAL | Age: 57
End: 2022-08-08

## 2022-08-08 VITALS
SYSTOLIC BLOOD PRESSURE: 130 MMHG | BODY MASS INDEX: 24.05 KG/M2 | TEMPERATURE: 97.1 F | DIASTOLIC BLOOD PRESSURE: 80 MMHG | RESPIRATION RATE: 18 BRPM | HEIGHT: 75 IN | HEART RATE: 82 BPM | WEIGHT: 193.4 LBS

## 2022-08-08 DIAGNOSIS — C34.91 NON-SMALL CELL CARCINOMA OF LUNG, RIGHT: Primary | ICD-10-CM

## 2022-08-08 LAB
ANION GAP SERPL CALCULATED.3IONS-SCNC: 12 MMOL/L (ref 5–15)
BASOPHILS # BLD AUTO: 0.04 10*3/MM3 (ref 0–0.2)
BASOPHILS NFR BLD AUTO: 0.3 % (ref 0–1.5)
BUN SERPL-MCNC: 12 MG/DL (ref 6–20)
BUN/CREAT SERPL: 12.6 (ref 7–25)
CALCIUM SPEC-SCNC: 8.8 MG/DL (ref 8.6–10.5)
CHLORIDE SERPL-SCNC: 93 MMOL/L (ref 98–107)
CO2 SERPL-SCNC: 29 MMOL/L (ref 22–29)
CREAT SERPL-MCNC: 0.95 MG/DL (ref 0.76–1.27)
DEPRECATED RDW RBC AUTO: 42.1 FL (ref 37–54)
EGFRCR SERPLBLD CKD-EPI 2021: 92.8 ML/MIN/1.73
EOSINOPHIL # BLD AUTO: 1.89 10*3/MM3 (ref 0–0.4)
EOSINOPHIL NFR BLD AUTO: 12.2 % (ref 0.3–6.2)
ERYTHROCYTE [DISTWIDTH] IN BLOOD BY AUTOMATED COUNT: 12.8 % (ref 12.3–15.4)
GLUCOSE SERPL-MCNC: 92 MG/DL (ref 65–99)
HCT VFR BLD AUTO: 35.9 % (ref 37.5–51)
HGB BLD-MCNC: 11.9 G/DL (ref 13–17.7)
HOLD SPECIMEN: NORMAL
HOLD SPECIMEN: NORMAL
LYMPHOCYTES # BLD AUTO: 0.84 10*3/MM3 (ref 0.7–3.1)
LYMPHOCYTES NFR BLD AUTO: 5.4 % (ref 19.6–45.3)
MAGNESIUM SERPL-MCNC: 1.8 MG/DL (ref 1.6–2.6)
MCH RBC QN AUTO: 30.2 PG (ref 26.6–33)
MCHC RBC AUTO-ENTMCNC: 33.1 G/DL (ref 31.5–35.7)
MCV RBC AUTO: 91.1 FL (ref 79–97)
MONOCYTES # BLD AUTO: 0.38 10*3/MM3 (ref 0.1–0.9)
MONOCYTES NFR BLD AUTO: 2.5 % (ref 5–12)
NEUTROPHILS NFR BLD AUTO: 12.28 10*3/MM3 (ref 1.7–7)
NEUTROPHILS NFR BLD AUTO: 79.6 % (ref 42.7–76)
PLATELET # BLD AUTO: 276 10*3/MM3 (ref 140–450)
PMV BLD AUTO: 8.9 FL (ref 6–12)
POTASSIUM SERPL-SCNC: 4 MMOL/L (ref 3.5–5.2)
RBC # BLD AUTO: 3.94 10*6/MM3 (ref 4.14–5.8)
SODIUM SERPL-SCNC: 134 MMOL/L (ref 136–145)
WBC NRBC COR # BLD: 15.43 10*3/MM3 (ref 3.4–10.8)

## 2022-08-08 PROCEDURE — 83735 ASSAY OF MAGNESIUM: CPT | Performed by: INTERNAL MEDICINE

## 2022-08-08 PROCEDURE — 36415 COLL VENOUS BLD VENIPUNCTURE: CPT

## 2022-08-08 PROCEDURE — 99214 OFFICE O/P EST MOD 30 MIN: CPT | Performed by: INTERNAL MEDICINE

## 2022-08-08 PROCEDURE — 85025 COMPLETE CBC W/AUTO DIFF WBC: CPT

## 2022-08-08 PROCEDURE — 80048 BASIC METABOLIC PNL TOTAL CA: CPT | Performed by: INTERNAL MEDICINE

## 2022-08-15 ENCOUNTER — TELEPHONE (OUTPATIENT)
Dept: ONCOLOGY | Facility: CLINIC | Age: 57
End: 2022-08-15

## 2022-08-15 DIAGNOSIS — C34.92 NSCLC OF LEFT LUNG: Primary | ICD-10-CM

## 2022-08-15 NOTE — TELEPHONE ENCOUNTER
OSW received a voicemail from patient, stating he had been speaking to work and needed to get an addendum to his FMLA.     OSW spoke with patient over the phone and the [employer] company is needing a letter signed by the MD, stating he is having chemotherapy, and may be intermittently or off until at least Nov 1, 2022.     OSW will get signed by MD and sent to [phone], per patient request    JONATAN Johnston, CSW, MSW  Oncology MSW  [facility]

## 2022-08-16 DIAGNOSIS — C34.91 NON-SMALL CELL CARCINOMA OF LUNG, RIGHT: Primary | ICD-10-CM

## 2022-08-16 LAB
LAB AP CASE REPORT: NORMAL
LAB AP DIAGNOSIS COMMENT: NORMAL
LAB AP SYNOPTIC CHECKLIST: NORMAL
Lab: NORMAL
Lab: NORMAL
PATH REPORT.ADDENDUM SPEC: NORMAL
PATH REPORT.FINAL DX SPEC: NORMAL
PATH REPORT.GROSS SPEC: NORMAL

## 2022-08-17 ENCOUNTER — LAB (OUTPATIENT)
Dept: LAB | Facility: HOSPITAL | Age: 57
End: 2022-08-17

## 2022-08-17 DIAGNOSIS — C34.91 NON-SMALL CELL CARCINOMA OF LUNG, RIGHT: ICD-10-CM

## 2022-08-17 LAB
ANION GAP SERPL CALCULATED.3IONS-SCNC: 9 MMOL/L (ref 5–15)
BASOPHILS # BLD AUTO: 0.13 10*3/MM3 (ref 0–0.2)
BASOPHILS NFR BLD AUTO: 1.1 % (ref 0–1.5)
BUN SERPL-MCNC: 9 MG/DL (ref 6–20)
BUN/CREAT SERPL: 10 (ref 7–25)
CALCIUM SPEC-SCNC: 9.6 MG/DL (ref 8.6–10.5)
CHLORIDE SERPL-SCNC: 95 MMOL/L (ref 98–107)
CO2 SERPL-SCNC: 30 MMOL/L (ref 22–29)
CREAT SERPL-MCNC: 0.9 MG/DL (ref 0.76–1.27)
DEPRECATED RDW RBC AUTO: 44.7 FL (ref 37–54)
EGFRCR SERPLBLD CKD-EPI 2021: 99 ML/MIN/1.73
EOSINOPHIL # BLD AUTO: 0.41 10*3/MM3 (ref 0–0.4)
EOSINOPHIL NFR BLD AUTO: 3.4 % (ref 0.3–6.2)
ERYTHROCYTE [DISTWIDTH] IN BLOOD BY AUTOMATED COUNT: 14.2 % (ref 12.3–15.4)
GLUCOSE SERPL-MCNC: 106 MG/DL (ref 65–99)
HCT VFR BLD AUTO: 38.2 % (ref 37.5–51)
HGB BLD-MCNC: 12.7 G/DL (ref 13–17.7)
LYMPHOCYTES # BLD AUTO: 1.33 10*3/MM3 (ref 0.7–3.1)
LYMPHOCYTES NFR BLD AUTO: 11 % (ref 19.6–45.3)
MAGNESIUM SERPL-MCNC: 1.9 MG/DL (ref 1.6–2.6)
MCH RBC QN AUTO: 30.1 PG (ref 26.6–33)
MCHC RBC AUTO-ENTMCNC: 33.2 G/DL (ref 31.5–35.7)
MCV RBC AUTO: 90.5 FL (ref 79–97)
MONOCYTES # BLD AUTO: 1.14 10*3/MM3 (ref 0.1–0.9)
MONOCYTES NFR BLD AUTO: 9.5 % (ref 5–12)
NEUTROPHILS NFR BLD AUTO: 75 % (ref 42.7–76)
NEUTROPHILS NFR BLD AUTO: 9.05 10*3/MM3 (ref 1.7–7)
PLATELET # BLD AUTO: 240 10*3/MM3 (ref 140–450)
PMV BLD AUTO: 8.6 FL (ref 6–12)
POTASSIUM SERPL-SCNC: 4.8 MMOL/L (ref 3.5–5.2)
RBC # BLD AUTO: 4.22 10*6/MM3 (ref 4.14–5.8)
SODIUM SERPL-SCNC: 134 MMOL/L (ref 136–145)
WBC NRBC COR # BLD: 12.06 10*3/MM3 (ref 3.4–10.8)

## 2022-08-17 PROCEDURE — 85025 COMPLETE CBC W/AUTO DIFF WBC: CPT

## 2022-08-17 PROCEDURE — 83735 ASSAY OF MAGNESIUM: CPT

## 2022-08-17 PROCEDURE — 36415 COLL VENOUS BLD VENIPUNCTURE: CPT

## 2022-08-17 PROCEDURE — 80048 BASIC METABOLIC PNL TOTAL CA: CPT

## 2022-08-24 ENCOUNTER — HOSPITAL ENCOUNTER (OUTPATIENT)
Dept: ONCOLOGY | Facility: HOSPITAL | Age: 57
Setting detail: INFUSION SERIES
Discharge: HOME | End: 2022-08-24

## 2022-08-24 VITALS
SYSTOLIC BLOOD PRESSURE: 127 MMHG | OXYGEN SATURATION: 100 % | TEMPERATURE: 96.9 F | DIASTOLIC BLOOD PRESSURE: 85 MMHG | RESPIRATION RATE: 18 BRPM | BODY MASS INDEX: 24.5 KG/M2 | WEIGHT: 196 LBS | HEART RATE: 77 BPM

## 2022-08-24 DIAGNOSIS — C34.92 NSCLC OF LEFT LUNG: Primary | ICD-10-CM

## 2022-08-24 DIAGNOSIS — Z45.2 ENCOUNTER FOR CARE RELATED TO VASCULAR ACCESS PORT: ICD-10-CM

## 2022-08-24 DIAGNOSIS — C34.91 NON-SMALL CELL CARCINOMA OF LUNG, RIGHT: ICD-10-CM

## 2022-08-24 DIAGNOSIS — C34.31 CANCER OF LOWER LOBE OF RIGHT LUNG: ICD-10-CM

## 2022-08-24 LAB
ALBUMIN SERPL-MCNC: 4.1 G/DL (ref 3.5–5.2)
ALBUMIN/GLOB SERPL: 1.5 G/DL
ALP SERPL-CCNC: 123 U/L (ref 39–117)
ALT SERPL W P-5'-P-CCNC: 17 U/L (ref 1–41)
ANION GAP SERPL CALCULATED.3IONS-SCNC: 14 MMOL/L (ref 5–15)
AST SERPL-CCNC: 16 U/L (ref 1–40)
BASOPHILS # BLD AUTO: 0.08 10*3/MM3 (ref 0–0.2)
BASOPHILS NFR BLD AUTO: 0.9 % (ref 0–1.5)
BILIRUB SERPL-MCNC: 0.3 MG/DL (ref 0–1.2)
BUN SERPL-MCNC: 10 MG/DL (ref 6–20)
BUN/CREAT SERPL: 11.9 (ref 7–25)
CALCIUM SPEC-SCNC: 9.5 MG/DL (ref 8.6–10.5)
CHLORIDE SERPL-SCNC: 94 MMOL/L (ref 98–107)
CO2 SERPL-SCNC: 25 MMOL/L (ref 22–29)
CREAT BLDA-MCNC: 0.8 MG/DL (ref 0.6–1.3)
CREAT SERPL-MCNC: 0.84 MG/DL (ref 0.76–1.27)
DEPRECATED RDW RBC AUTO: 46.9 FL (ref 37–54)
EGFRCR SERPLBLD CKD-EPI 2021: 101.1 ML/MIN/1.73
EGFRCR SERPLBLD CKD-EPI 2021: 102.6 ML/MIN/1.73
EOSINOPHIL # BLD AUTO: 0.23 10*3/MM3 (ref 0–0.4)
EOSINOPHIL NFR BLD AUTO: 2.6 % (ref 0.3–6.2)
ERYTHROCYTE [DISTWIDTH] IN BLOOD BY AUTOMATED COUNT: 14.8 % (ref 12.3–15.4)
GLOBULIN UR ELPH-MCNC: 2.8 GM/DL
GLUCOSE SERPL-MCNC: 114 MG/DL (ref 65–99)
HCT VFR BLD AUTO: 37.3 % (ref 37.5–51)
HGB BLD-MCNC: 12.3 G/DL (ref 13–17.7)
LYMPHOCYTES # BLD AUTO: 1 10*3/MM3 (ref 0.7–3.1)
LYMPHOCYTES NFR BLD AUTO: 11.3 % (ref 19.6–45.3)
MAGNESIUM SERPL-MCNC: 2 MG/DL (ref 1.6–2.6)
MCH RBC QN AUTO: 30.2 PG (ref 26.6–33)
MCHC RBC AUTO-ENTMCNC: 33 G/DL (ref 31.5–35.7)
MCV RBC AUTO: 91.6 FL (ref 79–97)
MONOCYTES # BLD AUTO: 1.46 10*3/MM3 (ref 0.1–0.9)
MONOCYTES NFR BLD AUTO: 16.5 % (ref 5–12)
NEUTROPHILS NFR BLD AUTO: 6.07 10*3/MM3 (ref 1.7–7)
NEUTROPHILS NFR BLD AUTO: 68.7 % (ref 42.7–76)
PLATELET # BLD AUTO: 357 10*3/MM3 (ref 140–450)
PMV BLD AUTO: 8.8 FL (ref 6–12)
POTASSIUM SERPL-SCNC: 4.2 MMOL/L (ref 3.5–5.2)
PROT SERPL-MCNC: 6.9 G/DL (ref 6–8.5)
RBC # BLD AUTO: 4.07 10*6/MM3 (ref 4.14–5.8)
SODIUM SERPL-SCNC: 133 MMOL/L (ref 136–145)
WBC NRBC COR # BLD: 8.84 10*3/MM3 (ref 3.4–10.8)

## 2022-08-24 PROCEDURE — 25010000002 PALONOSETRON 0.25 MG/5ML SOLUTION PREFILLED SYRINGE: Performed by: INTERNAL MEDICINE

## 2022-08-24 PROCEDURE — 36593 DECLOT VASCULAR DEVICE: CPT

## 2022-08-24 PROCEDURE — 25010000002 HEPARIN LOCK FLUSH PER 10 UNITS: Performed by: INTERNAL MEDICINE

## 2022-08-24 PROCEDURE — 96377 APPLICATON ON-BODY INJECTOR: CPT

## 2022-08-24 PROCEDURE — 82565 ASSAY OF CREATININE: CPT

## 2022-08-24 PROCEDURE — 85025 COMPLETE CBC W/AUTO DIFF WBC: CPT | Performed by: INTERNAL MEDICINE

## 2022-08-24 PROCEDURE — 25010000002 ALTEPLASE 2 MG RECONSTITUTED SOLUTION: Performed by: INTERNAL MEDICINE

## 2022-08-24 PROCEDURE — 96361 HYDRATE IV INFUSION ADD-ON: CPT

## 2022-08-24 PROCEDURE — 80053 COMPREHEN METABOLIC PANEL: CPT | Performed by: INTERNAL MEDICINE

## 2022-08-24 PROCEDURE — 25010000002 DIPHENHYDRAMINE PER 50 MG: Performed by: INTERNAL MEDICINE

## 2022-08-24 PROCEDURE — 25010000002 POTASSIUM CHLORIDE PER 2 MEQ OF POTASSIUM: Performed by: INTERNAL MEDICINE

## 2022-08-24 PROCEDURE — 96367 TX/PROPH/DG ADDL SEQ IV INF: CPT

## 2022-08-24 PROCEDURE — 25010000002 PACLITAXEL PER 1 MG: Performed by: INTERNAL MEDICINE

## 2022-08-24 PROCEDURE — 96413 CHEMO IV INFUSION 1 HR: CPT

## 2022-08-24 PROCEDURE — 96417 CHEMO IV INFUS EACH ADDL SEQ: CPT

## 2022-08-24 PROCEDURE — 25010000002 FOSAPREPITANT PER 1 MG: Performed by: INTERNAL MEDICINE

## 2022-08-24 PROCEDURE — 96366 THER/PROPH/DIAG IV INF ADDON: CPT

## 2022-08-24 PROCEDURE — 96375 TX/PRO/DX INJ NEW DRUG ADDON: CPT

## 2022-08-24 PROCEDURE — 25010000002 MAGNESIUM SULFATE PER 500 MG OF MAGNESIUM: Performed by: INTERNAL MEDICINE

## 2022-08-24 PROCEDURE — 25010000002 PEGFILGRASTIM 6 MG/0.6ML PREFILLED SYRINGE KIT: Performed by: INTERNAL MEDICINE

## 2022-08-24 PROCEDURE — 96415 CHEMO IV INFUSION ADDL HR: CPT

## 2022-08-24 PROCEDURE — 25010000002 CISPLATIN PER 50 MG: Performed by: INTERNAL MEDICINE

## 2022-08-24 PROCEDURE — 83735 ASSAY OF MAGNESIUM: CPT | Performed by: INTERNAL MEDICINE

## 2022-08-24 PROCEDURE — 96360 HYDRATION IV INFUSION INIT: CPT

## 2022-08-24 PROCEDURE — 25010000002 DEXAMETHASONE SODIUM PHOSPHATE 120 MG/30ML SOLUTION: Performed by: INTERNAL MEDICINE

## 2022-08-24 RX ORDER — FAMOTIDINE 10 MG/ML
20 INJECTION, SOLUTION INTRAVENOUS AS NEEDED
Status: CANCELLED | OUTPATIENT
Start: 2022-08-24

## 2022-08-24 RX ORDER — SODIUM CHLORIDE 0.9 % (FLUSH) 0.9 %
20 SYRINGE (ML) INJECTION AS NEEDED
Status: DISCONTINUED | OUTPATIENT
Start: 2022-08-24 | End: 2022-08-25 | Stop reason: HOSPADM

## 2022-08-24 RX ORDER — SODIUM CHLORIDE 9 MG/ML
250 INJECTION, SOLUTION INTRAVENOUS ONCE
Status: COMPLETED | OUTPATIENT
Start: 2022-08-24 | End: 2022-08-24

## 2022-08-24 RX ORDER — OLANZAPINE 5 MG/1
5 TABLET ORAL ONCE
Status: COMPLETED | OUTPATIENT
Start: 2022-08-24 | End: 2022-08-24

## 2022-08-24 RX ORDER — FAMOTIDINE 10 MG/ML
20 INJECTION, SOLUTION INTRAVENOUS ONCE
Status: COMPLETED | OUTPATIENT
Start: 2022-08-24 | End: 2022-08-24

## 2022-08-24 RX ORDER — DIPHENHYDRAMINE HYDROCHLORIDE 50 MG/ML
50 INJECTION INTRAMUSCULAR; INTRAVENOUS AS NEEDED
Status: CANCELLED | OUTPATIENT
Start: 2022-08-24

## 2022-08-24 RX ORDER — PALONOSETRON 0.05 MG/ML
0.25 INJECTION, SOLUTION INTRAVENOUS ONCE
Status: COMPLETED | OUTPATIENT
Start: 2022-08-24 | End: 2022-08-24

## 2022-08-24 RX ORDER — HEPARIN SODIUM (PORCINE) LOCK FLUSH IV SOLN 100 UNIT/ML 100 UNIT/ML
500 SOLUTION INTRAVENOUS AS NEEDED
Status: DISCONTINUED | OUTPATIENT
Start: 2022-08-24 | End: 2022-08-25 | Stop reason: HOSPADM

## 2022-08-24 RX ADMIN — CISPLATIN 162 MG: 1 INJECTION, SOLUTION INTRAVENOUS at 15:07

## 2022-08-24 RX ADMIN — POTASSIUM CHLORIDE: 2 INJECTION, SOLUTION, CONCENTRATE INTRAVENOUS at 08:14

## 2022-08-24 RX ADMIN — ALTEPLASE: 2.2 INJECTION, POWDER, LYOPHILIZED, FOR SOLUTION INTRAVENOUS at 08:09

## 2022-08-24 RX ADMIN — SODIUM CHLORIDE 250 ML: 9 INJECTION, SOLUTION INTRAVENOUS at 10:19

## 2022-08-24 RX ADMIN — PACLITAXEL 295 MG: 6 INJECTION, SOLUTION, CONCENTRATE INTRAVENOUS at 11:50

## 2022-08-24 RX ADMIN — DIPHENHYDRAMINE HYDROCHLORIDE 50 MG: 50 INJECTION, SOLUTION INTRAMUSCULAR; INTRAVENOUS at 11:31

## 2022-08-24 RX ADMIN — Medication 20 ML: at 17:12

## 2022-08-24 RX ADMIN — PALONOSETRON 0.25 MG: 0.25 INJECTION, SOLUTION INTRAVENOUS at 10:19

## 2022-08-24 RX ADMIN — DEXAMETHASONE SODIUM PHOSPHATE 20 MG: 4 INJECTION, SOLUTION INTRA-ARTICULAR; INTRALESIONAL; INTRAMUSCULAR; INTRAVENOUS; SOFT TISSUE at 11:05

## 2022-08-24 RX ADMIN — HEPARIN 500 UNITS: 100 SYRINGE at 17:12

## 2022-08-24 RX ADMIN — PEGFILGRASTIM 6 MG: KIT SUBCUTANEOUS at 17:12

## 2022-08-24 RX ADMIN — SODIUM CHLORIDE 100 ML: 9 INJECTION, SOLUTION INTRAVENOUS at 10:29

## 2022-08-24 RX ADMIN — OLANZAPINE 5 MG: 5 TABLET, FILM COATED ORAL at 10:18

## 2022-08-24 RX ADMIN — FAMOTIDINE 20 MG: 10 INJECTION INTRAVENOUS at 10:24

## 2022-08-24 NOTE — PROGRESS NOTES
Pt. Here at clinic for C2D1 Cisplatin, Taxol,   Port accessed per sterile protocol, no blood return noted, TPA ordered per protocol. Peripheral IV obtained and labs drawn via peripheral IV site.   Pt. Has no complaints today and all lab results within parameters for treatment.   TPA instilled in port per protocol, dwell time about 40 minutes, TPA removed with blood return noted.   Treatment given as ordered using pt's port.   Pt. Tolerated treatment well.   Pt. Discharged from clinic with no complaints. Pt. Follows my chart for his schedules.

## 2022-08-30 ENCOUNTER — TELEPHONE (OUTPATIENT)
Dept: ONCOLOGY | Facility: CLINIC | Age: 57
End: 2022-08-30

## 2022-08-30 DIAGNOSIS — C34.92 NSCLC OF LEFT LUNG: ICD-10-CM

## 2022-08-30 DIAGNOSIS — C34.91 NON-SMALL CELL CARCINOMA OF LUNG, RIGHT: Primary | ICD-10-CM

## 2022-08-30 NOTE — TELEPHONE ENCOUNTER
OSW returned patient's call.     He stated his leave office, [place], had stated they have not received the letter written on 8/23/2022. OSW attempted to confirm the fax number, but patient was unable to at this time.     OSW searched patient's media, and found where paperwork had been sent to Dr. Reyna's office - OSW printed and completed what was possible and re-faxed to Nick, along with signed noted.     Mayela Monsalve, LSW, CSW, MSW  Oncology MSW  [facility]

## 2022-08-30 NOTE — TELEPHONE ENCOUNTER
OSW received a voicemail from patient requesting a call back.     CAITLIN JohnstonW, CSW, MSW  Oncology MSW  [facility]

## 2022-08-31 ENCOUNTER — LAB (OUTPATIENT)
Dept: LAB | Facility: HOSPITAL | Age: 57
End: 2022-08-31

## 2022-08-31 DIAGNOSIS — C34.91 NON-SMALL CELL CARCINOMA OF LUNG, RIGHT: ICD-10-CM

## 2022-08-31 LAB
ANION GAP SERPL CALCULATED.3IONS-SCNC: 12 MMOL/L (ref 5–15)
BASOPHILS # BLD AUTO: 0.06 10*3/MM3 (ref 0–0.2)
BASOPHILS NFR BLD AUTO: 0.3 % (ref 0–1.5)
BUN SERPL-MCNC: 15 MG/DL (ref 6–20)
BUN/CREAT SERPL: 15 (ref 7–25)
CALCIUM SPEC-SCNC: 9.7 MG/DL (ref 8.6–10.5)
CHLORIDE SERPL-SCNC: 92 MMOL/L (ref 98–107)
CO2 SERPL-SCNC: 27 MMOL/L (ref 22–29)
CREAT SERPL-MCNC: 1 MG/DL (ref 0.76–1.27)
DEPRECATED RDW RBC AUTO: 47.2 FL (ref 37–54)
EGFRCR SERPLBLD CKD-EPI 2021: 87.2 ML/MIN/1.73
EOSINOPHIL # BLD AUTO: 0.15 10*3/MM3 (ref 0–0.4)
EOSINOPHIL NFR BLD AUTO: 0.8 % (ref 0.3–6.2)
ERYTHROCYTE [DISTWIDTH] IN BLOOD BY AUTOMATED COUNT: 14.6 % (ref 12.3–15.4)
GLUCOSE SERPL-MCNC: 136 MG/DL (ref 65–99)
HCT VFR BLD AUTO: 36.5 % (ref 37.5–51)
HGB BLD-MCNC: 11.9 G/DL (ref 13–17.7)
LYMPHOCYTES # BLD AUTO: 1.28 10*3/MM3 (ref 0.7–3.1)
LYMPHOCYTES NFR BLD AUTO: 6.6 % (ref 19.6–45.3)
MAGNESIUM SERPL-MCNC: 1.4 MG/DL (ref 1.6–2.6)
MCH RBC QN AUTO: 30.1 PG (ref 26.6–33)
MCHC RBC AUTO-ENTMCNC: 32.6 G/DL (ref 31.5–35.7)
MCV RBC AUTO: 92.4 FL (ref 79–97)
MONOCYTES # BLD AUTO: 1.58 10*3/MM3 (ref 0.1–0.9)
MONOCYTES NFR BLD AUTO: 8.2 % (ref 5–12)
NEUTROPHILS NFR BLD AUTO: 16.22 10*3/MM3 (ref 1.7–7)
NEUTROPHILS NFR BLD AUTO: 84.1 % (ref 42.7–76)
PLATELET # BLD AUTO: 270 10*3/MM3 (ref 140–450)
PMV BLD AUTO: 9.3 FL (ref 6–12)
POTASSIUM SERPL-SCNC: 4.2 MMOL/L (ref 3.5–5.2)
RBC # BLD AUTO: 3.95 10*6/MM3 (ref 4.14–5.8)
SODIUM SERPL-SCNC: 131 MMOL/L (ref 136–145)
WBC NRBC COR # BLD: 19.29 10*3/MM3 (ref 3.4–10.8)

## 2022-08-31 PROCEDURE — 83735 ASSAY OF MAGNESIUM: CPT

## 2022-08-31 PROCEDURE — 36415 COLL VENOUS BLD VENIPUNCTURE: CPT

## 2022-08-31 PROCEDURE — 80048 BASIC METABOLIC PNL TOTAL CA: CPT

## 2022-08-31 PROCEDURE — 85025 COMPLETE CBC W/AUTO DIFF WBC: CPT

## 2022-08-31 RX ORDER — MAGNESIUM OXIDE 400 MG/1
400 TABLET ORAL DAILY
Qty: 30 TABLET | Refills: 0 | Status: SHIPPED | OUTPATIENT
Start: 2022-08-31 | End: 2022-12-05

## 2022-09-06 NOTE — PROGRESS NOTES
Hematology/Oncology Outpatient Follow Up    PATIENT NAME:Jc Driscoll  :1965  MRN: [id number]  PRIMARY CARE PHYSICIAN: Reshma Alvarenga MD  REFERRING PHYSICIAN: Reshma Alvarenga MD    Chief Complaint   Patient presents with   • Follow-up     Non-small cell carcinoma of lung, right (HCC)        HISTORY OF PRESENT ILLNESS:     This is a 57 y.o. who developed left shoulder pain and for that reason he had a chest x-ray done on 19 which showed a 2.7 cm noncalcified right lower lobe nodule was identified.  For that reason a CT scan of the chest was recommended.  Review of his records shows patient had the CT scan on 19 done without contrast.  This basically showed a lobulated noncalcified mass in the posterior aspect of the right lower lobe measuring 3 cm close to the pleural surface.  There is a calcified 1.2 mm nodule in the lateral aspect of the right lower lobe consistent with a granuloma.  There were also calcified subcarinal and right hilar nodules.  There is a lower right side tracheal nodule measuring 1 cm.  Patient had a PET/CT scan done on 19 which showed increased metabolic activity on the right lower lobe mass that measures 2.5 cm with SUV of 4.4.  There was also increased metabolic activity in the subcarinal space measuring 2.8 cm in size with SUV of 3.4, increased activity in an enlarged right paratracheal lymph node that measures 1.9 cm, SUV of 5, also suspicious for metastatic adenopathy.  Patient had a CT-guided biopsy of the right lower lobe mass on 3/8/19.  This showed adenocarcinoma, TTF-1 positive.  On 3/18/19 patient underwent endoscopic ultrasound and biopsy of a subcarinal lymph node.  This was positive for malignant cells.  Patient was then taken back to surgery on 3/20/18 and had left Mediport placement by Dr. Fuchs.  He has been referred for further evaluation and management of his stage 3 adenocarcinoma of the right lung.  He was accompanied by his spouse for  the appointment on 3/26/19.  Patient was scheduled to have a brain MRI for complete staging, but he could not do this due to claustrophobia.  He is willing to try with the help of angiolytics.    • Patient had brain MRI done on 4/5/19.  He states it did not show any evidence of metastatic disease.  Evidence of chronic sinusitis was noted.    • Patient was seen by Dr. Escalona who has recommended concurrent chemotherapy and radiation.  Patient is scheduled to begin on 4/15/19.   • 4/17/19 - Patient was initiated on combined chemotherapy and radiation with Cisplatin and Alimta.  Patient received cycle 1.      • 5/8/19 - Patient received cycle 2 of chemotherapy with Cisplatin and Alimta.   • 5/15/19 - Chemistry panel showed creatinine of 1.7.  WBC 1.8, hemoglobin 11.6, platelet count 72,000.   • 5/20/19 - BUN 10, creatinine 1.2, potassium 3.5.    • 5/23/19 - CT scan of the chest with contrast showed interval decrease in size of the right lower lobe pulmonary nodule now measuring 2.1 cm in size.  There was no mediastinal or hilar adenopathy identified.  • 6/26/2019 patient underwent right lower lobectomy with hilar and mediastinal lymph node dissection performed by Dr. Terrance Mckeon.  • Pathology revealed residual residual 2.2 cm invasive moderately differentiated adenocarcinoma.  There were a total of 16 lymph nodes evaluated that 7 had metastatic disease.  There was evidence of lymphovascular invasion, extranodal involvement.  All the surgical margins were negative and distance of the closest margin was 2.5 cm.  Logic stage is T1c N2 M0.  • Patient is here today accompanied by his spouse for this appointment.  He continues to complain of some postop discomfort, numbness but his skin is intact.  There is no drainage.  Has had follow-up with Dr. Fuchs.  • 8/14/2019-seen by Dr. Maria at the [facility].  Dr. Maria has recommended immunotherapy with durvalumab off label use as patient has not had significant  response to neoadjuvant chemotherapy and radiation.  Patient was given the option to have immunotherapy at the [facility] vs [place] and he has chosen to stay in [place]  • 8/21/19 - Started cycle 1 day 1 Imfinzi  • 9/4/2019 patient had CT scan of the chest this shows a moderate size right pleural effusion.  There are areas of groundglass opacification in the right upper lobe without any evidence of significant septal thickening.  Volume loss noted there is also moderate pleural effusion.  There is no suspicious recurrent malignancy.  There were nonenlarged residual mediastinal lymph nodes.  • 9/9/19 - WBC 6.23, Hgb 11.7 Platelets 257,000, , BUN 9, Cr 1.1,Vitamin B12 318, Ferritin 437  • 9/23/19 - received cycle 2 day 1 Imfinzi  • 10/9/19- Seen by Dr rodríguez with ENT for sinus disease  • 11/4/2019-patient received cycle 5 of Imfinzi(durvalumab)  • 12/2/2019 patient had cycle 7 of durvalumab  • 12/5/2019-patient had CT scan of the  abdomen and pelvis with small right pleural sided pleural effusion.There is no evidence of metastatic disease  • 12/30/2019-patient received cycle 9 of durvalumab  • 12/31/2019-patient had CT scan of the chest showed small to moderate left pleural effusion.  Iglesias appearing right lower paratracheal and right peribronchial soft tissue thickening without any discrete pathologically enlarged mediastinal or hilar lymph nodes.  • 1/27/20-patient received cycle 11 of durvalumab  • 2/17/20-patient had a stress test which was negative for ischemia  • 2/17/2020-patient had CT of the chest which showed postop changes on the right lung, right pleural effusion but no enlarging mass was noted  • 3/2/2020-patient received cycle 12 of durvalumab  • 3/16/2020-patient had ultrasound-guided right thoracentesis.  Pathology was negative for malignancy  • 4/13/2020-patient received cycle 15 of immunotherapy with durvalumab  • 5/11/2020-patient received cycle 17 of immunotherapy with  durvalumab  • 6/9/2020-patient had CT scan of the chest, abdomen and pelvis for lung cancer restaging.  There is stable small chronic loculated right basilar pleural effusion suggesting chronic pleuritis.  There is no evidence of metastatic disease in the abdomen or pelvis  • 7/20/2020 patient received cycle 22 of Durvalumab  • 8/17/2020 patient received cycle 24 and last cycle of durvalumab  • 9/24/2020 patient had CT scan of the chest, abdomen and pelvis for cancer restaging there was no evidence of local recurrence or metastatic disease within the abdomen and pelvis.  He has stable chronic small right pleural effusion.  Mild sigmoid diverticulosis was noted.  • 1/18/2021 patient had CT scan of the chest, abdomen and pelvis reviewed patient  • 5/24/2021 patient had CT scan of the chest calcified subcarinal lymph node consistent with changes of prior granulomatous disease.  Chronic small right pleural effusion is noted similar to prior exam.  Previously shown 4 mm nodule in the left lower lobe has nearly completely resolved.  The subpleural 3 mm nodule located within the posterior left lower lobe With resolved.  • 9/24/2021 patient had a CT scan of the chest, abdomen and pelvis there is soft tissue attenuation within the right paratracheal area which has been suggested to reflect sequela to previous treatment.  There is slight thickening of the bladder wall otherwise there is no evidence of metastatic disease.  • 12/27/2021 patient had CT scan of the chest with contrast this basically showed irregular shaped noncalcified 7 mm left upper lobe pulmonary nodule.  PET CT scan was recommended to further evaluate.  • 1/26/2022 patient had a PET CT scan done which basically showed evidence of mild uptake corresponding to the nodule within the left upper lobe which is new worrisome for developing metastatic focus.  There was mild radiopharmaceutical activity corresponding to pleural thickening throughout the right lung  base with associated pleural effusion likely related to post therapeutic changes and radiation changes in this region.  Metastatic malignancy involving the pleura could not be completely eliminated.  • 1/26/2022: CT-guided biopsy was aborted due to finding by the radiologist that the lung nodularity was actually a clump of tiny nodules of which the largest was 6 mm and therefore biopsy could not be completed.  Also the area was in the vicinity of multiple blood vessels with increased risk of bleeding.  Follow-up CT of the chest was recommended for continued surveillance  • 5/18/2022 patient had CT-guided biopsy of the left enlarging nodule, pathology was positive for invasive poorly differentiated adenocarcinoma most consistent with adenocarcinoma of pulmonary origin.  • 5/27/2022 patient had a PET CT scan which showed a 2.3 cm hypermetabolic left upper lobe nodule which has increased in size no convincing evidence of metastatic disease elsewhere within the chest.  • 5/21/2022 patient had brain MRI which did not show any evidence of intracranial metastasis  • 6/2/2022 patient was seen by Dr. Miryam Reyna pulmonary function test is being done to assess surgical candidacy  • 7/6/2022 patient underwent left heart [place] P video-assisted with upper lobectomy mediastinal lymph node dissection performed by Dr. Miryam Roach  • Final pathology revealed poorly differentiated  • A predominant adenocarcinoma with solid component presenting 45% and micropapillary component 5% with extensive necrosis, invasive carcinoma measures 2.5 maximally there was focal lymphovascular space invasion all margins were negative for malignancy discussed with the closest margin was 2.5 cm pathology stage is pT1C pN1.  PD-L1 showed a tumor proportion score of 95%  • 8/3/2022 patient started adjuvant chemotherapy with cisplatin, Taxol  • 8/24/2022 patient received cycle 2 of combination chemotherapy with cisplatin and Taxol with Neulasta    Past  Medical History:   Diagnosis Date   • Allergic rhinitis 07/25/2013    Overview:  4/2/2018 - still zyrtec 10 daily (if stops, gets dermatitis again).    • Anxiety and depression 06/05/2012    Overview:  4/2/2018 -   C/w well 300 xr and Lito 20.  4/8/2019 -   Doing ok even with new lung cancer - lito 20 & well 300xr.    • Chronic pansinusitis 04/08/2019    Overview:  4/8/2019 -   MRI showed chronic thick in frontal, maxillary, ethmoidal ---> to Dr. COLLAZO to est since abx ICC didn't help.  Does netipot bid.    • Diastolic CHF (HCC) 07/09/2014    Overview:  7/4/14 - impaired LV relaxation on [place] ECHO.  EF 60%. Rest normal   • Dupuytren's contracture of both hands    • Elevated cholesterol    • Erosive esophagitis 07/09/2019    Overview:  EGD 2016 4/2/2018 - nexium OTC daily for few week.  Qod before that.  Now carbonation hurts, epigastric pain 4/8/2019 -   protonix 40 doing well.    • Gastroesophageal reflux disease 02/21/2019   • Hiatal hernia 07/09/2019   • History of colon polyps    • Hypertension    • Lung cancer (HCC)     right lung and in lymph nodes x2   • Mediastinal lymphadenopathy 03/12/2019   • Normocytic anemia 08/08/2019    Overview:  6/2019 - dropped to 9's postop 7/2019 - rebounded to 10's.  8/8/2019 -   Back to 9's - check ferritin, b12 and thyroid.    • Prediabetes 07/09/2014    Overview:  7/4/14 - a1c 6.1 at [place] admission   • Retention of urine 06/27/2019    PSOT OP, RESOLVED       Past Surgical History:   Procedure Laterality Date   • BRONCHOSCOPY  03/18/2019    EBUS MONTERO NEEDLE BX   • COLONOSCOPY     • DUPUYTREN CONTRACTURE RELEASE Bilateral    • LUNG BIOPSY  03/08/2019    CT GUIDED   • LUNG REMOVAL, PARTIAL Right     right lower   • PORTACATH PLACEMENT  03/20/2019    DR LONGORIA   • THORACOSCOPY Right 6/26/2019    Procedure: RIGHT VATS, OPEN LOWER LOBECTOMY WITH LYMPH NODE DISECTION, WEDGE RESECTION OF RIGHT MIDDLE LOBE;  Surgeon: Terrance Longoria MD;  Location: [facility] OR;  Service:  Cardiothoracic   • THORACOSCOPY VIDEO ASSISTED WITH LOBECTOMY Left 7/6/2022    Procedure: THORACOSCOPY VIDEO ASSISTED WITH LOBECTOMY;  Surgeon: Miryam Reyna MD;  Location: [facility];  Service: Thoracic;  Laterality: Left;         Current Outpatient Medications:   •  buPROPion XL (WELLBUTRIN XL) 300 MG 24 hr tablet, Take 1 tablet by mouth Every Morning., Disp: , Rfl:   •  pantoprazole (PROTONIX) 40 MG EC tablet, Take 1 tablet by mouth Daily., Disp: , Rfl:   •  amLODIPine (NORVASC) 10 MG tablet, Take 10 mg by mouth Daily., Disp: , Rfl:   •  amLODIPine (NORVASC) 5 MG tablet, Take 5 mg by mouth Daily., Disp: , Rfl:   •  buPROPion XL (WELLBUTRIN XL) 300 MG 24 hr tablet, Take 300 mg by mouth Every Morning., Disp: , Rfl:   •  desvenlafaxine (PRISTIQ) 50 MG 24 hr tablet, Take 50 mg by mouth Daily., Disp: , Rfl:   •  gabapentin (NEURONTIN) 300 MG capsule, Take 1 capsule by mouth Every 8 (Eight) Hours for 30 days., Disp: 90 capsule, Rfl: 0  •  levothyroxine (SYNTHROID, LEVOTHROID) 100 MCG tablet, Take 100 mcg by mouth Every Morning., Disp: , Rfl:   •  lidocaine-prilocaine (EMLA) 2.5-2.5 % cream, Apply 1 application topically to the appropriate area as directed Take As Directed. Apply to port area 30 mins prior to port access, Disp: 30 g, Rfl: 6  •  liothyronine (CYTOMEL) 5 MCG tablet, Take 5 mcg by mouth Daily., Disp: , Rfl:   •  LORazepam (ATIVAN) 0.5 MG tablet, Take 0.5 mg by mouth Every 8 (Eight) Hours As Needed., Disp: , Rfl:   •  magnesium oxide (MAG-OX) 400 MG tablet, Take 1 tablet by mouth Daily., Disp: 30 tablet, Rfl: 0  •  Magnesium Oxide 400 (240 Mg) MG tablet, Take 1 tablet by mouth Daily., Disp: , Rfl:   •  metoprolol succinate XL (TOPROL-XL) 100 MG 24 hr tablet, Take 100 mg by mouth Daily., Disp: , Rfl:   •  OLANZapine (zyPREXA) 5 MG tablet, Take 1 tablet by mouth Every Night. Take on days 2, 3 and 4 after chemotherapy., Disp: 3 tablet, Rfl: 5  •  ondansetron (ZOFRAN) 8 MG tablet, Take 1 tablet by mouth  3 (Three) Times a Day As Needed for Nausea or Vomiting., Disp: 30 tablet, Rfl: 5  •  oxyCODONE (Roxicodone) 5 MG immediate release tablet, Take 1 tablet by mouth Every 6 (Six) Hours As Needed for Moderate Pain ., Disp: 45 tablet, Rfl: 0  •  pantoprazole (PROTONIX) 40 MG EC tablet, Take 40 mg by mouth Daily As Needed., Disp: , Rfl:   •  pravastatin (PRAVACHOL) 10 MG tablet, Take 10 mg by mouth Every Night., Disp: , Rfl:   •  vitamin B-12 (CYANOCOBALAMIN) 1000 MCG tablet, Take 1,000 mcg by mouth Daily., Disp: , Rfl:     No Known Allergies    Family History   Problem Relation Age of Onset   • Cancer Mother         cervical cancer   • Cervical cancer Mother    • Cancer Father         lung cancer   • Lung cancer Father    • Lung cancer Sister    • Cancer Sister         lung cancer   • Malig Hyperthermia Neg Hx        Cancer-related family history includes Cancer in his father, mother, and sister; Cervical cancer in his mother; Lung cancer in his father and sister.    Social History     Tobacco Use   • Smoking status: Former Smoker     Types: Cigarettes     Quit date: 2009     Years since quittin.0   • Smokeless tobacco: Never Used   Vaping Use   • Vaping Use: Never used   Substance Use Topics   • Alcohol use: Yes     Alcohol/week: 2.0 standard drinks     Types: 2 Cans of beer per week     Comment: Occasional   • Drug use: No      I have reviewed and confirmed the accuracy of the patient's history: Chief complaint, HPI, ROS and Subjective as entered by the MA/LPN/RN. Azucena Gaspar MD 22     SUBJECTIVE:    Patient denies any new issues other than fatigue      REVIEW OF SYSTEMS:    Review of Systems   Constitutional: Positive for fatigue. Negative for chills and fever.   HENT: Negative for ear pain, mouth sores, nosebleeds and sore throat.    Eyes: Negative for photophobia and visual disturbance.   Respiratory: Negative for wheezing and stridor.    Cardiovascular: Negative for chest pain and  "palpitations.   Gastrointestinal: Negative for abdominal pain, diarrhea, nausea and vomiting.   Endocrine: Negative for cold intolerance and heat intolerance.   Genitourinary: Negative for dysuria and hematuria.   Musculoskeletal: Negative for joint swelling and neck stiffness.   Skin: Negative for color change and rash.   Neurological: Negative for seizures and syncope.   Hematological: Negative for adenopathy.   Psychiatric/Behavioral: Negative for agitation, confusion and hallucinations.           OBJECTIVE:    Vitals:    09/07/22 1501   BP: 120/84   Pulse: 77   Resp: 18   Temp: 97.1 °F (36.2 °C)   TempSrc: Infrared   Weight: 88.9 kg (196 lb)   Height: 190.5 cm (75\")   PainSc: 0-No pain     ECOG    Eastern Cooperative Oncology Group (ECOG, [place], World Health Organization) performance scale  0 Fully active; no performance restrictions.  1 Strenuous physical activity restricted; fully ambulatory and able to carry out light work.  2 Capable of all self-care but unable to carry out any work activities. Up and about >50% of waking hours.  3 Capable of only limited self-care; confined to bed or chair >50% of waking hours.  4 Completely disabled; cannot carry out any self-care; totally confined to bed or chair.    Physical Exam   Constitutional: He is oriented to person, place, and time. He appears well-developed. No distress.   HENT:   Head: Normocephalic and atraumatic.   Right Ear: External ear normal.   Left Ear: External ear normal.   Nose: Nose normal.   Eyes: Pupils are equal, round, and reactive to light. Conjunctivae are normal. Right eye exhibits no discharge. Left eye exhibits no discharge. No scleral icterus.   Neck: No thyromegaly present.   Cardiovascular: Normal rate, regular rhythm and normal heart sounds. Exam reveals no gallop and no friction rub.   Pulmonary/Chest: Effort normal. No stridor. No respiratory distress. He has no wheezes.   Abdominal: Soft. Bowel sounds are normal. He exhibits no mass. " There is no abdominal tenderness. There is no rebound and no guarding.   Musculoskeletal: Normal range of motion. No tenderness.   Lymphadenopathy:     He has no cervical adenopathy.   Neurological: He is alert and oriented to person, place, and time. He exhibits normal muscle tone.   Skin: Skin is warm. No rash noted. He is not diaphoretic. No erythema.   Urticarial-like rash, right medial knee likely due to immunotherapy   Psychiatric: His behavior is normal. Judgment and thought content normal.   Nursing note and vitals reviewed.    I have reexamined the patient and the results are consistent with the previously documented exam. Azucena Gaspar MD     RECENT LABS    WBC   Date Value Ref Range Status   09/07/2022 21.50 (H) 3.40 - 10.80 10*3/mm3 Final   03/22/2022 7.84 4.5 - 11.0 10*3/uL Final     RBC   Date Value Ref Range Status   09/07/2022 3.73 (L) 4.14 - 5.80 10*6/mm3 Final   03/22/2022 4.23 (L) 4.5 - 5.9 10*6/uL Final     Hemoglobin   Date Value Ref Range Status   09/07/2022 11.6 (L) 13.0 - 17.7 g/dL Final   03/22/2022 12.9 (L) 13.5 - 17.5 g/dL Final     Hematocrit   Date Value Ref Range Status   09/07/2022 34.7 (L) 37.5 - 51.0 % Final   03/22/2022 38.8 (L) 41.0 - 53.0 % Final     MCV   Date Value Ref Range Status   09/07/2022 93.0 79.0 - 97.0 fL Final   03/22/2022 91.7 80.0 - 100.0 fL Final     MCH   Date Value Ref Range Status   09/07/2022 31.1 26.6 - 33.0 pg Final   03/22/2022 30.5 26.0 - 34.0 pg Final     MCHC   Date Value Ref Range Status   09/07/2022 33.4 31.5 - 35.7 g/dL Final   03/22/2022 33.2 31.0 - 37.0 g/dL Final     RDW   Date Value Ref Range Status   09/07/2022 15.3 12.3 - 15.4 % Final   03/22/2022 14.2 12.0 - 16.8 % Final     RDW-SD   Date Value Ref Range Status   09/07/2022 49.7 37.0 - 54.0 fl Final     MPV   Date Value Ref Range Status   09/07/2022 8.4 6.0 - 12.0 fL Final   03/22/2022 9.3 8.4 - 12.4 fL Final     Platelets   Date Value Ref Range Status   09/07/2022 230 140 - 450 10*3/mm3  Final   03/22/2022 324 140 - 440 10*3/uL Final     Neutrophil Rel %   Date Value Ref Range Status   03/22/2022 63.1 45 - 80 % Final     Neutrophil %   Date Value Ref Range Status   09/07/2022 83.8 (H) 42.7 - 76.0 % Final     Lymphocyte Rel %   Date Value Ref Range Status   03/22/2022 16.2 15 - 50 % Final     Lymphocyte %   Date Value Ref Range Status   09/07/2022 7.0 (L) 19.6 - 45.3 % Final     Monocyte Rel %   Date Value Ref Range Status   03/22/2022 13.6 0 - 15 % Final     Monocyte %   Date Value Ref Range Status   09/07/2022 6.8 5.0 - 12.0 % Final     Eosinophil %   Date Value Ref Range Status   09/07/2022 1.8 0.3 - 6.2 % Final   03/22/2022 5.5 0 - 7 % Final     Basophil Rel %   Date Value Ref Range Status   03/22/2022 1.1 0 - 2 % Final     Basophil %   Date Value Ref Range Status   09/07/2022 0.6 0.0 - 1.5 % Final     Immature Grans %   Date Value Ref Range Status   07/07/2022 0.4 0.0 - 0.5 % Final   03/22/2022 0.5 0.0 - 1.0 % Final     Neutrophils Absolute   Date Value Ref Range Status   03/22/2022 4.94 2.0 - 8.8 10*3/uL Final     Neutrophils, Absolute   Date Value Ref Range Status   09/07/2022 18.01 (H) 1.70 - 7.00 10*3/mm3 Final     Lymphocytes Absolute   Date Value Ref Range Status   03/22/2022 1.27 0.7 - 5.5 10*3/uL Final     Lymphocytes, Absolute   Date Value Ref Range Status   09/07/2022 1.51 0.70 - 3.10 10*3/mm3 Final     Monocytes Absolute   Date Value Ref Range Status   03/22/2022 1.07 0.0 - 1.7 10*3/uL Final     Monocytes, Absolute   Date Value Ref Range Status   09/07/2022 1.46 (H) 0.10 - 0.90 10*3/mm3 Final     Eosinophils Absolute   Date Value Ref Range Status   03/22/2022 0.43 0.0 - 0.8 10*3/uL Final     Eosinophils, Absolute   Date Value Ref Range Status   09/07/2022 0.39 0.00 - 0.40 10*3/mm3 Final     Basophils Absolute   Date Value Ref Range Status   03/22/2022 0.09 0.0 - 0.2 10*3/uL Final     Basophils, Absolute   Date Value Ref Range Status   09/07/2022 0.13 0.00 - 0.20 10*3/mm3 Final      Immature Grans, Absolute   Date Value Ref Range Status   07/07/2022 0.05 0.00 - 0.05 10*3/mm3 Final   03/22/2022 0.04 0.00 - 0.10 10*3/uL Final     nRBC   Date Value Ref Range Status   07/07/2022 0.0 0.0 - 0.2 /100 WBC Final       Lab Results   Component Value Date    GLUCOSE 136 (H) 08/31/2022    BUN 15 08/31/2022    CREATININE 1.00 08/31/2022    EGFRIFNONA 100 01/28/2022    EGFRIFAFRI >60 05/20/2019    BCR 15.0 08/31/2022    K 4.2 08/31/2022    CO2 27.0 08/31/2022    CALCIUM 9.7 08/31/2022    ALBUMIN 4.10 08/24/2022    LABIL2 1.5 03/22/2022    AST 16 08/24/2022    ALT 17 08/24/2022         ASSESSMENT:    1. 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  pT1 cpN1 M0 consistent with stage IIb disease, found on surveillance CT scans. Brain MRI was negative. We will recommend platinum doublet followed by Tecentriq unless he has EGFR mutation for which adjuvant osimertinib will be considered.  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  Patient was treated with cisplatin and Alimta in the adjuvant setting for his original malignancy.  He is currently on cisplatin and Taxol.  We will continue to cycle #4  2. High PD-L1 expression at 95%.  We will complete NGS testing to find other molecular markers..  Reviewed foundation 1 testing results.  This showed M ET amplification, ERBB2 amplification for which targeted therapy is available for including crizotinib for MET amplification, afatinib for ERBB2  3. Chemotherapy-induced fatigue  4. Hyper Camilo anemia secondary to chemotherapy    5. Adenocarcinoma of the right lung, T1c N2 M0, consistent with clinical stage 3A disease.    6. Enlarging left upper lobe nodule 2.2 cm concerning for malignancy, biopsy-proven  7. Pneumothorax following left lung biopsy  status post hospitalization  8. S/P combined chemotherapy and radiation with cisplatin and Alimta.  Surveillance CT scan  9. Recent diagnosis of hypothyroidism, on thyroid replacement therapy  10. Status post right lower lobectomy with mediastinal and hilar lymph node dissection with residual disease.  pT1 cN2 M0.  Patient with high risk features including lymphovascular invasion, extranodal capsular extension and persistent multiple lymph node disease.  Ongoing management  11. Status post consolidation treatment with immunotherapy with Imfinzi:  12. Port maintenance: Patient still wants to keep port in little longer.  13. fatigue: We will check thyroid function tests  14. Satus post right thoracentesis with cytology negative for malignancy  15. Postop anemia: Reviewed  16. Assessment has been reviewed and updated      PLANS:    1. Continue with chemotherapy  2. Continue weekly BMP and mag levels  3. Continue magnesium supplements  4. Encouraged p.o. fluid intake  5. Patient already has a port  6. Platinum doublet followed by Tecentriq or targeted therapy depending on NGS testing followed by adjuvant durvalumab.  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  Patient was treated with cisplatin and Alimta in the adjuvant setting for his original malignancy  7. Reviewed foundation 1 results  8. Reviewed side effects benefits of chemotherapy with him.  Patient will be scheduled for chemo education with nurse practitioner  9. Continue Emla cream to port  10. Continue thyroid replacement therapy through his PCP  11. Continue B12 injections  monthly: Patient did have elevated methylmalonic acid level during the early phases of his treatments.  Continue the same  12. Continue supportive care  13. All questions answered  14. Follow up in 4 weeks or earlier as needed         Patient has   questions which have all been answered to the best of my abilities    I have reviewed labs results, imaging, vitals, and medications with the patient today.     Patient verbalized understanding and is in agreement of the above plan.          I spent 40 total minutes, face-to-face, caring for Jc today.  90% of this time involved counseling and/or coordination of care as documented within this note.

## 2022-09-07 ENCOUNTER — LAB (OUTPATIENT)
Dept: LAB | Facility: HOSPITAL | Age: 57
End: 2022-09-07

## 2022-09-07 ENCOUNTER — OFFICE VISIT (OUTPATIENT)
Dept: ONCOLOGY | Facility: CLINIC | Age: 57
End: 2022-09-07

## 2022-09-07 VITALS
TEMPERATURE: 97.1 F | SYSTOLIC BLOOD PRESSURE: 120 MMHG | BODY MASS INDEX: 24.37 KG/M2 | RESPIRATION RATE: 18 BRPM | DIASTOLIC BLOOD PRESSURE: 84 MMHG | HEIGHT: 75 IN | HEART RATE: 77 BPM | WEIGHT: 196 LBS

## 2022-09-07 DIAGNOSIS — C34.91 NON-SMALL CELL CARCINOMA OF LUNG, RIGHT: Primary | ICD-10-CM

## 2022-09-07 LAB
ANION GAP SERPL CALCULATED.3IONS-SCNC: 10 MMOL/L (ref 5–15)
BASOPHILS # BLD AUTO: 0.13 10*3/MM3 (ref 0–0.2)
BASOPHILS NFR BLD AUTO: 0.6 % (ref 0–1.5)
BUN SERPL-MCNC: 11 MG/DL (ref 6–20)
BUN/CREAT SERPL: 11.1 (ref 7–25)
CALCIUM SPEC-SCNC: 9.2 MG/DL (ref 8.6–10.5)
CHLORIDE SERPL-SCNC: 92 MMOL/L (ref 98–107)
CO2 SERPL-SCNC: 29 MMOL/L (ref 22–29)
CREAT SERPL-MCNC: 0.99 MG/DL (ref 0.76–1.27)
DEPRECATED RDW RBC AUTO: 49.7 FL (ref 37–54)
EGFRCR SERPLBLD CKD-EPI 2021: 88.3 ML/MIN/1.73
EOSINOPHIL # BLD AUTO: 0.39 10*3/MM3 (ref 0–0.4)
EOSINOPHIL NFR BLD AUTO: 1.8 % (ref 0.3–6.2)
ERYTHROCYTE [DISTWIDTH] IN BLOOD BY AUTOMATED COUNT: 15.3 % (ref 12.3–15.4)
GLUCOSE SERPL-MCNC: 99 MG/DL (ref 65–99)
HCT VFR BLD AUTO: 34.7 % (ref 37.5–51)
HGB BLD-MCNC: 11.6 G/DL (ref 13–17.7)
HOLD SPECIMEN: NORMAL
LYMPHOCYTES # BLD AUTO: 1.51 10*3/MM3 (ref 0.7–3.1)
LYMPHOCYTES NFR BLD AUTO: 7 % (ref 19.6–45.3)
MAGNESIUM SERPL-MCNC: 1.6 MG/DL (ref 1.6–2.6)
MCH RBC QN AUTO: 31.1 PG (ref 26.6–33)
MCHC RBC AUTO-ENTMCNC: 33.4 G/DL (ref 31.5–35.7)
MCV RBC AUTO: 93 FL (ref 79–97)
MONOCYTES # BLD AUTO: 1.46 10*3/MM3 (ref 0.1–0.9)
MONOCYTES NFR BLD AUTO: 6.8 % (ref 5–12)
NEUTROPHILS NFR BLD AUTO: 18.01 10*3/MM3 (ref 1.7–7)
NEUTROPHILS NFR BLD AUTO: 83.8 % (ref 42.7–76)
PLATELET # BLD AUTO: 230 10*3/MM3 (ref 140–450)
PMV BLD AUTO: 8.4 FL (ref 6–12)
POTASSIUM SERPL-SCNC: 3.8 MMOL/L (ref 3.5–5.2)
RBC # BLD AUTO: 3.73 10*6/MM3 (ref 4.14–5.8)
SODIUM SERPL-SCNC: 131 MMOL/L (ref 136–145)
WBC NRBC COR # BLD: 21.5 10*3/MM3 (ref 3.4–10.8)

## 2022-09-07 PROCEDURE — 83735 ASSAY OF MAGNESIUM: CPT

## 2022-09-07 PROCEDURE — 80048 BASIC METABOLIC PNL TOTAL CA: CPT

## 2022-09-07 PROCEDURE — 99215 OFFICE O/P EST HI 40 MIN: CPT | Performed by: INTERNAL MEDICINE

## 2022-09-07 PROCEDURE — 36415 COLL VENOUS BLD VENIPUNCTURE: CPT

## 2022-09-07 PROCEDURE — 85025 COMPLETE CBC W/AUTO DIFF WBC: CPT

## 2022-09-07 RX ORDER — PANTOPRAZOLE SODIUM 40 MG/1
1 TABLET, DELAYED RELEASE ORAL DAILY
COMMUNITY
Start: 2022-08-23 | End: 2022-12-05 | Stop reason: SDUPTHER

## 2022-09-07 RX ORDER — LANOLIN ALCOHOL/MO/W.PET/CERES
1 CREAM (GRAM) TOPICAL DAILY
COMMUNITY
Start: 2022-08-31 | End: 2022-12-05

## 2022-09-07 RX ORDER — BUPROPION HYDROCHLORIDE 300 MG/1
1 TABLET ORAL EVERY MORNING
COMMUNITY
Start: 2022-08-23 | End: 2022-12-05 | Stop reason: SDUPTHER

## 2022-09-13 DIAGNOSIS — C34.92 NSCLC OF LEFT LUNG: Primary | ICD-10-CM

## 2022-09-13 DIAGNOSIS — C34.31 CANCER OF LOWER LOBE OF RIGHT LUNG: ICD-10-CM

## 2022-09-13 DIAGNOSIS — C34.91 NON-SMALL CELL CARCINOMA OF LUNG, RIGHT: ICD-10-CM

## 2022-09-14 ENCOUNTER — HOSPITAL ENCOUNTER (OUTPATIENT)
Dept: ONCOLOGY | Facility: HOSPITAL | Age: 57
Setting detail: INFUSION SERIES
Discharge: HOME | End: 2022-09-14

## 2022-09-14 VITALS
WEIGHT: 197.7 LBS | OXYGEN SATURATION: 99 % | DIASTOLIC BLOOD PRESSURE: 69 MMHG | SYSTOLIC BLOOD PRESSURE: 107 MMHG | BODY MASS INDEX: 24.71 KG/M2 | TEMPERATURE: 97 F | HEART RATE: 81 BPM | RESPIRATION RATE: 18 BRPM

## 2022-09-14 DIAGNOSIS — C34.91 NON-SMALL CELL CARCINOMA OF LUNG, RIGHT: Primary | ICD-10-CM

## 2022-09-14 DIAGNOSIS — C34.31 CANCER OF LOWER LOBE OF RIGHT LUNG: ICD-10-CM

## 2022-09-14 DIAGNOSIS — Z45.2 ENCOUNTER FOR CARE RELATED TO VASCULAR ACCESS PORT: ICD-10-CM

## 2022-09-14 DIAGNOSIS — C34.92 NSCLC OF LEFT LUNG: ICD-10-CM

## 2022-09-14 LAB
ALBUMIN SERPL-MCNC: 3.9 G/DL (ref 3.5–5.2)
ALBUMIN/GLOB SERPL: 1.3 G/DL
ALP SERPL-CCNC: 148 U/L (ref 39–117)
ALT SERPL W P-5'-P-CCNC: 19 U/L (ref 1–41)
ANION GAP SERPL CALCULATED.3IONS-SCNC: 14 MMOL/L (ref 5–15)
AST SERPL-CCNC: 14 U/L (ref 1–40)
BASOPHILS # BLD AUTO: 0.09 10*3/MM3 (ref 0–0.2)
BASOPHILS NFR BLD AUTO: 0.9 % (ref 0–1.5)
BILIRUB SERPL-MCNC: 0.4 MG/DL (ref 0–1.2)
BUN SERPL-MCNC: 12 MG/DL (ref 6–20)
BUN/CREAT SERPL: 14 (ref 7–25)
CALCIUM SPEC-SCNC: 9.2 MG/DL (ref 8.6–10.5)
CHLORIDE SERPL-SCNC: 93 MMOL/L (ref 98–107)
CO2 SERPL-SCNC: 25 MMOL/L (ref 22–29)
CREAT BLDA-MCNC: 0.9 MG/DL (ref 0.6–1.3)
CREAT SERPL-MCNC: 0.86 MG/DL (ref 0.76–1.27)
DEPRECATED RDW RBC AUTO: 49.3 FL (ref 37–54)
EGFRCR SERPLBLD CKD-EPI 2021: 100.4 ML/MIN/1.73
EGFRCR SERPLBLD CKD-EPI 2021: 99 ML/MIN/1.73
EOSINOPHIL # BLD AUTO: 0.21 10*3/MM3 (ref 0–0.4)
EOSINOPHIL NFR BLD AUTO: 2.2 % (ref 0.3–6.2)
ERYTHROCYTE [DISTWIDTH] IN BLOOD BY AUTOMATED COUNT: 15.2 % (ref 12.3–15.4)
GLOBULIN UR ELPH-MCNC: 3 GM/DL
GLUCOSE SERPL-MCNC: 103 MG/DL (ref 65–99)
HCT VFR BLD AUTO: 32.6 % (ref 37.5–51)
HGB BLD-MCNC: 10.8 G/DL (ref 13–17.7)
LYMPHOCYTES # BLD AUTO: 0.87 10*3/MM3 (ref 0.7–3.1)
LYMPHOCYTES NFR BLD AUTO: 9 % (ref 19.6–45.3)
MAGNESIUM SERPL-MCNC: 1.7 MG/DL (ref 1.6–2.6)
MCH RBC QN AUTO: 30.7 PG (ref 26.6–33)
MCHC RBC AUTO-ENTMCNC: 33.1 G/DL (ref 31.5–35.7)
MCV RBC AUTO: 92.6 FL (ref 79–97)
MONOCYTES # BLD AUTO: 1.47 10*3/MM3 (ref 0.1–0.9)
MONOCYTES NFR BLD AUTO: 15.2 % (ref 5–12)
NEUTROPHILS NFR BLD AUTO: 7.06 10*3/MM3 (ref 1.7–7)
NEUTROPHILS NFR BLD AUTO: 72.7 % (ref 42.7–76)
PLATELET # BLD AUTO: 312 10*3/MM3 (ref 140–450)
PMV BLD AUTO: 8.6 FL (ref 6–12)
POTASSIUM SERPL-SCNC: 3.9 MMOL/L (ref 3.5–5.2)
PROT SERPL-MCNC: 6.9 G/DL (ref 6–8.5)
RBC # BLD AUTO: 3.52 10*6/MM3 (ref 4.14–5.8)
SODIUM SERPL-SCNC: 132 MMOL/L (ref 136–145)
WBC NRBC COR # BLD: 9.7 10*3/MM3 (ref 3.4–10.8)

## 2022-09-14 PROCEDURE — 25010000002 POTASSIUM CHLORIDE PER 2 MEQ OF POTASSIUM: Performed by: INTERNAL MEDICINE

## 2022-09-14 PROCEDURE — 25010000002 PEGFILGRASTIM 6 MG/0.6ML PREFILLED SYRINGE KIT: Performed by: INTERNAL MEDICINE

## 2022-09-14 PROCEDURE — 25010000002 CISPLATIN PER 50 MG: Performed by: INTERNAL MEDICINE

## 2022-09-14 PROCEDURE — 25010000002 HEPARIN LOCK FLUSH PER 10 UNITS: Performed by: INTERNAL MEDICINE

## 2022-09-14 PROCEDURE — 96368 THER/DIAG CONCURRENT INF: CPT

## 2022-09-14 PROCEDURE — 96375 TX/PRO/DX INJ NEW DRUG ADDON: CPT

## 2022-09-14 PROCEDURE — 96361 HYDRATE IV INFUSION ADD-ON: CPT

## 2022-09-14 PROCEDURE — 80053 COMPREHEN METABOLIC PANEL: CPT | Performed by: INTERNAL MEDICINE

## 2022-09-14 PROCEDURE — 96367 TX/PROPH/DG ADDL SEQ IV INF: CPT

## 2022-09-14 PROCEDURE — 96413 CHEMO IV INFUSION 1 HR: CPT

## 2022-09-14 PROCEDURE — 25010000002 MAGNESIUM SULFATE PER 500 MG OF MAGNESIUM: Performed by: INTERNAL MEDICINE

## 2022-09-14 PROCEDURE — 83735 ASSAY OF MAGNESIUM: CPT | Performed by: INTERNAL MEDICINE

## 2022-09-14 PROCEDURE — 25010000002 DEXAMETHASONE SODIUM PHOSPHATE 120 MG/30ML SOLUTION: Performed by: INTERNAL MEDICINE

## 2022-09-14 PROCEDURE — 96417 CHEMO IV INFUS EACH ADDL SEQ: CPT

## 2022-09-14 PROCEDURE — 82565 ASSAY OF CREATININE: CPT

## 2022-09-14 PROCEDURE — 96415 CHEMO IV INFUSION ADDL HR: CPT

## 2022-09-14 PROCEDURE — 25010000002 FOSAPREPITANT PER 1 MG: Performed by: INTERNAL MEDICINE

## 2022-09-14 PROCEDURE — 25010000002 PALONOSETRON 0.25 MG/5ML SOLUTION PREFILLED SYRINGE: Performed by: INTERNAL MEDICINE

## 2022-09-14 PROCEDURE — 96377 APPLICATON ON-BODY INJECTOR: CPT

## 2022-09-14 PROCEDURE — 25010000002 DIPHENHYDRAMINE PER 50 MG: Performed by: INTERNAL MEDICINE

## 2022-09-14 PROCEDURE — 85025 COMPLETE CBC W/AUTO DIFF WBC: CPT | Performed by: INTERNAL MEDICINE

## 2022-09-14 PROCEDURE — 25010000002 PACLITAXEL PER 1 MG: Performed by: INTERNAL MEDICINE

## 2022-09-14 PROCEDURE — 96360 HYDRATION IV INFUSION INIT: CPT

## 2022-09-14 RX ORDER — OLANZAPINE 5 MG/1
5 TABLET ORAL ONCE
Status: COMPLETED | OUTPATIENT
Start: 2022-09-14 | End: 2022-09-14

## 2022-09-14 RX ORDER — SODIUM CHLORIDE 9 MG/ML
250 INJECTION, SOLUTION INTRAVENOUS ONCE
Status: CANCELLED | OUTPATIENT
Start: 2022-09-14

## 2022-09-14 RX ORDER — FAMOTIDINE 10 MG/ML
20 INJECTION, SOLUTION INTRAVENOUS ONCE
Status: COMPLETED | OUTPATIENT
Start: 2022-09-14 | End: 2022-09-14

## 2022-09-14 RX ORDER — PALONOSETRON 0.05 MG/ML
0.25 INJECTION, SOLUTION INTRAVENOUS ONCE
Status: CANCELLED | OUTPATIENT
Start: 2022-09-14

## 2022-09-14 RX ORDER — OLANZAPINE 5 MG/1
5 TABLET ORAL ONCE
Status: CANCELLED | OUTPATIENT
Start: 2022-09-14 | End: 2022-09-14

## 2022-09-14 RX ORDER — HEPARIN SODIUM (PORCINE) LOCK FLUSH IV SOLN 100 UNIT/ML 100 UNIT/ML
500 SOLUTION INTRAVENOUS AS NEEDED
Status: DISCONTINUED | OUTPATIENT
Start: 2022-09-14 | End: 2022-09-15 | Stop reason: HOSPADM

## 2022-09-14 RX ORDER — FAMOTIDINE 10 MG/ML
20 INJECTION, SOLUTION INTRAVENOUS ONCE
Status: CANCELLED | OUTPATIENT
Start: 2022-09-14

## 2022-09-14 RX ORDER — PALONOSETRON 0.05 MG/ML
0.25 INJECTION, SOLUTION INTRAVENOUS ONCE
Status: COMPLETED | OUTPATIENT
Start: 2022-09-14 | End: 2022-09-14

## 2022-09-14 RX ORDER — DIPHENHYDRAMINE HYDROCHLORIDE 50 MG/ML
50 INJECTION INTRAMUSCULAR; INTRAVENOUS AS NEEDED
Status: CANCELLED | OUTPATIENT
Start: 2022-09-14

## 2022-09-14 RX ORDER — SODIUM CHLORIDE 0.9 % (FLUSH) 0.9 %
20 SYRINGE (ML) INJECTION AS NEEDED
Status: DISCONTINUED | OUTPATIENT
Start: 2022-09-14 | End: 2022-09-15 | Stop reason: HOSPADM

## 2022-09-14 RX ORDER — SODIUM CHLORIDE 9 MG/ML
250 INJECTION, SOLUTION INTRAVENOUS ONCE
Status: COMPLETED | OUTPATIENT
Start: 2022-09-14 | End: 2022-09-14

## 2022-09-14 RX ORDER — FAMOTIDINE 10 MG/ML
20 INJECTION, SOLUTION INTRAVENOUS AS NEEDED
Status: CANCELLED | OUTPATIENT
Start: 2022-09-14

## 2022-09-14 RX ADMIN — SODIUM CHLORIDE 100 ML: 9 INJECTION, SOLUTION INTRAVENOUS at 09:45

## 2022-09-14 RX ADMIN — Medication 20 ML: at 16:20

## 2022-09-14 RX ADMIN — POTASSIUM CHLORIDE: 2 INJECTION, SOLUTION, CONCENTRATE INTRAVENOUS at 08:42

## 2022-09-14 RX ADMIN — PACLITAXEL 295 MG: 6 INJECTION, SOLUTION, CONCENTRATE INTRAVENOUS at 10:55

## 2022-09-14 RX ADMIN — DEXAMETHASONE SODIUM PHOSPHATE 20 MG: 4 INJECTION, SOLUTION INTRA-ARTICULAR; INTRALESIONAL; INTRAMUSCULAR; INTRAVENOUS; SOFT TISSUE at 10:20

## 2022-09-14 RX ADMIN — CISPLATIN 162 MG: 1 INJECTION, SOLUTION INTRAVENOUS at 14:04

## 2022-09-14 RX ADMIN — SODIUM CHLORIDE 250 ML: 9 INJECTION, SOLUTION INTRAVENOUS at 09:45

## 2022-09-14 RX ADMIN — OLANZAPINE 5 MG: 5 TABLET, FILM COATED ORAL at 09:45

## 2022-09-14 RX ADMIN — FAMOTIDINE 20 MG: 10 INJECTION INTRAVENOUS at 10:38

## 2022-09-14 RX ADMIN — DIPHENHYDRAMINE HYDROCHLORIDE 50 MG: 50 INJECTION, SOLUTION INTRAMUSCULAR; INTRAVENOUS at 10:38

## 2022-09-14 RX ADMIN — PALONOSETRON 0.25 MG: 0.25 INJECTION, SOLUTION INTRAVENOUS at 09:45

## 2022-09-14 RX ADMIN — HEPARIN 500 UNITS: 100 SYRINGE at 16:20

## 2022-09-14 RX ADMIN — PEGFILGRASTIM 6 MG: KIT SUBCUTANEOUS at 16:20

## 2022-09-20 ENCOUNTER — TELEPHONE (OUTPATIENT)
Dept: SURGERY | Facility: CLINIC | Age: 57
End: 2022-09-20

## 2022-09-20 NOTE — TELEPHONE ENCOUNTER
Caller: Jc Driscoll    Relationship: Self    Best call back number: [phone]    What orders are you requesting (i.e. lab or imaging): CT CHEST IS ORDERED FOR HOSPITAL     In what timeframe would the patient need to come in: 10/25/22    Where will you receive your lab/imaging services: [facility]STEAD OF TEST BEING DONE AT THE HOSPITAL.     Additional notes:  GIVE PT A CALL WHEN IT HAS BEEN SENT OVER SO PT CAN FOLLOW UP WITH PT INSURANCE.

## 2022-09-21 ENCOUNTER — LAB (OUTPATIENT)
Dept: LAB | Facility: HOSPITAL | Age: 57
End: 2022-09-21

## 2022-09-21 DIAGNOSIS — C34.91 NON-SMALL CELL CARCINOMA OF LUNG, RIGHT: ICD-10-CM

## 2022-09-21 LAB
ANION GAP SERPL CALCULATED.3IONS-SCNC: 11 MMOL/L (ref 5–15)
BASOPHILS # BLD AUTO: 0.04 10*3/MM3 (ref 0–0.2)
BASOPHILS NFR BLD AUTO: 0.3 % (ref 0–1.5)
BUN SERPL-MCNC: 12 MG/DL (ref 6–20)
BUN/CREAT SERPL: 11.3 (ref 7–25)
CALCIUM SPEC-SCNC: 9.2 MG/DL (ref 8.6–10.5)
CHLORIDE SERPL-SCNC: 89 MMOL/L (ref 98–107)
CO2 SERPL-SCNC: 28 MMOL/L (ref 22–29)
CREAT SERPL-MCNC: 1.06 MG/DL (ref 0.76–1.27)
DEPRECATED RDW RBC AUTO: 48.5 FL (ref 37–54)
EGFRCR SERPLBLD CKD-EPI 2021: 81.3 ML/MIN/1.73
EOSINOPHIL # BLD AUTO: 0.15 10*3/MM3 (ref 0–0.4)
EOSINOPHIL NFR BLD AUTO: 1.3 % (ref 0.3–6.2)
ERYTHROCYTE [DISTWIDTH] IN BLOOD BY AUTOMATED COUNT: 14.8 % (ref 12.3–15.4)
GLUCOSE SERPL-MCNC: 134 MG/DL (ref 65–99)
HCT VFR BLD AUTO: 30.7 % (ref 37.5–51)
HGB BLD-MCNC: 10 G/DL (ref 13–17.7)
LYMPHOCYTES # BLD AUTO: 0.84 10*3/MM3 (ref 0.7–3.1)
LYMPHOCYTES NFR BLD AUTO: 7 % (ref 19.6–45.3)
MAGNESIUM SERPL-MCNC: 1.4 MG/DL (ref 1.6–2.6)
MCH RBC QN AUTO: 30.8 PG (ref 26.6–33)
MCHC RBC AUTO-ENTMCNC: 32.6 G/DL (ref 31.5–35.7)
MCV RBC AUTO: 94.5 FL (ref 79–97)
MONOCYTES # BLD AUTO: 1.02 10*3/MM3 (ref 0.1–0.9)
MONOCYTES NFR BLD AUTO: 8.5 % (ref 5–12)
NEUTROPHILS NFR BLD AUTO: 82.9 % (ref 42.7–76)
NEUTROPHILS NFR BLD AUTO: 9.89 10*3/MM3 (ref 1.7–7)
PLATELET # BLD AUTO: 156 10*3/MM3 (ref 140–450)
PMV BLD AUTO: 8.8 FL (ref 6–12)
POTASSIUM SERPL-SCNC: 4 MMOL/L (ref 3.5–5.2)
RBC # BLD AUTO: 3.25 10*6/MM3 (ref 4.14–5.8)
SODIUM SERPL-SCNC: 128 MMOL/L (ref 136–145)
WBC NRBC COR # BLD: 11.94 10*3/MM3 (ref 3.4–10.8)

## 2022-09-21 PROCEDURE — 85025 COMPLETE CBC W/AUTO DIFF WBC: CPT

## 2022-09-21 PROCEDURE — 36415 COLL VENOUS BLD VENIPUNCTURE: CPT

## 2022-09-21 PROCEDURE — 83735 ASSAY OF MAGNESIUM: CPT | Performed by: INTERNAL MEDICINE

## 2022-09-21 PROCEDURE — 80048 BASIC METABOLIC PNL TOTAL CA: CPT

## 2022-09-28 ENCOUNTER — LAB (OUTPATIENT)
Dept: LAB | Facility: HOSPITAL | Age: 57
End: 2022-09-28

## 2022-09-28 DIAGNOSIS — C34.91 NON-SMALL CELL CARCINOMA OF LUNG, RIGHT: ICD-10-CM

## 2022-09-28 LAB
ANION GAP SERPL CALCULATED.3IONS-SCNC: 11 MMOL/L (ref 5–15)
BASOPHILS # BLD AUTO: 0.04 10*3/MM3 (ref 0–0.2)
BASOPHILS NFR BLD AUTO: 0.5 % (ref 0–1.5)
BUN SERPL-MCNC: 10 MG/DL (ref 6–20)
BUN/CREAT SERPL: 11.9 (ref 7–25)
CALCIUM SPEC-SCNC: 9.5 MG/DL (ref 8.6–10.5)
CHLORIDE SERPL-SCNC: 90 MMOL/L (ref 98–107)
CO2 SERPL-SCNC: 28 MMOL/L (ref 22–29)
CREAT SERPL-MCNC: 0.84 MG/DL (ref 0.76–1.27)
DEPRECATED RDW RBC AUTO: 49.3 FL (ref 37–54)
EGFRCR SERPLBLD CKD-EPI 2021: 101.1 ML/MIN/1.73
EOSINOPHIL # BLD AUTO: 0.14 10*3/MM3 (ref 0–0.4)
EOSINOPHIL NFR BLD AUTO: 1.7 % (ref 0.3–6.2)
ERYTHROCYTE [DISTWIDTH] IN BLOOD BY AUTOMATED COUNT: 15 % (ref 12.3–15.4)
GLUCOSE SERPL-MCNC: 133 MG/DL (ref 65–99)
HCT VFR BLD AUTO: 30.4 % (ref 37.5–51)
HGB BLD-MCNC: 10.1 G/DL (ref 13–17.7)
LYMPHOCYTES # BLD AUTO: 0.8 10*3/MM3 (ref 0.7–3.1)
LYMPHOCYTES NFR BLD AUTO: 9.7 % (ref 19.6–45.3)
MAGNESIUM SERPL-MCNC: 1.6 MG/DL (ref 1.6–2.6)
MCH RBC QN AUTO: 31.2 PG (ref 26.6–33)
MCHC RBC AUTO-ENTMCNC: 33.2 G/DL (ref 31.5–35.7)
MCV RBC AUTO: 93.8 FL (ref 79–97)
MONOCYTES # BLD AUTO: 0.82 10*3/MM3 (ref 0.1–0.9)
MONOCYTES NFR BLD AUTO: 9.9 % (ref 5–12)
NEUTROPHILS NFR BLD AUTO: 6.49 10*3/MM3 (ref 1.7–7)
NEUTROPHILS NFR BLD AUTO: 78.2 % (ref 42.7–76)
PLATELET # BLD AUTO: 184 10*3/MM3 (ref 140–450)
PMV BLD AUTO: 8.6 FL (ref 6–12)
POTASSIUM SERPL-SCNC: 4.6 MMOL/L (ref 3.5–5.2)
RBC # BLD AUTO: 3.24 10*6/MM3 (ref 4.14–5.8)
SODIUM SERPL-SCNC: 129 MMOL/L (ref 136–145)
WBC NRBC COR # BLD: 8.29 10*3/MM3 (ref 3.4–10.8)

## 2022-09-28 PROCEDURE — 36415 COLL VENOUS BLD VENIPUNCTURE: CPT

## 2022-09-28 PROCEDURE — 85025 COMPLETE CBC W/AUTO DIFF WBC: CPT

## 2022-09-28 PROCEDURE — 80048 BASIC METABOLIC PNL TOTAL CA: CPT

## 2022-09-28 PROCEDURE — 83735 ASSAY OF MAGNESIUM: CPT

## 2022-10-04 DIAGNOSIS — C34.92 NSCLC OF LEFT LUNG: Primary | ICD-10-CM

## 2022-10-04 DIAGNOSIS — C34.31 CANCER OF LOWER LOBE OF RIGHT LUNG: ICD-10-CM

## 2022-10-04 DIAGNOSIS — C34.91 NON-SMALL CELL CARCINOMA OF LUNG, RIGHT: ICD-10-CM

## 2022-10-04 RX ORDER — OLANZAPINE 5 MG/1
5 TABLET ORAL ONCE
Status: CANCELLED | OUTPATIENT
Start: 2022-10-05 | End: 2022-10-05

## 2022-10-04 RX ORDER — DIPHENHYDRAMINE HYDROCHLORIDE 50 MG/ML
50 INJECTION INTRAMUSCULAR; INTRAVENOUS AS NEEDED
Status: CANCELLED | OUTPATIENT
Start: 2022-10-05

## 2022-10-04 RX ORDER — PALONOSETRON 0.05 MG/ML
0.25 INJECTION, SOLUTION INTRAVENOUS ONCE
Status: CANCELLED | OUTPATIENT
Start: 2022-10-05

## 2022-10-04 RX ORDER — SODIUM CHLORIDE 9 MG/ML
250 INJECTION, SOLUTION INTRAVENOUS ONCE
Status: CANCELLED | OUTPATIENT
Start: 2022-10-05

## 2022-10-04 RX ORDER — FAMOTIDINE 10 MG/ML
20 INJECTION, SOLUTION INTRAVENOUS ONCE
Status: CANCELLED | OUTPATIENT
Start: 2022-10-05

## 2022-10-04 RX ORDER — FAMOTIDINE 10 MG/ML
20 INJECTION, SOLUTION INTRAVENOUS AS NEEDED
Status: CANCELLED | OUTPATIENT
Start: 2022-10-05

## 2022-10-05 ENCOUNTER — HOSPITAL ENCOUNTER (OUTPATIENT)
Dept: ONCOLOGY | Facility: HOSPITAL | Age: 57
Setting detail: INFUSION SERIES
Discharge: HOME | End: 2022-10-05

## 2022-10-05 ENCOUNTER — TELEPHONE (OUTPATIENT)
Dept: ONCOLOGY | Facility: CLINIC | Age: 57
End: 2022-10-05

## 2022-10-05 ENCOUNTER — OFFICE VISIT (OUTPATIENT)
Dept: ONCOLOGY | Facility: CLINIC | Age: 57
End: 2022-10-05

## 2022-10-05 VITALS
HEIGHT: 75 IN | HEART RATE: 82 BPM | WEIGHT: 198 LBS | BODY MASS INDEX: 24.62 KG/M2 | TEMPERATURE: 97 F | RESPIRATION RATE: 16 BRPM | SYSTOLIC BLOOD PRESSURE: 110 MMHG | OXYGEN SATURATION: 99 % | DIASTOLIC BLOOD PRESSURE: 72 MMHG

## 2022-10-05 VITALS
DIASTOLIC BLOOD PRESSURE: 72 MMHG | HEART RATE: 82 BPM | WEIGHT: 198.2 LBS | SYSTOLIC BLOOD PRESSURE: 110 MMHG | BODY MASS INDEX: 24.77 KG/M2 | OXYGEN SATURATION: 99 % | RESPIRATION RATE: 16 BRPM | TEMPERATURE: 97 F

## 2022-10-05 DIAGNOSIS — C34.92 NSCLC OF LEFT LUNG: ICD-10-CM

## 2022-10-05 DIAGNOSIS — C34.91 NON-SMALL CELL CARCINOMA OF LUNG, RIGHT: Primary | ICD-10-CM

## 2022-10-05 DIAGNOSIS — D64.9 ANEMIA, UNSPECIFIED TYPE: ICD-10-CM

## 2022-10-05 DIAGNOSIS — C34.31 CANCER OF LOWER LOBE OF RIGHT LUNG: ICD-10-CM

## 2022-10-05 DIAGNOSIS — Z45.2 ENCOUNTER FOR CARE RELATED TO VASCULAR ACCESS PORT: ICD-10-CM

## 2022-10-05 PROBLEM — E86.0 DEHYDRATION: Status: ACTIVE | Noted: 2022-10-05

## 2022-10-05 LAB
ALBUMIN SERPL-MCNC: 3.7 G/DL (ref 3.5–5.2)
ALBUMIN/GLOB SERPL: 1.2 G/DL
ALP SERPL-CCNC: 141 U/L (ref 39–117)
ALT SERPL W P-5'-P-CCNC: 17 U/L (ref 1–41)
ANION GAP SERPL CALCULATED.3IONS-SCNC: 10 MMOL/L (ref 5–15)
AST SERPL-CCNC: 15 U/L (ref 1–40)
BASOPHILS # BLD AUTO: 0.08 10*3/MM3 (ref 0–0.2)
BASOPHILS NFR BLD AUTO: 0.9 % (ref 0–1.5)
BILIRUB SERPL-MCNC: 0.3 MG/DL (ref 0–1.2)
BUN SERPL-MCNC: 11 MG/DL (ref 6–20)
BUN/CREAT SERPL: 12.8 (ref 7–25)
CALCIUM SPEC-SCNC: 9.4 MG/DL (ref 8.6–10.5)
CHLORIDE SERPL-SCNC: 90 MMOL/L (ref 98–107)
CO2 SERPL-SCNC: 27 MMOL/L (ref 22–29)
CREAT BLDA-MCNC: 0.9 MG/DL (ref 0.6–1.3)
CREAT SERPL-MCNC: 0.86 MG/DL (ref 0.76–1.27)
DEPRECATED RDW RBC AUTO: 50.8 FL (ref 37–54)
EGFRCR SERPLBLD CKD-EPI 2021: 100.4 ML/MIN/1.73
EGFRCR SERPLBLD CKD-EPI 2021: 99 ML/MIN/1.73
EOSINOPHIL # BLD AUTO: 0.21 10*3/MM3 (ref 0–0.4)
EOSINOPHIL NFR BLD AUTO: 2.3 % (ref 0.3–6.2)
ERYTHROCYTE [DISTWIDTH] IN BLOOD BY AUTOMATED COUNT: 15.3 % (ref 12.3–15.4)
GLOBULIN UR ELPH-MCNC: 3.1 GM/DL
GLUCOSE SERPL-MCNC: 148 MG/DL (ref 65–99)
HCT VFR BLD AUTO: 29.9 % (ref 37.5–51)
HGB BLD-MCNC: 9.9 G/DL (ref 13–17.7)
LYMPHOCYTES # BLD AUTO: 0.95 10*3/MM3 (ref 0.7–3.1)
LYMPHOCYTES NFR BLD AUTO: 10.6 % (ref 19.6–45.3)
MAGNESIUM SERPL-MCNC: 1.4 MG/DL (ref 1.6–2.6)
MCH RBC QN AUTO: 31.2 PG (ref 26.6–33)
MCHC RBC AUTO-ENTMCNC: 33.1 G/DL (ref 31.5–35.7)
MCV RBC AUTO: 94.3 FL (ref 79–97)
MONOCYTES # BLD AUTO: 1.1 10*3/MM3 (ref 0.1–0.9)
MONOCYTES NFR BLD AUTO: 12.3 % (ref 5–12)
NEUTROPHILS NFR BLD AUTO: 6.6 10*3/MM3 (ref 1.7–7)
NEUTROPHILS NFR BLD AUTO: 73.9 % (ref 42.7–76)
PLATELET # BLD AUTO: 397 10*3/MM3 (ref 140–450)
PMV BLD AUTO: 8.4 FL (ref 6–12)
POTASSIUM SERPL-SCNC: 4.4 MMOL/L (ref 3.5–5.2)
PROT SERPL-MCNC: 6.8 G/DL (ref 6–8.5)
RBC # BLD AUTO: 3.17 10*6/MM3 (ref 4.14–5.8)
SODIUM SERPL-SCNC: 127 MMOL/L (ref 136–145)
WBC NRBC COR # BLD: 8.94 10*3/MM3 (ref 3.4–10.8)

## 2022-10-05 PROCEDURE — 96417 CHEMO IV INFUS EACH ADDL SEQ: CPT

## 2022-10-05 PROCEDURE — 96413 CHEMO IV INFUSION 1 HR: CPT

## 2022-10-05 PROCEDURE — 25010000002 PEGFILGRASTIM 6 MG/0.6ML PREFILLED SYRINGE KIT: Performed by: INTERNAL MEDICINE

## 2022-10-05 PROCEDURE — 25010000002 MAGNESIUM SULFATE PER 500 MG OF MAGNESIUM: Performed by: INTERNAL MEDICINE

## 2022-10-05 PROCEDURE — 83735 ASSAY OF MAGNESIUM: CPT | Performed by: INTERNAL MEDICINE

## 2022-10-05 PROCEDURE — 25010000002 DEXAMETHASONE SODIUM PHOSPHATE 120 MG/30ML SOLUTION: Performed by: INTERNAL MEDICINE

## 2022-10-05 PROCEDURE — 25010000002 DIPHENHYDRAMINE PER 50 MG: Performed by: INTERNAL MEDICINE

## 2022-10-05 PROCEDURE — 96361 HYDRATE IV INFUSION ADD-ON: CPT

## 2022-10-05 PROCEDURE — 25010000002 HEPARIN LOCK FLUSH PER 10 UNITS: Performed by: INTERNAL MEDICINE

## 2022-10-05 PROCEDURE — 85025 COMPLETE CBC W/AUTO DIFF WBC: CPT | Performed by: INTERNAL MEDICINE

## 2022-10-05 PROCEDURE — 25010000002 POTASSIUM CHLORIDE PER 2 MEQ OF POTASSIUM: Performed by: INTERNAL MEDICINE

## 2022-10-05 PROCEDURE — 99214 OFFICE O/P EST MOD 30 MIN: CPT | Performed by: INTERNAL MEDICINE

## 2022-10-05 PROCEDURE — 96360 HYDRATION IV INFUSION INIT: CPT

## 2022-10-05 PROCEDURE — 96375 TX/PRO/DX INJ NEW DRUG ADDON: CPT

## 2022-10-05 PROCEDURE — 82565 ASSAY OF CREATININE: CPT

## 2022-10-05 PROCEDURE — 96368 THER/DIAG CONCURRENT INF: CPT

## 2022-10-05 PROCEDURE — 25010000002 PACLITAXEL PER 1 MG: Performed by: INTERNAL MEDICINE

## 2022-10-05 PROCEDURE — 25010000002 CISPLATIN PER 50 MG: Performed by: INTERNAL MEDICINE

## 2022-10-05 PROCEDURE — 96377 APPLICATON ON-BODY INJECTOR: CPT

## 2022-10-05 PROCEDURE — 96367 TX/PROPH/DG ADDL SEQ IV INF: CPT

## 2022-10-05 PROCEDURE — 25010000002 PALONOSETRON 0.25 MG/5ML SOLUTION PREFILLED SYRINGE: Performed by: INTERNAL MEDICINE

## 2022-10-05 PROCEDURE — 25010000002 FOSAPREPITANT PER 1 MG: Performed by: INTERNAL MEDICINE

## 2022-10-05 PROCEDURE — 96415 CHEMO IV INFUSION ADDL HR: CPT

## 2022-10-05 PROCEDURE — 80053 COMPREHEN METABOLIC PANEL: CPT | Performed by: INTERNAL MEDICINE

## 2022-10-05 RX ORDER — OLANZAPINE 5 MG/1
5 TABLET ORAL ONCE
Status: COMPLETED | OUTPATIENT
Start: 2022-10-05 | End: 2022-10-05

## 2022-10-05 RX ORDER — HEPARIN SODIUM (PORCINE) LOCK FLUSH IV SOLN 100 UNIT/ML 100 UNIT/ML
500 SOLUTION INTRAVENOUS AS NEEDED
Status: DISCONTINUED | OUTPATIENT
Start: 2022-10-05 | End: 2022-10-06 | Stop reason: HOSPADM

## 2022-10-05 RX ORDER — LEVOTHYROXINE SODIUM 0.1 MG/1
1 TABLET ORAL DAILY
COMMUNITY
Start: 2022-09-08 | End: 2022-12-05 | Stop reason: SDUPTHER

## 2022-10-05 RX ORDER — SODIUM CHLORIDE 9 MG/ML
250 INJECTION, SOLUTION INTRAVENOUS ONCE
Status: COMPLETED | OUTPATIENT
Start: 2022-10-05 | End: 2022-10-05

## 2022-10-05 RX ORDER — PALONOSETRON 0.05 MG/ML
0.25 INJECTION, SOLUTION INTRAVENOUS ONCE
Status: COMPLETED | OUTPATIENT
Start: 2022-10-05 | End: 2022-10-05

## 2022-10-05 RX ORDER — FAMOTIDINE 10 MG/ML
20 INJECTION, SOLUTION INTRAVENOUS ONCE
Status: COMPLETED | OUTPATIENT
Start: 2022-10-05 | End: 2022-10-05

## 2022-10-05 RX ORDER — LIOTHYRONINE SODIUM 5 UG/1
1 TABLET ORAL DAILY
COMMUNITY
Start: 2022-09-26 | End: 2022-12-05 | Stop reason: SDUPTHER

## 2022-10-05 RX ORDER — SODIUM CHLORIDE 0.9 % (FLUSH) 0.9 %
20 SYRINGE (ML) INJECTION AS NEEDED
Status: DISCONTINUED | OUTPATIENT
Start: 2022-10-05 | End: 2022-10-06 | Stop reason: HOSPADM

## 2022-10-05 RX ADMIN — SODIUM CHLORIDE 100 ML: 9 INJECTION, SOLUTION INTRAVENOUS at 09:12

## 2022-10-05 RX ADMIN — DIPHENHYDRAMINE HYDROCHLORIDE 50 MG: 50 INJECTION, SOLUTION INTRAMUSCULAR; INTRAVENOUS at 10:01

## 2022-10-05 RX ADMIN — CISPLATIN 162 MG: 1 INJECTION, SOLUTION INTRAVENOUS at 13:36

## 2022-10-05 RX ADMIN — FAMOTIDINE 20 MG: 10 INJECTION INTRAVENOUS at 10:01

## 2022-10-05 RX ADMIN — PACLITAXEL 295 MG: 300 INJECTION, SOLUTION INTRAVENOUS at 10:20

## 2022-10-05 RX ADMIN — SODIUM CHLORIDE 250 ML: 9 INJECTION, SOLUTION INTRAVENOUS at 10:04

## 2022-10-05 RX ADMIN — OLANZAPINE 5 MG: 5 TABLET, FILM COATED ORAL at 10:01

## 2022-10-05 RX ADMIN — DEXAMETHASONE SODIUM PHOSPHATE 20 MG: 4 INJECTION, SOLUTION INTRA-ARTICULAR; INTRALESIONAL; INTRAMUSCULAR; INTRAVENOUS; SOFT TISSUE at 09:43

## 2022-10-05 RX ADMIN — Medication 20 ML: at 15:49

## 2022-10-05 RX ADMIN — HEPARIN 500 UNITS: 100 SYRINGE at 15:49

## 2022-10-05 RX ADMIN — PEGFILGRASTIM 6 MG: KIT SUBCUTANEOUS at 15:12

## 2022-10-05 RX ADMIN — POTASSIUM CHLORIDE: 2 INJECTION, SOLUTION, CONCENTRATE INTRAVENOUS at 08:18

## 2022-10-05 RX ADMIN — PALONOSETRON 0.25 MG: 0.25 INJECTION, SOLUTION INTRAVENOUS at 10:01

## 2022-10-05 NOTE — TELEPHONE ENCOUNTER
PATIENT STATES HE WANTS TO WAIT AND SCHEDULE THE TECENTRIQ APPT AT NEXT [facility] F/U APPT.  HE STATES HE WANTS TO GO HOME AND THINK ABOUT THIS FIRST BEFORE MAKING APPT.

## 2022-10-07 RX ORDER — MAGNESIUM OXIDE 400 MG/1
400 TABLET ORAL 2 TIMES DAILY
Qty: 6 TABLET | Refills: 0 | Status: SHIPPED | OUTPATIENT
Start: 2022-10-07 | End: 2022-10-10

## 2022-10-10 ENCOUNTER — TELEPHONE (OUTPATIENT)
Dept: ONCOLOGY | Facility: CLINIC | Age: 57
End: 2022-10-10

## 2022-10-10 NOTE — TELEPHONE ENCOUNTER
IVF'S ORDERED ON PATIENT.  SPOKE WITH MIGUEL PADILLA RN.  SHE STATES PATIENT WILL NEED TO HAVE IVF'S SCHEDULED AT [facility].  SPOKE WITH PATIENT.  HE STATES HE WOULD RATHER NOT HAVE FLUIDS AT AMBULATORY AS HIS INSURANCE WILL NOT PAY FOR THIS SERVICE AT THE HOSPITAL.  PATIENT STATES HE WANTS FLUIDS TO BE GIVEN AT OUR OFFICE.  HE STATES HE ALSO WANTS THE LABS THAT ARE SCHEDULED FOR 10- TO BE DRAWN THIS DAY ALSO AS HE IS COMING FROM [place].  FLUIDS AND LABS SCHEDULED FOR 10-.  HE V/U ON DATES AND TIMES.

## 2022-10-12 ENCOUNTER — APPOINTMENT (OUTPATIENT)
Dept: LAB | Facility: HOSPITAL | Age: 57
End: 2022-10-12

## 2022-10-13 ENCOUNTER — HOSPITAL ENCOUNTER (OUTPATIENT)
Dept: ONCOLOGY | Facility: HOSPITAL | Age: 57
Setting detail: INFUSION SERIES
Discharge: HOME | End: 2022-10-13

## 2022-10-13 VITALS
HEIGHT: 75 IN | WEIGHT: 201.7 LBS | HEART RATE: 91 BPM | DIASTOLIC BLOOD PRESSURE: 78 MMHG | SYSTOLIC BLOOD PRESSURE: 132 MMHG | BODY MASS INDEX: 25.08 KG/M2 | RESPIRATION RATE: 20 BRPM | TEMPERATURE: 96.8 F | OXYGEN SATURATION: 99 %

## 2022-10-13 DIAGNOSIS — C34.91 NON-SMALL CELL CARCINOMA OF LUNG, RIGHT: ICD-10-CM

## 2022-10-13 DIAGNOSIS — Z45.2 ENCOUNTER FOR CARE RELATED TO VASCULAR ACCESS PORT: ICD-10-CM

## 2022-10-13 DIAGNOSIS — D64.9 ANEMIA, UNSPECIFIED TYPE: ICD-10-CM

## 2022-10-13 DIAGNOSIS — E86.0 DEHYDRATION: Primary | ICD-10-CM

## 2022-10-13 LAB
ANION GAP SERPL CALCULATED.3IONS-SCNC: 11 MMOL/L (ref 5–15)
BASOPHILS # BLD AUTO: 0.09 10*3/MM3 (ref 0–0.2)
BASOPHILS NFR BLD AUTO: 0.4 % (ref 0–1.5)
BUN SERPL-MCNC: 10 MG/DL (ref 6–20)
BUN/CREAT SERPL: 10.4 (ref 7–25)
CALCIUM SPEC-SCNC: 9.2 MG/DL (ref 8.6–10.5)
CHLORIDE SERPL-SCNC: 91 MMOL/L (ref 98–107)
CO2 SERPL-SCNC: 27 MMOL/L (ref 22–29)
CREAT SERPL-MCNC: 0.96 MG/DL (ref 0.76–1.27)
DEPRECATED RDW RBC AUTO: 48.5 FL (ref 37–54)
EGFRCR SERPLBLD CKD-EPI 2021: 91.6 ML/MIN/1.73
EOSINOPHIL # BLD AUTO: 0.12 10*3/MM3 (ref 0–0.4)
EOSINOPHIL NFR BLD AUTO: 0.5 % (ref 0.3–6.2)
ERYTHROCYTE [DISTWIDTH] IN BLOOD BY AUTOMATED COUNT: 14.9 % (ref 12.3–15.4)
GLUCOSE SERPL-MCNC: 103 MG/DL (ref 65–99)
HCT VFR BLD AUTO: 28 % (ref 37.5–51)
HGB BLD-MCNC: 9.2 G/DL (ref 13–17.7)
LYMPHOCYTES # BLD AUTO: 1.28 10*3/MM3 (ref 0.7–3.1)
LYMPHOCYTES NFR BLD AUTO: 5.1 % (ref 19.6–45.3)
MAGNESIUM SERPL-MCNC: 1.4 MG/DL (ref 1.6–2.6)
MCH RBC QN AUTO: 31.1 PG (ref 26.6–33)
MCHC RBC AUTO-ENTMCNC: 32.9 G/DL (ref 31.5–35.7)
MCV RBC AUTO: 94.6 FL (ref 79–97)
MONOCYTES # BLD AUTO: 3.89 10*3/MM3 (ref 0.1–0.9)
MONOCYTES NFR BLD AUTO: 15.4 % (ref 5–12)
NEUTROPHILS NFR BLD AUTO: 19.93 10*3/MM3 (ref 1.7–7)
NEUTROPHILS NFR BLD AUTO: 78.6 % (ref 42.7–76)
PLATELET # BLD AUTO: 307 10*3/MM3 (ref 140–450)
PMV BLD AUTO: 8.7 FL (ref 6–12)
POTASSIUM SERPL-SCNC: 4.1 MMOL/L (ref 3.5–5.2)
RBC # BLD AUTO: 2.96 10*6/MM3 (ref 4.14–5.8)
SODIUM SERPL-SCNC: 129 MMOL/L (ref 136–145)
WBC NRBC COR # BLD: 25.31 10*3/MM3 (ref 3.4–10.8)

## 2022-10-13 PROCEDURE — 85025 COMPLETE CBC W/AUTO DIFF WBC: CPT | Performed by: INTERNAL MEDICINE

## 2022-10-13 PROCEDURE — 96360 HYDRATION IV INFUSION INIT: CPT

## 2022-10-13 PROCEDURE — 80048 BASIC METABOLIC PNL TOTAL CA: CPT | Performed by: INTERNAL MEDICINE

## 2022-10-13 PROCEDURE — 83735 ASSAY OF MAGNESIUM: CPT | Performed by: INTERNAL MEDICINE

## 2022-10-13 PROCEDURE — 25010000002 HEPARIN LOCK FLUSH PER 10 UNITS: Performed by: INTERNAL MEDICINE

## 2022-10-13 RX ORDER — HEPARIN SODIUM (PORCINE) LOCK FLUSH IV SOLN 100 UNIT/ML 100 UNIT/ML
500 SOLUTION INTRAVENOUS AS NEEDED
Status: DISCONTINUED | OUTPATIENT
Start: 2022-10-13 | End: 2022-10-14 | Stop reason: HOSPADM

## 2022-10-13 RX ORDER — SODIUM CHLORIDE 0.9 % (FLUSH) 0.9 %
20 SYRINGE (ML) INJECTION AS NEEDED
Status: DISCONTINUED | OUTPATIENT
Start: 2022-10-13 | End: 2022-10-14 | Stop reason: HOSPADM

## 2022-10-13 RX ADMIN — SODIUM CHLORIDE 1000 ML: 9 INJECTION, SOLUTION INTRAVENOUS at 11:34

## 2022-10-13 RX ADMIN — Medication 20 ML: at 12:44

## 2022-10-13 RX ADMIN — HEPARIN 500 UNITS: 100 SYRINGE at 12:44

## 2022-10-13 NOTE — PROGRESS NOTES
Pt. Was here for IVF's today.   Pt's port flushes easy but, no blood return noted, Lab specimens collected via venipuncture and set to lab for processing.   IVF's given as ordered.   Port flushed at completion still no blood return noted. Pt. Did not want to wait on port declot today.   I instructed pt. That we can reevaluate port with next port flush.   Pt. Discharged from clinic with no complaints.

## 2022-10-17 ENCOUNTER — TELEPHONE (OUTPATIENT)
Dept: ONCOLOGY | Facility: CLINIC | Age: 57
End: 2022-10-17

## 2022-10-17 NOTE — TELEPHONE ENCOUNTER
Caller: Jc Driscoll    Relationship to patient: Self    Best call back number: [phone]    Chief complaint: CANC./JOSE., AS PATIENT WILL BE IN TOWN FOR ANOTHER APPT. TOMORROW AT 11 & IS HOPING TO JOSE. THIS APPT. FOR BEFORE THAT APPT.    Type of visit: LAB    Requested date: 10/18/2022 AROUND 10    If rescheduling, when is the original appointment: 10/19/2022    Additional notes: TRIED TO WT SINCE IT IS A LESS THAN 24 HOUR REQUEST BUT NO ANSWER.

## 2022-10-18 ENCOUNTER — LAB (OUTPATIENT)
Dept: LAB | Facility: HOSPITAL | Age: 57
End: 2022-10-18

## 2022-10-18 DIAGNOSIS — C34.91 NON-SMALL CELL CARCINOMA OF LUNG, RIGHT: ICD-10-CM

## 2022-10-18 DIAGNOSIS — D64.9 ANEMIA, UNSPECIFIED TYPE: ICD-10-CM

## 2022-10-18 LAB
ANION GAP SERPL CALCULATED.3IONS-SCNC: 11 MMOL/L (ref 5–15)
BASOPHILS # BLD AUTO: 0.08 10*3/MM3 (ref 0–0.2)
BASOPHILS NFR BLD AUTO: 0.5 % (ref 0–1.5)
BUN SERPL-MCNC: 11 MG/DL (ref 6–20)
BUN/CREAT SERPL: 13.4 (ref 7–25)
CALCIUM SPEC-SCNC: 9 MG/DL (ref 8.6–10.5)
CHLORIDE SERPL-SCNC: 91 MMOL/L (ref 98–107)
CO2 SERPL-SCNC: 28 MMOL/L (ref 22–29)
CREAT SERPL-MCNC: 0.82 MG/DL (ref 0.76–1.27)
DEPRECATED RDW RBC AUTO: 50 FL (ref 37–54)
EGFRCR SERPLBLD CKD-EPI 2021: 101.8 ML/MIN/1.73
EOSINOPHIL # BLD AUTO: 0.18 10*3/MM3 (ref 0–0.4)
EOSINOPHIL NFR BLD AUTO: 1.2 % (ref 0.3–6.2)
ERYTHROCYTE [DISTWIDTH] IN BLOOD BY AUTOMATED COUNT: 15.2 % (ref 12.3–15.4)
GLUCOSE SERPL-MCNC: 160 MG/DL (ref 65–99)
HCT VFR BLD AUTO: 29.5 % (ref 37.5–51)
HGB BLD-MCNC: 9.7 G/DL (ref 13–17.7)
LYMPHOCYTES # BLD AUTO: 1.21 10*3/MM3 (ref 0.7–3.1)
LYMPHOCYTES NFR BLD AUTO: 7.9 % (ref 19.6–45.3)
MAGNESIUM SERPL-MCNC: 1.5 MG/DL (ref 1.6–2.6)
MCH RBC QN AUTO: 31.1 PG (ref 26.6–33)
MCHC RBC AUTO-ENTMCNC: 32.9 G/DL (ref 31.5–35.7)
MCV RBC AUTO: 94.6 FL (ref 79–97)
MONOCYTES # BLD AUTO: 1.52 10*3/MM3 (ref 0.1–0.9)
MONOCYTES NFR BLD AUTO: 9.9 % (ref 5–12)
NEUTROPHILS NFR BLD AUTO: 12.4 10*3/MM3 (ref 1.7–7)
NEUTROPHILS NFR BLD AUTO: 80.5 % (ref 42.7–76)
PLATELET # BLD AUTO: 312 10*3/MM3 (ref 140–450)
PMV BLD AUTO: 8.5 FL (ref 6–12)
POTASSIUM SERPL-SCNC: 4.2 MMOL/L (ref 3.5–5.2)
RBC # BLD AUTO: 3.12 10*6/MM3 (ref 4.14–5.8)
SODIUM SERPL-SCNC: 130 MMOL/L (ref 136–145)
WBC NRBC COR # BLD: 15.39 10*3/MM3 (ref 3.4–10.8)

## 2022-10-18 PROCEDURE — 36415 COLL VENOUS BLD VENIPUNCTURE: CPT

## 2022-10-18 PROCEDURE — 85025 COMPLETE CBC W/AUTO DIFF WBC: CPT

## 2022-10-18 PROCEDURE — 83735 ASSAY OF MAGNESIUM: CPT

## 2022-10-18 PROCEDURE — 80048 BASIC METABOLIC PNL TOTAL CA: CPT

## 2022-10-19 ENCOUNTER — APPOINTMENT (OUTPATIENT)
Dept: LAB | Facility: HOSPITAL | Age: 57
End: 2022-10-19

## 2022-10-25 ENCOUNTER — OFFICE VISIT (OUTPATIENT)
Dept: SURGERY | Facility: CLINIC | Age: 57
End: 2022-10-25

## 2022-10-25 VITALS
SYSTOLIC BLOOD PRESSURE: 142 MMHG | OXYGEN SATURATION: 97 % | HEIGHT: 75 IN | HEART RATE: 95 BPM | BODY MASS INDEX: 24.39 KG/M2 | WEIGHT: 196.2 LBS | TEMPERATURE: 97.7 F | DIASTOLIC BLOOD PRESSURE: 85 MMHG

## 2022-10-25 DIAGNOSIS — M62.838 MUSCLE SPASM: ICD-10-CM

## 2022-10-25 DIAGNOSIS — C34.92 NSCLC OF LEFT LUNG: ICD-10-CM

## 2022-10-25 DIAGNOSIS — C34.12 MALIGNANT NEOPLASM OF UPPER LOBE OF LEFT LUNG: Primary | ICD-10-CM

## 2022-10-25 DIAGNOSIS — Z87.891 FORMER SMOKER: ICD-10-CM

## 2022-10-25 PROCEDURE — 99214 OFFICE O/P EST MOD 30 MIN: CPT | Performed by: NURSE PRACTITIONER

## 2022-10-25 RX ORDER — DIAZEPAM 2 MG/1
2 TABLET ORAL NIGHTLY PRN
Qty: 30 TABLET | Refills: 0 | Status: SHIPPED | OUTPATIENT
Start: 2022-10-25 | End: 2022-12-20

## 2022-10-25 NOTE — PROGRESS NOTES
"Chief Complaint  Follow up, NSCLC    Subjective        Jc Driscoll presents to [facility] THORACIC SURGERY     History of Present Illness    Mr. Driscoll is a pleasant 57-year-old gentleman who is status post left VATS with upper lobectomy on 7/6/2022 for T1cN1 adenocarcinoma.  He has finished chemotherapy and is followed by Dr. Gaspar.  He has a history of previous malignancy pT1cN2 M0 moderately differentiated adenocarcinoma of the right lung s/p right lower lobe wedge resection and chemotherapy in 2019.  He tolerated chemo well overall with some nausea.  His only complaint today is some intermittent left shoulder/scapular pain that is worse at night.  He also notices this when his left hand is elevated while driving.  This began approximately 1 month ago. This discomfort affects his ability to sleep.  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    He presents today with CT chest.       Objective   Vital Signs:  /85 (Cuff Size: Adult)   Pulse 95   Temp 97.7 °F (36.5 °C) (Infrared)   Ht 190.5 cm (75\")   Wt 89 kg (196 lb 3.2 oz)   SpO2 97%   BMI 24.52 kg/m²   Estimated body mass index is 24.52 kg/m² as calculated from the following:    Height as of this encounter: 190.5 cm (75\").    Weight as of this encounter: 89 kg (196 lb 3.2 oz).          Physical Exam  Constitutional:       Appearance: Normal appearance. He is not ill-appearing.   HENT:      Head: Normocephalic and atraumatic.   Cardiovascular:      Rate and Rhythm: Normal rate.      Pulses: Normal pulses.   Pulmonary:      Effort: Pulmonary effort is normal. No respiratory distress.   Musculoskeletal:         General: No swelling, tenderness or signs of injury. Normal range of motion.      Cervical back: Normal range of motion and neck supple.   Skin:     General: Skin is warm and dry.   Neurological:      General: No focal deficit present.      Mental Status: He is alert and oriented to person, place, and " time. Mental status is at baseline.      Cranial Nerves: No cranial nerve deficit.   Psychiatric:         Mood and Affect: Mood normal.         Behavior: Behavior normal.              Result Review :              I have independently reviewed the CT of the chest performed on 10/22/2022 which demonstrates postoperative changes consistent with a left upper lobectomy and right lower lobe wedge resection.  There is a rounded density in the left anterior first/second rib space adjacent to a small amount of loculated fluid.  No other new or enlarging nodules.  No mediastinal adenopathy.  There are trace bilateral pleural effusions, right greater than left.  No pericardial effusion.  Airways are patent.      Assessment and Plan   Diagnoses and all orders for this visit:    1. Malignant neoplasm of upper lobe of left lung (HCC) (Primary)  -     CT Chest Without Contrast; Future  -     NM PET/CT Skull Base to Mid Thigh; Future    2. NSCLC of left lung (HCC)  -     CT Chest Without Contrast; Future  -     NM PET/CT Skull Base to Mid Thigh; Future    3. Former smoker  -     CT Chest Without Contrast; Future    4. Muscle spasm  -     diazePAM (Valium) 2 MG tablet; Take 1 tablet by mouth At Night As Needed for Anxiety or Muscle Spasms.  Dispense: 30 tablet; Refill: 0      Mr. Driscoll presents today in follow-up status post left VATS with left upper lobectomy in July 2022 for a T1CN1 poorly differentiated adenocarcinoma.  He had a satisfactory postoperative course and the most recent CT chest demonstrates postoperative changes with a density in the upper part of the left lung as well as some adjacent loculated fluid. While recurrent disease cannot be excluded, this less likely given that he has recently completed chemotherapy. He is scheduled to follow-up with Dr. Gaspar tomorrow and will defer further recommendations to her.  I have discussed this with Dr. Reyna who recommends we go ahead and get a PET scan to rule out recurrence.   I have also ordered repeat CT chest 3 months from now. We will schedule a follow-up appointment with Dr. Reyna in the near future to review PET imaging.    For left upper extremity discomfort, I have ordered Valium 2mg nightly as needed for muscle spasms.       I spent 31 minutes caring for Jc on this date of service. This time includes time spent by me in the following activities:preparing for the visit, reviewing tests, performing a medically appropriate examination and/or evaluation , ordering medications, tests, or procedures, documenting information in the medical record and independently interpreting results and communicating that information with the patient/family/caregiver     Follow Up   Return in about 2 weeks (around 11/8/2022) for Next scheduled follow up.  Patient was given instructions and counseling regarding his condition or for health maintenance advice. Please see specific information pulled into the AVS if appropriate.

## 2022-10-26 ENCOUNTER — OFFICE VISIT (OUTPATIENT)
Dept: ONCOLOGY | Facility: CLINIC | Age: 57
End: 2022-10-26

## 2022-10-26 ENCOUNTER — LAB (OUTPATIENT)
Dept: LAB | Facility: HOSPITAL | Age: 57
End: 2022-10-26

## 2022-10-26 VITALS
HEIGHT: 75 IN | HEART RATE: 89 BPM | WEIGHT: 196.21 LBS | TEMPERATURE: 96.6 F | DIASTOLIC BLOOD PRESSURE: 80 MMHG | OXYGEN SATURATION: 100 % | SYSTOLIC BLOOD PRESSURE: 123 MMHG | BODY MASS INDEX: 24.4 KG/M2

## 2022-10-26 DIAGNOSIS — C34.91 NON-SMALL CELL CARCINOMA OF LUNG, RIGHT: Primary | ICD-10-CM

## 2022-10-26 DIAGNOSIS — D64.9 ANEMIA, UNSPECIFIED TYPE: ICD-10-CM

## 2022-10-26 LAB
ANION GAP SERPL CALCULATED.3IONS-SCNC: 14 MMOL/L (ref 5–15)
BASOPHILS # BLD AUTO: 0.09 10*3/MM3 (ref 0–0.2)
BASOPHILS NFR BLD AUTO: 1.1 % (ref 0–1.5)
BUN SERPL-MCNC: 12 MG/DL (ref 6–20)
BUN/CREAT SERPL: 14.1 (ref 7–25)
CALCIUM SPEC-SCNC: 9.1 MG/DL (ref 8.6–10.5)
CHLORIDE SERPL-SCNC: 88 MMOL/L (ref 98–107)
CO2 SERPL-SCNC: 27 MMOL/L (ref 22–29)
CREAT SERPL-MCNC: 0.85 MG/DL (ref 0.76–1.27)
DEPRECATED RDW RBC AUTO: 49.4 FL (ref 37–54)
EGFRCR SERPLBLD CKD-EPI 2021: 100.7 ML/MIN/1.73
EOSINOPHIL # BLD AUTO: 0.19 10*3/MM3 (ref 0–0.4)
EOSINOPHIL NFR BLD AUTO: 2.4 % (ref 0.3–6.2)
ERYTHROCYTE [DISTWIDTH] IN BLOOD BY AUTOMATED COUNT: 14.9 % (ref 12.3–15.4)
GLUCOSE SERPL-MCNC: 167 MG/DL (ref 65–99)
HCT VFR BLD AUTO: 27.2 % (ref 37.5–51)
HGB BLD-MCNC: 8.9 G/DL (ref 13–17.7)
HOLD SPECIMEN: NORMAL
LYMPHOCYTES # BLD AUTO: 1.04 10*3/MM3 (ref 0.7–3.1)
LYMPHOCYTES NFR BLD AUTO: 12.9 % (ref 19.6–45.3)
MAGNESIUM SERPL-MCNC: 1.4 MG/DL (ref 1.6–2.6)
MCH RBC QN AUTO: 31.2 PG (ref 26.6–33)
MCHC RBC AUTO-ENTMCNC: 32.7 G/DL (ref 31.5–35.7)
MCV RBC AUTO: 95.4 FL (ref 79–97)
MONOCYTES # BLD AUTO: 1.14 10*3/MM3 (ref 0.1–0.9)
MONOCYTES NFR BLD AUTO: 14.1 % (ref 5–12)
NEUTROPHILS NFR BLD AUTO: 5.61 10*3/MM3 (ref 1.7–7)
NEUTROPHILS NFR BLD AUTO: 69.5 % (ref 42.7–76)
PLATELET # BLD AUTO: 378 10*3/MM3 (ref 140–450)
PMV BLD AUTO: 8.1 FL (ref 6–12)
POTASSIUM SERPL-SCNC: 4.2 MMOL/L (ref 3.5–5.2)
RBC # BLD AUTO: 2.85 10*6/MM3 (ref 4.14–5.8)
SODIUM SERPL-SCNC: 129 MMOL/L (ref 136–145)
WBC NRBC COR # BLD: 8.07 10*3/MM3 (ref 3.4–10.8)

## 2022-10-26 PROCEDURE — 83735 ASSAY OF MAGNESIUM: CPT

## 2022-10-26 PROCEDURE — 99215 OFFICE O/P EST HI 40 MIN: CPT | Performed by: INTERNAL MEDICINE

## 2022-10-26 PROCEDURE — 85025 COMPLETE CBC W/AUTO DIFF WBC: CPT

## 2022-10-26 PROCEDURE — 36415 COLL VENOUS BLD VENIPUNCTURE: CPT

## 2022-10-26 PROCEDURE — 80048 BASIC METABOLIC PNL TOTAL CA: CPT

## 2022-10-26 NOTE — PATIENT INSTRUCTIONS
"Atezolizumab        (a te tutu GARRICK ue mab)    Trade Name: Tecentriq™    Atezolizumab is the generic name for the trade drug name Tecentriq™. In some cases, health care professionals may use the generic name atezolizumab when referring to the trade drug name Tecentriq™.    Drug Type: Atezolizumab is an Anti-PD-L1 monoclonal antibody. (For more detail, see \"How this drug works,\" below).    What Atezolizumab Is Used For:  For the treatment of patients with locally advanced or metastatic urothelial carcinoma who:    Have disease progression during or following platinum-containing chemotherapy.  Have disease progression within 12 months of neoadjuvant or adjuvant treatment with platinum-containing chemotherapy.  For treatment of patients with metastatic non-squamous, non-small cell lung cancer (Nsq NSCLC) with no EGFR or ALK gene mutations. Used in combination with bevacizumab, paclitaxel, and carboplatin or in combination with paclitaxel protein-bound and carboplatin    For treatment of patients with PD_L1 positive unresectable locally advanced or metastatic triple-negative breast cancer.    For treatment in combination with carboplatin and etoposide for first-line treatment of patients with extensive-stage small cell lung cancer.    For treatment in combination with cobimetinib and vemurafenib of patients with BRAF V600 mutation-positive unresectable or metastatic melanoma.    Note: If a drug has been approved for one use, physicians may elect to use this same drug for other problems if they believe it may be helpful.    How Atezolizumab Is Given:  Atezolizumab is given as an intravenous injection through a vein (IV) over 60 minutes for the first infusion, and if no infusion reaction, over 30 minutes for each infusion thereafter.  Treatment cycles are every 3 weeks (21 days)  You may receive medications before the infusion to reduce allergic reactions.  Side Effects:  Important things to remember about the side effects " of atezolizumab:    Most people do not experience all of the side effects listed.  Observed side effects below may NOT be related to atezolizumab.  Side effects are often predictable in terms of their onset and duration.  Immune-related side effects can occur weeks or months after discontinuation of treatment.  There are many options to help manage and prevent worsening of side effects.  There is no relationship between the presence or severity of side effects and the effectiveness of the medication.  The following side effects have been observed commonly for patients taking atezolizumab but may NOT be related to atezolizumab:    Fatigue  These side effects have been observed less commonly of patients receiving atezolizumab but may NOT be related to atezolizumab:    Decreased appetite  Nausea  Urinary tract or other infection  Constipation  Fever  Diarrhea  Colitis (bowel inflammation)  Swelling  Abdominal pain  Vomiting  Shortness of breath and/or cough and/or wheezing  Neck pain/back pain  Rash and/or itching  Hematuria (blood in the urine)  Arthralgia (joint pain)  Hyponatremia (low sodium level)  Thyroid changes  Autoimmune reactions* and infusion reactions  *A rare but possible side effect of this class of drug is the trigger of an auto-immune reaction. This can happen at any time when taking this drug, and/or after stopping the drug. The immune system may go after normal cells in the body, e.g. lungs, skin, intestines, endocrine system, liver, pancreas, eyes, etc. Symptoms and signs of this reaction will be closely monitored throughout treatment (e.g. cough, shortness of breath, wheezing, rash, diarrhea/blood in stool, fatigue/weakness, visual changes, etc.). Lab work will also be used to check for elevated liver enzymes, kidney thyroid function, electrolytes, glucose, and blood counts.    Contact your health care provider right away if you have any new or worsening symptoms.    Not all potential side effects  are listed above. Some of those that are more rare are not listed here. However, you should always inform your health care provider if you experience any unusual symptoms or changes.    When To Contact Your Doctor Or Health Care Provider:  Contact your health care provider immediately, day or night, if you should experience any of the following symptoms (may NOT be related to atezolizumab):    Fever of 100.4° (e.g. 38° C) or higher, chills, flank pain, cloudy or foul smelling urine, or other acute changes and/or signs of infection.  Signs of reaction to the drug (e.g. wheezing, chest tightness/pain, shortness of breath, cough, rash, itching, swelling of the face, lips, tongue, or throat)  Persistent or unusual headache, extreme generalized or focal weakness, dizziness/lightheadedness or fainting, stiff neck, new severe numbness, or vision changes.  Blood in stool, very dark stool, light colored stool, yellow skin/eyes/tongue, dark urine, unusual swelling of one of your legs/arms, bleeding, severe pain, or other acute changes.  The following symptoms require urgent medical attention, but might not usually be an emergency. Contact your doctor or health care provider within 24 hours of noticing any of the following (may NOT be related to atezolizumab):    Unable to eat or drink for 24 hours or have signs of dehydration, e.g. tiredness, thirst, dry mouth/skin, dark and decrease amount of urine, or dizziness  Nausea (interferes with ability to eat and unrelieved with prescribed medication), decrease appetite  Vomiting (vomiting more than 4-5 times in a 24 hour period)  Diarrhea, abdominal or back pain, especially right side  Extreme fatigue that impairs your normal activities of daily living  Significant change in weight  Change in mood or personality, feeling confused  Constipation  Deeper voice, feeling cold or hair loss  Numbness or tingling in hands or feet, very bad muscle weakness  Bleed or bruise more easily than  normal  Signs of infection: cough, frequent or painful urination, flu-like or other symptoms/signs  Always inform your health care provider if you experience any unusual symptoms or changes.    Precautions:  Before starting atezolizumab treatment, make sure you tell your doctor about any allergies, and other medications you are taking (including prescription, over-the-counter, vitamins, herbal remedies, etc.) and your complete past medical, surgical, family and social history  Do not receive any kind of immunization or vaccination without your doctor's approval while taking atezolizumab, e.g. it is OK to get inactivated/killed vaccines (flu vaccine), but not live vaccine (do not get flu-mist).  Inform your health care professional if you are pregnant or may be pregnant prior to starting this treatment. Atezolizumab may be hazardous to the fetus. Adverse events were observed in animal reproduction studies. Women who are pregnant or become pregnant must be advised of the potential hazard to the fetus.  For both men and women: Do not conceive a child (get pregnant) while taking atezolizumab. Barrier methods of contraception, such as condoms, are recommended during treatment. Women of child-bearing age should use effective contraception during therapy and for at least 5 months following treatment. Discuss with your doctor when you may safely become pregnant or conceive a child after therapy.  Do not breast feed while taking this medication. Due to the potential for serious adverse reactions in the nursing infant, breast-feeding is not recommended during therapy or for at least 5 months after the last dose.  If you have high blood sugar (diabetes), talk with your doctor. This drug might rarely raise blood sugar.  Inform your doctor before starting treatment, especially if you have any history of autoimmune disease (e.g. psoriasis, rheumatoid arthritis, lupus erythematosus, multiple sclerosis, or any other), inflammatory  bowel disease (e.g. ulcerative colitis, Crohn's disease, etc.), interstitial pneumonitis (e.g. chronic lung inflammation), hepatitis B or C, HIV, if you are getting steroids or other medications that suppress your immune system, and your recent vaccinations.  If you go to any other health care provider or the Emergency Room, please let the provider know that you are taking this (or took it in the past) so they should take it into account for your management/plan of care.  Self-Care Tips (both general and related to atezolizumab):  Drink at least two to three quarts of fluid every 24 hours, unless you are instructed otherwise.  If you should experience nausea, take anti-nausea medications as prescribed by your doctor, and eat small frequent meals. Sucking on lozenges and chewing gum may also help.  Avoid sun exposure. Wear SPF 30 (or higher) sun block and protective clothing.  In general, drinking alcoholic beverages should be kept to a minimum or avoided completely. You should discuss this with your doctor.  Use an electric razor to minimize bleeding.  Get plenty of rest.  Maintain good nutrition.  Wash your hands often.  You may be at risk for infection, report fever or any other signs of infection immediately to your healthcare provider. Try to avoid crowds or people with colds, and report fever or any other signs of infection immediately to your healthcare provider.  Discuss with your health care provider before taking any other medications including over the counter and herbal preparations.  If you experience symptoms or side effects, be sure to discuss them with your health care team. They can prescribe medications and/or offer other suggestions that are effective in managing such problems.  Monitoring and Testing While Taking Atezolizumab:  You will be checked regularly by your health care professional while you are taking atezolizumab, to monitor side effects and check your response to therapy.    How  "Atezolizumab Works:  Atezolizumab is classified as a monoclonal antibody. Monoclonal antibodies are a relatively new type of \"targeted\" cancer therapy (against a specific \"target\").    Antibodies are an integral part of the body's immune system. Normally, the body creates antibodies in response to an antigen (such as a protein in a germ) that has entered the body.    The immune system utilizes activators/stimulators (accelerators) and checkpoints (breaks) to maintain the balance. One of those checkpoints is the interaction between two proteins, called PD-L1 and PD-1, that can cause suppression of the immune system and thus diminished attack against cancer cells. Both cancer cells and immune system cells can may over-express on their surface PD-L1, and this can reduce the attack of the immune system against cancer cells. Atezolizumab is a monoclonal antibody that attaches to PD-L1 and “blocks” its checkpoint function, thus ‘unleashing’ the brake on the immune system, to attack cancer cells    Note: We encourage you to talk with your health care professional about your specific medical condition and treatments. The information contained in this website is meant to be helpful and educational, but is not at all a substitute for medical advice, and cannot cover all individual specific circumstances.  "

## 2022-10-26 NOTE — PROGRESS NOTES
Hematology/Oncology Outpatient Follow Up    PATIENT NAME:Jc Driscoll  :1965  MRN: [id number]  PRIMARY CARE PHYSICIAN: Reshma Alvarenga MD  REFERRING PHYSICIAN: Reshma Alvarenga MD    Chief Complaint   Patient presents with   • Follow-up     Non-small cell carcinoma of lung, right (HCC)        HISTORY OF PRESENT ILLNESS:     This is a 57 y.o. who developed left shoulder pain and for that reason he had a chest x-ray done on 19 which showed a 2.7 cm noncalcified right lower lobe nodule was identified.  For that reason a CT scan of the chest was recommended.  Review of his records shows patient had the CT scan on 19 done without contrast.  This basically showed a lobulated noncalcified mass in the posterior aspect of the right lower lobe measuring 3 cm close to the pleural surface.  There is a calcified 1.2 mm nodule in the lateral aspect of the right lower lobe consistent with a granuloma.  There were also calcified subcarinal and right hilar nodules.  There is a lower right side tracheal nodule measuring 1 cm.  Patient had a PET/CT scan done on 19 which showed increased metabolic activity on the right lower lobe mass that measures 2.5 cm with SUV of 4.4.  There was also increased metabolic activity in the subcarinal space measuring 2.8 cm in size with SUV of 3.4, increased activity in an enlarged right paratracheal lymph node that measures 1.9 cm, SUV of 5, also suspicious for metastatic adenopathy.  Patient had a CT-guided biopsy of the right lower lobe mass on 3/8/19.  This showed adenocarcinoma, TTF-1 positive.  On 3/18/19 patient underwent endoscopic ultrasound and biopsy of a subcarinal lymph node.  This was positive for malignant cells.  Patient was then taken back to surgery on 3/20/18 and had left Mediport placement by Dr. Fuchs.  He has been referred for further evaluation and management of his stage 3 adenocarcinoma of the right lung.  He was accompanied by his spouse for  the appointment on 3/26/19.  Patient was scheduled to have a brain MRI for complete staging, but he could not do this due to claustrophobia.  He is willing to try with the help of angiolytics.    • Patient had brain MRI done on 4/5/19.  He states it did not show any evidence of metastatic disease.  Evidence of chronic sinusitis was noted.    • Patient was seen by Dr. Escalona who has recommended concurrent chemotherapy and radiation.  Patient is scheduled to begin on 4/15/19.   • 4/17/19 - Patient was initiated on combined chemotherapy and radiation with Cisplatin and Alimta.  Patient received cycle 1.      • 5/8/19 - Patient received cycle 2 of chemotherapy with Cisplatin and Alimta.   • 5/15/19 - Chemistry panel showed creatinine of 1.7.  WBC 1.8, hemoglobin 11.6, platelet count 72,000.   • 5/20/19 - BUN 10, creatinine 1.2, potassium 3.5.    • 5/23/19 - CT scan of the chest with contrast showed interval decrease in size of the right lower lobe pulmonary nodule now measuring 2.1 cm in size.  There was no mediastinal or hilar adenopathy identified.  • 6/26/2019 patient underwent right lower lobectomy with hilar and mediastinal lymph node dissection performed by Dr. Terrance Mckeon.  • Pathology revealed residual residual 2.2 cm invasive moderately differentiated adenocarcinoma.  There were a total of 16 lymph nodes evaluated that 7 had metastatic disease.  There was evidence of lymphovascular invasion, extranodal involvement.  All the surgical margins were negative and distance of the closest margin was 2.5 cm.  Logic stage is T1c N2 M0.  • Patient is here today accompanied by his spouse for this appointment.  He continues to complain of some postop discomfort, numbness but his skin is intact.  There is no drainage.  Has had follow-up with Dr. Fuchs.  • 8/14/2019-seen by Dr. Maria at the [facility].  Dr. Maria has recommended immunotherapy with durvalumab off label use as patient has not had significant  response to neoadjuvant chemotherapy and radiation.  Patient was given the option to have immunotherapy at the [facility] vs [place] and he has chosen to stay in [place]  • 8/21/19 - Started cycle 1 day 1 Imfinzi  • 9/4/2019 patient had CT scan of the chest this shows a moderate size right pleural effusion.  There are areas of groundglass opacification in the right upper lobe without any evidence of significant septal thickening.  Volume loss noted there is also moderate pleural effusion.  There is no suspicious recurrent malignancy.  There were nonenlarged residual mediastinal lymph nodes.  • 9/9/19 - WBC 6.23, Hgb 11.7 Platelets 257,000, , BUN 9, Cr 1.1,Vitamin B12 318, Ferritin 437  • 9/23/19 - received cycle 2 day 1 Imfinzi  • 10/9/19- Seen by Dr rodríguez with ENT for sinus disease  • 11/4/2019-patient received cycle 5 of Imfinzi(durvalumab)  • 12/2/2019 patient had cycle 7 of durvalumab  • 12/5/2019-patient had CT scan of the  abdomen and pelvis with small right pleural sided pleural effusion.There is no evidence of metastatic disease  • 12/30/2019-patient received cycle 9 of durvalumab  • 12/31/2019-patient had CT scan of the chest showed small to moderate left pleural effusion.  Iglesias appearing right lower paratracheal and right peribronchial soft tissue thickening without any discrete pathologically enlarged mediastinal or hilar lymph nodes.  • 1/27/20-patient received cycle 11 of durvalumab  • 2/17/20-patient had a stress test which was negative for ischemia  • 2/17/2020-patient had CT of the chest which showed postop changes on the right lung, right pleural effusion but no enlarging mass was noted  • 3/2/2020-patient received cycle 12 of durvalumab  • 3/16/2020-patient had ultrasound-guided right thoracentesis.  Pathology was negative for malignancy  • 4/13/2020-patient received cycle 15 of immunotherapy with durvalumab  • 5/11/2020-patient received cycle 17 of immunotherapy with  durvalumab  • 6/9/2020-patient had CT scan of the chest, abdomen and pelvis for lung cancer restaging.  There is stable small chronic loculated right basilar pleural effusion suggesting chronic pleuritis.  There is no evidence of metastatic disease in the abdomen or pelvis  • 7/20/2020 patient received cycle 22 of Durvalumab  • 8/17/2020 patient received cycle 24 and last cycle of durvalumab  • 9/24/2020 patient had CT scan of the chest, abdomen and pelvis for cancer restaging there was no evidence of local recurrence or metastatic disease within the abdomen and pelvis.  He has stable chronic small right pleural effusion.  Mild sigmoid diverticulosis was noted.  • 1/18/2021 patient had CT scan of the chest, abdomen and pelvis reviewed patient  • 5/24/2021 patient had CT scan of the chest calcified subcarinal lymph node consistent with changes of prior granulomatous disease.  Chronic small right pleural effusion is noted similar to prior exam.  Previously shown 4 mm nodule in the left lower lobe has nearly completely resolved.  The subpleural 3 mm nodule located within the posterior left lower lobe With resolved.  • 9/24/2021 patient had a CT scan of the chest, abdomen and pelvis there is soft tissue attenuation within the right paratracheal area which has been suggested to reflect sequela to previous treatment.  There is slight thickening of the bladder wall otherwise there is no evidence of metastatic disease.  • 12/27/2021 patient had CT scan of the chest with contrast this basically showed irregular shaped noncalcified 7 mm left upper lobe pulmonary nodule.  PET CT scan was recommended to further evaluate.  • 1/26/2022 patient had a PET CT scan done which basically showed evidence of mild uptake corresponding to the nodule within the left upper lobe which is new worrisome for developing metastatic focus.  There was mild radiopharmaceutical activity corresponding to pleural thickening throughout the right lung  base with associated pleural effusion likely related to post therapeutic changes and radiation changes in this region.  Metastatic malignancy involving the pleura could not be completely eliminated.  • 1/26/2022: CT-guided biopsy was aborted due to finding by the radiologist that the lung nodularity was actually a clump of tiny nodules of which the largest was 6 mm and therefore biopsy could not be completed.  Also the area was in the vicinity of multiple blood vessels with increased risk of bleeding.  Follow-up CT of the chest was recommended for continued surveillance  • 5/18/2022 patient had CT-guided biopsy of the left enlarging nodule, pathology was positive for invasive poorly differentiated adenocarcinoma most consistent with adenocarcinoma of pulmonary origin.  • 5/27/2022 patient had a PET CT scan which showed a 2.3 cm hypermetabolic left upper lobe nodule which has increased in size no convincing evidence of metastatic disease elsewhere within the chest.  • 5/21/2022 patient had brain MRI which did not show any evidence of intracranial metastasis  • 6/2/2022 patient was seen by Dr. Miryam Reyna pulmonary function test is being done to assess surgical candidacy  • 7/6/2022 patient underwent left heart [place] P video-assisted with upper lobectomy mediastinal lymph node dissection performed by Dr. Miryam Roach  • Final pathology revealed poorly differentiated  • A predominant adenocarcinoma with solid component presenting 45% and micropapillary component 5% with extensive necrosis, invasive carcinoma measures 2.5 maximally there was focal lymphovascular space invasion all margins were negative for malignancy discussed with the closest margin was 2.5 cm pathology stage is pT1C pN1.  PD-L1 showed a tumor proportion score of 95%  • 8/3/2022 patient started adjuvant chemotherapy with cisplatin, Taxol  • 8/24/2022 patient received cycle 2 of combination chemotherapy with cisplatin and Taxol with  Neulasta  • 10/5/2022: Patient received cycle 4-day 1 of combination chemotherapy with cisplatin and Taxol    Past Medical History:   Diagnosis Date   • Allergic rhinitis 07/25/2013    Overview:  4/2/2018 - still zyrtec 10 daily (if stops, gets dermatitis again).    • Anxiety and depression 06/05/2012    Overview:  4/2/2018 -   C/w well 300 xr and Lito 20.  4/8/2019 -   Doing ok even with new lung cancer - lito 20 & well 300xr.    • Chronic pansinusitis 04/08/2019    Overview:  4/8/2019 -   MRI showed chronic thick in frontal, maxillary, ethmoidal ---> to Dr. COLLAZO to est since abx ICC didn't help.  Does netipot bid.    • Diastolic CHF (HCC) 07/09/2014    Overview:  7/4/14 - impaired LV relaxation on [facility].  EF 60%. Rest normal   • Dupuytren's contracture of both hands    • Elevated cholesterol    • Erosive esophagitis 07/09/2019    Overview:  EGD 2016 4/2/2018 - nexium OTC daily for few week.  Qod before that.  Now carbonation hurts, epigastric pain 4/8/2019 -   protonix 40 doing well.    • Gastroesophageal reflux disease 02/21/2019   • Hiatal hernia 07/09/2019   • History of colon polyps    • Hypertension    • Lung cancer (HCC)     right lung and in lymph nodes x2   • Mediastinal lymphadenopathy 03/12/2019   • Normocytic anemia 08/08/2019    Overview:  6/2019 - dropped to 9's postop 7/2019 - rebounded to 10's.  8/8/2019 -   Back to 9's - check ferritin, b12 and thyroid.    • Prediabetes 07/09/2014    Overview:  7/4/14 - a1c 6.1 at [facility]   • Retention of urine 06/27/2019    PSOT OP, RESOLVED       Past Surgical History:   Procedure Laterality Date   • BRONCHOSCOPY  03/18/2019    EBUS MONTERO NEEDLE BX   • COLONOSCOPY     • DUPUYTREN CONTRACTURE RELEASE Bilateral    • LUNG BIOPSY  03/08/2019    CT GUIDED   • LUNG REMOVAL, PARTIAL Right     right lower   • PORTACATH PLACEMENT  03/20/2019    DR LONGORIA   • THORACOSCOPY Right 6/26/2019    Procedure: RIGHT VATS, OPEN LOWER LOBECTOMY WITH LYMPH NODE DISECTION,  WEDGE RESECTION OF RIGHT MIDDLE LOBE;  Surgeon: Terrance Fuchs MD;  Location: [facility] MAIN OR;  Service: Cardiothoracic   • THORACOSCOPY VIDEO ASSISTED WITH LOBECTOMY Left 7/6/2022    Procedure: THORACOSCOPY VIDEO ASSISTED WITH LOBECTOMY;  Surgeon: Miryam Reyna MD;  Location: [facility] MAIN OR;  Service: Thoracic;  Laterality: Left;         Current Outpatient Medications:   •  amLODIPine (NORVASC) 10 MG tablet, Take 10 mg by mouth Daily., Disp: , Rfl:   •  amLODIPine (NORVASC) 5 MG tablet, Take 5 mg by mouth Daily., Disp: , Rfl:   •  buPROPion XL (WELLBUTRIN XL) 300 MG 24 hr tablet, Take 300 mg by mouth Every Morning., Disp: , Rfl:   •  buPROPion XL (WELLBUTRIN XL) 300 MG 24 hr tablet, Take 1 tablet by mouth Every Morning., Disp: , Rfl:   •  desvenlafaxine (PRISTIQ) 50 MG 24 hr tablet, Take 50 mg by mouth Daily., Disp: , Rfl:   •  diazePAM (Valium) 2 MG tablet, Take 1 tablet by mouth At Night As Needed for Anxiety or Muscle Spasms., Disp: 30 tablet, Rfl: 0  •  levothyroxine (SYNTHROID, LEVOTHROID) 100 MCG tablet, Take 100 mcg by mouth Every Morning., Disp: , Rfl:   •  levothyroxine (SYNTHROID, LEVOTHROID) 100 MCG tablet, Take 1 tablet by mouth Daily., Disp: , Rfl:   •  lidocaine-prilocaine (EMLA) 2.5-2.5 % cream, Apply 1 application topically to the appropriate area as directed Take As Directed. Apply to port area 30 mins prior to port access, Disp: 30 g, Rfl: 6  •  liothyronine (CYTOMEL) 5 MCG tablet, Take 5 mcg by mouth Daily., Disp: , Rfl:   •  liothyronine (CYTOMEL) 5 MCG tablet, Take 1 tablet by mouth Daily., Disp: , Rfl:   •  LORazepam (ATIVAN) 0.5 MG tablet, Take 0.5 mg by mouth Every 8 (Eight) Hours As Needed., Disp: , Rfl:   •  magnesium oxide (MAG-OX) 400 MG tablet, Take 1 tablet by mouth Daily., Disp: 30 tablet, Rfl: 0  •  Magnesium Oxide 400 (240 Mg) MG tablet, Take 1 tablet by mouth Daily., Disp: , Rfl:   •  metoprolol succinate XL (TOPROL-XL) 100 MG 24 hr tablet, Take 100 mg by mouth Daily., Disp:  , Rfl:   •  OLANZapine (zyPREXA) 5 MG tablet, Take 1 tablet by mouth Every Night. Take on days 2, 3 and 4 after chemotherapy., Disp: 3 tablet, Rfl: 5  •  ondansetron (ZOFRAN) 8 MG tablet, Take 1 tablet by mouth 3 (Three) Times a Day As Needed for Nausea or Vomiting., Disp: 30 tablet, Rfl: 5  •  oxyCODONE (Roxicodone) 5 MG immediate release tablet, Take 1 tablet by mouth Every 6 (Six) Hours As Needed for Moderate Pain ., Disp: 45 tablet, Rfl: 0  •  pantoprazole (PROTONIX) 40 MG EC tablet, Take 40 mg by mouth Daily As Needed., Disp: , Rfl:   •  pantoprazole (PROTONIX) 40 MG EC tablet, Take 1 tablet by mouth Daily., Disp: , Rfl:   •  pravastatin (PRAVACHOL) 10 MG tablet, Take 10 mg by mouth Every Night., Disp: , Rfl:   •  vitamin B-12 (CYANOCOBALAMIN) 1000 MCG tablet, Take 1,000 mcg by mouth Daily., Disp: , Rfl:   •  gabapentin (NEURONTIN) 300 MG capsule, Take 1 capsule by mouth Every 8 (Eight) Hours for 30 days., Disp: 90 capsule, Rfl: 0    No Known Allergies    Family History   Problem Relation Age of Onset   • Cancer Mother         cervical cancer   • Cervical cancer Mother    • Cancer Father         lung cancer   • Lung cancer Father    • Lung cancer Sister    • Cancer Sister         lung cancer   • Malig Hyperthermia Neg Hx        Cancer-related family history includes Cancer in his father, mother, and sister; Cervical cancer in his mother; Lung cancer in his father and sister.    Social History     Tobacco Use   • Smoking status: Former     Types: Cigarettes     Quit date: 2009     Years since quittin.1   • Smokeless tobacco: Never   Vaping Use   • Vaping Use: Never used   Substance Use Topics   • Alcohol use: Yes     Alcohol/week: 2.0 standard drinks     Types: 2 Cans of beer per week     Comment: Occasional   • Drug use: No     I have reviewed and confirmed the accuracy of the patient's history: Chief complaint, HPI, ROS and Subjective as entered by the MA/LPN/RN. Azucena Gaspar MD 10/27/22     "    SUBJECTIVE:    Patient is continuing to improve clinically since receiving his last cycle of chemotherapy      REVIEW OF SYSTEMS:    Review of Systems   Constitutional: Positive for fatigue. Negative for chills and fever.   HENT: Negative for ear pain, mouth sores, nosebleeds and sore throat.    Eyes: Negative for photophobia and visual disturbance.   Respiratory: Negative for wheezing and stridor.    Cardiovascular: Negative for chest pain and palpitations.   Gastrointestinal: Negative for abdominal pain, diarrhea, nausea and vomiting.   Endocrine: Negative for cold intolerance and heat intolerance.   Genitourinary: Negative for dysuria and hematuria.   Musculoskeletal: Negative for joint swelling and neck stiffness.   Skin: Negative for color change and rash.   Neurological: Negative for seizures and syncope.   Hematological: Negative for adenopathy.   Psychiatric/Behavioral: Negative for agitation, confusion and hallucinations.           OBJECTIVE:    Vitals:    10/26/22 0902   BP: 123/80   Pulse: 89   Temp: 96.6 °F (35.9 °C)   TempSrc: Temporal   SpO2: 100%   Weight: 89 kg (196 lb 3.4 oz)   Height: 190.5 cm (75\")   PainSc: 0-No pain     ECOG    Eastern Cooperative Oncology Group (ECOG, [place], World Health Organization) performance scale  0 Fully active; no performance restrictions.  1 Strenuous physical activity restricted; fully ambulatory and able to carry out light work.  2 Capable of all self-care but unable to carry out any work activities. Up and about >50% of waking hours.  3 Capable of only limited self-care; confined to bed or chair >50% of waking hours.  4 Completely disabled; cannot carry out any self-care; totally confined to bed or chair.    Physical Exam   Constitutional: He is oriented to person, place, and time. He appears well-developed. No distress.   HENT:   Head: Normocephalic and atraumatic.   Right Ear: External ear normal.   Left Ear: External ear normal.   Nose: Nose normal.   Eyes: " Pupils are equal, round, and reactive to light. Conjunctivae are normal. Right eye exhibits no discharge. Left eye exhibits no discharge. No scleral icterus.   Neck: No thyromegaly present.   Cardiovascular: Normal rate, regular rhythm and normal heart sounds. Exam reveals no gallop and no friction rub.   Pulmonary/Chest: Effort normal. No stridor. No respiratory distress. He has no wheezes.   Abdominal: Soft. Bowel sounds are normal. He exhibits no mass. There is no abdominal tenderness. There is no rebound and no guarding.   Musculoskeletal: Normal range of motion. No tenderness.   Lymphadenopathy:     He has no cervical adenopathy.   Neurological: He is alert and oriented to person, place, and time. He exhibits normal muscle tone.   Skin: Skin is warm. No rash noted. He is not diaphoretic. No erythema.   Urticarial-like rash, right medial knee likely due to immunotherapy   Psychiatric: His behavior is normal. Judgment and thought content normal.   Nursing note and vitals reviewed.    I have reexamined the patient and the results are consistent with the previously documented exam. Azucena Livia Gaspar MD     RECENT LABS    WBC   Date Value Ref Range Status   10/26/2022 8.07 3.40 - 10.80 10*3/mm3 Final   03/22/2022 7.84 4.5 - 11.0 10*3/uL Final     RBC   Date Value Ref Range Status   10/26/2022 2.85 (L) 4.14 - 5.80 10*6/mm3 Final   03/22/2022 4.23 (L) 4.5 - 5.9 10*6/uL Final     Hemoglobin   Date Value Ref Range Status   10/26/2022 8.9 (L) 13.0 - 17.7 g/dL Final   03/22/2022 12.9 (L) 13.5 - 17.5 g/dL Final     Hematocrit   Date Value Ref Range Status   10/26/2022 27.2 (L) 37.5 - 51.0 % Final   03/22/2022 38.8 (L) 41.0 - 53.0 % Final     MCV   Date Value Ref Range Status   10/26/2022 95.4 79.0 - 97.0 fL Final   03/22/2022 91.7 80.0 - 100.0 fL Final     MCH   Date Value Ref Range Status   10/26/2022 31.2 26.6 - 33.0 pg Final   03/22/2022 30.5 26.0 - 34.0 pg Final     [facility]   Date Value Ref Range Status   10/26/2022  32.7 31.5 - 35.7 g/dL Final   03/22/2022 33.2 31.0 - 37.0 g/dL Final     RDW   Date Value Ref Range Status   10/26/2022 14.9 12.3 - 15.4 % Final   03/22/2022 14.2 12.0 - 16.8 % Final     RDW-SD   Date Value Ref Range Status   10/26/2022 49.4 37.0 - 54.0 fl Final     MPV   Date Value Ref Range Status   10/26/2022 8.1 6.0 - 12.0 fL Final   03/22/2022 9.3 8.4 - 12.4 fL Final     Platelets   Date Value Ref Range Status   10/26/2022 378 140 - 450 10*3/mm3 Final   03/22/2022 324 140 - 440 10*3/uL Final     Neutrophil Rel %   Date Value Ref Range Status   03/22/2022 63.1 45 - 80 % Final     Neutrophil %   Date Value Ref Range Status   10/26/2022 69.5 42.7 - 76.0 % Final     Lymphocyte Rel %   Date Value Ref Range Status   03/22/2022 16.2 15 - 50 % Final     Lymphocyte %   Date Value Ref Range Status   10/26/2022 12.9 (L) 19.6 - 45.3 % Final     Monocyte Rel %   Date Value Ref Range Status   03/22/2022 13.6 0 - 15 % Final     Monocyte %   Date Value Ref Range Status   10/26/2022 14.1 (H) 5.0 - 12.0 % Final     Eosinophil %   Date Value Ref Range Status   10/26/2022 2.4 0.3 - 6.2 % Final   03/22/2022 5.5 0 - 7 % Final     Basophil Rel %   Date Value Ref Range Status   03/22/2022 1.1 0 - 2 % Final     Basophil %   Date Value Ref Range Status   10/26/2022 1.1 0.0 - 1.5 % Final     Immature Grans %   Date Value Ref Range Status   07/07/2022 0.4 0.0 - 0.5 % Final   03/22/2022 0.5 0.0 - 1.0 % Final     Neutrophils Absolute   Date Value Ref Range Status   03/22/2022 4.94 2.0 - 8.8 10*3/uL Final     Neutrophils, Absolute   Date Value Ref Range Status   10/26/2022 5.61 1.70 - 7.00 10*3/mm3 Final     Lymphocytes Absolute   Date Value Ref Range Status   03/22/2022 1.27 0.7 - 5.5 10*3/uL Final     Lymphocytes, Absolute   Date Value Ref Range Status   10/26/2022 1.04 0.70 - 3.10 10*3/mm3 Final     Monocytes Absolute   Date Value Ref Range Status   03/22/2022 1.07 0.0 - 1.7 10*3/uL Final     Monocytes, Absolute   Date Value Ref Range  Status   10/26/2022 1.14 (H) 0.10 - 0.90 10*3/mm3 Final     Eosinophils Absolute   Date Value Ref Range Status   03/22/2022 0.43 0.0 - 0.8 10*3/uL Final     Eosinophils, Absolute   Date Value Ref Range Status   10/26/2022 0.19 0.00 - 0.40 10*3/mm3 Final     Basophils Absolute   Date Value Ref Range Status   03/22/2022 0.09 0.0 - 0.2 10*3/uL Final     Basophils, Absolute   Date Value Ref Range Status   10/26/2022 0.09 0.00 - 0.20 10*3/mm3 Final     Immature Grans, Absolute   Date Value Ref Range Status   07/07/2022 0.05 0.00 - 0.05 10*3/mm3 Final   03/22/2022 0.04 0.00 - 0.10 10*3/uL Final     nRBC   Date Value Ref Range Status   07/07/2022 0.0 0.0 - 0.2 /100 WBC Final       Lab Results   Component Value Date    GLUCOSE 167 (H) 10/26/2022    BUN 12 10/26/2022    CREATININE 0.85 10/26/2022    EGFRIFNONA 100 01/28/2022    EGFRIFAFRI >60 05/20/2019    BCR 14.1 10/26/2022    K 4.2 10/26/2022    CO2 27.0 10/26/2022    CALCIUM 9.1 10/26/2022    ALBUMIN 3.70 10/05/2022    LABIL2 1.5 03/22/2022    AST 15 10/05/2022    ALT 17 10/05/2022         ASSESSMENT:    1. 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  pT1 cpN1 M0 consistent with stage IIb disease, found on surveillance CT scans. Brain MRI was negative. We will recommend platinum doublet followed by Tecentriq unless he has EGFR mutation for which adjuvant osimertinib will be considered.  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  Patient was treated with cisplatin and Alimta in the adjuvant setting for his original malignancy.  He is currently on cisplatin and Taxol.  Patient is receiving cycle #4 today.  This tumor was completely resected and has negative margins and no mediastinal lymph node involvement.  Reviewed the pathology with Dr. Trevizo.  This is  a new second lung primary.  Patient has received a total of 4 cycles of combination of cisplatin and Taxol completed on 10/5/2022.  He is a candidate for maintenance Tecentriq for total of 16 cycles  2. High PD-L1 expression at 95%.  We will complete NGS testing to find other molecular markers..  Reviewed foundation 1 testing results.  This showed MET amplification, ERBB2 amplification for which targeted therapy is available for including crizotinib for MET amplification, afatinib for ERBB2 but this is approved for metastatic disease  3. Chemotherapy-induced fatigue: Improving  4. HypoMagness anemia secondary to chemotherapy: Plan continue replacement therapy monitor levels  5. Chemotherapy induced anemia improving    6. History of adenocarcinoma of the right lung, T1c N2 M0, consistent with clinical stage 3A disease in 2019.  S/P combined chemotherapy and radiation with cisplatin and Alimta neoadjuvantly, followed by her right lower lobectomy with mediastinal and hilar lymph node dissection with residual disease pT1 cpN2 M0.  Followed by maintenance durvalumab for 1 year.   7. Recent diagnosis of hypothyroidism, on thyroid replacement therapy  8. Satus post right thoracentesis with cytology negative for malignancy  9. History of pneumothorax following lung biopsy  10. Postop anemia: Reviewed  11. Assessment has been reviewed and updated      PLANS:    1. Patient has completed all planned 4 cycles of chemo  2. I recommended to proceed with Tecentriq.  Reviewed the benefits, the side effects and the NCCN guidelines with patient in detail.  We discussed immunotherapy side effects including but not limited to:  3. Diarrhea, fatigue, pruritus and rash.  Also included pulmonary toxicity, hepatotoxicity, nephrotoxicity as well as neurotoxicity. There is potential for endocrine and metabolic abnormalities including hyperglycemia, hypertriglyceridemia, hyponatremia, phosphorus abnormalities, hypothyroidism or  hyperthyroidism.  There could also be pancytopenia, risk of infection and peripheral neuropathy.  Discussed lab monitoring that will be needed while on continuous immunotherapy and medicines to help with supportive care.  4. Patient was given additional information on Tecentriq to review  5. Continue weekly BMP and mag levels CBC  6. Continue magnesium supplements  7. Continue to increase his p.o. intake  8. Additional fluids for hyponatremia  9. Plan to offer Tecentriq 1200 mg IV q. 21 days for 16 cycles beginning first week in November 2022.  This has been ordered in [facility].  Patient to let me know as soon as possible how to proceed.  Discussed with patient and spouse  10. Continue antinausea medications  11. Reviewed foundation 1 results.  He will be a candidate for molecular targets in the future if he does develop metastatic disease  12. Continue Emla cream to port  13. Continue thyroid replacement therapy through his PCP  14. Continue B12 injections  monthly: Patient did have elevated methylmalonic acid level during the early phases of his treatments.  Continue the same  15. Continue supportive care  16. All questions answered  17. Follow-up 4 weeks         Patient has  questions which have all been answered to the best of my abilities    I have reviewed labs results, imaging, vitals, and medications with the patient today.     Patient verbalized understanding and is in agreement of the above plan.      I spent 40 total minutes, face-to-face, caring for Jc today.  90% of this time involved counseling and/or coordination of care as documented within this note.

## 2022-10-31 ENCOUNTER — HOSPITAL ENCOUNTER (OUTPATIENT)
Dept: PET IMAGING | Facility: HOSPITAL | Age: 57
Discharge: HOME | End: 2022-10-31
Admitting: NURSE PRACTITIONER

## 2022-10-31 DIAGNOSIS — C34.12 MALIGNANT NEOPLASM OF UPPER LOBE OF LEFT LUNG: ICD-10-CM

## 2022-10-31 DIAGNOSIS — C34.92 NSCLC OF LEFT LUNG: ICD-10-CM

## 2022-10-31 LAB — GLUCOSE BLDC GLUCOMTR-MCNC: 108 MG/DL (ref 70–105)

## 2022-10-31 PROCEDURE — 78815 PET IMAGE W/CT SKULL-THIGH: CPT

## 2022-10-31 PROCEDURE — 82962 GLUCOSE BLOOD TEST: CPT

## 2022-10-31 PROCEDURE — A9552 F18 FDG: HCPCS | Performed by: NURSE PRACTITIONER

## 2022-10-31 PROCEDURE — 0 FLUDEOXYGLUCOSE F18 SOLUTION: Performed by: NURSE PRACTITIONER

## 2022-10-31 RX ADMIN — FLUDEOXYGLUCOSE F18 1 DOSE: 300 INJECTION INTRAVENOUS at 12:05

## 2022-11-02 ENCOUNTER — TELEPHONE (OUTPATIENT)
Dept: ONCOLOGY | Facility: CLINIC | Age: 57
End: 2022-11-02

## 2022-11-02 ENCOUNTER — HOSPITAL ENCOUNTER (OUTPATIENT)
Dept: RADIATION ONCOLOGY | Facility: HOSPITAL | Age: 57
Setting detail: RADIATION/ONCOLOGY SERIES
End: 2022-11-02

## 2022-11-02 ENCOUNTER — OFFICE VISIT (OUTPATIENT)
Dept: SURGERY | Facility: CLINIC | Age: 57
End: 2022-11-02

## 2022-11-02 ENCOUNTER — NURSE NAVIGATOR (OUTPATIENT)
Dept: ONCOLOGY | Facility: CLINIC | Age: 57
End: 2022-11-02

## 2022-11-02 VITALS
BODY MASS INDEX: 24.49 KG/M2 | HEIGHT: 75 IN | TEMPERATURE: 97.3 F | SYSTOLIC BLOOD PRESSURE: 123 MMHG | HEART RATE: 94 BPM | OXYGEN SATURATION: 99 % | WEIGHT: 197 LBS | DIASTOLIC BLOOD PRESSURE: 82 MMHG

## 2022-11-02 DIAGNOSIS — C34.12 MALIGNANT NEOPLASM OF UPPER LOBE OF LEFT LUNG: Primary | ICD-10-CM

## 2022-11-02 PROCEDURE — 99214 OFFICE O/P EST MOD 30 MIN: CPT | Performed by: THORACIC SURGERY (CARDIOTHORACIC VASCULAR SURGERY)

## 2022-11-02 NOTE — PROGRESS NOTES
"Chief Complaint  Lung Cancer (Follow up Pet scan )    Subjective        Jc Driscoll presents to [facility] THORACIC SURGERY  History of Present Illness   Mr. Driscoll is a pleasant 57-year-old gentleman who presents today for follow-up for lung cancer and presents today with a PET scan.    The patient reports he is doing well overall. He complains of pain in his shoulder blade that radiates down his arm to his elbow. He states he started noticing the pain at bedtime because he could not sleep and then he started noticing it when he was driving. He notes he does not have an appointment with Dr. Gaspar until 11/23/2022. He reports other than the pain he is having, he is fine. He states that he feels pretty well and was planning on going back to work next week.    Objective   Vital Signs:  /82 (BP Location: Right arm, Patient Position: Sitting, Cuff Size: Adult)   Pulse 94   Temp 97.3 °F (36.3 °C) (Infrared)   Ht 190.5 cm (75\")   Wt 89.4 kg (197 lb)   SpO2 99%   BMI 24.62 kg/m²   Estimated body mass index is 24.62 kg/m² as calculated from the following:    Height as of this encounter: 190.5 cm (75\").    Weight as of this encounter: 89.4 kg (197 lb).    BMI is within normal parameters. No other follow-up for BMI required.      Physical Exam  Vitals and nursing note reviewed.   Constitutional:       Appearance: He is well-developed.   HENT:      Head: Normocephalic and atraumatic.      Nose: Nose normal.   Eyes:      Conjunctiva/sclera: Conjunctivae normal.   Pulmonary:      Effort: Pulmonary effort is normal.   Musculoskeletal:         General: Normal range of motion.      Cervical back: Normal range of motion and neck supple.   Skin:     General: Skin is warm and dry.   Neurological:      Mental Status: He is alert and oriented to person, place, and time.   Psychiatric:         Behavior: Behavior normal.         Thought Content: Thought content normal.         Judgment: Judgment normal.      "   Result Review :         I have independently reviewed the PET scan performed on 10/31/2022, which demonstrates a hypermetabolic soft tissue mass in the left chest wall consistent with recurrence. There are hypermetabolic supraclavicular lymph nodes consistent with metastatic disease. No other evidence of disease.         Assessment and Plan   Diagnoses and all orders for this visit:    1. Malignant neoplasm of upper lobe of left lung (HCC) (Primary)  -     Ambulatory Referral to Radiation Oncology    Mr. Dricsoll is a pleasant 57-year-old gentleman with a recurrent chest wall mass and supraclavicular lymph nodes. He is status post lobectomy in 07/2022 for a presumed stage II T2 aN1 non-small cell lung cancer. He has finished his adjuvant therapy with cisplatin and was getting ready to start Tecentriq. He will be referred to radiation oncology for discussion of treatment of his chest wall mass given its location as I think this will cause him symptoms. Will discuss his case at tumor board and discuss his case with Dr. Gaspar.      I spent 30 minutes caring for Jc on this date of service. This time includes time spent by me in the following activities:preparing for the visit, reviewing tests, obtaining and/or reviewing a separately obtained history, performing a medically appropriate examination and/or evaluation , counseling and educating the patient/family/caregiver, ordering medications, tests, or procedures, documenting information in the medical record and independently interpreting results and communicating that information with the patient/family/caregiver  Follow Up   No follow-ups on file.  Patient was given instructions and counseling regarding his condition or for health maintenance advice. Please see specific information pulled into the AVS if appropriate.

## 2022-11-02 NOTE — PROGRESS NOTES
I accompanied patient to Dr. Reyna's office visit.     Dr. Reyna reviewed PET scan with patient. They discussed plan to be radiation and some type of chemotherapy/immuntherapy. Dr. Reyna will discuss at tumor boards and has message Dr. Gaspar. Referral placed to Dr. Escalona. Patient having shoulder pain. Appointment for Dr. Escalona scheduled 11/3 at 1120 am

## 2022-11-02 NOTE — PROGRESS NOTES
RADIATION THERAPY CONSULT NOTE    NAME: Jc Driscoll  YOB: 1965  MRN #: [id number]  DATE OF SERVICE: 11/3/2022  REFERRING PROVIDER: Miryam Reyna MD  [address] [place] [address]  PRIMARY CARE PROVIDER: Reshma Alvarenga MD    DIAGNOSIS:  [id number] Stage II NSCLC BACILIO - new primary  Encounter Diagnosis   Name Primary?   • Malignant neoplasm of upper lobe of left lung (HCC) Yes       REASON FOR CONSULTATION/CHIEF COMPLAINT: Lung cancer  I was asked to see the patient at the request of the referring provider noted below for advice and recommendations regarding this diagnosis and the role of radiation therapy.                              REQUESTING PHYSICIAN:    Miryam Reyna Md  [address] [place] [address]        HISTORY OF PRESENT ILLNESS:  Jc Driscoll is a 57 y.o. male who is known to our clinic. He completed concurrent chemoradiation with Cisplatin/Alimta and 4500 cGy in 25 fractions for [id number], Stage 3A NSCLC to his right lung on 5/21/2019. He underwent right lower lobectomy with hilar and mediastinal lymph node dissection by Dr. Fuchs on 6/26/2019. He went on to complete 24 cycles Imfinzi from 8/21/2019 through 8/17/2020.  Interval imaging on 9/24/2020 showed no evidence of local recurrence or metastatic disease within the chest, abdomen or pelvis.    More recently patient completed a PET scan on 1/26/2022 that showed evidence of mild uptake corresponding to a nodule within the left upper lobe that was worrisome for malignancy.  There is also mild hypermetabolic activity corresponding to pleural thickening throughout the right lung base with associated pleural effusion likely related to post therapeutic change and radiation changes in the region, though metastatic malignancy involving the pleura cannot be eliminated. CT biopsy of left lung nodule was attempted at that time but was aborted as the lung mass was instead identified as a clump  of tiny nodules with the largest being 6 mm.    Repeated CT-guided biopsy of the enlarging left lung nodule was completed on 5/18/2022. Pathology revealed invasive poorly differentiated adenocarcinoma most consistent with pulmonary origin.  PET scan on 5/27/2022 showed a 2.3 cm hypermetabolic left upper lobe nodule that had increased in size but no convincing evidence of metastatic disease elsewhere in the chest.  MRI brain on 5/21/2022 was negative for intracranial metastasis.    He underwent left VATS with upper lobectomy and mediastinal lymph node dissection on 7/6/2022 by Dr. Reyna. Pathology revealed invasive poorly differentiated adenocarcinoma, tumor size 2.5 cm, all margins negative for invasive carcinoma with the closest margin 2.5 cm (vascular, parenchymal), 1/9 lymph nodes positive (lobar), carcinoma confined to the pulmonary parenchyma does not extend into the visceral pleural surface, focal lymphovascular space invasion identified. PD-L1 positive at 95%.  NGS molecular testing was also completed.    He completed 4 cycles of Cisplatin/Taxol (Q21D) on 10/5/2022. He was last seen in the office by Dr. Gaspar on 10/26/2022.  Planning to proceed with Tecentriq Q21D and follow-up in 4 weeks.    PET scan completed on 10/31/2022 showed a 4.0 x 3.5 cm hypermetabolic soft tissue mass in the left chest wall interposed between first and second ribs anteriorly consistent with malignant disease recurrence, small cluster hypermetabolic lymph nodes in the left supraclavicular region consistent with early manoj metastasis, no evidence of metastatic disease in the abdomen, pelvis, or skeleton.    He was seen for follow-up by Dr. Reyna on 11/2/2022. PET scan results reviewed. He was referred to our clinic for consideration of radiation therapy to his chest wall mass.    Tecentriq infusions have not been scheduled at this time.    He denies any brachial plexopathy symptoms or CW pain at this time.    The following portions  of the patient's history were reviewed and updated as appropriate: allergies, current medications, past family history, past medical history, past social history, past surgical history and problem list. Reviewed with the patient and remain unchanged.    PAST MEDICAL HISTORY:  he has a past medical history of Allergic rhinitis (07/25/2013), Anxiety and depression (06/05/2012), Chronic pansinusitis (04/08/2019), Diastolic CHF (HCC) (07/09/2014), Dupuytren's contracture of both hands, Elevated cholesterol, Erosive esophagitis (07/09/2019), Gastroesophageal reflux disease (02/21/2019), Hiatal hernia (07/09/2019), History of colon polyps, Hypertension, Lung cancer (HCC), Mediastinal lymphadenopathy (03/12/2019), Normocytic anemia (08/08/2019), Prediabetes (07/09/2014), and Retention of urine (06/27/2019).    MEDICATIONS:    Current Outpatient Medications:   •  amLODIPine (NORVASC) 10 MG tablet, Take 10 mg by mouth Daily., Disp: , Rfl:   •  amLODIPine (NORVASC) 5 MG tablet, Take 5 mg by mouth Daily., Disp: , Rfl:   •  buPROPion XL (WELLBUTRIN XL) 300 MG 24 hr tablet, Take 300 mg by mouth Every Morning., Disp: , Rfl:   •  buPROPion XL (WELLBUTRIN XL) 300 MG 24 hr tablet, Take 1 tablet by mouth Every Morning., Disp: , Rfl:   •  desvenlafaxine (PRISTIQ) 50 MG 24 hr tablet, Take 50 mg by mouth Daily., Disp: , Rfl:   •  diazePAM (Valium) 2 MG tablet, Take 1 tablet by mouth At Night As Needed for Anxiety or Muscle Spasms., Disp: 30 tablet, Rfl: 0  •  gabapentin (NEURONTIN) 300 MG capsule, Take 1 capsule by mouth Every 8 (Eight) Hours for 30 days., Disp: 90 capsule, Rfl: 0  •  levothyroxine (SYNTHROID, LEVOTHROID) 100 MCG tablet, Take 100 mcg by mouth Every Morning., Disp: , Rfl:   •  levothyroxine (SYNTHROID, LEVOTHROID) 100 MCG tablet, Take 1 tablet by mouth Daily., Disp: , Rfl:   •  lidocaine-prilocaine (EMLA) 2.5-2.5 % cream, Apply 1 application topically to the appropriate area as directed Take As Directed. Apply to port  area 30 mins prior to port access, Disp: 30 g, Rfl: 6  •  liothyronine (CYTOMEL) 5 MCG tablet, Take 5 mcg by mouth Daily., Disp: , Rfl:   •  liothyronine (CYTOMEL) 5 MCG tablet, Take 1 tablet by mouth Daily., Disp: , Rfl:   •  LORazepam (ATIVAN) 0.5 MG tablet, Take 0.5 mg by mouth Every 8 (Eight) Hours As Needed., Disp: , Rfl:   •  magnesium oxide (MAG-OX) 400 MG tablet, Take 1 tablet by mouth Daily., Disp: 30 tablet, Rfl: 0  •  Magnesium Oxide 400 (240 Mg) MG tablet, Take 1 tablet by mouth Daily., Disp: , Rfl:   •  metoprolol succinate XL (TOPROL-XL) 100 MG 24 hr tablet, Take 100 mg by mouth Daily., Disp: , Rfl:   •  OLANZapine (zyPREXA) 5 MG tablet, Take 1 tablet by mouth Every Night. Take on days 2, 3 and 4 after chemotherapy., Disp: 3 tablet, Rfl: 5  •  ondansetron (ZOFRAN) 8 MG tablet, Take 1 tablet by mouth 3 (Three) Times a Day As Needed for Nausea or Vomiting., Disp: 30 tablet, Rfl: 5  •  oxyCODONE (Roxicodone) 5 MG immediate release tablet, Take 1 tablet by mouth Every 6 (Six) Hours As Needed for Moderate Pain ., Disp: 45 tablet, Rfl: 0  •  pantoprazole (PROTONIX) 40 MG EC tablet, Take 40 mg by mouth Daily As Needed., Disp: , Rfl:   •  pantoprazole (PROTONIX) 40 MG EC tablet, Take 1 tablet by mouth Daily., Disp: , Rfl:   •  pravastatin (PRAVACHOL) 10 MG tablet, Take 10 mg by mouth Every Night., Disp: , Rfl:   •  vitamin B-12 (CYANOCOBALAMIN) 1000 MCG tablet, Take 1,000 mcg by mouth Daily., Disp: , Rfl:     ALLERGIES:  No Known Allergies    PAST SURGICAL HISTORY:  he has a past surgical history that includes Lung biopsy (03/08/2019); Portacath placement (03/20/2019); Bronchoscopy (03/18/2019); Dupuytren contracture release (Bilateral); Colonoscopy; Thoracoscopy (Right, 6/26/2019); Lung removal, partial (Right); and thoracoscopy video assisted with lobectomy (Left, 7/6/2022).    PREVIOUS RADIOTHERAPY OR CHEMOTHERAPY:  Yes, immunotherapy as well.    FAMILY HISTORY:  hisfamily history includes Cancer in his  father, mother, and sister; Cervical cancer in his mother; Lung cancer in his father and sister.    SOCIAL HISTORY:  he reports that he quit smoking about 13 years ago. His smoking use included cigarettes. He has never used smokeless tobacco. He reports current alcohol use of about 2.0 standard drinks per week. He reports that he does not use drugs.    PAIN AND PAIN MANAGEMENT:  No pain.    NUTRITIONAL STATUS:   no issues    KPS:  80    PHQ-9 Total Score: distress tool completed.    REVIEW OF SYSTEMS: General: No fevers, chills, weight change, or drenching night sweats. Skin: No rashes or jaundice.  HEENT: No change in vision or hearing, no headaches.  Neck: No dysphagia or masses.  Heme/Lymph: No easy bruising or bleeding.  Respiratory System: No shortness of breath or cough.  Cardiovascular: No chest pain, palpitations, or dyspnea on exertion.  - Pacemaker. GI: No nausea, vomiting, diarrhea, melena, or hematochezia.  : No dysuria or hematuria.  Endocrine: No heat or cold intolerance. Musculoskeletal: No myalgias or arthralgias.  Neuro: No weakness, numbness, syncope, or seizures. Psych: No mood changes or depression. Ext: Denies swelling.      Objective   VITAL SIGNS:  Vitals:    11/03/22 1126   BP: 119/86   Pulse: 79   Resp: 18   Temp: 96.9 °F (36.1 °C)   SpO2: 98%         PHYSICAL EXAM:  GENERAL:  No apparent distress. Sitting comfortably in room.    HEENT:  Normocephalic, atraumatic. Pupils are equal, round, reactive to light. Sclera anicteric. Conjunctiva not injected. Oropharynx without erythema, ulcerations or thrush.   NECK:  Supple with no masses.  LYMPHATIC:  No cervical, supraclavicular or axillary adenopathy appreciated bilaterally.   CARDIOVASCULAR:  S1 & S2 detected; no murmurs, rubs or gallops.  CHEST:  Clear to auscultation bilaterally; no wheezes, crackles or rubs. Work of breathing normal.  ABDOMEN:  Bowel sounds present. Abdomen is soft, nontender, nondistended.   MUSCULOSKELETAL:  No  tenderness to palpation along the spine or scapulae. Normal range of motion.  EXTREMITIES:  No clubbing, cyanosis, edema.  SKIN:  No erythema, rashes, ulcerations noted.   NEUROLOGIC:  Cranial nerves II-XII grossly intact bilaterally. No focal neurologic deficits.  PSYCHIATRIC:  Alert, aware, and appropriate.      PERTINENT IMAGING/PATHOLOGY/LABS (Medical Decision Making):      COORDINATION OF CARE:  A copy of this note is sent to the referring provider.    PATHOLOGY (Reviewed): as noted above    IMAGING (Reviewed): as noted above    LABS (Reviewed):  HEMATOLOGY:  WBC   Date Value Ref Range Status   10/26/2022 8.07 3.40 - 10.80 10*3/mm3 Final   03/22/2022 7.84 4.5 - 11.0 10*3/uL Final     RBC   Date Value Ref Range Status   10/26/2022 2.85 (L) 4.14 - 5.80 10*6/mm3 Final   03/22/2022 4.23 (L) 4.5 - 5.9 10*6/uL Final     Hemoglobin   Date Value Ref Range Status   10/26/2022 8.9 (L) 13.0 - 17.7 g/dL Final   03/22/2022 12.9 (L) 13.5 - 17.5 g/dL Final     Hematocrit   Date Value Ref Range Status   10/26/2022 27.2 (L) 37.5 - 51.0 % Final   03/22/2022 38.8 (L) 41.0 - 53.0 % Final     Platelets   Date Value Ref Range Status   10/26/2022 378 140 - 450 10*3/mm3 Final   03/22/2022 324 140 - 440 10*3/uL Final     CHEMISTRY:  Lab Results   Component Value Date    GLUCOSE 167 (H) 10/26/2022    BUN 12 10/26/2022    CREATININE 0.85 10/26/2022    EGFRIFNONA 100 01/28/2022    EGFRIFAFRI >60 05/20/2019    BCR 14.1 10/26/2022    K 4.2 10/26/2022    CO2 27.0 10/26/2022    CALCIUM 9.1 10/26/2022    ALBUMIN 3.70 10/05/2022    LABIL2 1.5 03/22/2022    AST 15 10/05/2022    ALT 17 10/05/2022       Assessment & Plan   ASSESSMENT AND PLAN:    1. Malignant neoplasm of upper lobe of left lung (HCC)       Mr. Driscoll returns to our clinic with recently diagnosed Left Lung cancer s/p lobectomy 7/22 for T2N1 disease and then adjuvant chemotherapy.  -He does have prior XRT history and is known to our team very well.    Unfortunately, his recent  imaging showed a recurrence in the left upper lobe with a recurrent Chest wall mass and adjacent PET positive SCV nodes.    -I reviewed his imaging, path, discussed with surgery and med/onc.    I feel that he can be treated with curative intent XRT without concern of dose-limiting toxicity in regards to his prior XRT fields.  CT sim is ordered; will require 4D CT    Will coordinate care with Dr. Gaspar.  Case will be discussed at tumor board.      This assessment comes from my review of the imaging, pathology, physician notes and other pertinent information as mentioned.    DISPOSITION:  CT sim is next step.      TIME SPENT WITH PATIENT:  I spent 45  minutes caring for Jc on this date of service. This time includes time spent by me in the following activities: preparing for the visit, reviewing tests, obtaining and/or reviewing a separately obtained history, performing a medically appropriate examination and/or evaluation, counseling and educating the patient/family/caregiver, referring and communicating with other health care professionals, documenting information in the medical record, independently interpreting results and communicating that information with the patient/family/caregiver and care coordination           CC: Miryam Reyna MD Isaacs, Patricia Lee, MD    Approved by:     Chino Escalona MD

## 2022-11-02 NOTE — PROGRESS NOTES
Hematology/Oncology Outpatient Follow Up    PATIENT NAME:Jc Driscoll  :1965  MRN: [id number]  PRIMARY CARE PHYSICIAN: Reshma Alvarenga MD  REFERRING PHYSICIAN: Reshma Alvarenga MD    Chief Complaint   Patient presents with   • Follow-up     Non-small cell carcinoma of lung, right (HCC)        HISTORY OF PRESENT ILLNESS:                                                                                                                                                                                                                                                             This is a 57 y.o. who developed left shoulder pain and for that reason he had a chest x-ray done on 19 which showed a 2.7 cm noncalcified right lower lobe nodule was identified.  For that reason a CT scan of the chest was recommended.  Review of his records shows patient had the CT scan on 19 done without contrast.  This basically showed a lobulated noncalcified mass in the posterior aspect of the right lower lobe measuring 3 cm close to the pleural surface.  There is a calcified 1.2 mm nodule in the lateral aspect of the right lower lobe consistent with a granuloma.  There were also calcified subcarinal and right hilar nodules.  There is a lower right side tracheal nodule measuring 1 cm.  Patient had a PET/CT scan done on 19 which showed increased metabolic activity on the right lower lobe mass that measures 2.5 cm with SUV of 4.4.  There was also increased metabolic activity in the subcarinal space measuring 2.8 cm in size with  SUV of 3.4, increased activity in an enlarged right paratracheal lymph node that measures 1.9 cm, SUV of 5, also suspicious for metastatic adenopathy.  Patient had a CT-guided biopsy of the right lower lobe mass on 3/8/19.  This showed adenocarcinoma, TTF-1 positive.  On 3/18/19 patient underwent endoscopic ultrasound and biopsy of a subcarinal lymph node.  This was positive for malignant cells.  Patient was then taken back to surgery on 3/20/18 and had left Mediport placement by Dr. Fuchs.  He has been referred for further evaluation and management of his stage 3 adenocarcinoma of the right lung.  He was accompanied by his spouse for the appointment on 3/26/19.  Patient was scheduled to have a brain MRI for complete staging, but he could not do this due to claustrophobia.  He is willing to try with the help of angiolytics.    • Patient had brain MRI done on 4/5/19.  He states it did not show any evidence of metastatic disease.  Evidence of chronic sinusitis was noted.    • Patient was seen by Dr. Escalona who has recommended concurrent chemotherapy and radiation.  Patient is scheduled to begin on 4/15/19.   • 4/17/19 - Patient was initiated on combined chemotherapy and radiation with Cisplatin and Alimta.  Patient received cycle 1.      • 5/8/19 - Patient received cycle 2 of chemotherapy with Cisplatin and Alimta.   • 5/15/19 - Chemistry panel showed creatinine of 1.7.  WBC 1.8, hemoglobin 11.6, platelet count 72,000.   • 5/20/19 - BUN 10, creatinine 1.2, potassium 3.5.    • 5/23/19 - CT scan of the chest with contrast showed interval decrease in size of the right lower lobe pulmonary nodule now measuring 2.1 cm in size.  There was no mediastinal or hilar adenopathy identified.  • 6/26/2019 patient underwent right lower lobectomy with hilar and mediastinal lymph node dissection performed by Dr. Terrance Mckeon.  • Pathology revealed residual residual 2.2 cm invasive moderately differentiated adenocarcinoma.  There  were a total of 16 lymph nodes evaluated that 7 had metastatic disease.  There was evidence of lymphovascular invasion, extranodal involvement.  All the surgical margins were negative and distance of the closest margin was 2.5 cm.  Logic stage is T1c N2 M0.  • Patient is here today accompanied by his spouse for this appointment.  He continues to complain of some postop discomfort, numbness but his skin is intact.  There is no drainage.  Has had follow-up with Dr. Fuchs.  • 8/14/2019-seen by Dr. Maria at the [facility].  Dr. Maria has recommended immunotherapy with durvalumab off label use as patient has not had significant response to neoadjuvant chemotherapy and radiation.  Patient was given the option to have immunotherapy at the [facility] vs [place] and he has chosen to stay in [place]  • 8/21/19 - Started cycle 1 day 1 Imfinzi  • 9/4/2019 patient had CT scan of the chest this shows a moderate size right pleural effusion.  There are areas of groundglass opacification in the right upper lobe without any evidence of significant septal thickening.  Volume loss noted there is also moderate pleural effusion.  There is no suspicious recurrent malignancy.  There were nonenlarged residual mediastinal lymph nodes.  • 9/9/19 - WBC 6.23, Hgb 11.7 Platelets 257,000, , BUN 9, Cr 1.1,Vitamin B12 318, Ferritin 437  • 9/23/19 - received cycle 2 day 1 Imfinzi  • 10/9/19- Seen by Dr rodríguez with ENT for sinus disease  • 11/4/2019-patient received cycle 5 of Imfinzi(durvalumab)  • 12/2/2019 patient had cycle 7 of durvalumab  • 12/5/2019-patient had CT scan of the  abdomen and pelvis with small right pleural sided pleural effusion.There is no evidence of metastatic disease  • 12/30/2019-patient received cycle 9 of durvalumab  • 12/31/2019-patient had CT scan of the chest showed small to moderate left pleural effusion.  Iglesias appearing right lower paratracheal and right peribronchial soft tissue thickening without any  discrete pathologically enlarged mediastinal or hilar lymph nodes.  • 1/27/20-patient received cycle 11 of durvalumab  • 2/17/20-patient had a stress test which was negative for ischemia  • 2/17/2020-patient had CT of the chest which showed postop changes on the right lung, right pleural effusion but no enlarging mass was noted  • 3/2/2020-patient received cycle 12 of durvalumab  • 3/16/2020-patient had ultrasound-guided right thoracentesis.  Pathology was negative for malignancy  • 4/13/2020-patient received cycle 15 of immunotherapy with durvalumab  • 5/11/2020-patient received cycle 17 of immunotherapy with durvalumab  • 6/9/2020-patient had CT scan of the chest, abdomen and pelvis for lung cancer restaging.  There is stable small chronic loculated right basilar pleural effusion suggesting chronic pleuritis.  There is no evidence of metastatic disease in the abdomen or pelvis  • 7/20/2020 patient received cycle 22 of Durvalumab  • 8/17/2020 patient received cycle 24 and last cycle of durvalumab  • 9/24/2020 patient had CT scan of the chest, abdomen and pelvis for cancer restaging there was no evidence of local recurrence or metastatic disease within the abdomen and pelvis.  He has stable chronic small right pleural effusion.  Mild sigmoid diverticulosis was noted.  • 1/18/2021 patient had CT scan of the chest, abdomen and pelvis reviewed patient  • 5/24/2021 patient had CT scan of the chest calcified subcarinal lymph node consistent with changes of prior granulomatous disease.  Chronic small right pleural effusion is noted similar to prior exam.  Previously shown 4 mm nodule in the left lower lobe has nearly completely resolved.  The subpleural 3 mm nodule located within the posterior left lower lobe With resolved.  • 9/24/2021 patient had a CT scan of the chest, abdomen and pelvis there is soft tissue attenuation within the right paratracheal area which has been suggested to reflect sequela to previous  treatment.  There is slight thickening of the bladder wall otherwise there is no evidence of metastatic disease.  • 12/27/2021 patient had CT scan of the chest with contrast this basically showed irregular shaped noncalcified 7 mm left upper lobe pulmonary nodule.  PET CT scan was recommended to further evaluate.  • 1/26/2022 patient had a PET CT scan done which basically showed evidence of mild uptake corresponding to the nodule within the left upper lobe which is new worrisome for developing metastatic focus.  There was mild radiopharmaceutical activity corresponding to pleural thickening throughout the right lung base with associated pleural effusion likely related to post therapeutic changes and radiation changes in this region.  Metastatic malignancy involving the pleura could not be completely eliminated.  • 1/26/2022: CT-guided biopsy was aborted due to finding by the radiologist that the lung nodularity was actually a clump of tiny nodules of which the largest was 6 mm and therefore biopsy could not be completed.  Also the area was in the vicinity of multiple blood vessels with increased risk of bleeding.  Follow-up CT of the chest was recommended for continued surveillance  • 5/18/2022 patient had CT-guided biopsy of the left enlarging nodule, pathology was positive for invasive poorly differentiated adenocarcinoma most consistent with adenocarcinoma of pulmonary origin.  • 5/27/2022 patient had a PET CT scan which showed a 2.3 cm hypermetabolic left upper lobe nodule which has increased in size no convincing evidence of metastatic disease elsewhere within the chest.  • 5/21/2022 patient had brain MRI which did not show any evidence of intracranial metastasis  • 6/2/2022 patient was seen by Dr. Miryam Reyna pulmonary function test is being done to assess surgical candidacy  • 7/6/2022 patient underwent left heart Koska P video-assisted with upper lobectomy mediastinal lymph node dissection performed by   Miryam Cappsan  • Final pathology revealed poorly differentiated  • A predominant adenocarcinoma with solid component presenting 45% and micropapillary component 5% with extensive necrosis, invasive carcinoma measures 2.5 maximally there was focal lymphovascular space invasion all margins were negative for malignancy discussed with the closest margin was 2.5 cm pathology stage is pT1C pN1.  PD-L1 showed a tumor proportion score of 95%  • 8/3/2022 patient started adjuvant chemotherapy with cisplatin, Taxol  • 8/24/2022 patient received cycle 2 of combination chemotherapy with cisplatin and Taxol with Neulasta  • 10/5/2022: Patient received cycle 4-day 1 of combination chemotherapy with cisplatin and Taxol  • Patient developed left-sided chest pain for that reason he had a PET/CT scan 1/20/2020 is basically revealed a 4 x 3.5 cm hypermetabolic soft tissue mass in the left chest wall between the first and second ribs anteriorly consistent with relapsed disease.  There was a small cluster of hypermetabolic lymph nodes seen in the left supraclavicular region consistent with early manoj metastasis    Past Medical History:   Diagnosis Date   • Allergic rhinitis 07/25/2013    Overview:  4/2/2018 - still zyrtec 10 daily (if stops, gets dermatitis again).    • Anxiety and depression 06/05/2012    Overview:  4/2/2018 -   C/w well 300 xr and Lito 20.  4/8/2019 -   Doing ok even with new lung cancer - lito 20 & well 300xr.    • Chronic pansinusitis 04/08/2019    Overview:  4/8/2019 -   MRI showed chronic thick in frontal, maxillary, ethmoidal ---> to Dr. COLLAZO to est since abx ICC didn't help.  Does netipot bid.    • Diastolic CHF (HCC) 07/09/2014    Overview:  7/4/14 - impaired LV relaxation on Zhou ECHO.  EF 60%. Rest normal   • Dupuytren's contracture of both hands    • Elevated cholesterol    • Erosive esophagitis 07/09/2019    Overview:  EGD 2016 4/2/2018 - nexium OTC daily for few week.  Qod before that.  Now carbonation hurts,  epigastric pain 4/8/2019 -   protonix 40 doing well.    • Gastroesophageal reflux disease 02/21/2019   • Hiatal hernia 07/09/2019   • History of colon polyps    • Hypertension    • Lung cancer (HCC)     right lung and in lymph nodes x2   • Mediastinal lymphadenopathy 03/12/2019   • Normocytic anemia 08/08/2019    Overview:  6/2019 - dropped to 9's postop 7/2019 - rebounded to 10's.  8/8/2019 -   Back to 9's - check ferritin, b12 and thyroid.    • Prediabetes 07/09/2014    Overview:  7/4/14 - a1c 6.1 at [place] admission   • Retention of urine 06/27/2019    PSOT OP, RESOLVED       Past Surgical History:   Procedure Laterality Date   • BRONCHOSCOPY  03/18/2019    EBUS MONTERO NEEDLE BX   • COLONOSCOPY     • DUPUYTREN CONTRACTURE RELEASE Bilateral    • LUNG BIOPSY  03/08/2019    CT GUIDED   • LUNG REMOVAL, PARTIAL Right     right lower   • PORTACATH PLACEMENT  03/20/2019    DR LONGORIA   • THORACOSCOPY Right 6/26/2019    Procedure: RIGHT VATS, OPEN LOWER LOBECTOMY WITH LYMPH NODE DISECTION, WEDGE RESECTION OF RIGHT MIDDLE LOBE;  Surgeon: Terrance Longoria MD;  Location: [facility] OR;  Service: Cardiothoracic   • THORACOSCOPY VIDEO ASSISTED WITH LOBECTOMY Left 7/6/2022    Procedure: THORACOSCOPY VIDEO ASSISTED WITH LOBECTOMY;  Surgeon: Miryam Reyna MD;  Location: [facility] OR;  Service: Thoracic;  Laterality: Left;         Current Outpatient Medications:   •  amLODIPine (NORVASC) 10 MG tablet, Take 10 mg by mouth Daily., Disp: , Rfl:   •  amLODIPine (NORVASC) 5 MG tablet, Take 5 mg by mouth Daily., Disp: , Rfl:   •  buPROPion XL (WELLBUTRIN XL) 300 MG 24 hr tablet, Take 300 mg by mouth Every Morning., Disp: , Rfl:   •  buPROPion XL (WELLBUTRIN XL) 300 MG 24 hr tablet, Take 1 tablet by mouth Every Morning., Disp: , Rfl:   •  desvenlafaxine (PRISTIQ) 50 MG 24 hr tablet, Take 50 mg by mouth Daily., Disp: , Rfl:   •  diazePAM (Valium) 2 MG tablet, Take 1 tablet by mouth At Night As Needed for Anxiety or Muscle Spasms.,  Disp: 30 tablet, Rfl: 0  •  levothyroxine (SYNTHROID, LEVOTHROID) 100 MCG tablet, Take 100 mcg by mouth Every Morning., Disp: , Rfl:   •  levothyroxine (SYNTHROID, LEVOTHROID) 100 MCG tablet, Take 1 tablet by mouth Daily., Disp: , Rfl:   •  lidocaine-prilocaine (EMLA) 2.5-2.5 % cream, Apply 1 application topically to the appropriate area as directed Take As Directed. Apply to port area 30 mins prior to port access, Disp: 30 g, Rfl: 6  •  liothyronine (CYTOMEL) 5 MCG tablet, Take 5 mcg by mouth Daily., Disp: , Rfl:   •  liothyronine (CYTOMEL) 5 MCG tablet, Take 1 tablet by mouth Daily., Disp: , Rfl:   •  LORazepam (ATIVAN) 0.5 MG tablet, Take 0.5 mg by mouth Every 8 (Eight) Hours As Needed., Disp: , Rfl:   •  magnesium oxide (MAG-OX) 400 MG tablet, Take 1 tablet by mouth Daily., Disp: 30 tablet, Rfl: 0  •  Magnesium Oxide 400 (240 Mg) MG tablet, Take 1 tablet by mouth Daily., Disp: , Rfl:   •  metoprolol succinate XL (TOPROL-XL) 100 MG 24 hr tablet, Take 100 mg by mouth Daily., Disp: , Rfl:   •  OLANZapine (zyPREXA) 5 MG tablet, Take 1 tablet by mouth Every Night. Take on days 2, 3 and 4 after chemotherapy., Disp: 3 tablet, Rfl: 5  •  ondansetron (ZOFRAN) 8 MG tablet, Take 1 tablet by mouth 3 (Three) Times a Day As Needed for Nausea or Vomiting., Disp: 30 tablet, Rfl: 5  •  oxyCODONE (Roxicodone) 5 MG immediate release tablet, Take 1 tablet by mouth Every 6 (Six) Hours As Needed for Moderate Pain ., Disp: 45 tablet, Rfl: 0  •  pantoprazole (PROTONIX) 40 MG EC tablet, Take 40 mg by mouth Daily As Needed., Disp: , Rfl:   •  pantoprazole (PROTONIX) 40 MG EC tablet, Take 1 tablet by mouth Daily., Disp: , Rfl:   •  pravastatin (PRAVACHOL) 10 MG tablet, Take 10 mg by mouth Every Night., Disp: , Rfl:   •  vitamin B-12 (CYANOCOBALAMIN) 1000 MCG tablet, Take 1,000 mcg by mouth Daily., Disp: , Rfl:   •  gabapentin (NEURONTIN) 300 MG capsule, Take 1 capsule by mouth Every 8 (Eight) Hours for 30 days., Disp: 90 capsule, Rfl:  0    No Known Allergies    Family History   Problem Relation Age of Onset   • Cancer Mother         cervical cancer   • Cervical cancer Mother    • Cancer Father         lung cancer   • Lung cancer Father    • Lung cancer Sister    • Cancer Sister         lung cancer   • Malig Hyperthermia Neg Hx        Cancer-related family history includes Cancer in his father, mother, and sister; Cervical cancer in his mother; Lung cancer in his father and sister.    Social History     Tobacco Use   • Smoking status: Former     Types: Cigarettes     Quit date: 2009     Years since quittin.1   • Smokeless tobacco: Never   Vaping Use   • Vaping Use: Never used   Substance Use Topics   • Alcohol use: Yes     Alcohol/week: 2.0 standard drinks     Types: 2 Cans of beer per week     Comment: Occasional   • Drug use: No        I have reviewed and confirmed the accuracy of the patient's history: Chief complaint, HPI, ROS and Subjective as entered by the MA/LPN/RN. Azucena Gaspar MD 22     SUBJECTIVE:    Patient is here today accompanied by his spouse for this appointment      REVIEW OF SYSTEMS:    Review of Systems   Constitutional: Positive for fatigue. Negative for chills and fever.   HENT: Negative for ear pain, mouth sores, nosebleeds and sore throat.    Eyes: Negative for photophobia and visual disturbance.   Respiratory: Negative for wheezing and stridor.    Cardiovascular: Negative for chest pain and palpitations.   Gastrointestinal: Negative for abdominal pain, diarrhea, nausea and vomiting.   Endocrine: Negative for cold intolerance and heat intolerance.   Genitourinary: Negative for dysuria and hematuria.   Musculoskeletal: Negative for joint swelling and neck stiffness.   Skin: Negative for color change and rash.   Neurological: Negative for seizures and syncope.   Hematological: Negative for adenopathy.   Psychiatric/Behavioral: Negative for agitation, confusion and hallucinations.  "          OBJECTIVE:    Vitals:    11/03/22 0918   BP: 119/86   Pulse: 79   Temp: 96.9 °F (36.1 °C)   TempSrc: Temporal   SpO2: 98%   Weight: 89.4 kg (197 lb)   Height: 190.5 cm (75\")   PainSc: 0-No pain     ECOG    Eastern Cooperative Oncology Group (ECOG, [place], World Health Organization) performance scale  0 Fully active; no performance restrictions.  1 Strenuous physical activity restricted; fully ambulatory and able to carry out light work.  2 Capable of all self-care but unable to carry out any work activities. Up and about >50% of waking hours.  3 Capable of only limited self-care; confined to bed or chair >50% of waking hours.  4 Completely disabled; cannot carry out any self-care; totally confined to bed or chair.    Physical Exam   Constitutional: He is oriented to person, place, and time. He appears well-developed. No distress.   HENT:   Head: Normocephalic and atraumatic.   Right Ear: External ear normal.   Left Ear: External ear normal.   Nose: Nose normal.   Eyes: Pupils are equal, round, and reactive to light. Conjunctivae are normal. Right eye exhibits no discharge. Left eye exhibits no discharge. No scleral icterus.   Neck: No thyromegaly present.   Cardiovascular: Normal rate, regular rhythm and normal heart sounds. Exam reveals no gallop and no friction rub.   Pulmonary/Chest: Effort normal. No stridor. No respiratory distress. He has no wheezes.   Abdominal: Soft. Bowel sounds are normal. He exhibits no mass. There is no abdominal tenderness. There is no rebound and no guarding.   Musculoskeletal: Normal range of motion. No tenderness.   Lymphadenopathy:     He has no cervical adenopathy.   Neurological: He is alert and oriented to person, place, and time. He exhibits normal muscle tone.   Skin: Skin is warm. No rash noted. He is not diaphoretic. No erythema.   Urticarial-like rash, right medial knee likely due to immunotherapy   Psychiatric: His behavior is normal. Judgment and thought content " normal.   Nursing note and vitals reviewed.      I have reexamined the patient and the results are consistent with the previously documented exam. Azucena Gaspar MD       RECENT LABS    WBC   Date Value Ref Range Status   11/03/2022 7.97 3.40 - 10.80 10*3/mm3 Final   03/22/2022 7.84 4.5 - 11.0 10*3/uL Final     RBC   Date Value Ref Range Status   11/03/2022 3.14 (L) 4.14 - 5.80 10*6/mm3 Final   03/22/2022 4.23 (L) 4.5 - 5.9 10*6/uL Final     Hemoglobin   Date Value Ref Range Status   11/03/2022 9.6 (L) 13.0 - 17.7 g/dL Final   03/22/2022 12.9 (L) 13.5 - 17.5 g/dL Final     Hematocrit   Date Value Ref Range Status   11/03/2022 29.3 (L) 37.5 - 51.0 % Final   03/22/2022 38.8 (L) 41.0 - 53.0 % Final     MCV   Date Value Ref Range Status   11/03/2022 93.3 79.0 - 97.0 fL Final   03/22/2022 91.7 80.0 - 100.0 fL Final     MCH   Date Value Ref Range Status   11/03/2022 30.6 26.6 - 33.0 pg Final   03/22/2022 30.5 26.0 - 34.0 pg Final     MCHC   Date Value Ref Range Status   11/03/2022 32.8 31.5 - 35.7 g/dL Final   03/22/2022 33.2 31.0 - 37.0 g/dL Final     RDW   Date Value Ref Range Status   11/03/2022 14.2 12.3 - 15.4 % Final   03/22/2022 14.2 12.0 - 16.8 % Final     RDW-SD   Date Value Ref Range Status   11/03/2022 46.5 37.0 - 54.0 fl Final     MPV   Date Value Ref Range Status   11/03/2022 8.0 6.0 - 12.0 fL Final   03/22/2022 9.3 8.4 - 12.4 fL Final     Platelets   Date Value Ref Range Status   11/03/2022 380 140 - 450 10*3/mm3 Final   03/22/2022 324 140 - 440 10*3/uL Final     Neutrophil Rel %   Date Value Ref Range Status   03/22/2022 63.1 45 - 80 % Final     Neutrophil %   Date Value Ref Range Status   11/03/2022 66.6 42.7 - 76.0 % Final     Lymphocyte Rel %   Date Value Ref Range Status   03/22/2022 16.2 15 - 50 % Final     Lymphocyte %   Date Value Ref Range Status   11/03/2022 13.3 (L) 19.6 - 45.3 % Final     Monocyte Rel %   Date Value Ref Range Status   03/22/2022 13.6 0 - 15 % Final     Monocyte %   Date  Value Ref Range Status   11/03/2022 16.3 (H) 5.0 - 12.0 % Final     Eosinophil %   Date Value Ref Range Status   11/03/2022 3.0 0.3 - 6.2 % Final   03/22/2022 5.5 0 - 7 % Final     Basophil Rel %   Date Value Ref Range Status   03/22/2022 1.1 0 - 2 % Final     Basophil %   Date Value Ref Range Status   11/03/2022 0.8 0.0 - 1.5 % Final     Immature Grans %   Date Value Ref Range Status   07/07/2022 0.4 0.0 - 0.5 % Final   03/22/2022 0.5 0.0 - 1.0 % Final     Neutrophils Absolute   Date Value Ref Range Status   03/22/2022 4.94 2.0 - 8.8 10*3/uL Final     Neutrophils, Absolute   Date Value Ref Range Status   11/03/2022 5.31 1.70 - 7.00 10*3/mm3 Final     Lymphocytes Absolute   Date Value Ref Range Status   03/22/2022 1.27 0.7 - 5.5 10*3/uL Final     Lymphocytes, Absolute   Date Value Ref Range Status   11/03/2022 1.06 0.70 - 3.10 10*3/mm3 Final     Monocytes Absolute   Date Value Ref Range Status   03/22/2022 1.07 0.0 - 1.7 10*3/uL Final     Monocytes, Absolute   Date Value Ref Range Status   11/03/2022 1.30 (H) 0.10 - 0.90 10*3/mm3 Final     Eosinophils Absolute   Date Value Ref Range Status   03/22/2022 0.43 0.0 - 0.8 10*3/uL Final     Eosinophils, Absolute   Date Value Ref Range Status   11/03/2022 0.24 0.00 - 0.40 10*3/mm3 Final     Basophils Absolute   Date Value Ref Range Status   03/22/2022 0.09 0.0 - 0.2 10*3/uL Final     Basophils, Absolute   Date Value Ref Range Status   11/03/2022 0.06 0.00 - 0.20 10*3/mm3 Final     Immature Grans, Absolute   Date Value Ref Range Status   07/07/2022 0.05 0.00 - 0.05 10*3/mm3 Final   03/22/2022 0.04 0.00 - 0.10 10*3/uL Final     nRBC   Date Value Ref Range Status   07/07/2022 0.0 0.0 - 0.2 /100 WBC Final       Lab Results   Component Value Date    GLUCOSE 109 (H) 11/03/2022    BUN 11 11/03/2022    CREATININE 0.88 11/03/2022    EGFRIFNONA 100 01/28/2022    EGFRIFAFRI >60 05/20/2019    BCR 12.5 11/03/2022    K 4.8 11/03/2022    CO2 27.0 11/03/2022    CALCIUM 9.6 11/03/2022     ALBUMIN 3.70 10/05/2022    LABIL2 1.5 03/22/2022    AST 15 10/05/2022    ALT 17 10/05/2022         ASSESSMENT:    1. 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  He has been referred to Dr. Escalona to see whether he is a candidate for radiation treatments.  Would also consider systemic treatment for him for molecular targets as he has had very poor response to chemotherapy malignancy  2. 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  pT1 cpN1 M0 consistent with stage IIb disease, found on surveillance CT scans. Brain MRI was negative. We will recommend platinum doublet followed by Tecentriq unless he has EGFR mutation for which adjuvant osimertinib will be considered.  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  Patient was treated with cisplatin and Alimta in the adjuvant setting for his original malignancy.  He is currently on cisplatin and Taxol.  Patient is receiving cycle #4 today.  This tumor was completely resected and has negative margins and no mediastinal lymph node involvement.  Reviewed the pathology with Dr. Trevizo.  This is a new second lung primary.  Patient has received a total of 4 cycles of combination of cisplatin and Taxol completed on 10/5/2022.  Patient will was a candidate for maintenance treatment with Tecentriq for now given that he has developed relapsed disease we will look at other options  3. High PD-L1 expression at 95%. Reviewed foundation 1 testing results.  This showed MET amplification, ERBB2 amplification for which targeted therapy is available for including crizotinib for MET amplification, afatinib for ERBB2 but this is approved for metastatic  disease.  We will consider molecular targeted therapy for ERBB2 amplification.  4. Chemotherapy-induced fatigue: Improving  5. HypoMagness anemia secondary to chemotherapy: Plan continue replacement therapy monitor levels  6. Chemotherapy induced anemia improving    7. History of adenocarcinoma of the right lung, T1c N2 M0, consistent with clinical stage 3A disease in 2019.  S/P combined chemotherapy and radiation with cisplatin and Alimta neoadjuvantly, followed by her right lower lobectomy with mediastinal and hilar lymph node dissection with residual disease pT1 cpN2 M0.  Followed by maintenance durvalumab for 1 year.   8. Recent diagnosis of hypothyroidism, on thyroid replacement therapy  9. Satus post right thoracentesis with cytology negative for malignancy  10. History of pneumothorax following lung biopsy  11. Postop anemia: Reviewed  12. Assessment has been reviewed and updated      PLANS:    1. 2D echocardiogram in preparation for HER2 directed treatment  2. Patient has completed all planned 4 cycles of chemo: Fortunately has developed relapsed disease  3. Immunotherapy with Tecentriq was to be an option for him in the future  4. Reviewed the above with patient and spouse who is present today  5. His case will be reviewed at the next tumor board scheduled on November 15, 2022 and I will see him shortly after that  6. Continue weekly BMP and mag levels CBC  7. Continue magnesium supplements  8. Continue to increase his p.o. intake  9. Additional fluids for hyponatremia  10. Continue antinausea medications  11. Reviewed foundation 1 results.  He will be a candidate for molecular targets in the future if he does develop metastatic disease  12. Continue Emla cream to port  13. Continue thyroid replacement therapy through his PCP  14. Continue B12 injections  monthly: Patient did have elevated methylmalonic acid level during the early phases of his treatments.  Continue the same  15. Continue supportive  care  16. All questions answered  17. Follow-up 4 weeks         Patient has  questions which have all been answered to the best of my abilities    I have reviewed labs results, imaging, vitals, and medications with the patient today.     Patient verbalized understanding and is in agreement of the above plan.      I spent 40 total minutes, face-to-face, caring for Jc today.  90% of this time involved counseling and/or coordination of care as documented within this note.

## 2022-11-03 ENCOUNTER — CONSULT (OUTPATIENT)
Dept: RADIATION ONCOLOGY | Facility: HOSPITAL | Age: 57
End: 2022-11-03

## 2022-11-03 ENCOUNTER — TELEPHONE (OUTPATIENT)
Dept: ONCOLOGY | Facility: CLINIC | Age: 57
End: 2022-11-03

## 2022-11-03 ENCOUNTER — LAB (OUTPATIENT)
Dept: LAB | Facility: HOSPITAL | Age: 57
End: 2022-11-03

## 2022-11-03 ENCOUNTER — OFFICE VISIT (OUTPATIENT)
Dept: ONCOLOGY | Facility: CLINIC | Age: 57
End: 2022-11-03

## 2022-11-03 ENCOUNTER — NURSE NAVIGATOR (OUTPATIENT)
Dept: ONCOLOGY | Facility: CLINIC | Age: 57
End: 2022-11-03

## 2022-11-03 VITALS
HEIGHT: 75 IN | DIASTOLIC BLOOD PRESSURE: 86 MMHG | BODY MASS INDEX: 24.49 KG/M2 | OXYGEN SATURATION: 98 % | WEIGHT: 197 LBS | HEART RATE: 79 BPM | SYSTOLIC BLOOD PRESSURE: 119 MMHG | TEMPERATURE: 96.9 F

## 2022-11-03 VITALS
HEART RATE: 79 BPM | TEMPERATURE: 96.9 F | DIASTOLIC BLOOD PRESSURE: 86 MMHG | RESPIRATION RATE: 18 BRPM | BODY MASS INDEX: 24.49 KG/M2 | HEIGHT: 75 IN | SYSTOLIC BLOOD PRESSURE: 119 MMHG | WEIGHT: 197 LBS | OXYGEN SATURATION: 98 %

## 2022-11-03 DIAGNOSIS — D64.9 ANEMIA, UNSPECIFIED TYPE: ICD-10-CM

## 2022-11-03 DIAGNOSIS — C34.12 MALIGNANT NEOPLASM OF UPPER LOBE OF LEFT LUNG: Primary | ICD-10-CM

## 2022-11-03 DIAGNOSIS — Z51.11 ENCOUNTER FOR ANTINEOPLASTIC CHEMOTHERAPY: ICD-10-CM

## 2022-11-03 DIAGNOSIS — C34.91 NON-SMALL CELL CARCINOMA OF LUNG, RIGHT: Primary | ICD-10-CM

## 2022-11-03 LAB
ANION GAP SERPL CALCULATED.3IONS-SCNC: 13 MMOL/L (ref 5–15)
BASOPHILS # BLD AUTO: 0.06 10*3/MM3 (ref 0–0.2)
BASOPHILS NFR BLD AUTO: 0.8 % (ref 0–1.5)
BUN SERPL-MCNC: 11 MG/DL (ref 6–20)
BUN/CREAT SERPL: 12.5 (ref 7–25)
CALCIUM SPEC-SCNC: 9.6 MG/DL (ref 8.6–10.5)
CHLORIDE SERPL-SCNC: 86 MMOL/L (ref 98–107)
CO2 SERPL-SCNC: 27 MMOL/L (ref 22–29)
CREAT SERPL-MCNC: 0.88 MG/DL (ref 0.76–1.27)
DEPRECATED RDW RBC AUTO: 46.5 FL (ref 37–54)
EGFRCR SERPLBLD CKD-EPI 2021: 99.7 ML/MIN/1.73
EOSINOPHIL # BLD AUTO: 0.24 10*3/MM3 (ref 0–0.4)
EOSINOPHIL NFR BLD AUTO: 3 % (ref 0.3–6.2)
ERYTHROCYTE [DISTWIDTH] IN BLOOD BY AUTOMATED COUNT: 14.2 % (ref 12.3–15.4)
GLUCOSE SERPL-MCNC: 109 MG/DL (ref 65–99)
HCT VFR BLD AUTO: 29.3 % (ref 37.5–51)
HGB BLD-MCNC: 9.6 G/DL (ref 13–17.7)
HOLD SPECIMEN: NORMAL
LYMPHOCYTES # BLD AUTO: 1.06 10*3/MM3 (ref 0.7–3.1)
LYMPHOCYTES NFR BLD AUTO: 13.3 % (ref 19.6–45.3)
MAGNESIUM SERPL-MCNC: 1.6 MG/DL (ref 1.6–2.6)
MCH RBC QN AUTO: 30.6 PG (ref 26.6–33)
MCHC RBC AUTO-ENTMCNC: 32.8 G/DL (ref 31.5–35.7)
MCV RBC AUTO: 93.3 FL (ref 79–97)
MONOCYTES # BLD AUTO: 1.3 10*3/MM3 (ref 0.1–0.9)
MONOCYTES NFR BLD AUTO: 16.3 % (ref 5–12)
NEUTROPHILS NFR BLD AUTO: 5.31 10*3/MM3 (ref 1.7–7)
NEUTROPHILS NFR BLD AUTO: 66.6 % (ref 42.7–76)
PLATELET # BLD AUTO: 380 10*3/MM3 (ref 140–450)
PMV BLD AUTO: 8 FL (ref 6–12)
POTASSIUM SERPL-SCNC: 4.8 MMOL/L (ref 3.5–5.2)
RBC # BLD AUTO: 3.14 10*6/MM3 (ref 4.14–5.8)
SODIUM SERPL-SCNC: 126 MMOL/L (ref 136–145)
WBC NRBC COR # BLD: 7.97 10*3/MM3 (ref 3.4–10.8)

## 2022-11-03 PROCEDURE — 36415 COLL VENOUS BLD VENIPUNCTURE: CPT

## 2022-11-03 PROCEDURE — 99215 OFFICE O/P EST HI 40 MIN: CPT | Performed by: INTERNAL MEDICINE

## 2022-11-03 PROCEDURE — 80048 BASIC METABOLIC PNL TOTAL CA: CPT

## 2022-11-03 PROCEDURE — 85025 COMPLETE CBC W/AUTO DIFF WBC: CPT

## 2022-11-03 PROCEDURE — 83735 ASSAY OF MAGNESIUM: CPT

## 2022-11-03 PROCEDURE — 99204 OFFICE O/P NEW MOD 45 MIN: CPT | Performed by: RADIOLOGY

## 2022-11-03 PROCEDURE — G0463 HOSPITAL OUTPT CLINIC VISIT: HCPCS | Performed by: RADIOLOGY

## 2022-11-03 NOTE — TELEPHONE ENCOUNTER
----- Message from MULUGETA Garcia sent at 11/3/2022 11:20 AM EDT -----  Please let the patient know that his sodium level is low.  Like to get him in for some IV fluids tomorrow.  I will enter the orders.  Thank you.

## 2022-11-03 NOTE — TELEPHONE ENCOUNTER
INFORMED PATIENT THAT HIS SODIUM LEVELS WERE LOW AND THAT DR. DE LEÓN IS WANTING PATIENT TO COME IN TOMORROW, 11-4-2022.  PATIENT STATES HE CANNOT COME IN TOMORROW BUT CAN COME IN ON Monday 11-7-2022.  DR. DE LEÓN NOTIFIED.

## 2022-11-04 ENCOUNTER — HOSPITAL ENCOUNTER (OUTPATIENT)
Dept: RADIATION ONCOLOGY | Facility: HOSPITAL | Age: 57
Discharge: HOME | End: 2022-11-04

## 2022-11-04 DIAGNOSIS — C34.91 NON-SMALL CELL CARCINOMA OF LUNG, RIGHT: ICD-10-CM

## 2022-11-04 DIAGNOSIS — E86.0 DEHYDRATION: Primary | ICD-10-CM

## 2022-11-04 PROCEDURE — 77334 RADIATION TREATMENT AID(S): CPT | Performed by: RADIOLOGY

## 2022-11-04 NOTE — PROGRESS NOTES
I accompanied patient and spouse to Dr. Escalona's office visit.    Dr. Escalona reviewed scans and patient discussed what Dr. Gaspar had planned. Patient aware that Dr. Reyna presenting at tumor boards 11/15/2022. Dr. Escalona discussed plan of 30 radiation treatments to treatment chest wall and lymph nodes. Dr. Escalona will also discuss plan with Dr. Gaspar. Dr. Escalona reviewed side effects and what to expect. All questions answered. No needs at this time. Will call with any needs or questions.

## 2022-11-07 ENCOUNTER — HOSPITAL ENCOUNTER (OUTPATIENT)
Dept: ONCOLOGY | Facility: HOSPITAL | Age: 57
Setting detail: INFUSION SERIES
Discharge: HOME | End: 2022-11-07

## 2022-11-07 VITALS
HEART RATE: 97 BPM | DIASTOLIC BLOOD PRESSURE: 77 MMHG | WEIGHT: 197.6 LBS | BODY MASS INDEX: 24.57 KG/M2 | TEMPERATURE: 97.6 F | HEIGHT: 75 IN | OXYGEN SATURATION: 100 % | SYSTOLIC BLOOD PRESSURE: 111 MMHG

## 2022-11-07 DIAGNOSIS — E86.0 DEHYDRATION: Primary | ICD-10-CM

## 2022-11-07 DIAGNOSIS — D64.9 ANEMIA, UNSPECIFIED TYPE: ICD-10-CM

## 2022-11-07 DIAGNOSIS — E87.1 HYPONATREMIA: ICD-10-CM

## 2022-11-07 DIAGNOSIS — Z45.2 ENCOUNTER FOR CARE RELATED TO VASCULAR ACCESS PORT: ICD-10-CM

## 2022-11-07 DIAGNOSIS — C34.91 NON-SMALL CELL CARCINOMA OF LUNG, RIGHT: ICD-10-CM

## 2022-11-07 LAB — OSMOLALITY UR: 318 MOSM/KG (ref 300–800)

## 2022-11-07 PROCEDURE — 96360 HYDRATION IV INFUSION INIT: CPT

## 2022-11-07 PROCEDURE — 77263 THER RADIOLOGY TX PLNG CPLX: CPT | Performed by: RADIOLOGY

## 2022-11-07 PROCEDURE — 25010000002 HEPARIN LOCK FLUSH PER 10 UNITS: Performed by: INTERNAL MEDICINE

## 2022-11-07 PROCEDURE — 83935 ASSAY OF URINE OSMOLALITY: CPT | Performed by: INTERNAL MEDICINE

## 2022-11-07 PROCEDURE — 96361 HYDRATE IV INFUSION ADD-ON: CPT

## 2022-11-07 RX ORDER — HEPARIN SODIUM (PORCINE) LOCK FLUSH IV SOLN 100 UNIT/ML 100 UNIT/ML
500 SOLUTION INTRAVENOUS AS NEEDED
Status: DISCONTINUED | OUTPATIENT
Start: 2022-11-07 | End: 2022-11-08 | Stop reason: HOSPADM

## 2022-11-07 RX ORDER — SODIUM CHLORIDE 0.9 % (FLUSH) 0.9 %
20 SYRINGE (ML) INJECTION AS NEEDED
Status: DISCONTINUED | OUTPATIENT
Start: 2022-11-07 | End: 2022-11-08 | Stop reason: HOSPADM

## 2022-11-07 RX ADMIN — SODIUM CHLORIDE 1000 ML: 9 INJECTION, SOLUTION INTRAVENOUS at 13:41

## 2022-11-07 RX ADMIN — Medication 20 ML: at 15:19

## 2022-11-07 RX ADMIN — HEPARIN 500 UNITS: 100 SYRINGE at 15:19

## 2022-11-07 NOTE — PROGRESS NOTES
Pt here for IVF from labs drawn last week at Dr. Gaspar f/u visit.  IVF given per MAR.  Pt tolerated well.  Pt d/c home with avs given.

## 2022-11-08 DIAGNOSIS — E86.0 DEHYDRATION: Primary | ICD-10-CM

## 2022-11-08 DIAGNOSIS — D64.9 ANEMIA, UNSPECIFIED TYPE: ICD-10-CM

## 2022-11-08 DIAGNOSIS — E87.1 HYPONATREMIA: ICD-10-CM

## 2022-11-08 DIAGNOSIS — C34.91 NON-SMALL CELL CARCINOMA OF LUNG, RIGHT: ICD-10-CM

## 2022-11-11 ENCOUNTER — HOSPITAL ENCOUNTER (OUTPATIENT)
Dept: CARDIOLOGY | Facility: HOSPITAL | Age: 57
Discharge: HOME | End: 2022-11-11
Admitting: INTERNAL MEDICINE

## 2022-11-11 ENCOUNTER — LAB (OUTPATIENT)
Dept: LAB | Facility: HOSPITAL | Age: 57
End: 2022-11-11

## 2022-11-11 VITALS — WEIGHT: 197 LBS | BODY MASS INDEX: 24.49 KG/M2 | HEIGHT: 75 IN

## 2022-11-11 DIAGNOSIS — D64.9 ANEMIA, UNSPECIFIED TYPE: ICD-10-CM

## 2022-11-11 DIAGNOSIS — E87.1 HYPONATREMIA: ICD-10-CM

## 2022-11-11 DIAGNOSIS — C34.91 NON-SMALL CELL CARCINOMA OF LUNG, RIGHT: ICD-10-CM

## 2022-11-11 DIAGNOSIS — Z51.11 ENCOUNTER FOR ANTINEOPLASTIC CHEMOTHERAPY: ICD-10-CM

## 2022-11-11 DIAGNOSIS — E86.0 DEHYDRATION: ICD-10-CM

## 2022-11-11 LAB
ANION GAP SERPL CALCULATED.3IONS-SCNC: 12 MMOL/L (ref 5–15)
BASOPHILS # BLD AUTO: 0.04 10*3/MM3 (ref 0–0.2)
BASOPHILS NFR BLD AUTO: 0.5 % (ref 0–1.5)
BH CV ECHO MEAS - ACS: 2.05 CM
BH CV ECHO MEAS - AO MAX PG: 8.7 MMHG
BH CV ECHO MEAS - AO MEAN PG: 4.6 MMHG
BH CV ECHO MEAS - AO ROOT DIAM: 3.1 CM
BH CV ECHO MEAS - AO V2 MAX: 147.5 CM/SEC
BH CV ECHO MEAS - AO V2 VTI: 20.7 CM
BH CV ECHO MEAS - AVA(I,D): 2.42 CM2
BH CV ECHO MEAS - EDV(CUBED): 129.2 ML
BH CV ECHO MEAS - EDV(MOD-SP4): 55.3 ML
BH CV ECHO MEAS - EF(MOD-SP4): 64.3 %
BH CV ECHO MEAS - ESV(CUBED): 9.1 ML
BH CV ECHO MEAS - ESV(MOD-SP4): 19.8 ML
BH CV ECHO MEAS - FS: 58.7 %
BH CV ECHO MEAS - IVS/LVPW: 0.95 CM
BH CV ECHO MEAS - IVSD: 0.79 CM
BH CV ECHO MEAS - LA DIMENSION: 3.9 CM
BH CV ECHO MEAS - LV MASS(C)D: 141.7 GRAMS
BH CV ECHO MEAS - LV MAX PG: 4.6 MMHG
BH CV ECHO MEAS - LV MEAN PG: 1.88 MMHG
BH CV ECHO MEAS - LV V1 MAX: 107.2 CM/SEC
BH CV ECHO MEAS - LV V1 VTI: 13 CM
BH CV ECHO MEAS - LVIDD: 5.1 CM
BH CV ECHO MEAS - LVIDS: 2.09 CM
BH CV ECHO MEAS - LVOT AREA: 3.8 CM2
BH CV ECHO MEAS - LVOT DIAM: 2.21 CM
BH CV ECHO MEAS - LVPWD: 0.83 CM
BH CV ECHO MEAS - MV A MAX VEL: 73.8 CM/SEC
BH CV ECHO MEAS - MV DEC SLOPE: 397.7 CM/SEC2
BH CV ECHO MEAS - MV DEC TIME: 0.13 MSEC
BH CV ECHO MEAS - MV E MAX VEL: 50.8 CM/SEC
BH CV ECHO MEAS - MV E/A: 0.69
BH CV ECHO MEAS - MV MAX PG: 4.7 MMHG
BH CV ECHO MEAS - MV MEAN PG: 1.83 MMHG
BH CV ECHO MEAS - MV V2 VTI: 14.2 CM
BH CV ECHO MEAS - MVA(VTI): 3.5 CM2
BH CV ECHO MEAS - RAP SYSTOLE: 3 MMHG
BH CV ECHO MEAS - RVDD: 2.12 CM
BH CV ECHO MEAS - RVSP: 43.7 MMHG
BH CV ECHO MEAS - SV(LVOT): 49.9 ML
BH CV ECHO MEAS - SV(MOD-SP4): 35.6 ML
BH CV ECHO MEAS - TR MAX PG: 40.7 MMHG
BH CV ECHO MEAS - TR MAX VEL: 319.1 CM/SEC
BUN SERPL-MCNC: 11 MG/DL (ref 6–20)
BUN/CREAT SERPL: 12.5 (ref 7–25)
CALCIUM SPEC-SCNC: 9.4 MG/DL (ref 8.6–10.5)
CHLORIDE SERPL-SCNC: 88 MMOL/L (ref 98–107)
CO2 SERPL-SCNC: 27 MMOL/L (ref 22–29)
CREAT SERPL-MCNC: 0.88 MG/DL (ref 0.76–1.27)
DEPRECATED RDW RBC AUTO: 46.4 FL (ref 37–54)
EGFRCR SERPLBLD CKD-EPI 2021: 99.7 ML/MIN/1.73
EOSINOPHIL # BLD AUTO: 0.23 10*3/MM3 (ref 0–0.4)
EOSINOPHIL NFR BLD AUTO: 3.1 % (ref 0.3–6.2)
ERYTHROCYTE [DISTWIDTH] IN BLOOD BY AUTOMATED COUNT: 14 % (ref 12.3–15.4)
GLUCOSE SERPL-MCNC: 137 MG/DL (ref 65–99)
HCT VFR BLD AUTO: 27.4 % (ref 37.5–51)
HGB BLD-MCNC: 9 G/DL (ref 13–17.7)
LYMPHOCYTES # BLD AUTO: 0.91 10*3/MM3 (ref 0.7–3.1)
LYMPHOCYTES NFR BLD AUTO: 12.4 % (ref 19.6–45.3)
MAGNESIUM SERPL-MCNC: 1.6 MG/DL (ref 1.6–2.6)
MAXIMAL PREDICTED HEART RATE: 162 BPM
MCH RBC QN AUTO: 31 PG (ref 26.6–33)
MCHC RBC AUTO-ENTMCNC: 32.8 G/DL (ref 31.5–35.7)
MCV RBC AUTO: 94.5 FL (ref 79–97)
MONOCYTES # BLD AUTO: 0.98 10*3/MM3 (ref 0.1–0.9)
MONOCYTES NFR BLD AUTO: 13.4 % (ref 5–12)
NEUTROPHILS NFR BLD AUTO: 5.18 10*3/MM3 (ref 1.7–7)
NEUTROPHILS NFR BLD AUTO: 70.6 % (ref 42.7–76)
OSMOLALITY SERPL: 272 MOSM/KG (ref 275–295)
PLATELET # BLD AUTO: 311 10*3/MM3 (ref 140–450)
PMV BLD AUTO: 8.1 FL (ref 6–12)
POTASSIUM SERPL-SCNC: 3.9 MMOL/L (ref 3.5–5.2)
RBC # BLD AUTO: 2.9 10*6/MM3 (ref 4.14–5.8)
SODIUM SERPL-SCNC: 127 MMOL/L (ref 136–145)
STRESS TARGET HR: 138 BPM
WBC NRBC COR # BLD: 7.34 10*3/MM3 (ref 3.4–10.8)

## 2022-11-11 PROCEDURE — 93356 MYOCRD STRAIN IMG SPCKL TRCK: CPT

## 2022-11-11 PROCEDURE — 36415 COLL VENOUS BLD VENIPUNCTURE: CPT

## 2022-11-11 PROCEDURE — 93306 TTE W/DOPPLER COMPLETE: CPT

## 2022-11-11 PROCEDURE — 77293 RESPIRATOR MOTION MGMT SIMUL: CPT | Performed by: RADIOLOGY

## 2022-11-11 PROCEDURE — 85025 COMPLETE CBC W/AUTO DIFF WBC: CPT

## 2022-11-11 PROCEDURE — 77300 RADIATION THERAPY DOSE PLAN: CPT | Performed by: RADIOLOGY

## 2022-11-11 PROCEDURE — 77338 DESIGN MLC DEVICE FOR IMRT: CPT | Performed by: RADIOLOGY

## 2022-11-11 PROCEDURE — 93306 TTE W/DOPPLER COMPLETE: CPT | Performed by: INTERNAL MEDICINE

## 2022-11-11 PROCEDURE — 83735 ASSAY OF MAGNESIUM: CPT

## 2022-11-11 PROCEDURE — 80048 BASIC METABOLIC PNL TOTAL CA: CPT

## 2022-11-11 PROCEDURE — 93356 MYOCRD STRAIN IMG SPCKL TRCK: CPT | Performed by: INTERNAL MEDICINE

## 2022-11-11 PROCEDURE — 77301 RADIOTHERAPY DOSE PLAN IMRT: CPT | Performed by: RADIOLOGY

## 2022-11-11 PROCEDURE — 83930 ASSAY OF BLOOD OSMOLALITY: CPT

## 2022-11-14 ENCOUNTER — TELEPHONE (OUTPATIENT)
Dept: ONCOLOGY | Facility: CLINIC | Age: 57
End: 2022-11-14

## 2022-11-14 DIAGNOSIS — C34.92 NSCLC OF LEFT LUNG: ICD-10-CM

## 2022-11-14 DIAGNOSIS — C34.91 NON-SMALL CELL CARCINOMA OF LUNG, RIGHT: Primary | Chronic | ICD-10-CM

## 2022-11-14 NOTE — TELEPHONE ENCOUNTER
OSW notified of patient being discussed in Tumor Board on 11/15, and noted discussion due to progression.     OSW reviewed chart, had Rad Onc consult on 11/3, and he rated a 4 out of 10 on DST. OSW placed call to patient, as OSW had worked with patient previously in August 2022.     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 OSW encouraged patient to reach out like he did last time - patient stated he'd call to check on 'later on.' Patient is endorsing pain this time around, that is increased with driving. He also discussed MD's discussing why the pain, where it's coming from, etc., and cites it's tolerable at this time.     Patient did have question about when Rad Onc will reach out - OSW to send secure message to Rad Onc RN.     OSW to remain available.     Mayela Monsalve, LSW, CSW, MSW  Oncology MSW  [facility]

## 2022-11-15 ENCOUNTER — MDT ASSESSMENT (OUTPATIENT)
Dept: ONCOLOGY | Facility: CLINIC | Age: 57
End: 2022-11-15

## 2022-11-15 NOTE — PROGRESS NOTES
Hematology/Oncology Outpatient Follow Up    PATIENT NAME:Jc Driscoll  :1965  MRN: [id number]  PRIMARY CARE PHYSICIAN: Reshma Alvarenga MD  REFERRING PHYSICIAN: Reshma Alvarenga MD    Chief Complaint   Patient presents with   • Follow-up     Non-small cell carcinoma of lung, right (HCC)        HISTORY OF PRESENT ILLNESS:                                                                                                                                                                                                                                                             This is a 57 y.o. who developed left shoulder pain and for that reason he had a chest x-ray done on 19 which showed a 2.7 cm noncalcified right lower lobe nodule was identified.  For that reason a CT scan of the chest was recommended.  Review of his records shows patient had the CT scan on 19 done without contrast.  This basically showed a lobulated noncalcified mass in the posterior aspect of the right lower lobe measuring 3 cm close to the pleural surface.  There is a calcified 1.2 mm nodule in the lateral aspect of the right lower lobe consistent with a granuloma.  There were also calcified subcarinal and right hilar nodules.  There is a lower right side tracheal nodule measuring 1 cm.  Patient had a PET/CT scan done on 19 which showed increased metabolic activity on the right lower lobe mass that measures 2.5 cm with SUV of 4.4.  There was also increased metabolic activity in the subcarinal space measuring 2.8 cm in size with  SUV of 3.4, increased activity in an enlarged right paratracheal lymph node that measures 1.9 cm, SUV of 5, also suspicious for metastatic adenopathy.  Patient had a CT-guided biopsy of the right lower lobe mass on 3/8/19.  This showed adenocarcinoma, TTF-1 positive.  On 3/18/19 patient underwent endoscopic ultrasound and biopsy of a subcarinal lymph node.  This was positive for malignant cells.  Patient was then taken back to surgery on 3/20/18 and had left Mediport placement by Dr. Fuchs.  He has been referred for further evaluation and management of his stage 3 adenocarcinoma of the right lung.  He was accompanied by his spouse for the appointment on 3/26/19.  Patient was scheduled to have a brain MRI for complete staging, but he could not do this due to claustrophobia.  He is willing to try with the help of angiolytics.    • Patient had brain MRI done on 4/5/19.  He states it did not show any evidence of metastatic disease.  Evidence of chronic sinusitis was noted.    • Patient was seen by Dr. Escalona who has recommended concurrent chemotherapy and radiation.  Patient is scheduled to begin on 4/15/19.   • 4/17/19 - Patient was initiated on combined chemotherapy and radiation with Cisplatin and Alimta.  Patient received cycle 1.      • 5/8/19 - Patient received cycle 2 of chemotherapy with Cisplatin and Alimta.   • 5/15/19 - Chemistry panel showed creatinine of 1.7.  WBC 1.8, hemoglobin 11.6, platelet count 72,000.   • 5/20/19 - BUN 10, creatinine 1.2, potassium 3.5.    • 5/23/19 - CT scan of the chest with contrast showed interval decrease in size of the right lower lobe pulmonary nodule now measuring 2.1 cm in size.  There was no mediastinal or hilar adenopathy identified.  • 6/26/2019 patient underwent right lower lobectomy with hilar and mediastinal lymph node dissection performed by Dr. Terrance Mckeon.  • Pathology revealed residual residual 2.2 cm invasive moderately differentiated adenocarcinoma.  There  were a total of 16 lymph nodes evaluated that 7 had metastatic disease.  There was evidence of lymphovascular invasion, extranodal involvement.  All the surgical margins were negative and distance of the closest margin was 2.5 cm.  Logic stage is T1c N2 M0.  • Patient is here today accompanied by his spouse for this appointment.  He continues to complain of some postop discomfort, numbness but his skin is intact.  There is no drainage.  Has had follow-up with Dr. Fuchs.  • 8/14/2019-seen by Dr. Maria at the [facility].  Dr. Maria has recommended immunotherapy with durvalumab off label use as patient has not had significant response to neoadjuvant chemotherapy and radiation.  Patient was given the option to have immunotherapy at the [facility] vs [place] and he has chosen to stay in [place]  • 8/21/19 - Started cycle 1 day 1 Imfinzi  • 9/4/2019 patient had CT scan of the chest this shows a moderate size right pleural effusion.  There are areas of groundglass opacification in the right upper lobe without any evidence of significant septal thickening.  Volume loss noted there is also moderate pleural effusion.  There is no suspicious recurrent malignancy.  There were nonenlarged residual mediastinal lymph nodes.  • 9/9/19 - WBC 6.23, Hgb 11.7 Platelets 257,000, , BUN 9, Cr 1.1,Vitamin B12 318, Ferritin 437  • 9/23/19 - received cycle 2 day 1 Imfinzi  • 10/9/19- Seen by Dr rodríguez with ENT for sinus disease  • 11/4/2019-patient received cycle 5 of Imfinzi(durvalumab)  • 12/2/2019 patient had cycle 7 of durvalumab  • 12/5/2019-patient had CT scan of the  abdomen and pelvis with small right pleural sided pleural effusion.There is no evidence of metastatic disease  • 12/30/2019-patient received cycle 9 of durvalumab  • 12/31/2019-patient had CT scan of the chest showed small to moderate left pleural effusion.  Iglesias appearing right lower paratracheal and right peribronchial soft tissue thickening without any  discrete pathologically enlarged mediastinal or hilar lymph nodes.  • 1/27/20-patient received cycle 11 of durvalumab  • 2/17/20-patient had a stress test which was negative for ischemia  • 2/17/2020-patient had CT of the chest which showed postop changes on the right lung, right pleural effusion but no enlarging mass was noted  • 3/2/2020-patient received cycle 12 of durvalumab  • 3/16/2020-patient had ultrasound-guided right thoracentesis.  Pathology was negative for malignancy  • 4/13/2020-patient received cycle 15 of immunotherapy with durvalumab  • 5/11/2020-patient received cycle 17 of immunotherapy with durvalumab  • 6/9/2020-patient had CT scan of the chest, abdomen and pelvis for lung cancer restaging.  There is stable small chronic loculated right basilar pleural effusion suggesting chronic pleuritis.  There is no evidence of metastatic disease in the abdomen or pelvis  • 7/20/2020 patient received cycle 22 of Durvalumab  • 8/17/2020 patient received cycle 24 and last cycle of durvalumab  • 9/24/2020 patient had CT scan of the chest, abdomen and pelvis for cancer restaging there was no evidence of local recurrence or metastatic disease within the abdomen and pelvis.  He has stable chronic small right pleural effusion.  Mild sigmoid diverticulosis was noted.  • 1/18/2021 patient had CT scan of the chest, abdomen and pelvis reviewed patient  • 5/24/2021 patient had CT scan of the chest calcified subcarinal lymph node consistent with changes of prior granulomatous disease.  Chronic small right pleural effusion is noted similar to prior exam.  Previously shown 4 mm nodule in the left lower lobe has nearly completely resolved.  The subpleural 3 mm nodule located within the posterior left lower lobe With resolved.  • 9/24/2021 patient had a CT scan of the chest, abdomen and pelvis there is soft tissue attenuation within the right paratracheal area which has been suggested to reflect sequela to previous  treatment.  There is slight thickening of the bladder wall otherwise there is no evidence of metastatic disease.  • 12/27/2021 patient had CT scan of the chest with contrast this basically showed irregular shaped noncalcified 7 mm left upper lobe pulmonary nodule.  PET CT scan was recommended to further evaluate.  • 1/26/2022 patient had a PET CT scan done which basically showed evidence of mild uptake corresponding to the nodule within the left upper lobe which is new worrisome for developing metastatic focus.  There was mild radiopharmaceutical activity corresponding to pleural thickening throughout the right lung base with associated pleural effusion likely related to post therapeutic changes and radiation changes in this region.  Metastatic malignancy involving the pleura could not be completely eliminated.  • 1/26/2022: CT-guided biopsy was aborted due to finding by the radiologist that the lung nodularity was actually a clump of tiny nodules of which the largest was 6 mm and therefore biopsy could not be completed.  Also the area was in the vicinity of multiple blood vessels with increased risk of bleeding.  Follow-up CT of the chest was recommended for continued surveillance  • 5/18/2022 patient had CT-guided biopsy of the left enlarging nodule, pathology was positive for invasive poorly differentiated adenocarcinoma most consistent with adenocarcinoma of pulmonary origin.  • 5/27/2022 patient had a PET CT scan which showed a 2.3 cm hypermetabolic left upper lobe nodule which has increased in size no convincing evidence of metastatic disease elsewhere within the chest.  • 5/21/2022 patient had brain MRI which did not show any evidence of intracranial metastasis  • 6/2/2022 patient was seen by Dr. Miryam Reyna pulmonary function test is being done to assess surgical candidacy  • 7/6/2022 patient underwent left heart Koska P video-assisted with upper lobectomy mediastinal lymph node dissection performed by   Miryam Cappsan  • Final pathology revealed poorly differentiated  • A predominant adenocarcinoma with solid component presenting 45% and micropapillary component 5% with extensive necrosis, invasive carcinoma measures 2.5 maximally there was focal lymphovascular space invasion all margins were negative for malignancy discussed with the closest margin was 2.5 cm pathology stage is pT1C pN1.  PD-L1 showed a tumor proportion score of 95%  • 8/3/2022 patient started adjuvant chemotherapy with cisplatin, Taxol  • 8/24/2022 patient received cycle 2 of combination chemotherapy with cisplatin and Taxol with Neulasta  • 10/5/2022: Patient received cycle 4-day 1 of combination chemotherapy with cisplatin and Taxol  • Patient developed left-sided chest pain for that reason he had a PET/CT scan 1/20/2020 is basically revealed a 4 x 3.5 cm hypermetabolic soft tissue mass in the left chest wall between the first and second ribs anteriorly consistent with relapsed disease.  There was a small cluster of hypermetabolic lymph nodes seen in the left supraclavicular region consistent with early manoj metastasis    Past Medical History:   Diagnosis Date   • Allergic rhinitis 07/25/2013    Overview:  4/2/2018 - still zyrtec 10 daily (if stops, gets dermatitis again).    • Anxiety and depression 06/05/2012    Overview:  4/2/2018 -   C/w well 300 xr and Lito 20.  4/8/2019 -   Doing ok even with new lung cancer - lito 20 & well 300xr.    • Chronic pansinusitis 04/08/2019    Overview:  4/8/2019 -   MRI showed chronic thick in frontal, maxillary, ethmoidal ---> to Dr. COLLAZO to est since abx ICC didn't help.  Does netipot bid.    • Diastolic CHF (HCC) 07/09/2014    Overview:  7/4/14 - impaired LV relaxation on Zhou ECHO.  EF 60%. Rest normal   • Dupuytren's contracture of both hands    • Elevated cholesterol    • Erosive esophagitis 07/09/2019    Overview:  EGD 2016 4/2/2018 - nexium OTC daily for few week.  Qod before that.  Now carbonation hurts,  epigastric pain 4/8/2019 -   protonix 40 doing well.    • Gastroesophageal reflux disease 02/21/2019   • Hiatal hernia 07/09/2019   • History of colon polyps    • Hypertension    • Lung cancer (HCC)     right lung and in lymph nodes x2   • Mediastinal lymphadenopathy 03/12/2019   • Normocytic anemia 08/08/2019    Overview:  6/2019 - dropped to 9's postop 7/2019 - rebounded to 10's.  8/8/2019 -   Back to 9's - check ferritin, b12 and thyroid.    • Prediabetes 07/09/2014    Overview:  7/4/14 - a1c 6.1 at [place] admission   • Retention of urine 06/27/2019    PSOT OP, RESOLVED       Past Surgical History:   Procedure Laterality Date   • BRONCHOSCOPY  03/18/2019    EBUS MONTERO NEEDLE BX   • COLONOSCOPY     • DUPUYTREN CONTRACTURE RELEASE Bilateral    • LUNG BIOPSY  03/08/2019    CT GUIDED   • LUNG REMOVAL, PARTIAL Right     right lower   • PORTACATH PLACEMENT  03/20/2019    DR LONGORIA   • THORACOSCOPY Right 6/26/2019    Procedure: RIGHT VATS, OPEN LOWER LOBECTOMY WITH LYMPH NODE DISECTION, WEDGE RESECTION OF RIGHT MIDDLE LOBE;  Surgeon: Terrance Longoria MD;  Location: [facility] OR;  Service: Cardiothoracic   • THORACOSCOPY VIDEO ASSISTED WITH LOBECTOMY Left 7/6/2022    Procedure: THORACOSCOPY VIDEO ASSISTED WITH LOBECTOMY;  Surgeon: Miryam Reyna MD;  Location: [facility] OR;  Service: Thoracic;  Laterality: Left;         Current Outpatient Medications:   •  amLODIPine (NORVASC) 10 MG tablet, Take 10 mg by mouth Daily., Disp: , Rfl:   •  amLODIPine (NORVASC) 5 MG tablet, Take 5 mg by mouth Daily., Disp: , Rfl:   •  buPROPion XL (WELLBUTRIN XL) 300 MG 24 hr tablet, Take 300 mg by mouth Every Morning., Disp: , Rfl:   •  buPROPion XL (WELLBUTRIN XL) 300 MG 24 hr tablet, Take 1 tablet by mouth Every Morning., Disp: , Rfl:   •  desvenlafaxine (PRISTIQ) 50 MG 24 hr tablet, Take 50 mg by mouth Daily., Disp: , Rfl:   •  diazePAM (Valium) 2 MG tablet, Take 1 tablet by mouth At Night As Needed for Anxiety or Muscle Spasms.,  Disp: 30 tablet, Rfl: 0  •  gabapentin (NEURONTIN) 300 MG capsule, Take 1 capsule by mouth Every 8 (Eight) Hours for 30 days., Disp: 90 capsule, Rfl: 0  •  levothyroxine (SYNTHROID, LEVOTHROID) 100 MCG tablet, Take 100 mcg by mouth Every Morning., Disp: , Rfl:   •  levothyroxine (SYNTHROID, LEVOTHROID) 100 MCG tablet, Take 1 tablet by mouth Daily., Disp: , Rfl:   •  lidocaine-prilocaine (EMLA) 2.5-2.5 % cream, Apply 1 application topically to the appropriate area as directed Take As Directed. Apply to port area 30 mins prior to port access, Disp: 30 g, Rfl: 6  •  liothyronine (CYTOMEL) 5 MCG tablet, Take 5 mcg by mouth Daily., Disp: , Rfl:   •  liothyronine (CYTOMEL) 5 MCG tablet, Take 1 tablet by mouth Daily., Disp: , Rfl:   •  LORazepam (ATIVAN) 0.5 MG tablet, Take 0.5 mg by mouth Every 8 (Eight) Hours As Needed., Disp: , Rfl:   •  magnesium oxide (MAG-OX) 400 MG tablet, Take 1 tablet by mouth Daily., Disp: 30 tablet, Rfl: 0  •  Magnesium Oxide 400 (240 Mg) MG tablet, Take 1 tablet by mouth Daily., Disp: , Rfl:   •  metoprolol succinate XL (TOPROL-XL) 100 MG 24 hr tablet, Take 100 mg by mouth Daily., Disp: , Rfl:   •  OLANZapine (zyPREXA) 5 MG tablet, Take 1 tablet by mouth Every Night. Take on days 2, 3 and 4 after chemotherapy., Disp: 3 tablet, Rfl: 5  •  ondansetron (ZOFRAN) 8 MG tablet, Take 1 tablet by mouth 3 (Three) Times a Day As Needed for Nausea or Vomiting., Disp: 30 tablet, Rfl: 5  •  oxyCODONE (Roxicodone) 5 MG immediate release tablet, Take 1 tablet by mouth Every 6 (Six) Hours As Needed for Moderate Pain ., Disp: 45 tablet, Rfl: 0  •  pantoprazole (PROTONIX) 40 MG EC tablet, Take 40 mg by mouth Daily As Needed., Disp: , Rfl:   •  pantoprazole (PROTONIX) 40 MG EC tablet, Take 1 tablet by mouth Daily., Disp: , Rfl:   •  pravastatin (PRAVACHOL) 10 MG tablet, Take 10 mg by mouth Every Night., Disp: , Rfl:   •  vitamin B-12 (CYANOCOBALAMIN) 1000 MCG tablet, Take 1,000 mcg by mouth Daily., Disp: , Rfl:      No Known Allergies    Family History   Problem Relation Age of Onset   • Cancer Mother         cervical cancer   • Cervical cancer Mother    • Cancer Father         lung cancer   • Lung cancer Father    • Lung cancer Sister    • Cancer Sister         lung cancer   • Malig Hyperthermia Neg Hx        Cancer-related family history includes Cancer in his father, mother, and sister; Cervical cancer in his mother; Lung cancer in his father and sister.    Social History     Tobacco Use   • Smoking status: Former     Types: Cigarettes     Quit date: 2009     Years since quittin.2   • Smokeless tobacco: Never   Vaping Use   • Vaping Use: Never used   Substance Use Topics   • Alcohol use: Yes     Alcohol/week: 2.0 standard drinks     Types: 2 Cans of beer per week     Comment: Occasional   • Drug use: No      I have reviewed and confirmed the accuracy of the patient's history: Chief complaint, HPI, ROS and Subjective as entered by the MA/LPN/RN. Azucena Gaspar MD 22       SUBJECTIVE:    Patient complains of left-sided chest discomfort.  Was prescribed Valium by CTS          REVIEW OF SYSTEMS:    Review of Systems   Constitutional: Positive for fatigue. Negative for chills and fever.   HENT: Negative for ear pain, mouth sores, nosebleeds and sore throat.    Eyes: Negative for photophobia and visual disturbance.   Respiratory: Negative for wheezing and stridor.    Cardiovascular: Negative for chest pain and palpitations.   Gastrointestinal: Negative for abdominal pain, diarrhea, nausea and vomiting.   Endocrine: Negative for cold intolerance and heat intolerance.   Genitourinary: Negative for dysuria and hematuria.   Musculoskeletal: Negative for joint swelling and neck stiffness.   Skin: Negative for color change and rash.   Neurological: Negative for seizures and syncope.   Hematological: Negative for adenopathy.   Psychiatric/Behavioral: Negative for agitation, confusion and hallucinations.  "          OBJECTIVE:    Vitals:    11/16/22 1249   BP: 118/74   Pulse: 96   Resp: 18   Temp: 96.8 °F (36 °C)   TempSrc: Infrared   Weight: 89.4 kg (197 lb)   Height: 190.5 cm (75\")   PainSc:   6   PainLoc: Comment: left shoulder, left arm     ECOG    Eastern Cooperative Oncology Group (ECOG, [place], World Health Organization) performance scale  0 Fully active; no performance restrictions.  1 Strenuous physical activity restricted; fully ambulatory and able to carry out light work.  2 Capable of all self-care but unable to carry out any work activities. Up and about >50% of waking hours.  3 Capable of only limited self-care; confined to bed or chair >50% of waking hours.  4 Completely disabled; cannot carry out any self-care; totally confined to bed or chair.    Physical Exam   Constitutional: He is oriented to person, place, and time. He appears well-developed. No distress.   HENT:   Head: Normocephalic and atraumatic.   Right Ear: External ear normal.   Left Ear: External ear normal.   Nose: Nose normal.   Eyes: Pupils are equal, round, and reactive to light. Conjunctivae are normal. Right eye exhibits no discharge. Left eye exhibits no discharge. No scleral icterus.   Neck: No thyromegaly present.   Cardiovascular: Normal rate, regular rhythm and normal heart sounds. Exam reveals no gallop and no friction rub.   Pulmonary/Chest: Effort normal. No stridor. No respiratory distress. He has no wheezes.   Abdominal: Soft. Bowel sounds are normal. He exhibits no mass. There is no abdominal tenderness. There is no rebound and no guarding.   Musculoskeletal: Normal range of motion. No tenderness.   Lymphadenopathy:     He has no cervical adenopathy.   Neurological: He is alert and oriented to person, place, and time. He exhibits normal muscle tone.   Skin: Skin is warm. No rash noted. He is not diaphoretic. No erythema.   Urticarial-like rash, right medial knee likely due to immunotherapy   Psychiatric: His behavior is " normal. Judgment and thought content normal.   Nursing note and vitals reviewed.    I have reexamined the patient and the results are consistent with the previously documented exam. Azucena Gaspar MD         RECENT LABS    WBC   Date Value Ref Range Status   11/16/2022 7.40 3.40 - 10.80 10*3/mm3 Final   03/22/2022 7.84 4.5 - 11.0 10*3/uL Final     RBC   Date Value Ref Range Status   11/16/2022 2.83 (L) 4.14 - 5.80 10*6/mm3 Final   03/22/2022 4.23 (L) 4.5 - 5.9 10*6/uL Final     Hemoglobin   Date Value Ref Range Status   11/16/2022 8.6 (L) 13.0 - 17.7 g/dL Final   03/22/2022 12.9 (L) 13.5 - 17.5 g/dL Final     Hematocrit   Date Value Ref Range Status   11/16/2022 26.5 (L) 37.5 - 51.0 % Final   03/22/2022 38.8 (L) 41.0 - 53.0 % Final     MCV   Date Value Ref Range Status   11/16/2022 93.6 79.0 - 97.0 fL Final   03/22/2022 91.7 80.0 - 100.0 fL Final     MCH   Date Value Ref Range Status   11/16/2022 30.4 26.6 - 33.0 pg Final   03/22/2022 30.5 26.0 - 34.0 pg Final     MCHC   Date Value Ref Range Status   11/16/2022 32.5 31.5 - 35.7 g/dL Final   03/22/2022 33.2 31.0 - 37.0 g/dL Final     RDW   Date Value Ref Range Status   11/16/2022 13.8 12.3 - 15.4 % Final   03/22/2022 14.2 12.0 - 16.8 % Final     RDW-SD   Date Value Ref Range Status   11/16/2022 45.2 37.0 - 54.0 fl Final     MPV   Date Value Ref Range Status   11/16/2022 8.1 6.0 - 12.0 fL Final   03/22/2022 9.3 8.4 - 12.4 fL Final     Platelets   Date Value Ref Range Status   11/16/2022 302 140 - 450 10*3/mm3 Final   03/22/2022 324 140 - 440 10*3/uL Final     Neutrophil Rel %   Date Value Ref Range Status   03/22/2022 63.1 45 - 80 % Final     Neutrophil %   Date Value Ref Range Status   11/16/2022 71.9 42.7 - 76.0 % Final     Lymphocyte Rel %   Date Value Ref Range Status   03/22/2022 16.2 15 - 50 % Final     Lymphocyte %   Date Value Ref Range Status   11/16/2022 11.9 (L) 19.6 - 45.3 % Final     Monocyte Rel %   Date Value Ref Range Status   03/22/2022 13.6 0  - 15 % Final     Monocyte %   Date Value Ref Range Status   11/16/2022 13.0 (H) 5.0 - 12.0 % Final     Eosinophil %   Date Value Ref Range Status   11/16/2022 2.8 0.3 - 6.2 % Final   03/22/2022 5.5 0 - 7 % Final     Basophil Rel %   Date Value Ref Range Status   03/22/2022 1.1 0 - 2 % Final     Basophil %   Date Value Ref Range Status   11/16/2022 0.4 0.0 - 1.5 % Final     Immature Grans %   Date Value Ref Range Status   07/07/2022 0.4 0.0 - 0.5 % Final   03/22/2022 0.5 0.0 - 1.0 % Final     Neutrophils Absolute   Date Value Ref Range Status   03/22/2022 4.94 2.0 - 8.8 10*3/uL Final     Neutrophils, Absolute   Date Value Ref Range Status   11/16/2022 5.32 1.70 - 7.00 10*3/mm3 Final     Lymphocytes Absolute   Date Value Ref Range Status   03/22/2022 1.27 0.7 - 5.5 10*3/uL Final     Lymphocytes, Absolute   Date Value Ref Range Status   11/16/2022 0.88 0.70 - 3.10 10*3/mm3 Final     Monocytes Absolute   Date Value Ref Range Status   03/22/2022 1.07 0.0 - 1.7 10*3/uL Final     Monocytes, Absolute   Date Value Ref Range Status   11/16/2022 0.96 (H) 0.10 - 0.90 10*3/mm3 Final     Eosinophils Absolute   Date Value Ref Range Status   03/22/2022 0.43 0.0 - 0.8 10*3/uL Final     Eosinophils, Absolute   Date Value Ref Range Status   11/16/2022 0.21 0.00 - 0.40 10*3/mm3 Final     Basophils Absolute   Date Value Ref Range Status   03/22/2022 0.09 0.0 - 0.2 10*3/uL Final     Basophils, Absolute   Date Value Ref Range Status   11/16/2022 0.03 0.00 - 0.20 10*3/mm3 Final     Immature Grans, Absolute   Date Value Ref Range Status   07/07/2022 0.05 0.00 - 0.05 10*3/mm3 Final   03/22/2022 0.04 0.00 - 0.10 10*3/uL Final     nRBC   Date Value Ref Range Status   07/07/2022 0.0 0.0 - 0.2 /100 WBC Final       Lab Results   Component Value Date    GLUCOSE 137 (H) 11/11/2022    BUN 11 11/11/2022    CREATININE 0.88 11/11/2022    EGFRIFNONA 100 01/28/2022    EGFRIFAFRI >60 05/20/2019    BCR 12.5 11/11/2022    K 3.9 11/11/2022    CO2 27.0  11/11/2022    CALCIUM 9.4 11/11/2022    ALBUMIN 3.70 10/05/2022    LABIL2 1.5 03/22/2022    AST 15 10/05/2022    ALT 17 10/05/2022         ASSESSMENT:    1. 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  He has been referred to Dr. Escalona to see whether he is a candidate for radiation treatments.  Would also consider systemic treatment for him for molecular targets as he has had very poor response to chemotherapy malignancy  2. 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  pT1 cpN1 M0 consistent with stage IIb disease, found on surveillance CT scans. Brain MRI was negative. We will recommend platinum doublet followed by Tecentriq unless he has EGFR mutation for which adjuvant osimertinib will be considered.  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  Patient was treated with cisplatin and Alimta in the adjuvant setting for his original malignancy.  He is currently on cisplatin and Taxol.  Patient is receiving cycle #4 today.  This tumor was completely resected and has negative margins and no mediastinal lymph node involvement.  Reviewed the pathology with Dr. Trevizo.  This is a new second lung primary.  Patient has received a total of 4 cycles of combination of cisplatin and Taxol completed on 10/5/2022.  Patient will was a candidate for maintenance treatment with Tecentriq for now given that he has developed relapsed disease we will look at other options  3. High PD-L1 expression at 95%. Reviewed foundation 1 testing results.  This showed MET amplification, ERBB2 amplification for which targeted therapy is available for including crizotinib for MET amplification, afatinib for  ERBB2 but this is approved for metastatic disease.   4. We have extensively reviewed the literature.  He will be a candidate for ER B B2 targeted therapy but fam-trastuzumab cannot be combined with XRT due to risk of interstitial lung disease.  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  For these reasons we have decided to proceed with immunotherapy along with radiation.  His case was reviewed at the tumor board and this was the recommendation.  Patient has been approved for set Tecentriq therefore we will go ahead and initiate concurrently with radiation  5. Chemotherapy-induced fatigue: Improving  6. HypoMagness anemia secondary to chemotherapy: Plan continue replacement therapy monitor levels  7. Chemotherapy induced anemia improving   8. 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  Increase salt in the diet, fluid restriction.  Continue to monitor his sodium levels.  Hopefully this would improve with treatment    9. History of adenocarcinoma of the right lung, T1c N2 M0, consistent with clinical stage 3A disease in 2019.  S/P combined chemotherapy and radiation with cisplatin and Alimta neoadjuvantly, followed by her right lower lobectomy with mediastinal and hilar lymph node dissection with residual disease pT1 cpN2 M0.  Followed by maintenance durvalumab for 1 year.   10. Recent diagnosis of hypothyroidism, on thyroid replacement therapy  11. Satus post right thoracentesis with cytology negative for malignancy  12. History of pneumothorax following lung biopsy  13. Postop anemia: Reviewed  14. Assessment has been reviewed and updated      PLANS:    1. 2D echocardiogram in preparation for HER2 directed treatment  2. Patient has completed all planned 4 cycles of chemo: Fortunately has developed relapsed disease  3. Begin immunotherapy with Tecentriq.  Has been ordered in [facility]  4. Reviewed the above with  patient and spouse who is present today  5. His case will be reviewed at the next tumor board scheduled on November 15, 2022 and I will see him shortly after that  6. Continue weekly BMP and mag levels CBC.  Reviewed and he will continue  7. Continue magnesium supplements  8. Fluid restriction to 1200 cc/day  9. Continue antinausea medications  10. Reviewed foundation 1 results.  He will be a candidate for molecular targets in the future if he does develop metastatic disease  11. Continue Emla cream to port  12. Continue thyroid replacement therapy through his PCP  13. Continue B12 injections  monthly: Patient did have elevated methylmalonic acid level during the early phases of his treatments.  Continue the same  14. Continue supportive care  15. All questions answered  16. Follow-up 4 weeks         Patient has  questions which have all been answered to the best of my abilities    I have reviewed labs results, imaging, vitals, and medications with the patient today.     Patient verbalized understanding and is in agreement of the above plan.    I spent 40 total minutes, face-to-face, caring for Jc today.  90% of this time involved counseling and/or coordination of care as documented within this note.

## 2022-11-16 ENCOUNTER — OFFICE VISIT (OUTPATIENT)
Dept: ONCOLOGY | Facility: CLINIC | Age: 57
End: 2022-11-16

## 2022-11-16 ENCOUNTER — LAB (OUTPATIENT)
Dept: LAB | Facility: HOSPITAL | Age: 57
End: 2022-11-16

## 2022-11-16 VITALS
RESPIRATION RATE: 18 BRPM | DIASTOLIC BLOOD PRESSURE: 74 MMHG | HEART RATE: 96 BPM | WEIGHT: 197 LBS | SYSTOLIC BLOOD PRESSURE: 118 MMHG | BODY MASS INDEX: 24.49 KG/M2 | HEIGHT: 75 IN | TEMPERATURE: 96.8 F

## 2022-11-16 DIAGNOSIS — C34.91 NON-SMALL CELL CARCINOMA OF LUNG, RIGHT: Primary | ICD-10-CM

## 2022-11-16 DIAGNOSIS — D64.9 ANEMIA, UNSPECIFIED TYPE: ICD-10-CM

## 2022-11-16 LAB
ANION GAP SERPL CALCULATED.3IONS-SCNC: 11 MMOL/L (ref 5–15)
BASOPHILS # BLD AUTO: 0.03 10*3/MM3 (ref 0–0.2)
BASOPHILS NFR BLD AUTO: 0.4 % (ref 0–1.5)
BUN SERPL-MCNC: 13 MG/DL (ref 6–20)
BUN/CREAT SERPL: 14.1 (ref 7–25)
CALCIUM SPEC-SCNC: 9.3 MG/DL (ref 8.6–10.5)
CHLORIDE SERPL-SCNC: 90 MMOL/L (ref 98–107)
CO2 SERPL-SCNC: 28 MMOL/L (ref 22–29)
CREAT SERPL-MCNC: 0.92 MG/DL (ref 0.76–1.27)
DEPRECATED RDW RBC AUTO: 45.2 FL (ref 37–54)
EGFRCR SERPLBLD CKD-EPI 2021: 96.4 ML/MIN/1.73
EOSINOPHIL # BLD AUTO: 0.21 10*3/MM3 (ref 0–0.4)
EOSINOPHIL NFR BLD AUTO: 2.8 % (ref 0.3–6.2)
ERYTHROCYTE [DISTWIDTH] IN BLOOD BY AUTOMATED COUNT: 13.8 % (ref 12.3–15.4)
GLUCOSE SERPL-MCNC: 142 MG/DL (ref 65–99)
HCT VFR BLD AUTO: 26.5 % (ref 37.5–51)
HGB BLD-MCNC: 8.6 G/DL (ref 13–17.7)
HOLD SPECIMEN: NORMAL
LYMPHOCYTES # BLD AUTO: 0.88 10*3/MM3 (ref 0.7–3.1)
LYMPHOCYTES NFR BLD AUTO: 11.9 % (ref 19.6–45.3)
MAGNESIUM SERPL-MCNC: 1.7 MG/DL (ref 1.6–2.6)
MCH RBC QN AUTO: 30.4 PG (ref 26.6–33)
MCHC RBC AUTO-ENTMCNC: 32.5 G/DL (ref 31.5–35.7)
MCV RBC AUTO: 93.6 FL (ref 79–97)
MONOCYTES # BLD AUTO: 0.96 10*3/MM3 (ref 0.1–0.9)
MONOCYTES NFR BLD AUTO: 13 % (ref 5–12)
NEUTROPHILS NFR BLD AUTO: 5.32 10*3/MM3 (ref 1.7–7)
NEUTROPHILS NFR BLD AUTO: 71.9 % (ref 42.7–76)
PLATELET # BLD AUTO: 302 10*3/MM3 (ref 140–450)
PMV BLD AUTO: 8.1 FL (ref 6–12)
POTASSIUM SERPL-SCNC: 3.7 MMOL/L (ref 3.5–5.2)
RBC # BLD AUTO: 2.83 10*6/MM3 (ref 4.14–5.8)
SODIUM SERPL-SCNC: 129 MMOL/L (ref 136–145)
WBC NRBC COR # BLD: 7.4 10*3/MM3 (ref 3.4–10.8)

## 2022-11-16 PROCEDURE — 36415 COLL VENOUS BLD VENIPUNCTURE: CPT

## 2022-11-16 PROCEDURE — 83735 ASSAY OF MAGNESIUM: CPT

## 2022-11-16 PROCEDURE — 99215 OFFICE O/P EST HI 40 MIN: CPT | Performed by: INTERNAL MEDICINE

## 2022-11-16 PROCEDURE — 85025 COMPLETE CBC W/AUTO DIFF WBC: CPT

## 2022-11-16 PROCEDURE — 80048 BASIC METABOLIC PNL TOTAL CA: CPT

## 2022-11-17 DIAGNOSIS — C34.92 NSCLC OF LEFT LUNG: Primary | ICD-10-CM

## 2022-11-17 DIAGNOSIS — C34.31 CANCER OF LOWER LOBE OF RIGHT LUNG: ICD-10-CM

## 2022-11-17 DIAGNOSIS — C34.91 NON-SMALL CELL CARCINOMA OF LUNG, RIGHT: ICD-10-CM

## 2022-11-17 RX ORDER — OXYCODONE HYDROCHLORIDE AND ACETAMINOPHEN 5; 325 MG/1; MG/1
1 TABLET ORAL EVERY 6 HOURS PRN
Qty: 40 TABLET | Refills: 0 | Status: SHIPPED | OUTPATIENT
Start: 2022-11-17 | End: 2022-12-27 | Stop reason: SDUPTHER

## 2022-11-17 RX ORDER — SODIUM CHLORIDE 9 MG/ML
250 INJECTION, SOLUTION INTRAVENOUS ONCE
Status: CANCELLED | OUTPATIENT
Start: 2022-11-22

## 2022-11-18 DIAGNOSIS — E87.1 HYPONATREMIA: Primary | ICD-10-CM

## 2022-11-20 LAB
RAD ONC ARIA COURSE ID: NORMAL
RAD ONC ARIA COURSE LAST TREATMENT DATE: NORMAL
RAD ONC ARIA COURSE START DATE: NORMAL
RAD ONC ARIA COURSE TREATMENT ELAPSED DAYS: 0
RAD ONC ARIA FIRST TREATMENT DATE: NORMAL
RAD ONC ARIA PLAN FRACTIONS TREATED TO DATE: 1
RAD ONC ARIA PLAN ID: NORMAL
RAD ONC ARIA PLAN PRESCRIBED DOSE PER FRACTION: 2 GY
RAD ONC ARIA PLAN PRIMARY REFERENCE POINT: NORMAL
RAD ONC ARIA PLAN TOTAL FRACTIONS PRESCRIBED: 30
RAD ONC ARIA PLAN TOTAL PRESCRIBED DOSE: 6000 CGY
RAD ONC ARIA REFERENCE POINT DOSAGE GIVEN TO DATE: 2 GY
RAD ONC ARIA REFERENCE POINT ID: NORMAL
RAD ONC ARIA REFERENCE POINT SESSION DOSAGE GIVEN: 2 GY

## 2022-11-20 PROCEDURE — 77427 RADIATION TX MANAGEMENT X5: CPT | Performed by: RADIOLOGY

## 2022-11-20 PROCEDURE — 77386: CPT | Performed by: RADIOLOGY

## 2022-11-20 PROCEDURE — 77014 CHG CT GUIDANCE RADIATION THERAPY FLDS PLACEMENT: CPT | Performed by: RADIOLOGY

## 2022-11-21 ENCOUNTER — RADIATION ONCOLOGY WEEKLY ASSESSMENT (OUTPATIENT)
Dept: RADIATION ONCOLOGY | Facility: HOSPITAL | Age: 57
End: 2022-11-21

## 2022-11-21 ENCOUNTER — TELEPHONE (OUTPATIENT)
Dept: ONCOLOGY | Facility: CLINIC | Age: 57
End: 2022-11-21

## 2022-11-21 ENCOUNTER — HOSPITAL ENCOUNTER (OUTPATIENT)
Dept: RADIATION ONCOLOGY | Facility: HOSPITAL | Age: 57
Discharge: HOME | End: 2022-11-21

## 2022-11-21 VITALS
SYSTOLIC BLOOD PRESSURE: 135 MMHG | BODY MASS INDEX: 24.62 KG/M2 | HEART RATE: 67 BPM | DIASTOLIC BLOOD PRESSURE: 79 MMHG | OXYGEN SATURATION: 95 % | RESPIRATION RATE: 18 BRPM | WEIGHT: 197 LBS

## 2022-11-21 DIAGNOSIS — C34.12 MALIGNANT NEOPLASM OF UPPER LOBE OF LEFT LUNG: Primary | ICD-10-CM

## 2022-11-21 LAB
RAD ONC ARIA COURSE ID: NORMAL
RAD ONC ARIA COURSE LAST TREATMENT DATE: NORMAL
RAD ONC ARIA COURSE START DATE: NORMAL
RAD ONC ARIA COURSE TREATMENT ELAPSED DAYS: 1
RAD ONC ARIA FIRST TREATMENT DATE: NORMAL
RAD ONC ARIA PLAN FRACTIONS TREATED TO DATE: 2
RAD ONC ARIA PLAN ID: NORMAL
RAD ONC ARIA PLAN PRESCRIBED DOSE PER FRACTION: 2 GY
RAD ONC ARIA PLAN PRIMARY REFERENCE POINT: NORMAL
RAD ONC ARIA PLAN TOTAL FRACTIONS PRESCRIBED: 30
RAD ONC ARIA PLAN TOTAL PRESCRIBED DOSE: 6000 CGY
RAD ONC ARIA REFERENCE POINT DOSAGE GIVEN TO DATE: 4 GY
RAD ONC ARIA REFERENCE POINT ID: NORMAL
RAD ONC ARIA REFERENCE POINT SESSION DOSAGE GIVEN: 2 GY

## 2022-11-21 PROCEDURE — FACE2FACE: Performed by: RADIOLOGY

## 2022-11-21 PROCEDURE — 77014 CHG CT GUIDANCE RADIATION THERAPY FLDS PLACEMENT: CPT | Performed by: RADIOLOGY

## 2022-11-21 PROCEDURE — 77386: CPT | Performed by: RADIOLOGY

## 2022-11-21 NOTE — PROGRESS NOTES
Patient Name: Jc Driscoll Date: 2022       : 1965        MRN #: [id number] Diagnosis:   Encounter Diagnosis   Name Primary?   • Malignant neoplasm of upper lobe of left lung (HCC) Yes               RADIATION ON TREATMENT VISIT NOTE - CHEST    Treatment Summary    [x] Concurrent Chemo   Regimen: Starting Tecentriq tomorrow 2022 ([facility])      Treatment Site Ref. ID Energy Dose/Fx (cGy) #Fx Dose Correction (cGy) Total Dose (cGy) Start Date End Date Elapsed Days   [id number]_CWRecur [id number]_CWRecur 6X 200  0 400 2022 1       General:           Review of Systems    [x] No new complaints [] Fever/chills  Night sweats  [] Decreased energy level [] Decreased appetite [] Oxygen   [] Dry cough [] Productive cough [] SOB  [] Hemoptysis [] Dysphagia      [x] Fatigue,  severity: 0 None [x] Pain,  severity: 1, location: LUE/shoulder  Pain medication regimen: NONE      Esophagitis regimen: NONE      Skin regimen: NONE     Comments/Notes:  Left shoulder/arm pain discussed; no neuro or brachial plexus symptoms--just the pain.    KPS: 90%       Vital Signs: /79   Pulse 67   Resp 18   Wt 89.4 kg (197 lb)   SpO2 95%   BMI 24.62 kg/m²     Weight:   Wt Readings from Last 3 Encounters:   22 89.7 kg (197 lb 12 oz)   22 89.7 kg (197 lb 12.8 oz)   22 89.4 kg (197 lb)       Medication:   Current Outpatient Medications:   •  amLODIPine (NORVASC) 10 MG tablet, Take 10 mg by mouth Daily., Disp: , Rfl:   •  amLODIPine (NORVASC) 5 MG tablet, Take 5 mg by mouth Daily., Disp: , Rfl:   •  buPROPion XL (WELLBUTRIN XL) 300 MG 24 hr tablet, Take 300 mg by mouth Every Morning., Disp: , Rfl:   •  buPROPion XL (WELLBUTRIN XL) 300 MG 24 hr tablet, Take 1 tablet by mouth Every Morning., Disp: , Rfl:   •  desvenlafaxine (PRISTIQ) 50 MG 24 hr tablet, Take 50 mg by mouth Daily., Disp: , Rfl:   •  diazePAM (Valium) 2 MG tablet, Take 1 tablet by mouth At Night As Needed for Anxiety or Muscle Spasms.,  Disp: 30 tablet, Rfl: 0  •  gabapentin (NEURONTIN) 300 MG capsule, Take 1 capsule by mouth Every 8 (Eight) Hours for 30 days., Disp: 90 capsule, Rfl: 0  •  levothyroxine (SYNTHROID, LEVOTHROID) 100 MCG tablet, Take 100 mcg by mouth Every Morning., Disp: , Rfl:   •  levothyroxine (SYNTHROID, LEVOTHROID) 100 MCG tablet, Take 1 tablet by mouth Daily., Disp: , Rfl:   •  lidocaine-prilocaine (EMLA) 2.5-2.5 % cream, Apply 1 application topically to the appropriate area as directed Take As Directed. Apply to port area 30 mins prior to port access, Disp: 30 g, Rfl: 6  •  liothyronine (CYTOMEL) 5 MCG tablet, Take 5 mcg by mouth Daily., Disp: , Rfl:   •  liothyronine (CYTOMEL) 5 MCG tablet, Take 1 tablet by mouth Daily., Disp: , Rfl:   •  LORazepam (ATIVAN) 0.5 MG tablet, Take 0.5 mg by mouth Every 8 (Eight) Hours As Needed., Disp: , Rfl:   •  magnesium oxide (MAG-OX) 400 MG tablet, Take 1 tablet by mouth Daily., Disp: 30 tablet, Rfl: 0  •  Magnesium Oxide 400 (240 Mg) MG tablet, Take 1 tablet by mouth Daily., Disp: , Rfl:   •  metoprolol succinate XL (TOPROL-XL) 100 MG 24 hr tablet, Take 100 mg by mouth Daily., Disp: , Rfl:   •  OLANZapine (zyPREXA) 5 MG tablet, Take 1 tablet by mouth Every Night. Take on days 2, 3 and 4 after chemotherapy., Disp: 3 tablet, Rfl: 5  •  ondansetron (ZOFRAN) 8 MG tablet, Take 1 tablet by mouth 3 (Three) Times a Day As Needed for Nausea or Vomiting., Disp: 30 tablet, Rfl: 5  •  oxyCODONE (Roxicodone) 5 MG immediate release tablet, Take 1 tablet by mouth Every 6 (Six) Hours As Needed for Moderate Pain ., Disp: 45 tablet, Rfl: 0  •  oxyCODONE-acetaminophen (PERCOCET) 5-325 MG per tablet, Take 1 tablet by mouth Every 6 (Six) Hours As Needed for Moderate Pain., Disp: 40 tablet, Rfl: 0  •  pantoprazole (PROTONIX) 40 MG EC tablet, Take 40 mg by mouth Daily As Needed., Disp: , Rfl:   •  pantoprazole (PROTONIX) 40 MG EC tablet, Take 1 tablet by mouth Daily., Disp: , Rfl:   •  pravastatin (PRAVACHOL) 10  MG tablet, Take 10 mg by mouth Every Night., Disp: , Rfl:   •  vitamin B-12 (CYANOCOBALAMIN) 1000 MCG tablet, Take 1,000 mcg by mouth Daily., Disp: , Rfl:   No current facility-administered medications for this visit.    Facility-Administered Medications Ordered in Other Visits:   •  heparin injection 500 Units, 500 Units, Intravenous, Chasity TIERNEY Ifeoma Roseline, MD, 500 Units at 11/22/22 1001  •  sodium chloride 0.9 % flush 20 mL, 20 mL, Intravenous, PRNChasity Ifeoma Roseline, MD, 10 mL at 11/22/22 1001       LABS (Reviewed):  Hematology WBC   Date Value Ref Range Status   11/22/2022 6.13 3.40 - 10.80 10*3/mm3 Final   03/22/2022 7.84 4.5 - 11.0 10*3/uL Final     RBC   Date Value Ref Range Status   11/22/2022 2.84 (L) 4.14 - 5.80 10*6/mm3 Final   03/22/2022 4.23 (L) 4.5 - 5.9 10*6/uL Final     Hemoglobin   Date Value Ref Range Status   11/22/2022 8.4 (L) 13.0 - 17.7 g/dL Final   03/22/2022 12.9 (L) 13.5 - 17.5 g/dL Final     Hematocrit   Date Value Ref Range Status   11/22/2022 26.2 (L) 37.5 - 51.0 % Final   03/22/2022 38.8 (L) 41.0 - 53.0 % Final     Platelets   Date Value Ref Range Status   11/22/2022 406 140 - 450 10*3/mm3 Final   03/22/2022 324 140 - 440 10*3/uL Final      Chemistry   Lab Results   Component Value Date    GLUCOSE 165 (H) 11/22/2022    BUN 11 11/22/2022    CREATININE 0.83 11/22/2022    EGFRIFNONA 100 01/28/2022    EGFRIFAFRI >60 05/20/2019    BCR 13.3 11/22/2022    K 4.0 11/22/2022    CO2 25.0 11/22/2022    CALCIUM 9.3 11/22/2022    ALBUMIN 3.40 (L) 11/22/2022    LABIL2 1.5 03/22/2022    AST 46 (H) 11/22/2022    ALT 68 (H) 11/22/2022         Physical Exam:         Pulmonary: [] Cough:     [] Dyspnea:  Neck:  [] Lymphadenopathy  Lungs:  [x] Clear to auscultation  CVS:  [x] Regular rate & rhythm  Skin:  stGstrstastdstest:st st1st GI:  [x] Dysphagia:None     [x] Esophagitis:None     Comments/Notes:     [x] Review of labs, images, dosimetry, dose delivered, & treatment parameters.    Comments:     [x] Patient  treatment setup reviewed.    Comments:     Recommendations: continue XRT and supportive measures  Monitoring CW pain; no brachial plexus symptoms.    [x] Continue RT  [] Change RT Plan [] Hold RT, length:        Approved by:     Chino Escalona MD

## 2022-11-21 NOTE — TELEPHONE ENCOUNTER
Late entry from 11/17/22: received a call from the pt asking for a refill of pain medication. Refill request sent to [facility].

## 2022-11-22 ENCOUNTER — OFFICE VISIT (OUTPATIENT)
Dept: ONCOLOGY | Facility: CLINIC | Age: 57
End: 2022-11-22

## 2022-11-22 ENCOUNTER — HOSPITAL ENCOUNTER (OUTPATIENT)
Dept: ONCOLOGY | Facility: HOSPITAL | Age: 57
Setting detail: INFUSION SERIES
Discharge: HOME | End: 2022-11-22

## 2022-11-22 VITALS
RESPIRATION RATE: 18 BRPM | WEIGHT: 197.8 LBS | HEART RATE: 86 BPM | BODY MASS INDEX: 24.59 KG/M2 | HEIGHT: 75 IN | SYSTOLIC BLOOD PRESSURE: 113 MMHG | TEMPERATURE: 98 F | DIASTOLIC BLOOD PRESSURE: 68 MMHG

## 2022-11-22 VITALS
DIASTOLIC BLOOD PRESSURE: 68 MMHG | HEART RATE: 86 BPM | SYSTOLIC BLOOD PRESSURE: 113 MMHG | RESPIRATION RATE: 18 BRPM | BODY MASS INDEX: 24.59 KG/M2 | TEMPERATURE: 98 F | WEIGHT: 197.75 LBS | HEIGHT: 75 IN

## 2022-11-22 DIAGNOSIS — C34.31 CANCER OF LOWER LOBE OF RIGHT LUNG: ICD-10-CM

## 2022-11-22 DIAGNOSIS — C34.91 NON-SMALL CELL CARCINOMA OF LUNG, RIGHT: Primary | ICD-10-CM

## 2022-11-22 DIAGNOSIS — C34.92 NSCLC OF LEFT LUNG: Primary | ICD-10-CM

## 2022-11-22 DIAGNOSIS — Z71.9 ENCOUNTER FOR EDUCATION: ICD-10-CM

## 2022-11-22 DIAGNOSIS — C34.92 NSCLC OF LEFT LUNG: ICD-10-CM

## 2022-11-22 DIAGNOSIS — Z45.2 ENCOUNTER FOR CARE RELATED TO VASCULAR ACCESS PORT: ICD-10-CM

## 2022-11-22 LAB
ALBUMIN SERPL-MCNC: 3.4 G/DL (ref 3.5–5.2)
ALBUMIN/GLOB SERPL: 0.9 G/DL
ALP SERPL-CCNC: 191 U/L (ref 39–117)
ALT SERPL W P-5'-P-CCNC: 68 U/L (ref 1–41)
ANION GAP SERPL CALCULATED.3IONS-SCNC: 14 MMOL/L (ref 5–15)
AST SERPL-CCNC: 46 U/L (ref 1–40)
BASOPHILS # BLD AUTO: 0.02 10*3/MM3 (ref 0–0.2)
BASOPHILS NFR BLD AUTO: 0.3 % (ref 0–1.5)
BILIRUB SERPL-MCNC: 0.4 MG/DL (ref 0–1.2)
BUN SERPL-MCNC: 11 MG/DL (ref 6–20)
BUN/CREAT SERPL: 13.3 (ref 7–25)
CALCIUM SPEC-SCNC: 9.3 MG/DL (ref 8.6–10.5)
CHLORIDE SERPL-SCNC: 88 MMOL/L (ref 98–107)
CO2 SERPL-SCNC: 25 MMOL/L (ref 22–29)
CREAT SERPL-MCNC: 0.83 MG/DL (ref 0.76–1.27)
DEPRECATED RDW RBC AUTO: 45.2 FL (ref 37–54)
EGFRCR SERPLBLD CKD-EPI 2021: 101.4 ML/MIN/1.73
EOSINOPHIL # BLD AUTO: 0.11 10*3/MM3 (ref 0–0.4)
EOSINOPHIL NFR BLD AUTO: 1.8 % (ref 0.3–6.2)
ERYTHROCYTE [DISTWIDTH] IN BLOOD BY AUTOMATED COUNT: 14 % (ref 12.3–15.4)
GLOBULIN UR ELPH-MCNC: 3.7 GM/DL
GLUCOSE BLDC GLUCOMTR-MCNC: 180 MG/DL (ref 70–105)
GLUCOSE SERPL-MCNC: 165 MG/DL (ref 65–99)
HCT VFR BLD AUTO: 26.2 % (ref 37.5–51)
HGB BLD-MCNC: 8.4 G/DL (ref 13–17.7)
LYMPHOCYTES # BLD AUTO: 0.6 10*3/MM3 (ref 0.7–3.1)
LYMPHOCYTES NFR BLD AUTO: 9.8 % (ref 19.6–45.3)
MCH RBC QN AUTO: 29.6 PG (ref 26.6–33)
MCHC RBC AUTO-ENTMCNC: 32.1 G/DL (ref 31.5–35.7)
MCV RBC AUTO: 92.3 FL (ref 79–97)
MONOCYTES # BLD AUTO: 0.61 10*3/MM3 (ref 0.1–0.9)
MONOCYTES NFR BLD AUTO: 10 % (ref 5–12)
NEUTROPHILS NFR BLD AUTO: 4.79 10*3/MM3 (ref 1.7–7)
NEUTROPHILS NFR BLD AUTO: 78.1 % (ref 42.7–76)
PLATELET # BLD AUTO: 406 10*3/MM3 (ref 140–450)
PMV BLD AUTO: 8.3 FL (ref 6–12)
POTASSIUM SERPL-SCNC: 4 MMOL/L (ref 3.5–5.2)
PROT SERPL-MCNC: 7.1 G/DL (ref 6–8.5)
RAD ONC ARIA COURSE ID: NORMAL
RAD ONC ARIA COURSE LAST TREATMENT DATE: NORMAL
RAD ONC ARIA COURSE START DATE: NORMAL
RAD ONC ARIA COURSE TREATMENT ELAPSED DAYS: 2
RAD ONC ARIA FIRST TREATMENT DATE: NORMAL
RAD ONC ARIA PLAN FRACTIONS TREATED TO DATE: 3
RAD ONC ARIA PLAN ID: NORMAL
RAD ONC ARIA PLAN PRESCRIBED DOSE PER FRACTION: 2 GY
RAD ONC ARIA PLAN PRIMARY REFERENCE POINT: NORMAL
RAD ONC ARIA PLAN TOTAL FRACTIONS PRESCRIBED: 30
RAD ONC ARIA PLAN TOTAL PRESCRIBED DOSE: 6000 CGY
RAD ONC ARIA REFERENCE POINT DOSAGE GIVEN TO DATE: 6 GY
RAD ONC ARIA REFERENCE POINT ID: NORMAL
RAD ONC ARIA REFERENCE POINT SESSION DOSAGE GIVEN: 2 GY
RBC # BLD AUTO: 2.84 10*6/MM3 (ref 4.14–5.8)
SODIUM SERPL-SCNC: 127 MMOL/L (ref 136–145)
T4 FREE SERPL-MCNC: 1.37 NG/DL (ref 0.93–1.7)
TSH SERPL DL<=0.05 MIU/L-ACNC: 1.21 UIU/ML (ref 0.27–4.2)
WBC NRBC COR # BLD: 6.13 10*3/MM3 (ref 3.4–10.8)

## 2022-11-22 PROCEDURE — 99215 OFFICE O/P EST HI 40 MIN: CPT | Performed by: NURSE PRACTITIONER

## 2022-11-22 PROCEDURE — 25010000002 HEPARIN LOCK FLUSH PER 10 UNITS: Performed by: INTERNAL MEDICINE

## 2022-11-22 PROCEDURE — 77386: CPT | Performed by: RADIOLOGY

## 2022-11-22 PROCEDURE — 80050 GENERAL HEALTH PANEL: CPT | Performed by: INTERNAL MEDICINE

## 2022-11-22 PROCEDURE — 82962 GLUCOSE BLOOD TEST: CPT

## 2022-11-22 PROCEDURE — 96413 CHEMO IV INFUSION 1 HR: CPT

## 2022-11-22 PROCEDURE — 84439 ASSAY OF FREE THYROXINE: CPT | Performed by: INTERNAL MEDICINE

## 2022-11-22 PROCEDURE — 25010000002 ATEZOLIZUMAB 1200 MG/20ML SOLUTION 20 ML VIAL: Performed by: INTERNAL MEDICINE

## 2022-11-22 PROCEDURE — 77014 CHG CT GUIDANCE RADIATION THERAPY FLDS PLACEMENT: CPT | Performed by: RADIOLOGY

## 2022-11-22 RX ORDER — SODIUM CHLORIDE 9 MG/ML
250 INJECTION, SOLUTION INTRAVENOUS ONCE
Status: COMPLETED | OUTPATIENT
Start: 2022-11-22 | End: 2022-11-22

## 2022-11-22 RX ORDER — SODIUM CHLORIDE 0.9 % (FLUSH) 0.9 %
20 SYRINGE (ML) INJECTION AS NEEDED
Status: DISCONTINUED | OUTPATIENT
Start: 2022-11-22 | End: 2022-11-23 | Stop reason: HOSPADM

## 2022-11-22 RX ORDER — HEPARIN SODIUM (PORCINE) LOCK FLUSH IV SOLN 100 UNIT/ML 100 UNIT/ML
500 SOLUTION INTRAVENOUS AS NEEDED
Status: DISCONTINUED | OUTPATIENT
Start: 2022-11-22 | End: 2022-11-23 | Stop reason: HOSPADM

## 2022-11-22 RX ADMIN — ATEZOLIZUMAB 1200 MG: 1200 INJECTION, SOLUTION INTRAVENOUS at 08:56

## 2022-11-22 RX ADMIN — Medication 10 ML: at 10:01

## 2022-11-22 RX ADMIN — HEPARIN 500 UNITS: 100 SYRINGE at 10:01

## 2022-11-22 RX ADMIN — SODIUM CHLORIDE 250 ML: 9 INJECTION, SOLUTION INTRAVENOUS at 08:56

## 2022-11-22 NOTE — PROGRESS NOTES
Pt here for C1D1 Tecentriq.  Education and consent done by Shayy Montemayor NP prior to treatment.  Port accessed by Lauren Ayaal RN.  Blood drawn from port.  10 cc blood wasted prior to specimen collection.  Specimens sent to lab for processing.  Treatment given and pt tolerated.  Port flushed and needle removed.  Pt given AVS w/ next appointment and v/u.  Pt discharged from clinic.

## 2022-11-22 NOTE — PROGRESS NOTES
Education for Administration of Chemotherapy and/or Biotherapy     NAME: Jc Driscoll  : 1965  MRN: [id number]  DATE OF SERVICE: 2022  REASON FOR VISIT: PATIENT EDUCATION    Mr. Jc Driscoll is here today for education on his upcoming chemotherapy and/or biotherapy recommended for treatment of his disease.    I reviewed treatment options, obtained signed consent, and answered any questions he had regarding the administration of atezolizumab.     Jc Driscoll has already consulted with Azucena Gaspar MD  for the treatment of recurrent left lung adenocarcinoma.  The patient's oncologist has discussed the same treatment options with the patient and answered his questions prior to today's visit on 2022.    TREATMENT GOALS:  The goal of the treatment is to:    [x] Curative intent - intent is cure; cure implies patient survival will not be restricted by current cancer diagnosis   [] Control  - intent is to extend survival but not long enough to meet definition of cure for patient with that diagnosis   [] Palliative - means treatment given in a non-curative setting to optimize symptom control, improve quality of life, and improve survival    This treatment has been explained to Jc Driscoll. Alternative methods of treatment, if any, have been explained to Jc Driscoll, as have the benefits and risks of each. Based on the physician's explanation of the benefits and risks of this treatment and any alternatives available, the patient agrees the potential benefits outweighs the potential risks involved. I have explained to the patient the most likely complications which might occur from this treatment. The patient understands along with the treatment additional medications may be necessary to lessen the side effects.     SIDE EFFECTS:  Possible side effects may include but are not limited to, any of the following, or a combination of the following:    []  Abdominal pain  []  Hypersensitivity reaction  []  Rash   []  Allergic Reaction []  Hypertension []  Secondary malignancies   [x]  Anemia []  Hypertensive crisis  []  Sexual side effects    []  Anxiety []  Hypertriglyceridemia [x]  Shortness of breath   [x]  Back pain []  Hypoalbuminemia []  Skin changes   [x]  Body pain []  Hyponatremia  []  Somnolence   []  Blood clots (DVT/PE) []  Immune-mediated reaction []  Sore throat   []  Bleeding []  Infection  [x]  Swelling   []  Bone pain []  Infusion reaction  [x]  Taste changes   []  Bruising []  Injection site reaction  []  Temperature sensitivity   []  Cardiovascular events  []  Injection site ulceration []  Thrombocytopenia   []  Central neurotoxicity []  Insomnia []  Thyroid changes   []  Chest pain []  Itching []  Tinnitus   []  Chills [x]  Joint pain []  Upper respiratory tract infection    []  Confusion []  Kidney damage []  Visual changes   []  Congestive heart failure [x]  Leukopenia []  Vitlligo   [x]  Constipation []  LFT imbalances [x]  Vomiting   [x]  Cough []  Liver damage []  Watery eyes   []  Depression [x]  Loss of appetite [x]  Weakness   [x]  Diarrhea []  Low blood pressure []  Weight gain   []  Dizziness []  Lung damage []  Weight loss   []  Dry skin []  Menopausal symptoms []  Wound healing complication   []  Ecchymosis []  Menstrual irregularities []  Other   []  Electrolyte imbalances []  Metallic taste  []  Other   []  Elevated LDH []  Mood changes []  Other   []  Eye irritation [x]  Mouth sores []  Other   [x]  Fatigue [x]  Muscle aches  []  Other   []  Fertility effects  []  Nephrotic syndrome []  Other   [x]  Fevers []  Nail changes []  Other   []  Fistula formation [x]  Nausea  []  Other   []  Flu-like symptoms []  Neck pain  []  Other   []  Fluid retention [x]  Neutropenia []  Other   []  Forgetfulness []  Nosebleeds []  Other   []  Gastrointestinal perforation []  Pain in arms/legs []  Other   []  Hand foot syndrome []  Pericardial effusion  []  Other   []  Hair loss/discoloration [x]   Peripheral neuropathy []  Other   []  Headaches []  Petechiae []  Other   []  Hearing loss/change []  Pharyngitis  []  Other   []  Heart damage []  Photosensitivity  []  Other   []  Hematuria []  Pleural effusion  []  Other   []  Hemorrhage []  Proteinuria  []  Other     VASCULAR ACCESS:  The patient was educated on the possible need for vascular access/port placement.  The patient was advised although uncommon, leakage of an infused medication from the vein or venous access device (port) may lead to skin breakdown and/or other tissue damage.  The patient was advised he may have pain, bleeding, and/or bruising from the insertion of a needle in their vein or venous access device (port).  The patient was further advised despite proper technique, infection with redness and irritation may rarely occur at the site where the needle was inserted.  The patient was advised if complications occur, additional medical treatment is available.    BLOOD COUNT MONITORING:  While receiving treatment, it has been explained to the patient blood counts will be monitored.  This may include but is not limited to a complete blood count (CBC). The patient may develop neutropenia, anemia, or thrombocytopenia. This has been explained and a handout was provided to the patient.     NUTRITION:   It was explained to the patient about nutrition and its importance while undergoing chemotherapy and/or biotherapy. Certain medications will be prescribed during the treatment which may change the way foods taste or smell. These changes may cause poor or no appetite. The patient was advised food is fuel for the body, and if it does not get the fuel it needs, he may become malnourished, which can lead to severe fatigue. It was discussed with the patient about calories and how to add high-calorie foods to his diet.  Protein was also mentioned in regards to how it will help make new cells for the body. Information was given to Jc Driscoll regarding good  protein sources.   It was also discussed with the patient the importance of  eating and drinking every 2-3 hours while awake. We discussed fluid intake of at least 6 to 8 ounce glasses of liquids per day to stay hydrated. Examples are listed below:   Water  Juice (fruit or vegetable)  Soda Sport Drinks Soup   Milk  Ensure, Boost, Glucerna Ice Cream Popsicles Jello   Milkshakes Pudding  Gatorade Sherbert Yogurt     REPRODUCTION:  Reproductive risks were discussed, including appropriate use of birth control, and protection during sexual relations. The risks of becoming pregnant while receiving chemotherapy and/or biotherapy were reviewed for females.  Males were instructed to use appropriate birth control to prevent conception during treatment.  We also discussed the importance of using reliable barrier methods while participating in intimate activities as this may expose their partners to a potentially harmful drug. The importance of pregnancy prevention was emphasized due to risks of increased chance of birth defects and miscarriages.     Jc Driscoll was provided handouts on:   1. Home instructions  2. Complete blood counts and terminology  3. Nutrition during cancer therapy   4. Fluids and dehydration  5. Mouth care  6. Cancer-related fatigue  7. Management of constipation    8. Management of diarrhea   9. Handouts from [employer] on specific drugs   10. Handouts from [employer] on specific drugs if applicable   11. A signed copy of chemotherapy/biotherapy consent     TOPICS EDUCATION PROVIDED EDUCATION REINFORCED COMMENTS   ANEMIA:  role of RBC, cause, s/s, ways to manage, role of transfusion [x] [x]    THROMBOCYTOPENIA:  role of platelet, cause, s/s, ways to prevent bleeding, things to avoid, when to seek help [x] [x]    NEUTROPENIA:  role of WBC, cause, infection precautions, s/s of infection, when to call MD [x] [x]    NUTRITION & APPETITE CHANGES:  importance of maintaining healthy diet & weight, ways to  manage to improve intake, dietary consult, exercise regimen [x] [x]    DIARRHEA:  causes, s/s of dehydration, ways to manage, dietary changes, when to call MD [x] [x]    CONSTIPATION:  causes, ways to manage, dietary changes, when to call MD [x] [x]    NAUSEA & VOMITING:  causes, use of antiemetics, dietary changes, when to call MD [x] [x]    MOUTH SORES:  causes, oral care, ways to manage [x] [x]    ALOPECIA:  causes, ways to manage, resources [x] [x]    INFERTILITY & SEXUALITY:  causes, fertility preservation options, sexuality changes, ways to manage, importance of birth control [x] [x]    NERVOUS SYSTEM CHANGES:  causes, s/s, neuropathies, cognitive changes, ways to manage [x] [x]    PAIN:  causes, ways to manage [x] [x]    SKIN & NAIL CHANGES:  cause, s/s, ways to manage [x] [x]    ORGAN TOXICITIES:  cause, s/s, need for diagnostic tests, labs, when to notify MD [x] [x]    SURVIVORSHIP:  distress, distress assessment, secondary malignancies, early/late effects, follow-up, social issues, social support [x] [x]    HOME CARE:  use of spill kits, storing of PO chemo, how to manage bodily fluids [x] [x]    MISCELLANEOUS:  drug interactions, administration, vesicants  [x] [x]      PAST MEDICAL HISTORY:  Past Medical History:   Diagnosis Date   • Allergic rhinitis 07/25/2013    Overview:  4/2/2018 - still zyrtec 10 daily (if stops, gets dermatitis again).    • Anxiety and depression 06/05/2012    Overview:  4/2/2018 -   C/w well 300 xr and Lito 20.  4/8/2019 -   Doing ok even with new lung cancer - lito 20 & well 300xr.    • Chronic pansinusitis 04/08/2019    Overview:  4/8/2019 -   MRI showed chronic thick in frontal, maxillary, ethmoidal ---> to Dr. ZAY field since abx ICC didn't help.  Does netipot bid.    • Diastolic CHF (HCC) 07/09/2014    Overview:  7/4/14 - impaired LV relaxation on Zhou ECHO.  EF 60%. Rest normal   • Dupuytren's contracture of both hands    • Elevated cholesterol    • Erosive esophagitis  07/09/2019    Overview:  EGD 2016 4/2/2018 - nexium OTC daily for few week.  Qod before that.  Now carbonation hurts, epigastric pain 4/8/2019 -   protonix 40 doing well.    • Gastroesophageal reflux disease 02/21/2019   • Hiatal hernia 07/09/2019   • History of colon polyps    • Hypertension    • Lung cancer (HCC)     right lung and in lymph nodes x2   • Mediastinal lymphadenopathy 03/12/2019   • Normocytic anemia 08/08/2019    Overview:  6/2019 - dropped to 9's postop 7/2019 - rebounded to 10's.  8/8/2019 -   Back to 9's - check ferritin, b12 and thyroid.    • Prediabetes 07/09/2014    Overview:  7/4/14 - a1c 6.1 at [place] admission   • Retention of urine 06/27/2019    PSOT OP, RESOLVED       PAST SURGICAL HISTORY:  Past Surgical History:   Procedure Laterality Date   • BRONCHOSCOPY  03/18/2019    EBUS MONTERO NEEDLE BX   • COLONOSCOPY     • DUPUYTREN CONTRACTURE RELEASE Bilateral    • LUNG BIOPSY  03/08/2019    CT GUIDED   • LUNG REMOVAL, PARTIAL Right     right lower   • PORTACATH PLACEMENT  03/20/2019    DR LONGORIA   • THORACOSCOPY Right 6/26/2019    Procedure: RIGHT VATS, OPEN LOWER LOBECTOMY WITH LYMPH NODE DISECTION, WEDGE RESECTION OF RIGHT MIDDLE LOBE;  Surgeon: Terrance Longoria MD;  Location: [facility];  Service: Cardiothoracic   • THORACOSCOPY VIDEO ASSISTED WITH LOBECTOMY Left 7/6/2022    Procedure: THORACOSCOPY VIDEO ASSISTED WITH LOBECTOMY;  Surgeon: Miryam Reyna MD;  Location: [facility];  Service: Thoracic;  Laterality: Left;       CURRENT MEDICATIONS:    Current Outpatient Medications:   •  amLODIPine (NORVASC) 10 MG tablet, Take 10 mg by mouth Daily., Disp: , Rfl:   •  amLODIPine (NORVASC) 5 MG tablet, Take 5 mg by mouth Daily., Disp: , Rfl:   •  buPROPion XL (WELLBUTRIN XL) 300 MG 24 hr tablet, Take 300 mg by mouth Every Morning., Disp: , Rfl:   •  buPROPion XL (WELLBUTRIN XL) 300 MG 24 hr tablet, Take 1 tablet by mouth Every Morning., Disp: , Rfl:   •  desvenlafaxine (PRISTIQ) 50 MG  24 hr tablet, Take 50 mg by mouth Daily., Disp: , Rfl:   •  diazePAM (Valium) 2 MG tablet, Take 1 tablet by mouth At Night As Needed for Anxiety or Muscle Spasms., Disp: 30 tablet, Rfl: 0  •  gabapentin (NEURONTIN) 300 MG capsule, Take 1 capsule by mouth Every 8 (Eight) Hours for 30 days., Disp: 90 capsule, Rfl: 0  •  levothyroxine (SYNTHROID, LEVOTHROID) 100 MCG tablet, Take 100 mcg by mouth Every Morning., Disp: , Rfl:   •  levothyroxine (SYNTHROID, LEVOTHROID) 100 MCG tablet, Take 1 tablet by mouth Daily., Disp: , Rfl:   •  lidocaine-prilocaine (EMLA) 2.5-2.5 % cream, Apply 1 application topically to the appropriate area as directed Take As Directed. Apply to port area 30 mins prior to port access, Disp: 30 g, Rfl: 6  •  liothyronine (CYTOMEL) 5 MCG tablet, Take 5 mcg by mouth Daily., Disp: , Rfl:   •  liothyronine (CYTOMEL) 5 MCG tablet, Take 1 tablet by mouth Daily., Disp: , Rfl:   •  LORazepam (ATIVAN) 0.5 MG tablet, Take 0.5 mg by mouth Every 8 (Eight) Hours As Needed., Disp: , Rfl:   •  magnesium oxide (MAG-OX) 400 MG tablet, Take 1 tablet by mouth Daily., Disp: 30 tablet, Rfl: 0  •  Magnesium Oxide 400 (240 Mg) MG tablet, Take 1 tablet by mouth Daily., Disp: , Rfl:   •  metoprolol succinate XL (TOPROL-XL) 100 MG 24 hr tablet, Take 100 mg by mouth Daily., Disp: , Rfl:   •  OLANZapine (zyPREXA) 5 MG tablet, Take 1 tablet by mouth Every Night. Take on days 2, 3 and 4 after chemotherapy., Disp: 3 tablet, Rfl: 5  •  ondansetron (ZOFRAN) 8 MG tablet, Take 1 tablet by mouth 3 (Three) Times a Day As Needed for Nausea or Vomiting., Disp: 30 tablet, Rfl: 5  •  oxyCODONE (Roxicodone) 5 MG immediate release tablet, Take 1 tablet by mouth Every 6 (Six) Hours As Needed for Moderate Pain ., Disp: 45 tablet, Rfl: 0  •  oxyCODONE-acetaminophen (PERCOCET) 5-325 MG per tablet, Take 1 tablet by mouth Every 6 (Six) Hours As Needed for Moderate Pain., Disp: 40 tablet, Rfl: 0  •  pantoprazole (PROTONIX) 40 MG EC tablet, Take 40  mg by mouth Daily As Needed., Disp: , Rfl:   •  pantoprazole (PROTONIX) 40 MG EC tablet, Take 1 tablet by mouth Daily., Disp: , Rfl:   •  pravastatin (PRAVACHOL) 10 MG tablet, Take 10 mg by mouth Every Night., Disp: , Rfl:   •  vitamin B-12 (CYANOCOBALAMIN) 1000 MCG tablet, Take 1,000 mcg by mouth Daily., Disp: , Rfl:   No current facility-administered medications for this visit.    Facility-Administered Medications Ordered in Other Visits:   •  Atezolizumab (TECENTRIQ) 1,200 mg in sodium chloride 0.9 % 270 mL chemo IVPB, 1,200 mg, Intravenous, Once, Azucena Gaspar MD  •  heparin injection 500 Units, 500 Units, Intravenous, PRN, Azucena Gaspar MD  •  sodium chloride 0.9 % flush 20 mL, 20 mL, Intravenous, PRN, Azucena Gaspar MD  •  sodium chloride 0.9 % infusion 250 mL, 250 mL, Intravenous, Once, Azucena Gaspar MD    ALLERGIES:  No Known Allergies    FAMILY HISTORY:  Family History   Problem Relation Age of Onset   • Cancer Mother         cervical cancer   • Cervical cancer Mother    • Cancer Father         lung cancer   • Lung cancer Father    • Lung cancer Sister    • Cancer Sister         lung cancer   • Malig Hyperthermia Neg Hx        ONCOLOGIC FAMILY HISTORY:  Cancer-related family history includes Cancer in his father, mother, and sister; Cervical cancer in his mother; Lung cancer in his father and sister.    SOCIAL HISTORY:  Social History     Tobacco Use   • Smoking status: Former     Types: Cigarettes     Quit date: 2009     Years since quittin.2   • Smokeless tobacco: Never   Vaping Use   • Vaping Use: Never used   Substance Use Topics   • Alcohol use: Yes     Alcohol/week: 2.0 standard drinks     Types: 2 Cans of beer per week     Comment: Occasional   • Drug use: No       HPI, ROS and PFSH have been reviewed and confirmed on 2022.     REVIEW OF SYSTEMS:  Review of Systems   Constitutional: Positive for fatigue. Negative for chills and fever.    Respiratory: Negative for cough and shortness of breath.    Cardiovascular: Negative for chest pain and leg swelling.   Musculoskeletal:        Left shoulder pain   Hematological: Does not bruise/bleed easily.       PHYSICAL EXAMINATION:  Physical Exam  Constitutional:       General: He is not in acute distress.     Appearance: Normal appearance.   HENT:      Head: Normocephalic and atraumatic.   Pulmonary:      Effort: Pulmonary effort is normal.   Musculoskeletal:         General: Normal range of motion.      Cervical back: Normal range of motion.   Skin:     General: Skin is warm and dry.   Neurological:      Mental Status: He is alert and oriented to person, place, and time.   Psychiatric:         Mood and Affect: Mood normal.         LABS:  WBC   Date Value Ref Range Status   11/22/2022 6.13 3.40 - 10.80 10*3/mm3 Final   03/22/2022 7.84 4.5 - 11.0 10*3/uL Final     RBC   Date Value Ref Range Status   11/22/2022 2.84 (L) 4.14 - 5.80 10*6/mm3 Final   03/22/2022 4.23 (L) 4.5 - 5.9 10*6/uL Final     Hemoglobin   Date Value Ref Range Status   11/22/2022 8.4 (L) 13.0 - 17.7 g/dL Final   03/22/2022 12.9 (L) 13.5 - 17.5 g/dL Final     Hematocrit   Date Value Ref Range Status   11/22/2022 26.2 (L) 37.5 - 51.0 % Final   03/22/2022 38.8 (L) 41.0 - 53.0 % Final     MCV   Date Value Ref Range Status   11/22/2022 92.3 79.0 - 97.0 fL Final   03/22/2022 91.7 80.0 - 100.0 fL Final     MCH   Date Value Ref Range Status   11/22/2022 29.6 26.6 - 33.0 pg Final   03/22/2022 30.5 26.0 - 34.0 pg Final     MCHC   Date Value Ref Range Status   11/22/2022 32.1 31.5 - 35.7 g/dL Final   03/22/2022 33.2 31.0 - 37.0 g/dL Final     RDW   Date Value Ref Range Status   11/22/2022 14.0 12.3 - 15.4 % Final   03/22/2022 14.2 12.0 - 16.8 % Final     RDW-SD   Date Value Ref Range Status   11/22/2022 45.2 37.0 - 54.0 fl Final     MPV   Date Value Ref Range Status   11/22/2022 8.3 6.0 - 12.0 fL Final   03/22/2022 9.3 8.4 - 12.4 fL Final      Platelets   Date Value Ref Range Status   11/22/2022 406 140 - 450 10*3/mm3 Final   03/22/2022 324 140 - 440 10*3/uL Final     Neutrophil Rel %   Date Value Ref Range Status   03/22/2022 63.1 45 - 80 % Final     Neutrophil %   Date Value Ref Range Status   11/22/2022 78.1 (H) 42.7 - 76.0 % Final     Lymphocyte Rel %   Date Value Ref Range Status   03/22/2022 16.2 15 - 50 % Final     Lymphocyte %   Date Value Ref Range Status   11/22/2022 9.8 (L) 19.6 - 45.3 % Final     Monocyte Rel %   Date Value Ref Range Status   03/22/2022 13.6 0 - 15 % Final     Monocyte %   Date Value Ref Range Status   11/22/2022 10.0 5.0 - 12.0 % Final     Eosinophil %   Date Value Ref Range Status   11/22/2022 1.8 0.3 - 6.2 % Final   03/22/2022 5.5 0 - 7 % Final     Basophil Rel %   Date Value Ref Range Status   03/22/2022 1.1 0 - 2 % Final     Basophil %   Date Value Ref Range Status   11/22/2022 0.3 0.0 - 1.5 % Final     Immature Grans %   Date Value Ref Range Status   07/07/2022 0.4 0.0 - 0.5 % Final   03/22/2022 0.5 0.0 - 1.0 % Final     Neutrophils Absolute   Date Value Ref Range Status   03/22/2022 4.94 2.0 - 8.8 10*3/uL Final     Neutrophils, Absolute   Date Value Ref Range Status   11/22/2022 4.79 1.70 - 7.00 10*3/mm3 Final     Lymphocytes Absolute   Date Value Ref Range Status   03/22/2022 1.27 0.7 - 5.5 10*3/uL Final     Lymphocytes, Absolute   Date Value Ref Range Status   11/22/2022 0.60 (L) 0.70 - 3.10 10*3/mm3 Final     Monocytes Absolute   Date Value Ref Range Status   03/22/2022 1.07 0.0 - 1.7 10*3/uL Final     Monocytes, Absolute   Date Value Ref Range Status   11/22/2022 0.61 0.10 - 0.90 10*3/mm3 Final     Eosinophils Absolute   Date Value Ref Range Status   03/22/2022 0.43 0.0 - 0.8 10*3/uL Final     Eosinophils, Absolute   Date Value Ref Range Status   11/22/2022 0.11 0.00 - 0.40 10*3/mm3 Final     Basophils Absolute   Date Value Ref Range Status   03/22/2022 0.09 0.0 - 0.2 10*3/uL Final     Basophils, Absolute   Date  Value Ref Range Status   11/22/2022 0.02 0.00 - 0.20 10*3/mm3 Final     Immature Grans, Absolute   Date Value Ref Range Status   07/07/2022 0.05 0.00 - 0.05 10*3/mm3 Final   03/22/2022 0.04 0.00 - 0.10 10*3/uL Final     nRBC   Date Value Ref Range Status   07/07/2022 0.0 0.0 - 0.2 /100 WBC Final     Lab Results   Component Value Date    GLUCOSE 142 (H) 11/16/2022    BUN 13 11/16/2022    CREATININE 0.92 11/16/2022    EGFRIFNONA 100 01/28/2022    EGFRIFAFRI >60 05/20/2019    BCR 14.1 11/16/2022    K 3.7 11/16/2022    CO2 28.0 11/16/2022    CALCIUM 9.3 11/16/2022    ALBUMIN 3.70 10/05/2022    LABIL2 1.5 03/22/2022    AST 15 10/05/2022    ALT 17 10/05/2022       Assessment & Plan   There are no diagnoses linked to this encounter.  ASSESSMENT   1. Encounter for medication management and education of chemotherapy/biotherapy    2. Relapsed recurrent poorly differentiated adenocarcinoma of the left lung  3. Chemotherapy-induced anemia and fatigue: Improving    PLAN  • Start atezolizumab  • Reviewed CBC  • Notified , financial counselor, and dietician patient starting new treatment   • RTC 12/20/2022 with Azucena Gaspar MD     I have reviewed labs results, imaging, vitals, and medications with the patient today.    A total of 40 minutes spent with the patient, more than 50% of that was spent face-to-face counseling and education  Electronically signed by MULUGETA Garcia, 11/22/22, 09:07 EST

## 2022-11-23 ENCOUNTER — HOSPITAL ENCOUNTER (OUTPATIENT)
Dept: RADIATION ONCOLOGY | Facility: HOSPITAL | Age: 57
Discharge: HOME | End: 2022-11-23

## 2022-11-23 ENCOUNTER — APPOINTMENT (OUTPATIENT)
Dept: LAB | Facility: HOSPITAL | Age: 57
End: 2022-11-23

## 2022-11-23 LAB
RAD ONC ARIA COURSE ID: NORMAL
RAD ONC ARIA COURSE LAST TREATMENT DATE: NORMAL
RAD ONC ARIA COURSE START DATE: NORMAL
RAD ONC ARIA COURSE TREATMENT ELAPSED DAYS: 3
RAD ONC ARIA FIRST TREATMENT DATE: NORMAL
RAD ONC ARIA PLAN FRACTIONS TREATED TO DATE: 4
RAD ONC ARIA PLAN ID: NORMAL
RAD ONC ARIA PLAN PRESCRIBED DOSE PER FRACTION: 2 GY
RAD ONC ARIA PLAN PRIMARY REFERENCE POINT: NORMAL
RAD ONC ARIA PLAN TOTAL FRACTIONS PRESCRIBED: 30
RAD ONC ARIA PLAN TOTAL PRESCRIBED DOSE: 6000 CGY
RAD ONC ARIA REFERENCE POINT DOSAGE GIVEN TO DATE: 8 GY
RAD ONC ARIA REFERENCE POINT ID: NORMAL
RAD ONC ARIA REFERENCE POINT SESSION DOSAGE GIVEN: 2 GY

## 2022-11-23 PROCEDURE — 77336 RADIATION PHYSICS CONSULT: CPT | Performed by: RADIOLOGY

## 2022-11-23 PROCEDURE — 77014 CHG CT GUIDANCE RADIATION THERAPY FLDS PLACEMENT: CPT | Performed by: STUDENT IN AN ORGANIZED HEALTH CARE EDUCATION/TRAINING PROGRAM

## 2022-11-23 PROCEDURE — 77386: CPT | Performed by: STUDENT IN AN ORGANIZED HEALTH CARE EDUCATION/TRAINING PROGRAM

## 2022-11-23 NOTE — PROGRESS NOTES
MULTIDISCIPLINARY TREATMENT PLANNING CONFERENCE  DATE:  11/15/2022     SPECIALTY:  Thoracic Surgery  PRESENTER:  Miryam Reyna MD  SITE: Lung        Please discuss clinical/working stage, TNM, Stage Group, National Treatment Guidelines, and Prognostic Indicators.       CONFERENCE SUMMARY:  Miryam Reyna MD presented a 57 year-old male with a h/o right lung cancer in 2019. Current scans show new abnormalities   in his left lung. Plan: immunotherapy with radiation.                            AJCC STAGE: new primary 2022 [id number]        REFERRAL SUPPORT   Psychosocial Assessment:  []                Clinical Trials:  []                Genetic Testing:  []                Geriatric Assessment:  []                Smoking Cessation:  []                Nutrition Assessment:  []                Social Work Evaluation/Barriers to Care:  []                Behavioral Oncology Evaluation:  []                Palliative Care:  []        EVIDENCE BASED NATIONAL TREATMENT GUIDELINES:  [x]                   SOCIAL HISTORY:   reports that he quit smoking about 13 years ago. His smoking use included cigarettes. He has never used smokeless tobacco. He reports current alcohol use of about 2.0 standard drinks per week. He reports that he does not use drugs.                  PAST MEDICAL HISTORY:   has a past medical history of Allergic rhinitis (07/25/2013), Anxiety and depression (06/05/2012), Chronic pansinusitis (04/08/2019), Diastolic CHF (HCC) (07/09/2014), Dupuytren's contracture of both hands, Elevated cholesterol, Erosive esophagitis (07/09/2019), Gastroesophageal reflux disease (02/21/2019), Hiatal hernia (07/09/2019), History of colon polyps, Hypertension, Lung cancer (HCC), Mediastinal lymphadenopathy (03/12/2019), Normocytic anemia (08/08/2019), Prediabetes (07/09/2014), and Retention of urine (06/27/2019).                  PAST SURGICAL HISTORY:  he has a past surgical history that includes Lung biopsy (03/08/2019); Portacath  placement (03/20/2019); Bronchoscopy (03/18/2019); Dupuytren contracture release (Bilateral); Colonoscopy; Thoracoscopy (Right, 6/26/2019); Lung removal, partial (Right); and thoracoscopy video assisted with lobectomy (Left, 7/6/2022).                 IMAGING:  NM PET/CT Skull Base to Mid Thigh    Result Date: 10/31/2022  1. 4.0 x 3.5 cm hypermetabolic soft tissue mass in the left chest wall interposed between the first and second ribs anteriorly, is consistent with malignant disease recurrence. 2. Small cluster hypermetabolic lymph nodes are seen in the left supraclavicular region, consistent with early manoj metastases. 3. No evidence of metastatic disease in the abdomen or pelvis or skeleton.  Electronically Signed By-Margie Bass MD On:10/31/2022 2:10 PM This report was finalized on [id number] by  Margie Bass MD.                    SURGICAL PROCEDURE / PATHOLOGY:       5/18/2022 [id number] (F) Lung, left, biopsy: Invasive poorly differentiated adenocarcinoma.    7/6/2022 [id number] ([facility]) Lymph Node Level 12: One lymph node with metastatic adenocarcinoma. Focus of metastasis measures 6 mm maximally. Left Upper Lobe, Lobectomy: Poorly differentiated acinar predominant adenocarcinoma with solid component and micropapillary component with extensive necrosis.                 Labs & Biomarkers:  5/18/2022 PD-L1: 60% 7/6/2022 PD-L1: 95%

## 2022-11-28 ENCOUNTER — HOSPITAL ENCOUNTER (OUTPATIENT)
Dept: RADIATION ONCOLOGY | Facility: HOSPITAL | Age: 57
Discharge: HOME | End: 2022-11-28

## 2022-11-28 ENCOUNTER — RADIATION ONCOLOGY WEEKLY ASSESSMENT (OUTPATIENT)
Dept: RADIATION ONCOLOGY | Facility: HOSPITAL | Age: 57
End: 2022-11-28

## 2022-11-28 VITALS
OXYGEN SATURATION: 99 % | DIASTOLIC BLOOD PRESSURE: 86 MMHG | TEMPERATURE: 98.3 F | BODY MASS INDEX: 24.07 KG/M2 | HEIGHT: 75 IN | HEART RATE: 104 BPM | SYSTOLIC BLOOD PRESSURE: 134 MMHG | WEIGHT: 193.6 LBS | RESPIRATION RATE: 18 BRPM

## 2022-11-28 DIAGNOSIS — C34.12 MALIGNANT NEOPLASM OF UPPER LOBE OF LEFT LUNG: Primary | ICD-10-CM

## 2022-11-28 LAB
RAD ONC ARIA COURSE ID: NORMAL
RAD ONC ARIA COURSE LAST TREATMENT DATE: NORMAL
RAD ONC ARIA COURSE START DATE: NORMAL
RAD ONC ARIA COURSE TREATMENT ELAPSED DAYS: 8
RAD ONC ARIA FIRST TREATMENT DATE: NORMAL
RAD ONC ARIA PLAN FRACTIONS TREATED TO DATE: 5
RAD ONC ARIA PLAN ID: NORMAL
RAD ONC ARIA PLAN PRESCRIBED DOSE PER FRACTION: 2 GY
RAD ONC ARIA PLAN PRIMARY REFERENCE POINT: NORMAL
RAD ONC ARIA PLAN TOTAL FRACTIONS PRESCRIBED: 30
RAD ONC ARIA PLAN TOTAL PRESCRIBED DOSE: 6000 CGY
RAD ONC ARIA REFERENCE POINT DOSAGE GIVEN TO DATE: 10 GY
RAD ONC ARIA REFERENCE POINT ID: NORMAL
RAD ONC ARIA REFERENCE POINT SESSION DOSAGE GIVEN: 2 GY

## 2022-11-28 PROCEDURE — 77014 CHG CT GUIDANCE RADIATION THERAPY FLDS PLACEMENT: CPT | Performed by: RADIOLOGY

## 2022-11-28 PROCEDURE — 77386: CPT | Performed by: RADIOLOGY

## 2022-11-28 PROCEDURE — FACE2FACE: Performed by: RADIOLOGY

## 2022-11-28 NOTE — PROGRESS NOTES
"NAME: Jc Driscoll DATE: 2022   : 1965    MRN: [id number] DIAGNOSIS:   Encounter Diagnosis   Name Primary?   • Malignant neoplasm of upper lobe of left lung (HCC) Yes          RADIATION ON TREATMENT VISIT NOTE - CHEST    Treatment Summary  Treatment Site Ref. ID Energy Dose/Fx (cGy) #Fx Dose Correction (cGy) Total Dose (cGy) Start Date End Date Elapsed Days   LtSC_CWRecur LtScv_CWRecur 6X 200  0 1,000 2022 8       [x] Concurrent Chemo   Regimen: Tecentriq- immunotherapy ([facility])     General:     Review of Systems  [x] No new complaints  [] Fever/chills  Night sweats  [] Decreased energy level [] Decreased appetite [] Oxygen:  L/min  [] Dry cough    [] Productive cough   [] SOB  [] Hemoptysis   [] Dysphagia      [x] Fatigue,  severity: 1 [x] Pain,  Severity: 3, location: LUE/ Shoulder   Pain medication regimen: NONE    Esophagitis regimen: NONE    Skin regimen: NONE     Subjective:  His shoulder is still bothering him a little bit today--but better since starting XRT, immunotherapy was draining and poor appetite. No other symptoms, concerns or complaints.    KPS: 80     Vital Signs: /86   Pulse 104   Temp 98.3 °F (36.8 °C) (Oral)   Resp 18   Ht 190.5 cm (75\")   Wt 87.8 kg (193 lb 9.6 oz)   SpO2 99%   BMI 24.20 kg/m²     Weight:   Wt Readings from Last 3 Encounters:   22 87.8 kg (193 lb 9.6 oz)   22 89.7 kg (197 lb 12.8 oz)   22 89.7 kg (197 lb 12 oz)       Medication:   Current Outpatient Medications:   •  amLODIPine (NORVASC) 10 MG tablet, Take 10 mg by mouth Daily., Disp: , Rfl:   •  amLODIPine (NORVASC) 5 MG tablet, Take 5 mg by mouth Daily., Disp: , Rfl:   •  buPROPion XL (WELLBUTRIN XL) 300 MG 24 hr tablet, Take 300 mg by mouth Every Morning., Disp: , Rfl:   •  buPROPion XL (WELLBUTRIN XL) 300 MG 24 hr tablet, Take 1 tablet by mouth Every Morning., Disp: , Rfl:   •  desvenlafaxine (PRISTIQ) 50 MG 24 hr tablet, Take 50 mg by mouth Daily., Disp: , " Rfl:   •  diazePAM (Valium) 2 MG tablet, Take 1 tablet by mouth At Night As Needed for Anxiety or Muscle Spasms., Disp: 30 tablet, Rfl: 0  •  gabapentin (NEURONTIN) 300 MG capsule, Take 1 capsule by mouth Every 8 (Eight) Hours for 30 days., Disp: 90 capsule, Rfl: 0  •  levothyroxine (SYNTHROID, LEVOTHROID) 100 MCG tablet, Take 100 mcg by mouth Every Morning., Disp: , Rfl:   •  levothyroxine (SYNTHROID, LEVOTHROID) 100 MCG tablet, Take 1 tablet by mouth Daily., Disp: , Rfl:   •  lidocaine-prilocaine (EMLA) 2.5-2.5 % cream, Apply 1 application topically to the appropriate area as directed Take As Directed. Apply to port area 30 mins prior to port access, Disp: 30 g, Rfl: 6  •  liothyronine (CYTOMEL) 5 MCG tablet, Take 5 mcg by mouth Daily., Disp: , Rfl:   •  liothyronine (CYTOMEL) 5 MCG tablet, Take 1 tablet by mouth Daily., Disp: , Rfl:   •  LORazepam (ATIVAN) 0.5 MG tablet, Take 0.5 mg by mouth Every 8 (Eight) Hours As Needed., Disp: , Rfl:   •  magnesium oxide (MAG-OX) 400 MG tablet, Take 1 tablet by mouth Daily., Disp: 30 tablet, Rfl: 0  •  Magnesium Oxide 400 (240 Mg) MG tablet, Take 1 tablet by mouth Daily., Disp: , Rfl:   •  metoprolol succinate XL (TOPROL-XL) 100 MG 24 hr tablet, Take 100 mg by mouth Daily., Disp: , Rfl:   •  OLANZapine (zyPREXA) 5 MG tablet, Take 1 tablet by mouth Every Night. Take on days 2, 3 and 4 after chemotherapy., Disp: 3 tablet, Rfl: 5  •  ondansetron (ZOFRAN) 8 MG tablet, Take 1 tablet by mouth 3 (Three) Times a Day As Needed for Nausea or Vomiting., Disp: 30 tablet, Rfl: 5  •  oxyCODONE (Roxicodone) 5 MG immediate release tablet, Take 1 tablet by mouth Every 6 (Six) Hours As Needed for Moderate Pain ., Disp: 45 tablet, Rfl: 0  •  oxyCODONE-acetaminophen (PERCOCET) 5-325 MG per tablet, Take 1 tablet by mouth Every 6 (Six) Hours As Needed for Moderate Pain., Disp: 40 tablet, Rfl: 0  •  pantoprazole (PROTONIX) 40 MG EC tablet, Take 40 mg by mouth Daily As Needed., Disp: , Rfl:   •   pantoprazole (PROTONIX) 40 MG EC tablet, Take 1 tablet by mouth Daily., Disp: , Rfl:   •  pravastatin (PRAVACHOL) 10 MG tablet, Take 10 mg by mouth Every Night., Disp: , Rfl:   •  vitamin B-12 (CYANOCOBALAMIN) 1000 MCG tablet, Take 1,000 mcg by mouth Daily., Disp: , Rfl:      LABS (Reviewed):  Hematology  WBC   Date Value Ref Range Status   11/22/2022 6.13 3.40 - 10.80 10*3/mm3 Final   03/22/2022 7.84 4.5 - 11.0 10*3/uL Final     RBC   Date Value Ref Range Status   11/22/2022 2.84 (L) 4.14 - 5.80 10*6/mm3 Final   03/22/2022 4.23 (L) 4.5 - 5.9 10*6/uL Final     Hemoglobin   Date Value Ref Range Status   11/22/2022 8.4 (L) 13.0 - 17.7 g/dL Final   03/22/2022 12.9 (L) 13.5 - 17.5 g/dL Final     Hematocrit   Date Value Ref Range Status   11/22/2022 26.2 (L) 37.5 - 51.0 % Final   03/22/2022 38.8 (L) 41.0 - 53.0 % Final     Platelets   Date Value Ref Range Status   11/22/2022 406 140 - 450 10*3/mm3 Final   03/22/2022 324 140 - 440 10*3/uL Final     Chemistry   Lab Results   Component Value Date    GLUCOSE 165 (H) 11/22/2022    BUN 11 11/22/2022    CREATININE 0.83 11/22/2022    EGFRIFNONA 100 01/28/2022    EGFRIFAFRI >60 05/20/2019    BCR 13.3 11/22/2022    K 4.0 11/22/2022    CO2 25.0 11/22/2022    CALCIUM 9.3 11/22/2022    ALBUMIN 3.40 (L) 11/22/2022    LABIL2 1.5 03/22/2022    AST 46 (H) 11/22/2022    ALT 68 (H) 11/22/2022         Physical Exam:     Pulmonary: [] Cough:     [] Dyspnea:  Neck:  [] Lymphadenopathy  Lungs:  [x] Clear to auscultation  CVS:  [x] Regular rate & rhythm  Skin:  stGstrstastdstest:st st1st GI:  [] Dysphagia:     [] Esophagitis:     Comments/Notes:  Shoulder mobility better  Ache in that region  States a focal point on the upper posterior shoulder but cannot locate today.    [x] Review of labs, images, dosimetry, dose delivered, & treatment parameters.    Comments:     [x] Patient treatment setup reviewed.    Comments:     Recommendations:  Continue XRT and supportive measures    [x] Continue RT  [] Change RT Plan  [] Hold RT, length:        Approved Electronically By:  Chino Escalona MD, 11/28/2022, 14:41 EST      Confidentiality of this medical record shall be maintained except when use or disclosure is required or permitted by law, regulation or written authorization by the patient.

## 2022-11-29 ENCOUNTER — HOSPITAL ENCOUNTER (OUTPATIENT)
Dept: RADIATION ONCOLOGY | Facility: HOSPITAL | Age: 57
Discharge: HOME | End: 2022-11-29

## 2022-11-29 ENCOUNTER — LAB (OUTPATIENT)
Dept: LAB | Facility: HOSPITAL | Age: 57
End: 2022-11-29

## 2022-11-29 DIAGNOSIS — C34.91 NON-SMALL CELL CARCINOMA OF LUNG, RIGHT: ICD-10-CM

## 2022-11-29 LAB
ALBUMIN SERPL-MCNC: 3.6 G/DL (ref 3.5–5.2)
ALP SERPL-CCNC: 218 U/L (ref 39–117)
ALT SERPL W P-5'-P-CCNC: 87 U/L (ref 1–41)
ANION GAP SERPL CALCULATED.3IONS-SCNC: 11 MMOL/L (ref 5–15)
AST SERPL-CCNC: 50 U/L (ref 1–40)
BASOPHILS # BLD AUTO: 0.02 10*3/MM3 (ref 0–0.2)
BASOPHILS NFR BLD AUTO: 0.3 % (ref 0–1.5)
BILIRUB CONJ SERPL-MCNC: <0.2 MG/DL (ref 0–0.3)
BILIRUB INDIRECT SERPL-MCNC: ABNORMAL MG/DL
BILIRUB SERPL-MCNC: 0.3 MG/DL (ref 0–1.2)
BUN SERPL-MCNC: 10 MG/DL (ref 6–20)
BUN/CREAT SERPL: 10.9 (ref 7–25)
CALCIUM SPEC-SCNC: 9.5 MG/DL (ref 8.6–10.5)
CHLORIDE SERPL-SCNC: 87 MMOL/L (ref 98–107)
CO2 SERPL-SCNC: 28 MMOL/L (ref 22–29)
CREAT SERPL-MCNC: 0.92 MG/DL (ref 0.76–1.27)
DEPRECATED RDW RBC AUTO: 45.9 FL (ref 37–54)
EGFRCR SERPLBLD CKD-EPI 2021: 96.4 ML/MIN/1.73
EOSINOPHIL # BLD AUTO: 0.09 10*3/MM3 (ref 0–0.4)
EOSINOPHIL NFR BLD AUTO: 1.2 % (ref 0.3–6.2)
ERYTHROCYTE [DISTWIDTH] IN BLOOD BY AUTOMATED COUNT: 14.4 % (ref 12.3–15.4)
GLUCOSE SERPL-MCNC: 93 MG/DL (ref 65–99)
HCT VFR BLD AUTO: 27.6 % (ref 37.5–51)
HGB BLD-MCNC: 8.9 G/DL (ref 13–17.7)
LYMPHOCYTES # BLD AUTO: 0.66 10*3/MM3 (ref 0.7–3.1)
LYMPHOCYTES NFR BLD AUTO: 8.8 % (ref 19.6–45.3)
MAGNESIUM SERPL-MCNC: 1.6 MG/DL (ref 1.6–2.6)
MCH RBC QN AUTO: 29.3 PG (ref 26.6–33)
MCHC RBC AUTO-ENTMCNC: 32.2 G/DL (ref 31.5–35.7)
MCV RBC AUTO: 90.8 FL (ref 79–97)
MONOCYTES # BLD AUTO: 1.49 10*3/MM3 (ref 0.1–0.9)
MONOCYTES NFR BLD AUTO: 19.8 % (ref 5–12)
NEUTROPHILS NFR BLD AUTO: 5.25 10*3/MM3 (ref 1.7–7)
NEUTROPHILS NFR BLD AUTO: 69.9 % (ref 42.7–76)
PLATELET # BLD AUTO: 545 10*3/MM3 (ref 140–450)
PMV BLD AUTO: 7.7 FL (ref 6–12)
POTASSIUM SERPL-SCNC: 4.6 MMOL/L (ref 3.5–5.2)
PROT SERPL-MCNC: 7.2 G/DL (ref 6–8.5)
RAD ONC ARIA COURSE ID: NORMAL
RAD ONC ARIA COURSE LAST TREATMENT DATE: NORMAL
RAD ONC ARIA COURSE START DATE: NORMAL
RAD ONC ARIA COURSE TREATMENT ELAPSED DAYS: 9
RAD ONC ARIA FIRST TREATMENT DATE: NORMAL
RAD ONC ARIA PLAN FRACTIONS TREATED TO DATE: 6
RAD ONC ARIA PLAN ID: NORMAL
RAD ONC ARIA PLAN PRESCRIBED DOSE PER FRACTION: 2 GY
RAD ONC ARIA PLAN PRIMARY REFERENCE POINT: NORMAL
RAD ONC ARIA PLAN TOTAL FRACTIONS PRESCRIBED: 30
RAD ONC ARIA PLAN TOTAL PRESCRIBED DOSE: 6000 CGY
RAD ONC ARIA REFERENCE POINT DOSAGE GIVEN TO DATE: 12 GY
RAD ONC ARIA REFERENCE POINT ID: NORMAL
RAD ONC ARIA REFERENCE POINT SESSION DOSAGE GIVEN: 2 GY
RBC # BLD AUTO: 3.04 10*6/MM3 (ref 4.14–5.8)
SODIUM SERPL-SCNC: 126 MMOL/L (ref 136–145)
WBC NRBC COR # BLD: 7.51 10*3/MM3 (ref 3.4–10.8)

## 2022-11-29 PROCEDURE — 77386: CPT | Performed by: RADIOLOGY

## 2022-11-29 PROCEDURE — 83735 ASSAY OF MAGNESIUM: CPT

## 2022-11-29 PROCEDURE — 77014 CHG CT GUIDANCE RADIATION THERAPY FLDS PLACEMENT: CPT | Performed by: RADIOLOGY

## 2022-11-29 PROCEDURE — 80076 HEPATIC FUNCTION PANEL: CPT

## 2022-11-29 PROCEDURE — 80048 BASIC METABOLIC PNL TOTAL CA: CPT

## 2022-11-29 PROCEDURE — 77427 RADIATION TX MANAGEMENT X5: CPT | Performed by: RADIOLOGY

## 2022-11-29 PROCEDURE — 85025 COMPLETE CBC W/AUTO DIFF WBC: CPT

## 2022-11-29 PROCEDURE — 36415 COLL VENOUS BLD VENIPUNCTURE: CPT

## 2022-11-30 ENCOUNTER — HOSPITAL ENCOUNTER (OUTPATIENT)
Dept: RADIATION ONCOLOGY | Facility: HOSPITAL | Age: 57
Discharge: HOME | End: 2022-11-30

## 2022-11-30 DIAGNOSIS — R79.89 ELEVATED LFTS: Primary | ICD-10-CM

## 2022-11-30 LAB
RAD ONC ARIA COURSE ID: NORMAL
RAD ONC ARIA COURSE LAST TREATMENT DATE: NORMAL
RAD ONC ARIA COURSE START DATE: NORMAL
RAD ONC ARIA COURSE TREATMENT ELAPSED DAYS: 10
RAD ONC ARIA FIRST TREATMENT DATE: NORMAL
RAD ONC ARIA PLAN FRACTIONS TREATED TO DATE: 7
RAD ONC ARIA PLAN ID: NORMAL
RAD ONC ARIA PLAN PRESCRIBED DOSE PER FRACTION: 2 GY
RAD ONC ARIA PLAN PRIMARY REFERENCE POINT: NORMAL
RAD ONC ARIA PLAN TOTAL FRACTIONS PRESCRIBED: 30
RAD ONC ARIA PLAN TOTAL PRESCRIBED DOSE: 6000 CGY
RAD ONC ARIA REFERENCE POINT DOSAGE GIVEN TO DATE: 14 GY
RAD ONC ARIA REFERENCE POINT ID: NORMAL
RAD ONC ARIA REFERENCE POINT SESSION DOSAGE GIVEN: 2 GY

## 2022-11-30 PROCEDURE — 77014 CHG CT GUIDANCE RADIATION THERAPY FLDS PLACEMENT: CPT | Performed by: RADIOLOGY

## 2022-11-30 PROCEDURE — 77386: CPT | Performed by: RADIOLOGY

## 2022-12-01 ENCOUNTER — HOSPITAL ENCOUNTER (OUTPATIENT)
Dept: RADIATION ONCOLOGY | Facility: HOSPITAL | Age: 57
Setting detail: RADIATION/ONCOLOGY SERIES
End: 2022-12-01
Payer: COMMERCIAL

## 2022-12-01 ENCOUNTER — TELEPHONE (OUTPATIENT)
Dept: ONCOLOGY | Facility: CLINIC | Age: 57
End: 2022-12-01

## 2022-12-01 ENCOUNTER — HOSPITAL ENCOUNTER (OUTPATIENT)
Dept: RADIATION ONCOLOGY | Facility: HOSPITAL | Age: 57
Discharge: HOME | End: 2022-12-01

## 2022-12-01 LAB
RAD ONC ARIA COURSE ID: NORMAL
RAD ONC ARIA COURSE LAST TREATMENT DATE: NORMAL
RAD ONC ARIA COURSE START DATE: NORMAL
RAD ONC ARIA COURSE TREATMENT ELAPSED DAYS: 11
RAD ONC ARIA FIRST TREATMENT DATE: NORMAL
RAD ONC ARIA PLAN FRACTIONS TREATED TO DATE: 8
RAD ONC ARIA PLAN ID: NORMAL
RAD ONC ARIA PLAN PRESCRIBED DOSE PER FRACTION: 2 GY
RAD ONC ARIA PLAN PRIMARY REFERENCE POINT: NORMAL
RAD ONC ARIA PLAN TOTAL FRACTIONS PRESCRIBED: 30
RAD ONC ARIA PLAN TOTAL PRESCRIBED DOSE: 6000 CGY
RAD ONC ARIA REFERENCE POINT DOSAGE GIVEN TO DATE: 16 GY
RAD ONC ARIA REFERENCE POINT ID: NORMAL
RAD ONC ARIA REFERENCE POINT SESSION DOSAGE GIVEN: 2 GY

## 2022-12-01 PROCEDURE — 77336 RADIATION PHYSICS CONSULT: CPT | Performed by: RADIOLOGY

## 2022-12-01 PROCEDURE — 77014 CHG CT GUIDANCE RADIATION THERAPY FLDS PLACEMENT: CPT | Performed by: RADIOLOGY

## 2022-12-01 PROCEDURE — 77386: CPT | Performed by: RADIOLOGY

## 2022-12-01 NOTE — TELEPHONE ENCOUNTER
----- Message from MULUGETA Garcia sent at 11/30/2022  4:55 PM EST -----  Please asked the patient to stop his Pravachol due to his increasing liver enzymes.  I am entering orders for a hepatitis panel and a repeat CMP to be drawn in 1 week.  Thanks!

## 2022-12-01 NOTE — TELEPHONE ENCOUNTER
Called pt to let him know that his liver enzymes have increased and that he should stop his Pravachol. I also told him that MULUGETA Lucas would like to recheck his labs next week. Appt scheduled. No questions or needs at this time.

## 2022-12-02 ENCOUNTER — HOSPITAL ENCOUNTER (OUTPATIENT)
Dept: RADIATION ONCOLOGY | Facility: HOSPITAL | Age: 57
Discharge: HOME | End: 2022-12-02

## 2022-12-02 ENCOUNTER — DOCUMENTATION (OUTPATIENT)
Dept: ONCOLOGY | Facility: CLINIC | Age: 57
End: 2022-12-02

## 2022-12-02 LAB
RAD ONC ARIA COURSE ID: NORMAL
RAD ONC ARIA COURSE LAST TREATMENT DATE: NORMAL
RAD ONC ARIA COURSE START DATE: NORMAL
RAD ONC ARIA COURSE TREATMENT ELAPSED DAYS: 12
RAD ONC ARIA FIRST TREATMENT DATE: NORMAL
RAD ONC ARIA PLAN FRACTIONS TREATED TO DATE: 9
RAD ONC ARIA PLAN ID: NORMAL
RAD ONC ARIA PLAN PRESCRIBED DOSE PER FRACTION: 2 GY
RAD ONC ARIA PLAN PRIMARY REFERENCE POINT: NORMAL
RAD ONC ARIA PLAN TOTAL FRACTIONS PRESCRIBED: 30
RAD ONC ARIA PLAN TOTAL PRESCRIBED DOSE: 6000 CGY
RAD ONC ARIA REFERENCE POINT DOSAGE GIVEN TO DATE: 18 GY
RAD ONC ARIA REFERENCE POINT ID: NORMAL
RAD ONC ARIA REFERENCE POINT SESSION DOSAGE GIVEN: 2 GY

## 2022-12-02 PROCEDURE — 77014 CHG CT GUIDANCE RADIATION THERAPY FLDS PLACEMENT: CPT | Performed by: RADIOLOGY

## 2022-12-02 PROCEDURE — 77386: CPT | Performed by: RADIOLOGY

## 2022-12-02 NOTE — PROGRESS NOTES
Distress Screening Follow-up     Diagnosis: Malignant Neoplasm of Upper Lobe of Left Lung     Comments: See phone note from [facility] on 11/14    Location of Distress Screening: [facility]      Distress Level: 4 (11/03/2022; 1120)     Physical Concerns:      Pain: N  Sleep: N  Fatigue: N  Tobacco Use: N  Substance Use: N  Memory / Concentration: N  Sexual Health: N  Changes in Eating: N  Loss / Change of Physical Abilities: N  Changes in Appearance: N    Emotional Concerns:      Worry / Anxiety: Y  Sadness / Depression: N  Loss of Interest / Enjoyment: N  Grief / Loss: N  Fear: N  Loneliness: N  Anger: N  Feelings of Worthlessness / [place]: N    Social Concerns:      Relationship w/ Spouse / Partner: N  Relationship w/ Children: N  Relationship w/ Family Members: N  Relationship w/ Friends / Coworkers: N  Ability to have Children: N     Practical Problems:      Communication w/ Healthcare Team: N  Taking Care of Myself: N  Taking Care of Others: N  Work / School: N  Housing: N   Finances: N  Insurance: N   Transportation: N   Childcare: N   Food Insecurity: N  Access to Medicines: N  Treatment Decisions: N     Spiritual Concerns:     Sense of Meaning or Purpose: N  Changes in Aileen or Beliefs: N  Death, Dying, or Afterlife: N  Conflict b/t Beliefs & Cancer Treatments: N  Relationship w/ the Sacred: N  Ritual or Dietary Needs: N

## 2022-12-05 ENCOUNTER — TELEPHONE (OUTPATIENT)
Dept: ONCOLOGY | Facility: CLINIC | Age: 57
End: 2022-12-05

## 2022-12-05 ENCOUNTER — HOSPITAL ENCOUNTER (OUTPATIENT)
Facility: HOSPITAL | Age: 57
Setting detail: OBSERVATION
Discharge: HOME | End: 2022-12-11
Attending: EMERGENCY MEDICINE | Admitting: HOSPITALIST

## 2022-12-05 ENCOUNTER — APPOINTMENT (OUTPATIENT)
Dept: GENERAL RADIOLOGY | Facility: HOSPITAL | Age: 57
End: 2022-12-05

## 2022-12-05 ENCOUNTER — APPOINTMENT (OUTPATIENT)
Dept: RADIATION ONCOLOGY | Facility: HOSPITAL | Age: 57
End: 2022-12-05
Payer: COMMERCIAL

## 2022-12-05 DIAGNOSIS — R53.1 WEAKNESS: Primary | ICD-10-CM

## 2022-12-05 DIAGNOSIS — E86.0 DEHYDRATION: ICD-10-CM

## 2022-12-05 DIAGNOSIS — E22.2 SIADH (SYNDROME OF INAPPROPRIATE ADH PRODUCTION): ICD-10-CM

## 2022-12-05 DIAGNOSIS — E87.1 HYPONATREMIA: ICD-10-CM

## 2022-12-05 PROBLEM — Z29.8 IMMUNOTHERAPY: Status: ACTIVE | Noted: 2022-12-05

## 2022-12-05 PROBLEM — Z29.89 IMMUNOTHERAPY: Status: ACTIVE | Noted: 2022-01-01

## 2022-12-05 PROBLEM — R79.89 ELEVATED LACTIC ACID LEVEL: Status: ACTIVE | Noted: 2022-12-05

## 2022-12-05 LAB
ALBUMIN SERPL-MCNC: 3.7 G/DL (ref 3.5–5.2)
ALBUMIN/GLOB SERPL: 0.9 G/DL
ALP SERPL-CCNC: 289 U/L (ref 39–117)
ALT SERPL W P-5'-P-CCNC: 58 U/L (ref 1–41)
ANION GAP SERPL CALCULATED.3IONS-SCNC: 18 MMOL/L (ref 5–15)
APTT PPP: 35.5 SECONDS (ref 24–31)
AST SERPL-CCNC: 23 U/L (ref 1–40)
B PARAPERT DNA SPEC QL NAA+PROBE: NOT DETECTED
B PERT DNA SPEC QL NAA+PROBE: NOT DETECTED
BASOPHILS # BLD AUTO: 0.1 10*3/MM3 (ref 0–0.2)
BASOPHILS NFR BLD AUTO: 0.9 % (ref 0–1.5)
BILIRUB SERPL-MCNC: 0.5 MG/DL (ref 0–1.2)
BUN SERPL-MCNC: 12 MG/DL (ref 6–20)
BUN/CREAT SERPL: 10.4 (ref 7–25)
C PNEUM DNA NPH QL NAA+NON-PROBE: NOT DETECTED
CALCIUM SPEC-SCNC: 9.5 MG/DL (ref 8.6–10.5)
CHLORIDE SERPL-SCNC: 82 MMOL/L (ref 98–107)
CO2 SERPL-SCNC: 24 MMOL/L (ref 22–29)
CORTIS SERPL-MCNC: 17.13 MCG/DL
CREAT SERPL-MCNC: 1.15 MG/DL (ref 0.76–1.27)
D-LACTATE SERPL-SCNC: 1.2 MMOL/L (ref 0.5–2)
D-LACTATE SERPL-SCNC: 3.8 MMOL/L (ref 0.5–2)
DEPRECATED RDW RBC AUTO: 51.6 FL (ref 37–54)
EGFRCR SERPLBLD CKD-EPI 2021: 73.8 ML/MIN/1.73
EOSINOPHIL # BLD AUTO: 0.1 10*3/MM3 (ref 0–0.4)
EOSINOPHIL NFR BLD AUTO: 0.6 % (ref 0.3–6.2)
ERYTHROCYTE [DISTWIDTH] IN BLOOD BY AUTOMATED COUNT: 16.4 % (ref 12.3–15.4)
FLUAV SUBTYP SPEC NAA+PROBE: NOT DETECTED
FLUBV RNA ISLT QL NAA+PROBE: NOT DETECTED
GLOBULIN UR ELPH-MCNC: 4.2 GM/DL
GLUCOSE SERPL-MCNC: 139 MG/DL (ref 65–99)
HADV DNA SPEC NAA+PROBE: NOT DETECTED
HCOV 229E RNA SPEC QL NAA+PROBE: NOT DETECTED
HCOV HKU1 RNA SPEC QL NAA+PROBE: NOT DETECTED
HCOV NL63 RNA SPEC QL NAA+PROBE: NOT DETECTED
HCOV OC43 RNA SPEC QL NAA+PROBE: NOT DETECTED
HCT VFR BLD AUTO: 28.6 % (ref 37.5–51)
HGB BLD-MCNC: 9.5 G/DL (ref 13–17.7)
HMPV RNA NPH QL NAA+NON-PROBE: NOT DETECTED
HOLD SPECIMEN: NORMAL
HPIV1 RNA ISLT QL NAA+PROBE: NOT DETECTED
HPIV2 RNA SPEC QL NAA+PROBE: NOT DETECTED
HPIV3 RNA NPH QL NAA+PROBE: NOT DETECTED
HPIV4 P GENE NPH QL NAA+PROBE: NOT DETECTED
INR PPP: 1.28 (ref 0.93–1.1)
LYMPHOCYTES # BLD AUTO: 0.1 10*3/MM3 (ref 0.7–3.1)
LYMPHOCYTES NFR BLD AUTO: 1.2 % (ref 19.6–45.3)
M PNEUMO IGG SER IA-ACNC: NOT DETECTED
MCH RBC QN AUTO: 28.3 PG (ref 26.6–33)
MCHC RBC AUTO-ENTMCNC: 33.1 G/DL (ref 31.5–35.7)
MCV RBC AUTO: 85.5 FL (ref 79–97)
MONOCYTES # BLD AUTO: 0.6 10*3/MM3 (ref 0.1–0.9)
MONOCYTES NFR BLD AUTO: 6.2 % (ref 5–12)
NEUTROPHILS NFR BLD AUTO: 9 10*3/MM3 (ref 1.7–7)
NEUTROPHILS NFR BLD AUTO: 91.1 % (ref 42.7–76)
NRBC BLD AUTO-RTO: 0 /100 WBC (ref 0–0.2)
NT-PROBNP SERPL-MCNC: 375.3 PG/ML (ref 0–900)
OSMOLALITY SERPL: 254 MOSM/KG (ref 275–295)
OSMOLALITY UR: 436 MOSM/KG (ref 300–800)
PLATELET # BLD AUTO: 614 10*3/MM3 (ref 140–450)
PMV BLD AUTO: 6.5 FL (ref 6–12)
POTASSIUM SERPL-SCNC: 3.7 MMOL/L (ref 3.5–5.2)
PROT SERPL-MCNC: 7.9 G/DL (ref 6–8.5)
PROTHROMBIN TIME: 13 SECONDS (ref 9.6–11.7)
RBC # BLD AUTO: 3.34 10*6/MM3 (ref 4.14–5.8)
RHINOVIRUS RNA SPEC NAA+PROBE: NOT DETECTED
RSV RNA NPH QL NAA+NON-PROBE: NOT DETECTED
SARS-COV-2 RNA NPH QL NAA+NON-PROBE: NOT DETECTED
SODIUM SERPL-SCNC: 124 MMOL/L (ref 136–145)
SODIUM SERPL-SCNC: 124 MMOL/L (ref 136–145)
SODIUM UR-SCNC: 44 MMOL/L
TROPONIN T SERPL-MCNC: <0.01 NG/ML (ref 0–0.03)
TSH SERPL DL<=0.05 MIU/L-ACNC: 2.11 UIU/ML (ref 0.27–4.2)
URATE SERPL-MCNC: 2.9 MG/DL (ref 3.4–7)
WBC NRBC COR # BLD: 9.8 10*3/MM3 (ref 3.4–10.8)

## 2022-12-05 PROCEDURE — 83930 ASSAY OF BLOOD OSMOLALITY: CPT | Performed by: INTERNAL MEDICINE

## 2022-12-05 PROCEDURE — 25010000002 ENOXAPARIN PER 10 MG: Performed by: INTERNAL MEDICINE

## 2022-12-05 PROCEDURE — 96372 THER/PROPH/DIAG INJ SC/IM: CPT

## 2022-12-05 PROCEDURE — 84295 ASSAY OF SERUM SODIUM: CPT | Performed by: INTERNAL MEDICINE

## 2022-12-05 PROCEDURE — 83605 ASSAY OF LACTIC ACID: CPT

## 2022-12-05 PROCEDURE — 84550 ASSAY OF BLOOD/URIC ACID: CPT | Performed by: INTERNAL MEDICINE

## 2022-12-05 PROCEDURE — 96360 HYDRATION IV INFUSION INIT: CPT

## 2022-12-05 PROCEDURE — 85610 PROTHROMBIN TIME: CPT | Performed by: NURSE PRACTITIONER

## 2022-12-05 PROCEDURE — 83880 ASSAY OF NATRIURETIC PEPTIDE: CPT | Performed by: NURSE PRACTITIONER

## 2022-12-05 PROCEDURE — 82607 VITAMIN B-12: CPT | Performed by: INTERNAL MEDICINE

## 2022-12-05 PROCEDURE — 0202U NFCT DS 22 TRGT SARS-COV-2: CPT | Performed by: EMERGENCY MEDICINE

## 2022-12-05 PROCEDURE — 84484 ASSAY OF TROPONIN QUANT: CPT | Performed by: NURSE PRACTITIONER

## 2022-12-05 PROCEDURE — 87040 BLOOD CULTURE FOR BACTERIA: CPT | Performed by: NURSE PRACTITIONER

## 2022-12-05 PROCEDURE — 80050 GENERAL HEALTH PANEL: CPT | Performed by: NURSE PRACTITIONER

## 2022-12-05 PROCEDURE — 83935 ASSAY OF URINE OSMOLALITY: CPT | Performed by: INTERNAL MEDICINE

## 2022-12-05 PROCEDURE — 96361 HYDRATE IV INFUSION ADD-ON: CPT

## 2022-12-05 PROCEDURE — 99285 EMERGENCY DEPT VISIT HI MDM: CPT

## 2022-12-05 PROCEDURE — 85730 THROMBOPLASTIN TIME PARTIAL: CPT | Performed by: NURSE PRACTITIONER

## 2022-12-05 PROCEDURE — 36415 COLL VENOUS BLD VENIPUNCTURE: CPT | Performed by: INTERNAL MEDICINE

## 2022-12-05 PROCEDURE — G0378 HOSPITAL OBSERVATION PER HR: HCPCS

## 2022-12-05 PROCEDURE — 93005 ELECTROCARDIOGRAM TRACING: CPT | Performed by: NURSE PRACTITIONER

## 2022-12-05 PROCEDURE — 84300 ASSAY OF URINE SODIUM: CPT | Performed by: INTERNAL MEDICINE

## 2022-12-05 PROCEDURE — 82533 TOTAL CORTISOL: CPT | Performed by: INTERNAL MEDICINE

## 2022-12-05 PROCEDURE — 71046 X-RAY EXAM CHEST 2 VIEWS: CPT

## 2022-12-05 RX ORDER — LANOLIN ALCOHOL/MO/W.PET/CERES
1000 CREAM (GRAM) TOPICAL DAILY
Status: DISCONTINUED | OUTPATIENT
Start: 2022-12-06 | End: 2022-12-11 | Stop reason: HOSPADM

## 2022-12-05 RX ORDER — GABAPENTIN 300 MG/1
300 CAPSULE ORAL EVERY 8 HOURS SCHEDULED
Status: DISCONTINUED | OUTPATIENT
Start: 2022-12-05 | End: 2022-12-09

## 2022-12-05 RX ORDER — METOPROLOL SUCCINATE 50 MG/1
100 TABLET, EXTENDED RELEASE ORAL DAILY
Status: DISCONTINUED | OUTPATIENT
Start: 2022-12-06 | End: 2022-12-11 | Stop reason: HOSPADM

## 2022-12-05 RX ORDER — BUPROPION HYDROCHLORIDE 150 MG/1
300 TABLET ORAL DAILY
Status: DISCONTINUED | OUTPATIENT
Start: 2022-12-06 | End: 2022-12-10

## 2022-12-05 RX ORDER — SODIUM CHLORIDE 9 MG/ML
100 INJECTION, SOLUTION INTRAVENOUS CONTINUOUS
Status: DISCONTINUED | OUTPATIENT
Start: 2022-12-05 | End: 2022-12-06

## 2022-12-05 RX ORDER — ONDANSETRON 4 MG/1
8 TABLET, FILM COATED ORAL 3 TIMES DAILY PRN
Status: DISCONTINUED | OUTPATIENT
Start: 2022-12-05 | End: 2022-12-11 | Stop reason: HOSPADM

## 2022-12-05 RX ORDER — ACETAMINOPHEN 325 MG/1
650 TABLET ORAL EVERY 4 HOURS PRN
Status: DISCONTINUED | OUTPATIENT
Start: 2022-12-05 | End: 2022-12-11 | Stop reason: HOSPADM

## 2022-12-05 RX ORDER — OXYCODONE HYDROCHLORIDE 5 MG/1
5 TABLET ORAL EVERY 4 HOURS PRN
Status: DISCONTINUED | OUTPATIENT
Start: 2022-12-05 | End: 2022-12-11 | Stop reason: HOSPADM

## 2022-12-05 RX ORDER — OLANZAPINE 2.5 MG/1
5 TABLET ORAL NIGHTLY
Status: DISCONTINUED | OUTPATIENT
Start: 2022-12-05 | End: 2022-12-11 | Stop reason: HOSPADM

## 2022-12-05 RX ORDER — LORAZEPAM 0.5 MG/1
0.5 TABLET ORAL EVERY 8 HOURS PRN
Status: DISCONTINUED | OUTPATIENT
Start: 2022-12-05 | End: 2022-12-11 | Stop reason: HOSPADM

## 2022-12-05 RX ORDER — SODIUM CHLORIDE 9 MG/ML
40 INJECTION, SOLUTION INTRAVENOUS AS NEEDED
Status: DISCONTINUED | OUTPATIENT
Start: 2022-12-05 | End: 2022-12-11 | Stop reason: HOSPADM

## 2022-12-05 RX ORDER — SODIUM CHLORIDE 0.9 % (FLUSH) 0.9 %
10 SYRINGE (ML) INJECTION EVERY 12 HOURS SCHEDULED
Status: DISCONTINUED | OUTPATIENT
Start: 2022-12-05 | End: 2022-12-11 | Stop reason: HOSPADM

## 2022-12-05 RX ORDER — DIAZEPAM 2 MG/1
2 TABLET ORAL NIGHTLY PRN
Status: DISCONTINUED | OUTPATIENT
Start: 2022-12-05 | End: 2022-12-11 | Stop reason: HOSPADM

## 2022-12-05 RX ORDER — SODIUM CHLORIDE 0.9 % (FLUSH) 0.9 %
10 SYRINGE (ML) INJECTION AS NEEDED
Status: DISCONTINUED | OUTPATIENT
Start: 2022-12-05 | End: 2022-12-11 | Stop reason: HOSPADM

## 2022-12-05 RX ORDER — PANTOPRAZOLE SODIUM 40 MG/1
40 TABLET, DELAYED RELEASE ORAL
Status: DISCONTINUED | OUTPATIENT
Start: 2022-12-06 | End: 2022-12-11 | Stop reason: HOSPADM

## 2022-12-05 RX ORDER — ENOXAPARIN SODIUM 100 MG/ML
40 INJECTION SUBCUTANEOUS DAILY
Status: DISCONTINUED | OUTPATIENT
Start: 2022-12-05 | End: 2022-12-11 | Stop reason: HOSPADM

## 2022-12-05 RX ORDER — DESVENLAFAXINE SUCCINATE 50 MG/1
50 TABLET, EXTENDED RELEASE ORAL DAILY
Status: DISCONTINUED | OUTPATIENT
Start: 2022-12-06 | End: 2022-12-07

## 2022-12-05 RX ORDER — LIOTHYRONINE SODIUM 5 UG/1
5 TABLET ORAL DAILY
Status: DISCONTINUED | OUTPATIENT
Start: 2022-12-06 | End: 2022-12-11 | Stop reason: HOSPADM

## 2022-12-05 RX ORDER — LEVOTHYROXINE SODIUM 0.1 MG/1
100 TABLET ORAL
Status: DISCONTINUED | OUTPATIENT
Start: 2022-12-06 | End: 2022-12-11 | Stop reason: HOSPADM

## 2022-12-05 RX ADMIN — SODIUM CHLORIDE 100 ML/HR: 9 INJECTION, SOLUTION INTRAVENOUS at 22:39

## 2022-12-05 RX ADMIN — OXYCODONE 5 MG: 5 TABLET ORAL at 20:51

## 2022-12-05 RX ADMIN — SODIUM CHLORIDE 1000 ML: 9 INJECTION, SOLUTION INTRAVENOUS at 13:46

## 2022-12-05 RX ADMIN — Medication 10 ML: at 22:39

## 2022-12-05 RX ADMIN — ENOXAPARIN SODIUM 40 MG: 100 INJECTION SUBCUTANEOUS at 20:51

## 2022-12-05 RX ADMIN — OLANZAPINE 5 MG: 5 TABLET, FILM COATED ORAL at 20:52

## 2022-12-05 RX ADMIN — GABAPENTIN 300 MG: 300 CAPSULE ORAL at 20:51

## 2022-12-05 NOTE — ED PROVIDER NOTES
Subjective      Provider in Triage Note  Patient is a 57-year-old white male with history of lung cancer on immunotherapy currently who presents today from home with complaints of shortness of breath.  He states he has been ill for about 3 days but it is worse today.  Reports shortness of breath at rest worse with exertion.  He does report feeling feverish at times as well.  He denies any significant cough or congestion.  Denies chest pain abdominal pain nausea vomiting diarrhea black or bloody stools.        History of Present Illness  Shortness of breath, weakness  57-year-old male with history of lung cancer describes some increasing weakness since his immunotherapy treatment couple weeks ago.  Over the weekend then he developed some cough and low-grade fever.  He has had some nasal congestion he reports no known ill contacts.  He reports some mild shortness of breath.  He reports no radiating chest pain or diaphoresis.  He had 1 episode of vomiting today.  Review of Systems   HENT: Positive for congestion.    Eyes: Negative.    Respiratory: Positive for cough and shortness of breath.    Cardiovascular: Negative.    Gastrointestinal: Positive for vomiting. Negative for abdominal pain.   Genitourinary: Negative.    Musculoskeletal: Negative.    Skin: Negative.    Neurological: Positive for weakness.   Psychiatric/Behavioral: Negative.        Past Medical History:   Diagnosis Date   • Allergic rhinitis 07/25/2013    Overview:  4/2/2018 - still zyrtec 10 daily (if stops, gets dermatitis again).    • Anxiety and depression 06/05/2012    Overview:  4/2/2018 -   C/w well 300 xr and Lito 20.  4/8/2019 -   Doing ok even with new lung cancer - lito 20 & well 300xr.    • Chronic pansinusitis 04/08/2019    Overview:  4/8/2019 -   MRI showed chronic thick in frontal, maxillary, ethmoidal ---> to Dr. COLLAZO to est since abx ICC didn't help.  Does netipot bid.    • Diastolic CHF (HCC) 07/09/2014    Overview:  7/4/14 - impaired LV  relaxation on [facility].  EF 60%. Rest normal   • Dupuytren's contracture of both hands    • Elevated cholesterol    • Erosive esophagitis 07/09/2019    Overview:  EGD 2016 4/2/2018 - nexium OTC daily for few week.  Qod before that.  Now carbonation hurts, epigastric pain 4/8/2019 -   protonix 40 doing well.    • Gastroesophageal reflux disease 02/21/2019   • Hiatal hernia 07/09/2019   • History of colon polyps    • Hypertension    • Lung cancer (HCC)     right lung and in lymph nodes x2   • Mediastinal lymphadenopathy 03/12/2019   • Normocytic anemia 08/08/2019    Overview:  6/2019 - dropped to 9's postop 7/2019 - rebounded to 10's.  8/8/2019 -   Back to 9's - check ferritin, b12 and thyroid.    • Prediabetes 07/09/2014    Overview:  7/4/14 - a1c 6.1 at [place] admission   • Retention of urine 06/27/2019    PSOT OP, RESOLVED       No Known Allergies    Past Surgical History:   Procedure Laterality Date   • BRONCHOSCOPY  03/18/2019    EBUS MONTERO NEEDLE BX   • COLONOSCOPY     • DUPUYTREN CONTRACTURE RELEASE Bilateral    • LUNG BIOPSY  03/08/2019    CT GUIDED   • LUNG REMOVAL, PARTIAL Right     right lower   • PORTACATH PLACEMENT  03/20/2019    DR LONGORIA   • THORACOSCOPY Right 6/26/2019    Procedure: RIGHT VATS, OPEN LOWER LOBECTOMY WITH LYMPH NODE DISECTION, WEDGE RESECTION OF RIGHT MIDDLE LOBE;  Surgeon: Trerance Longoria MD;  Location: [facility] OR;  Service: Cardiothoracic   • THORACOSCOPY VIDEO ASSISTED WITH LOBECTOMY Left 7/6/2022    Procedure: THORACOSCOPY VIDEO ASSISTED WITH LOBECTOMY;  Surgeon: Miryam Reyna MD;  Location: [facility] MAIN OR;  Service: Thoracic;  Laterality: Left;       Family History   Problem Relation Age of Onset   • Cancer Mother         cervical cancer   • Cervical cancer Mother    • Cancer Father         lung cancer   • Lung cancer Father    • Lung cancer Sister    • Cancer Sister         lung cancer   • Malig Hyperthermia Neg Hx        Social History     Socioeconomic History   •  Marital status:    Tobacco Use   • Smoking status: Former     Types: Cigarettes     Quit date: 2009     Years since quittin.2   • Smokeless tobacco: Never   Vaping Use   • Vaping Use: Never used   Substance and Sexual Activity   • Alcohol use: Yes     Alcohol/week: 2.0 standard drinks     Types: 2 Cans of beer per week     Comment: Occasional   • Drug use: No   • Sexual activity: Defer     Prior to Admission medications    Medication Sig Start Date End Date Taking? Authorizing Provider   amLODIPine (NORVASC) 10 MG tablet Take 10 mg by mouth Daily.    Lloyd Rasmussen MD   amLODIPine (NORVASC) 5 MG tablet Take 5 mg by mouth Daily. 22  Lloyd Rasmussen MD   buPROPion XL (WELLBUTRIN XL) 300 MG 24 hr tablet Take 300 mg by mouth Every Morning. 20   Lloyd Rasmussen MD   buPROPion XL (WELLBUTRIN XL) 300 MG 24 hr tablet Take 1 tablet by mouth Every Morning. 22   Lloyd Rasmussen MD   desvenlafaxine (PRISTIQ) 50 MG 24 hr tablet Take 50 mg by mouth Daily. 19   Lloyd Rasmussen MD   diazePAM (Valium) 2 MG tablet Take 1 tablet by mouth At Night As Needed for Anxiety or Muscle Spasms. 10/25/22   Samreen Decker, KAVEH, MULUGETA   gabapentin (NEURONTIN) 300 MG capsule Take 1 capsule by mouth Every 8 (Eight) Hours for 30 days. 22  Lauren Reddy APRN   levothyroxine (SYNTHROID, LEVOTHROID) 100 MCG tablet Take 100 mcg by mouth Every Morning. 21   Lloyd Rasmussen MD   levothyroxine (SYNTHROID, LEVOTHROID) 100 MCG tablet Take 1 tablet by mouth Daily. 22   Lloyd Rasmussen MD   lidocaine-prilocaine (EMLA) 2.5-2.5 % cream Apply 1 application topically to the appropriate area as directed Take As Directed. Apply to port area 30 mins prior to port access 22   Azucena Gaspar MD   liothyronine (CYTOMEL) 5 MCG tablet Take 5 mcg by mouth Daily. 3/25/22   Lloyd Rasmussen MD   liothyronine (CYTOMEL) 5 MCG tablet Take 1 tablet  "by mouth Daily. 9/26/22   Lloyd Rasmussen MD   LORazepam (ATIVAN) 0.5 MG tablet Take 0.5 mg by mouth Every 8 (Eight) Hours As Needed. 5/25/22   Lloyd Rasmussen MD   magnesium oxide (MAG-OX) 400 MG tablet Take 1 tablet by mouth Daily. 8/31/22   Shayy Montemayor APRN   Magnesium Oxide 400 (240 Mg) MG tablet Take 1 tablet by mouth Daily. 8/31/22   Lloyd Rasmussen MD   metoprolol succinate XL (TOPROL-XL) 100 MG 24 hr tablet Take 100 mg by mouth Daily.    Lloyd Rasmussen MD   OLANZapine (zyPREXA) 5 MG tablet Take 1 tablet by mouth Every Night. Take on days 2, 3 and 4 after chemotherapy. 7/27/22   Azucena Gaspar MD   ondansetron (ZOFRAN) 8 MG tablet Take 1 tablet by mouth 3 (Three) Times a Day As Needed for Nausea or Vomiting. 7/27/22   Azucena Gaspar MD   oxyCODONE (Roxicodone) 5 MG immediate release tablet Take 1 tablet by mouth Every 6 (Six) Hours As Needed for Moderate Pain . 7/8/22   Miryam Reyna MD   oxyCODONE-acetaminophen (PERCOCET) 5-325 MG per tablet Take 1 tablet by mouth Every 6 (Six) Hours As Needed for Moderate Pain. 11/17/22   Shayy Montemayor APRN   pantoprazole (PROTONIX) 40 MG EC tablet Take 40 mg by mouth Daily As Needed.    Lloyd Rasmussen MD   pantoprazole (PROTONIX) 40 MG EC tablet Take 1 tablet by mouth Daily. 8/23/22   Lloyd Rasmussen MD   pravastatin (PRAVACHOL) 10 MG tablet Take 10 mg by mouth Every Night.    Lloyd Rasmussen MD   vitamin B-12 (CYANOCOBALAMIN) 1000 MCG tablet Take 1,000 mcg by mouth Daily.    Lloyd Rasmussen MD     /83   Pulse 108   Temp 97.6 °F (36.4 °C) (Oral)   Resp 16   Ht 190.5 cm (75\")   Wt 86.7 kg (191 lb 2.2 oz)   SpO2 98%   BMI 23.89 kg/m²   I examined the patient using the appropriate personal protective equipment.          Objective   Physical Exam  General: Well-developed well-appearing, no acute distress, alert and appropriate  Eyes:  sclera nonicteric, pupils normal  HEENT: " Mucous membranes somewhat dry, no mucosal swelling  Neck: Supple, no nuchal rigidity,   Respirations: Respirations nonlabored, equal breath sounds bilaterally, clear lungs  Heart regular rate and rhythm, no murmurs rubs or gallops,   Abdomen soft nontender nondistended, no hepatosplenomegaly, no hernia, no mass, normal bowel sounds, no CVA tenderness  Extremities no clubbing cyanosis or edema, calves are symmetric and nontender  Neuro cranial nerves grossly intact, no focal limb deficits  Psych oriented, pleasant affect  Skin no rash, brisk cap refill  Procedures           ED Course            Results for orders placed or performed during the hospital encounter of 12/05/22   Respiratory Panel PCR w/COVID-19(SARS-CoV-2) BRADY/NILSA/MARY/PAD/COR/MAD/HESHAM In-House, NP Swab in UTM/VTM, 3-4 HR TAT - Swab, Nasopharynx    Specimen: Nasopharynx; Swab   Result Value Ref Range    ADENOVIRUS, PCR Not Detected Not Detected    Coronavirus 229E Not Detected Not Detected    Coronavirus HKU1 Not Detected Not Detected    Coronavirus NL63 Not Detected Not Detected    Coronavirus OC43 Not Detected Not Detected    COVID19 Not Detected Not Detected - Ref. Range    Human Metapneumovirus Not Detected Not Detected    Human Rhinovirus/Enterovirus Not Detected Not Detected    Influenza A PCR Not Detected Not Detected    Influenza B PCR Not Detected Not Detected    Parainfluenza Virus 1 Not Detected Not Detected    Parainfluenza Virus 2 Not Detected Not Detected    Parainfluenza Virus 3 Not Detected Not Detected    Parainfluenza Virus 4 Not Detected Not Detected    RSV, PCR Not Detected Not Detected    Bordetella pertussis pcr Not Detected Not Detected    Bordetella parapertussis PCR Not Detected Not Detected    Chlamydophila pneumoniae PCR Not Detected Not Detected    Mycoplasma pneumo by PCR Not Detected Not Detected   Comprehensive Metabolic Panel    Specimen: Blood   Result Value Ref Range    Glucose 139 (H) 65 - 99 mg/dL    BUN 12 6 - 20 mg/dL     Creatinine 1.15 0.76 - 1.27 mg/dL    Sodium 124 (L) 136 - 145 mmol/L    Potassium 3.7 3.5 - 5.2 mmol/L    Chloride 82 (L) 98 - 107 mmol/L    CO2 24.0 22.0 - 29.0 mmol/L    Calcium 9.5 8.6 - 10.5 mg/dL    Total Protein 7.9 6.0 - 8.5 g/dL    Albumin 3.70 3.50 - 5.20 g/dL    ALT (SGPT) 58 (H) 1 - 41 U/L    AST (SGOT) 23 1 - 40 U/L    Alkaline Phosphatase 289 (H) 39 - 117 U/L    Total Bilirubin 0.5 0.0 - 1.2 mg/dL    Globulin 4.2 gm/dL    A/G Ratio 0.9 g/dL    BUN/Creatinine Ratio 10.4 7.0 - 25.0    Anion Gap 18.0 (H) 5.0 - 15.0 mmol/L    eGFR 73.8 >60.0 mL/min/1.73   Protime-INR    Specimen: Blood   Result Value Ref Range    Protime 13.0 (H) 9.6 - 11.7 Seconds    INR 1.28 (H) 0.93 - 1.10   aPTT    Specimen: Blood   Result Value Ref Range    PTT 35.5 (H) 24.0 - 31.0 seconds   BNP    Specimen: Blood   Result Value Ref Range    proBNP 375.3 0.0 - 900.0 pg/mL   Troponin    Specimen: Blood   Result Value Ref Range    Troponin T <0.010 0.000 - 0.030 ng/mL   CBC Auto Differential    Specimen: Blood   Result Value Ref Range    WBC 9.80 3.40 - 10.80 10*3/mm3    RBC 3.34 (L) 4.14 - 5.80 10*6/mm3    Hemoglobin 9.5 (L) 13.0 - 17.7 g/dL    Hematocrit 28.6 (L) 37.5 - 51.0 %    MCV 85.5 79.0 - 97.0 fL    MCH 28.3 26.6 - 33.0 pg    MCHC 33.1 31.5 - 35.7 g/dL    RDW 16.4 (H) 12.3 - 15.4 %    RDW-SD 51.6 37.0 - 54.0 fl    MPV 6.5 6.0 - 12.0 fL    Platelets 614 (H) 140 - 450 10*3/mm3    Neutrophil % 91.1 (H) 42.7 - 76.0 %    Lymphocyte % 1.2 (L) 19.6 - 45.3 %    Monocyte % 6.2 5.0 - 12.0 %    Eosinophil % 0.6 0.3 - 6.2 %    Basophil % 0.9 0.0 - 1.5 %    Neutrophils, Absolute 9.00 (H) 1.70 - 7.00 10*3/mm3    Lymphocytes, Absolute 0.10 (L) 0.70 - 3.10 10*3/mm3    Monocytes, Absolute 0.60 0.10 - 0.90 10*3/mm3    Eosinophils, Absolute 0.10 0.00 - 0.40 10*3/mm3    Basophils, Absolute 0.10 0.00 - 0.20 10*3/mm3    nRBC 0.0 0.0 - 0.2 /100 WBC   STAT Lactic Acid, Reflex    Specimen: Arm, Left; Blood   Result Value Ref Range    Lactate 1.2 0.5  - 2.0 mmol/L   POC Lactate    Specimen: Blood   Result Value Ref Range    Lactate 3.8 (C) 0.5 - 2.0 mmol/L   ECG 12 Lead Dyspnea   Result Value Ref Range    QT Interval 291 ms   Gold Top - SST   Result Value Ref Range    Extra Tube Hold for add-ons.      XR Chest 2 View    Result Date: 12/5/2022  1. No acute process. 2. Postsurgical changes of prior left upper lobectomy with left upper chest wall soft tissue mass better assessed on the recent PET/CT. 3. Small chronic right pleural effusion.  Electronically Signed By-Antoni Deal MD On:12/5/2022 12:43 PM This report was finalized on [id number] by  Antoni Deal MD.                                      MDM  My EKG interpretation sinus tachycardia rate of 136, baseline artifact, PVC  Patient presents with describing weakness and as well as shortness of breath and some flulike symptoms.  He also had an episode of vomiting.  He has a benign abdominal examination and is in no respiratory distress.  Does have a history of lung cancer and recent immunotherapy and radiation.  He does appear somewhat dehydrated.  Laboratory findings are consistent with hyponatremia.  I reviewed the records and he does have some chronic hyponatremia and this is trending downward with worsened hyponatremia today.  He was ordered IV fluids with normal saline.  He has a normal mental status.  He is anemic but this is unchanged from previous.  His viral panel is negative.  X-ray shows no acute infiltrates.  Case discussed with Dr. Boss with the hospitalist service for admission.  Final diagnoses:   Weakness   Dehydration   Hyponatremia       ED Disposition  ED Disposition     ED Disposition   Decision to Admit    Condition   --    Comment   Level of Care: Telemetry [5]   Admitting Physician: RE BOSS [[id number]]   Attending Physician: RE BOSS [[id number]]               No follow-up provider specified.       Medication List      No changes were made to  your prescriptions during this visit.          Andrei Barton MD  12/05/22 2997

## 2022-12-05 NOTE — ED NOTES
Patient reports he had his first round of immunotherapy 3 wks ago and at first thought he felt bad due to it. Pt reports the last 3 days he has been feeling nausea, SOB, and a headache. Pt reports he has not vomited but feels like he needs to. Patient reports he has stage 3 lung cancer that has spread to his ribs. Pt reports his R lower lobe of lung has been removed and part of his L lung. Pt present with nausea and mottled skin. Pt does not appear labored at this time with breathing but reports he does feel soa.

## 2022-12-05 NOTE — TELEPHONE ENCOUNTER
Received a call from the pt's wife stating that he will not be at his radiation appt today due to him having a fever and shaking. She stated that she does not know if he has the flu or what is going on. I told her that he should be evaluated and that I would cancel his radiation appt. She verbalized understanding. No further questions or needs at this time.

## 2022-12-06 ENCOUNTER — APPOINTMENT (OUTPATIENT)
Dept: CT IMAGING | Facility: HOSPITAL | Age: 57
End: 2022-12-06

## 2022-12-06 LAB
ANION GAP SERPL CALCULATED.3IONS-SCNC: 11 MMOL/L (ref 5–15)
ANION GAP SERPL CALCULATED.3IONS-SCNC: 14 MMOL/L (ref 5–15)
ANION GAP SERPL CALCULATED.3IONS-SCNC: 15 MMOL/L (ref 5–15)
BASOPHILS # BLD AUTO: 0 10*3/MM3 (ref 0–0.2)
BASOPHILS NFR BLD AUTO: 0.3 % (ref 0–1.5)
BILIRUB UR QL STRIP: NEGATIVE
BUN SERPL-MCNC: 11 MG/DL (ref 6–20)
BUN SERPL-MCNC: 11 MG/DL (ref 6–20)
BUN SERPL-MCNC: 12 MG/DL (ref 6–20)
BUN/CREAT SERPL: 11.6 (ref 7–25)
BUN/CREAT SERPL: 11.7 (ref 7–25)
BUN/CREAT SERPL: 12.6 (ref 7–25)
CALCIUM SPEC-SCNC: 8 MG/DL (ref 8.6–10.5)
CALCIUM SPEC-SCNC: 8.6 MG/DL (ref 8.6–10.5)
CALCIUM SPEC-SCNC: 9 MG/DL (ref 8.6–10.5)
CHLORIDE SERPL-SCNC: 88 MMOL/L (ref 98–107)
CHLORIDE SERPL-SCNC: 88 MMOL/L (ref 98–107)
CHLORIDE SERPL-SCNC: 89 MMOL/L (ref 98–107)
CLARITY UR: CLEAR
CO2 SERPL-SCNC: 23 MMOL/L (ref 22–29)
CO2 SERPL-SCNC: 24 MMOL/L (ref 22–29)
CO2 SERPL-SCNC: 24 MMOL/L (ref 22–29)
COLOR UR: YELLOW
CREAT SERPL-MCNC: 0.94 MG/DL (ref 0.76–1.27)
CREAT SERPL-MCNC: 0.95 MG/DL (ref 0.76–1.27)
CREAT SERPL-MCNC: 0.95 MG/DL (ref 0.76–1.27)
DEPRECATED RDW RBC AUTO: 50.3 FL (ref 37–54)
EGFRCR SERPLBLD CKD-EPI 2021: 92.8 ML/MIN/1.73
EGFRCR SERPLBLD CKD-EPI 2021: 92.8 ML/MIN/1.73
EGFRCR SERPLBLD CKD-EPI 2021: 94 ML/MIN/1.73
EOSINOPHIL # BLD AUTO: 0.1 10*3/MM3 (ref 0–0.4)
EOSINOPHIL NFR BLD AUTO: 1.5 % (ref 0.3–6.2)
ERYTHROCYTE [DISTWIDTH] IN BLOOD BY AUTOMATED COUNT: 16.5 % (ref 12.3–15.4)
GLUCOSE SERPL-MCNC: 109 MG/DL (ref 65–99)
GLUCOSE SERPL-MCNC: 113 MG/DL (ref 65–99)
GLUCOSE SERPL-MCNC: 97 MG/DL (ref 65–99)
GLUCOSE UR STRIP-MCNC: NEGATIVE MG/DL
HCT VFR BLD AUTO: 26.8 % (ref 37.5–51)
HGB BLD-MCNC: 8.5 G/DL (ref 13–17.7)
HGB UR QL STRIP.AUTO: NEGATIVE
KETONES UR QL STRIP: NEGATIVE
LEUKOCYTE ESTERASE UR QL STRIP.AUTO: NEGATIVE
LYMPHOCYTES # BLD AUTO: 0.2 10*3/MM3 (ref 0.7–3.1)
LYMPHOCYTES NFR BLD AUTO: 3 % (ref 19.6–45.3)
MCH RBC QN AUTO: 27.9 PG (ref 26.6–33)
MCHC RBC AUTO-ENTMCNC: 31.7 G/DL (ref 31.5–35.7)
MCV RBC AUTO: 88.2 FL (ref 79–97)
MONOCYTES # BLD AUTO: 0.5 10*3/MM3 (ref 0.1–0.9)
MONOCYTES NFR BLD AUTO: 7.6 % (ref 5–12)
NEUTROPHILS NFR BLD AUTO: 6.3 10*3/MM3 (ref 1.7–7)
NEUTROPHILS NFR BLD AUTO: 87.6 % (ref 42.7–76)
NITRITE UR QL STRIP: NEGATIVE
NRBC BLD AUTO-RTO: 0 /100 WBC (ref 0–0.2)
PH UR STRIP.AUTO: 7 [PH] (ref 5–8)
PLATELET # BLD AUTO: 511 10*3/MM3 (ref 140–450)
PMV BLD AUTO: 6.6 FL (ref 6–12)
POTASSIUM SERPL-SCNC: 3.2 MMOL/L (ref 3.5–5.2)
POTASSIUM SERPL-SCNC: 3.2 MMOL/L (ref 3.5–5.2)
POTASSIUM SERPL-SCNC: 3.4 MMOL/L (ref 3.5–5.2)
PROT UR QL STRIP: ABNORMAL
QT INTERVAL: 291 MS
RBC # BLD AUTO: 3.04 10*6/MM3 (ref 4.14–5.8)
SODIUM SERPL-SCNC: 124 MMOL/L (ref 136–145)
SODIUM SERPL-SCNC: 126 MMOL/L (ref 136–145)
SODIUM SERPL-SCNC: 126 MMOL/L (ref 136–145)
SP GR UR STRIP: 1.01 (ref 1–1.03)
UROBILINOGEN UR QL STRIP: ABNORMAL
VIT B12 BLD-MCNC: >2000 PG/ML (ref 211–946)
WBC NRBC COR # BLD: 7.2 10*3/MM3 (ref 3.4–10.8)

## 2022-12-06 PROCEDURE — 97165 OT EVAL LOW COMPLEX 30 MIN: CPT

## 2022-12-06 PROCEDURE — 25010000002 ENOXAPARIN PER 10 MG: Performed by: INTERNAL MEDICINE

## 2022-12-06 PROCEDURE — G0378 HOSPITAL OBSERVATION PER HR: HCPCS

## 2022-12-06 PROCEDURE — 80048 BASIC METABOLIC PNL TOTAL CA: CPT | Performed by: INTERNAL MEDICINE

## 2022-12-06 PROCEDURE — 96372 THER/PROPH/DIAG INJ SC/IM: CPT

## 2022-12-06 PROCEDURE — 81003 URINALYSIS AUTO W/O SCOPE: CPT | Performed by: INTERNAL MEDICINE

## 2022-12-06 PROCEDURE — 85025 COMPLETE CBC W/AUTO DIFF WBC: CPT | Performed by: NURSE PRACTITIONER

## 2022-12-06 PROCEDURE — 70450 CT HEAD/BRAIN W/O DYE: CPT

## 2022-12-06 PROCEDURE — 80048 BASIC METABOLIC PNL TOTAL CA: CPT | Performed by: NURSE PRACTITIONER

## 2022-12-06 PROCEDURE — 96361 HYDRATE IV INFUSION ADD-ON: CPT

## 2022-12-06 PROCEDURE — 97161 PT EVAL LOW COMPLEX 20 MIN: CPT

## 2022-12-06 RX ORDER — SODIUM CHLORIDE 1000 MG
2 TABLET, SOLUBLE MISCELLANEOUS
Status: DISCONTINUED | OUTPATIENT
Start: 2022-12-06 | End: 2022-12-11 | Stop reason: HOSPADM

## 2022-12-06 RX ORDER — POTASSIUM CHLORIDE 20 MEQ/1
40 TABLET, EXTENDED RELEASE ORAL ONCE
Status: COMPLETED | OUTPATIENT
Start: 2022-12-06 | End: 2022-12-06

## 2022-12-06 RX ADMIN — GABAPENTIN 300 MG: 300 CAPSULE ORAL at 05:14

## 2022-12-06 RX ADMIN — GABAPENTIN 300 MG: 300 CAPSULE ORAL at 21:17

## 2022-12-06 RX ADMIN — OXYCODONE 5 MG: 5 TABLET ORAL at 12:21

## 2022-12-06 RX ADMIN — CYANOCOBALAMIN TAB 1000 MCG 1000 MCG: 1000 TAB at 09:28

## 2022-12-06 RX ADMIN — BUPROPION HYDROCHLORIDE 300 MG: 150 TABLET, EXTENDED RELEASE ORAL at 09:28

## 2022-12-06 RX ADMIN — ACETAMINOPHEN 650 MG: 325 TABLET, FILM COATED ORAL at 00:42

## 2022-12-06 RX ADMIN — TOLVAPTAN 15 MG: 30 TABLET ORAL at 16:48

## 2022-12-06 RX ADMIN — LIOTHYRONINE SODIUM 5 MCG: 5 TABLET ORAL at 11:25

## 2022-12-06 RX ADMIN — OXYCODONE 5 MG: 5 TABLET ORAL at 05:15

## 2022-12-06 RX ADMIN — Medication 10 ML: at 09:28

## 2022-12-06 RX ADMIN — DESVENLAFAXINE SUCCINATE 50 MG: 50 TABLET, EXTENDED RELEASE ORAL at 09:28

## 2022-12-06 RX ADMIN — GABAPENTIN 300 MG: 300 CAPSULE ORAL at 13:29

## 2022-12-06 RX ADMIN — OLANZAPINE 5 MG: 5 TABLET, FILM COATED ORAL at 20:34

## 2022-12-06 RX ADMIN — PANTOPRAZOLE SODIUM 40 MG: 40 TABLET, DELAYED RELEASE ORAL at 05:14

## 2022-12-06 RX ADMIN — Medication 10 ML: at 20:35

## 2022-12-06 RX ADMIN — ACETAMINOPHEN 650 MG: 325 TABLET, FILM COATED ORAL at 12:21

## 2022-12-06 RX ADMIN — METOPROLOL SUCCINATE 100 MG: 50 TABLET, EXTENDED RELEASE ORAL at 09:28

## 2022-12-06 RX ADMIN — LEVOTHYROXINE SODIUM 100 MCG: 0.1 TABLET ORAL at 05:14

## 2022-12-06 RX ADMIN — SODIUM CHLORIDE 2 G: 1 TABLET ORAL at 16:49

## 2022-12-06 RX ADMIN — ACETAMINOPHEN 650 MG: 325 TABLET, FILM COATED ORAL at 20:34

## 2022-12-06 RX ADMIN — ENOXAPARIN SODIUM 40 MG: 100 INJECTION SUBCUTANEOUS at 16:47

## 2022-12-06 RX ADMIN — OXYCODONE 5 MG: 5 TABLET ORAL at 00:43

## 2022-12-06 RX ADMIN — SODIUM CHLORIDE 100 ML/HR: 9 INJECTION, SOLUTION INTRAVENOUS at 09:27

## 2022-12-06 RX ADMIN — SODIUM CHLORIDE 2 G: 1 TABLET ORAL at 13:29

## 2022-12-06 RX ADMIN — POTASSIUM CHLORIDE 40 MEQ: 1500 TABLET, EXTENDED RELEASE ORAL at 17:55

## 2022-12-06 NOTE — CASE MANAGEMENT/SOCIAL WORK
Discharge Planning Assessment  [facility]     Patient Name: Jc Driscoll  MRN: [id number]  Today's Date: 12/6/2022    Admit Date: 12/5/2022    Plan: D/C plan: Anticipate home with spouse   Discharge Needs Assessment     Row Name 12/06/22 1501       Living Environment    People in Home spouse    Name(s) of People in Home Wife Rachel    Current Living Arrangements home    Primary Care Provided by self    Provides Primary Care For no one    Family Caregiver if Needed spouse    Family Caregiver Names Rachel    Quality of Family Relationships unable to assess  No family present during CM rounds    Able to Return to Prior Arrangements yes       Resource/Environmental Concerns    Resource/Environmental Concerns none    Transportation Concerns none       Transition Planning    Patient/Family Anticipates Transition to home with family    Patient/Family Anticipated Services at Transition none    Transportation Anticipated family or friend will provide       Discharge Needs Assessment    Readmission Within the Last 30 Days no previous admission in last 30 days    Equipment Currently Used at Home none    Concerns to be Addressed no discharge needs identified;denies needs/concerns at this time    Anticipated Changes Related to Illness none    Equipment Needed After Discharge none    Provided Post Acute Provider List? N/A    N/A Provider List Comment Anticipate home               Discharge Plan     Row Name 12/06/22 1507       Plan    Plan D/C plan: Anticipate home with spouse    Patient/Family in Agreement with Plan yes    Plan Comments Met with pt at bedside. Pt lives at home with wife Rachel. Pt is IADL, including driving. Pt's wife or mom will provide transportation at time of d/c. Denies use of DME at home. PCP and pharmacy confirmed. No financial barriers to meds. No use of HHC. Barrier to d/c: Nephrology consult, hypokalemia with replacement, fluid restriction, CT head today.                   Expected Discharge Date and Time      Expected Discharge Date Expected Discharge Time    Dec 7, 2022          Demographic Summary     Row Name 12/06/22 1500       General Information    Admission Type observation    Arrived From emergency department    Referral Source admission list    Reason for Consult discharge planning    Preferred Language English               Functional Status     Row Name 12/06/22 1501       Functional Status    Usual Activity Tolerance good    Current Activity Tolerance good       Functional Status, IADL    Medications independent    Meal Preparation independent    Housekeeping independent    Laundry independent    Shopping independent                   Met with patient in room wearing PPE: mask    Maintained distance greater than six feet and spent less than 15 minutes in the room.            Gilbert Chapman RN

## 2022-12-06 NOTE — OUTREACH NOTE
Prep Survey    Flowsheet Row Responses   [facility] facility patient discharged from? [facility]   Is LACE score < 7 ? No   Emergency Room discharge w/ pulse ox? No   Eligibility Readm Mgmt   Discharge diagnosis lung biopsy.   Does the patient have one of the following disease processes/diagnoses(primary or secondary)? General Surgery   Does the patient have Home health ordered? No   Is there a DME ordered? No   Prep survey completed? Yes          ELIOT FULLER - Registered Nurse         Other

## 2022-12-06 NOTE — CONSULTS
NEPHROLOGY CONSULTATION-----[facility]    [facility]  [phone]  Adalberto Huffman MD    Patient Care Team:  Reshma Alvarenga MD as PCP - General (Family Medicine)  Reshma Alvarenga MD as PCP - Family Medicine  Miryam Reyna MD as Surgeon (Thoracic Surgery)  Azucena Gaspar MD as Consulting Physician (Hematology and Oncology)    CC/REASON FOR CONSULTATION: Hyponatremia    PHYSICIAN REQUESTING CONSULTATION: Dr Lua    History of Present Illness    57 year old male with PMHx HTN, NSCLC presented 12/5/22 with generalized weakness. His Sodium was noted to be 124. He received NS. Na 126 this am. No vomiting or diarrhea. Not on thiazides. He has had a poor appetite, and hardly ate much for a week, only been drinking water. His sodium has been low for over a month 125-129 range. He is on a SSRI that may be contributory too.He started feeling sick after about a week of immunotherapy, does not remember name.  Has a lot of burning tingling in extremities.     Review of Systems   As noted above, otherwise 10 systems reviewed and were negative.    Past Medical History:   Diagnosis Date   • Allergic rhinitis 07/25/2013    Overview:  4/2/2018 - still zyrtec 10 daily (if stops, gets dermatitis again).    • Anxiety and depression 06/05/2012    Overview:  4/2/2018 -   C/w well 300 xr and Lito 20.  4/8/2019 -   Doing ok even with new lung cancer - lito 20 & well 300xr.    • Chronic pansinusitis 04/08/2019    Overview:  4/8/2019 -   MRI showed chronic thick in frontal, maxillary, ethmoidal ---> to Dr. ZAY knowles est since abx ICC didn't help.  Does netipot bid.    • Diastolic CHF (HCC) 07/09/2014    Overview:  7/4/14 - impaired LV relaxation on Zhou ECHO.  EF 60%. Rest normal   • Dupuytren's contracture of both hands    • Elevated cholesterol    • Erosive esophagitis 07/09/2019    Overview:  EGD 2016 4/2/2018 - nexium OTC daily for few week.  Qod before that.   Now carbonation hurts, epigastric pain 2019 -   protonix 40 doing well.    • Gastroesophageal reflux disease 2019   • Hiatal hernia 2019   • History of colon polyps    • Hypertension    • Lung cancer (HCC)     right lung and in lymph nodes x2   • Mediastinal lymphadenopathy 2019   • Normocytic anemia 2019    Overview:  2019 - dropped to 9's postop 2019 - rebounded to 10's.  2019 -   Back to 9's - check ferritin, b12 and thyroid.    • Prediabetes 2014    Overview:  14 - a1c 6.1 at [facility] admission   • Retention of urine 2019    PSOT OP, RESOLVED       Past Surgical History:   Procedure Laterality Date   • BRONCHOSCOPY  2019    EBUS MONTERO NEEDLE BX   • COLONOSCOPY     • DUPUYTREN CONTRACTURE RELEASE Bilateral    • LUNG BIOPSY  2019    CT GUIDED   • LUNG REMOVAL, PARTIAL Right     right lower   • PORTACATH PLACEMENT  2019    DR LONGORIA   • THORACOSCOPY Right 2019    Procedure: RIGHT VATS, OPEN LOWER LOBECTOMY WITH LYMPH NODE DISECTION, WEDGE RESECTION OF RIGHT MIDDLE LOBE;  Surgeon: Terrance Longoria MD;  Location: [facility];  Service: Cardiothoracic   • THORACOSCOPY VIDEO ASSISTED WITH LOBECTOMY Left 2022    Procedure: THORACOSCOPY VIDEO ASSISTED WITH LOBECTOMY;  Surgeon: Miryam Reyna MD;  Location: [facility] OR;  Service: Thoracic;  Laterality: Left;       Family History   Problem Relation Age of Onset   • Cancer Mother         cervical cancer   • Cervical cancer Mother    • Cancer Father         lung cancer   • Lung cancer Father    • Lung cancer Sister    • Cancer Sister         lung cancer   • Malig Hyperthermia Neg Hx        Social History     Tobacco Use   • Smoking status: Former     Types: Cigarettes     Quit date: 2009     Years since quittin.2   • Smokeless tobacco: Never   Vaping Use   • Vaping Use: Never used   Substance Use Topics   • Alcohol use: Yes     Alcohol/week: 2.0 standard drinks     Types: 2 Cans  of beer per week     Comment: Occasional   • Drug use: No       Home Meds:   Medications Prior to Admission   Medication Sig Dispense Refill Last Dose   • buPROPion XL (WELLBUTRIN XL) 300 MG 24 hr tablet Take 300 mg by mouth Every Morning.   12/4/2022   • diazePAM (Valium) 2 MG tablet Take 1 tablet by mouth At Night As Needed for Anxiety or Muscle Spasms. 30 tablet 0    • levothyroxine (SYNTHROID, LEVOTHROID) 100 MCG tablet Take 100 mcg by mouth Every Morning.   12/4/2022   • liothyronine (CYTOMEL) 5 MCG tablet Take 5 mcg by mouth Daily.      • LORazepam (ATIVAN) 0.5 MG tablet Take 0.5 mg by mouth Every 8 (Eight) Hours As Needed.   Past Month   • metoprolol succinate XL (TOPROL-XL) 100 MG 24 hr tablet Take 100 mg by mouth Daily.   12/4/2022   • OLANZapine (zyPREXA) 5 MG tablet Take 1 tablet by mouth Every Night. Take on days 2, 3 and 4 after chemotherapy. 3 tablet 5 Past Month   • ondansetron (ZOFRAN) 8 MG tablet Take 1 tablet by mouth 3 (Three) Times a Day As Needed for Nausea or Vomiting. 30 tablet 5 Past Month   • oxyCODONE-acetaminophen (PERCOCET) 5-325 MG per tablet Take 1 tablet by mouth Every 6 (Six) Hours As Needed for Moderate Pain. 40 tablet 0 Past Month   • pantoprazole (PROTONIX) 40 MG EC tablet Take 40 mg by mouth Daily As Needed.   12/4/2022   • vitamin B-12 (CYANOCOBALAMIN) 1000 MCG tablet Take 1,000 mcg by mouth Daily.   12/4/2022   • desvenlafaxine (PRISTIQ) 50 MG 24 hr tablet Take 50 mg by mouth Daily.      • gabapentin (NEURONTIN) 300 MG capsule Take 1 capsule by mouth Every 8 (Eight) Hours for 30 days. 90 capsule 0        Scheduled Meds:  buPROPion XL, 300 mg, Oral, Daily  desvenlafaxine, 50 mg, Oral, Daily  enoxaparin, 40 mg, Subcutaneous, Daily  gabapentin, 300 mg, Oral, Q8H  levothyroxine, 100 mcg, Oral, Q AM  liothyronine, 5 mcg, Oral, Daily  metoprolol succinate XL, 100 mg, Oral, Daily  OLANZapine, 5 mg, Oral, Nightly  pantoprazole, 40 mg, Oral, Q AM  sodium chloride, 10 mL, Intravenous,  Q12H  vitamin B-12, 1,000 mcg, Oral, Daily        Continuous Infusions:  sodium chloride, 100 mL/hr, Last Rate: 100 mL/hr (12/06/22 0251)        PRN Meds:  •  acetaminophen  •  diazePAM  •  LORazepam  •  ondansetron  •  oxyCODONE  •  [COMPLETED] Insert Peripheral IV **AND** sodium chloride  •  sodium chloride  •  sodium chloride    Allergies:  Patient has no known allergies.    OBJECTIVE    Vital Signs  Temp:  [97.6 °F (36.4 °C)-100 °F (37.8 °C)] 98.9 °F (37.2 °C)  Heart Rate:  [] 102  Resp:  [14-22] 20  BP: (101-145)/(65-87) 122/75    I/O this shift:  In: 240 [P.O.:240]  Out: 1000 [Urine:1000]  No intake/output data recorded.    Physical Exam:  General Appearance: alert, appears stated age and cooperative  Head: normocephalic, without obvious abnormality and atraumatic  Eyes: conjunctivae and sclerae normal and no icterus  Neck: supple and no JVD  Lungs: scattered rhonchi  Heart: regular rhythm & normal rate and normal S1, S2  Chest Wall: no abnormalities observed  Abdomen: normal bowel sounds and soft, nontender  Extremities: moves extremities well, no edema, no cyanosis  Skin: no bleeding, bruising or rash  Neurologic: Alert, and oriented. No focal deficits    Results Review:    I reviewed the patient's new clinical results.    WBC WBC   Date Value Ref Range Status   12/06/2022 7.20 3.40 - 10.80 10*3/mm3 Final   12/05/2022 9.80 3.40 - 10.80 10*3/mm3 Final      HGB Hemoglobin   Date Value Ref Range Status   12/06/2022 8.5 (L) 13.0 - 17.7 g/dL Final   12/05/2022 9.5 (L) 13.0 - 17.7 g/dL Final      HCT Hematocrit   Date Value Ref Range Status   12/06/2022 26.8 (L) 37.5 - 51.0 % Final   12/05/2022 28.6 (L) 37.5 - 51.0 % Final      Platelets No results found for: LABPLAT   MCV MCV   Date Value Ref Range Status   12/06/2022 88.2 79.0 - 97.0 fL Final   12/05/2022 85.5 79.0 - 97.0 fL Final          Sodium Sodium   Date Value Ref Range Status   12/06/2022 126 (L) 136 - 145 mmol/L Final   12/05/2022 124 (L) 136 - 145  mmol/L Final   12/05/2022 124 (L) 136 - 145 mmol/L Final      Potassium Potassium   Date Value Ref Range Status   12/06/2022 3.2 (L) 3.5 - 5.2 mmol/L Final   12/05/2022 3.7 3.5 - 5.2 mmol/L Final      Chloride Chloride   Date Value Ref Range Status   12/06/2022 88 (L) 98 - 107 mmol/L Final   12/05/2022 82 (L) 98 - 107 mmol/L Final      CO2 CO2   Date Value Ref Range Status   12/06/2022 24.0 22.0 - 29.0 mmol/L Final   12/05/2022 24.0 22.0 - 29.0 mmol/L Final      BUN BUN   Date Value Ref Range Status   12/06/2022 11 6 - 20 mg/dL Final   12/05/2022 12 6 - 20 mg/dL Final      Creatinine Creatinine   Date Value Ref Range Status   12/06/2022 0.95 0.76 - 1.27 mg/dL Final   12/05/2022 1.15 0.76 - 1.27 mg/dL Final      Calcium Calcium   Date Value Ref Range Status   12/06/2022 9.0 8.6 - 10.5 mg/dL Final   12/05/2022 9.5 8.6 - 10.5 mg/dL Final      PO4 No results found for: CAPO4   Albumin Albumin   Date Value Ref Range Status   12/05/2022 3.70 3.50 - 5.20 g/dL Final      Magnesium No results found for: MG   Uric Acid Uric Acid   Date Value Ref Range Status   12/05/2022 2.9 (L) 3.4 - 7.0 mg/dL Final          Imaging Results (Last 72 Hours)     Procedure Component Value Units Date/Time    CT Head Without Contrast [[id number]] Resulted: 12/06/22 0857     Updated: 12/06/22 0859    XR Chest 2 View [[id number]] Collected: 12/05/22 1234     Updated: 12/05/22 1245    Narrative:         DATE OF EXAM:   12/5/2022 12:32 PM     PROCEDURE:   XR CHEST 2 VW-     INDICATIONS:   soa     COMPARISON:  07/21/2022, PET/CT 10/31/2022     TECHNIQUE:   PA and lateral views of the chest were obtained.     FINDINGS:   Left-sided port catheter noted with tip at the mid SVC. Heart size  normal. Postsurgical changes of left upper lobectomy again noted. Soft  tissue mass at the left chest wall between the first and second ribs  better assessed on prior PET/CT. Chronic blunting at the right lateral  costophrenic angle related to small right pleural  effusion unchanged. No  pneumothorax. Osseous structures appear intact.       Impression:      1. No acute process.  2. Postsurgical changes of prior left upper lobectomy with left upper  chest wall soft tissue mass better assessed on the recent PET/CT.  3. Small chronic right pleural effusion.     Electronically Signed By-Antoni Deal MD On:12/5/2022 12:43 PM  This report was finalized on [id number] by  Antoni Deal MD.            Results for orders placed during the hospital encounter of 12/05/22    XR Chest 2 View    Narrative  DATE OF EXAM:  12/5/2022 12:32 PM    PROCEDURE:  XR CHEST 2 VW-    INDICATIONS:  soa    COMPARISON:  07/21/2022, PET/CT 10/31/2022    TECHNIQUE:  PA and lateral views of the chest were obtained.    FINDINGS:  Left-sided port catheter noted with tip at the mid SVC. Heart size  normal. Postsurgical changes of left upper lobectomy again noted. Soft  tissue mass at the left chest wall between the first and second ribs  better assessed on prior PET/CT. Chronic blunting at the right lateral  costophrenic angle related to small right pleural effusion unchanged. No  pneumothorax. Osseous structures appear intact.    Impression  1. No acute process.  2. Postsurgical changes of prior left upper lobectomy with left upper  chest wall soft tissue mass better assessed on the recent PET/CT.  3. Small chronic right pleural effusion.    Electronically Signed By-Antoni Deal MD On:12/5/2022 12:43 PM  This report was finalized on [id number] by  Antoni Deal MD.      Results for orders placed during the hospital encounter of 07/21/22    XR Chest 2 View    Narrative  EXAM: XR CHEST 2 VW-    DATE OF EXAM: 7/21/2022 12:20 PM    INDICATION: POST-OP; C34.92-Malignant neoplasm of unspecified part of  left bronchus or lung.    COMPARISONS: 05/22/2022    FINDINGS:    New ill-defined density along the superior margin of the left hilum. No  evidence of pneumothorax. Small-moderate-sized bilateral  pleural  effusions. Left-sided port unchanged in good position. No evidence of  acute processes of the mediastinum. Surgical clips in the medial right  upper thorax/mediastinal location again noted.    Impression  New ill-defined density along the superior left hilum which may either  relate to development of a mass or postsurgical change. Correlate with  surgical history and consider chest CT evaluation if needed.    No evidence of pneumothorax.    Small-moderate bilateral pleural effusions    Electronically Signed By-Max Killian On:7/21/2022 1:22 PM  This report was finalized on [id number] by  Max Killian, .      Results for orders placed during the hospital encounter of 07/06/22    XR Chest 1 View    Narrative  XR CHEST 1 VW-    Clinical: Chest tube removal, status post left upper lobectomy    COMPARISON examination 0511 hours, current examination 1506 hours    FINDINGS: Left-sided chest tube removed in the interim, no pneumothorax.  Small amount of subcutaneous emphysema left chest wall as before. Seen  better on the current examination is a lobular density within the left  midlung zone, measuring approximately 6 cm transverse and 3.5 cm AP.  This likely represents a focal area consolidation or atelectasis status  post upper lobectomy. Advise conservative surveillance.    There is right-sided pleural effusion with patchy atelectasis and/or  infiltrate at the right lung base as before. The cardiomediastinal  silhouette is stable. Mediport catheter in position.    CONCLUSION: Removal of the left chest tube, no pneumothorax. Better  aeration of the left base with now a somewhat lobulated density at this  location which may represent a focal area of consolidation or  atelectasis. No change in the appearance of the right lung base, likely  effusion and airspace disease as before.    This report was finalized on 7/8/2022 3:34 PM by Dr. Maximiliano Arreguin M.D.            ASSESSMENT / PLAN      Weakness    Anxiety and  depression    Diastolic CHF (HCC)    Gastroesophageal reflux disease    Hypertension    Hyperlipidemia    Non-small cell carcinoma of lung, right (HCC)    Hashimoto's thyroiditis    NSCLC of left lung (HCC)    Hyponatremia    Immunotherapy      · Hyponatremia--chronic, suspect underlying SIADH from lung malignancy and or SSRI and mild volume depletion  · HTN  · Non small cell lung cancer  · Depression--may need to hold SSRI/Pristiq.  · Neuropathy--check B12. Already on oral B12    Check urine  Continue IVF, will recheck sodium , if not improving, Dose samsca  Fluid restriction  Add nepro supplement  Closely monitor UOP, renal function and electrolytes.      I discussed the patient's findings and my recommendations with patient, nursing staff and consulting provider    Will follow along closely. Thank you for allowing us to see this patient in renal consultation.    [facility]  [phone]  MD Adalberto Troncoso MD  12/06/22  09:22 EST

## 2022-12-06 NOTE — PROGRESS NOTES
[facility] Medicine Services Daily Progress Note    Patient Name: Jc Driscoll  : 1965  MRN: [id number]  Primary Care Physician:  Reshma Alvarenga MD  Date of admission: 2022      Subjective      Chief Complaint: Weakness    Seen and examined.  Denies cough denies shortness of breath denies diarrhea denies dysuria denies polyuria denies flulike symptoms.  Has occasional headache lately more than usual.  No focal weakness no focal numbness no dizziness no blurry vision.  No neck stiffness    ROS negative apart for above      Objective      Vitals:   Temp:  [97.8 °F (36.6 °C)-101.5 °F (38.6 °C)] 99.6 °F (37.6 °C)  Heart Rate:  [] 108  Resp:  [16-22] 18  BP: (101-141)/(65-85) 128/76    Physical Exam   Physical Exam   Constitutional:  oriented to person, place, and time. No distress.   HENT:   Head: Normocephalic and atraumatic.   Eyes: Conjunctivae and EOM are normal. Pupils are equal, round, and reactive to light.   Neck: No JVD present. No thyromegaly present.   Cardiovascular: Normal rate, regular rhythm, normal heart sounds and intact distal pulses. Exam reveals no gallop and no friction rub.   No murmur heard.  Pulmonary/Chest: Effort normal and breath sounds normal. No stridor. No respiratory distress.  has no wheezes.  has no rales.  exhibits no tenderness.   Abdominal: Soft. Bowel sounds are normal.  no distension and no mass. There is no tenderness. There is no rebound and no guarding. No hernia.   Musculoskeletal: Normal range of motion.   Lymphadenopathy:     no cervical adenopathy.   Neurological:  alert and oriented to person, place, and time. No cranial nerve deficit or sensory deficit. exhibits normal muscle tone.   Skin: No rash noted.  not diaphoretic.   Psychiatric:  normal mood and affect.   Vitals reviewed.         Result Review    Result Review:  I have personally reviewed the results from the time of this admission to 2022 14:44 EST and agree with  these findings:  [x]  Laboratory  []  Microbiology  [x]  Radiology  []  EKG/Telemetry   []  Cardiology/Vascular   []  Pathology            Assessment & Plan          buPROPion XL, 300 mg, Oral, Daily  desvenlafaxine, 50 mg, Oral, Daily  enoxaparin, 40 mg, Subcutaneous, Daily  gabapentin, 300 mg, Oral, Q8H  levothyroxine, 100 mcg, Oral, Q AM  liothyronine, 5 mcg, Oral, Daily  metoprolol succinate XL, 100 mg, Oral, Daily  OLANZapine, 5 mg, Oral, Nightly  pantoprazole, 40 mg, Oral, Q AM  sodium chloride, 10 mL, Intravenous, Q12H  sodium chloride, 2 g, Oral, TID With Meals  vitamin B-12, 1,000 mcg, Oral, Daily       sodium chloride, 100 mL/hr, Last Rate: 100 mL/hr (12/06/22 0927)         Active Hospital Problems:  Active Hospital Problems    Diagnosis    • **Weakness    • Hyponatremia    • Immunotherapy    • NSCLC of left lung (HCC)    • Hashimoto's thyroiditis    • Non-small cell carcinoma of lung, right (HCC)    • Gastroesophageal reflux disease    • Hyperlipidemia    • Diastolic CHF (HCC)    • Anxiety and depression    • Hypertension      Plan:   Generalized weakness  Patient reports lack of appetite last couple weeks.  He has been trying to drink plenty of liquids however.  Denies fevers at home was however did have a 1-1.5 fever here.  No obvious source of infection per initial diagnostics or review of systems  Blood cultures are pending  Respiratory viral panel negative, UA no evidence of infection, chest x-ray negative, CT head negative  TSH within normal limits  Possibly secondary to lack of appetite and poor diet and hyponatremia    Hyponatremia  IV fluids per nephrology.    Non-small cell lung cancer  Per heme-onc    GERD  Continue PPI    Anxiety/depression  Continue home meds    Hypertension  Continue metoprolol next    Estrogens thyroiditis  Continue Cytomel levothyroxine  TSH normal     DVT PUD prophylaxis    Plan as above    DVT prophylaxis:  Medical and mechanical DVT prophylaxis orders are  present.    CODE STATUS:    Code Status (Patient has no pulse and is not breathing): CPR (Attempt to Resuscitate)  Medical Interventions (Patient has pulse or is breathing): Full Support      Disposition:  I expect patient to be discharged when stable.      Electronically signed by Lake Dewey MD, 12/06/22, 14:44 EST.  [facility]

## 2022-12-06 NOTE — PLAN OF CARE
Goal Outcome Evaluation:  Plan of Care Reviewed With: patient            58 y/o M who presented with weakness. Current diagnosis of stage III lung CA with mets. Head CT (-). Reported prior to admission that it felt like his legs and brain are not connecting. Patient not currently working but has a goal of returning to work. Pt has a full flight of steps to get to his bedroom with a rail. Denies falls. Spouse works full time. Patient was independent with bed mobility, SBA for sit to stand and SBA for 50' ambulation without AD. Still does not feel back to normal, feels more weak than normal so will keep on caseload. Bu he feels since his sodium is being corrected that his strength has significantly improved. Do not anticipate PT needs at d/c Encouraged to sit in chair and ambulate daily while admitted.

## 2022-12-06 NOTE — PLAN OF CARE
Goal Outcome Evaluation:  Plan of Care Reviewed With: patient           Outcome Evaluation: 58 y/o M who presented with weakness. Current diagnosis of stage III lung CA with mets. Head CT (-). Reported prior to admission that it felt like his legs and brain are not connecting. Patient not currently working but has a goal of returning to work. Pt has a full flight of steps to get to his bedroom with a rail. Denies falls. Spouse works full time. At time of today's evaluation, patient still reports feeling slightly weak but much improved. He has returned to independent with all ADL function, as well as all mobility. Good strength and ROM with MMT and AROM tests. No further skilled OT needs at this time, OT will sign off.

## 2022-12-06 NOTE — PLAN OF CARE
Goal Outcome Evaluation:  Plan of Care Reviewed With: patient        Progress: improving   Patient admitted 12-5-22 for weakness.  Patient weakness has improved.  Currently treating electrolyte imbalances.  Will continue to monitor

## 2022-12-06 NOTE — THERAPY EVALUATION
Patient Name: Jc Driscoll  : 1965    MRN: [id number]                              Today's Date: 2022       Admit Date: 2022    Visit Dx:     ICD-10-CM ICD-9-CM   1. Weakness  R53.1 780.79   2. Dehydration  E86.0 276.51   3. Hyponatremia  E87.1 276.1     Patient Active Problem List   Diagnosis   • Cancer of lower lobe of right lung (HCC)   • Retention of urine   • Allergic rhinitis   • Anxiety and depression   • Chronic pansinusitis   • Diastolic CHF (HCC)   • Diverticulosis   • Erosive esophagitis   • Family history of aneurysm   • Gastroesophageal reflux disease   • Hiatal hernia   • Hypertension   • Hyperlipidemia   • Mediastinal lymphadenopathy   • Prediabetes   • Non-small cell carcinoma of lung, right (HCC)   • Encounter for care related to vascular access port   • Normocytic anemia   • B12 deficiency   • Pleural effusion   • Acute chest pain   • Hashimoto's thyroiditis   • Pneumothorax   • NSCLC of left lung (HCC)   • Malignant neoplasm of upper lobe of left lung (HCC)   • Hyponatremia   • Dehydration   • Weakness   • Hyponatremia   • Immunotherapy     Past Medical History:   Diagnosis Date   • Allergic rhinitis 2013    Overview:  2018 - still zyrtec 10 daily (if stops, gets dermatitis again).    • Anxiety and depression 2012    Overview:  2018 -   C/w well 300 xr and Lito 20.  2019 -   Doing ok even with new lung cancer - lito 20 & well 300xr.    • Chronic pansinusitis 2019    Overview:  2019 -   MRI showed chronic thick in frontal, maxillary, ethmoidal ---> to Dr. COLLAZO to est since abx ICC didn't help.  Does netipot bid.    • Diastolic CHF (HCC) 2014    Overview:  14 - impaired LV relaxation on Zhou ECHO.  EF 60%. Rest normal   • Dupuytren's contracture of both hands    • Elevated cholesterol    • Erosive esophagitis 2019    Overview:  EGD 2016 2018 - nexium OTC daily for few week.  Qod before that.  Now carbonation hurts, epigastric pain  4/8/2019 -   protonix 40 doing well.    • Gastroesophageal reflux disease 02/21/2019   • Hiatal hernia 07/09/2019   • History of colon polyps    • Hypertension    • Lung cancer (HCC)     right lung and in lymph nodes x2   • Mediastinal lymphadenopathy 03/12/2019   • Normocytic anemia 08/08/2019    Overview:  6/2019 - dropped to 9's postop 7/2019 - rebounded to 10's.  8/8/2019 -   Back to 9's - check ferritin, b12 and thyroid.    • Prediabetes 07/09/2014    Overview:  7/4/14 - a1c 6.1 at [place] admission   • Retention of urine 06/27/2019    PSOT OP, RESOLVED     Past Surgical History:   Procedure Laterality Date   • BRONCHOSCOPY  03/18/2019    EBUS MONTERO NEEDLE BX   • COLONOSCOPY     • DUPUYTREN CONTRACTURE RELEASE Bilateral    • LUNG BIOPSY  03/08/2019    CT GUIDED   • LUNG REMOVAL, PARTIAL Right     right lower   • PORTACATH PLACEMENT  03/20/2019    DR LONGORIA   • THORACOSCOPY Right 6/26/2019    Procedure: RIGHT VATS, OPEN LOWER LOBECTOMY WITH LYMPH NODE DISECTION, WEDGE RESECTION OF RIGHT MIDDLE LOBE;  Surgeon: Terrance Longoria MD;  Location: [facility] OR;  Service: Cardiothoracic   • THORACOSCOPY VIDEO ASSISTED WITH LOBECTOMY Left 7/6/2022    Procedure: THORACOSCOPY VIDEO ASSISTED WITH LOBECTOMY;  Surgeon: Miryam Reyna MD;  Location: [facility] OR;  Service: Thoracic;  Laterality: Left;      General Information     Row Name 12/06/22 1538          OT Time and Intention    Document Type evaluation  -LS     Mode of Treatment occupational therapy  -LS     Row Name 12/06/22 1537          General Information    Patient Profile Reviewed yes  -LS     Prior Level of Function independent:;ADL's;driving;work;community mobility  -LS     Existing Precautions/Restrictions no known precautions/restrictions  -LS     Barriers to Rehab none identified  -LS     Row Name 12/06/22 1532          Living Environment    People in Home spouse  -LS     Row Name 12/06/22 1536          Home Main Entrance    Number of Stairs, Main  Entrance two  -     Row Name 12/06/22 1538          Stairs Within Home, Primary    Number of Stairs, Within Home, Primary twelve  -LS     Stair Railings, Within Home, Primary railings safe and in good condition  -     Stairs Comment, Within Home, Primary Patient's bed room is upstairs  -     Row Name 12/06/22 1538          Cognition    Orientation Status (Cognition) oriented x 4  -           User Key  (r) = Recorded By, (t) = Taken By, (c) = Cosigned By    Initials Name Provider Type    Alf Irizarry OT Occupational Therapist                 Mobility/ADL's     Row Name 12/06/22 1539          Bed Mobility    Bed Mobility bed mobility (all) activities  -     All Activities, [place] (Bed Mobility) independent  -     Row Name 12/06/22 1539          Transfers    Transfers sit-stand transfer  -     Row Name 12/06/22 1539          Sit-Stand Transfer    Sit-Stand [place] (Transfers) independent  -     Row Name 12/06/22 1539          Functional Mobility    Functional Mobility- Ind. Level independent  -     Row Name 12/06/22 1539          Activities of Daily Living    BADL Assessment/Intervention other (see comments)  Pt remains independent with ADL function  -           User Key  (r) = Recorded By, (t) = Taken By, (c) = Cosigned By    Initials Name Provider Type    Alf Irizarry OT Occupational Therapist               Obj/Interventions     Row Name 12/06/22 1540          Sensory Assessment (Somatosensory)    Sensory Assessment (Somatosensory) sensation intact  -     Row Name 12/06/22 1540          Range of Motion Comprehensive    General Range of Motion no range of motion deficits identified  -     Row Name 12/06/22 1540          Strength Comprehensive (MMT)    General Manual Muscle Testing (MMT) Assessment no strength deficits identified  -     Row Name 12/06/22 1540          Balance    Comment, Balance No balance deficits  -           User Key  (r) = Recorded By, (t) = Taken  By, (c) = Cosigned By    Initials Name Provider Type    LS Alf Jo OT Occupational Therapist               Goals/Plan    No documentation.                Clinical Impression     Row Name 12/06/22 1545          Pain Assessment    Pretreatment Pain Rating 0/10 - no pain  -LS     Posttreatment Pain Rating 0/10 - no pain  -LS     Row Name 12/06/22 1545          Plan of Care Review    Plan of Care Reviewed With patient  -     Outcome Evaluation 56 y/o M who presented with weakness. Current diagnosis of stage III lung CA with mets. Head CT (-). Reported prior to admission that it felt like his legs and brain are not connecting. Patient not currently working but has a goal of returning to work. Pt has a full flight of steps to get to his bedroom with a rail. Denies falls. Spouse works full time. At time of today's evaluation, patient still reports feeling slightly weak but much improved. He has returned to independent with all ADL function, as well as all mobility. Good strength and ROM with MMT and AROM tests. No further skilled OT needs at this time, OT will sign off.  -     Row Name 12/06/22 1545          Therapy Assessment/Plan (OT)    Criteria for Skilled Therapeutic Interventions Met (OT) no;no problems identified which require skilled intervention  -     Therapy Frequency (OT) evaluation only  -     Predicted Duration of Therapy Intervention (OT) eval only  -     Row Name 12/06/22 1545          Therapy Plan Review/Discharge Plan (OT)    Anticipated Discharge Disposition (OT) home  -     Row Name 12/06/22 1545          Positioning and Restraints    Pre-Treatment Position in bed  -LS     Post Treatment Position bed  -LS     In Bed notified nsg;fowlers;call light within reach;encouraged to call for assist  -LS           User Key  (r) = Recorded By, (t) = Taken By, (c) = Cosigned By    Initials Name Provider Type    Alf Irizarry OT Occupational Therapist               Outcome Measures     Row  Name 12/06/22 1547          How much help from another is currently needed...    Putting on and taking off regular lower body clothing? 4  -LS     Bathing (including washing, rinsing, and drying) 4  -LS     Toileting (which includes using toilet bed pan or urinal) 4  -LS     Putting on and taking off regular upper body clothing 4  -LS     Taking care of personal grooming (such as brushing teeth) 4  -LS     Eating meals 4  -     AM-PAC 6 Clicks Score (OT) 24  -LS     Row Name 12/06/22 0802          How much help from another person do you currently need...    Turning from your back to your side while in flat bed without using bedrails? 4  -LH     Moving from lying on back to sitting on the side of a flat bed without bedrails? 4  -LH     Moving to and from a bed to a chair (including a wheelchair)? 4  -LH     Standing up from a chair using your arms (e.g., wheelchair, bedside chair)? 4  -LH     Climbing 3-5 steps with a railing? 4  -LH     To walk in hospital room? 4  -     AM-PAC 6 Clicks Score (PT) 24  -     Highest level of mobility 8 --> Walked 250 feet or more  -     Row Name 12/06/22 1547          Functional Assessment    Outcome Measure Options AM-PAC 6 Clicks Daily Activity (OT)  -           User Key  (r) = Recorded By, (t) = Taken By, (c) = Cosigned By    Initials Name Provider Type     Leslie Fall RN Registered Nurse    Alf Irizarry OT Occupational Therapist                Occupational Therapy Education     Title: PT OT SLP Therapies (Done)     Topic: Occupational Therapy (Done)     Point: ADL training (Done)     Description:   Instruct learner(s) on proper safety adaptation and remediation techniques during self care or transfers.   Instruct in proper use of assistive devices.              Learning Progress Summary           Patient Acceptance, E,TB, VU by  at 12/6/2022 1547                               User Key     Initials Effective Dates Name Provider Type Discipline    CAITLIN  09/22/22 -  Alf Jo OT Occupational Therapist OT              OT Recommendation and Plan  Therapy Frequency (OT): evaluation only  Plan of Care Review  Plan of Care Reviewed With: patient  Outcome Evaluation: 58 y/o M who presented with weakness. Current diagnosis of stage III lung CA with mets. Head CT (-). Reported prior to admission that it felt like his legs and brain are not connecting. Patient not currently working but has a goal of returning to work. Pt has a full flight of steps to get to his bedroom with a rail. Denies falls. Spouse works full time. At time of today's evaluation, patient still reports feeling slightly weak but much improved. He has returned to independent with all ADL function, as well as all mobility. Good strength and ROM with MMT and AROM tests. No further skilled OT needs at this time, OT will sign off.     Time Calculation:    Time Calculation- OT     Row Name 12/06/22 1547             Time Calculation- OT    OT Start Time 1453  -LS      OT Stop Time 1514  -LS      OT Time Calculation (min) 21 min  -      OT Received On 12/06/22  -         Untimed Charges    OT Eval/Re-eval Minutes 21  -LS         Total Minutes    Untimed Charges Total Minutes 21  -LS       Total Minutes 21  -LS            User Key  (r) = Recorded By, (t) = Taken By, (c) = Cosigned By    Initials Name Provider Type     Alf Jo OT Occupational Therapist              Therapy Charges for Today     Code Description Service Date Service Provider Modifiers Qty    29044482565  OT EVAL LOW COMPLEXITY 4 12/6/2022 Alf Jo OT GO 1               Alf Jo OT  12/6/2022

## 2022-12-06 NOTE — THERAPY EVALUATION
Patient Name: Jc Driscoll  : 1965    MRN: [id number]                              Today's Date: 2022       Admit Date: 2022    Visit Dx:     ICD-10-CM ICD-9-CM   1. Weakness  R53.1 780.79   2. Dehydration  E86.0 276.51   3. Hyponatremia  E87.1 276.1     Patient Active Problem List   Diagnosis   • Cancer of lower lobe of right lung (HCC)   • Retention of urine   • Allergic rhinitis   • Anxiety and depression   • Chronic pansinusitis   • Diastolic CHF (HCC)   • Diverticulosis   • Erosive esophagitis   • Family history of aneurysm   • Gastroesophageal reflux disease   • Hiatal hernia   • Hypertension   • Hyperlipidemia   • Mediastinal lymphadenopathy   • Prediabetes   • Non-small cell carcinoma of lung, right (HCC)   • Encounter for care related to vascular access port   • Normocytic anemia   • B12 deficiency   • Pleural effusion   • Acute chest pain   • Hashimoto's thyroiditis   • Pneumothorax   • NSCLC of left lung (HCC)   • Malignant neoplasm of upper lobe of left lung (HCC)   • Hyponatremia   • Dehydration   • Weakness   • Hyponatremia   • Immunotherapy     Past Medical History:   Diagnosis Date   • Allergic rhinitis 2013    Overview:  2018 - still zyrtec 10 daily (if stops, gets dermatitis again).    • Anxiety and depression 2012    Overview:  2018 -   C/w well 300 xr and Lito 20.  2019 -   Doing ok even with new lung cancer - lito 20 & well 300xr.    • Chronic pansinusitis 2019    Overview:  2019 -   MRI showed chronic thick in frontal, maxillary, ethmoidal ---> to Dr. COLLAZO to est since abx ICC didn't help.  Does netipot bid.    • Diastolic CHF (HCC) 2014    Overview:  14 - impaired LV relaxation on Zhou ECHO.  EF 60%. Rest normal   • Dupuytren's contracture of both hands    • Elevated cholesterol    • Erosive esophagitis 2019    Overview:  EGD 2016 2018 - nexium OTC daily for few week.  Qod before that.  Now carbonation hurts, epigastric pain  4/8/2019 -   protonix 40 doing well.    • Gastroesophageal reflux disease 02/21/2019   • Hiatal hernia 07/09/2019   • History of colon polyps    • Hypertension    • Lung cancer (HCC)     right lung and in lymph nodes x2   • Mediastinal lymphadenopathy 03/12/2019   • Normocytic anemia 08/08/2019    Overview:  6/2019 - dropped to 9's postop 7/2019 - rebounded to 10's.  8/8/2019 -   Back to 9's - check ferritin, b12 and thyroid.    • Prediabetes 07/09/2014    Overview:  7/4/14 - a1c 6.1 at [place] admission   • Retention of urine 06/27/2019    PSOT OP, RESOLVED     Past Surgical History:   Procedure Laterality Date   • BRONCHOSCOPY  03/18/2019    EBUS MONTERO NEEDLE BX   • COLONOSCOPY     • DUPUYTREN CONTRACTURE RELEASE Bilateral    • LUNG BIOPSY  03/08/2019    CT GUIDED   • LUNG REMOVAL, PARTIAL Right     right lower   • PORTACATH PLACEMENT  03/20/2019    DR LONGORIA   • THORACOSCOPY Right 6/26/2019    Procedure: RIGHT VATS, OPEN LOWER LOBECTOMY WITH LYMPH NODE DISECTION, WEDGE RESECTION OF RIGHT MIDDLE LOBE;  Surgeon: Terrance Longoria MD;  Location: [facility];  Service: Cardiothoracic   • THORACOSCOPY VIDEO ASSISTED WITH LOBECTOMY Left 7/6/2022    Procedure: THORACOSCOPY VIDEO ASSISTED WITH LOBECTOMY;  Surgeon: Miryam Reyna MD;  Location: [facility] OR;  Service: Thoracic;  Laterality: Left;      General Information     Row Name 12/06/22 1209          Physical Therapy Time and Intention    Document Type evaluation  -SS     Mode of Treatment physical therapy  -     Row Name 12/06/22 1202          General Information    Patient Profile Reviewed yes  -SS     Prior Level of Function independent:;ADL's;all household mobility;community mobility  -     Row Name 12/06/22 1209          Living Environment    People in Home spouse  -SS     Row Name 12/06/22 1209          Stairs Within Home, Primary    Stairs, Within Home, Primary full flight to get to his room  -     Row Name 12/06/22 1209          Cognition     Orientation Status (Cognition) oriented x 4  -           User Key  (r) = Recorded By, (t) = Taken By, (c) = Cosigned By    Initials Name Provider Type     Larisa Borja PT Physical Therapist               Mobility     Row Name 12/06/22 1215          Bed Mobility    Bed Mobility bed mobility (all) activities  -     All Activities, [place] (Bed Mobility) independent  -     Row Name 12/06/22 1215          Sit-Stand Transfer    Sit-Stand [place] (Transfers) independent  -     Row Name 12/06/22 1215          Gait/Stairs (Locomotion)    [place] Level (Gait) supervision  -     Distance in Feet (Gait) 50  -           User Key  (r) = Recorded By, (t) = Taken By, (c) = Cosigned By    Initials Name Provider Type     Larisa Borja PT Physical Therapist               Obj/Interventions     Row Name 12/06/22 1218          Range of Motion Comprehensive    General Range of Motion no range of motion deficits identified  -[facility] Name 12/06/22 1218          Strength Comprehensive (MMT)    General Manual Muscle Testing (MMT) Assessment no strength deficits identified  -     Row Name 12/06/22 1218          Balance    Balance Assessment sitting static balance;standing static balance  -     Static Sitting Balance independent  -     Static Standing Balance supervision  -     Row Name 12/06/22 1218          Sensory Assessment (Somatosensory)    Sensory Assessment (Somatosensory) sensation intact  -           User Key  (r) = Recorded By, (t) = Taken By, (c) = Cosigned By    Initials Name Provider Type     Larisa Borja PT Physical Therapist               Goals/Plan     Row Name 12/06/22 1222          Transfer Goal 1 (PT)    Activity/Assistive Device (Transfer Goal 1, PT) transfers, all  -     [place] Level/Cues Needed (Transfer Goal 1, PT) independent  -     Time Frame (Transfer Goal 1, PT) long term goal (LTG);2 weeks  -     Row Name 12/06/22 1222          Gait  Training Goal 1 (PT)    Activity/Assistive Device (Gait Training Goal 1, PT) gait (walking locomotion)  -SS     [place] Level (Gait Training Goal 1, PT) independent  -SS     Distance (Gait Training Goal 1, PT) 400  -SS     Time Frame (Gait Training Goal 1, PT) long term goal (LTG);2 weeks  -     Row Name 12/06/22 1222          Therapy Assessment/Plan (PT)    Planned Therapy Interventions (PT) balance training;gait training;transfer training;home exercise program;strengthening  -           User Key  (r) = Recorded By, (t) = Taken By, (c) = Cosigned By    Initials Name Provider Type    SS Larisa Borja, PT Physical Therapist               Clinical Impression     Row Name 12/06/22 1219          Pain    Pretreatment Pain Rating 0/10 - no pain  -SS     Posttreatment Pain Rating 0/10 - no pain  -SS     Row Name 12/06/22 1230 12/06/22 1219       Plan of Care Review    Plan of Care Reviewed With -- patient  -    Outcome Evaluation 56 y/o M who presented with weakness. Current diagnosis of stage III lung CA with mets. Head CT (-). Reported prior to admission that it felt like his legs and brain are not connecting. Patient not currently working but has a goal of returning to work. Pt has a full flight of steps to get to his bedroom with a rail. Denies falls. Spouse works full time. Patient was independent with bed mobility, SBA for sit to stand and SBA for 50' ambulation without AD. Still does not feel back to normal, feels more weak than normal so will keep on caseload. Bu he feels since his sodium is being corrected that his strength has significantly improved. Do not anticipate PT needs at d/c Encouraged to sit in chair and ambulate daily while admitted.  -SS --    Row Name 12/06/22 1214          Therapy Assessment/Plan (PT)    Rehab Potential (PT) good, to achieve stated therapy goals  -     Criteria for Skilled Interventions Met (PT) yes;meets criteria  -     Therapy Frequency (PT) 3 times/wk  -      Predicted Duration of Therapy Intervention (PT) until d/c  -     Row Name 12/06/22 1219          Positioning and Restraints    Pre-Treatment Position in bed  -     Post Treatment Position bed  -           User Key  (r) = Recorded By, (t) = Taken By, (c) = Cosigned By    Initials Name Provider Type     Larisa Borja, PT Physical Therapist               Outcome Measures     Row Name 12/06/22 0802          How much help from another person do you currently need...    Turning from your back to your side while in flat bed without using bedrails? 4  -LH     Moving from lying on back to sitting on the side of a flat bed without bedrails? 4  -LH     Moving to and from a bed to a chair (including a wheelchair)? 4  -LH     Standing up from a chair using your arms (e.g., wheelchair, bedside chair)? 4  -LH     Climbing 3-5 steps with a railing? 4  -LH     To walk in hospital room? 4  -LH     AM-PAC 6 Clicks Score (PT) 24  -     Highest level of mobility 8 --> Walked 250 feet or more  -           User Key  (r) = Recorded By, (t) = Taken By, (c) = Cosigned By    Initials Name Provider Type     Leslie Fall, RN Registered Nurse                             Physical Therapy Education     Title: PT OT SLP Therapies (Done)     Topic: Physical Therapy (Done)     Point: Mobility training (Done)     Learning Progress Summary           Patient Acceptance, E, VU by  at 12/6/2022 1224                               User Key     Initials Effective Dates Name Provider Type [facility] 06/16/21 -  Larisa Borja PT Physical Therapist PT              PT Recommendation and Plan  Planned Therapy Interventions (PT): balance training, gait training, transfer training, home exercise program, strengthening  Plan of Care Reviewed With: patient  Outcome Evaluation: 56 y/o M who presented with weakness. Current diagnosis of stage III lung CA with mets. Head CT (-). Reported prior to admission that it felt like his  legs and brain are not connecting. Patient not currently working but has a goal of returning to work. Pt has a full flight of steps to get to his bedroom with a rail. Denies falls. Spouse works full time. Patient was independent with bed mobility, SBA for sit to stand and SBA for 50' ambulation without AD. Still does not feel back to normal, feels more weak than normal so will keep on caseload. Bu he feels since his sodium is being corrected that his strength has significantly improved. Do not anticipate PT needs at d/c Encouraged to sit in chair and ambulate daily while admitted.     Time Calculation:    PT Charges     Row Name 12/06/22 1224             Time Calculation    Start Time 1121  -      Stop Time 1140  -      Time Calculation (min) 19 min  -      PT Received On 12/06/22  -      PT - Next Appointment 12/08/22  -      PT Goal Re-Cert Due Date 12/20/22  -         Time Calculation- PT    Total Timed Code Minutes- PT 0 minute(s)  -            User Key  (r) = Recorded By, (t) = Taken By, (c) = Cosigned By    Initials Name Provider Type    SS Larisa Borja, PT Physical Therapist              Therapy Charges for Today     Code Description Service Date Service Provider Modifiers Qty    71017959285 HC PT EVAL LOW COMPLEXITY 3 12/6/2022 Larisa Borja PT GP 1          PT G-Codes  AM-PAC 6 Clicks Score (PT): 24  PT Discharge Summary  Anticipated Discharge Disposition (PT): home    Larisa Borja PT  12/6/2022

## 2022-12-06 NOTE — H&P
[facility] ER Medicine Services      Patient Name: Jc Driscoll  : 1965  MRN: [id number]  Primary Care Physician:  Reshma Alvarenga MD  Date of admission: 2022      Subjective      Chief Complaint: General weakness    History of Present Illness: Jc Driscoll is a 57 y.o. male who presented to [facility] on 2022 complaining of general weakness, shortness of breath, headache and nausea without vomiting.  He reports he has stage III recurrent lung cancer which has spread to his ribs status post left thoracotomy and chemotherapy in past he is currently on radiation treatment and also received his first immunotherapy 3 weeks ago.  He is stable on room air.  He reports after immunotherapy he started to feel progressively worse in the past 3 days he has felt very weak and nauseous.  Labs today show a sodium of 124 chloride 82 troponin less than 0.010 proBNP 375.3, WBC 9.6 hemoglobin 9.5, initial lactic 3.8 now 1.2.  Respiratory panel negative.  Chest x-ray per radiology no acute process chronic small right pleural effusion.  Review of records shows he has had chronic hyponatremia since early October.  He was given a 1 L normal saline fluid bolus in ED and started on IV fluids and will be admitted for further evaluation and treatment.      Review of Systems   Constitutional: Positive for fever and malaise/fatigue.   HENT: Negative.    Eyes: Negative.    Cardiovascular: Negative.    Respiratory: Positive for shortness of breath.    Endocrine: Negative.    Hematologic/Lymphatic: Negative.    Skin: Negative.    Musculoskeletal: Positive for joint pain.        Left shoulder   Gastrointestinal: Negative.    Genitourinary: Negative.    Neurological: Positive for weakness.   Psychiatric/Behavioral: Positive for depression. The patient is nervous/anxious.    Allergic/Immunologic: Negative.    All other systems reviewed and are negative.      Personal History     Past Medical  History:   Diagnosis Date   • Allergic rhinitis 07/25/2013    Overview:  4/2/2018 - still zyrtec 10 daily (if stops, gets dermatitis again).    • Anxiety and depression 06/05/2012    Overview:  4/2/2018 -   C/w well 300 xr and Lito 20.  4/8/2019 -   Doing ok even with new lung cancer - lito 20 & well 300xr.    • Chronic pansinusitis 04/08/2019    Overview:  4/8/2019 -   MRI showed chronic thick in frontal, maxillary, ethmoidal ---> to Dr. COLLAZO to est since abx ICC didn't help.  Does netipot bid.    • Diastolic CHF (HCC) 07/09/2014    Overview:  7/4/14 - impaired LV relaxation on [facility].  EF 60%. Rest normal   • Dupuytren's contracture of both hands    • Elevated cholesterol    • Erosive esophagitis 07/09/2019    Overview:  EGD 2016 4/2/2018 - nexium OTC daily for few week.  Qod before that.  Now carbonation hurts, epigastric pain 4/8/2019 -   protonix 40 doing well.    • Gastroesophageal reflux disease 02/21/2019   • Hiatal hernia 07/09/2019   • History of colon polyps    • Hypertension    • Lung cancer (HCC)     right lung and in lymph nodes x2   • Mediastinal lymphadenopathy 03/12/2019   • Normocytic anemia 08/08/2019    Overview:  6/2019 - dropped to 9's postop 7/2019 - rebounded to 10's.  8/8/2019 -   Back to 9's - check ferritin, b12 and thyroid.    • Prediabetes 07/09/2014    Overview:  7/4/14 - a1c 6.1 at [facility] admission   • Retention of urine 06/27/2019    PSOT OP, RESOLVED       Past Surgical History:   Procedure Laterality Date   • BRONCHOSCOPY  03/18/2019    EBUS MONTERO NEEDLE BX   • COLONOSCOPY     • DUPUYTREN CONTRACTURE RELEASE Bilateral    • LUNG BIOPSY  03/08/2019    CT GUIDED   • LUNG REMOVAL, PARTIAL Right     right lower   • PORTACATH PLACEMENT  03/20/2019    DR LONGORIA   • THORACOSCOPY Right 6/26/2019    Procedure: RIGHT VATS, OPEN LOWER LOBECTOMY WITH LYMPH NODE DISECTION, WEDGE RESECTION OF RIGHT MIDDLE LOBE;  Surgeon: Terrance Longoria MD;  Location: [facility] OR;  Service: Cardiothoracic   •  THORACOSCOPY VIDEO ASSISTED WITH LOBECTOMY Left 7/6/2022    Procedure: THORACOSCOPY VIDEO ASSISTED WITH LOBECTOMY;  Surgeon: Miryam Reyna MD;  Location: [facility];  Service: Thoracic;  Laterality: Left;       Family History: family history includes Cancer in his father, mother, and sister; Cervical cancer in his mother; Lung cancer in his father and sister. Otherwise pertinent FHx was reviewed and not pertinent to current issue.    Social History:  reports that he quit smoking about 13 years ago. His smoking use included cigarettes. He has never used smokeless tobacco. He reports current alcohol use of about 2.0 standard drinks per week. He reports that he does not use drugs.    Home Medications:  Prior to Admission Medications     Prescriptions Last Dose Informant Patient Reported? Taking?    buPROPion XL (WELLBUTRIN XL) 300 MG 24 hr tablet 12/4/2022  Yes Yes    Take 300 mg by mouth Every Morning.    diazePAM (Valium) 2 MG tablet   No Yes    Take 1 tablet by mouth At Night As Needed for Anxiety or Muscle Spasms.    levothyroxine (SYNTHROID, LEVOTHROID) 100 MCG tablet 12/4/2022  Yes Yes    Take 100 mcg by mouth Every Morning.    liothyronine (CYTOMEL) 5 MCG tablet   Yes Yes    Take 5 mcg by mouth Daily.    LORazepam (ATIVAN) 0.5 MG tablet Past Month  Yes Yes    Take 0.5 mg by mouth Every 8 (Eight) Hours As Needed.    metoprolol succinate XL (TOPROL-XL) 100 MG 24 hr tablet 12/4/2022  Yes Yes    Take 100 mg by mouth Daily.    OLANZapine (zyPREXA) 5 MG tablet Past Month  No Yes    Take 1 tablet by mouth Every Night. Take on days 2, 3 and 4 after chemotherapy.    ondansetron (ZOFRAN) 8 MG tablet Past Month  No Yes    Take 1 tablet by mouth 3 (Three) Times a Day As Needed for Nausea or Vomiting.    oxyCODONE-acetaminophen (PERCOCET) 5-325 MG per tablet Past Month  No Yes    Take 1 tablet by mouth Every 6 (Six) Hours As Needed for Moderate Pain.    pantoprazole (PROTONIX) 40 MG EC tablet 12/4/2022  Yes Yes     Take 40 mg by mouth Daily As Needed.    vitamin B-12 (CYANOCOBALAMIN) 1000 MCG tablet 12/4/2022  Yes Yes    Take 1,000 mcg by mouth Daily.    desvenlafaxine (PRISTIQ) 50 MG 24 hr tablet   Yes No    Take 50 mg by mouth Daily.    gabapentin (NEURONTIN) 300 MG capsule   No No    Take 1 capsule by mouth Every 8 (Eight) Hours for 30 days.            Allergies:  No Known Allergies    Objective      Vitals:   Temp:  [97.6 °F (36.4 °C)] 97.6 °F (36.4 °C)  Heart Rate:  [107-137] 108  Resp:  [14-16] 16  BP: (114-145)/(73-87) 123/83    Physical Exam  Vitals reviewed.   Constitutional:       Appearance: Normal appearance. He is normal weight.   HENT:      Head: Normocephalic and atraumatic.      Right Ear: External ear normal.      Left Ear: External ear normal.      Nose: Nose normal.      Mouth/Throat:      Mouth: Mucous membranes are moist.   Eyes:      Extraocular Movements: Extraocular movements intact.   Cardiovascular:      Rate and Rhythm: Regular rhythm. Tachycardia present.      Pulses: Normal pulses.      Heart sounds: Normal heart sounds.   Pulmonary:      Effort: Pulmonary effort is normal.      Breath sounds: Normal breath sounds.   Abdominal:      Palpations: Abdomen is soft.   Genitourinary:     Comments: deferred  Musculoskeletal:         General: Normal range of motion.      Cervical back: Normal range of motion and neck supple.   Skin:     General: Skin is warm and dry.   Neurological:      General: No focal deficit present.      Mental Status: He is alert and oriented to person, place, and time.   Psychiatric:         Mood and Affect: Mood normal.         Behavior: Behavior normal.         Thought Content: Thought content normal.         Judgment: Judgment normal.         Result Review    Result Review:  I have personally reviewed the results from the time of this admission to 12/5/2022 22:52 EST and agree with these findings:  [x]  Laboratory  [x]  Microbiology  [x]  Radiology  [x]  EKG/Telemetry   []   Cardiology/Vascular   []  Pathology  [x]  Old records  []  Other:  Most notable findings include: Na 124, chloride 82, troponin less than 0.010 proBNP 375.3      CXR  . No acute process.  2. Postsurgical changes of prior left upper lobectomy with left upper  chest wall soft tissue mass better assessed on the recent PET/CT.  3. Small chronic right pleural effusion.    Assessment & Plan        Active Hospital Problems:  Active Hospital Problems    Diagnosis    • **Weakness    • Hyponatremia    • Immunotherapy    • NSCLC of left lung (HCC)    • Non-small cell carcinoma of lung, right (HCC)    • Hashimoto's thyroiditis    • Gastroesophageal reflux disease    • Hyperlipidemia    • Diastolic CHF (HCC)    • Anxiety and depression    • Hypertension      Plan:    General weakness nausea may be secondary to non-small cell adenocarcinoma right and left lung with metastasis to rib, immunotherapy and radiation therapy in progress, in addition to hyponatremia, 4 mg IV Zofran every 6 hours as needed nausea see plans below    Hyponatremia sodium 124, chronic but below baseline of 131, 1 L IV fluid normal saline bolus in ED with normal saline at 100 cc/h trend renal function, consider nephrology consult    Non-small cell lung cancer of the left lung and right lung status post partial resection, currently on radiation and immunotherapy Dr. Escalona and Dr Gaspar, consider hematology oncology consult, reordered home oxycodone and gabapentin inspect verified    GERD on PPI    Anxiety, depression, reordered home Wellbutrin, Pristiq, Zyprexa, diazepam and lorazepam)INSPECT verified)    GERD, on PPI    Hypertension on home metoprolol reordered monitor BP    Hashimoto's thyroiditis on home Cytomel and levothyroxine reordered      DVT prophylaxis:  Medical and mechanical DVT prophylaxis orders are present.    CODE STATUS:    Code Status (Patient has no pulse and is not breathing): CPR (Attempt to Resuscitate)  Medical Interventions (Patient has  pulse or is breathing): Full Support    Admission Status:  I believe this patient meets observation  status.    I discussed the patient's findings and my recommendations with patient.    This patient has been examined wearing appropriate Personal Protective Equipment . 12/05/22      Signature: Electronically signed by MULUGETA Kevin, 12/05/22, 10:56 PM EST.

## 2022-12-07 ENCOUNTER — TELEPHONE (OUTPATIENT)
Dept: RADIATION ONCOLOGY | Facility: HOSPITAL | Age: 57
End: 2022-12-07

## 2022-12-07 LAB
ANION GAP SERPL CALCULATED.3IONS-SCNC: 13 MMOL/L (ref 5–15)
ANION GAP SERPL CALCULATED.3IONS-SCNC: 15 MMOL/L (ref 5–15)
ANION GAP SERPL CALCULATED.3IONS-SCNC: 16 MMOL/L (ref 5–15)
BUN SERPL-MCNC: 10 MG/DL (ref 6–20)
BUN SERPL-MCNC: 11 MG/DL (ref 6–20)
BUN SERPL-MCNC: 11 MG/DL (ref 6–20)
BUN/CREAT SERPL: 11.3 (ref 7–25)
BUN/CREAT SERPL: 11.5 (ref 7–25)
BUN/CREAT SERPL: 12 (ref 7–25)
CALCIUM SPEC-SCNC: 8.5 MG/DL (ref 8.6–10.5)
CALCIUM SPEC-SCNC: 8.8 MG/DL (ref 8.6–10.5)
CALCIUM SPEC-SCNC: 8.9 MG/DL (ref 8.6–10.5)
CHLORIDE SERPL-SCNC: 88 MMOL/L (ref 98–107)
CHLORIDE SERPL-SCNC: 89 MMOL/L (ref 98–107)
CHLORIDE SERPL-SCNC: 91 MMOL/L (ref 98–107)
CO2 SERPL-SCNC: 24 MMOL/L (ref 22–29)
CO2 SERPL-SCNC: 24 MMOL/L (ref 22–29)
CO2 SERPL-SCNC: 25 MMOL/L (ref 22–29)
CREAT SERPL-MCNC: 0.87 MG/DL (ref 0.76–1.27)
CREAT SERPL-MCNC: 0.92 MG/DL (ref 0.76–1.27)
CREAT SERPL-MCNC: 0.97 MG/DL (ref 0.76–1.27)
EGFRCR SERPLBLD CKD-EPI 2021: 100 ML/MIN/1.73
EGFRCR SERPLBLD CKD-EPI 2021: 90.5 ML/MIN/1.73
EGFRCR SERPLBLD CKD-EPI 2021: 96.4 ML/MIN/1.73
GLUCOSE SERPL-MCNC: 106 MG/DL (ref 65–99)
GLUCOSE SERPL-MCNC: 117 MG/DL (ref 65–99)
GLUCOSE SERPL-MCNC: 141 MG/DL (ref 65–99)
POTASSIUM SERPL-SCNC: 3.3 MMOL/L (ref 3.5–5.2)
POTASSIUM SERPL-SCNC: 3.4 MMOL/L (ref 3.5–5.2)
POTASSIUM SERPL-SCNC: 3.5 MMOL/L (ref 3.5–5.2)
SODIUM SERPL-SCNC: 128 MMOL/L (ref 136–145)
SODIUM SERPL-SCNC: 128 MMOL/L (ref 136–145)
SODIUM SERPL-SCNC: 129 MMOL/L (ref 136–145)

## 2022-12-07 PROCEDURE — 85027 COMPLETE CBC AUTOMATED: CPT | Performed by: HOSPITALIST

## 2022-12-07 PROCEDURE — G0378 HOSPITAL OBSERVATION PER HR: HCPCS

## 2022-12-07 PROCEDURE — 25010000002 ENOXAPARIN PER 10 MG: Performed by: INTERNAL MEDICINE

## 2022-12-07 PROCEDURE — 96372 THER/PROPH/DIAG INJ SC/IM: CPT

## 2022-12-07 PROCEDURE — 80053 COMPREHEN METABOLIC PANEL: CPT | Performed by: INTERNAL MEDICINE

## 2022-12-07 RX ORDER — POTASSIUM CHLORIDE 20 MEQ/1
40 TABLET, EXTENDED RELEASE ORAL ONCE
Status: COMPLETED | OUTPATIENT
Start: 2022-12-07 | End: 2022-12-07

## 2022-12-07 RX ADMIN — DESVENLAFAXINE SUCCINATE 50 MG: 50 TABLET, EXTENDED RELEASE ORAL at 08:01

## 2022-12-07 RX ADMIN — ACETAMINOPHEN 650 MG: 325 TABLET, FILM COATED ORAL at 22:00

## 2022-12-07 RX ADMIN — LEVOTHYROXINE SODIUM 100 MCG: 0.1 TABLET ORAL at 05:31

## 2022-12-07 RX ADMIN — SODIUM CHLORIDE 2 G: 1 TABLET ORAL at 11:34

## 2022-12-07 RX ADMIN — POTASSIUM CHLORIDE 40 MEQ: 1500 TABLET, EXTENDED RELEASE ORAL at 10:47

## 2022-12-07 RX ADMIN — POTASSIUM CHLORIDE 40 MEQ: 1500 TABLET, EXTENDED RELEASE ORAL at 21:45

## 2022-12-07 RX ADMIN — GABAPENTIN 300 MG: 300 CAPSULE ORAL at 21:45

## 2022-12-07 RX ADMIN — CYANOCOBALAMIN TAB 1000 MCG 1000 MCG: 1000 TAB at 08:01

## 2022-12-07 RX ADMIN — GABAPENTIN 300 MG: 300 CAPSULE ORAL at 05:31

## 2022-12-07 RX ADMIN — METOPROLOL SUCCINATE 100 MG: 50 TABLET, EXTENDED RELEASE ORAL at 08:01

## 2022-12-07 RX ADMIN — GABAPENTIN 300 MG: 300 CAPSULE ORAL at 16:11

## 2022-12-07 RX ADMIN — SODIUM CHLORIDE 2 G: 1 TABLET ORAL at 18:04

## 2022-12-07 RX ADMIN — ENOXAPARIN SODIUM 40 MG: 100 INJECTION SUBCUTANEOUS at 16:11

## 2022-12-07 RX ADMIN — Medication 10 ML: at 21:46

## 2022-12-07 RX ADMIN — OLANZAPINE 5 MG: 5 TABLET, FILM COATED ORAL at 21:46

## 2022-12-07 RX ADMIN — Medication 10 ML: at 08:02

## 2022-12-07 RX ADMIN — SODIUM CHLORIDE 2 G: 1 TABLET ORAL at 08:02

## 2022-12-07 RX ADMIN — PANTOPRAZOLE SODIUM 40 MG: 40 TABLET, DELAYED RELEASE ORAL at 05:31

## 2022-12-07 RX ADMIN — LIOTHYRONINE SODIUM 5 MCG: 5 TABLET ORAL at 08:02

## 2022-12-07 RX ADMIN — BUPROPION HYDROCHLORIDE 300 MG: 150 TABLET, EXTENDED RELEASE ORAL at 08:01

## 2022-12-07 NOTE — PLAN OF CARE
Problem: Adult Inpatient Plan of Care  Goal: Plan of Care Review  Outcome: Ongoing, Progressing  Goal: Patient-Specific Goal (Individualized)  Outcome: Ongoing, Progressing  Goal: Absence of Hospital-Acquired Illness or Injury  Outcome: Ongoing, Progressing  Intervention: Identify and Manage Fall Risk  Recent Flowsheet Documentation  Taken 12/7/2022 0040 by Indira Lozano RN  Safety Promotion/Fall Prevention: safety round/check completed  Taken 12/6/2022 2210 by Indira Lozano RN  Safety Promotion/Fall Prevention: safety round/check completed  Taken 12/6/2022 2037 by Indira Lozano RN  Safety Promotion/Fall Prevention: safety round/check completed  Intervention: Prevent Skin Injury  Recent Flowsheet Documentation  Taken 12/6/2022 2037 by Indira Lozano RN  Skin Protection: adhesive use limited  Intervention: Prevent and Manage VTE (Venous Thromboembolism) Risk  Recent Flowsheet Documentation  Taken 12/6/2022 2037 by Indira Lozano RN  VTE Prevention/Management:   sequential compression devices off   compression stockings off  Range of Motion: active ROM (range of motion) encouraged  Intervention: Prevent Infection  Recent Flowsheet Documentation  Taken 12/6/2022 2037 by Indira Lozano RN  Infection Prevention:   equipment surfaces disinfected   hand hygiene promoted  Goal: Optimal Comfort and Wellbeing  Outcome: Ongoing, Progressing  Intervention: Monitor Pain and Promote Comfort  Recent Flowsheet Documentation  Taken 12/6/2022 2037 by Indira Lozano RN  Pain Management Interventions:   quiet environment facilitated   position adjusted   see MAR  Intervention: Provide Person-Centered Care  Recent Flowsheet Documentation  Taken 12/6/2022 2037 by Indira Lozano RN  Trust Relationship/Rapport:   care explained   choices provided   questions answered   questions encouraged   thoughts/feelings acknowledged  Goal: Readiness for Transition of Care  Outcome: Ongoing, Progressing      Problem: Fall Injury Risk  Goal: Absence of Fall and Fall-Related Injury  Outcome: Ongoing, Progressing  Intervention: Identify and Manage Contributors  Recent Flowsheet Documentation  Taken 12/6/2022 2037 by Indira Lozano RN  Medication Review/Management: medications reviewed  Intervention: Promote Injury-Free Environment  Recent Flowsheet Documentation  Taken 12/7/2022 0040 by Indira Lozano, RN  Safety Promotion/Fall Prevention: safety round/check completed  Taken 12/6/2022 2210 by Indira Lozano RN  Safety Promotion/Fall Prevention: safety round/check completed  Taken 12/6/2022 2037 by Indira Lozano RN  Safety Promotion/Fall Prevention: safety round/check completed     Problem: Hypertension Comorbidity  Goal: Blood Pressure in Desired Range  Outcome: Ongoing, Progressing  Intervention: Maintain Blood Pressure Management  Recent Flowsheet Documentation  Taken 12/6/2022 2037 by Indira Lozano, RN  Medication Review/Management: medications reviewed   Goal Outcome Evaluation:                 Patient states he feels stronger than when he came in.

## 2022-12-07 NOTE — PROGRESS NOTES
"NEPHROLOGY PROGRESS NOTE------[facility]    [facility]  [phone]  Adalberto Huffman MD      Patient Care Team:  Reshma Alvarenga MD as PCP - General (Family Medicine)  Reshma Alvarenga MD as PCP - Family Medicine  Miryam Reyna MD as Surgeon (Thoracic Surgery)  Azucena Gaspar MD as Consulting Physician (Hematology and Oncology)  Adalberto Huffman MD as Consulting Physician (Nephrology)      Provider:  Adalberto Huffman MD  Patient Name: Jc Driscoll  :  1965    SUBJECTIVE:  F/U hyponatremia  No chest pain or SOA  Feeling better, ambulated some  Urine initially dark, now clear  Na 126-->124-->126-->128, received samsca yesterday    Medication:  buPROPion XL, 300 mg, Oral, Daily  desvenlafaxine, 50 mg, Oral, Daily  enoxaparin, 40 mg, Subcutaneous, Daily  gabapentin, 300 mg, Oral, Q8H  levothyroxine, 100 mcg, Oral, Q AM  liothyronine, 5 mcg, Oral, Daily  metoprolol succinate XL, 100 mg, Oral, Daily  OLANZapine, 5 mg, Oral, Nightly  pantoprazole, 40 mg, Oral, Q AM  sodium chloride, 10 mL, Intravenous, Q12H  sodium chloride, 2 g, Oral, TID With Meals  vitamin B-12, 1,000 mcg, Oral, Daily           OBJECTIVE    Vital Sign Min/Max for last 24 hours  Temp  Min: 97.9 °F (36.6 °C)  Max: 101.5 °F (38.6 °C)   BP  Min: 113/72  Max: 128/76   Pulse  Min: 90  Max: 108   Resp  Min: 16  Max: 20   SpO2  Min: 98 %  Max: 100 %   No data recorded   Weight  Min: 84.8 kg (187 lb)  Max: 84.8 kg (187 lb)     Flowsheet Rows    Flowsheet Row First Filed Value   Admission Height 190.5 cm (75\") Documented at 2022 1153   Admission Weight 86.7 kg (191 lb 2.2 oz) Documented at 2022 1153          No intake/output data recorded.  I/O last 3 completed shifts:  In: 480 [P.O.:480]  Out: 1000 [Urine:1000]    Physical Exam:  General Appearance: alert, appears stated age and cooperative  Head: normocephalic, without obvious abnormality and atraumatic  Eyes: " conjunctivae and sclerae normal and no icterus  Neck: supple and no JVD  Lungs: scattered rhonchi  Heart: regular rhythm & normal rate and normal S1, S2  Chest: Wall no abnormalities observed  Abdomen: normal bowel sounds and soft, nontender  Extremities: moves extremities well, no edema, no cyanosis and no redness  Skin: no bleeding, bruising or rash, turgor normal, color normal and no lesions noted  Neurologic: Alert, and oriented. No focal deficits    Labs:    WBC WBC   Date Value Ref Range Status   12/06/2022 7.20 3.40 - 10.80 10*3/mm3 Final   12/05/2022 9.80 3.40 - 10.80 10*3/mm3 Final      HGB Hemoglobin   Date Value Ref Range Status   12/06/2022 8.5 (L) 13.0 - 17.7 g/dL Final   12/05/2022 9.5 (L) 13.0 - 17.7 g/dL Final      HCT Hematocrit   Date Value Ref Range Status   12/06/2022 26.8 (L) 37.5 - 51.0 % Final   12/05/2022 28.6 (L) 37.5 - 51.0 % Final      Platelets No results found for: LABPLAT   MCV MCV   Date Value Ref Range Status   12/06/2022 88.2 79.0 - 97.0 fL Final   12/05/2022 85.5 79.0 - 97.0 fL Final          Sodium Sodium   Date Value Ref Range Status   12/06/2022 126 (L) 136 - 145 mmol/L Final   12/06/2022 124 (L) 136 - 145 mmol/L Final   12/06/2022 126 (L) 136 - 145 mmol/L Final   12/05/2022 124 (L) 136 - 145 mmol/L Final   12/05/2022 124 (L) 136 - 145 mmol/L Final      Potassium Potassium   Date Value Ref Range Status   12/06/2022 3.4 (L) 3.5 - 5.2 mmol/L Final   12/06/2022 3.2 (L) 3.5 - 5.2 mmol/L Final   12/06/2022 3.2 (L) 3.5 - 5.2 mmol/L Final   12/05/2022 3.7 3.5 - 5.2 mmol/L Final      Chloride Chloride   Date Value Ref Range Status   12/06/2022 88 (L) 98 - 107 mmol/L Final   12/06/2022 89 (L) 98 - 107 mmol/L Final   12/06/2022 88 (L) 98 - 107 mmol/L Final   12/05/2022 82 (L) 98 - 107 mmol/L Final      CO2 CO2   Date Value Ref Range Status   12/06/2022 23.0 22.0 - 29.0 mmol/L Final   12/06/2022 24.0 22.0 - 29.0 mmol/L Final   12/06/2022 24.0 22.0 - 29.0 mmol/L Final   12/05/2022 24.0 22.0  - 29.0 mmol/L Final      BUN BUN   Date Value Ref Range Status   12/06/2022 11 6 - 20 mg/dL Final   12/06/2022 12 6 - 20 mg/dL Final   12/06/2022 11 6 - 20 mg/dL Final   12/05/2022 12 6 - 20 mg/dL Final      Creatinine Creatinine   Date Value Ref Range Status   12/06/2022 0.94 0.76 - 1.27 mg/dL Final   12/06/2022 0.95 0.76 - 1.27 mg/dL Final   12/06/2022 0.95 0.76 - 1.27 mg/dL Final   12/05/2022 1.15 0.76 - 1.27 mg/dL Final      Calcium Calcium   Date Value Ref Range Status   12/06/2022 8.6 8.6 - 10.5 mg/dL Final   12/06/2022 8.0 (L) 8.6 - 10.5 mg/dL Final   12/06/2022 9.0 8.6 - 10.5 mg/dL Final   12/05/2022 9.5 8.6 - 10.5 mg/dL Final      PO4 No components found for: PO4   Albumin Albumin   Date Value Ref Range Status   12/05/2022 3.70 3.50 - 5.20 g/dL Final      Magnesium No results found for: MG   Uric Acid No components found for: URIC ACID     Imaging Results (Last 72 Hours)     Procedure Component Value Units Date/Time    CT Head Without Contrast [[id number]] Collected: 12/06/22 0944     Updated: 12/06/22 0948    Narrative:      Exam: CT HEAD WO CONTRAST-     Date of Exam: 12/6/2022 8:57 AM     Indication: Headache, uncomplicated (Ped 0-17y); R53.1-Weakness;  E86.0-Dehydration; E87.2-Pyuy-sztbrkkcxi and hyponatremia.     Comparison: MRI brain dated 05/21/2022     Technique:  Axial CT images of the head were obtained from skull base to  vertex without IV contrast.  Coronal reconstructions were performed.   Automated exposure control and iterative reconstruction methods were  used.     FINDINGS  The ventricles and sulci are proportional without evidence of volume  loss or hydrocephalus. There is no mass effect or midline shift. There  is no acute intracranial hemorrhage. There is no abnormal extra-axial  fluid collection. The gray-white matter differentiation is preserved.  The calvarium is intact. The mastoid air cells and middle ears appear  well aerated. The paranasal sinuses are clear. Visualized orbits  and  globes appear symmetric.       Impression:      1. No acute intracranial abnormality. Specifically, no evidence of  hemorrhage, mass effect or midline shift     Electronically Signed By-Elpidio Billingsley MD On:12/6/2022 9:46 AM  This report was finalized on [id number] by  Elpidio Billingsley MD.    XR Chest 2 View [[id number]] Collected: 12/05/22 1234     Updated: 12/05/22 1245    Narrative:         DATE OF EXAM:   12/5/2022 12:32 PM     PROCEDURE:   XR CHEST 2 VW-     INDICATIONS:   soa     COMPARISON:  07/21/2022, PET/CT 10/31/2022     TECHNIQUE:   PA and lateral views of the chest were obtained.     FINDINGS:   Left-sided port catheter noted with tip at the mid SVC. Heart size  normal. Postsurgical changes of left upper lobectomy again noted. Soft  tissue mass at the left chest wall between the first and second ribs  better assessed on prior PET/CT. Chronic blunting at the right lateral  costophrenic angle related to small right pleural effusion unchanged. No  pneumothorax. Osseous structures appear intact.       Impression:      1. No acute process.  2. Postsurgical changes of prior left upper lobectomy with left upper  chest wall soft tissue mass better assessed on the recent PET/CT.  3. Small chronic right pleural effusion.     Electronically Signed By-Antoni Deal MD On:12/5/2022 12:43 PM  This report was finalized on [id number] by  Antoni Deal MD.          Results for orders placed during the hospital encounter of 12/05/22    XR Chest 2 View    Narrative  DATE OF EXAM:  12/5/2022 12:32 PM    PROCEDURE:  XR CHEST 2 VW-    INDICATIONS:  soa    COMPARISON:  07/21/2022, PET/CT 10/31/2022    TECHNIQUE:  PA and lateral views of the chest were obtained.    FINDINGS:  Left-sided port catheter noted with tip at the mid SVC. Heart size  normal. Postsurgical changes of left upper lobectomy again noted. Soft  tissue mass at the left chest wall between the first and second ribs  better assessed on prior PET/CT.  Chronic blunting at the right lateral  costophrenic angle related to small right pleural effusion unchanged. No  pneumothorax. Osseous structures appear intact.    Impression  1. No acute process.  2. Postsurgical changes of prior left upper lobectomy with left upper  chest wall soft tissue mass better assessed on the recent PET/CT.  3. Small chronic right pleural effusion.    Electronically Signed By-Antoni Deal MD On:12/5/2022 12:43 PM  This report was finalized on [id number] by  Antnoi Deal MD.      Results for orders placed during the hospital encounter of 07/21/22    XR Chest 2 View    Narrative  EXAM: XR CHEST 2 VW-    DATE OF EXAM: 7/21/2022 12:20 PM    INDICATION: POST-OP; C34.92-Malignant neoplasm of unspecified part of  left bronchus or lung.    COMPARISONS: 05/22/2022    FINDINGS:    New ill-defined density along the superior margin of the left hilum. No  evidence of pneumothorax. Small-moderate-sized bilateral pleural  effusions. Left-sided port unchanged in good position. No evidence of  acute processes of the mediastinum. Surgical clips in the medial right  upper thorax/mediastinal location again noted.    Impression  New ill-defined density along the superior left hilum which may either  relate to development of a mass or postsurgical change. Correlate with  surgical history and consider chest CT evaluation if needed.    No evidence of pneumothorax.    Small-moderate bilateral pleural effusions    Electronically Signed By-Max Killian On:7/21/2022 1:22 PM  This report was finalized on [id number] by  Max Killian, .      Results for orders placed during the hospital encounter of 07/06/22    XR Chest 1 View    Narrative  XR CHEST 1 VW-    Clinical: Chest tube removal, status post left upper lobectomy    COMPARISON examination 0511 hours, current examination 1506 hours    FINDINGS: Left-sided chest tube removed in the interim, no pneumothorax.  Small amount of subcutaneous emphysema left chest  wall as before. Seen  better on the current examination is a lobular density within the left  midlung zone, measuring approximately 6 cm transverse and 3.5 cm AP.  This likely represents a focal area consolidation or atelectasis status  post upper lobectomy. Advise conservative surveillance.    There is right-sided pleural effusion with patchy atelectasis and/or  infiltrate at the right lung base as before. The cardiomediastinal  silhouette is stable. Mediport catheter in position.    CONCLUSION: Removal of the left chest tube, no pneumothorax. Better  aeration of the left base with now a somewhat lobulated density at this  location which may represent a focal area of consolidation or  atelectasis. No change in the appearance of the right lung base, likely  effusion and airspace disease as before.    This report was finalized on 7/8/2022 3:34 PM by Dr. Maximiliano Arreguin M.D.            ASSESSMENT / PLAN      Weakness    Anxiety and depression    Diastolic CHF (HCC)    Gastroesophageal reflux disease    Hypertension    Hyperlipidemia    Non-small cell carcinoma of lung, right (HCC)    Hashimoto's thyroiditis    NSCLC of left lung (HCC)    Hyponatremia    Immunotherapy    • Hyponatremia--chronic, underlying SIADH from lung malignancy and or SSRI and mild volume depletion.  • HTN  • Non small cell lung cancer  • Depression--may need to hold SSRI/Pristiq.  • Neuropathy--check B12. Already on oral B12     Sodium better  S/P samsca 12/6/22  Continue fluid restriction, NaCL table  Avoid SSRIs  Closely monitor UOP, renal function and electrolytes.        Adalberto Huffman MD  [facility]  [phone]  12/07/22  09:00 EST

## 2022-12-07 NOTE — TELEPHONE ENCOUNTER
Radiation Oncology Nurse Note    Received call from patient nurse. She stated patient has declined treatment today due to lack of transportation and not feeling up to treatment today.  Notify treatment team.  Will watch for patient discharge.    Antionette Gage RN, BSN  Radiation Oncology Department  [facility]  [phone]  Office  [phone]  Fax

## 2022-12-07 NOTE — PROGRESS NOTES
[facility] Medicine Services Daily Progress Note    Patient Name: Jc Driscoll  : 1965  MRN: [id number]  Primary Care Physician:  Reshma Alvarenga MD  Date of admission: 2022      Subjective      Chief Complaint: Weakness  He is improving feeling much better today.  No fevers overnight.    ROS negative apart for above      Objective      Vitals:   Temp:  [97.9 °F (36.6 °C)-101.5 °F (38.6 °C)] 98.9 °F (37.2 °C)  Heart Rate:  [] 108  Resp:  [16-20] 20  BP: (113-128)/(72-80) 114/72    Physical Exam   Physical Exam   Constitutional:  oriented to person, place, and time. No distress.   HENT:   Head: Normocephalic and atraumatic.   Eyes: Conjunctivae and EOM are normal. Pupils are equal, round, and reactive to light.   Neck: No JVD present. No thyromegaly present.   Cardiovascular: Normal rate, regular rhythm, normal heart sounds and intact distal pulses. Exam reveals no gallop and no friction rub.   No murmur heard.  Pulmonary/Chest: Effort normal and breath sounds normal. No stridor. No respiratory distress.  has no wheezes.  has no rales.  exhibits no tenderness.   Abdominal: Soft. Bowel sounds are normal.  no distension and no mass. There is no tenderness. There is no rebound and no guarding. No hernia.   Musculoskeletal: Normal range of motion.   Lymphadenopathy:     no cervical adenopathy.   Neurological:  alert and oriented to person, place, and time. No cranial nerve deficit or sensory deficit. exhibits normal muscle tone.   Skin: No rash noted.  not diaphoretic.   Psychiatric:  normal mood and affect.   Vitals reviewed.         Result Review    Result Review:  I have personally reviewed the results from the time of this admission to 2022 09:37 EST and agree with these findings:  [x]  Laboratory  []  Microbiology  [x]  Radiology  []  EKG/Telemetry   []  Cardiology/Vascular   []  Pathology            Assessment & Plan          buPROPion XL, 300 mg, Oral,  Daily  desvenlafaxine, 50 mg, Oral, Daily  enoxaparin, 40 mg, Subcutaneous, Daily  gabapentin, 300 mg, Oral, Q8H  levothyroxine, 100 mcg, Oral, Q AM  liothyronine, 5 mcg, Oral, Daily  metoprolol succinate XL, 100 mg, Oral, Daily  OLANZapine, 5 mg, Oral, Nightly  pantoprazole, 40 mg, Oral, Q AM  sodium chloride, 10 mL, Intravenous, Q12H  sodium chloride, 2 g, Oral, TID With Meals  vitamin B-12, 1,000 mcg, Oral, Daily             Active Hospital Problems:  Active Hospital Problems    Diagnosis    • **Weakness    • Hyponatremia    • Immunotherapy    • NSCLC of left lung (HCC)    • Hashimoto's thyroiditis    • Non-small cell carcinoma of lung, right (HCC)    • Gastroesophageal reflux disease    • Hyperlipidemia    • Diastolic CHF (HCC)    • Anxiety and depression    • Hypertension      Plan:   Generalized weakness  Patient reports lack of appetite last couple weeks.  He has been trying to drink plenty of liquids however.  Denies fevers at home was however did have a 1-1.5 fever here.  No obvious source of infection per initial diagnostics or review of systems  Blood cultures are pending and so far negative  Respiratory viral panel negative, UA no evidence of infection, chest x-ray negative, CT head negative  TSH within normal limits  Possibly secondary to lack of appetite and poor diet and hyponatremia    Hyponatremia  Improving   Dosed with samsca  Per nephrology     Non-small cell lung cancer  Per heme-onc    GERD  Continue PPI    Anxiety/depression  Continue home meds, avoid ssri     Hypertension  Continue metoprolol next    thyroiditis  Continue Cytomel levothyroxine  TSH normal     DVT PUD prophylaxis    Plan if patient continues to improve and blood cultures remain negative and his sodium and electrolytes are intact in the next 24 to 48 hours we will plan on discharging    DVT prophylaxis:  Medical and mechanical DVT prophylaxis orders are present.    CODE STATUS:    Code Status (Patient has no pulse and is not  breathing): CPR (Attempt to Resuscitate)  Medical Interventions (Patient has pulse or is breathing): Full Support      Disposition:  I expect patient to be discharged when stable.      Electronically signed by Lake Dewey MD, 12/07/22, 09:37 EST.  [facility] Hospitalist Team

## 2022-12-07 NOTE — PLAN OF CARE
Problem: Adult Inpatient Plan of Care  Goal: Plan of Care Review  12/7/2022 0532 by Indira Lozano RN  Outcome: Ongoing, Progressing  12/7/2022 0337 by Indira Lozano RN  Outcome: Ongoing, Progressing  Goal: Patient-Specific Goal (Individualized)  12/7/2022 0532 by Indira Lozano RN  Outcome: Ongoing, Progressing  12/7/2022 0337 by Indira Lozano RN  Outcome: Ongoing, Progressing  Goal: Absence of Hospital-Acquired Illness or Injury  12/7/2022 0532 by Indira Lozano RN  Outcome: Ongoing, Progressing  12/7/2022 0337 by Indira Lozano RN  Outcome: Ongoing, Progressing  Intervention: Identify and Manage Fall Risk  Recent Flowsheet Documentation  Taken 12/7/2022 0040 by Indira Lozano RN  Safety Promotion/Fall Prevention: safety round/check completed  Taken 12/6/2022 2210 by Indira Lozano RN  Safety Promotion/Fall Prevention: safety round/check completed  Taken 12/6/2022 2037 by Indira Lozano RN  Safety Promotion/Fall Prevention: safety round/check completed  Intervention: Prevent Skin Injury  Recent Flowsheet Documentation  Taken 12/6/2022 2037 by Indira Lozano RN  Skin Protection: adhesive use limited  Intervention: Prevent and Manage VTE (Venous Thromboembolism) Risk  Recent Flowsheet Documentation  Taken 12/6/2022 2037 by Indira Lozano RN  VTE Prevention/Management:   sequential compression devices off   compression stockings off  Range of Motion: active ROM (range of motion) encouraged  Intervention: Prevent Infection  Recent Flowsheet Documentation  Taken 12/6/2022 2037 by Indira Lozano RN  Infection Prevention:   equipment surfaces disinfected   hand hygiene promoted  Goal: Optimal Comfort and Wellbeing  12/7/2022 0532 by Indira Lozano RN  Outcome: Ongoing, Progressing  12/7/2022 0337 by Indira Lozano RN  Outcome: Ongoing, Progressing  Intervention: Monitor Pain and Promote Comfort  Recent Flowsheet Documentation  Taken 12/6/2022 2037 by  Marta, Indira, RN  Pain Management Interventions:   quiet environment facilitated   position adjusted   see MAR  Intervention: Provide Person-Centered Care  Recent Flowsheet Documentation  Taken 12/6/2022 2037 by Indira Lozano RN  Trust Relationship/Rapport:   care explained   choices provided   questions answered   questions encouraged   thoughts/feelings acknowledged  Goal: Readiness for Transition of Care  12/7/2022 0532 by Indira Lozano RN  Outcome: Ongoing, Progressing  12/7/2022 0337 by Indira Lozano RN  Outcome: Ongoing, Progressing     Problem: Fall Injury Risk  Goal: Absence of Fall and Fall-Related Injury  12/7/2022 0532 by Indira Lozano RN  Outcome: Ongoing, Progressing  12/7/2022 0337 by Indira Lozano RN  Outcome: Ongoing, Progressing  Intervention: Identify and Manage Contributors  Recent Flowsheet Documentation  Taken 12/6/2022 2037 by Indira Lozano RN  Medication Review/Management: medications reviewed  Intervention: Promote Injury-Free Environment  Recent Flowsheet Documentation  Taken 12/7/2022 0040 by Indira Lozano RN  Safety Promotion/Fall Prevention: safety round/check completed  Taken 12/6/2022 2210 by Indira Lozano RN  Safety Promotion/Fall Prevention: safety round/check completed  Taken 12/6/2022 2037 by Indira Lozano RN  Safety Promotion/Fall Prevention: safety round/check completed     Problem: Hypertension Comorbidity  Goal: Blood Pressure in Desired Range  12/7/2022 0532 by Indira Lozano RN  Outcome: Ongoing, Progressing  12/7/2022 0337 by Indira Lozano RN  Outcome: Ongoing, Progressing  Intervention: Maintain Blood Pressure Management  Recent Flowsheet Documentation  Taken 12/6/2022 2037 by Indira Lozano RN  Medication Review/Management: medications reviewed   Goal Outcome Evaluation:                 Patient rested comfortably throughout the night. States he feels stronger than when he was admitted. VSS. Will continue to  observe.

## 2022-12-07 NOTE — CASE MANAGEMENT/SOCIAL WORK
Continued Stay Note   [facility]     Patient Name: Jc Driscoll  MRN: [id number]  Today's Date: 12/7/2022    Admit Date: 12/5/2022    Plan: D/C plan: Anticipate home with spouse   Discharge Plan     Row Name 12/07/22 1458       Plan    Plan D/C plan: Anticipate home with spouse    Patient/Family in Agreement with Plan yes    Plan Comments Anticipate home. Barrier to d/c: Labs this morning: Potassium 3.3, Sodium 128                   Expected Discharge Date and Time     Expected Discharge Date Expected Discharge Time    Dec 8, 2022         Phone communication or documentation only - no physical contact with patient or family.      Gilbert Chapman RN

## 2022-12-07 NOTE — TELEPHONE ENCOUNTER
Radiation Oncology Nurse Note    Called and spoke with inpatient nurse, Leslie. She stated patient is doing well today and may be discharged pending BMP results from this AM and approval from nephrology. Asked if patient would be able to be brought over from the hospital by a family member to receive treatment today. Leslie stated patient is stable enough for travel but does not have a family member available to help. His wife is out-of-town and he will have difficulties securing a ride home if he's discharged today. Requested return call when decision to discharge is made and we will try to coordinate XRT same day.  Leslie verbalized understanding and agreed with plan.    Antionette Gage RN, BSN  Radiation Oncology Department  [facility]  [phone]  Office  [phone]  Fax

## 2022-12-07 NOTE — PLAN OF CARE
Goal Outcome Evaluation:  Plan of Care Reviewed With: patient        Progress: improving   57 year old male admitted on 12-5 for weakness X 12 days.  Patient A/O X 4.  Patient states he is feeling much stronger.  He has recently had no appetite but patient reports this is also improving.  Will continue to monitor labs (Na+ & K+).  Potential discharge for tomorrow.

## 2022-12-08 ENCOUNTER — APPOINTMENT (OUTPATIENT)
Dept: CARDIOLOGY | Facility: HOSPITAL | Age: 57
End: 2022-12-08

## 2022-12-08 ENCOUNTER — APPOINTMENT (OUTPATIENT)
Dept: CT IMAGING | Facility: HOSPITAL | Age: 57
End: 2022-12-08

## 2022-12-08 LAB
ALBUMIN SERPL-MCNC: 2.9 G/DL (ref 3.5–5.2)
ALBUMIN/GLOB SERPL: 0.9 G/DL
ALP SERPL-CCNC: 358 U/L (ref 39–117)
ALT SERPL W P-5'-P-CCNC: 40 U/L (ref 1–41)
ANION GAP SERPL CALCULATED.3IONS-SCNC: 12 MMOL/L (ref 5–15)
ANION GAP SERPL CALCULATED.3IONS-SCNC: 13 MMOL/L (ref 5–15)
ANION GAP SERPL CALCULATED.3IONS-SCNC: 14 MMOL/L (ref 5–15)
ANION GAP SERPL CALCULATED.3IONS-SCNC: 15 MMOL/L (ref 5–15)
AST SERPL-CCNC: 37 U/L (ref 1–40)
BH CV LOWER VASCULAR LEFT COMMON FEMORAL AUGMENT: NORMAL
BH CV LOWER VASCULAR LEFT COMMON FEMORAL COMPETENT: NORMAL
BH CV LOWER VASCULAR LEFT COMMON FEMORAL COMPRESS: NORMAL
BH CV LOWER VASCULAR LEFT COMMON FEMORAL PHASIC: NORMAL
BH CV LOWER VASCULAR LEFT COMMON FEMORAL SPONT: NORMAL
BH CV LOWER VASCULAR LEFT DISTAL FEMORAL COMPRESS: NORMAL
BH CV LOWER VASCULAR LEFT GASTRONEMIUS COMPRESS: NORMAL
BH CV LOWER VASCULAR LEFT GREATER SAPH AK COMPRESS: NORMAL
BH CV LOWER VASCULAR LEFT GREATER SAPH BK COMPRESS: NORMAL
BH CV LOWER VASCULAR LEFT LESSER SAPH COMPRESS: NORMAL
BH CV LOWER VASCULAR LEFT MID FEMORAL AUGMENT: NORMAL
BH CV LOWER VASCULAR LEFT MID FEMORAL COMPETENT: NORMAL
BH CV LOWER VASCULAR LEFT MID FEMORAL COMPRESS: NORMAL
BH CV LOWER VASCULAR LEFT MID FEMORAL PHASIC: NORMAL
BH CV LOWER VASCULAR LEFT MID FEMORAL SPONT: NORMAL
BH CV LOWER VASCULAR LEFT PERONEAL COMPRESS: NORMAL
BH CV LOWER VASCULAR LEFT POPLITEAL AUGMENT: NORMAL
BH CV LOWER VASCULAR LEFT POPLITEAL COMPETENT: NORMAL
BH CV LOWER VASCULAR LEFT POPLITEAL COMPRESS: NORMAL
BH CV LOWER VASCULAR LEFT POPLITEAL PHASIC: NORMAL
BH CV LOWER VASCULAR LEFT POPLITEAL SPONT: NORMAL
BH CV LOWER VASCULAR LEFT POSTERIOR TIBIAL COMPRESS: NORMAL
BH CV LOWER VASCULAR LEFT PROXIMAL FEMORAL COMPRESS: NORMAL
BH CV LOWER VASCULAR LEFT SAPHENOFEMORAL JUNCTION COMPRESS: NORMAL
BH CV LOWER VASCULAR RIGHT COMMON FEMORAL AUGMENT: NORMAL
BH CV LOWER VASCULAR RIGHT COMMON FEMORAL COMPETENT: NORMAL
BH CV LOWER VASCULAR RIGHT COMMON FEMORAL COMPRESS: NORMAL
BH CV LOWER VASCULAR RIGHT COMMON FEMORAL PHASIC: NORMAL
BH CV LOWER VASCULAR RIGHT COMMON FEMORAL SPONT: NORMAL
BH CV LOWER VASCULAR RIGHT DISTAL FEMORAL COMPRESS: NORMAL
BH CV LOWER VASCULAR RIGHT GASTRONEMIUS COMPRESS: NORMAL
BH CV LOWER VASCULAR RIGHT GREATER SAPH AK COMPRESS: NORMAL
BH CV LOWER VASCULAR RIGHT GREATER SAPH BK COMPRESS: NORMAL
BH CV LOWER VASCULAR RIGHT LESSER SAPH COMPRESS: NORMAL
BH CV LOWER VASCULAR RIGHT MID FEMORAL AUGMENT: NORMAL
BH CV LOWER VASCULAR RIGHT MID FEMORAL COMPETENT: NORMAL
BH CV LOWER VASCULAR RIGHT MID FEMORAL COMPRESS: NORMAL
BH CV LOWER VASCULAR RIGHT MID FEMORAL PHASIC: NORMAL
BH CV LOWER VASCULAR RIGHT MID FEMORAL SPONT: NORMAL
BH CV LOWER VASCULAR RIGHT PERONEAL COMPRESS: NORMAL
BH CV LOWER VASCULAR RIGHT POPLITEAL AUGMENT: NORMAL
BH CV LOWER VASCULAR RIGHT POPLITEAL COMPETENT: NORMAL
BH CV LOWER VASCULAR RIGHT POPLITEAL COMPRESS: NORMAL
BH CV LOWER VASCULAR RIGHT POPLITEAL PHASIC: NORMAL
BH CV LOWER VASCULAR RIGHT POPLITEAL SPONT: NORMAL
BH CV LOWER VASCULAR RIGHT POSTERIOR TIBIAL COMPRESS: NORMAL
BH CV LOWER VASCULAR RIGHT PROXIMAL FEMORAL COMPRESS: NORMAL
BH CV LOWER VASCULAR RIGHT SAPHENOFEMORAL JUNCTION COMPRESS: NORMAL
BH CV UPPER VENOUS LEFT AXILLARY AUGMENT: NORMAL
BH CV UPPER VENOUS LEFT AXILLARY COMPRESS: NORMAL
BH CV UPPER VENOUS LEFT AXILLARY PHASIC: NORMAL
BH CV UPPER VENOUS LEFT AXILLARY SPONT: NORMAL
BH CV UPPER VENOUS LEFT BASILIC FOREARM COMPRESS: NORMAL
BH CV UPPER VENOUS LEFT BASILIC UPPER COMPRESS: NORMAL
BH CV UPPER VENOUS LEFT BRACHIAL COMPRESS: NORMAL
BH CV UPPER VENOUS LEFT CEPHALIC FOREARM COMPRESS: NORMAL
BH CV UPPER VENOUS LEFT CEPHALIC UPPER COMPRESS: NORMAL
BH CV UPPER VENOUS LEFT INTERNAL JUGULAR AUGMENT: NORMAL
BH CV UPPER VENOUS LEFT INTERNAL JUGULAR COMPRESS: NORMAL
BH CV UPPER VENOUS LEFT INTERNAL JUGULAR PHASIC: NORMAL
BH CV UPPER VENOUS LEFT INTERNAL JUGULAR SPONT: NORMAL
BH CV UPPER VENOUS LEFT RADIAL COMPRESS: NORMAL
BH CV UPPER VENOUS LEFT SUBCLAVIAN AUGMENT: NORMAL
BH CV UPPER VENOUS LEFT SUBCLAVIAN COMPRESS: NORMAL
BH CV UPPER VENOUS LEFT SUBCLAVIAN PHASIC: NORMAL
BH CV UPPER VENOUS LEFT SUBCLAVIAN SPONT: NORMAL
BH CV UPPER VENOUS LEFT ULNAR COMPRESS: NORMAL
BH CV UPPER VENOUS RIGHT AXILLARY AUGMENT: NORMAL
BH CV UPPER VENOUS RIGHT AXILLARY COMPRESS: NORMAL
BH CV UPPER VENOUS RIGHT AXILLARY PHASIC: NORMAL
BH CV UPPER VENOUS RIGHT AXILLARY SPONT: NORMAL
BH CV UPPER VENOUS RIGHT BASILIC FOREARM COMPRESS: NORMAL
BH CV UPPER VENOUS RIGHT BASILIC UPPER COMPRESS: NORMAL
BH CV UPPER VENOUS RIGHT BRACHIAL COMPRESS: NORMAL
BH CV UPPER VENOUS RIGHT CEPHALIC FOREARM COMPRESS: NORMAL
BH CV UPPER VENOUS RIGHT CEPHALIC UPPER COMPRESS: NORMAL
BH CV UPPER VENOUS RIGHT INTERNAL JUGULAR AUGMENT: NORMAL
BH CV UPPER VENOUS RIGHT INTERNAL JUGULAR COMPRESS: NORMAL
BH CV UPPER VENOUS RIGHT INTERNAL JUGULAR PHASIC: NORMAL
BH CV UPPER VENOUS RIGHT INTERNAL JUGULAR SPONT: NORMAL
BH CV UPPER VENOUS RIGHT RADIAL COMPRESS: NORMAL
BH CV UPPER VENOUS RIGHT SUBCLAVIAN AUGMENT: NORMAL
BH CV UPPER VENOUS RIGHT SUBCLAVIAN COMPRESS: NORMAL
BH CV UPPER VENOUS RIGHT SUBCLAVIAN PHASIC: NORMAL
BH CV UPPER VENOUS RIGHT SUBCLAVIAN SPONT: NORMAL
BH CV UPPER VENOUS RIGHT ULNAR COMPRESS: NORMAL
BILIRUB SERPL-MCNC: 0.3 MG/DL (ref 0–1.2)
BUN SERPL-MCNC: 11 MG/DL (ref 6–20)
BUN SERPL-MCNC: 12 MG/DL (ref 6–20)
BUN SERPL-MCNC: 13 MG/DL (ref 6–20)
BUN SERPL-MCNC: 14 MG/DL (ref 6–20)
BUN/CREAT SERPL: 11.8 (ref 7–25)
BUN/CREAT SERPL: 12.9 (ref 7–25)
BUN/CREAT SERPL: 14.8 (ref 7–25)
BUN/CREAT SERPL: 17.7 (ref 7–25)
CALCIUM SPEC-SCNC: 8.3 MG/DL (ref 8.6–10.5)
CALCIUM SPEC-SCNC: 8.5 MG/DL (ref 8.6–10.5)
CALCIUM SPEC-SCNC: 8.8 MG/DL (ref 8.6–10.5)
CALCIUM SPEC-SCNC: 9.3 MG/DL (ref 8.6–10.5)
CHLORIDE SERPL-SCNC: 90 MMOL/L (ref 98–107)
CHLORIDE SERPL-SCNC: 91 MMOL/L (ref 98–107)
CHLORIDE SERPL-SCNC: 93 MMOL/L (ref 98–107)
CHLORIDE SERPL-SCNC: 93 MMOL/L (ref 98–107)
CO2 SERPL-SCNC: 22 MMOL/L (ref 22–29)
CO2 SERPL-SCNC: 22 MMOL/L (ref 22–29)
CO2 SERPL-SCNC: 23 MMOL/L (ref 22–29)
CO2 SERPL-SCNC: 24 MMOL/L (ref 22–29)
CREAT SERPL-MCNC: 0.79 MG/DL (ref 0.76–1.27)
CREAT SERPL-MCNC: 0.88 MG/DL (ref 0.76–1.27)
CREAT SERPL-MCNC: 0.93 MG/DL (ref 0.76–1.27)
CREAT SERPL-MCNC: 0.93 MG/DL (ref 0.76–1.27)
DEPRECATED RDW RBC AUTO: 52.5 FL (ref 37–54)
EGFRCR SERPLBLD CKD-EPI 2021: 103 ML/MIN/1.73
EGFRCR SERPLBLD CKD-EPI 2021: 95.2 ML/MIN/1.73
EGFRCR SERPLBLD CKD-EPI 2021: 95.2 ML/MIN/1.73
EGFRCR SERPLBLD CKD-EPI 2021: 99.7 ML/MIN/1.73
ERYTHROCYTE [DISTWIDTH] IN BLOOD BY AUTOMATED COUNT: 16.7 % (ref 12.3–15.4)
GLOBULIN UR ELPH-MCNC: 3.3 GM/DL
GLUCOSE SERPL-MCNC: 104 MG/DL (ref 65–99)
GLUCOSE SERPL-MCNC: 110 MG/DL (ref 65–99)
GLUCOSE SERPL-MCNC: 112 MG/DL (ref 65–99)
GLUCOSE SERPL-MCNC: 131 MG/DL (ref 65–99)
HCT VFR BLD AUTO: 21.9 % (ref 37.5–51)
HGB BLD-MCNC: 7.4 G/DL (ref 13–17.7)
MAXIMAL PREDICTED HEART RATE: 162 BPM
MAXIMAL PREDICTED HEART RATE: 162 BPM
MCH RBC QN AUTO: 28.4 PG (ref 26.6–33)
MCHC RBC AUTO-ENTMCNC: 33.6 G/DL (ref 31.5–35.7)
MCV RBC AUTO: 84.6 FL (ref 79–97)
PLATELET # BLD AUTO: 397 10*3/MM3 (ref 140–450)
PMV BLD AUTO: 7 FL (ref 6–12)
POTASSIUM SERPL-SCNC: 3.1 MMOL/L (ref 3.5–5.2)
POTASSIUM SERPL-SCNC: 3.4 MMOL/L (ref 3.5–5.2)
POTASSIUM SERPL-SCNC: 3.6 MMOL/L (ref 3.5–5.2)
POTASSIUM SERPL-SCNC: 3.7 MMOL/L (ref 3.5–5.2)
PROT SERPL-MCNC: 6.2 G/DL (ref 6–8.5)
RBC # BLD AUTO: 2.59 10*6/MM3 (ref 4.14–5.8)
SODIUM SERPL-SCNC: 126 MMOL/L (ref 136–145)
SODIUM SERPL-SCNC: 127 MMOL/L (ref 136–145)
SODIUM SERPL-SCNC: 129 MMOL/L (ref 136–145)
SODIUM SERPL-SCNC: 130 MMOL/L (ref 136–145)
STRESS TARGET HR: 138 BPM
STRESS TARGET HR: 138 BPM
WBC NRBC COR # BLD: 7.2 10*3/MM3 (ref 3.4–10.8)

## 2022-12-08 PROCEDURE — 93970 EXTREMITY STUDY: CPT

## 2022-12-08 PROCEDURE — 71250 CT THORAX DX C-: CPT

## 2022-12-08 PROCEDURE — G0378 HOSPITAL OBSERVATION PER HR: HCPCS

## 2022-12-08 PROCEDURE — 25010000002 ENOXAPARIN PER 10 MG: Performed by: INTERNAL MEDICINE

## 2022-12-08 PROCEDURE — 96372 THER/PROPH/DIAG INJ SC/IM: CPT

## 2022-12-08 PROCEDURE — 80048 BASIC METABOLIC PNL TOTAL CA: CPT | Performed by: HOSPITALIST

## 2022-12-08 PROCEDURE — 87040 BLOOD CULTURE FOR BACTERIA: CPT | Performed by: INTERNAL MEDICINE

## 2022-12-08 PROCEDURE — 97116 GAIT TRAINING THERAPY: CPT

## 2022-12-08 PROCEDURE — 80048 BASIC METABOLIC PNL TOTAL CA: CPT | Performed by: INTERNAL MEDICINE

## 2022-12-08 RX ORDER — POTASSIUM CHLORIDE 1.5 G/1.77G
40 POWDER, FOR SOLUTION ORAL AS NEEDED
Status: DISCONTINUED | OUTPATIENT
Start: 2022-12-08 | End: 2022-12-11 | Stop reason: HOSPADM

## 2022-12-08 RX ORDER — MAGNESIUM SULFATE HEPTAHYDRATE 40 MG/ML
2 INJECTION, SOLUTION INTRAVENOUS AS NEEDED
Status: DISCONTINUED | OUTPATIENT
Start: 2022-12-08 | End: 2022-12-11 | Stop reason: HOSPADM

## 2022-12-08 RX ORDER — POTASSIUM CHLORIDE 20 MEQ/1
40 TABLET, EXTENDED RELEASE ORAL AS NEEDED
Status: DISCONTINUED | OUTPATIENT
Start: 2022-12-08 | End: 2022-12-11 | Stop reason: HOSPADM

## 2022-12-08 RX ORDER — MAGNESIUM SULFATE HEPTAHYDRATE 40 MG/ML
4 INJECTION, SOLUTION INTRAVENOUS AS NEEDED
Status: DISCONTINUED | OUTPATIENT
Start: 2022-12-08 | End: 2022-12-11 | Stop reason: HOSPADM

## 2022-12-08 RX ADMIN — SODIUM CHLORIDE 2 G: 1 TABLET ORAL at 12:34

## 2022-12-08 RX ADMIN — CYANOCOBALAMIN TAB 1000 MCG 1000 MCG: 1000 TAB at 09:49

## 2022-12-08 RX ADMIN — LEVOTHYROXINE SODIUM 100 MCG: 0.1 TABLET ORAL at 05:35

## 2022-12-08 RX ADMIN — Medication 10 ML: at 09:50

## 2022-12-08 RX ADMIN — SODIUM CHLORIDE 2 G: 1 TABLET ORAL at 09:49

## 2022-12-08 RX ADMIN — Medication 10 ML: at 20:37

## 2022-12-08 RX ADMIN — SODIUM CHLORIDE 2 G: 1 TABLET ORAL at 17:14

## 2022-12-08 RX ADMIN — DIAZEPAM 2 MG: 2 TABLET ORAL at 20:37

## 2022-12-08 RX ADMIN — GABAPENTIN 300 MG: 300 CAPSULE ORAL at 05:35

## 2022-12-08 RX ADMIN — OLANZAPINE 5 MG: 5 TABLET, FILM COATED ORAL at 20:37

## 2022-12-08 RX ADMIN — LIOTHYRONINE SODIUM 5 MCG: 5 TABLET ORAL at 09:49

## 2022-12-08 RX ADMIN — PANTOPRAZOLE SODIUM 40 MG: 40 TABLET, DELAYED RELEASE ORAL at 05:35

## 2022-12-08 RX ADMIN — BUPROPION HYDROCHLORIDE 300 MG: 150 TABLET, EXTENDED RELEASE ORAL at 09:49

## 2022-12-08 RX ADMIN — GABAPENTIN 300 MG: 300 CAPSULE ORAL at 16:40

## 2022-12-08 RX ADMIN — ENOXAPARIN SODIUM 40 MG: 100 INJECTION SUBCUTANEOUS at 16:40

## 2022-12-08 RX ADMIN — METOPROLOL SUCCINATE 100 MG: 50 TABLET, EXTENDED RELEASE ORAL at 09:49

## 2022-12-08 NOTE — PLAN OF CARE
Goal Outcome Evaluation:   Patient has rested well throughout the shift with no c/o pain. Port accessed to draw blood cultures and immediately deaccessed. Keeping again overnight to monitor fevers. ID consulted. VSS, continue to monitor.

## 2022-12-08 NOTE — THERAPY DISCHARGE NOTE
Patient Name: Jc Driscoll  : 1965    MRN: [id number]                              Today's Date: 2022       Admit Date: 2022    Visit Dx:     ICD-10-CM ICD-9-CM   1. Weakness  R53.1 780.79   2. Dehydration  E86.0 276.51   3. Hyponatremia  E87.1 276.1     Patient Active Problem List   Diagnosis   • Cancer of lower lobe of right lung (HCC)   • Retention of urine   • Allergic rhinitis   • Anxiety and depression   • Chronic pansinusitis   • Diastolic CHF (HCC)   • Diverticulosis   • Erosive esophagitis   • Family history of aneurysm   • Gastroesophageal reflux disease   • Hiatal hernia   • Hypertension   • Hyperlipidemia   • Mediastinal lymphadenopathy   • Prediabetes   • Non-small cell carcinoma of lung, right (HCC)   • Encounter for care related to vascular access port   • Normocytic anemia   • B12 deficiency   • Pleural effusion   • Acute chest pain   • Hashimoto's thyroiditis   • Pneumothorax   • NSCLC of left lung (HCC)   • Malignant neoplasm of upper lobe of left lung (HCC)   • Hyponatremia   • Dehydration   • Weakness   • Hyponatremia   • Immunotherapy     Past Medical History:   Diagnosis Date   • Allergic rhinitis 2013    Overview:  2018 - still zyrtec 10 daily (if stops, gets dermatitis again).    • Anxiety and depression 2012    Overview:  2018 -   C/w well 300 xr and Lito 20.  2019 -   Doing ok even with new lung cancer - lito 20 & well 300xr.    • Chronic pansinusitis 2019    Overview:  2019 -   MRI showed chronic thick in frontal, maxillary, ethmoidal ---> to Dr. COLLAZO to est since abx ICC didn't help.  Does netipot bid.    • Diastolic CHF (HCC) 2014    Overview:  14 - impaired LV relaxation on Zhou ECHO.  EF 60%. Rest normal   • Dupuytren's contracture of both hands    • Elevated cholesterol    • Erosive esophagitis 2019    Overview:  EGD 2016 2018 - nexium OTC daily for few week.  Qod before that.  Now carbonation hurts, epigastric pain  4/8/2019 -   protonix 40 doing well.    • Gastroesophageal reflux disease 02/21/2019   • Hiatal hernia 07/09/2019   • History of colon polyps    • Hypertension    • Lung cancer (HCC)     right lung and in lymph nodes x2   • Mediastinal lymphadenopathy 03/12/2019   • Normocytic anemia 08/08/2019    Overview:  6/2019 - dropped to 9's postop 7/2019 - rebounded to 10's.  8/8/2019 -   Back to 9's - check ferritin, b12 and thyroid.    • Prediabetes 07/09/2014    Overview:  7/4/14 - a1c 6.1 at [facility] admission   • Retention of urine 06/27/2019    PSOT OP, RESOLVED     Past Surgical History:   Procedure Laterality Date   • BRONCHOSCOPY  03/18/2019    EBUS MONTERO NEEDLE BX   • COLONOSCOPY     • DUPUYTREN CONTRACTURE RELEASE Bilateral    • LUNG BIOPSY  03/08/2019    CT GUIDED   • LUNG REMOVAL, PARTIAL Right     right lower   • PORTACATH PLACEMENT  03/20/2019    DR LONGORIA   • THORACOSCOPY Right 6/26/2019    Procedure: RIGHT VATS, OPEN LOWER LOBECTOMY WITH LYMPH NODE DISECTION, WEDGE RESECTION OF RIGHT MIDDLE LOBE;  Surgeon: Terrance Longoria MD;  Location: [facility] OR;  Service: Cardiothoracic   • THORACOSCOPY VIDEO ASSISTED WITH LOBECTOMY Left 7/6/2022    Procedure: THORACOSCOPY VIDEO ASSISTED WITH LOBECTOMY;  Surgeon: Miryam Reyna MD;  Location: [facility] OR;  Service: Thoracic;  Laterality: Left;      General Information    No documentation.                Mobility     Row Name 12/08/22 1141          Gait/Stairs (Locomotion)    [place] Level (Gait) standby assist  -CR     Distance in Feet (Gait) 200 ft x 2  -CR     Deviations/Abnormal Patterns (Gait) gait speed decreased  -CR     [place] Level (Stairs) stand by assist  -CR     Handrail Location (Stairs) right side (descending)  -CR     Number of Steps (Stairs) 1 flight  -CR     Ascending Technique (Stairs) step-over-step  -CR     Descending Technique (Stairs) step-to-step  -CR           User Key  (r) = Recorded By, (t) = Taken By, (c) = Cosigned By     Initials Name Provider Type    CR Reyes, Carmela, PT Physical Therapist               Obj/Interventions    No documentation.                Goals/Plan     Row Name 12/08/22 1139          Transfer Goal 1 (PT)    Progress/Outcome (Transfer Goal 1, PT) goal met  -CR     Row Name 12/08/22 1139          Gait Training Goal 1 (PT)    Progress/Outcome (Gait Training Goal 1, PT) goal partially met  -CR           User Key  (r) = Recorded By, (t) = Taken By, (c) = Cosigned By    Initials Name Provider Type    CR Reyes, Carmela, PT Physical Therapist               Clinical Impression     Row Name 12/08/22 1142          Plan of Care Review    Outcome Evaluation Patient tolerated ambulation for 200 ft x2 and able to safely ascend/descend 1 flight of stairs with one hand rail this session. spencer has been getting up and ambulating in room on his own. Encouraged patient to continue to be up ad pam. No further skilled rehab indicated at this time.  -CR           User Key  (r) = Recorded By, (t) = Taken By, (c) = Cosigned By    Initials Name Provider Type    CR Reyes, Carmela, PT Physical Therapist               Outcome Measures     Row Name 12/08/22 0822          How much help from another person do you currently need...    Turning from your back to your side while in flat bed without using bedrails? 4  -BD     Moving from lying on back to sitting on the side of a flat bed without bedrails? 4  -BD     Moving to and from a bed to a chair (including a wheelchair)? 4  -BD     Standing up from a chair using your arms (e.g., wheelchair, bedside chair)? 4  -BD     Climbing 3-5 steps with a railing? 4  -BD     To walk in hospital room? 4  -BD     AM-PAC 6 Clicks Score (PT) 24  -BD     Highest level of mobility 8 --> Walked 250 feet or more  -BD           User Key  (r) = Recorded By, (t) = Taken By, (c) = Cosigned By    Initials Name Provider Type    Tara Cotton, RN Registered Nurse              Physical Therapy Education     Title: PT  OT SLP Therapies (Done)     Topic: Physical Therapy (Done)     Point: Mobility training (Done)     Learning Progress Summary           Patient Acceptance, E, VU by  at 12/6/2022 1224                               User Key     Initials Effective Dates Name Provider Type Discipline     06/16/21 -  Larisa Borja PT Physical Therapist PT              PT Recommendation and Plan     Outcome Evaluation: Patient tolerated ambulation for 200 ft x2 and able to safely ascend/descend 1 flight of stairs with one hand rail this session. spencer has been getting up and ambulating in room on his own. Encouraged patient to continue to be up ad pam. No further skilled rehab indicated at this time.     Time Calculation:    PT Charges     Row Name 12/08/22 1138             Time Calculation    Start Time 1048  -CR      Stop Time 1107  -CR      Time Calculation (min) 19 min  -CR      PT Received On 12/08/22  -CR         Time Calculation- PT    Total Timed Code Minutes- PT 19 minute(s)  -CR            User Key  (r) = Recorded By, (t) = Taken By, (c) = Cosigned By    Initials Name Provider Type    CR Reyes, Carmela, BELKIS Physical Therapist              Therapy Charges for Today     Code Description Service Date Service Provider Modifiers Qty    [id number] HC GAIT TRAINING EA 15 MIN 12/8/2022 Reyes, Carmela, BELKIS GP 1          PT G-Codes  Outcome Measure Options: AM-PAC 6 Clicks Daily Activity (OT)  AM-PAC 6 Clicks Score (PT): 24  AM-PAC 6 Clicks Score (OT): 24    PT Discharge Summary  Reason for Discharge: Independent  Outcomes Achieved: Patient able to partially acheive established goals    Carmela Reyes, PT  12/8/2022

## 2022-12-08 NOTE — PROGRESS NOTES
"NEPHROLOGY PROGRESS NOTE------[facility]    [facility]  [phone]  Adalberto Huffman MD      Patient Care Team:  Reshma Alvarenga MD as PCP - General (Family Medicine)  Reshma Alvarenga MD as PCP - Family Medicine  Miryam Reyna MD as Surgeon (Thoracic Surgery)  Azucena Gaspar MD as Consulting Physician (Hematology and Oncology)  Adalberto Huffman MD as Consulting Physician (Nephrology)      Provider:  Adalberto Huffman MD  Patient Name: Jc Driscoll  :  1965    SUBJECTIVE:  F/U hyponatremia  No chest pain or SOA  Sodium upto 130 now.    Medication:  buPROPion XL, 300 mg, Oral, Daily  enoxaparin, 40 mg, Subcutaneous, Daily  gabapentin, 300 mg, Oral, Q8H  levothyroxine, 100 mcg, Oral, Q AM  liothyronine, 5 mcg, Oral, Daily  metoprolol succinate XL, 100 mg, Oral, Daily  OLANZapine, 5 mg, Oral, Nightly  pantoprazole, 40 mg, Oral, Q AM  sodium chloride, 10 mL, Intravenous, Q12H  sodium chloride, 2 g, Oral, TID With Meals  vitamin B-12, 1,000 mcg, Oral, Daily           OBJECTIVE    Vital Sign Min/Max for last 24 hours  Temp  Min: 98 °F (36.7 °C)  Max: 101.4 °F (38.6 °C)   BP  Min: 110/64  Max: 131/70   Pulse  Min: 81  Max: 111   Resp  Min: 18  Max: 21   SpO2  Min: 92 %  Max: 98 %   No data recorded   No data recorded     Flowsheet Rows    Flowsheet Row First Filed Value   Admission Height 190.5 cm (75\") Documented at 2022 1153   Admission Weight 86.7 kg (191 lb 2.2 oz) Documented at 2022 1153          No intake/output data recorded.  I/O last 3 completed shifts:  In: 360 [P.O.:360]  Out: -     Physical Exam:  General Appearance: alert, appears stated age and cooperative  Head: normocephalic, without obvious abnormality and atraumatic  Eyes: conjunctivae and sclerae normal and no icterus  Neck: supple and no JVD  Lungs: scattered rhonchi  Heart: regular rhythm & normal rate and normal S1, S2  Chest: Wall no abnormalities " observed  Abdomen: normal bowel sounds and soft, nontender  Extremities: moves extremities well, no edema, no cyanosis and no redness  Skin: no bleeding, bruising or rash, turgor normal, color normal and no lesions noted  Neurologic: Alert, and oriented. No focal deficits    Labs:    WBC WBC   Date Value Ref Range Status   12/07/2022 7.20 3.40 - 10.80 10*3/mm3 Final   12/06/2022 7.20 3.40 - 10.80 10*3/mm3 Final   12/05/2022 9.80 3.40 - 10.80 10*3/mm3 Final      HGB Hemoglobin   Date Value Ref Range Status   12/07/2022 7.4 (L) 13.0 - 17.7 g/dL Final   12/06/2022 8.5 (L) 13.0 - 17.7 g/dL Final   12/05/2022 9.5 (L) 13.0 - 17.7 g/dL Final      HCT Hematocrit   Date Value Ref Range Status   12/07/2022 21.9 (L) 37.5 - 51.0 % Final   12/06/2022 26.8 (L) 37.5 - 51.0 % Final   12/05/2022 28.6 (L) 37.5 - 51.0 % Final      Platelets No results found for: LABPLAT   MCV MCV   Date Value Ref Range Status   12/07/2022 84.6 79.0 - 97.0 fL Final   12/06/2022 88.2 79.0 - 97.0 fL Final   12/05/2022 85.5 79.0 - 97.0 fL Final          Sodium Sodium   Date Value Ref Range Status   12/08/2022 130 (L) 136 - 145 mmol/L Final   12/07/2022 127 (L) 136 - 145 mmol/L Final   12/07/2022 128 (L) 136 - 145 mmol/L Final   12/07/2022 129 (L) 136 - 145 mmol/L Final   12/07/2022 128 (L) 136 - 145 mmol/L Final   12/06/2022 126 (L) 136 - 145 mmol/L Final   12/06/2022 124 (L) 136 - 145 mmol/L Final   12/06/2022 126 (L) 136 - 145 mmol/L Final   12/05/2022 124 (L) 136 - 145 mmol/L Final   12/05/2022 124 (L) 136 - 145 mmol/L Final      Potassium Potassium   Date Value Ref Range Status   12/08/2022 3.7 3.5 - 5.2 mmol/L Final   12/07/2022 3.4 (L) 3.5 - 5.2 mmol/L Final   12/07/2022 3.4 (L) 3.5 - 5.2 mmol/L Final   12/07/2022 3.5 3.5 - 5.2 mmol/L Final   12/07/2022 3.3 (L) 3.5 - 5.2 mmol/L Final   12/06/2022 3.4 (L) 3.5 - 5.2 mmol/L Final   12/06/2022 3.2 (L) 3.5 - 5.2 mmol/L Final   12/06/2022 3.2 (L) 3.5 - 5.2 mmol/L Final   12/05/2022 3.7 3.5 - 5.2 mmol/L  Final      Chloride Chloride   Date Value Ref Range Status   12/08/2022 93 (L) 98 - 107 mmol/L Final   12/07/2022 91 (L) 98 - 107 mmol/L Final   12/07/2022 91 (L) 98 - 107 mmol/L Final   12/07/2022 89 (L) 98 - 107 mmol/L Final   12/07/2022 88 (L) 98 - 107 mmol/L Final   12/06/2022 88 (L) 98 - 107 mmol/L Final   12/06/2022 89 (L) 98 - 107 mmol/L Final   12/06/2022 88 (L) 98 - 107 mmol/L Final   12/05/2022 82 (L) 98 - 107 mmol/L Final      CO2 CO2   Date Value Ref Range Status   12/08/2022 22.0 22.0 - 29.0 mmol/L Final   12/07/2022 23.0 22.0 - 29.0 mmol/L Final   12/07/2022 24.0 22.0 - 29.0 mmol/L Final   12/07/2022 24.0 22.0 - 29.0 mmol/L Final   12/07/2022 25.0 22.0 - 29.0 mmol/L Final   12/06/2022 23.0 22.0 - 29.0 mmol/L Final   12/06/2022 24.0 22.0 - 29.0 mmol/L Final   12/06/2022 24.0 22.0 - 29.0 mmol/L Final   12/05/2022 24.0 22.0 - 29.0 mmol/L Final      BUN BUN   Date Value Ref Range Status   12/08/2022 11 6 - 20 mg/dL Final   12/07/2022 12 6 - 20 mg/dL Final   12/07/2022 11 6 - 20 mg/dL Final   12/07/2022 11 6 - 20 mg/dL Final   12/07/2022 10 6 - 20 mg/dL Final   12/06/2022 11 6 - 20 mg/dL Final   12/06/2022 12 6 - 20 mg/dL Final   12/06/2022 11 6 - 20 mg/dL Final   12/05/2022 12 6 - 20 mg/dL Final      Creatinine Creatinine   Date Value Ref Range Status   12/08/2022 0.93 0.76 - 1.27 mg/dL Final   12/07/2022 0.93 0.76 - 1.27 mg/dL Final   12/07/2022 0.97 0.76 - 1.27 mg/dL Final   12/07/2022 0.92 0.76 - 1.27 mg/dL Final   12/07/2022 0.87 0.76 - 1.27 mg/dL Final   12/06/2022 0.94 0.76 - 1.27 mg/dL Final   12/06/2022 0.95 0.76 - 1.27 mg/dL Final   12/06/2022 0.95 0.76 - 1.27 mg/dL Final   12/05/2022 1.15 0.76 - 1.27 mg/dL Final      Calcium Calcium   Date Value Ref Range Status   12/08/2022 9.3 8.6 - 10.5 mg/dL Final   12/07/2022 8.5 (L) 8.6 - 10.5 mg/dL Final   12/07/2022 8.5 (L) 8.6 - 10.5 mg/dL Final   12/07/2022 8.8 8.6 - 10.5 mg/dL Final   12/07/2022 8.9 8.6 - 10.5 mg/dL Final   12/06/2022 8.6 8.6 - 10.5  mg/dL Final   12/06/2022 8.0 (L) 8.6 - 10.5 mg/dL Final   12/06/2022 9.0 8.6 - 10.5 mg/dL Final   12/05/2022 9.5 8.6 - 10.5 mg/dL Final      PO4 No components found for: PO4   Albumin Albumin   Date Value Ref Range Status   12/07/2022 2.90 (L) 3.50 - 5.20 g/dL Final   12/05/2022 3.70 3.50 - 5.20 g/dL Final      Magnesium No results found for: MG   Uric Acid No components found for: URIC ACID     Imaging Results (Last 72 Hours)     Procedure Component Value Units Date/Time    CT Head Without Contrast [[id number]] Collected: 12/06/22 0944     Updated: 12/06/22 0948    Narrative:      Exam: CT HEAD WO CONTRAST-     Date of Exam: 12/6/2022 8:57 AM     Indication: Headache, uncomplicated (Ped 0-17y); R53.1-Weakness;  E86.0-Dehydration; E87.5-Bkxy-ndrhkanppk and hyponatremia.     Comparison: MRI brain dated 05/21/2022     Technique:  Axial CT images of the head were obtained from skull base to  vertex without IV contrast.  Coronal reconstructions were performed.   Automated exposure control and iterative reconstruction methods were  used.     FINDINGS  The ventricles and sulci are proportional without evidence of volume  loss or hydrocephalus. There is no mass effect or midline shift. There  is no acute intracranial hemorrhage. There is no abnormal extra-axial  fluid collection. The gray-white matter differentiation is preserved.  The calvarium is intact. The mastoid air cells and middle ears appear  well aerated. The paranasal sinuses are clear. Visualized orbits and  globes appear symmetric.       Impression:      1. No acute intracranial abnormality. Specifically, no evidence of  hemorrhage, mass effect or midline shift     Electronically Signed By-Elpidio Billingsley MD On:12/6/2022 9:46 AM  This report was finalized on [id number] by  Elpidio Billingsley MD.    XR Chest 2 View [[id number]] Collected: 12/05/22 1234     Updated: 12/05/22 1245    Narrative:         DATE OF EXAM:   12/5/2022 12:32 PM     PROCEDURE:   XR CHEST  2 VW-     INDICATIONS:   soa     COMPARISON:  07/21/2022, PET/CT 10/31/2022     TECHNIQUE:   PA and lateral views of the chest were obtained.     FINDINGS:   Left-sided port catheter noted with tip at the mid SVC. Heart size  normal. Postsurgical changes of left upper lobectomy again noted. Soft  tissue mass at the left chest wall between the first and second ribs  better assessed on prior PET/CT. Chronic blunting at the right lateral  costophrenic angle related to small right pleural effusion unchanged. No  pneumothorax. Osseous structures appear intact.       Impression:      1. No acute process.  2. Postsurgical changes of prior left upper lobectomy with left upper  chest wall soft tissue mass better assessed on the recent PET/CT.  3. Small chronic right pleural effusion.     Electronically Signed By-Antoni Deal MD On:12/5/2022 12:43 PM  This report was finalized on [id number] by  Antoni Deal MD.          Results for orders placed during the hospital encounter of 12/05/22    XR Chest 2 View    Narrative  DATE OF EXAM:  12/5/2022 12:32 PM    PROCEDURE:  XR CHEST 2 VW-    INDICATIONS:  soa    COMPARISON:  07/21/2022, PET/CT 10/31/2022    TECHNIQUE:  PA and lateral views of the chest were obtained.    FINDINGS:  Left-sided port catheter noted with tip at the mid SVC. Heart size  normal. Postsurgical changes of left upper lobectomy again noted. Soft  tissue mass at the left chest wall between the first and second ribs  better assessed on prior PET/CT. Chronic blunting at the right lateral  costophrenic angle related to small right pleural effusion unchanged. No  pneumothorax. Osseous structures appear intact.    Impression  1. No acute process.  2. Postsurgical changes of prior left upper lobectomy with left upper  chest wall soft tissue mass better assessed on the recent PET/CT.  3. Small chronic right pleural effusion.    Electronically Signed By-Antoni Deal MD On:12/5/2022 12:43 PM  This report was  finalized on [id number] by  Antoni Deal MD.      Results for orders placed during the hospital encounter of 07/21/22    XR Chest 2 View    Narrative  EXAM: XR CHEST 2 VW-    DATE OF EXAM: 7/21/2022 12:20 PM    INDICATION: POST-OP; C34.92-Malignant neoplasm of unspecified part of  left bronchus or lung.    COMPARISONS: 05/22/2022    FINDINGS:    New ill-defined density along the superior margin of the left hilum. No  evidence of pneumothorax. Small-moderate-sized bilateral pleural  effusions. Left-sided port unchanged in good position. No evidence of  acute processes of the mediastinum. Surgical clips in the medial right  upper thorax/mediastinal location again noted.    Impression  New ill-defined density along the superior left hilum which may either  relate to development of a mass or postsurgical change. Correlate with  surgical history and consider chest CT evaluation if needed.    No evidence of pneumothorax.    Small-moderate bilateral pleural effusions    Electronically Signed By-Max Killian On:7/21/2022 1:22 PM  This report was finalized on [id number] by  Max Killian, .      Results for orders placed during the hospital encounter of 07/06/22    XR Chest 1 View    Narrative  XR CHEST 1 VW-    Clinical: Chest tube removal, status post left upper lobectomy    COMPARISON examination 0511 hours, current examination 1506 hours    FINDINGS: Left-sided chest tube removed in the interim, no pneumothorax.  Small amount of subcutaneous emphysema left chest wall as before. Seen  better on the current examination is a lobular density within the left  midlung zone, measuring approximately 6 cm transverse and 3.5 cm AP.  This likely represents a focal area consolidation or atelectasis status  post upper lobectomy. Advise conservative surveillance.    There is right-sided pleural effusion with patchy atelectasis and/or  infiltrate at the right lung base as before. The cardiomediastinal  silhouette is stable.  Mediport catheter in position.    CONCLUSION: Removal of the left chest tube, no pneumothorax. Better  aeration of the left base with now a somewhat lobulated density at this  location which may represent a focal area of consolidation or  atelectasis. No change in the appearance of the right lung base, likely  effusion and airspace disease as before.    This report was finalized on 7/8/2022 3:34 PM by Dr. Maximiliano Arreguin M.D.            ASSESSMENT / PLAN      Weakness    Anxiety and depression    Diastolic CHF (HCC)    Gastroesophageal reflux disease    Hypertension    Hyperlipidemia    Non-small cell carcinoma of lung, right (HCC)    Hashimoto's thyroiditis    NSCLC of left lung (HCC)    Hyponatremia    Immunotherapy    • Hyponatremia--chronic, underlying SIADH from lung malignancy and or SSRI and mild volume depletion.  • HTN  • Non small cell lung cancer  • Depression--hold SSRI/Pristiq.  • Neuropathy--check B12. Already on oral B12     Sodium better  S/P samsca 12/6/22  Continue fluid restriction, NaCl tabs  Renal wise no obj to d/c        Adalberto Huffman MD  [facility]  [phone]  12/08/22  07:19 EST

## 2022-12-08 NOTE — PLAN OF CARE
Goal Outcome Evaluation:              Outcome Evaluation: Patient tolerated ambulation for 200 ft x2 and able to safely ascend/descend 1 flight of stairs with one hand rail this session. spencer has been getting up and ambulating in room on his own. Encouraged patient to continue to be up ad pam. No further skilled rehab indicated at this time.

## 2022-12-08 NOTE — PLAN OF CARE
Goal Outcome Evaluation:  Plan of Care Reviewed With: patient        Progress: no change   Pt currently resting abed. Pt did c/o headache and had a slight temp see mar. Pt VSS will cont to monitor.

## 2022-12-08 NOTE — CONSULTS
Nutrition Services    Patient Name: Jc Driscoll  YOB: 1965  MRN: [id number]  Admission date: 12/5/2022    Comment:    Acute disease malnutrition related to recent cancer dx, initiation of radiation and immunotherapy as evidenced by < 75% est energy requirement for > 7 days, wt loss 5% x 1 month, moderate muscle wasting. See MSA below.     Continue Novasource Renal TID     PPE Documentation        PPE Worn By Provider mask, gloves and eye protection   PPE Worn By Patient   none      CLINICAL NUTRITION ASSESSMENT      Reason for Assessment 12/8: MST 2      H&P      Past Medical History:   Diagnosis Date   • Allergic rhinitis 07/25/2013    Overview:  4/2/2018 - still zyrtec 10 daily (if stops, gets dermatitis again).    • Anxiety and depression 06/05/2012    Overview:  4/2/2018 -   C/w well 300 xr and Lito 20.  4/8/2019 -   Doing ok even with new lung cancer - lito 20 & well 300xr.    • Chronic pansinusitis 04/08/2019    Overview:  4/8/2019 -   MRI showed chronic thick in frontal, maxillary, ethmoidal ---> to Dr. COLLAZO to est since abx ICC didn't help.  Does netipot bid.    • Diastolic CHF (HCC) 07/09/2014    Overview:  7/4/14 - impaired LV relaxation on [facility] ECHO.  EF 60%. Rest normal   • Dupuytren's contracture of both hands    • Elevated cholesterol    • Erosive esophagitis 07/09/2019    Overview:  EGD 2016 4/2/2018 - nexium OTC daily for few week.  Qod before that.  Now carbonation hurts, epigastric pain 4/8/2019 -   protonix 40 doing well.    • Gastroesophageal reflux disease 02/21/2019   • Hiatal hernia 07/09/2019   • History of colon polyps    • Hypertension    • Lung cancer (HCC)     right lung and in lymph nodes x2   • Mediastinal lymphadenopathy 03/12/2019   • Normocytic anemia 08/08/2019    Overview:  6/2019 - dropped to 9's postop 7/2019 - rebounded to 10's.  8/8/2019 -   Back to 9's - check ferritin, b12 and thyroid.    • Prediabetes 07/09/2014    Overview:  7/4/14 - a1c 6.1 at [facility]  "  • Retention of urine 06/27/2019    PSOT OP, RESOLVED       Past Surgical History:   Procedure Laterality Date   • BRONCHOSCOPY  03/18/2019    EBUS MONTERO NEEDLE BX   • COLONOSCOPY     • DUPUYTREN CONTRACTURE RELEASE Bilateral    • LUNG BIOPSY  03/08/2019    CT GUIDED   • LUNG REMOVAL, PARTIAL Right     right lower   • PORTACATH PLACEMENT  03/20/2019    DR LONGORIA   • THORACOSCOPY Right 6/26/2019    Procedure: RIGHT VATS, OPEN LOWER LOBECTOMY WITH LYMPH NODE DISECTION, WEDGE RESECTION OF RIGHT MIDDLE LOBE;  Surgeon: Terrance Longoria MD;  Location: 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 Pt states he had his first round of immunotherapy this past week and did not feel well. Pt has had immunotherapy and radiation in the past, side effects were not like this. Pt was not able to eat much during the past week d/t not feeling well. Since pt has been in the hospital, pt's electrolytes have stabilized and pt is feeling better, returning to usual PO intake. Pt typically eats 2-3 meals/day and regularly drinks ONS. NFPE completed, consistent with nutrition diagnosis of malnutrition using AND/ASPEN criteria. See MSA below.        Anthropometrics        Current Height, Weight Height: 190.5 cm (75\")  Weight: 84.8 kg (187 lb) (12/07/22 0326)       Ideal Body Weight (IBW) 196 lb   Usual Body Weight (UBW) 197 lb        Trending Weight Hx     This admission: 12/8: 187 lb             PTA: 12/8: wt down 5% x 1 month     Wt Readings from Last 30 Encounters:   12/07/22 0326 84.8 kg (187 lb) "   12/06/22 0337 85.9 kg (189 lb 6.4 oz)   12/05/22 1153 86.7 kg (191 lb 2.2 oz)   11/28/22 1434 87.8 kg (193 lb 9.6 oz)   11/22/22 0801 89.7 kg (197 lb 12.8 oz)   11/22/22 0807 89.7 kg (197 lb 12 oz)   11/21/22 1359 89.4 kg (197 lb)   11/16/22 1249 89.4 kg (197 lb)   11/11/22 0927 89.4 kg (197 lb)   11/07/22 1330 89.6 kg (197 lb 9.6 oz)   11/03/22 1126 89.4 kg (197 lb)   11/03/22 0918 89.4 kg (197 lb)   11/02/22 1049 89.4 kg (197 lb)   10/26/22 0902 89 kg (196 lb 3.4 oz)   10/25/22 1348 89 kg (196 lb 3.2 oz)   10/13/22 1114 91.5 kg (201 lb 11.2 oz)   10/05/22 0734 89.9 kg (198 lb 3.2 oz)   10/05/22 0818 89.8 kg (198 lb)   09/14/22 0800 89.7 kg (197 lb 11.2 oz)   09/07/22 1501 88.9 kg (196 lb)   08/24/22 0730 88.9 kg (196 lb)   08/08/22 1511 87.7 kg (193 lb 6.4 oz)   08/04/22 0746 88.5 kg (195 lb)   08/03/22 0741 88.4 kg (194 lb 12.8 oz)   07/27/22 0830 87.5 kg (193 lb)   07/26/22 0909 87.9 kg (193 lb 12.8 oz)   07/21/22 1319 88.9 kg (196 lb)   07/20/22 1515 88 kg (194 lb)   07/06/22 0759 88.2 kg (194 lb 7.1 oz)   06/29/22 0949 89.8 kg (198 lb)   06/16/22 1453 89.6 kg (197 lb 9.6 oz)   06/08/22 1529 89.8 kg (198 lb)      BMI kg/m2 Body mass index is 23.37 kg/m².       Labs        Pertinent Labs    Results from last 7 days   Lab Units 12/08/22  0531 12/07/22  2323 12/07/22  1819 12/05/22 2007 12/05/22  1217   SODIUM mmol/L 130* 127* 128*   < > 124*   POTASSIUM mmol/L 3.7 3.4* 3.4*   < > 3.7   CHLORIDE mmol/L 93* 91* 91*   < > 82*   CO2 mmol/L 22.0 23.0 24.0   < > 24.0   BUN mg/dL 11 12 11   < > 12   CREATININE mg/dL 0.93 0.93 0.97   < > 1.15   CALCIUM mg/dL 9.3 8.5* 8.5*   < > 9.5   BILIRUBIN mg/dL  --  0.3  --   --  0.5   ALK PHOS U/L  --  358*  --   --  289*   ALT (SGPT) U/L  --  40  --   --  58*   AST (SGOT) U/L  --  37  --   --  23   GLUCOSE mg/dL 110* 112* 141*   < > 139*    < > = values in this interval not displayed.     Results from last 7 days   Lab Units 12/07/22 2323   HEMOGLOBIN g/dL 7.4*   HEMATOCRIT %  21.9*     COVID19   Date Value Ref Range Status   12/05/2022 Not Detected Not Detected - Ref. Range Final     Lab Results   Component Value Date    HGBA1C 5.4 08/08/2019        Medications    Scheduled Medications buPROPion XL, 300 mg, Oral, Daily  enoxaparin, 40 mg, Subcutaneous, Daily  gabapentin, 300 mg, Oral, Q8H  levothyroxine, 100 mcg, Oral, Q AM  liothyronine, 5 mcg, Oral, Daily  metoprolol succinate XL, 100 mg, Oral, Daily  OLANZapine, 5 mg, Oral, Nightly  pantoprazole, 40 mg, Oral, Q AM  sodium chloride, 10 mL, Intravenous, Q12H  sodium chloride, 2 g, Oral, TID With Meals  vitamin B-12, 1,000 mcg, Oral, Daily        Infusions      PRN Medications •  acetaminophen  •  diazePAM  •  LORazepam  •  ondansetron  •  oxyCODONE  •  [COMPLETED] Insert Peripheral IV **AND** sodium chloride  •  sodium chloride  •  sodium chloride     Physical Findings        Trending Physical   Appearance, NFPE 12/8: NFPE completed, consistent with nutrition diagnosis of malnutrition using AND/ASPEN criteria. See MSA below.       --  Edema  None      Bowel Function BM 12/7   Tubes  none      Chewing/Swallowing No issues per pt    Skin No pressure injuries    --  Current Nutrition Orders & Evaluation of Intake       Oral Nutrition     Food Allergies NKFA   Current PO Diet Diet: Regular/House Diet, Fluid Restriction (240 mL/tray) Diets; 1500 mL/day; Texture: Regular Texture (IDDSI 7); Fluid Consistency: Thin (IDDSI 0)   Supplement Novasource Renal TID   PO Evaluation     Trending % PO Intake 12/8: 75%, (small meals ordered)   --  Nutritional Risk Screening        NRS-2002 Score          Nutrition Diagnosis         Nutrition Dx Problem 1 Acute disease malnutrition related to recent cancer dx, initiation of radiation and immunotherapy as evidenced by < 75% est energy requirement for > 7 days, wt loss 5% x 1 month, moderate muscle wasting.       Nutrition Dx Problem 2        Intervention Goal         Intervention Goal(s) PO intake > 75%      Nutrition Intervention        RD Action Continue ONS     Nutrition Prescription          Diet Prescription Regular, fluid restriction 1500ml/day   Supplement Prescription Novasource Renal TID (provides 1425 kcals, 65 g protein if consumed)      --  Monitor/Evaluation        Monitor PO intake, Supplement intake, Weight, Skin status, GI status     Malnutrition Severity Assessment      Patient meets criteria for : Moderate (non-severe) Malnutrition  Malnutrition Type (last 8 hours)     Malnutrition Severity Assessment     Row Name 12/09/22 1013       Malnutrition Severity Assessment    Malnutrition Type Acute Disease or Injury - Related Malnutrition    Row Name 12/09/22 1013       Insufficient Energy Intake     Insufficient Energy Intake  <75% of est. energy requirement for >7d)    Row Name 12/09/22 1013       Unintentional Weight Loss     Unintentional Weight Loss Findings Moderate    Unintentional Weight Loss  Weight loss of 5% in one month    Row Name 12/09/22 1013       Muscle Loss    Loss of Muscle Mass Findings Moderate    [place] Moderate - slight depression    Patellar Region Moderate - patella more prominent, less muscle definition around patella    Row Name 12/09/22 1013       Criteria Met (Must meet criteria for severity in at least 2 of these categories: M Wasting, Fat Loss, Fluid, Secondary Signs, Wt. Status, Intake)    Patient meets criteria for  Moderate (non-severe) Malnutrition                       Electronically signed by:  Mary Roth RD  12/08/22 14:58 EST

## 2022-12-08 NOTE — CONSULTS
Infectious Diseases Consult Note    Referring Provider: Lake Dewey MD    Reason for Consultation: Fever    Patient Care Team:  Reshma Alvarenga MD as PCP - General (Family Medicine)  Reshma Alvarenga MD as PCP - Family Medicine  Miryam Reyna MD as Surgeon (Thoracic Surgery)  Azucena Gaspar MD as Consulting Physician (Hematology and Oncology)  Adalberto Huffman MD as Consulting Physician (Nephrology)    Chief complaint fever    Subjective     History of present illness:      This is 57-year-old male with past medical history significant for lung cancer was previously on chemotherapy and recently on immunotherapy.  Patient came to the hospital on December 5, 2022 with complaining of weakness and fever.  He said that his temperature goes up at night usually.  His temperature curve is down according to him.  The patient denied having any other complaints.  The patient is hemodynamically stable.    Review of Systems   Review of Systems   Constitutional: Positive for fever.   HENT: Negative.    Eyes: Negative.    Respiratory: Negative.    Cardiovascular: Negative.    Gastrointestinal: Negative.    Genitourinary: Negative.    Musculoskeletal: Negative.    Skin: Negative.    Neurological: Negative.    Hematological: Negative.    Psychiatric/Behavioral: Negative.        Medications  Medications Prior to Admission   Medication Sig Dispense Refill Last Dose   • buPROPion XL (WELLBUTRIN XL) 300 MG 24 hr tablet Take 300 mg by mouth Every Morning.   12/4/2022   • diazePAM (Valium) 2 MG tablet Take 1 tablet by mouth At Night As Needed for Anxiety or Muscle Spasms. 30 tablet 0    • levothyroxine (SYNTHROID, LEVOTHROID) 100 MCG tablet Take 100 mcg by mouth Every Morning.   12/4/2022   • liothyronine (CYTOMEL) 5 MCG tablet Take 5 mcg by mouth Daily.      • LORazepam (ATIVAN) 0.5 MG tablet Take 0.5 mg by mouth Every 8 (Eight) Hours As Needed.   Past Month   • metoprolol succinate XL (TOPROL-XL) 100 MG 24 hr  tablet Take 100 mg by mouth Daily.   12/4/2022   • OLANZapine (zyPREXA) 5 MG tablet Take 1 tablet by mouth Every Night. Take on days 2, 3 and 4 after chemotherapy. 3 tablet 5 Past Month   • ondansetron (ZOFRAN) 8 MG tablet Take 1 tablet by mouth 3 (Three) Times a Day As Needed for Nausea or Vomiting. 30 tablet 5 Past Month   • oxyCODONE-acetaminophen (PERCOCET) 5-325 MG per tablet Take 1 tablet by mouth Every 6 (Six) Hours As Needed for Moderate Pain. 40 tablet 0 Past Month   • pantoprazole (PROTONIX) 40 MG EC tablet Take 40 mg by mouth Daily As Needed.   12/4/2022   • vitamin B-12 (CYANOCOBALAMIN) 1000 MCG tablet Take 1,000 mcg by mouth Daily.   12/4/2022   • desvenlafaxine (PRISTIQ) 50 MG 24 hr tablet Take 50 mg by mouth Daily.      • gabapentin (NEURONTIN) 300 MG capsule Take 1 capsule by mouth Every 8 (Eight) Hours for 30 days. 90 capsule 0        History  Past Medical History:   Diagnosis Date   • Allergic rhinitis 07/25/2013    Overview:  4/2/2018 - still zyrtec 10 daily (if stops, gets dermatitis again).    • Anxiety and depression 06/05/2012    Overview:  4/2/2018 -   C/w well 300 xr and Lito 20.  4/8/2019 -   Doing ok even with new lung cancer - lito 20 & well 300xr.    • Chronic pansinusitis 04/08/2019    Overview:  4/8/2019 -   MRI showed chronic thick in frontal, maxillary, ethmoidal ---> to Dr. COLLAZO to est since abx ICC didn't help.  Does netipot bid.    • Diastolic CHF (HCC) 07/09/2014    Overview:  7/4/14 - impaired LV relaxation on [facility] ECHO.  EF 60%. Rest normal   • Dupuytren's contracture of both hands    • Elevated cholesterol    • Erosive esophagitis 07/09/2019    Overview:  EGD 2016 4/2/2018 - nexium OTC daily for few week.  Qod before that.  Now carbonation hurts, epigastric pain 4/8/2019 -   protonix 40 doing well.    • Gastroesophageal reflux disease 02/21/2019   • Hiatal hernia 07/09/2019   • History of colon polyps    • Hypertension    • Lung cancer (HCC)     right lung and in lymph nodes x2    • Mediastinal lymphadenopathy 03/12/2019   • Normocytic anemia 08/08/2019    Overview:  6/2019 - dropped to 9's postop 7/2019 - rebounded to 10's.  8/8/2019 -   Back to 9's - check ferritin, b12 and thyroid.    • Prediabetes 07/09/2014    Overview:  7/4/14 - a1c 6.1 at [facility] admission   • Retention of urine 06/27/2019    PSOT OP, RESOLVED     Past Surgical History:   Procedure Laterality Date   • BRONCHOSCOPY  03/18/2019    EBUS MONTERO NEEDLE BX   • COLONOSCOPY     • DUPUYTREN CONTRACTURE RELEASE Bilateral    • LUNG BIOPSY  03/08/2019    CT GUIDED   • LUNG REMOVAL, PARTIAL Right     right lower   • PORTACATH PLACEMENT  03/20/2019    DR LONGORIA   • THORACOSCOPY Right 6/26/2019    Procedure: RIGHT VATS, OPEN LOWER LOBECTOMY WITH LYMPH NODE DISECTION, WEDGE RESECTION OF RIGHT MIDDLE LOBE;  Surgeon: Terrance Longoria MD;  Location: [facility];  Service: Cardiothoracic   • THORACOSCOPY VIDEO ASSISTED WITH LOBECTOMY Left 7/6/2022    Procedure: THORACOSCOPY VIDEO ASSISTED WITH LOBECTOMY;  Surgeon: Miryam Reyna MD;  Location: [facility] OR;  Service: Thoracic;  Laterality: Left;       Family History  Family History   Problem Relation Age of Onset   • Cancer Mother         cervical cancer   • Cervical cancer Mother    • Cancer Father         lung cancer   • Lung cancer Father    • Lung cancer Sister    • Cancer Sister         lung cancer   • Malig Hyperthermia Neg Hx        Social History   reports that he quit smoking about 13 years ago. His smoking use included cigarettes. He has never used smokeless tobacco. He reports current alcohol use of about 2.0 standard drinks per week. He reports that he does not use drugs.    Allergies  Patient has no known allergies.    Objective     Vital Signs   Vital Signs (last 24 hours)       12/07 0700  12/08 0659 12/08 0700  12/08 1511   Most Recent      Temp (°F) 98 -  101.4    97.5 -  98.9     97.5 (36.4) 12/08 1200    Heart Rate 81 -  111    104 -  108     104 12/08 1200     Resp 18 -  21      18     18 12/08 1200    /64 -  131/70    117/72 -  120/75     120/75 12/08 1200    SpO2 (%) 92 -  98    97 -  98     98 12/08 1200          Physical Exam:  Physical Exam  Vitals and nursing note reviewed.   Constitutional:       Appearance: He is well-developed.   HENT:      Head: Normocephalic and atraumatic.   Eyes:      Pupils: Pupils are equal, round, and reactive to light.   Cardiovascular:      Rate and Rhythm: Normal rate and regular rhythm.      Heart sounds: Normal heart sounds.   Pulmonary:      Effort: Pulmonary effort is normal. No respiratory distress.      Breath sounds: Normal breath sounds. No wheezing or rales.   Abdominal:      General: Bowel sounds are normal. There is no distension.      Palpations: Abdomen is soft. There is no mass.      Tenderness: There is no abdominal tenderness. There is no guarding or rebound.   Musculoskeletal:         General: No deformity. Normal range of motion.      Cervical back: Normal range of motion and neck supple.   Skin:     General: Skin is warm.      Findings: No erythema or rash.   Neurological:      Mental Status: He is alert and oriented to person, place, and time.      Cranial Nerves: No cranial nerve deficit.         Microbiology  Microbiology Results (last 10 days)     Procedure Component Value - Date/Time    Respiratory Panel PCR w/COVID-19(SARS-CoV-2) BRADY/NILSA/MARY/PAD/COR/MAD/HESHAM In-House, NP Swab in UTM/VTM, 3-4 HR TAT - Swab, Nasopharynx [[id number]]  (Normal) Collected: 12/05/22 1242    Lab Status: Final result Specimen: Swab from Nasopharynx Updated: 12/05/22 1333     ADENOVIRUS, PCR Not Detected     Coronavirus 229E Not Detected     Coronavirus HKU1 Not Detected     Coronavirus NL63 Not Detected     Coronavirus OC43 Not Detected     COVID19 Not Detected     Human Metapneumovirus Not Detected     Human Rhinovirus/Enterovirus Not Detected     Influenza A PCR Not Detected     Influenza B PCR Not Detected     Parainfluenza Virus  1 Not Detected     Parainfluenza Virus 2 Not Detected     Parainfluenza Virus 3 Not Detected     Parainfluenza Virus 4 Not Detected     RSV, PCR Not Detected     Bordetella pertussis pcr Not Detected     Bordetella parapertussis PCR Not Detected     Chlamydophila pneumoniae PCR Not Detected     Mycoplasma pneumo by PCR Not Detected    Narrative:      In the setting of a positive respiratory panel with a viral infection PLUS a negative procalcitonin without other underlying concern for bacterial infection, consider observing off antibiotics or discontinuation of antibiotics and continue supportive care. If the respiratory panel is positive for atypical bacterial infection (Bordetella pertussis, Chlamydophila pneumoniae, or Mycoplasma pneumoniae), consider antibiotic de-escalation to target atypical bacterial infection.    Blood Culture - Blood, Arm, Right [[id number]]  (Normal) Collected: 12/05/22 1217    Lab Status: Preliminary result Specimen: Blood from Arm, Right Updated: 12/08/22 1230     Blood Culture No growth at 3 days    Narrative:      Less than seven (7) mL's of blood was collected.  Insufficient quantity may yield false negative results.    Blood Culture - Blood, Arm, Left [[id number]]  (Normal) Collected: 12/05/22 1217    Lab Status: Preliminary result Specimen: Blood from Arm, Left Updated: 12/08/22 1230     Blood Culture No growth at 3 days    Narrative:      Less than seven (7) mL's of blood was collected.  Insufficient quantity may yield false negative results.          Laboratory  Results from last 7 days   Lab Units 12/07/22  2323   WBC 10*3/mm3 7.20   HEMOGLOBIN g/dL 7.4*   HEMATOCRIT % 21.9*   PLATELETS 10*3/mm3 397     Results from last 7 days   Lab Units 12/08/22  0531   SODIUM mmol/L 130*   POTASSIUM mmol/L 3.7   CHLORIDE mmol/L 93*   CO2 mmol/L 22.0   BUN mg/dL 11   CREATININE mg/dL 0.93   GLUCOSE mg/dL 110*   CALCIUM mg/dL 9.3     Results from last 7 days   Lab Units 12/08/22  0531   SODIUM  mmol/L 130*   POTASSIUM mmol/L 3.7   CHLORIDE mmol/L 93*   CO2 mmol/L 22.0   BUN mg/dL 11   CREATININE mg/dL 0.93   GLUCOSE mg/dL 110*   CALCIUM mg/dL 9.3                   Radiology  Imaging Results (Last 72 Hours)     Procedure Component Value Units Date/Time    CT Head Without Contrast [[id number]] Collected: 12/06/22 0944     Updated: 12/06/22 0948    Narrative:      Exam: CT HEAD WO CONTRAST-     Date of Exam: 12/6/2022 8:57 AM     Indication: Headache, uncomplicated (Ped 0-17y); R53.1-Weakness;  E86.0-Dehydration; E87.7-[id number] and hyponatremia.     Comparison: MRI brain dated 05/21/2022     Technique:  Axial CT images of the head were obtained from skull base to  vertex without IV contrast.  Coronal reconstructions were performed.   Automated exposure control and iterative reconstruction methods were  used.     FINDINGS  The ventricles and sulci are proportional without evidence of volume  loss or hydrocephalus. There is no mass effect or midline shift. There  is no acute intracranial hemorrhage. There is no abnormal extra-axial  fluid collection. The gray-white matter differentiation is preserved.  The calvarium is intact. The mastoid air cells and middle ears appear  well aerated. The paranasal sinuses are clear. Visualized orbits and  globes appear symmetric.       Impression:      1. No acute intracranial abnormality. Specifically, no evidence of  hemorrhage, mass effect or midline shift     Electronically Signed By-Elpidio Billingsley MD On:12/6/2022 9:46 AM  This report was finalized on [id number] by  Elpidio Billingsley MD.          Cardiology      Results Review:  I have reviewed all clinical data, test, lab, and imaging results.       Schedule Meds  buPROPion XL, 300 mg, Oral, Daily  enoxaparin, 40 mg, Subcutaneous, Daily  gabapentin, 300 mg, Oral, Q8H  levothyroxine, 100 mcg, Oral, Q AM  liothyronine, 5 mcg, Oral, Daily  metoprolol succinate XL, 100 mg, Oral, Daily  OLANZapine, 5 mg, Oral,  Nightly  pantoprazole, 40 mg, Oral, Q AM  sodium chloride, 10 mL, Intravenous, Q12H  sodium chloride, 2 g, Oral, TID With Meals  vitamin B-12, 1,000 mcg, Oral, Daily        Infusion Meds       PRN Meds  •  acetaminophen  •  diazePAM  •  LORazepam  •  ondansetron  •  oxyCODONE  •  [COMPLETED] Insert Peripheral IV **AND** sodium chloride  •  sodium chloride  •  sodium chloride      Assessment & Plan       Assessment     Fever with no clinical signs of bacterial infection.  The patient is hemodynamically stable.  I am suspecting noninfectious fever such as malignancy fever or DVT.  But we need to rule out port infection    History of lung cancer on immunotherapy    Plan    No need for antimicrobial therapy at this point  Request 1 set of blood culture from the port  CT scan of the chest without contrast  Venous Doppler of the upper and lower extremities to rule out DVT  Supportive care    Myron Wilson MD  12/08/22  15:11 EST    Note is dictated utilizing voice recognition software/Dragon

## 2022-12-08 NOTE — CASE MANAGEMENT/SOCIAL WORK
Continued Stay Note   [facility]     Patient Name: Jc Driscoll  MRN: [id number]  Today's Date: 12/8/2022    Admit Date: 12/5/2022    Plan: D/C plan: Anticipate home with spouse.   Discharge Plan     Row Name 12/08/22 1107       Plan    Plan D/C plan: Anticipate home with spouse.    Patient/Family in Agreement with Plan yes    Plan Comments Anticipate home, possibly today. AM labs improved from yesterday.                   Expected Discharge Date and Time     Expected Discharge Date Expected Discharge Time    Dec 8, 2022         Phone communication or documentation only - no physical contact with patient or family.      Gilbert Chapman RN

## 2022-12-09 LAB
ALBUMIN SERPL-MCNC: 3.2 G/DL (ref 3.5–5.2)
ALBUMIN/GLOB SERPL: 1 G/DL
ALP SERPL-CCNC: 401 U/L (ref 39–117)
ALT SERPL W P-5'-P-CCNC: 87 U/L (ref 1–41)
ANION GAP SERPL CALCULATED.3IONS-SCNC: 12 MMOL/L (ref 5–15)
ANION GAP SERPL CALCULATED.3IONS-SCNC: 13 MMOL/L (ref 5–15)
ANION GAP SERPL CALCULATED.3IONS-SCNC: 16 MMOL/L (ref 5–15)
AST SERPL-CCNC: 109 U/L (ref 1–40)
BASOPHILS # BLD AUTO: 0 10*3/MM3 (ref 0–0.2)
BASOPHILS NFR BLD AUTO: 0.4 % (ref 0–1.5)
BILIRUB SERPL-MCNC: 0.5 MG/DL (ref 0–1.2)
BUN SERPL-MCNC: 10 MG/DL (ref 6–20)
BUN SERPL-MCNC: 12 MG/DL (ref 6–20)
BUN SERPL-MCNC: 12 MG/DL (ref 6–20)
BUN/CREAT SERPL: 11.8 (ref 7–25)
BUN/CREAT SERPL: 12.5 (ref 7–25)
BUN/CREAT SERPL: 14.1 (ref 7–25)
CALCIUM SPEC-SCNC: 8.5 MG/DL (ref 8.6–10.5)
CALCIUM SPEC-SCNC: 8.5 MG/DL (ref 8.6–10.5)
CALCIUM SPEC-SCNC: 8.7 MG/DL (ref 8.6–10.5)
CHLORIDE SERPL-SCNC: 89 MMOL/L (ref 98–107)
CHLORIDE SERPL-SCNC: 91 MMOL/L (ref 98–107)
CHLORIDE SERPL-SCNC: 94 MMOL/L (ref 98–107)
CO2 SERPL-SCNC: 23 MMOL/L (ref 22–29)
CO2 SERPL-SCNC: 24 MMOL/L (ref 22–29)
CO2 SERPL-SCNC: 24 MMOL/L (ref 22–29)
CREAT SERPL-MCNC: 0.85 MG/DL (ref 0.76–1.27)
CREAT SERPL-MCNC: 0.85 MG/DL (ref 0.76–1.27)
CREAT SERPL-MCNC: 0.96 MG/DL (ref 0.76–1.27)
DEPRECATED RDW RBC AUTO: 53.4 FL (ref 37–54)
EGFRCR SERPLBLD CKD-EPI 2021: 100.7 ML/MIN/1.73
EGFRCR SERPLBLD CKD-EPI 2021: 100.7 ML/MIN/1.73
EGFRCR SERPLBLD CKD-EPI 2021: 91.6 ML/MIN/1.73
EOSINOPHIL # BLD AUTO: 0.2 10*3/MM3 (ref 0–0.4)
EOSINOPHIL NFR BLD AUTO: 2 % (ref 0.3–6.2)
ERYTHROCYTE [DISTWIDTH] IN BLOOD BY AUTOMATED COUNT: 16.9 % (ref 12.3–15.4)
GLOBULIN UR ELPH-MCNC: 3.3 GM/DL
GLUCOSE SERPL-MCNC: 108 MG/DL (ref 65–99)
GLUCOSE SERPL-MCNC: 122 MG/DL (ref 65–99)
GLUCOSE SERPL-MCNC: 135 MG/DL (ref 65–99)
HCT VFR BLD AUTO: 23.7 % (ref 37.5–51)
HGB BLD-MCNC: 8.2 G/DL (ref 13–17.7)
LYMPHOCYTES # BLD AUTO: 0.2 10*3/MM3 (ref 0.7–3.1)
LYMPHOCYTES NFR BLD AUTO: 2.4 % (ref 19.6–45.3)
MCH RBC QN AUTO: 29.5 PG (ref 26.6–33)
MCHC RBC AUTO-ENTMCNC: 34.5 G/DL (ref 31.5–35.7)
MCV RBC AUTO: 85.4 FL (ref 79–97)
MONOCYTES # BLD AUTO: 0.7 10*3/MM3 (ref 0.1–0.9)
MONOCYTES NFR BLD AUTO: 7.6 % (ref 5–12)
NEUTROPHILS NFR BLD AUTO: 8 10*3/MM3 (ref 1.7–7)
NEUTROPHILS NFR BLD AUTO: 87.6 % (ref 42.7–76)
NRBC BLD AUTO-RTO: 0 /100 WBC (ref 0–0.2)
PLATELET # BLD AUTO: 408 10*3/MM3 (ref 140–450)
PMV BLD AUTO: 7.1 FL (ref 6–12)
POTASSIUM SERPL-SCNC: 3.2 MMOL/L (ref 3.5–5.2)
POTASSIUM SERPL-SCNC: 3.2 MMOL/L (ref 3.5–5.2)
POTASSIUM SERPL-SCNC: 3.4 MMOL/L (ref 3.5–5.2)
PROT SERPL-MCNC: 6.5 G/DL (ref 6–8.5)
RBC # BLD AUTO: 2.78 10*6/MM3 (ref 4.14–5.8)
SODIUM SERPL-SCNC: 128 MMOL/L (ref 136–145)
SODIUM SERPL-SCNC: 128 MMOL/L (ref 136–145)
SODIUM SERPL-SCNC: 130 MMOL/L (ref 136–145)
WBC NRBC COR # BLD: 9.1 10*3/MM3 (ref 3.4–10.8)

## 2022-12-09 PROCEDURE — 25010000002 ENOXAPARIN PER 10 MG: Performed by: INTERNAL MEDICINE

## 2022-12-09 PROCEDURE — 80053 COMPREHEN METABOLIC PANEL: CPT | Performed by: INTERNAL MEDICINE

## 2022-12-09 PROCEDURE — G0378 HOSPITAL OBSERVATION PER HR: HCPCS

## 2022-12-09 PROCEDURE — 85025 COMPLETE CBC W/AUTO DIFF WBC: CPT | Performed by: INTERNAL MEDICINE

## 2022-12-09 PROCEDURE — 96372 THER/PROPH/DIAG INJ SC/IM: CPT

## 2022-12-09 RX ADMIN — POTASSIUM CHLORIDE 40 MEQ: 1500 TABLET, EXTENDED RELEASE ORAL at 18:08

## 2022-12-09 RX ADMIN — ACETAMINOPHEN 650 MG: 325 TABLET, FILM COATED ORAL at 02:02

## 2022-12-09 RX ADMIN — SODIUM CHLORIDE 2 G: 1 TABLET ORAL at 17:03

## 2022-12-09 RX ADMIN — POTASSIUM CHLORIDE 40 MEQ: 1500 TABLET, EXTENDED RELEASE ORAL at 08:13

## 2022-12-09 RX ADMIN — SODIUM CHLORIDE 2 G: 1 TABLET ORAL at 13:33

## 2022-12-09 RX ADMIN — LEVOTHYROXINE SODIUM 100 MCG: 0.1 TABLET ORAL at 05:31

## 2022-12-09 RX ADMIN — PANTOPRAZOLE SODIUM 40 MG: 40 TABLET, DELAYED RELEASE ORAL at 05:31

## 2022-12-09 RX ADMIN — OLANZAPINE 5 MG: 5 TABLET, FILM COATED ORAL at 20:59

## 2022-12-09 RX ADMIN — LIOTHYRONINE SODIUM 5 MCG: 5 TABLET ORAL at 08:13

## 2022-12-09 RX ADMIN — POTASSIUM CHLORIDE 40 MEQ: 1500 TABLET, EXTENDED RELEASE ORAL at 20:59

## 2022-12-09 RX ADMIN — POTASSIUM CHLORIDE 40 MEQ: 1500 TABLET, EXTENDED RELEASE ORAL at 13:34

## 2022-12-09 RX ADMIN — ENOXAPARIN SODIUM 40 MG: 100 INJECTION SUBCUTANEOUS at 17:03

## 2022-12-09 RX ADMIN — POTASSIUM CHLORIDE 40 MEQ: 1500 TABLET, EXTENDED RELEASE ORAL at 00:45

## 2022-12-09 RX ADMIN — CYANOCOBALAMIN TAB 1000 MCG 1000 MCG: 1000 TAB at 08:13

## 2022-12-09 RX ADMIN — DIAZEPAM 2 MG: 2 TABLET ORAL at 20:59

## 2022-12-09 RX ADMIN — Medication 10 ML: at 08:14

## 2022-12-09 RX ADMIN — BUPROPION HYDROCHLORIDE 300 MG: 150 TABLET, EXTENDED RELEASE ORAL at 08:13

## 2022-12-09 RX ADMIN — SODIUM CHLORIDE 2 G: 1 TABLET ORAL at 08:13

## 2022-12-09 RX ADMIN — Medication 10 ML: at 21:19

## 2022-12-09 RX ADMIN — METOPROLOL SUCCINATE 100 MG: 50 TABLET, EXTENDED RELEASE ORAL at 08:13

## 2022-12-09 NOTE — PLAN OF CARE
Goal Outcome Evaluation:  Plan of Care Reviewed With: patient        Progress: no change   Pt currently awake in bed. Pt c/o headache see mar. Pt VSS no fever noted this shift. K has been replaced. Will cont to monitor.

## 2022-12-09 NOTE — PLAN OF CARE
Goal Outcome Evaluation:      Patient has rested well throughout shift with no c/o pain. Waiting on blood cultures from central line to result and patient will possibly d/c. Replaced potassium and still low. Replacing per protocol again. No fever, VSS continue to monitor.

## 2022-12-09 NOTE — DISCHARGE SUMMARY
[facility] Medicine Services  DISCHARGE SUMMARY    Patient Name: Jc Driscoll  : 1965  MRN: [id number]    Date of Admission: 2022  Discharge Diagnosis: Weakness  Date of Discharge: 2022  Primary Care Physician: Reshma Alvarenga MD      Presenting Problem:   Dehydration [E86.0]  Hyponatremia [E87.1]  Weakness [R53.1]    Active and Resolved Hospital Problems:  Active Hospital Problems    Diagnosis POA   • **Weakness [R53.1] Yes   • Hyponatremia [E87.1] Yes   • Immunotherapy [Z29.8] Not Applicable   • NSCLC of left lung (HCC) [C34.92] Yes   • Hashimoto's thyroiditis [E06.3] Yes   • Non-small cell carcinoma of lung, right (HCC) [C34.91] Yes   • Gastroesophageal reflux disease [K21.9] Yes   • Hyperlipidemia [E78.5] Yes   • Diastolic CHF (HCC) [I50.30] Yes   • Anxiety and depression [F41.9, F32.A] Yes   • Hypertension [I10] Yes      Resolved Hospital Problems   No resolved problems to display.         Hospital Course     Hospital Course:  Chief Complaint: General weakness     History of Present Illness: Jc Driscoll is a 57 y.o. male who presented to [facility] on 2022 complaining of general weakness, shortness of breath, headache and nausea without vomiting.  He reports he has stage III recurrent lung cancer which has spread to his ribs status post left thoracotomy and chemotherapy in past he is currently on radiation treatment and also received his first immunotherapy 3 weeks ago.  He is stable on room air.  He reports after immunotherapy he started to feel progressively worse in the past 3 days he has felt very weak and nauseous.  Labs today show a sodium of 124 chloride 82 troponin less than 0.010 proBNP 375.3, WBC 9.6 hemoglobin 9.5, initial lactic 3.8 now 1.2.  Respiratory panel negative.  Chest x-ray per radiology no acute process chronic small right pleural effusion.  Review of records shows he has had chronic hyponatremia since early October.   He was given a 1 L normal saline fluid bolus in ED and started on IV fluids and will be admitted for further evaluation and treatment.    Hospital course and problem list    Plan:   Generalized weakness  Resolved  Patient reports lack of appetite last couple weeks.   Appetite improved  Overall improved, initially likely due to dehydration hyponatremia     Fever  Resolved  This could be directly related to malignancy directly  Low-grade fever x2 while here  Denies fevers at home   Patient s/p chemotherapy and now immunotherapyas an outpatient   no neutropenia here  No obvious source of infection per initial diagnostics or review of systems  CT chest does show that tumor no pneumonia  Blood cultures are negative at 3 days, blood culture from port was also obtained its pending at this time and negative  Respiratory viral panel negative, UA no evidence of infection, chest x-ray negative, CT head negative  TSH within normal limits  No DVTs on upper lower extremity duplex  Dr. Wilson on board cleared for discharge no antibiotics needed    Hyponatremia  Proved up to 130  Dosed with samsca  Demeclocycline and urea on discharge  Dr. Raygoza on board cleared for discharge     Non-small cell lung cancer  Per heme-onc  Undergoing immunotherapy and radiation outpatient     GERD  Continue PPI     Anxiety/depression  Continue home meds, avoid ssri bupropion held dt na level      Hypertension  Continue metoprolol next     thyroiditis  Continue Cytomel levothyroxine  TSH normal     K/mg  Replaced per protocol      DVT PUD prophylaxis    Plan discharged home stable condition follow-up with PCP and his oncologist as an outpatient.  We will have him follow-up with nephrology as well        Reasons For Change In Medications and Indications for New Medications:      Day of Discharge     Vital Signs:  Temp:  [97.3 °F (36.3 °C)-98.1 °F (36.7 °C)] 97.8 °F (36.6 °C)  Heart Rate:  [] 95  Resp:  [18] 18  BP: ()/(60-77)  105/70    Physical Exam:  Physical Exam   Head: atraumatic/normocephalic  Neck: supple   Heart: s1s2,regular rate and rythm,no extra heart sounds   Lungs: clear to ascultation b/l   Abd: soft/nontender/nondystended/bs+  Neuro: moving all extremities, no facial asymetry, dtr LE intact   Ext: no edema           Pertinent  and/or Most Recent Results     LAB RESULTS:      Lab 12/09/22  0158 12/07/22  2323 12/06/22  0825 12/05/22  1624 12/05/22  1220 12/05/22  1217   WBC 9.10 7.20 7.20  --   --  9.80   HEMOGLOBIN 8.2* 7.4* 8.5*  --   --  9.5*   HEMATOCRIT 23.7* 21.9* 26.8*  --   --  28.6*   PLATELETS 408 397 511*  --   --  614*   NEUTROS ABS 8.00*  --  6.30  --   --  9.00*   LYMPHS ABS 0.20*  --  0.20*  --   --  0.10*   MONOS ABS 0.70  --  0.50  --   --  0.60   EOS ABS 0.20  --  0.10  --   --  0.10   MCV 85.4 84.6 88.2  --   --  85.5   LACTATE  --   --   --  1.2 3.8*  --    PROTIME  --   --   --   --   --  13.0*   APTT  --   --   --   --   --  35.5*         Lab 12/09/22  0529 12/09/22  0158 12/08/22  2002 12/08/22  1424 12/08/22  0531 12/06/22  0825 12/05/22 2007   SODIUM 130* 128* 126* 129* 130*   < > 124*   POTASSIUM 3.4* 3.2* 3.1* 3.6 3.7   < >  --    CHLORIDE 94* 89* 90* 93* 93*   < >  --    CO2 24.0 23.0 22.0 24.0 22.0   < >  --    ANION GAP 12.0 16.0* 14.0 12.0 15.0   < >  --    BUN 12 12 14 13 11   < >  --    CREATININE 0.85 0.96 0.79 0.88 0.93   < >  --    EGFR 100.7 91.6 103.0 99.7 95.2   < >  --    GLUCOSE 108* 122* 131* 104* 110*   < >  --    CALCIUM 8.5* 8.7 8.3* 8.8 9.3   < >  --    TSH  --   --   --   --   --   --  2.110    < > = values in this interval not displayed.         Lab 12/09/22  0158 12/07/22  2323 12/05/22  1217   TOTAL PROTEIN 6.5 6.2 7.9   ALBUMIN 3.20* 2.90* 3.70   GLOBULIN 3.3 3.3 4.2   ALT (SGPT) 87* 40 58*   AST (SGOT) 109* 37 23   BILIRUBIN 0.5 0.3 0.5   ALK PHOS 401* 358* 289*         Lab 12/05/22  1217   PROBNP 375.3   TROPONIN T <0.010   PROTIME 13.0*   INR 1.28*             Lab  12/05/22 2007   VITAMIN B 12 >2,000*         Brief Urine Lab Results  (Last result in the past 365 days)      Color   Clarity   Blood   Leuk Est   Nitrite   Protein   CREAT   Urine HCG        12/06/22 1059 Yellow   Clear   Negative   Negative   Negative   Trace               Microbiology Results (last 10 days)     Procedure Component Value - Date/Time    Respiratory Panel PCR w/COVID-19(SARS-CoV-2) BRADY/NILSA/[facility]AM In-House, NP Swab in UTM/VTM, 3-4 HR TAT - Swab, Nasopharynx [[id number]]  (Normal) Collected: 12/05/22 1242    Lab Status: Final result Specimen: Swab from Nasopharynx Updated: 12/05/22 1333     ADENOVIRUS, PCR Not Detected     Coronavirus 229E Not Detected     Coronavirus HKU1 Not Detected     Coronavirus NL63 Not Detected     Coronavirus OC43 Not Detected     COVID19 Not Detected     Human Metapneumovirus Not Detected     Human Rhinovirus/Enterovirus Not Detected     Influenza A PCR Not Detected     Influenza B PCR Not Detected     Parainfluenza Virus 1 Not Detected     Parainfluenza Virus 2 Not Detected     Parainfluenza Virus 3 Not Detected     Parainfluenza Virus 4 Not Detected     RSV, PCR Not Detected     Bordetella pertussis pcr Not Detected     Bordetella parapertussis PCR Not Detected     Chlamydophila pneumoniae PCR Not Detected     Mycoplasma pneumo by PCR Not Detected    Narrative:      In the setting of a positive respiratory panel with a viral infection PLUS a negative procalcitonin without other underlying concern for bacterial infection, consider observing off antibiotics or discontinuation of antibiotics and continue supportive care. If the respiratory panel is positive for atypical bacterial infection (Bordetella pertussis, Chlamydophila pneumoniae, or Mycoplasma pneumoniae), consider antibiotic de-escalation to target atypical bacterial infection.    Blood Culture - Blood, Arm, Right [[id number]]  (Normal) Collected: 12/05/22 1217    Lab Status: Preliminary result Specimen:  Blood from Arm, Right Updated: 12/08/22 1230     Blood Culture No growth at 3 days    Narrative:      Less than seven (7) mL's of blood was collected.  Insufficient quantity may yield false negative results.    Blood Culture - Blood, Arm, Left [[id number]]  (Normal) Collected: 12/05/22 1217    Lab Status: Preliminary result Specimen: Blood from Arm, Left Updated: 12/08/22 1230     Blood Culture No growth at 3 days    Narrative:      Less than seven (7) mL's of blood was collected.  Insufficient quantity may yield false negative results.          XR Chest 2 View    Result Date: 12/5/2022  Impression: 1. No acute process. 2. Postsurgical changes of prior left upper lobectomy with left upper chest wall soft tissue mass better assessed on the recent PET/CT. 3. Small chronic right pleural effusion.  Electronically Signed By-Antoni Deal MD On:12/5/2022 12:43 PM This report was finalized on [id number] by  Antoni Deal MD.    CT Head Without Contrast    Result Date: 12/6/2022  Impression: 1. No acute intracranial abnormality. Specifically, no evidence of hemorrhage, mass effect or midline shift  Electronically Signed By-Elpidio Billingsley MD On:12/6/2022 9:46 AM This report was finalized on [id number] by  Elpidio Billingsley MD.    CT Chest Without Contrast Diagnostic    Result Date: 12/8/2022  Impression: 1.     Mass in the anterior upper left chest wall between the first and second ribs measures 5.9 x 4.1 cm, 4.3 x 3.57 m on PET/CT from 10/31/2022. 2.     Cluster of left supraclavicular lymph nodes, which were previously hypermetabolic also appear slightly larger than on the PET/CT. 3.     Bilateral postoperative changes in the lungs with suspected chronic scarring, atelectasis, and small bilateral pleural effusions, as before. No definite acute infiltrate is seen. 4.     Calcific atherosclerosis and emphysema.  Electronically Signed By-Vipin Moore MD On:12/8/2022 4:23 PM This report was finalized on [id number]  by  Vipin Moore MD.      Results for orders placed during the hospital encounter of 12/05/22    Duplex Venous Lower Extremity - Bilateral CAR    Interpretation Summary  •  Normal bilateral lower extremity venous duplex scan.      Results for orders placed during the hospital encounter of 12/05/22    Duplex Venous Lower Extremity - Bilateral CAR    Interpretation Summary  •  Normal bilateral lower extremity venous duplex scan.      Results for orders placed during the hospital encounter of 11/11/22    Adult Transthoracic Echo Complete W/ Cont if Necessary Per Protocol    Interpretation Summary  •  Left ventricular ejection fraction appears to be 56 - 60%.  •  Estimated right ventricular systolic pressure from tricuspid regurgitation is mildly elevated (35-45 mmHg).    Indications  Chemotherapy    Technically satisfactory study.  Mitral valve is structurally normal.  Tricuspid valve is structurally normal.  Aortic valve is structurally normal.  Pulmonic valve could not be well visualized.  No evidence for mitral tricuspid or aortic regurgitation is seen by Doppler study.  Left atrium is normal in size.  Right atrium is normal in size.  Left ventricle is normal in size and contractility with ejection fraction of 60%.  Right ventricle is normal in size.  Atrial septum is intact.  Aorta is normal.  No pericardial effusion or intracardiac thrombus is seen.    Impression  Structurally and functionally normal cardiac valves.  Left ventricular size and contractility is normal with ejection fraction of 60%.      Labs Pending at Discharge:  Pending Labs     Order Current Status    Blood Culture - Blood, Blood, Central Line In process    Blood Culture - Blood, Hand, Right In process    Blood Culture - Blood, Arm, Left Preliminary result    Blood Culture - Blood, Arm, Right Preliminary result          Procedures Performed           Consults:   Consults     Date and Time Order Name Status Description    12/8/2022  1:28 PM  Inpatient Infectious Diseases Consult Completed     12/6/2022  8:11 AM Inpatient Nephrology Consult      12/5/2022  7:08 PM Inpatient Nephrology Consult Completed     12/5/2022  4:17 PM Hospitalist (on-call MD unless specified)              Discharge Details        Discharge Medications      ASK your doctor about these medications      Instructions Start Date   buPROPion  MG 24 hr tablet  Commonly known as: WELLBUTRIN XL  Ask about: Which instructions should I use?   300 mg, Oral, Every Morning      desvenlafaxine 50 MG 24 hr tablet  Commonly known as: PRISTIQ   50 mg, Oral, Daily      diazePAM 2 MG tablet  Commonly known as: Valium   2 mg, Oral, Nightly PRN      gabapentin 300 MG capsule  Commonly known as: NEURONTIN   300 mg, Oral, Every 8 Hours Scheduled      levothyroxine 100 MCG tablet  Commonly known as: SYNTHROID, LEVOTHROID  Ask about: Which instructions should I use?   100 mcg, Oral, Every Early Morning      liothyronine 5 MCG tablet  Commonly known as: CYTOMEL  Ask about: Which instructions should I use?   5 mcg, Oral, Daily      LORazepam 0.5 MG tablet  Commonly known as: ATIVAN   0.5 mg, Oral, Every 8 Hours PRN      metoprolol succinate  MG 24 hr tablet  Commonly known as: TOPROL-XL   100 mg, Oral, Daily      OLANZapine 5 MG tablet  Commonly known as: zyPREXA   5 mg, Oral, Nightly, Take on days 2, 3 and 4 after chemotherapy.      ondansetron 8 MG tablet  Commonly known as: ZOFRAN   8 mg, Oral, 3 Times Daily PRN      oxyCODONE-acetaminophen 5-325 MG per tablet  Commonly known as: PERCOCET   1 tablet, Oral, Every 6 Hours PRN      pantoprazole 40 MG EC tablet  Commonly known as: PROTONIX  Ask about: Which instructions should I use?   40 mg, Oral, Daily PRN      vitamin B-12 1000 MCG tablet  Commonly known as: CYANOCOBALAMIN   1,000 mcg, Oral, Daily             No Known Allergies      Discharge Disposition: Discharged home stable condition      Diet:  Hospital:  Diet Order   Procedures   • Diet:  Regular/House Diet, Fluid Restriction (240 mL/tray) Diets; 1500 mL/day; Texture: Regular Texture (IDDSI 7); Fluid Consistency: Thin (IDDSI 0)         Discharge Activity:         CODE STATUS:  Code Status and Medical Interventions:   Ordered at: 12/05/22 1934     Code Status (Patient has no pulse and is not breathing):    CPR (Attempt to Resuscitate)     Medical Interventions (Patient has pulse or is breathing):    Full Support         Future Appointments   Date Time Provider Department Center   12/12/2022  3:00 PM Chino Escalona MD [facility] RO [facility] None   12/19/2022 11:45 AM INF ROOM 27 - CHAIR A  [facility]  [facility] CC NA [facility]   12/20/2022  1:30 PM Azucena Gaspar MD K ONC NA [facility]   12/20/2022  1:30 PM LAB MD BH [facility] ONC LAB NA [facility] O[facility]   1/25/2023  9:15 AM Miryam Reyna MD [facility] THOR NA [facility]           Time spent on Discharge including face to face service: 38 minutes      Signature:   Electronically signed by Lake Dewey MD, 12/09/22, 9:27 AM EST.

## 2022-12-09 NOTE — PROGRESS NOTES
[facility] Medicine Services Daily Progress Note    Patient Name: Jc Driscoll  : 1965  MRN: [id number]  Primary Care Physician:  Reshma Alvarenga MD  Date of admission: 2022      Subjective      Chief Complaint: Weakness    Seen examined bedside.  Continues to improve awaiting culture results.    ROS negative apart for above      Objective      Vitals:   Temp:  [97.3 °F (36.3 °C)-98.1 °F (36.7 °C)] 97.9 °F (36.6 °C)  Heart Rate:  [] 103  Resp:  [18] 18  BP: ()/(60-77) 115/73    Physical Exam   Physical Exam   Head: atraumatic/normocephalic  Neck: supple   Heart: s1s2,regular rate and rythm,no extra heart sounds   Lungs: clear to ascultation b/l   Abd: soft/nontender/nondystended/bs+  Neuro: moving all extremities, no facial asymetry, dtr LE intact   Ext: no edema              Result Review    Result Review:  I have personally reviewed the results from the time of this admission to 2022 13:23 EST and agree with these findings:  [x]  Laboratory  []  Microbiology  [x]  Radiology  []  EKG/Telemetry   []  Cardiology/Vascular   []  Pathology            Assessment & Plan          buPROPion XL, 300 mg, Oral, Daily  enoxaparin, 40 mg, Subcutaneous, Daily  gabapentin, 300 mg, Oral, Q8H  levothyroxine, 100 mcg, Oral, Q AM  liothyronine, 5 mcg, Oral, Daily  metoprolol succinate XL, 100 mg, Oral, Daily  OLANZapine, 5 mg, Oral, Nightly  pantoprazole, 40 mg, Oral, Q AM  sodium chloride, 10 mL, Intravenous, Q12H  sodium chloride, 2 g, Oral, TID With Meals  vitamin B-12, 1,000 mcg, Oral, Daily             Active Hospital Problems:  Active Hospital Problems    Diagnosis    • **Weakness    • Hyponatremia    • Immunotherapy    • NSCLC of left lung (HCC)    • Hashimoto's thyroiditis    • Non-small cell carcinoma of lung, right (HCC)    • Gastroesophageal reflux disease    • Hyperlipidemia    • Diastolic CHF (HCC)    • Anxiety and depression    • Hypertension      Plan:    Generalized weakness  Patient reports lack of appetite last couple weeks.  He has been trying to drink plenty of liquids however.  Improved, likely due to dehydration hyponatremia     Fever  This could be directly related to malignancy directly  Low-grade fever x2 while here  Denies fevers at home   Patient s/p chemotherapy and now immunotherapyas an outpatient   no neutropenia here  No obvious source of infection per initial diagnostics or review of systems  CT chest does show that tumor no pneumonia  Blood cultures are negative at 3 days, blood culture from port was also obtained its pending at this time  Respiratory viral panel negative, UA no evidence of infection, chest x-ray negative, CT head negative  TSH within normal limits  No DVTs on upper lower extremity duplex  Dr. Wilson on board      Hyponatremia  Improved up to 130  Dosed with samsca  Per nephrology      Non-small cell lung cancer  Per heme-onc  Undergoing immunotherapy and radiation outpatient     GERD  Continue PPI     Anxiety/depression  Continue home meds, avoid ssri      Hypertension  Continue metoprolol next     thyroiditis  Continue Cytomel levothyroxine  TSH normal      DVT PUD prophylaxis    Plan as above  DVT prophylaxis:  Medical and mechanical DVT prophylaxis orders are present.    CODE STATUS:    Code Status (Patient has no pulse and is not breathing): CPR (Attempt to Resuscitate)  Medical Interventions (Patient has pulse or is breathing): Full Support      Disposition:  I expect patient to be discharged when stable.      Electronically signed by Lake Dewey MD, 12/09/22, 13:23 EST.  [facility] Hospitalist Team

## 2022-12-09 NOTE — PROGRESS NOTES
Infectious Diseases Progress Note      LOS: 0 days   Patient Care Team:  Reshma Alvarenga MD as PCP - General (Family Medicine)  Reshma Alvarenga MD as PCP - Family Medicine  Miryam Reyna MD as Surgeon (Thoracic Surgery)  Azucena Gaspar MD as Consulting Physician (Hematology and Oncology)  Adalberto Huffman MD as Consulting Physician (Nephrology)    Chief Complaint: Fever at admission    Subjective       The patient has been afebrile for the last 24 hours.  The patient is on room air, hemodynamically stable, and is tolerating antimicrobial therapy.      Review of Systems:   Review of Systems   Constitutional: Positive for fatigue.   HENT: Negative.    Eyes: Negative.    Respiratory: Negative.    Cardiovascular: Negative.    Gastrointestinal: Negative.    Endocrine: Negative.    Genitourinary: Negative.    Musculoskeletal: Negative.    Skin: Negative.    Neurological: Negative.    Psychiatric/Behavioral: Negative.    All other systems reviewed and are negative.       Objective     Vital Signs  Temp:  [97.3 °F (36.3 °C)-98.1 °F (36.7 °C)] 97.9 °F (36.6 °C)  Heart Rate:  [] 103  Resp:  [18] 18  BP: ()/(60-77) 115/73    Physical Exam:  Physical Exam  Vitals and nursing note reviewed.   Constitutional:       General: He is not in acute distress.     Appearance: Normal appearance. He is well-developed and normal weight. He is not diaphoretic.   HENT:      Head: Normocephalic and atraumatic.   Eyes:      Conjunctiva/sclera: Conjunctivae normal.      Pupils: Pupils are equal, round, and reactive to light.   Cardiovascular:      Rate and Rhythm: Normal rate and regular rhythm.      Heart sounds: Normal heart sounds, S1 normal and S2 normal.   Pulmonary:      Effort: Pulmonary effort is normal. No respiratory distress.      Breath sounds: Normal breath sounds. No stridor. No wheezing or rales.   Abdominal:      General: Bowel sounds are normal. There is no distension.      Palpations:  Abdomen is soft. There is no mass.      Tenderness: There is no abdominal tenderness. There is no guarding.   Musculoskeletal:         General: No deformity. Normal range of motion.      Cervical back: Neck supple.   Skin:     General: Skin is warm and dry.      Coloration: Skin is not pale.      Findings: No erythema or rash.      Comments: Port   Neurological:      Mental Status: He is alert and oriented to person, place, and time.      Cranial Nerves: No cranial nerve deficit.   Psychiatric:         Mood and Affect: Mood normal.          Results Review:    I have reviewed all clinical data, test, lab, and imaging results.     Radiology  CT Chest Without Contrast Diagnostic    Result Date: 12/8/2022   DATE OF EXAM: 12/8/2022 3:40 PM  PROCEDURE: CT CHEST WO CONTRAST DIAGNOSTIC-  INDICATIONS: Recurrent fever with history of lung cancer; R53.1-Weakness; E86.0-Dehydration; E87.5-Eqke-garwmhnohh and hyponatremia  COMPARISON: PET/CT 10/31/2022 and prior.  TECHNIQUE: Routine transaxial slices were obtained through the chest without the administration of intravenous contrast. Reconstructed coronal and sagittal images were also obtained. Automated exposure control and iterative construction methods were used.  FINDINGS: There is a mass in the anterior upper left chest wall between the first and second ribs, as seen on the prior PET/CT the mass appears to measure up to 5.9 x 4.1 cm on this exam, previously measured as 4 x 3.5 cm on the prior PET/CT.  There is a small right pleural effusion with pleural thickening, as before. There is a trace amount of left pleural fluid, as before. There is emphysema. There are postoperative changes of right lobectomy, as before. Some suspected chronic atelectasis/scarring with bilateral linear opacities and consolidative densities, as before. There is a small focus of linear consolidation in the anterior-superior left lung likely atelectasis or scarring. There appears to have been left  upper lobectomy. No definite suspicious new pulmonary lesion or infiltrate is seen.  Heart size appears within normal limits. There is calcific atherosclerosis of the coronary arteries and aorta. There is mild enlargement of the main pulmonary artery, as before. No pericardial effusion. Port catheter terminates in the lower SVC.  There are calcified mediastinal lymph nodes. There are a cluster of left supraclavicular lymph nodes which were hypermetabolic on the prior PET/CT. Index lymph node appears to measure 8 x 10 mm, previously 7 x 9 mm on axial image 22. No definite axillary, hilar, or mediastinal lymphadenopathy is seen at this time. No acute abnormality is seen in the visualized upper abdomen.  There are some subtle erosive changes involving the anterior left first and second ribs adjacent to the mass. No other definite aggressive osseous lesion is seen.      1.     Mass in the anterior upper left chest wall between the first and second ribs measures 5.9 x 4.1 cm, 4.3 x 3.57 m on PET/CT from 10/31/2022. 2.     Cluster of left supraclavicular lymph nodes, which were previously hypermetabolic also appear slightly larger than on the PET/CT. 3.     Bilateral postoperative changes in the lungs with suspected chronic scarring, atelectasis, and small bilateral pleural effusions, as before. No definite acute infiltrate is seen. 4.     Calcific atherosclerosis and emphysema.  Electronically Signed By-Vipin Moore MD On:12/8/2022 4:23 PM This report was finalized on [id number] by  Vipin Moore MD.    Duplex Venous Upper Extremity - Bilateral CAR    Result Date: 12/8/2022  •  Normal bilateral upper extremity venous duplex scan.     Duplex Venous Lower Extremity - Bilateral CAR    Result Date: 12/8/2022  •  Normal bilateral lower extremity venous duplex scan.       Cardiology    Laboratory    Results from last 7 days   Lab Units 12/09/22  0158 12/07/22  2323 12/06/22  0825 12/05/22  1217   WBC 10*3/mm3 9.10 7.20  7.20 9.80   HEMOGLOBIN g/dL 8.2* 7.4* 8.5* 9.5*   HEMATOCRIT % 23.7* 21.9* 26.8* 28.6*   PLATELETS 10*3/mm3 408 397 511* 614*     Results from last 7 days   Lab Units 12/09/22  0529 12/09/22  0158 12/08/22 2002 12/08/22  1424 12/08/22  0531 12/07/22  2323 12/07/22 1819 12/05/22 2007 12/05/22  1217   SODIUM mmol/L 130* 128* 126* 129* 130* 127* 128*   < > 124*   POTASSIUM mmol/L 3.4* 3.2* 3.1* 3.6 3.7 3.4* 3.4*   < > 3.7   CHLORIDE mmol/L 94* 89* 90* 93* 93* 91* 91*   < > 82*   CO2 mmol/L 24.0 23.0 22.0 24.0 22.0 23.0 24.0   < > 24.0   BUN mg/dL 12 12 14 13 11 12 11   < > 12   CREATININE mg/dL 0.85 0.96 0.79 0.88 0.93 0.93 0.97   < > 1.15   GLUCOSE mg/dL 108* 122* 131* 104* 110* 112* 141*   < > 139*   ALBUMIN g/dL  --  3.20*  --   --   --  2.90*  --   --  3.70   BILIRUBIN mg/dL  --  0.5  --   --   --  0.3  --   --  0.5   ALK PHOS U/L  --  401*  --   --   --  358*  --   --  289*   AST (SGOT) U/L  --  109*  --   --   --  37  --   --  23   ALT (SGPT) U/L  --  87*  --   --   --  40  --   --  58*   CALCIUM mg/dL 8.5* 8.7 8.3* 8.8 9.3 8.5* 8.5*   < > 9.5    < > = values in this interval not displayed.                 Microbiology   Microbiology Results (last 10 days)     Procedure Component Value - Date/Time    Respiratory Panel PCR w/COVID-19(SARS-CoV-2) [facility] In-House, NP Swab in UTM/VTM, 3-4 HR TAT - Swab, Nasopharynx [[id number]]  (Normal) Collected: 12/05/22 1242    Lab Status: Final result Specimen: Swab from Nasopharynx Updated: 12/05/22 1333     ADENOVIRUS, PCR Not Detected     Coronavirus 229E Not Detected     Coronavirus HKU1 Not Detected     Coronavirus NL63 Not Detected     Coronavirus OC43 Not Detected     COVID19 Not Detected     Human Metapneumovirus Not Detected     Human Rhinovirus/Enterovirus Not Detected     Influenza A PCR Not Detected     Influenza B PCR Not Detected     Parainfluenza Virus 1 Not Detected     Parainfluenza Virus 2 Not Detected     Parainfluenza Virus 3 Not  Detected     Parainfluenza Virus 4 Not Detected     RSV, PCR Not Detected     Bordetella pertussis pcr Not Detected     Bordetella parapertussis PCR Not Detected     Chlamydophila pneumoniae PCR Not Detected     Mycoplasma pneumo by PCR Not Detected    Narrative:      In the setting of a positive respiratory panel with a viral infection PLUS a negative procalcitonin without other underlying concern for bacterial infection, consider observing off antibiotics or discontinuation of antibiotics and continue supportive care. If the respiratory panel is positive for atypical bacterial infection (Bordetella pertussis, Chlamydophila pneumoniae, or Mycoplasma pneumoniae), consider antibiotic de-escalation to target atypical bacterial infection.    Blood Culture - Blood, Arm, Right [[id number]]  (Normal) Collected: 12/05/22 1217    Lab Status: Preliminary result Specimen: Blood from Arm, Right Updated: 12/08/22 1230     Blood Culture No growth at 3 days    Narrative:      Less than seven (7) mL's of blood was collected.  Insufficient quantity may yield false negative results.    Blood Culture - Blood, Arm, Left [[id number]]  (Normal) Collected: 12/05/22 1217    Lab Status: Preliminary result Specimen: Blood from Arm, Left Updated: 12/08/22 1230     Blood Culture No growth at 3 days    Narrative:      Less than seven (7) mL's of blood was collected.  Insufficient quantity may yield false negative results.          Medication Review:       Schedule Meds  buPROPion XL, 300 mg, Oral, Daily  enoxaparin, 40 mg, Subcutaneous, Daily  gabapentin, 300 mg, Oral, Q8H  levothyroxine, 100 mcg, Oral, Q AM  liothyronine, 5 mcg, Oral, Daily  metoprolol succinate XL, 100 mg, Oral, Daily  OLANZapine, 5 mg, Oral, Nightly  pantoprazole, 40 mg, Oral, Q AM  sodium chloride, 10 mL, Intravenous, Q12H  sodium chloride, 2 g, Oral, TID With Meals  vitamin B-12, 1,000 mcg, Oral, Daily        Infusion Meds       PRN Meds  •  acetaminophen  •  diazePAM  •   LORazepam  •  magnesium sulfate **OR** magnesium sulfate **OR** magnesium sulfate  •  ondansetron  •  oxyCODONE  •  potassium chloride  •  potassium chloride  •  [COMPLETED] Insert Peripheral IV **AND** sodium chloride  •  sodium chloride  •  sodium chloride        Assessment & Plan       Antimicrobial Therapy   1.         2.        3.        4.        5.            Assessment     Fever with no clinical signs of bacterial infection.  The patient is hemodynamically stable.  I am suspecting noninfectious fever such as malignancy fever or DVT.  But we need to rule out port infection  -Initial blood culture from 1222 is negative so far  -CT chest did not show any significant infiltrates  -Doppler lower extremities are negative for DVT     History of lung cancer on immunotherapy     Plan     No need for antimicrobial therapy at this point  Waiting on repeat blood culture from the port-if this is negative patient can be discharged from infectious disease standpoint  Continue supportive care        Bridget Guerra, APRN  12/09/22  11:54 EST    Note is dictated utilizing voice recognition software/Dragon

## 2022-12-09 NOTE — PROGRESS NOTES
"NEPHROLOGY PROGRESS NOTE------[facility]/MP/OPT    [facility]  [phone]  Adalberto Huffman MD      Patient Care Team:  Reshma Alvarenga MD as PCP - General (Family Medicine)  Reshma Alvarenga MD as PCP - Family Medicine  Miryam Reyna MD as Surgeon (Thoracic Surgery)  Azucena Gaspar MD as Consulting Physician (Hematology and Oncology)  Adalberto Huffman MD as Consulting Physician (Nephrology)      Provider:  Adalberto Huffman MD  Patient Name: Jc Driscoll  :  1965    SUBJECTIVE:  F/U hyponatremia  No chest pain or SOA  Sodium stable at 130    Medication:  buPROPion XL, 300 mg, Oral, Daily  enoxaparin, 40 mg, Subcutaneous, Daily  gabapentin, 300 mg, Oral, Q8H  levothyroxine, 100 mcg, Oral, Q AM  liothyronine, 5 mcg, Oral, Daily  metoprolol succinate XL, 100 mg, Oral, Daily  OLANZapine, 5 mg, Oral, Nightly  pantoprazole, 40 mg, Oral, Q AM  sodium chloride, 10 mL, Intravenous, Q12H  sodium chloride, 2 g, Oral, TID With Meals  vitamin B-12, 1,000 mcg, Oral, Daily           OBJECTIVE    Vital Sign Min/Max for last 24 hours  Temp  Min: 97.3 °F (36.3 °C)  Max: 98.1 °F (36.7 °C)   BP  Min: 96/62  Max: 139/77   Pulse  Min: 92  Max: 107   Resp  Min: 18  Max: 18   SpO2  Min: 93 %  Max: 99 %   No data recorded   Weight  Min: 84 kg (185 lb 3.2 oz)  Max: 84 kg (185 lb 3.2 oz)     Flowsheet Rows    Flowsheet Row First Filed Value   Admission Height 190.5 cm (75\") Documented at 2022 1153   Admission Weight 86.7 kg (191 lb 2.2 oz) Documented at 2022 1153          No intake/output data recorded.  I/O last 3 completed shifts:  In: 120 [P.O.:120]  Out: -     Physical Exam:  General Appearance: alert, appears stated age and cooperative  Head: normocephalic, without obvious abnormality and atraumatic  Eyes: conjunctivae and sclerae normal and no icterus  Neck: supple and no JVD  Lungs: scattered rhonchi  Heart: regular rhythm & normal rate and " normal S1, S2  Chest: Wall no abnormalities observed  Abdomen: normal bowel sounds and soft, nontender  Extremities: moves extremities well, no edema, no cyanosis and no redness  Skin: no bleeding, bruising or rash, turgor normal, color normal and no lesions noted  Neurologic: Alert, and oriented. No focal deficits    Labs:    WBC WBC   Date Value Ref Range Status   12/09/2022 9.10 3.40 - 10.80 10*3/mm3 Final   12/07/2022 7.20 3.40 - 10.80 10*3/mm3 Final      HGB Hemoglobin   Date Value Ref Range Status   12/09/2022 8.2 (L) 13.0 - 17.7 g/dL Final   12/07/2022 7.4 (L) 13.0 - 17.7 g/dL Final      HCT Hematocrit   Date Value Ref Range Status   12/09/2022 23.7 (L) 37.5 - 51.0 % Final   12/07/2022 21.9 (L) 37.5 - 51.0 % Final      Platelets No results found for: LABPLAT   MCV MCV   Date Value Ref Range Status   12/09/2022 85.4 79.0 - 97.0 fL Final   12/07/2022 84.6 79.0 - 97.0 fL Final          Sodium Sodium   Date Value Ref Range Status   12/09/2022 130 (L) 136 - 145 mmol/L Final   12/09/2022 128 (L) 136 - 145 mmol/L Final   12/08/2022 126 (L) 136 - 145 mmol/L Final   12/08/2022 129 (L) 136 - 145 mmol/L Final   12/08/2022 130 (L) 136 - 145 mmol/L Final   12/07/2022 127 (L) 136 - 145 mmol/L Final   12/07/2022 128 (L) 136 - 145 mmol/L Final   12/07/2022 129 (L) 136 - 145 mmol/L Final   12/07/2022 128 (L) 136 - 145 mmol/L Final   12/06/2022 126 (L) 136 - 145 mmol/L Final   12/06/2022 124 (L) 136 - 145 mmol/L Final      Potassium Potassium   Date Value Ref Range Status   12/09/2022 3.4 (L) 3.5 - 5.2 mmol/L Final   12/09/2022 3.2 (L) 3.5 - 5.2 mmol/L Final   12/08/2022 3.1 (L) 3.5 - 5.2 mmol/L Final   12/08/2022 3.6 3.5 - 5.2 mmol/L Final   12/08/2022 3.7 3.5 - 5.2 mmol/L Final   12/07/2022 3.4 (L) 3.5 - 5.2 mmol/L Final   12/07/2022 3.4 (L) 3.5 - 5.2 mmol/L Final   12/07/2022 3.5 3.5 - 5.2 mmol/L Final   12/07/2022 3.3 (L) 3.5 - 5.2 mmol/L Final   12/06/2022 3.4 (L) 3.5 - 5.2 mmol/L Final   12/06/2022 3.2 (L) 3.5 - 5.2  mmol/L Final      Chloride Chloride   Date Value Ref Range Status   12/09/2022 94 (L) 98 - 107 mmol/L Final   12/09/2022 89 (L) 98 - 107 mmol/L Final   12/08/2022 90 (L) 98 - 107 mmol/L Final   12/08/2022 93 (L) 98 - 107 mmol/L Final   12/08/2022 93 (L) 98 - 107 mmol/L Final   12/07/2022 91 (L) 98 - 107 mmol/L Final   12/07/2022 91 (L) 98 - 107 mmol/L Final   12/07/2022 89 (L) 98 - 107 mmol/L Final   12/07/2022 88 (L) 98 - 107 mmol/L Final   12/06/2022 88 (L) 98 - 107 mmol/L Final   12/06/2022 89 (L) 98 - 107 mmol/L Final      CO2 CO2   Date Value Ref Range Status   12/09/2022 24.0 22.0 - 29.0 mmol/L Final   12/09/2022 23.0 22.0 - 29.0 mmol/L Final   12/08/2022 22.0 22.0 - 29.0 mmol/L Final   12/08/2022 24.0 22.0 - 29.0 mmol/L Final   12/08/2022 22.0 22.0 - 29.0 mmol/L Final   12/07/2022 23.0 22.0 - 29.0 mmol/L Final   12/07/2022 24.0 22.0 - 29.0 mmol/L Final   12/07/2022 24.0 22.0 - 29.0 mmol/L Final   12/07/2022 25.0 22.0 - 29.0 mmol/L Final   12/06/2022 23.0 22.0 - 29.0 mmol/L Final   12/06/2022 24.0 22.0 - 29.0 mmol/L Final      BUN BUN   Date Value Ref Range Status   12/09/2022 12 6 - 20 mg/dL Final   12/09/2022 12 6 - 20 mg/dL Final   12/08/2022 14 6 - 20 mg/dL Final   12/08/2022 13 6 - 20 mg/dL Final   12/08/2022 11 6 - 20 mg/dL Final   12/07/2022 12 6 - 20 mg/dL Final   12/07/2022 11 6 - 20 mg/dL Final   12/07/2022 11 6 - 20 mg/dL Final   12/07/2022 10 6 - 20 mg/dL Final   12/06/2022 11 6 - 20 mg/dL Final   12/06/2022 12 6 - 20 mg/dL Final      Creatinine Creatinine   Date Value Ref Range Status   12/09/2022 0.85 0.76 - 1.27 mg/dL Final   12/09/2022 0.96 0.76 - 1.27 mg/dL Final   12/08/2022 0.79 0.76 - 1.27 mg/dL Final   12/08/2022 0.88 0.76 - 1.27 mg/dL Final   12/08/2022 0.93 0.76 - 1.27 mg/dL Final   12/07/2022 0.93 0.76 - 1.27 mg/dL Final   12/07/2022 0.97 0.76 - 1.27 mg/dL Final   12/07/2022 0.92 0.76 - 1.27 mg/dL Final   12/07/2022 0.87 0.76 - 1.27 mg/dL Final   12/06/2022 0.94 0.76 - 1.27 mg/dL Final    12/06/2022 0.95 0.76 - 1.27 mg/dL Final      Calcium Calcium   Date Value Ref Range Status   12/09/2022 8.5 (L) 8.6 - 10.5 mg/dL Final   12/09/2022 8.7 8.6 - 10.5 mg/dL Final   12/08/2022 8.3 (L) 8.6 - 10.5 mg/dL Final   12/08/2022 8.8 8.6 - 10.5 mg/dL Final   12/08/2022 9.3 8.6 - 10.5 mg/dL Final   12/07/2022 8.5 (L) 8.6 - 10.5 mg/dL Final   12/07/2022 8.5 (L) 8.6 - 10.5 mg/dL Final   12/07/2022 8.8 8.6 - 10.5 mg/dL Final   12/07/2022 8.9 8.6 - 10.5 mg/dL Final   12/06/2022 8.6 8.6 - 10.5 mg/dL Final   12/06/2022 8.0 (L) 8.6 - 10.5 mg/dL Final      PO4 No components found for: PO4   Albumin Albumin   Date Value Ref Range Status   12/09/2022 3.20 (L) 3.50 - 5.20 g/dL Final   12/07/2022 2.90 (L) 3.50 - 5.20 g/dL Final      Magnesium No results found for: MG   Uric Acid No components found for: URIC ACID     Imaging Results (Last 72 Hours)     Procedure Component Value Units Date/Time    CT Chest Without Contrast Diagnostic [[id number]] Collected: 12/08/22 1601     Updated: 12/08/22 1625    Narrative:         DATE OF EXAM:  12/8/2022 3:40 PM     PROCEDURE:  CT CHEST WO CONTRAST DIAGNOSTIC-     INDICATIONS:   Recurrent fever with history of lung cancer; R53.1-Weakness;  E86.0-Dehydration; E87.7-Cozr-[id number] and hyponatremia     COMPARISON:   PET/CT 10/31/2022 and prior.     TECHNIQUE:  Routine transaxial slices were obtained through the chest without the  administration of intravenous contrast. Reconstructed coronal and  sagittal images were also obtained. Automated exposure control and  iterative construction methods were used.     FINDINGS:  There is a mass in the anterior upper left chest wall between the first  and second ribs, as seen on the prior PET/CT the mass appears to measure  up to 5.9 x 4.1 cm on this exam, previously measured as 4 x 3.5 cm on  the prior PET/CT.     There is a small right pleural effusion with pleural thickening, as  before. There is a trace amount of left pleural fluid, as before.  There  is emphysema. There are postoperative changes of right lobectomy, as  before. Some suspected chronic atelectasis/scarring with bilateral  linear opacities and consolidative densities, as before. There is a  small focus of linear consolidation in the anterior-superior left lung  likely atelectasis or scarring. There appears to have been left upper  lobectomy. No definite suspicious new pulmonary lesion or infiltrate is  seen.     Heart size appears within normal limits. There is calcific  atherosclerosis of the coronary arteries and aorta. There is mild  enlargement of the main pulmonary artery, as before. No pericardial  effusion. Port catheter terminates in the lower SVC.     There are calcified mediastinal lymph nodes. There are a cluster of left  supraclavicular lymph nodes which were hypermetabolic on the prior  PET/CT. Index lymph node appears to measure 8 x 10 mm, previously 7 x 9  mm on axial image 22. No definite axillary, hilar, or mediastinal  lymphadenopathy is seen at this time. No acute abnormality is seen in  the visualized upper abdomen.     There are some subtle erosive changes involving the anterior left first  and second ribs adjacent to the mass. No other definite aggressive  osseous lesion is seen.       Impression:      1.     Mass in the anterior upper left chest wall between the first and  second ribs measures 5.9 x 4.1 cm, 4.3 x 3.57 m on PET/CT from  10/31/2022.  2.     Cluster of left supraclavicular lymph nodes, which were  previously hypermetabolic also appear slightly larger than on the  PET/CT.  3.     Bilateral postoperative changes in the lungs with suspected  chronic scarring, atelectasis, and small bilateral pleural effusions, as  before. No definite acute infiltrate is seen.  4.     Calcific atherosclerosis and emphysema.     Electronically Signed By-Vipin Moore MD On:12/8/2022 4:23 PM  This report was finalized on [id number] by  Vipin Moore MD.          Results for  orders placed during the hospital encounter of 12/05/22    XR Chest 2 View    Narrative  DATE OF EXAM:  12/5/2022 12:32 PM    PROCEDURE:  XR CHEST 2 VW-    INDICATIONS:  soa    COMPARISON:  07/21/2022, PET/CT 10/31/2022    TECHNIQUE:  PA and lateral views of the chest were obtained.    FINDINGS:  Left-sided port catheter noted with tip at the mid SVC. Heart size  normal. Postsurgical changes of left upper lobectomy again noted. Soft  tissue mass at the left chest wall between the first and second ribs  better assessed on prior PET/CT. Chronic blunting at the right lateral  costophrenic angle related to small right pleural effusion unchanged. No  pneumothorax. Osseous structures appear intact.    Impression  1. No acute process.  2. Postsurgical changes of prior left upper lobectomy with left upper  chest wall soft tissue mass better assessed on the recent PET/CT.  3. Small chronic right pleural effusion.    Electronically Signed By-Antoni Deal MD On:12/5/2022 12:43 PM  This report was finalized on [id number] by  Antoni Deal MD.      Results for orders placed during the hospital encounter of 07/21/22    XR Chest 2 View    Narrative  EXAM: XR CHEST 2 VW-    DATE OF EXAM: 7/21/2022 12:20 PM    INDICATION: POST-OP; C34.92-Malignant neoplasm of unspecified part of  left bronchus or lung.    COMPARISONS: 05/22/2022    FINDINGS:    New ill-defined density along the superior margin of the left hilum. No  evidence of pneumothorax. Small-moderate-sized bilateral pleural  effusions. Left-sided port unchanged in good position. No evidence of  acute processes of the mediastinum. Surgical clips in the medial right  upper thorax/mediastinal location again noted.    Impression  New ill-defined density along the superior left hilum which may either  relate to development of a mass or postsurgical change. Correlate with  surgical history and consider chest CT evaluation if needed.    No evidence of  pneumothorax.    Small-moderate bilateral pleural effusions    Electronically Signed By-Max Killian On:7/21/2022 1:22 PM  This report was finalized on [id number] by  Max Killian, .      Results for orders placed during the hospital encounter of 07/06/22    XR Chest 1 View    Narrative  XR CHEST 1 VW-    Clinical: Chest tube removal, status post left upper lobectomy    COMPARISON examination 0511 hours, current examination 1506 hours    FINDINGS: Left-sided chest tube removed in the interim, no pneumothorax.  Small amount of subcutaneous emphysema left chest wall as before. Seen  better on the current examination is a lobular density within the left  midlung zone, measuring approximately 6 cm transverse and 3.5 cm AP.  This likely represents a focal area consolidation or atelectasis status  post upper lobectomy. Advise conservative surveillance.    There is right-sided pleural effusion with patchy atelectasis and/or  infiltrate at the right lung base as before. The cardiomediastinal  silhouette is stable. Mediport catheter in position.    CONCLUSION: Removal of the left chest tube, no pneumothorax. Better  aeration of the left base with now a somewhat lobulated density at this  location which may represent a focal area of consolidation or  atelectasis. No change in the appearance of the right lung base, likely  effusion and airspace disease as before.    This report was finalized on 7/8/2022 3:34 PM by Dr. Maximiliano Arreguin M.D.      Results for orders placed during the hospital encounter of 12/05/22    Duplex Venous Lower Extremity - Bilateral CAR    Interpretation Summary  •  Normal bilateral lower extremity venous duplex scan.        ASSESSMENT / PLAN      Weakness    Anxiety and depression    Diastolic CHF (HCC)    Gastroesophageal reflux disease    Hypertension    Hyperlipidemia    Non-small cell carcinoma of lung, right (HCC)    Hashimoto's thyroiditis    NSCLC of left lung (HCC)    Hyponatremia     Immunotherapy    • Hyponatremia--chronic, underlying SIADH from lung malignancy and or SSRI and mild volume depletion.  • HTN  • Non small cell lung cancer  • Depression--hold SSRI/Pristiq.  • Neuropathy--check B12. Already on oral B12     Sodium stable  [facility]  Continue fluid restriction, NaCl tabs  Renal wise no obj to d/c, he wants to go home.        Adalberto Hufmfan MD  [facility]  [phone]  12/09/22  10:05 EST

## 2022-12-09 NOTE — CASE MANAGEMENT/SOCIAL WORK
Continued Stay Note   [facility]     Patient Name: Jc Driscoll  MRN: [id number]  Today's Date: 12/9/2022    Admit Date: 12/5/2022    Plan: D/C plan: Anticipate home with spouse.   Discharge Plan     Row Name 12/09/22 0940       Plan    Plan D/C plan: Anticipate home with spouse.    Patient/Family in Agreement with Plan yes    Plan Comments Anticipate home with spouse when medically ready. Barrier to d/c: ID consulted, fevers, blood cx, chest CT, venous doppler                   Expected Discharge Date and Time     Expected Discharge Date Expected Discharge Time    Dec 12, 2022         Phone communication or documentation only - no physical contact with patient or family.      Gilbert Chapman RN

## 2022-12-10 LAB
BACTERIA SPEC AEROBE CULT: NORMAL
BACTERIA SPEC AEROBE CULT: NORMAL
BASOPHILS # BLD AUTO: 0 10*3/MM3 (ref 0–0.2)
BASOPHILS NFR BLD AUTO: 0.6 % (ref 0–1.5)
DEPRECATED RDW RBC AUTO: 50.8 FL (ref 37–54)
DEPRECATED RDW RBC AUTO: 52.5 FL (ref 37–54)
EOSINOPHIL # BLD AUTO: 0.1 10*3/MM3 (ref 0–0.4)
EOSINOPHIL NFR BLD AUTO: 1.8 % (ref 0.3–6.2)
ERYTHROCYTE [DISTWIDTH] IN BLOOD BY AUTOMATED COUNT: 16.8 % (ref 12.3–15.4)
ERYTHROCYTE [DISTWIDTH] IN BLOOD BY AUTOMATED COUNT: 16.9 % (ref 12.3–15.4)
HCT VFR BLD AUTO: 21.1 % (ref 37.5–51)
HCT VFR BLD AUTO: 22 % (ref 37.5–51)
HCT VFR BLD AUTO: 22.2 % (ref 37.5–51)
HGB BLD-MCNC: 6.8 G/DL (ref 13–17.7)
HGB BLD-MCNC: 7.1 G/DL (ref 13–17.7)
HGB BLD-MCNC: 7.5 G/DL (ref 13–17.7)
IRON 24H UR-MRATE: 18 MCG/DL (ref 59–158)
IRON SATN MFR SERPL: 11 % (ref 20–50)
LYMPHOCYTES # BLD AUTO: 0.3 10*3/MM3 (ref 0.7–3.1)
LYMPHOCYTES NFR BLD AUTO: 4.4 % (ref 19.6–45.3)
MCH RBC QN AUTO: 27.5 PG (ref 26.6–33)
MCH RBC QN AUTO: 28.5 PG (ref 26.6–33)
MCHC RBC AUTO-ENTMCNC: 32 G/DL (ref 31.5–35.7)
MCHC RBC AUTO-ENTMCNC: 33.7 G/DL (ref 31.5–35.7)
MCV RBC AUTO: 84.7 FL (ref 79–97)
MCV RBC AUTO: 86 FL (ref 79–97)
MONOCYTES # BLD AUTO: 0.7 10*3/MM3 (ref 0.1–0.9)
MONOCYTES NFR BLD AUTO: 10.4 % (ref 5–12)
NEUTROPHILS NFR BLD AUTO: 5.6 10*3/MM3 (ref 1.7–7)
NEUTROPHILS NFR BLD AUTO: 82.8 % (ref 42.7–76)
NRBC BLD AUTO-RTO: 0 /100 WBC (ref 0–0.2)
PLATELET # BLD AUTO: 384 10*3/MM3 (ref 140–450)
PLATELET # BLD AUTO: 394 10*3/MM3 (ref 140–450)
PMV BLD AUTO: 7 FL (ref 6–12)
PMV BLD AUTO: 7.3 FL (ref 6–12)
POTASSIUM SERPL-SCNC: 3.9 MMOL/L (ref 3.5–5.2)
RBC # BLD AUTO: 2.46 10*6/MM3 (ref 4.14–5.8)
RBC # BLD AUTO: 2.62 10*6/MM3 (ref 4.14–5.8)
TIBC SERPL-MCNC: 171 MCG/DL (ref 298–536)
TRANSFERRIN SERPL-MCNC: 115 MG/DL (ref 200–360)
WBC NRBC COR # BLD: 6.7 10*3/MM3 (ref 3.4–10.8)
WBC NRBC COR # BLD: 7.6 10*3/MM3 (ref 3.4–10.8)

## 2022-12-10 PROCEDURE — 84466 ASSAY OF TRANSFERRIN: CPT | Performed by: INTERNAL MEDICINE

## 2022-12-10 PROCEDURE — 84132 ASSAY OF SERUM POTASSIUM: CPT | Performed by: HOSPITALIST

## 2022-12-10 PROCEDURE — 85027 COMPLETE CBC AUTOMATED: CPT | Performed by: HOSPITALIST

## 2022-12-10 PROCEDURE — 96372 THER/PROPH/DIAG INJ SC/IM: CPT

## 2022-12-10 PROCEDURE — 85014 HEMATOCRIT: CPT | Performed by: HOSPITALIST

## 2022-12-10 PROCEDURE — 85025 COMPLETE CBC W/AUTO DIFF WBC: CPT | Performed by: NURSE PRACTITIONER

## 2022-12-10 PROCEDURE — 83540 ASSAY OF IRON: CPT | Performed by: INTERNAL MEDICINE

## 2022-12-10 PROCEDURE — 25010000002 ENOXAPARIN PER 10 MG: Performed by: INTERNAL MEDICINE

## 2022-12-10 PROCEDURE — 85018 HEMOGLOBIN: CPT | Performed by: HOSPITALIST

## 2022-12-10 PROCEDURE — G0378 HOSPITAL OBSERVATION PER HR: HCPCS

## 2022-12-10 RX ADMIN — ENOXAPARIN SODIUM 40 MG: 100 INJECTION SUBCUTANEOUS at 17:23

## 2022-12-10 RX ADMIN — CYANOCOBALAMIN TAB 1000 MCG 1000 MCG: 1000 TAB at 09:41

## 2022-12-10 RX ADMIN — LIOTHYRONINE SODIUM 5 MCG: 5 TABLET ORAL at 09:41

## 2022-12-10 RX ADMIN — PANTOPRAZOLE SODIUM 40 MG: 40 TABLET, DELAYED RELEASE ORAL at 05:36

## 2022-12-10 RX ADMIN — DIAZEPAM 2 MG: 2 TABLET ORAL at 22:10

## 2022-12-10 RX ADMIN — Medication 10 ML: at 21:54

## 2022-12-10 RX ADMIN — BUPROPION HYDROCHLORIDE 300 MG: 150 TABLET, EXTENDED RELEASE ORAL at 09:41

## 2022-12-10 RX ADMIN — Medication 15 G: at 12:54

## 2022-12-10 RX ADMIN — LEVOTHYROXINE SODIUM 100 MCG: 0.1 TABLET ORAL at 05:37

## 2022-12-10 RX ADMIN — SODIUM CHLORIDE 2 G: 1 TABLET ORAL at 12:54

## 2022-12-10 RX ADMIN — METOPROLOL SUCCINATE 100 MG: 50 TABLET, EXTENDED RELEASE ORAL at 09:41

## 2022-12-10 RX ADMIN — SODIUM CHLORIDE 2 G: 1 TABLET ORAL at 17:23

## 2022-12-10 RX ADMIN — SODIUM CHLORIDE 2 G: 1 TABLET ORAL at 09:41

## 2022-12-10 RX ADMIN — ACETAMINOPHEN 650 MG: 325 TABLET, FILM COATED ORAL at 09:49

## 2022-12-10 RX ADMIN — OLANZAPINE 5 MG: 5 TABLET, FILM COATED ORAL at 21:53

## 2022-12-10 RX ADMIN — Medication 10 ML: at 09:42

## 2022-12-10 NOTE — PLAN OF CARE
Goal Outcome Evaluation:  Plan of Care Reviewed With: patient        Progress: no change     Pt rested comfortably throughout the night. No new issues addressed.   Problem: Adult Inpatient Plan of Care  Goal: Plan of Care Review  Outcome: Ongoing, Progressing  Flowsheets (Taken 12/10/2022 0112)  Progress: no change  Plan of Care Reviewed With: patient  Goal: Patient-Specific Goal (Individualized)  Outcome: Ongoing, Progressing  Goal: Absence of Hospital-Acquired Illness or Injury  Outcome: Ongoing, Progressing  Intervention: Identify and Manage Fall Risk  Recent Flowsheet Documentation  Taken 12/10/2022 0000 by Remi Juan RN  Safety Promotion/Fall Prevention: safety round/check completed  Taken 12/9/2022 2200 by Remi Juan RN  Safety Promotion/Fall Prevention: safety round/check completed  Taken 12/9/2022 2000 by Remi Juan RN  Safety Promotion/Fall Prevention:   safety round/check completed   room organization consistent   nonskid shoes/slippers when out of bed   assistive device/personal items within reach   fall prevention program maintained  Goal: Optimal Comfort and Wellbeing  Outcome: Ongoing, Progressing  Intervention: Provide Person-Centered Care  Recent Flowsheet Documentation  Taken 12/9/2022 2000 by Remi Juan RN  Trust Relationship/Rapport:   care explained   choices provided  Goal: Readiness for Transition of Care  Outcome: Ongoing, Progressing     Problem: Fall Injury Risk  Goal: Absence of Fall and Fall-Related Injury  Outcome: Ongoing, Progressing  Intervention: Promote Injury-Free Environment  Recent Flowsheet Documentation  Taken 12/10/2022 0000 by Remi Juan RN  Safety Promotion/Fall Prevention: safety round/check completed  Taken 12/9/2022 2200 by Remi Juan RN  Safety Promotion/Fall Prevention: safety round/check completed  Taken 12/9/2022 2000 by Remi Juan RN  Safety Promotion/Fall Prevention:   safety round/check completed   room organization consistent    nonskid shoes/slippers when out of bed   assistive device/personal items within reach   fall prevention program maintained     Problem: Hypertension Comorbidity  Goal: Blood Pressure in Desired Range  Outcome: Ongoing, Progressing     Problem: Malnutrition  Goal: Improved Nutritional Intake  Outcome: Ongoing, Progressing

## 2022-12-10 NOTE — PROGRESS NOTES
[facility] Medicine Services Daily Progress Note    Patient Name: Jc Driscoll  : 1965  MRN: [id number]  Primary Care Physician:  Reshma Alvarenga MD  Date of admission: 2022      Subjective      Chief Complaint: Weakness      Doing well no fevers overnight, hemoglobin slightly lower today    ROS negative apart for above      Objective      Vitals:   Temp:  [97.3 °F (36.3 °C)-98.5 °F (36.9 °C)] 97.6 °F (36.4 °C)  Heart Rate:  [] 77  Resp:  [18-21] 20  BP: (103-122)/(66-80) 122/76    Physical Exam   Physical Exam   Head: atraumatic/normocephalic  Neck: supple   Heart: s1s2,regular rate and rythm,no extra heart sounds   Lungs: clear to ascultation b/l   Abd: soft/nontender/nondystended/bs+  Neuro: moving all extremities, no facial asymetry, dtr LE intact   Ext: no edema              Result Review    Result Review:  I have personally reviewed the results from the time of this admission to 12/10/2022 09:33 EST and agree with these findings:  [x]  Laboratory  []  Microbiology  [x]  Radiology  []  EKG/Telemetry   []  Cardiology/Vascular   []  Pathology            Assessment & Plan          buPROPion XL, 300 mg, Oral, Daily  enoxaparin, 40 mg, Subcutaneous, Daily  levothyroxine, 100 mcg, Oral, Q AM  liothyronine, 5 mcg, Oral, Daily  metoprolol succinate XL, 100 mg, Oral, Daily  OLANZapine, 5 mg, Oral, Nightly  pantoprazole, 40 mg, Oral, Q AM  sodium chloride, 10 mL, Intravenous, Q12H  sodium chloride, 2 g, Oral, TID With Meals  vitamin B-12, 1,000 mcg, Oral, Daily             Active Hospital Problems:  Active Hospital Problems    Diagnosis    • **Weakness    • Hyponatremia    • Immunotherapy    • NSCLC of left lung (HCC)    • Hashimoto's thyroiditis    • Non-small cell carcinoma of lung, right (HCC)    • Gastroesophageal reflux disease    • Hyperlipidemia    • Diastolic CHF (HCC)    • Anxiety and depression    • Hypertension      Plan:     Anemia  Chronic  Repeat H&H  Transfuse  if needed    Generalized weakness  Patient reports lack of appetite last couple weeks.  He has been trying to drink plenty of liquids however.  Improved, likely due to dehydration hyponatremia     Fever  Resolved in the last 24  This could be directly related to malignancy directly  Low-grade fever x2 while here  Denies fevers at home   Patient s/p chemotherapy and now immunotherapyas an outpatient   no neutropenia here  No obvious source of infection per initial diagnostics or review of systems  CT chest does show that tumor no pneumonia  Blood cultures are negative at 3 days, blood culture from port was also obtained its pending at this time  Respiratory viral panel negative, UA no evidence of infection, chest x-ray negative, CT head negative  TSH within normal limits  No DVTs on upper lower extremity duplex  Dr. Wilson on board      Hyponatremia  Improved up to 130  Dosed with samsca  Per nephrology      Non-small cell lung cancer  Per heme-onc  Undergoing immunotherapy and radiation outpatient     GERD  Continue PPI     Anxiety/depression  Continue home meds, avoid ssri      Hypertension  Continue metoprolol next     thyroiditis  Continue Cytomel levothyroxine  TSH normal      DVT PUD prophylaxis    Plan as above  DVT prophylaxis:  Medical and mechanical DVT prophylaxis orders are present.    CODE STATUS:    Code Status (Patient has no pulse and is not breathing): CPR (Attempt to Resuscitate)  Medical Interventions (Patient has pulse or is breathing): Full Support      Disposition:  I expect patient to be discharged when stable.      Electronically signed by Lake Dewey MD, 12/10/22, 09:33 EST.  [facility]

## 2022-12-10 NOTE — PLAN OF CARE
Goal Outcome Evaluation:           Progress: no change  Outcome Evaluation: Patient has no c/o pain or discomfort following administration of prn tylenol. Pt ambulated independently and showered today. H+H re-drawn after initial findings 6.8 with am labs per attending's order. Re-draw H+H came back at 7.1. MD notified. No new orders.

## 2022-12-10 NOTE — PROGRESS NOTES
"NEPHROLOGY PROGRESS NOTE------[facility]    [facility]  [phone]  Sandhya Raygoza MD      Patient Care Team:  Reshma Alvarenga MD as PCP - General (Family Medicine)  Reshma Alvarenga MD as PCP - Family Medicine  Miryam Reyna MD as Surgeon (Thoracic Surgery)  Azucena Gaspar MD as Consulting Physician (Hematology and Oncology)  Adalberto Huffman MD as Consulting Physician (Nephrology)      Provider:  Sandhya Raygoza MD  Patient Name: Jc Driscoll  :  1965    SUBJECTIVE:    F/U HYPONATREMIA    Feeling great. No urinary sx. No HA, blurry vision, tremors/twitches. No edema    Medication:  buPROPion XL, 300 mg, Oral, Daily  enoxaparin, 40 mg, Subcutaneous, Daily  levothyroxine, 100 mcg, Oral, Q AM  liothyronine, 5 mcg, Oral, Daily  metoprolol succinate XL, 100 mg, Oral, Daily  OLANZapine, 5 mg, Oral, Nightly  pantoprazole, 40 mg, Oral, Q AM  sodium chloride, 10 mL, Intravenous, Q12H  sodium chloride, 2 g, Oral, TID With Meals  vitamin B-12, 1,000 mcg, Oral, Daily           OBJECTIVE    Vital Sign Min/Max for last 24 hours  Temp  Min: 97.3 °F (36.3 °C)  Max: 98.5 °F (36.9 °C)   BP  Min: 103/66  Max: 122/76   Pulse  Min: 77  Max: 106   Resp  Min: 18  Max: 21   SpO2  Min: 92 %  Max: 99 %   No data recorded   Weight  Min: 85.2 kg (187 lb 13.3 oz)  Max: 85.2 kg (187 lb 13.3 oz)     Flowsheet Rows    Flowsheet Row First Filed Value   Admission Height 190.5 cm (75\") Documented at 2022 1153   Admission Weight 86.7 kg (191 lb 2.2 oz) Documented at 2022 1153          No intake/output data recorded.  I/O last 3 completed shifts:  In: 240 [P.O.:240]  Out: -     Physical Exam:  General Appearance: alert, appears stated age and cooperative  Head: normocephalic, without obvious abnormality and atraumatic  Eyes: conjunctivae and sclerae normal and no icterus  Neck: supple and no JVD  Lungs: scattered rhonchi  Heart: regular " rhythm & normal rate and normal S1, S2  Chest: Wall no abnormalities observed  Abdomen: normal bowel sounds and soft, nontender  Extremities: moves extremities well, no edema, no cyanosis and no redness  Skin: no bleeding, bruising or rash, turgor normal, color normal and no lesions noted  Neurologic: Alert, and oriented. No focal deficits    Labs:    WBC WBC   Date Value Ref Range Status   12/10/2022 7.60 3.40 - 10.80 10*3/mm3 Final   12/09/2022 9.10 3.40 - 10.80 10*3/mm3 Final   12/07/2022 7.20 3.40 - 10.80 10*3/mm3 Final      HGB Hemoglobin   Date Value Ref Range Status   12/10/2022 7.5 (L) 13.0 - 17.7 g/dL Final   12/09/2022 8.2 (L) 13.0 - 17.7 g/dL Final   12/07/2022 7.4 (L) 13.0 - 17.7 g/dL Final      HCT Hematocrit   Date Value Ref Range Status   12/10/2022 22.2 (L) 37.5 - 51.0 % Final   12/09/2022 23.7 (L) 37.5 - 51.0 % Final   12/07/2022 21.9 (L) 37.5 - 51.0 % Final      Platelets No results found for: LABPLAT   MCV MCV   Date Value Ref Range Status   12/10/2022 84.7 79.0 - 97.0 fL Final   12/09/2022 85.4 79.0 - 97.0 fL Final   12/07/2022 84.6 79.0 - 97.0 fL Final          Sodium Sodium   Date Value Ref Range Status   12/09/2022 128 (L) 136 - 145 mmol/L Final   12/09/2022 130 (L) 136 - 145 mmol/L Final   12/09/2022 128 (L) 136 - 145 mmol/L Final   12/08/2022 126 (L) 136 - 145 mmol/L Final   12/08/2022 129 (L) 136 - 145 mmol/L Final   12/08/2022 130 (L) 136 - 145 mmol/L Final   12/07/2022 127 (L) 136 - 145 mmol/L Final   12/07/2022 128 (L) 136 - 145 mmol/L Final   12/07/2022 129 (L) 136 - 145 mmol/L Final   12/07/2022 128 (L) 136 - 145 mmol/L Final      Potassium Potassium   Date Value Ref Range Status   12/10/2022 3.9 3.5 - 5.2 mmol/L Final   12/09/2022 3.2 (L) 3.5 - 5.2 mmol/L Final   12/09/2022 3.4 (L) 3.5 - 5.2 mmol/L Final   12/09/2022 3.2 (L) 3.5 - 5.2 mmol/L Final   12/08/2022 3.1 (L) 3.5 - 5.2 mmol/L Final   12/08/2022 3.6 3.5 - 5.2 mmol/L Final   12/08/2022 3.7 3.5 - 5.2 mmol/L Final   12/07/2022  3.4 (L) 3.5 - 5.2 mmol/L Final   12/07/2022 3.4 (L) 3.5 - 5.2 mmol/L Final   12/07/2022 3.5 3.5 - 5.2 mmol/L Final   12/07/2022 3.3 (L) 3.5 - 5.2 mmol/L Final      Chloride Chloride   Date Value Ref Range Status   12/09/2022 91 (L) 98 - 107 mmol/L Final   12/09/2022 94 (L) 98 - 107 mmol/L Final   12/09/2022 89 (L) 98 - 107 mmol/L Final   12/08/2022 90 (L) 98 - 107 mmol/L Final   12/08/2022 93 (L) 98 - 107 mmol/L Final   12/08/2022 93 (L) 98 - 107 mmol/L Final   12/07/2022 91 (L) 98 - 107 mmol/L Final   12/07/2022 91 (L) 98 - 107 mmol/L Final   12/07/2022 89 (L) 98 - 107 mmol/L Final   12/07/2022 88 (L) 98 - 107 mmol/L Final      CO2 CO2   Date Value Ref Range Status   12/09/2022 24.0 22.0 - 29.0 mmol/L Final   12/09/2022 24.0 22.0 - 29.0 mmol/L Final   12/09/2022 23.0 22.0 - 29.0 mmol/L Final   12/08/2022 22.0 22.0 - 29.0 mmol/L Final   12/08/2022 24.0 22.0 - 29.0 mmol/L Final   12/08/2022 22.0 22.0 - 29.0 mmol/L Final   12/07/2022 23.0 22.0 - 29.0 mmol/L Final   12/07/2022 24.0 22.0 - 29.0 mmol/L Final   12/07/2022 24.0 22.0 - 29.0 mmol/L Final   12/07/2022 25.0 22.0 - 29.0 mmol/L Final      BUN BUN   Date Value Ref Range Status   12/09/2022 10 6 - 20 mg/dL Final   12/09/2022 12 6 - 20 mg/dL Final   12/09/2022 12 6 - 20 mg/dL Final   12/08/2022 14 6 - 20 mg/dL Final   12/08/2022 13 6 - 20 mg/dL Final   12/08/2022 11 6 - 20 mg/dL Final   12/07/2022 12 6 - 20 mg/dL Final   12/07/2022 11 6 - 20 mg/dL Final   12/07/2022 11 6 - 20 mg/dL Final   12/07/2022 10 6 - 20 mg/dL Final      Creatinine Creatinine   Date Value Ref Range Status   12/09/2022 0.85 0.76 - 1.27 mg/dL Final   12/09/2022 0.85 0.76 - 1.27 mg/dL Final   12/09/2022 0.96 0.76 - 1.27 mg/dL Final   12/08/2022 0.79 0.76 - 1.27 mg/dL Final   12/08/2022 0.88 0.76 - 1.27 mg/dL Final   12/08/2022 0.93 0.76 - 1.27 mg/dL Final   12/07/2022 0.93 0.76 - 1.27 mg/dL Final   12/07/2022 0.97 0.76 - 1.27 mg/dL Final   12/07/2022 0.92 0.76 - 1.27 mg/dL Final   12/07/2022  0.87 0.76 - 1.27 mg/dL Final      Calcium Calcium   Date Value Ref Range Status   12/09/2022 8.5 (L) 8.6 - 10.5 mg/dL Final   12/09/2022 8.5 (L) 8.6 - 10.5 mg/dL Final   12/09/2022 8.7 8.6 - 10.5 mg/dL Final   12/08/2022 8.3 (L) 8.6 - 10.5 mg/dL Final   12/08/2022 8.8 8.6 - 10.5 mg/dL Final   12/08/2022 9.3 8.6 - 10.5 mg/dL Final   12/07/2022 8.5 (L) 8.6 - 10.5 mg/dL Final   12/07/2022 8.5 (L) 8.6 - 10.5 mg/dL Final   12/07/2022 8.8 8.6 - 10.5 mg/dL Final   12/07/2022 8.9 8.6 - 10.5 mg/dL Final      PO4 No components found for: PO4   Albumin Albumin   Date Value Ref Range Status   12/09/2022 3.20 (L) 3.50 - 5.20 g/dL Final   12/07/2022 2.90 (L) 3.50 - 5.20 g/dL Final      Magnesium No results found for: MG   Uric Acid No components found for: URIC ACID     Imaging Results (Last 72 Hours)     Procedure Component Value Units Date/Time    CT Chest Without Contrast Diagnostic [[id number]] Collected: 12/08/22 1601     Updated: 12/08/22 1625    Narrative:         DATE OF EXAM:  12/8/2022 3:40 PM     PROCEDURE:  CT CHEST WO CONTRAST DIAGNOSTIC-     INDICATIONS:   Recurrent fever with history of lung cancer; R53.1-Weakness;  E86.0-Dehydration; E87.1-Zccb-kzadudrurp and hyponatremia     COMPARISON:   PET/CT 10/31/2022 and prior.     TECHNIQUE:  Routine transaxial slices were obtained through the chest without the  administration of intravenous contrast. Reconstructed coronal and  sagittal images were also obtained. Automated exposure control and  iterative construction methods were used.     FINDINGS:  There is a mass in the anterior upper left chest wall between the first  and second ribs, as seen on the prior PET/CT the mass appears to measure  up to 5.9 x 4.1 cm on this exam, previously measured as 4 x 3.5 cm on  the prior PET/CT.     There is a small right pleural effusion with pleural thickening, as  before. There is a trace amount of left pleural fluid, as before. There  is emphysema. There are postoperative changes of  right lobectomy, as  before. Some suspected chronic atelectasis/scarring with bilateral  linear opacities and consolidative densities, as before. There is a  small focus of linear consolidation in the anterior-superior left lung  likely atelectasis or scarring. There appears to have been left upper  lobectomy. No definite suspicious new pulmonary lesion or infiltrate is  seen.     Heart size appears within normal limits. There is calcific  atherosclerosis of the coronary arteries and aorta. There is mild  enlargement of the main pulmonary artery, as before. No pericardial  effusion. Port catheter terminates in the lower SVC.     There are calcified mediastinal lymph nodes. There are a cluster of left  supraclavicular lymph nodes which were hypermetabolic on the prior  PET/CT. Index lymph node appears to measure 8 x 10 mm, previously 7 x 9  mm on axial image 22. No definite axillary, hilar, or mediastinal  lymphadenopathy is seen at this time. No acute abnormality is seen in  the visualized upper abdomen.     There are some subtle erosive changes involving the anterior left first  and second ribs adjacent to the mass. No other definite aggressive  osseous lesion is seen.       Impression:      1.     Mass in the anterior upper left chest wall between the first and  second ribs measures 5.9 x 4.1 cm, 4.3 x 3.57 m on PET/CT from  10/31/2022.  2.     Cluster of left supraclavicular lymph nodes, which were  previously hypermetabolic also appear slightly larger than on the  PET/CT.  3.     Bilateral postoperative changes in the lungs with suspected  chronic scarring, atelectasis, and small bilateral pleural effusions, as  before. No definite acute infiltrate is seen.  4.     Calcific atherosclerosis and emphysema.     Electronically Signed By-Vipin Moore MD On:12/8/2022 4:23 PM  This report was finalized on [id number] by  Vipin Moore MD.          Results for orders placed during the hospital encounter of  12/05/22    XR Chest 2 View    Narrative  DATE OF EXAM:  12/5/2022 12:32 PM    PROCEDURE:  XR CHEST 2 VW-    INDICATIONS:  soa    COMPARISON:  07/21/2022, PET/CT 10/31/2022    TECHNIQUE:  PA and lateral views of the chest were obtained.    FINDINGS:  Left-sided port catheter noted with tip at the mid SVC. Heart size  normal. Postsurgical changes of left upper lobectomy again noted. Soft  tissue mass at the left chest wall between the first and second ribs  better assessed on prior PET/CT. Chronic blunting at the right lateral  costophrenic angle related to small right pleural effusion unchanged. No  pneumothorax. Osseous structures appear intact.    Impression  1. No acute process.  2. Postsurgical changes of prior left upper lobectomy with left upper  chest wall soft tissue mass better assessed on the recent PET/CT.  3. Small chronic right pleural effusion.    Electronically Signed By-Antoni Deal MD On:12/5/2022 12:43 PM  This report was finalized on [id number] by  Antoni Deal MD.      Results for orders placed during the hospital encounter of 07/21/22    XR Chest 2 View    Narrative  EXAM: XR CHEST 2 VW-    DATE OF EXAM: 7/21/2022 12:20 PM    INDICATION: POST-OP; C34.92-Malignant neoplasm of unspecified part of  left bronchus or lung.    COMPARISONS: 05/22/2022    FINDINGS:    New ill-defined density along the superior margin of the left hilum. No  evidence of pneumothorax. Small-moderate-sized bilateral pleural  effusions. Left-sided port unchanged in good position. No evidence of  acute processes of the mediastinum. Surgical clips in the medial right  upper thorax/mediastinal location again noted.    Impression  New ill-defined density along the superior left hilum which may either  relate to development of a mass or postsurgical change. Correlate with  surgical history and consider chest CT evaluation if needed.    No evidence of pneumothorax.    Small-moderate bilateral pleural  effusions    Electronically Signed By-Max Killian On:7/21/2022 1:22 PM  This report was finalized on [id number] by  Max Killian, .      Results for orders placed during the hospital encounter of 07/06/22    XR Chest 1 View    Narrative  XR CHEST 1 VW-    Clinical: Chest tube removal, status post left upper lobectomy    COMPARISON examination 0511 hours, current examination 1506 hours    FINDINGS: Left-sided chest tube removed in the interim, no pneumothorax.  Small amount of subcutaneous emphysema left chest wall as before. Seen  better on the current examination is a lobular density within the left  midlung zone, measuring approximately 6 cm transverse and 3.5 cm AP.  This likely represents a focal area consolidation or atelectasis status  post upper lobectomy. Advise conservative surveillance.    There is right-sided pleural effusion with patchy atelectasis and/or  infiltrate at the right lung base as before. The cardiomediastinal  silhouette is stable. Mediport catheter in position.    CONCLUSION: Removal of the left chest tube, no pneumothorax. Better  aeration of the left base with now a somewhat lobulated density at this  location which may represent a focal area of consolidation or  atelectasis. No change in the appearance of the right lung base, likely  effusion and airspace disease as before.    This report was finalized on 7/8/2022 3:34 PM by Dr. Maximiliano Arreguin M.D.      Results for orders placed during the hospital encounter of 12/05/22    Duplex Venous Lower Extremity - Bilateral CAR    Interpretation Summary  •  Normal bilateral lower extremity venous duplex scan.        ASSESSMENT / PLAN      Weakness    Anxiety and depression    Diastolic CHF (HCC)    Gastroesophageal reflux disease    Hypertension    Hyperlipidemia    Non-small cell carcinoma of lung, right (HCC)    Hashimoto's thyroiditis    NSCLC of left lung (HCC)    Hyponatremia    Immunotherapy    1. HYPONATREMIA---------Chronic. SIADH from  Lung CA and antidepressants. Give Urea packet x one. D/C Wellbutrin    2. BENIGN ESSENTIAL HTN------BP okay    3. NON SMALL CELL LUNG CA-----per Pulmonary and Hem/Onc    4. H/O CHRONIC DIASTOLIC CHF------No gross overload clinically at present    5. DEPRESSION------No SSRI/Pristiq or Wellbutrin given hyponatremia. If needed would use TCA    6. PERIPHERAL NEUROPATHY-----On po B12    7. HYPOTHYROIDISM/HASHIMOTO'S-------On Synthroid/Cytomel    8. ANEMIA-----H/H down. Follow for PRBC need      Sandhya Raygoza MD  [facility]  [phone]  12/10/22  07:40 EST

## 2022-12-10 NOTE — PROGRESS NOTES
Infectious Diseases Progress Note      LOS: 0 days   Patient Care Team:  Reshma Alvarenga MD as PCP - General (Family Medicine)  Reshma Alvarenga MD as PCP - Family Medicine  Miryam Reyna MD as Surgeon (Thoracic Surgery)  Azucena Gaspar MD as Consulting Physician (Hematology and Oncology)  Adalberto Huffman MD as Consulting Physician (Nephrology)    Chief Complaint: Fever at admission    Subjective       The patient has been afebrile for the last 24 hours.  The patient is on room air, hemodynamically stable, and is tolerating antimicrobial therapy.      Review of Systems:   Review of Systems   Constitutional: Positive for fatigue.   HENT: Negative.    Eyes: Negative.    Respiratory: Negative.    Cardiovascular: Negative.    Gastrointestinal: Negative.    Endocrine: Negative.    Genitourinary: Negative.    Musculoskeletal: Negative.    Skin: Negative.    Neurological: Negative.    Psychiatric/Behavioral: Negative.    All other systems reviewed and are negative.       Objective     Vital Signs  Temp:  [97.3 °F (36.3 °C)-98.5 °F (36.9 °C)] 97.6 °F (36.4 °C)  Heart Rate:  [] 77  Resp:  [18-21] 20  BP: (103-122)/(66-80) 122/76    Physical Exam:  Physical Exam  Vitals and nursing note reviewed.   Constitutional:       General: He is not in acute distress.     Appearance: Normal appearance. He is well-developed and normal weight. He is not diaphoretic.   HENT:      Head: Normocephalic and atraumatic.   Eyes:      Conjunctiva/sclera: Conjunctivae normal.      Pupils: Pupils are equal, round, and reactive to light.   Cardiovascular:      Rate and Rhythm: Normal rate and regular rhythm.      Heart sounds: Normal heart sounds, S1 normal and S2 normal.   Pulmonary:      Effort: Pulmonary effort is normal. No respiratory distress.      Breath sounds: Normal breath sounds. No stridor. No wheezing or rales.   Abdominal:      General: Bowel sounds are normal. There is no distension.      Palpations:  Abdomen is soft. There is no mass.      Tenderness: There is no abdominal tenderness. There is no guarding.   Musculoskeletal:         General: No deformity. Normal range of motion.      Cervical back: Neck supple.   Skin:     General: Skin is warm and dry.      Coloration: Skin is not pale.      Findings: No erythema or rash.      Comments: Port   Neurological:      Mental Status: He is alert and oriented to person, place, and time.      Cranial Nerves: No cranial nerve deficit.   Psychiatric:         Mood and Affect: Mood normal.          Results Review:    I have reviewed all clinical data, test, lab, and imaging results.     Radiology  No Radiology Exams Resulted Within Past 24 Hours    Cardiology    Laboratory    Results from last 7 days   Lab Units 12/10/22  1030 12/10/22  0912 12/10/22  0609 12/09/22  0158 12/07/22 2323 12/06/22  0825 12/05/22  1217   WBC 10*3/mm3  --  6.70 7.60 9.10 7.20 7.20 9.80   HEMOGLOBIN g/dL 7.1* 6.8* 7.5* 8.2* 7.4* 8.5* 9.5*   HEMATOCRIT % 22.0* 21.1* 22.2* 23.7* 21.9* 26.8* 28.6*   PLATELETS 10*3/mm3  --  384 394 408 397 511* 614*     Results from last 7 days   Lab Units 12/10/22  0609 12/09/22  1404 12/09/22  0529 12/09/22  0158 12/08/22 2002 12/08/22  1424 12/08/22  0531 12/07/22  2323 12/05/22 2007 12/05/22  1217   SODIUM mmol/L  --  128* 130* 128* 126* 129* 130* 127*   < > 124*   POTASSIUM mmol/L 3.9 3.2* 3.4* 3.2* 3.1* 3.6 3.7 3.4*   < > 3.7   CHLORIDE mmol/L  --  91* 94* 89* 90* 93* 93* 91*   < > 82*   CO2 mmol/L  --  24.0 24.0 23.0 22.0 24.0 22.0 23.0   < > 24.0   BUN mg/dL  --  10 12 12 14 13 11 12   < > 12   CREATININE mg/dL  --  0.85 0.85 0.96 0.79 0.88 0.93 0.93   < > 1.15   GLUCOSE mg/dL  --  135* 108* 122* 131* 104* 110* 112*   < > 139*   ALBUMIN g/dL  --   --   --  3.20*  --   --   --  2.90*  --  3.70   BILIRUBIN mg/dL  --   --   --  0.5  --   --   --  0.3  --  0.5   ALK PHOS U/L  --   --   --  401*  --   --   --  358*  --  289*   AST (SGOT) U/L  --   --   --  109*   --   --   --  37  --  23   ALT (SGPT) U/L  --   --   --  87*  --   --   --  40  --  58*   CALCIUM mg/dL  --  8.5* 8.5* 8.7 8.3* 8.8 9.3 8.5*   < > 9.5    < > = values in this interval not displayed.                 Microbiology   Microbiology Results (last 10 days)     Procedure Component Value - Date/Time    Blood Culture - Blood, Hand, Right [[id number]]  (Normal) Collected: 12/08/22 1734    Lab Status: Preliminary result Specimen: Blood from Hand, Right Updated: 12/09/22 1831     Blood Culture No growth at 24 hours    Narrative:      Less than seven (7) mL's of blood was collected.  Insufficient quantity may yield false negative results.    Blood Culture - Blood, Blood, Central Line [[id number]]  (Normal) Collected: 12/08/22 1655    Lab Status: Preliminary result Specimen: Blood, Central Line Updated: 12/09/22 1831     Blood Culture No growth at 24 hours    Narrative:      Less than seven (7) mL's of blood was collected.  Insufficient quantity may yield false negative results.    Respiratory Panel PCR w/COVID-19(SARS-CoV-2) BRADY/NILSA/MARY/[facility]ESHAM In-House, NP Swab in [facility], 3-4 HR TAT - Swab, Nasopharynx [[id number]]  (Normal) Collected: 12/05/22 1242    Lab Status: Final result Specimen: Swab from Nasopharynx Updated: 12/05/22 1333     ADENOVIRUS, PCR Not Detected     Coronavirus 229E Not Detected     Coronavirus HKU1 Not Detected     Coronavirus NL63 Not Detected     Coronavirus OC43 Not Detected     COVID19 Not Detected     Human Metapneumovirus Not Detected     Human Rhinovirus/Enterovirus Not Detected     Influenza A PCR Not Detected     Influenza B PCR Not Detected     Parainfluenza Virus 1 Not Detected     Parainfluenza Virus 2 Not Detected     Parainfluenza Virus 3 Not Detected     Parainfluenza Virus 4 Not Detected     RSV, PCR Not Detected     Bordetella pertussis pcr Not Detected     Bordetella parapertussis PCR Not Detected     Chlamydophila pneumoniae PCR Not Detected     Mycoplasma pneumo by PCR  Not Detected    Narrative:      In the setting of a positive respiratory panel with a viral infection PLUS a negative procalcitonin without other underlying concern for bacterial infection, consider observing off antibiotics or discontinuation of antibiotics and continue supportive care. If the respiratory panel is positive for atypical bacterial infection (Bordetella pertussis, Chlamydophila pneumoniae, or Mycoplasma pneumoniae), consider antibiotic de-escalation to target atypical bacterial infection.    Blood Culture - Blood, Arm, Right [[id number]]  (Normal) Collected: 12/05/22 1217    Lab Status: Final result Specimen: Blood from Arm, Right Updated: 12/10/22 1230     Blood Culture No growth at 5 days    Narrative:      Less than seven (7) mL's of blood was collected.  Insufficient quantity may yield false negative results.    Blood Culture - Blood, Arm, Left [[id number]]  (Normal) Collected: 12/05/22 1217    Lab Status: Final result Specimen: Blood from Arm, Left Updated: 12/10/22 1230     Blood Culture No growth at 5 days    Narrative:      Less than seven (7) mL's of blood was collected.  Insufficient quantity may yield false negative results.          Medication Review:       Schedule Meds  enoxaparin, 40 mg, Subcutaneous, Daily  levothyroxine, 100 mcg, Oral, Q AM  liothyronine, 5 mcg, Oral, Daily  metoprolol succinate XL, 100 mg, Oral, Daily  OLANZapine, 5 mg, Oral, Nightly  pantoprazole, 40 mg, Oral, Q AM  sodium chloride, 10 mL, Intravenous, Q12H  sodium chloride, 2 g, Oral, TID With Meals  vitamin B-12, 1,000 mcg, Oral, Daily        Infusion Meds       PRN Meds  •  acetaminophen  •  diazePAM  •  LORazepam  •  magnesium sulfate **OR** magnesium sulfate **OR** magnesium sulfate  •  ondansetron  •  oxyCODONE  •  potassium chloride  •  potassium chloride  •  [COMPLETED] Insert Peripheral IV **AND** sodium chloride  •  sodium chloride  •  sodium chloride        Assessment & Plan       Antimicrobial Therapy   1.          2.        3.        4.        5.            Assessment     Fever with no clinical signs of bacterial infection.  The patient is hemodynamically stable.  I am suspecting noninfectious fever such as malignancy fever or DVT.  But we need to rule out port infection  -Initial blood culture from 1222 is negative so far  -CT chest did not show any significant infiltrates  -Doppler lower extremities are negative for DVT  -Cultures port is negative     History of lung cancer on immunotherapy     Plan     No need for antimicrobial therapy at this point  Continue supportive care  Okay to discharge from Infectious Disease standpoint  Not much more to add from infectious disease standpoint-we will sign off at this time-please call with any questions.      Bridget Guerra, APRN  12/10/22  13:29 EST    Note is dictated utilizing voice recognition software/Dragon

## 2022-12-11 ENCOUNTER — READMISSION MANAGEMENT (OUTPATIENT)
Dept: CALL CENTER | Facility: HOSPITAL | Age: 57
End: 2022-12-11

## 2022-12-11 VITALS
HEIGHT: 75 IN | TEMPERATURE: 97.6 F | OXYGEN SATURATION: 98 % | WEIGHT: 187.83 LBS | SYSTOLIC BLOOD PRESSURE: 100 MMHG | HEART RATE: 110 BPM | DIASTOLIC BLOOD PRESSURE: 67 MMHG | BODY MASS INDEX: 23.35 KG/M2 | RESPIRATION RATE: 18 BRPM

## 2022-12-11 LAB
ANION GAP SERPL CALCULATED.3IONS-SCNC: 16 MMOL/L (ref 5–15)
BUN SERPL-MCNC: 14 MG/DL (ref 6–20)
BUN/CREAT SERPL: 15.6 (ref 7–25)
CALCIUM SPEC-SCNC: 9 MG/DL (ref 8.6–10.5)
CHLORIDE SERPL-SCNC: 88 MMOL/L (ref 98–107)
CO2 SERPL-SCNC: 22 MMOL/L (ref 22–29)
CREAT SERPL-MCNC: 0.9 MG/DL (ref 0.76–1.27)
DEPRECATED RDW RBC AUTO: 49.9 FL (ref 37–54)
EGFRCR SERPLBLD CKD-EPI 2021: 99 ML/MIN/1.73
ERYTHROCYTE [DISTWIDTH] IN BLOOD BY AUTOMATED COUNT: 16.9 % (ref 12.3–15.4)
GLUCOSE SERPL-MCNC: 100 MG/DL (ref 65–99)
HCT VFR BLD AUTO: 22.9 % (ref 37.5–51)
HGB BLD-MCNC: 7.5 G/DL (ref 13–17.7)
MAGNESIUM SERPL-MCNC: 1.3 MG/DL (ref 1.6–2.6)
MCH RBC QN AUTO: 27.6 PG (ref 26.6–33)
MCHC RBC AUTO-ENTMCNC: 32.6 G/DL (ref 31.5–35.7)
MCV RBC AUTO: 84.8 FL (ref 79–97)
PHOSPHATE SERPL-MCNC: 3.6 MG/DL (ref 2.5–4.5)
PLATELET # BLD AUTO: 395 10*3/MM3 (ref 140–450)
PMV BLD AUTO: 7.2 FL (ref 6–12)
POTASSIUM SERPL-SCNC: 3.7 MMOL/L (ref 3.5–5.2)
RBC # BLD AUTO: 2.7 10*6/MM3 (ref 4.14–5.8)
SODIUM SERPL-SCNC: 126 MMOL/L (ref 136–145)
WBC NRBC COR # BLD: 7.7 10*3/MM3 (ref 3.4–10.8)

## 2022-12-11 PROCEDURE — 84100 ASSAY OF PHOSPHORUS: CPT | Performed by: INTERNAL MEDICINE

## 2022-12-11 PROCEDURE — G0378 HOSPITAL OBSERVATION PER HR: HCPCS

## 2022-12-11 PROCEDURE — 83735 ASSAY OF MAGNESIUM: CPT | Performed by: INTERNAL MEDICINE

## 2022-12-11 PROCEDURE — 25010000002 MAGNESIUM SULFATE 2 GM/50ML SOLUTION: Performed by: INTERNAL MEDICINE

## 2022-12-11 PROCEDURE — 80048 BASIC METABOLIC PNL TOTAL CA: CPT | Performed by: INTERNAL MEDICINE

## 2022-12-11 PROCEDURE — 85027 COMPLETE CBC AUTOMATED: CPT | Performed by: INTERNAL MEDICINE

## 2022-12-11 RX ORDER — DEMECLOCYCLINE HYDROCHLORIDE 150 MG/1
300 TABLET, FILM COATED ORAL EVERY 12 HOURS SCHEDULED
Status: DISCONTINUED | OUTPATIENT
Start: 2022-12-11 | End: 2022-12-11 | Stop reason: HOSPADM

## 2022-12-11 RX ORDER — MAGNESIUM SULFATE HEPTAHYDRATE 40 MG/ML
2 INJECTION, SOLUTION INTRAVENOUS EVERY 12 HOURS
Status: DISCONTINUED | OUTPATIENT
Start: 2022-12-11 | End: 2022-12-11 | Stop reason: HOSPADM

## 2022-12-11 RX ORDER — DEMECLOCYCLINE HYDROCHLORIDE 300 MG/1
300 TABLET, FILM COATED ORAL EVERY 12 HOURS SCHEDULED
Qty: 60 TABLET | Refills: 3 | Status: SHIPPED | OUTPATIENT
Start: 2022-12-11 | End: 2023-03-20

## 2022-12-11 RX ADMIN — LEVOTHYROXINE SODIUM 100 MCG: 0.1 TABLET ORAL at 05:44

## 2022-12-11 RX ADMIN — DEMECLOCYCLINE HYDROCHLORIDE 300 MG: 150 TABLET ORAL at 14:01

## 2022-12-11 RX ADMIN — Medication 10 ML: at 08:47

## 2022-12-11 RX ADMIN — LIOTHYRONINE SODIUM 5 MCG: 5 TABLET ORAL at 08:47

## 2022-12-11 RX ADMIN — MAGNESIUM SULFATE HEPTAHYDRATE 2 G: 2 INJECTION, SOLUTION INTRAVENOUS at 10:39

## 2022-12-11 RX ADMIN — SODIUM CHLORIDE 2 G: 1 TABLET ORAL at 08:47

## 2022-12-11 RX ADMIN — SODIUM CHLORIDE 2 G: 1 TABLET ORAL at 14:02

## 2022-12-11 RX ADMIN — CYANOCOBALAMIN TAB 1000 MCG 1000 MCG: 1000 TAB at 08:47

## 2022-12-11 RX ADMIN — PANTOPRAZOLE SODIUM 40 MG: 40 TABLET, DELAYED RELEASE ORAL at 05:44

## 2022-12-11 RX ADMIN — Medication 15 G: at 10:40

## 2022-12-11 RX ADMIN — MAGNESIUM OXIDE TAB 400 MG (241.3 MG ELEMENTAL MG) 400 MG: 400 (241.3 MG) TAB at 10:40

## 2022-12-11 RX ADMIN — METOPROLOL SUCCINATE 100 MG: 50 TABLET, EXTENDED RELEASE ORAL at 08:47

## 2022-12-11 NOTE — PLAN OF CARE
Problem: Adult Inpatient Plan of Care  Goal: Plan of Care Review  Outcome: Ongoing, Progressing  Flowsheets (Taken 12/11/2022 0214)  Progress: no change  Plan of Care Reviewed With: patient  Goal: Patient-Specific Goal (Individualized)  Outcome: Ongoing, Progressing  Goal: Absence of Hospital-Acquired Illness or Injury  Outcome: Ongoing, Progressing  Intervention: Identify and Manage Fall Risk  Recent Flowsheet Documentation  Taken 12/10/2022 2200 by Remi Juan RN  Safety Promotion/Fall Prevention: safety round/check completed  Taken 12/10/2022 2000 by Remi Juan RN  Safety Promotion/Fall Prevention:   toileting scheduled   safety round/check completed   patient off unit   room organization consistent   nonskid shoes/slippers when out of bed  Goal: Optimal Comfort and Wellbeing  Outcome: Ongoing, Progressing  Goal: Readiness for Transition of Care  Outcome: Ongoing, Progressing     Problem: Fall Injury Risk  Goal: Absence of Fall and Fall-Related Injury  Outcome: Ongoing, Progressing  Intervention: Promote Injury-Free Environment  Recent Flowsheet Documentation  Taken 12/10/2022 2200 by Remi Juan RN  Safety Promotion/Fall Prevention: safety round/check completed  Taken 12/10/2022 2000 by Remi Juan RN  Safety Promotion/Fall Prevention:   toileting scheduled   safety round/check completed   patient off unit   room organization consistent   nonskid shoes/slippers when out of bed     Problem: Hypertension Comorbidity  Goal: Blood Pressure in Desired Range  Outcome: Ongoing, Progressing     Problem: Malnutrition  Goal: Improved Nutritional Intake  Outcome: Ongoing, Progressing   Goal Outcome Evaluation:  Plan of Care Reviewed With: patient        Progress: no change       Pt rested comfortably throughout shift. No new issues addressed. Will continue to monitor

## 2022-12-11 NOTE — PROGRESS NOTES
"NEPHROLOGY PROGRESS NOTE------[facility]    [facility]  [phone]  Sandhya Raygoza MD      Patient Care Team:  Reshma Alvarenga MD as PCP - General (Family Medicine)  Reshma Alvarenga MD as PCP - Family Medicine  Miryam Reyna MD as Surgeon (Thoracic Surgery)  Azucena Gaspar MD as Consulting Physician (Hematology and Oncology)  Adalberto Huffman MD as Consulting Physician (Nephrology)      Provider:  Sandhya Raygoza MD  Patient Name: Jc Driscoll  :  1965    SUBJECTIVE:    F/U HYPONATREMIA    Feeling good. No SOB, CP, palpitations, cramping. No HA, blurry vision, dizziness, tremors, twitches. No edema    Medication:  enoxaparin, 40 mg, Subcutaneous, Daily  levothyroxine, 100 mcg, Oral, Q AM  liothyronine, 5 mcg, Oral, Daily  metoprolol succinate XL, 100 mg, Oral, Daily  OLANZapine, 5 mg, Oral, Nightly  pantoprazole, 40 mg, Oral, Q AM  sodium chloride, 10 mL, Intravenous, Q12H  sodium chloride, 2 g, Oral, TID With Meals  vitamin B-12, 1,000 mcg, Oral, Daily           OBJECTIVE    Vital Sign Min/Max for last 24 hours  Temp  Min: 97.4 °F (36.3 °C)  Max: 98.3 °F (36.8 °C)   BP  Min: 97/63  Max: 123/70   Pulse  Min: 101  Max: 119   Resp  Min: 17  Max: 20   SpO2  Min: 95 %  Max: 99 %   No data recorded   No data recorded     Flowsheet Rows    Flowsheet Row First Filed Value   Admission Height 190.5 cm (75\") Documented at 2022 1153   Admission Weight 86.7 kg (191 lb 2.2 oz) Documented at 2022 1153          No intake/output data recorded.  I/O last 3 completed shifts:  In: 240 [P.O.:240]  Out: -     Physical Exam:  General Appearance: alert, appears stated age and cooperative  Head: normocephalic, without obvious abnormality and atraumatic  Eyes: conjunctivae and sclerae normal and no icterus  Neck: supple and no JVD  Lungs: scattered rhonchi  Heart: regular rhythm & normal rate and normal S1, S2  Chest: Wall no " abnormalities observed  Abdomen: normal bowel sounds and soft, nontender  Extremities: moves extremities well, no edema, no cyanosis and no redness  Skin: no bleeding, bruising or rash, turgor normal, color normal and no lesions noted  Neurologic: Alert, and oriented. No focal deficits    Labs:    WBC WBC   Date Value Ref Range Status   12/11/2022 7.70 3.40 - 10.80 10*3/mm3 Final   12/10/2022 6.70 3.40 - 10.80 10*3/mm3 Final   12/10/2022 7.60 3.40 - 10.80 10*3/mm3 Final   12/09/2022 9.10 3.40 - 10.80 10*3/mm3 Final      HGB Hemoglobin   Date Value Ref Range Status   12/11/2022 7.5 (L) 13.0 - 17.7 g/dL Final   12/10/2022 7.1 (L) 13.0 - 17.7 g/dL Final   12/10/2022 6.8 (C) 13.0 - 17.7 g/dL Final   12/10/2022 7.5 (L) 13.0 - 17.7 g/dL Final   12/09/2022 8.2 (L) 13.0 - 17.7 g/dL Final      HCT Hematocrit   Date Value Ref Range Status   12/11/2022 22.9 (L) 37.5 - 51.0 % Final   12/10/2022 22.0 (L) 37.5 - 51.0 % Final   12/10/2022 21.1 (L) 37.5 - 51.0 % Final   12/10/2022 22.2 (L) 37.5 - 51.0 % Final   12/09/2022 23.7 (L) 37.5 - 51.0 % Final      Platelets No results found for: LABPLAT   MCV MCV   Date Value Ref Range Status   12/11/2022 84.8 79.0 - 97.0 fL Final   12/10/2022 86.0 79.0 - 97.0 fL Final   12/10/2022 84.7 79.0 - 97.0 fL Final   12/09/2022 85.4 79.0 - 97.0 fL Final          Sodium Sodium   Date Value Ref Range Status   12/11/2022 126 (L) 136 - 145 mmol/L Final   12/09/2022 128 (L) 136 - 145 mmol/L Final   12/09/2022 130 (L) 136 - 145 mmol/L Final   12/09/2022 128 (L) 136 - 145 mmol/L Final   12/08/2022 126 (L) 136 - 145 mmol/L Final   12/08/2022 129 (L) 136 - 145 mmol/L Final      Potassium Potassium   Date Value Ref Range Status   12/11/2022 3.7 3.5 - 5.2 mmol/L Final   12/10/2022 3.9 3.5 - 5.2 mmol/L Final   12/09/2022 3.2 (L) 3.5 - 5.2 mmol/L Final   12/09/2022 3.4 (L) 3.5 - 5.2 mmol/L Final   12/09/2022 3.2 (L) 3.5 - 5.2 mmol/L Final   12/08/2022 3.1 (L) 3.5 - 5.2 mmol/L Final   12/08/2022 3.6 3.5 - 5.2  mmol/L Final      Chloride Chloride   Date Value Ref Range Status   12/11/2022 88 (L) 98 - 107 mmol/L Final   12/09/2022 91 (L) 98 - 107 mmol/L Final   12/09/2022 94 (L) 98 - 107 mmol/L Final   12/09/2022 89 (L) 98 - 107 mmol/L Final   12/08/2022 90 (L) 98 - 107 mmol/L Final   12/08/2022 93 (L) 98 - 107 mmol/L Final      CO2 CO2   Date Value Ref Range Status   12/11/2022 22.0 22.0 - 29.0 mmol/L Final   12/09/2022 24.0 22.0 - 29.0 mmol/L Final   12/09/2022 24.0 22.0 - 29.0 mmol/L Final   12/09/2022 23.0 22.0 - 29.0 mmol/L Final   12/08/2022 22.0 22.0 - 29.0 mmol/L Final   12/08/2022 24.0 22.0 - 29.0 mmol/L Final      BUN BUN   Date Value Ref Range Status   12/11/2022 14 6 - 20 mg/dL Final   12/09/2022 10 6 - 20 mg/dL Final   12/09/2022 12 6 - 20 mg/dL Final   12/09/2022 12 6 - 20 mg/dL Final   12/08/2022 14 6 - 20 mg/dL Final   12/08/2022 13 6 - 20 mg/dL Final      Creatinine Creatinine   Date Value Ref Range Status   12/11/2022 0.90 0.76 - 1.27 mg/dL Final   12/09/2022 0.85 0.76 - 1.27 mg/dL Final   12/09/2022 0.85 0.76 - 1.27 mg/dL Final   12/09/2022 0.96 0.76 - 1.27 mg/dL Final   12/08/2022 0.79 0.76 - 1.27 mg/dL Final   12/08/2022 0.88 0.76 - 1.27 mg/dL Final      Calcium Calcium   Date Value Ref Range Status   12/11/2022 9.0 8.6 - 10.5 mg/dL Final   12/09/2022 8.5 (L) 8.6 - 10.5 mg/dL Final   12/09/2022 8.5 (L) 8.6 - 10.5 mg/dL Final   12/09/2022 8.7 8.6 - 10.5 mg/dL Final   12/08/2022 8.3 (L) 8.6 - 10.5 mg/dL Final   12/08/2022 8.8 8.6 - 10.5 mg/dL Final      PO4 No components found for: PO4   Albumin Albumin   Date Value Ref Range Status   12/09/2022 3.20 (L) 3.50 - 5.20 g/dL Final      Magnesium Magnesium   Date Value Ref Range Status   12/11/2022 1.3 (L) 1.6 - 2.6 mg/dL Final      Uric Acid No components found for: URIC ACID     Imaging Results (Last 72 Hours)     Procedure Component Value Units Date/Time    CT Chest Without Contrast Diagnostic [[id number]] Collected: 12/08/22 1601     Updated: 12/08/22 1622     Narrative:         DATE OF EXAM:  12/8/2022 3:40 PM     PROCEDURE:  CT CHEST WO CONTRAST DIAGNOSTIC-     INDICATIONS:   Recurrent fever with history of lung cancer; R53.1-Weakness;  E86.0-Dehydration; E87.0-Nabi-icuyhmzesl and hyponatremia     COMPARISON:   PET/CT 10/31/2022 and prior.     TECHNIQUE:  Routine transaxial slices were obtained through the chest without the  administration of intravenous contrast. Reconstructed coronal and  sagittal images were also obtained. Automated exposure control and  iterative construction methods were used.     FINDINGS:  There is a mass in the anterior upper left chest wall between the first  and second ribs, as seen on the prior PET/CT the mass appears to measure  up to 5.9 x 4.1 cm on this exam, previously measured as 4 x 3.5 cm on  the prior PET/CT.     There is a small right pleural effusion with pleural thickening, as  before. There is a trace amount of left pleural fluid, as before. There  is emphysema. There are postoperative changes of right lobectomy, as  before. Some suspected chronic atelectasis/scarring with bilateral  linear opacities and consolidative densities, as before. There is a  small focus of linear consolidation in the anterior-superior left lung  likely atelectasis or scarring. There appears to have been left upper  lobectomy. No definite suspicious new pulmonary lesion or infiltrate is  seen.     Heart size appears within normal limits. There is calcific  atherosclerosis of the coronary arteries and aorta. There is mild  enlargement of the main pulmonary artery, as before. No pericardial  effusion. Port catheter terminates in the lower SVC.     There are calcified mediastinal lymph nodes. There are a cluster of left  supraclavicular lymph nodes which were hypermetabolic on the prior  PET/CT. Index lymph node appears to measure 8 x 10 mm, previously 7 x 9  mm on axial image 22. No definite axillary, hilar, or mediastinal  lymphadenopathy is seen at  this time. No acute abnormality is seen in  the visualized upper abdomen.     There are some subtle erosive changes involving the anterior left first  and second ribs adjacent to the mass. No other definite aggressive  osseous lesion is seen.       Impression:      1.     Mass in the anterior upper left chest wall between the first and  second ribs measures 5.9 x 4.1 cm, 4.3 x 3.57 m on PET/CT from  10/31/2022.  2.     Cluster of left supraclavicular lymph nodes, which were  previously hypermetabolic also appear slightly larger than on the  PET/CT.  3.     Bilateral postoperative changes in the lungs with suspected  chronic scarring, atelectasis, and small bilateral pleural effusions, as  before. No definite acute infiltrate is seen.  4.     Calcific atherosclerosis and emphysema.     Electronically Signed By-Vipin Moore MD On:12/8/2022 4:23 PM  This report was finalized on [id number] by  Vipin Moore MD.          Results for orders placed during the hospital encounter of 12/05/22    XR Chest 2 View    Narrative  DATE OF EXAM:  12/5/2022 12:32 PM    PROCEDURE:  XR CHEST 2 VW-    INDICATIONS:  soa    COMPARISON:  07/21/2022, PET/CT 10/31/2022    TECHNIQUE:  PA and lateral views of the chest were obtained.    FINDINGS:  Left-sided port catheter noted with tip at the mid SVC. Heart size  normal. Postsurgical changes of left upper lobectomy again noted. Soft  tissue mass at the left chest wall between the first and second ribs  better assessed on prior PET/CT. Chronic blunting at the right lateral  costophrenic angle related to small right pleural effusion unchanged. No  pneumothorax. Osseous structures appear intact.    Impression  1. No acute process.  2. Postsurgical changes of prior left upper lobectomy with left upper  chest wall soft tissue mass better assessed on the recent PET/CT.  3. Small chronic right pleural effusion.    Electronically Signed By-Antoni Deal MD On:12/5/2022 12:43 PM  This report was  finalized on [id number] by  Antoni Deal MD.      Results for orders placed during the hospital encounter of 07/21/22    XR Chest 2 View    Narrative  EXAM: XR CHEST 2 VW-    DATE OF EXAM: 7/21/2022 12:20 PM    INDICATION: POST-OP; C34.92-Malignant neoplasm of unspecified part of  left bronchus or lung.    COMPARISONS: 05/22/2022    FINDINGS:    New ill-defined density along the superior margin of the left hilum. No  evidence of pneumothorax. Small-moderate-sized bilateral pleural  effusions. Left-sided port unchanged in good position. No evidence of  acute processes of the mediastinum. Surgical clips in the medial right  upper thorax/mediastinal location again noted.    Impression  New ill-defined density along the superior left hilum which may either  relate to development of a mass or postsurgical change. Correlate with  surgical history and consider chest CT evaluation if needed.    No evidence of pneumothorax.    Small-moderate bilateral pleural effusions    Electronically Signed By-Max Killian On:7/21/2022 1:22 PM  This report was finalized on [id number] by  Max Killian, .      Results for orders placed during the hospital encounter of 07/06/22    XR Chest 1 View    Narrative  XR CHEST 1 VW-    Clinical: Chest tube removal, status post left upper lobectomy    COMPARISON examination 0511 hours, current examination 1506 hours    FINDINGS: Left-sided chest tube removed in the interim, no pneumothorax.  Small amount of subcutaneous emphysema left chest wall as before. Seen  better on the current examination is a lobular density within the left  midlung zone, measuring approximately 6 cm transverse and 3.5 cm AP.  This likely represents a focal area consolidation or atelectasis status  post upper lobectomy. Advise conservative surveillance.    There is right-sided pleural effusion with patchy atelectasis and/or  infiltrate at the right lung base as before. The cardiomediastinal  silhouette is stable.  Mediport catheter in position.    CONCLUSION: Removal of the left chest tube, no pneumothorax. Better  aeration of the left base with now a somewhat lobulated density at this  location which may represent a focal area of consolidation or  atelectasis. No change in the appearance of the right lung base, likely  effusion and airspace disease as before.    This report was finalized on 7/8/2022 3:34 PM by Dr. Maximiliano Arreguin M.D.      Results for orders placed during the hospital encounter of 12/05/22    Duplex Venous Lower Extremity - Bilateral CAR    Interpretation Summary  •  Normal bilateral lower extremity venous duplex scan.        ASSESSMENT / PLAN      Weakness    Anxiety and depression    Diastolic CHF (HCC)    Gastroesophageal reflux disease    Hypertension    Hyperlipidemia    Non-small cell carcinoma of lung, right (HCC)    Hashimoto's thyroiditis    NSCLC of left lung (HCC)    Hyponatremia    Immunotherapy    1. HYPONATREMIA/SIADH---------Chronic. SIADH from Lung CA and antidepressants. Give Urea packets. D/C Wellbutrin. Add Demeclocycline. Counseled on po fluid restriction    2. BENIGN ESSENTIAL HTN------BP okay    3. NON SMALL CELL LUNG CA-----per Pulmonary and Hem/Onc    4. H/O CHRONIC DIASTOLIC CHF------No gross overload clinically at present    5. DEPRESSION------No SSRI/Pristiq or Wellbutrin given hyponatremia. If needed would use TCA    6. PERIPHERAL NEUROPATHY-----On po B12    7. HYPOTHYROIDISM/HASHIMOTO'S-------On Synthroid/Cytomel    8. ANEMIA-----H/H down. Follow for PRBC need    9. HYPOMAGNESEMIA------Replace IV and po and follow    Okay from RENAL standpoint to d/c today and f/u closely with us an outpatient as ordered    Sandhya Raygoza MD  [facility]  [phone]  12/11/22  09:12 EST

## 2022-12-12 ENCOUNTER — HOSPITAL ENCOUNTER (OUTPATIENT)
Dept: RADIATION ONCOLOGY | Facility: HOSPITAL | Age: 57
Discharge: HOME | End: 2022-12-12

## 2022-12-12 ENCOUNTER — RADIATION ONCOLOGY WEEKLY ASSESSMENT (OUTPATIENT)
Dept: RADIATION ONCOLOGY | Facility: HOSPITAL | Age: 57
End: 2022-12-12
Payer: COMMERCIAL

## 2022-12-12 VITALS
DIASTOLIC BLOOD PRESSURE: 74 MMHG | OXYGEN SATURATION: 98 % | HEART RATE: 127 BPM | WEIGHT: 182.6 LBS | SYSTOLIC BLOOD PRESSURE: 119 MMHG | TEMPERATURE: 98.6 F | BODY MASS INDEX: 22.7 KG/M2 | RESPIRATION RATE: 18 BRPM | HEIGHT: 75 IN

## 2022-12-12 DIAGNOSIS — C34.12 MALIGNANT NEOPLASM OF UPPER LOBE OF LEFT LUNG: Primary | ICD-10-CM

## 2022-12-12 DIAGNOSIS — C34.91 NON-SMALL CELL CARCINOMA OF LUNG, RIGHT: Primary | ICD-10-CM

## 2022-12-12 LAB
RAD ONC ARIA COURSE ID: NORMAL
RAD ONC ARIA COURSE LAST TREATMENT DATE: NORMAL
RAD ONC ARIA COURSE START DATE: NORMAL
RAD ONC ARIA COURSE TREATMENT ELAPSED DAYS: 22
RAD ONC ARIA FIRST TREATMENT DATE: NORMAL
RAD ONC ARIA PLAN FRACTIONS TREATED TO DATE: 10
RAD ONC ARIA PLAN ID: NORMAL
RAD ONC ARIA PLAN PRESCRIBED DOSE PER FRACTION: 2 GY
RAD ONC ARIA PLAN PRIMARY REFERENCE POINT: NORMAL
RAD ONC ARIA PLAN TOTAL FRACTIONS PRESCRIBED: 30
RAD ONC ARIA PLAN TOTAL PRESCRIBED DOSE: 6000 CGY
RAD ONC ARIA REFERENCE POINT DOSAGE GIVEN TO DATE: 20 GY
RAD ONC ARIA REFERENCE POINT ID: NORMAL
RAD ONC ARIA REFERENCE POINT SESSION DOSAGE GIVEN: 2 GY

## 2022-12-12 PROCEDURE — FACE2FACE: Performed by: RADIOLOGY

## 2022-12-12 PROCEDURE — 77014 CHG CT GUIDANCE RADIATION THERAPY FLDS PLACEMENT: CPT | Performed by: RADIOLOGY

## 2022-12-12 PROCEDURE — 77386: CPT | Performed by: RADIOLOGY

## 2022-12-12 NOTE — OUTREACH NOTE
Prep Survey    Flowsheet Row Responses   [facility] facility patient discharged from? [facility]   Is LACE score < 7 ? No   Emergency Room discharge w/ pulse ox? No   Eligibility Readm Mgmt   Discharge diagnosis Hyponatremia   Does the patient have one of the following disease processes/diagnoses(primary or secondary)? Other   Does the patient have Home health ordered? No   Is there a DME ordered? No   Prep survey completed? Yes          ANAYELI COPELAND - Registered Nurse

## 2022-12-12 NOTE — CASE MANAGEMENT/SOCIAL WORK
Case Management Discharge Note      Final Note: Home    Provided Post Acute Provider List?: N/A  N/A Provider List Comment: Anticipate home    Selected Continued Care - Discharged on 12/11/2022 Admission date: 12/5/2022 - Discharge disposition: Home or Self Care            Transportation Services  Private: Car    Final Discharge Disposition Code: 01 - home or self-care

## 2022-12-12 NOTE — PROGRESS NOTES
"NAME: Jc Driscoll DATE: 2022   : 1965    MRN: [id number] DIAGNOSIS:   Encounter Diagnosis   Name Primary?   • Malignant neoplasm of upper lobe of left lung (HCC) Yes          RADIATION ON TREATMENT VISIT NOTE - CHEST    Treatment Summary      Treatment Site Ref. ID Energy Dose/Fx (cGy) #Fx Dose Correction (cGy) Total Dose (cGy) Start Date End Date Elapsed Days   LtSC_CWRecur LtScv_CWRecur 6X 200 10 / 30 0 2,000 2022           General:     Review of Systems  [x] No new complaints  [] Fever/chills  Night sweats  [] Decreased energy level [] Decreased appetite [] Oxygen:  L/min  [] Dry cough    [] Productive cough   [] SOB  [] Hemoptysis   [] Dysphagia      [x] Fatigue,  severity: 1 [x] Pain,  Severity: 0  Pain medication regimen: NONE    Esophagitis regimen: NONE    Skin regimen: NONE     Subjective:  He states that he doing okay, but happy to be back finishing up his treatments. He has no concerns or complaints at this time.    KPS: 70     Vital Signs: /74   Pulse (!) 127   Temp 98.6 °F (37 °C) (Oral)   Resp 18   Ht 190.5 cm (75\")   Wt 82.8 kg (182 lb 9.6 oz)   SpO2 98%   BMI 22.82 kg/m²     Weight:   Wt Readings from Last 3 Encounters:   22 82.8 kg (182 lb 9.6 oz)   12/10/22 85.2 kg (187 lb 13.3 oz)   22 87.8 kg (193 lb 9.6 oz)       Medication:   Current Outpatient Medications:   •  demeclocycline (DECLOMYCIN) 300 MG tablet, Take 1 tablet by mouth Every 12 (Twelve) Hours for 198 doses. Indications: SIADH, Disp: 60 tablet, Rfl: 3  •  desvenlafaxine (PRISTIQ) 50 MG 24 hr tablet, Take 50 mg by mouth Daily., Disp: , Rfl:   •  diazePAM (Valium) 2 MG tablet, Take 1 tablet by mouth At Night As Needed for Anxiety or Muscle Spasms., Disp: 30 tablet, Rfl: 0  •  levothyroxine (SYNTHROID, LEVOTHROID) 100 MCG tablet, Take 100 mcg by mouth Every Morning., Disp: , Rfl:   •  liothyronine (CYTOMEL) 5 MCG tablet, Take 5 mcg by mouth Daily., Disp: , Rfl:   •  LORazepam " (ATIVAN) 0.5 MG tablet, Take 0.5 mg by mouth Every 8 (Eight) Hours As Needed., Disp: , Rfl:   •  metoprolol succinate XL (TOPROL-XL) 100 MG 24 hr tablet, Take 100 mg by mouth Daily., Disp: , Rfl:   •  OLANZapine (zyPREXA) 5 MG tablet, Take 1 tablet by mouth Every Night. Take on days 2, 3 and 4 after chemotherapy., Disp: 3 tablet, Rfl: 5  •  ondansetron (ZOFRAN) 8 MG tablet, Take 1 tablet by mouth 3 (Three) Times a Day As Needed for Nausea or Vomiting., Disp: 30 tablet, Rfl: 5  •  oxyCODONE-acetaminophen (PERCOCET) 5-325 MG per tablet, Take 1 tablet by mouth Every 6 (Six) Hours As Needed for Moderate Pain., Disp: 40 tablet, Rfl: 0  •  pantoprazole (PROTONIX) 40 MG EC tablet, Take 40 mg by mouth Daily As Needed., Disp: , Rfl:   •  Urea (URE-NA) 15 g pack packet, Take 15 g by mouth 2 (Two) Times a Day for 2 doses., Disp: 2 packet, Rfl: 0  •  vitamin B-12 (CYANOCOBALAMIN) 1000 MCG tablet, Take 1,000 mcg by mouth Daily., Disp: , Rfl:   No current facility-administered medications for this visit.     LABS (Reviewed):  Hematology  WBC   Date Value Ref Range Status   12/11/2022 7.70 3.40 - 10.80 10*3/mm3 Final   03/22/2022 7.84 4.5 - 11.0 10*3/uL Final     RBC   Date Value Ref Range Status   12/11/2022 2.70 (L) 4.14 - 5.80 10*6/mm3 Final   03/22/2022 4.23 (L) 4.5 - 5.9 10*6/uL Final     Hemoglobin   Date Value Ref Range Status   12/11/2022 7.5 (L) 13.0 - 17.7 g/dL Final   03/22/2022 12.9 (L) 13.5 - 17.5 g/dL Final     Hematocrit   Date Value Ref Range Status   12/11/2022 22.9 (L) 37.5 - 51.0 % Final   03/22/2022 38.8 (L) 41.0 - 53.0 % Final     Platelets   Date Value Ref Range Status   12/11/2022 395 140 - 450 10*3/mm3 Final   03/22/2022 324 140 - 440 10*3/uL Final     Chemistry   Lab Results   Component Value Date    GLUCOSE 100 (H) 12/11/2022    BUN 14 12/11/2022    CREATININE 0.90 12/11/2022    EGFRIFNONA 100 01/28/2022    EGFRIFAFRI >60 05/20/2019    BCR 15.6 12/11/2022    K 3.7 12/11/2022    CO2 22.0 12/11/2022     CALCIUM 9.0 12/11/2022    ALBUMIN 3.20 (L) 12/09/2022    LABIL2 1.5 03/22/2022     (H) 12/09/2022    ALT 87 (H) 12/09/2022         Physical Exam:     Pulmonary: [] Cough:     [] Dyspnea:  Neck:  [] Lymphadenopathy  Lungs:  [x] Clear to auscultation  CVS:  [x] Regular rate & rhythm  Skin:  stGstrstastdstest:st st1st GI:  [] Dysphagia:     [x] Esophagitis: No issues  Comments/Notes:      [x] Review of labs, images, dosimetry, dose delivered, & treatment parameters.    Comments:     [x] Patient treatment setup reviewed.    Comments:     Recommendations:  Alerting Dr. Gaspar to his interval admission and his labs  Will discuss and consider next steps with her.  He is not having much Lt shoulder or arm pain today    [x] Continue RT  [] Change RT Plan [] Hold RT, length:        Approved Electronically By:  Chino Escalona MD, 12/12/2022, 15:18 EST      Confidentiality of this medical record shall be maintained except when use or disclosure is required or permitted by law, regulation or written authorization by the patient.

## 2022-12-13 ENCOUNTER — HOSPITAL ENCOUNTER (OUTPATIENT)
Dept: RADIATION ONCOLOGY | Facility: HOSPITAL | Age: 57
Discharge: HOME | End: 2022-12-13

## 2022-12-13 LAB
BACTERIA SPEC AEROBE CULT: NORMAL
BACTERIA SPEC AEROBE CULT: NORMAL
RAD ONC ARIA COURSE ID: NORMAL
RAD ONC ARIA COURSE LAST TREATMENT DATE: NORMAL
RAD ONC ARIA COURSE START DATE: NORMAL
RAD ONC ARIA COURSE TREATMENT ELAPSED DAYS: 23
RAD ONC ARIA FIRST TREATMENT DATE: NORMAL
RAD ONC ARIA PLAN FRACTIONS TREATED TO DATE: 11
RAD ONC ARIA PLAN ID: NORMAL
RAD ONC ARIA PLAN PRESCRIBED DOSE PER FRACTION: 2 GY
RAD ONC ARIA PLAN PRIMARY REFERENCE POINT: NORMAL
RAD ONC ARIA PLAN TOTAL FRACTIONS PRESCRIBED: 30
RAD ONC ARIA PLAN TOTAL PRESCRIBED DOSE: 6000 CGY
RAD ONC ARIA REFERENCE POINT DOSAGE GIVEN TO DATE: 22 GY
RAD ONC ARIA REFERENCE POINT ID: NORMAL
RAD ONC ARIA REFERENCE POINT SESSION DOSAGE GIVEN: 2 GY

## 2022-12-13 PROCEDURE — 77014 CHG CT GUIDANCE RADIATION THERAPY FLDS PLACEMENT: CPT | Performed by: RADIOLOGY

## 2022-12-13 PROCEDURE — 77386: CPT | Performed by: RADIOLOGY

## 2022-12-13 PROCEDURE — 77427 RADIATION TX MANAGEMENT X5: CPT | Performed by: RADIOLOGY

## 2022-12-14 ENCOUNTER — HOSPITAL ENCOUNTER (OUTPATIENT)
Dept: RADIATION ONCOLOGY | Facility: HOSPITAL | Age: 57
Discharge: HOME | End: 2022-12-14

## 2022-12-14 ENCOUNTER — HOSPITAL ENCOUNTER (OUTPATIENT)
Dept: ONCOLOGY | Facility: HOSPITAL | Age: 57
Setting detail: INFUSION SERIES
Discharge: HOME | End: 2022-12-14
Payer: COMMERCIAL

## 2022-12-14 ENCOUNTER — OFFICE VISIT (OUTPATIENT)
Dept: ONCOLOGY | Facility: CLINIC | Age: 57
End: 2022-12-14

## 2022-12-14 ENCOUNTER — TELEPHONE (OUTPATIENT)
Dept: ONCOLOGY | Facility: CLINIC | Age: 57
End: 2022-12-14

## 2022-12-14 ENCOUNTER — LAB (OUTPATIENT)
Dept: LAB | Facility: HOSPITAL | Age: 57
End: 2022-12-14
Payer: COMMERCIAL

## 2022-12-14 VITALS
WEIGHT: 185 LBS | TEMPERATURE: 97.5 F | OXYGEN SATURATION: 100 % | DIASTOLIC BLOOD PRESSURE: 65 MMHG | HEIGHT: 75 IN | SYSTOLIC BLOOD PRESSURE: 107 MMHG | RESPIRATION RATE: 18 BRPM | HEART RATE: 112 BPM | BODY MASS INDEX: 23 KG/M2

## 2022-12-14 VITALS
DIASTOLIC BLOOD PRESSURE: 59 MMHG | HEART RATE: 72 BPM | BODY MASS INDEX: 23 KG/M2 | HEIGHT: 75 IN | SYSTOLIC BLOOD PRESSURE: 95 MMHG | WEIGHT: 185 LBS | OXYGEN SATURATION: 100 % | TEMPERATURE: 94.8 F

## 2022-12-14 DIAGNOSIS — Z45.2 ENCOUNTER FOR CARE RELATED TO VASCULAR ACCESS PORT: ICD-10-CM

## 2022-12-14 DIAGNOSIS — C34.91 NON-SMALL CELL CARCINOMA OF LUNG, RIGHT: Primary | ICD-10-CM

## 2022-12-14 DIAGNOSIS — E86.0 DEHYDRATION: Primary | ICD-10-CM

## 2022-12-14 DIAGNOSIS — E83.42 HYPOMAGNESEMIA: ICD-10-CM

## 2022-12-14 DIAGNOSIS — D64.9 ANEMIA, UNSPECIFIED TYPE: ICD-10-CM

## 2022-12-14 LAB
ALBUMIN SERPL-MCNC: 3.3 G/DL (ref 3.5–5.2)
ALBUMIN/GLOB SERPL: 1 G/DL
ALP SERPL-CCNC: 304 U/L (ref 39–117)
ALT SERPL W P-5'-P-CCNC: 66 U/L (ref 1–41)
ANION GAP SERPL CALCULATED.3IONS-SCNC: 14 MMOL/L (ref 5–15)
AST SERPL-CCNC: 35 U/L (ref 1–40)
BASOPHILS # BLD AUTO: 0.02 10*3/MM3 (ref 0–0.2)
BASOPHILS NFR BLD AUTO: 0.2 % (ref 0–1.5)
BILIRUB SERPL-MCNC: 0.4 MG/DL (ref 0–1.2)
BUN SERPL-MCNC: 30 MG/DL (ref 6–20)
BUN/CREAT SERPL: 33.7 (ref 7–25)
CALCIUM SPEC-SCNC: 9.1 MG/DL (ref 8.6–10.5)
CHLORIDE SERPL-SCNC: 89 MMOL/L (ref 98–107)
CO2 SERPL-SCNC: 25 MMOL/L (ref 22–29)
CREAT SERPL-MCNC: 0.89 MG/DL (ref 0.76–1.27)
DEPRECATED RDW RBC AUTO: 50 FL (ref 37–54)
EGFRCR SERPLBLD CKD-EPI 2021: 99.3 ML/MIN/1.73
EOSINOPHIL # BLD AUTO: 0.32 10*3/MM3 (ref 0–0.4)
EOSINOPHIL NFR BLD AUTO: 3.3 % (ref 0.3–6.2)
ERYTHROCYTE [DISTWIDTH] IN BLOOD BY AUTOMATED COUNT: 16.3 % (ref 12.3–15.4)
FERRITIN SERPL-MCNC: 1684 NG/ML (ref 30–400)
GLOBULIN UR ELPH-MCNC: 3.3 GM/DL
GLUCOSE SERPL-MCNC: 130 MG/DL (ref 65–99)
HCT VFR BLD AUTO: 23.8 % (ref 37.5–51)
HGB BLD-MCNC: 7.6 G/DL (ref 13–17.7)
HOLD SPECIMEN: NORMAL
LYMPHOCYTES # BLD AUTO: 0.59 10*3/MM3 (ref 0.7–3.1)
LYMPHOCYTES NFR BLD AUTO: 6.1 % (ref 19.6–45.3)
MAGNESIUM SERPL-MCNC: 1.6 MG/DL (ref 1.6–2.6)
MCH RBC QN AUTO: 27.7 PG (ref 26.6–33)
MCHC RBC AUTO-ENTMCNC: 31.9 G/DL (ref 31.5–35.7)
MCV RBC AUTO: 86.9 FL (ref 79–97)
MONOCYTES # BLD AUTO: 1.01 10*3/MM3 (ref 0.1–0.9)
MONOCYTES NFR BLD AUTO: 10.5 % (ref 5–12)
NEUTROPHILS NFR BLD AUTO: 7.68 10*3/MM3 (ref 1.7–7)
NEUTROPHILS NFR BLD AUTO: 79.9 % (ref 42.7–76)
PLATELET # BLD AUTO: 547 10*3/MM3 (ref 140–450)
PMV BLD AUTO: 9.1 FL (ref 6–12)
POTASSIUM SERPL-SCNC: 3.9 MMOL/L (ref 3.5–5.2)
PROT SERPL-MCNC: 6.6 G/DL (ref 6–8.5)
RAD ONC ARIA COURSE ID: NORMAL
RAD ONC ARIA COURSE LAST TREATMENT DATE: NORMAL
RAD ONC ARIA COURSE START DATE: NORMAL
RAD ONC ARIA COURSE TREATMENT ELAPSED DAYS: 24
RAD ONC ARIA FIRST TREATMENT DATE: NORMAL
RAD ONC ARIA PLAN FRACTIONS TREATED TO DATE: 12
RAD ONC ARIA PLAN ID: NORMAL
RAD ONC ARIA PLAN PRESCRIBED DOSE PER FRACTION: 2 GY
RAD ONC ARIA PLAN PRIMARY REFERENCE POINT: NORMAL
RAD ONC ARIA PLAN TOTAL FRACTIONS PRESCRIBED: 30
RAD ONC ARIA PLAN TOTAL PRESCRIBED DOSE: 6000 CGY
RAD ONC ARIA REFERENCE POINT DOSAGE GIVEN TO DATE: 24 GY
RAD ONC ARIA REFERENCE POINT ID: NORMAL
RAD ONC ARIA REFERENCE POINT SESSION DOSAGE GIVEN: 2 GY
RBC # BLD AUTO: 2.74 10*6/MM3 (ref 4.14–5.8)
SODIUM SERPL-SCNC: 128 MMOL/L (ref 136–145)
WBC NRBC COR # BLD: 9.62 10*3/MM3 (ref 3.4–10.8)

## 2022-12-14 PROCEDURE — 77386: CPT | Performed by: RADIOLOGY

## 2022-12-14 PROCEDURE — 36415 COLL VENOUS BLD VENIPUNCTURE: CPT

## 2022-12-14 PROCEDURE — 85025 COMPLETE CBC W/AUTO DIFF WBC: CPT | Performed by: NURSE PRACTITIONER

## 2022-12-14 PROCEDURE — 82728 ASSAY OF FERRITIN: CPT | Performed by: NURSE PRACTITIONER

## 2022-12-14 PROCEDURE — 96360 HYDRATION IV INFUSION INIT: CPT

## 2022-12-14 PROCEDURE — 99215 OFFICE O/P EST HI 40 MIN: CPT | Performed by: NURSE PRACTITIONER

## 2022-12-14 PROCEDURE — 83735 ASSAY OF MAGNESIUM: CPT

## 2022-12-14 PROCEDURE — 82746 ASSAY OF FOLIC ACID SERUM: CPT

## 2022-12-14 PROCEDURE — 77014 CHG CT GUIDANCE RADIATION THERAPY FLDS PLACEMENT: CPT | Performed by: RADIOLOGY

## 2022-12-14 PROCEDURE — 80053 COMPREHEN METABOLIC PANEL: CPT | Performed by: NURSE PRACTITIONER

## 2022-12-14 PROCEDURE — 25010000002 HEPARIN LOCK FLUSH PER 10 UNITS: Performed by: INTERNAL MEDICINE

## 2022-12-14 RX ORDER — HEPARIN SODIUM (PORCINE) LOCK FLUSH IV SOLN 100 UNIT/ML 100 UNIT/ML
500 SOLUTION INTRAVENOUS AS NEEDED
Status: DISCONTINUED | OUTPATIENT
Start: 2022-12-14 | End: 2022-12-15 | Stop reason: HOSPADM

## 2022-12-14 RX ORDER — SODIUM CHLORIDE 0.9 % (FLUSH) 0.9 %
20 SYRINGE (ML) INJECTION AS NEEDED
Status: DISCONTINUED | OUTPATIENT
Start: 2022-12-14 | End: 2022-12-15 | Stop reason: HOSPADM

## 2022-12-14 RX ADMIN — SODIUM CHLORIDE 1000 ML: 9 INJECTION, SOLUTION INTRAVENOUS at 14:45

## 2022-12-14 RX ADMIN — Medication 500 UNITS: at 15:47

## 2022-12-14 RX ADMIN — Medication 20 ML: at 15:47

## 2022-12-14 NOTE — PROGRESS NOTES
Hematology/Oncology Outpatient Follow Up    PATIENT NAME:Jc Driscoll  :1965  MRN: [id number]  PRIMARY CARE PHYSICIAN: Reshma Alvarenga MD  REFERRING PHYSICIAN: Reshma Alvarenga MD    Chief Complaint   Patient presents with   • Follow-up     Non-small cell carcinoma of lung, right (HCC)        HISTORY OF PRESENT ILLNESS:                                                                                                                                                                                                                                                             This is a 57 y.o. who developed left shoulder pain and for that reason he had a chest x-ray done on 19 which showed a 2.7 cm noncalcified right lower lobe nodule was identified.  For that reason a CT scan of the chest was recommended.  Review of his records shows patient had the CT scan on 19 done without contrast.  This basically showed a lobulated noncalcified mass in the posterior aspect of the right lower lobe measuring 3 cm close to the pleural surface.  There is a calcified 1.2 mm nodule in the lateral aspect of the right lower lobe consistent with a granuloma.  There were also calcified subcarinal and right hilar nodules.  There is a lower right side tracheal nodule measuring 1 cm.  Patient had a PET/CT scan done on 19 which showed increased metabolic activity on the right lower lobe mass that measures 2.5 cm with SUV of 4.4.  There was also increased metabolic activity in the subcarinal space measuring 2.8 cm in size with  SUV of 3.4, increased activity in an enlarged right paratracheal lymph node that measures 1.9 cm, SUV of 5, also suspicious for metastatic adenopathy.  Patient had a CT-guided biopsy of the right lower lobe mass on 3/8/19.  This showed adenocarcinoma, TTF-1 positive.  On 3/18/19 patient underwent endoscopic ultrasound and biopsy of a subcarinal lymph node.  This was positive for malignant cells.  Patient was then taken back to surgery on 3/20/18 and had left Mediport placement by Dr. Fuchs.  He has been referred for further evaluation and management of his stage 3 adenocarcinoma of the right lung.  He was accompanied by his spouse for the appointment on 3/26/19.  Patient was scheduled to have a brain MRI for complete staging, but he could not do this due to claustrophobia.  He is willing to try with the help of angiolytics.    • Patient had brain MRI done on 4/5/19.  He states it did not show any evidence of metastatic disease.  Evidence of chronic sinusitis was noted.    • Patient was seen by Dr. Escalona who has recommended concurrent chemotherapy and radiation.  Patient is scheduled to begin on 4/15/19.   • 4/17/19 - Patient was initiated on combined chemotherapy and radiation with Cisplatin and Alimta.  Patient received cycle 1.      • 5/8/19 - Patient received cycle 2 of chemotherapy with Cisplatin and Alimta.   • 5/15/19 - Chemistry panel showed creatinine of 1.7.  WBC 1.8, hemoglobin 11.6, platelet count 72,000.   • 5/20/19 - BUN 10, creatinine 1.2, potassium 3.5.    • 5/23/19 - CT scan of the chest with contrast showed interval decrease in size of the right lower lobe pulmonary nodule now measuring 2.1 cm in size.  There was no mediastinal or hilar adenopathy identified.  • 6/26/2019 patient underwent right lower lobectomy with hilar and mediastinal lymph node dissection performed by Dr. Terrance Mckeon.  • Pathology revealed residual residual 2.2 cm invasive moderately differentiated adenocarcinoma.  There  were a total of 16 lymph nodes evaluated that 7 had metastatic disease.  There was evidence of lymphovascular invasion, extranodal involvement.  All the surgical margins were negative and distance of the closest margin was 2.5 cm.  Logic stage is T1c N2 M0.  • Patient is here today accompanied by his spouse for this appointment.  He continues to complain of some postop discomfort, numbness but his skin is intact.  There is no drainage.  Has had follow-up with Dr. Fuchs.  • 8/14/2019-seen by Dr. Maria at the [facility].  Dr. Maria has recommended immunotherapy with durvalumab off label use as patient has not had significant response to neoadjuvant chemotherapy and radiation.  Patient was given the option to have immunotherapy at the [facility] vs [place] and he has chosen to stay in [place]  • 8/21/19 - Started cycle 1 day 1 Imfinzi  • 9/4/2019 patient had CT scan of the chest this shows a moderate size right pleural effusion.  There are areas of groundglass opacification in the right upper lobe without any evidence of significant septal thickening.  Volume loss noted there is also moderate pleural effusion.  There is no suspicious recurrent malignancy.  There were nonenlarged residual mediastinal lymph nodes.  • 9/9/19 - WBC 6.23, Hgb 11.7 Platelets 257,000, , BUN 9, Cr 1.1,Vitamin B12 318, Ferritin 437  • 9/23/19 - received cycle 2 day 1 Imfinzi  • 10/9/19- Seen by Dr rodríguez with ENT for sinus disease  • 11/4/2019-patient received cycle 5 of Imfinzi(durvalumab)  • 12/2/2019 patient had cycle 7 of durvalumab  • 12/5/2019-patient had CT scan of the  abdomen and pelvis with small right pleural sided pleural effusion.There is no evidence of metastatic disease  • 12/30/2019-patient received cycle 9 of durvalumab  • 12/31/2019-patient had CT scan of the chest showed small to moderate left pleural effusion.  Iglesias appearing right lower paratracheal and right peribronchial soft tissue thickening without any  discrete pathologically enlarged mediastinal or hilar lymph nodes.  • 1/27/20-patient received cycle 11 of durvalumab  • 2/17/20-patient had a stress test which was negative for ischemia  • 2/17/2020-patient had CT of the chest which showed postop changes on the right lung, right pleural effusion but no enlarging mass was noted  • 3/2/2020-patient received cycle 12 of durvalumab  • 3/16/2020-patient had ultrasound-guided right thoracentesis.  Pathology was negative for malignancy  • 4/13/2020-patient received cycle 15 of immunotherapy with durvalumab  • 5/11/2020-patient received cycle 17 of immunotherapy with durvalumab  • 6/9/2020-patient had CT scan of the chest, abdomen and pelvis for lung cancer restaging.  There is stable small chronic loculated right basilar pleural effusion suggesting chronic pleuritis.  There is no evidence of metastatic disease in the abdomen or pelvis  • 7/20/2020 patient received cycle 22 of Durvalumab  • 8/17/2020 patient received cycle 24 and last cycle of durvalumab  • 9/24/2020 patient had CT scan of the chest, abdomen and pelvis for cancer restaging there was no evidence of local recurrence or metastatic disease within the abdomen and pelvis.  He has stable chronic small right pleural effusion.  Mild sigmoid diverticulosis was noted.  • 1/18/2021 patient had CT scan of the chest, abdomen and pelvis reviewed patient  • 5/24/2021 patient had CT scan of the chest calcified subcarinal lymph node consistent with changes of prior granulomatous disease.  Chronic small right pleural effusion is noted similar to prior exam.  Previously shown 4 mm nodule in the left lower lobe has nearly completely resolved.  The subpleural 3 mm nodule located within the posterior left lower lobe With resolved.  • 9/24/2021 patient had a CT scan of the chest, abdomen and pelvis there is soft tissue attenuation within the right paratracheal area which has been suggested to reflect sequela to previous  treatment.  There is slight thickening of the bladder wall otherwise there is no evidence of metastatic disease.  • 12/27/2021 patient had CT scan of the chest with contrast this basically showed irregular shaped noncalcified 7 mm left upper lobe pulmonary nodule.  PET CT scan was recommended to further evaluate.  • 1/26/2022 patient had a PET CT scan done which basically showed evidence of mild uptake corresponding to the nodule within the left upper lobe which is new worrisome for developing metastatic focus.  There was mild radiopharmaceutical activity corresponding to pleural thickening throughout the right lung base with associated pleural effusion likely related to post therapeutic changes and radiation changes in this region.  Metastatic malignancy involving the pleura could not be completely eliminated.  • 1/26/2022: CT-guided biopsy was aborted due to finding by the radiologist that the lung nodularity was actually a clump of tiny nodules of which the largest was 6 mm and therefore biopsy could not be completed.  Also the area was in the vicinity of multiple blood vessels with increased risk of bleeding.  Follow-up CT of the chest was recommended for continued surveillance  • 5/18/2022 patient had CT-guided biopsy of the left enlarging nodule, pathology was positive for invasive poorly differentiated adenocarcinoma most consistent with adenocarcinoma of pulmonary origin.  • 5/27/2022 patient had a PET CT scan which showed a 2.3 cm hypermetabolic left upper lobe nodule which has increased in size no convincing evidence of metastatic disease elsewhere within the chest.  • 5/21/2022 patient had brain MRI which did not show any evidence of intracranial metastasis  • 6/2/2022 patient was seen by Dr. Miryam Reyna pulmonary function test is being done to assess surgical candidacy  • 7/6/2022 patient underwent left heart Koska P video-assisted with upper lobectomy mediastinal lymph node dissection performed by   Miryam Roach  • Final pathology revealed poorly differentiated  • A predominant adenocarcinoma with solid component presenting 45% and micropapillary component 5% with extensive necrosis, invasive carcinoma measures 2.5 maximally there was focal lymphovascular space invasion all margins were negative for malignancy discussed with the closest margin was 2.5 cm pathology stage is pT1C pN1.  PD-L1 showed a tumor proportion score of 95%  • 8/3/2022 patient started adjuvant chemotherapy with cisplatin, Taxol  • 8/24/2022 patient received cycle 2 of combination chemotherapy with cisplatin and Taxol with Neulasta  • 10/5/2022: Patient received cycle 4-day 1 of combination chemotherapy with cisplatin and Taxol  • Patient developed left-sided chest pain for that reason he had a PET/CT scan 1/20/2020 is basically revealed a 4 x 3.5 cm hypermetabolic soft tissue mass in the left chest wall between the first and second ribs anteriorly consistent with relapsed disease.  There was a small cluster of hypermetabolic lymph nodes seen in the left supraclavicular region consistent with early manoj metastasis  • 11/11/2022: 2D echocardiogram performed showing a left ventricular EF of 56 to 60%  • 11/22/2022: Cycle 1 of atezolizumab received  • 12/5/2022-12/9/2022: Hospitalized at [facility] for dehydration, hyponatremia, and weakness.    Past Medical History:   Diagnosis Date   • Allergic rhinitis 07/25/2013    Overview:  4/2/2018 - still zyrtec 10 daily (if stops, gets dermatitis again).    • Anxiety and depression 06/05/2012    Overview:  4/2/2018 -   C/w well 300 xr and Lito 20.  4/8/2019 -   Doing ok even with new lung cancer - lito 20 & well 300xr.    • Chronic pansinusitis 04/08/2019    Overview:  4/8/2019 -   MRI showed chronic thick in frontal, maxillary, ethmoidal ---> to Dr. ZAY knowles est since abx ICC didn't help.  Does netipot bid.    • Diastolic CHF (HCC) 07/09/2014    Overview:  7/4/14 - impaired LV relaxation on Zhou ECHO.  EF 60%.  Rest normal   • Dupuytren's contracture of both hands    • Elevated cholesterol    • Erosive esophagitis 07/09/2019    Overview:  EGD 2016 4/2/2018 - nexium OTC daily for few week.  Qod before that.  Now carbonation hurts, epigastric pain 4/8/2019 -   protonix 40 doing well.    • Gastroesophageal reflux disease 02/21/2019   • Hiatal hernia 07/09/2019   • History of colon polyps    • Hypertension    • Lung cancer (HCC)     right lung and in lymph nodes x2   • Mediastinal lymphadenopathy 03/12/2019   • Normocytic anemia 08/08/2019    Overview:  6/2019 - dropped to 9's postop 7/2019 - rebounded to 10's.  8/8/2019 -   Back to 9's - check ferritin, b12 and thyroid.    • Prediabetes 07/09/2014    Overview:  7/4/14 - a1c 6.1 at [place] admission   • Retention of urine 06/27/2019    PSOT OP, RESOLVED       Past Surgical History:   Procedure Laterality Date   • BRONCHOSCOPY  03/18/2019    EBUS MONTERO NEEDLE BX   • COLONOSCOPY     • DUPUYTREN CONTRACTURE RELEASE Bilateral    • LUNG BIOPSY  03/08/2019    CT GUIDED   • LUNG REMOVAL, PARTIAL Right     right lower   • PORTACATH PLACEMENT  03/20/2019    DR LONGORIA   • THORACOSCOPY Right 6/26/2019    Procedure: RIGHT VATS, OPEN LOWER LOBECTOMY WITH LYMPH NODE DISECTION, WEDGE RESECTION OF RIGHT MIDDLE LOBE;  Surgeon: Terrance Longoria MD;  Location: [facility];  Service: Cardiothoracic   • THORACOSCOPY VIDEO ASSISTED WITH LOBECTOMY Left 7/6/2022    Procedure: THORACOSCOPY VIDEO ASSISTED WITH LOBECTOMY;  Surgeon: Miryam Reyna MD;  Location: [facility] OR;  Service: Thoracic;  Laterality: Left;         Current Outpatient Medications:   •  demeclocycline (DECLOMYCIN) 300 MG tablet, Take 1 tablet by mouth Every 12 (Twelve) Hours for 198 doses. Indications: SIADH, Disp: 60 tablet, Rfl: 3  •  desvenlafaxine (PRISTIQ) 50 MG 24 hr tablet, Take 50 mg by mouth Daily., Disp: , Rfl:   •  diazePAM (Valium) 2 MG tablet, Take 1 tablet by mouth At Night As Needed for Anxiety or Muscle  Spasms., Disp: 30 tablet, Rfl: 0  •  levothyroxine (SYNTHROID, LEVOTHROID) 100 MCG tablet, Take 100 mcg by mouth Every Morning., Disp: , Rfl:   •  liothyronine (CYTOMEL) 5 MCG tablet, Take 5 mcg by mouth Daily., Disp: , Rfl:   •  LORazepam (ATIVAN) 0.5 MG tablet, Take 0.5 mg by mouth Every 8 (Eight) Hours As Needed., Disp: , Rfl:   •  metoprolol succinate XL (TOPROL-XL) 100 MG 24 hr tablet, Take 100 mg by mouth Daily., Disp: , Rfl:   •  OLANZapine (zyPREXA) 5 MG tablet, Take 1 tablet by mouth Every Night. Take on days 2, 3 and 4 after chemotherapy., Disp: 3 tablet, Rfl: 5  •  ondansetron (ZOFRAN) 8 MG tablet, Take 1 tablet by mouth 3 (Three) Times a Day As Needed for Nausea or Vomiting., Disp: 30 tablet, Rfl: 5  •  oxyCODONE-acetaminophen (PERCOCET) 5-325 MG per tablet, Take 1 tablet by mouth Every 6 (Six) Hours As Needed for Moderate Pain., Disp: 40 tablet, Rfl: 0  •  pantoprazole (PROTONIX) 40 MG EC tablet, Take 40 mg by mouth Daily As Needed., Disp: , Rfl:   •  vitamin B-12 (CYANOCOBALAMIN) 1000 MCG tablet, Take 1,000 mcg by mouth Daily., Disp: , Rfl:   No current facility-administered medications for this visit.    Facility-Administered Medications Ordered in Other Visits:   •  heparin injection 500 Units, 500 Units, Intravenous, PRN, Azucena Gaspar MD, 500 Units at 12/14/22 1547  •  sodium chloride 0.9 % flush 20 mL, 20 mL, Intravenous, PRN, Azucena Gaspar MD, 20 mL at 12/14/22 1547    No Known Allergies    Family History   Problem Relation Age of Onset   • Cancer Mother         cervical cancer   • Cervical cancer Mother    • Cancer Father         lung cancer   • Lung cancer Father    • Lung cancer Sister    • Cancer Sister         lung cancer   • Malig Hyperthermia Neg Hx        Cancer-related family history includes Cancer in his father, mother, and sister; Cervical cancer in his mother; Lung cancer in his father and sister.    Social History     Tobacco Use   • Smoking status: Former      Types: Cigarettes     Quit date: 2009     Years since quittin.2   • Smokeless tobacco: Never   Vaping Use   • Vaping Use: Never used   Substance Use Topics   • Alcohol use: Yes     Alcohol/week: 2.0 standard drinks     Types: 2 Cans of beer per week     Comment: Occasional   • Drug use: No      I have reviewed and confirmed the accuracy of the patient's history: Chief complaint, HPI, ROS and Subjective as entered by the MA/LPN/RN. Shayy Montemayor, APRN 22       SUBJECTIVE:  Patient here for hospital follow-up.  He was hospitalized at [facility] due to weakness, dehydration, and hyponatremia.  He reports he was also having some intermittent fevers but they did not find any underlying infection during his hospitalization.  He had a consult from nephrology while he was inpatient and they have began him on demeclocycline for his hyponatremia.  He states that he has trouble complying with the fluid restriction of 1200 mL daily because he does feel very thirsty.  But he does state compliance with that.  He states that he also had low magnesium and low potassium in the hospital but is not currently on any supplements for these things.  He was also anemic in the hospital and he states they discussed a blood transfusion but he did not ever have one.          REVIEW OF SYSTEMS:    Review of Systems   Constitutional: Positive for fatigue. Negative for chills and fever.   HENT: Negative for ear pain, mouth sores, nosebleeds and sore throat.    Eyes: Negative for photophobia and visual disturbance.   Respiratory: Negative for wheezing and stridor.    Cardiovascular: Negative for chest pain and palpitations.   Gastrointestinal: Negative for abdominal pain, diarrhea, nausea and vomiting.   Endocrine: Negative for cold intolerance and heat intolerance.   Genitourinary: Negative for dysuria and hematuria.   Musculoskeletal: Negative for joint swelling and neck stiffness.   Skin: Negative for color change and rash.  "  Neurological: Negative for seizures and syncope.   Hematological: Negative for adenopathy.   Psychiatric/Behavioral: Negative for agitation, confusion and hallucinations.           OBJECTIVE:    Vitals:    12/14/22 1307   BP: 95/59   Pulse: 72   Temp: 94.8 °F (34.9 °C)   TempSrc: Oral   SpO2: 100%   Weight: 83.9 kg (185 lb)   Height: 190.5 cm (75\")   PainSc: 0-No pain     ECOG    Eastern Cooperative Oncology Group (ECOG, [place], World Health Organization) performance scale  0 Fully active; no performance restrictions.  1 Strenuous physical activity restricted; fully ambulatory and able to carry out light work.  2 Capable of all self-care but unable to carry out any work activities. Up and about >50% of waking hours.  3 Capable of only limited self-care; confined to bed or chair >50% of waking hours.  4 Completely disabled; cannot carry out any self-care; totally confined to bed or chair.    Physical Exam   Constitutional: He is oriented to person, place, and time. He appears well-developed. No distress.   HENT:   Head: Normocephalic and atraumatic.   Right Ear: External ear normal.   Left Ear: External ear normal.   Nose: Nose normal.   Eyes: Pupils are equal, round, and reactive to light. Conjunctivae are normal. Right eye exhibits no discharge. Left eye exhibits no discharge. No scleral icterus.   Neck: No thyromegaly present.   Cardiovascular: Normal rate, regular rhythm and normal heart sounds. Exam reveals no gallop and no friction rub.   Pulmonary/Chest: Effort normal. No stridor. No respiratory distress. He has no wheezes.   Abdominal: Soft. Bowel sounds are normal. He exhibits no mass. There is no abdominal tenderness. There is no rebound and no guarding.   Musculoskeletal: Normal range of motion. No tenderness.   Lymphadenopathy:     He has no cervical adenopathy.   Neurological: He is alert and oriented to person, place, and time. He exhibits normal muscle tone.   Skin: Skin is warm. No rash noted. He is " not diaphoretic. No erythema.   Urticarial-like rash, right medial knee likely due to immunotherapy   Psychiatric: His behavior is normal. Judgment and thought content normal.   Nursing note and vitals reviewed.    I have reexamined the patient and the results are consistent with the previously documented exam. Shayy Montemayor, MULUGETA         RECENT LABS    WBC   Date Value Ref Range Status   12/14/2022 9.62 3.40 - 10.80 10*3/mm3 Final   03/22/2022 7.84 4.5 - 11.0 10*3/uL Final     RBC   Date Value Ref Range Status   12/14/2022 2.74 (L) 4.14 - 5.80 10*6/mm3 Final   03/22/2022 4.23 (L) 4.5 - 5.9 10*6/uL Final     Hemoglobin   Date Value Ref Range Status   12/14/2022 7.6 (C) 13.0 - 17.7 g/dL Final   03/22/2022 12.9 (L) 13.5 - 17.5 g/dL Final     Hematocrit   Date Value Ref Range Status   12/14/2022 23.8 (C) 37.5 - 51.0 % Final   03/22/2022 38.8 (L) 41.0 - 53.0 % Final     MCV   Date Value Ref Range Status   12/14/2022 86.9 79.0 - 97.0 fL Final   03/22/2022 91.7 80.0 - 100.0 fL Final     MCH   Date Value Ref Range Status   12/14/2022 27.7 26.6 - 33.0 pg Final   03/22/2022 30.5 26.0 - 34.0 pg Final     MCHC   Date Value Ref Range Status   12/14/2022 31.9 31.5 - 35.7 g/dL Final   03/22/2022 33.2 31.0 - 37.0 g/dL Final     RDW   Date Value Ref Range Status   12/14/2022 16.3 (H) 12.3 - 15.4 % Final   03/22/2022 14.2 12.0 - 16.8 % Final     RDW-SD   Date Value Ref Range Status   12/14/2022 50.0 37.0 - 54.0 fl Final     MPV   Date Value Ref Range Status   12/14/2022 9.1 6.0 - 12.0 fL Final   03/22/2022 9.3 8.4 - 12.4 fL Final     Platelets   Date Value Ref Range Status   12/14/2022 547 (H) 140 - 450 10*3/mm3 Final   03/22/2022 324 140 - 440 10*3/uL Final     Neutrophil Rel %   Date Value Ref Range Status   03/22/2022 63.1 45 - 80 % Final     Neutrophil %   Date Value Ref Range Status   12/14/2022 79.9 (H) 42.7 - 76.0 % Final     Lymphocyte Rel %   Date Value Ref Range Status   03/22/2022 16.2 15 - 50 % Final     Lymphocyte  %   Date Value Ref Range Status   12/14/2022 6.1 (L) 19.6 - 45.3 % Final     Monocyte Rel %   Date Value Ref Range Status   03/22/2022 13.6 0 - 15 % Final     Monocyte %   Date Value Ref Range Status   12/14/2022 10.5 5.0 - 12.0 % Final     Eosinophil %   Date Value Ref Range Status   12/14/2022 3.3 0.3 - 6.2 % Final   03/22/2022 5.5 0 - 7 % Final     Basophil Rel %   Date Value Ref Range Status   03/22/2022 1.1 0 - 2 % Final     Basophil %   Date Value Ref Range Status   12/14/2022 0.2 0.0 - 1.5 % Final     Immature Grans %   Date Value Ref Range Status   07/07/2022 0.4 0.0 - 0.5 % Final   03/22/2022 0.5 0.0 - 1.0 % Final     Neutrophils Absolute   Date Value Ref Range Status   03/22/2022 4.94 2.0 - 8.8 10*3/uL Final     Neutrophils, Absolute   Date Value Ref Range Status   12/14/2022 7.68 (H) 1.70 - 7.00 10*3/mm3 Final     Lymphocytes Absolute   Date Value Ref Range Status   03/22/2022 1.27 0.7 - 5.5 10*3/uL Final     Lymphocytes, Absolute   Date Value Ref Range Status   12/14/2022 0.59 (L) 0.70 - 3.10 10*3/mm3 Final     Monocytes Absolute   Date Value Ref Range Status   03/22/2022 1.07 0.0 - 1.7 10*3/uL Final     Monocytes, Absolute   Date Value Ref Range Status   12/14/2022 1.01 (H) 0.10 - 0.90 10*3/mm3 Final     Eosinophils Absolute   Date Value Ref Range Status   03/22/2022 0.43 0.0 - 0.8 10*3/uL Final     Eosinophils, Absolute   Date Value Ref Range Status   12/14/2022 0.32 0.00 - 0.40 10*3/mm3 Final     Basophils Absolute   Date Value Ref Range Status   03/22/2022 0.09 0.0 - 0.2 10*3/uL Final     Basophils, Absolute   Date Value Ref Range Status   12/14/2022 0.02 0.00 - 0.20 10*3/mm3 Final     Immature Grans, Absolute   Date Value Ref Range Status   07/07/2022 0.05 0.00 - 0.05 10*3/mm3 Final   03/22/2022 0.04 0.00 - 0.10 10*3/uL Final     nRBC   Date Value Ref Range Status   12/10/2022 0.0 0.0 - 0.2 /100 WBC Final       Lab Results   Component Value Date    GLUCOSE 130 (H) 12/14/2022    BUN 30 (H) 12/14/2022     CREATININE 0.89 12/14/2022    EGFRIFNONA 100 01/28/2022    EGFRIFAFRI >60 05/20/2019    BCR 33.7 (H) 12/14/2022    K 3.9 12/14/2022    CO2 25.0 12/14/2022    CALCIUM 9.1 12/14/2022    ALBUMIN 3.30 (L) 12/14/2022    LABIL2 1.5 03/22/2022    AST 35 12/14/2022    ALT 66 (H) 12/14/2022         ASSESSMENT:    1. 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  He has been referred to Dr. Escalona to see whether he is a candidate for radiation treatments.  Would also consider systemic treatment for him for molecular targets as he has had very poor response to chemotherapy malignancy  2. 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  pT1 cpN1 M0 consistent with stage IIb disease, found on surveillance CT scans. Brain MRI was negative. We will recommend platinum doublet followed by Tecentriq unless he has EGFR mutation for which adjuvant osimertinib will be considered.  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  Patient was treated with cisplatin and Alimta in the adjuvant setting for his original malignancy.  He is currently on cisplatin and Taxol.  Patient is receiving cycle #4 today.  This tumor was completely resected and has negative margins and no mediastinal lymph node involvement.  Reviewed the pathology with Dr. Trevizo.  This is a new second lung primary.  Patient has received a total of 4 cycles of combination of cisplatin and Taxol completed on 10/5/2022.  Patient will was a candidate for maintenance treatment with Tecentriq for now given that he has developed relapsed disease we will look at other options  3. High PD-L1 expression at 95%. Reviewed foundation 1 testing results.   This showed MET amplification, ERBB2 amplification for which targeted therapy is available for including crizotinib for MET amplification, afatinib for ERBB2 but this is approved for metastatic disease.   4. We have extensively reviewed the literature.  He will be a candidate for ER B B2 targeted therapy but fam-trastuzumab cannot be combined with XRT due to risk of interstitial lung disease.  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  For these reasons we have decided to proceed with immunotherapy along with radiation.  His case was reviewed at the tumor board and this was the recommendation.  Patient has been approved for set Tecentriq therefore we will go ahead and initiate concurrently with radiation  5. Chemotherapy-induced fatigue: Improving  6. Hypomagnesemia secondary to chemotherapy: Plan continue replacement therapy monitor levels  7. Chemotherapy induced anemia improving   8. 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  Increase salt in the diet, fluid restriction.  Continue to monitor his sodium levels.  Hopefully this would improve with treatment    9. History of adenocarcinoma of the right lung, T1c N2 M0, consistent with clinical stage 3A disease in 2019.  S/P combined chemotherapy and radiation with cisplatin and Alimta neoadjuvantly, followed by her right lower lobectomy with mediastinal and hilar lymph node dissection with residual disease pT1 cpN2 M0.  Followed by maintenance durvalumab for 1 year.   10. Recent diagnosis of hypothyroidism, on thyroid replacement therapy  11. Status post right thoracentesis with cytology negative for malignancy  12. History of pneumothorax following lung biopsy  13. Postop anemia: Reviewed  14. Assessment has been reviewed and updated      PLANS:    1. CBC reviewed.  CMP and magnesium ordered today to reevaluate electrolyte abnormalities.  Supplement as  indicated.  2. 1 L normal saline today for hypotension.  3. 2D echocardiogram in preparation for HER2 directed treatment reviewed  4. Patient has completed all planned 4 cycles of chemo: unfortunately has developed relapsed disease  5. Continue Tecentriq-scheduled for 12/19/2022  6. Reviewed the above with patient and spouse who is present today  7. His case will be reviewed at the next tumor board scheduled on November 15, 2022 and I will see him shortly after that  8. Continue weekly BMP and mag levels, CBC.  Reviewed and he will continue  9. Fluid restriction to 1200 cc/day  10. Follow-up with nephrology-patient states he plans to get an appointment.  11. Continue anti-nausea medications  12. Reviewed foundation 1 results.  He will be a candidate for molecular targets in the future if he does develop metastatic disease  13. Continue Emla cream to port  14. Continue thyroid replacement therapy through his PCP  15. Continue B12 injections  monthly: Patient did have elevated methylmalonic acid level during the early phases of his treatments.  Continue the same  16. Continue supportive care  17. All questions answered  18. Follow-up with Dr. Gaspar as previously scheduled         Patient has  questions which have all been answered to the best of my abilities    I have reviewed labs results, imaging, vitals, and medications with the patient today.     Patient verbalized understanding and is in agreement of the above plan.    I spent 40 total minutes, face-to-face, caring for Jc today.  90% of this time involved counseling and/or coordination of care as documented within this note.    Electronically signed by MULUGETA Garcia, 12/14/22, 16:19 EST

## 2022-12-15 ENCOUNTER — HOSPITAL ENCOUNTER (OUTPATIENT)
Dept: RADIATION ONCOLOGY | Facility: HOSPITAL | Age: 57
Discharge: HOME | End: 2022-12-15

## 2022-12-15 DIAGNOSIS — D64.9 ANEMIA, UNSPECIFIED TYPE: Primary | ICD-10-CM

## 2022-12-15 DIAGNOSIS — E53.8 FOLATE DEFICIENCY: ICD-10-CM

## 2022-12-15 LAB
FOLATE SERPL-MCNC: 3.41 NG/ML (ref 4.78–24.2)
RAD ONC ARIA COURSE ID: NORMAL
RAD ONC ARIA COURSE LAST TREATMENT DATE: NORMAL
RAD ONC ARIA COURSE START DATE: NORMAL
RAD ONC ARIA COURSE TREATMENT ELAPSED DAYS: 25
RAD ONC ARIA FIRST TREATMENT DATE: NORMAL
RAD ONC ARIA PLAN FRACTIONS TREATED TO DATE: 13
RAD ONC ARIA PLAN ID: NORMAL
RAD ONC ARIA PLAN PRESCRIBED DOSE PER FRACTION: 2 GY
RAD ONC ARIA PLAN PRIMARY REFERENCE POINT: NORMAL
RAD ONC ARIA PLAN TOTAL FRACTIONS PRESCRIBED: 30
RAD ONC ARIA PLAN TOTAL PRESCRIBED DOSE: 6000 CGY
RAD ONC ARIA REFERENCE POINT DOSAGE GIVEN TO DATE: 26 GY
RAD ONC ARIA REFERENCE POINT ID: NORMAL
RAD ONC ARIA REFERENCE POINT SESSION DOSAGE GIVEN: 2 GY

## 2022-12-15 PROCEDURE — 77386: CPT | Performed by: RADIOLOGY

## 2022-12-15 PROCEDURE — 77014 CHG CT GUIDANCE RADIATION THERAPY FLDS PLACEMENT: CPT | Performed by: RADIOLOGY

## 2022-12-15 PROCEDURE — 77336 RADIATION PHYSICS CONSULT: CPT | Performed by: RADIOLOGY

## 2022-12-15 RX ORDER — FOLIC ACID 1 MG/1
1 TABLET ORAL DAILY
Qty: 30 TABLET | Refills: 2 | Status: SHIPPED | OUTPATIENT
Start: 2022-12-15 | End: 2023-01-14

## 2022-12-16 ENCOUNTER — READMISSION MANAGEMENT (OUTPATIENT)
Dept: CALL CENTER | Facility: HOSPITAL | Age: 57
End: 2022-12-16

## 2022-12-16 ENCOUNTER — TELEPHONE (OUTPATIENT)
Dept: ONCOLOGY | Facility: CLINIC | Age: 57
End: 2022-12-16

## 2022-12-16 ENCOUNTER — HOSPITAL ENCOUNTER (OUTPATIENT)
Dept: RADIATION ONCOLOGY | Facility: HOSPITAL | Age: 57
Discharge: HOME | End: 2022-12-16

## 2022-12-16 LAB
RAD ONC ARIA COURSE ID: NORMAL
RAD ONC ARIA COURSE LAST TREATMENT DATE: NORMAL
RAD ONC ARIA COURSE START DATE: NORMAL
RAD ONC ARIA COURSE TREATMENT ELAPSED DAYS: 26
RAD ONC ARIA FIRST TREATMENT DATE: NORMAL
RAD ONC ARIA PLAN FRACTIONS TREATED TO DATE: 14
RAD ONC ARIA PLAN ID: NORMAL
RAD ONC ARIA PLAN PRESCRIBED DOSE PER FRACTION: 2 GY
RAD ONC ARIA PLAN PRIMARY REFERENCE POINT: NORMAL
RAD ONC ARIA PLAN TOTAL FRACTIONS PRESCRIBED: 30
RAD ONC ARIA PLAN TOTAL PRESCRIBED DOSE: 6000 CGY
RAD ONC ARIA REFERENCE POINT DOSAGE GIVEN TO DATE: 28 GY
RAD ONC ARIA REFERENCE POINT ID: NORMAL
RAD ONC ARIA REFERENCE POINT SESSION DOSAGE GIVEN: 2 GY

## 2022-12-16 PROCEDURE — 77386: CPT | Performed by: RADIOLOGY

## 2022-12-16 PROCEDURE — 77014 CHG CT GUIDANCE RADIATION THERAPY FLDS PLACEMENT: CPT | Performed by: RADIOLOGY

## 2022-12-16 NOTE — OUTREACH NOTE
Medical Week 1 Survey    Flowsheet Row Responses   [facility] patient discharged from? [facility]   Does the patient have one of the following disease processes/diagnoses(primary or secondary)? Other   Week 1 attempt successful? Yes   Call start time [phone]   Call end time [phone]   Discharge diagnosis Hyponatremia   Meds reviewed with patient/caregiver? Yes   Is the patient having any side effects they believe may be caused by any medication additions or changes? No   Does the patient have all medications ordered at discharge? Yes   Is the patient taking all medications as directed (includes completed medication regime)? Yes   Comments regarding appointments Pt follows with Oncology and has labs weekly and infusions every 3 weeks, aware of need to schedule a nephrology appt   Does the patient have a primary care provider?  Yes   Does the patient have an appointment with their PCP within 7 days of discharge? No   What is preventing the patient from scheduling follow up appointments within 7 days of discharge? Haven't had time   Nursing Interventions Advised patient to make appointment  [Reports it will be a challenge to make an appt with his PCP this late in year]   Has the patient kept scheduled appointments due by today? N/A   Has home health visited the patient within 72 hours of discharge? N/A   Psychosocial issues? No   Did the patient receive a copy of their discharge instructions? Yes   Nursing interventions Reviewed instructions with patient   What is the patient's perception of their health status since discharge? Improving  [Feels much better--no fever, appetite returned, getting stronger.  Offers no issues or concerns at time of call.]   Is the patient/caregiver able to teach back signs and symptoms related to disease process for when to call PCP? Yes   Week 1 call completed? Yes          BRITTNEY BROWN - Registered Nurse

## 2022-12-16 NOTE — TELEPHONE ENCOUNTER
----- Message from MULUGETA Garcia sent at 12/15/2022  8:22 AM EST -----  Please let the patient know that his folic acid level was a bit low and this may be contributing to his anemia and fatigue.  I will send in a supplement for him to take.  His iron level came back not showing any continued iron deficiency so no further iron supplementation needed at this time.  Thanks!

## 2022-12-16 NOTE — TELEPHONE ENCOUNTER
Was not able to reach pt on 12/15, SW Pt today and he is taking the folic acid and understands he can stop his iron supplementation.

## 2022-12-19 ENCOUNTER — RADIATION ONCOLOGY WEEKLY ASSESSMENT (OUTPATIENT)
Dept: RADIATION ONCOLOGY | Facility: HOSPITAL | Age: 57
End: 2022-12-19

## 2022-12-19 ENCOUNTER — HOSPITAL ENCOUNTER (OUTPATIENT)
Dept: ONCOLOGY | Facility: HOSPITAL | Age: 57
Setting detail: INFUSION SERIES
Discharge: HOME | End: 2022-12-19
Payer: COMMERCIAL

## 2022-12-19 ENCOUNTER — LAB (OUTPATIENT)
Dept: LAB | Facility: HOSPITAL | Age: 57
End: 2022-12-19

## 2022-12-19 VITALS
OXYGEN SATURATION: 99 % | WEIGHT: 182 LBS | DIASTOLIC BLOOD PRESSURE: 68 MMHG | SYSTOLIC BLOOD PRESSURE: 110 MMHG | TEMPERATURE: 97.8 F | HEIGHT: 75 IN | HEART RATE: 112 BPM | BODY MASS INDEX: 22.63 KG/M2 | RESPIRATION RATE: 18 BRPM

## 2022-12-19 VITALS
TEMPERATURE: 97.8 F | SYSTOLIC BLOOD PRESSURE: 110 MMHG | DIASTOLIC BLOOD PRESSURE: 68 MMHG | HEIGHT: 75 IN | RESPIRATION RATE: 18 BRPM | WEIGHT: 182 LBS | OXYGEN SATURATION: 99 % | HEART RATE: 112 BPM | BODY MASS INDEX: 22.63 KG/M2

## 2022-12-19 DIAGNOSIS — C34.91 NON-SMALL CELL CARCINOMA OF LUNG, RIGHT: ICD-10-CM

## 2022-12-19 DIAGNOSIS — C34.31 CANCER OF LOWER LOBE OF RIGHT LUNG: ICD-10-CM

## 2022-12-19 DIAGNOSIS — C34.91 NON-SMALL CELL CARCINOMA OF LUNG, RIGHT: Primary | ICD-10-CM

## 2022-12-19 DIAGNOSIS — D64.9 ANEMIA, UNSPECIFIED TYPE: ICD-10-CM

## 2022-12-19 DIAGNOSIS — C34.12 MALIGNANT NEOPLASM OF UPPER LOBE OF LEFT LUNG: Primary | ICD-10-CM

## 2022-12-19 DIAGNOSIS — Z45.2 ENCOUNTER FOR CARE RELATED TO VASCULAR ACCESS PORT: ICD-10-CM

## 2022-12-19 DIAGNOSIS — C34.92 NSCLC OF LEFT LUNG: Primary | ICD-10-CM

## 2022-12-19 DIAGNOSIS — E83.42 HYPOMAGNESEMIA: ICD-10-CM

## 2022-12-19 LAB
ABO GROUP BLD: NORMAL
ALBUMIN SERPL-MCNC: 3.2 G/DL (ref 3.5–5.2)
ALBUMIN/GLOB SERPL: 0.9 G/DL
ALP SERPL-CCNC: 280 U/L (ref 39–117)
ALT SERPL W P-5'-P-CCNC: 38 U/L (ref 1–41)
ANION GAP SERPL CALCULATED.3IONS-SCNC: 12 MMOL/L (ref 5–15)
AST SERPL-CCNC: 28 U/L (ref 1–40)
BASOPHILS # BLD AUTO: 0.03 10*3/MM3 (ref 0–0.2)
BASOPHILS # BLD AUTO: 0.1 10*3/MM3 (ref 0–0.2)
BASOPHILS NFR BLD AUTO: 0.4 % (ref 0–1.5)
BASOPHILS NFR BLD AUTO: 1.1 % (ref 0–1.5)
BILIRUB SERPL-MCNC: 0.3 MG/DL (ref 0–1.2)
BLD GP AB SCN SERPL QL: NEGATIVE
BUN SERPL-MCNC: 18 MG/DL (ref 6–20)
BUN/CREAT SERPL: 19.1 (ref 7–25)
CALCIUM SPEC-SCNC: 8.9 MG/DL (ref 8.6–10.5)
CHLORIDE SERPL-SCNC: 90 MMOL/L (ref 98–107)
CO2 SERPL-SCNC: 27 MMOL/L (ref 22–29)
CREAT SERPL-MCNC: 0.94 MG/DL (ref 0.76–1.27)
DEPRECATED RDW RBC AUTO: 50.7 FL (ref 37–54)
DEPRECATED RDW RBC AUTO: 53.8 FL (ref 37–54)
EGFRCR SERPLBLD CKD-EPI 2021: 94 ML/MIN/1.73
EOSINOPHIL # BLD AUTO: 0.24 10*3/MM3 (ref 0–0.4)
EOSINOPHIL # BLD AUTO: 0.3 10*3/MM3 (ref 0–0.4)
EOSINOPHIL NFR BLD AUTO: 3 % (ref 0.3–6.2)
EOSINOPHIL NFR BLD AUTO: 3.5 % (ref 0.3–6.2)
ERYTHROCYTE [DISTWIDTH] IN BLOOD BY AUTOMATED COUNT: 16.3 % (ref 12.3–15.4)
ERYTHROCYTE [DISTWIDTH] IN BLOOD BY AUTOMATED COUNT: 18.2 % (ref 12.3–15.4)
GLOBULIN UR ELPH-MCNC: 3.4 GM/DL
GLUCOSE BLDC GLUCOMTR-MCNC: 111 MG/DL (ref 70–105)
GLUCOSE SERPL-MCNC: 151 MG/DL (ref 65–99)
HCT VFR BLD AUTO: 22.7 % (ref 37.5–51)
HCT VFR BLD AUTO: 24.2 % (ref 37.5–51)
HGB BLD-MCNC: 7.2 G/DL (ref 13–17.7)
HGB BLD-MCNC: 7.7 G/DL (ref 13–17.7)
LYMPHOCYTES # BLD AUTO: 0.36 10*3/MM3 (ref 0.7–3.1)
LYMPHOCYTES # BLD AUTO: 0.5 10*3/MM3 (ref 0.7–3.1)
LYMPHOCYTES NFR BLD AUTO: 4.6 % (ref 19.6–45.3)
LYMPHOCYTES NFR BLD AUTO: 6.3 % (ref 19.6–45.3)
MAGNESIUM SERPL-MCNC: 1.6 MG/DL (ref 1.6–2.6)
MCH RBC QN AUTO: 26.7 PG (ref 26.6–33)
MCH RBC QN AUTO: 27.7 PG (ref 26.6–33)
MCHC RBC AUTO-ENTMCNC: 31.6 G/DL (ref 31.5–35.7)
MCHC RBC AUTO-ENTMCNC: 31.7 G/DL (ref 31.5–35.7)
MCV RBC AUTO: 84.5 FL (ref 79–97)
MCV RBC AUTO: 87.3 FL (ref 79–97)
MONOCYTES # BLD AUTO: 0.75 10*3/MM3 (ref 0.1–0.9)
MONOCYTES # BLD AUTO: 0.8 10*3/MM3 (ref 0.1–0.9)
MONOCYTES NFR BLD AUTO: 10.3 % (ref 5–12)
MONOCYTES NFR BLD AUTO: 9.5 % (ref 5–12)
NEUTROPHILS NFR BLD AUTO: 6.2 10*3/MM3 (ref 1.7–7)
NEUTROPHILS NFR BLD AUTO: 6.49 10*3/MM3 (ref 1.7–7)
NEUTROPHILS NFR BLD AUTO: 78.8 % (ref 42.7–76)
NEUTROPHILS NFR BLD AUTO: 82.5 % (ref 42.7–76)
NRBC BLD AUTO-RTO: 0 /100 WBC (ref 0–0.2)
PLATELET # BLD AUTO: 506 10*3/MM3 (ref 140–450)
PLATELET # BLD AUTO: 569 10*3/MM3 (ref 140–450)
PMV BLD AUTO: 7.4 FL (ref 6–12)
PMV BLD AUTO: 9 FL (ref 6–12)
POTASSIUM SERPL-SCNC: 3.8 MMOL/L (ref 3.5–5.2)
PROT SERPL-MCNC: 6.6 G/DL (ref 6–8.5)
RAD ONC ARIA COURSE ID: NORMAL
RAD ONC ARIA COURSE LAST TREATMENT DATE: NORMAL
RAD ONC ARIA COURSE START DATE: NORMAL
RAD ONC ARIA COURSE TREATMENT ELAPSED DAYS: 29
RAD ONC ARIA FIRST TREATMENT DATE: NORMAL
RAD ONC ARIA PLAN FRACTIONS TREATED TO DATE: 15
RAD ONC ARIA PLAN ID: NORMAL
RAD ONC ARIA PLAN PRESCRIBED DOSE PER FRACTION: 2 GY
RAD ONC ARIA PLAN PRIMARY REFERENCE POINT: NORMAL
RAD ONC ARIA PLAN TOTAL FRACTIONS PRESCRIBED: 30
RAD ONC ARIA PLAN TOTAL PRESCRIBED DOSE: 6000 CGY
RAD ONC ARIA REFERENCE POINT DOSAGE GIVEN TO DATE: 30 GY
RAD ONC ARIA REFERENCE POINT ID: NORMAL
RAD ONC ARIA REFERENCE POINT SESSION DOSAGE GIVEN: 2 GY
RBC # BLD AUTO: 2.6 10*6/MM3 (ref 4.14–5.8)
RBC # BLD AUTO: 2.86 10*6/MM3 (ref 4.14–5.8)
RH BLD: POSITIVE
SODIUM SERPL-SCNC: 129 MMOL/L (ref 136–145)
T&S EXPIRATION DATE: NORMAL
WBC NRBC COR # BLD: 7.87 10*3/MM3 (ref 3.4–10.8)
WBC NRBC COR # BLD: 7.9 10*3/MM3 (ref 3.4–10.8)

## 2022-12-19 PROCEDURE — 86850 RBC ANTIBODY SCREEN: CPT

## 2022-12-19 PROCEDURE — 86901 BLOOD TYPING SEROLOGIC RH(D): CPT

## 2022-12-19 PROCEDURE — 77386: CPT | Performed by: RADIOLOGY

## 2022-12-19 PROCEDURE — 83735 ASSAY OF MAGNESIUM: CPT | Performed by: NURSE PRACTITIONER

## 2022-12-19 PROCEDURE — 77014 CHG CT GUIDANCE RADIATION THERAPY FLDS PLACEMENT: CPT | Performed by: RADIOLOGY

## 2022-12-19 PROCEDURE — 25010000002 ATEZOLIZUMAB 1200 MG/20ML SOLUTION 20 ML VIAL: Performed by: INTERNAL MEDICINE

## 2022-12-19 PROCEDURE — 80053 COMPREHEN METABOLIC PANEL: CPT | Performed by: INTERNAL MEDICINE

## 2022-12-19 PROCEDURE — 85025 COMPLETE CBC W/AUTO DIFF WBC: CPT | Performed by: INTERNAL MEDICINE

## 2022-12-19 PROCEDURE — 86923 COMPATIBILITY TEST ELECTRIC: CPT

## 2022-12-19 PROCEDURE — 86900 BLOOD TYPING SEROLOGIC ABO: CPT

## 2022-12-19 PROCEDURE — FACE2FACE: Performed by: RADIOLOGY

## 2022-12-19 PROCEDURE — 96413 CHEMO IV INFUSION 1 HR: CPT

## 2022-12-19 PROCEDURE — 25010000002 HEPARIN LOCK FLUSH PER 10 UNITS: Performed by: INTERNAL MEDICINE

## 2022-12-19 PROCEDURE — 82962 GLUCOSE BLOOD TEST: CPT

## 2022-12-19 PROCEDURE — 36415 COLL VENOUS BLD VENIPUNCTURE: CPT

## 2022-12-19 RX ORDER — SODIUM CHLORIDE 9 MG/ML
250 INJECTION, SOLUTION INTRAVENOUS AS NEEDED
Status: CANCELLED | OUTPATIENT
Start: 2022-12-19

## 2022-12-19 RX ORDER — HEPARIN SODIUM (PORCINE) LOCK FLUSH IV SOLN 100 UNIT/ML 100 UNIT/ML
500 SOLUTION INTRAVENOUS AS NEEDED
Status: DISCONTINUED | OUTPATIENT
Start: 2022-12-19 | End: 2022-12-20 | Stop reason: HOSPADM

## 2022-12-19 RX ORDER — SODIUM CHLORIDE 0.9 % (FLUSH) 0.9 %
20 SYRINGE (ML) INJECTION AS NEEDED
Status: DISCONTINUED | OUTPATIENT
Start: 2022-12-19 | End: 2022-12-20 | Stop reason: HOSPADM

## 2022-12-19 RX ORDER — SODIUM CHLORIDE 9 MG/ML
250 INJECTION, SOLUTION INTRAVENOUS ONCE
Status: CANCELLED | OUTPATIENT
Start: 2022-12-19

## 2022-12-19 RX ORDER — SODIUM CHLORIDE 9 MG/ML
250 INJECTION, SOLUTION INTRAVENOUS ONCE
Status: COMPLETED | OUTPATIENT
Start: 2022-12-19 | End: 2022-12-19

## 2022-12-19 RX ADMIN — SODIUM CHLORIDE 250 ML: 9 INJECTION, SOLUTION INTRAVENOUS at 13:47

## 2022-12-19 RX ADMIN — Medication 20 ML: at 14:30

## 2022-12-19 RX ADMIN — ATEZOLIZUMAB 1200 MG: 1200 INJECTION, SOLUTION INTRAVENOUS at 13:49

## 2022-12-19 RX ADMIN — HEPARIN 500 UNITS: 100 SYRINGE at 14:32

## 2022-12-19 NOTE — PROGRESS NOTES
Message below sent to MD Dr. Gaspar, pt. is here for C2 Tecentiq, pt. was released from the hospital last Sunday. Pt. states he was admitted due to weakness, low sodium. Pt. states he is feeling better today. Pt's CBC results WBC 7.87, HGB 7.2, Plts 506, ANC 6490, Is it ok to continue with treatment and could you release treatment from hold an SIGN?   Orders given Ok to treat, transfuse one unit PRBC's today or tomorrow per Dr. Gaspar  Treatment given as ordered and pt. Tolerated well.   Talya PHILIP RN called over to ambulatory care to schedule transfusion.   Pt. Was instructed to go to [facility] after his radiation treatment today to get his T&S done and plan blood transfusion for tomorrow. I instructed pt. If he does not make it over there by 3:30 or so he was instructed to go to main lab to get his T&S done.   Pt. Verbalized understanding.   Pt. Discharged from clinic and AVS was given.

## 2022-12-19 NOTE — PROGRESS NOTES
"NAME: Jc Driscoll DATE: 2022   : 1965    MRN: [id number] DIAGNOSIS:   Encounter Diagnosis   Name Primary?   • Malignant neoplasm of upper lobe of left lung (HCC) Yes          RADIATION ON TREATMENT VISIT NOTE - CHEST    Treatment Summary    Treatment Site Ref. ID Energy Dose/Fx (cGy) #Fx Dose Correction (cGy) Total Dose (cGy) Start Date End Date Elapsed Days   LtSC_CWRecur LtScv_CWRecur 6X 200 15 / 30 0 3,000 2022 29         General:     Review of Systems  [x] No new complaints  [] Fever/chills  Night sweats  [] Decreased energy level [] Decreased appetite [] Oxygen:  L/min  [] Dry cough    [] Productive cough   [] SOB  [] Hemoptysis   [] Dysphagia      [x] Fatigue,  severity: 1 [x] Pain,  severity: 0  Pain medication regimen: Oxycodone    Skin regimen: Aquaphor discussed    Subjective:  He states that he is doing well, no concerns or complaints.  LUE pain has improved during the treatment course    KPS: 90     Vital Signs: /68   Pulse 112   Temp 97.8 °F (36.6 °C) (Oral)   Resp 18   Ht 190.5 cm (75\")   Wt 82.6 kg (182 lb)   SpO2 99%   BMI 22.75 kg/m²     Weight:   Wt Readings from Last 3 Encounters:   22 82.6 kg (182 lb)   22 82.6 kg (182 lb)   22 83.9 kg (185 lb)       Medication:   Current Outpatient Medications:   •  demeclocycline (DECLOMYCIN) 300 MG tablet, Take 1 tablet by mouth Every 12 (Twelve) Hours for 198 doses. Indications: SIADH, Disp: 60 tablet, Rfl: 3  •  desvenlafaxine (PRISTIQ) 50 MG 24 hr tablet, Take 50 mg by mouth Daily., Disp: , Rfl:   •  diazePAM (Valium) 2 MG tablet, Take 1 tablet by mouth At Night As Needed for Anxiety or Muscle Spasms., Disp: 30 tablet, Rfl: 0  •  folic acid (FOLVITE) 1 MG tablet, Take 1 tablet by mouth Daily for 30 days., Disp: 30 tablet, Rfl: 2  •  levothyroxine (SYNTHROID, LEVOTHROID) 100 MCG tablet, Take 100 mcg by mouth Every Morning., Disp: , Rfl:   •  liothyronine (CYTOMEL) 5 MCG tablet, Take 5 mcg by mouth " Daily., Disp: , Rfl:   •  LORazepam (ATIVAN) 0.5 MG tablet, Take 0.5 mg by mouth Every 8 (Eight) Hours As Needed., Disp: , Rfl:   •  metoprolol succinate XL (TOPROL-XL) 100 MG 24 hr tablet, Take 100 mg by mouth Daily., Disp: , Rfl:   •  OLANZapine (zyPREXA) 5 MG tablet, Take 1 tablet by mouth Every Night. Take on days 2, 3 and 4 after chemotherapy., Disp: 3 tablet, Rfl: 5  •  ondansetron (ZOFRAN) 8 MG tablet, Take 1 tablet by mouth 3 (Three) Times a Day As Needed for Nausea or Vomiting., Disp: 30 tablet, Rfl: 5  •  oxyCODONE-acetaminophen (PERCOCET) 5-325 MG per tablet, Take 1 tablet by mouth Every 6 (Six) Hours As Needed for Moderate Pain., Disp: 40 tablet, Rfl: 0  •  pantoprazole (PROTONIX) 40 MG EC tablet, Take 40 mg by mouth Daily As Needed., Disp: , Rfl:   •  vitamin B-12 (CYANOCOBALAMIN) 1000 MCG tablet, Take 1,000 mcg by mouth Daily., Disp: , Rfl:   No current facility-administered medications for this visit.    Facility-Administered Medications Ordered in Other Visits:   •  heparin injection 500 Units, 500 Units, Intravenous, PRN, Azucena Gaspar MD, 500 Units at 12/19/22 1432  •  sodium chloride 0.9 % flush 20 mL, 20 mL, Intravenous, PRNChasity Ifeoma Roseline, MD, 20 mL at 12/19/22 1430     LABS (Reviewed):  Hematology  WBC   Date Value Ref Range Status   12/19/2022 7.87 3.40 - 10.80 10*3/mm3 Final   03/22/2022 7.84 4.5 - 11.0 10*3/uL Final     RBC   Date Value Ref Range Status   12/19/2022 2.60 (L) 4.14 - 5.80 10*6/mm3 Final   03/22/2022 4.23 (L) 4.5 - 5.9 10*6/uL Final     Hemoglobin   Date Value Ref Range Status   12/19/2022 7.2 (C) 13.0 - 17.7 g/dL Final   03/22/2022 12.9 (L) 13.5 - 17.5 g/dL Final     Hematocrit   Date Value Ref Range Status   12/19/2022 22.7 (C) 37.5 - 51.0 % Final   03/22/2022 38.8 (L) 41.0 - 53.0 % Final     Platelets   Date Value Ref Range Status   12/19/2022 506 (H) 140 - 450 10*3/mm3 Final   03/22/2022 324 140 - 440 10*3/uL Final     Chemistry   Lab Results   Component  Value Date    GLUCOSE 151 (H) 12/19/2022    BUN 18 12/19/2022    CREATININE 0.94 12/19/2022    EGFRIFNONA 100 01/28/2022    EGFRIFAFRI >60 05/20/2019    BCR 19.1 12/19/2022    K 3.8 12/19/2022    CO2 27.0 12/19/2022    CALCIUM 8.9 12/19/2022    ALBUMIN 3.20 (L) 12/19/2022    LABIL2 1.5 03/22/2022    AST 28 12/19/2022    ALT 38 12/19/2022         Physical Exam:     Pulmonary: [] Cough:     [] Dyspnea:  Neck:  [] Lymphadenopathy  Lungs:  [x] Clear to auscultation  CVS:  [x] Regular rate & rhythm  Skin:  stGstrstastdstest:st st1st GI:  [] Dysphagia:     [x] Esophagitis: denies issues    Comments/Notes:      [x] Review of labs, images, dosimetry, dose delivered, & treatment parameters.    Comments:     [x] Patient treatment setup reviewed.    Comments:     Recommendations:  Continue XRT and supportive measures  Doing well  Will be getting next cycle of Atezolizumab    [x] Continue RT  [] Change RT Plan [] Hold RT, length:        Approved Electronically By:  Chino Escalona MD, 12/19/2022, 15:33 EST      Confidentiality of this medical record shall be maintained except when use or disclosure is required or permitted by law, regulation or written authorization by the patient.

## 2022-12-20 ENCOUNTER — HOSPITAL ENCOUNTER (OUTPATIENT)
Dept: RADIATION ONCOLOGY | Facility: HOSPITAL | Age: 57
Discharge: HOME | End: 2022-12-20

## 2022-12-20 ENCOUNTER — HOSPITAL ENCOUNTER (OUTPATIENT)
Dept: INFUSION THERAPY | Facility: HOSPITAL | Age: 57
Setting detail: INFUSION SERIES
Discharge: HOME | End: 2022-12-20

## 2022-12-20 ENCOUNTER — APPOINTMENT (OUTPATIENT)
Dept: LAB | Facility: HOSPITAL | Age: 57
End: 2022-12-20
Payer: COMMERCIAL

## 2022-12-20 VITALS
DIASTOLIC BLOOD PRESSURE: 61 MMHG | HEART RATE: 104 BPM | OXYGEN SATURATION: 98 % | SYSTOLIC BLOOD PRESSURE: 102 MMHG | TEMPERATURE: 98 F | RESPIRATION RATE: 18 BRPM

## 2022-12-20 DIAGNOSIS — D64.9 ANEMIA, UNSPECIFIED TYPE: Primary | ICD-10-CM

## 2022-12-20 DIAGNOSIS — Z45.2 ENCOUNTER FOR CARE RELATED TO VASCULAR ACCESS PORT: ICD-10-CM

## 2022-12-20 LAB
RAD ONC ARIA COURSE ID: NORMAL
RAD ONC ARIA COURSE LAST TREATMENT DATE: NORMAL
RAD ONC ARIA COURSE START DATE: NORMAL
RAD ONC ARIA COURSE TREATMENT ELAPSED DAYS: 30
RAD ONC ARIA FIRST TREATMENT DATE: NORMAL
RAD ONC ARIA PLAN FRACTIONS TREATED TO DATE: 16
RAD ONC ARIA PLAN ID: NORMAL
RAD ONC ARIA PLAN PRESCRIBED DOSE PER FRACTION: 2 GY
RAD ONC ARIA PLAN PRIMARY REFERENCE POINT: NORMAL
RAD ONC ARIA PLAN TOTAL FRACTIONS PRESCRIBED: 30
RAD ONC ARIA PLAN TOTAL PRESCRIBED DOSE: 6000 CGY
RAD ONC ARIA REFERENCE POINT DOSAGE GIVEN TO DATE: 32 GY
RAD ONC ARIA REFERENCE POINT ID: NORMAL
RAD ONC ARIA REFERENCE POINT SESSION DOSAGE GIVEN: 2 GY

## 2022-12-20 PROCEDURE — 36430 TRANSFUSION BLD/BLD COMPNT: CPT

## 2022-12-20 PROCEDURE — 77014 CHG CT GUIDANCE RADIATION THERAPY FLDS PLACEMENT: CPT | Performed by: RADIOLOGY

## 2022-12-20 PROCEDURE — 86900 BLOOD TYPING SEROLOGIC ABO: CPT

## 2022-12-20 PROCEDURE — P9016 RBC LEUKOCYTES REDUCED: HCPCS

## 2022-12-20 PROCEDURE — 25010000002 HEPARIN LOCK FLUSH PER 10 UNITS: Performed by: INTERNAL MEDICINE

## 2022-12-20 PROCEDURE — 77386: CPT | Performed by: RADIOLOGY

## 2022-12-20 PROCEDURE — 77427 RADIATION TX MANAGEMENT X5: CPT | Performed by: RADIOLOGY

## 2022-12-20 PROCEDURE — 96374 THER/PROPH/DIAG INJ IV PUSH: CPT

## 2022-12-20 RX ORDER — SODIUM CHLORIDE 0.9 % (FLUSH) 0.9 %
20 SYRINGE (ML) INJECTION AS NEEDED
Status: DISCONTINUED | OUTPATIENT
Start: 2022-12-20 | End: 2022-12-22 | Stop reason: HOSPADM

## 2022-12-20 RX ORDER — SODIUM CHLORIDE 9 MG/ML
250 INJECTION, SOLUTION INTRAVENOUS AS NEEDED
Status: DISCONTINUED | OUTPATIENT
Start: 2022-12-20 | End: 2022-12-22 | Stop reason: HOSPADM

## 2022-12-20 RX ORDER — HEPARIN SODIUM (PORCINE) LOCK FLUSH IV SOLN 100 UNIT/ML 100 UNIT/ML
500 SOLUTION INTRAVENOUS AS NEEDED
Status: DISCONTINUED | OUTPATIENT
Start: 2022-12-20 | End: 2022-12-22 | Stop reason: HOSPADM

## 2022-12-20 RX ADMIN — Medication 500 UNITS: at 13:41

## 2022-12-20 NOTE — PROGRESS NOTES
Hematology/Oncology Outpatient Follow Up    PATIENT NAME:Jc Driscoll  :1965  MRN: [id number]  PRIMARY CARE PHYSICIAN: Reshma Alvarenga MD  REFERRING PHYSICIAN: Reshma Alvarenga MD    Chief Complaint   Patient presents with   • Follow-up     Non-small cell carcinoma of lung, right (HCC)        HISTORY OF PRESENT ILLNESS:                                                                                                                                                                                                                                                             This is a 57 y.o. who developed left shoulder pain and for that reason he had a chest x-ray done on 19 which showed a 2.7 cm noncalcified right lower lobe nodule was identified.  For that reason a CT scan of the chest was recommended.  Review of his records shows patient had the CT scan on 19 done without contrast.  This basically showed a lobulated noncalcified mass in the posterior aspect of the right lower lobe measuring 3 cm close to the pleural surface.  There is a calcified 1.2 mm nodule in the lateral aspect of the right lower lobe consistent with a granuloma.  There were also calcified subcarinal and right hilar nodules.  There is a lower right side tracheal nodule measuring 1 cm.  Patient had a PET/CT scan done on 19 which showed increased metabolic activity on the right lower lobe mass that measures 2.5 cm with SUV of 4.4.  There was also increased metabolic activity in the subcarinal space measuring 2.8 cm in size with  SUV of 3.4, increased activity in an enlarged right paratracheal lymph node that measures 1.9 cm, SUV of 5, also suspicious for metastatic adenopathy.  Patient had a CT-guided biopsy of the right lower lobe mass on 3/8/19.  This showed adenocarcinoma, TTF-1 positive.  On 3/18/19 patient underwent endoscopic ultrasound and biopsy of a subcarinal lymph node.  This was positive for malignant cells.  Patient was then taken back to surgery on 3/20/18 and had left Mediport placement by Dr. Fuchs.  He has been referred for further evaluation and management of his stage 3 adenocarcinoma of the right lung.  He was accompanied by his spouse for the appointment on 3/26/19.  Patient was scheduled to have a brain MRI for complete staging, but he could not do this due to claustrophobia.  He is willing to try with the help of angiolytics.    • Patient had brain MRI done on 4/5/19.  He states it did not show any evidence of metastatic disease.  Evidence of chronic sinusitis was noted.    • Patient was seen by Dr. Escalona who has recommended concurrent chemotherapy and radiation.  Patient is scheduled to begin on 4/15/19.   • 4/17/19 - Patient was initiated on combined chemotherapy and radiation with Cisplatin and Alimta.  Patient received cycle 1.      • 5/8/19 - Patient received cycle 2 of chemotherapy with Cisplatin and Alimta.   • 5/15/19 - Chemistry panel showed creatinine of 1.7.  WBC 1.8, hemoglobin 11.6, platelet count 72,000.   • 5/20/19 - BUN 10, creatinine 1.2, potassium 3.5.    • 5/23/19 - CT scan of the chest with contrast showed interval decrease in size of the right lower lobe pulmonary nodule now measuring 2.1 cm in size.  There was no mediastinal or hilar adenopathy identified.  • 6/26/2019 patient underwent right lower lobectomy with hilar and mediastinal lymph node dissection performed by Dr. Terrance Mckeon.  • Pathology revealed residual residual 2.2 cm invasive moderately differentiated adenocarcinoma.  There  were a total of 16 lymph nodes evaluated that 7 had metastatic disease.  There was evidence of lymphovascular invasion, extranodal involvement.  All the surgical margins were negative and distance of the closest margin was 2.5 cm.  Logic stage is T1c N2 M0.  • Patient is here today accompanied by his spouse for this appointment.  He continues to complain of some postop discomfort, numbness but his skin is intact.  There is no drainage.  Has had follow-up with Dr. Fuchs.  • 8/14/2019-seen by Dr. Maria at the [facility].  Dr. Maria has recommended immunotherapy with durvalumab off label use as patient has not had significant response to neoadjuvant chemotherapy and radiation.  Patient was given the option to have immunotherapy at the [facility] vs [place] and he has chosen to stay in [place]  • 8/21/19 - Started cycle 1 day 1 Imfinzi  • 9/4/2019 patient had CT scan of the chest this shows a moderate size right pleural effusion.  There are areas of groundglass opacification in the right upper lobe without any evidence of significant septal thickening.  Volume loss noted there is also moderate pleural effusion.  There is no suspicious recurrent malignancy.  There were nonenlarged residual mediastinal lymph nodes.  • 9/9/19 - WBC 6.23, Hgb 11.7 Platelets 257,000, , BUN 9, Cr 1.1,Vitamin B12 318, Ferritin 437  • 9/23/19 - received cycle 2 day 1 Imfinzi  • 10/9/19- Seen by Dr rodríguez with ENT for sinus disease  • 11/4/2019-patient received cycle 5 of Imfinzi(durvalumab)  • 12/2/2019 patient had cycle 7 of durvalumab  • 12/5/2019-patient had CT scan of the  abdomen and pelvis with small right pleural sided pleural effusion.There is no evidence of metastatic disease  • 12/30/2019-patient received cycle 9 of durvalumab  • 12/31/2019-patient had CT scan of the chest showed small to moderate left pleural effusion.  Iglesias appearing right lower paratracheal and right peribronchial soft tissue thickening without any  discrete pathologically enlarged mediastinal or hilar lymph nodes.  • 1/27/20-patient received cycle 11 of durvalumab  • 2/17/20-patient had a stress test which was negative for ischemia  • 2/17/2020-patient had CT of the chest which showed postop changes on the right lung, right pleural effusion but no enlarging mass was noted  • 3/2/2020-patient received cycle 12 of durvalumab  • 3/16/2020-patient had ultrasound-guided right thoracentesis.  Pathology was negative for malignancy  • 4/13/2020-patient received cycle 15 of immunotherapy with durvalumab  • 5/11/2020-patient received cycle 17 of immunotherapy with durvalumab  • 6/9/2020-patient had CT scan of the chest, abdomen and pelvis for lung cancer restaging.  There is stable small chronic loculated right basilar pleural effusion suggesting chronic pleuritis.  There is no evidence of metastatic disease in the abdomen or pelvis  • 7/20/2020 patient received cycle 22 of Durvalumab  • 8/17/2020 patient received cycle 24 and last cycle of durvalumab  • 9/24/2020 patient had CT scan of the chest, abdomen and pelvis for cancer restaging there was no evidence of local recurrence or metastatic disease within the abdomen and pelvis.  He has stable chronic small right pleural effusion.  Mild sigmoid diverticulosis was noted.  • 1/18/2021 patient had CT scan of the chest, abdomen and pelvis reviewed patient  • 5/24/2021 patient had CT scan of the chest calcified subcarinal lymph node consistent with changes of prior granulomatous disease.  Chronic small right pleural effusion is noted similar to prior exam.  Previously shown 4 mm nodule in the left lower lobe has nearly completely resolved.  The subpleural 3 mm nodule located within the posterior left lower lobe With resolved.  • 9/24/2021 patient had a CT scan of the chest, abdomen and pelvis there is soft tissue attenuation within the right paratracheal area which has been suggested to reflect sequela to previous  treatment.  There is slight thickening of the bladder wall otherwise there is no evidence of metastatic disease.  • 12/27/2021 patient had CT scan of the chest with contrast this basically showed irregular shaped noncalcified 7 mm left upper lobe pulmonary nodule.  PET CT scan was recommended to further evaluate.  • 1/26/2022 patient had a PET CT scan done which basically showed evidence of mild uptake corresponding to the nodule within the left upper lobe which is new worrisome for developing metastatic focus.  There was mild radiopharmaceutical activity corresponding to pleural thickening throughout the right lung base with associated pleural effusion likely related to post therapeutic changes and radiation changes in this region.  Metastatic malignancy involving the pleura could not be completely eliminated.  • 1/26/2022: CT-guided biopsy was aborted due to finding by the radiologist that the lung nodularity was actually a clump of tiny nodules of which the largest was 6 mm and therefore biopsy could not be completed.  Also the area was in the vicinity of multiple blood vessels with increased risk of bleeding.  Follow-up CT of the chest was recommended for continued surveillance  • 5/18/2022 patient had CT-guided biopsy of the left enlarging nodule, pathology was positive for invasive poorly differentiated adenocarcinoma most consistent with adenocarcinoma of pulmonary origin.  • 5/27/2022 patient had a PET CT scan which showed a 2.3 cm hypermetabolic left upper lobe nodule which has increased in size no convincing evidence of metastatic disease elsewhere within the chest.  • 5/21/2022 patient had brain MRI which did not show any evidence of intracranial metastasis  • 6/2/2022 patient was seen by Dr. Miryam Reyna pulmonary function test is being done to assess surgical candidacy  • 7/6/2022 patient underwent left heart Koska P video-assisted with upper lobectomy mediastinal lymph node dissection performed by   Miryam Roach  • Final pathology revealed poorly differentiated  • A predominant adenocarcinoma with solid component presenting 45% and micropapillary component 5% with extensive necrosis, invasive carcinoma measures 2.5 maximally there was focal lymphovascular space invasion all margins were negative for malignancy discussed with the closest margin was 2.5 cm pathology stage is pT1C pN1.  PD-L1 showed a tumor proportion score of 95%  • 8/3/2022 patient started adjuvant chemotherapy with cisplatin, Taxol  • 8/24/2022 patient received cycle 2 of combination chemotherapy with cisplatin and Taxol with Neulasta  • 10/5/2022: Patient received cycle 4-day 1 of combination chemotherapy with cisplatin and Taxol  • Patient developed left-sided chest pain for that reason he had a PET/CT scan 1/20/2020 is basically revealed a 4 x 3.5 cm hypermetabolic soft tissue mass in the left chest wall between the first and second ribs anteriorly consistent with relapsed disease.  There was a small cluster of hypermetabolic lymph nodes seen in the left supraclavicular region consistent with early manoj metastasis  • 11/11/2022: 2D echocardiogram performed showing a left ventricular EF of 56 to 60%  • 11/22/2022: Cycle 1 of atezolizumab received  • 12/5/2022-12/9/2022: Hospitalized at [facility] for dehydration, hyponatremia, and weakness.  • 12/19/2020: Patient received cycle 2 of Tecentriq along with XRT    Past Medical History:   Diagnosis Date   • Allergic rhinitis 07/25/2013    Overview:  4/2/2018 - still zyrtec 10 daily (if stops, gets dermatitis again).    • Anxiety and depression 06/05/2012    Overview:  4/2/2018 -   C/w well 300 xr and Lito 20.  4/8/2019 -   Doing ok even with new lung cancer - lito 20 & well 300xr.    • Chronic pansinusitis 04/08/2019    Overview:  4/8/2019 -   MRI showed chronic thick in frontal, maxillary, ethmoidal ---> to Dr. ZAY field since abx ICC didn't help.  Does netipot bid.    • Diastolic CHF (HCC) 07/09/2014     Overview:  7/4/14 - impaired LV relaxation on [place] ECHO.  EF 60%. Rest normal   • Dupuytren's contracture of both hands    • Elevated cholesterol    • Erosive esophagitis 07/09/2019    Overview:  EGD 2016 4/2/2018 - nexium OTC daily for few week.  Qod before that.  Now carbonation hurts, epigastric pain 4/8/2019 -   protonix 40 doing well.    • Gastroesophageal reflux disease 02/21/2019   • Hiatal hernia 07/09/2019   • History of colon polyps    • Hypertension    • Lung cancer (HCC)     right lung and in lymph nodes x2   • Mediastinal lymphadenopathy 03/12/2019   • Normocytic anemia 08/08/2019    Overview:  6/2019 - dropped to 9's postop 7/2019 - rebounded to 10's.  8/8/2019 -   Back to 9's - check ferritin, b12 and thyroid.    • Prediabetes 07/09/2014    Overview:  7/4/14 - a1c 6.1 at [place] admission   • Retention of urine 06/27/2019    PSOT OP, RESOLVED       Past Surgical History:   Procedure Laterality Date   • BRONCHOSCOPY  03/18/2019    EBUS MONTERO NEEDLE BX   • COLONOSCOPY     • DUPUYTREN CONTRACTURE RELEASE Bilateral    • LUNG BIOPSY  03/08/2019    CT GUIDED   • LUNG REMOVAL, PARTIAL Right     right lower   • PORTACATH PLACEMENT  03/20/2019    DR LONGORIA   • THORACOSCOPY Right 6/26/2019    Procedure: RIGHT VATS, OPEN LOWER LOBECTOMY WITH LYMPH NODE DISECTION, WEDGE RESECTION OF RIGHT MIDDLE LOBE;  Surgeon: Terrance Longoria MD;  Location: [facility] OR;  Service: Cardiothoracic   • THORACOSCOPY VIDEO ASSISTED WITH LOBECTOMY Left 7/6/2022    Procedure: THORACOSCOPY VIDEO ASSISTED WITH LOBECTOMY;  Surgeon: Miryam Reyna MD;  Location: [facility] MAIN OR;  Service: Thoracic;  Laterality: Left;         Current Outpatient Medications:   •  levothyroxine (SYNTHROID, LEVOTHROID) 100 MCG tablet, Take 1 tablet by mouth Daily., Disp: , Rfl:   •  demeclocycline (DECLOMYCIN) 300 MG tablet, Take 1 tablet by mouth Every 12 (Twelve) Hours for 198 doses. Indications: SIADH, Disp: 60 tablet, Rfl: 3  •  desvenlafaxine  (PRISTIQ) 50 MG 24 hr tablet, Take 50 mg by mouth Daily., Disp: , Rfl:   •  folic acid (FOLVITE) 1 MG tablet, Take 1 tablet by mouth Daily for 30 days., Disp: 30 tablet, Rfl: 2  •  levothyroxine (SYNTHROID, LEVOTHROID) 100 MCG tablet, Take 100 mcg by mouth Every Morning., Disp: , Rfl:   •  liothyronine (CYTOMEL) 5 MCG tablet, Take 5 mcg by mouth Daily., Disp: , Rfl:   •  LORazepam (ATIVAN) 0.5 MG tablet, Take 0.5 mg by mouth Every 8 (Eight) Hours As Needed., Disp: , Rfl:   •  metoprolol succinate XL (TOPROL-XL) 100 MG 24 hr tablet, Take 100 mg by mouth Daily., Disp: , Rfl:   •  ondansetron (ZOFRAN) 8 MG tablet, Take 1 tablet by mouth 3 (Three) Times a Day As Needed for Nausea or Vomiting., Disp: 30 tablet, Rfl: 5  •  oxyCODONE-acetaminophen (PERCOCET) 5-325 MG per tablet, Take 1 tablet by mouth Every 6 (Six) Hours As Needed for Moderate Pain., Disp: 40 tablet, Rfl: 0  •  pantoprazole (PROTONIX) 40 MG EC tablet, Take 40 mg by mouth Daily As Needed., Disp: , Rfl:   •  Ure-Na 15 g pack packet, TAKE 1 PACKET BY MOUTH TWICE DAILY AS DIRECTED FOR 2 DOSES, Disp: , Rfl:   •  vitamin B-12 (CYANOCOBALAMIN) 1000 MCG tablet, Take 1,000 mcg by mouth Daily., Disp: , Rfl:   No current facility-administered medications for this visit.    No Known Allergies    Family History   Problem Relation Age of Onset   • Cancer Mother         cervical cancer   • Cervical cancer Mother    • Cancer Father         lung cancer   • Lung cancer Father    • Lung cancer Sister    • Cancer Sister         lung cancer   • Malig Hyperthermia Neg Hx        Cancer-related family history includes Cancer in his father, mother, and sister; Cervical cancer in his mother; Lung cancer in his father and sister.    Social History     Tobacco Use   • Smoking status: Former     Types: Cigarettes     Quit date: 2009     Years since quittin.3   • Smokeless tobacco: Never   Vaping Use   • Vaping Use: Never used   Substance Use Topics   • Alcohol use: Yes      "Alcohol/week: 2.0 standard drinks     Types: 2 Cans of beer per week     Comment: Occasional   • Drug use: No       I have reviewed and confirmed the accuracy of the patient's history: Chief complaint, HPI, ROS and Subjective as entered by the MA/LPN/RN. Azucena Gaspar MD 12/22/22     SUBJECTIVE:    Patient still feels weak.  He denies nausea or vomiting.  He denies any fevers.  His appetite is beginning to improve      REVIEW OF SYSTEMS:    Review of Systems   Constitutional: Positive for fatigue. Negative for chills and fever.   HENT: Negative for ear pain, mouth sores, nosebleeds and sore throat.    Eyes: Negative for photophobia and visual disturbance.   Respiratory: Negative for wheezing and stridor.    Cardiovascular: Negative for chest pain and palpitations.   Gastrointestinal: Negative for abdominal pain, diarrhea, nausea and vomiting.   Endocrine: Negative for cold intolerance and heat intolerance.   Genitourinary: Negative for dysuria and hematuria.   Musculoskeletal: Negative for joint swelling and neck stiffness.   Skin: Negative for color change and rash.   Neurological: Negative for seizures and syncope.   Hematological: Negative for adenopathy.   Psychiatric/Behavioral: Negative for agitation, confusion and hallucinations.           OBJECTIVE:    Vitals:    12/21/22 1555   BP: 108/70   Pulse: 114   Resp: 20   Temp: 96.6 °F (35.9 °C)   TempSrc: Infrared   Height: 190.5 cm (75\")   PainSc:   2   PainLoc: Comment: left side     ECOG    Eastern Cooperative Oncology Group (ECOG, [place], World Health Organization) performance scale  0 Fully active; no performance restrictions.  1 Strenuous physical activity restricted; fully ambulatory and able to carry out light work.  2 Capable of all self-care but unable to carry out any work activities. Up and about >50% of waking hours.  3 Capable of only limited self-care; confined to bed or chair >50% of waking hours.  4 Completely disabled; cannot carry out " any self-care; totally confined to bed or chair.    Physical Exam   Constitutional: He is oriented to person, place, and time. He appears well-developed. No distress.   HENT:   Head: Normocephalic and atraumatic.   Right Ear: External ear normal.   Left Ear: External ear normal.   Nose: Nose normal.   Eyes: Pupils are equal, round, and reactive to light. Conjunctivae are normal. Right eye exhibits no discharge. Left eye exhibits no discharge. No scleral icterus.   Neck: No thyromegaly present.   Cardiovascular: Normal rate, regular rhythm and normal heart sounds. Exam reveals no gallop and no friction rub.   Pulmonary/Chest: Effort normal. No stridor. No respiratory distress. He has no wheezes.   Abdominal: Soft. Bowel sounds are normal. He exhibits no mass. There is no abdominal tenderness. There is no rebound and no guarding.   Musculoskeletal: Normal range of motion. No tenderness.   Lymphadenopathy:     He has no cervical adenopathy.   Neurological: He is alert and oriented to person, place, and time. He exhibits normal muscle tone.   Skin: Skin is warm. No rash noted. He is not diaphoretic. No erythema.   Urticarial-like rash, right medial knee likely due to immunotherapy   Psychiatric: His behavior is normal. Judgment and thought content normal.   Nursing note and vitals reviewed.    I have reexamined the patient and the results are consistent with the previously documented exam. Azucena Gaspar MD       RECENT LABS    WBC   Date Value Ref Range Status   12/21/2022 7.81 3.40 - 10.80 10*3/mm3 Final   03/22/2022 7.84 4.5 - 11.0 10*3/uL Final     RBC   Date Value Ref Range Status   12/21/2022 3.24 (L) 4.14 - 5.80 10*6/mm3 Final   03/22/2022 4.23 (L) 4.5 - 5.9 10*6/uL Final     Hemoglobin   Date Value Ref Range Status   12/21/2022 9.1 (L) 13.0 - 17.7 g/dL Final   03/22/2022 12.9 (L) 13.5 - 17.5 g/dL Final     Hematocrit   Date Value Ref Range Status   12/21/2022 28.6 (L) 37.5 - 51.0 % Final   03/22/2022  38.8 (L) 41.0 - 53.0 % Final     MCV   Date Value Ref Range Status   12/21/2022 88.3 79.0 - 97.0 fL Final   03/22/2022 91.7 80.0 - 100.0 fL Final     MCH   Date Value Ref Range Status   12/21/2022 28.1 26.6 - 33.0 pg Final   03/22/2022 30.5 26.0 - 34.0 pg Final     MCHC   Date Value Ref Range Status   12/21/2022 31.8 31.5 - 35.7 g/dL Final   03/22/2022 33.2 31.0 - 37.0 g/dL Final     RDW   Date Value Ref Range Status   12/21/2022 16.2 (H) 12.3 - 15.4 % Final   03/22/2022 14.2 12.0 - 16.8 % Final     RDW-SD   Date Value Ref Range Status   12/21/2022 51.0 37.0 - 54.0 fl Final     MPV   Date Value Ref Range Status   12/21/2022 8.6 6.0 - 12.0 fL Final   03/22/2022 9.3 8.4 - 12.4 fL Final     Platelets   Date Value Ref Range Status   12/21/2022 475 (H) 140 - 450 10*3/mm3 Final   03/22/2022 324 140 - 440 10*3/uL Final     Neutrophil Rel %   Date Value Ref Range Status   03/22/2022 63.1 45 - 80 % Final     Neutrophil %   Date Value Ref Range Status   12/21/2022 78.0 (H) 42.7 - 76.0 % Final     Lymphocyte Rel %   Date Value Ref Range Status   03/22/2022 16.2 15 - 50 % Final     Lymphocyte %   Date Value Ref Range Status   12/21/2022 6.0 (L) 19.6 - 45.3 % Final     Monocyte Rel %   Date Value Ref Range Status   03/22/2022 13.6 0 - 15 % Final     Monocyte %   Date Value Ref Range Status   12/21/2022 12.3 (H) 5.0 - 12.0 % Final     Eosinophil %   Date Value Ref Range Status   12/21/2022 3.3 0.3 - 6.2 % Final   03/22/2022 5.5 0 - 7 % Final     Basophil Rel %   Date Value Ref Range Status   03/22/2022 1.1 0 - 2 % Final     Basophil %   Date Value Ref Range Status   12/21/2022 0.4 0.0 - 1.5 % Final     Immature Grans %   Date Value Ref Range Status   07/07/2022 0.4 0.0 - 0.5 % Final   03/22/2022 0.5 0.0 - 1.0 % Final     Neutrophils Absolute   Date Value Ref Range Status   03/22/2022 4.94 2.0 - 8.8 10*3/uL Final     Neutrophils, Absolute   Date Value Ref Range Status   12/21/2022 6.09 1.70 - 7.00 10*3/mm3 Final     Lymphocytes  Absolute   Date Value Ref Range Status   03/22/2022 1.27 0.7 - 5.5 10*3/uL Final     Lymphocytes, Absolute   Date Value Ref Range Status   12/21/2022 0.47 (L) 0.70 - 3.10 10*3/mm3 Final     Monocytes Absolute   Date Value Ref Range Status   03/22/2022 1.07 0.0 - 1.7 10*3/uL Final     Monocytes, Absolute   Date Value Ref Range Status   12/21/2022 0.96 (H) 0.10 - 0.90 10*3/mm3 Final     Eosinophils Absolute   Date Value Ref Range Status   03/22/2022 0.43 0.0 - 0.8 10*3/uL Final     Eosinophils, Absolute   Date Value Ref Range Status   12/21/2022 0.26 0.00 - 0.40 10*3/mm3 Final     Basophils Absolute   Date Value Ref Range Status   03/22/2022 0.09 0.0 - 0.2 10*3/uL Final     Basophils, Absolute   Date Value Ref Range Status   12/21/2022 0.03 0.00 - 0.20 10*3/mm3 Final     Immature Grans, Absolute   Date Value Ref Range Status   07/07/2022 0.05 0.00 - 0.05 10*3/mm3 Final   03/22/2022 0.04 0.00 - 0.10 10*3/uL Final     nRBC   Date Value Ref Range Status   12/19/2022 0.0 0.0 - 0.2 /100 WBC Final       Lab Results   Component Value Date    GLUCOSE 136 (H) 12/21/2022    BUN 11 12/21/2022    CREATININE 0.99 12/21/2022    EGFRIFNONA 100 01/28/2022    EGFRIFAFRI >60 05/20/2019    BCR 11.1 12/21/2022    K 4.1 12/21/2022    CO2 28.0 12/21/2022    CALCIUM 9.3 12/21/2022    ALBUMIN 3.20 (L) 12/19/2022    LABIL2 1.5 03/22/2022    AST 28 12/19/2022    ALT 38 12/19/2022         ASSESSMENT:    1. 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  He has been referred to Dr. Escalona to see whether he is a candidate for radiation treatments.  Would also consider systemic treatment for him for molecular targets as he has had very poor response to chemotherapy malignancy.  Continue Tecentriq with XRT  2. Poorly differentiated adenocarcinoma of the left lung status post left thoracotomy with mediastinal lymph node  dissection.  pT1 cpN1 M0 consistent with stage IIb disease, found on surveillance CT scans. Brain MRI was negative. We will recommend platinum doublet followed by Tecentriq unless he has EGFR mutation for which adjuvant osimertinib will be considered.  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  Patient was treated with cisplatin and Alimta in the adjuvant setting for his original malignancy.  He is currently on cisplatin and Taxol.  Patient is receiving cycle #4 today.  This tumor was completely resected and has negative margins and no mediastinal lymph node involvement.  Reviewed the pathology with Dr. Trevizo.  This is a new second lung primary.  Patient has received a total of 4 cycles of combination of cisplatin and Taxol completed on 10/5/2022.  Currently on Tecentriq with XRT  3. High PD-L1 expression at 95%. Reviewed foundation 1 testing results.  This showed MET amplification, ERBB2 amplification for which targeted therapy is available for including crizotinib for MET amplification, afatinib for ERBB2 but this is approved for metastatic disease.   4. We have extensively reviewed the literature.  He will be a candidate for ERBB2 targeted therapy but fam-trastuzumab cannot be combined with XRT due to risk of interstitial lung disease.  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  For these reasons we have decided to proceed with immunotherapy along with radiation.  His case was reviewed at the tumor board and this was the recommendation.  Patient has been approved for start Tecentriq therefore we will go ahead and initiate concurrently with radiation  5. Chemotherapy-induced fatigue: Improving  6. Hypomagnesemia secondary to chemotherapy: Plan continue replacement  therapy monitor levels.  Continue to monitor his electrolytes  7. Chemotherapy induced anemia improving.  He received blood transfusion on 6 12/19/2022  8. 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  Increase salt in the diet, fluid restriction.  Continue to monitor his sodium levels.  Hopefully this would improve with treatment    9. History of adenocarcinoma of the right lung, T1c N2 M0, consistent with clinical stage 3A disease in 2019.  S/P combined chemotherapy and radiation with cisplatin and Alimta neoadjuvantly, followed by her right lower lobectomy with mediastinal and hilar lymph node dissection with residual disease pT1 cpN2 M0.  Followed by maintenance durvalumab for 1 year.   10. Recent diagnosis of hypothyroidism, on thyroid replacement therapy  11. Status post right thoracentesis with cytology negative for malignancy  12. History of pneumothorax following lung biopsy  13. Postop anemia: Reviewed  14. Assessment has been reviewed and updated      PLANS:    1. CBC reviewed.  Electrolytes ordered  2. Continue to increase p.o. intake  3. 2D echocardiogram in preparation for HER2 directed treatment reviewed  4. Patient has completed all planned 4 cycles of chemo: unfortunately has developed relapsed disease  5. Continue Tecentriq-every 3 weeks  6. Reviewed the above with patient and spouse who is present today  7. His case will be reviewed at the next tumor board scheduled on November 15, 2022 and I will see him shortly after that  8. Continue weekly BMP and mag levels, CBC.  Reviewed and he will continue  9. Continue fluid restriction to 1200 cc/day  10. Follow-up with nephrology-patient states he plans to get an appointment.  11. Continue anti-nausea medications  12. Reviewed foundation 1 results.  He will be a candidate for molecular targets in the future if he does develop metastatic disease  13. Continue Emla cream to port  14. Continue thyroid replacement therapy through his PCP  15. Continue B12  injections  monthly: Patient did have elevated methylmalonic acid level during the early phases of his treatments.  Continue the same  16. Continue supportive care  17. All questions answered  18. Follow-up with me in middle of January         Patient has  questions which have all been answered to the best of my abilities    I have reviewed labs results, imaging, vitals, and medications with the patient today.     Patient verbalized understanding and is in agreement of the above plan.      I spent 40 total minutes, face-to-face, caring for Jc today.  90% of this time involved counseling and/or coordination of care as documented within this note.

## 2022-12-21 ENCOUNTER — OFFICE VISIT (OUTPATIENT)
Dept: ONCOLOGY | Facility: CLINIC | Age: 57
End: 2022-12-21

## 2022-12-21 ENCOUNTER — HOSPITAL ENCOUNTER (OUTPATIENT)
Dept: RADIATION ONCOLOGY | Facility: HOSPITAL | Age: 57
Discharge: HOME | End: 2022-12-21

## 2022-12-21 ENCOUNTER — LAB (OUTPATIENT)
Dept: LAB | Facility: HOSPITAL | Age: 57
End: 2022-12-21
Payer: COMMERCIAL

## 2022-12-21 VITALS
HEIGHT: 75 IN | TEMPERATURE: 96.6 F | RESPIRATION RATE: 20 BRPM | HEART RATE: 114 BPM | SYSTOLIC BLOOD PRESSURE: 108 MMHG | BODY MASS INDEX: 22.75 KG/M2 | DIASTOLIC BLOOD PRESSURE: 70 MMHG

## 2022-12-21 DIAGNOSIS — C34.91 NON-SMALL CELL CARCINOMA OF LUNG, RIGHT: Primary | ICD-10-CM

## 2022-12-21 DIAGNOSIS — E22.2 SIADH (SYNDROME OF INAPPROPRIATE ADH PRODUCTION): ICD-10-CM

## 2022-12-21 DIAGNOSIS — C34.91 NON-SMALL CELL CARCINOMA OF LUNG, RIGHT: ICD-10-CM

## 2022-12-21 DIAGNOSIS — D64.9 ANEMIA, UNSPECIFIED TYPE: Primary | ICD-10-CM

## 2022-12-21 DIAGNOSIS — C34.92 NSCLC OF LEFT LUNG: ICD-10-CM

## 2022-12-21 LAB
ANION GAP SERPL CALCULATED.3IONS-SCNC: 12 MMOL/L (ref 5–15)
BASOPHILS # BLD AUTO: 0.03 10*3/MM3 (ref 0–0.2)
BASOPHILS NFR BLD AUTO: 0.4 % (ref 0–1.5)
BH BB BLOOD EXPIRATION DATE: NORMAL
BH BB BLOOD TYPE BARCODE: 6200
BH BB DISPENSE STATUS: NORMAL
BH BB PRODUCT CODE: NORMAL
BH BB UNIT NUMBER: NORMAL
BUN SERPL-MCNC: 11 MG/DL (ref 6–20)
BUN/CREAT SERPL: 11.1 (ref 7–25)
CALCIUM SPEC-SCNC: 9.3 MG/DL (ref 8.6–10.5)
CHLORIDE SERPL-SCNC: 89 MMOL/L (ref 98–107)
CO2 SERPL-SCNC: 28 MMOL/L (ref 22–29)
CREAT SERPL-MCNC: 0.99 MG/DL (ref 0.76–1.27)
CROSSMATCH INTERPRETATION: NORMAL
DEPRECATED RDW RBC AUTO: 51 FL (ref 37–54)
EGFRCR SERPLBLD CKD-EPI 2021: 88.3 ML/MIN/1.73
EOSINOPHIL # BLD AUTO: 0.26 10*3/MM3 (ref 0–0.4)
EOSINOPHIL NFR BLD AUTO: 3.3 % (ref 0.3–6.2)
ERYTHROCYTE [DISTWIDTH] IN BLOOD BY AUTOMATED COUNT: 16.2 % (ref 12.3–15.4)
GLUCOSE SERPL-MCNC: 136 MG/DL (ref 65–99)
HCT VFR BLD AUTO: 28.6 % (ref 37.5–51)
HGB BLD-MCNC: 9.1 G/DL (ref 13–17.7)
HOLD SPECIMEN: NORMAL
HOLD SPECIMEN: NORMAL
LYMPHOCYTES # BLD AUTO: 0.47 10*3/MM3 (ref 0.7–3.1)
LYMPHOCYTES NFR BLD AUTO: 6 % (ref 19.6–45.3)
MAGNESIUM SERPL-MCNC: 1.6 MG/DL (ref 1.6–2.6)
MCH RBC QN AUTO: 28.1 PG (ref 26.6–33)
MCHC RBC AUTO-ENTMCNC: 31.8 G/DL (ref 31.5–35.7)
MCV RBC AUTO: 88.3 FL (ref 79–97)
MONOCYTES # BLD AUTO: 0.96 10*3/MM3 (ref 0.1–0.9)
MONOCYTES NFR BLD AUTO: 12.3 % (ref 5–12)
NEUTROPHILS NFR BLD AUTO: 6.09 10*3/MM3 (ref 1.7–7)
NEUTROPHILS NFR BLD AUTO: 78 % (ref 42.7–76)
PHOSPHATE SERPL-MCNC: 4 MG/DL (ref 2.5–4.5)
PLATELET # BLD AUTO: 475 10*3/MM3 (ref 140–450)
PMV BLD AUTO: 8.6 FL (ref 6–12)
POTASSIUM SERPL-SCNC: 4.1 MMOL/L (ref 3.5–5.2)
RAD ONC ARIA COURSE ID: NORMAL
RAD ONC ARIA COURSE LAST TREATMENT DATE: NORMAL
RAD ONC ARIA COURSE START DATE: NORMAL
RAD ONC ARIA COURSE TREATMENT ELAPSED DAYS: 31
RAD ONC ARIA FIRST TREATMENT DATE: NORMAL
RAD ONC ARIA PLAN FRACTIONS TREATED TO DATE: 17
RAD ONC ARIA PLAN ID: NORMAL
RAD ONC ARIA PLAN PRESCRIBED DOSE PER FRACTION: 2 GY
RAD ONC ARIA PLAN PRIMARY REFERENCE POINT: NORMAL
RAD ONC ARIA PLAN TOTAL FRACTIONS PRESCRIBED: 30
RAD ONC ARIA PLAN TOTAL PRESCRIBED DOSE: 6000 CGY
RAD ONC ARIA REFERENCE POINT DOSAGE GIVEN TO DATE: 34 GY
RAD ONC ARIA REFERENCE POINT ID: NORMAL
RAD ONC ARIA REFERENCE POINT SESSION DOSAGE GIVEN: 2 GY
RBC # BLD AUTO: 3.24 10*6/MM3 (ref 4.14–5.8)
SODIUM SERPL-SCNC: 129 MMOL/L (ref 136–145)
UNIT  ABO: NORMAL
UNIT  RH: NORMAL
WBC NRBC COR # BLD: 7.81 10*3/MM3 (ref 3.4–10.8)

## 2022-12-21 PROCEDURE — 77386: CPT | Performed by: RADIOLOGY

## 2022-12-21 PROCEDURE — 85025 COMPLETE CBC W/AUTO DIFF WBC: CPT

## 2022-12-21 PROCEDURE — 36415 COLL VENOUS BLD VENIPUNCTURE: CPT

## 2022-12-21 PROCEDURE — 99215 OFFICE O/P EST HI 40 MIN: CPT | Performed by: INTERNAL MEDICINE

## 2022-12-21 PROCEDURE — 83735 ASSAY OF MAGNESIUM: CPT

## 2022-12-21 PROCEDURE — 80048 BASIC METABOLIC PNL TOTAL CA: CPT

## 2022-12-21 PROCEDURE — 84100 ASSAY OF PHOSPHORUS: CPT

## 2022-12-21 PROCEDURE — 77014 CHG CT GUIDANCE RADIATION THERAPY FLDS PLACEMENT: CPT | Performed by: RADIOLOGY

## 2022-12-21 RX ORDER — LEVOTHYROXINE SODIUM 0.1 MG/1
1 TABLET ORAL DAILY
COMMUNITY
Start: 2022-12-19

## 2022-12-21 RX ORDER — UREA 15 G
POWDER IN PACKET (EA) ORAL
COMMUNITY
Start: 2022-12-13

## 2022-12-22 ENCOUNTER — HOSPITAL ENCOUNTER (OUTPATIENT)
Dept: RADIATION ONCOLOGY | Facility: HOSPITAL | Age: 57
Discharge: HOME | End: 2022-12-22

## 2022-12-22 LAB
RAD ONC ARIA COURSE ID: NORMAL
RAD ONC ARIA COURSE LAST TREATMENT DATE: NORMAL
RAD ONC ARIA COURSE START DATE: NORMAL
RAD ONC ARIA COURSE TREATMENT ELAPSED DAYS: 32
RAD ONC ARIA FIRST TREATMENT DATE: NORMAL
RAD ONC ARIA PLAN FRACTIONS TREATED TO DATE: 18
RAD ONC ARIA PLAN ID: NORMAL
RAD ONC ARIA PLAN PRESCRIBED DOSE PER FRACTION: 2 GY
RAD ONC ARIA PLAN PRIMARY REFERENCE POINT: NORMAL
RAD ONC ARIA PLAN TOTAL FRACTIONS PRESCRIBED: 30
RAD ONC ARIA PLAN TOTAL PRESCRIBED DOSE: 6000 CGY
RAD ONC ARIA REFERENCE POINT DOSAGE GIVEN TO DATE: 36 GY
RAD ONC ARIA REFERENCE POINT ID: NORMAL
RAD ONC ARIA REFERENCE POINT SESSION DOSAGE GIVEN: 2 GY

## 2022-12-22 PROCEDURE — 77336 RADIATION PHYSICS CONSULT: CPT | Performed by: RADIOLOGY

## 2022-12-22 PROCEDURE — 77386: CPT | Performed by: RADIOLOGY

## 2022-12-22 PROCEDURE — 77014 CHG CT GUIDANCE RADIATION THERAPY FLDS PLACEMENT: CPT | Performed by: RADIOLOGY

## 2022-12-23 NOTE — TELEPHONE ENCOUNTER
Caller: Jc Driscoll    Relationship: Self    Best call back number: [phone]    What is the best time to reach you: ANYTIME    Who are you requesting to speak with (clinical staff, provider,  specific staff member): CLINICAL     What was the call regarding: PT CALLING HAS BEEN CONSTIPATED ABOUT 5 DAYS MAYBE MORE HAS DONE EVERYTHING HE IS SUPPOSE TO AND NOTHING IS WORKING, CAN YOU CALL HIM SOMETHING TO THE [facility] IN [place]?    CALL PT TO DISCUSS TRIED TO W/T NO ANSWER    Do you require a callback: YES

## 2022-12-26 NOTE — NURSING NOTE
Dr Bonilla after reviewing CT images.  Lesion is 6-7mm and surrounded by blood vessels.  Will not proceed with biopsy.    verbal cues/1 person assist

## 2022-12-27 ENCOUNTER — RADIATION ONCOLOGY WEEKLY ASSESSMENT (OUTPATIENT)
Dept: RADIATION ONCOLOGY | Facility: HOSPITAL | Age: 57
End: 2022-12-27
Payer: COMMERCIAL

## 2022-12-27 ENCOUNTER — HOSPITAL ENCOUNTER (OUTPATIENT)
Dept: RADIATION ONCOLOGY | Facility: HOSPITAL | Age: 57
Discharge: HOME | End: 2022-12-27

## 2022-12-27 VITALS
BODY MASS INDEX: 22.23 KG/M2 | HEART RATE: 123 BPM | DIASTOLIC BLOOD PRESSURE: 81 MMHG | SYSTOLIC BLOOD PRESSURE: 130 MMHG | HEIGHT: 75 IN | RESPIRATION RATE: 18 BRPM | WEIGHT: 178.8 LBS | TEMPERATURE: 98.6 F | OXYGEN SATURATION: 100 %

## 2022-12-27 DIAGNOSIS — C34.12 MALIGNANT NEOPLASM OF UPPER LOBE OF LEFT LUNG: Primary | ICD-10-CM

## 2022-12-27 LAB
RAD ONC ARIA COURSE ID: NORMAL
RAD ONC ARIA COURSE LAST TREATMENT DATE: NORMAL
RAD ONC ARIA COURSE START DATE: NORMAL
RAD ONC ARIA COURSE TREATMENT ELAPSED DAYS: 37
RAD ONC ARIA FIRST TREATMENT DATE: NORMAL
RAD ONC ARIA PLAN FRACTIONS TREATED TO DATE: 19
RAD ONC ARIA PLAN ID: NORMAL
RAD ONC ARIA PLAN PRESCRIBED DOSE PER FRACTION: 2 GY
RAD ONC ARIA PLAN PRIMARY REFERENCE POINT: NORMAL
RAD ONC ARIA PLAN TOTAL FRACTIONS PRESCRIBED: 30
RAD ONC ARIA PLAN TOTAL PRESCRIBED DOSE: 6000 CGY
RAD ONC ARIA REFERENCE POINT DOSAGE GIVEN TO DATE: 38 GY
RAD ONC ARIA REFERENCE POINT ID: NORMAL
RAD ONC ARIA REFERENCE POINT SESSION DOSAGE GIVEN: 2 GY

## 2022-12-27 PROCEDURE — 77014 CHG CT GUIDANCE RADIATION THERAPY FLDS PLACEMENT: CPT | Performed by: RADIOLOGY

## 2022-12-27 PROCEDURE — FACE2FACE: Performed by: RADIOLOGY

## 2022-12-27 PROCEDURE — 77386: CPT | Performed by: RADIOLOGY

## 2022-12-27 RX ORDER — OXYCODONE HYDROCHLORIDE AND ACETAMINOPHEN 5; 325 MG/1; MG/1
1 TABLET ORAL EVERY 6 HOURS PRN
Qty: 40 TABLET | Refills: 0 | Status: SHIPPED | OUTPATIENT
Start: 2022-12-27 | End: 2023-01-31 | Stop reason: SDUPTHER

## 2022-12-27 NOTE — PROGRESS NOTES
NAME: Jc Driscoll DATE: 2022   : 1965    MRN: [id number] DIAGNOSIS: Left lung cancer, Recurrence       RADIATION ON TREATMENT VISIT NOTE - CHEST    Treatment Summary  Treatment Site Ref. ID Energy Dose/Fx (cGy) #Fx Dose Correction (cGy) Total Dose (cGy) Start Date End Date Elapsed Days   LtSC_CWRecur LtScv_CWRecur 6X 200  0 5,200 2022 47       General:     Review of Systems  [x] No new complaints  [] Fever/chills  Night sweats  [] Decreased energy level [] Decreased appetite [] Oxygen:  L/min  [] Dry cough    [] Productive cough   [] SOB  [] Hemoptysis   [] Dysphagia      [x] Fatigue,  Severity: 1 [x] Pain,  severity: 2, location: under left arm  Pain medication regimen: Oxycodone  Esophagitis regimen: NONE    Skin regimen: Aquaphor discussed    Subjective:  He doesn't have any new symptoms, he did mention some very dull pain under his left arm/ upper rib cage area, but no other concerns or complaints.    KPS: 80; improving post discharge     Vital Signs: /81   Pulse (!) 123   Temp 98.6 °F (37 °C) (Oral)   Resp 18   Ht 190.5 cm (75\")   Wt 81.1 kg (178 lb 12.8 oz)   SpO2 100%   BMI 22.35 kg/m²     Weight:   Wt Readings from Last 3 Encounters:   22 81.1 kg (178 lb 12.8 oz)   22 82.6 kg (182 lb)   22 82.6 kg (182 lb)       Medication:   Current Outpatient Medications:   •  demeclocycline (DECLOMYCIN) 300 MG tablet, Take 1 tablet by mouth Every 12 (Twelve) Hours for 198 doses. Indications: SIADH, Disp: 60 tablet, Rfl: 3  •  desvenlafaxine (PRISTIQ) 50 MG 24 hr tablet, Take 50 mg by mouth Daily., Disp: , Rfl:   •  folic acid (FOLVITE) 1 MG tablet, Take 1 tablet by mouth Daily for 30 days., Disp: 30 tablet, Rfl: 2  •  levothyroxine (SYNTHROID, LEVOTHROID) 100 MCG tablet, Take 100 mcg by mouth Every Morning., Disp: , Rfl:   •  levothyroxine (SYNTHROID, LEVOTHROID) 100 MCG tablet, Take 1 tablet by mouth Daily., Disp: , Rfl:   •  liothyronine (CYTOMEL) 5 MCG  tablet, Take 5 mcg by mouth Daily., Disp: , Rfl:   •  LORazepam (ATIVAN) 0.5 MG tablet, Take 0.5 mg by mouth Every 8 (Eight) Hours As Needed., Disp: , Rfl:   •  metoprolol succinate XL (TOPROL-XL) 100 MG 24 hr tablet, Take 100 mg by mouth Daily., Disp: , Rfl:   •  ondansetron (ZOFRAN) 8 MG tablet, Take 1 tablet by mouth 3 (Three) Times a Day As Needed for Nausea or Vomiting., Disp: 30 tablet, Rfl: 5  •  oxyCODONE-acetaminophen (PERCOCET) 5-325 MG per tablet, Take 1 tablet by mouth Every 6 (Six) Hours As Needed for Moderate Pain., Disp: 40 tablet, Rfl: 0  •  pantoprazole (PROTONIX) 40 MG EC tablet, Take 40 mg by mouth Daily As Needed., Disp: , Rfl:   •  Ure-Na 15 g pack packet, TAKE 1 PACKET BY MOUTH TWICE DAILY AS DIRECTED FOR 2 DOSES, Disp: , Rfl:   •  vitamin B-12 (CYANOCOBALAMIN) 1000 MCG tablet, Take 1,000 mcg by mouth Daily., Disp: , Rfl:      LABS (Reviewed):  Hematology  WBC   Date Value Ref Range Status   12/21/2022 7.81 3.40 - 10.80 10*3/mm3 Final   03/22/2022 7.84 4.5 - 11.0 10*3/uL Final     RBC   Date Value Ref Range Status   12/21/2022 3.24 (L) 4.14 - 5.80 10*6/mm3 Final   03/22/2022 4.23 (L) 4.5 - 5.9 10*6/uL Final     Hemoglobin   Date Value Ref Range Status   12/21/2022 9.1 (L) 13.0 - 17.7 g/dL Final   03/22/2022 12.9 (L) 13.5 - 17.5 g/dL Final     Hematocrit   Date Value Ref Range Status   12/21/2022 28.6 (L) 37.5 - 51.0 % Final   03/22/2022 38.8 (L) 41.0 - 53.0 % Final     Platelets   Date Value Ref Range Status   12/21/2022 475 (H) 140 - 450 10*3/mm3 Final   03/22/2022 324 140 - 440 10*3/uL Final     Chemistry   Lab Results   Component Value Date    GLUCOSE 136 (H) 12/21/2022    BUN 11 12/21/2022    CREATININE 0.99 12/21/2022    EGFRIFNONA 100 01/28/2022    EGFRIFAFRI >60 05/20/2019    BCR 11.1 12/21/2022    K 4.1 12/21/2022    CO2 28.0 12/21/2022    CALCIUM 9.3 12/21/2022    ALBUMIN 3.20 (L) 12/19/2022    LABIL2 1.5 03/22/2022    AST 28 12/19/2022    ALT 38 12/19/2022         Physical Exam:      Pulmonary: [] Cough:     [] Dyspnea:  Neck:  [] Lymphadenopathy  Lungs:  [x] Clear to auscultation  CVS:  [x] Regular rate & rhythm  Skin:  stGstrstastdstest:st st1st GI:  [] Dysphagia:     [x] Esophagitis: Denies     Comments/Notes:      [x] Review of labs, images, dosimetry, dose delivered, & treatment parameters.    Comments:     [x] Patient treatment setup reviewed.    Comments:     Recommendations:  Continue XRT and supportive measures    [x] Continue RT  [] Change RT Plan [] Hold RT, length:        Approved Electronically By:  Chino Escalona MD, 12/27/2022, 14:56 EST      Confidentiality of this medical record shall be maintained except when use or disclosure is required or permitted by law, regulation or written authorization by the patient.

## 2022-12-28 ENCOUNTER — HOSPITAL ENCOUNTER (OUTPATIENT)
Dept: RADIATION ONCOLOGY | Facility: HOSPITAL | Age: 57
Discharge: HOME | End: 2022-12-28

## 2022-12-28 ENCOUNTER — LAB (OUTPATIENT)
Dept: LAB | Facility: HOSPITAL | Age: 57
End: 2022-12-28
Payer: COMMERCIAL

## 2022-12-28 DIAGNOSIS — C34.91 NON-SMALL CELL CARCINOMA OF LUNG, RIGHT: ICD-10-CM

## 2022-12-28 LAB
ANION GAP SERPL CALCULATED.3IONS-SCNC: 11 MMOL/L (ref 5–15)
BASOPHILS # BLD AUTO: 0.02 10*3/MM3 (ref 0–0.2)
BASOPHILS NFR BLD AUTO: 0.3 % (ref 0–1.5)
BUN SERPL-MCNC: 12 MG/DL (ref 6–20)
BUN/CREAT SERPL: 15.4 (ref 7–25)
CALCIUM SPEC-SCNC: 9 MG/DL (ref 8.6–10.5)
CHLORIDE SERPL-SCNC: 91 MMOL/L (ref 98–107)
CO2 SERPL-SCNC: 28 MMOL/L (ref 22–29)
CREAT SERPL-MCNC: 0.78 MG/DL (ref 0.76–1.27)
DEPRECATED RDW RBC AUTO: 50.3 FL (ref 37–54)
EGFRCR SERPLBLD CKD-EPI 2021: 103.4 ML/MIN/1.73
EOSINOPHIL # BLD AUTO: 0.32 10*3/MM3 (ref 0–0.4)
EOSINOPHIL NFR BLD AUTO: 4.2 % (ref 0.3–6.2)
ERYTHROCYTE [DISTWIDTH] IN BLOOD BY AUTOMATED COUNT: 16.1 % (ref 12.3–15.4)
GLUCOSE SERPL-MCNC: 151 MG/DL (ref 65–99)
HCT VFR BLD AUTO: 27.1 % (ref 37.5–51)
HGB BLD-MCNC: 8.5 G/DL (ref 13–17.7)
LYMPHOCYTES # BLD AUTO: 0.46 10*3/MM3 (ref 0.7–3.1)
LYMPHOCYTES NFR BLD AUTO: 6 % (ref 19.6–45.3)
MAGNESIUM SERPL-MCNC: 1.6 MG/DL (ref 1.6–2.6)
MCH RBC QN AUTO: 27.6 PG (ref 26.6–33)
MCHC RBC AUTO-ENTMCNC: 31.4 G/DL (ref 31.5–35.7)
MCV RBC AUTO: 88 FL (ref 79–97)
MONOCYTES # BLD AUTO: 1 10*3/MM3 (ref 0.1–0.9)
MONOCYTES NFR BLD AUTO: 13.1 % (ref 5–12)
NEUTROPHILS NFR BLD AUTO: 5.86 10*3/MM3 (ref 1.7–7)
NEUTROPHILS NFR BLD AUTO: 76.4 % (ref 42.7–76)
PHOSPHATE SERPL-MCNC: 3.3 MG/DL (ref 2.5–4.5)
PLATELET # BLD AUTO: 357 10*3/MM3 (ref 140–450)
PMV BLD AUTO: 8.1 FL (ref 6–12)
POTASSIUM SERPL-SCNC: 3.3 MMOL/L (ref 3.5–5.2)
RAD ONC ARIA COURSE ID: NORMAL
RAD ONC ARIA COURSE LAST TREATMENT DATE: NORMAL
RAD ONC ARIA COURSE START DATE: NORMAL
RAD ONC ARIA COURSE TREATMENT ELAPSED DAYS: 38
RAD ONC ARIA FIRST TREATMENT DATE: NORMAL
RAD ONC ARIA PLAN FRACTIONS TREATED TO DATE: 20
RAD ONC ARIA PLAN ID: NORMAL
RAD ONC ARIA PLAN PRESCRIBED DOSE PER FRACTION: 2 GY
RAD ONC ARIA PLAN PRIMARY REFERENCE POINT: NORMAL
RAD ONC ARIA PLAN TOTAL FRACTIONS PRESCRIBED: 30
RAD ONC ARIA PLAN TOTAL PRESCRIBED DOSE: 6000 CGY
RAD ONC ARIA REFERENCE POINT DOSAGE GIVEN TO DATE: 40 GY
RAD ONC ARIA REFERENCE POINT ID: NORMAL
RAD ONC ARIA REFERENCE POINT SESSION DOSAGE GIVEN: 2 GY
RBC # BLD AUTO: 3.08 10*6/MM3 (ref 4.14–5.8)
SODIUM SERPL-SCNC: 130 MMOL/L (ref 136–145)
WBC NRBC COR # BLD: 7.66 10*3/MM3 (ref 3.4–10.8)

## 2022-12-28 PROCEDURE — 84100 ASSAY OF PHOSPHORUS: CPT

## 2022-12-28 PROCEDURE — 36415 COLL VENOUS BLD VENIPUNCTURE: CPT

## 2022-12-28 PROCEDURE — 80048 BASIC METABOLIC PNL TOTAL CA: CPT

## 2022-12-28 PROCEDURE — 77014 CHG CT GUIDANCE RADIATION THERAPY FLDS PLACEMENT: CPT | Performed by: RADIOLOGY

## 2022-12-28 PROCEDURE — 83735 ASSAY OF MAGNESIUM: CPT

## 2022-12-28 PROCEDURE — 85025 COMPLETE CBC W/AUTO DIFF WBC: CPT

## 2022-12-28 PROCEDURE — 77386: CPT | Performed by: RADIOLOGY

## 2022-12-29 ENCOUNTER — HOSPITAL ENCOUNTER (OUTPATIENT)
Dept: RADIATION ONCOLOGY | Facility: HOSPITAL | Age: 57
Discharge: HOME | End: 2022-12-29

## 2022-12-29 LAB
RAD ONC ARIA COURSE ID: NORMAL
RAD ONC ARIA COURSE LAST TREATMENT DATE: NORMAL
RAD ONC ARIA COURSE START DATE: NORMAL
RAD ONC ARIA COURSE TREATMENT ELAPSED DAYS: 39
RAD ONC ARIA FIRST TREATMENT DATE: NORMAL
RAD ONC ARIA PLAN FRACTIONS TREATED TO DATE: 21
RAD ONC ARIA PLAN ID: NORMAL
RAD ONC ARIA PLAN PRESCRIBED DOSE PER FRACTION: 2 GY
RAD ONC ARIA PLAN PRIMARY REFERENCE POINT: NORMAL
RAD ONC ARIA PLAN TOTAL FRACTIONS PRESCRIBED: 30
RAD ONC ARIA PLAN TOTAL PRESCRIBED DOSE: 6000 CGY
RAD ONC ARIA REFERENCE POINT DOSAGE GIVEN TO DATE: 42 GY
RAD ONC ARIA REFERENCE POINT ID: NORMAL
RAD ONC ARIA REFERENCE POINT SESSION DOSAGE GIVEN: 2 GY

## 2022-12-29 PROCEDURE — 77386: CPT | Performed by: STUDENT IN AN ORGANIZED HEALTH CARE EDUCATION/TRAINING PROGRAM

## 2022-12-29 PROCEDURE — 77014 CHG CT GUIDANCE RADIATION THERAPY FLDS PLACEMENT: CPT | Performed by: STUDENT IN AN ORGANIZED HEALTH CARE EDUCATION/TRAINING PROGRAM

## 2022-12-29 PROCEDURE — 77427 RADIATION TX MANAGEMENT X5: CPT | Performed by: STUDENT IN AN ORGANIZED HEALTH CARE EDUCATION/TRAINING PROGRAM

## 2022-12-30 ENCOUNTER — HOSPITAL ENCOUNTER (OUTPATIENT)
Dept: RADIATION ONCOLOGY | Facility: HOSPITAL | Age: 57
Discharge: HOME | End: 2022-12-30

## 2022-12-30 LAB
RAD ONC ARIA COURSE ID: NORMAL
RAD ONC ARIA COURSE LAST TREATMENT DATE: NORMAL
RAD ONC ARIA COURSE START DATE: NORMAL
RAD ONC ARIA COURSE TREATMENT ELAPSED DAYS: 40
RAD ONC ARIA FIRST TREATMENT DATE: NORMAL
RAD ONC ARIA PLAN FRACTIONS TREATED TO DATE: 22
RAD ONC ARIA PLAN ID: NORMAL
RAD ONC ARIA PLAN PRESCRIBED DOSE PER FRACTION: 2 GY
RAD ONC ARIA PLAN PRIMARY REFERENCE POINT: NORMAL
RAD ONC ARIA PLAN TOTAL FRACTIONS PRESCRIBED: 30
RAD ONC ARIA PLAN TOTAL PRESCRIBED DOSE: 6000 CGY
RAD ONC ARIA REFERENCE POINT DOSAGE GIVEN TO DATE: 44 GY
RAD ONC ARIA REFERENCE POINT ID: NORMAL
RAD ONC ARIA REFERENCE POINT SESSION DOSAGE GIVEN: 2 GY

## 2022-12-30 PROCEDURE — 77336 RADIATION PHYSICS CONSULT: CPT | Performed by: RADIOLOGY

## 2022-12-30 PROCEDURE — 77386: CPT | Performed by: RADIOLOGY

## 2022-12-30 PROCEDURE — 77014 CHG CT GUIDANCE RADIATION THERAPY FLDS PLACEMENT: CPT | Performed by: RADIOLOGY

## 2023-01-01 ENCOUNTER — TELEPHONE (OUTPATIENT)
Dept: ONCOLOGY | Facility: CLINIC | Age: 58
End: 2023-01-01

## 2023-01-03 ENCOUNTER — HOSPITAL ENCOUNTER (OUTPATIENT)
Dept: RADIATION ONCOLOGY | Facility: HOSPITAL | Age: 58
Setting detail: RADIATION/ONCOLOGY SERIES
End: 2023-01-03
Payer: COMMERCIAL

## 2023-01-03 ENCOUNTER — RADIATION ONCOLOGY WEEKLY ASSESSMENT (OUTPATIENT)
Dept: RADIATION ONCOLOGY | Facility: HOSPITAL | Age: 58
End: 2023-01-03
Payer: COMMERCIAL

## 2023-01-03 VITALS
TEMPERATURE: 98.3 F | DIASTOLIC BLOOD PRESSURE: 81 MMHG | HEART RATE: 108 BPM | WEIGHT: 180.6 LBS | BODY MASS INDEX: 22.46 KG/M2 | HEIGHT: 75 IN | SYSTOLIC BLOOD PRESSURE: 131 MMHG | OXYGEN SATURATION: 97 % | RESPIRATION RATE: 18 BRPM

## 2023-01-03 DIAGNOSIS — C34.12 MALIGNANT NEOPLASM OF UPPER LOBE OF LEFT LUNG: Primary | ICD-10-CM

## 2023-01-03 LAB
RAD ONC ARIA COURSE ID: NORMAL
RAD ONC ARIA COURSE LAST TREATMENT DATE: NORMAL
RAD ONC ARIA COURSE START DATE: NORMAL
RAD ONC ARIA COURSE TREATMENT ELAPSED DAYS: 44
RAD ONC ARIA FIRST TREATMENT DATE: NORMAL
RAD ONC ARIA PLAN FRACTIONS TREATED TO DATE: 23
RAD ONC ARIA PLAN ID: NORMAL
RAD ONC ARIA PLAN PRESCRIBED DOSE PER FRACTION: 2 GY
RAD ONC ARIA PLAN PRIMARY REFERENCE POINT: NORMAL
RAD ONC ARIA PLAN TOTAL FRACTIONS PRESCRIBED: 30
RAD ONC ARIA PLAN TOTAL PRESCRIBED DOSE: 6000 CGY
RAD ONC ARIA REFERENCE POINT DOSAGE GIVEN TO DATE: 46 GY
RAD ONC ARIA REFERENCE POINT ID: NORMAL
RAD ONC ARIA REFERENCE POINT SESSION DOSAGE GIVEN: 2 GY

## 2023-01-03 PROCEDURE — 77014 CHG CT GUIDANCE RADIATION THERAPY FLDS PLACEMENT: CPT | Performed by: RADIOLOGY

## 2023-01-03 PROCEDURE — FACE2FACE: Performed by: RADIOLOGY

## 2023-01-03 PROCEDURE — 77386: CPT | Performed by: RADIOLOGY

## 2023-01-03 NOTE — PROGRESS NOTES
NAME: Jc Driscoll DATE: 1/3/2023   : 1965    MRN: [id number] DIAGNOSIS: Recurrent L Lung cancer       RADIATION ON TREATMENT VISIT NOTE - CHEST    Treatment Summary    Treatment Site Ref. ID Energy Dose/Fx (cGy) #Fx Dose Correction (cGy) Total Dose (cGy) Start Date End Date Elapsed Days   LtSC_CWRecur LtScv_CWRecur 6X 200  0 4600 2022       Systemic therapy: Atezolizumab    General:     Review of Systems  [x] No new complaints  [] Fever/chills  Night sweats  [] Decreased energy level [] Decreased appetite [] Oxygen:  L/min  [] Dry cough    [] Productive cough   [] SOB  [] Hemoptysis   [] Dysphagia      [x] Fatigue,  severity: 1 [x] Pain,  severity: 1, location: under left arm  Pain medication regimen: Oxycodone   Esophagitis regimen: NONE    Skin regimen: Aquaphor    Subjective:  He doesn't have new symptoms, still having dull pain in his left shoulder, but no other concerns or complaints.    KPS: 80     Vital Signs: /81   Pulse 108   Temp 98.3 °F (36.8 °C) (Oral)   Resp 18   Ht 190.5 cm (75\")   Wt 81.9 kg (180 lb 9.6 oz)   SpO2 97%   BMI 22.57 kg/m²     Weight:   Wt Readings from Last 3 Encounters:   23 81.9 kg (180 lb 9.6 oz)   22 81.1 kg (178 lb 12.8 oz)   22 82.6 kg (182 lb)       Medication:   Current Outpatient Medications:   •  demeclocycline (DECLOMYCIN) 300 MG tablet, Take 1 tablet by mouth Every 12 (Twelve) Hours for 198 doses. Indications: SIADH, Disp: 60 tablet, Rfl: 3  •  desvenlafaxine (PRISTIQ) 50 MG 24 hr tablet, Take 50 mg by mouth Daily., Disp: , Rfl:   •  folic acid (FOLVITE) 1 MG tablet, Take 1 tablet by mouth Daily for 30 days., Disp: 30 tablet, Rfl: 2  •  levothyroxine (SYNTHROID, LEVOTHROID) 100 MCG tablet, Take 100 mcg by mouth Every Morning., Disp: , Rfl:   •  levothyroxine (SYNTHROID, LEVOTHROID) 100 MCG tablet, Take 1 tablet by mouth Daily., Disp: , Rfl:   •  liothyronine (CYTOMEL) 5 MCG tablet, Take 5 mcg by mouth Daily., Disp: , Rfl:    •  LORazepam (ATIVAN) 0.5 MG tablet, Take 0.5 mg by mouth Every 8 (Eight) Hours As Needed., Disp: , Rfl:   •  metoprolol succinate XL (TOPROL-XL) 100 MG 24 hr tablet, Take 100 mg by mouth Daily., Disp: , Rfl:   •  ondansetron (ZOFRAN) 8 MG tablet, Take 1 tablet by mouth 3 (Three) Times a Day As Needed for Nausea or Vomiting., Disp: 30 tablet, Rfl: 5  •  oxyCODONE-acetaminophen (PERCOCET) 5-325 MG per tablet, Take 1 tablet by mouth Every 6 (Six) Hours As Needed for Moderate Pain., Disp: 40 tablet, Rfl: 0  •  pantoprazole (PROTONIX) 40 MG EC tablet, Take 40 mg by mouth Daily As Needed., Disp: , Rfl:   •  Ure-Na 15 g pack packet, TAKE 1 PACKET BY MOUTH TWICE DAILY AS DIRECTED FOR 2 DOSES, Disp: , Rfl:   •  vitamin B-12 (CYANOCOBALAMIN) 1000 MCG tablet, Take 1,000 mcg by mouth Daily., Disp: , Rfl:      LABS (Reviewed):  Hematology  WBC   Date Value Ref Range Status   12/28/2022 7.66 3.40 - 10.80 10*3/mm3 Final   03/22/2022 7.84 4.5 - 11.0 10*3/uL Final     RBC   Date Value Ref Range Status   12/28/2022 3.08 (L) 4.14 - 5.80 10*6/mm3 Final   03/22/2022 4.23 (L) 4.5 - 5.9 10*6/uL Final     Hemoglobin   Date Value Ref Range Status   12/28/2022 8.5 (L) 13.0 - 17.7 g/dL Final   03/22/2022 12.9 (L) 13.5 - 17.5 g/dL Final     Hematocrit   Date Value Ref Range Status   12/28/2022 27.1 (L) 37.5 - 51.0 % Final   03/22/2022 38.8 (L) 41.0 - 53.0 % Final     Platelets   Date Value Ref Range Status   12/28/2022 357 140 - 450 10*3/mm3 Final   03/22/2022 324 140 - 440 10*3/uL Final     Chemistry   Lab Results   Component Value Date    GLUCOSE 151 (H) 12/28/2022    BUN 12 12/28/2022    CREATININE 0.78 12/28/2022    EGFRIFNONA 100 01/28/2022    EGFRIFAFRI >60 05/20/2019    BCR 15.4 12/28/2022    K 3.3 (L) 12/28/2022    CO2 28.0 12/28/2022    CALCIUM 9.0 12/28/2022    ALBUMIN 3.20 (L) 12/19/2022    LABIL2 1.5 03/22/2022    AST 28 12/19/2022    ALT 38 12/19/2022         Physical Exam:     Pulmonary: [] Cough:     [] Dyspnea:  Neck:  []  Lymphadenopathy  Lungs:  [x] Clear to auscultation  CVS:  [x] Regular rate & rhythm  Skin:  ndGndrndanddndend:nd nd2nd GI:  [] Dysphagia:   [x] Esophagitis: Denies   Comments/Notes:      [x] Review of labs, images, dosimetry, dose delivered, & treatment parameters.    Comments:     [x] Patient treatment setup reviewed.    Comments:     Recommendations:  Continue XRT and supportive measures  Improved post COVD  Tolerating therapy.    [x] Continue RT  [] Change RT Plan [] Hold RT, length:        Approved Electronically By:  Chino Escalona MD, 1/3/2023, 15:21 EST      Confidentiality of this medical record shall be maintained except when use or disclosure is required or permitted by law, regulation or written authorization by the patient.

## 2023-01-04 ENCOUNTER — HOSPITAL ENCOUNTER (OUTPATIENT)
Dept: RADIATION ONCOLOGY | Facility: HOSPITAL | Age: 58
Discharge: HOME | End: 2023-01-04
Payer: COMMERCIAL

## 2023-01-04 LAB
RAD ONC ARIA COURSE ID: NORMAL
RAD ONC ARIA COURSE LAST TREATMENT DATE: NORMAL
RAD ONC ARIA COURSE START DATE: NORMAL
RAD ONC ARIA COURSE TREATMENT ELAPSED DAYS: 45
RAD ONC ARIA FIRST TREATMENT DATE: NORMAL
RAD ONC ARIA PLAN FRACTIONS TREATED TO DATE: 24
RAD ONC ARIA PLAN ID: NORMAL
RAD ONC ARIA PLAN PRESCRIBED DOSE PER FRACTION: 2 GY
RAD ONC ARIA PLAN PRIMARY REFERENCE POINT: NORMAL
RAD ONC ARIA PLAN TOTAL FRACTIONS PRESCRIBED: 30
RAD ONC ARIA PLAN TOTAL PRESCRIBED DOSE: 6000 CGY
RAD ONC ARIA REFERENCE POINT DOSAGE GIVEN TO DATE: 48 GY
RAD ONC ARIA REFERENCE POINT ID: NORMAL
RAD ONC ARIA REFERENCE POINT SESSION DOSAGE GIVEN: 2 GY

## 2023-01-04 PROCEDURE — 77386: CPT | Performed by: RADIOLOGY

## 2023-01-04 PROCEDURE — 77014 CHG CT GUIDANCE RADIATION THERAPY FLDS PLACEMENT: CPT | Performed by: RADIOLOGY

## 2023-01-05 ENCOUNTER — HOSPITAL ENCOUNTER (OUTPATIENT)
Dept: RADIATION ONCOLOGY | Facility: HOSPITAL | Age: 58
Discharge: HOME | End: 2023-01-05
Payer: COMMERCIAL

## 2023-01-05 LAB
RAD ONC ARIA COURSE ID: NORMAL
RAD ONC ARIA COURSE LAST TREATMENT DATE: NORMAL
RAD ONC ARIA COURSE START DATE: NORMAL
RAD ONC ARIA COURSE TREATMENT ELAPSED DAYS: 46
RAD ONC ARIA FIRST TREATMENT DATE: NORMAL
RAD ONC ARIA PLAN FRACTIONS TREATED TO DATE: 25
RAD ONC ARIA PLAN ID: NORMAL
RAD ONC ARIA PLAN PRESCRIBED DOSE PER FRACTION: 2 GY
RAD ONC ARIA PLAN PRIMARY REFERENCE POINT: NORMAL
RAD ONC ARIA PLAN TOTAL FRACTIONS PRESCRIBED: 30
RAD ONC ARIA PLAN TOTAL PRESCRIBED DOSE: 6000 CGY
RAD ONC ARIA REFERENCE POINT DOSAGE GIVEN TO DATE: 50 GY
RAD ONC ARIA REFERENCE POINT ID: NORMAL
RAD ONC ARIA REFERENCE POINT SESSION DOSAGE GIVEN: 2 GY

## 2023-01-05 PROCEDURE — 77014 CHG CT GUIDANCE RADIATION THERAPY FLDS PLACEMENT: CPT | Performed by: RADIOLOGY

## 2023-01-05 PROCEDURE — 77386: CPT | Performed by: RADIOLOGY

## 2023-01-06 ENCOUNTER — HOSPITAL ENCOUNTER (OUTPATIENT)
Dept: RADIATION ONCOLOGY | Facility: HOSPITAL | Age: 58
Discharge: HOME | End: 2023-01-06

## 2023-01-06 LAB
RAD ONC ARIA COURSE ID: NORMAL
RAD ONC ARIA COURSE LAST TREATMENT DATE: NORMAL
RAD ONC ARIA COURSE START DATE: NORMAL
RAD ONC ARIA COURSE TREATMENT ELAPSED DAYS: 47
RAD ONC ARIA FIRST TREATMENT DATE: NORMAL
RAD ONC ARIA PLAN FRACTIONS TREATED TO DATE: 26
RAD ONC ARIA PLAN ID: NORMAL
RAD ONC ARIA PLAN PRESCRIBED DOSE PER FRACTION: 2 GY
RAD ONC ARIA PLAN PRIMARY REFERENCE POINT: NORMAL
RAD ONC ARIA PLAN TOTAL FRACTIONS PRESCRIBED: 30
RAD ONC ARIA PLAN TOTAL PRESCRIBED DOSE: 6000 CGY
RAD ONC ARIA REFERENCE POINT DOSAGE GIVEN TO DATE: 52 GY
RAD ONC ARIA REFERENCE POINT ID: NORMAL
RAD ONC ARIA REFERENCE POINT SESSION DOSAGE GIVEN: 2 GY

## 2023-01-06 PROCEDURE — 77386: CPT | Performed by: RADIOLOGY

## 2023-01-06 PROCEDURE — 77014 CHG CT GUIDANCE RADIATION THERAPY FLDS PLACEMENT: CPT | Performed by: RADIOLOGY

## 2023-01-06 PROCEDURE — 77427 RADIATION TX MANAGEMENT X5: CPT | Performed by: RADIOLOGY

## 2023-01-08 DIAGNOSIS — C34.91 NON-SMALL CELL CARCINOMA OF LUNG, RIGHT: ICD-10-CM

## 2023-01-08 DIAGNOSIS — C34.92 NSCLC OF LEFT LUNG: Primary | ICD-10-CM

## 2023-01-08 DIAGNOSIS — C34.31 CANCER OF LOWER LOBE OF RIGHT LUNG: ICD-10-CM

## 2023-01-08 RX ORDER — SODIUM CHLORIDE 9 MG/ML
250 INJECTION, SOLUTION INTRAVENOUS ONCE
Status: CANCELLED | OUTPATIENT
Start: 2023-01-09

## 2023-01-09 ENCOUNTER — HOSPITAL ENCOUNTER (OUTPATIENT)
Dept: ONCOLOGY | Facility: HOSPITAL | Age: 58
Setting detail: INFUSION SERIES
Discharge: HOME | End: 2023-01-09
Payer: COMMERCIAL

## 2023-01-09 ENCOUNTER — HOSPITAL ENCOUNTER (OUTPATIENT)
Dept: RADIATION ONCOLOGY | Facility: HOSPITAL | Age: 58
Discharge: HOME | End: 2023-01-09

## 2023-01-09 ENCOUNTER — RADIATION ONCOLOGY WEEKLY ASSESSMENT (OUTPATIENT)
Dept: RADIATION ONCOLOGY | Facility: HOSPITAL | Age: 58
End: 2023-01-09
Payer: COMMERCIAL

## 2023-01-09 VITALS
TEMPERATURE: 98.6 F | DIASTOLIC BLOOD PRESSURE: 69 MMHG | SYSTOLIC BLOOD PRESSURE: 112 MMHG | HEART RATE: 111 BPM | WEIGHT: 176 LBS | RESPIRATION RATE: 22 BRPM | BODY MASS INDEX: 21.88 KG/M2 | OXYGEN SATURATION: 93 % | HEIGHT: 75 IN

## 2023-01-09 VITALS
DIASTOLIC BLOOD PRESSURE: 69 MMHG | SYSTOLIC BLOOD PRESSURE: 112 MMHG | TEMPERATURE: 98.4 F | BODY MASS INDEX: 22.03 KG/M2 | RESPIRATION RATE: 22 BRPM | HEIGHT: 75 IN | OXYGEN SATURATION: 93 % | HEART RATE: 111 BPM | WEIGHT: 177.2 LBS

## 2023-01-09 DIAGNOSIS — Z45.2 ENCOUNTER FOR CARE RELATED TO VASCULAR ACCESS PORT: ICD-10-CM

## 2023-01-09 DIAGNOSIS — C34.31 CANCER OF LOWER LOBE OF RIGHT LUNG: ICD-10-CM

## 2023-01-09 DIAGNOSIS — C34.92 NSCLC OF LEFT LUNG: ICD-10-CM

## 2023-01-09 DIAGNOSIS — C34.91 NON-SMALL CELL CARCINOMA OF LUNG, RIGHT: Primary | ICD-10-CM

## 2023-01-09 DIAGNOSIS — C34.12 MALIGNANT NEOPLASM OF UPPER LOBE OF LEFT LUNG: Primary | ICD-10-CM

## 2023-01-09 LAB
ALBUMIN SERPL-MCNC: 3.3 G/DL (ref 3.5–5.2)
ALBUMIN/GLOB SERPL: 1.1 G/DL
ALP SERPL-CCNC: 123 U/L (ref 39–117)
ALT SERPL W P-5'-P-CCNC: 10 U/L (ref 1–41)
ANION GAP SERPL CALCULATED.3IONS-SCNC: 13 MMOL/L (ref 5–15)
AST SERPL-CCNC: 14 U/L (ref 1–40)
BASOPHILS # BLD AUTO: 0.02 10*3/MM3 (ref 0–0.2)
BASOPHILS NFR BLD AUTO: 0.3 % (ref 0–1.5)
BILIRUB SERPL-MCNC: 0.2 MG/DL (ref 0–1.2)
BUN SERPL-MCNC: 12 MG/DL (ref 6–20)
BUN/CREAT SERPL: 13.3 (ref 7–25)
CALCIUM SPEC-SCNC: 9 MG/DL (ref 8.6–10.5)
CHLORIDE SERPL-SCNC: 92 MMOL/L (ref 98–107)
CO2 SERPL-SCNC: 28 MMOL/L (ref 22–29)
CREAT SERPL-MCNC: 0.9 MG/DL (ref 0.76–1.27)
DEPRECATED RDW RBC AUTO: 50.3 FL (ref 37–54)
EGFRCR SERPLBLD CKD-EPI 2021: 99 ML/MIN/1.73
EOSINOPHIL # BLD AUTO: 0.21 10*3/MM3 (ref 0–0.4)
EOSINOPHIL NFR BLD AUTO: 3.1 % (ref 0.3–6.2)
ERYTHROCYTE [DISTWIDTH] IN BLOOD BY AUTOMATED COUNT: 16.4 % (ref 12.3–15.4)
GLOBULIN UR ELPH-MCNC: 3.1 GM/DL
GLUCOSE BLDC GLUCOMTR-MCNC: 125 MG/DL (ref 70–105)
GLUCOSE SERPL-MCNC: 122 MG/DL (ref 65–99)
HCT VFR BLD AUTO: 26.9 % (ref 37.5–51)
HGB BLD-MCNC: 8.3 G/DL (ref 13–17.7)
LYMPHOCYTES # BLD AUTO: 0.5 10*3/MM3 (ref 0.7–3.1)
LYMPHOCYTES NFR BLD AUTO: 7.3 % (ref 19.6–45.3)
MCH RBC QN AUTO: 26.9 PG (ref 26.6–33)
MCHC RBC AUTO-ENTMCNC: 30.9 G/DL (ref 31.5–35.7)
MCV RBC AUTO: 87.1 FL (ref 79–97)
MONOCYTES # BLD AUTO: 0.84 10*3/MM3 (ref 0.1–0.9)
MONOCYTES NFR BLD AUTO: 12.3 % (ref 5–12)
NEUTROPHILS NFR BLD AUTO: 5.27 10*3/MM3 (ref 1.7–7)
NEUTROPHILS NFR BLD AUTO: 77 % (ref 42.7–76)
PLATELET # BLD AUTO: 376 10*3/MM3 (ref 140–450)
PMV BLD AUTO: 8.7 FL (ref 6–12)
POTASSIUM SERPL-SCNC: 3.1 MMOL/L (ref 3.5–5.2)
PROT SERPL-MCNC: 6.4 G/DL (ref 6–8.5)
RAD ONC ARIA COURSE ID: NORMAL
RAD ONC ARIA COURSE LAST TREATMENT DATE: NORMAL
RAD ONC ARIA COURSE START DATE: NORMAL
RAD ONC ARIA COURSE TREATMENT ELAPSED DAYS: 50
RAD ONC ARIA FIRST TREATMENT DATE: NORMAL
RAD ONC ARIA PLAN FRACTIONS TREATED TO DATE: 27
RAD ONC ARIA PLAN ID: NORMAL
RAD ONC ARIA PLAN PRESCRIBED DOSE PER FRACTION: 2 GY
RAD ONC ARIA PLAN PRIMARY REFERENCE POINT: NORMAL
RAD ONC ARIA PLAN TOTAL FRACTIONS PRESCRIBED: 30
RAD ONC ARIA PLAN TOTAL PRESCRIBED DOSE: 6000 CGY
RAD ONC ARIA REFERENCE POINT DOSAGE GIVEN TO DATE: 54 GY
RAD ONC ARIA REFERENCE POINT ID: NORMAL
RAD ONC ARIA REFERENCE POINT SESSION DOSAGE GIVEN: 2 GY
RBC # BLD AUTO: 3.09 10*6/MM3 (ref 4.14–5.8)
SODIUM SERPL-SCNC: 133 MMOL/L (ref 136–145)
T4 FREE SERPL-MCNC: 1.32 NG/DL (ref 0.93–1.7)
TSH SERPL DL<=0.05 MIU/L-ACNC: 1.13 UIU/ML (ref 0.27–4.2)
WBC NRBC COR # BLD: 6.84 10*3/MM3 (ref 3.4–10.8)

## 2023-01-09 PROCEDURE — 77014 CHG CT GUIDANCE RADIATION THERAPY FLDS PLACEMENT: CPT | Performed by: RADIOLOGY

## 2023-01-09 PROCEDURE — 82962 GLUCOSE BLOOD TEST: CPT

## 2023-01-09 PROCEDURE — 96413 CHEMO IV INFUSION 1 HR: CPT

## 2023-01-09 PROCEDURE — 80050 GENERAL HEALTH PANEL: CPT | Performed by: INTERNAL MEDICINE

## 2023-01-09 PROCEDURE — 25010000002 HEPARIN LOCK FLUSH PER 10 UNITS: Performed by: INTERNAL MEDICINE

## 2023-01-09 PROCEDURE — 25010000002 ATEZOLIZUMAB 1200 MG/20ML SOLUTION 20 ML VIAL: Performed by: INTERNAL MEDICINE

## 2023-01-09 PROCEDURE — FACE2FACE: Performed by: RADIOLOGY

## 2023-01-09 PROCEDURE — 84439 ASSAY OF FREE THYROXINE: CPT | Performed by: INTERNAL MEDICINE

## 2023-01-09 PROCEDURE — 77386: CPT | Performed by: RADIOLOGY

## 2023-01-09 RX ORDER — HEPARIN SODIUM (PORCINE) LOCK FLUSH IV SOLN 100 UNIT/ML 100 UNIT/ML
500 SOLUTION INTRAVENOUS AS NEEDED
Status: DISCONTINUED | OUTPATIENT
Start: 2023-01-09 | End: 2023-01-10 | Stop reason: HOSPADM

## 2023-01-09 RX ORDER — SODIUM CHLORIDE 0.9 % (FLUSH) 0.9 %
20 SYRINGE (ML) INJECTION AS NEEDED
Status: DISCONTINUED | OUTPATIENT
Start: 2023-01-09 | End: 2023-01-10 | Stop reason: HOSPADM

## 2023-01-09 RX ORDER — SODIUM CHLORIDE 9 MG/ML
250 INJECTION, SOLUTION INTRAVENOUS ONCE
Status: COMPLETED | OUTPATIENT
Start: 2023-01-09 | End: 2023-01-09

## 2023-01-09 RX ADMIN — Medication 500 UNITS: at 14:41

## 2023-01-09 RX ADMIN — ATEZOLIZUMAB 1200 MG: 1200 INJECTION, SOLUTION INTRAVENOUS at 14:01

## 2023-01-09 RX ADMIN — Medication 20 ML: at 14:41

## 2023-01-09 RX ADMIN — SODIUM CHLORIDE 250 ML: 9 INJECTION, SOLUTION INTRAVENOUS at 14:01

## 2023-01-09 NOTE — PROGRESS NOTES
NAME: Jc Driscoll DATE: 2023   : 1965    MRN: [id number] DIAGNOSIS:   Encounter Diagnosis   Name Primary?   • Malignant neoplasm of upper lobe of left lung (HCC) Yes          RADIATION ON TREATMENT VISIT NOTE - CHEST    Treatment Summary    Systemic Therapy: Atezolizumab  Treatment Site Ref. ID Energy Dose/Fx (cGy) #Fx Dose Correction (cGy) Total Dose (cGy) Start Date End Date Elapsed Days   LtSC_CWRecur LtScv_CWRecur 6X 200  0 5,400 2022 50     Recurrent L Lung, CW invasion with SCV nodes  I        General:     Review of Systems  [x] No new complaints  [] Fever/chills  Night sweats  [] Decreased energy level [] Decreased appetite [] Oxygen:  L/min  [] Dry cough    [] Productive cough   [] SOB  [] Hemoptysis   [] Dysphagia      [x] Fatigue,  severity: 1 [x] Pain,  severity: 1, location: under left arm  Pain medication regimen: Oxycodone    Esophagitis regimen: NONE    Skin regimen: Aquaphor    Subjective:  He is still complaining of the pain under his left arm, but otherwise doing well, no other concerns or complaints.    Denies esophagitis  Denies a cough    KPS: 80     Vital Signs: /69   Pulse 111   Temp 98.4 °F (36.9 °C) (Oral)   Resp 22   Ht 190.5 cm (75\")   Wt 80.4 kg (177 lb 3.2 oz)   SpO2 93%   BMI 22.15 kg/m²     Weight:   Wt Readings from Last 3 Encounters:   23 80.4 kg (177 lb 3.2 oz)   23 79.8 kg (176 lb)   23 81.9 kg (180 lb 9.6 oz)       Medication:   Current Outpatient Medications:   •  demeclocycline (DECLOMYCIN) 300 MG tablet, Take 1 tablet by mouth Every 12 (Twelve) Hours for 198 doses. Indications: SIADH, Disp: 60 tablet, Rfl: 3  •  desvenlafaxine (PRISTIQ) 50 MG 24 hr tablet, Take 50 mg by mouth Daily., Disp: , Rfl:   •  folic acid (FOLVITE) 1 MG tablet, Take 1 tablet by mouth Daily for 30 days., Disp: 30 tablet, Rfl: 2  •  levothyroxine (SYNTHROID, LEVOTHROID) 100 MCG tablet, Take 100 mcg by mouth Every Morning., Disp: , Rfl:   •   levothyroxine (SYNTHROID, LEVOTHROID) 100 MCG tablet, Take 1 tablet by mouth Daily., Disp: , Rfl:   •  liothyronine (CYTOMEL) 5 MCG tablet, Take 5 mcg by mouth Daily., Disp: , Rfl:   •  LORazepam (ATIVAN) 0.5 MG tablet, Take 0.5 mg by mouth Every 8 (Eight) Hours As Needed., Disp: , Rfl:   •  metoprolol succinate XL (TOPROL-XL) 100 MG 24 hr tablet, Take 100 mg by mouth Daily., Disp: , Rfl:   •  ondansetron (ZOFRAN) 8 MG tablet, Take 1 tablet by mouth 3 (Three) Times a Day As Needed for Nausea or Vomiting., Disp: 30 tablet, Rfl: 5  •  oxyCODONE-acetaminophen (PERCOCET) 5-325 MG per tablet, Take 1 tablet by mouth Every 6 (Six) Hours As Needed for Moderate Pain., Disp: 40 tablet, Rfl: 0  •  pantoprazole (PROTONIX) 40 MG EC tablet, Take 40 mg by mouth Daily As Needed., Disp: , Rfl:   •  Ure-Na 15 g pack packet, TAKE 1 PACKET BY MOUTH TWICE DAILY AS DIRECTED FOR 2 DOSES, Disp: , Rfl:   •  vitamin B-12 (CYANOCOBALAMIN) 1000 MCG tablet, Take 1,000 mcg by mouth Daily., Disp: , Rfl:   No current facility-administered medications for this visit.    Facility-Administered Medications Ordered in Other Visits:   •  heparin injection 500 Units, 500 Units, Intravenous, PRNChasity Ifeoma Roseline, MD, 500 Units at 01/09/23 1441  •  sodium chloride 0.9 % flush 20 mL, 20 mL, Intravenous, PRNChasity Ifeoma Roseline, MD, 20 mL at 01/09/23 1441     LABS (Reviewed):  Hematology  WBC   Date Value Ref Range Status   01/09/2023 6.84 3.40 - 10.80 10*3/mm3 Final   03/22/2022 7.84 4.5 - 11.0 10*3/uL Final     RBC   Date Value Ref Range Status   01/09/2023 3.09 (L) 4.14 - 5.80 10*6/mm3 Final   03/22/2022 4.23 (L) 4.5 - 5.9 10*6/uL Final     Hemoglobin   Date Value Ref Range Status   01/09/2023 8.3 (L) 13.0 - 17.7 g/dL Final   03/22/2022 12.9 (L) 13.5 - 17.5 g/dL Final     Hematocrit   Date Value Ref Range Status   01/09/2023 26.9 (L) 37.5 - 51.0 % Final   03/22/2022 38.8 (L) 41.0 - 53.0 % Final     Platelets   Date Value Ref Range Status    01/09/2023 376 140 - 450 10*3/mm3 Final   03/22/2022 324 140 - 440 10*3/uL Final     Chemistry   Lab Results   Component Value Date    GLUCOSE 151 (H) 12/28/2022    BUN 12 12/28/2022    CREATININE 0.78 12/28/2022    EGFRIFNONA 100 01/28/2022    EGFRIFAFRI >60 05/20/2019    BCR 15.4 12/28/2022    K 3.3 (L) 12/28/2022    CO2 28.0 12/28/2022    CALCIUM 9.0 12/28/2022    ALBUMIN 3.20 (L) 12/19/2022    LABIL2 1.5 03/22/2022    AST 28 12/19/2022    ALT 38 12/19/2022         Physical Exam:     Pulmonary: [] Cough:     [] Dyspnea:  Neck:  [] Lymphadenopathy  Lungs:  [x] Clear to auscultation  CVS:  [x] Regular rate & rhythm  Skin:  Grade: 1; no desquamation  GI:  [] Dysphagia:     [x] Esophagitis: Denies    Comments/Notes:      [x] Review of labs, images, dosimetry, dose delivered, & treatment parameters.    Comments:     [x] Patient treatment setup reviewed.    Comments:     Recommendations:  Continue XRT and supportive     [x] Continue RT  [] Change RT Plan [] Hold RT, length:        Approved Electronically By:  Chino Escalona MD, 1/9/2023, 15:31 EST      Confidentiality of this medical record shall be maintained except when use or disclosure is required or permitted by law, regulation or written authorization by the patient.

## 2023-01-10 ENCOUNTER — HOSPITAL ENCOUNTER (OUTPATIENT)
Dept: RADIATION ONCOLOGY | Facility: HOSPITAL | Age: 58
Discharge: HOME | End: 2023-01-10

## 2023-01-10 LAB
RAD ONC ARIA COURSE ID: NORMAL
RAD ONC ARIA COURSE LAST TREATMENT DATE: NORMAL
RAD ONC ARIA COURSE START DATE: NORMAL
RAD ONC ARIA COURSE TREATMENT ELAPSED DAYS: 51
RAD ONC ARIA FIRST TREATMENT DATE: NORMAL
RAD ONC ARIA PLAN FRACTIONS TREATED TO DATE: 28
RAD ONC ARIA PLAN ID: NORMAL
RAD ONC ARIA PLAN PRESCRIBED DOSE PER FRACTION: 2 GY
RAD ONC ARIA PLAN PRIMARY REFERENCE POINT: NORMAL
RAD ONC ARIA PLAN TOTAL FRACTIONS PRESCRIBED: 30
RAD ONC ARIA PLAN TOTAL PRESCRIBED DOSE: 6000 CGY
RAD ONC ARIA REFERENCE POINT DOSAGE GIVEN TO DATE: 56 GY
RAD ONC ARIA REFERENCE POINT ID: NORMAL
RAD ONC ARIA REFERENCE POINT SESSION DOSAGE GIVEN: 2 GY

## 2023-01-10 PROCEDURE — 77014 CHG CT GUIDANCE RADIATION THERAPY FLDS PLACEMENT: CPT | Performed by: RADIOLOGY

## 2023-01-10 PROCEDURE — 77386: CPT | Performed by: RADIOLOGY

## 2023-01-10 RX ORDER — POTASSIUM CHLORIDE 20 MEQ/1
40 TABLET, EXTENDED RELEASE ORAL ONCE
Qty: 2 TABLET | Refills: 0 | Status: SHIPPED | OUTPATIENT
Start: 2023-01-10 | End: 2023-01-10

## 2023-01-11 ENCOUNTER — HOSPITAL ENCOUNTER (OUTPATIENT)
Dept: RADIATION ONCOLOGY | Facility: HOSPITAL | Age: 58
Discharge: HOME | End: 2023-01-11

## 2023-01-11 LAB
RAD ONC ARIA COURSE ID: NORMAL
RAD ONC ARIA COURSE LAST TREATMENT DATE: NORMAL
RAD ONC ARIA COURSE START DATE: NORMAL
RAD ONC ARIA COURSE TREATMENT ELAPSED DAYS: 52
RAD ONC ARIA FIRST TREATMENT DATE: NORMAL
RAD ONC ARIA PLAN FRACTIONS TREATED TO DATE: 29
RAD ONC ARIA PLAN ID: NORMAL
RAD ONC ARIA PLAN PRESCRIBED DOSE PER FRACTION: 2 GY
RAD ONC ARIA PLAN PRIMARY REFERENCE POINT: NORMAL
RAD ONC ARIA PLAN TOTAL FRACTIONS PRESCRIBED: 30
RAD ONC ARIA PLAN TOTAL PRESCRIBED DOSE: 6000 CGY
RAD ONC ARIA REFERENCE POINT DOSAGE GIVEN TO DATE: 58 GY
RAD ONC ARIA REFERENCE POINT ID: NORMAL
RAD ONC ARIA REFERENCE POINT SESSION DOSAGE GIVEN: 2 GY

## 2023-01-11 PROCEDURE — 77014 CHG CT GUIDANCE RADIATION THERAPY FLDS PLACEMENT: CPT | Performed by: RADIOLOGY

## 2023-01-11 PROCEDURE — 77386: CPT | Performed by: RADIOLOGY

## 2023-01-12 ENCOUNTER — HOSPITAL ENCOUNTER (OUTPATIENT)
Dept: RADIATION ONCOLOGY | Facility: HOSPITAL | Age: 58
Discharge: HOME | End: 2023-01-12

## 2023-01-12 ENCOUNTER — RADIATION ONCOLOGY WEEKLY ASSESSMENT (OUTPATIENT)
Dept: RADIATION ONCOLOGY | Facility: HOSPITAL | Age: 58
End: 2023-01-12
Payer: COMMERCIAL

## 2023-01-12 DIAGNOSIS — C34.12 MALIGNANT NEOPLASM OF UPPER LOBE OF LEFT LUNG: Primary | ICD-10-CM

## 2023-01-12 LAB
RAD ONC ARIA COURSE ID: NORMAL
RAD ONC ARIA COURSE LAST TREATMENT DATE: NORMAL
RAD ONC ARIA COURSE START DATE: NORMAL
RAD ONC ARIA COURSE TREATMENT ELAPSED DAYS: 53
RAD ONC ARIA FIRST TREATMENT DATE: NORMAL
RAD ONC ARIA PLAN FRACTIONS TREATED TO DATE: 30
RAD ONC ARIA PLAN ID: NORMAL
RAD ONC ARIA PLAN PRESCRIBED DOSE PER FRACTION: 2 GY
RAD ONC ARIA PLAN PRIMARY REFERENCE POINT: NORMAL
RAD ONC ARIA PLAN TOTAL FRACTIONS PRESCRIBED: 30
RAD ONC ARIA PLAN TOTAL PRESCRIBED DOSE: 6000 CGY
RAD ONC ARIA REFERENCE POINT DOSAGE GIVEN TO DATE: 60 GY
RAD ONC ARIA REFERENCE POINT ID: NORMAL
RAD ONC ARIA REFERENCE POINT SESSION DOSAGE GIVEN: 2 GY

## 2023-01-12 PROCEDURE — FACE2FACE: Performed by: RADIOLOGY

## 2023-01-12 PROCEDURE — 77336 RADIATION PHYSICS CONSULT: CPT | Performed by: RADIOLOGY

## 2023-01-12 PROCEDURE — 77386: CPT | Performed by: STUDENT IN AN ORGANIZED HEALTH CARE EDUCATION/TRAINING PROGRAM

## 2023-01-12 PROCEDURE — 77014 CHG CT GUIDANCE RADIATION THERAPY FLDS PLACEMENT: CPT | Performed by: STUDENT IN AN ORGANIZED HEALTH CARE EDUCATION/TRAINING PROGRAM

## 2023-01-12 NOTE — PROGRESS NOTES
RADIATION THERAPY COMPLETION and DISCHARGE     Jc Driscoll completed radiation therapy on 01/12/2023 for Recurrent NSCLC, BACILIO. The summary of his/her treatment is as follows:    TREATMENT SITE:    SC/ CW START DATE:   11/20/2022   TOTAL DOSE (cGy):   6000 END DATE:   01/13/2023   DOSE/FRACTION:   200 ELAPSED DAYS:   53   TOTAL FACTIONS:   30 PHYSICIAN:    Chino Escaolna MD     He/She is scheduled for a one-month follow-up appointment with Dr. Escalona on February 15, 2023 at 1:40 pm.     The following instructions were provided to the patient and/or family in their printed after visit summary (AVS) as well as discussed in-person with the radiation oncology nurse. The patient and/or family member had the opportunity to ask questions and verbalized their questions were adequately answered.   _______________________________________________________________________    RADIATION THERAPY DISCHARGE INSTRUCTIONS  Chest    CONGRATULATIONS! You completed 30 radiation treatments for treatment of your recurrent NSCLC of BACILIO. These discharge instructions are important for you to follow until your one-month follow up appointment with Dr. Escalona. Please make sure to review these instructions and call the Radiation Oncology Department if you have any questions or concerns with symptoms you may experience. Thank you for trusting us with your cancer treatment!    DIET  • Eat a regular, well balanced diet that is high in protein such as meat, eggs, cheese, and nut butters  • Drink 48 to 64 ounces of fluid daily  • Use nutritional supplements if you are not able to eat full meals  • Monitor your weight and report continued weight loss to your doctor    MEDICATIONS  • Use Tylenol as needed to decrease discomfort and irritation to treatment area  • Take pain medications only as prescribed  • Take a laxative or stool softener as needed to prevent constipation due to pain medications  • Use Nel's Magic Mouthwash or other oral pain relief  medication before meals and before taking medications as needed to ease the discomfort of swallowing (if applicable)    SKIN CARE  • Wash treated skin gently with your hands using a mild, non-drying soap such as Dove® or Aveeno® until skin returns to normal  • Pat skin dry - do not rub  • Keep treated skin moist with frequent applications of Aquaphor® or unscented lotion (such as Eucerin)®  • Always protect your treated skin when outdoors by wearing protective clothing and applying sunscreen SPF 15 or higher at least 30 minutes before going outdoors and reapply frequently  • Resume use of deodorant to the armpit of the affected arm when skin reactions improve    ACTIVITY  Fatigue is a normal side effect of radiation therapy and should improve over time  • Alternate rest and activity  • Exercise such as walking may help to improve your fatigue    FOLLOW-UP  • Continue follow-up appointments with all other doctors as necessary  • Call your radiation oncology doctor if you are concerned with any side effects you are experiencing, such as increased shortness of breath, fevers or chills, night sweats, increased coughing or new cough, blood in your sputum, or difficulty swallowing: [phone]    _______________________________________________________________________    Completed by: Char Blancas MA, Radiation Oncology Nurse on 01/12/2023  at 14:24 EST

## 2023-01-12 NOTE — PATIENT INSTRUCTIONS
RADIATION THERAPY DISCHARGE INSTRUCTIONS  Chest    CONGRATULATIONS! You completed 30 radiation treatments for treatment of your recurrent NSCLC of [place]. These discharge instructions are important for you to follow until your one-month follow up appointment with Dr. Escalona. Please make sure to review these instructions and call the Radiation Oncology Department if you have any questions or concerns with symptoms you may experience. Thank you for trusting us with your cancer treatment!    DIET  Eat a regular, well balanced diet that is high in protein such as meat, eggs, cheese, and nut butters  Drink 48 to 64 ounces of fluid daily  Use nutritional supplements if you are not able to eat full meals  Monitor your weight and report continued weight loss to your doctor    MEDICATIONS  Use Tylenol as needed to decrease discomfort and irritation to treatment area  Take pain medications only as prescribed  Take a laxative or stool softener as needed to prevent constipation due to pain medications  Use Nel's Magic Mouthwash or other oral pain relief medication before meals and before taking medications as needed to ease the discomfort of swallowing (if applicable)    SKIN CARE  Wash treated skin gently with your hands using a mild, non-drying soap such as Dove® or Aveeno® until skin returns to normal  Pat skin dry - do not rub  Keep treated skin moist with frequent applications of Aquaphor® or unscented lotion (such as Eucerin)®  Always protect your treated skin when outdoors by wearing protective clothing and applying sunscreen SPF 15 or higher at least 30 minutes before going outdoors and reapply frequently  Resume use of deodorant to the armpit of the affected arm when skin reactions improve    ACTIVITY  Fatigue is a normal side effect of radiation therapy and should improve over time  Alternate rest and activity  Exercise such as walking may help to improve your fatigue    FOLLOW-UP  Continue follow-up appointments with  all other doctors as necessary  Call your radiation oncology doctor if you are concerned with any side effects you are experiencing, such as increased shortness of breath, fevers or chills, night sweats, increased coughing or new cough, blood in your sputum, or difficulty swallowing: [phone]    _______________________________________________________________________    Completed by: Char Blancas MA on 1/12/2023 at 14:23 EST

## 2023-01-18 ENCOUNTER — OFFICE VISIT (OUTPATIENT)
Dept: ONCOLOGY | Facility: CLINIC | Age: 58
End: 2023-01-18
Payer: COMMERCIAL

## 2023-01-18 ENCOUNTER — LAB (OUTPATIENT)
Dept: LAB | Facility: HOSPITAL | Age: 58
End: 2023-01-18
Payer: COMMERCIAL

## 2023-01-18 VITALS
BODY MASS INDEX: 22.26 KG/M2 | RESPIRATION RATE: 20 BRPM | HEIGHT: 75 IN | TEMPERATURE: 96.8 F | DIASTOLIC BLOOD PRESSURE: 84 MMHG | HEART RATE: 98 BPM | WEIGHT: 179 LBS | SYSTOLIC BLOOD PRESSURE: 137 MMHG

## 2023-01-18 DIAGNOSIS — C34.91 NON-SMALL CELL CARCINOMA OF LUNG, RIGHT: Primary | ICD-10-CM

## 2023-01-18 LAB
BASOPHILS # BLD AUTO: 0.02 10*3/MM3 (ref 0–0.2)
BASOPHILS NFR BLD AUTO: 0.2 % (ref 0–1.5)
DEPRECATED RDW RBC AUTO: 51 FL (ref 37–54)
EOSINOPHIL # BLD AUTO: 0.1 10*3/MM3 (ref 0–0.4)
EOSINOPHIL NFR BLD AUTO: 1.1 % (ref 0.3–6.2)
ERYTHROCYTE [DISTWIDTH] IN BLOOD BY AUTOMATED COUNT: 16.6 % (ref 12.3–15.4)
HCT VFR BLD AUTO: 28.5 % (ref 37.5–51)
HGB BLD-MCNC: 9 G/DL (ref 13–17.7)
HOLD SPECIMEN: NORMAL
HOLD SPECIMEN: NORMAL
LYMPHOCYTES # BLD AUTO: 0.51 10*3/MM3 (ref 0.7–3.1)
LYMPHOCYTES NFR BLD AUTO: 5.7 % (ref 19.6–45.3)
MCH RBC QN AUTO: 27.1 PG (ref 26.6–33)
MCHC RBC AUTO-ENTMCNC: 31.6 G/DL (ref 31.5–35.7)
MCV RBC AUTO: 85.8 FL (ref 79–97)
MONOCYTES # BLD AUTO: 0.91 10*3/MM3 (ref 0.1–0.9)
MONOCYTES NFR BLD AUTO: 10.2 % (ref 5–12)
NEUTROPHILS NFR BLD AUTO: 7.4 10*3/MM3 (ref 1.7–7)
NEUTROPHILS NFR BLD AUTO: 82.8 % (ref 42.7–76)
PLATELET # BLD AUTO: 393 10*3/MM3 (ref 140–450)
PMV BLD AUTO: 7.8 FL (ref 6–12)
RBC # BLD AUTO: 3.32 10*6/MM3 (ref 4.14–5.8)
WBC NRBC COR # BLD: 8.94 10*3/MM3 (ref 3.4–10.8)

## 2023-01-18 PROCEDURE — 85025 COMPLETE CBC W/AUTO DIFF WBC: CPT

## 2023-01-18 PROCEDURE — 36415 COLL VENOUS BLD VENIPUNCTURE: CPT

## 2023-01-18 PROCEDURE — 99214 OFFICE O/P EST MOD 30 MIN: CPT | Performed by: INTERNAL MEDICINE

## 2023-01-18 RX ORDER — POTASSIUM CHLORIDE 20 MEQ/1
TABLET, EXTENDED RELEASE ORAL
COMMUNITY
Start: 2023-01-10

## 2023-01-18 RX ORDER — LIOTHYRONINE SODIUM 5 UG/1
1 TABLET ORAL DAILY
COMMUNITY
Start: 2023-01-03

## 2023-01-18 RX ORDER — LACTULOSE 10 G/15ML
20 SOLUTION ORAL EVERY 12 HOURS
Qty: 900 ML | Refills: 1 | Status: SHIPPED | OUTPATIENT
Start: 2023-01-18 | End: 2023-02-27

## 2023-01-18 NOTE — PROGRESS NOTES
Hematology/Oncology Outpatient Follow Up    PATIENT NAME:Jc Driscoll  :1965  MRN: [id number]  PRIMARY CARE PHYSICIAN: Reshma Alvarenga MD  REFERRING PHYSICIAN: Reshma Alvarenga MD    Chief Complaint   Patient presents with   • Follow-up     Non-small cell carcinoma of lung, right (HCC)        HISTORY OF PRESENT ILLNESS:                                                                                                                                                                                                                                                             This is a 57 y.o. who developed left shoulder pain and for that reason he had a chest x-ray done on 19 which showed a 2.7 cm noncalcified right lower lobe nodule was identified.  For that reason a CT scan of the chest was recommended.  Review of his records shows patient had the CT scan on 19 done without contrast.  This basically showed a lobulated noncalcified mass in the posterior aspect of the right lower lobe measuring 3 cm close to the pleural surface.  There is a calcified 1.2 mm nodule in the lateral aspect of the right lower lobe consistent with a granuloma.  There were also calcified subcarinal and right hilar nodules.  There is a lower right side tracheal nodule measuring 1 cm.  Patient had a PET/CT scan done on 19 which showed increased metabolic activity on the right lower lobe mass that measures 2.5 cm with SUV of 4.4.  There was also increased metabolic activity in the subcarinal space measuring 2.8 cm in size with  SUV of 3.4, increased activity in an enlarged right paratracheal lymph node that measures 1.9 cm, SUV of 5, also suspicious for metastatic adenopathy.  Patient had a CT-guided biopsy of the right lower lobe mass on 3/8/19.  This showed adenocarcinoma, TTF-1 positive.  On 3/18/19 patient underwent endoscopic ultrasound and biopsy of a subcarinal lymph node.  This was positive for malignant cells.  Patient was then taken back to surgery on 3/20/18 and had left Mediport placement by Dr. Fuchs.  He has been referred for further evaluation and management of his stage 3 adenocarcinoma of the right lung.  He was accompanied by his spouse for the appointment on 3/26/19.  Patient was scheduled to have a brain MRI for complete staging, but he could not do this due to claustrophobia.  He is willing to try with the help of angiolytics.    • Patient had brain MRI done on 4/5/19.  He states it did not show any evidence of metastatic disease.  Evidence of chronic sinusitis was noted.    • Patient was seen by Dr. Escalona who has recommended concurrent chemotherapy and radiation.  Patient is scheduled to begin on 4/15/19.   • 4/17/19 - Patient was initiated on combined chemotherapy and radiation with Cisplatin and Alimta.  Patient received cycle 1.      • 5/8/19 - Patient received cycle 2 of chemotherapy with Cisplatin and Alimta.   • 5/15/19 - Chemistry panel showed creatinine of 1.7.  WBC 1.8, hemoglobin 11.6, platelet count 72,000.   • 5/20/19 - BUN 10, creatinine 1.2, potassium 3.5.    • 5/23/19 - CT scan of the chest with contrast showed interval decrease in size of the right lower lobe pulmonary nodule now measuring 2.1 cm in size.  There was no mediastinal or hilar adenopathy identified.  • 6/26/2019 patient underwent right lower lobectomy with hilar and mediastinal lymph node dissection performed by Dr. Terrance Mckeon.  • Pathology revealed residual residual 2.2 cm invasive moderately differentiated adenocarcinoma.  There  were a total of 16 lymph nodes evaluated that 7 had metastatic disease.  There was evidence of lymphovascular invasion, extranodal involvement.  All the surgical margins were negative and distance of the closest margin was 2.5 cm.  Logic stage is T1c N2 M0.  • Patient is here today accompanied by his spouse for this appointment.  He continues to complain of some postop discomfort, numbness but his skin is intact.  There is no drainage.  Has had follow-up with Dr. Fuchs.  • 8/14/2019-[facility]  Dr. Maria has recommended immunotherapy with durvalumab off label use as patient has not had significant response to neoadjuvant chemotherapy and radiation.  Patient was given the option to have immunotherapy at the [facility] vs [place] and he has chosen to stay in [place]  • 8/21/19 - Started cycle 1 day 1 Imfinzi  • 9/4/2019 patient had CT scan of the chest this shows a moderate size right pleural effusion.  There are areas of groundglass opacification in the right upper lobe without any evidence of significant septal thickening.  Volume loss noted there is also moderate pleural effusion.  There is no suspicious recurrent malignancy.  There were nonenlarged residual mediastinal lymph nodes.  • 9/9/19 - WBC 6.23, Hgb 11.7 Platelets 257,000, , BUN 9, Cr 1.1,Vitamin B12 318, Ferritin 437  • 9/23/19 - received cycle 2 day 1 Imfinzi  • 10/9/19- Seen by Dr rodríguez with ENT for sinus disease  • 11/4/2019-patient received cycle 5 of Imfinzi(durvalumab)  • 12/2/2019 patient had cycle 7 of durvalumab  • 12/5/2019-patient had CT scan of the  abdomen and pelvis with small right pleural sided pleural effusion.There is no evidence of metastatic disease  • 12/30/2019-patient received cycle 9 of durvalumab  • 12/31/2019-patient had CT scan of the chest showed small to moderate left pleural effusion.  Iglesias appearing right lower paratracheal and right peribronchial soft tissue thickening without any  discrete pathologically enlarged mediastinal or hilar lymph nodes.  • 1/27/20-patient received cycle 11 of durvalumab  • 2/17/20-patient had a stress test which was negative for ischemia  • 2/17/2020-patient had CT of the chest which showed postop changes on the right lung, right pleural effusion but no enlarging mass was noted  • 3/2/2020-patient received cycle 12 of durvalumab  • 3/16/2020-patient had ultrasound-guided right thoracentesis.  Pathology was negative for malignancy  • 4/13/2020-patient received cycle 15 of immunotherapy with durvalumab  • 5/11/2020-patient received cycle 17 of immunotherapy with durvalumab  • 6/9/2020-patient had CT scan of the chest, abdomen and pelvis for lung cancer restaging.  There is stable small chronic loculated right basilar pleural effusion suggesting chronic pleuritis.  There is no evidence of metastatic disease in the abdomen or pelvis  • 7/20/2020 patient received cycle 22 of Durvalumab  • 8/17/2020 patient received cycle 24 and last cycle of durvalumab  • 9/24/2020 patient had CT scan of the chest, abdomen and pelvis for cancer restaging there was no evidence of local recurrence or metastatic disease within the abdomen and pelvis.  He has stable chronic small right pleural effusion.  Mild sigmoid diverticulosis was noted.  • 1/18/2021 patient had CT scan of the chest, abdomen and pelvis reviewed patient  • 5/24/2021 patient had CT scan of the chest calcified subcarinal lymph node consistent with changes of prior granulomatous disease.  Chronic small right pleural effusion is noted similar to prior exam.  Previously shown 4 mm nodule in the left lower lobe has nearly completely resolved.  The subpleural 3 mm nodule located within the posterior left lower lobe With resolved.  • 9/24/2021 patient had a CT scan of the chest, abdomen and pelvis there is soft tissue attenuation within the right paratracheal area which has been suggested to reflect sequela to previous  treatment.  There is slight thickening of the bladder wall otherwise there is no evidence of metastatic disease.  • 12/27/2021 patient had CT scan of the chest with contrast this basically showed irregular shaped noncalcified 7 mm left upper lobe pulmonary nodule.  PET CT scan was recommended to further evaluate.  • 1/26/2022 patient had a PET CT scan done which basically showed evidence of mild uptake corresponding to the nodule within the left upper lobe which is new worrisome for developing metastatic focus.  There was mild radiopharmaceutical activity corresponding to pleural thickening throughout the right lung base with associated pleural effusion likely related to post therapeutic changes and radiation changes in this region.  Metastatic malignancy involving the pleura could not be completely eliminated.  • 1/26/2022: CT-guided biopsy was aborted due to finding by the radiologist that the lung nodularity was actually a clump of tiny nodules of which the largest was 6 mm and therefore biopsy could not be completed.  Also the area was in the vicinity of multiple blood vessels with increased risk of bleeding.  Follow-up CT of the chest was recommended for continued surveillance  • 5/18/2022 patient had CT-guided biopsy of the left enlarging nodule, pathology was positive for invasive poorly differentiated adenocarcinoma most consistent with adenocarcinoma of pulmonary origin.  • 5/27/2022 patient had a PET CT scan which showed a 2.3 cm hypermetabolic left upper lobe nodule which has increased in size no convincing evidence of metastatic disease elsewhere within the chest.  • 5/21/2022 patient had brain MRI which did not show any evidence of intracranial metastasis  • 6/2/2022 patient was seen by Dr. Miryam Reyna pulmonary function test is being done to assess surgical candidacy  • 7/6/2022 patient underwent left heart Koska P video-assisted with upper lobectomy mediastinal lymph node dissection performed by   Miryam Roach  • Final pathology revealed poorly differentiated  • A predominant adenocarcinoma with solid component presenting 45% and micropapillary component 5% with extensive necrosis, invasive carcinoma measures 2.5 maximally there was focal lymphovascular space invasion all margins were negative for malignancy discussed with the closest margin was 2.5 cm pathology stage is pT1C pN1.  PD-L1 showed a tumor proportion score of 95%  • 8/3/2022 patient started adjuvant chemotherapy with cisplatin, Taxol  • 8/24/2022 patient received cycle 2 of combination chemotherapy with cisplatin and Taxol with Neulasta  • 10/5/2022: Patient received cycle 4-day 1 of combination chemotherapy with cisplatin and Taxol  • Patient developed left-sided chest pain for that reason he had a PET/CT scan 1/20/2020 is basically revealed a 4 x 3.5 cm hypermetabolic soft tissue mass in the left chest wall between the first and second ribs anteriorly consistent with relapsed disease.  There was a small cluster of hypermetabolic lymph nodes seen in the left supraclavicular region consistent with early manoj metastasis  • 11/11/2022: 2D echocardiogram performed showing a left ventricular EF of 56 to 60%  • 11/22/2022: Cycle 1 of atezolizumab received  • 12/5/2022-12/9/2022: Hospitalized at [facility] for dehydration, hyponatremia, and weakness.  • 12/19/2020: Patient received cycle 2 of Tecentriq along with XRT  • 1/11/2023: Patient completed radiation.  Remains on Tecentriq every 3 weeks    Past Medical History:   Diagnosis Date   • Allergic rhinitis 07/25/2013    Overview:  4/2/2018 - still zyrtec 10 daily (if stops, gets dermatitis again).    • Anxiety and depression 06/05/2012    Overview:  4/2/2018 -   C/w well 300 xr and Lito 20.  4/8/2019 -   Doing ok even with new lung cancer - lito 20 & well 300xr.    • Chronic pansinusitis 04/08/2019    Overview:  4/8/2019 -   MRI showed chronic thick in frontal, maxillary, ethmoidal ---> to Dr. ZAY knowles est since  abx ICC didn't help.  Does netipot bid.    • Diastolic CHF (HCC) 07/09/2014    Overview:  7/4/14 - impaired LV relaxation on [facility] ECHO.  EF 60%. Rest normal   • Dupuytren's contracture of both hands    • Elevated cholesterol    • Erosive esophagitis 07/09/2019    Overview:  EGD 2016 4/2/2018 - nexium OTC daily for few week.  Qod before that.  Now carbonation hurts, epigastric pain 4/8/2019 -   protonix 40 doing well.    • Gastroesophageal reflux disease 02/21/2019   • Hiatal hernia 07/09/2019   • History of colon polyps    • Hypertension    • Lung cancer (HCC)     right lung and in lymph nodes x2   • Mediastinal lymphadenopathy 03/12/2019   • Normocytic anemia 08/08/2019    Overview:  6/2019 - dropped to 9's postop 7/2019 - rebounded to 10's.  8/8/2019 -   Back to 9's - check ferritin, b12 and thyroid.    • Prediabetes 07/09/2014    Overview:  7/4/14 - a1c 6.1 at [facility] admission   • Retention of urine 06/27/2019    PSOT OP, RESOLVED       Past Surgical History:   Procedure Laterality Date   • BRONCHOSCOPY  03/18/2019    EBUS MONTERO NEEDLE BX   • COLONOSCOPY     • DUPUYTREN CONTRACTURE RELEASE Bilateral    • LUNG BIOPSY  03/08/2019    CT GUIDED   • LUNG REMOVAL, PARTIAL Right     right lower   • PORTACATH PLACEMENT  03/20/2019    DR LONGORIA   • THORACOSCOPY Right 6/26/2019    Procedure: RIGHT VATS, OPEN LOWER LOBECTOMY WITH LYMPH NODE DISECTION, WEDGE RESECTION OF RIGHT MIDDLE LOBE;  Surgeon: Terrance Longoria MD;  Location: [facility] MAIN OR;  Service: Cardiothoracic   • THORACOSCOPY VIDEO ASSISTED WITH LOBECTOMY Left 7/6/2022    Procedure: THORACOSCOPY VIDEO ASSISTED WITH LOBECTOMY;  Surgeon: Miryam Reyna MD;  Location: [facility] MAIN OR;  Service: Thoracic;  Laterality: Left;         Current Outpatient Medications:   •  liothyronine (CYTOMEL) 5 MCG tablet, Take 1 tablet by mouth Daily., Disp: , Rfl:   •  demeclocycline (DECLOMYCIN) 300 MG tablet, Take 1 tablet by mouth Every 12 (Twelve) Hours for 198 doses.  Indications: SIADH, Disp: 60 tablet, Rfl: 3  •  desvenlafaxine (PRISTIQ) 50 MG 24 hr tablet, Take 50 mg by mouth Daily., Disp: , Rfl:   •  lactulose (CHRONULAC) 10 GM/15ML solution, Take 30 mL by mouth Every 12 (Twelve) Hours., Disp: 900 mL, Rfl: 1  •  levothyroxine (SYNTHROID, LEVOTHROID) 100 MCG tablet, Take 100 mcg by mouth Every Morning., Disp: , Rfl:   •  levothyroxine (SYNTHROID, LEVOTHROID) 100 MCG tablet, Take 1 tablet by mouth Daily., Disp: , Rfl:   •  liothyronine (CYTOMEL) 5 MCG tablet, Take 5 mcg by mouth Daily., Disp: , Rfl:   •  LORazepam (ATIVAN) 0.5 MG tablet, Take 0.5 mg by mouth Every 8 (Eight) Hours As Needed., Disp: , Rfl:   •  metoprolol succinate XL (TOPROL-XL) 100 MG 24 hr tablet, Take 100 mg by mouth Daily., Disp: , Rfl:   •  ondansetron (ZOFRAN) 8 MG tablet, Take 1 tablet by mouth 3 (Three) Times a Day As Needed for Nausea or Vomiting., Disp: 30 tablet, Rfl: 5  •  oxyCODONE-acetaminophen (PERCOCET) 5-325 MG per tablet, Take 1 tablet by mouth Every 6 (Six) Hours As Needed for Moderate Pain., Disp: 40 tablet, Rfl: 0  •  pantoprazole (PROTONIX) 40 MG EC tablet, Take 40 mg by mouth Daily As Needed., Disp: , Rfl:   •  potassium chloride (K-DUR,KLOR-CON) 20 MEQ CR tablet, TAKE TWO TABLETS BY MOUTH AS A ONE-TIME DOSE, Disp: , Rfl:   •  Ure-Na 15 g pack packet, TAKE 1 PACKET BY MOUTH TWICE DAILY AS DIRECTED FOR 2 DOSES, Disp: , Rfl:   •  vitamin B-12 (CYANOCOBALAMIN) 1000 MCG tablet, Take 1,000 mcg by mouth Daily., Disp: , Rfl:     No Known Allergies    Family History   Problem Relation Age of Onset   • Cancer Mother         cervical cancer   • Cervical cancer Mother    • Cancer Father         lung cancer   • Lung cancer Father    • Lung cancer Sister    • Cancer Sister         lung cancer   • Malig Hyperthermia Neg Hx        Cancer-related family history includes Cancer in his father, mother, and sister; Cervical cancer in his mother; Lung cancer in his father and sister.    Social History  "    Tobacco Use   • Smoking status: Former     Types: Cigarettes     Quit date: 2009     Years since quittin.3   • Smokeless tobacco: Never   Vaping Use   • Vaping Use: Never used   Substance Use Topics   • Alcohol use: Yes     Alcohol/week: 2.0 standard drinks     Types: 2 Cans of beer per week     Comment: Occasional   • Drug use: No     I have reviewed and confirmed the accuracy of the patient's history: Chief complaint, HPI, ROS and Subjective as entered by the MA/LPN/RN. Azucena Gaspar MD 23     SUBJECTIVE:    Overall he has improved.  He has less pain.  His energy level has also improved.  He complains of constipation      REVIEW OF SYSTEMS:    Review of Systems   Constitutional: Positive for fatigue. Negative for chills and fever.   HENT: Negative for ear pain, mouth sores, nosebleeds and sore throat.    Eyes: Negative for photophobia and visual disturbance.   Respiratory: Negative for wheezing and stridor.    Cardiovascular: Negative for chest pain and palpitations.   Gastrointestinal: Negative for abdominal pain, diarrhea, nausea and vomiting. He has constipation  Endocrine: Negative for cold intolerance and heat intolerance.   Genitourinary: Negative for dysuria and hematuria.   Musculoskeletal: Negative for joint swelling and neck stiffness.   Skin: Negative for color change and rash.   Neurological: Negative for seizures and syncope.   Hematological: Negative for adenopathy.   Psychiatric/Behavioral: Negative for agitation, confusion and hallucinations.           OBJECTIVE:    Vitals:    23 1538   BP: 137/84   Pulse: 98   Resp: 20   Temp: 96.8 °F (36 °C)   TempSrc: Infrared   Weight: 81.2 kg (179 lb)   Height: 190.5 cm (75\")   PainSc:   2   PainLoc: Shoulder     ECOG    Eastern Cooperative Oncology Group (ECOG, [place], World Health Organization) performance scale  0 Fully active; no performance restrictions.  1 Strenuous physical activity restricted; fully ambulatory and able " to carry out light work.  2 Capable of all self-care but unable to carry out any work activities. Up and about >50% of waking hours.  3 Capable of only limited self-care; confined to bed or chair >50% of waking hours.  4 Completely disabled; cannot carry out any self-care; totally confined to bed or chair.    Physical Exam   Constitutional: He is oriented to person, place, and time. He appears well-developed. No distress.   HENT:   Head: Normocephalic and atraumatic.   Right Ear: External ear normal.   Left Ear: External ear normal.   Nose: Nose normal.   Eyes: Pupils are equal, round, and reactive to light. Conjunctivae are normal. Right eye exhibits no discharge. Left eye exhibits no discharge. No scleral icterus.   Neck: No thyromegaly present.   Cardiovascular: Normal rate, regular rhythm and normal heart sounds. Exam reveals no gallop and no friction rub.   Pulmonary/Chest: Effort normal. No stridor. No respiratory distress. He has no wheezes.   Abdominal: Soft. Bowel sounds are normal. He exhibits no mass. There is no abdominal tenderness. There is no rebound and no guarding.   Musculoskeletal: Normal range of motion. No tenderness.   Lymphadenopathy:     He has no cervical adenopathy.   Neurological: He is alert and oriented to person, place, and time. He exhibits normal muscle tone.   Skin: Skin is warm. No rash noted. He is not diaphoretic. No erythema.   Urticarial-like rash, right medial knee likely due to immunotherapy   Psychiatric: His behavior is normal. Judgment and thought content normal.   Nursing note and vitals reviewed.    I have reexamined the patient and the results are consistent with the previously documented exam. Azucena Gaspar MD       RECENT LABS    WBC   Date Value Ref Range Status   01/18/2023 8.94 3.40 - 10.80 10*3/mm3 Final   03/22/2022 7.84 4.5 - 11.0 10*3/uL Final     RBC   Date Value Ref Range Status   01/18/2023 3.32 (L) 4.14 - 5.80 10*6/mm3 Final   03/22/2022 4.23 (L)  4.5 - 5.9 10*6/uL Final     Hemoglobin   Date Value Ref Range Status   01/18/2023 9.0 (L) 13.0 - 17.7 g/dL Final   03/22/2022 12.9 (L) 13.5 - 17.5 g/dL Final     Hematocrit   Date Value Ref Range Status   01/18/2023 28.5 (L) 37.5 - 51.0 % Final   03/22/2022 38.8 (L) 41.0 - 53.0 % Final     MCV   Date Value Ref Range Status   01/18/2023 85.8 79.0 - 97.0 fL Final   03/22/2022 91.7 80.0 - 100.0 fL Final     MCH   Date Value Ref Range Status   01/18/2023 27.1 26.6 - 33.0 pg Final   03/22/2022 30.5 26.0 - 34.0 pg Final     MCHC   Date Value Ref Range Status   01/18/2023 31.6 31.5 - 35.7 g/dL Final   03/22/2022 33.2 31.0 - 37.0 g/dL Final     RDW   Date Value Ref Range Status   01/18/2023 16.6 (H) 12.3 - 15.4 % Final   03/22/2022 14.2 12.0 - 16.8 % Final     RDW-SD   Date Value Ref Range Status   01/18/2023 51.0 37.0 - 54.0 fl Final     MPV   Date Value Ref Range Status   01/18/2023 7.8 6.0 - 12.0 fL Final   03/22/2022 9.3 8.4 - 12.4 fL Final     Platelets   Date Value Ref Range Status   01/18/2023 393 140 - 450 10*3/mm3 Final   03/22/2022 324 140 - 440 10*3/uL Final     Neutrophil Rel %   Date Value Ref Range Status   03/22/2022 63.1 45 - 80 % Final     Neutrophil %   Date Value Ref Range Status   01/18/2023 82.8 (H) 42.7 - 76.0 % Final     Lymphocyte Rel %   Date Value Ref Range Status   03/22/2022 16.2 15 - 50 % Final     Lymphocyte %   Date Value Ref Range Status   01/18/2023 5.7 (L) 19.6 - 45.3 % Final     Monocyte Rel %   Date Value Ref Range Status   03/22/2022 13.6 0 - 15 % Final     Monocyte %   Date Value Ref Range Status   01/18/2023 10.2 5.0 - 12.0 % Final     Eosinophil %   Date Value Ref Range Status   01/18/2023 1.1 0.3 - 6.2 % Final   03/22/2022 5.5 0 - 7 % Final     Basophil Rel %   Date Value Ref Range Status   03/22/2022 1.1 0 - 2 % Final     Basophil %   Date Value Ref Range Status   01/18/2023 0.2 0.0 - 1.5 % Final     Immature Grans %   Date Value Ref Range Status   07/07/2022 0.4 0.0 - 0.5 % Final    03/22/2022 0.5 0.0 - 1.0 % Final     Neutrophils Absolute   Date Value Ref Range Status   03/22/2022 4.94 2.0 - 8.8 10*3/uL Final     Neutrophils, Absolute   Date Value Ref Range Status   01/18/2023 7.40 (H) 1.70 - 7.00 10*3/mm3 Final     Lymphocytes Absolute   Date Value Ref Range Status   03/22/2022 1.27 0.7 - 5.5 10*3/uL Final     Lymphocytes, Absolute   Date Value Ref Range Status   01/18/2023 0.51 (L) 0.70 - 3.10 10*3/mm3 Final     Monocytes Absolute   Date Value Ref Range Status   03/22/2022 1.07 0.0 - 1.7 10*3/uL Final     Monocytes, Absolute   Date Value Ref Range Status   01/18/2023 0.91 (H) 0.10 - 0.90 10*3/mm3 Final     Eosinophils Absolute   Date Value Ref Range Status   03/22/2022 0.43 0.0 - 0.8 10*3/uL Final     Eosinophils, Absolute   Date Value Ref Range Status   01/18/2023 0.10 0.00 - 0.40 10*3/mm3 Final     Basophils Absolute   Date Value Ref Range Status   03/22/2022 0.09 0.0 - 0.2 10*3/uL Final     Basophils, Absolute   Date Value Ref Range Status   01/18/2023 0.02 0.00 - 0.20 10*3/mm3 Final     Immature Grans, Absolute   Date Value Ref Range Status   07/07/2022 0.05 0.00 - 0.05 10*3/mm3 Final   03/22/2022 0.04 0.00 - 0.10 10*3/uL Final     nRBC   Date Value Ref Range Status   12/19/2022 0.0 0.0 - 0.2 /100 WBC Final       Lab Results   Component Value Date    GLUCOSE 122 (H) 01/09/2023    BUN 12 01/09/2023    CREATININE 0.90 01/09/2023    EGFRIFNONA 100 01/28/2022    EGFRIFAFRI >60 05/20/2019    BCR 13.3 01/09/2023    K 3.1 (L) 01/09/2023    CO2 28.0 01/09/2023    CALCIUM 9.0 01/09/2023    ALBUMIN 3.3 (L) 01/09/2023    LABIL2 1.5 03/22/2022    AST 14 01/09/2023    ALT 10 01/09/2023         ASSESSMENT:    1. 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  He has been referred to Dr. Escalona to see whether he is a candidate for radiation treatments.  Would also consider  systemic treatment for him for molecular targets as he has had very poor response to chemotherapy malignancy.  Continue Tecentriq with XRT.  Patient has completed XRT on 1/11/2023  2. 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  pT1 cpN1 M0 consistent with stage IIb disease, found on surveillance CT scans. Brain MRI was negative. We will recommend platinum doublet followed by Tecentriq unless he has EGFR mutation for which adjuvant osimertinib will be considered.  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  Patient was treated with cisplatin and Alimta in the adjuvant setting for his original malignancy.  He is currently on cisplatin and Taxol.  Patient is receiving cycle #4 today.  This tumor was completely resected and has negative margins and no mediastinal lymph node involvement.  Reviewed the pathology with Dr. Trevizo.  This is a new second lung primary.  Patient has received a total of 4 cycles of combination of cisplatin and Taxol completed on 10/5/2022.  He will continue Tecentriq  3. Constipation: We will begin lactulose.  Patient to call me if no results  4. High PD-L1 expression at 95%. Reviewed foundation 1 testing results.  This showed MET amplification, ERBB2 amplification for which targeted therapy is available for including crizotinib for MET amplification, afatinib for ERBB2 but this is approved for metastatic disease.   5. We have extensively reviewed the literature.  He will be a candidate for ERBB2 targeted therapy but fam-trastuzumab cannot be combined with XRT due to risk of interstitial lung disease.  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  For these reasons we have  decided to proceed with immunotherapy along with radiation.  His case was reviewed at the tumor board and this was the recommendation.  Patient has been approved for start Tecentriq therefore we will go ahead and initiate concurrently with radiation  6. Chemotherapy-induced fatigue: This has improved  7. Hypomagnesemia secondary to chemotherapy: Plan continue replacement therapy monitor levels.  Continue to monitor his electrolytes  8. Chemotherapy induced anemia improving.  He received blood transfusion on 6 12/19/2022  9. 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  Increase salt in the diet, fluid restriction.  Continue to monitor his sodium levels.  Hopefully this would improve with treatment    10. History of adenocarcinoma of the right lung, T1c N2 M0, consistent with clinical stage 3A disease in 2019.  S/P combined chemotherapy and radiation with cisplatin and Alimta neoadjuvantly, followed by her right lower lobectomy with mediastinal and hilar lymph node dissection with residual disease pT1 cpN2 M0.  Followed by maintenance durvalumab for 1 year.   11. Recent diagnosis of hypothyroidism, on thyroid replacement therapy  12. Status post right thoracentesis with cytology negative for malignancy  13. History of pneumothorax following lung biopsy  14. Postop anemia: Reviewed  15. Assessment has been reviewed and updated      PLANS:    1. Continue Tecentriq  2. CT of the chest with contrast in 6 weeks ED before the next appointment to evaluate for response with XRT  3. 2D echocardiogram in preparation for HER2 directed treatment reviewed  4. Patient has completed all planned 4 cycles of chemo: unfortunately has developed relapsed disease  5. Continue Tecentriq-every 3 weeks.  Reviewed  6. Lactulose for constipation  7. Reviewed the above with patient and spouse who is present today  8. His case will be reviewed at the next tumor board scheduled on November 15, 2022 and I will see him shortly after  that  9. Continue weekly BMP and mag levels, CBC.  Reviewed and he will continue  10. Continue fluid restriction to 1200 cc/day  11. Follow-up with nephrology-patient states he plans to get an appointment.  12. Continue anti-nausea medications  13. Reviewed foundation 1 results.  He will be a candidate for molecular targets in the future if he does develop metastatic disease  14. Continue Emla cream to port  15. Continue thyroid replacement therapy through his PCP  16. Continue B12 injections  monthly: Patient did have elevated methylmalonic acid level during the early phases of his treatments.  Continue the same  17. Continue supportive care  18. All questions answered  19. Follow-up with me in middle of January         Patient has  questions which have all been answered to the best of my abilities    I have reviewed labs results, imaging, vitals, and medications with the patient today.     Patient verbalized understanding and is in agreement of the above plan.      I spent 30 total minutes, face-to-face, caring for Jc today.  90% of this time involved counseling and/or coordination of care as documented within this note.

## 2023-01-19 LAB
RAD ONC ARIA COURSE END DATE: NORMAL
RAD ONC ARIA COURSE ID: NORMAL
RAD ONC ARIA COURSE LAST TREATMENT DATE: NORMAL
RAD ONC ARIA COURSE START DATE: NORMAL
RAD ONC ARIA COURSE TREATMENT ELAPSED DAYS: 53
RAD ONC ARIA FIRST TREATMENT DATE: NORMAL
RAD ONC ARIA PLAN FRACTIONS TREATED TO DATE: 30
RAD ONC ARIA PLAN ID: NORMAL
RAD ONC ARIA PLAN NAME: NORMAL
RAD ONC ARIA PLAN PRESCRIBED DOSE PER FRACTION: 2 GY
RAD ONC ARIA PLAN PRIMARY REFERENCE POINT: NORMAL
RAD ONC ARIA PLAN TOTAL FRACTIONS PRESCRIBED: 30
RAD ONC ARIA PLAN TOTAL PRESCRIBED DOSE: 6000 CGY
RAD ONC ARIA REFERENCE POINT DOSAGE GIVEN TO DATE: 60 GY
RAD ONC ARIA REFERENCE POINT ID: NORMAL

## 2023-01-29 DIAGNOSIS — C34.91 NON-SMALL CELL CARCINOMA OF LUNG, RIGHT: ICD-10-CM

## 2023-01-29 DIAGNOSIS — C34.31 CANCER OF LOWER LOBE OF RIGHT LUNG: ICD-10-CM

## 2023-01-29 DIAGNOSIS — C34.92 NSCLC OF LEFT LUNG: Primary | ICD-10-CM

## 2023-01-29 RX ORDER — SODIUM CHLORIDE 9 MG/ML
250 INJECTION, SOLUTION INTRAVENOUS ONCE
Status: CANCELLED | OUTPATIENT
Start: 2023-01-30

## 2023-01-30 ENCOUNTER — HOSPITAL ENCOUNTER (OUTPATIENT)
Dept: ONCOLOGY | Facility: HOSPITAL | Age: 58
Setting detail: INFUSION SERIES
Discharge: HOME | End: 2023-01-30
Payer: COMMERCIAL

## 2023-01-30 VITALS
BODY MASS INDEX: 22.5 KG/M2 | DIASTOLIC BLOOD PRESSURE: 78 MMHG | OXYGEN SATURATION: 99 % | RESPIRATION RATE: 18 BRPM | SYSTOLIC BLOOD PRESSURE: 146 MMHG | TEMPERATURE: 98.1 F | WEIGHT: 181 LBS | HEIGHT: 75 IN | HEART RATE: 92 BPM

## 2023-01-30 DIAGNOSIS — C34.91 NON-SMALL CELL CARCINOMA OF LUNG, RIGHT: Primary | ICD-10-CM

## 2023-01-30 DIAGNOSIS — C34.92 NSCLC OF LEFT LUNG: Primary | ICD-10-CM

## 2023-01-30 DIAGNOSIS — C34.91 NON-SMALL CELL CARCINOMA OF LUNG, RIGHT: ICD-10-CM

## 2023-01-30 DIAGNOSIS — Z45.2 ENCOUNTER FOR CARE RELATED TO VASCULAR ACCESS PORT: ICD-10-CM

## 2023-01-30 DIAGNOSIS — C34.31 CANCER OF LOWER LOBE OF RIGHT LUNG: ICD-10-CM

## 2023-01-30 LAB
ALBUMIN SERPL-MCNC: 3.5 G/DL (ref 3.5–5.2)
ALBUMIN/GLOB SERPL: 1.1 G/DL
ALP SERPL-CCNC: 117 U/L (ref 39–117)
ALT SERPL W P-5'-P-CCNC: 7 U/L (ref 1–41)
ANION GAP SERPL CALCULATED.3IONS-SCNC: 11 MMOL/L (ref 5–15)
AST SERPL-CCNC: 9 U/L (ref 1–40)
BASOPHILS # BLD AUTO: 0.04 10*3/MM3 (ref 0–0.2)
BASOPHILS NFR BLD AUTO: 0.5 % (ref 0–1.5)
BILIRUB SERPL-MCNC: 0.3 MG/DL (ref 0–1.2)
BUN SERPL-MCNC: 14 MG/DL (ref 6–20)
BUN/CREAT SERPL: 21.5 (ref 7–25)
CALCIUM SPEC-SCNC: 9.4 MG/DL (ref 8.6–10.5)
CHLORIDE SERPL-SCNC: 95 MMOL/L (ref 98–107)
CO2 SERPL-SCNC: 27 MMOL/L (ref 22–29)
CREAT SERPL-MCNC: 0.65 MG/DL (ref 0.76–1.27)
DEPRECATED RDW RBC AUTO: 55.4 FL (ref 37–54)
EGFRCR SERPLBLD CKD-EPI 2021: 109.2 ML/MIN/1.73
EOSINOPHIL # BLD AUTO: 0.35 10*3/MM3 (ref 0–0.4)
EOSINOPHIL NFR BLD AUTO: 4.7 % (ref 0.3–6.2)
ERYTHROCYTE [DISTWIDTH] IN BLOOD BY AUTOMATED COUNT: 18 % (ref 12.3–15.4)
GLOBULIN UR ELPH-MCNC: 3.2 GM/DL
GLUCOSE BLDC GLUCOMTR-MCNC: 104 MG/DL (ref 70–105)
GLUCOSE SERPL-MCNC: 114 MG/DL (ref 65–99)
HCT VFR BLD AUTO: 28.7 % (ref 37.5–51)
HGB BLD-MCNC: 8.9 G/DL (ref 13–17.7)
LYMPHOCYTES # BLD AUTO: 0.41 10*3/MM3 (ref 0.7–3.1)
LYMPHOCYTES NFR BLD AUTO: 5.6 % (ref 19.6–45.3)
MAGNESIUM SERPL-MCNC: 1.7 MG/DL (ref 1.6–2.6)
MCH RBC QN AUTO: 26.9 PG (ref 26.6–33)
MCHC RBC AUTO-ENTMCNC: 31 G/DL (ref 31.5–35.7)
MCV RBC AUTO: 86.7 FL (ref 79–97)
MONOCYTES # BLD AUTO: 1.29 10*3/MM3 (ref 0.1–0.9)
MONOCYTES NFR BLD AUTO: 17.5 % (ref 5–12)
NEUTROPHILS NFR BLD AUTO: 5.29 10*3/MM3 (ref 1.7–7)
NEUTROPHILS NFR BLD AUTO: 71.7 % (ref 42.7–76)
PLATELET # BLD AUTO: 369 10*3/MM3 (ref 140–450)
PMV BLD AUTO: 8.3 FL (ref 6–12)
POTASSIUM SERPL-SCNC: 3.8 MMOL/L (ref 3.5–5.2)
PROT SERPL-MCNC: 6.7 G/DL (ref 6–8.5)
RBC # BLD AUTO: 3.31 10*6/MM3 (ref 4.14–5.8)
SODIUM SERPL-SCNC: 133 MMOL/L (ref 136–145)
WBC NRBC COR # BLD: 7.38 10*3/MM3 (ref 3.4–10.8)

## 2023-01-30 PROCEDURE — 85025 COMPLETE CBC W/AUTO DIFF WBC: CPT | Performed by: INTERNAL MEDICINE

## 2023-01-30 PROCEDURE — 96413 CHEMO IV INFUSION 1 HR: CPT

## 2023-01-30 PROCEDURE — 36593 DECLOT VASCULAR DEVICE: CPT

## 2023-01-30 PROCEDURE — 80053 COMPREHEN METABOLIC PANEL: CPT | Performed by: INTERNAL MEDICINE

## 2023-01-30 PROCEDURE — 25010000002 ALTEPLASE 2 MG RECONSTITUTED SOLUTION: Performed by: INTERNAL MEDICINE

## 2023-01-30 PROCEDURE — 25010000002 ATEZOLIZUMAB 1200 MG/20ML SOLUTION 20 ML VIAL: Performed by: INTERNAL MEDICINE

## 2023-01-30 PROCEDURE — 25010000002 HEPARIN LOCK FLUSH PER 10 UNITS: Performed by: INTERNAL MEDICINE

## 2023-01-30 PROCEDURE — 82962 GLUCOSE BLOOD TEST: CPT

## 2023-01-30 PROCEDURE — 83735 ASSAY OF MAGNESIUM: CPT | Performed by: INTERNAL MEDICINE

## 2023-01-30 RX ORDER — HEPARIN SODIUM (PORCINE) LOCK FLUSH IV SOLN 100 UNIT/ML 100 UNIT/ML
500 SOLUTION INTRAVENOUS AS NEEDED
Status: DISCONTINUED | OUTPATIENT
Start: 2023-01-30 | End: 2023-01-31 | Stop reason: HOSPADM

## 2023-01-30 RX ORDER — SODIUM CHLORIDE 0.9 % (FLUSH) 0.9 %
20 SYRINGE (ML) INJECTION AS NEEDED
Status: DISCONTINUED | OUTPATIENT
Start: 2023-01-30 | End: 2023-01-31 | Stop reason: HOSPADM

## 2023-01-30 RX ORDER — SODIUM CHLORIDE 9 MG/ML
250 INJECTION, SOLUTION INTRAVENOUS ONCE
Status: COMPLETED | OUTPATIENT
Start: 2023-01-30 | End: 2023-01-30

## 2023-01-30 RX ADMIN — Medication 500 UNITS: at 15:11

## 2023-01-30 RX ADMIN — Medication 20 ML: at 15:11

## 2023-01-30 RX ADMIN — ALTEPLASE: 2.2 INJECTION, POWDER, LYOPHILIZED, FOR SOLUTION INTRAVENOUS at 13:38

## 2023-01-30 RX ADMIN — SODIUM CHLORIDE 250 ML: 9 INJECTION, SOLUTION INTRAVENOUS at 14:36

## 2023-01-30 RX ADMIN — ATEZOLIZUMAB 1200 MG: 1200 INJECTION, SOLUTION INTRAVENOUS at 14:36

## 2023-01-31 DIAGNOSIS — C34.12 MALIGNANT NEOPLASM OF UPPER LOBE OF LEFT LUNG: ICD-10-CM

## 2023-01-31 RX ORDER — OXYCODONE HYDROCHLORIDE AND ACETAMINOPHEN 5; 325 MG/1; MG/1
1 TABLET ORAL EVERY 6 HOURS PRN
Qty: 60 TABLET | Refills: 0 | Status: SHIPPED | OUTPATIENT
Start: 2023-01-31 | End: 2023-03-29 | Stop reason: SDUPTHER

## 2023-01-31 NOTE — PROGRESS NOTES
Patient Name: Jc Driscoll   Date: 2023  : 1965       MRN: [id number]     Referring Physician: Reshma Alvarenga MD  DX: Left lung cancer, Recurrent     COMPLETION NOTE      Primary Radiation Oncologist: Chino Escalona MD    PRIMARY SITE AND HISTOPATHOLOGY:   Left upper lobe NSCLC, pathology revealed invasive poorly differentiated adenocarcinoma. Recurrence post-op      STAGE: [id number] Stage II NSCLC BACILIO - new primary with short interval recurrence post-op      SIGNIFICANT LAB AND X-RAY FINDINGS: Jc Driscoll is a 57 y.o. male who is known to our clinic. He completed concurrent chemoradiation with Cisplatin/Alimta and 4500 cGy in 25 fractions for [id number], Stage 3A NSCLC to his right lung on 2019. He underwent right lower lobectomy with hilar and mediastinal lymph node dissection by Dr. Fuchs on 2019. He went on to complete 24 cycles Imfinzi from 2019 through 2020.  Interval imaging on 2020 showed no evidence of local recurrence or metastatic disease within the chest, abdomen or pelvis.     More recently patient completed a PET scan on 2022 that showed evidence of mild uptake corresponding to a nodule within the left upper lobe that was worrisome for malignancy.  There is also mild hypermetabolic activity corresponding to pleural thickening throughout the right lung base with associated pleural effusion likely related to post therapeutic change and radiation changes in the region, though metastatic malignancy involving the pleura cannot be eliminated. CT biopsy of left lung nodule was attempted at that time but was aborted as the lung mass was instead identified as a clump of tiny nodules with the largest being 6 mm.     Repeated CT-guided biopsy of the enlarging left lung nodule was completed on 2022. Pathology revealed invasive poorly differentiated adenocarcinoma most consistent with pulmonary origin.  PET scan on 2022 showed a 2.3 cm hypermetabolic left  upper lobe nodule that had increased in size but no convincing evidence of metastatic disease elsewhere in the chest.  MRI brain on 5/21/2022 was negative for intracranial metastasis.     He underwent left VATS with upper lobectomy and mediastinal lymph node dissection on 7/6/2022 by Dr. Reyna. Pathology revealed invasive poorly differentiated adenocarcinoma, tumor size 2.5 cm, all margins negative for invasive carcinoma with the closest margin 2.5 cm (vascular, parenchymal), 1/9 lymph nodes positive (lobar), carcinoma confined to the pulmonary parenchyma does not extend into the visceral pleural surface, focal lymphovascular space invasion identified. PD-L1 positive at 95%.  NGS molecular testing was also completed.     He completed 4 cycles of Cisplatin/Taxol (Q21D) on 10/5/2022. He was last seen in the office by Dr. Gaspar on 10/26/2022.  Planning to proceed with Tecentriq Q21D and follow-up in 4 weeks.     PET scan completed on 10/31/2022 showed a 4.0 x 3.5 cm hypermetabolic soft tissue mass in the left chest wall interposed between first and second ribs anteriorly consistent with malignant disease recurrence, small cluster hypermetabolic lymph nodes in the left supraclavicular region consistent with early manoj metastasis, no evidence of metastatic disease in the abdomen, pelvis, or skeleton.     He was seen for follow-up by Dr. Reyna on 11/2/2022. PET scan results reviewed. He was referred to our clinic for consideration of radiation therapy to his chest wall mass.     Tecentriq infusions have not been scheduled at this time.     He denies any brachial plexopathy symptoms or CW pain at this time.      PLAN OF TREATMENT:  Recurrence directed curative intent dosing.      DATE STARTED:  11/20/2022   DATE COMPLETED:  1/12/2023      TOTAL DOSE: 60 Gy in 30 fractions.   DOSE/FRACTION: 2 Gy per fraction  ENERGY:  VMAT, 6MV photons   STATUS OF TUMOR/PATIENT AT COMPLETION OF RADIOTHERAPY: Patient did have an unplanned  break early in his course related to a COVID infection and was not up to treatment.      TOLERANCE: Improved from the COVID infection.  Grade 1 fatigue and esophagitis--Nel's magic      DISPOSITION:  Adjuvant Atezolizumab with Dr. Chasity LAGUNA here in 1 month or earlier as needed.      Current Outpatient Medications:   •  demeclocycline (DECLOMYCIN) 300 MG tablet, Take 1 tablet by mouth Every 12 (Twelve) Hours for 198 doses. Indications: SIADH, Disp: 60 tablet, Rfl: 3  •  desvenlafaxine (PRISTIQ) 50 MG 24 hr tablet, Take 50 mg by mouth Daily., Disp: , Rfl:   •  lactulose (CHRONULAC) 10 GM/15ML solution, Take 30 mL by mouth Every 12 (Twelve) Hours., Disp: 900 mL, Rfl: 1  •  levothyroxine (SYNTHROID, LEVOTHROID) 100 MCG tablet, Take 100 mcg by mouth Every Morning., Disp: , Rfl:   •  levothyroxine (SYNTHROID, LEVOTHROID) 100 MCG tablet, Take 1 tablet by mouth Daily., Disp: , Rfl:   •  liothyronine (CYTOMEL) 5 MCG tablet, Take 5 mcg by mouth Daily., Disp: , Rfl:   •  liothyronine (CYTOMEL) 5 MCG tablet, Take 1 tablet by mouth Daily., Disp: , Rfl:   •  LORazepam (ATIVAN) 0.5 MG tablet, Take 0.5 mg by mouth Every 8 (Eight) Hours As Needed., Disp: , Rfl:   •  metoprolol succinate XL (TOPROL-XL) 100 MG 24 hr tablet, Take 100 mg by mouth Daily., Disp: , Rfl:   •  ondansetron (ZOFRAN) 8 MG tablet, Take 1 tablet by mouth 3 (Three) Times a Day As Needed for Nausea or Vomiting., Disp: 30 tablet, Rfl: 5  •  oxyCODONE-acetaminophen (PERCOCET) 5-325 MG per tablet, Take 1 tablet by mouth Every 6 (Six) Hours As Needed for Moderate Pain., Disp: 40 tablet, Rfl: 0  •  pantoprazole (PROTONIX) 40 MG EC tablet, Take 40 mg by mouth Daily As Needed., Disp: , Rfl:   •  potassium chloride (K-DUR,KLOR-CON) 20 MEQ CR tablet, TAKE TWO TABLETS BY MOUTH AS A ONE-TIME DOSE, Disp: , Rfl:   •  Ure-Na 15 g pack packet, TAKE 1 PACKET BY MOUTH TWICE DAILY AS DIRECTED FOR 2 DOSES, Disp: , Rfl:   •  vitamin B-12 (CYANOCOBALAMIN) 1000 MCG tablet, Take 1,000  mcg by mouth Daily., Disp: , Rfl:   No current facility-administered medications for this visit.    KPS: 70            cc: Azucena Gaspar MD        Approved by:     Chino Escalona MD

## 2023-02-02 ENCOUNTER — HOSPITAL ENCOUNTER (OUTPATIENT)
Dept: RADIATION ONCOLOGY | Facility: HOSPITAL | Age: 58
Setting detail: RADIATION/ONCOLOGY SERIES
End: 2023-02-02
Payer: COMMERCIAL

## 2023-02-02 NOTE — PROGRESS NOTES
RADIATION ONCOLOGY FOLLOW-UP NOTE    NAME: Jc Driscoll  YOB: 1965  MRN #: [id number]  DATE OF SERVICE: 2/6/2023  REFERRING PROVIDER: Reshma Alvarenga MD  [address] [address]  PRIMARY CARE PROVIDER: Reshma Alvarenga MD    DIAGNOSIS:  Left upper lobe NSCLC, pathology revealed invasive poorly differentiated adenocarcinoma. Recurrence post-op  [id number] Stage II NSCLC BACILIO - new primary with short interval recurrence post-op  1. Malignant neoplasm of upper lobe of left lung (HCC)      REASON FOR VISIT:  1M s/p XRT F/U    RADIATION TREATMENT COURSE:  6000 cGy in 30 fractions to Lt SC/ CW Recurrence, completed 01/12/2023.    HISTORY OF PRESENT ILLNESS: The patient is a 57 y.o. year old male who was last seen in our office on 01/12/2023 upon completion of radiation therapy treatment.    He follows with Dr. Gaspar, and was last seen on 01/18/2023. Their plan is to continue Tecentriq every 3 weeks, CT Chest with contrast in 6 weeks before next appointment to evaluate response to XRT, start lactose for constipation, and follow up in 6 weeks on 03/01/2023.    No imaging over the interval; due end of this month and fu with Dr. Gaspar on 3/1/23 following that study.    He denies any L neck or CW pain. No new shoulder pain. No new headaches, n/v, bone pain or new issues.      The following portions of the patient's history were reviewed and updated as appropriate: allergies, current medications, past family history, past medical history, past social history, past surgical history and problem list. Reviewed with the patient and remain unchanged.    PAST MEDICAL HISTORY:  he has a past medical history of Allergic rhinitis (07/25/2013), Anxiety and depression (06/05/2012), Chronic pansinusitis (04/08/2019), Diastolic CHF (HCC) (07/09/2014), Dupuytren's contracture of both hands, Elevated cholesterol, Erosive esophagitis (07/09/2019), Gastroesophageal reflux disease  (02/21/2019), Hiatal hernia (07/09/2019), History of colon polyps, Hypertension, Lung cancer (HCC), Mediastinal lymphadenopathy (03/12/2019), Normocytic anemia (08/08/2019), Prediabetes (07/09/2014), and Retention of urine (06/27/2019).    MEDICATIONS:    Current Outpatient Medications:   •  demeclocycline (DECLOMYCIN) 300 MG tablet, Take 1 tablet by mouth Every 12 (Twelve) Hours for 198 doses. Indications: SIADH, Disp: 60 tablet, Rfl: 3  •  desvenlafaxine (PRISTIQ) 50 MG 24 hr tablet, Take 50 mg by mouth Daily., Disp: , Rfl:   •  lactulose (CHRONULAC) 10 GM/15ML solution, Take 30 mL by mouth Every 12 (Twelve) Hours., Disp: 900 mL, Rfl: 1  •  levothyroxine (SYNTHROID, LEVOTHROID) 100 MCG tablet, Take 100 mcg by mouth Every Morning., Disp: , Rfl:   •  levothyroxine (SYNTHROID, LEVOTHROID) 100 MCG tablet, Take 1 tablet by mouth Daily., Disp: , Rfl:   •  liothyronine (CYTOMEL) 5 MCG tablet, Take 5 mcg by mouth Daily., Disp: , Rfl:   •  liothyronine (CYTOMEL) 5 MCG tablet, Take 1 tablet by mouth Daily., Disp: , Rfl:   •  LORazepam (ATIVAN) 0.5 MG tablet, Take 0.5 mg by mouth Every 8 (Eight) Hours As Needed., Disp: , Rfl:   •  metoprolol succinate XL (TOPROL-XL) 100 MG 24 hr tablet, Take 100 mg by mouth Daily., Disp: , Rfl:   •  ondansetron (ZOFRAN) 8 MG tablet, Take 1 tablet by mouth 3 (Three) Times a Day As Needed for Nausea or Vomiting., Disp: 30 tablet, Rfl: 5  •  oxyCODONE-acetaminophen (PERCOCET) 5-325 MG per tablet, Take 1 tablet by mouth Every 6 (Six) Hours As Needed for Moderate Pain., Disp: 60 tablet, Rfl: 0  •  pantoprazole (PROTONIX) 40 MG EC tablet, Take 40 mg by mouth Daily As Needed., Disp: , Rfl:   •  potassium chloride (K-DUR,KLOR-CON) 20 MEQ CR tablet, TAKE TWO TABLETS BY MOUTH AS A ONE-TIME DOSE, Disp: , Rfl:   •  Ure-Na 15 g pack packet, TAKE 1 PACKET BY MOUTH TWICE DAILY AS DIRECTED FOR 2 DOSES, Disp: , Rfl:   •  vitamin B-12 (CYANOCOBALAMIN) 1000 MCG tablet, Take 1,000 mcg by mouth Daily., Disp: ,  "Rfl:     ALLERGIES:  No Known Allergies    PAST SURGICAL HISTORY:  he has a past surgical history that includes Lung biopsy (03/08/2019); Portacath placement (03/20/2019); Bronchoscopy (03/18/2019); Dupuytren contracture release (Bilateral); Colonoscopy; Thoracoscopy (Right, 6/26/2019); Lung removal, partial (Right); and thoracoscopy video assisted with lobectomy (Left, 7/6/2022).    PREVIOUS RADIOTHERAPY OR CHEMOTHERAPY:  yes    FAMILY HISTORY:  hisfamily history includes Cancer in his father, mother, and sister; Cervical cancer in his mother; Lung cancer in his father and sister.    SOCIAL HISTORY:  he reports that he quit smoking about 13 years ago. His smoking use included cigarettes. He has never used smokeless tobacco. He reports current alcohol use of about 2.0 standard drinks per week. He reports that he does not use drugs.    PAIN AND PAIN MANAGEMENT:  Denies pain.  Vitals:    02/06/23 1042   BP: 145/88   Pulse: 113   Resp: 18   Temp: 98.3 °F (36.8 °C)   TempSrc: Oral   SpO2: 98%   Weight: 81.4 kg (179 lb 6.4 oz)   Height: 190.5 cm (75\")   PainSc: 0-No pain       NUTRITIONAL STATUS:   no issues    KPS:  80:  Normal activity with effort; some signs or symptoms      REVIEW OF SYSTEMS:   General: No fevers, chills, weight change, or drenching night sweats. Skin: No rashes or jaundice.  HEENT: No change in vision or hearing, no headaches.  Neck: No dysphagia or masses.  Heme/Lymph: No easy bruising or bleeding.  Respiratory System: No shortness of breath or cough.  Cardiovascular: No chest pain, palpitations, or dyspnea on exertion.  - Pacemaker. GI: No nausea, vomiting, diarrhea, melena, or hematochezia.  : No dysuria or hematuria.  Endocrine: No heat or cold intolerance. Musculoskeletal: No myalgias or arthralgias.  Neuro: No weakness, numbness, syncope, or seizures. Psych: No mood changes or depression. Ext: Denies swelling.        Objective   VITAL SIGNS:  Vitals:    02/06/23 1042   BP: 145/88   Pulse: " "113   Resp: 18   Temp: 98.3 °F (36.8 °C)   TempSrc: Oral   SpO2: 98%   Weight: 81.4 kg (179 lb 6.4 oz)   Height: 190.5 cm (75\")   PainSc: 0-No pain       PHYSICAL EXAM:  GENERAL: In no apparent distress, sitting comfortably in room.    HEENT: Normocephalic, atraumatic. Pupils are equal, round, reactive to light. Sclera anicteric. Conjunctiva not injected. Oropharynx without erythema, ulcerations or thrush.   NECK: Supple with no masses.  LYMPHATIC: No cervical, supraclavicular or axillary adenopathy appreciated bilaterally.   CARDIOVASCULAR: S1 & S2 detected; no murmurs, rubs or gallops.  CHEST: Clear to auscultation bilaterally; no wheezes, crackles or rubs. Work of breathing normal.  ABDOMEN: Bowel sounds present. Abdomen is soft, nontender, nondistended.   MUSCULOSKELETAL: No tenderness to palpation along the spine or scapulae. Normal range of motion.  EXTREMITIES: No clubbing, cyanosis, edema.  SKIN: No erythema, rashes, ulcerations noted.   NEUROLOGIC: Cranial nerves II-XII grossly intact bilaterally. No focal neurologic deficits.  PSYCHIATRIC:  Alert, aware, and appropriate.      PERTINENT IMAGING/PATHOLOGY/LABS (Medical Decision Making):     COORDINATION OF CARE: A copy of this note is sent to the referring provider.    PATHOLOGY (Reviewed):     IMAGING (Reviewed):     LABS (Reviewed):  HEMATOLOGY:  WBC   Date Value Ref Range Status   02/06/2023 8.61 3.40 - 10.80 10*3/mm3 Final   03/22/2022 7.84 4.5 - 11.0 10*3/uL Final     RBC   Date Value Ref Range Status   02/06/2023 3.14 (L) 4.14 - 5.80 10*6/mm3 Final   03/22/2022 4.23 (L) 4.5 - 5.9 10*6/uL Final     Hemoglobin   Date Value Ref Range Status   02/06/2023 8.4 (L) 13.0 - 17.7 g/dL Final   03/22/2022 12.9 (L) 13.5 - 17.5 g/dL Final     Hematocrit   Date Value Ref Range Status   02/06/2023 27.5 (L) 37.5 - 51.0 % Final   03/22/2022 38.8 (L) 41.0 - 53.0 % Final     Platelets   Date Value Ref Range Status   02/06/2023 422 140 - 450 10*3/mm3 Final   03/22/2022 324 " 140 - 440 10*3/uL Final     CHEMISTRY:  Lab Results   Component Value Date    GLUCOSE 114 (H) 01/30/2023    BUN 14 01/30/2023    CREATININE 0.65 (L) 01/30/2023    EGFRIFNONA 100 01/28/2022    EGFRIFAFRI >60 05/20/2019    BCR 21.5 01/30/2023    K 3.8 01/30/2023    CO2 27.0 01/30/2023    CALCIUM 9.4 01/30/2023    ALBUMIN 3.5 01/30/2023    LABIL2 1.5 03/22/2022    AST 9 01/30/2023    ALT 7 01/30/2023       Assessment & Plan   ASSESSMENT AND PLAN:    1. Malignant neoplasm of upper lobe of left lung (HCC)       -Prior concurrent chemoradiation with Cisplatin/Alimta and 4500 cGy in 25 fractions for [id number], Stage 3A NSCLC to his right lung on 5/21/2019. He underwent right lower lobectomy with hilar and mediastinal lymph node dissection by Dr. Fuchs on 6/26/2019. He went on to complete 24 cycles Imfinzi from 8/21/2019 through 8/17/2020.    -CT-guided biopsy of the enlarging left lung nodule was completed on 5/18/2022. Pathology revealed invasive poorly differentiated adenocarcinoma most consistent with pulmonary origin.  PET scan on 5/27/2022 showed a 2.3 cm hypermetabolic left upper lobe nodule that had increased in size but no convincing evidence of metastatic disease elsewhere in the chest.  MRI brain on 5/21/2022 was negative for intracranial metastasis.     He underwent left VATS with upper lobectomy and mediastinal lymph node dissection on 7/6/2022 by Dr. Reyna. Pathology revealed invasive poorly differentiated adenocarcinoma, tumor size 2.5 cm, all margins negative for invasive carcinoma with the closest margin 2.5 cm (vascular, parenchymal), 1/9 lymph nodes positive (lobar), carcinoma confined to the pulmonary parenchyma does not extend into the visceral pleural surface, focal lymphovascular space invasion identified. PD-L1 positive at 95%.  NGS molecular testing was also completed.     He completed 4 cycles of Cisplatin/Taxol (Q21D) on 10/5/2022. He was last seen in the office by Dr. Gaspar on  10/26/2022.  Planning to proceed with Tecentriq Q21D and follow-up in 4 weeks.     PET scan completed on 10/31/2022 showed a 4.0 x 3.5 cm hypermetabolic soft tissue mass in the left chest wall interposed between first and second ribs anteriorly consistent with malignant disease recurrence, small cluster hypermetabolic lymph nodes in the left supraclavicular region consistent with early manoj metastasis, no evidence of metastatic disease in the abdomen, pelvis, or skeleton.      Largely tolerated his course ok, did have COVID during the early post of his XRT course and was quite symptomatic.    Doing well now.  Continues on Atezolizumab and close f/u with Dr. Gaspar.  Due scans 2/27/2023 at [facility] and then will follow-up with her on 3/1/2023.    Next f/u here in 6 months unless interval concerns.    No Subacute XRT toxicities.    This assessment comes from my review of the imaging, pathology, physician notes and other pertinent information as mentioned.              CC: MD Chino Corona MD  2/6/2023  10:57 AM EST

## 2023-02-06 ENCOUNTER — LAB (OUTPATIENT)
Dept: LAB | Facility: HOSPITAL | Age: 58
End: 2023-02-06
Payer: COMMERCIAL

## 2023-02-06 ENCOUNTER — OFFICE VISIT (OUTPATIENT)
Dept: RADIATION ONCOLOGY | Facility: HOSPITAL | Age: 58
End: 2023-02-06
Payer: COMMERCIAL

## 2023-02-06 VITALS
WEIGHT: 179.4 LBS | DIASTOLIC BLOOD PRESSURE: 88 MMHG | BODY MASS INDEX: 22.3 KG/M2 | OXYGEN SATURATION: 98 % | HEART RATE: 113 BPM | HEIGHT: 75 IN | TEMPERATURE: 98.3 F | RESPIRATION RATE: 18 BRPM | SYSTOLIC BLOOD PRESSURE: 145 MMHG

## 2023-02-06 DIAGNOSIS — C34.12 MALIGNANT NEOPLASM OF UPPER LOBE OF LEFT LUNG: Primary | ICD-10-CM

## 2023-02-06 DIAGNOSIS — C34.91 NON-SMALL CELL CARCINOMA OF LUNG, RIGHT: ICD-10-CM

## 2023-02-06 LAB
ANION GAP SERPL CALCULATED.3IONS-SCNC: 10 MMOL/L (ref 5–15)
BASOPHILS # BLD AUTO: 0.04 10*3/MM3 (ref 0–0.2)
BASOPHILS NFR BLD AUTO: 0.5 % (ref 0–1.5)
BUN SERPL-MCNC: 8 MG/DL (ref 6–20)
BUN/CREAT SERPL: 10.4 (ref 7–25)
CALCIUM SPEC-SCNC: 9.6 MG/DL (ref 8.6–10.5)
CHLORIDE SERPL-SCNC: 95 MMOL/L (ref 98–107)
CO2 SERPL-SCNC: 29 MMOL/L (ref 22–29)
CREAT SERPL-MCNC: 0.77 MG/DL (ref 0.76–1.27)
DEPRECATED RDW RBC AUTO: 55.6 FL (ref 37–54)
EGFRCR SERPLBLD CKD-EPI 2021: 103.8 ML/MIN/1.73
EOSINOPHIL # BLD AUTO: 0.43 10*3/MM3 (ref 0–0.4)
EOSINOPHIL NFR BLD AUTO: 5 % (ref 0.3–6.2)
ERYTHROCYTE [DISTWIDTH] IN BLOOD BY AUTOMATED COUNT: 17.8 % (ref 12.3–15.4)
GLUCOSE SERPL-MCNC: 159 MG/DL (ref 65–99)
HCT VFR BLD AUTO: 27.5 % (ref 37.5–51)
HGB BLD-MCNC: 8.4 G/DL (ref 13–17.7)
LYMPHOCYTES # BLD AUTO: 0.59 10*3/MM3 (ref 0.7–3.1)
LYMPHOCYTES NFR BLD AUTO: 6.9 % (ref 19.6–45.3)
MAGNESIUM SERPL-MCNC: 1.9 MG/DL (ref 1.6–2.6)
MCH RBC QN AUTO: 26.8 PG (ref 26.6–33)
MCHC RBC AUTO-ENTMCNC: 30.5 G/DL (ref 31.5–35.7)
MCV RBC AUTO: 87.6 FL (ref 79–97)
MONOCYTES # BLD AUTO: 1.24 10*3/MM3 (ref 0.1–0.9)
MONOCYTES NFR BLD AUTO: 14.4 % (ref 5–12)
NEUTROPHILS NFR BLD AUTO: 6.31 10*3/MM3 (ref 1.7–7)
NEUTROPHILS NFR BLD AUTO: 73.2 % (ref 42.7–76)
PLATELET # BLD AUTO: 422 10*3/MM3 (ref 140–450)
PMV BLD AUTO: 7.8 FL (ref 6–12)
POTASSIUM SERPL-SCNC: 3.9 MMOL/L (ref 3.5–5.2)
RBC # BLD AUTO: 3.14 10*6/MM3 (ref 4.14–5.8)
SODIUM SERPL-SCNC: 134 MMOL/L (ref 136–145)
WBC NRBC COR # BLD: 8.61 10*3/MM3 (ref 3.4–10.8)

## 2023-02-06 PROCEDURE — 83735 ASSAY OF MAGNESIUM: CPT | Performed by: INTERNAL MEDICINE

## 2023-02-06 PROCEDURE — G0463 HOSPITAL OUTPT CLINIC VISIT: HCPCS | Performed by: RADIOLOGY

## 2023-02-06 PROCEDURE — 99024 POSTOP FOLLOW-UP VISIT: CPT | Performed by: RADIOLOGY

## 2023-02-06 PROCEDURE — 85025 COMPLETE CBC W/AUTO DIFF WBC: CPT

## 2023-02-06 PROCEDURE — 80048 BASIC METABOLIC PNL TOTAL CA: CPT | Performed by: INTERNAL MEDICINE

## 2023-02-06 PROCEDURE — 36415 COLL VENOUS BLD VENIPUNCTURE: CPT

## 2023-02-20 ENCOUNTER — HOSPITAL ENCOUNTER (OUTPATIENT)
Dept: ONCOLOGY | Facility: HOSPITAL | Age: 58
Discharge: HOME | End: 2023-02-20
Admitting: INTERNAL MEDICINE
Payer: COMMERCIAL

## 2023-02-20 VITALS
OXYGEN SATURATION: 95 % | TEMPERATURE: 97.3 F | HEART RATE: 100 BPM | HEIGHT: 75 IN | RESPIRATION RATE: 22 BRPM | DIASTOLIC BLOOD PRESSURE: 88 MMHG | SYSTOLIC BLOOD PRESSURE: 146 MMHG | BODY MASS INDEX: 22.75 KG/M2 | WEIGHT: 183 LBS

## 2023-02-20 DIAGNOSIS — C34.92 NSCLC OF LEFT LUNG: Primary | ICD-10-CM

## 2023-02-20 DIAGNOSIS — Z45.2 ENCOUNTER FOR CARE RELATED TO VASCULAR ACCESS PORT: ICD-10-CM

## 2023-02-20 DIAGNOSIS — C34.91 NON-SMALL CELL CARCINOMA OF LUNG, RIGHT: ICD-10-CM

## 2023-02-20 DIAGNOSIS — C34.31 CANCER OF LOWER LOBE OF RIGHT LUNG: ICD-10-CM

## 2023-02-20 LAB
ALBUMIN SERPL-MCNC: 3.5 G/DL (ref 3.5–5.2)
ALBUMIN/GLOB SERPL: 1.1 G/DL
ALP SERPL-CCNC: 109 U/L (ref 39–117)
ALT SERPL W P-5'-P-CCNC: 11 U/L (ref 1–41)
ANION GAP SERPL CALCULATED.3IONS-SCNC: 12 MMOL/L (ref 5–15)
AST SERPL-CCNC: 14 U/L (ref 1–40)
BASOPHILS # BLD AUTO: 0.07 10*3/MM3 (ref 0–0.2)
BASOPHILS NFR BLD AUTO: 0.8 % (ref 0–1.5)
BILIRUB SERPL-MCNC: 0.2 MG/DL (ref 0–1.2)
BUN SERPL-MCNC: 11 MG/DL (ref 6–20)
BUN/CREAT SERPL: 13.6 (ref 7–25)
CALCIUM SPEC-SCNC: 9.6 MG/DL (ref 8.6–10.5)
CHLORIDE SERPL-SCNC: 96 MMOL/L (ref 98–107)
CO2 SERPL-SCNC: 27 MMOL/L (ref 22–29)
CREAT SERPL-MCNC: 0.81 MG/DL (ref 0.76–1.27)
DEPRECATED RDW RBC AUTO: 57.1 FL (ref 37–54)
EGFRCR SERPLBLD CKD-EPI 2021: 102.2 ML/MIN/1.73
EOSINOPHIL # BLD AUTO: 0.27 10*3/MM3 (ref 0–0.4)
EOSINOPHIL NFR BLD AUTO: 3.2 % (ref 0.3–6.2)
ERYTHROCYTE [DISTWIDTH] IN BLOOD BY AUTOMATED COUNT: 18.3 % (ref 12.3–15.4)
GLOBULIN UR ELPH-MCNC: 3.3 GM/DL
GLUCOSE BLDC GLUCOMTR-MCNC: 118 MG/DL (ref 70–105)
GLUCOSE SERPL-MCNC: 120 MG/DL (ref 65–99)
HCT VFR BLD AUTO: 32.3 % (ref 37.5–51)
HGB BLD-MCNC: 9.9 G/DL (ref 13–17.7)
LYMPHOCYTES # BLD AUTO: 0.74 10*3/MM3 (ref 0.7–3.1)
LYMPHOCYTES NFR BLD AUTO: 8.7 % (ref 19.6–45.3)
MAGNESIUM SERPL-MCNC: 1.9 MG/DL (ref 1.6–2.6)
MCH RBC QN AUTO: 27.1 PG (ref 26.6–33)
MCHC RBC AUTO-ENTMCNC: 30.7 G/DL (ref 31.5–35.7)
MCV RBC AUTO: 88.5 FL (ref 79–97)
MONOCYTES # BLD AUTO: 1.04 10*3/MM3 (ref 0.1–0.9)
MONOCYTES NFR BLD AUTO: 12.2 % (ref 5–12)
NEUTROPHILS NFR BLD AUTO: 6.42 10*3/MM3 (ref 1.7–7)
NEUTROPHILS NFR BLD AUTO: 75.1 % (ref 42.7–76)
PLATELET # BLD AUTO: 484 10*3/MM3 (ref 140–450)
PMV BLD AUTO: 8.6 FL (ref 6–12)
POTASSIUM SERPL-SCNC: 3.8 MMOL/L (ref 3.5–5.2)
PROT SERPL-MCNC: 6.8 G/DL (ref 6–8.5)
RBC # BLD AUTO: 3.65 10*6/MM3 (ref 4.14–5.8)
SODIUM SERPL-SCNC: 135 MMOL/L (ref 136–145)
T4 FREE SERPL-MCNC: 1.16 NG/DL (ref 0.93–1.7)
TSH SERPL DL<=0.05 MIU/L-ACNC: 2.29 UIU/ML (ref 0.27–4.2)
WBC NRBC COR # BLD: 8.54 10*3/MM3 (ref 3.4–10.8)

## 2023-02-20 PROCEDURE — 25010000002 ALTEPLASE 2 MG RECONSTITUTED SOLUTION: Performed by: INTERNAL MEDICINE

## 2023-02-20 PROCEDURE — 36593 DECLOT VASCULAR DEVICE: CPT

## 2023-02-20 PROCEDURE — 96360 HYDRATION IV INFUSION INIT: CPT

## 2023-02-20 PROCEDURE — 25010000002 ATEZOLIZUMAB 1200 MG/20ML SOLUTION 20 ML VIAL: Performed by: INTERNAL MEDICINE

## 2023-02-20 PROCEDURE — 82962 GLUCOSE BLOOD TEST: CPT

## 2023-02-20 PROCEDURE — 96413 CHEMO IV INFUSION 1 HR: CPT

## 2023-02-20 PROCEDURE — 80050 GENERAL HEALTH PANEL: CPT | Performed by: INTERNAL MEDICINE

## 2023-02-20 PROCEDURE — 83735 ASSAY OF MAGNESIUM: CPT | Performed by: INTERNAL MEDICINE

## 2023-02-20 PROCEDURE — 84439 ASSAY OF FREE THYROXINE: CPT | Performed by: INTERNAL MEDICINE

## 2023-02-20 RX ORDER — SODIUM CHLORIDE 9 MG/ML
250 INJECTION, SOLUTION INTRAVENOUS ONCE
Status: CANCELLED | OUTPATIENT
Start: 2023-02-20

## 2023-02-20 RX ORDER — SODIUM CHLORIDE 9 MG/ML
250 INJECTION, SOLUTION INTRAVENOUS ONCE
Status: COMPLETED | OUTPATIENT
Start: 2023-02-20 | End: 2023-02-20

## 2023-02-20 RX ADMIN — ATEZOLIZUMAB 1200 MG: 1200 INJECTION, SOLUTION INTRAVENOUS at 15:14

## 2023-02-20 RX ADMIN — ALTEPLASE: 2.2 INJECTION, POWDER, LYOPHILIZED, FOR SOLUTION INTRAVENOUS at 14:34

## 2023-02-20 RX ADMIN — SODIUM CHLORIDE 250 ML: 9 INJECTION, SOLUTION INTRAVENOUS at 15:14

## 2023-02-20 NOTE — PROGRESS NOTES
Patient in today for immunotherapy.  Port Accessed no blood return given/ Activase released.  IV started and labs taken from IV.  Patient given Tecentriq via IV.  Patients port to give blood return prior deaccessing.

## 2023-02-27 ENCOUNTER — LAB (OUTPATIENT)
Dept: LAB | Facility: HOSPITAL | Age: 58
End: 2023-02-27
Payer: COMMERCIAL

## 2023-02-27 DIAGNOSIS — C34.91 NON-SMALL CELL CARCINOMA OF LUNG, RIGHT: ICD-10-CM

## 2023-02-27 LAB
ANION GAP SERPL CALCULATED.3IONS-SCNC: 9 MMOL/L (ref 5–15)
BASOPHILS # BLD AUTO: 0.09 10*3/MM3 (ref 0–0.2)
BASOPHILS NFR BLD AUTO: 1.3 % (ref 0–1.5)
BUN SERPL-MCNC: 19 MG/DL (ref 6–20)
BUN/CREAT SERPL: 22.9 (ref 7–25)
CALCIUM SPEC-SCNC: 9.1 MG/DL (ref 8.6–10.5)
CHLORIDE SERPL-SCNC: 107 MMOL/L (ref 98–107)
CO2 SERPL-SCNC: 27 MMOL/L (ref 22–29)
CREAT SERPL-MCNC: 0.83 MG/DL (ref 0.76–1.27)
DEPRECATED RDW RBC AUTO: 55 FL (ref 37–54)
EGFRCR SERPLBLD CKD-EPI 2021: 101.4 ML/MIN/1.73
EOSINOPHIL # BLD AUTO: 0.23 10*3/MM3 (ref 0–0.4)
EOSINOPHIL NFR BLD AUTO: 3.4 % (ref 0.3–6.2)
ERYTHROCYTE [DISTWIDTH] IN BLOOD BY AUTOMATED COUNT: 17.4 % (ref 12.3–15.4)
GLUCOSE SERPL-MCNC: 92 MG/DL (ref 65–99)
HCT VFR BLD AUTO: 32.8 % (ref 37.5–51)
HGB BLD-MCNC: 10.1 G/DL (ref 13–17.7)
LYMPHOCYTES # BLD AUTO: 0.65 10*3/MM3 (ref 0.7–3.1)
LYMPHOCYTES NFR BLD AUTO: 9.5 % (ref 19.6–45.3)
MAGNESIUM SERPL-MCNC: 2.1 MG/DL (ref 1.6–2.6)
MCH RBC QN AUTO: 27.4 PG (ref 26.6–33)
MCHC RBC AUTO-ENTMCNC: 30.8 G/DL (ref 31.5–35.7)
MCV RBC AUTO: 89.1 FL (ref 79–97)
MONOCYTES # BLD AUTO: 0.73 10*3/MM3 (ref 0.1–0.9)
MONOCYTES NFR BLD AUTO: 10.7 % (ref 5–12)
NEUTROPHILS NFR BLD AUTO: 5.15 10*3/MM3 (ref 1.7–7)
NEUTROPHILS NFR BLD AUTO: 75.1 % (ref 42.7–76)
PLATELET # BLD AUTO: 453 10*3/MM3 (ref 140–450)
PMV BLD AUTO: 8.6 FL (ref 6–12)
POTASSIUM SERPL-SCNC: 4.4 MMOL/L (ref 3.5–5.2)
RBC # BLD AUTO: 3.68 10*6/MM3 (ref 4.14–5.8)
SODIUM SERPL-SCNC: 143 MMOL/L (ref 136–145)
WBC NRBC COR # BLD: 6.85 10*3/MM3 (ref 3.4–10.8)

## 2023-02-27 PROCEDURE — 85025 COMPLETE CBC W/AUTO DIFF WBC: CPT

## 2023-02-27 PROCEDURE — 80048 BASIC METABOLIC PNL TOTAL CA: CPT | Performed by: INTERNAL MEDICINE

## 2023-02-27 PROCEDURE — 83735 ASSAY OF MAGNESIUM: CPT | Performed by: INTERNAL MEDICINE

## 2023-02-27 PROCEDURE — 36415 COLL VENOUS BLD VENIPUNCTURE: CPT

## 2023-02-27 RX ORDER — LACTULOSE 10 G/15ML
SOLUTION ORAL
Qty: 900 ML | Refills: 0 | Status: SHIPPED | OUTPATIENT
Start: 2023-02-27 | End: 2023-03-20

## 2023-03-01 ENCOUNTER — OFFICE VISIT (OUTPATIENT)
Dept: ONCOLOGY | Facility: CLINIC | Age: 58
End: 2023-03-01
Payer: COMMERCIAL

## 2023-03-01 VITALS
RESPIRATION RATE: 18 BRPM | DIASTOLIC BLOOD PRESSURE: 87 MMHG | WEIGHT: 182 LBS | HEART RATE: 111 BPM | BODY MASS INDEX: 22.63 KG/M2 | HEIGHT: 75 IN | TEMPERATURE: 97 F | SYSTOLIC BLOOD PRESSURE: 137 MMHG

## 2023-03-01 DIAGNOSIS — C34.92 NSCLC OF LEFT LUNG: Primary | ICD-10-CM

## 2023-03-01 DIAGNOSIS — C34.31 CANCER OF LOWER LOBE OF RIGHT LUNG: ICD-10-CM

## 2023-03-01 DIAGNOSIS — I27.20 PULMONARY HYPERTENSION: ICD-10-CM

## 2023-03-01 DIAGNOSIS — E87.1 HYPONATREMIA: ICD-10-CM

## 2023-03-01 DIAGNOSIS — E86.0 DEHYDRATION: ICD-10-CM

## 2023-03-01 PROCEDURE — 99215 OFFICE O/P EST HI 40 MIN: CPT | Performed by: INTERNAL MEDICINE

## 2023-03-01 RX ORDER — MULTIVITAMIN WITH IRON
100 TABLET ORAL EVERY 12 HOURS
Qty: 60 TABLET | Refills: 6 | Status: SHIPPED | OUTPATIENT
Start: 2023-03-01

## 2023-03-01 RX ORDER — FOLIC ACID 1 MG/1
TABLET ORAL
COMMUNITY
Start: 2023-02-05

## 2023-03-01 RX ORDER — GABAPENTIN 300 MG/1
300 CAPSULE ORAL NIGHTLY
Qty: 30 CAPSULE | Refills: 3 | Status: SHIPPED | OUTPATIENT
Start: 2023-03-01

## 2023-03-02 ENCOUNTER — TELEPHONE (OUTPATIENT)
Dept: ONCOLOGY | Facility: CLINIC | Age: 58
End: 2023-03-02
Payer: COMMERCIAL

## 2023-03-02 NOTE — TELEPHONE ENCOUNTER
Hub is instructed to read the documentation below to patient      Called pt and LVM regarding CT chest showing possible infection/inflammation and if he was having any cough, SOA or fevers.

## 2023-03-02 NOTE — TELEPHONE ENCOUNTER
----- Message from MULUGETA Garcia sent at 3/2/2023 10:38 AM EST -----  He has some new groundglass changes that could be infection/inflammation or post treatment change.  Pleases call and assess for cough, SOA, fever.  If having any symptoms I will order and antibiotic. ty

## 2023-03-11 DIAGNOSIS — C34.91 NON-SMALL CELL CARCINOMA OF LUNG, RIGHT: ICD-10-CM

## 2023-03-11 DIAGNOSIS — C34.92 NSCLC OF LEFT LUNG: Primary | ICD-10-CM

## 2023-03-11 DIAGNOSIS — C34.31 CANCER OF LOWER LOBE OF RIGHT LUNG: ICD-10-CM

## 2023-03-11 RX ORDER — SODIUM CHLORIDE 9 MG/ML
250 INJECTION, SOLUTION INTRAVENOUS ONCE
Status: CANCELLED | OUTPATIENT
Start: 2023-03-13

## 2023-03-13 ENCOUNTER — HOSPITAL ENCOUNTER (OUTPATIENT)
Dept: ONCOLOGY | Facility: HOSPITAL | Age: 58
Discharge: HOME | End: 2023-03-13
Admitting: INTERNAL MEDICINE
Payer: COMMERCIAL

## 2023-03-13 VITALS
WEIGHT: 188 LBS | OXYGEN SATURATION: 96 % | BODY MASS INDEX: 23.38 KG/M2 | DIASTOLIC BLOOD PRESSURE: 75 MMHG | SYSTOLIC BLOOD PRESSURE: 121 MMHG | HEIGHT: 75 IN | RESPIRATION RATE: 18 BRPM | TEMPERATURE: 98.1 F | HEART RATE: 89 BPM

## 2023-03-13 DIAGNOSIS — Z45.2 ENCOUNTER FOR CARE RELATED TO VASCULAR ACCESS PORT: ICD-10-CM

## 2023-03-13 DIAGNOSIS — C34.92 NSCLC OF LEFT LUNG: ICD-10-CM

## 2023-03-13 DIAGNOSIS — C34.91 NON-SMALL CELL CARCINOMA OF LUNG, RIGHT: Primary | ICD-10-CM

## 2023-03-13 DIAGNOSIS — C34.31 CANCER OF LOWER LOBE OF RIGHT LUNG: ICD-10-CM

## 2023-03-13 LAB
ALBUMIN SERPL-MCNC: 3.5 G/DL (ref 3.5–5.2)
ALBUMIN/GLOB SERPL: 1.1 G/DL
ALP SERPL-CCNC: 108 U/L (ref 39–117)
ALT SERPL W P-5'-P-CCNC: 16 U/L (ref 1–41)
ANION GAP SERPL CALCULATED.3IONS-SCNC: 11 MMOL/L (ref 5–15)
AST SERPL-CCNC: 20 U/L (ref 1–40)
BASOPHILS # BLD AUTO: 0.07 10*3/MM3 (ref 0–0.2)
BASOPHILS NFR BLD AUTO: 0.8 % (ref 0–1.5)
BILIRUB SERPL-MCNC: 0.2 MG/DL (ref 0–1.2)
BUN SERPL-MCNC: 10 MG/DL (ref 6–20)
BUN/CREAT SERPL: 12.2 (ref 7–25)
CALCIUM SPEC-SCNC: 9.3 MG/DL (ref 8.6–10.5)
CHLORIDE SERPL-SCNC: 97 MMOL/L (ref 98–107)
CO2 SERPL-SCNC: 26 MMOL/L (ref 22–29)
CREAT SERPL-MCNC: 0.82 MG/DL (ref 0.76–1.27)
DEPRECATED RDW RBC AUTO: 51.4 FL (ref 37–54)
EGFRCR SERPLBLD CKD-EPI 2021: 101.8 ML/MIN/1.73
EOSINOPHIL # BLD AUTO: 0.29 10*3/MM3 (ref 0–0.4)
EOSINOPHIL NFR BLD AUTO: 3.5 % (ref 0.3–6.2)
ERYTHROCYTE [DISTWIDTH] IN BLOOD BY AUTOMATED COUNT: 16.4 % (ref 12.3–15.4)
GLOBULIN UR ELPH-MCNC: 3.2 GM/DL
GLUCOSE BLDC GLUCOMTR-MCNC: 143 MG/DL (ref 70–105)
GLUCOSE SERPL-MCNC: 150 MG/DL (ref 65–99)
HCT VFR BLD AUTO: 32.8 % (ref 37.5–51)
HGB BLD-MCNC: 10.1 G/DL (ref 13–17.7)
LYMPHOCYTES # BLD AUTO: 0.75 10*3/MM3 (ref 0.7–3.1)
LYMPHOCYTES NFR BLD AUTO: 9 % (ref 19.6–45.3)
MAGNESIUM SERPL-MCNC: 1.9 MG/DL (ref 1.6–2.6)
MCH RBC QN AUTO: 27.4 PG (ref 26.6–33)
MCHC RBC AUTO-ENTMCNC: 30.8 G/DL (ref 31.5–35.7)
MCV RBC AUTO: 88.9 FL (ref 79–97)
MONOCYTES # BLD AUTO: 1.06 10*3/MM3 (ref 0.1–0.9)
MONOCYTES NFR BLD AUTO: 12.7 % (ref 5–12)
NEUTROPHILS NFR BLD AUTO: 6.17 10*3/MM3 (ref 1.7–7)
NEUTROPHILS NFR BLD AUTO: 74 % (ref 42.7–76)
PLATELET # BLD AUTO: 427 10*3/MM3 (ref 140–450)
PMV BLD AUTO: 8.6 FL (ref 6–12)
POTASSIUM SERPL-SCNC: 4.2 MMOL/L (ref 3.5–5.2)
PROT SERPL-MCNC: 6.7 G/DL (ref 6–8.5)
RBC # BLD AUTO: 3.69 10*6/MM3 (ref 4.14–5.8)
SODIUM SERPL-SCNC: 134 MMOL/L (ref 136–145)
WBC NRBC COR # BLD: 8.34 10*3/MM3 (ref 3.4–10.8)

## 2023-03-13 PROCEDURE — 80053 COMPREHEN METABOLIC PANEL: CPT | Performed by: INTERNAL MEDICINE

## 2023-03-13 PROCEDURE — 82962 GLUCOSE BLOOD TEST: CPT

## 2023-03-13 PROCEDURE — 96413 CHEMO IV INFUSION 1 HR: CPT

## 2023-03-13 PROCEDURE — 99195 PHLEBOTOMY: CPT

## 2023-03-13 PROCEDURE — 25010000002 ATEZOLIZUMAB 1200 MG/20ML SOLUTION 20 ML VIAL: Performed by: INTERNAL MEDICINE

## 2023-03-13 PROCEDURE — 85025 COMPLETE CBC W/AUTO DIFF WBC: CPT | Performed by: INTERNAL MEDICINE

## 2023-03-13 PROCEDURE — 83735 ASSAY OF MAGNESIUM: CPT | Performed by: INTERNAL MEDICINE

## 2023-03-13 PROCEDURE — 25010000002 HEPARIN LOCK FLUSH PER 10 UNITS: Performed by: INTERNAL MEDICINE

## 2023-03-13 RX ORDER — SODIUM CHLORIDE 9 MG/ML
250 INJECTION, SOLUTION INTRAVENOUS ONCE
Status: COMPLETED | OUTPATIENT
Start: 2023-03-13 | End: 2023-03-13

## 2023-03-13 RX ORDER — SODIUM CHLORIDE 0.9 % (FLUSH) 0.9 %
20 SYRINGE (ML) INJECTION AS NEEDED
Status: DISCONTINUED | OUTPATIENT
Start: 2023-03-13 | End: 2023-03-14 | Stop reason: HOSPADM

## 2023-03-13 RX ORDER — HEPARIN SODIUM (PORCINE) LOCK FLUSH IV SOLN 100 UNIT/ML 100 UNIT/ML
500 SOLUTION INTRAVENOUS AS NEEDED
Status: DISCONTINUED | OUTPATIENT
Start: 2023-03-13 | End: 2023-03-14 | Stop reason: HOSPADM

## 2023-03-13 RX ADMIN — HEPARIN 500 UNITS: 100 SYRINGE at 13:43

## 2023-03-13 RX ADMIN — Medication 20 ML: at 13:43

## 2023-03-13 RX ADMIN — SODIUM CHLORIDE 250 ML: 9 INJECTION, SOLUTION INTRAVENOUS at 13:01

## 2023-03-13 RX ADMIN — ATEZOLIZUMAB 1200 MG: 1200 INJECTION, SOLUTION INTRAVENOUS at 13:01

## 2023-03-13 NOTE — PROGRESS NOTES
Port accessed by Nicole Brock RN with sterile technique and positive blood return noted.  Blood drawn from port.  10 cc blood wasted prior to specimen collection.  Specimens sent to lab for processing.

## 2023-03-20 RX ORDER — LACTULOSE 10 G/15ML
SOLUTION ORAL
Qty: 900 ML | Refills: 0 | Status: SHIPPED | OUTPATIENT
Start: 2023-03-20

## 2023-03-27 DIAGNOSIS — C34.31 CANCER OF LOWER LOBE OF RIGHT LUNG: ICD-10-CM

## 2023-03-27 DIAGNOSIS — C34.92 NSCLC OF LEFT LUNG: ICD-10-CM

## 2023-03-27 DIAGNOSIS — C34.91 NON-SMALL CELL CARCINOMA OF LUNG, RIGHT: ICD-10-CM

## 2023-03-27 RX ORDER — ONDANSETRON HYDROCHLORIDE 8 MG/1
TABLET, FILM COATED ORAL
Qty: 30 TABLET | Refills: 0 | Status: SHIPPED | OUTPATIENT
Start: 2023-03-27

## 2023-03-29 ENCOUNTER — OFFICE VISIT (OUTPATIENT)
Dept: ONCOLOGY | Facility: CLINIC | Age: 58
End: 2023-03-29
Payer: COMMERCIAL

## 2023-03-29 ENCOUNTER — LAB (OUTPATIENT)
Dept: LAB | Facility: HOSPITAL | Age: 58
End: 2023-03-29
Payer: COMMERCIAL

## 2023-03-29 ENCOUNTER — TELEPHONE (OUTPATIENT)
Dept: RADIATION ONCOLOGY | Facility: HOSPITAL | Age: 58
End: 2023-03-29
Payer: COMMERCIAL

## 2023-03-29 VITALS
SYSTOLIC BLOOD PRESSURE: 127 MMHG | HEIGHT: 75 IN | BODY MASS INDEX: 22.88 KG/M2 | WEIGHT: 184 LBS | TEMPERATURE: 97 F | RESPIRATION RATE: 18 BRPM | HEART RATE: 79 BPM | DIASTOLIC BLOOD PRESSURE: 79 MMHG

## 2023-03-29 DIAGNOSIS — C34.12 MALIGNANT NEOPLASM OF UPPER LOBE OF LEFT LUNG: ICD-10-CM

## 2023-03-29 DIAGNOSIS — C34.92 NSCLC OF LEFT LUNG: Primary | ICD-10-CM

## 2023-03-29 DIAGNOSIS — C34.91 NON-SMALL CELL CARCINOMA OF LUNG, RIGHT: Primary | ICD-10-CM

## 2023-03-29 DIAGNOSIS — C34.92 NSCLC OF LEFT LUNG: ICD-10-CM

## 2023-03-29 LAB
ANION GAP SERPL CALCULATED.3IONS-SCNC: 13 MMOL/L (ref 5–15)
BASOPHILS # BLD AUTO: 0.03 10*3/MM3 (ref 0–0.2)
BASOPHILS NFR BLD AUTO: 0.4 % (ref 0–1.5)
BUN SERPL-MCNC: 15 MG/DL (ref 6–20)
BUN/CREAT SERPL: 16.5 (ref 7–25)
CALCIUM SPEC-SCNC: 9.1 MG/DL (ref 8.6–10.5)
CHLORIDE SERPL-SCNC: 95 MMOL/L (ref 98–107)
CO2 SERPL-SCNC: 24 MMOL/L (ref 22–29)
CREAT SERPL-MCNC: 0.91 MG/DL (ref 0.76–1.27)
DEPRECATED RDW RBC AUTO: 46.8 FL (ref 37–54)
EGFRCR SERPLBLD CKD-EPI 2021: 97.7 ML/MIN/1.73
EOSINOPHIL # BLD AUTO: 0.19 10*3/MM3 (ref 0–0.4)
EOSINOPHIL NFR BLD AUTO: 2.5 % (ref 0.3–6.2)
ERYTHROCYTE [DISTWIDTH] IN BLOOD BY AUTOMATED COUNT: 15.2 % (ref 12.3–15.4)
GLUCOSE SERPL-MCNC: 98 MG/DL (ref 65–99)
HCT VFR BLD AUTO: 33.4 % (ref 37.5–51)
HGB BLD-MCNC: 10.6 G/DL (ref 13–17.7)
HOLD SPECIMEN: NORMAL
LYMPHOCYTES # BLD AUTO: 0.75 10*3/MM3 (ref 0.7–3.1)
LYMPHOCYTES NFR BLD AUTO: 9.8 % (ref 19.6–45.3)
MAGNESIUM SERPL-MCNC: 1.6 MG/DL (ref 1.6–2.6)
MCH RBC QN AUTO: 27.2 PG (ref 26.6–33)
MCHC RBC AUTO-ENTMCNC: 31.7 G/DL (ref 31.5–35.7)
MCV RBC AUTO: 85.9 FL (ref 79–97)
MONOCYTES # BLD AUTO: 0.89 10*3/MM3 (ref 0.1–0.9)
MONOCYTES NFR BLD AUTO: 11.6 % (ref 5–12)
NEUTROPHILS NFR BLD AUTO: 5.81 10*3/MM3 (ref 1.7–7)
NEUTROPHILS NFR BLD AUTO: 75.7 % (ref 42.7–76)
PLATELET # BLD AUTO: 369 10*3/MM3 (ref 140–450)
PMV BLD AUTO: 8.3 FL (ref 6–12)
POTASSIUM SERPL-SCNC: 3.7 MMOL/L (ref 3.5–5.2)
RBC # BLD AUTO: 3.89 10*6/MM3 (ref 4.14–5.8)
SODIUM SERPL-SCNC: 132 MMOL/L (ref 136–145)
WBC NRBC COR # BLD: 7.67 10*3/MM3 (ref 3.4–10.8)

## 2023-03-29 PROCEDURE — 85025 COMPLETE CBC W/AUTO DIFF WBC: CPT

## 2023-03-29 PROCEDURE — 36415 COLL VENOUS BLD VENIPUNCTURE: CPT

## 2023-03-29 PROCEDURE — 99214 OFFICE O/P EST MOD 30 MIN: CPT | Performed by: INTERNAL MEDICINE

## 2023-03-29 PROCEDURE — 83735 ASSAY OF MAGNESIUM: CPT | Performed by: INTERNAL MEDICINE

## 2023-03-29 PROCEDURE — 80048 BASIC METABOLIC PNL TOTAL CA: CPT | Performed by: INTERNAL MEDICINE

## 2023-03-29 RX ORDER — OXYCODONE HYDROCHLORIDE AND ACETAMINOPHEN 5; 325 MG/1; MG/1
1 TABLET ORAL EVERY 6 HOURS PRN
Qty: 60 TABLET | Refills: 0 | Status: SHIPPED | OUTPATIENT
Start: 2023-03-29

## 2023-03-29 NOTE — PROGRESS NOTES
Hematology/Oncology Outpatient Follow Up    PATIENT NAME:Jc Driscoll  :1965  MRN: [id number]  PRIMARY CARE PHYSICIAN: Reshma Alvarenga MD  REFERRING PHYSICIAN: Reshma Alvarenga MD    Chief Complaint   Patient presents with   • Follow-up     Non-small cell carcinoma of lung, right (HCC)        HISTORY OF PRESENT ILLNESS:                                                                                                                                                                                                                                                             This is a 57 y.o. who developed left shoulder pain and for that reason he had a chest x-ray done on 19 which showed a 2.7 cm noncalcified right lower lobe nodule was identified.  For that reason a CT scan of the chest was recommended.  Review of his records shows patient had the CT scan on 19 done without contrast.  This basically showed a lobulated noncalcified mass in the posterior aspect of the right lower lobe measuring 3 cm close to the pleural surface.  There is a calcified 1.2 mm nodule in the lateral aspect of the right lower lobe consistent with a granuloma.  There were also calcified subcarinal and right hilar nodules.  There is a lower right side tracheal nodule measuring 1 cm.  Patient had a PET/CT scan done on 19 which showed increased metabolic activity on the right lower lobe mass that measures 2.5 cm with SUV of 4.4.  There was also increased metabolic activity in the subcarinal space measuring 2.8 cm in size with  SUV of 3.4, increased activity in an enlarged right paratracheal lymph node that measures 1.9 cm, SUV of 5, also suspicious for metastatic adenopathy.  Patient had a CT-guided biopsy of the right lower lobe mass on 3/8/19.  This showed adenocarcinoma, TTF-1 positive.  On 3/18/19 patient underwent endoscopic ultrasound and biopsy of a subcarinal lymph node.  This was positive for malignant cells.  Patient was then taken back to surgery on 3/20/18 and had left Mediport placement by Dr. Fuchs.  He has been referred for further evaluation and management of his stage 3 adenocarcinoma of the right lung.  He was accompanied by his spouse for the appointment on 3/26/19.  Patient was scheduled to have a brain MRI for complete staging, but he could not do this due to claustrophobia.  He is willing to try with the help of angiolytics.    • Patient had brain MRI done on 4/5/19.  He states it did not show any evidence of metastatic disease.  Evidence of chronic sinusitis was noted.    • Patient was seen by Dr. Escalona who has recommended concurrent chemotherapy and radiation.  Patient is scheduled to begin on 4/15/19.   • 4/17/19 - Patient was initiated on combined chemotherapy and radiation with Cisplatin and Alimta.  Patient received cycle 1.      • 5/8/19 - Patient received cycle 2 of chemotherapy with Cisplatin and Alimta.   • 5/15/19 - Chemistry panel showed creatinine of 1.7.  WBC 1.8, hemoglobin 11.6, platelet count 72,000.   • 5/20/19 - BUN 10, creatinine 1.2, potassium 3.5.    • 5/23/19 - CT scan of the chest with contrast showed interval decrease in size of the right lower lobe pulmonary nodule now measuring 2.1 cm in size.  There was no mediastinal or hilar adenopathy identified.  • 6/26/2019 patient underwent right lower lobectomy with hilar and mediastinal lymph node dissection performed by Dr. Terrance Mckeon.  • Pathology revealed residual residual 2.2 cm invasive moderately differentiated adenocarcinoma.  There  were a total of 16 lymph nodes evaluated that 7 had metastatic disease.  There was evidence of lymphovascular invasion, extranodal involvement.  All the surgical margins were negative and distance of the closest margin was 2.5 cm.  Logic stage is T1c N2 M0.  • Patient is here today accompanied by his spouse for this appointment.  He continues to complain of some postop discomfort, numbness but his skin is intact.  There is no drainage.  Has had follow-up with Dr. Fuchs.  • 8/14/2019-seen by Dr. Maria at the [facility].  Dr. Maria has recommended immunotherapy with durvalumab off label use as patient has not had significant response to neoadjuvant chemotherapy and radiation.  Patient was given the option to have immunotherapy at the [facility] vs [place] and he has chosen to stay in [place]  • 8/21/19 - Started cycle 1 day 1 Imfinzi  • 9/4/2019 patient had CT scan of the chest this shows a moderate size right pleural effusion.  There are areas of groundglass opacification in the right upper lobe without any evidence of significant septal thickening.  Volume loss noted there is also moderate pleural effusion.  There is no suspicious recurrent malignancy.  There were nonenlarged residual mediastinal lymph nodes.  • 9/9/19 - WBC 6.23, Hgb 11.7 Platelets 257,000, , BUN 9, Cr 1.1,Vitamin B12 318, Ferritin 437  • 9/23/19 - received cycle 2 day 1 Imfinzi  • 10/9/19- Seen by Dr rodríguez with ENT for sinus disease  • 11/4/2019-patient received cycle 5 of Imfinzi(durvalumab)  • 12/2/2019 patient had cycle 7 of durvalumab  • 12/5/2019-patient had CT scan of the  abdomen and pelvis with small right pleural sided pleural effusion.There is no evidence of metastatic disease  • 12/30/2019-patient received cycle 9 of durvalumab  • 12/31/2019-patient had CT scan of the chest showed small to moderate left pleural effusion.  Iglesias appearing right lower paratracheal and right peribronchial soft tissue thickening without any  discrete pathologically enlarged mediastinal or hilar lymph nodes.  • 1/27/20-patient received cycle 11 of durvalumab  • 2/17/20-patient had a stress test which was negative for ischemia  • 2/17/2020-patient had CT of the chest which showed postop changes on the right lung, right pleural effusion but no enlarging mass was noted  • 3/2/2020-patient received cycle 12 of durvalumab  • 3/16/2020-patient had ultrasound-guided right thoracentesis.  Pathology was negative for malignancy  • 4/13/2020-patient received cycle 15 of immunotherapy with durvalumab  • 5/11/2020-patient received cycle 17 of immunotherapy with durvalumab  • 6/9/2020-patient had CT scan of the chest, abdomen and pelvis for lung cancer restaging.  There is stable small chronic loculated right basilar pleural effusion suggesting chronic pleuritis.  There is no evidence of metastatic disease in the abdomen or pelvis  • 7/20/2020 patient received cycle 22 of Durvalumab  • 8/17/2020 patient received cycle 24 and last cycle of durvalumab  • 9/24/2020 patient had CT scan of the chest, abdomen and pelvis for cancer restaging there was no evidence of local recurrence or metastatic disease within the abdomen and pelvis.  He has stable chronic small right pleural effusion.  Mild sigmoid diverticulosis was noted.  • 1/18/2021 patient had CT scan of the chest, abdomen and pelvis reviewed patient  • 5/24/2021 patient had CT scan of the chest calcified subcarinal lymph node consistent with changes of prior granulomatous disease.  Chronic small right pleural effusion is noted similar to prior exam.  Previously shown 4 mm nodule in the left lower lobe has nearly completely resolved.  The subpleural 3 mm nodule located within the posterior left lower lobe With resolved.  • 9/24/2021 patient had a CT scan of the chest, abdomen and pelvis there is soft tissue attenuation within the right paratracheal area which has been suggested to reflect sequela to previous  treatment.  There is slight thickening of the bladder wall otherwise there is no evidence of metastatic disease.  • 12/27/2021 patient had CT scan of the chest with contrast this basically showed irregular shaped noncalcified 7 mm left upper lobe pulmonary nodule.  PET CT scan was recommended to further evaluate.  • 1/26/2022 patient had a PET CT scan done which basically showed evidence of mild uptake corresponding to the nodule within the left upper lobe which is new worrisome for developing metastatic focus.  There was mild radiopharmaceutical activity corresponding to pleural thickening throughout the right lung base with associated pleural effusion likely related to post therapeutic changes and radiation changes in this region.  Metastatic malignancy involving the pleura could not be completely eliminated.  • 1/26/2022: CT-guided biopsy was aborted due to finding by the radiologist that the lung nodularity was actually a clump of tiny nodules of which the largest was 6 mm and therefore biopsy could not be completed.  Also the area was in the vicinity of multiple blood vessels with increased risk of bleeding.  Follow-up CT of the chest was recommended for continued surveillance  • 5/18/2022 patient had CT-guided biopsy of the left enlarging nodule, pathology was positive for invasive poorly differentiated adenocarcinoma most consistent with adenocarcinoma of pulmonary origin.  • 5/27/2022 patient had a PET CT scan which showed a 2.3 cm hypermetabolic left upper lobe nodule which has increased in size no convincing evidence of metastatic disease elsewhere within the chest.  • 5/21/2022 patient had brain MRI which did not show any evidence of intracranial metastasis  • 6/2/2022 patient was seen by Dr. Miryam Reyna pulmonary function test is being done to assess surgical candidacy  • 7/6/2022 patient underwent left heart Koska P video-assisted with upper lobectomy mediastinal lymph node dissection performed by   Miryam Roach  • Final pathology revealed poorly differentiated  • A predominant adenocarcinoma with solid component presenting 45% and micropapillary component 5% with extensive necrosis, invasive carcinoma measures 2.5 maximally there was focal lymphovascular space invasion all margins were negative for malignancy discussed with the closest margin was 2.5 cm pathology stage is pT1C pN1.  PD-L1 showed a tumor proportion score of 95%  • 8/3/2022 patient started adjuvant chemotherapy with cisplatin, Taxol  • 8/24/2022 patient received cycle 2 of combination chemotherapy with cisplatin and Taxol with Neulasta  • 10/5/2022: Patient received cycle 4-day 1 of combination chemotherapy with cisplatin and Taxol  • Patient developed left-sided chest pain for that reason he had a PET/CT scan 1/20/2020 is basically revealed a 4 x 3.5 cm hypermetabolic soft tissue mass in the left chest wall between the first and second ribs anteriorly consistent with relapsed disease.  There was a small cluster of hypermetabolic lymph nodes seen in the left supraclavicular region consistent with early manoj metastasis  • 11/11/2022: 2D echocardiogram performed showing a left ventricular EF of 56 to 60%  • 11/22/2022: Cycle 1 of atezolizumab received  • 12/5/2022-12/9/2022: Hospitalized at [facility] for dehydration, hyponatremia, and weakness.  • 12/19/2020: Patient received cycle 2 of Tecentriq along with XRT  • 1/11/2023: Patient completed radiation.  Remains on Tecentriq every 3 weeks  • 1/18/2023 patient had a CT scan of the chest which basically revealed abnormal soft tissue interposed between the first and second rib with some erosive changes to the ribs this finding measures about 5 x 2.8 cm previously was 4.8 x 3.8 cm.  There is some pleural thickening about in this area nodular area within the prominent mediastinal fat in the upper chest measuring 1.7 cm more fluid as opposed to enlarged lymph nodes.  Possibility of pulmonary hypertension  was also noted due to prominence of main pulmonary artery measuring 3.7 cm.    Past Medical History:   Diagnosis Date   • Allergic rhinitis 07/25/2013    Overview:  4/2/2018 - still zyrtec 10 daily (if stops, gets dermatitis again).    • Anxiety and depression 06/05/2012    Overview:  4/2/2018 -   C/w well 300 xr and Lito 20.  4/8/2019 -   Doing ok even with new lung cancer - lito 20 & well 300xr.    • Chronic pansinusitis 04/08/2019    Overview:  4/8/2019 -   MRI showed chronic thick in frontal, maxillary, ethmoidal ---> to Dr. COLLAZO to est since abx ICC didn't help.  Does netipot bid.    • Diastolic CHF (HCC) 07/09/2014    Overview:  7/4/14 - impaired LV relaxation on [facility] ECHO.  EF 60%. Rest normal   • Dupuytren's contracture of both hands    • Elevated cholesterol    • Erosive esophagitis 07/09/2019    Overview:  EGD 2016 4/2/2018 - nexium OTC daily for few week.  Qod before that.  Now carbonation hurts, epigastric pain 4/8/2019 -   protonix 40 doing well.    • Gastroesophageal reflux disease 02/21/2019   • Hiatal hernia 07/09/2019   • History of colon polyps    • Hypertension    • Lung cancer (HCC)     right lung and in lymph nodes x2   • Mediastinal lymphadenopathy 03/12/2019   • Normocytic anemia 08/08/2019    Overview:  6/2019 - dropped to 9's postop 7/2019 - rebounded to 10's.  8/8/2019 -   Back to 9's - check ferritin, b12 and thyroid.    • Prediabetes 07/09/2014    Overview:  7/4/14 - a1c 6.1 at [facility] admission   • Retention of urine 06/27/2019    PSOT OP, RESOLVED       Past Surgical History:   Procedure Laterality Date   • BRONCHOSCOPY  03/18/2019    EBUS MONTERO NEEDLE BX   • COLONOSCOPY     • DUPUYTREN CONTRACTURE RELEASE Bilateral    • LUNG BIOPSY  03/08/2019    CT GUIDED   • LUNG REMOVAL, PARTIAL Right     right lower   • PORTACATH PLACEMENT  03/20/2019    DR LONGORIA   • THORACOSCOPY Right 6/26/2019    Procedure: RIGHT VATS, OPEN LOWER LOBECTOMY WITH LYMPH NODE DISECTION, WEDGE RESECTION OF RIGHT MIDDLE  LOBE;  Surgeon: Terrance Fuchs MD;  Location: [facility] MAIN OR;  Service: Cardiothoracic   • THORACOSCOPY VIDEO ASSISTED WITH LOBECTOMY Left 7/6/2022    Procedure: THORACOSCOPY VIDEO ASSISTED WITH LOBECTOMY;  Surgeon: Miryam Reyna MD;  Location: [facility] MAIN OR;  Service: Thoracic;  Laterality: Left;         Current Outpatient Medications:   •  Constulose 10 GM/15ML solution, TAKE  30 ML BY MOUTH  EVERY 12 HOURS, Disp: 900 mL, Rfl: 0  •  desvenlafaxine (PRISTIQ) 50 MG 24 hr tablet, Take 50 mg by mouth Daily., Disp: , Rfl:   •  folic acid (FOLVITE) 1 MG tablet, , Disp: , Rfl:   •  gabapentin (NEURONTIN) 300 MG capsule, Take 1 capsule by mouth Every Night., Disp: 30 capsule, Rfl: 3  •  levothyroxine (SYNTHROID, LEVOTHROID) 100 MCG tablet, Take 100 mcg by mouth Every Morning., Disp: , Rfl:   •  levothyroxine (SYNTHROID, LEVOTHROID) 100 MCG tablet, Take 1 tablet by mouth Daily., Disp: , Rfl:   •  liothyronine (CYTOMEL) 5 MCG tablet, Take 5 mcg by mouth Daily., Disp: , Rfl:   •  liothyronine (CYTOMEL) 5 MCG tablet, Take 1 tablet by mouth Daily., Disp: , Rfl:   •  LORazepam (ATIVAN) 0.5 MG tablet, Take 0.5 mg by mouth Every 8 (Eight) Hours As Needed., Disp: , Rfl:   •  metoprolol succinate XL (TOPROL-XL) 100 MG 24 hr tablet, Take 100 mg by mouth Daily., Disp: , Rfl:   •  ondansetron (ZOFRAN) 8 MG tablet, TAKE 1 TABLET BY MOUTH THREE TIMES DAILY AS NEEDED FOR NAUSEA FOR VOMITING, Disp: 30 tablet, Rfl: 0  •  oxyCODONE-acetaminophen (PERCOCET) 5-325 MG per tablet, Take 1 tablet by mouth Every 6 (Six) Hours As Needed for Moderate Pain., Disp: 60 tablet, Rfl: 0  •  pantoprazole (PROTONIX) 40 MG EC tablet, Take 40 mg by mouth Daily As Needed., Disp: , Rfl:   •  potassium chloride (K-DUR,KLOR-CON) 20 MEQ CR tablet, TAKE TWO TABLETS BY MOUTH AS A ONE-TIME DOSE, Disp: , Rfl:   •  Ure-Na 15 g pack packet, TAKE 1 PACKET BY MOUTH TWICE DAILY AS DIRECTED FOR 2 DOSES, Disp: , Rfl:   •  vitamin B-12 (CYANOCOBALAMIN) 1000 MCG  tablet, Take 1,000 mcg by mouth Daily., Disp: , Rfl:   •  vitamin B-6 (PYRIDOXINE) 100 MG tablet, Take 1 tablet by mouth Every 12 (Twelve) Hours., Disp: 60 tablet, Rfl: 6    No Known Allergies    Family History   Problem Relation Age of Onset   • Cancer Mother         cervical cancer   • Cervical cancer Mother    • Cancer Father         lung cancer   • Lung cancer Father    • Lung cancer Sister    • Cancer Sister         lung cancer   • Malig Hyperthermia Neg Hx        Cancer-related family history includes Cancer in his father, mother, and sister; Cervical cancer in his mother; Lung cancer in his father and sister.    Social History     Tobacco Use   • Smoking status: Former     Types: Cigarettes     Quit date: 2009     Years since quittin.5   • Smokeless tobacco: Never   Vaping Use   • Vaping Use: Never used   Substance Use Topics   • Alcohol use: Yes     Alcohol/week: 2.0 standard drinks     Types: 2 Cans of beer per week     Comment: Occasional   • Drug use: No     I have reviewed and confirmed the accuracy of the patient's history: Chief complaint, HPI, ROS and Subjective as entered by the MA/LPN/RN. Azucena Gaspar MD 23     SUBJECTIVE:    Overall he has improved.  He has less pain.  His energy level has also improved.  He complains of constipation    Patient notes increased skin sensitivity over the left anterior chest wall      REVIEW OF SYSTEMS:    Review of Systems   Constitutional: Positive for fatigue. Negative for chills and fever.   HENT: Negative for ear pain, mouth sores, nosebleeds and sore throat.    Eyes: Negative for photophobia and visual disturbance.   Respiratory: Negative for wheezing and stridor.    Cardiovascular: Negative for chest pain and palpitations.   Gastrointestinal: Negative for abdominal pain, diarrhea, nausea and vomiting. He has constipation  Endocrine: Negative for cold intolerance and heat intolerance.   Genitourinary: Negative for dysuria and hematuria.  "  Musculoskeletal: Negative for joint swelling and neck stiffness.   Skin: Negative for color change and rash.   Neurological: Negative for seizures and syncope.   Hematological: Negative for adenopathy.   Psychiatric/Behavioral: Negative for agitation, confusion and hallucinations.           OBJECTIVE:    Vitals:    03/29/23 1453   BP: 127/79   Pulse: 79   Resp: 18   Temp: 97 °F (36.1 °C)   TempSrc: Infrared   Weight: 83.5 kg (184 lb)   Height: 190.5 cm (75\")   PainSc:   5   PainLoc: Comment: port area     ECOG    Eastern Cooperative Oncology Group (ECOG, [place], World Health Organization) performance scale  0 Fully active; no performance restrictions.  1 Strenuous physical activity restricted; fully ambulatory and able to carry out light work.  2 Capable of all self-care but unable to carry out any work activities. Up and about >50% of waking hours.  3 Capable of only limited self-care; confined to bed or chair >50% of waking hours.  4 Completely disabled; cannot carry out any self-care; totally confined to bed or chair.    Physical Exam   Constitutional: He is oriented to person, place, and time. He appears well-developed. No distress.   HENT:   Head: Normocephalic and atraumatic.   Right Ear: External ear normal.   Left Ear: External ear normal.   Nose: Nose normal.   Eyes: Pupils are equal, round, and reactive to light. Conjunctivae are normal. Right eye exhibits no discharge. Left eye exhibits no discharge. No scleral icterus.   Neck: No thyromegaly present.   Cardiovascular: Normal rate, regular rhythm and normal heart sounds. Exam reveals no gallop and no friction rub.   Pulmonary/Chest: Effort normal. No stridor. No respiratory distress. He has no wheezes.   Abdominal: Soft. Bowel sounds are normal. He exhibits no mass. There is no abdominal tenderness. There is no rebound and no guarding.   Musculoskeletal: Normal range of motion. No tenderness.   Lymphadenopathy:     He has no cervical adenopathy. "   Neurological: He is alert and oriented to person, place, and time. He exhibits normal muscle tone.   Skin: Skin is warm. No rash noted. He is not diaphoretic. No erythema.   Urticarial-like rash, right medial knee likely due to immunotherapy   Psychiatric: His behavior is normal. Judgment and thought content normal.   Nursing note and vitals reviewed.    I have reexamined the patient and the results are consistent with the previously documented exam. Azucenarufus Gaspar MD     RECENT LABS    WBC   Date Value Ref Range Status   03/29/2023 7.67 3.40 - 10.80 10*3/mm3 Final   03/22/2022 7.84 4.5 - 11.0 10*3/uL Final     RBC   Date Value Ref Range Status   03/29/2023 3.89 (L) 4.14 - 5.80 10*6/mm3 Final   03/22/2022 4.23 (L) 4.5 - 5.9 10*6/uL Final     Hemoglobin   Date Value Ref Range Status   03/29/2023 10.6 (L) 13.0 - 17.7 g/dL Final   03/22/2022 12.9 (L) 13.5 - 17.5 g/dL Final     Hematocrit   Date Value Ref Range Status   03/29/2023 33.4 (L) 37.5 - 51.0 % Final   03/22/2022 38.8 (L) 41.0 - 53.0 % Final     MCV   Date Value Ref Range Status   03/29/2023 85.9 79.0 - 97.0 fL Final   03/22/2022 91.7 80.0 - 100.0 fL Final     MCH   Date Value Ref Range Status   03/29/2023 27.2 26.6 - 33.0 pg Final   03/22/2022 30.5 26.0 - 34.0 pg Final     MCHC   Date Value Ref Range Status   03/29/2023 31.7 31.5 - 35.7 g/dL Final   03/22/2022 33.2 31.0 - 37.0 g/dL Final     RDW   Date Value Ref Range Status   03/29/2023 15.2 12.3 - 15.4 % Final   03/22/2022 14.2 12.0 - 16.8 % Final     RDW-SD   Date Value Ref Range Status   03/29/2023 46.8 37.0 - 54.0 fl Final     MPV   Date Value Ref Range Status   03/29/2023 8.3 6.0 - 12.0 fL Final   03/22/2022 9.3 8.4 - 12.4 fL Final     Platelets   Date Value Ref Range Status   03/29/2023 369 140 - 450 10*3/mm3 Final   03/22/2022 324 140 - 440 10*3/uL Final     Neutrophil Rel %   Date Value Ref Range Status   03/22/2022 63.1 45 - 80 % Final     Neutrophil %   Date Value Ref Range Status    03/29/2023 75.7 42.7 - 76.0 % Final     Lymphocyte Rel %   Date Value Ref Range Status   03/22/2022 16.2 15 - 50 % Final     Lymphocyte %   Date Value Ref Range Status   03/29/2023 9.8 (L) 19.6 - 45.3 % Final     Monocyte Rel %   Date Value Ref Range Status   03/22/2022 13.6 0 - 15 % Final     Monocyte %   Date Value Ref Range Status   03/29/2023 11.6 5.0 - 12.0 % Final     Eosinophil %   Date Value Ref Range Status   03/29/2023 2.5 0.3 - 6.2 % Final   03/22/2022 5.5 0 - 7 % Final     Basophil Rel %   Date Value Ref Range Status   03/22/2022 1.1 0 - 2 % Final     Basophil %   Date Value Ref Range Status   03/29/2023 0.4 0.0 - 1.5 % Final     Immature Grans %   Date Value Ref Range Status   07/07/2022 0.4 0.0 - 0.5 % Final   03/22/2022 0.5 0.0 - 1.0 % Final     Neutrophils Absolute   Date Value Ref Range Status   03/22/2022 4.94 2.0 - 8.8 10*3/uL Final     Neutrophils, Absolute   Date Value Ref Range Status   03/29/2023 5.81 1.70 - 7.00 10*3/mm3 Final     Lymphocytes Absolute   Date Value Ref Range Status   03/22/2022 1.27 0.7 - 5.5 10*3/uL Final     Lymphocytes, Absolute   Date Value Ref Range Status   03/29/2023 0.75 0.70 - 3.10 10*3/mm3 Final     Monocytes Absolute   Date Value Ref Range Status   03/22/2022 1.07 0.0 - 1.7 10*3/uL Final     Monocytes, Absolute   Date Value Ref Range Status   03/29/2023 0.89 0.10 - 0.90 10*3/mm3 Final     Eosinophils Absolute   Date Value Ref Range Status   03/22/2022 0.43 0.0 - 0.8 10*3/uL Final     Eosinophils, Absolute   Date Value Ref Range Status   03/29/2023 0.19 0.00 - 0.40 10*3/mm3 Final     Basophils Absolute   Date Value Ref Range Status   03/22/2022 0.09 0.0 - 0.2 10*3/uL Final     Basophils, Absolute   Date Value Ref Range Status   03/29/2023 0.03 0.00 - 0.20 10*3/mm3 Final     Immature Grans, Absolute   Date Value Ref Range Status   07/07/2022 0.05 0.00 - 0.05 10*3/mm3 Final   03/22/2022 0.04 0.00 - 0.10 10*3/uL Final     nRBC   Date Value Ref Range Status    12/19/2022 0.0 0.0 - 0.2 /100 WBC Final       Lab Results   Component Value Date    GLUCOSE 98 03/29/2023    BUN 15 03/29/2023    CREATININE 0.91 03/29/2023    EGFRIFNONA 100 01/28/2022    EGFRIFAFRI >60 05/20/2019    BCR 16.5 03/29/2023    K 3.7 03/29/2023    CO2 24.0 03/29/2023    CALCIUM 9.1 03/29/2023    ALBUMIN 3.5 03/13/2023    LABIL2 1.5 03/22/2022    AST 20 03/13/2023    ALT 16 03/13/2023         ASSESSMENT:    1. 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  He has been referred to Dr. Escalona to see whether he is a candidate for radiation treatments.  Would also consider systemic treatment for him for molecular targets as he has had very poor response to chemotherapy malignancy.  Continue Tecentriq with XRT.  Patient has completed XRT on 1/11/2023  2. Reviewed his recent CT of the chest which shows 1 cm increase in the size of the left lung mass.  Patient is also on immunotherapy which can cause pseudo-enlargement.  We will have a short interval 6-week follow-up CT of the chest which will be due second week in April 2023.  This will be scheduled second week in April 2023  3. 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  pT1 cpN1 M0 consistent with stage IIb disease, found on surveillance CT scans. Brain MRI was negative. We will recommend platinum doublet followed by Tecentriq unless he has EGFR mutation for which adjuvant osimertinib will be considered.  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  Patient was treated with cisplatin and Alimta in the adjuvant setting for his original malignancy.  He is currently on cisplatin and Taxol.  Patient is receiving cycle #4  today.  This tumor was completely resected and has negative margins and no mediastinal lymph node involvement.  Reviewed the pathology with Dr. Trevizo.  This is a new second lung primary.  Patient has received a total of 4 cycles of combination of cisplatin and Taxol completed on 10/5/2022.  He will continue Tecentriq  4. Constipation: We will begin lactulose.  Patient to call me if no results  5. High PD-L1 expression at 95%. Reviewed foundation 1 testing results.  This showed MET amplification, ERBB2 amplification for which targeted therapy is available for including crizotinib for MET amplification, afatinib for ERBB2 but this is approved for metastatic disease.   6. We have extensively reviewed the literature.  He will be a candidate for ERBB2 targeted therapy but fam-trastuzumab cannot be combined with XRT due to risk of interstitial lung disease.  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  For these reasons we have decided to proceed with immunotherapy along with radiation.  His case was reviewed at the tumor board and this was the recommendation.  Patient has been approved for start Tecentriq therefore we will go ahead and initiate concurrently with radiation  7. Chemotherapy-induced fatigue: This has improved  8. Hypomagnesemia secondary to chemotherapy: Patient has discontinued magnesium supplements  9. 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  Increase salt in the diet, fluid restriction.  Continue to monitor his sodium levels.  Patient is currently on demeclocycline.  We will start demeclocycline and follow his BMPs on a weekly basis for now until next visit in 3 weeks    10. History of adenocarcinoma of the right lung, T1c N2 M0, consistent with clinical stage 3A disease in 2019.  S/P combined chemotherapy and radiation with cisplatin and Alimta neoadjuvantly, followed by her right lower  lobectomy with mediastinal and hilar lymph node dissection with residual disease pT1 cpN2 M0.  Followed by maintenance durvalumab for 1 year.   11. Recent diagnosis of hypothyroidism, on thyroid replacement therapy  12. Status post right thoracentesis with cytology negative for malignancy  13. History of pneumothorax following lung biopsy  14. Postop anemia: Reviewed  15. Assessment has been reviewed and updated      PLANS:    1. Continue Tecentriq  2. CBC reviewed  3. CMP today  4. CT scans of the chest coming up second week in April 2023  5. 2D echocardiogram in preparation for HER2 directed treatment reviewed  6. Patient has completed all planned 4 cycles of chemo: unfortunately has developed relapsed disease  7. Discontinue demeclocycline and monitor his BMPs every week  8. Continue lactulose for constipation  9. Follow-up with nephrology-patient states he plans to get an appointment.  10. Continue anti-nausea medications  11. Reviewed foundation 1 results.  He will be a candidate for molecular targets in the future if he does develop metastatic disease  12. Continue Emla cream to port  13. Continue thyroid replacement therapy through his PCP  14. Continue B12 injections  monthly: Patient did have elevated methylmalonic acid level during the early phases of his treatments.  Continue the same  15. Continue supportive care  16. All questions answered  17. Follow-up with me in middle of January         Patient has  questions which have all been answered to the best of my abilities    I have reviewed labs results, imaging, vitals, and medications with the patient today.     Patient verbalized understanding and is in agreement of the above plan.        I spent 30 total minutes, face-to-face, caring for Jc today.  90% of this time involved counseling and/or coordination of care as documented within this note.

## 2023-03-29 NOTE — TELEPHONE ENCOUNTER
The patient requested a refill on his pain medication earlier this week. Dr. Escalona filled the prescription today. I called to let him that it had been filled, and receipt was confirmed by his pharmacy at 2:01pm. He voiced understanding.

## 2023-03-30 DIAGNOSIS — C34.92 NSCLC OF LEFT LUNG: Primary | ICD-10-CM

## 2023-03-30 DIAGNOSIS — C34.91 NON-SMALL CELL CARCINOMA OF LUNG, RIGHT: ICD-10-CM

## 2023-03-30 DIAGNOSIS — C34.31 CANCER OF LOWER LOBE OF RIGHT LUNG: ICD-10-CM

## 2023-03-30 RX ORDER — SODIUM CHLORIDE 9 MG/ML
250 INJECTION, SOLUTION INTRAVENOUS ONCE
Status: CANCELLED | OUTPATIENT
Start: 2023-04-03

## 2023-03-31 ENCOUNTER — TELEPHONE (OUTPATIENT)
Dept: ONCOLOGY | Facility: CLINIC | Age: 58
End: 2023-03-31
Payer: COMMERCIAL

## 2023-03-31 NOTE — TELEPHONE ENCOUNTER
Called pt to let him know that Dr. Gaspar would like for him to restart the demeclocycline and continue weekly BMP's. Pt stated that he got the labs done at the [facility] office in [place]. I told him that was fine and that he can get the labs done at his convenience. Pt stated that he still has demeclocycline tablets. I advised that he let us know when he needs a refill. Pt verbalized understanding.

## 2023-03-31 NOTE — TELEPHONE ENCOUNTER
----- Message from Azucena Gaspar MD sent at 3/30/2023  9:48 AM EDT -----  Ask him to start back on demeclocycline for low salts.  He is to continue weekly BMPs at [facility] which is close to his home because his last sodium is down to 132.

## 2023-04-03 ENCOUNTER — HOSPITAL ENCOUNTER (OUTPATIENT)
Dept: ONCOLOGY | Facility: HOSPITAL | Age: 58
Discharge: HOME | End: 2023-04-03
Admitting: INTERNAL MEDICINE
Payer: COMMERCIAL

## 2023-04-03 VITALS
OXYGEN SATURATION: 95 % | BODY MASS INDEX: 23.38 KG/M2 | DIASTOLIC BLOOD PRESSURE: 74 MMHG | SYSTOLIC BLOOD PRESSURE: 118 MMHG | HEART RATE: 87 BPM | RESPIRATION RATE: 16 BRPM | HEIGHT: 75 IN | WEIGHT: 188 LBS | TEMPERATURE: 97.5 F

## 2023-04-03 DIAGNOSIS — C34.91 NON-SMALL CELL CARCINOMA OF LUNG, RIGHT: ICD-10-CM

## 2023-04-03 DIAGNOSIS — C34.92 NSCLC OF LEFT LUNG: Primary | ICD-10-CM

## 2023-04-03 DIAGNOSIS — Z45.2 ENCOUNTER FOR CARE RELATED TO VASCULAR ACCESS PORT: ICD-10-CM

## 2023-04-03 DIAGNOSIS — C34.31 CANCER OF LOWER LOBE OF RIGHT LUNG: ICD-10-CM

## 2023-04-03 LAB
ALBUMIN SERPL-MCNC: 3.5 G/DL (ref 3.5–5.2)
ALBUMIN/GLOB SERPL: 1.2 G/DL
ALP SERPL-CCNC: 98 U/L (ref 39–117)
ALT SERPL W P-5'-P-CCNC: 24 U/L (ref 1–41)
ANION GAP SERPL CALCULATED.3IONS-SCNC: 10 MMOL/L (ref 5–15)
AST SERPL-CCNC: 20 U/L (ref 1–40)
BASOPHILS # BLD AUTO: 0.05 10*3/MM3 (ref 0–0.2)
BASOPHILS NFR BLD AUTO: 0.5 % (ref 0–1.5)
BILIRUB SERPL-MCNC: 0.2 MG/DL (ref 0–1.2)
BUN SERPL-MCNC: 8 MG/DL (ref 6–20)
BUN/CREAT SERPL: 11 (ref 7–25)
CALCIUM SPEC-SCNC: 9.1 MG/DL (ref 8.6–10.5)
CHLORIDE SERPL-SCNC: 94 MMOL/L (ref 98–107)
CO2 SERPL-SCNC: 28 MMOL/L (ref 22–29)
CREAT SERPL-MCNC: 0.73 MG/DL (ref 0.76–1.27)
DEPRECATED RDW RBC AUTO: 46.8 FL (ref 37–54)
EGFRCR SERPLBLD CKD-EPI 2021: 105.5 ML/MIN/1.73
EOSINOPHIL # BLD AUTO: 0.31 10*3/MM3 (ref 0–0.4)
EOSINOPHIL NFR BLD AUTO: 3.1 % (ref 0.3–6.2)
ERYTHROCYTE [DISTWIDTH] IN BLOOD BY AUTOMATED COUNT: 15.3 % (ref 12.3–15.4)
GLOBULIN UR ELPH-MCNC: 3 GM/DL
GLUCOSE BLDC GLUCOMTR-MCNC: 135 MG/DL (ref 70–105)
GLUCOSE SERPL-MCNC: 135 MG/DL (ref 65–99)
HCT VFR BLD AUTO: 31.9 % (ref 37.5–51)
HGB BLD-MCNC: 10.1 G/DL (ref 13–17.7)
LYMPHOCYTES # BLD AUTO: 0.69 10*3/MM3 (ref 0.7–3.1)
LYMPHOCYTES NFR BLD AUTO: 6.8 % (ref 19.6–45.3)
MCH RBC QN AUTO: 27.3 PG (ref 26.6–33)
MCHC RBC AUTO-ENTMCNC: 31.7 G/DL (ref 31.5–35.7)
MCV RBC AUTO: 86.2 FL (ref 79–97)
MONOCYTES # BLD AUTO: 1.15 10*3/MM3 (ref 0.1–0.9)
MONOCYTES NFR BLD AUTO: 11.3 % (ref 5–12)
NEUTROPHILS NFR BLD AUTO: 7.94 10*3/MM3 (ref 1.7–7)
NEUTROPHILS NFR BLD AUTO: 78.3 % (ref 42.7–76)
PLATELET # BLD AUTO: 451 10*3/MM3 (ref 140–450)
PMV BLD AUTO: 8.7 FL (ref 6–12)
POTASSIUM SERPL-SCNC: 3.5 MMOL/L (ref 3.5–5.2)
PROT SERPL-MCNC: 6.5 G/DL (ref 6–8.5)
RBC # BLD AUTO: 3.7 10*6/MM3 (ref 4.14–5.8)
SODIUM SERPL-SCNC: 132 MMOL/L (ref 136–145)
T4 FREE SERPL-MCNC: 1.07 NG/DL (ref 0.93–1.7)
TSH SERPL DL<=0.05 MIU/L-ACNC: 1.77 UIU/ML (ref 0.27–4.2)
WBC NRBC COR # BLD: 10.14 10*3/MM3 (ref 3.4–10.8)

## 2023-04-03 PROCEDURE — 80050 GENERAL HEALTH PANEL: CPT | Performed by: INTERNAL MEDICINE

## 2023-04-03 PROCEDURE — 96413 CHEMO IV INFUSION 1 HR: CPT

## 2023-04-03 PROCEDURE — 25010000002 HEPARIN LOCK FLUSH PER 10 UNITS: Performed by: INTERNAL MEDICINE

## 2023-04-03 PROCEDURE — 82962 GLUCOSE BLOOD TEST: CPT

## 2023-04-03 PROCEDURE — 96367 TX/PROPH/DG ADDL SEQ IV INF: CPT

## 2023-04-03 PROCEDURE — 25010000002 ONDANSETRON PER 1 MG: Performed by: INTERNAL MEDICINE

## 2023-04-03 PROCEDURE — 25010000002 ATEZOLIZUMAB 1200 MG/20ML SOLUTION 20 ML VIAL: Performed by: INTERNAL MEDICINE

## 2023-04-03 PROCEDURE — 25010000002 ALTEPLASE 2 MG RECONSTITUTED SOLUTION: Performed by: INTERNAL MEDICINE

## 2023-04-03 PROCEDURE — 84439 ASSAY OF FREE THYROXINE: CPT | Performed by: INTERNAL MEDICINE

## 2023-04-03 PROCEDURE — 96375 TX/PRO/DX INJ NEW DRUG ADDON: CPT

## 2023-04-03 PROCEDURE — 36593 DECLOT VASCULAR DEVICE: CPT

## 2023-04-03 RX ORDER — HEPARIN SODIUM (PORCINE) LOCK FLUSH IV SOLN 100 UNIT/ML 100 UNIT/ML
500 SOLUTION INTRAVENOUS AS NEEDED
Status: DISCONTINUED | OUTPATIENT
Start: 2023-04-03 | End: 2023-04-04 | Stop reason: HOSPADM

## 2023-04-03 RX ORDER — SODIUM CHLORIDE 0.9 % (FLUSH) 0.9 %
20 SYRINGE (ML) INJECTION AS NEEDED
Status: DISCONTINUED | OUTPATIENT
Start: 2023-04-03 | End: 2023-04-04 | Stop reason: HOSPADM

## 2023-04-03 RX ORDER — SODIUM CHLORIDE 9 MG/ML
250 INJECTION, SOLUTION INTRAVENOUS ONCE
Status: COMPLETED | OUTPATIENT
Start: 2023-04-03 | End: 2023-04-03

## 2023-04-03 RX ADMIN — SODIUM CHLORIDE 250 ML: 9 INJECTION, SOLUTION INTRAVENOUS at 12:15

## 2023-04-03 RX ADMIN — Medication 20 ML: at 13:19

## 2023-04-03 RX ADMIN — ONDANSETRON 16 MG: 2 INJECTION INTRAMUSCULAR; INTRAVENOUS at 12:15

## 2023-04-03 RX ADMIN — ALTEPLASE: 2.2 INJECTION, POWDER, LYOPHILIZED, FOR SOLUTION INTRAVENOUS at 11:45

## 2023-04-03 RX ADMIN — HEPARIN 500 UNITS: 100 SYRINGE at 13:19

## 2023-04-03 RX ADMIN — ATEZOLIZUMAB 1200 MG: 1200 INJECTION, SOLUTION INTRAVENOUS at 12:38

## 2023-04-03 NOTE — PROGRESS NOTES
Pt. Was here at clinic for C7 Tecentriq  Pt. Has no complaints today.   Port accessed per sterile protocol, no blood return noted, TPA administered per protocol.   Peripheral IV started and lab specimens drawn via peripheral IV site and sent to lab for processing.   Treatment given as ordered via peripheral IV site, and pt. Tolerated well.   TPA dwell time 1hr. 30min., TPA removed with blood return noted.   Pt. Discharged from clinic with no complaints, no AVS given, pt. Follows my chart.     Message sent to Dr. Gaspar's nurse Talya YOUNG. That pt. received zofran 16mg IV prior to his treatment today but, it's not on the next treatment nor was it on the previous treatments. Pt. States he doesn't usually have any problem with nausea.   Talya was not sure why zofran was added to the treatment plan but, she stated she would check with Dr. Gaspar when she returns on Wed. 4/5.

## 2023-04-17 RX ORDER — LACTULOSE 10 G/15ML
SOLUTION ORAL
Qty: 900 ML | Refills: 0 | Status: SHIPPED | OUTPATIENT
Start: 2023-04-17

## 2023-04-24 ENCOUNTER — OFFICE VISIT (OUTPATIENT)
Dept: ONCOLOGY | Facility: CLINIC | Age: 58
End: 2023-04-24
Payer: COMMERCIAL

## 2023-04-24 ENCOUNTER — HOSPITAL ENCOUNTER (OUTPATIENT)
Dept: ONCOLOGY | Facility: HOSPITAL | Age: 58
Discharge: HOME | End: 2023-04-24
Admitting: INTERNAL MEDICINE
Payer: COMMERCIAL

## 2023-04-24 VITALS
DIASTOLIC BLOOD PRESSURE: 79 MMHG | HEART RATE: 65 BPM | WEIGHT: 187 LBS | BODY MASS INDEX: 23.37 KG/M2 | SYSTOLIC BLOOD PRESSURE: 119 MMHG | TEMPERATURE: 98.2 F

## 2023-04-24 VITALS
WEIGHT: 187 LBS | TEMPERATURE: 98.2 F | HEIGHT: 75 IN | SYSTOLIC BLOOD PRESSURE: 119 MMHG | RESPIRATION RATE: 16 BRPM | DIASTOLIC BLOOD PRESSURE: 79 MMHG | BODY MASS INDEX: 23.25 KG/M2 | HEART RATE: 65 BPM

## 2023-04-24 DIAGNOSIS — C34.31 CANCER OF LOWER LOBE OF RIGHT LUNG: ICD-10-CM

## 2023-04-24 DIAGNOSIS — C34.91 NON-SMALL CELL CARCINOMA OF LUNG, RIGHT: ICD-10-CM

## 2023-04-24 DIAGNOSIS — C34.92 NSCLC OF LEFT LUNG: Primary | ICD-10-CM

## 2023-04-24 DIAGNOSIS — C34.12 MALIGNANT NEOPLASM OF UPPER LOBE OF LEFT LUNG: Primary | ICD-10-CM

## 2023-04-24 DIAGNOSIS — Z45.2 ENCOUNTER FOR CARE RELATED TO VASCULAR ACCESS PORT: ICD-10-CM

## 2023-04-24 LAB
ALBUMIN SERPL-MCNC: 3.4 G/DL (ref 3.5–5.2)
ALBUMIN/GLOB SERPL: 1 G/DL
ALP SERPL-CCNC: 105 U/L (ref 39–117)
ALT SERPL W P-5'-P-CCNC: 11 U/L (ref 1–41)
ANION GAP SERPL CALCULATED.3IONS-SCNC: 13 MMOL/L (ref 5–15)
AST SERPL-CCNC: 18 U/L (ref 1–40)
BASOPHILS # BLD AUTO: 0.07 10*3/MM3 (ref 0–0.2)
BASOPHILS NFR BLD AUTO: 0.5 % (ref 0–1.5)
BILIRUB SERPL-MCNC: <0.2 MG/DL (ref 0–1.2)
BUN SERPL-MCNC: 9 MG/DL (ref 6–20)
BUN/CREAT SERPL: 11.4 (ref 7–25)
CALCIUM SPEC-SCNC: 9.2 MG/DL (ref 8.6–10.5)
CHLORIDE SERPL-SCNC: 92 MMOL/L (ref 98–107)
CO2 SERPL-SCNC: 26 MMOL/L (ref 22–29)
CREAT SERPL-MCNC: 0.79 MG/DL (ref 0.76–1.27)
DEPRECATED RDW RBC AUTO: 48.8 FL (ref 37–54)
EGFRCR SERPLBLD CKD-EPI 2021: 103 ML/MIN/1.73
EOSINOPHIL # BLD AUTO: 0.25 10*3/MM3 (ref 0–0.4)
EOSINOPHIL NFR BLD AUTO: 1.8 % (ref 0.3–6.2)
ERYTHROCYTE [DISTWIDTH] IN BLOOD BY AUTOMATED COUNT: 16 % (ref 12.3–15.4)
GLOBULIN UR ELPH-MCNC: 3.3 GM/DL
GLUCOSE BLDC GLUCOMTR-MCNC: 135 MG/DL (ref 70–105)
GLUCOSE SERPL-MCNC: 122 MG/DL (ref 65–99)
HCT VFR BLD AUTO: 33.6 % (ref 37.5–51)
HGB BLD-MCNC: 10.6 G/DL (ref 13–17.7)
LYMPHOCYTES # BLD AUTO: 0.82 10*3/MM3 (ref 0.7–3.1)
LYMPHOCYTES NFR BLD AUTO: 5.8 % (ref 19.6–45.3)
MCH RBC QN AUTO: 27 PG (ref 26.6–33)
MCHC RBC AUTO-ENTMCNC: 31.5 G/DL (ref 31.5–35.7)
MCV RBC AUTO: 85.5 FL (ref 79–97)
MONOCYTES # BLD AUTO: 1.56 10*3/MM3 (ref 0.1–0.9)
MONOCYTES NFR BLD AUTO: 11 % (ref 5–12)
NEUTROPHILS NFR BLD AUTO: 11.45 10*3/MM3 (ref 1.7–7)
NEUTROPHILS NFR BLD AUTO: 80.9 % (ref 42.7–76)
PLATELET # BLD AUTO: 552 10*3/MM3 (ref 140–450)
PMV BLD AUTO: 8.6 FL (ref 6–12)
POTASSIUM SERPL-SCNC: 4.5 MMOL/L (ref 3.5–5.2)
PROT SERPL-MCNC: 6.7 G/DL (ref 6–8.5)
RBC # BLD AUTO: 3.93 10*6/MM3 (ref 4.14–5.8)
SODIUM SERPL-SCNC: 131 MMOL/L (ref 136–145)
WBC NRBC COR # BLD: 14.15 10*3/MM3 (ref 3.4–10.8)

## 2023-04-24 PROCEDURE — 25010000002 HEPARIN LOCK FLUSH PER 10 UNITS: Performed by: INTERNAL MEDICINE

## 2023-04-24 PROCEDURE — 96413 CHEMO IV INFUSION 1 HR: CPT

## 2023-04-24 PROCEDURE — 85025 COMPLETE CBC W/AUTO DIFF WBC: CPT | Performed by: INTERNAL MEDICINE

## 2023-04-24 PROCEDURE — 36593 DECLOT VASCULAR DEVICE: CPT

## 2023-04-24 PROCEDURE — 82962 GLUCOSE BLOOD TEST: CPT

## 2023-04-24 PROCEDURE — 25010000002 ALTEPLASE 2 MG RECONSTITUTED SOLUTION: Performed by: INTERNAL MEDICINE

## 2023-04-24 PROCEDURE — 25010000002 ATEZOLIZUMAB 1200 MG/20ML SOLUTION 20 ML VIAL: Performed by: INTERNAL MEDICINE

## 2023-04-24 PROCEDURE — 80053 COMPREHEN METABOLIC PANEL: CPT | Performed by: INTERNAL MEDICINE

## 2023-04-24 RX ORDER — OXYCODONE HYDROCHLORIDE 5 MG/1
5 CAPSULE ORAL 2 TIMES DAILY PRN
COMMUNITY
End: 2023-04-24 | Stop reason: ALTCHOICE

## 2023-04-24 RX ORDER — OXYCODONE HYDROCHLORIDE 5 MG/1
5 TABLET ORAL EVERY 8 HOURS PRN
Qty: 60 TABLET | Refills: 0 | Status: SHIPPED | OUTPATIENT
Start: 2023-04-24

## 2023-04-24 RX ORDER — SODIUM CHLORIDE 9 MG/ML
250 INJECTION, SOLUTION INTRAVENOUS ONCE
Status: CANCELLED | OUTPATIENT
Start: 2023-04-24

## 2023-04-24 RX ORDER — HEPARIN SODIUM (PORCINE) LOCK FLUSH IV SOLN 100 UNIT/ML 100 UNIT/ML
500 SOLUTION INTRAVENOUS AS NEEDED
Status: DISCONTINUED | OUTPATIENT
Start: 2023-04-24 | End: 2023-04-25 | Stop reason: HOSPADM

## 2023-04-24 RX ORDER — SODIUM CHLORIDE 0.9 % (FLUSH) 0.9 %
20 SYRINGE (ML) INJECTION AS NEEDED
Status: DISCONTINUED | OUTPATIENT
Start: 2023-04-24 | End: 2023-04-25 | Stop reason: HOSPADM

## 2023-04-24 RX ORDER — PANTOPRAZOLE SODIUM 40 MG/1
1 TABLET, DELAYED RELEASE ORAL DAILY
Status: ON HOLD | COMMUNITY
Start: 2023-04-05 | End: 2023-04-27

## 2023-04-24 RX ORDER — SODIUM CHLORIDE 9 MG/ML
250 INJECTION, SOLUTION INTRAVENOUS ONCE
Status: COMPLETED | OUTPATIENT
Start: 2023-04-24 | End: 2023-04-24

## 2023-04-24 RX ORDER — LIDOCAINE AND PRILOCAINE 25; 25 MG/G; MG/G
CREAM TOPICAL
Qty: 30 G | Refills: 1 | Status: SHIPPED | OUTPATIENT
Start: 2023-04-24

## 2023-04-24 RX ADMIN — Medication 20 ML: at 14:16

## 2023-04-24 RX ADMIN — HEPARIN 500 UNITS: 100 SYRINGE at 14:16

## 2023-04-24 RX ADMIN — ATEZOLIZUMAB 1200 MG: 1200 INJECTION, SOLUTION INTRAVENOUS at 13:31

## 2023-04-24 RX ADMIN — ALTEPLASE: 2.2 INJECTION, POWDER, LYOPHILIZED, FOR SOLUTION INTRAVENOUS at 12:47

## 2023-04-24 RX ADMIN — SODIUM CHLORIDE 250 ML: 9 INJECTION, SOLUTION INTRAVENOUS at 13:31

## 2023-04-24 NOTE — PROGRESS NOTES
Hematology/Oncology Outpatient Follow Up    PATIENT NAME:Jc Driscoll  :1965  MRN: [id number]  PRIMARY CARE PHYSICIAN: Reshma Alvarenga MD  REFERRING PHYSICIAN: No ref. provider found    Chief Complaint   Patient presents with   • Follow-up     NSCLC of left lung        HISTORY OF PRESENT ILLNESS:                                                                                                                                                                                                                                                             This is a 57 y.o. who developed left shoulder pain and for that reason he had a chest x-ray done on 19 which showed a 2.7 cm noncalcified right lower lobe nodule was identified.  For that reason a CT scan of the chest was recommended.  Review of his records shows patient had the CT scan on 19 done without contrast.  This basically showed a lobulated noncalcified mass in the posterior aspect of the right lower lobe measuring 3 cm close to the pleural surface.  There is a calcified 1.2 mm nodule in the lateral aspect of the right lower lobe consistent with a granuloma.  There were also calcified subcarinal and right hilar nodules.  There is a lower right side tracheal nodule measuring 1 cm.  Patient had a PET/CT scan done on 19 which showed increased metabolic activity on the right lower lobe mass that measures 2.5 cm with SUV of 4.4.  There was also increased metabolic activity in the subcarinal space measuring 2.8 cm in size with SUV of 3.4, increased  activity in an enlarged right paratracheal lymph node that measures 1.9 cm, SUV of 5, also suspicious for metastatic adenopathy.  Patient had a CT-guided biopsy of the right lower lobe mass on 3/8/19.  This showed adenocarcinoma, TTF-1 positive.  On 3/18/19 patient underwent endoscopic ultrasound and biopsy of a subcarinal lymph node.  This was positive for malignant cells.  Patient was then taken back to surgery on 3/20/18 and had left Mediport placement by Dr. Fuchs.  He has been referred for further evaluation and management of his stage 3 adenocarcinoma of the right lung.  He was accompanied by his spouse for the appointment on 3/26/19.  Patient was scheduled to have a brain MRI for complete staging, but he could not do this due to claustrophobia.  He is willing to try with the help of angiolytics.    • Patient had brain MRI done on 4/5/19.  He states it did not show any evidence of metastatic disease.  Evidence of chronic sinusitis was noted.    • Patient was seen by Dr. Escalona who has recommended concurrent chemotherapy and radiation.  Patient is scheduled to begin on 4/15/19.   • 4/17/19 - Patient was initiated on combined chemotherapy and radiation with Cisplatin and Alimta.  Patient received cycle 1.      • 5/8/19 - Patient received cycle 2 of chemotherapy with Cisplatin and Alimta.   • 5/15/19 - Chemistry panel showed creatinine of 1.7.  WBC 1.8, hemoglobin 11.6, platelet count 72,000.   • 5/20/19 - BUN 10, creatinine 1.2, potassium 3.5.    • 5/23/19 - CT scan of the chest with contrast showed interval decrease in size of the right lower lobe pulmonary nodule now measuring 2.1 cm in size.  There was no mediastinal or hilar adenopathy identified.  • 6/26/2019 patient underwent right lower lobectomy with hilar and mediastinal lymph node dissection performed by Dr. Terrance Mckeon.  • Pathology revealed residual residual 2.2 cm invasive moderately differentiated adenocarcinoma.  There were a total of 16  lymph nodes evaluated that 7 had metastatic disease.  There was evidence of lymphovascular invasion, extranodal involvement.  All the surgical margins were negative and distance of the closest margin was 2.5 cm.  Logic stage is T1c N2 M0.  • Patient is here today accompanied by his spouse for this appointment.  He continues to complain of some postop discomfort, numbness but his skin is intact.  There is no drainage.  Has had follow-up with Dr. Fuchs.  • 8/14/2019-seen by Dr. Maria at the [facility].  Dr. Maria has recommended immunotherapy with durvalumab off label use as patient has not had significant response to neoadjuvant chemotherapy and radiation.  Patient was given the option to have immunotherapy at the [facility] vs [place] and he has chosen to stay in [place]  • 8/21/19 - Started cycle 1 day 1 Imfinzi  • 9/4/2019 patient had CT scan of the chest this shows a moderate size right pleural effusion.  There are areas of groundglass opacification in the right upper lobe without any evidence of significant septal thickening.  Volume loss noted there is also moderate pleural effusion.  There is no suspicious recurrent malignancy.  There were nonenlarged residual mediastinal lymph nodes.  • 9/9/19 - WBC 6.23, Hgb 11.7 Platelets 257,000, , BUN 9, Cr 1.1,Vitamin B12 318, Ferritin 437  • 9/23/19 - received cycle 2 day 1 Imfinzi  • 10/9/19- Seen by Dr rodríguez with ENT for sinus disease  • 11/4/2019-patient received cycle 5 of Imfinzi(durvalumab)  • 12/2/2019 patient had cycle 7 of durvalumab  • 12/5/2019-patient had CT scan of the  abdomen and pelvis with small right pleural sided pleural effusion.There is no evidence of metastatic disease  • 12/30/2019-patient received cycle 9 of durvalumab  • 12/31/2019-patient had CT scan of the chest showed small to moderate left pleural effusion.  Iglesias appearing right lower paratracheal and right peribronchial soft tissue thickening without any discrete  pathologically enlarged mediastinal or hilar lymph nodes.  • 1/27/20-patient received cycle 11 of durvalumab  • 2/17/20-patient had a stress test which was negative for ischemia  • 2/17/2020-patient had CT of the chest which showed postop changes on the right lung, right pleural effusion but no enlarging mass was noted  • 3/2/2020-patient received cycle 12 of durvalumab  • 3/16/2020-patient had ultrasound-guided right thoracentesis.  Pathology was negative for malignancy  • 4/13/2020-patient received cycle 15 of immunotherapy with durvalumab  • 5/11/2020-patient received cycle 17 of immunotherapy with durvalumab  • 6/9/2020-patient had CT scan of the chest, abdomen and pelvis for lung cancer restaging.  There is stable small chronic loculated right basilar pleural effusion suggesting chronic pleuritis.  There is no evidence of metastatic disease in the abdomen or pelvis  • 7/20/2020 patient received cycle 22 of Durvalumab  • 8/17/2020 patient received cycle 24 and last cycle of durvalumab  • 9/24/2020 patient had CT scan of the chest, abdomen and pelvis for cancer restaging there was no evidence of local recurrence or metastatic disease within the abdomen and pelvis.  He has stable chronic small right pleural effusion.  Mild sigmoid diverticulosis was noted.  • 1/18/2021 patient had CT scan of the chest, abdomen and pelvis reviewed patient  • 5/24/2021 patient had CT scan of the chest calcified subcarinal lymph node consistent with changes of prior granulomatous disease.  Chronic small right pleural effusion is noted similar to prior exam.  Previously shown 4 mm nodule in the left lower lobe has nearly completely resolved.  The subpleural 3 mm nodule located within the posterior left lower lobe With resolved.  • 9/24/2021 patient had a CT scan of the chest, abdomen and pelvis there is soft tissue attenuation within the right paratracheal area which has been suggested to reflect sequela to previous treatment.  There  is slight thickening of the bladder wall otherwise there is no evidence of metastatic disease.  • 12/27/2021 patient had CT scan of the chest with contrast this basically showed irregular shaped noncalcified 7 mm left upper lobe pulmonary nodule.  PET CT scan was recommended to further evaluate.  • 1/26/2022 patient had a PET CT scan done which basically showed evidence of mild uptake corresponding to the nodule within the left upper lobe which is new worrisome for developing metastatic focus.  There was mild radiopharmaceutical activity corresponding to pleural thickening throughout the right lung base with associated pleural effusion likely related to post therapeutic changes and radiation changes in this region.  Metastatic malignancy involving the pleura could not be completely eliminated.  • 1/26/2022: CT-guided biopsy was aborted due to finding by the radiologist that the lung nodularity was actually a clump of tiny nodules of which the largest was 6 mm and therefore biopsy could not be completed.  Also the area was in the vicinity of multiple blood vessels with increased risk of bleeding.  Follow-up CT of the chest was recommended for continued surveillance  • 5/18/2022 patient had CT-guided biopsy of the left enlarging nodule, pathology was positive for invasive poorly differentiated adenocarcinoma most consistent with adenocarcinoma of pulmonary origin.  • 5/27/2022 patient had a PET CT scan which showed a 2.3 cm hypermetabolic left upper lobe nodule which has increased in size no convincing evidence of metastatic disease elsewhere within the chest.  • 5/21/2022 patient had brain MRI which did not show any evidence of intracranial metastasis  • 6/2/2022 patient was seen by Dr. Miryam Reyna pulmonary function test is being done to assess surgical candidacy  • 7/6/2022 patient underwent left heart Koska P video-assisted with upper lobectomy mediastinal lymph node dissection performed by Dr. Witt  Doc  • Final pathology revealed poorly differentiated  • A predominant adenocarcinoma with solid component presenting 45% and micropapillary component 5% with extensive necrosis, invasive carcinoma measures 2.5 maximally there was focal lymphovascular space invasion all margins were negative for malignancy discussed with the closest margin was 2.5 cm pathology stage is pT1C pN1.  PD-L1 showed a tumor proportion score of 95%  • 8/3/2022 patient started adjuvant chemotherapy with cisplatin, Taxol  • 8/24/2022 patient received cycle 2 of combination chemotherapy with cisplatin and Taxol with Neulasta  • 10/5/2022: Patient received cycle 4-day 1 of combination chemotherapy with cisplatin and Taxol  • Patient developed left-sided chest pain for that reason he had a PET/CT scan 1/20/2020 is basically revealed a 4 x 3.5 cm hypermetabolic soft tissue mass in the left chest wall between the first and second ribs anteriorly consistent with relapsed disease.  There was a small cluster of hypermetabolic lymph nodes seen in the left supraclavicular region consistent with early manoj metastasis  • 11/11/2022: 2D echocardiogram performed showing a left ventricular EF of 56 to 60%  • 11/22/2022: Cycle 1 of atezolizumab received  • 12/5/2022-12/9/2022: Hospitalized at [facility] for dehydration, hyponatremia, and weakness.  • 12/19/2020: Patient received cycle 2 of Tecentriq along with XRT  • 1/11/2023: Patient completed radiation.  Remains on Tecentriq every 3 weeks  • 1/18/2023 patient had a CT scan of the chest which basically revealed abnormal soft tissue interposed between the first and second rib with some erosive changes to the ribs this finding measures about 5 x 2.8 cm previously was 4.8 x 3.8 cm.  There is some pleural thickening about in this area nodular area within the prominent mediastinal fat in the upper chest measuring 1.7 cm more fluid as opposed to enlarged lymph nodes.  Possibility of pulmonary hypertension was also  noted due to prominence of main pulmonary artery measuring 3.7 cm.    Past Medical History:   Diagnosis Date   • Allergic rhinitis 07/25/2013    Overview:  4/2/2018 - still zyrtec 10 daily (if stops, gets dermatitis again).    • Anxiety and depression 06/05/2012    Overview:  4/2/2018 -   C/w well 300 xr and Lito 20.  4/8/2019 -   Doing ok even with new lung cancer - lito 20 & well 300xr.    • Chronic pansinusitis 04/08/2019    Overview:  4/8/2019 -   MRI showed chronic thick in frontal, maxillary, ethmoidal ---> to Dr. COLLAZO to est since abx ICC didn't help.  Does netipot bid.    • Diastolic CHF 07/09/2014    Overview:  7/4/14 - impaired LV relaxation on [place] ECHO.  EF 60%. Rest normal   • Dupuytren's contracture of both hands    • Elevated cholesterol    • Erosive esophagitis 07/09/2019    Overview:  EGD 2016 4/2/2018 - nexium OTC daily for few week.  Qod before that.  Now carbonation hurts, epigastric pain 4/8/2019 -   protonix 40 doing well.    • Gastroesophageal reflux disease 02/21/2019   • Hiatal hernia 07/09/2019   • History of colon polyps    • Hypertension    • Lung cancer     right lung and in lymph nodes x2   • Mediastinal lymphadenopathy 03/12/2019   • Normocytic anemia 08/08/2019    Overview:  6/2019 - dropped to 9's postop 7/2019 - rebounded to 10's.  8/8/2019 -   Back to 9's - check ferritin, b12 and thyroid.    • Prediabetes 07/09/2014    Overview:  7/4/14 - a1c 6.1 at [place] admission   • Retention of urine 06/27/2019    PSOT OP, RESOLVED       Past Surgical History:   Procedure Laterality Date   • BRONCHOSCOPY  03/18/2019    EBUS MONTERO NEEDLE BX   • COLONOSCOPY     • DUPUYTREN CONTRACTURE RELEASE Bilateral    • LUNG BIOPSY  03/08/2019    CT GUIDED   • LUNG REMOVAL, PARTIAL Right     right lower   • PORTACATH PLACEMENT  03/20/2019    DR LONGORIA   • THORACOSCOPY Right 6/26/2019    Procedure: RIGHT VATS, OPEN LOWER LOBECTOMY WITH LYMPH NODE DISECTION, WEDGE RESECTION OF RIGHT MIDDLE LOBE;  Surgeon:  Terrance Fuchs MD;  Location: [facility] MAIN OR;  Service: Cardiothoracic   • THORACOSCOPY VIDEO ASSISTED WITH LOBECTOMY Left 7/6/2022    Procedure: THORACOSCOPY VIDEO ASSISTED WITH LOBECTOMY;  Surgeon: Miryam Reyna MD;  Location: [facility] MAIN OR;  Service: Thoracic;  Laterality: Left;         Current Outpatient Medications:   •  Constulose 10 GM/15ML solution, TAKE  30 ML BY MOUTH  EVERY 12 HOURS, Disp: 900 mL, Rfl: 0  •  desvenlafaxine (PRISTIQ) 50 MG 24 hr tablet, Take 50 mg by mouth Daily., Disp: , Rfl:   •  folic acid (FOLVITE) 1 MG tablet, , Disp: , Rfl:   •  gabapentin (NEURONTIN) 300 MG capsule, Take 1 capsule by mouth Every Night., Disp: 30 capsule, Rfl: 3  •  levothyroxine (SYNTHROID, LEVOTHROID) 100 MCG tablet, Take 100 mcg by mouth Every Morning., Disp: , Rfl:   •  levothyroxine (SYNTHROID, LEVOTHROID) 100 MCG tablet, Take 1 tablet by mouth Daily., Disp: , Rfl:   •  lidocaine-prilocaine (EMLA) 2.5-2.5 % cream, Apply to port site 60 minutes prior to it being accessed, Disp: 30 g, Rfl: 1  •  liothyronine (CYTOMEL) 5 MCG tablet, Take 5 mcg by mouth Daily., Disp: , Rfl:   •  liothyronine (CYTOMEL) 5 MCG tablet, Take 1 tablet by mouth Daily., Disp: , Rfl:   •  LORazepam (ATIVAN) 0.5 MG tablet, Take 0.5 mg by mouth Every 8 (Eight) Hours As Needed., Disp: , Rfl:   •  metoprolol succinate XL (TOPROL-XL) 100 MG 24 hr tablet, Take 100 mg by mouth Daily., Disp: , Rfl:   •  ondansetron (ZOFRAN) 8 MG tablet, TAKE 1 TABLET BY MOUTH THREE TIMES DAILY AS NEEDED FOR NAUSEA FOR VOMITING, Disp: 30 tablet, Rfl: 0  •  oxyCODONE (Roxicodone) 5 MG immediate release tablet, Take 1 tablet by mouth Every 8 (Eight) Hours As Needed for Moderate Pain., Disp: 60 tablet, Rfl: 0  •  pantoprazole (PROTONIX) 40 MG EC tablet, Take 40 mg by mouth Daily As Needed., Disp: , Rfl:   •  pantoprazole (PROTONIX) 40 MG EC tablet, Take 1 tablet by mouth Daily., Disp: , Rfl:   •  potassium chloride (K-DUR,KLOR-CON) 20 MEQ CR tablet, TAKE TWO  TABLETS BY MOUTH AS A ONE-TIME DOSE, Disp: , Rfl:   •  Ure-Na 15 g pack packet, TAKE 1 PACKET BY MOUTH TWICE DAILY AS DIRECTED FOR 2 DOSES, Disp: , Rfl:   •  vitamin B-12 (CYANOCOBALAMIN) 1000 MCG tablet, Take 1,000 mcg by mouth Daily., Disp: , Rfl:   •  vitamin B-6 (PYRIDOXINE) 100 MG tablet, Take 1 tablet by mouth Every 12 (Twelve) Hours., Disp: 60 tablet, Rfl: 6  No current facility-administered medications for this visit.    Facility-Administered Medications Ordered in Other Visits:   •  alteplase (CATHFLO/ACTIVASE) injection 2 mg, 2 mg, Intracatheter, Q2H PRN, Azucena Gaspar MD, New Syringe/Cartridge at 23 1247  •  heparin injection 500 Units, 500 Units, Intravenous, PRN, Azucena Gaspar MD, 500 Units at 23 1416  •  sodium chloride 0.9 % flush 20 mL, 20 mL, Intravenous, PRN, Azucena Gaspar MD, 20 mL at 23 1416    No Known Allergies    Family History   Problem Relation Age of Onset   • Cancer Mother         cervical cancer   • Cervical cancer Mother    • Cancer Father         lung cancer   • Lung cancer Father    • Lung cancer Sister    • Cancer Sister         lung cancer   • Malig Hyperthermia Neg Hx        Cancer-related family history includes Cancer in his father, mother, and sister; Cervical cancer in his mother; Lung cancer in his father and sister.    Social History     Tobacco Use   • Smoking status: Former     Types: Cigarettes     Quit date: 2009     Years since quittin.6   • Smokeless tobacco: Never   Vaping Use   • Vaping Use: Never used   Substance Use Topics   • Alcohol use: Yes     Alcohol/week: 2.0 standard drinks     Types: 2 Cans of beer per week     Comment: Occasional   • Drug use: No       I have reviewed and confirmed the accuracy of the patient's history: Chief complaint, HPI, ROS and Subjective as entered by the MA/LPN/RN. Azucena Gaspar MD 23        SUBJECTIVE:    Overall he has improved.  He has less pain.  His energy  "level has also improved.  He complains of constipation    Patient notes increased skin sensitivity over the left anterior chest wall.  Patient is asking for more pain medications.      REVIEW OF SYSTEMS:    Review of Systems   Constitutional: Positive for fatigue. Negative for chills and fever.   HENT: Negative for ear pain, mouth sores, nosebleeds and sore throat.    Eyes: Negative for photophobia and visual disturbance.   Respiratory: Negative for wheezing and stridor.    Cardiovascular: Negative for chest pain and palpitations.   Gastrointestinal: Negative for abdominal pain, diarrhea, nausea and vomiting. He has constipation  Endocrine: Negative for cold intolerance and heat intolerance.   Genitourinary: Negative for dysuria and hematuria.   Musculoskeletal: Negative for joint swelling and neck stiffness.   Skin: Negative for color change and rash.   Neurological: Negative for seizures and syncope.   Hematological: Negative for adenopathy.   Psychiatric/Behavioral: Negative for agitation, confusion and hallucinations.           OBJECTIVE:    Vitals:    04/24/23 1336   BP: 119/79   Pulse: 65   Resp: 16   Temp: 98.2 °F (36.8 °C)   TempSrc: Infrared   Weight: 84.8 kg (187 lb)   Height: 190.5 cm (75\")   PainSc: 0-No pain     ECOG    Eastern Cooperative Oncology Group (ECOG, [place], World Health Organization) performance scale  0 Fully active; no performance restrictions.  1 Strenuous physical activity restricted; fully ambulatory and able to carry out light work.  2 Capable of all self-care but unable to carry out any work activities. Up and about >50% of waking hours.  3 Capable of only limited self-care; confined to bed or chair >50% of waking hours.  4 Completely disabled; cannot carry out any self-care; totally confined to bed or chair.    Physical Exam   Constitutional: He is oriented to person, place, and time. He appears well-developed. No distress.   HENT:   Head: Normocephalic and atraumatic.   Right Ear: " External ear normal.   Left Ear: External ear normal.   Nose: Nose normal.   Eyes: Pupils are equal, round, and reactive to light. Conjunctivae are normal. Right eye exhibits no discharge. Left eye exhibits no discharge. No scleral icterus.   Neck: No thyromegaly present.   Cardiovascular: Normal rate, regular rhythm and normal heart sounds. Exam reveals no gallop and no friction rub.   Pulmonary/Chest: Effort normal. No stridor. No respiratory distress. He has no wheezes.   Abdominal: Soft. Bowel sounds are normal. He exhibits no mass. There is no abdominal tenderness. There is no rebound and no guarding.   Musculoskeletal: Normal range of motion. No tenderness.   Lymphadenopathy:     He has no cervical adenopathy.   Neurological: He is alert and oriented to person, place, and time. He exhibits normal muscle tone.   Skin: Skin is warm. No rash noted. He is not diaphoretic. No erythema.   Urticarial-like rash, right medial knee likely due to immunotherapy   Psychiatric: His behavior is normal. Judgment and thought content normal.   Nursing note and vitals reviewed.      I have reexamined the patient and the results are consistent with the previously documented exam. Azucena Gaspar MD     RECENT LABS    WBC   Date Value Ref Range Status   04/24/2023 14.15 (H) 3.40 - 10.80 10*3/mm3 Final   03/22/2022 7.84 4.5 - 11.0 10*3/uL Final     RBC   Date Value Ref Range Status   04/24/2023 3.93 (L) 4.14 - 5.80 10*6/mm3 Final   03/22/2022 4.23 (L) 4.5 - 5.9 10*6/uL Final     Hemoglobin   Date Value Ref Range Status   04/24/2023 10.6 (L) 13.0 - 17.7 g/dL Final   03/22/2022 12.9 (L) 13.5 - 17.5 g/dL Final     Hematocrit   Date Value Ref Range Status   04/24/2023 33.6 (L) 37.5 - 51.0 % Final   03/22/2022 38.8 (L) 41.0 - 53.0 % Final     MCV   Date Value Ref Range Status   04/24/2023 85.5 79.0 - 97.0 fL Final   03/22/2022 91.7 80.0 - 100.0 fL Final     MCH   Date Value Ref Range Status   04/24/2023 27.0 26.6 - 33.0 pg Final    03/22/2022 30.5 26.0 - 34.0 pg Final     MCHC   Date Value Ref Range Status   04/24/2023 31.5 31.5 - 35.7 g/dL Final   03/22/2022 33.2 31.0 - 37.0 g/dL Final     RDW   Date Value Ref Range Status   04/24/2023 16.0 (H) 12.3 - 15.4 % Final   03/22/2022 14.2 12.0 - 16.8 % Final     RDW-SD   Date Value Ref Range Status   04/24/2023 48.8 37.0 - 54.0 fl Final     MPV   Date Value Ref Range Status   04/24/2023 8.6 6.0 - 12.0 fL Final   03/22/2022 9.3 8.4 - 12.4 fL Final     Platelets   Date Value Ref Range Status   04/24/2023 552 (H) 140 - 450 10*3/mm3 Final   03/22/2022 324 140 - 440 10*3/uL Final     Neutrophil Rel %   Date Value Ref Range Status   03/22/2022 63.1 45 - 80 % Final     Neutrophil %   Date Value Ref Range Status   04/24/2023 80.9 (H) 42.7 - 76.0 % Final     Lymphocyte Rel %   Date Value Ref Range Status   03/22/2022 16.2 15 - 50 % Final     Lymphocyte %   Date Value Ref Range Status   04/24/2023 5.8 (L) 19.6 - 45.3 % Final     Monocyte Rel %   Date Value Ref Range Status   03/22/2022 13.6 0 - 15 % Final     Monocyte %   Date Value Ref Range Status   04/24/2023 11.0 5.0 - 12.0 % Final     Eosinophil %   Date Value Ref Range Status   04/24/2023 1.8 0.3 - 6.2 % Final   03/22/2022 5.5 0 - 7 % Final     Basophil Rel %   Date Value Ref Range Status   03/22/2022 1.1 0 - 2 % Final     Basophil %   Date Value Ref Range Status   04/24/2023 0.5 0.0 - 1.5 % Final     Immature Grans %   Date Value Ref Range Status   07/07/2022 0.4 0.0 - 0.5 % Final   03/22/2022 0.5 0.0 - 1.0 % Final     Neutrophils Absolute   Date Value Ref Range Status   03/22/2022 4.94 2.0 - 8.8 10*3/uL Final     Neutrophils, Absolute   Date Value Ref Range Status   04/24/2023 11.45 (H) 1.70 - 7.00 10*3/mm3 Final     Lymphocytes Absolute   Date Value Ref Range Status   03/22/2022 1.27 0.7 - 5.5 10*3/uL Final     Lymphocytes, Absolute   Date Value Ref Range Status   04/24/2023 0.82 0.70 - 3.10 10*3/mm3 Final     Monocytes Absolute   Date Value Ref  Range Status   03/22/2022 1.07 0.0 - 1.7 10*3/uL Final     Monocytes, Absolute   Date Value Ref Range Status   04/24/2023 1.56 (H) 0.10 - 0.90 10*3/mm3 Final     Eosinophils Absolute   Date Value Ref Range Status   03/22/2022 0.43 0.0 - 0.8 10*3/uL Final     Eosinophils, Absolute   Date Value Ref Range Status   04/24/2023 0.25 0.00 - 0.40 10*3/mm3 Final     Basophils Absolute   Date Value Ref Range Status   03/22/2022 0.09 0.0 - 0.2 10*3/uL Final     Basophils, Absolute   Date Value Ref Range Status   04/24/2023 0.07 0.00 - 0.20 10*3/mm3 Final     Immature Grans, Absolute   Date Value Ref Range Status   07/07/2022 0.05 0.00 - 0.05 10*3/mm3 Final   03/22/2022 0.04 0.00 - 0.10 10*3/uL Final     nRBC   Date Value Ref Range Status   12/19/2022 0.0 0.0 - 0.2 /100 WBC Final       Lab Results   Component Value Date    GLUCOSE 122 (H) 04/24/2023    BUN 9 04/24/2023    CREATININE 0.79 04/24/2023    EGFRIFNONA 100 01/28/2022    EGFRIFAFRI >60 05/20/2019    BCR 11.4 04/24/2023    K 4.5 04/24/2023    CO2 26.0 04/24/2023    CALCIUM 9.2 04/24/2023    ALBUMIN 3.4 (L) 04/24/2023    LABIL2 1.5 03/22/2022    AST 18 04/24/2023    ALT 11 04/24/2023         ASSESSMENT:    1. 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  He has been referred to Dr. Escalona to see whether he is a candidate for radiation treatments.  Would also consider systemic treatment for him for molecular targets as he has had very poor response to chemotherapy malignancy.  Continue Tecentriq with XRT.  Patient has completed XRT on 1/11/2023  2. Reviewed his recent CT of the chest which shows 1 cm increase in the size of the left lung mass.  Patient is also on immunotherapy which can cause pseudo-enlargement.  We will have a short interval 6-week follow-up CT of the chest which will be due second week in April 2023.  This will be scheduled second  week in April 2023  3. 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  pT1 cpN1 M0 consistent with stage IIb disease, found on surveillance CT scans. Brain MRI was negative. We will recommend platinum doublet followed by Tecentriq unless he has EGFR mutation for which adjuvant osimertinib will be considered.  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  Patient was treated with cisplatin and Alimta in the adjuvant setting for his original malignancy.  He is currently on cisplatin and Taxol.  Patient is receiving cycle #4 today.  This tumor was completely resected and has negative margins and no mediastinal lymph node involvement.  Reviewed the pathology with Dr. Trevizo.  This is a new second lung primary.  Patient has received a total of 4 cycles of combination of cisplatin and Taxol completed on 10/5/2022.  He will continue Tecentriq  4. Constipation: We will begin lactulose.  Patient to call me if no results  5. High PD-L1 expression at 95%. Reviewed foundation 1 testing results.  This showed MET amplification, ERBB2 amplification for which targeted therapy is available for including crizotinib for MET amplification, afatinib for ERBB2 but this is approved for metastatic disease.   6. We have extensively reviewed the literature.  He will be a candidate for ERBB2 targeted therapy but fam-trastuzumab cannot be combined with XRT due to risk of interstitial lung disease.  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  For these reasons we have decided to proceed with immunotherapy along with radiation.  His case was reviewed at the tumor board and this was the recommendation.  Patient has been approved  for start Tecentriq therefore we will go ahead and initiate concurrently with radiation  7. Chemotherapy-induced fatigue: This has improved  8. Hypomagnesemia secondary to chemotherapy: Patient has discontinued magnesium supplements  9. 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  Increase salt in the diet, fluid restriction.  Continue to monitor his sodium levels.  Patient is currently on demeclocycline.  We will start demeclocycline and follow his BMPs on a weekly basis for now until next visit in 3 weeks    10. History of adenocarcinoma of the right lung, T1c N2 M0, consistent with clinical stage 3A disease in 2019.  S/P combined chemotherapy and radiation with cisplatin and Alimta neoadjuvantly, followed by her right lower lobectomy with mediastinal and hilar lymph node dissection with residual disease pT1 cpN2 M0.  Followed by maintenance durvalumab for 1 year.   11. Recent diagnosis of hypothyroidism, on thyroid replacement therapy  12. Status post right thoracentesis with cytology negative for malignancy  13. History of pneumothorax following lung biopsy  14. Postop anemia: Reviewed  15. Assessment has been reviewed and updated      PLANS:    1. Continue Tecentriq  2. Patient has CT of the chest scheduled on 4/25/2023  3. Review results when available  4. He will follow-up with Dr. Escalona for his pain medication  5. 2D echocardiogram in preparation for HER2 directed treatment reviewed  6. Patient has completed all planned 4 cycles of chemo: unfortunately has developed relapsed disease  7. Discontinue demeclocycline and monitor his BMPs every week  8. Continue lactulose for constipation  9. Follow-up with nephrology-patient states he plans to get an appointment.  10. Continue anti-nausea medications  11. Reviewed foundation 1 results.  He will be a candidate for molecular targets in the future if he does develop metastatic disease  12. Continue Emla cream to port  13. Continue thyroid replacement therapy through  his PCP  14. Continue B12 injections  monthly: Patient did have elevated methylmalonic acid level during the early phases of his treatments.  Continue the same  15. Continue supportive care  16. All questions answered  17. Keep his appointment with me next week         Patient has  questions which have all been answered to the best of my abilities    I have reviewed labs results, imaging, vitals, and medications with the patient today.     Patient verbalized understanding and is in agreement of the above plan.        I spent 30 total minutes, face-to-face, caring for Jc today. 90% of this time involved counseling and/or coordination of care as documented within this note.

## 2023-04-24 NOTE — PROGRESS NOTES
Patient here for C8 Tecentriq.  Port accessed by Nicole Brock RN with sterile technique and blood return was not noted. A peripheral IV was obtained for labs and Dr. Gaspar approved treatment via peripheral IV. Patient was complaining of an increase in pain to left chest, left shoulder and left flank. Dr. Gaspar added him to the schedule and referred him to Dr. Escalona for pain management. Cath timothy was used on the port but was unsuccessful, only about a 1 cc of blood could be drawn back. Dr. Gaspar ordered a dye study and schedulers were made aware. Patient tolerated treatment well.

## 2023-04-25 DIAGNOSIS — Z45.2 ENCOUNTER FOR CARE RELATED TO VASCULAR ACCESS PORT: Primary | ICD-10-CM

## 2023-04-27 ENCOUNTER — APPOINTMENT (OUTPATIENT)
Dept: CT IMAGING | Facility: HOSPITAL | Age: 58
DRG: 194 | End: 2023-04-27
Payer: COMMERCIAL

## 2023-04-27 ENCOUNTER — HOSPITAL ENCOUNTER (INPATIENT)
Facility: HOSPITAL | Age: 58
LOS: 3 days | Discharge: HOME | DRG: 194 | End: 2023-04-30
Attending: INTERNAL MEDICINE | Admitting: INTERNAL MEDICINE
Payer: COMMERCIAL

## 2023-04-27 ENCOUNTER — LAB (OUTPATIENT)
Dept: LAB | Facility: HOSPITAL | Age: 58
End: 2023-04-27
Payer: COMMERCIAL

## 2023-04-27 ENCOUNTER — OFFICE VISIT (OUTPATIENT)
Dept: ONCOLOGY | Facility: CLINIC | Age: 58
End: 2023-04-27
Payer: COMMERCIAL

## 2023-04-27 VITALS
HEIGHT: 75 IN | BODY MASS INDEX: 22.63 KG/M2 | SYSTOLIC BLOOD PRESSURE: 124 MMHG | WEIGHT: 182 LBS | OXYGEN SATURATION: 98 % | DIASTOLIC BLOOD PRESSURE: 78 MMHG | HEART RATE: 74 BPM

## 2023-04-27 DIAGNOSIS — E87.1 HYPONATREMIA: ICD-10-CM

## 2023-04-27 DIAGNOSIS — C34.92 NSCLC OF LEFT LUNG: Primary | ICD-10-CM

## 2023-04-27 DIAGNOSIS — C34.92 NSCLC OF LEFT LUNG: ICD-10-CM

## 2023-04-27 DIAGNOSIS — J90 PLEURAL EFFUSION ON LEFT: ICD-10-CM

## 2023-04-27 DIAGNOSIS — J18.9 PNEUMONIA OF LEFT UPPER LOBE DUE TO INFECTIOUS ORGANISM: ICD-10-CM

## 2023-04-27 DIAGNOSIS — E86.0 DEHYDRATION: ICD-10-CM

## 2023-04-27 DIAGNOSIS — C34.91 NON-SMALL CELL CARCINOMA OF LUNG, RIGHT: ICD-10-CM

## 2023-04-27 DIAGNOSIS — J18.9 PNEUMONIA OF UPPER LOBE DUE TO INFECTIOUS ORGANISM, UNSPECIFIED LATERALITY: ICD-10-CM

## 2023-04-27 LAB
ALBUMIN SERPL-MCNC: 3.5 G/DL (ref 3.5–5.2)
ALBUMIN/GLOB SERPL: 1.1 G/DL
ALP SERPL-CCNC: 102 U/L (ref 39–117)
ALT SERPL W P-5'-P-CCNC: 11 U/L (ref 1–41)
ANION GAP SERPL CALCULATED.3IONS-SCNC: 13 MMOL/L (ref 5–15)
AST SERPL-CCNC: 15 U/L (ref 1–40)
BASOPHILS # BLD AUTO: 0.08 10*3/MM3 (ref 0–0.2)
BASOPHILS NFR BLD AUTO: 0.6 % (ref 0–1.5)
BILIRUB SERPL-MCNC: 0.3 MG/DL (ref 0–1.2)
BUN SERPL-MCNC: 8 MG/DL (ref 6–20)
BUN/CREAT SERPL: 7.8 (ref 7–25)
CALCIUM SPEC-SCNC: 9.3 MG/DL (ref 8.6–10.5)
CHLORIDE SERPL-SCNC: 91 MMOL/L (ref 98–107)
CO2 SERPL-SCNC: 27 MMOL/L (ref 22–29)
CREAT SERPL-MCNC: 1.03 MG/DL (ref 0.76–1.27)
D-LACTATE SERPL-SCNC: 0.9 MMOL/L (ref 0.5–2)
DEPRECATED RDW RBC AUTO: 48.3 FL (ref 37–54)
EGFRCR SERPLBLD CKD-EPI 2021: 84.2 ML/MIN/1.73
EOSINOPHIL # BLD AUTO: 0.24 10*3/MM3 (ref 0–0.4)
EOSINOPHIL NFR BLD AUTO: 1.7 % (ref 0.3–6.2)
ERYTHROCYTE [DISTWIDTH] IN BLOOD BY AUTOMATED COUNT: 16.2 % (ref 12.3–15.4)
GLOBULIN UR ELPH-MCNC: 3.1 GM/DL
GLUCOSE BLDC GLUCOMTR-MCNC: 111 MG/DL (ref 70–105)
GLUCOSE SERPL-MCNC: 144 MG/DL (ref 65–99)
HCT VFR BLD AUTO: 32.3 % (ref 37.5–51)
HGB BLD-MCNC: 10.2 G/DL (ref 13–17.7)
HOLD SPECIMEN: NORMAL
LYMPHOCYTES # BLD AUTO: 0.98 10*3/MM3 (ref 0.7–3.1)
LYMPHOCYTES NFR BLD AUTO: 7.1 % (ref 19.6–45.3)
MCH RBC QN AUTO: 26.8 PG (ref 26.6–33)
MCHC RBC AUTO-ENTMCNC: 31.6 G/DL (ref 31.5–35.7)
MCV RBC AUTO: 84.8 FL (ref 79–97)
MONOCYTES # BLD AUTO: 1.3 10*3/MM3 (ref 0.1–0.9)
MONOCYTES NFR BLD AUTO: 9.4 % (ref 5–12)
MRSA DNA SPEC QL NAA+PROBE: NORMAL
NEUTROPHILS NFR BLD AUTO: 11.17 10*3/MM3 (ref 1.7–7)
NEUTROPHILS NFR BLD AUTO: 81.2 % (ref 42.7–76)
PLATELET # BLD AUTO: 493 10*3/MM3 (ref 140–450)
PMV BLD AUTO: 8.2 FL (ref 6–12)
POTASSIUM SERPL-SCNC: 3.8 MMOL/L (ref 3.5–5.2)
PROT SERPL-MCNC: 6.6 G/DL (ref 6–8.5)
RBC # BLD AUTO: 3.81 10*6/MM3 (ref 4.14–5.8)
SODIUM SERPL-SCNC: 131 MMOL/L (ref 136–145)
WBC NRBC COR # BLD: 13.77 10*3/MM3 (ref 3.4–10.8)
WHOLE BLOOD HOLD COAG: NORMAL
WHOLE BLOOD HOLD SPECIMEN: NORMAL

## 2023-04-27 PROCEDURE — 87040 BLOOD CULTURE FOR BACTERIA: CPT | Performed by: INTERNAL MEDICINE

## 2023-04-27 PROCEDURE — 87641 MR-STAPH DNA AMP PROBE: CPT | Performed by: INTERNAL MEDICINE

## 2023-04-27 PROCEDURE — 71250 CT THORAX DX C-: CPT

## 2023-04-27 PROCEDURE — 25010000002 ENOXAPARIN PER 10 MG: Performed by: INTERNAL MEDICINE

## 2023-04-27 PROCEDURE — 25010000002 VANCOMYCIN 10 G RECONSTITUTED SOLUTION: Performed by: INTERNAL MEDICINE

## 2023-04-27 PROCEDURE — 83605 ASSAY OF LACTIC ACID: CPT | Performed by: INTERNAL MEDICINE

## 2023-04-27 PROCEDURE — 80053 COMPREHEN METABOLIC PANEL: CPT | Performed by: INTERNAL MEDICINE

## 2023-04-27 PROCEDURE — 82948 REAGENT STRIP/BLOOD GLUCOSE: CPT

## 2023-04-27 PROCEDURE — 25010000002 PIPERACILLIN SOD-TAZOBACTAM PER 1 G: Performed by: INTERNAL MEDICINE

## 2023-04-27 PROCEDURE — 36415 COLL VENOUS BLD VENIPUNCTURE: CPT | Performed by: NURSE PRACTITIONER

## 2023-04-27 PROCEDURE — 85025 COMPLETE CBC W/AUTO DIFF WBC: CPT

## 2023-04-27 RX ORDER — ONDANSETRON 2 MG/ML
4 INJECTION INTRAMUSCULAR; INTRAVENOUS EVERY 6 HOURS PRN
Status: DISCONTINUED | OUTPATIENT
Start: 2023-04-27 | End: 2023-04-30 | Stop reason: HOSPADM

## 2023-04-27 RX ORDER — LEVOTHYROXINE SODIUM 0.1 MG/1
100 TABLET ORAL
Status: DISCONTINUED | OUTPATIENT
Start: 2023-04-28 | End: 2023-04-30 | Stop reason: HOSPADM

## 2023-04-27 RX ORDER — ONDANSETRON 4 MG/1
4 TABLET, FILM COATED ORAL EVERY 6 HOURS PRN
Status: DISCONTINUED | OUTPATIENT
Start: 2023-04-27 | End: 2023-04-30 | Stop reason: HOSPADM

## 2023-04-27 RX ORDER — POLYETHYLENE GLYCOL 3350 17 G/17G
17 POWDER, FOR SOLUTION ORAL DAILY PRN
Status: DISCONTINUED | OUTPATIENT
Start: 2023-04-27 | End: 2023-04-30 | Stop reason: HOSPADM

## 2023-04-27 RX ORDER — DESVENLAFAXINE SUCCINATE 50 MG/1
50 TABLET, EXTENDED RELEASE ORAL DAILY
Status: DISCONTINUED | OUTPATIENT
Start: 2023-04-28 | End: 2023-04-30 | Stop reason: HOSPADM

## 2023-04-27 RX ORDER — METOPROLOL SUCCINATE 50 MG/1
100 TABLET, EXTENDED RELEASE ORAL DAILY
Status: DISCONTINUED | OUTPATIENT
Start: 2023-04-28 | End: 2023-04-30 | Stop reason: HOSPADM

## 2023-04-27 RX ORDER — BISACODYL 5 MG/1
5 TABLET, DELAYED RELEASE ORAL DAILY PRN
Status: DISCONTINUED | OUTPATIENT
Start: 2023-04-27 | End: 2023-04-30 | Stop reason: HOSPADM

## 2023-04-27 RX ORDER — VANCOMYCIN 1.75 GRAM/500 ML IN 0.9 % SODIUM CHLORIDE INTRAVENOUS
20 ONCE
Status: COMPLETED | OUTPATIENT
Start: 2023-04-27 | End: 2023-04-27

## 2023-04-27 RX ORDER — LANOLIN ALCOHOL/MO/W.PET/CERES
1000 CREAM (GRAM) TOPICAL DAILY
Status: DISCONTINUED | OUTPATIENT
Start: 2023-04-28 | End: 2023-04-30 | Stop reason: HOSPADM

## 2023-04-27 RX ORDER — LIOTHYRONINE SODIUM 5 UG/1
5 TABLET ORAL DAILY
Status: DISCONTINUED | OUTPATIENT
Start: 2023-04-28 | End: 2023-04-30 | Stop reason: HOSPADM

## 2023-04-27 RX ORDER — LORAZEPAM 0.5 MG/1
0.5 TABLET ORAL EVERY 8 HOURS PRN
Status: DISCONTINUED | OUTPATIENT
Start: 2023-04-27 | End: 2023-04-30 | Stop reason: HOSPADM

## 2023-04-27 RX ORDER — GABAPENTIN 300 MG/1
300 CAPSULE ORAL NIGHTLY
Status: DISCONTINUED | OUTPATIENT
Start: 2023-04-27 | End: 2023-04-30 | Stop reason: HOSPADM

## 2023-04-27 RX ORDER — PANTOPRAZOLE SODIUM 40 MG/1
40 TABLET, DELAYED RELEASE ORAL DAILY
Status: DISCONTINUED | OUTPATIENT
Start: 2023-04-27 | End: 2023-04-30 | Stop reason: HOSPADM

## 2023-04-27 RX ORDER — IPRATROPIUM BROMIDE AND ALBUTEROL SULFATE 2.5; .5 MG/3ML; MG/3ML
3 SOLUTION RESPIRATORY (INHALATION) EVERY 4 HOURS PRN
Status: DISCONTINUED | OUTPATIENT
Start: 2023-04-27 | End: 2023-04-30 | Stop reason: HOSPADM

## 2023-04-27 RX ORDER — AMOXICILLIN 250 MG
2 CAPSULE ORAL 2 TIMES DAILY
Status: DISCONTINUED | OUTPATIENT
Start: 2023-04-27 | End: 2023-04-30 | Stop reason: HOSPADM

## 2023-04-27 RX ORDER — ENOXAPARIN SODIUM 100 MG/ML
40 INJECTION SUBCUTANEOUS DAILY
Status: DISCONTINUED | OUTPATIENT
Start: 2023-04-27 | End: 2023-04-30 | Stop reason: HOSPADM

## 2023-04-27 RX ORDER — SODIUM CHLORIDE, SODIUM LACTATE, POTASSIUM CHLORIDE, CALCIUM CHLORIDE 600; 310; 30; 20 MG/100ML; MG/100ML; MG/100ML; MG/100ML
75 INJECTION, SOLUTION INTRAVENOUS CONTINUOUS
Status: DISPENSED | OUTPATIENT
Start: 2023-04-27 | End: 2023-04-29

## 2023-04-27 RX ORDER — SODIUM CHLORIDE 9 MG/ML
40 INJECTION, SOLUTION INTRAVENOUS AS NEEDED
Status: DISCONTINUED | OUTPATIENT
Start: 2023-04-27 | End: 2023-04-30 | Stop reason: HOSPADM

## 2023-04-27 RX ORDER — ACETAMINOPHEN 500 MG
1000 TABLET ORAL 3 TIMES DAILY
Status: DISPENSED | OUTPATIENT
Start: 2023-04-27 | End: 2023-04-29

## 2023-04-27 RX ORDER — CALCIUM CARBONATE 200(500)MG
3 TABLET,CHEWABLE ORAL 3 TIMES DAILY PRN
Status: DISCONTINUED | OUTPATIENT
Start: 2023-04-27 | End: 2023-04-30 | Stop reason: HOSPADM

## 2023-04-27 RX ORDER — BISACODYL 10 MG
10 SUPPOSITORY, RECTAL RECTAL DAILY PRN
Status: DISCONTINUED | OUTPATIENT
Start: 2023-04-27 | End: 2023-04-30 | Stop reason: HOSPADM

## 2023-04-27 RX ORDER — MULTIVITAMIN WITH IRON
100 TABLET ORAL EVERY 12 HOURS
Status: DISCONTINUED | OUTPATIENT
Start: 2023-04-27 | End: 2023-04-30 | Stop reason: HOSPADM

## 2023-04-27 RX ORDER — SODIUM CHLORIDE 0.9 % (FLUSH) 0.9 %
10 SYRINGE (ML) INJECTION EVERY 12 HOURS SCHEDULED
Status: DISCONTINUED | OUTPATIENT
Start: 2023-04-27 | End: 2023-04-30 | Stop reason: HOSPADM

## 2023-04-27 RX ORDER — SODIUM CHLORIDE 0.9 % (FLUSH) 0.9 %
10 SYRINGE (ML) INJECTION AS NEEDED
Status: DISCONTINUED | OUTPATIENT
Start: 2023-04-27 | End: 2023-04-30 | Stop reason: HOSPADM

## 2023-04-27 RX ORDER — CHOLECALCIFEROL (VITAMIN D3) 125 MCG
5 CAPSULE ORAL NIGHTLY PRN
Status: DISCONTINUED | OUTPATIENT
Start: 2023-04-27 | End: 2023-04-30 | Stop reason: HOSPADM

## 2023-04-27 RX ORDER — OXYCODONE HYDROCHLORIDE 5 MG/1
5 TABLET ORAL EVERY 8 HOURS PRN
Status: DISCONTINUED | OUTPATIENT
Start: 2023-04-27 | End: 2023-04-29

## 2023-04-27 RX ORDER — FOLIC ACID 1 MG/1
1 TABLET ORAL DAILY
Status: DISCONTINUED | OUTPATIENT
Start: 2023-04-28 | End: 2023-04-30 | Stop reason: HOSPADM

## 2023-04-27 RX ADMIN — OXYCODONE 5 MG: 5 TABLET ORAL at 20:02

## 2023-04-27 RX ADMIN — ACETAMINOPHEN 1000 MG: 500 TABLET, FILM COATED ORAL at 20:02

## 2023-04-27 RX ADMIN — VANCOMYCIN HYDROCHLORIDE 1750 MG: 10 INJECTION, POWDER, LYOPHILIZED, FOR SOLUTION INTRAVENOUS at 20:16

## 2023-04-27 RX ADMIN — ENOXAPARIN SODIUM 40 MG: 100 INJECTION SUBCUTANEOUS at 20:06

## 2023-04-27 RX ADMIN — PIPERACILLIN SODIUM AND TAZOBACTAM SODIUM 3.38 G: 3; .375 INJECTION, POWDER, LYOPHILIZED, FOR SOLUTION INTRAVENOUS at 21:55

## 2023-04-27 RX ADMIN — PANTOPRAZOLE SODIUM 40 MG: 40 TABLET, DELAYED RELEASE ORAL at 20:06

## 2023-04-27 RX ADMIN — Medication 100 MG: at 20:02

## 2023-04-27 RX ADMIN — SENNOSIDES AND DOCUSATE SODIUM 2 TABLET: 50; 8.6 TABLET ORAL at 20:02

## 2023-04-27 RX ADMIN — GABAPENTIN 300 MG: 300 CAPSULE ORAL at 20:02

## 2023-04-27 RX ADMIN — Medication 10 ML: at 20:17

## 2023-04-27 NOTE — H&P
[facility] Medicine Services  History & Physical    Patient Name: Jc Driscoll  : 1965  MRN: [id number]  Primary Care Physician:  Reshma Alvarenga MD  Date of admission: 2023  Date and Time of Service: 23 at 1845    Subjective      Chief Complaint: Left sided chest pain, SOB    History of Present Illness: Jc Driscoll is a 57 y.o. male with past medical history of hypertension, hyperlipidemia, GERD, diastolic heart failure, prediabetes, anxiety depression, hypothyroidism and recurrent lung cancer currently on immunotherapy who was sent as a direct admission from Dr Gaspar's oncology clinic due to concern for pneumonia and pleural effusion.  Patient reports increased left-sided chest pain associated with mild shortness of breath over the past 1 week.  He denies malaise, cough, sputum, hemoptysis, fever or chills.  I found report of chest x-ray obtained on  which demonstrated left upper lobe consolidation and pleural effusion.  Chest x-ray image was not available for my review.  Patient states that he has always experienced chest pain since his surgery/radiation in 2022.  Due to this pain, he was prescribed low-dose oxycodone 3 times daily.  The pain that he now experiences is somewhat different from his usual pain in terms of quality and severity    ROS at least 14 systems reviewed.  Pertinent information as in HPI    Personal History     Past Medical History:   Diagnosis Date   • Allergic rhinitis 2013    Overview:  2018 - still zyrtec 10 daily (if stops, gets dermatitis again).    • Anxiety and depression 2012    Overview:  2018 -   C/w well 300 xr and Lito 20.  2019 -   Doing ok even with new lung cancer - lito 20 & well 300xr.    • Chronic pansinusitis 2019    Overview:  2019 -   MRI showed chronic thick in frontal, maxillary, ethmoidal ---> to Dr. COLLAZO to est since abx ICC didn't help.  Does netipot bid.    • Diastolic CHF 2014     Overview:  7/4/14 - impaired LV relaxation on [facility].  EF 60%. Rest normal   • Dupuytren's contracture of both hands    • Elevated cholesterol    • Erosive esophagitis 07/09/2019    Overview:  EGD 2016 4/2/2018 - nexium OTC daily for few week.  Qod before that.  Now carbonation hurts, epigastric pain 4/8/2019 -   protonix 40 doing well.    • Gastroesophageal reflux disease 02/21/2019   • Hiatal hernia 07/09/2019   • History of colon polyps    • Hypertension    • Lung cancer     right lung and in lymph nodes x2   • Mediastinal lymphadenopathy 03/12/2019   • Normocytic anemia 08/08/2019    Overview:  6/2019 - dropped to 9's postop 7/2019 - rebounded to 10's.  8/8/2019 -   Back to 9's - check ferritin, b12 and thyroid.    • Prediabetes 07/09/2014    Overview:  7/4/14 - a1c 6.1 at [facility] admission   • Retention of urine 06/27/2019    PSOT OP, RESOLVED       Past Surgical History:   Procedure Laterality Date   • BRONCHOSCOPY  03/18/2019    EBUS MONTERO NEEDLE BX   • COLONOSCOPY     • DUPUYTREN CONTRACTURE RELEASE Bilateral    • LUNG BIOPSY  03/08/2019    CT GUIDED   • LUNG REMOVAL, PARTIAL Right     right lower   • PORTACATH PLACEMENT  03/20/2019    DR LONGORIA   • THORACOSCOPY Right 6/26/2019    Procedure: RIGHT VATS, OPEN LOWER LOBECTOMY WITH LYMPH NODE DISECTION, WEDGE RESECTION OF RIGHT MIDDLE LOBE;  Surgeon: Terrance Longoria MD;  Location: [facility] OR;  Service: Cardiothoracic   • THORACOSCOPY VIDEO ASSISTED WITH LOBECTOMY Left 7/6/2022    Procedure: THORACOSCOPY VIDEO ASSISTED WITH LOBECTOMY;  Surgeon: Miryam Reyna MD;  Location: [facility] OR;  Service: Thoracic;  Laterality: Left;       Family History: family history includes Cancer in his father, mother, and sister; Cervical cancer in his mother; Lung cancer in his father and sister. Otherwise pertinent FHx was reviewed and not pertinent to current issue.    Social History:  reports that he quit smoking about 13 years ago. His smoking use included  cigarettes. He has never used smokeless tobacco. He reports current alcohol use of about 2.0 standard drinks per week. He reports that he does not use drugs.    Home Medications:  Prior to Admission Medications     Prescriptions Last Dose Informant Patient Reported? Taking?    Constulose 10 GM/15ML solution   No No    TAKE  30 ML BY MOUTH  EVERY 12 HOURS    desvenlafaxine (PRISTIQ) 50 MG 24 hr tablet   Yes No    Take 1 tablet by mouth Daily.    folic acid (FOLVITE) 1 MG tablet   Yes No    gabapentin (NEURONTIN) 300 MG capsule   No No    Take 1 capsule by mouth Every Night.    levothyroxine (SYNTHROID, LEVOTHROID) 100 MCG tablet   Yes No    Take 1 tablet by mouth Every Morning.    levothyroxine (SYNTHROID, LEVOTHROID) 100 MCG tablet   Yes No    Take 1 tablet by mouth Daily.    lidocaine-prilocaine (EMLA) 2.5-2.5 % cream   No No    Apply to port site 60 minutes prior to it being accessed    liothyronine (CYTOMEL) 5 MCG tablet   Yes No    Take 1 tablet by mouth Daily.    liothyronine (CYTOMEL) 5 MCG tablet   Yes No    Take 1 tablet by mouth Daily.    LORazepam (ATIVAN) 0.5 MG tablet   Yes No    Take 1 tablet by mouth Every 8 (Eight) Hours As Needed.    metoprolol succinate XL (TOPROL-XL) 100 MG 24 hr tablet   Yes No    Take 1 tablet by mouth Daily.    ondansetron (ZOFRAN) 8 MG tablet   No No    TAKE 1 TABLET BY MOUTH THREE TIMES DAILY AS NEEDED FOR NAUSEA FOR VOMITING    oxyCODONE (Roxicodone) 5 MG immediate release tablet   No No    Take 1 tablet by mouth Every 8 (Eight) Hours As Needed for Moderate Pain.    pantoprazole (PROTONIX) 40 MG EC tablet   Yes No    Take 1 tablet by mouth Daily As Needed.    pantoprazole (PROTONIX) 40 MG EC tablet   Yes No    Take 1 tablet by mouth Daily.    potassium chloride (K-DUR,KLOR-CON) 20 MEQ CR tablet   Yes No    TAKE TWO TABLETS BY MOUTH AS A ONE-TIME DOSE    Ure-Na 15 g pack packet   Yes No    TAKE 1 PACKET BY MOUTH TWICE DAILY AS DIRECTED FOR 2 DOSES    vitamin B-12  (CYANOCOBALAMIN) 1000 MCG tablet   Yes No    Take 1 tablet by mouth Daily.    vitamin B-6 (PYRIDOXINE) 100 MG tablet   No No    Take 1 tablet by mouth Every 12 (Twelve) Hours.            Allergies:  No Known Allergies    Objective      Vitals:   Temp:  [97.5 °F (36.4 °C)] 97.5 °F (36.4 °C)  Heart Rate:  [74-88] 88  Resp:  [16] 16  BP: (120-124)/(72-78) 120/72    Physical Exam   General appearance: Well developed male resting in bed, not particularly ill-appearing and not toxic, not in distress, spouse at bedside  Mental status: Alert and oriented x3  Head: Normocephalic and atraumatic  Eyes: EOMI, nonicteric  Oropharynx: Moist mucous membranes, no thrush  Neck: Supple, no JVD, no tenderness  CVS: Regular heart sounds, no gallops, no murmurs  Respiration: Anterior chest wall tenderness to palpation, no external injuries or bruises  Lungs: Unlabored breathing at rest, no wheezes, no rales  GI: Soft, nondistended, nontender, normal bowel sounds  Extremity: No leg edema, no gross deformities, no calf tenderness  Musculoskeletal: No gross deformities, symmetrical muscle mass  Neurology: Normal speech, no facial droop, moves all extremities  Psychiatry: Normal interaction, no hallucination, no agitation    Result Review    Result Review:  I have personally reviewed the results from the time of this admission to 4/27/2023 18:44 EDT and agree with these findings:  [x]  Laboratory  []  Microbiology  [x]  Radiology  []  EKG/Telemetry   []  Cardiology/Vascular   []  Pathology  [x]  Old records  []  Other:  Most notable findings include:       Assessment & Plan        Active Hospital Problems:  Active Hospital Problems    Diagnosis    • **Left upper lobe pneumonia    • Erosive esophagitis    • Non-small cell carcinoma of lung, right (HCC)    • Diastolic CHF (HCC)    • Hypertension    • Anxiety and depression      Plan:   #Suspected left upper lobe bacterial pneumonia in an immunocompromised subjective  -Based on clinical  symptoms and chest x-ray obtained on 4/25 (image not available to me)  -Will obtain chest CT for more clarity  -Empirically started on broad-spectrum antibiotics and negative organisms and gram-positive including MRSA  -If pleural effusion is significant, patient would benefit from diagnostic thoracentesis, and pulmonary consultation  -Pain control with Tylenol and Norco as needed    #History of recurrent lung cancer, previously underwent chemotherapy, lung resection and radiotherapy.  Presently undergoing immunotherapy under Dr. Gaspar's care.  Dr. Gaspar will consult in a.m.    #Immunocompromise status secondary to recurrent lung cancer diagnosis and immunotherapy    #Chronic diastolic heart failure, stable, euvolemic    #Major depression, in remission: Continue home Pristiq    #Essential hypertension: BP at goal on current dose of metoprolol    #Acquired hypothyroidism: Continue home levothyroxine and liothyronine    #GERD: continue PPI    DVT prophylaxis:  Medical DVT prophylaxis orders are present.    CODE STATUS:    Code Status (Patient has no pulse and is not breathing): CPR (Attempt to Resuscitate)  Medical Interventions (Patient has pulse or is breathing): Full Support    Admission Status:  I believe this patient meets inpt status.    I discussed the patient's findings and my recommendations with patient, wife and RN at bedside      Signature: Electronically signed by Vijaya Pereira MD, 04/27/23, 18:44 EDT.  [facility] Hospitalist Team

## 2023-04-27 NOTE — PROGRESS NOTES
Hematology/Oncology Outpatient Follow Up    PATIENT NAME:Jc Driscoll  :1965  MRN: [id number]  PRIMARY CARE PHYSICIAN: Reshma Alvarenga MD  REFERRING PHYSICIAN: No ref. provider found    Chief Complaint   Patient presents with   • Follow-up     NSCLC of left lung        HISTORY OF PRESENT ILLNESS:                                                                                                                                                                                                                                                             This is a 57 y.o. who developed left shoulder pain and for that reason he had a chest x-ray done on 19 which showed a 2.7 cm noncalcified right lower lobe nodule was identified.  For that reason a CT scan of the chest was recommended.  Review of his records shows patient had the CT scan on 19 done without contrast.  This basically showed a lobulated noncalcified mass in the posterior aspect of the right lower lobe measuring 3 cm close to the pleural surface.  There is a calcified 1.2 mm nodule in the lateral aspect of the right lower lobe consistent with a granuloma.  There were also calcified subcarinal and right hilar nodules.  There is a lower right side tracheal nodule measuring 1 cm.  Patient had a PET/CT scan done on 19 which showed increased metabolic activity on the right lower lobe mass that measures 2.5 cm with SUV of 4.4.  There was also increased metabolic activity in the subcarinal space measuring 2.8 cm in size with SUV of 3.4, increased  activity in an enlarged right paratracheal lymph node that measures 1.9 cm, SUV of 5, also suspicious for metastatic adenopathy.  Patient had a CT-guided biopsy of the right lower lobe mass on 3/8/19.  This showed adenocarcinoma, TTF-1 positive.  On 3/18/19 patient underwent endoscopic ultrasound and biopsy of a subcarinal lymph node.  This was positive for malignant cells.  Patient was then taken back to surgery on 3/20/18 and had left Mediport placement by Dr. Fuchs.  He has been referred for further evaluation and management of his stage 3 adenocarcinoma of the right lung.  He was accompanied by his spouse for the appointment on 3/26/19.  Patient was scheduled to have a brain MRI for complete staging, but he could not do this due to claustrophobia.  He is willing to try with the help of angiolytics.    • Patient had brain MRI done on 4/5/19.  He states it did not show any evidence of metastatic disease.  Evidence of chronic sinusitis was noted.    • Patient was seen by Dr. Escalona who has recommended concurrent chemotherapy and radiation.  Patient is scheduled to begin on 4/15/19.   • 4/17/19 - Patient was initiated on combined chemotherapy and radiation with Cisplatin and Alimta.  Patient received cycle 1.      • 5/8/19 - Patient received cycle 2 of chemotherapy with Cisplatin and Alimta.   • 5/15/19 - Chemistry panel showed creatinine of 1.7.  WBC 1.8, hemoglobin 11.6, platelet count 72,000.   • 5/20/19 - BUN 10, creatinine 1.2, potassium 3.5.    • 5/23/19 - CT scan of the chest with contrast showed interval decrease in size of the right lower lobe pulmonary nodule now measuring 2.1 cm in size.  There was no mediastinal or hilar adenopathy identified.  • 6/26/2019 patient underwent right lower lobectomy with hilar and mediastinal lymph node dissection performed by Dr. Terrance Mckeon.  • Pathology revealed residual residual 2.2 cm invasive moderately differentiated adenocarcinoma.  There were a total of 16  lymph nodes evaluated that 7 had metastatic disease.  There was evidence of lymphovascular invasion, extranodal involvement.  All the surgical margins were negative and distance of the closest margin was 2.5 cm.  Logic stage is T1c N2 M0.  • Patient is here today accompanied by his spouse for this appointment.  He continues to complain of some postop discomfort, numbness but his skin is intact.  There is no drainage.  Has had follow-up with Dr. Fuchs.  • 8/14/2019-seen by Dr. Maria at the [facility].  Dr. Maria has recommended immunotherapy with durvalumab off label use as patient has not had significant response to neoadjuvant chemotherapy and radiation.  Patient was given the option to have immunotherapy at the [facility] vs [place] and he has chosen to stay in [place]  • 8/21/19 - Started cycle 1 day 1 Imfinzi  • 9/4/2019 patient had CT scan of the chest this shows a moderate size right pleural effusion.  There are areas of groundglass opacification in the right upper lobe without any evidence of significant septal thickening.  Volume loss noted there is also moderate pleural effusion.  There is no suspicious recurrent malignancy.  There were nonenlarged residual mediastinal lymph nodes.  • 9/9/19 - WBC 6.23, Hgb 11.7 Platelets 257,000, , BUN 9, Cr 1.1,Vitamin B12 318, Ferritin 437  • 9/23/19 - received cycle 2 day 1 Imfinzi  • 10/9/19- Seen by Dr rodríguez with ENT for sinus disease  • 11/4/2019-patient received cycle 5 of Imfinzi(durvalumab)  • 12/2/2019 patient had cycle 7 of durvalumab  • 12/5/2019-patient had CT scan of the  abdomen and pelvis with small right pleural sided pleural effusion.There is no evidence of metastatic disease  • 12/30/2019-patient received cycle 9 of durvalumab  • 12/31/2019-patient had CT scan of the chest showed small to moderate left pleural effusion.  Iglesias appearing right lower paratracheal and right peribronchial soft tissue thickening without any discrete  pathologically enlarged mediastinal or hilar lymph nodes.  • 1/27/20-patient received cycle 11 of durvalumab  • 2/17/20-patient had a stress test which was negative for ischemia  • 2/17/2020-patient had CT of the chest which showed postop changes on the right lung, right pleural effusion but no enlarging mass was noted  • 3/2/2020-patient received cycle 12 of durvalumab  • 3/16/2020-patient had ultrasound-guided right thoracentesis.  Pathology was negative for malignancy  • 4/13/2020-patient received cycle 15 of immunotherapy with durvalumab  • 5/11/2020-patient received cycle 17 of immunotherapy with durvalumab  • 6/9/2020-patient had CT scan of the chest, abdomen and pelvis for lung cancer restaging.  There is stable small chronic loculated right basilar pleural effusion suggesting chronic pleuritis.  There is no evidence of metastatic disease in the abdomen or pelvis  • 7/20/2020 patient received cycle 22 of Durvalumab  • 8/17/2020 patient received cycle 24 and last cycle of durvalumab  • 9/24/2020 patient had CT scan of the chest, abdomen and pelvis for cancer restaging there was no evidence of local recurrence or metastatic disease within the abdomen and pelvis.  He has stable chronic small right pleural effusion.  Mild sigmoid diverticulosis was noted.  • 1/18/2021 patient had CT scan of the chest, abdomen and pelvis reviewed patient  • 5/24/2021 patient had CT scan of the chest calcified subcarinal lymph node consistent with changes of prior granulomatous disease.  Chronic small right pleural effusion is noted similar to prior exam.  Previously shown 4 mm nodule in the left lower lobe has nearly completely resolved.  The subpleural 3 mm nodule located within the posterior left lower lobe With resolved.  • 9/24/2021 patient had a CT scan of the chest, abdomen and pelvis there is soft tissue attenuation within the right paratracheal area which has been suggested to reflect sequela to previous treatment.  There  is slight thickening of the bladder wall otherwise there is no evidence of metastatic disease.  • 12/27/2021 patient had CT scan of the chest with contrast this basically showed irregular shaped noncalcified 7 mm left upper lobe pulmonary nodule.  PET CT scan was recommended to further evaluate.  • 1/26/2022 patient had a PET CT scan done which basically showed evidence of mild uptake corresponding to the nodule within the left upper lobe which is new worrisome for developing metastatic focus.  There was mild radiopharmaceutical activity corresponding to pleural thickening throughout the right lung base with associated pleural effusion likely related to post therapeutic changes and radiation changes in this region.  Metastatic malignancy involving the pleura could not be completely eliminated.  • 1/26/2022: CT-guided biopsy was aborted due to finding by the radiologist that the lung nodularity was actually a clump of tiny nodules of which the largest was 6 mm and therefore biopsy could not be completed.  Also the area was in the vicinity of multiple blood vessels with increased risk of bleeding.  Follow-up CT of the chest was recommended for continued surveillance  • 5/18/2022 patient had CT-guided biopsy of the left enlarging nodule, pathology was positive for invasive poorly differentiated adenocarcinoma most consistent with adenocarcinoma of pulmonary origin.  • 5/27/2022 patient had a PET CT scan which showed a 2.3 cm hypermetabolic left upper lobe nodule which has increased in size no convincing evidence of metastatic disease elsewhere within the chest.  • 5/21/2022 patient had brain MRI which did not show any evidence of intracranial metastasis  • 6/2/2022 patient was seen by Dr. Miryam Reyna pulmonary function test is being done to assess surgical candidacy  • 7/6/2022 patient underwent left heart Koska P video-assisted with upper lobectomy mediastinal lymph node dissection performed by Dr. Witt  Doc  • Final pathology revealed poorly differentiated  • A predominant adenocarcinoma with solid component presenting 45% and micropapillary component 5% with extensive necrosis, invasive carcinoma measures 2.5 maximally there was focal lymphovascular space invasion all margins were negative for malignancy discussed with the closest margin was 2.5 cm pathology stage is pT1C pN1.  PD-L1 showed a tumor proportion score of 95%  • 8/3/2022 patient started adjuvant chemotherapy with cisplatin, Taxol  • 8/24/2022 patient received cycle 2 of combination chemotherapy with cisplatin and Taxol with Neulasta  • 10/5/2022: Patient received cycle 4-day 1 of combination chemotherapy with cisplatin and Taxol  • Patient developed left-sided chest pain for that reason he had a PET/CT scan 1/20/2020 is basically revealed a 4 x 3.5 cm hypermetabolic soft tissue mass in the left chest wall between the first and second ribs anteriorly consistent with relapsed disease.  There was a small cluster of hypermetabolic lymph nodes seen in the left supraclavicular region consistent with early manoj metastasis  • 11/11/2022: 2D echocardiogram performed showing a left ventricular EF of 56 to 60%  • 11/22/2022: Cycle 1 of atezolizumab received  • 12/5/2022-12/9/2022: Hospitalized at [facility] for dehydration, hyponatremia, and weakness.  • 12/19/2020: Patient received cycle 2 of Tecentriq along with XRT  • 1/11/2023: Patient completed radiation.  Remains on Tecentriq every 3 weeks  • 1/18/2023 patient had a CT scan of the chest which basically revealed abnormal soft tissue interposed between the first and second rib with some erosive changes to the ribs this finding measures about 5 x 2.8 cm previously was 4.8 x 3.8 cm.  There is some pleural thickening about in this area nodular area within the prominent mediastinal fat in the upper chest measuring 1.7 cm more fluid as opposed to enlarged lymph nodes.  Possibility of pulmonary hypertension was also  noted due to prominence of main pulmonary artery measuring 3.7 cm.  • 2/24/2023: CT of the chest with contrast showed abnormal mass along the chest wall interposed between the first and second ribs that could relate to metastatic disease.  Some erosive changes to the ribs in this area.  Nodular area in the more superior mediastinal fat that looks like you to reflect more fluidlike attenuation as opposed to a pathologic lymph node.  New groundglass changes within the left upper lobe that could be secondary to inflammatory infectious process.  It is possible this may be posttreatment in nature.  There are some groundglass changes along the superior mediastinum and right upper lobe which are unchanged.  Bilateral pleural effusions noted.  This is developed on the left.  Right unchanged.  • 4/25/2023 CT of the chest with contrast done in short-term follow-up to reevaluate from previous scan: Increased consolidation in the upper left chest since previous study; increased size of left pleural effusion with loculation along its superior medial margin; no change in the soft tissue mass with osseous destruction of the left upper chest wall between the first and second ribs.    Past Medical History:   Diagnosis Date   • Allergic rhinitis 07/25/2013    Overview:  4/2/2018 - still zyrtec 10 daily (if stops, gets dermatitis again).    • Anxiety and depression 06/05/2012    Overview:  4/2/2018 -   C/w well 300 xr and Lito 20.  4/8/2019 -   Doing ok even with new lung cancer - lito 20 & well 300xr.    • Chronic pansinusitis 04/08/2019    Overview:  4/8/2019 -   MRI showed chronic thick in frontal, maxillary, ethmoidal ---> to Dr. COLLAZO to est since abx ICC didn't help.  Does netipot bid.    • Diastolic CHF 07/09/2014    Overview:  7/4/14 - impaired LV relaxation on Zhou ECHO.  EF 60%. Rest normal   • Dupuytren's contracture of both hands    • Elevated cholesterol    • Erosive esophagitis 07/09/2019    Overview:  EGD 2016 4/2/2018 -  nexium OTC daily for few week.  Qod before that.  Now carbonation hurts, epigastric pain 4/8/2019 -   protonix 40 doing well.    • Gastroesophageal reflux disease 02/21/2019   • Hiatal hernia 07/09/2019   • History of colon polyps    • Hypertension    • Lung cancer     right lung and in lymph nodes x2   • Mediastinal lymphadenopathy 03/12/2019   • Normocytic anemia 08/08/2019    Overview:  6/2019 - dropped to 9's postop 7/2019 - rebounded to 10's.  8/8/2019 -   Back to 9's - check ferritin, b12 and thyroid.    • Prediabetes 07/09/2014    Overview:  7/4/14 - a1c 6.1 at [place] admission   • Retention of urine 06/27/2019    PSOT OP, RESOLVED       Past Surgical History:   Procedure Laterality Date   • BRONCHOSCOPY  03/18/2019    EBUS MONTERO NEEDLE BX   • COLONOSCOPY     • DUPUYTREN CONTRACTURE RELEASE Bilateral    • LUNG BIOPSY  03/08/2019    CT GUIDED   • LUNG REMOVAL, PARTIAL Right     right lower   • PORTACATH PLACEMENT  03/20/2019    DR LONGORIA   • THORACOSCOPY Right 6/26/2019    Procedure: RIGHT VATS, OPEN LOWER LOBECTOMY WITH LYMPH NODE DISECTION, WEDGE RESECTION OF RIGHT MIDDLE LOBE;  Surgeon: Terrance Longoria MD;  Location: [facility] OR;  Service: Cardiothoracic   • THORACOSCOPY VIDEO ASSISTED WITH LOBECTOMY Left 7/6/2022    Procedure: THORACOSCOPY VIDEO ASSISTED WITH LOBECTOMY;  Surgeon: Miryam Reyna MD;  Location: [facility] OR;  Service: Thoracic;  Laterality: Left;       No current outpatient medications on file.    No Known Allergies    Family History   Problem Relation Age of Onset   • Cancer Mother         cervical cancer   • Cervical cancer Mother    • Cancer Father         lung cancer   • Lung cancer Father    • Lung cancer Sister    • Cancer Sister         lung cancer   • Malig Hyperthermia Neg Hx        Cancer-related family history includes Cancer in his father, mother, and sister; Cervical cancer in his mother; Lung cancer in his father and sister.    Social History     Tobacco Use   •  Smoking status: Former     Types: Cigarettes     Quit date: 2009     Years since quittin.6   • Smokeless tobacco: Never   Vaping Use   • Vaping Use: Never used   Substance Use Topics   • Alcohol use: Yes     Alcohol/week: 2.0 standard drinks     Types: 2 Cans of beer per week     Comment: Occasional   • Drug use: No       I have reviewed and confirmed the accuracy of the patient's history: Chief complaint, HPI, ROS and Subjective as entered by the MA/LPN/RN. Shayy Montemayor, APRN 23        SUBJECTIVE:  Patient to the office due to CT of the chest showing increased consolidation in the upper left chest and an increased left pleural effusion.  He has been having increased shortness of breath, increased fatigue, and increased pain in the left shoulder that has become difficult to manage.      Jc COURTNEY Driscoll reports a pain score of 7.  Given his pain assessment as noted, treatment options were discussed and the following options were decided upon as a follow-up plan to address the patient's pain: continuation of current treatment plan for pain and DA to the hospital.    REVIEW OF SYSTEMS:    Review of Systems   Constitutional: Positive for fatigue. Negative for chills and fever.   HENT: Negative for ear pain, mouth sores, nosebleeds and sore throat.    Eyes: Negative for photophobia and visual disturbance.   Respiratory: Negative for wheezing and stridor.    Cardiovascular: Negative for chest pain and palpitations.   Gastrointestinal: Negative for abdominal pain, diarrhea, nausea and vomiting. He has constipation  Endocrine: Negative for cold intolerance and heat intolerance.   Genitourinary: Negative for dysuria and hematuria.   Musculoskeletal: Negative for joint swelling and neck stiffness.   Skin: Negative for color change and rash.   Neurological: Negative for seizures and syncope.   Hematological: Negative for adenopathy.   Psychiatric/Behavioral: Negative for agitation, confusion and  "hallucinations.           OBJECTIVE:    Vitals:    04/27/23 1527   BP: 124/78   Pulse: 74   SpO2: 98%   Weight: 82.6 kg (182 lb)   Height: 190.5 cm (75\")   PainSc:   7   PainLoc: Shoulder     ECOG    Eastern Cooperative Oncology Group (ECOG, [place], World Health Organization) performance scale  0 Fully active; no performance restrictions.  1 Strenuous physical activity restricted; fully ambulatory and able to carry out light work.  2 Capable of all self-care but unable to carry out any work activities. Up and about >50% of waking hours.  3 Capable of only limited self-care; confined to bed or chair >50% of waking hours.  4 Completely disabled; cannot carry out any self-care; totally confined to bed or chair.    Physical Exam   Constitutional: He is oriented to person, place, and time. He appears well-developed. No distress.   HENT:   Head: Normocephalic and atraumatic.   Right Ear: External ear normal.   Left Ear: External ear normal.   Nose: Nose normal.   Eyes: Pupils are equal, round, and reactive to light. Conjunctivae are normal. Right eye exhibits no discharge. Left eye exhibits no discharge. No scleral icterus.   Neck: No thyromegaly present.   Cardiovascular: Normal rate, regular rhythm and normal heart sounds. Exam reveals no gallop and no friction rub.   Pulmonary/Chest: Effort normal. No stridor. No respiratory distress. He has no wheezes. Diminished left lung fields with faint coarse rhonchi.  Abdominal: Soft. Bowel sounds are normal. He exhibits no mass. There is no abdominal tenderness. There is no rebound and no guarding.   Musculoskeletal: Normal range of motion. No tenderness.   Lymphadenopathy:     He has no cervical adenopathy.   Neurological: He is alert and oriented to person, place, and time. He exhibits normal muscle tone.   Skin: Skin is warm. No rash noted. He is not diaphoretic. No erythema.   Urticarial-like rash, right medial knee likely due to immunotherapy   Psychiatric: His behavior is " normal. Judgment and thought content normal.   Nursing note and vitals reviewed.      I have reexamined the patient and the results are consistent with the previously documented exam. MULUGETA Garcia     RECENT LABS    WBC   Date Value Ref Range Status   04/27/2023 13.77 (H) 3.40 - 10.80 10*3/mm3 Final   03/22/2022 7.84 4.5 - 11.0 10*3/uL Final     RBC   Date Value Ref Range Status   04/27/2023 3.81 (L) 4.14 - 5.80 10*6/mm3 Final   03/22/2022 4.23 (L) 4.5 - 5.9 10*6/uL Final     Hemoglobin   Date Value Ref Range Status   04/27/2023 10.2 (L) 13.0 - 17.7 g/dL Final   03/22/2022 12.9 (L) 13.5 - 17.5 g/dL Final     Hematocrit   Date Value Ref Range Status   04/27/2023 32.3 (L) 37.5 - 51.0 % Final   03/22/2022 38.8 (L) 41.0 - 53.0 % Final     MCV   Date Value Ref Range Status   04/27/2023 84.8 79.0 - 97.0 fL Final   03/22/2022 91.7 80.0 - 100.0 fL Final     MCH   Date Value Ref Range Status   04/27/2023 26.8 26.6 - 33.0 pg Final   03/22/2022 30.5 26.0 - 34.0 pg Final     MCHC   Date Value Ref Range Status   04/27/2023 31.6 31.5 - 35.7 g/dL Final   03/22/2022 33.2 31.0 - 37.0 g/dL Final     RDW   Date Value Ref Range Status   04/27/2023 16.2 (H) 12.3 - 15.4 % Final   03/22/2022 14.2 12.0 - 16.8 % Final     RDW-SD   Date Value Ref Range Status   04/27/2023 48.3 37.0 - 54.0 fl Final     MPV   Date Value Ref Range Status   04/27/2023 8.2 6.0 - 12.0 fL Final   03/22/2022 9.3 8.4 - 12.4 fL Final     Platelets   Date Value Ref Range Status   04/27/2023 493 (H) 140 - 450 10*3/mm3 Final   03/22/2022 324 140 - 440 10*3/uL Final     Neutrophil Rel %   Date Value Ref Range Status   03/22/2022 63.1 45 - 80 % Final     Neutrophil %   Date Value Ref Range Status   04/27/2023 81.2 (H) 42.7 - 76.0 % Final     Lymphocyte Rel %   Date Value Ref Range Status   03/22/2022 16.2 15 - 50 % Final     Lymphocyte %   Date Value Ref Range Status   04/27/2023 7.1 (L) 19.6 - 45.3 % Final     Monocyte Rel %   Date Value Ref Range Status    03/22/2022 13.6 0 - 15 % Final     Monocyte %   Date Value Ref Range Status   04/27/2023 9.4 5.0 - 12.0 % Final     Eosinophil %   Date Value Ref Range Status   04/27/2023 1.7 0.3 - 6.2 % Final   03/22/2022 5.5 0 - 7 % Final     Basophil Rel %   Date Value Ref Range Status   03/22/2022 1.1 0 - 2 % Final     Basophil %   Date Value Ref Range Status   04/27/2023 0.6 0.0 - 1.5 % Final     Immature Grans %   Date Value Ref Range Status   07/07/2022 0.4 0.0 - 0.5 % Final   03/22/2022 0.5 0.0 - 1.0 % Final     Neutrophils Absolute   Date Value Ref Range Status   03/22/2022 4.94 2.0 - 8.8 10*3/uL Final     Neutrophils, Absolute   Date Value Ref Range Status   04/27/2023 11.17 (H) 1.70 - 7.00 10*3/mm3 Final     Lymphocytes Absolute   Date Value Ref Range Status   03/22/2022 1.27 0.7 - 5.5 10*3/uL Final     Lymphocytes, Absolute   Date Value Ref Range Status   04/27/2023 0.98 0.70 - 3.10 10*3/mm3 Final     Monocytes Absolute   Date Value Ref Range Status   03/22/2022 1.07 0.0 - 1.7 10*3/uL Final     Monocytes, Absolute   Date Value Ref Range Status   04/27/2023 1.30 (H) 0.10 - 0.90 10*3/mm3 Final     Eosinophils Absolute   Date Value Ref Range Status   03/22/2022 0.43 0.0 - 0.8 10*3/uL Final     Eosinophils, Absolute   Date Value Ref Range Status   04/27/2023 0.24 0.00 - 0.40 10*3/mm3 Final     Basophils Absolute   Date Value Ref Range Status   03/22/2022 0.09 0.0 - 0.2 10*3/uL Final     Basophils, Absolute   Date Value Ref Range Status   04/27/2023 0.08 0.00 - 0.20 10*3/mm3 Final     Immature Grans, Absolute   Date Value Ref Range Status   07/07/2022 0.05 0.00 - 0.05 10*3/mm3 Final   03/22/2022 0.04 0.00 - 0.10 10*3/uL Final     nRBC   Date Value Ref Range Status   12/19/2022 0.0 0.0 - 0.2 /100 WBC Final       Lab Results   Component Value Date    GLUCOSE 122 (H) 04/24/2023    BUN 9 04/24/2023    CREATININE 0.79 04/24/2023    EGFRIFNONA 100 01/28/2022    EGFRIFAFRI >60 05/20/2019    BCR 11.4 04/24/2023    K 4.5 04/24/2023     CO2 26.0 04/24/2023    CALCIUM 9.2 04/24/2023    ALBUMIN 3.4 (L) 04/24/2023    LABIL2 1.5 03/22/2022    AST 18 04/24/2023    ALT 11 04/24/2023         ASSESSMENT:    1. 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  He has been referred to Dr. Escalona to see whether he is a candidate for radiation treatments.  Would also consider systemic treatment for him for molecular targets as he has had very poor response to chemotherapy malignancy.  Continue Tecentriq with XRT.  Patient has completed XRT on 1/11/2023  2. Reviewed his recent CT of the chest which shows 1 cm increase in the size of the left lung mass.  Patient is also on immunotherapy which can cause pseudo-enlargement.  We will have a short interval 6-week follow-up CT of the chest which will be due second week in April 2023.  This will be scheduled second week in April 2023  3. 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  pT1 cpN1 M0 consistent with stage IIb disease, found on surveillance CT scans. Brain MRI was negative. We will recommend platinum doublet followed by Tecentriq unless he has EGFR mutation for which adjuvant osimertinib will be considered.  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  Patient was treated with cisplatin and Alimta in the adjuvant setting for his original malignancy.  He is currently on cisplatin and Taxol.  Patient is receiving cycle #4 today.  This tumor was completely resected and has negative margins and no mediastinal lymph node involvement.  Reviewed the pathology with Dr. Trevizo.  This is a new second lung primary.  Patient has received a total of 4 cycles of combination of cisplatin  and Taxol completed on 10/5/2022.  He will continue Tecentriq  4. Constipation: We will begin lactulose.  Patient to call me if no results  5. High PD-L1 expression at 95%. Reviewed foundation 1 testing results.  This showed MET amplification, ERBB2 amplification for which targeted therapy is available for including crizotinib for MET amplification, afatinib for ERBB2 but this is approved for metastatic disease.   6. We have extensively reviewed the literature.  He will be a candidate for ERBB2 targeted therapy but fam-trastuzumab cannot be combined with XRT due to risk of interstitial lung disease.  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  For these reasons we have decided to proceed with immunotherapy along with radiation.  His case was reviewed at the tumor board and this was the recommendation.  Patient has been approved for start Tecentriq therefore we will go ahead and initiate concurrently with radiation  7. Chemotherapy-induced fatigue: This has improved  8. Hypomagnesemia secondary to chemotherapy: Patient has discontinued magnesium supplements  9. 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  Increase salt in the diet, fluid restriction.  Continue to monitor his sodium levels.  Patient is currently on demeclocycline.  We will start demeclocycline and follow his BMPs on a weekly basis for now until next visit in 3 weeks    10. History of adenocarcinoma of the right lung, T1c N2 M0, consistent with clinical stage 3A disease in 2019.  S/P combined chemotherapy and radiation with cisplatin and Alimta neoadjuvantly, followed by her right lower lobectomy with mediastinal and hilar lymph node dissection with residual disease pT1 cpN2 M0.  Followed by maintenance durvalumab for 1 year.   11. Recent diagnosis of hypothyroidism, on thyroid replacement therapy  12. Status post right thoracentesis with  cytology negative for malignancy  13. History of pneumothorax following lung biopsy  14. Postop anemia: Reviewed  15. Assessment has been reviewed and updated      PLANS:    1. Discussed CT results and patient physical assessment with Dr. Gaspar and she requested a direct admission to the hospital for pulmonary consult, IV antibiotic management, pain control for diagnosis of left upper lobe pneumonia, left pleural effusion in a patient with underlying non-small cell lung cancer.  2. CBC reviewed showing leukocytosis with a WBC of 13.77, ANC 11.17  3. Continue Tecentriq  4. Patient has CT of the chest scheduled on 4/25/2023  5. Review results when available  6. He will follow-up with Dr. Escalona for his pain medication  7. 2D echocardiogram in preparation for HER2 directed treatment reviewed  8. Patient has completed all planned 4 cycles of chemo: unfortunately has developed relapsed disease  9. Discontinue demeclocycline and monitor his BMPs every week  10. Continue lactulose for constipation  11. Follow-up with nephrology-patient states he plans to get an appointment.  12. Continue anti-nausea medications  13. Reviewed foundation 1 results.  He will be a candidate for molecular targets in the future if he does develop metastatic disease  14. Continue Emla cream to port  15. Continue thyroid replacement therapy through his PCP  16. Continue B12 injections  monthly: Patient did have elevated methylmalonic acid level during the early phases of his treatments.  Continue the same  17. Continue supportive care  18. All questions answered  19. We will follow in the hospital.  Keep his appointment with Dr. Gaspar.         Patient has  questions which have all been answered to the best of my abilities    I have reviewed labs results, imaging, vitals, and medications with the patient today.     Patient verbalized understanding and is in agreement of the above plan.    I spent 30 total minutes, face-to-face, caring for Jc today.  90% of this time involved counseling and/or coordination of care as documented within this note.

## 2023-04-27 NOTE — PROGRESS NOTES
"Pharmacy Antimicrobial Dosing Service    Subjective:  Jc Driscoll is a 57 y.o.male admitted with PNA. Pharmacy has been consulted to dose Vancomycin for possible PNA. MRSA nares ordered.    PMH: lung cancer on immunotherapy - direct admit from Dr. Gaspar's clinic      Assessment/Plan    1. Day #1 Vancomycin: Goal -600 mcg*h/mL. Vancomycin 1750mg (21 mg/kg ABW) IV x 1 dose ordered. Will schedule vancomycin 1250mg (15 mg/kg ABW) q12h. Will obtain level prior to fourth total dose on 4/29. Predicted  mcg*h/mL.    2. Day #1 Piperacillin/Tazobactam: 3.375g IV q8h for estCrCl > 20 mL/min.    Will continue to monitor drug levels, renal function, culture and sensitivities, and patient clinical status.       Objective:  Relevant clinical data and objective history reviewed:  190.5 cm (75\")   82.6 kg (182 lb)   Ideal body weight: 84.5 kg (186 lb 4.6 oz)  Body mass index is 22.75 kg/m².        Results from last 7 days   Lab Units 04/24/23  1123   CREATININE mg/dL 0.79     Estimated Creatinine Clearance: 119.1 mL/min (by C-G formula based on SCr of 0.79 mg/dL).  No intake/output data recorded.    Results from last 7 days   Lab Units 04/27/23  1526 04/24/23  1123   WBC 10*3/mm3 13.77* 14.15*     Temperature    04/27/23 1814   Temp: 97.5 °F (36.4 °C)     Baseline culture/source/susceptibility:  Microbiology Results (last 10 days)       ** No results found for the last 240 hours. **            Anti-Infectives (From admission, onward)      Ordered     Dose/Rate Route Frequency Start Stop    04/27/23 1750  Vancomycin HCl 1,250 mg in sodium chloride 0.9 % 250 mL IVPB        Ordering Provider: Vijaya Pereira MD    15 mg/kg × 82.6 kg Intravenous Every 12 Hours 04/28/23 0600 05/05/23 0559    04/27/23 1737  piperacillin-tazobactam (ZOSYN) IVPB 3.375 g in 100 mL NS (CD)        Ordering Provider: Vijaya Pereira MD    3.375 g  over 4 Hours Intravenous Every 8 Hours 04/28/23 0000 05/04/23 2359    04/27/23 1737  " piperacillin-tazobactam (ZOSYN) IVPB 3.375 g in 100 mL NS (CD)        Ordering Provider: Vijaya Pereira MD    3.375 g  over 30 Minutes Intravenous Once 04/27/23 1830      04/27/23 1737  vancomycin IVPB 1750 mg in 0.9% Sodium Chloride (premix) 500 mL        Ordering Provider: Vijaya Pereira MD   See Hyperspace for full Linked Orders Report.    20 mg/kg × 82.6 kg Intravenous Once 04/27/23 1830      04/27/23 1737  Pharmacy to dose vancomycin        Ordering Provider: Vijaya Pereira MD   See Hyperspace for full Linked Orders Report.     Does not apply Continuous PRN 04/27/23 1735 05/04/23 1734            Leslee Cole RPH  04/27/23 19:14 EDT

## 2023-04-28 PROCEDURE — 25010000002 VANCOMYCIN HCL 1.25 G RECONSTITUTED SOLUTION 1 EACH VIAL: Performed by: INTERNAL MEDICINE

## 2023-04-28 PROCEDURE — 25010000002 ENOXAPARIN PER 10 MG: Performed by: INTERNAL MEDICINE

## 2023-04-28 PROCEDURE — 99222 1ST HOSP IP/OBS MODERATE 55: CPT | Performed by: INTERNAL MEDICINE

## 2023-04-28 PROCEDURE — 0 CEFEPIME PER 500 MG: Performed by: INTERNAL MEDICINE

## 2023-04-28 PROCEDURE — 25010000002 PIPERACILLIN SOD-TAZOBACTAM PER 1 G: Performed by: INTERNAL MEDICINE

## 2023-04-28 RX ADMIN — PIPERACILLIN SODIUM AND TAZOBACTAM SODIUM 3.38 G: 3; .375 INJECTION, POWDER, LYOPHILIZED, FOR SOLUTION INTRAVENOUS at 00:21

## 2023-04-28 RX ADMIN — Medication 100 MG: at 08:23

## 2023-04-28 RX ADMIN — Medication 5 MG: at 22:02

## 2023-04-28 RX ADMIN — VANCOMYCIN HYDROCHLORIDE 1250 MG: 1.25 INJECTION, POWDER, LYOPHILIZED, FOR SOLUTION INTRAVENOUS at 08:26

## 2023-04-28 RX ADMIN — Medication 5 MG: at 00:58

## 2023-04-28 RX ADMIN — ACETAMINOPHEN 1000 MG: 500 TABLET, FILM COATED ORAL at 20:23

## 2023-04-28 RX ADMIN — SENNOSIDES AND DOCUSATE SODIUM 2 TABLET: 50; 8.6 TABLET ORAL at 20:24

## 2023-04-28 RX ADMIN — ACETAMINOPHEN 1000 MG: 500 TABLET, FILM COATED ORAL at 08:22

## 2023-04-28 RX ADMIN — Medication 100 MG: at 20:23

## 2023-04-28 RX ADMIN — ENOXAPARIN SODIUM 40 MG: 100 INJECTION SUBCUTANEOUS at 17:33

## 2023-04-28 RX ADMIN — Medication 10 ML: at 09:06

## 2023-04-28 RX ADMIN — ACETAMINOPHEN 1000 MG: 500 TABLET, FILM COATED ORAL at 17:34

## 2023-04-28 RX ADMIN — PIPERACILLIN SODIUM AND TAZOBACTAM SODIUM 3.38 G: 3; .375 INJECTION, POWDER, LYOPHILIZED, FOR SOLUTION INTRAVENOUS at 08:31

## 2023-04-28 RX ADMIN — CEFEPIME 2 G: 2 INJECTION, POWDER, FOR SOLUTION INTRAVENOUS at 17:33

## 2023-04-28 RX ADMIN — OXYCODONE 5 MG: 5 TABLET ORAL at 13:52

## 2023-04-28 RX ADMIN — PANTOPRAZOLE SODIUM 40 MG: 40 TABLET, DELAYED RELEASE ORAL at 08:23

## 2023-04-28 RX ADMIN — GABAPENTIN 300 MG: 300 CAPSULE ORAL at 20:23

## 2023-04-28 RX ADMIN — OXYCODONE 5 MG: 5 TABLET ORAL at 05:44

## 2023-04-28 RX ADMIN — CEFEPIME 2 G: 2 INJECTION, POWDER, FOR SOLUTION INTRAVENOUS at 23:33

## 2023-04-28 RX ADMIN — SODIUM CHLORIDE, POTASSIUM CHLORIDE, SODIUM LACTATE AND CALCIUM CHLORIDE 75 ML/HR: 600; 310; 30; 20 INJECTION, SOLUTION INTRAVENOUS at 00:24

## 2023-04-28 RX ADMIN — METOPROLOL SUCCINATE 100 MG: 50 TABLET, EXTENDED RELEASE ORAL at 08:23

## 2023-04-28 RX ADMIN — LEVOTHYROXINE SODIUM 100 MCG: 0.1 TABLET ORAL at 05:44

## 2023-04-28 RX ADMIN — Medication 10 ML: at 20:24

## 2023-04-28 RX ADMIN — SENNOSIDES AND DOCUSATE SODIUM 2 TABLET: 50; 8.6 TABLET ORAL at 08:23

## 2023-04-28 RX ADMIN — DESVENLAFAXINE SUCCINATE 50 MG: 50 TABLET, EXTENDED RELEASE ORAL at 08:23

## 2023-04-28 RX ADMIN — LIOTHYRONINE SODIUM 5 MCG: 5 TABLET ORAL at 08:22

## 2023-04-28 RX ADMIN — CYANOCOBALAMIN TAB 1000 MCG 1000 MCG: 1000 TAB at 08:22

## 2023-04-28 RX ADMIN — OXYCODONE 5 MG: 5 TABLET ORAL at 22:02

## 2023-04-28 NOTE — PLAN OF CARE
Goal Outcome Evaluation:  Plan of Care Reviewed With: patient        Progress: no change   Pt currently resting abed. C/o pain to L shoulder see mar. Pt VSS will cont to monitor.

## 2023-04-28 NOTE — CONSULTS
Hematology/Oncology Inpatient Consultation    Patient name: Jc Driscoll  : 1965  MRN: [id number]  Referring Provider: Dr. Pereira  Reason for Consultation: NSCLC on immunotherapy    Chief complaint: Left-sided chest pain, shortness of breath    History of present illness:      Jc Driscoll is a 57 y.o. male who presented to [facility] on 2023 as a direct admission from his oncologist office with complaints of left-sided chest pain, increased fatigue and increased shortness of breath.  Past medical history of hypertension, hyperlipidemia, GERD, diastolic heart failure, prediabetes, anxiety and depression, hypothyroidism, recurrent lung cancer on immunotherapy.  Patient was noted on a CT of the chest with contrast done at an outlying facility to have an increased consolidation in the upper left chest since his prior study, increased size of the left pleural effusion with loculation along its superior medial margin since prior study; no change in the soft tissue mass with osseous destruction of the left upper chest wall between the first and second ribs.  He was noted to have leukocytosis with a WBC of 13.77.  He also complained of increased fatigue and shortness of breath.  He had increasing left chest and left upper back pain that was not well managed by his home pain regimen of oxycodone.  For these reasons a direct admission to the hospital was requested by Dr. Gaspar and he was admitted for IV antibiotics and pulmonary consult.    23  Hematology/Oncology was consulted on a patient known to us and established in the office with Dr. 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  The patient initially presented in  with moderately differentiated adenocarcinoma of the right lung for which he underwent concurrent chemotherapy and radiation neoadjuvantly followed by a right lower lobectomy.  This was followed by immunotherapy with  durvalumab for 24 cycles ending in August 2020.  In January 2022 a routine surveillance CT of the chest revealed a left upper lobe pulmonary nodule for which a subsequent PET/CT was done that showed mild uptake corresponding to the nodule.  In January 2022 a CT-guided biopsy was attempted but aborted due to finding by the radiologist that the lung nodularity was actually a clump of tiny nodules of which the largest was 6 mm and therefore biopsy cannot be completed.  Also the area was in the vicinity of multiple blood vessels with increased risk of bleeding.  Follow-up CT of the chest was recommended for continued surveillance.  In May 2022 the patient underwent a CT-guided biopsy of the left enlarging nodule and pathology was positive for invasive poorly differentiated adenocarcinoma most consistent with an adenocarcinoma of pulmonary origin.  Subsequent PET showed a 2.3 cm hypermetabolic left upper lobe nodule and no convincing metastatic disease elsewhere brain MRI was negative for any intracranial metastasis.  On 7/6/2022 patient underwent a left upper lobe lobectomy which was followed by adjuvant chemotherapy.  After completion of chemotherapy the patient was initiated on atezolizumab along with XRT.  Radiation was completed 1/11/2023 and the patient remains on Tecentriq every 3 weeks.    Patient complains of exquisite tenderness involving the left chest wall.  There was no trauma there is no redness.  The port is on the left chest wall.  He has had minimal cough also has pain on the upper left back.  Patient has required more pain medications due to the above.  He had a CT scan of the chest done which shows increased consolidation in the left upper lobe suspicious for infection versus post radiation.  Patient was then directed to come to the hospital for admission IV antibiotics pulmonary consult.  Was also noted that he has left pleural effusion which is moderate in size    He/She  has a past medical history  of Allergic rhinitis (07/25/2013), Anxiety and depression (06/05/2012), Chronic pansinusitis (04/08/2019), Diastolic CHF (07/09/2014), Dupuytren's contracture of both hands, Elevated cholesterol, Erosive esophagitis (07/09/2019), Gastroesophageal reflux disease (02/21/2019), Hiatal hernia (07/09/2019), History of colon polyps, Hypertension, Lung cancer, Mediastinal lymphadenopathy (03/12/2019), Normocytic anemia (08/08/2019), Prediabetes (07/09/2014), and Retention of urine (06/27/2019).    PCP: Reshma Alvarenga MD    History:  Past Medical History:   Diagnosis Date   • Allergic rhinitis 07/25/2013    Overview:  4/2/2018 - still zyrtec 10 daily (if stops, gets dermatitis again).    • Anxiety and depression 06/05/2012    Overview:  4/2/2018 -   C/w well 300 xr and Lito 20.  4/8/2019 -   Doing ok even with new lung cancer - lito 20 & well 300xr.    • Chronic pansinusitis 04/08/2019    Overview:  4/8/2019 -   MRI showed chronic thick in frontal, maxillary, ethmoidal ---> to Dr. COLLAZO to est since abx ICC didn't help.  Does netipot bid.    • Diastolic CHF 07/09/2014    Overview:  7/4/14 - impaired LV relaxation on Zhou ECHO.  EF 60%. Rest normal   • Dupuytren's contracture of both hands    • Elevated cholesterol    • Erosive esophagitis 07/09/2019    Overview:  EGD 2016 4/2/2018 - nexium OTC daily for few week.  Qod before that.  Now carbonation hurts, epigastric pain 4/8/2019 -   protonix 40 doing well.    • Gastroesophageal reflux disease 02/21/2019   • Hiatal hernia 07/09/2019   • History of colon polyps    • Hypertension    • Lung cancer     right lung and in lymph nodes x2   • Mediastinal lymphadenopathy 03/12/2019   • Normocytic anemia 08/08/2019    Overview:  6/2019 - dropped to 9's postop 7/2019 - rebounded to 10's.  8/8/2019 -   Back to 9's - check ferritin, b12 and thyroid.    • Prediabetes 07/09/2014    Overview:  7/4/14 - a1c 6.1 at [place] admission   • Retention of urine 06/27/2019    PSOT OP, RESOLVED   ,    Past Surgical History:   Procedure Laterality Date   • BRONCHOSCOPY  2019    EBUS MONTERO NEEDLE BX   • COLONOSCOPY     • DUPUYTREN CONTRACTURE RELEASE Bilateral    • LUNG BIOPSY  2019    CT GUIDED   • LUNG REMOVAL, PARTIAL Right     right lower   • PORTACATH PLACEMENT  2019    DR LONGORIA   • THORACOSCOPY Right 2019    Procedure: RIGHT VATS, OPEN LOWER LOBECTOMY WITH LYMPH NODE DISECTION, WEDGE RESECTION OF RIGHT MIDDLE LOBE;  Surgeon: Terrance Longoria MD;  Location: [facility] MAIN OR;  Service: Cardiothoracic   • THORACOSCOPY VIDEO ASSISTED WITH LOBECTOMY Left 2022    Procedure: THORACOSCOPY VIDEO ASSISTED WITH LOBECTOMY;  Surgeon: Miryam Reyna MD;  Location: [facility] MAIN OR;  Service: Thoracic;  Laterality: Left;   ,   Family History   Problem Relation Age of Onset   • Cancer Mother         cervical cancer   • Cervical cancer Mother    • Cancer Father         lung cancer   • Lung cancer Father    • Lung cancer Sister    • Cancer Sister         lung cancer   • Malig Hyperthermia Neg Hx    ,   Social History     Tobacco Use   • Smoking status: Former     Types: Cigarettes     Quit date: 2009     Years since quittin.6   • Smokeless tobacco: Never   Vaping Use   • Vaping Use: Never used   Substance Use Topics   • Alcohol use: Yes     Alcohol/week: 2.0 standard drinks     Types: 2 Cans of beer per week     Comment: Occasional   • Drug use: No   ,   Medications Prior to Admission   Medication Sig Dispense Refill Last Dose   • Constulose 10 GM/15ML solution TAKE  30 ML BY MOUTH  EVERY 12 HOURS 900 mL 0    • desvenlafaxine (PRISTIQ) 50 MG 24 hr tablet Take 1 tablet by mouth Daily.      • gabapentin (NEURONTIN) 300 MG capsule Take 1 capsule by mouth Every Night. 30 capsule 3    • levothyroxine (SYNTHROID, LEVOTHROID) 100 MCG tablet Take 1 tablet by mouth Every Morning.      • lidocaine-prilocaine (EMLA) 2.5-2.5 % cream Apply to port site 60 minutes prior to it being accessed 30 g 1     • liothyronine (CYTOMEL) 5 MCG tablet Take 1 tablet by mouth Daily.      • LORazepam (ATIVAN) 0.5 MG tablet Take 1 tablet by mouth Every 8 (Eight) Hours As Needed.      • metoprolol succinate XL (TOPROL-XL) 100 MG 24 hr tablet Take 1 tablet by mouth Daily.      • ondansetron (ZOFRAN) 8 MG tablet TAKE 1 TABLET BY MOUTH THREE TIMES DAILY AS NEEDED FOR NAUSEA FOR VOMITING 30 tablet 0    • oxyCODONE (Roxicodone) 5 MG immediate release tablet Take 1 tablet by mouth Every 8 (Eight) Hours As Needed for Moderate Pain. 60 tablet 0    • pantoprazole (PROTONIX) 40 MG EC tablet Take 1 tablet by mouth Daily As Needed.      • vitamin B-12 (CYANOCOBALAMIN) 1000 MCG tablet Take 1 tablet by mouth Daily.      • vitamin B-6 (PYRIDOXINE) 100 MG tablet Take 1 tablet by mouth Every 12 (Twelve) Hours. 60 tablet 6    , Scheduled Meds:  acetaminophen, 1,000 mg, Oral, TID  cefepime, 2 g, Intravenous, Q8H  desvenlafaxine, 50 mg, Oral, Daily  enoxaparin, 40 mg, Subcutaneous, Daily  folic acid, 1 mg, Oral, Daily  gabapentin, 300 mg, Oral, Nightly  levothyroxine, 100 mcg, Oral, Q AM  liothyronine, 5 mcg, Oral, Daily  metoprolol succinate XL, 100 mg, Oral, Daily  pantoprazole, 40 mg, Oral, Daily  senna-docusate sodium, 2 tablet, Oral, BID  sodium chloride, 10 mL, Intravenous, Q12H  vitamin B-12, 1,000 mcg, Oral, Daily  vitamin B-6, 100 mg, Oral, Q12H    , Continuous Infusions:  lactated ringers, 75 mL/hr, Last Rate: 75 mL/hr (04/28/23 0024)    , PRN Meds:  •  senna-docusate sodium **AND** polyethylene glycol **AND** bisacodyl **AND** bisacodyl  •  calcium carbonate  •  ipratropium-albuterol  •  LORazepam  •  melatonin  •  ondansetron **OR** ondansetron  •  oxyCODONE  •  sodium chloride  •  sodium chloride   Allergies:  Patient has no known allergies.    Subjective     ROS:  Review of Systems   Constitutional: Positive for fatigue. Negative for chills and fever.   HENT: Negative for congestion, drooling, ear discharge, rhinorrhea, sinus pressure  "and tinnitus.    Eyes: Negative for photophobia, pain and discharge.   Respiratory: Negative for apnea, choking and stridor.    Cardiovascular: Negative for palpitations.   Gastrointestinal: Negative for abdominal distention, abdominal pain and anal bleeding.   Endocrine: Negative for polydipsia and polyphagia.   Genitourinary: Negative for decreased urine volume, flank pain and genital sores.   Musculoskeletal: Negative for gait problem, neck pain and neck stiffness.   Skin: Negative for color change, rash and wound.   Neurological: Negative for tremors, seizures, syncope, facial asymmetry and speech difficulty.   Hematological: Negative for adenopathy.   Psychiatric/Behavioral: Negative for agitation, confusion, hallucinations and self-injury. The patient is not hyperactive.         Objective   Vital Signs:   BP 98/68 (BP Location: Right arm, Patient Position: Lying)   Pulse 70   Temp 97.6 °F (36.4 °C) (Oral)   Resp 16   Ht 190.5 cm (75\")   Wt 82.6 kg (182 lb)   SpO2 96%   BMI 22.75 kg/m²     Physical Exam: (performed by MD)  Physical Exam  Vitals and nursing note reviewed.   Constitutional:       General: He is not in acute distress.     Appearance: He is not diaphoretic.   HENT:      Head: Normocephalic and atraumatic.   Eyes:      General: No scleral icterus.        Right eye: No discharge.         Left eye: No discharge.      Conjunctiva/sclera: Conjunctivae normal.   Neck:      Thyroid: No thyromegaly.   Cardiovascular:      Rate and Rhythm: Normal rate and regular rhythm.      Heart sounds: Normal heart sounds.     No friction rub. No gallop.   Pulmonary:      Effort: Pulmonary effort is normal. No respiratory distress.      Breath sounds: No stridor. No wheezing.   Abdominal:      General: Bowel sounds are normal.      Palpations: Abdomen is soft. There is no mass.      Tenderness: There is no abdominal tenderness. There is no guarding or rebound.   Musculoskeletal:         General: No tenderness. " Normal range of motion.      Cervical back: Normal range of motion and neck supple.   Lymphadenopathy:      Cervical: No cervical adenopathy.   Skin:     General: Skin is warm.      Findings: No erythema or rash.   Neurological:      Mental Status: He is alert and oriented to person, place, and time.      Motor: No abnormal muscle tone.   Psychiatric:         Behavior: Behavior normal.         Results Review:  Lab Results (last 48 hours)     Procedure Component Value Units Date/Time    MRSA Screen, PCR (Inpatient) - Swab, Nares [[id number]]  (Normal) Collected: 04/27/23 2142    Specimen: Swab from Nares Updated: 04/27/23 2335     MRSA PCR No MRSA Detected    Narrative:      The negative predictive value of this diagnostic test is high and should only be used to consider de-escalating anti-MRSA therapy. A positive result may indicate colonization with MRSA and must be correlated clinically.    Comprehensive Metabolic Panel [[id number]]  (Abnormal) Collected: 04/27/23 2032    Specimen: Blood Updated: 04/27/23 2131     Glucose 144 mg/dL      BUN 8 mg/dL      Creatinine 1.03 mg/dL      Sodium 131 mmol/L      Potassium 3.8 mmol/L      Chloride 91 mmol/L      CO2 27.0 mmol/L      Calcium 9.3 mg/dL      Total Protein 6.6 g/dL      Albumin 3.5 g/dL      ALT (SGPT) 11 U/L      AST (SGOT) 15 U/L      Alkaline Phosphatase 102 U/L      Total Bilirubin 0.3 mg/dL      Globulin 3.1 gm/dL      A/G Ratio 1.1 g/dL      BUN/Creatinine Ratio 7.8     Anion Gap 13.0 mmol/L      eGFR 84.2 mL/min/1.73     Narrative:      GFR Normal >60  Chronic Kidney Disease <60  Kidney Failure <15      Lactic Acid, Plasma [[id number]]  (Normal) Collected: 04/27/23 2032    Specimen: Blood Updated: 04/27/23 2131     Lactate 0.9 mmol/L     Blood Culture - Blood, Arm, Left [[id number]] Collected: 04/27/23 1906    Specimen: Blood from Arm, Left Updated: 04/27/23 2108    Blood Culture - Blood, Arm, Right [[id number]] Collected: 04/27/23 2032    Specimen: Blood from  Arm, Right Updated: 04/27/23 2108    POC Glucose Once [[id number]]  (Abnormal) Collected: 04/27/23 1829    Specimen: Blood Updated: 04/27/23 1834     Glucose 111 mg/dL      Comment: Serial Number: [id number]:  [id number]              Pending Results: Blood cultures    Imaging Reviewed:   CT Chest Without Contrast Diagnostic    Result Date: 4/27/2023  Impression: 1. No change from recent contrast-enhanced chest CT dated 4/25/2023 with consolidation with air bronchograms at left apex which may relate to pneumonia or posttreatment related consolidation. 2. 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 3. Small right and small to moderate left pleural effusions, unchanged. 4. Emphysema with postsurgical changes again noted related to prior right lower lobectomy and left upper lobectomy. Electronically Signed: Antoni Deal  4/27/2023 10:39 PM EDT  Workstation ID: [id number]           Assessment & Plan   ASSESSMENT  1. Relapsed recurrent poorly differentiated adenocarcinoma of the left lung.  Status post left thoracotomy, status post chemotherapy and radiation.  Currently on immunotherapy with Tecentriq.  High PD-L1 expression at 95%.  2. History of moderately differentiated adenocarcinoma of the right lung status post right lobectomy status post chemoradiation and immunotherapy.  3. Suspected left upper lobe pneumonia.  On IV antibiotics.  Pulmonary consult pending to evaluate for diagnostic thoracentesis.    PLAN  1. Pulmonary consultation  2. Continue IV antibiotics  3. Continue supportive care and pain management per primary team    Electronically signed by MULUGETA Garcia, 04/28/23, 7:50 AM EDT.    Thank you for this consult. We will be happy to follow along with you.     This is a 57-year-old male who has a history of recurrent lung cancer..  He underwent recent left thoracotomy followed by adjuvant chemotherapy.  He was weak to relapse with  this malignancy and was treated with radiation and has been on maintenance immunotherapy since then.  Patient complains of exquisite tenderness involving the left chest wall.  There was no trauma there is no redness.  The port is on the left chest wall.  He has had minimal cough also has pain on the upper left back.  Patient has required more pain medications due to the above.  He had a CT scan of the chest done which shows increased consolidation in the left upper lobe suspicious for infection versus post radiation.  Patient was then directed to come to the hospital for admission IV antibiotics pulmonary consult.  Was also noted that he has left pleural effusion which is moderate in size    Physical exam apart from tenderness over the left anterior chest wall was essentially unremarkable  Reviewed his labs, imaging studies and progress notes  I also reviewed his repeat CT of the chest which did not show any significant change.  He is currently on IV Zosyn  We have consulted pulmonary to decide on whether the effusion needs to be drained or if he needs to have bronchoscopy.  Continue supportive care  His left upper lobe mass has not significantly changed suggesting stability on immunotherapy    Discussed the above plans with patient  We will continue to follow      Electronically signed by Azucena Gaspar MD, 04/28/23, 2:03 PM EDT.

## 2023-04-28 NOTE — CASE MANAGEMENT/SOCIAL WORK
Discharge Planning Assessment  [facility]     Patient Name: Jc Driscoll  MRN: [id number]  Today's Date: 4/28/2023    Admit Date: 4/27/2023    Plan: D/C plan: Anticipate home with spouse.   Discharge Needs Assessment     Row Name 04/28/23 1152       Living Environment    People in Home spouse    Name(s) of People in Home Wife Rachel    Current Living Arrangements home    Primary Care Provided by self    Provides Primary Care For no one    Family Caregiver if Needed spouse    Family Caregiver Names Rachel    Quality of Family Relationships unable to assess       Resource/Environmental Concerns    Resource/Environmental Concerns none    Transportation Concerns none       Transition Planning    Patient/Family Anticipates Transition to home with family    Patient/Family Anticipated Services at Transition none    Transportation Anticipated family or friend will provide       Discharge Needs Assessment    Readmission Within the Last 30 Days no previous admission in last 30 days    Equipment Currently Used at Home none    Concerns to be Addressed no discharge needs identified;denies needs/concerns at this time    Anticipated Changes Related to Illness none    Equipment Needed After Discharge none    Provided Post Acute Provider List? N/A               Discharge Plan     Row Name 04/28/23 3785       Plan    Plan D/C plan: Anticipate home with spouse.    Patient/Family in Agreement with Plan yes    Plan Comments CM met with patient at bedside. Pt lives at home with wife Rachel. Pt is IADL, including driving. Rachel to transport at discharge. Denies use of DME at home. PCP and pharmacy confirmed. No financial barriers to meds. No current use of HHC. No d/c needs identified at this time. CM will follow for needs.                    Demographic Summary     Row Name 04/28/23 1145       General Information    Admission Type inpatient    [facility]    Referral Source admission list    Reason  for Consult discharge planning    Preferred Language English               Functional Status     Row Name 04/28/23 1151       Functional Status    Usual Activity Tolerance good    Current Activity Tolerance good       Functional Status, IADL    Medications independent    Meal Preparation independent    Housekeeping independent    Laundry independent    Shopping independent                     Met with patient in room wearing PPE: mask  Maintained distance greater than six feet and spent less than 15 minutes in the room.          Gilbert Chapman RN

## 2023-04-28 NOTE — PAYOR COMM NOTE
"This is a clinical information for Jc Driscoll COURTNEY  Reference/Auth # [id number]    INPATIENT AUTHORIZATION PENDING:     Please call or fax determination to contact below.   Thank you.    Jannette Lovett, RN, BSN  Utilization Review Nurse  [facility]  Direct & confidential phone # [phone]  Fax # [phone]  =====================================================================    Pneumonia [facility] Inpatient Care     Overall Determination: Indications Met     Criteria:  [×] Admission is indicated for  1 or more  of the following  [D] [E]:      [×] Moderate-risk-category or high-risk-category patient [F] (Pneumonia Severity Index class IV or V, or CURB-65 score of 3 or greater)                   PSI Score: 98 Class: IV    =======================================================================    Jc Driscoll (57 y.o. Male)     Date of Birth   1965    Social Security Number       Address   [address] [facility] [place] [facility]    Home Phone   [phone]    N   [id number]       Yarsani   None    Marital Status                               Admission Date   4/27/23    Admission Type   Elective    Admitting Provider   Vijaya Pereira MD    Attending Provider   Vijaya Pereira MD    Department, Room/Bed   [facility] 3A MEDICAL INPATIENT, 301/1       Discharge Date       Discharge Disposition       Discharge Destination                               Attending Provider: Vijaya Pereira MD    Allergies: No Known Allergies    Isolation: None   Infection: COVID Screen (preop/placement) (07/06/22)   Code Status: CPR    Ht: 190.5 cm (75\")   Wt: 82.6 kg (182 lb)    Admission Cmt: None   Principal Problem: Left upper lobe pneumonia [J18.9]                 Active Insurance as of 4/27/2023     Primary Coverage     Payor Plan Insurance Group Employer/Plan Group    [employer] BLUE CROSS [facility] BLUE CROSS BLUE [facility] PPO [id number]     Payor Plan Address Payor Plan Phone Number " Payor Plan Fax Number Effective Dates    [address] [phone]  2014 - None Entered    [facility] [id number]       Subscriber Name Subscriber Birth Date Member ID       JC MCBRIDE 1965 [id number]                 Emergency Contacts      (Rel.) Home Phone Work Phone Mobile Phone    VERN MCBRIDE (Spouse) [phone] -- [phone]               History & Physical      Idem, Vijaya Smith MD at 23 1843              [facility] Medicine Services  History & Physical    Patient Name: Jc Mcbride  : 1965  MRN: [id number]  Primary Care Physician:  Reshma Alvarenga MD  Date of admission: 2023  Date and Time of Service: 23 at 1845    Subjective       Chief Complaint: Left sided chest pain, SOB    History of Present Illness: Jc Mcbride is a 57 y.o. male with past medical history of hypertension, hyperlipidemia, GERD, diastolic heart failure, prediabetes, anxiety depression, hypothyroidism and recurrent lung cancer currently on immunotherapy who was sent as a direct admission from Dr Gaspar's oncology clinic due to concern for pneumonia and pleural effusion.  Patient reports increased left-sided chest pain associated with mild shortness of breath over the past 1 week.  He denies malaise, cough, sputum, hemoptysis, fever or chills.  I found report of chest x-ray obtained on  which demonstrated left upper lobe consolidation and pleural effusion.  Chest x-ray image was not available for my review.  Patient states that he has always experienced chest pain since his surgery/radiation in 2022.  Due to this pain, he was prescribed low-dose oxycodone 3 times daily.  The pain that he now experiences is somewhat different from his usual pain in terms of quality and severity    ROS at least 14 systems reviewed.  Pertinent information as in HPI    Personal History     Past Medical History:   Diagnosis Date   • Allergic rhinitis 2013    Overview:  2018 -  still zyrtec 10 daily (if stops, gets dermatitis again).    • Anxiety and depression 06/05/2012    Overview:  4/2/2018 -   C/w well 300 xr and Lito 20.  4/8/2019 -   Doing ok even with new lung cancer - lito 20 & well 300xr.    • Chronic pansinusitis 04/08/2019    Overview:  4/8/2019 -   MRI showed chronic thick in frontal, maxillary, ethmoidal ---> to Dr. COLLAZO to est since abx ICC didn't help.  Does netipot bid.    • Diastolic CHF 07/09/2014    Overview:  7/4/14 - impaired LV relaxation on [place] [facility] ECHO.  EF 60%. Rest normal   • Dupuytren's contracture of both hands    • Elevated cholesterol    • Erosive esophagitis 07/09/2019    Overview:  EGD 2016 4/2/2018 - nexium OTC daily for few week.  Qod before that.  Now carbonation hurts, epigastric pain 4/8/2019 -   protonix 40 doing well.    • Gastroesophageal reflux disease 02/21/2019   • Hiatal hernia 07/09/2019   • History of colon polyps    • Hypertension    • Lung cancer     right lung and in lymph nodes x2   • Mediastinal lymphadenopathy 03/12/2019   • Normocytic anemia 08/08/2019    Overview:  6/2019 - dropped to 9's postop 7/2019 - rebounded to 10's.  8/8/2019 -   Back to 9's - check ferritin, b12 and thyroid.    • Prediabetes 07/09/2014    Overview:  7/4/14 - a1c 6.1 at [facility] admission   • Retention of urine 06/27/2019    PSOT OP, RESOLVED       Past Surgical History:   Procedure Laterality Date   • BRONCHOSCOPY  03/18/2019    EBUS MONTERO NEEDLE BX   • COLONOSCOPY     • DUPUYTREN CONTRACTURE RELEASE Bilateral    • LUNG BIOPSY  03/08/2019    CT GUIDED   • LUNG REMOVAL, PARTIAL Right     right lower   • PORTACATH PLACEMENT  03/20/2019    DR LONGORIA   • THORACOSCOPY Right 6/26/2019    Procedure: RIGHT VATS, OPEN LOWER LOBECTOMY WITH LYMPH NODE DISECTION, WEDGE RESECTION OF RIGHT MIDDLE LOBE;  Surgeon: Terrance Longoria MD;  [facility];  Service: Cardiothoracic   • THORACOSCOPY VIDEO ASSISTED WITH LOBECTOMY Left 7/6/2022    Procedure: THORACOSCOPY VIDEO  ASSISTED WITH LOBECTOMY;  Surgeon: Miryam Reyna MD;  [facility];  Service: Thoracic;  Laterality: Left;       Family History: family history includes Cancer in his father, mother, and sister; Cervical cancer in his mother; Lung cancer in his father and sister. Otherwise pertinent FHx was reviewed and not pertinent to current issue.    Social History:  reports that he quit smoking about 13 years ago. His smoking use included cigarettes. He has never used smokeless tobacco. He reports current alcohol use of about 2.0 standard drinks per week. He reports that he does not use drugs.    Home Medications:  Prior to Admission Medications     Prescriptions Last Dose Informant Patient Reported? Taking?    Constulose 10 GM/15ML solution   No No    TAKE  30 ML BY MOUTH  EVERY 12 HOURS    desvenlafaxine (PRISTIQ) 50 MG 24 hr tablet   Yes No    Take 1 tablet by mouth Daily.    folic acid (FOLVITE) 1 MG tablet   Yes No    gabapentin (NEURONTIN) 300 MG capsule   No No    Take 1 capsule by mouth Every Night.    levothyroxine (SYNTHROID, LEVOTHROID) 100 MCG tablet   Yes No    Take 1 tablet by mouth Every Morning.    levothyroxine (SYNTHROID, LEVOTHROID) 100 MCG tablet   Yes No    Take 1 tablet by mouth Daily.    lidocaine-prilocaine (EMLA) 2.5-2.5 % cream   No No    Apply to port site 60 minutes prior to it being accessed    liothyronine (CYTOMEL) 5 MCG tablet   Yes No    Take 1 tablet by mouth Daily.    liothyronine (CYTOMEL) 5 MCG tablet   Yes No    Take 1 tablet by mouth Daily.    LORazepam (ATIVAN) 0.5 MG tablet   Yes No    Take 1 tablet by mouth Every 8 (Eight) Hours As Needed.    metoprolol succinate XL (TOPROL-XL) 100 MG 24 hr tablet   Yes No    Take 1 tablet by mouth Daily.    ondansetron (ZOFRAN) 8 MG tablet   No No    TAKE 1 TABLET BY MOUTH THREE TIMES DAILY AS NEEDED FOR NAUSEA FOR VOMITING    oxyCODONE (Roxicodone) 5 MG immediate release tablet   No No    Take 1 tablet by mouth Every 8 (Eight) Hours As  Needed for Moderate Pain.    pantoprazole (PROTONIX) 40 MG EC tablet   Yes No    Take 1 tablet by mouth Daily As Needed.    pantoprazole (PROTONIX) 40 MG EC tablet   Yes No    Take 1 tablet by mouth Daily.    potassium chloride (K-DUR,KLOR-CON) 20 MEQ CR tablet   Yes No    TAKE TWO TABLETS BY MOUTH AS A ONE-TIME DOSE    Ure-Na 15 g pack packet   Yes No    TAKE 1 PACKET BY MOUTH TWICE DAILY AS DIRECTED FOR 2 DOSES    vitamin B-12 (CYANOCOBALAMIN) 1000 MCG tablet   Yes No    Take 1 tablet by mouth Daily.    vitamin B-6 (PYRIDOXINE) 100 MG tablet   No No    Take 1 tablet by mouth Every 12 (Twelve) Hours.            Allergies:  No Known Allergies    Objective       Vitals:   Temp:  [97.5 °F (36.4 °C)] 97.5 °F (36.4 °C)  Heart Rate:  [74-88] 88  Resp:  [16] 16  BP: (120-124)/(72-78) 120/72    Physical Exam   General appearance: Well developed male resting in bed, not particularly ill-appearing and not toxic, not in distress, spouse at bedside  Mental status: Alert and oriented x3  Head: Normocephalic and atraumatic  Eyes: EOMI, nonicteric  Oropharynx: Moist mucous membranes, no thrush  Neck: Supple, no JVD, no tenderness  CVS: Regular heart sounds, no gallops, no murmurs  Respiration: Anterior chest wall tenderness to palpation, no external injuries or bruises  Lungs: Unlabored breathing at rest, no wheezes, no rales  GI: Soft, nondistended, nontender, normal bowel sounds  Extremity: No leg edema, no gross deformities, no calf tenderness  Musculoskeletal: No gross deformities, symmetrical muscle mass  Neurology: Normal speech, no facial droop, moves all extremities  Psychiatry: Normal interaction, no hallucination, no agitation    Result Review    Result Review:  I have personally reviewed the results from the time of this admission to 4/27/2023 18:44 EDT and agree with these findings:  [x]  Laboratory  []  Microbiology  [x]  Radiology  []  EKG/Telemetry   []  Cardiology/Vascular   []  Pathology  [x]  Old records  []   Other:  Most notable findings include:       Assessment & Plan        Active Hospital Problems:  Active Hospital Problems    Diagnosis    • **Left upper lobe pneumonia    • Erosive esophagitis    • Non-small cell carcinoma of lung, right (HCC)    • Diastolic CHF (HCC)    • Hypertension    • Anxiety and depression      Plan:   #Suspected left upper lobe bacterial pneumonia in an immunocompromised subjective  -Based on clinical symptoms and chest x-ray obtained on 4/25 (image not available to me)  -Will obtain chest CT for more clarity  -Empirically started on broad-spectrum antibiotics and negative organisms and gram-positive including MRSA  -If pleural effusion is significant, patient would benefit from diagnostic thoracentesis, and pulmonary consultation  -Pain control with Tylenol and Norco as needed    #History of recurrent lung cancer, previously underwent chemotherapy, lung resection and radiotherapy.  Presently undergoing immunotherapy under Dr. Gaspar's care.  Dr. Gaspar will consult in a.m.    #Immunocompromise status secondary to recurrent lung cancer diagnosis and immunotherapy    #Chronic diastolic heart failure, stable, euvolemic    #Major depression, in remission: Continue home Pristiq    #Essential hypertension: BP at goal on current dose of metoprolol    #Acquired hypothyroidism: Continue home levothyroxine and liothyronine    #GERD: continue PPI    DVT prophylaxis:  Medical DVT prophylaxis orders are present.    CODE STATUS:    Code Status (Patient has no pulse and is not breathing): CPR (Attempt to Resuscitate)  Medical Interventions (Patient has pulse or is breathing): Full Support    Admission Status:  I believe this patient meets inpt status.    I discussed the patient's findings and my recommendations with patient, wife and RN at bedside      Signature: Electronically signed by Vijaya Pereira MD, 04/27/23, 18:44 EDT.  [facility] Hospitalist Team    Electronically signed by Vijaya Pereira,  MD at 23 1902       Emergency Department Notes    No notes of this type exist for this encounter.         Vital Signs (last day)     Date/Time Temp Temp src Pulse Resp BP Patient Position SpO2    23 0818 97.7 (36.5) Axillary 82 16 115/83 Lying 96    23 2352 97.9 (36.6) Oral 70 18 102/57 Lying 97    23 1814 97.5 (36.4) Oral 88 16 120/72 Lying 100          Oxygen Therapy (last day)     Date/Time SpO2 Device (Oxygen Therapy) Flow (L/min) Oxygen Concentration (%) ETCO2 (mmHg)    23 0818 96 room air -- -- --    23 2352 97 room air -- -- --    23 -- room air -- -- --    23 -- room air -- -- --    23 181 100 room air -- -- --             Physician Progress Notes (all)      Vijaya Pereira MD at 23 1002              [facility] Medicine Services   Daily Progress Note    Patient Name: Jc Driscoll  : 1965  MRN: [id number]  Primary Care Physician:  Reshma Alvarenga MD  Date of admission: 2023  Date and Time of Service: 23 at 1003      Subjective       Jc Driscoll is a 57 y.o. male with past medical history of hypertension, hyperlipidemia, GERD, diastolic heart failure, prediabetes, anxiety depression, hypothyroidism and recurrent lung cancer currently on immunotherapy who was sent as a direct admission from Dr Gaspar's oncology clinic on account of BACILIO pneumonia and pleural effusion.  Started on vancomycin and Zosyn at time of admission, and since MRSA screen is negative, de-escalated to cefepime this morning on pharmacy advise.  Patient feels good and reports no new concerns.  Denies fever, chills, cough, sputum, hemoptysis or chest pain    Objective       Vitals:   Temp:  [97.5 °F (36.4 °C)-97.9 °F (36.6 °C)] 97.7 °F (36.5 °C)  Heart Rate:  [70-88] 82  Resp:  [16-18] 16  BP: (102-124)/(57-83) 115/83    Physical Exam   General appearance: Well developed male resting in bed,   not particularly ill-appearing and not toxic,  not in distress,   Mental status: Alert and oriented x3  Head: Normocephalic and atraumatic  Eyes: EOMI, nonicteric  Oropharynx: Moist mucous membranes, no thrush  Neck: Supple, no JVD, no tenderness  CVS: Regular heart sounds, no gallops, no murmurs  Respiration: Mild anterior chest wall tenderness to palpation,   no external injuries or bruises  Lungs: Unlabored breathing at rest, no wheezes, no rales  GI: Soft, nondistended, nontender, normal bowel sounds  Extremity: No leg edema, no gross deformities, no calf tenderness  Musculoskeletal: No gross deformities, symmetrical muscle mass  Neurology: Normal speech, no facial droop, moves all extremities  Psychiatry: Normal interaction, no hallucination, no agitation     Result Review    Result Review:  I have personally reviewed the results from the time of this admission to 4/28/2023 10:03 EDT and agree with these findings:  []  Laboratory  []  Microbiology  [x]  Radiology  []  EKG/Telemetry   []  Cardiology/Vascular   []  Pathology  [x]  Old records  []  Other:        Assessment & Plan          acetaminophen, 1,000 mg, Oral, TID  desvenlafaxine, 50 mg, Oral, Daily  enoxaparin, 40 mg, Subcutaneous, Daily  folic acid, 1 mg, Oral, Daily  gabapentin, 300 mg, Oral, Nightly  levothyroxine, 100 mcg, Oral, Q AM  liothyronine, 5 mcg, Oral, Daily  metoprolol succinate XL, 100 mg, Oral, Daily  pantoprazole, 40 mg, Oral, Daily  piperacillin-tazobactam, 3.375 g, Intravenous, Q8H  senna-docusate sodium, 2 tablet, Oral, BID  sodium chloride, 10 mL, Intravenous, Q12H  vancomycin, 15 mg/kg, Intravenous, Q12H  vitamin B-12, 1,000 mcg, Oral, Daily  vitamin B-6, 100 mg, Oral, Q12H       lactated ringers, 75 mL/hr, Last Rate: 75 mL/hr (04/28/23 0024)  Pharmacy to dose vancomycin,          Active Hospital Problems:  Active Hospital Problems    Diagnosis    • **Left upper lobe pneumonia    • Erosive esophagitis    • Non-small cell carcinoma of lung, right (HCC)    • Diastolic CHF (HCC)     • Hypertension    • Anxiety and depression      Plan:   #Left upper lobe bacterial pneumonia in immunocompromised subjective  -Chest CT: BACILIO consolidation, small right pleural effusion, small to moderate left pleural effusion  -MRSA screen negative, antibiotic de-escalated with pharmacy assistance to cefepime  -Pulmonology consultation requested  -Pain control with Tylenol and Norco as needed     #History of recurrent lung cancer, previously underwent chemotherapy, lung resection and radiotherapy. -Presently undergoing immunotherapy under Dr. Gaspar's care.   -Discussed with Dr. Gaspar     #Immunocompromise status secondary to recurrent lung cancer diagnosis and immunotherapy     #Chronic diastolic heart failure, stable, euvolemic     #Major depression, in remission: Continue home Pristiq     #Essential hypertension: BP at goal on current dose of metoprolol     #Acquired hypothyroidism: Continue home levothyroxine and liothyronine     #GERD: continue PPI    #Activity: Encourage ambulation       DVT prophylaxis:  Medical DVT prophylaxis orders are present.    CODE STATUS:    Code Status (Patient has no pulse and is not breathing): CPR (Attempt to Resuscitate)  Medical Interventions (Patient has pulse or is breathing): Full Support      Disposition:  I expect patient to be discharged 2-3 days.      Electronically signed by Vijaya Pereira MD, 04/28/23, 10:03 EDT.  [facility] Hospitalist Team             Electronically signed by Vijaya Pereira MD at 04/28/23 7441

## 2023-04-28 NOTE — CONSULTS
Group: [facility]         CONSULT NOTE    Patient Identification:  Jc Driscoll  57 y.o.  male  1965  [id number]            Requesting physician: Attending physician    Reason for Consultation: Pneumonia      History of Present Illness:  57-year-old male with history of lung CA status post chemo and radiation currently on immunotherapy, HTN, HLD, GERD, diastolic heart failure, hypothyroidism who was sent from the oncologist office due to the concern for pneumonia and pleural effusion.  Patient complains of 1 week of increased shortness of breath and discomfort in the left side of the chest and chest x-ray showing left upper lobe consolidation and pleural effusion    Assessment:  Left upper lobe pneumonia  Left pleural effusion  Chest pain due to pleurisy  History of recurrent lung carcinoma status post chemo and resection and radiation, currently on immunotherapy  Chronic diastolic heart failure  Essential hypertension  HLD  Hypothyroidism  GERD  Leukocytosis  Anemia    Recommendations:  Currently oxygenating well on room air  Antibiotics  Bronchodilatores  Blood pressure control  Thyroid hormone replacement  DVT/GI prophylaxis            Review of Sytems:  Constitutional: Negative for chills, fever and malaise/fatigue.   HENT: Negative.    Eyes: Negative.    Cardiovascular: Negative.    Respiratory: Positive for pleurisy and left side  Skin: Negative.    Musculoskeletal: Negative.    Gastrointestinal: Negative.    Genitourinary: Negative.    Neurological: Negative.    Psychiatric/Behavioral: Negative.    Past Medical History:  Past Medical History:   Diagnosis Date   • Allergic rhinitis 07/25/2013    Overview:  4/2/2018 - still zyrtec 10 daily (if stops, gets dermatitis again).    • Anxiety and depression 06/05/2012    Overview:  4/2/2018 -   C/w well 300 xr and Lito 20.  4/8/2019 -   Doing ok even with new lung cancer - lito 20 & well 300xr.    • Chronic pansinusitis 04/08/2019    Overview:   4/8/2019 -   MRI showed chronic thick in frontal, maxillary, ethmoidal ---> to Dr. COLLAZO to est since abx ICC didn't help.  Does netipot bid.    • Diastolic CHF 07/09/2014    Overview:  7/4/14 - impaired LV relaxation on [facility] ECHO.  EF 60%. Rest normal   • Dupuytren's contracture of both hands    • Elevated cholesterol    • Erosive esophagitis 07/09/2019    Overview:  EGD 2016 4/2/2018 - nexium OTC daily for few week.  Qod before that.  Now carbonation hurts, epigastric pain 4/8/2019 -   protonix 40 doing well.    • Gastroesophageal reflux disease 02/21/2019   • Hiatal hernia 07/09/2019   • History of colon polyps    • Hypertension    • Lung cancer     right lung and in lymph nodes x2   • Mediastinal lymphadenopathy 03/12/2019   • Normocytic anemia 08/08/2019    Overview:  6/2019 - dropped to 9's postop 7/2019 - rebounded to 10's.  8/8/2019 -   Back to 9's - check ferritin, b12 and thyroid.    • Prediabetes 07/09/2014    Overview:  7/4/14 - a1c 6.1 at [facility] admission   • Retention of urine 06/27/2019    PSOT OP, RESOLVED       Past Surgical History:  Past Surgical History:   Procedure Laterality Date   • BRONCHOSCOPY  03/18/2019    EBUS MONTERO NEEDLE BX   • COLONOSCOPY     • DUPUYTREN CONTRACTURE RELEASE Bilateral    • LUNG BIOPSY  03/08/2019    CT GUIDED   • LUNG REMOVAL, PARTIAL Right     right lower   • PORTACATH PLACEMENT  03/20/2019    DR LONGORIA   • THORACOSCOPY Right 6/26/2019    Procedure: RIGHT VATS, OPEN LOWER LOBECTOMY WITH LYMPH NODE DISECTION, WEDGE RESECTION OF RIGHT MIDDLE LOBE;  Surgeon: Terrance Longoria MD;  Location: [facility] MAIN OR;  Service: Cardiothoracic   • THORACOSCOPY VIDEO ASSISTED WITH LOBECTOMY Left 7/6/2022    Procedure: THORACOSCOPY VIDEO ASSISTED WITH LOBECTOMY;  Surgeon: Miryam Reyna MD;  Location: [facility] MAIN OR;  Service: Thoracic;  Laterality: Left;        Home Meds:  Medications Prior to Admission   Medication Sig Dispense Refill Last Dose   • Constulose 10 GM/15ML solution TAKE   30 ML BY MOUTH  EVERY 12 HOURS 900 mL 0    • desvenlafaxine (PRISTIQ) 50 MG 24 hr tablet Take 1 tablet by mouth Daily.      • gabapentin (NEURONTIN) 300 MG capsule Take 1 capsule by mouth Every Night. 30 capsule 3    • levothyroxine (SYNTHROID, LEVOTHROID) 100 MCG tablet Take 1 tablet by mouth Every Morning.      • lidocaine-prilocaine (EMLA) 2.5-2.5 % cream Apply to port site 60 minutes prior to it being accessed 30 g 1    • liothyronine (CYTOMEL) 5 MCG tablet Take 1 tablet by mouth Daily.      • LORazepam (ATIVAN) 0.5 MG tablet Take 1 tablet by mouth Every 8 (Eight) Hours As Needed.      • metoprolol succinate XL (TOPROL-XL) 100 MG 24 hr tablet Take 1 tablet by mouth Daily.      • ondansetron (ZOFRAN) 8 MG tablet TAKE 1 TABLET BY MOUTH THREE TIMES DAILY AS NEEDED FOR NAUSEA FOR VOMITING 30 tablet 0    • oxyCODONE (Roxicodone) 5 MG immediate release tablet Take 1 tablet by mouth Every 8 (Eight) Hours As Needed for Moderate Pain. 60 tablet 0    • pantoprazole (PROTONIX) 40 MG EC tablet Take 1 tablet by mouth Daily As Needed.      • vitamin B-12 (CYANOCOBALAMIN) 1000 MCG tablet Take 1 tablet by mouth Daily.      • vitamin B-6 (PYRIDOXINE) 100 MG tablet Take 1 tablet by mouth Every 12 (Twelve) Hours. 60 tablet 6        Allergies:  No Known Allergies    Social History:   Social History     Socioeconomic History   • Marital status:    Tobacco Use   • Smoking status: Former     Types: Cigarettes     Quit date: 2009     Years since quittin.6   • Smokeless tobacco: Never   Vaping Use   • Vaping Use: Never used   Substance and Sexual Activity   • Alcohol use: Yes     Alcohol/week: 2.0 standard drinks     Types: 2 Cans of beer per week     Comment: Occasional   • Drug use: No   • Sexual activity: Defer       Family History:  Family History   Problem Relation Age of Onset   • Cancer Mother         cervical cancer   • Cervical cancer Mother    • Cancer Father         lung cancer   • Lung cancer Father    • Lung  "cancer Sister    • Cancer Sister         lung cancer   • Malig Hyperthermia Neg Hx        Physical Exam:  /83 (BP Location: Left arm, Patient Position: Lying)   Pulse 82   Temp 97.7 °F (36.5 °C) (Axillary)   Resp 16   Ht 190.5 cm (75\")   Wt 82.6 kg (182 lb)   SpO2 96%   BMI 22.75 kg/m²  Body mass index is 22.75 kg/m². 96% 82.6 kg (182 lb)  General Appearance:  Alert   HEENT:  Normocephalic, without obvious abnormality, Conjunctiva/corneas clear,.   Nares normal, no drainage     Neck:  Supple, symmetrical, trachea midline. No JVD.  Lungs /Chest wall: Mild left apical rhonchi, respirations unlabored, symmetrical wall movement.     Heart:  Regular rate and rhythm, S1 S2 normal  Abdomen: Soft, non-tender, no masses, no organomegaly.    Extremities: No edema, no clubbing or cyanosis    LABS:  Lab Results   Component Value Date    CALCIUM 9.3 04/27/2023    PHOS 3.3 12/28/2022     Results from last 7 days   Lab Units 04/27/23 2032 04/27/23  1526 04/24/23  1123   SODIUM mmol/L 131*  --  131*   POTASSIUM mmol/L 3.8  --  4.5   CHLORIDE mmol/L 91*  --  92*   CO2 mmol/L 27.0  --  26.0   BUN mg/dL 8  --  9   CREATININE mg/dL 1.03  --  0.79   GLUCOSE mg/dL 144*  --  122*   CALCIUM mg/dL 9.3  --  9.2   WBC 10*3/mm3  --  13.77* 14.15*   HEMOGLOBIN g/dL  --  10.2* 10.6*   PLATELETS 10*3/mm3  --  493* 552*   ALT (SGPT) U/L 11  --  11   AST (SGOT) U/L 15  --  18     Lab Results   Component Value Date    TROPONINT <0.010 12/05/2022             Results from last 7 days   Lab Units 04/27/23 2032   LACTATE mmol/L 0.9                     Lab Results   Component Value Date    TSH 1.770 04/03/2023     Estimated Creatinine Clearance: 91.3 mL/min (by C-G formula based on SCr of 1.03 mg/dL).         Imaging:  Imaging Results (Last 24 Hours)     Procedure Component Value Units Date/Time    CT Chest Without Contrast Diagnostic [[id number]] Collected: 04/27/23 2230     Updated: 04/27/23 2241    Narrative:      CT CHEST WO CONTRAST " DIAGNOSTIC    Date of Exam: 4/27/2023 9:33 PM EDT    Indication: SOB, pneumonia/pleural effusion suspected.    Comparison: CT chest with contrast 4/25/2023    Technique: Axial CT images were obtained of the chest without contrast administration.  Sagittal and coronal reconstructions were performed.  Automated exposure control and iterative reconstruction methods were used.    Findings:  Soft tissues of the lower neck demonstrate a left-sided port catheter with the tip terminating at the mid SVC. Heart size is normal. The main pulmonary is dilated up to 4 cm unchanged in prior study which can be seen in the setting of pulmonary   hypertension. Coronary artery calcifications noted. Negative for pericardial effusion. No mediastinal or hilar adenopathy within limits of technique. Postsurgical changes noted related to prior left upper lobectomy and right lower lobectomy.    Small chronic appearing pleural effusion at the right lung base. Small to moderate left pleural effusion not significantly changed. There is loculated pleural fluid at the medial left upper lung abutting the mediastinum which measures 5.1 x 3 cm similar   to the prior study. Consolidation involving the left apex which may relate to pneumonia or posttreatment related findings on image 48 similar to the prior study.     Redemonstration of mass involving the left upper lung and chest wall with partial osseous erosion and fracture of the left anterior second rib which is unchanged. Abnormal soft tissue encasing the left second rib measures 5 x 3.5 cm on image 47. Mass   also partially erodes anterior left first rib. Thoracic vertebral bodies are maintained in height. Chronic atelectasis/scarring in the medial right upper lobe abutting the mediastinum. Emphysema. Linear atelectasis at the lung bases. No new pulmonary   nodule or mass.    Upper abdomen demonstrates normal noncontrast appearance of the liver, spleen, and adrenal glands. No free fluid in the  upper abdomen.      Impression:      Impression:  1. No change from recent contrast-enhanced chest CT dated 4/25/2023 with consolidation with air bronchograms at left apex which may relate to pneumonia or posttreatment related consolidation.  2. 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  3. Small right and small to moderate left pleural effusions, unchanged.  4. Emphysema with postsurgical changes again noted related to prior right lower lobectomy and left upper lobectomy.      Electronically Signed: Antoni Toyin    4/27/2023 10:39 PM EDT    Workstation ID: [id number]            Current Meds:   SCHEDULE  acetaminophen, 1,000 mg, Oral, TID  cefepime, 2 g, Intravenous, Q8H  desvenlafaxine, 50 mg, Oral, Daily  enoxaparin, 40 mg, Subcutaneous, Daily  folic acid, 1 mg, Oral, Daily  gabapentin, 300 mg, Oral, Nightly  levothyroxine, 100 mcg, Oral, Q AM  liothyronine, 5 mcg, Oral, Daily  metoprolol succinate XL, 100 mg, Oral, Daily  pantoprazole, 40 mg, Oral, Daily  senna-docusate sodium, 2 tablet, Oral, BID  sodium chloride, 10 mL, Intravenous, Q12H  vitamin B-12, 1,000 mcg, Oral, Daily  vitamin B-6, 100 mg, Oral, Q12H      Infusions  lactated ringers, 75 mL/hr, Last Rate: 75 mL/hr (04/28/23 0024)      PRNs  •  senna-docusate sodium **AND** polyethylene glycol **AND** bisacodyl **AND** bisacodyl  •  calcium carbonate  •  ipratropium-albuterol  •  LORazepam  •  melatonin  •  ondansetron **OR** ondansetron  •  oxyCODONE  •  sodium chloride  •  sodium chloride        Marvel Rodriguez MD  4/28/2023  11:11 EDT      Much of this encounter note is an electronic transcription/translation of spoken language to printed text using Dragon Software.

## 2023-04-28 NOTE — PROGRESS NOTES
[facility] Medicine Services   Daily Progress Note    Patient Name: Jc Driscoll  : 1965  MRN: [id number]  Primary Care Physician:  Reshma Alvarenga MD  Date of admission: 2023  Date and Time of Service: 23 at 1003      Subjective      Jc Driscoll is a 57 y.o. male with past medical history of hypertension, hyperlipidemia, GERD, diastolic heart failure, prediabetes, anxiety depression, hypothyroidism and recurrent lung cancer currently on immunotherapy who was sent as a direct admission from Dr Gaspar's oncology clinic on account of BACILIO pneumonia and pleural effusion.  Started on vancomycin and Zosyn at time of admission, and since MRSA screen is negative, de-escalated to cefepime this morning on pharmacy advise.  Patient feels good and reports no new concerns.  Denies fever, chills, cough, sputum, hemoptysis or chest pain    Objective      Vitals:   Temp:  [97.5 °F (36.4 °C)-97.9 °F (36.6 °C)] 97.7 °F (36.5 °C)  Heart Rate:  [70-88] 82  Resp:  [16-18] 16  BP: (102-124)/(57-83) 115/83    Physical Exam   General appearance: Well developed male resting in bed,   not particularly ill-appearing and not toxic, not in distress,   Mental status: Alert and oriented x3  Head: Normocephalic and atraumatic  Eyes: EOMI, nonicteric  Oropharynx: Moist mucous membranes, no thrush  Neck: Supple, no JVD, no tenderness  CVS: Regular heart sounds, no gallops, no murmurs  Respiration: Mild anterior chest wall tenderness to palpation,   no external injuries or bruises  Lungs: Unlabored breathing at rest, no wheezes, no rales  GI: Soft, nondistended, nontender, normal bowel sounds  Extremity: No leg edema, no gross deformities, no calf tenderness  Musculoskeletal: No gross deformities, symmetrical muscle mass  Neurology: Normal speech, no facial droop, moves all extremities  Psychiatry: Normal interaction, no hallucination, no agitation     Result Review    Result Review:  I have personally reviewed the  results from the time of this admission to 4/28/2023 10:03 EDT and agree with these findings:  []  Laboratory  []  Microbiology  [x]  Radiology  []  EKG/Telemetry   []  Cardiology/Vascular   []  Pathology  [x]  Old records  []  Other:        Assessment & Plan          acetaminophen, 1,000 mg, Oral, TID  desvenlafaxine, 50 mg, Oral, Daily  enoxaparin, 40 mg, Subcutaneous, Daily  folic acid, 1 mg, Oral, Daily  gabapentin, 300 mg, Oral, Nightly  levothyroxine, 100 mcg, Oral, Q AM  liothyronine, 5 mcg, Oral, Daily  metoprolol succinate XL, 100 mg, Oral, Daily  pantoprazole, 40 mg, Oral, Daily  piperacillin-tazobactam, 3.375 g, Intravenous, Q8H  senna-docusate sodium, 2 tablet, Oral, BID  sodium chloride, 10 mL, Intravenous, Q12H  vancomycin, 15 mg/kg, Intravenous, Q12H  vitamin B-12, 1,000 mcg, Oral, Daily  vitamin B-6, 100 mg, Oral, Q12H       lactated ringers, 75 mL/hr, Last Rate: 75 mL/hr (04/28/23 0024)  Pharmacy to dose vancomycin,          Active Hospital Problems:  Active Hospital Problems    Diagnosis    • **Left upper lobe pneumonia    • Erosive esophagitis    • Non-small cell carcinoma of lung, right (HCC)    • Diastolic CHF (HCC)    • Hypertension    • Anxiety and depression      Plan:   #Left upper lobe bacterial pneumonia in immunocompromised subjective  -Chest CT: BACILIO consolidation, small right pleural effusion, small to moderate left pleural effusion  -MRSA screen negative, antibiotic de-escalated with pharmacy assistance to cefepime  -Pulmonology consultation requested  -Pain control with Tylenol and Norco as needed     #History of recurrent lung cancer, previously underwent chemotherapy, lung resection and radiotherapy. -Presently undergoing immunotherapy under Dr. Gaspar's care.   -Discussed with Dr. Gaspar     #Immunocompromise status secondary to recurrent lung cancer diagnosis and immunotherapy     #Chronic diastolic heart failure, stable, euvolemic     #Major depression, in remission: Continue home  Pristiq     #Essential hypertension: BP at goal on current dose of metoprolol     #Acquired hypothyroidism: Continue home levothyroxine and liothyronine     #GERD: continue PPI    #Activity: Encourage ambulation       DVT prophylaxis:  Medical DVT prophylaxis orders are present.    CODE STATUS:    Code Status (Patient has no pulse and is not breathing): CPR (Attempt to Resuscitate)  Medical Interventions (Patient has pulse or is breathing): Full Support      Disposition:  I expect patient to be discharged 2-3 days.      Electronically signed by Vijaya Pereira MD, 04/28/23, 10:03 EDT.  [facility] Hospitalist Team

## 2023-04-29 LAB
ANION GAP SERPL CALCULATED.3IONS-SCNC: 10 MMOL/L (ref 5–15)
BUN SERPL-MCNC: 8 MG/DL (ref 6–20)
BUN/CREAT SERPL: 8.4 (ref 7–25)
CALCIUM SPEC-SCNC: 9.2 MG/DL (ref 8.6–10.5)
CHLORIDE SERPL-SCNC: 99 MMOL/L (ref 98–107)
CO2 SERPL-SCNC: 29 MMOL/L (ref 22–29)
CREAT SERPL-MCNC: 0.95 MG/DL (ref 0.76–1.27)
DEPRECATED RDW RBC AUTO: 49.4 FL (ref 37–54)
EGFRCR SERPLBLD CKD-EPI 2021: 92.8 ML/MIN/1.73
ERYTHROCYTE [DISTWIDTH] IN BLOOD BY AUTOMATED COUNT: 16.8 % (ref 12.3–15.4)
GLUCOSE SERPL-MCNC: 110 MG/DL (ref 65–99)
HCT VFR BLD AUTO: 30.7 % (ref 37.5–51)
HGB BLD-MCNC: 9.5 G/DL (ref 13–17.7)
MCH RBC QN AUTO: 25.9 PG (ref 26.6–33)
MCHC RBC AUTO-ENTMCNC: 31 G/DL (ref 31.5–35.7)
MCV RBC AUTO: 83.4 FL (ref 79–97)
PLATELET # BLD AUTO: 467 10*3/MM3 (ref 140–450)
PMV BLD AUTO: 6.7 FL (ref 6–12)
POTASSIUM SERPL-SCNC: 4.1 MMOL/L (ref 3.5–5.2)
RBC # BLD AUTO: 3.68 10*6/MM3 (ref 4.14–5.8)
SODIUM SERPL-SCNC: 138 MMOL/L (ref 136–145)
WBC NRBC COR # BLD: 12.1 10*3/MM3 (ref 3.4–10.8)

## 2023-04-29 PROCEDURE — 25010000002 ENOXAPARIN PER 10 MG: Performed by: INTERNAL MEDICINE

## 2023-04-29 PROCEDURE — 99231 SBSQ HOSP IP/OBS SF/LOW 25: CPT | Performed by: INTERNAL MEDICINE

## 2023-04-29 PROCEDURE — 80048 BASIC METABOLIC PNL TOTAL CA: CPT | Performed by: INTERNAL MEDICINE

## 2023-04-29 PROCEDURE — 85027 COMPLETE CBC AUTOMATED: CPT | Performed by: INTERNAL MEDICINE

## 2023-04-29 PROCEDURE — 0 CEFEPIME PER 500 MG: Performed by: INTERNAL MEDICINE

## 2023-04-29 RX ORDER — NAPROXEN 250 MG/1
500 TABLET ORAL 2 TIMES DAILY WITH MEALS
Status: DISCONTINUED | OUTPATIENT
Start: 2023-04-29 | End: 2023-04-30 | Stop reason: HOSPADM

## 2023-04-29 RX ORDER — ACETAMINOPHEN 650 MG/1
650 SUPPOSITORY RECTAL EVERY 4 HOURS PRN
Status: DISCONTINUED | OUTPATIENT
Start: 2023-04-29 | End: 2023-04-30 | Stop reason: HOSPADM

## 2023-04-29 RX ORDER — ACETAMINOPHEN 325 MG/1
650 TABLET ORAL EVERY 6 HOURS PRN
Status: DISCONTINUED | OUTPATIENT
Start: 2023-04-29 | End: 2023-04-30 | Stop reason: HOSPADM

## 2023-04-29 RX ORDER — OXYCODONE HYDROCHLORIDE 5 MG/1
10 TABLET ORAL EVERY 4 HOURS PRN
Status: DISCONTINUED | OUTPATIENT
Start: 2023-04-29 | End: 2023-04-30 | Stop reason: HOSPADM

## 2023-04-29 RX ADMIN — OXYCODONE 10 MG: 5 TABLET ORAL at 16:20

## 2023-04-29 RX ADMIN — LEVOTHYROXINE SODIUM 100 MCG: 0.1 TABLET ORAL at 05:35

## 2023-04-29 RX ADMIN — SENNOSIDES AND DOCUSATE SODIUM 2 TABLET: 50; 8.6 TABLET ORAL at 08:28

## 2023-04-29 RX ADMIN — METOPROLOL SUCCINATE 100 MG: 50 TABLET, EXTENDED RELEASE ORAL at 08:28

## 2023-04-29 RX ADMIN — DESVENLAFAXINE SUCCINATE 50 MG: 50 TABLET, EXTENDED RELEASE ORAL at 08:28

## 2023-04-29 RX ADMIN — Medication 100 MG: at 08:28

## 2023-04-29 RX ADMIN — ENOXAPARIN SODIUM 40 MG: 100 INJECTION SUBCUTANEOUS at 17:07

## 2023-04-29 RX ADMIN — Medication 10 ML: at 08:29

## 2023-04-29 RX ADMIN — CEFEPIME 2 G: 2 INJECTION, POWDER, FOR SOLUTION INTRAVENOUS at 17:07

## 2023-04-29 RX ADMIN — LIOTHYRONINE SODIUM 5 MCG: 5 TABLET ORAL at 08:27

## 2023-04-29 RX ADMIN — NAPROXEN 500 MG: 250 TABLET ORAL at 17:07

## 2023-04-29 RX ADMIN — OXYCODONE 10 MG: 5 TABLET ORAL at 21:17

## 2023-04-29 RX ADMIN — CYANOCOBALAMIN TAB 1000 MCG 1000 MCG: 1000 TAB at 08:27

## 2023-04-29 RX ADMIN — CEFEPIME 2 G: 2 INJECTION, POWDER, FOR SOLUTION INTRAVENOUS at 08:28

## 2023-04-29 RX ADMIN — ACETAMINOPHEN 650 MG: 325 TABLET, FILM COATED ORAL at 04:40

## 2023-04-29 RX ADMIN — GABAPENTIN 300 MG: 300 CAPSULE ORAL at 21:17

## 2023-04-29 RX ADMIN — Medication 100 MG: at 21:17

## 2023-04-29 RX ADMIN — SENNOSIDES AND DOCUSATE SODIUM 2 TABLET: 50; 8.6 TABLET ORAL at 21:18

## 2023-04-29 RX ADMIN — Medication 10 ML: at 21:15

## 2023-04-29 RX ADMIN — ACETAMINOPHEN 1000 MG: 500 TABLET, FILM COATED ORAL at 17:06

## 2023-04-29 RX ADMIN — OXYCODONE 5 MG: 5 TABLET ORAL at 08:27

## 2023-04-29 RX ADMIN — PANTOPRAZOLE SODIUM 40 MG: 40 TABLET, DELAYED RELEASE ORAL at 08:28

## 2023-04-29 NOTE — PLAN OF CARE
Goal Outcome Evaluation:               Pt was lying on bed, c/o pain on L shoulder. LBM is  2/27. Had crackles on auscultation. Pain med was given. VSS and will cont to monitor.

## 2023-04-29 NOTE — CONSULTS
Hematology/Oncology Inpatient Progress Note    PATIENT NAME: Jc Driscoll  : 1965  MRN: [id number]    CHIEF COMPLAINT: Left-sided chest pain, shortness of breath    HISTORY OF PRESENT ILLNESS:    Jc Driscoll is a 57 y.o. male who presented to [facility] on 2023 as a direct admission from his oncologist office with complaints of left-sided chest pain, increased fatigue and increased shortness of breath.  Past medical history of hypertension, hyperlipidemia, GERD, diastolic heart failure, prediabetes, anxiety and depression, hypothyroidism, recurrent lung cancer on immunotherapy.  Patient was noted on a CT of the chest with contrast done at an outlying facility to have an increased consolidation in the upper left chest since his prior study, increased size of the left pleural effusion with loculation along its superior medial margin since prior study; no change in the soft tissue mass with osseous destruction of the left upper chest wall between the first and second ribs.  He was noted to have leukocytosis with a WBC of 13.77.  He also complained of increased fatigue and shortness of breath.  He had increasing left chest and left upper back pain that was not well managed by his home pain regimen of oxycodone.  For these reasons a direct admission to the hospital was requested by Dr. Gaspar and he was admitted for IV antibiotics and pulmonary consult.     23  Hematology/Oncology was consulted on a patient known to us and established in the office with Dr. 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  The patient initially presented in  with moderately differentiated adenocarcinoma of the right lung for which he underwent concurrent chemotherapy and radiation neoadjuvantly followed by a right lower lobectomy.  This was followed by immunotherapy with durvalumab for 24 cycles ending in 2020.  In 2022 a routine  surveillance CT of the chest revealed a left upper lobe pulmonary nodule for which a subsequent PET/CT was done that showed mild uptake corresponding to the nodule.  In January 2022 a CT-guided biopsy was attempted but aborted due to finding by the radiologist that the lung nodularity was actually a clump of tiny nodules of which the largest was 6 mm and therefore biopsy cannot be completed.  Also the area was in the vicinity of multiple blood vessels with increased risk of bleeding.  Follow-up CT of the chest was recommended for continued surveillance.  In May 2022 the patient underwent a CT-guided biopsy of the left enlarging nodule and pathology was positive for invasive poorly differentiated adenocarcinoma most consistent with an adenocarcinoma of pulmonary origin.  Subsequent PET showed a 2.3 cm hypermetabolic left upper lobe nodule and no convincing metastatic disease elsewhere brain MRI was negative for any intracranial metastasis.  On 7/6/2022 patient underwent a left upper lobe lobectomy which was followed by adjuvant chemotherapy.  After completion of chemotherapy the patient was initiated on atezolizumab along with XRT.  Radiation was completed 1/11/2023 and the patient remains on Tecentriq every 3 weeks.     Patient complains of exquisite tenderness involving the left chest wall.  There was no trauma there is no redness.  The port is on the left chest wall.  He has had minimal cough also has pain on the upper left back.  Patient has required more pain medications due to the above.  He had a CT scan of the chest done which shows increased consolidation in the left upper lobe suspicious for infection versus post radiation.  Patient was then directed to come to the hospital for admission IV antibiotics pulmonary consult.  Was also noted that he has left pleural effusion which is moderate in size     He/She  has a past medical history of Allergic rhinitis (07/25/2013), Anxiety and depression (06/05/2012),  Chronic pansinusitis (04/08/2019), Diastolic CHF (07/09/2014), Dupuytren's contracture of both hands, Elevated cholesterol, Erosive esophagitis (07/09/2019), Gastroesophageal reflux disease (02/21/2019), Hiatal hernia (07/09/2019), History of colon polyps, Hypertension, Lung cancer, Mediastinal lymphadenopathy (03/12/2019), Normocytic anemia (08/08/2019), Prediabetes (07/09/2014), and Retention of urine (06/27/2019).     PCP: Reshma Alvarenga MD    History of present illness was reviewed and is unchanged from the previous visit. 04/29/23    Subjective     ROS:  Review of Systems   Constitutional: Negative for appetite change, chills, fatigue and fever.   HENT: Negative for congestion, mouth sores and nosebleeds.    Eyes: Negative for photophobia, pain and visual disturbance.   Respiratory: Positive for shortness of breath (chronic mild). Negative for cough.    Cardiovascular: Negative for chest pain, palpitations and leg swelling.   Gastrointestinal: Negative for abdominal pain, blood in stool, constipation, diarrhea and nausea.   Endocrine: Negative for cold intolerance and heat intolerance.   Genitourinary: Negative for difficulty urinating.   Musculoskeletal:        Left upper chest and left posterior shoulder pain   Skin: Negative for rash and wound.   Neurological: Negative for dizziness, weakness and headaches.   Hematological: Negative for adenopathy.   Psychiatric/Behavioral: Negative for behavioral problems.        MEDICATIONS:    Scheduled Meds:  acetaminophen, 1,000 mg, Oral, TID  cefepime, 2 g, Intravenous, Q8H  desvenlafaxine, 50 mg, Oral, Daily  enoxaparin, 40 mg, Subcutaneous, Daily  folic acid, 1 mg, Oral, Daily  gabapentin, 300 mg, Oral, Nightly  levothyroxine, 100 mcg, Oral, Q AM  liothyronine, 5 mcg, Oral, Daily  metoprolol succinate XL, 100 mg, Oral, Daily  naproxen, 500 mg, Oral, BID With Meals  pantoprazole, 40 mg, Oral, Daily  senna-docusate sodium, 2 tablet, Oral, BID  sodium chloride,  "10 mL, Intravenous, Q12H  vitamin B-12, 1,000 mcg, Oral, Daily  vitamin B-6, 100 mg, Oral, Q12H       Continuous Infusions:      PRN Meds:  •  acetaminophen **OR** acetaminophen  •  senna-docusate sodium **AND** polyethylene glycol **AND** bisacodyl **AND** bisacodyl  •  calcium carbonate  •  ipratropium-albuterol  •  LORazepam  •  melatonin  •  ondansetron **OR** ondansetron  •  oxyCODONE  •  sodium chloride  •  sodium chloride     ALLERGIES:  No Known Allergies    Objective    VITALS:   /72 (BP Location: Right arm, Patient Position: Lying)   Pulse 77   Temp 97.2 °F (36.2 °C) (Oral)   Resp 16   Ht 190.5 cm (75\")   Wt 82.6 kg (182 lb)   SpO2 98%   BMI 22.75 kg/m²     PHYSICAL EXAM: (performed by MD)  Physical Exam  Vitals and nursing note reviewed.   Constitutional:       General: He is not in acute distress.  HENT:      Head: Normocephalic.      Mouth/Throat:      Pharynx: Oropharynx is clear.   Eyes:      Conjunctiva/sclera: Conjunctivae normal.      Pupils: Pupils are equal, round, and reactive to light.   Cardiovascular:      Rate and Rhythm: Normal rate and regular rhythm.      Pulses: Normal pulses.      Heart sounds: Normal heart sounds.   Pulmonary:      Effort: Pulmonary effort is normal. No respiratory distress.      Breath sounds: Normal breath sounds.   Abdominal:      General: Bowel sounds are normal. There is no distension.      Palpations: There is no mass.      Tenderness: There is no abdominal tenderness.   Musculoskeletal:         General: Normal range of motion.      Cervical back: Normal range of motion.      Right lower leg: No edema.      Left lower leg: No edema.   Lymphadenopathy:      Cervical: No cervical adenopathy.   Skin:     General: Skin is warm and dry.      Coloration: Skin is not jaundiced.      Findings: No erythema or rash.   Neurological:      Mental Status: He is alert and oriented to person, place, and time.   Psychiatric:         Behavior: Behavior normal.       "     RECENT LABS:  Lab Results (last 24 hours)     Procedure Component Value Units Date/Time    CBC (No Diff) [[id number]]  (Abnormal) Collected: 04/29/23 1046    Specimen: Blood Updated: 04/29/23 1130     WBC 12.10 10*3/mm3      RBC 3.68 10*6/mm3      Hemoglobin 9.5 g/dL      Hematocrit 30.7 %      MCV 83.4 fL      MCH 25.9 pg      MCHC 31.0 g/dL      RDW 16.8 %      RDW-SD 49.4 fl      MPV 6.7 fL      Platelets 467 10*3/mm3     Basic Metabolic Panel [[id number]]  (Abnormal) Collected: 04/29/23 0141    Specimen: Blood from Arm, Left Updated: 04/29/23 0252     Glucose 110 mg/dL      BUN 8 mg/dL      Creatinine 0.95 mg/dL      Sodium 138 mmol/L      Potassium 4.1 mmol/L      Chloride 99 mmol/L      CO2 29.0 mmol/L      Calcium 9.2 mg/dL      BUN/Creatinine Ratio 8.4     Anion Gap 10.0 mmol/L      eGFR 92.8 mL/min/1.73     Narrative:      GFR Normal >60  Chronic Kidney Disease <60  Kidney Failure <15      Blood Culture - Blood, Arm, Left [[id number]]  (Normal) Collected: 04/27/23 1906    Specimen: Blood from Arm, Left Updated: 04/28/23 2115     Blood Culture No growth at 24 hours    Narrative:      Less than seven (7) mL's of blood was collected.  Insufficient quantity may yield false negative results.    Blood Culture - Blood, Arm, Right [[id number]]  (Normal) Collected: 04/27/23 2032    Specimen: Blood from Arm, Right Updated: 04/28/23 2115     Blood Culture No growth at 24 hours    Narrative:      Less than seven (7) mL's of blood was collected.  Insufficient quantity may yield false negative results.          PENDING RESULTS:     IMAGING REVIEWED:  CT Chest Without Contrast Diagnostic    Result Date: 4/27/2023  Impression: 1. No change from recent contrast-enhanced chest CT dated 4/25/2023 with consolidation with air bronchograms at left apex which may relate to pneumonia or posttreatment related consolidation. 2. 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  3. Small right and small to moderate left pleural effusions, unchanged. 4. Emphysema with postsurgical changes again noted related to prior right lower lobectomy and left upper lobectomy. Electronically Signed: Antoni Deal  4/27/2023 10:39 PM EDT  Workstation ID: [id number]      Assessment & Plan   ASSESSMENT:  1. Relapsed recurrent poorly differentiated adenocarcinoma of the left lung.  Status post left thoracotomy, status post chemotherapy and radiation.  Currently on immunotherapy with Tecentriq.  High PD-L1 expression at 95%.  2. History of moderately differentiated adenocarcinoma of the right lung status post right lobectomy status post chemoradiation and immunotherapy.  3. Suspected left upper lobe pneumonia. Pulmonary to evaluate for diagnostic thoracentesis.  Pulmonology on board     PLAN  1. Continue cefepime IV  2. Continue supportive care and pain management per primary team  3. Continue to follow    Electronically signed by MULUGETA Torrez, 04/29/23, 1:08 PM EDT.    Mr. Driscoll feels better today. He has persisted with pain in the left shoulder, but it is better and not radiating anywhere. He is still coughing but he has no more dyspnea. On exam alert, conversant and in no distress. No jaundice. No oral lesions. Lungs clear bilaterally and heart regular. Reviewed the laboratory exams. He is to continue with antibiotics. At this time no need for transfusion.   I discussed with Ms. Holloway and concur with her note. I formulated the plan and the analysis.     Lowell Ba MD on 4/29/2023 as documented above.

## 2023-04-29 NOTE — PROGRESS NOTES
[facility] Medicine Services   Daily Progress Note    Patient Name: Jc Driscoll  : 1965  MRN: [id number]  Primary Care Physician:  Reshma Alvarenga MD  Date of admission: 2023  Date and Time of Service: 23 at 1003      Subjective      Jc Driscoll is a 57 y.o. male with past medical history of hypertension, hyperlipidemia, GERD, diastolic heart failure, prediabetes, anxiety depression, hypothyroidism and recurrent lung cancer currently on immunotherapy who was sent as a direct admission from Dr Gaspar's oncology clinic on account of BACILIO pneumonia and pleural effusion.  Started on vancomycin and Zosyn at time of admission, and since MRSA screen was negative, de-escalated to cefepime on pharmacy recommendation.  Experienced chest pain last night which improved with medication.  Feels a lot better this morning.  Denies fever, chills, cough, sputum, or hemoptysis .     Objective      Vitals:   Temp:  [97.2 °F (36.2 °C)-98.5 °F (36.9 °C)] 97.2 °F (36.2 °C)  Heart Rate:  [69-79] 77  Resp:  [16-18] 16  BP: ()/(66-75) 116/72    Physical Exam   General appearance: Well developed male resting in bed,   not particularly ill-appearing, not toxic, not in distress,   Mental status: Alert and oriented x3  Head: Normocephalic and atraumatic  CVS: Regular heart sounds, no gallops, no murmurs  Respiration: Mild anterior chest wall tenderness to palpation,   no external injuries or bruises  Lungs: Unlabored breathing at rest, no wheezes, no rales  GI: Soft, nondistended, nontender, normal bowel sounds  Extremity: No leg edema, no gross deformities, no calf tenderness  Musculoskeletal: No gross deformities, symmetrical muscle mass  Neurology: Normal speech, no facial droop, moves all extremities  Psychiatry: Normal interaction, no hallucination, no agitation     Result Review    Result Review:  I have personally reviewed the results from the time of this admission to 2023 09:02 EDT and  agree with these findings:  [x]  Laboratory  [x]  Microbiology  []  Radiology  []  EKG/Telemetry   []  Cardiology/Vascular   []  Pathology  []  Old records  []  Other:        Assessment & Plan          acetaminophen, 1,000 mg, Oral, TID  cefepime, 2 g, Intravenous, Q8H  desvenlafaxine, 50 mg, Oral, Daily  enoxaparin, 40 mg, Subcutaneous, Daily  folic acid, 1 mg, Oral, Daily  gabapentin, 300 mg, Oral, Nightly  levothyroxine, 100 mcg, Oral, Q AM  liothyronine, 5 mcg, Oral, Daily  metoprolol succinate XL, 100 mg, Oral, Daily  pantoprazole, 40 mg, Oral, Daily  senna-docusate sodium, 2 tablet, Oral, BID  sodium chloride, 10 mL, Intravenous, Q12H  vitamin B-12, 1,000 mcg, Oral, Daily  vitamin B-6, 100 mg, Oral, Q12H             Active Hospital Problems:  Active Hospital Problems    Diagnosis    • **Left upper lobe pneumonia    • Erosive esophagitis    • Non-small cell carcinoma of lung, right (HCC)    • Diastolic CHF (HCC)    • Hypertension    • Anxiety and depression      Plan:   #Left upper lobe bacterial pneumonia in immunocompromised subjective  -Chest CT: BACILIO consolidation, small right pleural effusion, small to moderate left pleural effusion  -MRSA screen negative, antibiotic de-escalated with pharmacy assistance to cefepime  -Negative blood cultures  -Pulmonology  following  -Tylenol and Norco as needed for pain     #History of recurrent lung cancer, previously underwent chemotherapy, lung resection and radiotherapy. -Presently undergoing immunotherapy under Dr. Gaspar's care.      #Immunocompromise status secondary to recurrent lung cancer diagnosis and immunotherapy     #Chronic diastolic heart failure, stable, euvolemic     #Major depression, in remission: Continue home Pristiq     #Essential hypertension: BP at goal on current dose of metoprolol     #Acquired hypothyroidism: Continue home levothyroxine and liothyronine     #GERD: continue PPI    #Activity: Encourage ambulation       DVT prophylaxis:  Medical DVT  prophylaxis orders are present.    CODE STATUS:    Code Status (Patient has no pulse and is not breathing): CPR (Attempt to Resuscitate)  Medical Interventions (Patient has pulse or is breathing): Full Support      Disposition:  I expect patient to be discharged 2-3 days.      Electronically signed by Vijaya Pereira MD, 04/29/23, 09:02 EDT.  [facility] Hospitalist Team

## 2023-04-29 NOTE — PROGRESS NOTES
Daily Progress Note          Assessment    Left upper lobe pneumonia  Left pleural effusion  Chest pain due to pleurisy  History of recurrent lung carcinoma status post chemo and resection and radiation, currently on immunotherapy  Chronic diastolic heart failure  Essential hypertension  HLD  Hypothyroidism  GERD  Leukocytosis  Anemia     Recommendations:  Patient stable from pulmonary standpoint and can be discharged home   currently oxygenating well on room air  Antibiotics  Bronchodilatores  Blood pressure control  Thyroid hormone replacement  DVT/GI prophylaxis                     LOS: 2 days     Subjective     Patient reports partial improvement in the right-sided chest pain    Objective     Vital signs for last 24 hours:  Vitals:    04/28/23 1100 04/28/23 1500 04/29/23 0140 04/29/23 0822   BP: 98/68 102/66 110/75 116/72   BP Location: Right arm Right arm Right arm Right arm   Patient Position: Lying Lying Lying Lying   Pulse: 70 79 69 77   Resp: 16 16 18 16   Temp: 97.6 °F (36.4 °C) 98.5 °F (36.9 °C)  97.2 °F (36.2 °C)   TempSrc: Oral Oral  Oral   SpO2: 96% 97% 98% 98%   Weight:       Height:           Intake/Output last 3 shifts:  I/O last 3 completed shifts:  In: 480 [P.O.:480]  Out: -   Intake/Output this shift:  I/O this shift:  In: 240 [P.O.:240]  Out: -       Radiology  Imaging Results (Last 24 Hours)     ** No results found for the last 24 hours. **          Labs:  Results from last 7 days   Lab Units 04/27/23  1526   WBC 10*3/mm3 13.77*   HEMOGLOBIN g/dL 10.2*   HEMATOCRIT % 32.3*   PLATELETS 10*3/mm3 493*     Results from last 7 days   Lab Units 04/29/23  0141 04/27/23  2032   SODIUM mmol/L 138 131*   POTASSIUM mmol/L 4.1 3.8   CHLORIDE mmol/L 99 91*   CO2 mmol/L 29.0 27.0   BUN mg/dL 8 8   CREATININE mg/dL 0.95 1.03   CALCIUM mg/dL 9.2 9.3   BILIRUBIN mg/dL  --  0.3   ALK PHOS U/L  --  102   ALT (SGPT) U/L  --  11   AST (SGOT) U/L  --  15   GLUCOSE mg/dL 110* 144*         Results from last 7 days    Lab Units 04/27/23 2032 04/24/23  1123   ALBUMIN g/dL 3.5 3.4*                               Meds:   SCHEDULE  acetaminophen, 1,000 mg, Oral, TID  cefepime, 2 g, Intravenous, Q8H  desvenlafaxine, 50 mg, Oral, Daily  enoxaparin, 40 mg, Subcutaneous, Daily  folic acid, 1 mg, Oral, Daily  gabapentin, 300 mg, Oral, Nightly  levothyroxine, 100 mcg, Oral, Q AM  liothyronine, 5 mcg, Oral, Daily  metoprolol succinate XL, 100 mg, Oral, Daily  pantoprazole, 40 mg, Oral, Daily  senna-docusate sodium, 2 tablet, Oral, BID  sodium chloride, 10 mL, Intravenous, Q12H  vitamin B-12, 1,000 mcg, Oral, Daily  vitamin B-6, 100 mg, Oral, Q12H      Infusions     PRNs  •  acetaminophen **OR** acetaminophen  •  senna-docusate sodium **AND** polyethylene glycol **AND** bisacodyl **AND** bisacodyl  •  calcium carbonate  •  ipratropium-albuterol  •  LORazepam  •  melatonin  •  ondansetron **OR** ondansetron  •  oxyCODONE  •  sodium chloride  •  sodium chloride    Physical Exam:  General Appearance:  Alert   HEENT:  Normocephalic, without obvious abnormality, Conjunctiva/corneas clear,.   Nares normal, no drainage     Neck:  Supple, symmetrical, trachea midline.   Lungs /Chest wall:   Mild left apical rhonchi, respirations unlabored, symmetrical wall movement.     Heart:  Regular rate and rhythm, S1 S2 normal  Abdomen: Soft, non-tender, no masses, no organomegaly.    Extremities: No edema, no clubbing or cyanosis     ROS  Constitutional: Negative for chills, fever and malaise/fatigue.   HENT: Negative.    Eyes: Negative.    Cardiovascular: Negative.    Respiratory: Negative for cough and shortness of breath.    Skin: Negative.    Musculoskeletal: Negative.    Gastrointestinal: Negative.    Genitourinary: Negative.    Neurological: Negative.    Psychiatric/Behavioral: Negative.      I reviewed the recent clinical results    Much of this encounter note is an electronic transcription/translation of spoken language to printed text using Dragon  Software which might include inadvertent errors in transcription.

## 2023-04-30 ENCOUNTER — READMISSION MANAGEMENT (OUTPATIENT)
Dept: CALL CENTER | Facility: HOSPITAL | Age: 58
End: 2023-04-30
Payer: COMMERCIAL

## 2023-04-30 VITALS
HEART RATE: 76 BPM | BODY MASS INDEX: 22.63 KG/M2 | DIASTOLIC BLOOD PRESSURE: 85 MMHG | WEIGHT: 182 LBS | SYSTOLIC BLOOD PRESSURE: 124 MMHG | RESPIRATION RATE: 19 BRPM | OXYGEN SATURATION: 99 % | HEIGHT: 75 IN | TEMPERATURE: 97.4 F

## 2023-04-30 PROCEDURE — 0 CEFEPIME PER 500 MG: Performed by: INTERNAL MEDICINE

## 2023-04-30 PROCEDURE — 99231 SBSQ HOSP IP/OBS SF/LOW 25: CPT | Performed by: INTERNAL MEDICINE

## 2023-04-30 RX ORDER — AMOXICILLIN AND CLAVULANATE POTASSIUM 875; 125 MG/1; MG/1
1 TABLET, FILM COATED ORAL 2 TIMES DAILY
Qty: 14 TABLET | Refills: 0 | Status: SHIPPED | OUTPATIENT
Start: 2023-04-30 | End: 2023-05-07

## 2023-04-30 RX ADMIN — DESVENLAFAXINE SUCCINATE 50 MG: 50 TABLET, EXTENDED RELEASE ORAL at 08:40

## 2023-04-30 RX ADMIN — OXYCODONE 10 MG: 5 TABLET ORAL at 05:48

## 2023-04-30 RX ADMIN — SENNOSIDES AND DOCUSATE SODIUM 2 TABLET: 50; 8.6 TABLET ORAL at 08:40

## 2023-04-30 RX ADMIN — Medication 100 MG: at 08:40

## 2023-04-30 RX ADMIN — CEFEPIME 2 G: 2 INJECTION, POWDER, FOR SOLUTION INTRAVENOUS at 08:41

## 2023-04-30 RX ADMIN — CEFEPIME 2 G: 2 INJECTION, POWDER, FOR SOLUTION INTRAVENOUS at 01:00

## 2023-04-30 RX ADMIN — METOPROLOL SUCCINATE 100 MG: 50 TABLET, EXTENDED RELEASE ORAL at 08:40

## 2023-04-30 RX ADMIN — Medication 10 ML: at 08:41

## 2023-04-30 RX ADMIN — OXYCODONE 5 MG: 5 TABLET ORAL at 12:04

## 2023-04-30 RX ADMIN — CYANOCOBALAMIN TAB 1000 MCG 1000 MCG: 1000 TAB at 08:40

## 2023-04-30 RX ADMIN — PANTOPRAZOLE SODIUM 40 MG: 40 TABLET, DELAYED RELEASE ORAL at 08:40

## 2023-04-30 RX ADMIN — LEVOTHYROXINE SODIUM 100 MCG: 0.1 TABLET ORAL at 05:47

## 2023-04-30 RX ADMIN — LIOTHYRONINE SODIUM 5 MCG: 5 TABLET ORAL at 08:40

## 2023-04-30 RX ADMIN — NAPROXEN 500 MG: 250 TABLET ORAL at 08:40

## 2023-04-30 NOTE — DISCHARGE SUMMARY
[facility] Medicine Services  Discharge Summary    Date of Service: 23  Patient Name: Jc Driscoll  : 1965  MRN: [id number]    Date of Admission: 2023  Discharge Diagnosis: Bacterial pneumonia  Date of Discharge: 23  Primary Care Physician: Reshma Alvarenga MD      Presenting Problem:   Left upper lobe pneumonia [J18.9]    Active and Resolved Hospital Problems:  Active Hospital Problems    Diagnosis POA   • **Left upper lobe pneumonia [J18.9] Yes     Priority: Medium   • Erosive esophagitis [K22.10] Yes   • Non-small cell carcinoma of lung, right (HCC) [C34.91] Yes   • Diastolic CHF (HCC) [I50.30] Yes   • Hypertension [I10] Yes   • Anxiety and depression [F41.9, F32.A] Yes      Resolved Hospital Problems   No resolved problems to display.         Hospital Course     Jc Driscoll is a 57 y.o. male with past medical history of hypertension, hyperlipidemia, GERD, diastolic heart failure, prediabetes, anxiety depression, hypothyroidism and recurrent lung cancer currently on immunotherapy who arrived on 23 as direct admission from Dr Gaspar's oncology clinic account of BACILIO pneumonia and pleural effusion.  Started on vancomycin and Zosyn at time of admission, and since MRSA screen was negative, de-escalated to cefepime on pharmacy recommendation.   Remained clinically stable during hospital course.  Did not require supplemental oxygen.  Seen by pulmonology, no recommendation for bronchoscopy.  Refer to additional discussion below    #Left upper lobe bacterial pneumonia in immunocompromised subjective  -Chest CT: BACILIO consolidation, small right pleural effusion, small to moderate left pleural effusion  -MRSA screen negative, antibiotic de-escalated with pharmacy assistance to IV cefepime  -Negative blood cultures  -Discharged home on additional 7 days of oral Augmentin to complete total of 10 days antibiotics  -Would benefit from surveillance imaging in 4 to 6 weeks to  demonstrate resolution of pneumonia       #History of recurrent lung cancer, previously underwent chemotherapy, lung resection and radiotherapy. -Follow-up with Dr. Gaspar     #Immunocompromise status secondary to recurrent lung cancer diagnosis and immunotherapy     #Chronic diastolic heart failure, stable, euvolemic     #Major depression, in remission: Continue home Pristiq     #Essential hypertension: Continue home metoprolol     #Acquired hypothyroidism: Continue home levothyroxine and liothyronine     #GERD: continue PPI     #Activity: Encourage ambulation     DISCHARGE Follow Up Recommendations for labs and diagnostics:   Repeat chest x-ray in 4 to 6 weeks to demonstrate resolution of pneumonia      Day of Discharge     Vital Signs:  Temp:  [97.4 °F (36.3 °C)-97.7 °F (36.5 °C)] 97.4 °F (36.3 °C)  Heart Rate:  [67-76] 76  Resp:  [19-24] 19  BP: (124-133)/(83-85) 124/85    Physical Exam:  General appearance: Well developed male resting in bed,   not particularly ill-appearing, not toxic, not in distress,   Mental status: Alert and oriented x3  Head: Normocephalic and atraumatic  CVS: Regular heart sounds, no gallops, no murmurs  Respiration: Mild anterior chest wall tenderness to palpation,   no external injuries or bruises  Lungs: Unlabored breathing at rest, no wheezes, no rales  GI: Soft, nondistended, nontender, normal bowel sounds  Extremity: No leg edema, no gross deformities, no calf tenderness  Musculoskeletal: No gross deformities, symmetrical muscle mass  Neurology: Normal speech, no facial droop, moves all extremities  Psychiatry: Normal interaction, no hallucination, no agitation      Pertinent  and/or Most Recent Results     LAB RESULTS:      Lab 04/29/23  1046 04/27/23  2032 04/27/23  1526 04/24/23  1123   WBC 12.10*  --  13.77* 14.15*   HEMOGLOBIN 9.5*  --  10.2* 10.6*   HEMATOCRIT 30.7*  --  32.3* 33.6*   PLATELETS 467*  --  493* 552*   NEUTROS ABS  --   --  11.17* 11.45*   LYMPHS ABS  --   --   0.98 0.82   MONOS ABS  --   --  1.30* 1.56*   EOS ABS  --   --  0.24 0.25   MCV 83.4  --  84.8 85.5   LACTATE  --  0.9  --   --          Lab 04/29/23  0141 04/27/23 2032 04/24/23  1123   SODIUM 138 131* 131*   POTASSIUM 4.1 3.8 4.5   CHLORIDE 99 91* 92*   CO2 29.0 27.0 26.0   ANION GAP 10.0 13.0 13.0   BUN 8 8 9   CREATININE 0.95 1.03 0.79   EGFR 92.8 84.2 103.0   GLUCOSE 110* 144* 122*   CALCIUM 9.2 9.3 9.2         Lab 04/27/23 2032 04/24/23  1123   TOTAL PROTEIN 6.6 6.7   ALBUMIN 3.5 3.4*   GLOBULIN 3.1 3.3   ALT (SGPT) 11 11   AST (SGOT) 15 18   BILIRUBIN 0.3 <0.2   ALK PHOS 102 105                     Brief Urine Lab Results  (Last result in the past 365 days)      Color   Clarity   Blood   Leuk Est   Nitrite   Protein   CREAT   Urine HCG        12/06/22 1059 Yellow   Clear   Negative   Negative   Negative   Trace               Microbiology Results (last 10 days)     Procedure Component Value - Date/Time    MRSA Screen, PCR (Inpatient) - Swab, Nares [[id number]]  (Normal) Collected: 04/27/23 2142    Lab Status: Final result Specimen: Swab from Nares Updated: 04/27/23 2335     MRSA PCR No MRSA Detected    Narrative:      The negative predictive value of this diagnostic test is high and should only be used to consider de-escalating anti-MRSA therapy. A positive result may indicate colonization with MRSA and must be correlated clinically.    Blood Culture - Blood, Arm, Right [[id number]]  (Normal) Collected: 04/27/23 2032    Lab Status: Preliminary result Specimen: Blood from Arm, Right Updated: 04/29/23 2115     Blood Culture No growth at 2 days    Narrative:      Less than seven (7) mL's of blood was collected.  Insufficient quantity may yield false negative results.    Blood Culture - Blood, Arm, Left [[id number]]  (Normal) Collected: 04/27/23 1906    Lab Status: Preliminary result Specimen: Blood from Arm, Left Updated: 04/29/23 2115     Blood Culture No growth at 2 days    Narrative:      Less than seven (7)  mL's of blood was collected.  Insufficient quantity may yield false negative results.          CT Chest Without Contrast Diagnostic    Result Date: 4/27/2023  Impression: Impression: 1. No change from recent contrast-enhanced chest CT dated 4/25/2023 with consolidation with air bronchograms at left apex which may relate to pneumonia or posttreatment related consolidation. 2. 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 3. Small right and small to moderate left pleural effusions, unchanged. 4. Emphysema with postsurgical changes again noted related to prior right lower lobectomy and left upper lobectomy. Electronically Signed: Antoni Joser  4/27/2023 10:39 PM EDT  Workstation ID: [id number]      Results for orders placed during the hospital encounter of 12/05/22    Duplex Venous Lower Extremity - Bilateral CAR    Interpretation Summary  •  Normal bilateral lower extremity venous duplex scan.      Results for orders placed during the hospital encounter of 12/05/22    Duplex Venous Lower Extremity - Bilateral CAR    Interpretation Summary  •  Normal bilateral lower extremity venous duplex scan.      Results for orders placed during the hospital encounter of 11/11/22    Adult Transthoracic Echo Complete W/ Cont if Necessary Per Protocol    Interpretation Summary  •  Left ventricular ejection fraction appears to be 56 - 60%.  •  Estimated right ventricular systolic pressure from tricuspid regurgitation is mildly elevated (35-45 mmHg).    Indications  Chemotherapy    Technically satisfactory study.  Mitral valve is structurally normal.  Tricuspid valve is structurally normal.  Aortic valve is structurally normal.  Pulmonic valve could not be well visualized.  No evidence for mitral tricuspid or aortic regurgitation is seen by Doppler study.  Left atrium is normal in size.  Right atrium is normal in size.  Left ventricle is normal in size and contractility with  ejection fraction of 60%.  Right ventricle is normal in size.  Atrial septum is intact.  Aorta is normal.  No pericardial effusion or intracardiac thrombus is seen.    Impression  Structurally and functionally normal cardiac valves.  Left ventricular size and contractility is normal with ejection fraction of 60%.      Labs Pending at Discharge:  Pending Labs     Order Current Status    Blood Culture - Blood, Arm, Left Preliminary result    Blood Culture - Blood, Arm, Right Preliminary result          Procedures Performed           Consults:   Consults     Date and Time Order Name Status Description    4/28/2023  7:41 AM Inpatient Pulmonology Consult Completed     4/27/2023  5:37 PM Inpatient Hematology & Oncology Consult Completed             Discharge Details        Discharge Medications      New Medications      Instructions Start Date   amoxicillin-clavulanate 875-125 MG per tablet  Commonly known as: Augmentin   1 tablet, Oral, 2 Times Daily         Continue These Medications      Instructions Start Date   Constulose 10 GM/15ML solution  Generic drug: lactulose   TAKE  30 ML BY MOUTH  EVERY 12 HOURS      desvenlafaxine 50 MG 24 hr tablet  Commonly known as: PRISTIQ   50 mg, Oral, Daily      gabapentin 300 MG capsule  Commonly known as: NEURONTIN   300 mg, Oral, Nightly      levothyroxine 100 MCG tablet  Commonly known as: SYNTHROID, LEVOTHROID   100 mcg, Oral, Every Early Morning      lidocaine-prilocaine 2.5-2.5 % cream  Commonly known as: EMLA   Apply to port site 60 minutes prior to it being accessed      liothyronine 5 MCG tablet  Commonly known as: CYTOMEL   5 mcg, Oral, Daily      LORazepam 0.5 MG tablet  Commonly known as: ATIVAN   0.5 mg, Oral, Every 8 Hours PRN      metoprolol succinate  MG 24 hr tablet  Commonly known as: TOPROL-XL   100 mg, Oral, Daily      ondansetron 8 MG tablet  Commonly known as: ZOFRAN   TAKE 1 TABLET BY MOUTH THREE TIMES DAILY AS NEEDED FOR NAUSEA FOR VOMITING       oxyCODONE 5 MG immediate release tablet  Commonly known as: Roxicodone   5 mg, Oral, Every 8 Hours PRN      pantoprazole 40 MG EC tablet  Commonly known as: PROTONIX   40 mg, Oral, Daily PRN      vitamin B-12 1000 MCG tablet  Commonly known as: CYANOCOBALAMIN   1,000 mcg, Oral, Daily      vitamin B-6 100 MG tablet  Commonly known as: PYRIDOXINE   100 mg, Oral, Every 12 Hours             No Known Allergies      Discharge Disposition:   Home or Self Care    Diet:  Hospital:  Diet Order   Procedures   • Diet: Cardiac Diets; Healthy Heart (2-3 Na+); Texture: Regular Texture (IDDSI 7); Fluid Consistency: Thin (IDDSI 0)         Discharge Activity:   Activity Instructions     Activity as Tolerated              CODE STATUS:  Code Status and Medical Interventions:   Ordered at: 04/27/23 8789     Code Status (Patient has no pulse and is not breathing):    CPR (Attempt to Resuscitate)     Medical Interventions (Patient has pulse or is breathing):    Full Support         Future Appointments   Date Time Provider Department Center   5/1/2023  2:00 PM [facility] IR 1 [facility] IR [facility]   5/3/2023  1:30 PM Azucena Gaspar MD [facility] ONC NA [facility]   5/3/2023  1:30 PM LAB MD [facility] ONC LAB NA [facility] ONAL [facility]   5/15/2023 11:00 AM INF ROOM 25 - CHAIR [facility]  [facility] CC [facility]   8/1/2023  1:00 PM Chino Escalona MD [facility] RO [facility] None           Time spent on Discharge including face to face service:  35 minutes    Signature: Electronically signed by Vijaya Pereira MD, 04/30/23, 11:31 EDT.  [facility], [place] Hospitalist Team

## 2023-04-30 NOTE — PLAN OF CARE
Pt resting ok tonight. Pain mostly under control. Vitals are stable. Will continue to monitor.  Problem: Adult Inpatient Plan of Care  Goal: Absence of Hospital-Acquired Illness or Injury  Intervention: Identify and Manage Fall Risk  Recent Flowsheet Documentation  Taken 4/30/2023 0600 by Martin Bonilla LPN  Safety Promotion/Fall Prevention: safety round/check completed  Taken 4/30/2023 0440 by Martin Bonilla LPN  Safety Promotion/Fall Prevention:   activity supervised   assistive device/personal items within reach   clutter free environment maintained   fall prevention program maintained   lighting adjusted   mobility aid in reach   nonskid shoes/slippers when out of bed   room organization consistent   safety round/check completed  Taken 4/30/2023 0200 by Martin Bonilla LPN  Safety Promotion/Fall Prevention: safety round/check completed  Taken 4/30/2023 0000 by Martin Bonilla LPN  Safety Promotion/Fall Prevention:   activity supervised   assistive device/personal items within reach   clutter free environment maintained   fall prevention program maintained   lighting adjusted   mobility aid in reach   nonskid shoes/slippers when out of bed   room organization consistent   safety round/check completed  Taken 4/29/2023 2200 by Martin Bonilla LPN  Safety Promotion/Fall Prevention: safety round/check completed  Taken 4/29/2023 2000 by Martin Bonilla LPN  Safety Promotion/Fall Prevention:   activity supervised   assistive device/personal items within reach   clutter free environment maintained   fall prevention program maintained   lighting adjusted   mobility aid in reach   nonskid shoes/slippers when out of bed   room organization consistent   safety round/check completed  Intervention: Prevent Skin Injury  Recent Flowsheet Documentation  Taken 4/30/2023 0440 by Martin Bonilla LPN  Body Position: position changed independently  Taken 4/30/2023 0000 by Martin Bonilla LPN  Body Position: position  changed independently  Taken 4/29/2023 2000 by Martin Bonilla LPN  Body Position: position changed independently  Intervention: Prevent and Manage VTE (Venous Thromboembolism) Risk  Recent Flowsheet Documentation  Taken 4/29/2023 2000 by Martin Bonilla LPN  Activity Management: up ad pam  Intervention: Prevent Infection  Recent Flowsheet Documentation  Taken 4/30/2023 0440 by Martin Bonilla LPN  Infection Prevention:   visitors restricted/screened   single patient room provided   rest/sleep promoted   personal protective equipment utilized   hand hygiene promoted  Taken 4/30/2023 0000 by Martin Bonilla LPN  Infection Prevention:   visitors restricted/screened   single patient room provided   rest/sleep promoted   personal protective equipment utilized   hand hygiene promoted  Taken 4/29/2023 2000 by Martin Bonilla LPN  Infection Prevention:   visitors restricted/screened   single patient room provided   rest/sleep promoted   personal protective equipment utilized   hand hygiene promoted  Goal: Optimal Comfort and Wellbeing  Intervention: Monitor Pain and Promote Comfort  Recent Flowsheet Documentation  Taken 4/30/2023 0440 by Martin Bonilla LPN  Pain Management Interventions:   see MAR   quiet environment facilitated   pain management plan reviewed with patient/caregiver  Taken 4/30/2023 0000 by Martin Bonilla LPN  Pain Management Interventions:   see MAR   quiet environment facilitated   pain management plan reviewed with patient/caregiver  Taken 4/29/2023 2000 by Martin Bonilla LPN  Pain Management Interventions:   see MAR   quiet environment facilitated   pain management plan reviewed with patient/caregiver  Intervention: Provide Person-Centered Care  Recent Flowsheet Documentation  Taken 4/29/2023 2000 by Martin Bonilla LPN  Trust Relationship/Rapport:   care explained   questions answered   questions encouraged   Goal Outcome Evaluation:

## 2023-04-30 NOTE — PROGRESS NOTES
Daily Progress Note          Assessment    Left upper lobe pneumonia  Left pleural effusion  Chest pain due to pleurisy  History of recurrent lung carcinoma status post chemo and resection and radiation, currently on immunotherapy  Chronic diastolic heart failure  Essential hypertension  HLD  Hypothyroidism  GERD  Leukocytosis  Anemia     Recommendations:  Patient stable from pulmonary standpoint and can be discharged home   currently oxygenating well on room air  Antibiotics  NSAIDs for the pleurisy  Bronchodilatores  Blood pressure control  Thyroid hormone replacement  DVT/GI prophylaxis                     LOS: 3 days     Subjective     Patient reports partial improvement in the right-sided chest pain    Objective     Vital signs for last 24 hours:  Vitals:    04/29/23 0822 04/29/23 1615 04/29/23 2322 04/30/23 0847   BP: 116/72 133/83 124/83 124/85   BP Location: Right arm  Right arm Right arm   Patient Position: Lying  Lying Lying   Pulse: 77 71 67 76   Resp: 16 24 19    Temp: 97.2 °F (36.2 °C) 97.6 °F (36.4 °C) 97.7 °F (36.5 °C) 97.4 °F (36.3 °C)   TempSrc: Oral  Oral Oral   SpO2: 98% 99% 96% 99%   Weight:       Height:           Intake/Output last 3 shifts:  I/O last 3 completed shifts:  In: 720 [P.O.:720]  Out: -   Intake/Output this shift:  No intake/output data recorded.      Radiology  Imaging Results (Last 24 Hours)     ** No results found for the last 24 hours. **          Labs:  Results from last 7 days   Lab Units 04/29/23  1046   WBC 10*3/mm3 12.10*   HEMOGLOBIN g/dL 9.5*   HEMATOCRIT % 30.7*   PLATELETS 10*3/mm3 467*     Results from last 7 days   Lab Units 04/29/23  0141 04/27/23  2032   SODIUM mmol/L 138 131*   POTASSIUM mmol/L 4.1 3.8   CHLORIDE mmol/L 99 91*   CO2 mmol/L 29.0 27.0   BUN mg/dL 8 8   CREATININE mg/dL 0.95 1.03   CALCIUM mg/dL 9.2 9.3   BILIRUBIN mg/dL  --  0.3   ALK PHOS U/L  --  102   ALT (SGPT) U/L  --  11   AST (SGOT) U/L  --  15   GLUCOSE mg/dL 110* 144*         Results from  last 7 days   Lab Units 04/27/23 2032 04/24/23  1123   ALBUMIN g/dL 3.5 3.4*                               Meds:   SCHEDULE  cefepime, 2 g, Intravenous, Q8H  desvenlafaxine, 50 mg, Oral, Daily  enoxaparin, 40 mg, Subcutaneous, Daily  folic acid, 1 mg, Oral, Daily  gabapentin, 300 mg, Oral, Nightly  levothyroxine, 100 mcg, Oral, Q AM  liothyronine, 5 mcg, Oral, Daily  metoprolol succinate XL, 100 mg, Oral, Daily  naproxen, 500 mg, Oral, BID With Meals  pantoprazole, 40 mg, Oral, Daily  senna-docusate sodium, 2 tablet, Oral, BID  sodium chloride, 10 mL, Intravenous, Q12H  vitamin B-12, 1,000 mcg, Oral, Daily  vitamin B-6, 100 mg, Oral, Q12H      Infusions     PRNs  •  acetaminophen **OR** acetaminophen  •  senna-docusate sodium **AND** polyethylene glycol **AND** bisacodyl **AND** bisacodyl  •  calcium carbonate  •  ipratropium-albuterol  •  LORazepam  •  melatonin  •  ondansetron **OR** ondansetron  •  oxyCODONE  •  sodium chloride  •  sodium chloride    Physical Exam:  General Appearance:  Alert   HEENT:  Normocephalic, without obvious abnormality, Conjunctiva/corneas clear,.   Nares normal, no drainage     Neck:  Supple, symmetrical, trachea midline.   Lungs /Chest wall: Significant improvement in the mild left apical rhonchi, respirations unlabored, symmetrical wall movement.     Heart:  Regular rate and rhythm, S1 S2 normal  Abdomen: Soft, non-tender, no masses, no organomegaly.    Extremities: No edema, no clubbing or cyanosis     ROS  Constitutional: Negative for chills, fever and malaise/fatigue.   HENT: Negative.    Eyes: Negative.    Cardiovascular: Negative.    Respiratory: Negative for cough and shortness of breath.  The left pleuritic chest pain is better  Skin: Negative.    Musculoskeletal: Negative.    Gastrointestinal: Negative.    Genitourinary: Negative.    Neurological: Negative.    Psychiatric/Behavioral: Negative.      I reviewed the recent clinical results    Much of this encounter note is an  electronic transcription/translation of spoken language to printed text using Dragon Software which might include inadvertent errors in transcription.

## 2023-04-30 NOTE — PROGRESS NOTES
Hematology/Oncology Inpatient Progress Note    PATIENT NAME: Jc Driscoll  : 1965  MRN: [id number]    CHIEF COMPLAINT: Left chest pain. Dyspnea.     HISTORY OF PRESENT ILLNESS:      23  Hematology/Oncology was consulted on a patient known to us and established in the office with Dr. 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  The patient initially presented in  with moderately differentiated adenocarcinoma of the right lung for which he underwent concurrent chemotherapy and radiation neoadjuvantly followed by a right lower lobectomy.  This was followed by immunotherapy with durvalumab for 24 cycles ending in 2020.  In 2022 a routine surveillance CT of the chest revealed a left upper lobe pulmonary nodule for which a subsequent PET/CT was done that showed mild uptake corresponding to the nodule.  In 2022 a CT-guided biopsy was attempted but aborted due to finding by the radiologist that the lung nodularity was actually a clump of tiny nodules of which the largest was 6 mm and therefore biopsy cannot be completed.  Also the area was in the vicinity of multiple blood vessels with increased risk of bleeding.  Follow-up CT of the chest was recommended for continued surveillance.  In May 2022 the patient underwent a CT-guided biopsy of the left enlarging nodule and pathology was positive for invasive poorly differentiated adenocarcinoma most consistent with an adenocarcinoma of pulmonary origin.  Subsequent PET showed a 2.3 cm hypermetabolic left upper lobe nodule and no convincing metastatic disease elsewhere brain MRI was negative for any intracranial metastasis.  On 2022 patient underwent a left upper lobe lobectomy which was followed by adjuvant chemotherapy.  After completion of chemotherapy the patient was initiated on atezolizumab along with XRT.  Radiation was completed 2023 and the patient remains on  "Tecentriq every 3 weeks.     Patient complains of exquisite tenderness involving the left chest wall.  There was no trauma there is no redness.  The port is on the left chest wall.  He has had minimal cough also has pain on the upper left back.  Patient has required more pain medications due to the above.  He had a CT scan of the chest done which shows increased consolidation in the left upper lobe suspicious for infection versus post radiation.  Patient was then directed to come to the hospital for admission IV antibiotics pulmonary consult.  Was also noted that he has left pleural effusion which is moderate in size    Subjective   4/30/2023: Feeling much better. \"Better than in the previous 3 weeks\". Had been able to eat and had no nausea or vomiting. No fevers. The chest pain had improved significantly.     ROS:  Review of Systems   Constitutional: Positive for fatigue. Negative for activity change, appetite change, chills, diaphoresis, fever and unexpected weight change.   HENT: Negative for congestion, dental problem, drooling, ear discharge, ear pain, facial swelling, hearing loss, mouth sores, nosebleeds, postnasal drip, rhinorrhea, sinus pressure, sinus pain, sneezing, sore throat, tinnitus, trouble swallowing and voice change.    Eyes: Negative for photophobia, pain, discharge, redness, itching and visual disturbance.   Respiratory: Negative for apnea, cough, choking, chest tightness, shortness of breath, wheezing and stridor.         The chest pain has improved very significantly.    Cardiovascular: Negative for chest pain, palpitations and leg swelling.   Gastrointestinal: Negative for abdominal distention, abdominal pain, anal bleeding, blood in stool, constipation, diarrhea, nausea, rectal pain and vomiting.   Endocrine: Negative for cold intolerance, heat intolerance, polydipsia and polyuria.   Genitourinary: Negative for decreased urine volume, difficulty urinating, dysuria, flank pain, frequency, " "genital sores, hematuria and urgency.   Musculoskeletal: Negative for arthralgias, back pain, gait problem, joint swelling, myalgias, neck pain and neck stiffness.   Skin: Negative for color change, pallor and rash.   Neurological: Negative for dizziness, tremors, seizures, syncope, facial asymmetry, speech difficulty, weakness, light-headedness, numbness and headaches.   Hematological: Negative for adenopathy. Does not bruise/bleed easily.   Psychiatric/Behavioral: Negative for agitation, behavioral problems, confusion, decreased concentration, hallucinations, self-injury, sleep disturbance and suicidal ideas. The patient is not nervous/anxious.       MEDICATIONS:    Scheduled Meds:  cefepime, 2 g, Intravenous, Q8H  desvenlafaxine, 50 mg, Oral, Daily  enoxaparin, 40 mg, Subcutaneous, Daily  folic acid, 1 mg, Oral, Daily  gabapentin, 300 mg, Oral, Nightly  levothyroxine, 100 mcg, Oral, Q AM  liothyronine, 5 mcg, Oral, Daily  metoprolol succinate XL, 100 mg, Oral, Daily  naproxen, 500 mg, Oral, BID With Meals  pantoprazole, 40 mg, Oral, Daily  senna-docusate sodium, 2 tablet, Oral, BID  sodium chloride, 10 mL, Intravenous, Q12H  vitamin B-12, 1,000 mcg, Oral, Daily  vitamin B-6, 100 mg, Oral, Q12H       Continuous Infusions:      PRN Meds:  •  acetaminophen **OR** acetaminophen  •  senna-docusate sodium **AND** polyethylene glycol **AND** bisacodyl **AND** bisacodyl  •  calcium carbonate  •  ipratropium-albuterol  •  LORazepam  •  melatonin  •  ondansetron **OR** ondansetron  •  oxyCODONE  •  sodium chloride  •  sodium chloride     ALLERGIES:  No Known Allergies    Objective    VITALS:   /85 (BP Location: Right arm, Patient Position: Lying)   Pulse 76   Temp 97.4 °F (36.3 °C) (Oral)   Resp 19   Ht 190.5 cm (75\")   Wt 82.6 kg (182 lb)   SpO2 99%   BMI 22.75 kg/m²     PHYSICAL EXAM: (performed by MD)  Physical Exam  Constitutional:       General: He is not in acute distress.     Appearance: Normal " appearance. He is not ill-appearing, toxic-appearing or diaphoretic.   HENT:      Head: Normocephalic and atraumatic.      Right Ear: External ear normal.      Left Ear: External ear normal.      Nose: Nose normal.      Mouth/Throat:      Mouth: Mucous membranes are moist.      Pharynx: Oropharynx is clear.   Eyes:      General: No scleral icterus.        Right eye: No discharge.         Left eye: No discharge.      Conjunctiva/sclera: Conjunctivae normal.      Pupils: Pupils are equal, round, and reactive to light.   Cardiovascular:      Rate and Rhythm: Normal rate and regular rhythm.      Pulses: Normal pulses.      Heart sounds: Normal heart sounds. No murmur heard.    No friction rub. No gallop.   Pulmonary:      Effort: No respiratory distress.      Breath sounds: No stridor. No wheezing, rhonchi or rales.      Comments: The breath sounds are diminished but clear. They're particularly diminished in the left upper lobe.   Chest:      Chest wall: No tenderness.   Abdominal:      General: Abdomen is flat. Bowel sounds are normal. There is no distension.      Palpations: Abdomen is soft. There is no mass.      Tenderness: There is no abdominal tenderness. There is no right CVA tenderness, left CVA tenderness, guarding or rebound.   Musculoskeletal:         General: No swelling, tenderness, deformity or signs of injury.      Cervical back: No rigidity.      Right lower leg: No edema.      Left lower leg: No edema.   Lymphadenopathy:      Cervical: No cervical adenopathy.   Skin:     General: Skin is warm and dry.      Coloration: Skin is not jaundiced.      Findings: No bruising or rash.   Neurological:      General: No focal deficit present.      Mental Status: He is alert and oriented to person, place, and time.      Gait: Gait normal.   Psychiatric:         Mood and Affect: Mood normal.         Behavior: Behavior normal.         Thought Content: Thought content normal.         Judgment: Judgment normal.     I  Lowell Ba MD performed the physical exam on 4/30/2023 as documented above.       RECENT LABS:  Lab Results (last 24 hours)     Procedure Component Value Units Date/Time    Blood Culture - Blood, Arm, Left [[id number]]  (Normal) Collected: 04/27/23 1906    Specimen: Blood from Arm, Left Updated: 04/29/23 2115     Blood Culture No growth at 2 days    Narrative:      Less than seven (7) mL's of blood was collected.  Insufficient quantity may yield false negative results.    Blood Culture - Blood, Arm, Right [[id number]]  (Normal) Collected: 04/27/23 2032    Specimen: Blood from Arm, Right Updated: 04/29/23 2115     Blood Culture No growth at 2 days    Narrative:      Less than seven (7) mL's of blood was collected.  Insufficient quantity may yield false negative results.    CBC (No Diff) [[id number]]  (Abnormal) Collected: 04/29/23 1046    Specimen: Blood Updated: 04/29/23 1130     WBC 12.10 10*3/mm3      RBC 3.68 10*6/mm3      Hemoglobin 9.5 g/dL      Hematocrit 30.7 %      MCV 83.4 fL      MCH 25.9 pg      MCHC 31.0 g/dL      RDW 16.8 %      RDW-SD 49.4 fl      MPV 6.7 fL      Platelets 467 10*3/mm3         IMAGING REVIEWED:  No radiology results for the last day    Assessment & Plan   ASSESSMENT:  1. Pneumonia? On antibiotics. Will continue same for now.   2. Non small cell lung cancer. He will continue treatment with Dr. Gaspar who will be back on 5/1/2023    PLAN:  1. As above.     Lowell Ba MD on 4/30/2023 at 9:40 AM.

## 2023-05-01 NOTE — PAYOR COMM NOTE
"This is discharge notification for Jc Driscoll   Reference/Auth # [id number]  Pt discharged on 4/30/23    PENDING INPATIENT AUTHORIZATION REQUEST. PLEASE CALL OR FAX DETERMINATION TO CONTACT BELOW.     Jannette Lovett, RN, BSN  Utilization Review Nurse  [facility]  Direct & confidential phone # [phone]  Fax # [phone]      Jc Driscoll (57 y.o. Male)     Date of Birth   1965    Social Security Number       Address   [address] [place] [facility] [place] [facility]    Home Phone   [phone]    MRN   [id number]       Holiness   None    Marital Status                               Admission Date   4/27/23    Admission Type   Elective    Admitting Provider   Vijaya Pereira MD    Attending Provider       Department, Room/Bed   [facility] 3A MEDICAL INPATIENT, 301/1       Discharge Date   4/30/2023    Discharge Disposition   Home or Self Care    Discharge Destination                               Attending Provider: (none)   Allergies: No Known Allergies    Isolation: None   Infection: COVID Screen (preop/placement) (07/06/22)   Code Status: Prior    Ht: 190.5 cm (75\")   Wt: 82.6 kg (182 lb)    Admission Cmt: None   Principal Problem: Left upper lobe pneumonia [J18.9]                 Active Insurance as of 4/27/2023     Primary Coverage     Payor Plan Insurance Group Employer/Plan Group    [facility] [id number] [facility] PPO [id number]     Payor Plan Address Payor Plan Phone Number Payor Plan Fax Number Effective Dates    [address] [phone]  1/1/2014 - None Entered    [place] [address]       Subscriber Name Subscriber Birth Date Member ID       JC DRISCOLL 1965 [id number]                 Emergency Contacts      (Rel.) Home Phone Work Phone Mobile Phone    VERN DRISCOLL (Spouse) [phone] -- [phone]               Discharge Summary      Vijaya Pereira MD at 04/30/23 1131                       [facility] Medicine " Services  Discharge Summary    Date of Service: 23  Patient Name: Jc Driscoll  : 1965  MRN: [id number]    Date of Admission: 2023  Discharge Diagnosis: Bacterial pneumonia  Date of Discharge: 23  Primary Care Physician: Reshma Alvarenga MD      Presenting Problem:   Left upper lobe pneumonia [J18.9]    Active and Resolved Hospital Problems:  Active Hospital Problems    Diagnosis POA   • **Left upper lobe pneumonia [J18.9] Yes     Priority: Medium   • Erosive esophagitis [K22.10] Yes   • Non-small cell carcinoma of lung, right (HCC) [C34.91] Yes   • Diastolic CHF (HCC) [I50.30] Yes   • Hypertension [I10] Yes   • Anxiety and depression [F41.9, F32.A] Yes      Resolved Hospital Problems   No resolved problems to display.         Hospital Course     Jc Driscoll is a 57 y.o. male with past medical history of hypertension, hyperlipidemia, GERD, diastolic heart failure, prediabetes, anxiety depression, hypothyroidism and recurrent lung cancer currently on immunotherapy who arrived on 23 as direct admission from Dr Gaspar's oncology clinic account of BACILIO pneumonia and pleural effusion.  Started on vancomycin and Zosyn at time of admission, and since MRSA screen was negative, de-escalated to cefepime on pharmacy recommendation.   Remained clinically stable during hospital course.  Did not require supplemental oxygen.  Seen by pulmonology, no recommendation for bronchoscopy.  Refer to additional discussion below    #Left upper lobe bacterial pneumonia in immunocompromised subjective  -Chest CT: BACILIO consolidation, small right pleural effusion, small to moderate left pleural effusion  -MRSA screen negative, antibiotic de-escalated with pharmacy assistance to IV cefepime  -Negative blood cultures  -Discharged home on additional 7 days of oral Augmentin to complete total of 10 days antibiotics  -Would benefit from surveillance imaging in 4 to 6 weeks to demonstrate resolution of  pneumonia       #History of recurrent lung cancer, previously underwent chemotherapy, lung resection and radiotherapy. -Follow-up with Dr. Gaspar     #Immunocompromise status secondary to recurrent lung cancer diagnosis and immunotherapy     #Chronic diastolic heart failure, stable, euvolemic     #Major depression, in remission: Continue home Pristiq     #Essential hypertension: Continue home metoprolol     #Acquired hypothyroidism: Continue home levothyroxine and liothyronine     #GERD: continue PPI     #Activity: Encourage ambulation     DISCHARGE Follow Up Recommendations for labs and diagnostics:   Repeat chest x-ray in 4 to 6 weeks to demonstrate resolution of pneumonia      Day of Discharge     Vital Signs:  Temp:  [97.4 °F (36.3 °C)-97.7 °F (36.5 °C)] 97.4 °F (36.3 °C)  Heart Rate:  [67-76] 76  Resp:  [19-24] 19  BP: (124-133)/(83-85) 124/85    Physical Exam:  General appearance: Well developed male resting in bed,   not particularly ill-appearing, not toxic, not in distress,   Mental status: Alert and oriented x3  Head: Normocephalic and atraumatic  CVS: Regular heart sounds, no gallops, no murmurs  Respiration: Mild anterior chest wall tenderness to palpation,   no external injuries or bruises  Lungs: Unlabored breathing at rest, no wheezes, no rales  GI: Soft, nondistended, nontender, normal bowel sounds  Extremity: No leg edema, no gross deformities, no calf tenderness  Musculoskeletal: No gross deformities, symmetrical muscle mass  Neurology: Normal speech, no facial droop, moves all extremities  Psychiatry: Normal interaction, no hallucination, no agitation      Pertinent  and/or Most Recent Results     LAB RESULTS:      Lab 04/29/23  1046 04/27/23  2032 04/27/23  1526 04/24/23  1123   WBC 12.10*  --  13.77* 14.15*   HEMOGLOBIN 9.5*  --  10.2* 10.6*   HEMATOCRIT 30.7*  --  32.3* 33.6*   PLATELETS 467*  --  493* 552*   NEUTROS ABS  --   --  11.17* 11.45*   LYMPHS ABS  --   --  0.98 0.82   MONOS ABS  --    --  1.30* 1.56*   EOS ABS  --   --  0.24 0.25   MCV 83.4  --  84.8 85.5   LACTATE  --  0.9  --   --          Lab 04/29/23  0141 04/27/23 2032 04/24/23  1123   SODIUM 138 131* 131*   POTASSIUM 4.1 3.8 4.5   CHLORIDE 99 91* 92*   CO2 29.0 27.0 26.0   ANION GAP 10.0 13.0 13.0   BUN 8 8 9   CREATININE 0.95 1.03 0.79   EGFR 92.8 84.2 103.0   GLUCOSE 110* 144* 122*   CALCIUM 9.2 9.3 9.2         Lab 04/27/23 2032 04/24/23  1123   TOTAL PROTEIN 6.6 6.7   ALBUMIN 3.5 3.4*   GLOBULIN 3.1 3.3   ALT (SGPT) 11 11   AST (SGOT) 15 18   BILIRUBIN 0.3 <0.2   ALK PHOS 102 105                     Brief Urine Lab Results  (Last result in the past 365 days)      Color   Clarity   Blood   Leuk Est   Nitrite   Protein   CREAT   Urine HCG        12/06/22 1059 Yellow   Clear   Negative   Negative   Negative   Trace               Microbiology Results (last 10 days)     Procedure Component Value - Date/Time    MRSA Screen, PCR (Inpatient) - Swab, Nares [[id number]]  (Normal) Collected: 04/27/23 2142    Lab Status: Final result Specimen: Swab from Nares Updated: 04/27/23 2335     MRSA PCR No MRSA Detected    Narrative:      The negative predictive value of this diagnostic test is high and should only be used to consider de-escalating anti-MRSA therapy. A positive result may indicate colonization with MRSA and must be correlated clinically.    Blood Culture - Blood, Arm, Right [[id number]]  (Normal) Collected: 04/27/23 2032    Lab Status: Preliminary result Specimen: Blood from Arm, Right Updated: 04/29/23 2115     Blood Culture No growth at 2 days    Narrative:      Less than seven (7) mL's of blood was collected.  Insufficient quantity may yield false negative results.    Blood Culture - Blood, Arm, Left [[id number]]  (Normal) Collected: 04/27/23 1906    Lab Status: Preliminary result Specimen: Blood from Arm, Left Updated: 04/29/23 2115     Blood Culture No growth at 2 days    Narrative:      Less than seven (7) mL's of blood was collected.   Insufficient quantity may yield false negative results.          CT Chest Without Contrast Diagnostic    Result Date: 4/27/2023  Impression: Impression: 1. No change from recent contrast-enhanced chest CT dated 4/25/2023 with consolidation with air bronchograms at left apex which may relate to pneumonia or posttreatment related consolidation. 2. 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 3. Small right and small to moderate left pleural effusions, unchanged. 4. Emphysema with postsurgical changes again noted related to prior right lower lobectomy and left upper lobectomy. Electronically Signed: Antoni Deal  4/27/2023 10:39 PM EDT  Workstation ID: [id number]      Results for orders placed during the hospital encounter of 12/05/22    Duplex Venous Lower Extremity - Bilateral CAR    Interpretation Summary  •  Normal bilateral lower extremity venous duplex scan.      Results for orders placed during the hospital encounter of 12/05/22    Duplex Venous Lower Extremity - Bilateral CAR    Interpretation Summary  •  Normal bilateral lower extremity venous duplex scan.      Results for orders placed during the hospital encounter of 11/11/22    Adult Transthoracic Echo Complete W/ Cont if Necessary Per Protocol    Interpretation Summary  •  Left ventricular ejection fraction appears to be 56 - 60%.  •  Estimated right ventricular systolic pressure from tricuspid regurgitation is mildly elevated (35-45 mmHg).    Indications  Chemotherapy    Technically satisfactory study.  Mitral valve is structurally normal.  Tricuspid valve is structurally normal.  Aortic valve is structurally normal.  Pulmonic valve could not be well visualized.  No evidence for mitral tricuspid or aortic regurgitation is seen by Doppler study.  Left atrium is normal in size.  Right atrium is normal in size.  Left ventricle is normal in size and contractility with ejection fraction of 60%.  Right  ventricle is normal in size.  Atrial septum is intact.  Aorta is normal.  No pericardial effusion or intracardiac thrombus is seen.    Impression  Structurally and functionally normal cardiac valves.  Left ventricular size and contractility is normal with ejection fraction of 60%.      Labs Pending at Discharge:  Pending Labs     Order Current Status    Blood Culture - Blood, Arm, Left Preliminary result    Blood Culture - Blood, Arm, Right Preliminary result          Procedures Performed           Consults:   Consults     Date and Time Order Name Status Description    4/28/2023  7:41 AM Inpatient Pulmonology Consult Completed     4/27/2023  5:37 PM Inpatient Hematology & Oncology Consult Completed             Discharge Details        Discharge Medications      New Medications      Instructions Start Date   amoxicillin-clavulanate 875-125 MG per tablet  Commonly known as: Augmentin   1 tablet, Oral, 2 Times Daily         Continue These Medications      Instructions Start Date   Constulose 10 GM/15ML solution  Generic drug: lactulose   TAKE  30 ML BY MOUTH  EVERY 12 HOURS      desvenlafaxine 50 MG 24 hr tablet  Commonly known as: PRISTIQ   50 mg, Oral, Daily      gabapentin 300 MG capsule  Commonly known as: NEURONTIN   300 mg, Oral, Nightly      levothyroxine 100 MCG tablet  Commonly known as: SYNTHROID, LEVOTHROID   100 mcg, Oral, Every Early Morning      lidocaine-prilocaine 2.5-2.5 % cream  Commonly known as: EMLA   Apply to port site 60 minutes prior to it being accessed      liothyronine 5 MCG tablet  Commonly known as: CYTOMEL   5 mcg, Oral, Daily      LORazepam 0.5 MG tablet  Commonly known as: ATIVAN   0.5 mg, Oral, Every 8 Hours PRN      metoprolol succinate  MG 24 hr tablet  Commonly known as: TOPROL-XL   100 mg, Oral, Daily      ondansetron 8 MG tablet  Commonly known as: ZOFRAN   TAKE 1 TABLET BY MOUTH THREE TIMES DAILY AS NEEDED FOR NAUSEA FOR VOMITING      oxyCODONE 5 MG immediate release  tablet  Commonly known as: Roxicodone   5 mg, Oral, Every 8 Hours PRN      pantoprazole 40 MG EC tablet  Commonly known as: PROTONIX   40 mg, Oral, Daily PRN      vitamin B-12 1000 MCG tablet  Commonly known as: CYANOCOBALAMIN   1,000 mcg, Oral, Daily      vitamin B-6 100 MG tablet  Commonly known as: PYRIDOXINE   100 mg, Oral, Every 12 Hours             No Known Allergies      Discharge Disposition:   Home or Self Care    Diet:  Hospital:  Diet Order   Procedures   • Diet: Cardiac Diets; Healthy Heart (2-3 Na+); Texture: Regular Texture (IDDSI 7); Fluid Consistency: Thin (IDDSI 0)         Discharge Activity:   Activity Instructions     Activity as Tolerated              CODE STATUS:  Code Status and Medical Interventions:   Ordered at: 04/27/23 9727     Code Status (Patient has no pulse and is not breathing):    CPR (Attempt to Resuscitate)     Medical Interventions (Patient has pulse or is breathing):    Full Support         Future Appointments   Date Time Provider Department Center   5/1/2023  2:00 PM [facility] IR 1 [facility] IR [facility]   5/3/2023  1:30 PM Azucena Gaspar MD [facility] ONC NA [facility]   5/3/2023  1:30 PM LAB MD [facility] ONC LAB NA [facility] ONAL [facility]   5/15/2023 11:00 AM INF ROOM 25 - CHAIR [facility] CC [facility]   8/1/2023  1:00 PM Chino Escalona MD [facility] RO [facility] None           Time spent on Discharge including face to face service:  35 minutes    Signature: Electronically signed by Vijaya Pereira MD, 04/30/23, 11:31 EDT.  [facility] Hospitalist Team    Electronically signed by Vijaya Pereira MD at 04/30/23 2123

## 2023-05-01 NOTE — OUTREACH NOTE
Prep Survey    Flowsheet Row Responses   [facility] facility patient discharged from? [facility]   Is LACE score < 7 ? No   Eligibility Readm Mgmt   Discharge diagnosis Left upper lobe pneumonia   Does the patient have one of the following disease processes/diagnoses(primary or secondary)? Pneumonia   Does the patient have Home health ordered? No   Is there a DME ordered? No   Prep survey completed? Yes          Katie PONCE - Registered Nurse

## 2023-05-01 NOTE — PAYOR COMM NOTE
"This is a clinical update for Jc Driscoll   Reference/Auth # [id number]    EXTENDED AUTHORIZATION PENDING:   PREVIOUSLY FAXED DISCHARGE NOTICE TODAY AT 3934    Please call or fax determination to contact below.   Thank you.    Jannette Lovett, RN, BSN  Utilization Review Nurse  [facility]  Direct & confidential phone # [phone]  Fax # [phone]      DriscollJc (57 y.o. Male)     Date of Birth   1965    Social Security Number       Address   [address] [place] [facility]    Home Phone   [phone]    MRN   [id number]       Hindu   None    Marital Status                               Admission Date   4/27/23    Admission Type   Elective    Admitting Provider   Vijaya Pereira MD    Attending Provider       Department, Room/Bed   [facility] 3A MEDICAL INPATIENT, 301/1       Discharge Date   4/30/2023    Discharge Disposition   Home or Self Care    Discharge Destination                               Attending Provider: (none)   Allergies: No Known Allergies    Isolation: None   Infection: COVID Screen (preop/placement) (07/06/22)   Code Status: Prior    Ht: 190.5 cm (75\")   Wt: 82.6 kg (182 lb)    Admission Cmt: None   Principal Problem: Left upper lobe pneumonia [J18.9]                 Active Insurance as of 4/27/2023     Primary Coverage     Payor Plan Insurance Group Employer/Plan Group    [employer] [facility] PPO [id number]     Payor Plan Address Payor Plan Phone Number Payor Plan Fax Number Effective Dates    [address] [phone]  1/1/2014 - None Entered    [place] [address]       Subscriber Name Subscriber Birth Date Member ID       JC DRISCOLL 1965 [id number]                 Emergency Contacts      (Rel.) Home Phone Work Phone Mobile Phone    VERN DRISCOLL (Spouse) [phone] -- [phone]            Vital Signs (last day) before discharge     Date/Time Temp Temp src Pulse Resp BP " Patient Position SpO2    04/30/23 0847 97.4 (36.3) Oral 76 -- 124/85 Lying 99    04/29/23 2322 97.7 (36.5) Oral 67 19 124/83 Lying 96    04/29/23 1615 97.6 (36.4) -- 71 24 133/83 -- 99    04/29/23 0822 97.2 (36.2) Oral 77 16 116/72 Lying 98    04/29/23 0140 -- -- 69 18 110/75 Lying 98          Oxygen Therapy (last day) before discharge     Date/Time SpO2 Device (Oxygen Therapy) Flow (L/min) Oxygen Concentration (%) ETCO2 (mmHg)    04/30/23 0850 -- room air -- -- --    04/30/23 0847 99 room air -- -- --    04/30/23 0440 -- room air -- -- --    04/30/23 0000 -- room air -- -- --    04/29/23 2322 96 room air -- -- --    04/29/23 2000 -- room air -- -- --    04/29/23 1615 99 -- -- -- --    04/29/23 0840 -- room air -- -- --    04/29/23 0822 98 room air -- -- --    04/29/23 0140 98 -- -- -- --          Operative/Procedure Notes (all)    No notes of this type exist for this encounter.            Physician Progress Notes (last 24 hours)      Marvel Rodriguez MD at 04/30/23 1230          Daily Progress Note          Assessment    Left upper lobe pneumonia  Left pleural effusion  Chest pain due to pleurisy  History of recurrent lung carcinoma status post chemo and resection and radiation, currently on immunotherapy  Chronic diastolic heart failure  Essential hypertension  HLD  Hypothyroidism  GERD  Leukocytosis  Anemia     Recommendations:  Patient stable from pulmonary standpoint and can be discharged home   currently oxygenating well on room air  Antibiotics  NSAIDs for the pleurisy  Bronchodilatores  Blood pressure control  Thyroid hormone replacement  DVT/GI prophylaxis                     LOS: 3 days     Subjective     Patient reports partial improvement in the right-sided chest pain    Objective     Vital signs for last 24 hours:  Vitals:    04/29/23 0822 04/29/23 1615 04/29/23 2322 04/30/23 0847   BP: 116/72 133/83 124/83 124/85   BP Location: Right arm  Right arm Right arm   Patient Position: Lying  Lying Lying    Pulse: 77 71 67 76   Resp: 16 24 19    Temp: 97.2 °F (36.2 °C) 97.6 °F (36.4 °C) 97.7 °F (36.5 °C) 97.4 °F (36.3 °C)   TempSrc: Oral  Oral Oral   SpO2: 98% 99% 96% 99%   Weight:       Height:           Intake/Output last 3 shifts:  I/O last 3 completed shifts:  In: 720 [P.O.:720]  Out: -   Intake/Output this shift:  No intake/output data recorded.      Radiology  Imaging Results (Last 24 Hours)     ** No results found for the last 24 hours. **          Labs:  Results from last 7 days   Lab Units 04/29/23  1046   WBC 10*3/mm3 12.10*   HEMOGLOBIN g/dL 9.5*   HEMATOCRIT % 30.7*   PLATELETS 10*3/mm3 467*     Results from last 7 days   Lab Units 04/29/23  0141 04/27/23  2032   SODIUM mmol/L 138 131*   POTASSIUM mmol/L 4.1 3.8   CHLORIDE mmol/L 99 91*   CO2 mmol/L 29.0 27.0   BUN mg/dL 8 8   CREATININE mg/dL 0.95 1.03   CALCIUM mg/dL 9.2 9.3   BILIRUBIN mg/dL  --  0.3   ALK PHOS U/L  --  102   ALT (SGPT) U/L  --  11   AST (SGOT) U/L  --  15   GLUCOSE mg/dL 110* 144*         Results from last 7 days   Lab Units 04/27/23  2032 04/24/23  1123   ALBUMIN g/dL 3.5 3.4*                               Meds:   SCHEDULE  cefepime, 2 g, Intravenous, Q8H  desvenlafaxine, 50 mg, Oral, Daily  enoxaparin, 40 mg, Subcutaneous, Daily  folic acid, 1 mg, Oral, Daily  gabapentin, 300 mg, Oral, Nightly  levothyroxine, 100 mcg, Oral, Q AM  liothyronine, 5 mcg, Oral, Daily  metoprolol succinate XL, 100 mg, Oral, Daily  naproxen, 500 mg, Oral, BID With Meals  pantoprazole, 40 mg, Oral, Daily  senna-docusate sodium, 2 tablet, Oral, BID  sodium chloride, 10 mL, Intravenous, Q12H  vitamin B-12, 1,000 mcg, Oral, Daily  vitamin B-6, 100 mg, Oral, Q12H      Infusions     PRNs  •  acetaminophen **OR** acetaminophen  •  senna-docusate sodium **AND** polyethylene glycol **AND** bisacodyl **AND** bisacodyl  •  calcium carbonate  •  ipratropium-albuterol  •  LORazepam  •  melatonin  •  ondansetron **OR** ondansetron  •  oxyCODONE  •  sodium chloride  •   sodium chloride    Physical Exam:  General Appearance:  Alert   HEENT:  Normocephalic, without obvious abnormality, Conjunctiva/corneas clear,.   Nares normal, no drainage     Neck:  Supple, symmetrical, trachea midline.   Lungs /Chest wall: Significant improvement in the mild left apical rhonchi, respirations unlabored, symmetrical wall movement.     Heart:  Regular rate and rhythm, S1 S2 normal  Abdomen: Soft, non-tender, no masses, no organomegaly.    Extremities: No edema, no clubbing or cyanosis     ROS  Constitutional: Negative for chills, fever and malaise/fatigue.   HENT: Negative.    Eyes: Negative.    Cardiovascular: Negative.    Respiratory: Negative for cough and shortness of breath.  The left pleuritic chest pain is better  Skin: Negative.    Musculoskeletal: Negative.    Gastrointestinal: Negative.    Genitourinary: Negative.    Neurological: Negative.    Psychiatric/Behavioral: Negative.      I reviewed the recent clinical results    Much of this encounter note is an electronic transcription/translation of spoken language to printed text using Dragon Software which might include inadvertent errors in transcription.      Electronically signed by Marvel Rodriguez MD at 23 1231     Lowell Ba MD at 23 0931                Hematology/Oncology Inpatient Progress Note    PATIENT NAME: Jc Driscoll  : 1965  MRN: [id number]    CHIEF COMPLAINT: Left chest pain. Dyspnea.     HISTORY OF PRESENT ILLNESS:      23  Hematology/Oncology was consulted on a patient known to us and established in the office with Dr. 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  The patient initially presented in 2019 with moderately differentiated adenocarcinoma of the right lung for which he underwent concurrent chemotherapy and radiation neoadjuvantly followed by a right lower lobectomy.  This was followed by immunotherapy with durvalumab for 24 cycles  "ending in August 2020.  In January 2022 a routine surveillance CT of the chest revealed a left upper lobe pulmonary nodule for which a subsequent PET/CT was done that showed mild uptake corresponding to the nodule.  In January 2022 a CT-guided biopsy was attempted but aborted due to finding by the radiologist that the lung nodularity was actually a clump of tiny nodules of which the largest was 6 mm and therefore biopsy cannot be completed.  Also the area was in the vicinity of multiple blood vessels with increased risk of bleeding.  Follow-up CT of the chest was recommended for continued surveillance.  In May 2022 the patient underwent a CT-guided biopsy of the left enlarging nodule and pathology was positive for invasive poorly differentiated adenocarcinoma most consistent with an adenocarcinoma of pulmonary origin.  Subsequent PET showed a 2.3 cm hypermetabolic left upper lobe nodule and no convincing metastatic disease elsewhere brain MRI was negative for any intracranial metastasis.  On 7/6/2022 patient underwent a left upper lobe lobectomy which was followed by adjuvant chemotherapy.  After completion of chemotherapy the patient was initiated on atezolizumab along with XRT.  Radiation was completed 1/11/2023 and the patient remains on Tecentriq every 3 weeks.     Patient complains of exquisite tenderness involving the left chest wall.  There was no trauma there is no redness.  The port is on the left chest wall.  He has had minimal cough also has pain on the upper left back.  Patient has required more pain medications due to the above.  He had a CT scan of the chest done which shows increased consolidation in the left upper lobe suspicious for infection versus post radiation.  Patient was then directed to come to the hospital for admission IV antibiotics pulmonary consult.  Was also noted that he has left pleural effusion which is moderate in size    Subjective   4/30/2023: Feeling much better. \"Better than in " "the previous 3 weeks\". Had been able to eat and had no nausea or vomiting. No fevers. The chest pain had improved significantly.     ROS:  Review of Systems   Constitutional: Positive for fatigue. Negative for activity change, appetite change, chills, diaphoresis, fever and unexpected weight change.   HENT: Negative for congestion, dental problem, drooling, ear discharge, ear pain, facial swelling, hearing loss, mouth sores, nosebleeds, postnasal drip, rhinorrhea, sinus pressure, sinus pain, sneezing, sore throat, tinnitus, trouble swallowing and voice change.    Eyes: Negative for photophobia, pain, discharge, redness, itching and visual disturbance.   Respiratory: Negative for apnea, cough, choking, chest tightness, shortness of breath, wheezing and stridor.         The chest pain has improved very significantly.    Cardiovascular: Negative for chest pain, palpitations and leg swelling.   Gastrointestinal: Negative for abdominal distention, abdominal pain, anal bleeding, blood in stool, constipation, diarrhea, nausea, rectal pain and vomiting.   Endocrine: Negative for cold intolerance, heat intolerance, polydipsia and polyuria.   Genitourinary: Negative for decreased urine volume, difficulty urinating, dysuria, flank pain, frequency, genital sores, hematuria and urgency.   Musculoskeletal: Negative for arthralgias, back pain, gait problem, joint swelling, myalgias, neck pain and neck stiffness.   Skin: Negative for color change, pallor and rash.   Neurological: Negative for dizziness, tremors, seizures, syncope, facial asymmetry, speech difficulty, weakness, light-headedness, numbness and headaches.   Hematological: Negative for adenopathy. Does not bruise/bleed easily.   Psychiatric/Behavioral: Negative for agitation, behavioral problems, confusion, decreased concentration, hallucinations, self-injury, sleep disturbance and suicidal ideas. The patient is not nervous/anxious.       MEDICATIONS:    Scheduled Meds: " " cefepime, 2 g, Intravenous, Q8H  desvenlafaxine, 50 mg, Oral, Daily  enoxaparin, 40 mg, Subcutaneous, Daily  folic acid, 1 mg, Oral, Daily  gabapentin, 300 mg, Oral, Nightly  levothyroxine, 100 mcg, Oral, Q AM  liothyronine, 5 mcg, Oral, Daily  metoprolol succinate XL, 100 mg, Oral, Daily  naproxen, 500 mg, Oral, BID With Meals  pantoprazole, 40 mg, Oral, Daily  senna-docusate sodium, 2 tablet, Oral, BID  sodium chloride, 10 mL, Intravenous, Q12H  vitamin B-12, 1,000 mcg, Oral, Daily  vitamin B-6, 100 mg, Oral, Q12H       Continuous Infusions:      PRN Meds:  •  acetaminophen **OR** acetaminophen  •  senna-docusate sodium **AND** polyethylene glycol **AND** bisacodyl **AND** bisacodyl  •  calcium carbonate  •  ipratropium-albuterol  •  LORazepam  •  melatonin  •  ondansetron **OR** ondansetron  •  oxyCODONE  •  sodium chloride  •  sodium chloride     ALLERGIES:  No Known Allergies    Objective    VITALS:   /85 (BP Location: Right arm, Patient Position: Lying)   Pulse 76   Temp 97.4 °F (36.3 °C) (Oral)   Resp 19   Ht 190.5 cm (75\")   Wt 82.6 kg (182 lb)   SpO2 99%   BMI 22.75 kg/m²     PHYSICAL EXAM: (performed by MD)  Physical Exam  Constitutional:       General: He is not in acute distress.     Appearance: Normal appearance. He is not ill-appearing, toxic-appearing or diaphoretic.   HENT:      Head: Normocephalic and atraumatic.      Right Ear: External ear normal.      Left Ear: External ear normal.      Nose: Nose normal.      Mouth/Throat:      Mouth: Mucous membranes are moist.      Pharynx: Oropharynx is clear.   Eyes:      General: No scleral icterus.        Right eye: No discharge.         Left eye: No discharge.      Conjunctiva/sclera: Conjunctivae normal.      Pupils: Pupils are equal, round, and reactive to light.   Cardiovascular:      Rate and Rhythm: Normal rate and regular rhythm.      Pulses: Normal pulses.      Heart sounds: Normal heart sounds. No murmur heard.    No friction rub. No " gallop.   Pulmonary:      Effort: No respiratory distress.      Breath sounds: No stridor. No wheezing, rhonchi or rales.      Comments: The breath sounds are diminished but clear. They're particularly diminished in the left upper lobe.   Chest:      Chest wall: No tenderness.   Abdominal:      General: Abdomen is flat. Bowel sounds are normal. There is no distension.      Palpations: Abdomen is soft. There is no mass.      Tenderness: There is no abdominal tenderness. There is no right CVA tenderness, left CVA tenderness, guarding or rebound.   Musculoskeletal:         General: No swelling, tenderness, deformity or signs of injury.      Cervical back: No rigidity.      Right lower leg: No edema.      Left lower leg: No edema.   Lymphadenopathy:      Cervical: No cervical adenopathy.   Skin:     General: Skin is warm and dry.      Coloration: Skin is not jaundiced.      Findings: No bruising or rash.   Neurological:      General: No focal deficit present.      Mental Status: He is alert and oriented to person, place, and time.      Gait: Gait normal.   Psychiatric:         Mood and Affect: Mood normal.         Behavior: Behavior normal.         Thought Content: Thought content normal.         Judgment: Judgment normal.     ANDREW Ba MD performed the physical exam on 4/30/2023 as documented above.       RECENT LABS:  Lab Results (last 24 hours)     Procedure Component Value Units Date/Time    Blood Culture - Blood, Arm, Left [[id number]]  (Normal) Collected: 04/27/23 1906    Specimen: Blood from Arm, Left Updated: 04/29/23 2115     Blood Culture No growth at 2 days    Narrative:      Less than seven (7) mL's of blood was collected.  Insufficient quantity may yield false negative results.    Blood Culture - Blood, Arm, Right [[id number]]  (Normal) Collected: 04/27/23 2032    Specimen: Blood from Arm, Right Updated: 04/29/23 2115     Blood Culture No growth at 2 days    Narrative:      Less than seven (7) mL's of  blood was collected.  Insufficient quantity may yield false negative results.    CBC (No Diff) [[id number]]  (Abnormal) Collected: 23 1046    Specimen: Blood Updated: 23 1130     WBC 12.10 10*3/mm3      RBC 3.68 10*6/mm3      Hemoglobin 9.5 g/dL      Hematocrit 30.7 %      MCV 83.4 fL      MCH 25.9 pg      MCHC 31.0 g/dL      RDW 16.8 %      RDW-SD 49.4 fl      MPV 6.7 fL      Platelets 467 10*3/mm3         IMAGING REVIEWED:  No radiology results for the last day    Assessment & Plan   ASSESSMENT:  1. Pneumonia? On antibiotics. Will continue same for now.   2. Non small cell lung cancer. He will continue treatment with Dr. Gaspar who will be back on 2023    PLAN:  1. As above.     Lowell Ba MD on 2023 at 9:40 AM.               Electronically signed by Lowell Ba MD at 23 0940       Consult Notes (last 24 hours)  Notes from 23 0816 through 23 0816   No notes of this type exist for this encounter.            Discharge Summary      Vijaya Pereira MD at 23 1131                       [facility]  Discharge Summary    Date of Service: 23  Patient Name: Jc Driscoll  : 1965  MRN: [id number]    Date of Admission: 2023  Discharge Diagnosis: Bacterial pneumonia  Date of Discharge: 23  Primary Care Physician: Reshma Alvarenga MD      Presenting Problem:   Left upper lobe pneumonia [J18.9]    Active and Resolved Hospital Problems:  Active Hospital Problems    Diagnosis POA   • **Left upper lobe pneumonia [J18.9] Yes     Priority: Medium   • Erosive esophagitis [K22.10] Yes   • Non-small cell carcinoma of lung, right (HCC) [C34.91] Yes   • Diastolic CHF (HCC) [I50.30] Yes   • Hypertension [I10] Yes   • Anxiety and depression [F41.9, F32.A] Yes      Resolved Hospital Problems   No resolved problems to display.         Hospital Course     Jc Driscoll is a 57 y.o. male with past medical history of hypertension,  hyperlipidemia, GERD, diastolic heart failure, prediabetes, anxiety depression, hypothyroidism and recurrent lung cancer currently on immunotherapy who arrived on 4/27/23 as direct admission from Dr Gaspar's oncology clinic account of BACILIO pneumonia and pleural effusion.  Started on vancomycin and Zosyn at time of admission, and since MRSA screen was negative, de-escalated to cefepime on pharmacy recommendation.   Remained clinically stable during hospital course.  Did not require supplemental oxygen.  Seen by pulmonology, no recommendation for bronchoscopy.  Refer to additional discussion below    #Left upper lobe bacterial pneumonia in immunocompromised subjective  -Chest CT: BACILIO consolidation, small right pleural effusion, small to moderate left pleural effusion  -MRSA screen negative, antibiotic de-escalated with pharmacy assistance to IV cefepime  -Negative blood cultures  -Discharged home on additional 7 days of oral Augmentin to complete total of 10 days antibiotics  -Would benefit from surveillance imaging in 4 to 6 weeks to demonstrate resolution of pneumonia       #History of recurrent lung cancer, previously underwent chemotherapy, lung resection and radiotherapy. -Follow-up with Dr. Gaspar     #Immunocompromise status secondary to recurrent lung cancer diagnosis and immunotherapy     #Chronic diastolic heart failure, stable, euvolemic     #Major depression, in remission: Continue home Pristiq     #Essential hypertension: Continue home metoprolol     #Acquired hypothyroidism: Continue home levothyroxine and liothyronine     #GERD: continue PPI     #Activity: Encourage ambulation     DISCHARGE Follow Up Recommendations for labs and diagnostics:   Repeat chest x-ray in 4 to 6 weeks to demonstrate resolution of pneumonia      Day of Discharge     Vital Signs:  Temp:  [97.4 °F (36.3 °C)-97.7 °F (36.5 °C)] 97.4 °F (36.3 °C)  Heart Rate:  [67-76] 76  Resp:  [19-24] 19  BP: (124-133)/(83-85) 124/85    Physical  Exam:  General appearance: Well developed male resting in bed,   not particularly ill-appearing, not toxic, not in distress,   Mental status: Alert and oriented x3  Head: Normocephalic and atraumatic  CVS: Regular heart sounds, no gallops, no murmurs  Respiration: Mild anterior chest wall tenderness to palpation,   no external injuries or bruises  Lungs: Unlabored breathing at rest, no wheezes, no rales  GI: Soft, nondistended, nontender, normal bowel sounds  Extremity: No leg edema, no gross deformities, no calf tenderness  Musculoskeletal: No gross deformities, symmetrical muscle mass  Neurology: Normal speech, no facial droop, moves all extremities  Psychiatry: Normal interaction, no hallucination, no agitation      Pertinent  and/or Most Recent Results     LAB RESULTS:      Lab 04/29/23  1046 04/27/23 2032 04/27/23  1526 04/24/23  1123   WBC 12.10*  --  13.77* 14.15*   HEMOGLOBIN 9.5*  --  10.2* 10.6*   HEMATOCRIT 30.7*  --  32.3* 33.6*   PLATELETS 467*  --  493* 552*   NEUTROS ABS  --   --  11.17* 11.45*   LYMPHS ABS  --   --  0.98 0.82   MONOS ABS  --   --  1.30* 1.56*   EOS ABS  --   --  0.24 0.25   MCV 83.4  --  84.8 85.5   LACTATE  --  0.9  --   --          Lab 04/29/23  0141 04/27/23 2032 04/24/23  1123   SODIUM 138 131* 131*   POTASSIUM 4.1 3.8 4.5   CHLORIDE 99 91* 92*   CO2 29.0 27.0 26.0   ANION GAP 10.0 13.0 13.0   BUN 8 8 9   CREATININE 0.95 1.03 0.79   EGFR 92.8 84.2 103.0   GLUCOSE 110* 144* 122*   CALCIUM 9.2 9.3 9.2         Lab 04/27/23 2032 04/24/23  1123   TOTAL PROTEIN 6.6 6.7   ALBUMIN 3.5 3.4*   GLOBULIN 3.1 3.3   ALT (SGPT) 11 11   AST (SGOT) 15 18   BILIRUBIN 0.3 <0.2   ALK PHOS 102 105                     Brief Urine Lab Results  (Last result in the past 365 days)      Color   Clarity   Blood   Leuk Est   Nitrite   Protein   CREAT   Urine HCG        12/06/22 1059 Yellow   Clear   Negative   Negative   Negative   Trace               Microbiology Results (last 10 days)     Procedure  Component Value - Date/Time    MRSA Screen, PCR (Inpatient) - Swab, Nares [[id number]]  (Normal) Collected: 04/27/23 2142    Lab Status: Final result Specimen: Swab from Nares Updated: 04/27/23 2335     MRSA PCR No MRSA Detected    Narrative:      The negative predictive value of this diagnostic test is high and should only be used to consider de-escalating anti-MRSA therapy. A positive result may indicate colonization with MRSA and must be correlated clinically.    Blood Culture - Blood, Arm, Right [[id number]]  (Normal) Collected: 04/27/23 2032    Lab Status: Preliminary result Specimen: Blood from Arm, Right Updated: 04/29/23 2115     Blood Culture No growth at 2 days    Narrative:      Less than seven (7) mL's of blood was collected.  Insufficient quantity may yield false negative results.    Blood Culture - Blood, Arm, Left [[id number]]  (Normal) Collected: 04/27/23 1906    Lab Status: Preliminary result Specimen: Blood from Arm, Left Updated: 04/29/23 2115     Blood Culture No growth at 2 days    Narrative:      Less than seven (7) mL's of blood was collected.  Insufficient quantity may yield false negative results.          CT Chest Without Contrast Diagnostic    Result Date: 4/27/2023  Impression: Impression: 1. No change from recent contrast-enhanced chest CT dated 4/25/2023 with consolidation with air bronchograms at left apex which may relate to pneumonia or posttreatment related consolidation. 2. 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 3. Small right and small to moderate left pleural effusions, unchanged. 4. Emphysema with postsurgical changes again noted related to prior right lower lobectomy and left upper lobectomy. Electronically Signed: Antoni Deal  4/27/2023 10:39 PM EDT  Workstation ID: [id number]      Results for orders placed during the hospital encounter of 12/05/22    Duplex Venous Lower Extremity - Bilateral CAR    Interpretation  Summary  •  Normal bilateral lower extremity venous duplex scan.      Results for orders placed during the hospital encounter of 12/05/22    Duplex Venous Lower Extremity - Bilateral CAR    Interpretation Summary  •  Normal bilateral lower extremity venous duplex scan.      Results for orders placed during the hospital encounter of 11/11/22    Adult Transthoracic Echo Complete W/ Cont if Necessary Per Protocol    Interpretation Summary  •  Left ventricular ejection fraction appears to be 56 - 60%.  •  Estimated right ventricular systolic pressure from tricuspid regurgitation is mildly elevated (35-45 mmHg).    Indications  Chemotherapy    Technically satisfactory study.  Mitral valve is structurally normal.  Tricuspid valve is structurally normal.  Aortic valve is structurally normal.  Pulmonic valve could not be well visualized.  No evidence for mitral tricuspid or aortic regurgitation is seen by Doppler study.  Left atrium is normal in size.  Right atrium is normal in size.  Left ventricle is normal in size and contractility with ejection fraction of 60%.  Right ventricle is normal in size.  Atrial septum is intact.  Aorta is normal.  No pericardial effusion or intracardiac thrombus is seen.    Impression  Structurally and functionally normal cardiac valves.  Left ventricular size and contractility is normal with ejection fraction of 60%.      Labs Pending at Discharge:  Pending Labs     Order Current Status    Blood Culture - Blood, Arm, Left Preliminary result    Blood Culture - Blood, Arm, Right Preliminary result          Procedures Performed           Consults:   Consults     Date and Time Order Name Status Description    4/28/2023  7:41 AM Inpatient Pulmonology Consult Completed     4/27/2023  5:37 PM Inpatient Hematology & Oncology Consult Completed             Discharge Details        Discharge Medications      New Medications      Instructions Start Date   amoxicillin-clavulanate 875-125 MG per  tablet  Commonly known as: Augmentin   1 tablet, Oral, 2 Times Daily         Continue These Medications      Instructions Start Date   Constulose 10 GM/15ML solution  Generic drug: lactulose   TAKE  30 ML BY MOUTH  EVERY 12 HOURS      desvenlafaxine 50 MG 24 hr tablet  Commonly known as: PRISTIQ   50 mg, Oral, Daily      gabapentin 300 MG capsule  Commonly known as: NEURONTIN   300 mg, Oral, Nightly      levothyroxine 100 MCG tablet  Commonly known as: SYNTHROID, LEVOTHROID   100 mcg, Oral, Every Early Morning      lidocaine-prilocaine 2.5-2.5 % cream  Commonly known as: EMLA   Apply to port site 60 minutes prior to it being accessed      liothyronine 5 MCG tablet  Commonly known as: CYTOMEL   5 mcg, Oral, Daily      LORazepam 0.5 MG tablet  Commonly known as: ATIVAN   0.5 mg, Oral, Every 8 Hours PRN      metoprolol succinate  MG 24 hr tablet  Commonly known as: TOPROL-XL   100 mg, Oral, Daily      ondansetron 8 MG tablet  Commonly known as: ZOFRAN   TAKE 1 TABLET BY MOUTH THREE TIMES DAILY AS NEEDED FOR NAUSEA FOR VOMITING      oxyCODONE 5 MG immediate release tablet  Commonly known as: Roxicodone   5 mg, Oral, Every 8 Hours PRN      pantoprazole 40 MG EC tablet  Commonly known as: PROTONIX   40 mg, Oral, Daily PRN      vitamin B-12 1000 MCG tablet  Commonly known as: CYANOCOBALAMIN   1,000 mcg, Oral, Daily      vitamin B-6 100 MG tablet  Commonly known as: PYRIDOXINE   100 mg, Oral, Every 12 Hours             No Known Allergies      Discharge Disposition:   Home or Self Care    Diet:  Hospital:  Diet Order   Procedures   • Diet: Cardiac Diets; Healthy Heart (2-3 Na+); Texture: Regular Texture (IDDSI 7); Fluid Consistency: Thin (IDDSI 0)         Discharge Activity:   Activity Instructions     Activity as Tolerated              CODE STATUS:  Code Status and Medical Interventions:   Ordered at: 04/27/23 0420     Code Status (Patient has no pulse and is not breathing):    CPR (Attempt to Resuscitate)     Medical  Interventions (Patient has pulse or is breathing):    Full Support         Future Appointments   Date Time Provider Department Center   5/1/2023  2:00 PM [facility] 1 [facility]   5/3/2023  1:30 PM Azucena Gaspar MD [facility] ONC NA [facility]   5/3/2023  1:30 PM LAB MD [facility] ONC LAB NA  [facility]   5/15/2023 11:00 AM INF ROOM 25 - CHAIR [facility]  [facility] CC NA [facility]   8/1/2023  1:00 PM Chino Escalona MD [facility] RO [facility] None           Time spent on Discharge including face to face service:  35 minutes    Signature: Electronically signed by Vijaya Pereira MD, 04/30/23, 11:31 EDT.  [facility] Hospitalist Team    Electronically signed by Vijaya Pereira MD at 04/30/23 1132

## 2023-05-01 NOTE — CASE MANAGEMENT/SOCIAL WORK
Case Management Discharge Note      Final Note: Home    Provided Post Acute Provider List?: N/A    Selected Continued Care - Discharged on 4/30/2023 Admission date: 4/27/2023 - Discharge disposition: Home or Self Care            Transportation Services  Private: Car    Final Discharge Disposition Code: 01 - home or self-care

## 2023-05-02 LAB
BACTERIA SPEC AEROBE CULT: NORMAL
BACTERIA SPEC AEROBE CULT: NORMAL

## 2023-05-03 NOTE — TELEPHONE ENCOUNTER
Caller: Jc Driscoll    Relationship to patient: Self    Best call back number: [phone]    Type of visit: LAB & F.U 1    Requested date: WILL R/S LATER     If rescheduling, when is the original appointment: 5/3/2023

## 2023-05-04 ENCOUNTER — READMISSION MANAGEMENT (OUTPATIENT)
Dept: CALL CENTER | Facility: HOSPITAL | Age: 58
End: 2023-05-04
Payer: COMMERCIAL

## 2023-05-04 NOTE — OUTREACH NOTE
COPD/PN Week 1 Survey    Flowsheet Row Responses   [facility] patient discharged from? [facility]   Does the patient have one of the following disease processes/diagnoses(primary or secondary)? Pneumonia   Week 1 attempt successful? No   Unsuccessful attempts Attempt 1          Jie Parra Licensed Nurse

## 2023-05-08 NOTE — PROGRESS NOTES
Hematology/Oncology Outpatient Follow Up    PATIENT NAME:Jc Driscoll  :1965  MRN: [id number]  PRIMARY CARE PHYSICIAN: Reshma Alvarenga MD  REFERRING PHYSICIAN: No ref. provider found    Chief Complaint   Patient presents with   • Follow-up     Hospital follow-up for pneumonia        HISTORY OF PRESENT ILLNESS:                                                                                                                                                                                                                                                             This is a 57 y.o. who developed left shoulder pain and for that reason he had a chest x-ray done on 19 which showed a 2.7 cm noncalcified right lower lobe nodule was identified.  For that reason a CT scan of the chest was recommended.  Review of his records shows patient had the CT scan on 19 done without contrast.  This basically showed a lobulated noncalcified mass in the posterior aspect of the right lower lobe measuring 3 cm close to the pleural surface.  There is a calcified 1.2 mm nodule in the lateral aspect of the right lower lobe consistent with a granuloma.  There were also calcified subcarinal and right hilar nodules.  There is a lower right side tracheal nodule measuring 1 cm.  Patient had a PET/CT scan done on 19 which showed increased metabolic activity on the right lower lobe mass that measures 2.5 cm with SUV of 4.4.  There was also increased metabolic activity in the subcarinal space measuring 2.8 cm in size with SUV of 3.4,  increased activity in an enlarged right paratracheal lymph node that measures 1.9 cm, SUV of 5, also suspicious for metastatic adenopathy.  Patient had a CT-guided biopsy of the right lower lobe mass on 3/8/19.  This showed adenocarcinoma, TTF-1 positive.  On 3/18/19 patient underwent endoscopic ultrasound and biopsy of a subcarinal lymph node.  This was positive for malignant cells.  Patient was then taken back to surgery on 3/20/18 and had left Mediport placement by Dr. Fuchs.  He has been referred for further evaluation and management of his stage 3 adenocarcinoma of the right lung.  He was accompanied by his spouse for the appointment on 3/26/19.  Patient was scheduled to have a brain MRI for complete staging, but he could not do this due to claustrophobia.  He is willing to try with the help of angiolytics.    • Patient had brain MRI done on 4/5/19.  He states it did not show any evidence of metastatic disease.  Evidence of chronic sinusitis was noted.    • Patient was seen by Dr. Escalona who has recommended concurrent chemotherapy and radiation.  Patient is scheduled to begin on 4/15/19.   • 4/17/19 - Patient was initiated on combined chemotherapy and radiation with Cisplatin and Alimta.  Patient received cycle 1.      • 5/8/19 - Patient received cycle 2 of chemotherapy with Cisplatin and Alimta.   • 5/15/19 - Chemistry panel showed creatinine of 1.7.  WBC 1.8, hemoglobin 11.6, platelet count 72,000.   • 5/20/19 - BUN 10, creatinine 1.2, potassium 3.5.    • 5/23/19 - CT scan of the chest with contrast showed interval decrease in size of the right lower lobe pulmonary nodule now measuring 2.1 cm in size.  There was no mediastinal or hilar adenopathy identified.  • 6/26/2019 patient underwent right lower lobectomy with hilar and mediastinal lymph node dissection performed by Dr. Terrance Mckeon.  • Pathology revealed residual residual 2.2 cm invasive moderately differentiated adenocarcinoma.  There were a total  of 16 lymph nodes evaluated that 7 had metastatic disease.  There was evidence of lymphovascular invasion, extranodal involvement.  All the surgical margins were negative and distance of the closest margin was 2.5 cm.  Logic stage is T1c N2 M0.  • Patient is here today accompanied by his spouse for this appointment.  He continues to complain of some postop discomfort, numbness but his skin is intact.  There is no drainage.  Has had follow-up with Dr. Fuchs.  • 8/14/2019-seen by Dr. Maria at the [facility].  Dr. Maria has recommended immunotherapy with durvalumab off label use as patient has not had significant response to neoadjuvant chemotherapy and radiation.  Patient was given the option to have immunotherapy at the [facility] vs [place] and he has chosen to stay in [place]  • 8/21/19 - Started cycle 1 day 1 Imfinzi  • 9/4/2019 patient had CT scan of the chest this shows a moderate size right pleural effusion.  There are areas of groundglass opacification in the right upper lobe without any evidence of significant septal thickening.  Volume loss noted there is also moderate pleural effusion.  There is no suspicious recurrent malignancy.  There were nonenlarged residual mediastinal lymph nodes.  • 9/9/19 - WBC 6.23, Hgb 11.7 Platelets 257,000, , BUN 9, Cr 1.1,Vitamin B12 318, Ferritin 437  • 9/23/19 - received cycle 2 day 1 Imfinzi  • 10/9/19- Seen by Dr rodríguez with ENT for sinus disease  • 11/4/2019-patient received cycle 5 of Imfinzi(durvalumab)  • 12/2/2019 patient had cycle 7 of durvalumab  • 12/5/2019-patient had CT scan of the  abdomen and pelvis with small right pleural sided pleural effusion.There is no evidence of metastatic disease  • 12/30/2019-patient received cycle 9 of durvalumab  • 12/31/2019-patient had CT scan of the chest showed small to moderate left pleural effusion.  Iglesias appearing right lower paratracheal and right peribronchial soft tissue thickening without any discrete  pathologically enlarged mediastinal or hilar lymph nodes.  • 1/27/20-patient received cycle 11 of durvalumab  • 2/17/20-patient had a stress test which was negative for ischemia  • 2/17/2020-patient had CT of the chest which showed postop changes on the right lung, right pleural effusion but no enlarging mass was noted  • 3/2/2020-patient received cycle 12 of durvalumab  • 3/16/2020-patient had ultrasound-guided right thoracentesis.  Pathology was negative for malignancy  • 4/13/2020-patient received cycle 15 of immunotherapy with durvalumab  • 5/11/2020-patient received cycle 17 of immunotherapy with durvalumab  • 6/9/2020-patient had CT scan of the chest, abdomen and pelvis for lung cancer restaging.  There is stable small chronic loculated right basilar pleural effusion suggesting chronic pleuritis.  There is no evidence of metastatic disease in the abdomen or pelvis  • 7/20/2020 patient received cycle 22 of Durvalumab  • 8/17/2020 patient received cycle 24 and last cycle of durvalumab  • 9/24/2020 patient had CT scan of the chest, abdomen and pelvis for cancer restaging there was no evidence of local recurrence or metastatic disease within the abdomen and pelvis.  He has stable chronic small right pleural effusion.  Mild sigmoid diverticulosis was noted.  • 1/18/2021 patient had CT scan of the chest, abdomen and pelvis reviewed patient  • 5/24/2021 patient had CT scan of the chest calcified subcarinal lymph node consistent with changes of prior granulomatous disease.  Chronic small right pleural effusion is noted similar to prior exam.  Previously shown 4 mm nodule in the left lower lobe has nearly completely resolved.  The subpleural 3 mm nodule located within the posterior left lower lobe With resolved.  • 9/24/2021 patient had a CT scan of the chest, abdomen and pelvis there is soft tissue attenuation within the right paratracheal area which has been suggested to reflect sequela to previous treatment.  There  is slight thickening of the bladder wall otherwise there is no evidence of metastatic disease.  • 12/27/2021 patient had CT scan of the chest with contrast this basically showed irregular shaped noncalcified 7 mm left upper lobe pulmonary nodule.  PET CT scan was recommended to further evaluate.  • 1/26/2022 patient had a PET CT scan done which basically showed evidence of mild uptake corresponding to the nodule within the left upper lobe which is new worrisome for developing metastatic focus.  There was mild radiopharmaceutical activity corresponding to pleural thickening throughout the right lung base with associated pleural effusion likely related to post therapeutic changes and radiation changes in this region.  Metastatic malignancy involving the pleura could not be completely eliminated.  • 1/26/2022: CT-guided biopsy was aborted due to finding by the radiologist that the lung nodularity was actually a clump of tiny nodules of which the largest was 6 mm and therefore biopsy could not be completed.  Also the area was in the vicinity of multiple blood vessels with increased risk of bleeding.  Follow-up CT of the chest was recommended for continued surveillance  • 5/18/2022 patient had CT-guided biopsy of the left enlarging nodule, pathology was positive for invasive poorly differentiated adenocarcinoma most consistent with adenocarcinoma of pulmonary origin.  • 5/27/2022 patient had a PET CT scan which showed a 2.3 cm hypermetabolic left upper lobe nodule which has increased in size no convincing evidence of metastatic disease elsewhere within the chest.  • 5/21/2022 patient had brain MRI which did not show any evidence of intracranial metastasis  • 6/2/2022 patient was seen by Dr. Miryam Reyna pulmonary function test is being done to assess surgical candidacy  • 7/6/2022 patient underwent left heart Koska P video-assisted with upper lobectomy mediastinal lymph node dissection performed by Dr. Witt  Doc  • Final pathology revealed poorly differentiated  • A predominant adenocarcinoma with solid component presenting 45% and micropapillary component 5% with extensive necrosis, invasive carcinoma measures 2.5 maximally there was focal lymphovascular space invasion all margins were negative for malignancy discussed with the closest margin was 2.5 cm pathology stage is pT1C pN1.  PD-L1 showed a tumor proportion score of 95%  • 8/3/2022 patient started adjuvant chemotherapy with cisplatin, Taxol  • 8/24/2022 patient received cycle 2 of combination chemotherapy with cisplatin and Taxol with Neulasta  • 10/5/2022: Patient received cycle 4-day 1 of combination chemotherapy with cisplatin and Taxol  • Patient developed left-sided chest pain for that reason he had a PET/CT scan 1/20/2020 is basically revealed a 4 x 3.5 cm hypermetabolic soft tissue mass in the left chest wall between the first and second ribs anteriorly consistent with relapsed disease.  There was a small cluster of hypermetabolic lymph nodes seen in the left supraclavicular region consistent with early manoj metastasis  • 11/11/2022: 2D echocardiogram performed showing a left ventricular EF of 56 to 60%  • 11/22/2022: Cycle 1 of atezolizumab received  • 12/5/2022-12/9/2022: Hospitalized at [facility] for dehydration, hyponatremia, and weakness.  • 12/19/2020: Patient received cycle 2 of Tecentriq along with XRT  • 1/11/2023: Patient completed radiation.  Remains on Tecentriq every 3 weeks  • 1/18/2023 patient had a CT scan of the chest which basically revealed abnormal soft tissue interposed between the first and second rib with some erosive changes to the ribs this finding measures about 5 x 2.8 cm previously was 4.8 x 3.8 cm.  There is some pleural thickening about in this area nodular area within the prominent mediastinal fat in the upper chest measuring 1.7 cm more fluid as opposed to enlarged lymph nodes.  Possibility of pulmonary hypertension was also  noted due to prominence of main pulmonary artery measuring 3.7 cm.  • 2/24/2023: CT of the chest with contrast showed abnormal mass along the chest wall interposed between the first and second ribs that could relate to metastatic disease.  Some erosive changes to the ribs in this area.  Nodular area in the more superior mediastinal fat that looks like you to reflect more fluidlike attenuation as opposed to a pathologic lymph node.  New groundglass changes within the left upper lobe that could be secondary to inflammatory infectious process.  It is possible this may be posttreatment in nature.  There are some groundglass changes along the superior mediastinum and right upper lobe which are unchanged.  Bilateral pleural effusions noted.  This is developed on the left.  Right unchanged.  • 4/25/2023 CT of the chest with contrast done in short-term follow-up to reevaluate from previous scan: Increased consolidation in the upper left chest since previous study; increased size of left pleural effusion with loculation along its superior medial margin; no change in the soft tissue mass with osseous destruction of the left upper chest wall between the first and second ribs.  • 4/28/2023-4/30/2023: Hospitalization at [facility] for pneumonia.    Past Medical History:   Diagnosis Date   • Allergic rhinitis 07/25/2013    Overview:  4/2/2018 - still zyrtec 10 daily (if stops, gets dermatitis again).    • Anxiety and depression 06/05/2012    Overview:  4/2/2018 -   C/w well 300 xr and Lito 20.  4/8/2019 -   Doing ok even with new lung cancer - lito 20 & well 300xr.    • Chronic pansinusitis 04/08/2019    Overview:  4/8/2019 -   MRI showed chronic thick in frontal, maxillary, ethmoidal ---> to Dr. COLLAZO to est since abx ICC didn't help.  Does netipot bid.    • Diastolic CHF 07/09/2014    Overview:  7/4/14 - impaired LV relaxation on Zhou ECHO.  EF 60%. Rest normal   • Dupuytren's contracture of both hands    • Elevated cholesterol    •  Erosive esophagitis 07/09/2019    Overview:  EGD 2016 4/2/2018 - nexium OTC daily for few week.  Qod before that.  Now carbonation hurts, epigastric pain 4/8/2019 -   protonix 40 doing well.    • Gastroesophageal reflux disease 02/21/2019   • Hiatal hernia 07/09/2019   • History of colon polyps    • Hypertension    • Lung cancer     right lung and in lymph nodes x2   • Mediastinal lymphadenopathy 03/12/2019   • Normocytic anemia 08/08/2019    Overview:  6/2019 - dropped to 9's postop 7/2019 - rebounded to 10's.  8/8/2019 -   Back to 9's - check ferritin, b12 and thyroid.    • Prediabetes 07/09/2014    Overview:  7/4/14 - a1c 6.1 at [facility] admission   • Retention of urine 06/27/2019    PSOT OP, RESOLVED       Past Surgical History:   Procedure Laterality Date   • BRONCHOSCOPY  03/18/2019    EBUS MONTERO NEEDLE BX   • COLONOSCOPY     • DUPUYTREN CONTRACTURE RELEASE Bilateral    • LUNG BIOPSY  03/08/2019    CT GUIDED   • LUNG REMOVAL, PARTIAL Right     right lower   • PORTACATH PLACEMENT  03/20/2019    DR LONGORIA   • THORACOSCOPY Right 6/26/2019    Procedure: RIGHT VATS, OPEN LOWER LOBECTOMY WITH LYMPH NODE DISECTION, WEDGE RESECTION OF RIGHT MIDDLE LOBE;  Surgeon: Terrance Longoria MD;  Location: [facility];  Service: Cardiothoracic   • THORACOSCOPY VIDEO ASSISTED WITH LOBECTOMY Left 7/6/2022    Procedure: THORACOSCOPY VIDEO ASSISTED WITH LOBECTOMY;  Surgeon: Miryam Reyna MD;  Location: [facility];  Service: Thoracic;  Laterality: Left;         Current Outpatient Medications:   •  Constulose 10 GM/15ML solution, TAKE  30 ML BY MOUTH  EVERY 12 HOURS, Disp: 900 mL, Rfl: 0  •  desvenlafaxine (PRISTIQ) 50 MG 24 hr tablet, Take 1 tablet by mouth Daily., Disp: , Rfl:   •  gabapentin (NEURONTIN) 300 MG capsule, Take 1 capsule by mouth 2 (Two) Times a Day., Disp: 60 capsule, Rfl: 3  •  levothyroxine (SYNTHROID, LEVOTHROID) 100 MCG tablet, Take 1 tablet by mouth Every Morning., Disp: , Rfl:   •  lidocaine-prilocaine  (EMLA) 2.5-2.5 % cream, Apply to port site 60 minutes prior to it being accessed, Disp: 30 g, Rfl: 1  •  liothyronine (CYTOMEL) 5 MCG tablet, Take 1 tablet by mouth Daily., Disp: , Rfl:   •  LORazepam (ATIVAN) 0.5 MG tablet, Take 1 tablet by mouth Every 8 (Eight) Hours As Needed., Disp: , Rfl:   •  metoprolol succinate XL (TOPROL-XL) 100 MG 24 hr tablet, Take 1 tablet by mouth Daily., Disp: , Rfl:   •  ondansetron (ZOFRAN) 8 MG tablet, TAKE 1 TABLET BY MOUTH THREE TIMES DAILY AS NEEDED FOR NAUSEA FOR VOMITING, Disp: 30 tablet, Rfl: 0  •  oxyCODONE (Roxicodone) 5 MG immediate release tablet, Take 1 tablet by mouth Every 8 (Eight) Hours As Needed for Moderate Pain., Disp: 60 tablet, Rfl: 0  •  pantoprazole (PROTONIX) 40 MG EC tablet, Take 1 tablet by mouth Daily As Needed., Disp: , Rfl:   •  vitamin B-12 (CYANOCOBALAMIN) 1000 MCG tablet, Take 1 tablet by mouth Daily., Disp: , Rfl:   •  vitamin B-6 (PYRIDOXINE) 100 MG tablet, Take 1 tablet by mouth Every 12 (Twelve) Hours., Disp: 60 tablet, Rfl: 6    No Known Allergies    Family History   Problem Relation Age of Onset   • Cancer Mother         cervical cancer   • Cervical cancer Mother    • Cancer Father         lung cancer   • Lung cancer Father    • Lung cancer Sister    • Cancer Sister         lung cancer   • Malig Hyperthermia Neg Hx        Cancer-related family history includes Cancer in his father, mother, and sister; Cervical cancer in his mother; Lung cancer in his father and sister.    Social History     Tobacco Use   • Smoking status: Former     Types: Cigarettes     Quit date: 2009     Years since quittin.6   • Smokeless tobacco: Never   Vaping Use   • Vaping Use: Never used   Substance Use Topics   • Alcohol use: Yes     Alcohol/week: 2.0 standard drinks     Types: 2 Cans of beer per week     Comment: Occasional   • Drug use: No       I have reviewed and confirmed the accuracy of the patient's history: Chief complaint, HPI, ROS and Subjective as  "entered by the MA/LPN/RN. Shayy Escamilla Montemayor, APRN 05/09/23        SUBJECTIVE:  Patient is here for his follow-up from his hospitalization for pneumonia.  Patient reports he has completed his antibiotic.  He states that his fatigue and shortness of breath have improved but that he is still experiencing significant left upper extremity and left chest wall pain.  He states that the pain radiates down the arm to his hand and that even the pressure from his shorts sometimes on his skin can cause pain.    Jc Driscoll reports a pain score of 7.  Given his pain assessment as noted, treatment options were discussed and the following options were decided upon as a follow-up plan to address the patient's pain: increase in his gabapentin to BID.      REVIEW OF SYSTEMS:    Review of Systems   Constitutional: Positive for fatigue. Negative for chills and fever.   HENT: Negative for ear pain, mouth sores, nosebleeds and sore throat.    Eyes: Negative for photophobia and visual disturbance.   Respiratory: Negative for wheezing and stridor.    Cardiovascular: Negative for chest pain and palpitations.   Gastrointestinal: Negative for abdominal pain, diarrhea, nausea and vomiting. He has constipation  Endocrine: Negative for cold intolerance and heat intolerance.   Genitourinary: Negative for dysuria and hematuria.   Musculoskeletal: Negative for joint swelling and neck stiffness.   Skin: Negative for color change and rash.   Neurological: Negative for seizures and syncope.   Hematological: Negative for adenopathy.   Psychiatric/Behavioral: Negative for agitation, confusion and hallucinations.     OBJECTIVE:    Vitals:    05/09/23 1353   BP: 103/71   Pulse: 79   SpO2: 100%   Weight: 81.4 kg (179 lb 6.4 oz)   Height: 190.5 cm (75\")   PainSc:   7   PainLoc: Shoulder  Comment: left shoulder and down under arm     ECOG    Eastern Cooperative Oncology Group (ECOG, [place], World Health Organization) performance scale  0 Fully active; no " performance restrictions.  1 Strenuous physical activity restricted; fully ambulatory and able to carry out light work.  2 Capable of all self-care but unable to carry out any work activities. Up and about >50% of waking hours.  3 Capable of only limited self-care; confined to bed or chair >50% of waking hours.  4 Completely disabled; cannot carry out any self-care; totally confined to bed or chair.    Physical Exam   Constitutional: He is oriented to person, place, and time. He appears well-developed. No distress.   HENT:   Head: Normocephalic and atraumatic.   Right Ear: External ear normal.   Left Ear: External ear normal.   Nose: Nose normal.   Eyes: Pupils are equal, round, and reactive to light. Conjunctivae are normal. Right eye exhibits no discharge. Left eye exhibits no discharge. No scleral icterus.   Neck: No thyromegaly present.   Cardiovascular: Normal rate, regular rhythm and normal heart sounds. Exam reveals no gallop and no friction rub.   Pulmonary/Chest: Effort normal. No stridor. No respiratory distress. He has no wheezes. Diminished left lung fields with faint coarse rhonchi.  Abdominal: Soft. Bowel sounds are normal. He exhibits no mass. There is no abdominal tenderness. There is no rebound and no guarding.   Musculoskeletal: Normal range of motion. No tenderness.   Lymphadenopathy:     He has no cervical adenopathy.   Neurological: He is alert and oriented to person, place, and time. He exhibits normal muscle tone.   Skin: Skin is warm. No rash noted. He is not diaphoretic. No erythema.   Psychiatric: His behavior is normal. Judgment and thought content normal.   Nursing note and vitals reviewed.      I have reexamined the patient and the results are consistent with the previously documented exam. Shayy Montemayor, MULUGETA     RECENT LABS    WBC   Date Value Ref Range Status   05/09/2023 17.22 (H) 3.40 - 10.80 10*3/mm3 Final   03/22/2022 7.84 4.5 - 11.0 10*3/uL Final     RBC   Date Value Ref  Range Status   05/09/2023 3.79 (L) 4.14 - 5.80 10*6/mm3 Final   03/22/2022 4.23 (L) 4.5 - 5.9 10*6/uL Final     Hemoglobin   Date Value Ref Range Status   05/09/2023 10.1 (L) 13.0 - 17.7 g/dL Final   03/22/2022 12.9 (L) 13.5 - 17.5 g/dL Final     Hematocrit   Date Value Ref Range Status   05/09/2023 32.2 (L) 37.5 - 51.0 % Final   03/22/2022 38.8 (L) 41.0 - 53.0 % Final     MCV   Date Value Ref Range Status   05/09/2023 85.0 79.0 - 97.0 fL Final   03/22/2022 91.7 80.0 - 100.0 fL Final     MCH   Date Value Ref Range Status   05/09/2023 26.6 26.6 - 33.0 pg Final   03/22/2022 30.5 26.0 - 34.0 pg Final     MCHC   Date Value Ref Range Status   05/09/2023 31.4 (L) 31.5 - 35.7 g/dL Final   03/22/2022 33.2 31.0 - 37.0 g/dL Final     RDW   Date Value Ref Range Status   05/09/2023 15.7 (H) 12.3 - 15.4 % Final   03/22/2022 14.2 12.0 - 16.8 % Final     RDW-SD   Date Value Ref Range Status   05/09/2023 47.6 37.0 - 54.0 fl Final     MPV   Date Value Ref Range Status   05/09/2023 8.2 6.0 - 12.0 fL Final   03/22/2022 9.3 8.4 - 12.4 fL Final     Platelets   Date Value Ref Range Status   05/09/2023 532 (H) 140 - 450 10*3/mm3 Final   03/22/2022 324 140 - 440 10*3/uL Final     Neutrophil Rel %   Date Value Ref Range Status   03/22/2022 63.1 45 - 80 % Final     Neutrophil %   Date Value Ref Range Status   05/09/2023 87.8 (H) 42.7 - 76.0 % Final     Lymphocyte Rel %   Date Value Ref Range Status   03/22/2022 16.2 15 - 50 % Final     Lymphocyte %   Date Value Ref Range Status   05/09/2023 4.1 (L) 19.6 - 45.3 % Final     Monocyte Rel %   Date Value Ref Range Status   03/22/2022 13.6 0 - 15 % Final     Monocyte %   Date Value Ref Range Status   05/09/2023 6.3 5.0 - 12.0 % Final     Eosinophil %   Date Value Ref Range Status   05/09/2023 1.3 0.3 - 6.2 % Final   03/22/2022 5.5 0 - 7 % Final     Basophil Rel %   Date Value Ref Range Status   03/22/2022 1.1 0 - 2 % Final     Basophil %   Date Value Ref Range Status   05/09/2023 0.5 0.0 - 1.5 %  Final     Immature Grans %   Date Value Ref Range Status   07/07/2022 0.4 0.0 - 0.5 % Final   03/22/2022 0.5 0.0 - 1.0 % Final     Neutrophils Absolute   Date Value Ref Range Status   03/22/2022 4.94 2.0 - 8.8 10*3/uL Final     Neutrophils, Absolute   Date Value Ref Range Status   05/09/2023 15.13 (H) 1.70 - 7.00 10*3/mm3 Final     Lymphocytes Absolute   Date Value Ref Range Status   03/22/2022 1.27 0.7 - 5.5 10*3/uL Final     Lymphocytes, Absolute   Date Value Ref Range Status   05/09/2023 0.70 0.70 - 3.10 10*3/mm3 Final     Monocytes Absolute   Date Value Ref Range Status   03/22/2022 1.07 0.0 - 1.7 10*3/uL Final     Monocytes, Absolute   Date Value Ref Range Status   05/09/2023 1.09 (H) 0.10 - 0.90 10*3/mm3 Final     Eosinophils Absolute   Date Value Ref Range Status   03/22/2022 0.43 0.0 - 0.8 10*3/uL Final     Eosinophils, Absolute   Date Value Ref Range Status   05/09/2023 0.22 0.00 - 0.40 10*3/mm3 Final     Basophils Absolute   Date Value Ref Range Status   03/22/2022 0.09 0.0 - 0.2 10*3/uL Final     Basophils, Absolute   Date Value Ref Range Status   05/09/2023 0.08 0.00 - 0.20 10*3/mm3 Final     Immature Grans, Absolute   Date Value Ref Range Status   07/07/2022 0.05 0.00 - 0.05 10*3/mm3 Final   03/22/2022 0.04 0.00 - 0.10 10*3/uL Final     nRBC   Date Value Ref Range Status   12/19/2022 0.0 0.0 - 0.2 /100 WBC Final       Lab Results   Component Value Date    GLUCOSE 110 (H) 04/29/2023    BUN 8 04/29/2023    CREATININE 0.95 04/29/2023    EGFRIFNONA 100 01/28/2022    EGFRIFAFRI >60 05/20/2019    BCR 8.4 04/29/2023    K 4.1 04/29/2023    CO2 29.0 04/29/2023    CALCIUM 9.2 04/29/2023    ALBUMIN 3.5 04/27/2023    LABIL2 1.5 03/22/2022    AST 15 04/27/2023    ALT 11 04/27/2023         ASSESSMENT:    1. 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  He has been referred to Dr. Escalona to see  whether he is a candidate for radiation treatments.  Would also consider systemic treatment for him for molecular targets as he has had very poor response to chemotherapy malignancy.  Continue Tecentriq with XRT.  Patient has completed XRT on 1/11/2023  2. Reviewed his recent CT of the chest which shows 1 cm increase in the size of the left lung mass.  Patient is also on immunotherapy which can cause pseudo-enlargement.  We will have a short interval 6-week follow-up CT of the chest which will be due second week in April 2023.  This will be scheduled second week in April 2023  3. 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  pT1 cpN1 M0 consistent with stage IIb disease, found on surveillance CT scans. Brain MRI was negative. We will recommend platinum doublet followed by Tecentriq unless he has EGFR mutation for which adjuvant osimertinib will be considered.  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  Patient was treated with cisplatin and Alimta in the adjuvant setting for his original malignancy.  He is currently on cisplatin and Taxol.  Patient is receiving cycle #4 today.  This tumor was completely resected and has negative margins and no mediastinal lymph node involvement.  Reviewed the pathology with Dr. Trevizo.  This is a new second lung primary.  Patient has received a total of 4 cycles of combination of cisplatin and Taxol completed on 10/5/2022.  He will continue Tecentriq  4. Constipation: We will begin lactulose.  Patient to call me if no results  5. High PD-L1 expression at 95%. Reviewed foundation 1 testing results.  This showed MET amplification, ERBB2 amplification for which targeted therapy is available for including crizotinib for MET amplification, afatinib for ERBB2 but this is  approved for metastatic disease.   6. We have extensively reviewed the literature.  He will be a candidate for ERBB2 targeted therapy but fam-trastuzumab cannot be combined with XRT due to risk of interstitial lung disease.  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  For these reasons we have decided to proceed with immunotherapy along with radiation.  His case was reviewed at the tumor board and this was the recommendation.  Patient has been approved for start Tecentriq therefore we will go ahead and initiate concurrently with radiation  7. Chemotherapy-induced fatigue: This has improved  8. Hypomagnesemia secondary to chemotherapy: Patient has discontinued magnesium supplements  9. 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  Increase salt in the diet, fluid restriction.  Continue to monitor his sodium levels.  Patient is currently on demeclocycline.  We will start demeclocycline and follow his BMPs on a weekly basis for now until next visit in 3 weeks    10. History of adenocarcinoma of the right lung, T1c N2 M0, consistent with clinical stage 3A disease in 2019.  S/P combined chemotherapy and radiation with cisplatin and Alimta neoadjuvantly, followed by her right lower lobectomy with mediastinal and hilar lymph node dissection with residual disease pT1 cpN2 M0.  Followed by maintenance durvalumab for 1 year.   11. Recent diagnosis of hypothyroidism, on thyroid replacement therapy  12. Status post right thoracentesis with cytology negative for malignancy  13. History of pneumothorax following lung biopsy  14. Postop anemia: Reviewed  15. Pneumonia left upper lobe: Has completed his 10-day course of antibiotics.  Symptoms have improved.  16. Chronic left chest wall and left upper extremity pain: On opioid pain management with from his radiation oncologist.  Pain seems to be neuropathic in nature and  therefore we will try an increase in his gabapentin.  17. Assessment has been reviewed and updated      PLANS:    1. Discussed CT results and patient physical assessment with Dr. Gaspar and she requested a direct admission to the hospital for pulmonary consult, IV antibiotic management, pain control for diagnosis of left upper lobe pneumonia, left pleural effusion in a patient with underlying non-small cell lung cancer.  2. CBC reviewed showing leukocytosis just completed his antibiotic yesterday and has no signs or symptoms of infection at the appointment.  We will continue to monitor.  3. Will require a CT of the chest in 4 weeks to evaluate for resolution of pneumonia.  4. Continue Tecentriq-has an appointment on 5/15/2023  5. Increase his gabapentin from 300 mg nightly to 300 mg twice daily for neuropathic pain in the left upper extremity.  6. He will follow-up with Dr. Escalona for his pain medication  7. 2D echocardiogram in preparation for HER2 directed treatment reviewed  8. Patient has completed all planned 4 cycles of chemo: unfortunately has developed relapsed disease  9. Discontinue demeclocycline and monitor his BMPs every week  10. Continue lactulose for constipation  11. Follow-up with nephrology-patient states he plans to get an appointment.  12. Continue anti-nausea medications  13. Reviewed foundation 1 results.  He will be a candidate for molecular targets in the future if he does develop metastatic disease  14. Continue Emla cream to port  15. Continue thyroid replacement therapy through his PCP  16. Continue B12 injections  monthly: Patient did have elevated methylmalonic acid level during the early phases of his treatments.  Continue the same  17. Continue supportive care  18. All questions answered  19. Follow-up with the nurse practitioner in 1 week to reevaluate leukocytosis prior to Tecentriq and to reevaluate changes to the gabapentin for his pain  20. Follow-up with Dr. Gaspar in 3 weeks or  sooner if needed         Patient has  questions which have all been answered to the best of my abilities    I have reviewed labs results, imaging, vitals, and medications with the patient today.     Patient verbalized understanding and is in agreement of the above plan.    I spent 30 total minutes, face-to-face, caring for Jc today. 90% of this time involved counseling and/or coordination of care as documented within this note.

## 2023-05-08 NOTE — TELEPHONE ENCOUNTER
Caller: Jc Driscoll    Relationship: Self    Best call back number: [phone]    What is the best time to reach you: ANYTIME    Who are you requesting to speak with (clinical staff, provider,  specific staff member): SCHEDULING       What was the call regarding:     HAD BEEN IN THE HOSPITAL AND NEEDING TO MAKE FOLLOW UP APPT WOULD LIKE TO SCHEDULE WITH GALE DALAL       NEEDING AROUND 2PM OR AFTER IF POSSIBLE     Do you require a callback: YES

## 2023-05-09 ENCOUNTER — OFFICE VISIT (OUTPATIENT)
Dept: ONCOLOGY | Facility: CLINIC | Age: 58
End: 2023-05-09
Payer: COMMERCIAL

## 2023-05-09 ENCOUNTER — READMISSION MANAGEMENT (OUTPATIENT)
Dept: CALL CENTER | Facility: HOSPITAL | Age: 58
End: 2023-05-09
Payer: COMMERCIAL

## 2023-05-09 ENCOUNTER — APPOINTMENT (OUTPATIENT)
Dept: LAB | Facility: HOSPITAL | Age: 58
End: 2023-05-09
Payer: COMMERCIAL

## 2023-05-09 VITALS
SYSTOLIC BLOOD PRESSURE: 103 MMHG | OXYGEN SATURATION: 100 % | HEIGHT: 75 IN | WEIGHT: 179.4 LBS | HEART RATE: 79 BPM | BODY MASS INDEX: 22.3 KG/M2 | DIASTOLIC BLOOD PRESSURE: 71 MMHG

## 2023-05-09 DIAGNOSIS — E83.42 HYPOMAGNESEMIA: Primary | ICD-10-CM

## 2023-05-09 DIAGNOSIS — M79.2 NEUROPATHIC PAIN: ICD-10-CM

## 2023-05-09 DIAGNOSIS — J18.9 PNEUMONIA OF LEFT UPPER LOBE DUE TO INFECTIOUS ORGANISM: ICD-10-CM

## 2023-05-09 DIAGNOSIS — C34.92 NSCLC OF LEFT LUNG: Primary | ICD-10-CM

## 2023-05-09 LAB
BASOPHILS # BLD AUTO: 0.08 10*3/MM3 (ref 0–0.2)
BASOPHILS NFR BLD AUTO: 0.5 % (ref 0–1.5)
DEPRECATED RDW RBC AUTO: 47.6 FL (ref 37–54)
EOSINOPHIL # BLD AUTO: 0.22 10*3/MM3 (ref 0–0.4)
EOSINOPHIL NFR BLD AUTO: 1.3 % (ref 0.3–6.2)
ERYTHROCYTE [DISTWIDTH] IN BLOOD BY AUTOMATED COUNT: 15.7 % (ref 12.3–15.4)
HCT VFR BLD AUTO: 32.2 % (ref 37.5–51)
HGB BLD-MCNC: 10.1 G/DL (ref 13–17.7)
HOLD SPECIMEN: NORMAL
HOLD SPECIMEN: NORMAL
LYMPHOCYTES # BLD AUTO: 0.7 10*3/MM3 (ref 0.7–3.1)
LYMPHOCYTES NFR BLD AUTO: 4.1 % (ref 19.6–45.3)
MCH RBC QN AUTO: 26.6 PG (ref 26.6–33)
MCHC RBC AUTO-ENTMCNC: 31.4 G/DL (ref 31.5–35.7)
MCV RBC AUTO: 85 FL (ref 79–97)
MONOCYTES # BLD AUTO: 1.09 10*3/MM3 (ref 0.1–0.9)
MONOCYTES NFR BLD AUTO: 6.3 % (ref 5–12)
NEUTROPHILS NFR BLD AUTO: 15.13 10*3/MM3 (ref 1.7–7)
NEUTROPHILS NFR BLD AUTO: 87.8 % (ref 42.7–76)
PLATELET # BLD AUTO: 532 10*3/MM3 (ref 140–450)
PMV BLD AUTO: 8.2 FL (ref 6–12)
RBC # BLD AUTO: 3.79 10*6/MM3 (ref 4.14–5.8)
WBC NRBC COR # BLD: 17.22 10*3/MM3 (ref 3.4–10.8)

## 2023-05-09 PROCEDURE — 36415 COLL VENOUS BLD VENIPUNCTURE: CPT

## 2023-05-09 PROCEDURE — 85025 COMPLETE CBC W/AUTO DIFF WBC: CPT | Performed by: NURSE PRACTITIONER

## 2023-05-09 RX ORDER — GABAPENTIN 300 MG/1
300 CAPSULE ORAL 2 TIMES DAILY
Qty: 60 CAPSULE | Refills: 3 | Status: SHIPPED | OUTPATIENT
Start: 2023-05-09

## 2023-05-09 NOTE — OUTREACH NOTE
COPD/PN Week 2 Survey    Flowsheet Row Responses   [facility] patient discharged from? [facility]   Does the patient have one of the following disease processes/diagnoses(primary or secondary)? Pneumonia   Week 2 attempt successful? Yes   Call start time 1553   Call end time 1556   Discharge diagnosis Left upper lobe pneumonia   Person spoke with today (if not patient) and relationship patient   Meds reviewed with patient/caregiver? Yes   Is the patient taking all medications as directed (includes completed medication regime)? Yes   Comments regarding appointments Pt saw Shayy Blackwell with oncology   Does the patient have a primary care provider?  Yes   Does the patient have an appointment with their PCP or specialist within 7 days of discharge? No   Comments regarding PCP Dr. Alvarenga   What is preventing the patient from scheduling follow up appointments within 7 days of discharge? --  [Pt see Shayy in oncology.]   Has the patient kept scheduled appointments due by today? Yes   Pulse Ox monitoring None   Psychosocial issues? No   Did the patient receive a copy of their discharge instructions? Yes   Nursing interventions Reviewed instructions with patient   What is the patient's perception of their health status since discharge? Improving   If the patient is a current smoker, are they able to teach back resources for cessation? Not a smoker   Is the patient/caregiver able to teach back the hierarchy of who to call/visit for symptoms/problems? PCP, Specialist, Home health nurse, Urgent Care, ED, 911 Yes   Is the patient/caregiver able to teach back signs and symptoms of worsening condition: Fever/chills, Shortness of breath, Chest pain   Is the patient/caregiver able to teach back importance of completing antibiotic course of treatment? --  [completed]   Week 2 call completed? Yes   Is the patient interested in additional calls from an ambulatory ?  NOTE:  applies to high risk patients requiring  additional follow-up. No   Wrap up additional comments Pt doing well.  he denies needs.            APRYL VICTORIA - Registered Nurse

## 2023-05-15 ENCOUNTER — HOSPITAL ENCOUNTER (OUTPATIENT)
Dept: ONCOLOGY | Facility: HOSPITAL | Age: 58
Discharge: HOME | End: 2023-05-15
Admitting: INTERNAL MEDICINE
Payer: COMMERCIAL

## 2023-05-15 VITALS
SYSTOLIC BLOOD PRESSURE: 116 MMHG | RESPIRATION RATE: 20 BRPM | TEMPERATURE: 97.6 F | OXYGEN SATURATION: 98 % | HEART RATE: 83 BPM | BODY MASS INDEX: 22.13 KG/M2 | HEIGHT: 75 IN | WEIGHT: 178 LBS | DIASTOLIC BLOOD PRESSURE: 75 MMHG

## 2023-05-15 DIAGNOSIS — C34.12 MALIGNANT NEOPLASM OF UPPER LOBE OF LEFT LUNG: ICD-10-CM

## 2023-05-15 DIAGNOSIS — C34.92 NSCLC OF LEFT LUNG: Primary | ICD-10-CM

## 2023-05-15 DIAGNOSIS — C34.91 NON-SMALL CELL CARCINOMA OF LUNG, RIGHT: ICD-10-CM

## 2023-05-15 DIAGNOSIS — Z45.2 ENCOUNTER FOR CARE RELATED TO VASCULAR ACCESS PORT: ICD-10-CM

## 2023-05-15 DIAGNOSIS — C34.31 CANCER OF LOWER LOBE OF RIGHT LUNG: ICD-10-CM

## 2023-05-15 LAB
ALBUMIN SERPL-MCNC: 3.4 G/DL (ref 3.5–5.2)
ALBUMIN/GLOB SERPL: 1.1 G/DL
ALP SERPL-CCNC: 128 U/L (ref 39–117)
ALT SERPL W P-5'-P-CCNC: 18 U/L (ref 1–41)
ANION GAP SERPL CALCULATED.3IONS-SCNC: 12 MMOL/L (ref 5–15)
AST SERPL-CCNC: 18 U/L (ref 1–40)
BASOPHILS # BLD AUTO: 0.1 10*3/MM3 (ref 0–0.2)
BASOPHILS NFR BLD AUTO: 0.7 % (ref 0–1.5)
BILIRUB SERPL-MCNC: 0.3 MG/DL (ref 0–1.2)
BUN SERPL-MCNC: 10 MG/DL (ref 6–20)
BUN/CREAT SERPL: 11.4 (ref 7–25)
CALCIUM SPEC-SCNC: 9.1 MG/DL (ref 8.6–10.5)
CHLORIDE SERPL-SCNC: 93 MMOL/L (ref 98–107)
CO2 SERPL-SCNC: 28 MMOL/L (ref 22–29)
CREAT SERPL-MCNC: 0.88 MG/DL (ref 0.76–1.27)
DEPRECATED RDW RBC AUTO: 48.2 FL (ref 37–54)
EGFRCR SERPLBLD CKD-EPI 2021: 99.7 ML/MIN/1.73
EOSINOPHIL # BLD AUTO: 0.21 10*3/MM3 (ref 0–0.4)
EOSINOPHIL NFR BLD AUTO: 1.4 % (ref 0.3–6.2)
ERYTHROCYTE [DISTWIDTH] IN BLOOD BY AUTOMATED COUNT: 16.1 % (ref 12.3–15.4)
GLOBULIN UR ELPH-MCNC: 3.2 GM/DL
GLUCOSE BLDC GLUCOMTR-MCNC: 148 MG/DL (ref 70–105)
GLUCOSE SERPL-MCNC: 153 MG/DL (ref 65–99)
HCT VFR BLD AUTO: 32.7 % (ref 37.5–51)
HGB BLD-MCNC: 10.2 G/DL (ref 13–17.7)
LYMPHOCYTES # BLD AUTO: 0.76 10*3/MM3 (ref 0.7–3.1)
LYMPHOCYTES NFR BLD AUTO: 5.2 % (ref 19.6–45.3)
MAGNESIUM SERPL-MCNC: 1.8 MG/DL (ref 1.6–2.6)
MCH RBC QN AUTO: 26.1 PG (ref 26.6–33)
MCHC RBC AUTO-ENTMCNC: 31.2 G/DL (ref 31.5–35.7)
MCV RBC AUTO: 83.6 FL (ref 79–97)
MONOCYTES # BLD AUTO: 0.96 10*3/MM3 (ref 0.1–0.9)
MONOCYTES NFR BLD AUTO: 6.5 % (ref 5–12)
NEUTROPHILS NFR BLD AUTO: 12.71 10*3/MM3 (ref 1.7–7)
NEUTROPHILS NFR BLD AUTO: 86.2 % (ref 42.7–76)
PLATELET # BLD AUTO: 521 10*3/MM3 (ref 140–450)
PMV BLD AUTO: 8.3 FL (ref 6–12)
POTASSIUM SERPL-SCNC: 3.7 MMOL/L (ref 3.5–5.2)
PROT SERPL-MCNC: 6.6 G/DL (ref 6–8.5)
RBC # BLD AUTO: 3.91 10*6/MM3 (ref 4.14–5.8)
SODIUM SERPL-SCNC: 133 MMOL/L (ref 136–145)
T4 FREE SERPL-MCNC: 1.26 NG/DL (ref 0.93–1.7)
TSH SERPL DL<=0.05 MIU/L-ACNC: 3.59 UIU/ML (ref 0.27–4.2)
WBC NRBC COR # BLD: 14.74 10*3/MM3 (ref 3.4–10.8)

## 2023-05-15 PROCEDURE — 80050 GENERAL HEALTH PANEL: CPT | Performed by: INTERNAL MEDICINE

## 2023-05-15 PROCEDURE — 84439 ASSAY OF FREE THYROXINE: CPT | Performed by: INTERNAL MEDICINE

## 2023-05-15 PROCEDURE — 25010000002 HEPARIN LOCK FLUSH PER 10 UNITS: Performed by: INTERNAL MEDICINE

## 2023-05-15 PROCEDURE — 83735 ASSAY OF MAGNESIUM: CPT | Performed by: INTERNAL MEDICINE

## 2023-05-15 PROCEDURE — 82948 REAGENT STRIP/BLOOD GLUCOSE: CPT

## 2023-05-15 PROCEDURE — 96413 CHEMO IV INFUSION 1 HR: CPT

## 2023-05-15 PROCEDURE — 25010000002 ATEZOLIZUMAB 1200 MG/20ML SOLUTION 20 ML VIAL: Performed by: INTERNAL MEDICINE

## 2023-05-15 PROCEDURE — 36415 COLL VENOUS BLD VENIPUNCTURE: CPT | Performed by: INTERNAL MEDICINE

## 2023-05-15 RX ORDER — SODIUM CHLORIDE 0.9 % (FLUSH) 0.9 %
20 SYRINGE (ML) INJECTION AS NEEDED
Status: DISCONTINUED | OUTPATIENT
Start: 2023-05-15 | End: 2023-05-16 | Stop reason: HOSPADM

## 2023-05-15 RX ORDER — OXYCODONE HYDROCHLORIDE 5 MG/1
5 TABLET ORAL EVERY 8 HOURS PRN
Qty: 90 TABLET | Refills: 0 | Status: SHIPPED | OUTPATIENT
Start: 2023-05-15

## 2023-05-15 RX ORDER — HEPARIN SODIUM (PORCINE) LOCK FLUSH IV SOLN 100 UNIT/ML 100 UNIT/ML
500 SOLUTION INTRAVENOUS AS NEEDED
Status: DISCONTINUED | OUTPATIENT
Start: 2023-05-15 | End: 2023-05-16 | Stop reason: HOSPADM

## 2023-05-15 RX ORDER — LACTULOSE 10 G/15ML
SOLUTION ORAL
Qty: 900 ML | Refills: 0 | Status: SHIPPED | OUTPATIENT
Start: 2023-05-15

## 2023-05-15 RX ORDER — SODIUM CHLORIDE 9 MG/ML
250 INJECTION, SOLUTION INTRAVENOUS ONCE
Status: COMPLETED | OUTPATIENT
Start: 2023-05-15 | End: 2023-05-15

## 2023-05-15 RX ORDER — SODIUM CHLORIDE 9 MG/ML
250 INJECTION, SOLUTION INTRAVENOUS ONCE
Status: CANCELLED | OUTPATIENT
Start: 2023-05-15

## 2023-05-15 RX ADMIN — SODIUM CHLORIDE 250 ML: 9 INJECTION, SOLUTION INTRAVENOUS at 12:17

## 2023-05-15 RX ADMIN — Medication 20 ML: at 12:55

## 2023-05-15 RX ADMIN — ATEZOLIZUMAB 1200 MG: 1200 INJECTION, SOLUTION INTRAVENOUS at 12:18

## 2023-05-15 RX ADMIN — Medication 500 UNITS: at 12:55

## 2023-05-15 NOTE — PROGRESS NOTES
Pt here for C9 D1 tecentriq. Using sterile technique, pt's port accessed for blood collection. Positive blood return noted, but positional. Unable to collect enough blood for ordered labs. Pt's labs obtained via venipuncture. Pt tolerated today's treatment without having any issues. AVS given at discharge and he verbalized understanding.

## 2023-05-22 ENCOUNTER — TELEPHONE (OUTPATIENT)
Dept: ONCOLOGY | Facility: CLINIC | Age: 58
End: 2023-05-22
Payer: COMMERCIAL

## 2023-05-22 DIAGNOSIS — M79.2 NEUROPATHIC PAIN: ICD-10-CM

## 2023-05-22 RX ORDER — GABAPENTIN 300 MG/1
300 CAPSULE ORAL 3 TIMES DAILY
Qty: 60 CAPSULE | Refills: 3 | COMMUNITY
Start: 2023-05-22

## 2023-05-22 NOTE — TELEPHONE ENCOUNTER
Received a call from the pt's wife stating that his gabapentin was increased at his last visit due to neuropathy, but the pt is still in a lot of pain. She said that it is in his shoulder, down his arm, and around his port. She said it is so bad that he can't rest his elbow on a table without it causing pain. She also stated that he is taking pain medication, but it only lasts about an hour. She requested that an appt be scheduled for evaluation. Appt scheduled. Spoke to MULUGETA Lucas who advised that the pt increase his gabapentin to TID. I relayed this to the pt's wife and she verbalized understanding.

## 2023-05-22 NOTE — PROGRESS NOTES
Hematology/Oncology Outpatient Follow Up    PATIENT NAME:Jc Driscoll  :1965  MRN: [id number]  PRIMARY CARE PHYSICIAN: Reshma Alvarenga MD  REFERRING PHYSICIAN: Reshma Alvarenga MD    Chief Complaint   Patient presents with   • Follow-up     Follow up  NSCLC of left lung        HISTORY OF PRESENT ILLNESS:                                                                                                                                                                                                                                                             This is a 57 y.o. who developed left shoulder pain and for that reason he had a chest x-ray done on 19 which showed a 2.7 cm noncalcified right lower lobe nodule was identified.  For that reason a CT scan of the chest was recommended.  Review of his records shows patient had the CT scan on 19 done without contrast.  This basically showed a lobulated noncalcified mass in the posterior aspect of the right lower lobe measuring 3 cm close to the pleural surface.  There is a calcified 1.2 mm nodule in the lateral aspect of the right lower lobe consistent with a granuloma.  There were also calcified subcarinal and right hilar nodules.  There is a lower right side tracheal nodule measuring 1 cm.  Patient had a PET/CT scan done on 19 which showed increased metabolic activity on the right lower lobe mass that measures 2.5 cm with SUV of 4.4.  There was also increased metabolic activity in the subcarinal space measuring 2.8 cm in size with SUV of 3.4,  increased activity in an enlarged right paratracheal lymph node that measures 1.9 cm, SUV of 5, also suspicious for metastatic adenopathy.  Patient had a CT-guided biopsy of the right lower lobe mass on 3/8/19.  This showed adenocarcinoma, TTF-1 positive.  On 3/18/19 patient underwent endoscopic ultrasound and biopsy of a subcarinal lymph node.  This was positive for malignant cells.  Patient was then taken back to surgery on 3/20/18 and had left Mediport placement by Dr. Fuchs.  He has been referred for further evaluation and management of his stage 3 adenocarcinoma of the right lung.  He was accompanied by his spouse for the appointment on 3/26/19.  Patient was scheduled to have a brain MRI for complete staging, but he could not do this due to claustrophobia.  He is willing to try with the help of angiolytics.    • Patient had brain MRI done on 4/5/19.  He states it did not show any evidence of metastatic disease.  Evidence of chronic sinusitis was noted.    • Patient was seen by Dr. Escalona who has recommended concurrent chemotherapy and radiation.  Patient is scheduled to begin on 4/15/19.   • 4/17/19 - Patient was initiated on combined chemotherapy and radiation with Cisplatin and Alimta.  Patient received cycle 1.      • 5/8/19 - Patient received cycle 2 of chemotherapy with Cisplatin and Alimta.   • 5/15/19 - Chemistry panel showed creatinine of 1.7.  WBC 1.8, hemoglobin 11.6, platelet count 72,000.   • 5/20/19 - BUN 10, creatinine 1.2, potassium 3.5.    • 5/23/19 - CT scan of the chest with contrast showed interval decrease in size of the right lower lobe pulmonary nodule now measuring 2.1 cm in size.  There was no mediastinal or hilar adenopathy identified.  • 6/26/2019 patient underwent right lower lobectomy with hilar and mediastinal lymph node dissection performed by Dr. Terrance Mckeon.  • Pathology revealed residual residual 2.2 cm invasive moderately differentiated adenocarcinoma.  There were a total  of 16 lymph nodes evaluated that 7 had metastatic disease.  There was evidence of lymphovascular invasion, extranodal involvement.  All the surgical margins were negative and distance of the closest margin was 2.5 cm.  Logic stage is T1c N2 M0.  • Patient is here today accompanied by his spouse for this appointment.  He continues to complain of some postop discomfort, numbness but his skin is intact.  There is no drainage.  Has had follow-up with Dr. Fuchs.  • 8/14/2019-seen by Dr. Maria at the [facility].  Dr. Maria has recommended immunotherapy with durvalumab off label use as patient has not had significant response to neoadjuvant chemotherapy and radiation.  Patient was given the option to have immunotherapy at the [facility] vs [place] and he has chosen to stay in [place]  • 8/21/19 - Started cycle 1 day 1 Imfinzi  • 9/4/2019 patient had CT scan of the chest this shows a moderate size right pleural effusion.  There are areas of groundglass opacification in the right upper lobe without any evidence of significant septal thickening.  Volume loss noted there is also moderate pleural effusion.  There is no suspicious recurrent malignancy.  There were nonenlarged residual mediastinal lymph nodes.  • 9/9/19 - WBC 6.23, Hgb 11.7 Platelets 257,000, , BUN 9, Cr 1.1,Vitamin B12 318, Ferritin 437  • 9/23/19 - received cycle 2 day 1 Imfinzi  • 10/9/19- Seen by Dr rodrgíuez with ENT for sinus disease  • 11/4/2019-patient received cycle 5 of Imfinzi(durvalumab)  • 12/2/2019 patient had cycle 7 of durvalumab  • 12/5/2019-patient had CT scan of the  abdomen and pelvis with small right pleural sided pleural effusion.There is no evidence of metastatic disease  • 12/30/2019-patient received cycle 9 of durvalumab  • 12/31/2019-patient had CT scan of the chest showed small to moderate left pleural effusion.  Iglesias appearing right lower paratracheal and right peribronchial soft tissue thickening without any discrete  pathologically enlarged mediastinal or hilar lymph nodes.  • 1/27/20-patient received cycle 11 of durvalumab  • 2/17/20-patient had a stress test which was negative for ischemia  • 2/17/2020-patient had CT of the chest which showed postop changes on the right lung, right pleural effusion but no enlarging mass was noted  • 3/2/2020-patient received cycle 12 of durvalumab  • 3/16/2020-patient had ultrasound-guided right thoracentesis.  Pathology was negative for malignancy  • 4/13/2020-patient received cycle 15 of immunotherapy with durvalumab  • 5/11/2020-patient received cycle 17 of immunotherapy with durvalumab  • 6/9/2020-patient had CT scan of the chest, abdomen and pelvis for lung cancer restaging.  There is stable small chronic loculated right basilar pleural effusion suggesting chronic pleuritis.  There is no evidence of metastatic disease in the abdomen or pelvis  • 7/20/2020 patient received cycle 22 of Durvalumab  • 8/17/2020 patient received cycle 24 and last cycle of durvalumab  • 9/24/2020 patient had CT scan of the chest, abdomen and pelvis for cancer restaging there was no evidence of local recurrence or metastatic disease within the abdomen and pelvis.  He has stable chronic small right pleural effusion.  Mild sigmoid diverticulosis was noted.  • 1/18/2021 patient had CT scan of the chest, abdomen and pelvis reviewed patient  • 5/24/2021 patient had CT scan of the chest calcified subcarinal lymph node consistent with changes of prior granulomatous disease.  Chronic small right pleural effusion is noted similar to prior exam.  Previously shown 4 mm nodule in the left lower lobe has nearly completely resolved.  The subpleural 3 mm nodule located within the posterior left lower lobe With resolved.  • 9/24/2021 patient had a CT scan of the chest, abdomen and pelvis there is soft tissue attenuation within the right paratracheal area which has been suggested to reflect sequela to previous treatment.  There  is slight thickening of the bladder wall otherwise there is no evidence of metastatic disease.  • 12/27/2021 patient had CT scan of the chest with contrast this basically showed irregular shaped noncalcified 7 mm left upper lobe pulmonary nodule.  PET CT scan was recommended to further evaluate.  • 1/26/2022 patient had a PET CT scan done which basically showed evidence of mild uptake corresponding to the nodule within the left upper lobe which is new worrisome for developing metastatic focus.  There was mild radiopharmaceutical activity corresponding to pleural thickening throughout the right lung base with associated pleural effusion likely related to post therapeutic changes and radiation changes in this region.  Metastatic malignancy involving the pleura could not be completely eliminated.  • 1/26/2022: CT-guided biopsy was aborted due to finding by the radiologist that the lung nodularity was actually a clump of tiny nodules of which the largest was 6 mm and therefore biopsy could not be completed.  Also the area was in the vicinity of multiple blood vessels with increased risk of bleeding.  Follow-up CT of the chest was recommended for continued surveillance  • 5/18/2022 patient had CT-guided biopsy of the left enlarging nodule, pathology was positive for invasive poorly differentiated adenocarcinoma most consistent with adenocarcinoma of pulmonary origin.  • 5/27/2022 patient had a PET CT scan which showed a 2.3 cm hypermetabolic left upper lobe nodule which has increased in size no convincing evidence of metastatic disease elsewhere within the chest.  • 5/21/2022 patient had brain MRI which did not show any evidence of intracranial metastasis  • 6/2/2022 patient was seen by Dr. Miryam Reyna pulmonary function test is being done to assess surgical candidacy  • 7/6/2022 patient underwent left heart Koska P video-assisted with upper lobectomy mediastinal lymph node dissection performed by Dr. Witt  Doc  • Final pathology revealed poorly differentiated  • A predominant adenocarcinoma with solid component presenting 45% and micropapillary component 5% with extensive necrosis, invasive carcinoma measures 2.5 maximally there was focal lymphovascular space invasion all margins were negative for malignancy discussed with the closest margin was 2.5 cm pathology stage is pT1C pN1.  PD-L1 showed a tumor proportion score of 95%  • 8/3/2022 patient started adjuvant chemotherapy with cisplatin, Taxol  • 8/24/2022 patient received cycle 2 of combination chemotherapy with cisplatin and Taxol with Neulasta  • 10/5/2022: Patient received cycle 4-day 1 of combination chemotherapy with cisplatin and Taxol  • Patient developed left-sided chest pain for that reason he had a PET/CT scan 1/20/2020 is basically revealed a 4 x 3.5 cm hypermetabolic soft tissue mass in the left chest wall between the first and second ribs anteriorly consistent with relapsed disease.  There was a small cluster of hypermetabolic lymph nodes seen in the left supraclavicular region consistent with early manoj metastasis  • 11/11/2022: 2D echocardiogram performed showing a left ventricular EF of 56 to 60%  • 11/22/2022: Cycle 1 of atezolizumab received  • 12/5/2022-12/9/2022: Hospitalized at [facility] for dehydration, hyponatremia, and weakness.  • 12/19/2020: Patient received cycle 2 of Tecentriq along with XRT  • 1/11/2023: Patient completed radiation.  Remains on Tecentriq every 3 weeks  • 1/18/2023 patient had a CT scan of the chest which basically revealed abnormal soft tissue interposed between the first and second rib with some erosive changes to the ribs this finding measures about 5 x 2.8 cm previously was 4.8 x 3.8 cm.  There is some pleural thickening about in this area nodular area within the prominent mediastinal fat in the upper chest measuring 1.7 cm more fluid as opposed to enlarged lymph nodes.  Possibility of pulmonary hypertension was also  noted due to prominence of main pulmonary artery measuring 3.7 cm.  • 2/24/2023: CT of the chest with contrast showed abnormal mass along the chest wall interposed between the first and second ribs that could relate to metastatic disease.  Some erosive changes to the ribs in this area.  Nodular area in the more superior mediastinal fat that looks like you to reflect more fluidlike attenuation as opposed to a pathologic lymph node.  New groundglass changes within the left upper lobe that could be secondary to inflammatory infectious process.  It is possible this may be posttreatment in nature.  There are some groundglass changes along the superior mediastinum and right upper lobe which are unchanged.  Bilateral pleural effusions noted.  This is developed on the left.  Right unchanged.  • 4/25/2023 CT of the chest with contrast done in short-term follow-up to reevaluate from previous scan: Increased consolidation in the upper left chest since previous study; increased size of left pleural effusion with loculation along its superior medial margin; no change in the soft tissue mass with osseous destruction of the left upper chest wall between the first and second ribs.  • 4/28/2023-4/30/2023: Hospitalization at [facility] for pneumonia.    Past Medical History:   Diagnosis Date   • Allergic rhinitis 07/25/2013    Overview:  4/2/2018 - still zyrtec 10 daily (if stops, gets dermatitis again).    • Anxiety and depression 06/05/2012    Overview:  4/2/2018 -   C/w well 300 xr and Lito 20.  4/8/2019 -   Doing ok even with new lung cancer - lito 20 & well 300xr.    • Chronic pansinusitis 04/08/2019    Overview:  4/8/2019 -   MRI showed chronic thick in frontal, maxillary, ethmoidal ---> to Dr. COLLAZO to est since abx ICC didn't help.  Does netipot bid.    • Diastolic CHF 07/09/2014    Overview:  7/4/14 - impaired LV relaxation on Zhou ECHO.  EF 60%. Rest normal   • Dupuytren's contracture of both hands    • Elevated cholesterol    •  Erosive esophagitis 07/09/2019    Overview:  EGD 2016 4/2/2018 - nexium OTC daily for few week.  Qod before that.  Now carbonation hurts, epigastric pain 4/8/2019 -   protonix 40 doing well.    • Gastroesophageal reflux disease 02/21/2019   • Hiatal hernia 07/09/2019   • History of colon polyps    • Hypertension    • Lung cancer     right lung and in lymph nodes x2   • Mediastinal lymphadenopathy 03/12/2019   • Normocytic anemia 08/08/2019    Overview:  6/2019 - dropped to 9's postop 7/2019 - rebounded to 10's.  8/8/2019 -   Back to 9's - check ferritin, b12 and thyroid.    • Prediabetes 07/09/2014    Overview:  7/4/14 - a1c 6.1 at [place] admission   • Retention of urine 06/27/2019    PSOT OP, RESOLVED       Past Surgical History:   Procedure Laterality Date   • BRONCHOSCOPY  03/18/2019    EBUS MONTERO NEEDLE BX   • COLONOSCOPY     • DUPUYTREN CONTRACTURE RELEASE Bilateral    • LUNG BIOPSY  03/08/2019    CT GUIDED   • LUNG REMOVAL, PARTIAL Right     right lower   • PORTACATH PLACEMENT  03/20/2019    DR LONGORIA   • THORACOSCOPY Right 6/26/2019    Procedure: RIGHT VATS, OPEN LOWER LOBECTOMY WITH LYMPH NODE DISECTION, WEDGE RESECTION OF RIGHT MIDDLE LOBE;  Surgeon: Terrance Longoria MD;  Location: [facility];  Service: Cardiothoracic   • THORACOSCOPY VIDEO ASSISTED WITH LOBECTOMY Left 7/6/2022    Procedure: THORACOSCOPY VIDEO ASSISTED WITH LOBECTOMY;  Surgeon: Miryam Reyna MD;  Location: [facility];  Service: Thoracic;  Laterality: Left;         Current Outpatient Medications:   •  Constulose 10 GM/15ML solution, TAKE 30 ML BY MOUTH EVERY 12 HOURS, Disp: 900 mL, Rfl: 0  •  desvenlafaxine (PRISTIQ) 50 MG 24 hr tablet, Take 1 tablet by mouth Daily., Disp: , Rfl:   •  gabapentin (NEURONTIN) 300 MG capsule, Take 1 capsule by mouth 3 (Three) Times a Day., Disp: 60 capsule, Rfl: 3  •  levothyroxine (SYNTHROID, LEVOTHROID) 100 MCG tablet, Take 1 tablet by mouth Every Morning., Disp: , Rfl:   •  lidocaine-prilocaine  (EMLA) 2.5-2.5 % cream, Apply to port site 60 minutes prior to it being accessed, Disp: 30 g, Rfl: 1  •  liothyronine (CYTOMEL) 5 MCG tablet, Take 1 tablet by mouth Daily., Disp: , Rfl:   •  LORazepam (ATIVAN) 0.5 MG tablet, Take 1 tablet by mouth Every 8 (Eight) Hours As Needed., Disp: , Rfl:   •  metoprolol succinate XL (TOPROL-XL) 100 MG 24 hr tablet, Take 1 tablet by mouth Daily., Disp: , Rfl:   •  ondansetron (ZOFRAN) 8 MG tablet, TAKE 1 TABLET BY MOUTH THREE TIMES DAILY AS NEEDED FOR NAUSEA FOR VOMITING, Disp: 30 tablet, Rfl: 0  •  oxyCODONE (Roxicodone) 5 MG immediate release tablet, Take 1 tablet by mouth Every 8 (Eight) Hours As Needed for Moderate Pain., Disp: 90 tablet, Rfl: 0  •  pantoprazole (PROTONIX) 40 MG EC tablet, Take 1 tablet by mouth Daily As Needed., Disp: , Rfl:   •  vitamin B-12 (CYANOCOBALAMIN) 1000 MCG tablet, Take 1 tablet by mouth Daily., Disp: , Rfl:   •  vitamin B-6 (PYRIDOXINE) 100 MG tablet, Take 1 tablet by mouth Every 12 (Twelve) Hours., Disp: 60 tablet, Rfl: 6  •  gabapentin (NEURONTIN) 300 MG capsule, Take 2 capsules by mouth 3 (Three) Times a Day for 30 days., Disp: 180 capsule, Rfl: 2    No Known Allergies    Family History   Problem Relation Age of Onset   • Cancer Mother         cervical cancer   • Cervical cancer Mother    • Cancer Father         lung cancer   • Lung cancer Father    • Lung cancer Sister    • Cancer Sister         lung cancer   • Malig Hyperthermia Neg Hx        Cancer-related family history includes Cancer in his father, mother, and sister; Cervical cancer in his mother; Lung cancer in his father and sister.    Social History     Tobacco Use   • Smoking status: Former     Types: Cigarettes     Quit date: 2009     Years since quittin.7   • Smokeless tobacco: Never   Vaping Use   • Vaping Use: Never used   Substance Use Topics   • Alcohol use: Yes     Alcohol/week: 2.0 standard drinks     Types: 2 Cans of beer per week     Comment: Occasional   • Drug  use: No       I have reviewed and confirmed the accuracy of the patient's history: Chief complaint, HPI, ROS and Subjective as entered by the MA/LPN/RN. Shayy Montemayor, APRN 05/24/23        SUBJECTIVE:  Patient is here for an acute visit due to unrelenting pain in the left shoulder/left axilla/left chest wall/radiating down to the elbow of the left upper extremity.  This is not new pain but it is not well controlled and is affecting his quality of life.  He is currently taking oxycodone immediate release every 8 hours but it only gives him relief for short time.  He is also taking gabapentin 300 mg 3 times a day that he tolerates well but he is uncertain that he is having much effect from it.  Pain started after his surgery and radiation.    Jc COURTNEY Driscoll reports a pain score of .  Given his pain assessment as noted, treatment options were discussed and the following options were decided upon as a follow-up plan to address the patient's pain: increase in his gabapentin to BID.      REVIEW OF SYSTEMS:    Review of Systems   Constitutional: Positive for fatigue. Negative for chills and fever.   HENT: Negative for ear pain, mouth sores, nosebleeds and sore throat.    Eyes: Negative for photophobia and visual disturbance.   Respiratory: Negative for wheezing and stridor.    Cardiovascular: Negative for chest pain and palpitations.   Gastrointestinal: Negative for abdominal pain, diarrhea, nausea and vomiting. He has constipation  Endocrine: Negative for cold intolerance and heat intolerance.   Genitourinary: Negative for dysuria and hematuria.   Musculoskeletal: Negative for joint swelling and neck stiffness.   Skin: Negative for color change and rash.   Neurological: Negative for seizures and syncope.   Hematological: Negative for adenopathy.   Psychiatric/Behavioral: Negative for agitation, confusion and hallucinations.     OBJECTIVE:    Vitals:    05/24/23 1112   BP: 104/70   Pulse: 94   SpO2: 97%   Weight: 80.6  "kg (177 lb 12.8 oz)   Height: 190.5 cm (75\")   PainSc:   7   PainLoc: Chest  Comment: shoulder and arm on left side     ECOG    Eastern Cooperative Oncology Group (ECOG, [place], World Health Organization) performance scale  0 Fully active; no performance restrictions.  1 Strenuous physical activity restricted; fully ambulatory and able to carry out light work.  2 Capable of all self-care but unable to carry out any work activities. Up and about >50% of waking hours.  3 Capable of only limited self-care; confined to bed or chair >50% of waking hours.  4 Completely disabled; cannot carry out any self-care; totally confined to bed or chair.    Physical Exam   Constitutional: He is oriented to person, place, and time. He appears well-developed. No distress.   HENT:   Head: Normocephalic and atraumatic.   Right Ear: External ear normal.   Left Ear: External ear normal.   Nose: Nose normal.   Eyes: Pupils are equal, round, and reactive to light. Conjunctivae are normal. Right eye exhibits no discharge. Left eye exhibits no discharge. No scleral icterus.   Neck: No thyromegaly present.   Cardiovascular: Normal rate, regular rhythm and normal heart sounds. Exam reveals no gallop and no friction rub.   Pulmonary/Chest: Effort normal. No stridor. No respiratory distress. He has no wheezes. Diminished left lung fields with faint coarse rhonchi.  Abdominal: Soft. Bowel sounds are normal. He exhibits no mass. There is no abdominal tenderness. There is no rebound and no guarding.   Musculoskeletal: Normal range of motion. No tenderness.   Lymphadenopathy:     He has no cervical adenopathy.   Neurological: He is alert and oriented to person, place, and time. He exhibits normal muscle tone.   Skin: Skin is warm. No rash noted. He is not diaphoretic. No erythema.   Psychiatric: His behavior is normal. Judgment and thought content normal.   Nursing note and vitals reviewed.      I have reexamined the patient and the results are " consistent with the previously documented exam. MULUGETA Garcia     RECENT LABS    WBC   Date Value Ref Range Status   05/24/2023 16.31 (H) 3.40 - 10.80 10*3/mm3 Final   03/22/2022 7.84 4.5 - 11.0 10*3/uL Final     RBC   Date Value Ref Range Status   05/24/2023 3.60 (L) 4.14 - 5.80 10*6/mm3 Final   03/22/2022 4.23 (L) 4.5 - 5.9 10*6/uL Final     Hemoglobin   Date Value Ref Range Status   05/24/2023 9.5 (L) 13.0 - 17.7 g/dL Final   03/22/2022 12.9 (L) 13.5 - 17.5 g/dL Final     Hematocrit   Date Value Ref Range Status   05/24/2023 30.2 (L) 37.5 - 51.0 % Final   03/22/2022 38.8 (L) 41.0 - 53.0 % Final     MCV   Date Value Ref Range Status   05/24/2023 83.9 79.0 - 97.0 fL Final   03/22/2022 91.7 80.0 - 100.0 fL Final     MCH   Date Value Ref Range Status   05/24/2023 26.4 (L) 26.6 - 33.0 pg Final   03/22/2022 30.5 26.0 - 34.0 pg Final     MCHC   Date Value Ref Range Status   05/24/2023 31.5 31.5 - 35.7 g/dL Final   03/22/2022 33.2 31.0 - 37.0 g/dL Final     RDW   Date Value Ref Range Status   05/24/2023 16.1 (H) 12.3 - 15.4 % Final   03/22/2022 14.2 12.0 - 16.8 % Final     RDW-SD   Date Value Ref Range Status   05/24/2023 48.1 37.0 - 54.0 fl Final     MPV   Date Value Ref Range Status   05/24/2023 8.1 6.0 - 12.0 fL Final   03/22/2022 9.3 8.4 - 12.4 fL Final     Platelets   Date Value Ref Range Status   05/24/2023 521 (H) 140 - 450 10*3/mm3 Final   03/22/2022 324 140 - 440 10*3/uL Final     Neutrophil Rel %   Date Value Ref Range Status   03/22/2022 63.1 45 - 80 % Final     Neutrophil %   Date Value Ref Range Status   05/24/2023 83.0 (H) 42.7 - 76.0 % Final     Lymphocyte Rel %   Date Value Ref Range Status   03/22/2022 16.2 15 - 50 % Final     Lymphocyte %   Date Value Ref Range Status   05/24/2023 5.5 (L) 19.6 - 45.3 % Final     Monocyte Rel %   Date Value Ref Range Status   03/22/2022 13.6 0 - 15 % Final     Monocyte %   Date Value Ref Range Status   05/24/2023 9.3 5.0 - 12.0 % Final     Eosinophil %   Date  Value Ref Range Status   05/24/2023 1.7 0.3 - 6.2 % Final   03/22/2022 5.5 0 - 7 % Final     Basophil Rel %   Date Value Ref Range Status   03/22/2022 1.1 0 - 2 % Final     Basophil %   Date Value Ref Range Status   05/24/2023 0.5 0.0 - 1.5 % Final     Immature Grans %   Date Value Ref Range Status   07/07/2022 0.4 0.0 - 0.5 % Final   03/22/2022 0.5 0.0 - 1.0 % Final     Neutrophils Absolute   Date Value Ref Range Status   03/22/2022 4.94 2.0 - 8.8 10*3/uL Final     Neutrophils, Absolute   Date Value Ref Range Status   05/24/2023 13.54 (H) 1.70 - 7.00 10*3/mm3 Final     Lymphocytes Absolute   Date Value Ref Range Status   03/22/2022 1.27 0.7 - 5.5 10*3/uL Final     Lymphocytes, Absolute   Date Value Ref Range Status   05/24/2023 0.90 0.70 - 3.10 10*3/mm3 Final     Monocytes Absolute   Date Value Ref Range Status   03/22/2022 1.07 0.0 - 1.7 10*3/uL Final     Monocytes, Absolute   Date Value Ref Range Status   05/24/2023 1.51 (H) 0.10 - 0.90 10*3/mm3 Final     Eosinophils Absolute   Date Value Ref Range Status   03/22/2022 0.43 0.0 - 0.8 10*3/uL Final     Eosinophils, Absolute   Date Value Ref Range Status   05/24/2023 0.28 0.00 - 0.40 10*3/mm3 Final     Basophils Absolute   Date Value Ref Range Status   03/22/2022 0.09 0.0 - 0.2 10*3/uL Final     Basophils, Absolute   Date Value Ref Range Status   05/24/2023 0.08 0.00 - 0.20 10*3/mm3 Final     Immature Grans, Absolute   Date Value Ref Range Status   07/07/2022 0.05 0.00 - 0.05 10*3/mm3 Final   03/22/2022 0.04 0.00 - 0.10 10*3/uL Final     nRBC   Date Value Ref Range Status   12/19/2022 0.0 0.0 - 0.2 /100 WBC Final       Lab Results   Component Value Date    GLUCOSE 153 (H) 05/15/2023    BUN 10 05/15/2023    CREATININE 0.88 05/15/2023    EGFRIFNONA 100 01/28/2022    EGFRIFAFRI >60 05/20/2019    BCR 11.4 05/15/2023    K 3.7 05/15/2023    CO2 28.0 05/15/2023    CALCIUM 9.1 05/15/2023    ALBUMIN 3.4 (L) 05/15/2023    LABIL2 1.5 03/22/2022    AST 18 05/15/2023    ALT 18  05/15/2023         ASSESSMENT:    1. 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  He has been referred to Dr. Escalona to see whether he is a candidate for radiation treatments.  Would also consider systemic treatment for him for molecular targets as he has had very poor response to chemotherapy malignancy.  Continue Tecentriq with XRT.  Patient has completed XRT on 1/11/2023  2. Reviewed his recent CT of the chest which shows 1 cm increase in the size of the left lung mass.  Patient is also on immunotherapy which can cause pseudo-enlargement.  We will have a short interval 6-week follow-up CT of the chest which will be due second week in April 2023.  This will be scheduled second week in April 2023  3. 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  pT1 cpN1 M0 consistent with stage IIb disease, found on surveillance CT scans. Brain MRI was negative. We will recommend platinum doublet followed by Tecentriq unless he has EGFR mutation for which adjuvant osimertinib will be considered.  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  Patient was treated with cisplatin and Alimta in the adjuvant setting for his original malignancy.  He is currently on cisplatin and Taxol.  Patient is receiving cycle #4 today.  This tumor was completely resected and has negative margins and no mediastinal lymph node involvement.  Reviewed the pathology with Dr. Trevizo.  This is a new second lung primary.  Patient has received a total of 4 cycles of combination of cisplatin and Taxol completed on 10/5/2022.  He will continue Tecentriq  4. Constipation: We will begin lactulose.  Patient to call me if no  results  5. High PD-L1 expression at 95%. Reviewed foundation 1 testing results.  This showed MET amplification, ERBB2 amplification for which targeted therapy is available for including crizotinib for MET amplification, afatinib for ERBB2 but this is approved for metastatic disease.   6. We have extensively reviewed the literature.  He will be a candidate for ERBB2 targeted therapy but fam-trastuzumab cannot be combined with XRT due to risk of interstitial lung disease.  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  For these reasons we have decided to proceed with immunotherapy along with radiation.  His case was reviewed at the tumor board and this was the recommendation.  Patient has been approved for start Tecentriq therefore we will go ahead and initiate concurrently with radiation  7. Chemotherapy-induced fatigue: This has improved  8. Hypomagnesemia secondary to chemotherapy: Patient has discontinued magnesium supplements  9. 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  Increase salt in the diet, fluid restriction.  Continue to monitor his sodium levels.  Patient is currently on demeclocycline.  We will start demeclocycline and follow his BMPs on a weekly basis for now until next visit in 3 weeks    10. History of adenocarcinoma of the right lung, T1c N2 M0, consistent with clinical stage 3A disease in 2019.  S/P combined chemotherapy and radiation with cisplatin and Alimta neoadjuvantly, followed by her right lower lobectomy with mediastinal and hilar lymph node dissection with residual disease pT1 cpN2 M0.  Followed by maintenance durvalumab for 1 year.   11. Recent diagnosis of hypothyroidism, on thyroid replacement therapy  12. Status post right thoracentesis with cytology negative for malignancy  13. History of pneumothorax following lung biopsy  14. Postop anemia: Reviewed  15. Pneumonia left upper  lobe: Has completed his 10-day course of antibiotics.  Symptoms have improved.  16. Chronic left chest wall and left upper extremity pain: On opioid pain management with from his radiation oncologist.  Pain seems to be neuropathic in nature we will continue to increase his gabapentin, increase the frequency on his oxycodone as needed, and refer to pain management.  17. Assessment has been reviewed and updated      PLANS:    1. Titrate gabapentin up to 600 mg 3 times daily over the next week  2. Increase oxycodone 5 mg IR to every 6 hours as needed  3. Referred to pain management due to concern of chronic brachial plexopathy pain syndromes related to prior surgery and radiation therapy to evaluate for any interventions.  4. CBC reviewed showing leukocytosis has no signs or symptoms of infection at the appointment.  We will continue to monitor.  5. Will require a CT of the chest in 4 weeks to evaluate for resolution of pneumonia-this is due first week of June and has been ordered  6. Continue Tecentriq  7. 2D echocardiogram in preparation for HER2 directed treatment reviewed  8. Patient has completed all planned 4 cycles of chemo: unfortunately has developed relapsed disease  9. Discontinue demeclocycline and monitor his BMPs every week  10. Continue lactulose for constipation  11. Follow-up with nephrology-patient states he plans to get an appointment.  12. Continue anti-nausea medications  13. Reviewed foundation 1 results.  He will be a candidate for molecular targets in the future if he does develop metastatic disease  14. Continue Emla cream to port  15. Continue thyroid replacement therapy through his PCP  16. Continue B12 injections  monthly: Patient did have elevated methylmalonic acid level during the early phases of his treatments.  Continue the same  17. Continue supportive care  18. All questions answered  19. Follow-up with Dr. Gaspar as previously scheduled         Patient has  questions which have all  been answered to the best of my abilities    I have reviewed labs results, imaging, vitals, and medications with the patient today.     Patient verbalized understanding and is in agreement of the above plan.    I spent 30 total minutes, face-to-face, caring for Jc today. 90% of this time involved counseling and/or coordination of care as documented within this note.

## 2023-05-24 ENCOUNTER — LAB (OUTPATIENT)
Dept: LAB | Facility: HOSPITAL | Age: 58
End: 2023-05-24
Payer: COMMERCIAL

## 2023-05-24 ENCOUNTER — OFFICE VISIT (OUTPATIENT)
Dept: ONCOLOGY | Facility: CLINIC | Age: 58
End: 2023-05-24
Payer: COMMERCIAL

## 2023-05-24 VITALS
HEIGHT: 75 IN | OXYGEN SATURATION: 97 % | WEIGHT: 177.8 LBS | DIASTOLIC BLOOD PRESSURE: 70 MMHG | BODY MASS INDEX: 22.11 KG/M2 | HEART RATE: 94 BPM | SYSTOLIC BLOOD PRESSURE: 104 MMHG

## 2023-05-24 DIAGNOSIS — E86.0 DEHYDRATION: ICD-10-CM

## 2023-05-24 DIAGNOSIS — C34.92 NSCLC OF LEFT LUNG: ICD-10-CM

## 2023-05-24 DIAGNOSIS — C34.91 NON-SMALL CELL CARCINOMA OF LUNG, RIGHT: ICD-10-CM

## 2023-05-24 DIAGNOSIS — E83.42 HYPOMAGNESEMIA: Primary | ICD-10-CM

## 2023-05-24 DIAGNOSIS — E87.1 HYPONATREMIA: ICD-10-CM

## 2023-05-24 DIAGNOSIS — C34.12 MALIGNANT NEOPLASM OF UPPER LOBE OF LEFT LUNG: ICD-10-CM

## 2023-05-24 DIAGNOSIS — M79.2 NEUROPATHIC PAIN: Primary | ICD-10-CM

## 2023-05-24 LAB
ANION GAP SERPL CALCULATED.3IONS-SCNC: 12 MMOL/L (ref 5–15)
BASOPHILS # BLD AUTO: 0.08 10*3/MM3 (ref 0–0.2)
BASOPHILS NFR BLD AUTO: 0.5 % (ref 0–1.5)
BUN SERPL-MCNC: 11 MG/DL (ref 6–20)
BUN/CREAT SERPL: 10.5 (ref 7–25)
CALCIUM SPEC-SCNC: 9.2 MG/DL (ref 8.6–10.5)
CHLORIDE SERPL-SCNC: 92 MMOL/L (ref 98–107)
CO2 SERPL-SCNC: 26 MMOL/L (ref 22–29)
CREAT SERPL-MCNC: 1.05 MG/DL (ref 0.76–1.27)
DEPRECATED RDW RBC AUTO: 48.1 FL (ref 37–54)
EGFRCR SERPLBLD CKD-EPI 2021: 82.3 ML/MIN/1.73
EOSINOPHIL # BLD AUTO: 0.28 10*3/MM3 (ref 0–0.4)
EOSINOPHIL NFR BLD AUTO: 1.7 % (ref 0.3–6.2)
ERYTHROCYTE [DISTWIDTH] IN BLOOD BY AUTOMATED COUNT: 16.1 % (ref 12.3–15.4)
GLUCOSE SERPL-MCNC: 114 MG/DL (ref 65–99)
HCT VFR BLD AUTO: 30.2 % (ref 37.5–51)
HGB BLD-MCNC: 9.5 G/DL (ref 13–17.7)
HOLD SPECIMEN: NORMAL
LYMPHOCYTES # BLD AUTO: 0.9 10*3/MM3 (ref 0.7–3.1)
LYMPHOCYTES NFR BLD AUTO: 5.5 % (ref 19.6–45.3)
MAGNESIUM SERPL-MCNC: 1.9 MG/DL (ref 1.6–2.6)
MCH RBC QN AUTO: 26.4 PG (ref 26.6–33)
MCHC RBC AUTO-ENTMCNC: 31.5 G/DL (ref 31.5–35.7)
MCV RBC AUTO: 83.9 FL (ref 79–97)
MONOCYTES # BLD AUTO: 1.51 10*3/MM3 (ref 0.1–0.9)
MONOCYTES NFR BLD AUTO: 9.3 % (ref 5–12)
NEUTROPHILS NFR BLD AUTO: 13.54 10*3/MM3 (ref 1.7–7)
NEUTROPHILS NFR BLD AUTO: 83 % (ref 42.7–76)
PLATELET # BLD AUTO: 521 10*3/MM3 (ref 140–450)
PMV BLD AUTO: 8.1 FL (ref 6–12)
POTASSIUM SERPL-SCNC: 4.1 MMOL/L (ref 3.5–5.2)
RBC # BLD AUTO: 3.6 10*6/MM3 (ref 4.14–5.8)
SODIUM SERPL-SCNC: 130 MMOL/L (ref 136–145)
WBC NRBC COR # BLD: 16.31 10*3/MM3 (ref 3.4–10.8)

## 2023-05-24 PROCEDURE — 36415 COLL VENOUS BLD VENIPUNCTURE: CPT

## 2023-05-24 PROCEDURE — 85025 COMPLETE CBC W/AUTO DIFF WBC: CPT

## 2023-05-24 PROCEDURE — 83735 ASSAY OF MAGNESIUM: CPT | Performed by: INTERNAL MEDICINE

## 2023-05-24 PROCEDURE — 80048 BASIC METABOLIC PNL TOTAL CA: CPT | Performed by: INTERNAL MEDICINE

## 2023-05-24 RX ORDER — GABAPENTIN 300 MG/1
600 CAPSULE ORAL 3 TIMES DAILY
Qty: 180 CAPSULE | Refills: 2 | Status: SHIPPED | OUTPATIENT
Start: 2023-05-24 | End: 2023-06-23

## 2023-05-24 RX ORDER — OXYCODONE HYDROCHLORIDE 5 MG/1
5 TABLET ORAL EVERY 6 HOURS PRN
Qty: 120 TABLET | Refills: 0 | Status: SHIPPED | OUTPATIENT
Start: 2023-05-24 | End: 2023-06-23

## 2023-05-31 ENCOUNTER — LAB (OUTPATIENT)
Dept: LAB | Facility: HOSPITAL | Age: 58
End: 2023-05-31

## 2023-05-31 ENCOUNTER — OFFICE VISIT (OUTPATIENT)
Dept: ONCOLOGY | Facility: CLINIC | Age: 58
End: 2023-05-31

## 2023-05-31 VITALS
RESPIRATION RATE: 20 BRPM | SYSTOLIC BLOOD PRESSURE: 94 MMHG | HEART RATE: 73 BPM | DIASTOLIC BLOOD PRESSURE: 56 MMHG | HEIGHT: 75 IN | BODY MASS INDEX: 22.26 KG/M2 | WEIGHT: 179 LBS | OXYGEN SATURATION: 98 % | TEMPERATURE: 98 F

## 2023-05-31 DIAGNOSIS — C34.92 NSCLC OF LEFT LUNG: ICD-10-CM

## 2023-05-31 DIAGNOSIS — E86.0 DEHYDRATION: ICD-10-CM

## 2023-05-31 DIAGNOSIS — E87.1 HYPONATREMIA: ICD-10-CM

## 2023-05-31 DIAGNOSIS — C34.12 MALIGNANT NEOPLASM OF UPPER LOBE OF LEFT LUNG: Primary | ICD-10-CM

## 2023-05-31 DIAGNOSIS — C34.12 MALIGNANT NEOPLASM OF UPPER LOBE OF LEFT LUNG: ICD-10-CM

## 2023-05-31 DIAGNOSIS — C34.91 NON-SMALL CELL CARCINOMA OF LUNG, RIGHT: ICD-10-CM

## 2023-05-31 DIAGNOSIS — E83.42 HYPOMAGNESEMIA: Primary | ICD-10-CM

## 2023-05-31 LAB
ANION GAP SERPL CALCULATED.3IONS-SCNC: 15 MMOL/L (ref 5–15)
BASOPHILS # BLD AUTO: 0.06 10*3/MM3 (ref 0–0.2)
BASOPHILS NFR BLD AUTO: 0.3 % (ref 0–1.5)
BUN SERPL-MCNC: 11 MG/DL (ref 6–20)
BUN/CREAT SERPL: 10.6 (ref 7–25)
CALCIUM SPEC-SCNC: 8.9 MG/DL (ref 8.6–10.5)
CHLORIDE SERPL-SCNC: 89 MMOL/L (ref 98–107)
CO2 SERPL-SCNC: 24 MMOL/L (ref 22–29)
CREAT SERPL-MCNC: 1.04 MG/DL (ref 0.76–1.27)
DEPRECATED RDW RBC AUTO: 47.8 FL (ref 37–54)
EGFRCR SERPLBLD CKD-EPI 2021: 83.2 ML/MIN/1.73
EOSINOPHIL # BLD AUTO: 0.17 10*3/MM3 (ref 0–0.4)
EOSINOPHIL NFR BLD AUTO: 0.9 % (ref 0.3–6.2)
ERYTHROCYTE [DISTWIDTH] IN BLOOD BY AUTOMATED COUNT: 15.9 % (ref 12.3–15.4)
GLUCOSE SERPL-MCNC: 157 MG/DL (ref 65–99)
HCT VFR BLD AUTO: 28.5 % (ref 37.5–51)
HGB BLD-MCNC: 8.8 G/DL (ref 13–17.7)
HOLD SPECIMEN: NORMAL
LYMPHOCYTES # BLD AUTO: 0.57 10*3/MM3 (ref 0.7–3.1)
LYMPHOCYTES NFR BLD AUTO: 3.2 % (ref 19.6–45.3)
MAGNESIUM SERPL-MCNC: 1.7 MG/DL (ref 1.6–2.6)
MCH RBC QN AUTO: 25.9 PG (ref 26.6–33)
MCHC RBC AUTO-ENTMCNC: 30.9 G/DL (ref 31.5–35.7)
MCV RBC AUTO: 83.8 FL (ref 79–97)
MONOCYTES # BLD AUTO: 1.04 10*3/MM3 (ref 0.1–0.9)
MONOCYTES NFR BLD AUTO: 5.8 % (ref 5–12)
NEUTROPHILS NFR BLD AUTO: 16.08 10*3/MM3 (ref 1.7–7)
NEUTROPHILS NFR BLD AUTO: 89.8 % (ref 42.7–76)
PLATELET # BLD AUTO: 483 10*3/MM3 (ref 140–450)
PMV BLD AUTO: 7.8 FL (ref 6–12)
POTASSIUM SERPL-SCNC: 3.6 MMOL/L (ref 3.5–5.2)
RBC # BLD AUTO: 3.4 10*6/MM3 (ref 4.14–5.8)
SODIUM SERPL-SCNC: 128 MMOL/L (ref 136–145)
WBC NRBC COR # BLD: 17.92 10*3/MM3 (ref 3.4–10.8)

## 2023-05-31 PROCEDURE — 36415 COLL VENOUS BLD VENIPUNCTURE: CPT

## 2023-05-31 PROCEDURE — 85025 COMPLETE CBC W/AUTO DIFF WBC: CPT

## 2023-05-31 PROCEDURE — 80048 BASIC METABOLIC PNL TOTAL CA: CPT | Performed by: INTERNAL MEDICINE

## 2023-05-31 PROCEDURE — 83735 ASSAY OF MAGNESIUM: CPT | Performed by: INTERNAL MEDICINE

## 2023-05-31 NOTE — PROGRESS NOTES
Hematology/Oncology Outpatient Follow Up    PATIENT NAME:Jc Driscoll  :1965  MRN: [id number]  PRIMARY CARE PHYSICIAN: Reshma Alvarenga MD  REFERRING PHYSICIAN: No ref. provider found    Chief Complaint   Patient presents with   • Follow-up     NSCLC of left lung        HISTORY OF PRESENT ILLNESS:                                                                                                                                                                                                                                                             This is a 57 y.o. who developed left shoulder pain and for that reason he had a chest x-ray done on 19 which showed a 2.7 cm noncalcified right lower lobe nodule was identified.  For that reason a CT scan of the chest was recommended.  Review of his records shows patient had the CT scan on 19 done without contrast.  This basically showed a lobulated noncalcified mass in the posterior aspect of the right lower lobe measuring 3 cm close to the pleural surface.  There is a calcified 1.2 mm nodule in the lateral aspect of the right lower lobe consistent with a granuloma.  There were also calcified subcarinal and right hilar nodules.  There is a lower right side tracheal nodule measuring 1 cm.  Patient had a PET/CT scan done on 19 which showed increased metabolic activity on the right lower lobe mass that measures 2.5 cm with SUV of 4.4.  There was also increased metabolic activity in the subcarinal space measuring 2.8 cm in size with SUV of 3.4, increased  activity in an enlarged right paratracheal lymph node that measures 1.9 cm, SUV of 5, also suspicious for metastatic adenopathy.  Patient had a CT-guided biopsy of the right lower lobe mass on 3/8/19.  This showed adenocarcinoma, TTF-1 positive.  On 3/18/19 patient underwent endoscopic ultrasound and biopsy of a subcarinal lymph node.  This was positive for malignant cells.  Patient was then taken back to surgery on 3/20/18 and had left Mediport placement by Dr. Fuchs.  He has been referred for further evaluation and management of his stage 3 adenocarcinoma of the right lung.  He was accompanied by his spouse for the appointment on 3/26/19.  Patient was scheduled to have a brain MRI for complete staging, but he could not do this due to claustrophobia.  He is willing to try with the help of angiolytics.    • Patient had brain MRI done on 4/5/19.  He states it did not show any evidence of metastatic disease.  Evidence of chronic sinusitis was noted.    • Patient was seen by Dr. Escalona who has recommended concurrent chemotherapy and radiation.  Patient is scheduled to begin on 4/15/19.   • 4/17/19 - Patient was initiated on combined chemotherapy and radiation with Cisplatin and Alimta.  Patient received cycle 1.      • 5/8/19 - Patient received cycle 2 of chemotherapy with Cisplatin and Alimta.   • 5/15/19 - Chemistry panel showed creatinine of 1.7.  WBC 1.8, hemoglobin 11.6, platelet count 72,000.   • 5/20/19 - BUN 10, creatinine 1.2, potassium 3.5.    • 5/23/19 - CT scan of the chest with contrast showed interval decrease in size of the right lower lobe pulmonary nodule now measuring 2.1 cm in size.  There was no mediastinal or hilar adenopathy identified.  • 6/26/2019 patient underwent right lower lobectomy with hilar and mediastinal lymph node dissection performed by Dr. Terrance Mckeon.  • Pathology revealed residual residual 2.2 cm invasive moderately differentiated adenocarcinoma.  There were a total of 16  lymph nodes evaluated that 7 had metastatic disease.  There was evidence of lymphovascular invasion, extranodal involvement.  All the surgical margins were negative and distance of the closest margin was 2.5 cm.  Logic stage is T1c N2 M0.  • Patient is here today accompanied by his spouse for this appointment.  He continues to complain of some postop discomfort, numbness but his skin is intact.  There is no drainage.  Has had follow-up with Dr. Fuchs.  • 8/14/2019-seen by Dr. Maria at the [facility].  Dr. Maria has recommended immunotherapy with durvalumab off label use as patient has not had significant response to neoadjuvant chemotherapy and radiation.  Patient was given the option to have immunotherapy at the [facility] vs [place] and he has chosen to stay in [place]  • 8/21/19 - Started cycle 1 day 1 Imfinzi  • 9/4/2019 patient had CT scan of the chest this shows a moderate size right pleural effusion.  There are areas of groundglass opacification in the right upper lobe without any evidence of significant septal thickening.  Volume loss noted there is also moderate pleural effusion.  There is no suspicious recurrent malignancy.  There were nonenlarged residual mediastinal lymph nodes.  • 9/9/19 - WBC 6.23, Hgb 11.7 Platelets 257,000, , BUN 9, Cr 1.1,Vitamin B12 318, Ferritin 437  • 9/23/19 - received cycle 2 day 1 Imfinzi  • 10/9/19- Seen by Dr rodríguez with ENT for sinus disease  • 11/4/2019-patient received cycle 5 of Imfinzi(durvalumab)  • 12/2/2019 patient had cycle 7 of durvalumab  • 12/5/2019-patient had CT scan of the  abdomen and pelvis with small right pleural sided pleural effusion.There is no evidence of metastatic disease  • 12/30/2019-patient received cycle 9 of durvalumab  • 12/31/2019-patient had CT scan of the chest showed small to moderate left pleural effusion.  Iglesias appearing right lower paratracheal and right peribronchial soft tissue thickening without any discrete  pathologically enlarged mediastinal or hilar lymph nodes.  • 1/27/20-patient received cycle 11 of durvalumab  • 2/17/20-patient had a stress test which was negative for ischemia  • 2/17/2020-patient had CT of the chest which showed postop changes on the right lung, right pleural effusion but no enlarging mass was noted  • 3/2/2020-patient received cycle 12 of durvalumab  • 3/16/2020-patient had ultrasound-guided right thoracentesis.  Pathology was negative for malignancy  • 4/13/2020-patient received cycle 15 of immunotherapy with durvalumab  • 5/11/2020-patient received cycle 17 of immunotherapy with durvalumab  • 6/9/2020-patient had CT scan of the chest, abdomen and pelvis for lung cancer restaging.  There is stable small chronic loculated right basilar pleural effusion suggesting chronic pleuritis.  There is no evidence of metastatic disease in the abdomen or pelvis  • 7/20/2020 patient received cycle 22 of Durvalumab  • 8/17/2020 patient received cycle 24 and last cycle of durvalumab  • 9/24/2020 patient had CT scan of the chest, abdomen and pelvis for cancer restaging there was no evidence of local recurrence or metastatic disease within the abdomen and pelvis.  He has stable chronic small right pleural effusion.  Mild sigmoid diverticulosis was noted.  • 1/18/2021 patient had CT scan of the chest, abdomen and pelvis reviewed patient  • 5/24/2021 patient had CT scan of the chest calcified subcarinal lymph node consistent with changes of prior granulomatous disease.  Chronic small right pleural effusion is noted similar to prior exam.  Previously shown 4 mm nodule in the left lower lobe has nearly completely resolved.  The subpleural 3 mm nodule located within the posterior left lower lobe With resolved.  • 9/24/2021 patient had a CT scan of the chest, abdomen and pelvis there is soft tissue attenuation within the right paratracheal area which has been suggested to reflect sequela to previous treatment.  There  is slight thickening of the bladder wall otherwise there is no evidence of metastatic disease.  • 12/27/2021 patient had CT scan of the chest with contrast this basically showed irregular shaped noncalcified 7 mm left upper lobe pulmonary nodule.  PET CT scan was recommended to further evaluate.  • 1/26/2022 patient had a PET CT scan done which basically showed evidence of mild uptake corresponding to the nodule within the left upper lobe which is new worrisome for developing metastatic focus.  There was mild radiopharmaceutical activity corresponding to pleural thickening throughout the right lung base with associated pleural effusion likely related to post therapeutic changes and radiation changes in this region.  Metastatic malignancy involving the pleura could not be completely eliminated.  • 1/26/2022: CT-guided biopsy was aborted due to finding by the radiologist that the lung nodularity was actually a clump of tiny nodules of which the largest was 6 mm and therefore biopsy could not be completed.  Also the area was in the vicinity of multiple blood vessels with increased risk of bleeding.  Follow-up CT of the chest was recommended for continued surveillance  • 5/18/2022 patient had CT-guided biopsy of the left enlarging nodule, pathology was positive for invasive poorly differentiated adenocarcinoma most consistent with adenocarcinoma of pulmonary origin.  • 5/27/2022 patient had a PET CT scan which showed a 2.3 cm hypermetabolic left upper lobe nodule which has increased in size no convincing evidence of metastatic disease elsewhere within the chest.  • 5/21/2022 patient had brain MRI which did not show any evidence of intracranial metastasis  • 6/2/2022 patient was seen by Dr. Miryam Reyna pulmonary function test is being done to assess surgical candidacy  • 7/6/2022 patient underwent left heart Koska P video-assisted with upper lobectomy mediastinal lymph node dissection performed by Dr. Witt  Doc  • Final pathology revealed poorly differentiated  • A predominant adenocarcinoma with solid component presenting 45% and micropapillary component 5% with extensive necrosis, invasive carcinoma measures 2.5 maximally there was focal lymphovascular space invasion all margins were negative for malignancy discussed with the closest margin was 2.5 cm pathology stage is pT1C pN1.  PD-L1 showed a tumor proportion score of 95%  • 8/3/2022 patient started adjuvant chemotherapy with cisplatin, Taxol  • 8/24/2022 patient received cycle 2 of combination chemotherapy with cisplatin and Taxol with Neulasta  • 10/5/2022: Patient received cycle 4-day 1 of combination chemotherapy with cisplatin and Taxol  • Patient developed left-sided chest pain for that reason he had a PET/CT scan 1/20/2020 is basically revealed a 4 x 3.5 cm hypermetabolic soft tissue mass in the left chest wall between the first and second ribs anteriorly consistent with relapsed disease.  There was a small cluster of hypermetabolic lymph nodes seen in the left supraclavicular region consistent with early manoj metastasis  • 11/11/2022: 2D echocardiogram performed showing a left ventricular EF of 56 to 60%  • 11/22/2022: Cycle 1 of atezolizumab received  • 12/5/2022-12/9/2022: Hospitalized at [facility] for dehydration, hyponatremia, and weakness.  • 12/19/2020: Patient received cycle 2 of Tecentriq along with XRT  • 1/11/2023: Patient completed radiation.  Remains on Tecentriq every 3 weeks  • 1/18/2023 patient had a CT scan of the chest which basically revealed abnormal soft tissue interposed between the first and second rib with some erosive changes to the ribs this finding measures about 5 x 2.8 cm previously was 4.8 x 3.8 cm.  There is some pleural thickening about in this area nodular area within the prominent mediastinal fat in the upper chest measuring 1.7 cm more fluid as opposed to enlarged lymph nodes.  Possibility of pulmonary hypertension was also  noted due to prominence of main pulmonary artery measuring 3.7 cm.  • 2/24/2023: CT of the chest with contrast showed abnormal mass along the chest wall interposed between the first and second ribs that could relate to metastatic disease.  Some erosive changes to the ribs in this area.  Nodular area in the more superior mediastinal fat that looks like you to reflect more fluidlike attenuation as opposed to a pathologic lymph node.  New groundglass changes within the left upper lobe that could be secondary to inflammatory infectious process.  It is possible this may be posttreatment in nature.  There are some groundglass changes along the superior mediastinum and right upper lobe which are unchanged.  Bilateral pleural effusions noted.  This is developed on the left.  Right unchanged.  • 4/25/2023 CT of the chest with contrast done in short-term follow-up to reevaluate from previous scan: Increased consolidation in the upper left chest since previous study; increased size of left pleural effusion with loculation along its superior medial margin; no change in the soft tissue mass with osseous destruction of the left upper chest wall between the first and second ribs.  • 4/28/2023-4/30/2023: Hospitalization at [facility] for pneumonia.    Past Medical History:   Diagnosis Date   • Allergic rhinitis 07/25/2013    Overview:  4/2/2018 - still zyrtec 10 daily (if stops, gets dermatitis again).    • Anxiety and depression 06/05/2012    Overview:  4/2/2018 -   C/w well 300 xr and Lito 20.  4/8/2019 -   Doing ok even with new lung cancer - lito 20 & well 300xr.    • Chronic pansinusitis 04/08/2019    Overview:  4/8/2019 -   MRI showed chronic thick in frontal, maxillary, ethmoidal ---> to Dr. COLLAZO to est since abx ICC didn't help.  Does netipot bid.    • Diastolic CHF 07/09/2014    Overview:  7/4/14 - impaired LV relaxation on Zhou ECHO.  EF 60%. Rest normal   • Dupuytren's contracture of both hands    • Elevated cholesterol    •  Erosive esophagitis 07/09/2019    Overview:  EGD 2016 4/2/2018 - nexium OTC daily for few week.  Qod before that.  Now carbonation hurts, epigastric pain 4/8/2019 -   protonix 40 doing well.    • Gastroesophageal reflux disease 02/21/2019   • Hiatal hernia 07/09/2019   • History of colon polyps    • Hypertension    • Lung cancer     right lung and in lymph nodes x2   • Mediastinal lymphadenopathy 03/12/2019   • Normocytic anemia 08/08/2019    Overview:  6/2019 - dropped to 9's postop 7/2019 - rebounded to 10's.  8/8/2019 -   Back to 9's - check ferritin, b12 and thyroid.    • Prediabetes 07/09/2014    Overview:  7/4/14 - a1c 6.1 at [place] admission   • Retention of urine 06/27/2019    PSOT OP, RESOLVED       Past Surgical History:   Procedure Laterality Date   • BRONCHOSCOPY  03/18/2019    EBUS MONTERO NEEDLE BX   • COLONOSCOPY     • DUPUYTREN CONTRACTURE RELEASE Bilateral    • LUNG BIOPSY  03/08/2019    CT GUIDED   • LUNG REMOVAL, PARTIAL Right     right lower   • PORTACATH PLACEMENT  03/20/2019    DR LONGORIA   • THORACOSCOPY Right 6/26/2019    Procedure: RIGHT VATS, OPEN LOWER LOBECTOMY WITH LYMPH NODE DISECTION, WEDGE RESECTION OF RIGHT MIDDLE LOBE;  Surgeon: Terrance Longoria MD;  Location: [facility];  Service: Cardiothoracic   • THORACOSCOPY VIDEO ASSISTED WITH LOBECTOMY Left 7/6/2022    Procedure: THORACOSCOPY VIDEO ASSISTED WITH LOBECTOMY;  Surgeon: Miryam Reyna MD;  Location: [facility];  Service: Thoracic;  Laterality: Left;         Current Outpatient Medications:   •  Constulose 10 GM/15ML solution, TAKE 30 ML BY MOUTH EVERY 12 HOURS, Disp: 900 mL, Rfl: 0  •  desvenlafaxine (PRISTIQ) 50 MG 24 hr tablet, Take 1 tablet by mouth Daily., Disp: , Rfl:   •  gabapentin (NEURONTIN) 300 MG capsule, Take 1 capsule by mouth 3 (Three) Times a Day., Disp: 60 capsule, Rfl: 3  •  gabapentin (NEURONTIN) 300 MG capsule, Take 2 capsules by mouth 3 (Three) Times a Day for 30 days., Disp: 180 capsule, Rfl: 2  •   levothyroxine (SYNTHROID, LEVOTHROID) 100 MCG tablet, Take 1 tablet by mouth Every Morning., Disp: , Rfl:   •  lidocaine-prilocaine (EMLA) 2.5-2.5 % cream, Apply to port site 60 minutes prior to it being accessed, Disp: 30 g, Rfl: 1  •  liothyronine (CYTOMEL) 5 MCG tablet, Take 1 tablet by mouth Daily., Disp: , Rfl:   •  LORazepam (ATIVAN) 0.5 MG tablet, Take 1 tablet by mouth Every 8 (Eight) Hours As Needed., Disp: , Rfl:   •  metoprolol succinate XL (TOPROL-XL) 100 MG 24 hr tablet, Take 1 tablet by mouth Daily., Disp: , Rfl:   •  ondansetron (ZOFRAN) 8 MG tablet, TAKE 1 TABLET BY MOUTH THREE TIMES DAILY AS NEEDED FOR NAUSEA FOR VOMITING, Disp: 30 tablet, Rfl: 0  •  oxyCODONE (Roxicodone) 5 MG immediate release tablet, Take 1 tablet by mouth Every 6 (Six) Hours As Needed for Moderate Pain for up to 30 days., Disp: 120 tablet, Rfl: 0  •  pantoprazole (PROTONIX) 40 MG EC tablet, Take 1 tablet by mouth Daily As Needed., Disp: , Rfl:   •  vitamin B-12 (CYANOCOBALAMIN) 1000 MCG tablet, Take 1 tablet by mouth Daily., Disp: , Rfl:   •  vitamin B-6 (PYRIDOXINE) 100 MG tablet, Take 1 tablet by mouth Every 12 (Twelve) Hours., Disp: 60 tablet, Rfl: 6    No Known Allergies    Family History   Problem Relation Age of Onset   • Cancer Mother         cervical cancer   • Cervical cancer Mother    • Cancer Father         lung cancer   • Lung cancer Father    • Lung cancer Sister    • Cancer Sister         lung cancer   • Malig Hyperthermia Neg Hx        Cancer-related family history includes Cancer in his father, mother, and sister; Cervical cancer in his mother; Lung cancer in his father and sister.    Social History     Tobacco Use   • Smoking status: Former     Types: Cigarettes     Quit date: 2009     Years since quittin.7   • Smokeless tobacco: Never   Vaping Use   • Vaping Use: Never used   Substance Use Topics   • Alcohol use: Yes     Alcohol/week: 2.0 standard drinks     Types: 2 Cans of beer per week     Comment:  "Occasional   • Drug use: No       I have reviewed and confirmed the accuracy of the patient's history: Chief complaint, HPI, ROS and Subjective as entered by the MA/LPN/RN. Azucena Gaspar MD 05/31/23     SUBJECTIVE:      Patient complaining of left shoulder pain worsening  Neuropathic.  Has an appointment with pain management coming up June 19, 2023.          REVIEW OF SYSTEMS:    Review of Systems   Constitutional: Positive for fatigue. Negative for chills and fever.   HENT: Negative for ear pain, mouth sores, nosebleeds and sore throat.    Eyes: Negative for photophobia and visual disturbance.   Respiratory: Negative for wheezing and stridor.    Cardiovascular: Negative for chest pain and palpitations.   Gastrointestinal: Negative for abdominal pain, diarrhea, nausea and vomiting. He has constipation  Endocrine: Negative for cold intolerance and heat intolerance.   Genitourinary: Negative for dysuria and hematuria.   Musculoskeletal: Negative for joint swelling and neck stiffness.   Skin: Negative for color change and rash.   Neurological: Negative for seizures and syncope.   Hematological: Negative for adenopathy.   Psychiatric/Behavioral: Negative for agitation, confusion and hallucinations.     OBJECTIVE:    Vitals:    05/31/23 1505   BP: 94/56   Pulse: 73   Resp: 20   Temp: 98 °F (36.7 °C)   TempSrc: Infrared   SpO2: 98%   Weight: 81.2 kg (179 lb)   Height: 190.5 cm (75\")   PainSc:   6   PainLoc: Comment: rib cage area     ECOG    Eastern Cooperative Oncology Group (ECOG, [place], World Health Organization) performance scale  0 Fully active; no performance restrictions.  1 Strenuous physical activity restricted; fully ambulatory and able to carry out light work.  2 Capable of all self-care but unable to carry out any work activities. Up and about >50% of waking hours.  3 Capable of only limited self-care; confined to bed or chair >50% of waking hours.  4 Completely disabled; cannot carry out any " self-care; totally confined to bed or chair.    Physical Exam     Constitutional: He is oriented to person, place, and time. He appears well-developed. No distress.   HENT:   Head: Normocephalic and atraumatic.   Right Ear: External ear normal.   Left Ear: External ear normal.   Nose: Nose normal.   Eyes: Pupils are equal, round, and reactive to light. Conjunctivae are normal. Right eye exhibits no discharge. Left eye exhibits no discharge. No scleral icterus.   Neck: No thyromegaly present.   Cardiovascular: Normal rate, regular rhythm and normal heart sounds. Exam reveals no gallop and no friction rub.   Pulmonary/Chest: Effort normal. No stridor. No respiratory distress. He has no wheezes. Diminished left lung fields with faint coarse rhonchi.  Abdominal: Soft. Bowel sounds are normal. He exhibits no mass. There is no abdominal tenderness. There is no rebound and no guarding.   Musculoskeletal: Normal range of motion. No tenderness.   Lymphadenopathy:     He has no cervical adenopathy.   Neurological: He is alert and oriented to person, place, and time. He exhibits normal muscle tone.   Skin: Skin is warm. No rash noted. He is not diaphoretic. No erythema.   Psychiatric: His behavior is normal. Judgment and thought content normal.   Nursing note and vitals reviewed.    I have reexamined the patient and the results are consistent with the previously documented exam. Azucena Gaspar MD       RECENT LABS    WBC   Date Value Ref Range Status   05/31/2023 17.92 (H) 3.40 - 10.80 10*3/mm3 Final   03/22/2022 7.84 4.5 - 11.0 10*3/uL Final     RBC   Date Value Ref Range Status   05/31/2023 3.40 (L) 4.14 - 5.80 10*6/mm3 Final   03/22/2022 4.23 (L) 4.5 - 5.9 10*6/uL Final     Hemoglobin   Date Value Ref Range Status   05/31/2023 8.8 (L) 13.0 - 17.7 g/dL Final   03/22/2022 12.9 (L) 13.5 - 17.5 g/dL Final     Hematocrit   Date Value Ref Range Status   05/31/2023 28.5 (L) 37.5 - 51.0 % Final   03/22/2022 38.8 (L) 41.0 -  53.0 % Final     MCV   Date Value Ref Range Status   05/31/2023 83.8 79.0 - 97.0 fL Final   03/22/2022 91.7 80.0 - 100.0 fL Final     MCH   Date Value Ref Range Status   05/31/2023 25.9 (L) 26.6 - 33.0 pg Final   03/22/2022 30.5 26.0 - 34.0 pg Final     MCHC   Date Value Ref Range Status   05/31/2023 30.9 (L) 31.5 - 35.7 g/dL Final   03/22/2022 33.2 31.0 - 37.0 g/dL Final     RDW   Date Value Ref Range Status   05/31/2023 15.9 (H) 12.3 - 15.4 % Final   03/22/2022 14.2 12.0 - 16.8 % Final     RDW-SD   Date Value Ref Range Status   05/31/2023 47.8 37.0 - 54.0 fl Final     MPV   Date Value Ref Range Status   05/31/2023 7.8 6.0 - 12.0 fL Final   03/22/2022 9.3 8.4 - 12.4 fL Final     Platelets   Date Value Ref Range Status   05/31/2023 483 (H) 140 - 450 10*3/mm3 Final   03/22/2022 324 140 - 440 10*3/uL Final     Neutrophil Rel %   Date Value Ref Range Status   03/22/2022 63.1 45 - 80 % Final     Neutrophil %   Date Value Ref Range Status   05/31/2023 89.8 (H) 42.7 - 76.0 % Final     Lymphocyte Rel %   Date Value Ref Range Status   03/22/2022 16.2 15 - 50 % Final     Lymphocyte %   Date Value Ref Range Status   05/31/2023 3.2 (L) 19.6 - 45.3 % Final     Monocyte Rel %   Date Value Ref Range Status   03/22/2022 13.6 0 - 15 % Final     Monocyte %   Date Value Ref Range Status   05/31/2023 5.8 5.0 - 12.0 % Final     Eosinophil %   Date Value Ref Range Status   05/31/2023 0.9 0.3 - 6.2 % Final   03/22/2022 5.5 0 - 7 % Final     Basophil Rel %   Date Value Ref Range Status   03/22/2022 1.1 0 - 2 % Final     Basophil %   Date Value Ref Range Status   05/31/2023 0.3 0.0 - 1.5 % Final     Immature Grans %   Date Value Ref Range Status   07/07/2022 0.4 0.0 - 0.5 % Final   03/22/2022 0.5 0.0 - 1.0 % Final     Neutrophils Absolute   Date Value Ref Range Status   03/22/2022 4.94 2.0 - 8.8 10*3/uL Final     Neutrophils, Absolute   Date Value Ref Range Status   05/31/2023 16.08 (H) 1.70 - 7.00 10*3/mm3 Final     Lymphocytes Absolute    Date Value Ref Range Status   03/22/2022 1.27 0.7 - 5.5 10*3/uL Final     Lymphocytes, Absolute   Date Value Ref Range Status   05/31/2023 0.57 (L) 0.70 - 3.10 10*3/mm3 Final     Monocytes Absolute   Date Value Ref Range Status   03/22/2022 1.07 0.0 - 1.7 10*3/uL Final     Monocytes, Absolute   Date Value Ref Range Status   05/31/2023 1.04 (H) 0.10 - 0.90 10*3/mm3 Final     Eosinophils Absolute   Date Value Ref Range Status   03/22/2022 0.43 0.0 - 0.8 10*3/uL Final     Eosinophils, Absolute   Date Value Ref Range Status   05/31/2023 0.17 0.00 - 0.40 10*3/mm3 Final     Basophils Absolute   Date Value Ref Range Status   03/22/2022 0.09 0.0 - 0.2 10*3/uL Final     Basophils, Absolute   Date Value Ref Range Status   05/31/2023 0.06 0.00 - 0.20 10*3/mm3 Final     Immature Grans, Absolute   Date Value Ref Range Status   07/07/2022 0.05 0.00 - 0.05 10*3/mm3 Final   03/22/2022 0.04 0.00 - 0.10 10*3/uL Final     nRBC   Date Value Ref Range Status   12/19/2022 0.0 0.0 - 0.2 /100 WBC Final       Lab Results   Component Value Date    GLUCOSE 157 (H) 05/31/2023    BUN 11 05/31/2023    CREATININE 1.04 05/31/2023    EGFRIFNONA 100 01/28/2022    EGFRIFAFRI >60 05/20/2019    BCR 10.6 05/31/2023    K 3.6 05/31/2023    CO2 24.0 05/31/2023    CALCIUM 8.9 05/31/2023    ALBUMIN 3.4 (L) 05/15/2023    LABIL2 1.5 03/22/2022    AST 18 05/15/2023    ALT 18 05/15/2023         ASSESSMENT:    1. 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  He has been referred to Dr. Escalona to see whether he is a candidate for radiation treatments.  Would also consider systemic treatment for him for molecular targets as he has had very poor response to chemotherapy malignancy.  Continue Tecentriq with XRT.  Patient has completed XRT on 1/11/2023  2. Reviewed his recent CT of the chest which shows 1 cm increase in the size of the left  lung mass.  Patient is also on immunotherapy which can cause pseudo-enlargement.  We will have a short interval 6-week follow-up CT of the chest which will be due second week in April 2023.  This will be scheduled second week in April 2023  3. 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  pT1 cpN1 M0 consistent with stage IIb disease, found on surveillance CT scans. Brain MRI was negative. We will recommend platinum doublet followed by Tecentriq unless he has EGFR mutation for which adjuvant osimertinib will be considered.  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  Patient was treated with cisplatin and Alimta in the adjuvant setting for his original malignancy.  He is currently on cisplatin and Taxol.  Patient is receiving cycle #4 today.  This tumor was completely resected and has negative margins and no mediastinal lymph node involvement.  Reviewed the pathology with Dr. Trevizo.  This is a new second lung primary.  Patient has received a total of 4 cycles of combination of cisplatin and Taxol completed on 10/5/2022.  He will continue Tecentriq  4. Constipation: We will begin lactulose.  Patient to call me if no results  5. High PD-L1 expression at 95%. Reviewed foundation 1 testing results.  This showed MET amplification, ERBB2 amplification for which targeted therapy is available for including crizotinib for MET amplification, afatinib for ERBB2 but this is approved for metastatic disease.   6. We have extensively reviewed the literature.  He will be a candidate for ERBB2 targeted therapy but fam-trastuzumab cannot be combined with XRT due to risk of interstitial lung disease.  Also patient has ERBB2 amplification and not mutation and therefore his response rate may be around 25% as opposed to  55% if mutation was identified.  For these reasons we have decided to proceed with immunotherapy along with radiation.  His case was reviewed at the tumor board and this was the recommendation.  Patient has been approved for start Tecentriq therefore we will go ahead and initiate concurrently with radiation  7. Chemotherapy-induced fatigue: This has improved  8. Hypomagnesemia secondary to chemotherapy: Patient has discontinued magnesium supplements    9. History of adenocarcinoma of the right lung, T1c N2 M0, consistent with clinical stage 3A disease in 2019.  S/P combined chemotherapy and radiation with cisplatin and Alimta neoadjuvantly, followed by her right lower lobectomy with mediastinal and hilar lymph node dissection with residual disease pT1 cpN2 M0.  Followed by maintenance durvalumab for 1 year.   10. Recent diagnosis of hypothyroidism, on thyroid replacement therapy  11. Status post right thoracentesis with cytology negative for malignancy  12. History of pneumothorax following lung biopsy  13. Postop anemia: Reviewed  14. Pneumonia left upper lobe: Has completed his 10-day course of antibiotics.  Symptoms have improved.  15. Chronic left chest wall and left upper extremity pain: On opioid pain management with from his radiation oncologist.  Pain seems to be neuropathic in nature we will continue to increase his gabapentin, increase the frequency on his oxycodone as needed, and refer to pain management.  Has an appointment coming up 6/19/2023  16. Assessment has been reviewed and updated      PLANS:    1. Continue gabapentin up to 600 mg 3 times daily over the next week  2. Increase oxycodone 7.5 mg IR to every 6 hours as needed  3. Referred to pain management due to concern of chronic brachial plexopathy pain syndromes related to prior surgery and radiation therapy to evaluate for any interventions.  We will try to expedite this appointment  4. PET CT scan to further evaluate and hopefully define his  disease process better.  If evidence of clear progression we will switch therapy as documented above  5. Continue Tecentriq  6. 2D echocardiogram in preparation for HER2 directed treatment reviewed  7. Continue thyroid replacement therapy through his PCP  8. Continue B12 injections  monthly: Patient did have elevated methylmalonic acid level during the early phases of his treatments.  Continue the same  9. Continue supportive care  10. All questions answered  11. Follow-up in 2 weeks         Patient has  questions which have all been answered to the best of my abilities        I spent 40 total minutes, face-to-face, caring for Jc today. 90% of this time involved counseling and/or coordination of care as documented within this note.

## 2023-06-02 ENCOUNTER — TELEPHONE (OUTPATIENT)
Dept: ONCOLOGY | Facility: CLINIC | Age: 58
End: 2023-06-02

## 2023-06-02 DIAGNOSIS — E87.1 HYPONATREMIA: Primary | ICD-10-CM

## 2023-06-02 RX ORDER — SODIUM CHLORIDE 0.9 % (FLUSH) 0.9 %
20 SYRINGE (ML) INJECTION AS NEEDED
OUTPATIENT
Start: 2023-06-02

## 2023-06-02 RX ORDER — HEPARIN SODIUM (PORCINE) LOCK FLUSH IV SOLN 100 UNIT/ML 100 UNIT/ML
500 SOLUTION INTRAVENOUS AS NEEDED
OUTPATIENT
Start: 2023-06-02

## 2023-06-02 NOTE — TELEPHONE ENCOUNTER
Patient was called and made aware her sodium levels were low and Dr. Gaspar would like him to get fluids. Pt stated he is out of town and will not be back till his PET scan on Monday. Pt was educated to increase salt in his diet over the weekend and we would add him to the schedule for fluids after his PET on Monday.

## 2023-06-02 NOTE — TELEPHONE ENCOUNTER
----- Message from Azucena Gaspar MD sent at 6/1/2023  6:37 PM EDT -----  Please let patient know that his sodium is low again.  He can come in tomorrow for normal saline 1 L over 1 hour.  Repeat BMP on June 5, 2023.  Extra salt in his diet

## 2023-06-05 ENCOUNTER — HOSPITAL ENCOUNTER (OUTPATIENT)
Dept: ONCOLOGY | Facility: HOSPITAL | Age: 58
Discharge: HOME | End: 2023-06-05
Admitting: INTERNAL MEDICINE
Payer: COMMERCIAL

## 2023-06-05 ENCOUNTER — HOSPITAL ENCOUNTER (OUTPATIENT)
Dept: PET IMAGING | Facility: HOSPITAL | Age: 58
Discharge: HOME | End: 2023-06-05
Admitting: INTERNAL MEDICINE
Payer: COMMERCIAL

## 2023-06-05 VITALS
HEIGHT: 75 IN | HEART RATE: 94 BPM | BODY MASS INDEX: 22.13 KG/M2 | SYSTOLIC BLOOD PRESSURE: 119 MMHG | OXYGEN SATURATION: 97 % | DIASTOLIC BLOOD PRESSURE: 76 MMHG | TEMPERATURE: 98.1 F | RESPIRATION RATE: 22 BRPM | WEIGHT: 178 LBS

## 2023-06-05 DIAGNOSIS — C34.12 MALIGNANT NEOPLASM OF UPPER LOBE OF LEFT LUNG: ICD-10-CM

## 2023-06-05 DIAGNOSIS — C34.91 NON-SMALL CELL CARCINOMA OF LUNG, RIGHT: Primary | ICD-10-CM

## 2023-06-05 DIAGNOSIS — E87.1 HYPONATREMIA: Primary | ICD-10-CM

## 2023-06-05 DIAGNOSIS — C34.91 NON-SMALL CELL CARCINOMA OF LUNG, RIGHT: ICD-10-CM

## 2023-06-05 DIAGNOSIS — C34.31 CANCER OF LOWER LOBE OF RIGHT LUNG: ICD-10-CM

## 2023-06-05 DIAGNOSIS — C34.92 NSCLC OF LEFT LUNG: ICD-10-CM

## 2023-06-05 LAB
ALBUMIN SERPL-MCNC: 2.8 G/DL (ref 3.5–5.2)
ALBUMIN/GLOB SERPL: 0.9 G/DL
ALP SERPL-CCNC: 150 U/L (ref 39–117)
ALT SERPL W P-5'-P-CCNC: 15 U/L (ref 1–41)
ANION GAP SERPL CALCULATED.3IONS-SCNC: 11 MMOL/L (ref 5–15)
AST SERPL-CCNC: 15 U/L (ref 1–40)
BASOPHILS # BLD AUTO: 0.05 10*3/MM3 (ref 0–0.2)
BASOPHILS NFR BLD AUTO: 0.3 % (ref 0–1.5)
BILIRUB SERPL-MCNC: 0.3 MG/DL (ref 0–1.2)
BUN SERPL-MCNC: 8 MG/DL (ref 6–20)
BUN/CREAT SERPL: 10.3 (ref 7–25)
CALCIUM SPEC-SCNC: 8.7 MG/DL (ref 8.6–10.5)
CHLORIDE SERPL-SCNC: 90 MMOL/L (ref 98–107)
CO2 SERPL-SCNC: 27 MMOL/L (ref 22–29)
CREAT SERPL-MCNC: 0.78 MG/DL (ref 0.76–1.27)
DEPRECATED RDW RBC AUTO: 49 FL (ref 37–54)
EGFRCR SERPLBLD CKD-EPI 2021: 103.4 ML/MIN/1.73
EOSINOPHIL # BLD AUTO: 0.13 10*3/MM3 (ref 0–0.4)
EOSINOPHIL NFR BLD AUTO: 0.7 % (ref 0.3–6.2)
ERYTHROCYTE [DISTWIDTH] IN BLOOD BY AUTOMATED COUNT: 16.6 % (ref 12.3–15.4)
GLOBULIN UR ELPH-MCNC: 3.1 GM/DL
GLUCOSE BLDC GLUCOMTR-MCNC: 117 MG/DL (ref 70–105)
GLUCOSE BLDC GLUCOMTR-MCNC: 140 MG/DL (ref 70–105)
GLUCOSE SERPL-MCNC: 104 MG/DL (ref 65–99)
HCT VFR BLD AUTO: 27.2 % (ref 37.5–51)
HGB BLD-MCNC: 8.5 G/DL (ref 13–17.7)
LYMPHOCYTES # BLD AUTO: 0.51 10*3/MM3 (ref 0.7–3.1)
LYMPHOCYTES NFR BLD AUTO: 2.9 % (ref 19.6–45.3)
MCH RBC QN AUTO: 26.1 PG (ref 26.6–33)
MCHC RBC AUTO-ENTMCNC: 31.3 G/DL (ref 31.5–35.7)
MCV RBC AUTO: 83.4 FL (ref 79–97)
MONOCYTES # BLD AUTO: 1.3 10*3/MM3 (ref 0.1–0.9)
MONOCYTES NFR BLD AUTO: 7.3 % (ref 5–12)
NEUTROPHILS NFR BLD AUTO: 15.79 10*3/MM3 (ref 1.7–7)
NEUTROPHILS NFR BLD AUTO: 88.8 % (ref 42.7–76)
PLATELET # BLD AUTO: 526 10*3/MM3 (ref 140–450)
PMV BLD AUTO: 8.3 FL (ref 6–12)
POTASSIUM SERPL-SCNC: 3.7 MMOL/L (ref 3.5–5.2)
PROT SERPL-MCNC: 5.9 G/DL (ref 6–8.5)
RBC # BLD AUTO: 3.26 10*6/MM3 (ref 4.14–5.8)
SODIUM SERPL-SCNC: 128 MMOL/L (ref 136–145)
WBC NRBC COR # BLD: 17.78 10*3/MM3 (ref 3.4–10.8)

## 2023-06-05 PROCEDURE — 96413 CHEMO IV INFUSION 1 HR: CPT

## 2023-06-05 PROCEDURE — 96415 CHEMO IV INFUSION ADDL HR: CPT

## 2023-06-05 PROCEDURE — 0 FLUDEOXYGLUCOSE F18 SOLUTION: Performed by: INTERNAL MEDICINE

## 2023-06-05 PROCEDURE — 96361 HYDRATE IV INFUSION ADD-ON: CPT

## 2023-06-05 PROCEDURE — 80053 COMPREHEN METABOLIC PANEL: CPT | Performed by: INTERNAL MEDICINE

## 2023-06-05 PROCEDURE — 82948 REAGENT STRIP/BLOOD GLUCOSE: CPT

## 2023-06-05 PROCEDURE — 25010000002 ATEZOLIZUMAB 1200 MG/20ML SOLUTION 20 ML VIAL: Performed by: INTERNAL MEDICINE

## 2023-06-05 PROCEDURE — 85025 COMPLETE CBC W/AUTO DIFF WBC: CPT | Performed by: INTERNAL MEDICINE

## 2023-06-05 PROCEDURE — A9552 F18 FDG: HCPCS | Performed by: INTERNAL MEDICINE

## 2023-06-05 PROCEDURE — 78815 PET IMAGE W/CT SKULL-THIGH: CPT

## 2023-06-05 PROCEDURE — 96365 THER/PROPH/DIAG IV INF INIT: CPT

## 2023-06-05 RX ORDER — SODIUM CHLORIDE 9 MG/ML
250 INJECTION, SOLUTION INTRAVENOUS ONCE
Status: CANCELLED | OUTPATIENT
Start: 2023-06-05

## 2023-06-05 RX ORDER — SODIUM CHLORIDE 9 MG/ML
250 INJECTION, SOLUTION INTRAVENOUS ONCE
Status: DISCONTINUED | OUTPATIENT
Start: 2023-06-05 | End: 2023-06-06 | Stop reason: HOSPADM

## 2023-06-05 RX ADMIN — SODIUM CHLORIDE 1000 ML: 9 INJECTION, SOLUTION INTRAVENOUS at 10:32

## 2023-06-05 RX ADMIN — FLUDEOXYGLUCOSE F18 1 DOSE: 300 INJECTION INTRAVENOUS at 08:30

## 2023-06-05 RX ADMIN — ATEZOLIZUMAB 1200 MG: 1200 INJECTION, SOLUTION INTRAVENOUS at 11:50

## 2023-06-05 NOTE — PROGRESS NOTES
Patient here for 1 L NS bolus due to low sodium level. Upon arrival this nurse noted a treatment plan for Tecentriq for this day but the patient was not aware. Talya and Dr. Ba were made aware of the issue and the MD gave the okay to treat order. PIV was place and IVF bolus was started. Patient's port is still not functioning and he has not been to his port study appointment yet due to more pressing medical issues that keep happening, as stated per the patient. Labs were drawn from PIV. IVF and Tecentriq were given and tolerated. IV removed and patient discharged home. Declined AVS

## 2023-06-06 ENCOUNTER — TELEPHONE (OUTPATIENT)
Dept: ONCOLOGY | Facility: CLINIC | Age: 58
End: 2023-06-06
Payer: COMMERCIAL

## 2023-06-06 DIAGNOSIS — E87.1 HYPONATREMIA: Primary | ICD-10-CM

## 2023-06-06 NOTE — TELEPHONE ENCOUNTER
Received message from MULUGETA Lucas regarding pt's sodium level and need for additional fluids. Pt received fluids yesterday. Spoke to Nicole BROWN regarding timing of lab draw. She stated that the pt was approx. 15 minutes into IV fluid infusion when  lab was drawn. I relayed this to MULUGETA Lucas and she advised that the pt come in for labs tomorrow and potentially IV fluids. I relayed this to the pt. Appt scheduled.

## 2023-06-07 NOTE — TELEPHONE ENCOUNTER
Caller: VERN MCBRIDE    Relationship to patient: Emergency Contact    Best call back number: [phone]    Chief complaint: NOT FEELING WELL     Type of visit: LAB     Requested date: 6/8/2023 CALL TO R/S     If rescheduling, when is the original appointment: 6/7/2023

## 2023-06-08 ENCOUNTER — LAB (OUTPATIENT)
Dept: LAB | Facility: HOSPITAL | Age: 58
End: 2023-06-08
Payer: COMMERCIAL

## 2023-06-08 DIAGNOSIS — E87.1 HYPONATREMIA: ICD-10-CM

## 2023-06-08 LAB
ANION GAP SERPL CALCULATED.3IONS-SCNC: 14 MMOL/L (ref 5–15)
BUN SERPL-MCNC: 8 MG/DL (ref 6–20)
BUN/CREAT SERPL: 9.5 (ref 7–25)
CALCIUM SPEC-SCNC: 9.7 MG/DL (ref 8.6–10.5)
CHLORIDE SERPL-SCNC: 89 MMOL/L (ref 98–107)
CO2 SERPL-SCNC: 28 MMOL/L (ref 22–29)
CREAT SERPL-MCNC: 0.84 MG/DL (ref 0.76–1.27)
EGFRCR SERPLBLD CKD-EPI 2021: 101.1 ML/MIN/1.73
GLUCOSE SERPL-MCNC: 180 MG/DL (ref 65–99)
POTASSIUM SERPL-SCNC: 3.5 MMOL/L (ref 3.5–5.2)
SODIUM SERPL-SCNC: 131 MMOL/L (ref 136–145)

## 2023-06-08 PROCEDURE — 80048 BASIC METABOLIC PNL TOTAL CA: CPT | Performed by: NURSE PRACTITIONER

## 2023-06-08 PROCEDURE — 36415 COLL VENOUS BLD VENIPUNCTURE: CPT

## 2023-06-14 ENCOUNTER — LAB (OUTPATIENT)
Dept: LAB | Facility: HOSPITAL | Age: 58
End: 2023-06-14
Payer: COMMERCIAL

## 2023-06-14 DIAGNOSIS — E83.42 HYPOMAGNESEMIA: Primary | ICD-10-CM

## 2023-06-14 DIAGNOSIS — C34.91 NON-SMALL CELL CARCINOMA OF LUNG, RIGHT: ICD-10-CM

## 2023-06-15 DIAGNOSIS — C34.92 NSCLC OF LEFT LUNG: ICD-10-CM

## 2023-06-15 DIAGNOSIS — C34.91 NON-SMALL CELL CARCINOMA OF LUNG, RIGHT: ICD-10-CM

## 2023-06-15 DIAGNOSIS — C34.31 CANCER OF LOWER LOBE OF RIGHT LUNG: ICD-10-CM

## 2023-06-15 RX ORDER — ONDANSETRON HYDROCHLORIDE 8 MG/1
TABLET, FILM COATED ORAL
Qty: 30 TABLET | Refills: 0 | Status: ON HOLD | OUTPATIENT
Start: 2023-06-15

## 2023-06-18 PROBLEM — R53.1 WEAKNESS GENERALIZED: Status: ACTIVE | Noted: 2023-06-18

## 2023-06-19 ENCOUNTER — TELEPHONE (OUTPATIENT)
Dept: ONCOLOGY | Facility: CLINIC | Age: 58
End: 2023-06-19
Payer: COMMERCIAL

## 2023-06-19 NOTE — TELEPHONE ENCOUNTER
Received a call from pt's wife stating that the pt is admitted to [facility] and they wanted to make sure that Dr. Gaspar is aware so that she can see him. I told her that the pt's hospitalist will have to consult Dr. Gaspar for her to be able to see him inpatient. She verbalized understanding and stated that she would let the pt know.

## 2023-06-22 PROBLEM — E44.0 MODERATE MALNUTRITION: Status: ACTIVE | Noted: 2023-06-22

## 2023-06-28 NOTE — TELEPHONE ENCOUNTER
Caller: Jc Driscoll    Relationship to patient: Self    Best call back number: [phone]    Chief complaint: R/S    Type of visit: INFUSION    Requested date: 7-3 AROUND LUNCH TIME, JC STATES HE HAS A RIDE AT THAT TIME    If rescheduling, when is the original appointment: 6-30     Additional notes:PLEASE ADVISE

## 2023-07-06 NOTE — TELEPHONE ENCOUNTER
Caller: VERN MCBRIDE    Relationship to patient: Emergency Contact    Best call back number: [phone]     Chief complaint: PT WOULD LIKE TO SEE IF THEY CAN GET ALL OF THE APPTS FOR 07/11/23 MOVED TO 07/10/23 AFTER 12 PM.     Type of visit: INFUSION, LAB, FOLLOW UP    Requested date: 07/10/23 AFTER NOON     If rescheduling, when is the original appointment: 07/11/23    Additional notes: IF THE APPTS CAN NOT BE MOVED PLEASE LEAVE AS IS.    PLEASE CALL THE PT BACK TO LET THEM KNOW IF THESE CAN BE MOVED OR WHETHER THEY WILL HAVE TO KEEP THE APPTS ON 07/11/23

## 2023-07-20 NOTE — TELEPHONE ENCOUNTER
Caller: Jc Driscoll    Relationship: Self    Best call back number: [phone]    What is the best time to reach you: ANY    Who are you requesting to speak with (clinical staff, provider,  specific staff member): CLINICAL     What was the call regarding: JC MISSED HIS LABS YESTERDAY. HE WAS WANTING TO SEE IF THAT ORDER CAN BE SENT TO  ON [address] IN [place]       PLEASE ADVISE     Is it okay if the provider responds through [facility]: EITHER WAY

## 2023-07-25 NOTE — PROGRESS NOTES
Hematology/Oncology Outpatient Follow Up    PATIENT NAME:Jc Driscoll  :1965  MRN: [id number]  PRIMARY CARE PHYSICIAN: Reshma Alvarenga MD  REFERRING PHYSICIAN: Reshma Alvarenga MD    Chief Complaint   Patient presents with    Follow-up     Non-small cell carcinoma of lung, right        HISTORY OF PRESENT ILLNESS:                                                                                                                                                                                                                                                             This is a 57 y.o. who developed left shoulder pain and for that reason he had a chest x-ray done on 19 which showed a 2.7 cm noncalcified right lower lobe nodule was identified.  For that reason a CT scan of the chest was recommended.  Review of his records shows patient had the CT scan on 19 done without contrast.  This basically showed a lobulated noncalcified mass in the posterior aspect of the right lower lobe measuring 3 cm close to the pleural surface.  There is a calcified 1.2 mm nodule in the lateral aspect of the right lower lobe consistent with a granuloma.  There were also calcified subcarinal and right hilar nodules.  There is a lower right side tracheal nodule measuring 1 cm.  Patient had a PET/CT scan done on 19 which showed increased metabolic activity on the right lower lobe mass that measures 2.5 cm with SUV of 4.4.  There was also increased metabolic activity in the subcarinal space measuring 2.8 cm in size with SUV of  3.4, increased activity in an enlarged right paratracheal lymph node that measures 1.9 cm, SUV of 5, also suspicious for metastatic adenopathy.  Patient had a CT-guided biopsy of the right lower lobe mass on 3/8/19.  This showed adenocarcinoma, TTF-1 positive.  On 3/18/19 patient underwent endoscopic ultrasound and biopsy of a subcarinal lymph node.  This was positive for malignant cells.  Patient was then taken back to surgery on 3/20/18 and had left Mediport placement by Dr. Fuchs.  He has been referred for further evaluation and management of his stage 3 adenocarcinoma of the right lung.  He was accompanied by his spouse for the appointment on 3/26/19.  Patient was scheduled to have a brain MRI for complete staging, but he could not do this due to claustrophobia.  He is willing to try with the help of angiolytics.    Patient had brain MRI done on 4/5/19.  He states it did not show any evidence of metastatic disease.  Evidence of chronic sinusitis was noted.    Patient was seen by Dr. Escalona who has recommended concurrent chemotherapy and radiation.  Patient is scheduled to begin on 4/15/19.   4/17/19 - Patient was initiated on combined chemotherapy and radiation with Cisplatin and Alimta.  Patient received cycle 1.      5/8/19 - Patient received cycle 2 of chemotherapy with Cisplatin and Alimta.   5/15/19 - Chemistry panel showed creatinine of 1.7.  WBC 1.8, hemoglobin 11.6, platelet count 72,000.   5/20/19 - BUN 10, creatinine 1.2, potassium 3.5.    5/23/19 - CT scan of the chest with contrast showed interval decrease in size of the right lower lobe pulmonary nodule now measuring 2.1 cm in size.  There was no mediastinal or hilar adenopathy identified.  6/26/2019 patient underwent right lower lobectomy with hilar and mediastinal lymph node dissection performed by Dr. Terrance Mckeon.  Pathology revealed residual residual 2.2 cm invasive moderately differentiated adenocarcinoma.  There were a total of 16 lymph  nodes evaluated that 7 had metastatic disease.  There was evidence of lymphovascular invasion, extranodal involvement.  All the surgical margins were negative and distance of the closest margin was 2.5 cm.  Logic stage is T1c N2 M0.  Patient is here today accompanied by his spouse for this appointment.  He continues to complain of some postop discomfort, numbness but his skin is intact.  There is no drainage.  Has had follow-up with Dr. Fuchs.  8/14/2019-seen by Dr. Maria at the [facility].  Dr. Maria has recommended immunotherapy with durvalumab off label use as patient has not had significant response to neoadjuvant chemotherapy and radiation.  Patient was given the option to have immunotherapy at the [facility] vs [place] and he has chosen to stay in [place]  8/21/19 - Started cycle 1 day 1 Imfinzi  9/4/2019 patient had CT scan of the chest this shows a moderate size right pleural effusion.  There are areas of groundglass opacification in the right upper lobe without any evidence of significant septal thickening.  Volume loss noted there is also moderate pleural effusion.  There is no suspicious recurrent malignancy.  There were nonenlarged residual mediastinal lymph nodes.  9/9/19 - WBC 6.23, Hgb 11.7 Platelets 257,000, , BUN 9, Cr 1.1,Vitamin B12 318, Ferritin 437  9/23/19 - received cycle 2 day 1 Imfinzi  10/9/19- Seen by Dr rdoríguez with ENT for sinus disease  11/4/2019-patient received cycle 5 of Imfinzi(durvalumab)  12/2/2019 patient had cycle 7 of durvalumab  12/5/2019-patient had CT scan of the  abdomen and pelvis with small right pleural sided pleural effusion.There is no evidence of metastatic disease  12/30/2019-patient received cycle 9 of durvalumab  12/31/2019-patient had CT scan of the chest showed small to moderate left pleural effusion.  Iglesias appearing right lower paratracheal and right peribronchial soft tissue thickening without any discrete pathologically enlarged mediastinal or  hilar lymph nodes.  1/27/20-patient received cycle 11 of durvalumab  2/17/20-patient had a stress test which was negative for ischemia  2/17/2020-patient had CT of the chest which showed postop changes on the right lung, right pleural effusion but no enlarging mass was noted  3/2/2020-patient received cycle 12 of durvalumab  3/16/2020-patient had ultrasound-guided right thoracentesis.  Pathology was negative for malignancy  4/13/2020-patient received cycle 15 of immunotherapy with durvalumab  5/11/2020-patient received cycle 17 of immunotherapy with durvalumab  6/9/2020-patient had CT scan of the chest, abdomen and pelvis for lung cancer restaging.  There is stable small chronic loculated right basilar pleural effusion suggesting chronic pleuritis.  There is no evidence of metastatic disease in the abdomen or pelvis  7/20/2020 patient received cycle 22 of Durvalumab  8/17/2020 patient received cycle 24 and last cycle of durvalumab  9/24/2020 patient had CT scan of the chest, abdomen and pelvis for cancer restaging there was no evidence of local recurrence or metastatic disease within the abdomen and pelvis.  He has stable chronic small right pleural effusion.  Mild sigmoid diverticulosis was noted.  1/18/2021 patient had CT scan of the chest, abdomen and pelvis reviewed patient  5/24/2021 patient had CT scan of the chest calcified subcarinal lymph node consistent with changes of prior granulomatous disease.  Chronic small right pleural effusion is noted similar to prior exam.  Previously shown 4 mm nodule in the left lower lobe has nearly completely resolved.  The subpleural 3 mm nodule located within the posterior left lower lobe With resolved.  9/24/2021 patient had a CT scan of the chest, abdomen and pelvis there is soft tissue attenuation within the right paratracheal area which has been suggested to reflect sequela to previous treatment.  There is slight thickening of the bladder wall otherwise there is no  evidence of metastatic disease.  12/27/2021 patient had CT scan of the chest with contrast this basically showed irregular shaped noncalcified 7 mm left upper lobe pulmonary nodule.  PET CT scan was recommended to further evaluate.  1/26/2022 patient had a PET CT scan done which basically showed evidence of mild uptake corresponding to the nodule within the left upper lobe which is new worrisome for developing metastatic focus.  There was mild radiopharmaceutical activity corresponding to pleural thickening throughout the right lung base with associated pleural effusion likely related to post therapeutic changes and radiation changes in this region.  Metastatic malignancy involving the pleura could not be completely eliminated.  1/26/2022: CT-guided biopsy was aborted due to finding by the radiologist that the lung nodularity was actually a clump of tiny nodules of which the largest was 6 mm and therefore biopsy could not be completed.  Also the area was in the vicinity of multiple blood vessels with increased risk of bleeding.  Follow-up CT of the chest was recommended for continued surveillance  5/18/2022 patient had CT-guided biopsy of the left enlarging nodule, pathology was positive for invasive poorly differentiated adenocarcinoma most consistent with adenocarcinoma of pulmonary origin.  5/27/2022 patient had a PET CT scan which showed a 2.3 cm hypermetabolic left upper lobe nodule which has increased in size no convincing evidence of metastatic disease elsewhere within the chest.  5/21/2022 patient had brain MRI which did not show any evidence of intracranial metastasis  6/2/2022 patient was seen by Dr. Miryam Reyna pulmonary function test is being done to assess surgical candidacy  7/6/2022 patient underwent left heart Koska P video-assisted with upper lobectomy mediastinal lymph node dissection performed by Dr. Miryam Roach  Final pathology revealed poorly differentiated  A predominant adenocarcinoma with  solid component presenting 45% and micropapillary component 5% with extensive necrosis, invasive carcinoma measures 2.5 maximally there was focal lymphovascular space invasion all margins were negative for malignancy discussed with the closest margin was 2.5 cm pathology stage is pT1C pN1.  PD-L1 showed a tumor proportion score of 95%  8/3/2022 patient started adjuvant chemotherapy with cisplatin, Taxol  8/24/2022 patient received cycle 2 of combination chemotherapy with cisplatin and Taxol with Neulasta  10/5/2022: Patient received cycle 4-day 1 of combination chemotherapy with cisplatin and Taxol  Patient developed left-sided chest pain for that reason he had a PET/CT scan 1/20/2020 is basically revealed a 4 x 3.5 cm hypermetabolic soft tissue mass in the left chest wall between the first and second ribs anteriorly consistent with relapsed disease.  There was a small cluster of hypermetabolic lymph nodes seen in the left supraclavicular region consistent with early manoj metastasis  11/11/2022: 2D echocardiogram performed showing a left ventricular EF of 56 to 60%  11/22/2022: Cycle 1 of atezolizumab received  12/5/2022-12/9/2022: Hospitalized at [facility] for dehydration, hyponatremia, and weakness.  12/19/2020: Patient received cycle 2 of Tecentriq along with XRT  1/11/2023: Patient completed radiation.  Remains on Tecentriq every 3 weeks  1/18/2023 patient had a CT scan of the chest which basically revealed abnormal soft tissue interposed between the first and second rib with some erosive changes to the ribs this finding measures about 5 x 2.8 cm previously was 4.8 x 3.8 cm.  There is some pleural thickening about in this area nodular area within the prominent mediastinal fat in the upper chest measuring 1.7 cm more fluid as opposed to enlarged lymph nodes.  Possibility of pulmonary hypertension was also noted due to prominence of main pulmonary artery measuring 3.7 cm.  2/24/2023: CT of the chest with contrast  showed abnormal mass along the chest wall interposed between the first and second ribs that could relate to metastatic disease.  Some erosive changes to the ribs in this area.  Nodular area in the more superior mediastinal fat that looks like you to reflect more fluidlike attenuation as opposed to a pathologic lymph node.  New groundglass changes within the left upper lobe that could be secondary to inflammatory infectious process.  It is possible this may be posttreatment in nature.  There are some groundglass changes along the superior mediastinum and right upper lobe which are unchanged.  Bilateral pleural effusions noted.  This is developed on the left.  Right unchanged.  4/25/2023 CT of the chest with contrast done in short-term follow-up to reevaluate from previous scan: Increased consolidation in the upper left chest since previous study; increased size of left pleural effusion with loculation along its superior medial margin; no change in the soft tissue mass with osseous destruction of the left upper chest wall between the first and second ribs.  4/28/2023-4/30/2023: Hospitalization at [facility] for pneumonia.  7/5/2023-7/7/2023: Patient hospitalized at [facility] for hyponatremia, shortness of breath, weakness, leukocytosis-reactive to worsening left chest wall mass.  7/10/2023: Patient received cycle 1 of Enhertu    Past Medical History:   Diagnosis Date    Allergic rhinitis 07/25/2013    Overview:  4/2/2018 - still zyrtec 10 daily (if stops, gets dermatitis again).     Anxiety and depression 06/05/2012    Overview:  4/2/2018 -   C/w well 300 xr and Lito 20.  4/8/2019 -   Doing ok even with new lung cancer - lito 20 & well 300xr.     Chronic pansinusitis 04/08/2019    Overview:  4/8/2019 -   MRI showed chronic thick in frontal, maxillary, ethmoidal ---> to Dr. COLLAZO to est since abx ICC didn't help.  Does netipot bid.     Diastolic CHF 07/09/2014    Overview:  7/4/14 - impaired LV relaxation on Zhou ECHO.  EF 60%. Rest  normal    Dupuytren's contracture of both hands     Elevated cholesterol     Erosive esophagitis 07/09/2019    Overview:  EGD 2016 4/2/2018 - nexium OTC daily for few week.  Qod before that.  Now carbonation hurts, epigastric pain 4/8/2019 -   protonix 40 doing well.     Gastroesophageal reflux disease 02/21/2019    Hiatal hernia 07/09/2019    History of colon polyps     Hypertension     Lung cancer     right lung and in lymph nodes x2    Mediastinal lymphadenopathy 03/12/2019    Normocytic anemia 08/08/2019    Overview:  6/2019 - dropped to 9's postop 7/2019 - rebounded to 10's.  8/8/2019 -   Back to 9's - check ferritin, b12 and thyroid.     Prediabetes 07/09/2014    Overview:  7/4/14 - a1c 6.1 at [place] admission    Retention of urine 06/27/2019    PSOT OP, RESOLVED       Past Surgical History:   Procedure Laterality Date    BRONCHOSCOPY  03/18/2019    EBUS MONTERO NEEDLE BX    COLONOSCOPY      DUPUYTREN CONTRACTURE RELEASE Bilateral     LUNG BIOPSY  03/08/2019    CT GUIDED    LUNG REMOVAL, PARTIAL Right     right lower    PORTACATH PLACEMENT  03/20/2019    DR LONGORIA    THORACOSCOPY Right 6/26/2019    Procedure: RIGHT VATS, OPEN LOWER LOBECTOMY WITH LYMPH NODE DISECTION, WEDGE RESECTION OF RIGHT MIDDLE LOBE;  Surgeon: Terrance Longoria MD;  Location: [facility] OR;  Service: Cardiothoracic    THORACOSCOPY VIDEO ASSISTED WITH LOBECTOMY Left 7/6/2022    Procedure: THORACOSCOPY VIDEO ASSISTED WITH LOBECTOMY;  Surgeon: Miryam Reyna MD;  Location: [facility] MAIN OR;  Service: Thoracic;  Laterality: Left;         Current Outpatient Medications:     buPROPion XL (WELLBUTRIN XL) 300 MG 24 hr tablet, Take 1 tablet by mouth Daily., Disp: , Rfl:     Constulose 10 GM/15ML solution, TAKE 30 ML BY MOUTH  EVERY 12 HOURS, Disp: 900 mL, Rfl: 0    desvenlafaxine (PRISTIQ) 50 MG 24 hr tablet, Take 1 tablet by mouth Daily., Disp: , Rfl:     dexamethasone (DECADRON) 4 MG tablet, Take 1 tablet by mouth Every 12 (Twelve) Hours., Disp:  14 tablet, Rfl: 0    gabapentin (NEURONTIN) 300 MG capsule, Take 2 capsules by mouth 3 (Three) Times a Day for 30 days., Disp: 180 capsule, Rfl: 2    levothyroxine (SYNTHROID, LEVOTHROID) 125 MCG tablet, Take 1 tablet by mouth Every Morning., Disp: 30 tablet, Rfl: 0    liothyronine (CYTOMEL) 5 MCG tablet, Take 1 tablet by mouth Daily., Disp: , Rfl:     megestrol acetate (MEGACE) 400 MG/10ML suspension oral suspension, Take 10 mL by mouth Daily., Disp: 240 mL, Rfl: 0    OLANZapine (zyPREXA) 5 MG tablet, Take 1 tablet by mouth Every Night. Take on days 2, 3 and 4 after chemotherapy., Disp: 3 tablet, Rfl: 11    ondansetron (ZOFRAN) 8 MG tablet, TAKE 1 TABLET BY MOUTH THREE TIMES DAILY AS NEEDED FOR NAUSEA FOR VOMITING, Disp: 30 tablet, Rfl: 0    ondansetron (ZOFRAN) 8 MG tablet, Take 1 tablet by mouth 3 (Three) Times a Day As Needed for Nausea or Vomiting., Disp: 30 tablet, Rfl: 5    oxyCODONE-acetaminophen (PERCOCET)  MG per tablet, Take 1 tablet by mouth Every 4 (Four) Hours As Needed for Moderate Pain., Disp: 60 tablet, Rfl: 0    pantoprazole (PROTONIX) 40 MG EC tablet, Take 1 tablet by mouth Daily., Disp: , Rfl:     sennosides-docusate (PERICOLACE) 8.6-50 MG per tablet, Take 2 tablets by mouth 2 (Two) Times a Day., Disp: 30 tablet, Rfl: 0    vitamin B-12 (CYANOCOBALAMIN) 1000 MCG tablet, Take 1 tablet by mouth Daily., Disp: , Rfl:     vitamin B-6 (PYRIDOXINE) 100 MG tablet, Take 1 tablet by mouth Every 12 (Twelve) Hours., Disp: 60 tablet, Rfl: 6  No current facility-administered medications for this visit.    Facility-Administered Medications Ordered in Other Visits:     heparin injection 500 Units, 500 Units, Intravenous, PRN, Azucena Gaspar MD    sodium chloride 0.9 % bolus 1,000 mL, 1,000 mL, Intravenous, Once, Azucena Gaspar MD, Last Rate: 1,000 mL/hr at 07/26/23 1233, 1,000 mL at 07/26/23 1233    sodium chloride 0.9 % flush 20 mL, 20 mL, Intravenous, PRN, Chasity, Azucena Livia,  MD    No Known Allergies    Family History   Problem Relation Age of Onset    Cancer Mother         cervical cancer    Cervical cancer Mother     Cancer Father         lung cancer    Lung cancer Father     Lung cancer Sister     Cancer Sister         lung cancer    Malig Hyperthermia Neg Hx        Cancer-related family history includes Cancer in his father, mother, and sister; Cervical cancer in his mother; Lung cancer in his father and sister.    Social History     Tobacco Use    Smoking status: Former     Types: Cigarettes     Quit date: 2009     Years since quittin.8    Smokeless tobacco: Never   Vaping Use    Vaping Use: Never used   Substance Use Topics    Alcohol use: Yes     Alcohol/week: 2.0 standard drinks     Types: 2 Cans of beer per week     Comment: Occasional    Drug use: No     I have reviewed and confirmed the accuracy of the patient's history: Chief complaint, HPI, ROS, and Subjective as entered by the MA/LPN/RN. Azucena Gaspar MD 23      SUBJECTIVE:      Cartwright comes in very weak.  Pain has significantly resolved that he is no longer needing any pain medication.  He does have decreased appetite and his not taking his appetite stimulant as recommended.  He feels weak.    Jc COURTNEY Driscoll reports a pain score of 2.  Given his pain assessment as noted, treatment options were discussed and the following options were decided upon as a follow-up plan to address the patient's pain: continuation of current treatment plan for pain.     REVIEW OF SYSTEMS:    Review of Systems   Constitutional: Positive for fatigue. Negative for chills and fever.   HENT: Negative for ear pain, mouth sores, nosebleeds and sore throat.    Eyes: Negative for photophobia and visual disturbance.   Respiratory: Negative for wheezing and stridor.    Cardiovascular: Negative for chest pain and palpitations.   Gastrointestinal: Negative for abdominal pain, diarrhea, nausea and vomiting. He has constipation  Endocrine:  "Negative for cold intolerance and heat intolerance.   Genitourinary: Negative for dysuria and hematuria.   Musculoskeletal: Negative for joint swelling and neck stiffness.   Skin: Negative for color change and rash.   Neurological: Negative for seizures and syncope.   Hematological: Negative for adenopathy.   Psychiatric/Behavioral: Negative for agitation, confusion and hallucinations.   As stated in the subjective    OBJECTIVE:    Vitals:    07/26/23 1135   BP: 121/76   Pulse: 104   Resp: 20   Temp: 98 °F (36.7 °C)   TempSrc: Infrared   SpO2: 100%   Weight: 67.1 kg (148 lb)   Height: 190.5 cm (75\")   PainSc:   2   PainLoc: Generalized           ECOG    Eastern Cooperative Oncology Group (ECOG, [place], World Health Organization) performance scale  0 Fully active; no performance restrictions.  1 Strenuous physical activity restricted; fully ambulatory and able to carry out light work.  2 Capable of all self-care but unable to carry out any work activities. Up and about >50% of waking hours.  3 Capable of only limited self-care; confined to bed or chair >50% of waking hours.  4 Completely disabled; cannot carry out any self-care; totally confined to bed or chair.    Physical Exam     Constitutional: He is oriented to person, place, and time. He appears well-developed. No distress.   HENT:   Head: Normocephalic and atraumatic.   Right Ear: External ear normal.   Left Ear: External ear normal.   Nose: Nose normal.   Eyes: Pupils are equal, round, and reactive to light. Conjunctivae are normal. Right eye exhibits no discharge. Left eye exhibits no discharge. No scleral icterus.   Neck: No thyromegaly present.   Cardiovascular: Normal rate, regular rhythm and normal heart sounds. Exam reveals no gallop and no friction rub.   Pulmonary/Chest: Effort normal. No stridor. No respiratory distress. He has no wheezes.  Diminished bilateral bases.  Abdominal: Soft. Bowel sounds are normal. He exhibits no mass. There is no " abdominal tenderness. There is no rebound and no guarding.   Musculoskeletal: Normal range of motion. No tenderness.   Lymphadenopathy:     He has no cervical adenopathy.   Neurological: He is alert and oriented to person, place, and time. He exhibits normal muscle tone.   Skin: Skin is warm. No rash noted. He is not diaphoretic. No erythema.   Psychiatric: His behavior is normal. Judgment and thought content normal.   Nursing note and vitals reviewed.      I have reexamined the patient and the results are consistent with the previously documented exam. Azucena Gaspar MD      RECENT LABS    WBC   Date Value Ref Range Status   07/26/2023 9.23 3.40 - 10.80 10*3/mm3 Final   03/22/2022 7.84 4.5 - 11.0 10*3/uL Final     RBC   Date Value Ref Range Status   07/26/2023 3.00 (L) 4.14 - 5.80 10*6/mm3 Final   03/22/2022 4.23 (L) 4.5 - 5.9 10*6/uL Final     Hemoglobin   Date Value Ref Range Status   07/26/2023 7.8 (C) 13.0 - 17.7 g/dL Final   03/22/2022 12.9 (L) 13.5 - 17.5 g/dL Final     Hematocrit   Date Value Ref Range Status   07/26/2023 25.2 (L) 37.5 - 51.0 % Final   03/22/2022 38.8 (L) 41.0 - 53.0 % Final     MCV   Date Value Ref Range Status   07/26/2023 84.0 79.0 - 97.0 fL Final   03/22/2022 91.7 80.0 - 100.0 fL Final     MCH   Date Value Ref Range Status   07/26/2023 26.0 (L) 26.6 - 33.0 pg Final   03/22/2022 30.5 26.0 - 34.0 pg Final     MCHC   Date Value Ref Range Status   07/26/2023 31.0 (L) 31.5 - 35.7 g/dL Final   03/22/2022 33.2 31.0 - 37.0 g/dL Final     RDW   Date Value Ref Range Status   07/26/2023 16.4 (H) 12.3 - 15.4 % Final   03/22/2022 14.2 12.0 - 16.8 % Final     RDW-SD   Date Value Ref Range Status   07/26/2023 48.5 37.0 - 54.0 fl Final     MPV   Date Value Ref Range Status   07/26/2023 8.7 6.0 - 12.0 fL Final   03/22/2022 9.3 8.4 - 12.4 fL Final     Platelets   Date Value Ref Range Status   07/26/2023 413 140 - 450 10*3/mm3 Final   03/22/2022 324 140 - 440 10*3/uL Final     Neutrophil Rel %    Date Value Ref Range Status   03/22/2022 63.1 45 - 80 % Final     Neutrophil %   Date Value Ref Range Status   07/26/2023 76.1 (H) 42.7 - 76.0 % Final     Lymphocyte Rel %   Date Value Ref Range Status   03/22/2022 16.2 15 - 50 % Final     Lymphocyte %   Date Value Ref Range Status   07/26/2023 8.7 (L) 19.6 - 45.3 % Final     Monocyte Rel %   Date Value Ref Range Status   03/22/2022 13.6 0 - 15 % Final     Monocyte %   Date Value Ref Range Status   07/26/2023 11.7 5.0 - 12.0 % Final     Eosinophil %   Date Value Ref Range Status   07/26/2023 2.8 0.3 - 6.2 % Final   03/22/2022 5.5 0 - 7 % Final     Basophil Rel %   Date Value Ref Range Status   03/22/2022 1.1 0 - 2 % Final     Basophil %   Date Value Ref Range Status   07/26/2023 0.7 0.0 - 1.5 % Final     Immature Grans %   Date Value Ref Range Status   07/07/2022 0.4 0.0 - 0.5 % Final   03/22/2022 0.5 0.0 - 1.0 % Final     Neutrophils Absolute   Date Value Ref Range Status   03/22/2022 4.94 2.0 - 8.8 10*3/uL Final     Neutrophils, Absolute   Date Value Ref Range Status   07/26/2023 7.03 (H) 1.70 - 7.00 10*3/mm3 Final     Lymphocytes Absolute   Date Value Ref Range Status   03/22/2022 1.27 0.7 - 5.5 10*3/uL Final     Lymphocytes, Absolute   Date Value Ref Range Status   07/26/2023 0.80 0.70 - 3.10 10*3/mm3 Final     Monocytes Absolute   Date Value Ref Range Status   03/22/2022 1.07 0.0 - 1.7 10*3/uL Final     Monocytes, Absolute   Date Value Ref Range Status   07/26/2023 1.08 (H) 0.10 - 0.90 10*3/mm3 Final     Eosinophils Absolute   Date Value Ref Range Status   03/22/2022 0.43 0.0 - 0.8 10*3/uL Final     Eosinophils, Absolute   Date Value Ref Range Status   07/26/2023 0.26 0.00 - 0.40 10*3/mm3 Final     Basophils Absolute   Date Value Ref Range Status   03/22/2022 0.09 0.0 - 0.2 10*3/uL Final     Basophils, Absolute   Date Value Ref Range Status   07/26/2023 0.06 0.00 - 0.20 10*3/mm3 Final     Immature Grans, Absolute   Date Value Ref Range Status   07/07/2022  0.05 0.00 - 0.05 10*3/mm3 Final   03/22/2022 0.04 0.00 - 0.10 10*3/uL Final     nRBC   Date Value Ref Range Status   07/14/2023 0.0 0.0 - 0.2 /100 WBC Final       Lab Results   Component Value Date    GLUCOSE 91 07/14/2023    BUN 29 (H) 07/14/2023    CREATININE 0.90 07/14/2023    EGFRIFNONA 100 01/28/2022    EGFRIFAFRI >60 05/20/2019    BCR 32.2 (H) 07/14/2023    K 4.0 07/14/2023    CO2 22.0 07/14/2023    CALCIUM 8.5 (L) 07/14/2023    ALBUMIN 2.4 (L) 07/10/2023    LABIL2 1.5 03/22/2022    AST 12 07/10/2023    ALT 8 07/10/2023         ASSESSMENT:    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  Patient received concurrent XRT with Tecentriq followed by maintenance Tecentriq.  Patient has had evidence of progressive disease therefore Tecentriq has been discontinued.  I have recommended Enhertu for him.  Continue Enhertu  Intractable pain left shoulder: This has significantly improved since starting Enhertu  Dehydration: We will begin IV fluids  Poor appetite: Patient has been encouraged to begin to take Megace as recommended  Physical deconditioning: I recommended physical therapy  Poorly differentiated adenocarcinoma of the left lung status post left thoracotomy with mediastinal lymph node dissection.  pT1 cpN1 M0 consistent with stage IIb disease, found on surveillance CT scans. Brain MRI was negative. We will recommend platinum doublet followed by Tecentriq unless he has EGFR mutation for which adjuvant osimertinib will be considered.  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  Patient was treated with cisplatin and Alimta in the adjuvant setting for his original malignancy.  He is currently on cisplatin and Taxol.  Patient is receiving cycle #4  today.  This tumor was completely resected and has negative margins and no mediastinal lymph node involvement.  Reviewed the pathology with Dr. Trevizo.  This is a new second lung primary.  Patient has received a total of 4 cycles of combination of cisplatin and Taxol completed on 10/5/2022.   Constipation: We will begin lactulose.  Patient to call me if no results  High PD-L1 expression at 95%. Reviewed foundation 1 testing results.  This showed MET amplification, ERBB2 amplification for which targeted therapy is available for including crizotinib for MET amplification, afatinib for ERBB2 but this is approved for metastatic disease as well as fam-trastuzumab.  We have extensively reviewed the literature.  He will be a candidate for ERBB2 targeted therapy but fam-trastuzumab cannot be combined with XRT due to risk of interstitial lung disease.  Also patient has ERBB2 amplification and not mutation and therefore his response rate may be around 25% as opposed to 55% if mutation was identified.    Chemotherapy-induced fatigue: This has improved  Hypomagnesemia secondary to chemotherapy: Patient has discontinued magnesium supplements    History of adenocarcinoma of the right lung, T1c N2 M0, consistent with clinical stage 3A disease in 2019.  S/P combined chemotherapy and radiation with cisplatin and Alimta neoadjuvantly, followed by her right lower lobectomy with mediastinal and hilar lymph node dissection with residual disease pT1 cpN2 M0.  Followed by maintenance durvalumab for 1 year.   Recent diagnosis of hypothyroidism, on thyroid replacement therapy  Status post right thoracentesis with cytology negative for malignancy  History of pneumothorax following lung biopsy  Postop anemia: Reviewed  Pneumonia left upper lobe: Has completed his 10-day course of antibiotics.  Symptoms have improved.  Chronic left chest wall and left upper extremity pain: On opioid pain management with from his radiation oncologist.  Pain  seems to be neuropathic in nature we will continue to increase his gabapentin, increase the frequency on his oxycodone as needed, and refer to pain management.  Has an appointment coming up 6/19/2023  Orthostatic hypotension: Related to dehydration.  Resolved.  Leukocytosis: Infectious disease work-up was performed during his recent hospitalization and was negative.  Likely leukemoid reaction secondary to progressive malignancy.  Hyponatremia  Cancer related cachexia.  Improving..  Stable.  Assessment has been reviewed and updated      PLANS:    IV fluids  Encouraged to begin Megace  Packed red blood cells for anemia  Continue with Enhertu  Refer to physical therapy for deconditioning closer to home Timothy or Shani preferred the patient  Continue gabapentin up to 600 mg 3 times daily over the next week  Continue oxycodone to 10 mg every 4 hours as needed for pain  Continue fam-trastuzumab every 3 weeks.  Reviewed the benefits, the side effects in detail.  Reviewed.  Reviewed his PET CT scan results  2D echocardiogram in preparation for HER2 directed treatment reviewed.  Reviewed.  EF 51-55%.  We will check every 3 months next echo will be due October 10, 2023  Continue thyroid replacement therapy through his PCP  Continue B12 injections  monthly: Patient did have elevated methylmalonic acid level during the early phases of his treatments.  Continue the same  Follow-up with NP next week  Follow-up with me in 3 weeks  CMP mag will be ordered stat today.         Patient has  questions which have all been answered to the best of my abilities    I spent 40 total minutes, face-to-face, caring for Jc today. 90% of this time involved counseling and/or coordination of care as documented within this note.

## 2023-07-26 ENCOUNTER — OFFICE VISIT (OUTPATIENT)
Dept: ONCOLOGY | Facility: CLINIC | Age: 58
End: 2023-07-26
Payer: COMMERCIAL

## 2023-07-26 ENCOUNTER — HOSPITAL ENCOUNTER (OUTPATIENT)
Dept: INFUSION THERAPY | Facility: HOSPITAL | Age: 58
Discharge: HOME | End: 2023-07-26
Admitting: INTERNAL MEDICINE
Payer: COMMERCIAL

## 2023-07-26 ENCOUNTER — LAB (OUTPATIENT)
Dept: LAB | Facility: HOSPITAL | Age: 58
End: 2023-07-26
Payer: COMMERCIAL

## 2023-07-26 ENCOUNTER — HOSPITAL ENCOUNTER (OUTPATIENT)
Dept: ONCOLOGY | Facility: HOSPITAL | Age: 58
Discharge: HOME | End: 2023-07-26
Payer: COMMERCIAL

## 2023-07-26 VITALS
RESPIRATION RATE: 12 BRPM | SYSTOLIC BLOOD PRESSURE: 104 MMHG | HEART RATE: 95 BPM | OXYGEN SATURATION: 100 % | TEMPERATURE: 98.2 F | DIASTOLIC BLOOD PRESSURE: 72 MMHG

## 2023-07-26 VITALS
OXYGEN SATURATION: 100 % | SYSTOLIC BLOOD PRESSURE: 121 MMHG | WEIGHT: 148 LBS | HEART RATE: 104 BPM | TEMPERATURE: 98 F | RESPIRATION RATE: 20 BRPM | DIASTOLIC BLOOD PRESSURE: 76 MMHG | HEIGHT: 75 IN | BODY MASS INDEX: 18.4 KG/M2

## 2023-07-26 DIAGNOSIS — D64.9 SYMPTOMATIC ANEMIA: ICD-10-CM

## 2023-07-26 DIAGNOSIS — Z45.2 ENCOUNTER FOR CARE RELATED TO VASCULAR ACCESS PORT: Primary | ICD-10-CM

## 2023-07-26 DIAGNOSIS — R53.81 PHYSICAL DECONDITIONING: ICD-10-CM

## 2023-07-26 DIAGNOSIS — D64.9 ANEMIA, UNSPECIFIED TYPE: ICD-10-CM

## 2023-07-26 DIAGNOSIS — E83.42 HYPOMAGNESEMIA: Primary | ICD-10-CM

## 2023-07-26 DIAGNOSIS — E87.1 HYPONATREMIA: Primary | ICD-10-CM

## 2023-07-26 DIAGNOSIS — C34.91 NON-SMALL CELL CARCINOMA OF LUNG, RIGHT: ICD-10-CM

## 2023-07-26 DIAGNOSIS — Z45.2 ENCOUNTER FOR CARE RELATED TO VASCULAR ACCESS PORT: ICD-10-CM

## 2023-07-26 DIAGNOSIS — C34.91 NON-SMALL CELL CARCINOMA OF LUNG, RIGHT: Primary | ICD-10-CM

## 2023-07-26 DIAGNOSIS — C34.31 CANCER OF LOWER LOBE OF RIGHT LUNG: ICD-10-CM

## 2023-07-26 DIAGNOSIS — C34.31 CANCER OF LOWER LOBE OF RIGHT LUNG: Primary | ICD-10-CM

## 2023-07-26 LAB
ABO GROUP BLD: NORMAL
ALBUMIN SERPL-MCNC: 3.3 G/DL (ref 3.5–5.2)
ALBUMIN/GLOB SERPL: 1.5 G/DL
ALP SERPL-CCNC: 207 U/L (ref 39–117)
ALT SERPL W P-5'-P-CCNC: 22 U/L (ref 1–41)
ANION GAP SERPL CALCULATED.3IONS-SCNC: 13 MMOL/L (ref 5–15)
ANION GAP SERPL CALCULATED.3IONS-SCNC: 13 MMOL/L (ref 5–15)
AST SERPL-CCNC: 18 U/L (ref 1–40)
BASOPHILS # BLD AUTO: 0.06 10*3/MM3 (ref 0–0.2)
BASOPHILS # BLD AUTO: 0.1 10*3/MM3 (ref 0–0.2)
BASOPHILS NFR BLD AUTO: 0.7 % (ref 0–1.5)
BASOPHILS NFR BLD AUTO: 1 % (ref 0–1.5)
BB HOLD TUBE: NORMAL
BILIRUB SERPL-MCNC: 0.2 MG/DL (ref 0–1.2)
BLD GP AB SCN SERPL QL: NEGATIVE
BUN SERPL-MCNC: 11 MG/DL (ref 6–20)
BUN SERPL-MCNC: 12 MG/DL (ref 6–20)
BUN/CREAT SERPL: 12.5 (ref 7–25)
BUN/CREAT SERPL: 13.5 (ref 7–25)
CALCIUM SPEC-SCNC: 8.6 MG/DL (ref 8.6–10.5)
CALCIUM SPEC-SCNC: 8.6 MG/DL (ref 8.6–10.5)
CHLORIDE SERPL-SCNC: 92 MMOL/L (ref 98–107)
CHLORIDE SERPL-SCNC: 93 MMOL/L (ref 98–107)
CO2 SERPL-SCNC: 26 MMOL/L (ref 22–29)
CO2 SERPL-SCNC: 26 MMOL/L (ref 22–29)
CREAT SERPL-MCNC: 0.88 MG/DL (ref 0.76–1.27)
CREAT SERPL-MCNC: 0.89 MG/DL (ref 0.76–1.27)
DEPRECATED RDW RBC AUTO: 48.5 FL (ref 37–54)
DEPRECATED RDW RBC AUTO: 51.2 FL (ref 37–54)
EGFRCR SERPLBLD CKD-EPI 2021: 99.3 ML/MIN/1.73
EGFRCR SERPLBLD CKD-EPI 2021: 99.7 ML/MIN/1.73
EOSINOPHIL # BLD AUTO: 0.26 10*3/MM3 (ref 0–0.4)
EOSINOPHIL # BLD AUTO: 0.3 10*3/MM3 (ref 0–0.4)
EOSINOPHIL NFR BLD AUTO: 2.8 % (ref 0.3–6.2)
EOSINOPHIL NFR BLD AUTO: 3.2 % (ref 0.3–6.2)
ERYTHROCYTE [DISTWIDTH] IN BLOOD BY AUTOMATED COUNT: 16.4 % (ref 12.3–15.4)
ERYTHROCYTE [DISTWIDTH] IN BLOOD BY AUTOMATED COUNT: 17.2 % (ref 12.3–15.4)
GLOBULIN UR ELPH-MCNC: 2.2 GM/DL
GLUCOSE SERPL-MCNC: 112 MG/DL (ref 65–99)
GLUCOSE SERPL-MCNC: 113 MG/DL (ref 65–99)
HCT VFR BLD AUTO: 20.9 % (ref 37.5–51)
HCT VFR BLD AUTO: 25.2 % (ref 37.5–51)
HGB BLD-MCNC: 6.5 G/DL (ref 13–17.7)
HGB BLD-MCNC: 7.8 G/DL (ref 13–17.7)
HOLD SPECIMEN: NORMAL
LYMPHOCYTES # BLD AUTO: 0.8 10*3/MM3 (ref 0.7–3.1)
LYMPHOCYTES # BLD AUTO: 0.8 10*3/MM3 (ref 0.7–3.1)
LYMPHOCYTES NFR BLD AUTO: 8.7 % (ref 19.6–45.3)
LYMPHOCYTES NFR BLD AUTO: 9.2 % (ref 19.6–45.3)
MAGNESIUM SERPL-MCNC: 1.8 MG/DL (ref 1.6–2.6)
MAGNESIUM SERPL-MCNC: 1.8 MG/DL (ref 1.6–2.6)
MCH RBC QN AUTO: 24.9 PG (ref 26.6–33)
MCH RBC QN AUTO: 26 PG (ref 26.6–33)
MCHC RBC AUTO-ENTMCNC: 31 G/DL (ref 31.5–35.7)
MCHC RBC AUTO-ENTMCNC: 31.3 G/DL (ref 31.5–35.7)
MCV RBC AUTO: 79.6 FL (ref 79–97)
MCV RBC AUTO: 84 FL (ref 79–97)
MONOCYTES # BLD AUTO: 1 10*3/MM3 (ref 0.1–0.9)
MONOCYTES # BLD AUTO: 1.08 10*3/MM3 (ref 0.1–0.9)
MONOCYTES NFR BLD AUTO: 11.7 % (ref 5–12)
MONOCYTES NFR BLD AUTO: 11.8 % (ref 5–12)
NEUTROPHILS NFR BLD AUTO: 6.6 10*3/MM3 (ref 1.7–7)
NEUTROPHILS NFR BLD AUTO: 7.03 10*3/MM3 (ref 1.7–7)
NEUTROPHILS NFR BLD AUTO: 74.8 % (ref 42.7–76)
NEUTROPHILS NFR BLD AUTO: 76.1 % (ref 42.7–76)
NRBC BLD AUTO-RTO: 0.1 /100 WBC (ref 0–0.2)
PLATELET # BLD AUTO: 376 10*3/MM3 (ref 140–450)
PLATELET # BLD AUTO: 413 10*3/MM3 (ref 140–450)
PMV BLD AUTO: 7.1 FL (ref 6–12)
PMV BLD AUTO: 8.7 FL (ref 6–12)
POTASSIUM SERPL-SCNC: 3.5 MMOL/L (ref 3.5–5.2)
POTASSIUM SERPL-SCNC: 4.2 MMOL/L (ref 3.5–5.2)
PROT SERPL-MCNC: 5.5 G/DL (ref 6–8.5)
RBC # BLD AUTO: 2.63 10*6/MM3 (ref 4.14–5.8)
RBC # BLD AUTO: 3 10*6/MM3 (ref 4.14–5.8)
RH BLD: POSITIVE
SODIUM SERPL-SCNC: 131 MMOL/L (ref 136–145)
SODIUM SERPL-SCNC: 132 MMOL/L (ref 136–145)
T&S EXPIRATION DATE: NORMAL
WBC NRBC COR # BLD: 8.8 10*3/MM3 (ref 3.4–10.8)
WBC NRBC COR # BLD: 9.23 10*3/MM3 (ref 3.4–10.8)

## 2023-07-26 PROCEDURE — 36591 DRAW BLOOD OFF VENOUS DEVICE: CPT

## 2023-07-26 PROCEDURE — 83735 ASSAY OF MAGNESIUM: CPT | Performed by: INTERNAL MEDICINE

## 2023-07-26 PROCEDURE — 36415 COLL VENOUS BLD VENIPUNCTURE: CPT

## 2023-07-26 PROCEDURE — 96360 HYDRATION IV INFUSION INIT: CPT

## 2023-07-26 PROCEDURE — 86923 COMPATIBILITY TEST ELECTRIC: CPT

## 2023-07-26 PROCEDURE — 85025 COMPLETE CBC W/AUTO DIFF WBC: CPT

## 2023-07-26 PROCEDURE — 86900 BLOOD TYPING SEROLOGIC ABO: CPT | Performed by: INTERNAL MEDICINE

## 2023-07-26 PROCEDURE — 85025 COMPLETE CBC W/AUTO DIFF WBC: CPT | Performed by: INTERNAL MEDICINE

## 2023-07-26 PROCEDURE — 80053 COMPREHEN METABOLIC PANEL: CPT | Performed by: INTERNAL MEDICINE

## 2023-07-26 PROCEDURE — 96374 THER/PROPH/DIAG INJ IV PUSH: CPT

## 2023-07-26 PROCEDURE — 86901 BLOOD TYPING SEROLOGIC RH(D): CPT | Performed by: INTERNAL MEDICINE

## 2023-07-26 PROCEDURE — 86850 RBC ANTIBODY SCREEN: CPT | Performed by: INTERNAL MEDICINE

## 2023-07-26 PROCEDURE — 25010000002 HEPARIN LOCK FLUSH PER 10 UNITS: Performed by: INTERNAL MEDICINE

## 2023-07-26 RX ORDER — HEPARIN SODIUM (PORCINE) LOCK FLUSH IV SOLN 100 UNIT/ML 100 UNIT/ML
500 SOLUTION INTRAVENOUS AS NEEDED
Status: DISCONTINUED | OUTPATIENT
Start: 2023-07-26 | End: 2023-07-28 | Stop reason: HOSPADM

## 2023-07-26 RX ORDER — HEPARIN SODIUM (PORCINE) LOCK FLUSH IV SOLN 100 UNIT/ML 100 UNIT/ML
500 SOLUTION INTRAVENOUS AS NEEDED
Status: CANCELLED | OUTPATIENT
Start: 2023-07-26

## 2023-07-26 RX ORDER — SODIUM CHLORIDE 0.9 % (FLUSH) 0.9 %
20 SYRINGE (ML) INJECTION AS NEEDED
Status: DISCONTINUED | OUTPATIENT
Start: 2023-07-26 | End: 2023-07-28 | Stop reason: HOSPADM

## 2023-07-26 RX ORDER — SODIUM CHLORIDE 9 MG/ML
250 INJECTION, SOLUTION INTRAVENOUS AS NEEDED
Status: CANCELLED | OUTPATIENT
Start: 2023-07-26

## 2023-07-26 RX ORDER — HEPARIN SODIUM (PORCINE) LOCK FLUSH IV SOLN 100 UNIT/ML 100 UNIT/ML
500 SOLUTION INTRAVENOUS AS NEEDED
Status: DISCONTINUED | OUTPATIENT
Start: 2023-07-26 | End: 2023-07-27 | Stop reason: HOSPADM

## 2023-07-26 RX ORDER — SODIUM CHLORIDE 0.9 % (FLUSH) 0.9 %
20 SYRINGE (ML) INJECTION AS NEEDED
Status: CANCELLED | OUTPATIENT
Start: 2023-07-26

## 2023-07-26 RX ORDER — SODIUM CHLORIDE 0.9 % (FLUSH) 0.9 %
20 SYRINGE (ML) INJECTION AS NEEDED
Status: DISCONTINUED | OUTPATIENT
Start: 2023-07-26 | End: 2023-07-27 | Stop reason: HOSPADM

## 2023-07-26 RX ADMIN — SODIUM CHLORIDE 1000 ML: 9 INJECTION, SOLUTION INTRAVENOUS at 12:33

## 2023-07-26 RX ADMIN — Medication 500 UNITS: at 14:23

## 2023-07-26 RX ADMIN — Medication 20 ML: at 14:23

## 2023-07-26 NOTE — PROGRESS NOTES
Pt discharged with port accessed; will be going to ambulatory care today for lab draw for possible PRBC's transfusion. Flushed port with 20 ml NS after hydration infusion.

## 2023-07-26 NOTE — PROGRESS NOTES
Patients blood taken from Port that was accessed at the Cancer center. Port De-accessed in Ambulatory. Patient annamarie well.

## 2023-07-27 ENCOUNTER — HOSPITAL ENCOUNTER (OUTPATIENT)
Dept: INFUSION THERAPY | Facility: HOSPITAL | Age: 58
Discharge: HOME | End: 2023-07-27
Payer: COMMERCIAL

## 2023-07-27 VITALS
RESPIRATION RATE: 14 BRPM | SYSTOLIC BLOOD PRESSURE: 103 MMHG | HEART RATE: 95 BPM | OXYGEN SATURATION: 99 % | DIASTOLIC BLOOD PRESSURE: 66 MMHG | TEMPERATURE: 98.7 F

## 2023-07-27 DIAGNOSIS — C34.91 NON-SMALL CELL CARCINOMA OF LUNG, RIGHT: ICD-10-CM

## 2023-07-27 DIAGNOSIS — D64.9 ANEMIA, UNSPECIFIED TYPE: ICD-10-CM

## 2023-07-27 DIAGNOSIS — Z45.2 ENCOUNTER FOR CARE RELATED TO VASCULAR ACCESS PORT: Primary | ICD-10-CM

## 2023-07-27 PROCEDURE — 86900 BLOOD TYPING SEROLOGIC ABO: CPT

## 2023-07-27 PROCEDURE — 25010000002 HEPARIN LOCK FLUSH PER 10 UNITS: Performed by: INTERNAL MEDICINE

## 2023-07-27 PROCEDURE — 96374 THER/PROPH/DIAG INJ IV PUSH: CPT

## 2023-07-27 PROCEDURE — P9016 RBC LEUKOCYTES REDUCED: HCPCS

## 2023-07-27 PROCEDURE — 36430 TRANSFUSION BLD/BLD COMPNT: CPT

## 2023-07-27 RX ORDER — SODIUM CHLORIDE 0.9 % (FLUSH) 0.9 %
20 SYRINGE (ML) INJECTION AS NEEDED
Status: CANCELLED | OUTPATIENT
Start: 2023-07-27

## 2023-07-27 RX ORDER — SODIUM CHLORIDE 9 MG/ML
250 INJECTION, SOLUTION INTRAVENOUS AS NEEDED
Status: DISCONTINUED | OUTPATIENT
Start: 2023-07-27 | End: 2023-07-29 | Stop reason: HOSPADM

## 2023-07-27 RX ORDER — SODIUM CHLORIDE 0.9 % (FLUSH) 0.9 %
20 SYRINGE (ML) INJECTION AS NEEDED
Status: DISCONTINUED | OUTPATIENT
Start: 2023-07-27 | End: 2023-07-29 | Stop reason: HOSPADM

## 2023-07-27 RX ORDER — HEPARIN SODIUM (PORCINE) LOCK FLUSH IV SOLN 100 UNIT/ML 100 UNIT/ML
500 SOLUTION INTRAVENOUS AS NEEDED
Status: DISCONTINUED | OUTPATIENT
Start: 2023-07-27 | End: 2023-07-29 | Stop reason: HOSPADM

## 2023-07-27 RX ORDER — HEPARIN SODIUM (PORCINE) LOCK FLUSH IV SOLN 100 UNIT/ML 100 UNIT/ML
500 SOLUTION INTRAVENOUS AS NEEDED
Status: CANCELLED | OUTPATIENT
Start: 2023-07-27

## 2023-07-27 RX ADMIN — Medication 500 UNITS: at 16:12

## 2023-07-28 LAB
BH BB BLOOD EXPIRATION DATE: NORMAL
BH BB BLOOD TYPE BARCODE: 6200
BH BB DISPENSE STATUS: NORMAL
BH BB PRODUCT CODE: NORMAL
BH BB UNIT NUMBER: NORMAL
CROSSMATCH INTERPRETATION: NORMAL
UNIT  ABO: NORMAL
UNIT  RH: NORMAL

## 2023-07-31 ENCOUNTER — OFFICE VISIT (OUTPATIENT)
Dept: ONCOLOGY | Facility: CLINIC | Age: 58
End: 2023-07-31
Payer: COMMERCIAL

## 2023-07-31 ENCOUNTER — LAB (OUTPATIENT)
Dept: LAB | Facility: HOSPITAL | Age: 58
End: 2023-07-31
Payer: COMMERCIAL

## 2023-07-31 ENCOUNTER — HOSPITAL ENCOUNTER (OUTPATIENT)
Dept: ONCOLOGY | Facility: HOSPITAL | Age: 58
Discharge: HOME | End: 2023-07-31
Admitting: INTERNAL MEDICINE
Payer: COMMERCIAL

## 2023-07-31 VITALS
DIASTOLIC BLOOD PRESSURE: 71 MMHG | TEMPERATURE: 97.5 F | HEART RATE: 120 BPM | OXYGEN SATURATION: 99 % | WEIGHT: 151.5 LBS | SYSTOLIC BLOOD PRESSURE: 107 MMHG | HEIGHT: 75 IN | BODY MASS INDEX: 18.84 KG/M2 | RESPIRATION RATE: 16 BRPM

## 2023-07-31 VITALS
RESPIRATION RATE: 16 BRPM | OXYGEN SATURATION: 99 % | TEMPERATURE: 97.5 F | WEIGHT: 151.8 LBS | HEART RATE: 120 BPM | BODY MASS INDEX: 18.88 KG/M2 | SYSTOLIC BLOOD PRESSURE: 107 MMHG | HEIGHT: 75 IN | DIASTOLIC BLOOD PRESSURE: 71 MMHG

## 2023-07-31 DIAGNOSIS — Z45.2 ENCOUNTER FOR CARE RELATED TO VASCULAR ACCESS PORT: ICD-10-CM

## 2023-07-31 DIAGNOSIS — C34.91 NON-SMALL CELL CARCINOMA OF LUNG, RIGHT: Primary | ICD-10-CM

## 2023-07-31 DIAGNOSIS — D64.9 ANEMIA, UNSPECIFIED TYPE: ICD-10-CM

## 2023-07-31 DIAGNOSIS — C34.92 NSCLC OF LEFT LUNG: ICD-10-CM

## 2023-07-31 DIAGNOSIS — C34.92 NSCLC OF LEFT LUNG: Primary | ICD-10-CM

## 2023-07-31 DIAGNOSIS — C34.31 CANCER OF LOWER LOBE OF RIGHT LUNG: ICD-10-CM

## 2023-07-31 LAB
ABO GROUP BLD: NORMAL
ALBUMIN SERPL-MCNC: 2.5 G/DL (ref 3.5–5.2)
ALBUMIN/GLOB SERPL: 0.8 G/DL
ALP SERPL-CCNC: 235 U/L (ref 39–117)
ALT SERPL W P-5'-P-CCNC: 70 U/L (ref 1–41)
ANION GAP SERPL CALCULATED.3IONS-SCNC: 15 MMOL/L (ref 5–15)
AST SERPL-CCNC: 86 U/L (ref 1–40)
BASOPHILS # BLD AUTO: 0.07 10*3/MM3 (ref 0–0.2)
BASOPHILS # BLD AUTO: 0.3 10*3/MM3 (ref 0–0.2)
BASOPHILS NFR BLD AUTO: 0.3 % (ref 0–1.5)
BASOPHILS NFR BLD AUTO: 1.3 % (ref 0–1.5)
BILIRUB SERPL-MCNC: 0.2 MG/DL (ref 0–1.2)
BLD GP AB SCN SERPL QL: NEGATIVE
BUN SERPL-MCNC: 9 MG/DL (ref 6–20)
BUN/CREAT SERPL: 11.7 (ref 7–25)
CALCIUM SPEC-SCNC: 8.3 MG/DL (ref 8.6–10.5)
CHLORIDE SERPL-SCNC: 97 MMOL/L (ref 98–107)
CO2 SERPL-SCNC: 19 MMOL/L (ref 22–29)
CREAT SERPL-MCNC: 0.77 MG/DL (ref 0.76–1.27)
DEPRECATED RDW RBC AUTO: 48.9 FL (ref 37–54)
DEPRECATED RDW RBC AUTO: 51.6 FL (ref 37–54)
EGFRCR SERPLBLD CKD-EPI 2021: 103.8 ML/MIN/1.73
EOSINOPHIL # BLD AUTO: 0.1 10*3/MM3 (ref 0–0.4)
EOSINOPHIL # BLD AUTO: 0.15 10*3/MM3 (ref 0–0.4)
EOSINOPHIL NFR BLD AUTO: 0.4 % (ref 0.3–6.2)
EOSINOPHIL NFR BLD AUTO: 0.7 % (ref 0.3–6.2)
ERYTHROCYTE [DISTWIDTH] IN BLOOD BY AUTOMATED COUNT: 16.3 % (ref 12.3–15.4)
ERYTHROCYTE [DISTWIDTH] IN BLOOD BY AUTOMATED COUNT: 17 % (ref 12.3–15.4)
FERRITIN SERPL-MCNC: 1987 NG/ML (ref 30–400)
FOLATE SERPL-MCNC: 3.3 NG/ML (ref 4.78–24.2)
GLOBULIN UR ELPH-MCNC: 3.3 GM/DL
GLUCOSE SERPL-MCNC: 132 MG/DL (ref 65–99)
HCT VFR BLD AUTO: 24.8 % (ref 37.5–51)
HCT VFR BLD AUTO: 25.4 % (ref 37.5–51)
HGB BLD-MCNC: 7.8 G/DL (ref 13–17.7)
HGB BLD-MCNC: 7.9 G/DL (ref 13–17.7)
IRON 24H UR-MRATE: 10 MCG/DL (ref 59–158)
IRON SATN MFR SERPL: 7 % (ref 20–50)
LYMPHOCYTES # BLD AUTO: 0.6 10*3/MM3 (ref 0.7–3.1)
LYMPHOCYTES # BLD AUTO: 0.9 10*3/MM3 (ref 0.7–3.1)
LYMPHOCYTES NFR BLD AUTO: 2.5 % (ref 19.6–45.3)
LYMPHOCYTES NFR BLD AUTO: 4.3 % (ref 19.6–45.3)
MCH RBC QN AUTO: 25.1 PG (ref 26.6–33)
MCH RBC QN AUTO: 26.6 PG (ref 26.6–33)
MCHC RBC AUTO-ENTMCNC: 31.1 G/DL (ref 31.5–35.7)
MCHC RBC AUTO-ENTMCNC: 31.5 G/DL (ref 31.5–35.7)
MCV RBC AUTO: 80.8 FL (ref 79–97)
MCV RBC AUTO: 84.6 FL (ref 79–97)
MONOCYTES # BLD AUTO: 0.9 10*3/MM3 (ref 0.1–0.9)
MONOCYTES # BLD AUTO: 1.46 10*3/MM3 (ref 0.1–0.9)
MONOCYTES NFR BLD AUTO: 3.9 % (ref 5–12)
MONOCYTES NFR BLD AUTO: 7 % (ref 5–12)
NEUTROPHILS NFR BLD AUTO: 18.27 10*3/MM3 (ref 1.7–7)
NEUTROPHILS NFR BLD AUTO: 20.7 10*3/MM3 (ref 1.7–7)
NEUTROPHILS NFR BLD AUTO: 87.7 % (ref 42.7–76)
NEUTROPHILS NFR BLD AUTO: 91.9 % (ref 42.7–76)
NRBC BLD AUTO-RTO: 0 /100 WBC (ref 0–0.2)
PLATELET # BLD AUTO: 652 10*3/MM3 (ref 140–450)
PLATELET # BLD AUTO: 713 10*3/MM3 (ref 140–450)
PMV BLD AUTO: 7 FL (ref 6–12)
PMV BLD AUTO: 8.5 FL (ref 6–12)
POTASSIUM SERPL-SCNC: 3.2 MMOL/L (ref 3.5–5.2)
PROT SERPL-MCNC: 5.8 G/DL (ref 6–8.5)
RBC # BLD AUTO: 2.93 10*6/MM3 (ref 4.14–5.8)
RBC # BLD AUTO: 3.14 10*6/MM3 (ref 4.14–5.8)
RH BLD: POSITIVE
SODIUM SERPL-SCNC: 131 MMOL/L (ref 136–145)
T&S EXPIRATION DATE: NORMAL
TIBC SERPL-MCNC: 152 MCG/DL (ref 298–536)
TRANSFERRIN SERPL-MCNC: 102 MG/DL (ref 200–360)
VIT B12 BLD-MCNC: 1434 PG/ML (ref 211–946)
WBC NRBC COR # BLD: 20.85 10*3/MM3 (ref 3.4–10.8)
WBC NRBC COR # BLD: 22.5 10*3/MM3 (ref 3.4–10.8)

## 2023-07-31 PROCEDURE — 82728 ASSAY OF FERRITIN: CPT | Performed by: NURSE PRACTITIONER

## 2023-07-31 PROCEDURE — 36591 DRAW BLOOD OFF VENOUS DEVICE: CPT

## 2023-07-31 PROCEDURE — 36415 COLL VENOUS BLD VENIPUNCTURE: CPT

## 2023-07-31 PROCEDURE — 99214 OFFICE O/P EST MOD 30 MIN: CPT | Performed by: NURSE PRACTITIONER

## 2023-07-31 PROCEDURE — 36593 DECLOT VASCULAR DEVICE: CPT

## 2023-07-31 PROCEDURE — 80053 COMPREHEN METABOLIC PANEL: CPT | Performed by: INTERNAL MEDICINE

## 2023-07-31 PROCEDURE — 86900 BLOOD TYPING SEROLOGIC ABO: CPT

## 2023-07-31 PROCEDURE — 82746 ASSAY OF FOLIC ACID SERUM: CPT | Performed by: NURSE PRACTITIONER

## 2023-07-31 PROCEDURE — 85025 COMPLETE CBC W/AUTO DIFF WBC: CPT | Performed by: NURSE PRACTITIONER

## 2023-07-31 PROCEDURE — 96375 TX/PRO/DX INJ NEW DRUG ADDON: CPT

## 2023-07-31 PROCEDURE — 86901 BLOOD TYPING SEROLOGIC RH(D): CPT

## 2023-07-31 PROCEDURE — 25010000002 FOSAPREPITANT PER 1 MG: Performed by: INTERNAL MEDICINE

## 2023-07-31 PROCEDURE — 96413 CHEMO IV INFUSION 1 HR: CPT

## 2023-07-31 PROCEDURE — 85025 COMPLETE CBC W/AUTO DIFF WBC: CPT | Performed by: INTERNAL MEDICINE

## 2023-07-31 PROCEDURE — 96367 TX/PROPH/DG ADDL SEQ IV INF: CPT

## 2023-07-31 PROCEDURE — 25010000002 DEXAMETHASONE SODIUM PHOSPHATE 120 MG/30ML SOLUTION: Performed by: INTERNAL MEDICINE

## 2023-07-31 PROCEDURE — 25010000002 ALTEPLASE 2 MG RECONSTITUTED SOLUTION: Performed by: INTERNAL MEDICINE

## 2023-07-31 PROCEDURE — 86923 COMPATIBILITY TEST ELECTRIC: CPT

## 2023-07-31 PROCEDURE — 82607 VITAMIN B-12: CPT | Performed by: NURSE PRACTITIONER

## 2023-07-31 PROCEDURE — 25010000002 FAM-TRASTUZUMAB DERUXTEC-NXKI 100 MG RECONSTITUTED SOLUTION 1 EACH VIAL: Performed by: INTERNAL MEDICINE

## 2023-07-31 PROCEDURE — 83540 ASSAY OF IRON: CPT | Performed by: NURSE PRACTITIONER

## 2023-07-31 PROCEDURE — 25010000002 HEPARIN LOCK FLUSH PER 10 UNITS: Performed by: INTERNAL MEDICINE

## 2023-07-31 PROCEDURE — 86850 RBC ANTIBODY SCREEN: CPT

## 2023-07-31 PROCEDURE — 25010000002 PALONOSETRON 0.25 MG/5ML SOLUTION PREFILLED SYRINGE: Performed by: INTERNAL MEDICINE

## 2023-07-31 PROCEDURE — 84466 ASSAY OF TRANSFERRIN: CPT | Performed by: NURSE PRACTITIONER

## 2023-07-31 RX ORDER — HEPARIN SODIUM (PORCINE) LOCK FLUSH IV SOLN 100 UNIT/ML 100 UNIT/ML
500 SOLUTION INTRAVENOUS AS NEEDED
Status: CANCELLED | OUTPATIENT
Start: 2023-07-31

## 2023-07-31 RX ORDER — SODIUM CHLORIDE 0.9 % (FLUSH) 0.9 %
20 SYRINGE (ML) INJECTION AS NEEDED
Status: DISCONTINUED | OUTPATIENT
Start: 2023-07-31 | End: 2023-08-01 | Stop reason: HOSPADM

## 2023-07-31 RX ORDER — PALONOSETRON 0.05 MG/ML
0.25 INJECTION, SOLUTION INTRAVENOUS ONCE
Status: COMPLETED | OUTPATIENT
Start: 2023-07-31 | End: 2023-07-31

## 2023-07-31 RX ORDER — DEXTROSE MONOHYDRATE 50 MG/ML
250 INJECTION, SOLUTION INTRAVENOUS ONCE
Status: COMPLETED | OUTPATIENT
Start: 2023-07-31 | End: 2023-07-31

## 2023-07-31 RX ORDER — SODIUM CHLORIDE 0.9 % (FLUSH) 0.9 %
20 SYRINGE (ML) INJECTION AS NEEDED
Status: CANCELLED | OUTPATIENT
Start: 2023-07-31

## 2023-07-31 RX ORDER — PALONOSETRON 0.05 MG/ML
0.25 INJECTION, SOLUTION INTRAVENOUS ONCE
Status: CANCELLED | OUTPATIENT
Start: 2023-07-31

## 2023-07-31 RX ORDER — DEXTROSE MONOHYDRATE 50 MG/ML
250 INJECTION, SOLUTION INTRAVENOUS ONCE
Status: CANCELLED | OUTPATIENT
Start: 2023-07-31

## 2023-07-31 RX ORDER — OLANZAPINE 5 MG/1
5 TABLET ORAL ONCE
Status: CANCELLED | OUTPATIENT
Start: 2023-07-31 | End: 2023-07-31

## 2023-07-31 RX ORDER — OLANZAPINE 5 MG/1
5 TABLET ORAL ONCE
Status: COMPLETED | OUTPATIENT
Start: 2023-07-31 | End: 2023-07-31

## 2023-07-31 RX ORDER — HEPARIN SODIUM (PORCINE) LOCK FLUSH IV SOLN 100 UNIT/ML 100 UNIT/ML
500 SOLUTION INTRAVENOUS AS NEEDED
Status: DISCONTINUED | OUTPATIENT
Start: 2023-07-31 | End: 2023-08-01 | Stop reason: HOSPADM

## 2023-07-31 RX ORDER — SODIUM CHLORIDE 9 MG/ML
250 INJECTION, SOLUTION INTRAVENOUS AS NEEDED
OUTPATIENT
Start: 2023-07-31

## 2023-07-31 RX ADMIN — FAM-TRASTUZUMAB DERUXTECAN-NXKI 400 MG: 100 INJECTION, POWDER, LYOPHILIZED, FOR SOLUTION INTRAVENOUS at 15:51

## 2023-07-31 RX ADMIN — FOSAPREPITANT 100 ML: 150 INJECTION, POWDER, LYOPHILIZED, FOR SOLUTION INTRAVENOUS at 15:19

## 2023-07-31 RX ADMIN — DEXAMETHASONE SODIUM PHOSPHATE 12 MG: 4 INJECTION, SOLUTION INTRA-ARTICULAR; INTRALESIONAL; INTRAMUSCULAR; INTRAVENOUS; SOFT TISSUE at 15:18

## 2023-07-31 RX ADMIN — DEXTROSE MONOHYDRATE 250 ML: 50 INJECTION, SOLUTION INTRAVENOUS at 15:18

## 2023-07-31 RX ADMIN — Medication 20 ML: at 16:25

## 2023-07-31 RX ADMIN — OLANZAPINE 5 MG: 5 TABLET, FILM COATED ORAL at 15:16

## 2023-07-31 RX ADMIN — HEPARIN 500 UNITS: 100 SYRINGE at 16:25

## 2023-07-31 RX ADMIN — PALONOSETRON 0.25 MG: 0.25 INJECTION, SOLUTION INTRAVENOUS at 15:22

## 2023-07-31 RX ADMIN — ALTEPLASE: 2.2 INJECTION, POWDER, LYOPHILIZED, FOR SOLUTION INTRAVENOUS at 14:53

## 2023-08-01 DIAGNOSIS — D64.9 ANEMIA, UNSPECIFIED TYPE: Primary | ICD-10-CM

## 2023-08-02 ENCOUNTER — HOSPITAL ENCOUNTER (OUTPATIENT)
Dept: INFUSION THERAPY | Facility: HOSPITAL | Age: 58
Discharge: HOME | End: 2023-08-02
Payer: COMMERCIAL

## 2023-08-02 ENCOUNTER — TELEPHONE (OUTPATIENT)
Dept: ONCOLOGY | Facility: CLINIC | Age: 58
End: 2023-08-02
Payer: COMMERCIAL

## 2023-08-02 VITALS
HEART RATE: 110 BPM | OXYGEN SATURATION: 100 % | SYSTOLIC BLOOD PRESSURE: 107 MMHG | RESPIRATION RATE: 16 BRPM | DIASTOLIC BLOOD PRESSURE: 72 MMHG | TEMPERATURE: 98.2 F

## 2023-08-02 DIAGNOSIS — E53.8 FOLATE DEFICIENCY: Primary | ICD-10-CM

## 2023-08-02 DIAGNOSIS — D64.9 ANEMIA, UNSPECIFIED TYPE: Primary | ICD-10-CM

## 2023-08-02 DIAGNOSIS — E87.6 POTASSIUM DEFICIENCY: ICD-10-CM

## 2023-08-02 DIAGNOSIS — Z45.2 ENCOUNTER FOR CARE RELATED TO VASCULAR ACCESS PORT: Primary | ICD-10-CM

## 2023-08-02 DIAGNOSIS — D64.9 ANEMIA, UNSPECIFIED TYPE: ICD-10-CM

## 2023-08-02 LAB
HCT VFR BLD AUTO: 26.4 % (ref 37.5–51)
HGB BLD-MCNC: 8.1 G/DL (ref 13–17.7)

## 2023-08-02 PROCEDURE — P9016 RBC LEUKOCYTES REDUCED: HCPCS

## 2023-08-02 PROCEDURE — 85014 HEMATOCRIT: CPT | Performed by: NURSE PRACTITIONER

## 2023-08-02 PROCEDURE — 86900 BLOOD TYPING SEROLOGIC ABO: CPT

## 2023-08-02 PROCEDURE — 96374 THER/PROPH/DIAG INJ IV PUSH: CPT

## 2023-08-02 PROCEDURE — 85018 HEMOGLOBIN: CPT | Performed by: NURSE PRACTITIONER

## 2023-08-02 PROCEDURE — 36430 TRANSFUSION BLD/BLD COMPNT: CPT

## 2023-08-02 PROCEDURE — 25010000002 HEPARIN LOCK FLUSH PER 10 UNITS: Performed by: INTERNAL MEDICINE

## 2023-08-02 RX ORDER — SODIUM CHLORIDE 9 MG/ML
250 INJECTION, SOLUTION INTRAVENOUS AS NEEDED
Status: DISCONTINUED | OUTPATIENT
Start: 2023-08-02 | End: 2023-08-04 | Stop reason: HOSPADM

## 2023-08-02 RX ORDER — SODIUM CHLORIDE 9 MG/ML
250 INJECTION, SOLUTION INTRAVENOUS AS NEEDED
Status: CANCELLED | OUTPATIENT
Start: 2023-08-02

## 2023-08-02 RX ORDER — POTASSIUM CHLORIDE 20 MEQ/1
40 TABLET, EXTENDED RELEASE ORAL ONCE
Qty: 2 TABLET | Refills: 0 | Status: SHIPPED | OUTPATIENT
Start: 2023-08-02 | End: 2023-08-02

## 2023-08-02 RX ORDER — SODIUM CHLORIDE 0.9 % (FLUSH) 0.9 %
20 SYRINGE (ML) INJECTION AS NEEDED
OUTPATIENT
Start: 2023-08-02

## 2023-08-02 RX ORDER — HEPARIN SODIUM (PORCINE) LOCK FLUSH IV SOLN 100 UNIT/ML 100 UNIT/ML
500 SOLUTION INTRAVENOUS AS NEEDED
Status: DISCONTINUED | OUTPATIENT
Start: 2023-08-02 | End: 2023-08-04 | Stop reason: HOSPADM

## 2023-08-02 RX ORDER — HEPARIN SODIUM (PORCINE) LOCK FLUSH IV SOLN 100 UNIT/ML 100 UNIT/ML
500 SOLUTION INTRAVENOUS AS NEEDED
OUTPATIENT
Start: 2023-08-02

## 2023-08-02 RX ORDER — FOLIC ACID 1 MG/1
1 TABLET ORAL DAILY
Qty: 30 TABLET | Refills: 3 | Status: SHIPPED | OUTPATIENT
Start: 2023-08-02

## 2023-08-02 RX ORDER — SODIUM CHLORIDE 0.9 % (FLUSH) 0.9 %
20 SYRINGE (ML) INJECTION AS NEEDED
Status: DISCONTINUED | OUTPATIENT
Start: 2023-08-02 | End: 2023-08-04 | Stop reason: HOSPADM

## 2023-08-02 RX ADMIN — Medication 500 UNITS: at 14:11

## 2023-08-02 RX ADMIN — Medication 20 ML: at 14:10

## 2023-08-03 LAB
BH BB BLOOD EXPIRATION DATE: NORMAL
BH BB BLOOD TYPE BARCODE: 6200
BH BB DISPENSE STATUS: NORMAL
BH BB PRODUCT CODE: NORMAL
BH BB UNIT NUMBER: NORMAL
CROSSMATCH INTERPRETATION: NORMAL
METHYLMALONATE SERPL-SCNC: 194 NMOL/L (ref 0–378)
UNIT  ABO: NORMAL
UNIT  RH: NORMAL

## 2023-08-04 ENCOUNTER — TELEPHONE (OUTPATIENT)
Dept: ONCOLOGY | Facility: CLINIC | Age: 58
End: 2023-08-04
Payer: COMMERCIAL

## 2023-08-04 DIAGNOSIS — R06.02 SHORTNESS OF BREATH: Primary | ICD-10-CM

## 2023-08-09 ENCOUNTER — HOSPITAL ENCOUNTER (OUTPATIENT)
Dept: PET IMAGING | Facility: HOSPITAL | Age: 58
Discharge: HOME | End: 2023-08-09
Admitting: NURSE PRACTITIONER
Payer: COMMERCIAL

## 2023-08-09 ENCOUNTER — LAB (OUTPATIENT)
Dept: LAB | Facility: HOSPITAL | Age: 58
End: 2023-08-09
Payer: COMMERCIAL

## 2023-08-09 DIAGNOSIS — R06.02 SHORTNESS OF BREATH: ICD-10-CM

## 2023-08-09 DIAGNOSIS — C34.92 NSCLC OF LEFT LUNG: ICD-10-CM

## 2023-08-09 DIAGNOSIS — E87.1 HYPONATREMIA: ICD-10-CM

## 2023-08-09 DIAGNOSIS — C34.91 NON-SMALL CELL CARCINOMA OF LUNG, RIGHT: ICD-10-CM

## 2023-08-09 DIAGNOSIS — E86.0 DEHYDRATION: ICD-10-CM

## 2023-08-09 LAB
ANION GAP SERPL CALCULATED.3IONS-SCNC: 16 MMOL/L (ref 5–15)
BASOPHILS # BLD AUTO: 0.05 10*3/MM3 (ref 0–0.2)
BASOPHILS NFR BLD AUTO: 0.3 % (ref 0–1.5)
BUN SERPL-MCNC: 14 MG/DL (ref 6–20)
BUN/CREAT SERPL: 16.5 (ref 7–25)
CALCIUM SPEC-SCNC: 9.5 MG/DL (ref 8.6–10.5)
CHLORIDE SERPL-SCNC: 92 MMOL/L (ref 98–107)
CO2 SERPL-SCNC: 22 MMOL/L (ref 22–29)
CREAT SERPL-MCNC: 0.85 MG/DL (ref 0.76–1.27)
DEPRECATED RDW RBC AUTO: 48.1 FL (ref 37–54)
EGFRCR SERPLBLD CKD-EPI 2021: 100.1 ML/MIN/1.73
EOSINOPHIL # BLD AUTO: 0.13 10*3/MM3 (ref 0–0.4)
EOSINOPHIL NFR BLD AUTO: 0.8 % (ref 0.3–6.2)
ERYTHROCYTE [DISTWIDTH] IN BLOOD BY AUTOMATED COUNT: 16.2 % (ref 12.3–15.4)
GLUCOSE SERPL-MCNC: 149 MG/DL (ref 65–99)
HCT VFR BLD AUTO: 27.6 % (ref 37.5–51)
HGB BLD-MCNC: 8.5 G/DL (ref 13–17.7)
LYMPHOCYTES # BLD AUTO: 0.54 10*3/MM3 (ref 0.7–3.1)
LYMPHOCYTES NFR BLD AUTO: 3.1 % (ref 19.6–45.3)
MAGNESIUM SERPL-MCNC: 2.2 MG/DL (ref 1.6–2.6)
MCH RBC QN AUTO: 25.8 PG (ref 26.6–33)
MCHC RBC AUTO-ENTMCNC: 30.8 G/DL (ref 31.5–35.7)
MCV RBC AUTO: 83.6 FL (ref 79–97)
MONOCYTES # BLD AUTO: 0.7 10*3/MM3 (ref 0.1–0.9)
MONOCYTES NFR BLD AUTO: 4 % (ref 5–12)
NEUTROPHILS NFR BLD AUTO: 15.88 10*3/MM3 (ref 1.7–7)
NEUTROPHILS NFR BLD AUTO: 91.8 % (ref 42.7–76)
PLATELET # BLD AUTO: 402 10*3/MM3 (ref 140–450)
PMV BLD AUTO: 9.2 FL (ref 6–12)
POTASSIUM SERPL-SCNC: 3.5 MMOL/L (ref 3.5–5.2)
RBC # BLD AUTO: 3.3 10*6/MM3 (ref 4.14–5.8)
SODIUM SERPL-SCNC: 130 MMOL/L (ref 136–145)
WBC NRBC COR # BLD: 17.3 10*3/MM3 (ref 3.4–10.8)

## 2023-08-09 PROCEDURE — 85025 COMPLETE CBC W/AUTO DIFF WBC: CPT

## 2023-08-09 PROCEDURE — 80048 BASIC METABOLIC PNL TOTAL CA: CPT | Performed by: INTERNAL MEDICINE

## 2023-08-09 PROCEDURE — 83735 ASSAY OF MAGNESIUM: CPT | Performed by: INTERNAL MEDICINE

## 2023-08-09 PROCEDURE — 71260 CT THORAX DX C+: CPT

## 2023-08-09 PROCEDURE — 25510000001 IOPAMIDOL PER 1 ML: Performed by: NURSE PRACTITIONER

## 2023-08-09 PROCEDURE — 36415 COLL VENOUS BLD VENIPUNCTURE: CPT

## 2023-08-09 RX ADMIN — IOPAMIDOL 100 ML: 755 INJECTION, SOLUTION INTRAVENOUS at 12:44

## 2023-08-14 NOTE — PROGRESS NOTES
Hematology/Oncology Outpatient Follow Up    PATIENT NAME:Jc Driscoll  :1965  MRN: [id number]  PRIMARY CARE PHYSICIAN: Reshma Alvarenga MD  REFERRING PHYSICIAN: Reshma Alvarenga MD    Chief Complaint   Patient presents with    Follow-up     Non-small cell carcinoma of lung, right        HISTORY OF PRESENT ILLNESS:                                                                                                                                                                                                                                                             This is a 58 y.o. who developed left shoulder pain and for that reason he had a chest x-ray done on 19 which showed a 2.7 cm noncalcified right lower lobe nodule was identified.  For that reason a CT scan of the chest was recommended.  Review of his records shows patient had the CT scan on 19 done without contrast.  This basically showed a lobulated noncalcified mass in the posterior aspect of the right lower lobe measuring 3 cm close to the pleural surface.  There is a calcified 1.2 mm nodule in the lateral aspect of the right lower lobe consistent with a granuloma.  There were also calcified subcarinal and right hilar nodules.  There is a lower right side tracheal nodule measuring 1 cm.  Patient had a PET/CT scan done on 19 which showed increased metabolic activity on the right lower lobe mass that measures 2.5 cm with SUV of 4.4.  There was also increased metabolic activity in the subcarinal space measuring 2.8 cm in size with SUV of  3.4, increased activity in an enlarged right paratracheal lymph node that measures 1.9 cm, SUV of 5, also suspicious for metastatic adenopathy.  Patient had a CT-guided biopsy of the right lower lobe mass on 3/8/19.  This showed adenocarcinoma, TTF-1 positive.  On 3/18/19 patient underwent endoscopic ultrasound and biopsy of a subcarinal lymph node.  This was positive for malignant cells.  Patient was then taken back to surgery on 3/20/18 and had left Mediport placement by Dr. Fuchs.  He has been referred for further evaluation and management of his stage 3 adenocarcinoma of the right lung.  He was accompanied by his spouse for the appointment on 3/26/19.  Patient was scheduled to have a brain MRI for complete staging, but he could not do this due to claustrophobia.  He is willing to try with the help of angiolytics.    Patient had brain MRI done on 4/5/19.  He states it did not show any evidence of metastatic disease.  Evidence of chronic sinusitis was noted.    Patient was seen by Dr. Escalona who has recommended concurrent chemotherapy and radiation.  Patient is scheduled to begin on 4/15/19.   4/17/19 - Patient was initiated on combined chemotherapy and radiation with Cisplatin and Alimta.  Patient received cycle 1.      5/8/19 - Patient received cycle 2 of chemotherapy with Cisplatin and Alimta.   5/15/19 - Chemistry panel showed creatinine of 1.7.  WBC 1.8, hemoglobin 11.6, platelet count 72,000.   5/20/19 - BUN 10, creatinine 1.2, potassium 3.5.    5/23/19 - CT scan of the chest with contrast showed interval decrease in size of the right lower lobe pulmonary nodule now measuring 2.1 cm in size.  There was no mediastinal or hilar adenopathy identified.  6/26/2019 patient underwent right lower lobectomy with hilar and mediastinal lymph node dissection performed by Dr. Terrance Mckeon.  Pathology revealed residual residual 2.2 cm invasive moderately differentiated adenocarcinoma.  There were a total of 16 lymph  nodes evaluated that 7 had metastatic disease.  There was evidence of lymphovascular invasion, extranodal involvement.  All the surgical margins were negative and distance of the closest margin was 2.5 cm.  Logic stage is T1c N2 M0.  Patient is here today accompanied by his spouse for this appointment.  He continues to complain of some postop discomfort, numbness but his skin is intact.  There is no drainage.  Has had follow-up with Dr. Fuchs.  8/14/2019-seen by Dr. Maria at the [facility].  Dr. Maria has recommended immunotherapy with durvalumab off label use as patient has not had significant response to neoadjuvant chemotherapy and radiation.  Patient was given the option to have immunotherapy at the [facility] vs [place] and he has chosen to stay in [place]  8/21/19 - Started cycle 1 day 1 Imfinzi  9/4/2019 patient had CT scan of the chest this shows a moderate size right pleural effusion.  There are areas of groundglass opacification in the right upper lobe without any evidence of significant septal thickening.  Volume loss noted there is also moderate pleural effusion.  There is no suspicious recurrent malignancy.  There were nonenlarged residual mediastinal lymph nodes.  9/9/19 - WBC 6.23, Hgb 11.7 Platelets 257,000, , BUN 9, Cr 1.1,Vitamin B12 318, Ferritin 437  9/23/19 - received cycle 2 day 1 Imfinzi  10/9/19- Seen by Dr rodríguez with ENT for sinus disease  11/4/2019-patient received cycle 5 of Imfinzi(durvalumab)  12/2/2019 patient had cycle 7 of durvalumab  12/5/2019-patient had CT scan of the  abdomen and pelvis with small right pleural sided pleural effusion.There is no evidence of metastatic disease  12/30/2019-patient received cycle 9 of durvalumab  12/31/2019-patient had CT scan of the chest showed small to moderate left pleural effusion.  Iglesias appearing right lower paratracheal and right peribronchial soft tissue thickening without any discrete pathologically enlarged mediastinal or  hilar lymph nodes.  1/27/20-patient received cycle 11 of durvalumab  2/17/20-patient had a stress test which was negative for ischemia  2/17/2020-patient had CT of the chest which showed postop changes on the right lung, right pleural effusion but no enlarging mass was noted  3/2/2020-patient received cycle 12 of durvalumab  3/16/2020-patient had ultrasound-guided right thoracentesis.  Pathology was negative for malignancy  4/13/2020-patient received cycle 15 of immunotherapy with durvalumab  5/11/2020-patient received cycle 17 of immunotherapy with durvalumab  6/9/2020-patient had CT scan of the chest, abdomen and pelvis for lung cancer restaging.  There is stable small chronic loculated right basilar pleural effusion suggesting chronic pleuritis.  There is no evidence of metastatic disease in the abdomen or pelvis  7/20/2020 patient received cycle 22 of Durvalumab  8/17/2020 patient received cycle 24 and last cycle of durvalumab  9/24/2020 patient had CT scan of the chest, abdomen and pelvis for cancer restaging there was no evidence of local recurrence or metastatic disease within the abdomen and pelvis.  He has stable chronic small right pleural effusion.  Mild sigmoid diverticulosis was noted.  1/18/2021 patient had CT scan of the chest, abdomen and pelvis reviewed patient  5/24/2021 patient had CT scan of the chest calcified subcarinal lymph node consistent with changes of prior granulomatous disease.  Chronic small right pleural effusion is noted similar to prior exam.  Previously shown 4 mm nodule in the left lower lobe has nearly completely resolved.  The subpleural 3 mm nodule located within the posterior left lower lobe With resolved.  9/24/2021 patient had a CT scan of the chest, abdomen and pelvis there is soft tissue attenuation within the right paratracheal area which has been suggested to reflect sequela to previous treatment.  There is slight thickening of the bladder wall otherwise there is no  evidence of metastatic disease.  12/27/2021 patient had CT scan of the chest with contrast this basically showed irregular shaped noncalcified 7 mm left upper lobe pulmonary nodule.  PET CT scan was recommended to further evaluate.  1/26/2022 patient had a PET CT scan done which basically showed evidence of mild uptake corresponding to the nodule within the left upper lobe which is new worrisome for developing metastatic focus.  There was mild radiopharmaceutical activity corresponding to pleural thickening throughout the right lung base with associated pleural effusion likely related to post therapeutic changes and radiation changes in this region.  Metastatic malignancy involving the pleura could not be completely eliminated.  1/26/2022: CT-guided biopsy was aborted due to finding by the radiologist that the lung nodularity was actually a clump of tiny nodules of which the largest was 6 mm and therefore biopsy could not be completed.  Also the area was in the vicinity of multiple blood vessels with increased risk of bleeding.  Follow-up CT of the chest was recommended for continued surveillance  5/18/2022 patient had CT-guided biopsy of the left enlarging nodule, pathology was positive for invasive poorly differentiated adenocarcinoma most consistent with adenocarcinoma of pulmonary origin.  5/27/2022 patient had a PET CT scan which showed a 2.3 cm hypermetabolic left upper lobe nodule which has increased in size no convincing evidence of metastatic disease elsewhere within the chest.  5/21/2022 patient had brain MRI which did not show any evidence of intracranial metastasis  6/2/2022 patient was seen by Dr. Miryam Reyna pulmonary function test is being done to assess surgical candidacy  7/6/2022 patient underwent left heart Koska P video-assisted with upper lobectomy mediastinal lymph node dissection performed by Dr. Miryam Roach  Final pathology revealed poorly differentiated  A predominant adenocarcinoma with  solid component presenting 45% and micropapillary component 5% with extensive necrosis, invasive carcinoma measures 2.5 maximally there was focal lymphovascular space invasion all margins were negative for malignancy discussed with the closest margin was 2.5 cm pathology stage is pT1C pN1.  PD-L1 showed a tumor proportion score of 95%  8/3/2022 patient started adjuvant chemotherapy with cisplatin, Taxol  8/24/2022 patient received cycle 2 of combination chemotherapy with cisplatin and Taxol with Neulasta  10/5/2022: Patient received cycle 4-day 1 of combination chemotherapy with cisplatin and Taxol  Patient developed left-sided chest pain for that reason he had a PET/CT scan 1/20/2020 is basically revealed a 4 x 3.5 cm hypermetabolic soft tissue mass in the left chest wall between the first and second ribs anteriorly consistent with relapsed disease.  There was a small cluster of hypermetabolic lymph nodes seen in the left supraclavicular region consistent with early manoj metastasis  11/11/2022: 2D echocardiogram performed showing a left ventricular EF of 56 to 60%  11/22/2022: Cycle 1 of atezolizumab received  12/5/2022-12/9/2022: Hospitalized at [facility] for dehydration, hyponatremia, and weakness.  12/19/2020: Patient received cycle 2 of Tecentriq along with XRT  1/11/2023: Patient completed radiation.  Remains on Tecentriq every 3 weeks  1/18/2023 patient had a CT scan of the chest which basically revealed abnormal soft tissue interposed between the first and second rib with some erosive changes to the ribs this finding measures about 5 x 2.8 cm previously was 4.8 x 3.8 cm.  There is some pleural thickening about in this area nodular area within the prominent mediastinal fat in the upper chest measuring 1.7 cm more fluid as opposed to enlarged lymph nodes.  Possibility of pulmonary hypertension was also noted due to prominence of main pulmonary artery measuring 3.7 cm.  2/24/2023: CT of the chest with contrast  showed abnormal mass along the chest wall interposed between the first and second ribs that could relate to metastatic disease.  Some erosive changes to the ribs in this area.  Nodular area in the more superior mediastinal fat that looks like you to reflect more fluidlike attenuation as opposed to a pathologic lymph node.  New groundglass changes within the left upper lobe that could be secondary to inflammatory infectious process.  It is possible this may be posttreatment in nature.  There are some groundglass changes along the superior mediastinum and right upper lobe which are unchanged.  Bilateral pleural effusions noted.  This is developed on the left.  Right unchanged.  4/25/2023 CT of the chest with contrast done in short-term follow-up to reevaluate from previous scan: Increased consolidation in the upper left chest since previous study; increased size of left pleural effusion with loculation along its superior medial margin; no change in the soft tissue mass with osseous destruction of the left upper chest wall between the first and second ribs.  4/28/2023-4/30/2023: Hospitalization at [facility] for pneumonia.  7/5/2023-7/7/2023: Patient hospitalized at [facility] for hyponatremia, shortness of breath, weakness, leukocytosis-reactive to worsening left chest wall mass.  7/10/2023: Patient received cycle 1 of Enhertu    Past Medical History:   Diagnosis Date    Allergic rhinitis 07/25/2013    Overview:  4/2/2018 - still zyrtec 10 daily (if stops, gets dermatitis again).     Anxiety and depression 06/05/2012    Overview:  4/2/2018 -   C/w well 300 xr and Lito 20.  4/8/2019 -   Doing ok even with new lung cancer - lito 20 & well 300xr.     Chronic pansinusitis 04/08/2019    Overview:  4/8/2019 -   MRI showed chronic thick in frontal, maxillary, ethmoidal ---> to Dr. COLLAZO to est since abx ICC didn't help.  Does netipot bid.     Diastolic CHF 07/09/2014    Overview:  7/4/14 - impaired LV relaxation on Zhou ECHO.  EF 60%. Rest  normal    Dupuytren's contracture of both hands     Elevated cholesterol     Erosive esophagitis 07/09/2019    Overview:  EGD 2016 4/2/2018 - nexium OTC daily for few week.  Qod before that.  Now carbonation hurts, epigastric pain 4/8/2019 -   protonix 40 doing well.     Gastroesophageal reflux disease 02/21/2019    Hiatal hernia 07/09/2019    History of colon polyps     Hypertension     Lung cancer     right lung and in lymph nodes x2    Mediastinal lymphadenopathy 03/12/2019    Normocytic anemia 08/08/2019    Overview:  6/2019 - dropped to 9's postop 7/2019 - rebounded to 10's.  8/8/2019 -   Back to 9's - check ferritin, b12 and thyroid.     Prediabetes 07/09/2014    Overview:  7/4/14 - a1c 6.1 at [place] admission    Retention of urine 06/27/2019    PSOT OP, RESOLVED       Past Surgical History:   Procedure Laterality Date    BRONCHOSCOPY  03/18/2019    EBUS MONTERO NEEDLE BX    COLONOSCOPY      DUPUYTREN CONTRACTURE RELEASE Bilateral     LUNG BIOPSY  03/08/2019    CT GUIDED    LUNG REMOVAL, PARTIAL Right     right lower    PORTACATH PLACEMENT  03/20/2019    DR LONGORIA    THORACOSCOPY Right 6/26/2019    Procedure: RIGHT VATS, OPEN LOWER LOBECTOMY WITH LYMPH NODE DISECTION, WEDGE RESECTION OF RIGHT MIDDLE LOBE;  Surgeon: Terrance Longoria MD;  Location: [facility] OR;  Service: Cardiothoracic    THORACOSCOPY VIDEO ASSISTED WITH LOBECTOMY Left 7/6/2022    Procedure: THORACOSCOPY VIDEO ASSISTED WITH LOBECTOMY;  Surgeon: Miryam Reyna MD;  Location: [facility] MAIN OR;  Service: Thoracic;  Laterality: Left;         Current Outpatient Medications:     potassium chloride (K-DUR,KLOR-CON) 20 MEQ CR tablet, , Disp: , Rfl:     buPROPion XL (WELLBUTRIN XL) 300 MG 24 hr tablet, Take 1 tablet by mouth Daily., Disp: , Rfl:     Constulose 10 GM/15ML solution, TAKE 30 ML BY MOUTH  EVERY 12 HOURS, Disp: 900 mL, Rfl: 0    desvenlafaxine (PRISTIQ) 50 MG 24 hr tablet, Take 1 tablet by mouth Daily., Disp: , Rfl:     dexamethasone  (DECADRON) 4 MG tablet, Take 1 tablet by mouth Every 12 (Twelve) Hours., Disp: 14 tablet, Rfl: 0    folic acid (FOLVITE) 1 MG tablet, Take 1 tablet by mouth Daily., Disp: 30 tablet, Rfl: 3    gabapentin (NEURONTIN) 300 MG capsule, Take 2 capsules by mouth 3 (Three) Times a Day for 30 days., Disp: 180 capsule, Rfl: 2    levothyroxine (SYNTHROID, LEVOTHROID) 125 MCG tablet, Take 1 tablet by mouth Every Morning., Disp: 30 tablet, Rfl: 0    liothyronine (CYTOMEL) 5 MCG tablet, Take 1 tablet by mouth Daily., Disp: , Rfl:     megestrol acetate (MEGACE) 400 MG/10ML suspension oral suspension, Take 10 mL by mouth Daily., Disp: 240 mL, Rfl: 0    OLANZapine (zyPREXA) 5 MG tablet, Take 1 tablet by mouth Every Night. Take on days 2, 3 and 4 after chemotherapy., Disp: 3 tablet, Rfl: 11    ondansetron (ZOFRAN) 8 MG tablet, TAKE 1 TABLET BY MOUTH THREE TIMES DAILY AS NEEDED FOR NAUSEA FOR VOMITING, Disp: 30 tablet, Rfl: 0    ondansetron (ZOFRAN) 8 MG tablet, Take 1 tablet by mouth 3 (Three) Times a Day As Needed for Nausea or Vomiting., Disp: 30 tablet, Rfl: 5    oxyCODONE-acetaminophen (PERCOCET)  MG per tablet, Take 1 tablet by mouth Every 4 (Four) Hours As Needed for Moderate Pain., Disp: 60 tablet, Rfl: 0    pantoprazole (PROTONIX) 40 MG EC tablet, Take 1 tablet by mouth Daily., Disp: , Rfl:     sennosides-docusate (PERICOLACE) 8.6-50 MG per tablet, Take 2 tablets by mouth 2 (Two) Times a Day., Disp: 30 tablet, Rfl: 0    Tepotinib HCl (TEPMETKO) 225 MG tablet, Take 2 tablets by mouth Daily., Disp: , Rfl:     vitamin B-12 (CYANOCOBALAMIN) 1000 MCG tablet, Take 1 tablet by mouth Daily., Disp: , Rfl:     vitamin B-6 (PYRIDOXINE) 100 MG tablet, Take 1 tablet by mouth Every 12 (Twelve) Hours., Disp: 60 tablet, Rfl: 6    No Known Allergies    Family History   Problem Relation Age of Onset    Cancer Mother         cervical cancer    Cervical cancer Mother     Cancer Father         lung cancer    Lung cancer Father     Lung  cancer Sister     Cancer Sister         lung cancer    Malig Hyperthermia Neg Hx        Cancer-related family history includes Cancer in his father, mother, and sister; Cervical cancer in his mother; Lung cancer in his father and sister.    Social History     Tobacco Use    Smoking status: Former     Types: Cigarettes     Quit date: 2009     Years since quittin.9    Smokeless tobacco: Never   Vaping Use    Vaping Use: Never used   Substance Use Topics    Alcohol use: Yes     Alcohol/week: 2.0 standard drinks     Types: 2 Cans of beer per week     Comment: Occasional    Drug use: No         I have reviewed and confirmed the accuracy of the patient's history: Chief complaint, HPI, ROS, and Subjective as entered by the MA/LPN/RN. Azucena Livia Gaspar MD 23        SUBJECTIVE:    Patient is here today for follow-up.  He has progressive weakness but denies nausea vomiting    Jc COURTNEY Driscoll reports a pain score of 0.  Given his pain assessment as noted, treatment options were discussed and the following options were decided upon as a follow-up plan to address the patient's pain: continuation of current treatment plan for pain.     REVIEW OF SYSTEMS:    Review of Systems   Constitutional: Positive for fatigue. Negative for chills and fever.   HENT: Negative for ear pain, mouth sores, nosebleeds and sore throat.    Eyes: Negative for photophobia and visual disturbance.   Respiratory: Negative for wheezing and stridor.  Exertional shortness of breath  Cardiovascular: Negative for chest pain and palpitations.   Gastrointestinal: Negative for abdominal pain, diarrhea, nausea and vomiting. He has constipation  Endocrine: Negative for cold intolerance and heat intolerance.   Genitourinary: Negative for dysuria and hematuria.   Musculoskeletal: Negative for joint swelling and neck stiffness.   Skin: Negative for color change and rash.   Neurological: Negative for seizures and syncope.   Hematological: Negative for  "adenopathy.   Psychiatric/Behavioral: Negative for agitation, confusion and hallucinations.   As stated in the subjective    OBJECTIVE:    Vitals:    08/16/23 1505   BP: 108/71   Pulse: 106   Resp: 20   Temp: 98 øF (36.7 øC)   TempSrc: Infrared   SpO2: 99%   Weight: 66.2 kg (146 lb)   Height: 190.5 cm (75\")   PainSc: 0-No pain               ECOG    Eastern Cooperative Oncology Group (ECOG, [place], World Health Organization) performance scale  0 Fully active; no performance restrictions.  1 Strenuous physical activity restricted; fully ambulatory and able to carry out light work.  2 Capable of all self-care but unable to carry out any work activities. Up and about >50% of waking hours.  3 Capable of only limited self-care; confined to bed or chair >50% of waking hours.  4 Completely disabled; cannot carry out any self-care; totally confined to bed or chair.    Physical Exam     Constitutional: He is oriented to person, place, and time. He appears well-developed. No distress.   HENT:   Head: Normocephalic and atraumatic.   Right Ear: External ear normal.   Left Ear: External ear normal.   Nose: Nose normal.   Eyes: Pupils are equal, round, and reactive to light. Conjunctivae are normal. Right eye exhibits no discharge. Left eye exhibits no discharge. No scleral icterus.   Neck: No thyromegaly present.   Cardiovascular: Normal rate, regular rhythm and normal heart sounds. Exam reveals no gallop and no friction rub.   Pulmonary/Chest: Effort normal. No stridor. No respiratory distress. He has no wheezes.  Diminished bilateral bases.  Abdominal: Soft. Bowel sounds are normal. He exhibits no mass. There is no abdominal tenderness. There is no rebound and no guarding.   Musculoskeletal: Normal range of motion. No tenderness.   Lymphadenopathy:     He has no cervical adenopathy.   Neurological: He is alert and oriented to person, place, and time. He exhibits normal muscle tone.   Skin: Skin is warm. No rash noted. He is not " diaphoretic. No erythema.   Psychiatric: His behavior is normal. Judgment and thought content normal.   Nursing note and vitals reviewed.    I have reexamined the patient and the results are consistent with the previously documented exam. Azucena Gaspar MD      RECENT LABS    WBC   Date Value Ref Range Status   08/16/2023 17.36 (H) 3.40 - 10.80 10*3/mm3 Final   03/22/2022 7.84 4.5 - 11.0 10*3/uL Final     RBC   Date Value Ref Range Status   08/16/2023 2.77 (L) 4.14 - 5.80 10*6/mm3 Final   03/22/2022 4.23 (L) 4.5 - 5.9 10*6/uL Final     Hemoglobin   Date Value Ref Range Status   08/16/2023 7.4 (C) 13.0 - 17.7 g/dL Final   03/22/2022 12.9 (L) 13.5 - 17.5 g/dL Final     Hematocrit   Date Value Ref Range Status   08/16/2023 23.7 (C) 37.5 - 51.0 % Final   03/22/2022 38.8 (L) 41.0 - 53.0 % Final     MCV   Date Value Ref Range Status   08/16/2023 85.6 79.0 - 97.0 fL Final   03/22/2022 91.7 80.0 - 100.0 fL Final     MCH   Date Value Ref Range Status   08/16/2023 26.7 26.6 - 33.0 pg Final   03/22/2022 30.5 26.0 - 34.0 pg Final     MCHC   Date Value Ref Range Status   08/16/2023 31.2 (L) 31.5 - 35.7 g/dL Final   03/22/2022 33.2 31.0 - 37.0 g/dL Final     RDW   Date Value Ref Range Status   08/16/2023 17.5 (H) 12.3 - 15.4 % Final   03/22/2022 14.2 12.0 - 16.8 % Final     RDW-SD   Date Value Ref Range Status   08/16/2023 50.3 37.0 - 54.0 fl Final     MPV   Date Value Ref Range Status   08/16/2023 8.4 6.0 - 12.0 fL Final   03/22/2022 9.3 8.4 - 12.4 fL Final     Platelets   Date Value Ref Range Status   08/16/2023 730 (H) 140 - 450 10*3/mm3 Final   03/22/2022 324 140 - 440 10*3/uL Final     Neutrophil Rel %   Date Value Ref Range Status   03/22/2022 63.1 45 - 80 % Final     Neutrophil %   Date Value Ref Range Status   08/16/2023 83.7 (H) 42.7 - 76.0 % Final     Lymphocyte Rel %   Date Value Ref Range Status   03/22/2022 16.2 15 - 50 % Final     Lymphocyte %   Date Value Ref Range Status   08/16/2023 6.2 (L) 19.6 - 45.3 %  Final     Monocyte Rel %   Date Value Ref Range Status   03/22/2022 13.6 0 - 15 % Final     Monocyte %   Date Value Ref Range Status   08/16/2023 8.5 5.0 - 12.0 % Final     Eosinophil %   Date Value Ref Range Status   08/16/2023 1.2 0.3 - 6.2 % Final   03/22/2022 5.5 0 - 7 % Final     Basophil Rel %   Date Value Ref Range Status   03/22/2022 1.1 0 - 2 % Final     Basophil %   Date Value Ref Range Status   08/16/2023 0.4 0.0 - 1.5 % Final     Immature Grans %   Date Value Ref Range Status   07/07/2022 0.4 0.0 - 0.5 % Final   03/22/2022 0.5 0.0 - 1.0 % Final     Neutrophils Absolute   Date Value Ref Range Status   03/22/2022 4.94 2.0 - 8.8 10*3/uL Final     Neutrophils, Absolute   Date Value Ref Range Status   08/16/2023 14.54 (H) 1.70 - 7.00 10*3/mm3 Final     Lymphocytes Absolute   Date Value Ref Range Status   03/22/2022 1.27 0.7 - 5.5 10*3/uL Final     Lymphocytes, Absolute   Date Value Ref Range Status   08/16/2023 1.07 0.70 - 3.10 10*3/mm3 Final     Monocytes Absolute   Date Value Ref Range Status   03/22/2022 1.07 0.0 - 1.7 10*3/uL Final     Monocytes, Absolute   Date Value Ref Range Status   08/16/2023 1.47 (H) 0.10 - 0.90 10*3/mm3 Final     Eosinophils Absolute   Date Value Ref Range Status   03/22/2022 0.43 0.0 - 0.8 10*3/uL Final     Eosinophils, Absolute   Date Value Ref Range Status   08/16/2023 0.21 0.00 - 0.40 10*3/mm3 Final     Basophils Absolute   Date Value Ref Range Status   03/22/2022 0.09 0.0 - 0.2 10*3/uL Final     Basophils, Absolute   Date Value Ref Range Status   08/16/2023 0.07 0.00 - 0.20 10*3/mm3 Final     Immature Grans, Absolute   Date Value Ref Range Status   07/07/2022 0.05 0.00 - 0.05 10*3/mm3 Final   03/22/2022 0.04 0.00 - 0.10 10*3/uL Final     nRBC   Date Value Ref Range Status   07/31/2023 0.0 0.0 - 0.2 /100 WBC Final       Lab Results   Component Value Date    GLUCOSE 149 (H) 08/09/2023    BUN 14 08/09/2023    CREATININE 0.85 08/09/2023    EGFRIFNONA 100 01/28/2022    EGFRIFAFRI  >60 05/20/2019    BCR 16.5 08/09/2023    K 3.5 08/09/2023    CO2 22.0 08/09/2023    CALCIUM 9.5 08/09/2023    ALBUMIN 2.5 (L) 07/31/2023    LABIL2 1.5 03/22/2022    AST 86 (H) 07/31/2023    ALT 70 (H) 07/31/2023         ASSESSMENT:    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  Patient received concurrent XRT with Tecentriq followed by maintenance Tecentriq.  Patient has had evidence of progressive disease therefore Tecentriq has been discontinued.  Has also progressed through Enhertu.  For that reason we will discontinue treatments.  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  Reviewed the benefits the side effects in detail with patient.  I also given him information to review.  Clinical studies suggest up to 46% response rate with this drug  Intractable pain left shoulder: This has significantly improved since starting Enhertu  Symptomatic anemia: We will make arrangement for blood transfusion  Poor appetite: Patient has been encouraged to begin to take Megace as recommended  Physical deconditioning: I recommended physical therapy  Poorly differentiated adenocarcinoma of the left lung status post left thoracotomy with mediastinal lymph node dissection.  pT1 cpN1 M0 consistent with stage IIb disease, found on surveillance CT scans. Brain MRI was negative. We will recommend platinum doublet followed by Tecentriq unless he has EGFR mutation for which adjuvant osimertinib will be considered.  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  Patient was treated with cisplatin and Alimta in the adjuvant setting for his original malignancy.  He is currently on cisplatin and Taxol.  Patient is receiving cycle #4 today.   This tumor was completely resected and has negative margins and no mediastinal lymph node involvement.  Reviewed the pathology with Dr. Trevizo.  This is a new second lung primary.  Patient has received a total of 4 cycles of combination of cisplatin and Taxol completed on 10/5/2022.   Constipation: We will begin lactulose.  Patient to call me if no results  High PD-L1 expression at 95%. Reviewed foundation 1 testing results.  This showed MET amplification, ERBB2 amplification for which targeted therapy is available for including crizotinib for MET amplification, afatinib for ERBB2 but this is approved for metastatic disease as well as fam-trastuzumab.  We have extensively reviewed the literature.  He will be a candidate for ERBB2 targeted therapy but fam-trastuzumab cannot be combined with XRT due to risk of interstitial lung disease.  Also patient has ERBB2 amplification and not mutation and therefore his response rate may be around 25% as opposed to 55% if mutation was identified.    Chemotherapy-induced fatigue: This has improved  Hypomagnesemia secondary to chemotherapy: Patient has discontinued magnesium supplements  History of adenocarcinoma of the right lung, T1c N2 M0, consistent with clinical stage 3A disease in 2019.  S/P combined chemotherapy and radiation with cisplatin and Alimta neoadjuvantly, followed by her right lower lobectomy with mediastinal and hilar lymph node dissection with residual disease pT1 cpN2 M0.  Followed by maintenance durvalumab for 1 year.   Recent diagnosis of hypothyroidism, on thyroid replacement therapy  Status post right thoracentesis with cytology negative for malignancy  History of pneumothorax following lung biopsy  Postop anemia: Reviewed  Pneumonia left upper lobe: Has completed his 10-day course of antibiotics.  Symptoms have improved.  Chronic left chest wall and left upper extremity pain: On opioid pain management with from his radiation oncologist.  Pain seems to be  neuropathic in nature we will continue to increase his gabapentin, increase the frequency on his oxycodone as needed, and refer to pain management.  Has an appointment coming up 6/19/2023  Orthostatic hypotension: Related to dehydration.  Resolved.  Leukocytosis: Infectious disease work-up was performed during his recent hospitalization and was negative.  Likely leukemoid reaction secondary to progressive malignancy.  Hyponatremia  Cancer related cachexia.  Improving..  Stable.  Symptomatic normocytic anemia: Likely secondary to chemotherapy.  Has required several blood transfusions.  Currently hemoglobin 7.8 g/dL and patient with shortness of breath.  Assessment has been reviewed and updated      PLANS:    Transfuse packed red blood cells  Discontinue Enhertu  Begin tepotinib  Will activate MT for chemo education  Check iron profile, ferritin, vitamin B12, folate, MMA  Continue Megace  Refer to physical therapy for deconditioning closer to home Timothy or Shani preferred the patient  Continue gabapentin up to 600 mg 3 times daily over the next week  Continue oxycodone to 10 mg every 4 hours as needed for pain  Continue fam-trastuzumab every 3 weeks.  Reviewed the benefits, the side effects in detail.  Reviewed.  Reviewed his PET CT scan results  2D echocardiogram in preparation for HER2 directed treatment reviewed.  Reviewed.  EF 51-55%.  We will check every 3 months next echo will be due October 10, 2023  Continue thyroid replacement therapy through his PCP  Currently on oral B12 replacement.  Patient did have elevated methylmalonic acid level during the early phases of his treatments.  Continue the same  Follow-up in 3 weeks  All questions answered         Patient has  questions which have all been answered to the best of my abilities    I spent 40 total minutes, face-to-face, caring for Jc today. 90% of this time involved counseling and/or coordination of care as documented within this note.

## 2023-08-16 ENCOUNTER — LAB (OUTPATIENT)
Dept: LAB | Facility: HOSPITAL | Age: 58
End: 2023-08-16
Payer: COMMERCIAL

## 2023-08-16 ENCOUNTER — OFFICE VISIT (OUTPATIENT)
Dept: ONCOLOGY | Facility: CLINIC | Age: 58
End: 2023-08-16
Payer: COMMERCIAL

## 2023-08-16 ENCOUNTER — SPECIALTY PHARMACY (OUTPATIENT)
Dept: PHARMACY | Facility: HOSPITAL | Age: 58
End: 2023-08-16
Payer: COMMERCIAL

## 2023-08-16 VITALS
HEIGHT: 75 IN | SYSTOLIC BLOOD PRESSURE: 108 MMHG | WEIGHT: 146 LBS | RESPIRATION RATE: 20 BRPM | BODY MASS INDEX: 18.15 KG/M2 | DIASTOLIC BLOOD PRESSURE: 71 MMHG | HEART RATE: 106 BPM | TEMPERATURE: 98 F | OXYGEN SATURATION: 99 %

## 2023-08-16 DIAGNOSIS — D64.9 ANEMIA, UNSPECIFIED TYPE: Primary | ICD-10-CM

## 2023-08-16 DIAGNOSIS — C34.91 NON-SMALL CELL CARCINOMA OF LUNG, RIGHT: Primary | ICD-10-CM

## 2023-08-16 DIAGNOSIS — D64.9 ANEMIA, UNSPECIFIED TYPE: ICD-10-CM

## 2023-08-16 DIAGNOSIS — C34.91 NON-SMALL CELL CARCINOMA OF LUNG, RIGHT: ICD-10-CM

## 2023-08-16 DIAGNOSIS — R53.1 WEAKNESS GENERALIZED: ICD-10-CM

## 2023-08-16 DIAGNOSIS — C34.12 MALIGNANT NEOPLASM OF UPPER LOBE OF LEFT LUNG: ICD-10-CM

## 2023-08-16 DIAGNOSIS — D64.9 NORMOCYTIC ANEMIA: ICD-10-CM

## 2023-08-16 DIAGNOSIS — C34.31 CANCER OF LOWER LOBE OF RIGHT LUNG: ICD-10-CM

## 2023-08-16 LAB
ABO GROUP BLD: NORMAL
ALBUMIN SERPL-MCNC: 3.1 G/DL (ref 3.5–5.2)
ALBUMIN/GLOB SERPL: 1.1 G/DL
ALP SERPL-CCNC: 264 U/L (ref 39–117)
ALT SERPL W P-5'-P-CCNC: 68 U/L (ref 1–41)
ANION GAP SERPL CALCULATED.3IONS-SCNC: 14 MMOL/L (ref 5–15)
ANION GAP SERPL CALCULATED.3IONS-SCNC: 14 MMOL/L (ref 5–15)
AST SERPL-CCNC: 47 U/L (ref 1–40)
BASOPHILS # BLD AUTO: 0.07 10*3/MM3 (ref 0–0.2)
BASOPHILS # BLD MANUAL: 0.38 10*3/MM3 (ref 0–0.2)
BASOPHILS NFR BLD AUTO: 0.4 % (ref 0–1.5)
BASOPHILS NFR BLD MANUAL: 2 % (ref 0–1.5)
BILIRUB SERPL-MCNC: 0.3 MG/DL (ref 0–1.2)
BLD GP AB SCN SERPL QL: NEGATIVE
BUN SERPL-MCNC: 11 MG/DL (ref 6–20)
BUN SERPL-MCNC: 11 MG/DL (ref 6–20)
BUN/CREAT SERPL: 14.1 (ref 7–25)
BUN/CREAT SERPL: 14.9 (ref 7–25)
CALCIUM SPEC-SCNC: 8.8 MG/DL (ref 8.6–10.5)
CALCIUM SPEC-SCNC: 9.4 MG/DL (ref 8.6–10.5)
CHLORIDE SERPL-SCNC: 90 MMOL/L (ref 98–107)
CHLORIDE SERPL-SCNC: 90 MMOL/L (ref 98–107)
CO2 SERPL-SCNC: 23 MMOL/L (ref 22–29)
CO2 SERPL-SCNC: 24 MMOL/L (ref 22–29)
CREAT SERPL-MCNC: 0.74 MG/DL (ref 0.76–1.27)
CREAT SERPL-MCNC: 0.78 MG/DL (ref 0.76–1.27)
DEPRECATED RDW RBC AUTO: 50.3 FL (ref 37–54)
DEPRECATED RDW RBC AUTO: 51.6 FL (ref 37–54)
EGFRCR SERPLBLD CKD-EPI 2021: 102.7 ML/MIN/1.73
EGFRCR SERPLBLD CKD-EPI 2021: 104.4 ML/MIN/1.73
EOSINOPHIL # BLD AUTO: 0.21 10*3/MM3 (ref 0–0.4)
EOSINOPHIL # BLD MANUAL: 0.19 10*3/MM3 (ref 0–0.4)
EOSINOPHIL NFR BLD AUTO: 1.2 % (ref 0.3–6.2)
EOSINOPHIL NFR BLD MANUAL: 1 % (ref 0.3–6.2)
ERYTHROCYTE [DISTWIDTH] IN BLOOD BY AUTOMATED COUNT: 17.5 % (ref 12.3–15.4)
ERYTHROCYTE [DISTWIDTH] IN BLOOD BY AUTOMATED COUNT: 18.1 % (ref 12.3–15.4)
GLOBULIN UR ELPH-MCNC: 2.7 GM/DL
GLUCOSE SERPL-MCNC: 100 MG/DL (ref 65–99)
GLUCOSE SERPL-MCNC: 102 MG/DL (ref 65–99)
HCT VFR BLD AUTO: 23.6 % (ref 37.5–51)
HCT VFR BLD AUTO: 23.7 % (ref 37.5–51)
HGB BLD-MCNC: 7.4 G/DL (ref 13–17.7)
HGB BLD-MCNC: 7.5 G/DL (ref 13–17.7)
HOLD SPECIMEN: NORMAL
LYMPHOCYTES # BLD AUTO: 1.07 10*3/MM3 (ref 0.7–3.1)
LYMPHOCYTES # BLD MANUAL: 1.52 10*3/MM3 (ref 0.7–3.1)
LYMPHOCYTES NFR BLD AUTO: 6.2 % (ref 19.6–45.3)
LYMPHOCYTES NFR BLD MANUAL: 8 % (ref 5–12)
MAGNESIUM SERPL-MCNC: 1.9 MG/DL (ref 1.6–2.6)
MCH RBC QN AUTO: 26.6 PG (ref 26.6–33)
MCH RBC QN AUTO: 26.7 PG (ref 26.6–33)
MCHC RBC AUTO-ENTMCNC: 31.2 G/DL (ref 31.5–35.7)
MCHC RBC AUTO-ENTMCNC: 31.8 G/DL (ref 31.5–35.7)
MCV RBC AUTO: 83.7 FL (ref 79–97)
MCV RBC AUTO: 85.6 FL (ref 79–97)
MONOCYTES # BLD AUTO: 1.47 10*3/MM3 (ref 0.1–0.9)
MONOCYTES # BLD: 1.52 10*3/MM3 (ref 0.1–0.9)
MONOCYTES NFR BLD AUTO: 8.5 % (ref 5–12)
NEUTROPHILS # BLD AUTO: 15.39 10*3/MM3 (ref 1.7–7)
NEUTROPHILS NFR BLD AUTO: 14.54 10*3/MM3 (ref 1.7–7)
NEUTROPHILS NFR BLD AUTO: 83.7 % (ref 42.7–76)
NEUTROPHILS NFR BLD MANUAL: 80 % (ref 42.7–76)
NEUTS BAND NFR BLD MANUAL: 1 % (ref 0–5)
PLATELET # BLD AUTO: 730 10*3/MM3 (ref 140–450)
PLATELET # BLD AUTO: 763 10*3/MM3 (ref 140–450)
PMV BLD AUTO: 6.7 FL (ref 6–12)
PMV BLD AUTO: 8.4 FL (ref 6–12)
POTASSIUM SERPL-SCNC: 3.3 MMOL/L (ref 3.5–5.2)
POTASSIUM SERPL-SCNC: 3.5 MMOL/L (ref 3.5–5.2)
PROT SERPL-MCNC: 5.8 G/DL (ref 6–8.5)
RBC # BLD AUTO: 2.77 10*6/MM3 (ref 4.14–5.8)
RBC # BLD AUTO: 2.82 10*6/MM3 (ref 4.14–5.8)
RBC MORPH BLD: NORMAL
RH BLD: POSITIVE
SCAN SLIDE: NORMAL
SMALL PLATELETS BLD QL SMEAR: ABNORMAL
SODIUM SERPL-SCNC: 127 MMOL/L (ref 136–145)
SODIUM SERPL-SCNC: 128 MMOL/L (ref 136–145)
T&S EXPIRATION DATE: NORMAL
VARIANT LYMPHS NFR BLD MANUAL: 8 % (ref 19.6–45.3)
WBC MORPH BLD: NORMAL
WBC NRBC COR # BLD: 17.36 10*3/MM3 (ref 3.4–10.8)
WBC NRBC COR # BLD: 19 10*3/MM3 (ref 3.4–10.8)

## 2023-08-16 PROCEDURE — 85007 BL SMEAR W/DIFF WBC COUNT: CPT | Performed by: INTERNAL MEDICINE

## 2023-08-16 PROCEDURE — 85025 COMPLETE CBC W/AUTO DIFF WBC: CPT

## 2023-08-16 PROCEDURE — 86901 BLOOD TYPING SEROLOGIC RH(D): CPT

## 2023-08-16 PROCEDURE — 86850 RBC ANTIBODY SCREEN: CPT

## 2023-08-16 PROCEDURE — 86900 BLOOD TYPING SEROLOGIC ABO: CPT

## 2023-08-16 PROCEDURE — 85025 COMPLETE CBC W/AUTO DIFF WBC: CPT | Performed by: INTERNAL MEDICINE

## 2023-08-16 PROCEDURE — 36415 COLL VENOUS BLD VENIPUNCTURE: CPT

## 2023-08-16 PROCEDURE — 80053 COMPREHEN METABOLIC PANEL: CPT | Performed by: INTERNAL MEDICINE

## 2023-08-16 PROCEDURE — 86923 COMPATIBILITY TEST ELECTRIC: CPT

## 2023-08-16 PROCEDURE — 83735 ASSAY OF MAGNESIUM: CPT | Performed by: INTERNAL MEDICINE

## 2023-08-16 RX ORDER — SODIUM CHLORIDE 9 MG/ML
250 INJECTION, SOLUTION INTRAVENOUS AS NEEDED
Status: CANCELLED | OUTPATIENT
Start: 2023-08-16

## 2023-08-16 RX ORDER — POTASSIUM CHLORIDE 20 MEQ/1
TABLET, EXTENDED RELEASE ORAL
COMMUNITY
Start: 2023-08-02

## 2023-08-16 NOTE — PROGRESS NOTES
Oral Chemotherapy - New Referral    Received a referral from Dr. Gaspar    Treatment Plan: Tepmetko (tepotinib)  Start date of treatment planned for: As soon as oral specialty medication is available.  Indication: non-small cell carcinoma of lung with MET amplification  Relevant past treatments: cisplatin +Alimta x2 cycles (May 2019); Imfinzi x 24 cycles (August 2019); cisplatin +paclitaxel x 2 cycles (August 2022); atezolizumab (November 2022); Enhertu (July 2023) and has received radiation therapy  Is the therapy appropriate based on treatment guidelines and FDA labeling?: yes  Therapeutic Goals: Continue treatment until progression or intolerable toxicity  Patient can self-administer oral medications: Yes    Drug-Drug Interactions: The current medication list was reviewed and there are no relevant drug-drug interactions.  Medication Allergies: The patient has NKDA  Review of Labs/Dose Adjustments: The patient's most recent labs were reviewed and all are WNL to start treatment at this dose.     A prescription will be released after initial benefit investigation to be determined Specialty Pharmacy for   Drug: Tepmetko (tepotinib)  Strength: 225 mg  Directions: Take 2 tablet by mouth daily  Quantity: 60  Refills: 5    Pharmacy education is not yet scheduled.    Alysha CURTIS, PharmD     8/16/2023  16:14 EDT

## 2023-08-16 NOTE — PATIENT INSTRUCTIONS
Tepotinib Tablets  What is this medication?  TEPOTINIB (tep OH ti nib) treats lung cancer. It works by blocking a protein that causes cancer cells to grow and multiply. This helps to slow or stop the spread of cancer cells.    This medicine may be used for other purposes; ask your health care provider or pharmacist if you have questions.    COMMON BRAND NAME(S): PHILLIP    What should I tell my care team before I take this medication?  They need to know if you have any of these conditions:    Liver disease  Lung disease  An unusual or allergic reaction to tepotinib, other medications, foods, dyes, or preservatives  If you or your partner are pregnant or trying to get pregnant  Breastfeeding  How should I use this medication?  Take this medication by mouth. Take it as directed on the prescription label at the same time every day. Do not cut, crush, or chew this medication. Swallow the tablets whole. Take it with food. Your care team may change your dose or tell you to stop taking this medication if you get side effects. Do not change your dose or stop taking it unless your care team tells you to.    When using tablets to make a suspension: Place the tablets in a glass with 30 mL of water. Do not use other liquids. Stir the tablets until they are in small pieces. They will not dissolve all the way. Do not crush the tablets. Drink the mixture right away after mixing. Add another 30 mL of water to the glass. Swirl the mixture. Drink it right away.    Talk to your care team about the use of this medication in children. Special care may be needed.    Overdosage: If you think you have taken too much of this medicine contact a poison control center or emergency room at once.    NOTE: This medicine is only for you. Do not share this medicine with others.    What if I miss a dose?  If you miss a dose, take it as soon as you can unless it is more than 16 hours late. If it is more than 16 hours late, skip the missed dose. Take  the next dose at the normal time.    What may interact with this medication?  This medication may affect how other medications work, and other medications may affect the way this medication works. Talk with your care team about all of the medications you take. They may suggest changes to your treatment plan to lower the risk of side effects and to make sure your medications work as intended.    This list may not describe all possible interactions. Give your health care provider a list of all the medicines, herbs, non-prescription drugs, or dietary supplements you use. Also tell them if you smoke, drink alcohol, or use illegal drugs. Some items may interact with your medicine.    What should I watch for while using this medication?  Your condition will be monitored carefully while you are receiving this medication.    This medication may make you feel generally unwell. This is not unusual as chemotherapy can affect healthy cells as well as cancer cells. Report any side effects. Continue your course of treatment even though you feel ill unless your care team tells you to stop.    Talk to your care team if you may be pregnant. Serious birth defects can occur if you take this medication during pregnancy and for 1 week after the last dose. You will need a negative pregnancy test before starting this medication. Contraception is recommended while taking this medication and for 1 week after the last dose. Your care team can help you find the option that works for you.    If your partner can get pregnant, use a condom during sex while taking this medication and for 1 week after the last dose.    Do not breastfeed while taking this medication and for 1 week after the last dose.    What side effects may I notice from receiving this medication?  Side effects that you should report to your care team as soon as possible:    Allergic reactions-skin rash, itching, hives, swelling of the face, lips, tongue, or throat  Dry cough,  shortness of breath or trouble breathing  Liver injury-right upper belly pain, loss of appetite, nausea, light-colored stool, dark yellow or brown urine, yellowing skin or eyes, unusual weakness or fatigue  Side effects that usually do not require medical attention (report these to your care team if they continue or are bothersome):    Bone, joint, or muscle pain  Diarrhea  Nausea  Swelling of the ankles, hands, or feet  Unusual weakness or fatigue  This list may not describe all possible side effects. Call your doctor for medical advice about side effects. You may report side effects to FDA at 6-120-CRP-0630.    Where should I keep my medication?  Keep out of the reach of children and pets.    Store at room temperature between 20 and 25 degrees C (68 and 77 degrees F). Keep this medication in the original packaging. Get rid of any unused medication after the expiration date.    To get rid of medications that are no longer needed or have :    Take the medication to a medication take-back program. Check with your pharmacy or law enforcement to find a location.  If you cannot return the medication, check the label or package insert to see if the medication should be thrown out in the garbage or flushed down the toilet. If you are not sure, ask your care team. If it is safe to put it in the trash, take the medication out of the container. Mix the medication with cat litter, dirt, coffee grounds, or other unwanted substance. Seal the mixture in a bag or container. Put it in the trash.  NOTE: This sheet is a summary. It may not cover all possible information. If you have questions about this medicine, talk to your doctor, pharmacist, or health care provider.

## 2023-08-17 ENCOUNTER — HOSPITAL ENCOUNTER (OUTPATIENT)
Dept: INFUSION THERAPY | Facility: HOSPITAL | Age: 58
Discharge: HOME | End: 2023-08-17
Payer: COMMERCIAL

## 2023-08-17 VITALS
HEART RATE: 97 BPM | SYSTOLIC BLOOD PRESSURE: 99 MMHG | OXYGEN SATURATION: 100 % | TEMPERATURE: 98.4 F | RESPIRATION RATE: 18 BRPM | DIASTOLIC BLOOD PRESSURE: 64 MMHG

## 2023-08-17 DIAGNOSIS — C34.91 NON-SMALL CELL CARCINOMA OF LUNG, RIGHT: ICD-10-CM

## 2023-08-17 DIAGNOSIS — Z45.2 ENCOUNTER FOR CARE RELATED TO VASCULAR ACCESS PORT: Primary | ICD-10-CM

## 2023-08-17 DIAGNOSIS — D64.9 ANEMIA, UNSPECIFIED TYPE: ICD-10-CM

## 2023-08-17 PROCEDURE — 86900 BLOOD TYPING SEROLOGIC ABO: CPT

## 2023-08-17 PROCEDURE — 25010000002 HEPARIN LOCK FLUSH PER 10 UNITS: Performed by: INTERNAL MEDICINE

## 2023-08-17 PROCEDURE — P9016 RBC LEUKOCYTES REDUCED: HCPCS

## 2023-08-17 PROCEDURE — G0463 HOSPITAL OUTPT CLINIC VISIT: HCPCS

## 2023-08-17 PROCEDURE — 36415 COLL VENOUS BLD VENIPUNCTURE: CPT

## 2023-08-17 PROCEDURE — 36430 TRANSFUSION BLD/BLD COMPNT: CPT

## 2023-08-17 RX ORDER — SODIUM CHLORIDE 0.9 % (FLUSH) 0.9 %
20 SYRINGE (ML) INJECTION AS NEEDED
OUTPATIENT
Start: 2023-08-17

## 2023-08-17 RX ORDER — SODIUM CHLORIDE 9 MG/ML
250 INJECTION, SOLUTION INTRAVENOUS AS NEEDED
Status: DISCONTINUED | OUTPATIENT
Start: 2023-08-17 | End: 2023-08-19 | Stop reason: HOSPADM

## 2023-08-17 RX ORDER — HEPARIN SODIUM (PORCINE) LOCK FLUSH IV SOLN 100 UNIT/ML 100 UNIT/ML
500 SOLUTION INTRAVENOUS AS NEEDED
OUTPATIENT
Start: 2023-08-17

## 2023-08-17 RX ORDER — HEPARIN SODIUM (PORCINE) LOCK FLUSH IV SOLN 100 UNIT/ML 100 UNIT/ML
500 SOLUTION INTRAVENOUS AS NEEDED
Status: DISCONTINUED | OUTPATIENT
Start: 2023-08-17 | End: 2023-08-19 | Stop reason: HOSPADM

## 2023-08-17 RX ORDER — SODIUM CHLORIDE 0.9 % (FLUSH) 0.9 %
20 SYRINGE (ML) INJECTION AS NEEDED
Status: DISCONTINUED | OUTPATIENT
Start: 2023-08-17 | End: 2023-08-19 | Stop reason: HOSPADM

## 2023-08-17 RX ADMIN — Medication 500 UNITS: at 15:32

## 2023-08-17 RX ADMIN — Medication 20 ML: at 15:32

## 2023-08-22 DIAGNOSIS — C34.91 NON-SMALL CELL CARCINOMA OF LUNG, RIGHT: Primary | ICD-10-CM

## 2023-08-22 DIAGNOSIS — R79.89 ELEVATED LIVER FUNCTION TESTS: ICD-10-CM

## 2023-08-22 DIAGNOSIS — C34.91 NON-SMALL CELL CARCINOMA OF LUNG, RIGHT: ICD-10-CM

## 2023-08-22 RX ORDER — OXYCODONE AND ACETAMINOPHEN 10; 325 MG/1; MG/1
1 TABLET ORAL EVERY 4 HOURS PRN
Qty: 60 TABLET | Refills: 0 | Status: SHIPPED | OUTPATIENT
Start: 2023-08-22

## 2023-08-22 NOTE — TELEPHONE ENCOUNTER
Caller: Jc Driscoll    Relationship: Self    Best call back number: [phone]    Requested Prescriptions:   Requested Prescriptions     Pending Prescriptions Disp Refills    oxyCODONE-acetaminophen (PERCOCET)  MG per tablet 60 tablet 0     Sig: Take 1 tablet by mouth Every 4 (Four) Hours As Needed for Moderate Pain.        Pharmacy where request should be sent:   [address] - [phone] [place] [phone] [facility] [phone]      Last office visit with prescribing clinician: 8/16/2023   Last telemedicine visit with prescribing clinician: Visit date not found   Next office visit with prescribing clinician: 9/6/2023     Does the patient have less than a 3 day supply:  [x] Yes  [] No    Would you like a call back once the refill request has been completed: [] Yes [x] No    If the office needs to give you a call back, can they leave a voicemail: [x] Yes [] No    Gisselle Raymond   08/22/23 09:59 EDT

## 2023-08-23 ENCOUNTER — DOCUMENTATION (OUTPATIENT)
Dept: PHARMACY | Facility: HOSPITAL | Age: 58
End: 2023-08-23
Payer: COMMERCIAL

## 2023-08-23 ENCOUNTER — LAB (OUTPATIENT)
Dept: LAB | Facility: HOSPITAL | Age: 58
End: 2023-08-23
Payer: COMMERCIAL

## 2023-08-23 ENCOUNTER — SPECIALTY PHARMACY (OUTPATIENT)
Dept: PHARMACY | Facility: HOSPITAL | Age: 58
End: 2023-08-23
Payer: COMMERCIAL

## 2023-08-23 DIAGNOSIS — C34.91 NON-SMALL CELL CARCINOMA OF LUNG, RIGHT: ICD-10-CM

## 2023-08-23 LAB
ANION GAP SERPL CALCULATED.3IONS-SCNC: 15 MMOL/L (ref 5–15)
BASOPHILS # BLD AUTO: 0.05 10*3/MM3 (ref 0–0.2)
BASOPHILS NFR BLD AUTO: 0.1 % (ref 0–1.5)
BUN SERPL-MCNC: 10 MG/DL (ref 6–20)
BUN/CREAT SERPL: 16.1 (ref 7–25)
CALCIUM SPEC-SCNC: 9 MG/DL (ref 8.6–10.5)
CHLORIDE SERPL-SCNC: 91 MMOL/L (ref 98–107)
CO2 SERPL-SCNC: 23 MMOL/L (ref 22–29)
CREAT SERPL-MCNC: 0.62 MG/DL (ref 0.76–1.27)
DEPRECATED RDW RBC AUTO: 58.3 FL (ref 37–54)
EGFRCR SERPLBLD CKD-EPI 2021: 110.1 ML/MIN/1.73
EOSINOPHIL # BLD AUTO: 0.39 10*3/MM3 (ref 0–0.4)
EOSINOPHIL NFR BLD AUTO: 1.1 % (ref 0.3–6.2)
ERYTHROCYTE [DISTWIDTH] IN BLOOD BY AUTOMATED COUNT: 18.5 % (ref 12.3–15.4)
GLUCOSE SERPL-MCNC: 109 MG/DL (ref 65–99)
HCT VFR BLD AUTO: 27.9 % (ref 37.5–51)
HGB BLD-MCNC: 8.6 G/DL (ref 13–17.7)
LYMPHOCYTES # BLD AUTO: 1.13 10*3/MM3 (ref 0.7–3.1)
LYMPHOCYTES NFR BLD AUTO: 3.2 % (ref 19.6–45.3)
MAGNESIUM SERPL-MCNC: 1.8 MG/DL (ref 1.6–2.6)
MCH RBC QN AUTO: 27.8 PG (ref 26.6–33)
MCHC RBC AUTO-ENTMCNC: 30.8 G/DL (ref 31.5–35.7)
MCV RBC AUTO: 90.3 FL (ref 79–97)
MONOCYTES # BLD AUTO: 1.91 10*3/MM3 (ref 0.1–0.9)
MONOCYTES NFR BLD AUTO: 5.4 % (ref 5–12)
NEUTROPHILS NFR BLD AUTO: 32.21 10*3/MM3 (ref 1.7–7)
NEUTROPHILS NFR BLD AUTO: 90.2 % (ref 42.7–76)
PLATELET # BLD AUTO: 550 10*3/MM3 (ref 140–450)
PMV BLD AUTO: 8.6 FL (ref 6–12)
POTASSIUM SERPL-SCNC: 3.5 MMOL/L (ref 3.5–5.2)
RAD ONC ARIA COURSE END DATE: NORMAL
RAD ONC ARIA COURSE END DATE: NORMAL
RAD ONC ARIA COURSE ID: NORMAL
RAD ONC ARIA COURSE ID: NORMAL
RAD ONC ARIA COURSE LAST TREATMENT DATE: NORMAL
RAD ONC ARIA COURSE LAST TREATMENT DATE: NORMAL
RAD ONC ARIA COURSE START DATE: NORMAL
RAD ONC ARIA COURSE START DATE: NORMAL
RAD ONC ARIA COURSE TREATMENT ELAPSED DAYS: 34
RAD ONC ARIA COURSE TREATMENT ELAPSED DAYS: 53
RAD ONC ARIA FIRST TREATMENT DATE: NORMAL
RAD ONC ARIA FIRST TREATMENT DATE: NORMAL
RAD ONC ARIA PLAN FRACTIONS TREATED TO DATE: 25
RAD ONC ARIA PLAN FRACTIONS TREATED TO DATE: 30
RAD ONC ARIA PLAN ID: NORMAL
RAD ONC ARIA PLAN ID: NORMAL
RAD ONC ARIA PLAN NAME: NORMAL
RAD ONC ARIA PLAN NAME: NORMAL
RAD ONC ARIA PLAN PRESCRIBED DOSE PER FRACTION: 1.8 GY
RAD ONC ARIA PLAN PRESCRIBED DOSE PER FRACTION: 2 GY
RAD ONC ARIA PLAN PRIMARY REFERENCE POINT: NORMAL
RAD ONC ARIA PLAN PRIMARY REFERENCE POINT: NORMAL
RAD ONC ARIA PLAN TOTAL FRACTIONS PRESCRIBED: 25
RAD ONC ARIA PLAN TOTAL FRACTIONS PRESCRIBED: 30
RAD ONC ARIA PLAN TOTAL PRESCRIBED DOSE: 4500 CGY
RAD ONC ARIA PLAN TOTAL PRESCRIBED DOSE: 6000 CGY
RAD ONC ARIA REFERENCE POINT DOSAGE GIVEN TO DATE: 45 GY
RAD ONC ARIA REFERENCE POINT DOSAGE GIVEN TO DATE: 60 GY
RAD ONC ARIA REFERENCE POINT ID: NORMAL
RAD ONC ARIA REFERENCE POINT ID: NORMAL
RBC # BLD AUTO: 3.09 10*6/MM3 (ref 4.14–5.8)
SODIUM SERPL-SCNC: 129 MMOL/L (ref 136–145)
WBC NRBC COR # BLD: 35.69 10*3/MM3 (ref 3.4–10.8)

## 2023-08-23 PROCEDURE — 85025 COMPLETE CBC W/AUTO DIFF WBC: CPT

## 2023-08-23 PROCEDURE — 80048 BASIC METABOLIC PNL TOTAL CA: CPT | Performed by: INTERNAL MEDICINE

## 2023-08-23 PROCEDURE — 83735 ASSAY OF MAGNESIUM: CPT | Performed by: INTERNAL MEDICINE

## 2023-08-23 PROCEDURE — 36415 COLL VENOUS BLD VENIPUNCTURE: CPT

## 2023-08-23 RX ORDER — LORATADINE 10 MG/1
10 TABLET ORAL DAILY
COMMUNITY

## 2023-08-23 NOTE — PROGRESS NOTES
Specialty Pharmacy Patient Management Program  Oncology Initial Assessment       Jc Driscoll is a 58 y.o. male with non small cell lung carcinoma with MET amplification seen by an Oncology provider and enrolled in the Oncology Patient Management program offered by [facility].  An initial outreach was conducted, including assessment of therapy appropriateness and specialty medication education for Tepmetko (tepotinib). The patient was introduced to services offered by [facility], including: regular assessments, refill coordination, curbside pick-up or mail order delivery options, prior authorization maintenance, and financial assistance programs as applicable. The patient was also provided with contact information for the pharmacy team.     Regimen: Tepmetko (tepotinib)    Start date of oral specialty medication: As soon as oral specialty medication is available.    Relevant Past Medical History, Comorbidities, and Vaccines  Relevant medical history and concomitant health conditions were discussed with the patient. The patient's chart has been reviewed for relevant past medical history and comorbid health conditions and updated as necessary.  Vaccines are coordinated by the patient's oncologist and primary care provider.  Past Medical History:   Diagnosis Date    Allergic rhinitis 07/25/2013    Overview:  4/2/2018 - still zyrtec 10 daily (if stops, gets dermatitis again).     Anxiety and depression 06/05/2012    Overview:  4/2/2018 -   C/w well 300 xr and Lito 20.  4/8/2019 -   Doing ok even with new lung cancer - lito 20 & well 300xr.     Chronic pansinusitis 04/08/2019    Overview:  4/8/2019 -   MRI showed chronic thick in frontal, maxillary, ethmoidal ---> to Dr. COLLAZO to est since abx ICC didn't help.  Does netipot bid.     Diastolic CHF 07/09/2014    Overview:  7/4/14 - impaired LV relaxation on Zhou ECHO.  EF 60%. Rest normal    Dupuytren's contracture of both hands      Elevated cholesterol     Erosive esophagitis 2019    Overview:  EGD 2016 2018 - nexium OTC daily for few week.  Qod before that.  Now carbonation hurts, epigastric pain 2019 -   protonix 40 doing well.     Gastroesophageal reflux disease 2019    Hiatal hernia 2019    History of colon polyps     Hypertension     Lung cancer     right lung and in lymph nodes x2    Mediastinal lymphadenopathy 2019    Normocytic anemia 2019    Overview:  2019 - dropped to 9's postop 2019 - rebounded to 10's.  2019 -   Back to 9's - check ferritin, b12 and thyroid.     Prediabetes 2014    Overview:  14 - a1c 6.1 at [place] admission    Retention of urine 2019    PSOT OP, RESOLVED     Social History     Socioeconomic History    Marital status:    Tobacco Use    Smoking status: Former     Types: Cigarettes     Quit date: 2009     Years since quittin.9    Smokeless tobacco: Never   Vaping Use    Vaping Use: Never used   Substance and Sexual Activity    Alcohol use: Yes     Alcohol/week: 2.0 standard drinks     Types: 2 Cans of beer per week     Comment: Occasional    Drug use: No    Sexual activity: Defer       Allergies  Known allergies and reactions were discussed with the patient. The patient's chart has been reviewed for allergy information and updated as necessary.   No Known Allergies     Current Medication List  This medication list has been reviewed with the patient and evaluated for any interactions or necessary modifications/recommendations, and updated to include all prescription medications, OTC medications, and supplements the patient is currently taking.  This list reflects what is contained in the patient's profile, which has also been marked as reviewed to communicate to other providers it is the most up to date version of the patient's current medication therapy.   Prior to Admission medications    Medication Sig Start Date End Date Taking?  Authorizing Provider   buPROPion XL (WELLBUTRIN XL) 300 MG 24 hr tablet Take 1 tablet by mouth Daily.   Yes Lloyd Rasmussen MD   Constulose 10 GM/15ML solution TAKE 30 ML BY MOUTH  EVERY 12 HOURS 6/26/23  Yes Azucena Gaspar MD   desvenlafaxine (PRISTIQ) 50 MG 24 hr tablet Take 1 tablet by mouth Daily. 12/31/19  Yes Lloyd Rasmussen MD   folic acid (FOLVITE) 1 MG tablet Take 1 tablet by mouth Daily. 8/2/23  Yes Shayy Montemayor APRN   gabapentin (NEURONTIN) 300 MG capsule Take 2 capsules by mouth 3 (Three) Times a Day for 30 days.  Patient taking differently: Take 2 capsules by mouth every night at bedtime. 5/24/23 7/5/24 Yes Shayy Montemayor APRN   levothyroxine (SYNTHROID, LEVOTHROID) 125 MCG tablet Take 1 tablet by mouth Every Morning. 6/23/23  Yes Trinidad Del Angel MD   liothyronine (CYTOMEL) 5 MCG tablet Take 1 tablet by mouth Daily. 3/25/22  Yes Lloyd Rasmussen MD   loratadine (Claritin) 10 MG tablet Take 1 tablet by mouth Daily.   Yes Lloyd Rasmussen MD   oxyCODONE-acetaminophen (PERCOCET)  MG per tablet Take 1 tablet by mouth Every 4 (Four) Hours As Needed for Moderate Pain. 8/22/23  Yes Shayy Montemayor APRN   pantoprazole (PROTONIX) 40 MG EC tablet Take 1 tablet by mouth Daily.   Yes Lloyd Rasmussen MD   sennosides-docusate (PERICOLACE) 8.6-50 MG per tablet Take 2 tablets by mouth 2 (Two) Times a Day. 7/7/23  Yes Sj Sarabia MD   vitamin B-12 (CYANOCOBALAMIN) 1000 MCG tablet Take 1 tablet by mouth Daily.   Yes Lloyd Rasmussen MD   vitamin B-6 (PYRIDOXINE) 100 MG tablet Take 1 tablet by mouth Every 12 (Twelve) Hours. 3/1/23  Yes Azucena Gaspar MD   dexamethasone (DECADRON) 4 MG tablet Take 1 tablet by mouth Every 12 (Twelve) Hours.  Patient not taking: Reported on 8/23/2023 6/22/23   Trinidad Del Angel MD   megestrol acetate (MEGACE) 400 MG/10ML suspension oral suspension Take 10 mL by mouth Daily. 7/7/23   Sj Sarabia MD    ondansetron (ZOFRAN) 8 MG tablet TAKE 1 TABLET BY MOUTH THREE TIMES DAILY AS NEEDED FOR NAUSEA FOR VOMITING  Patient not taking: Reported on 8/23/2023 6/15/23   Azucena Gaspar MD   ondansetron (ZOFRAN) 8 MG tablet Take 1 tablet by mouth 3 (Three) Times a Day As Needed for Nausea or Vomiting. 7/10/23   Azucena Gaspar MD   potassium chloride (K-DUR,KLOR-CON) 20 MEQ CR tablet  8/2/23   Lloyd Rasmussen MD   Tepotinib HCl (TEPMETKO) 225 MG tablet Take 2 tablets by mouth Daily.  Patient not taking: Reported on 8/23/2023    Lloyd Rasmussen MD   OLANZapine (zyPREXA) 5 MG tablet Take 1 tablet by mouth Every Night. Take on days 2, 3 and 4 after chemotherapy. 7/10/23 8/23/23  Azucena Gaspar MD   oxyCODONE-acetaminophen (PERCOCET)  MG per tablet Take 1 tablet by mouth Every 4 (Four) Hours As Needed for Moderate Pain. 7/18/23 8/22/23  Azucena Gaspar MD   potassium chloride (K-DUR,KLOR-CON) 20 MEQ CR tablet Take 2 tablets by mouth 1 (One) Time for 1 dose. 8/2/23 8/23/23  Shayy Montemayor APRN       Drug Interactions  Reviewed concomitant medications, allergies, labs, comorbidities/medical history, quality of life, and immunization history.   Drug-drug interactions noted and discussed during education: no significant drug interactions noted. . Reminded the patient to let us know before making any changes or starting any new prescription or OTC medications so we can first assess drug interactions.  Drug-food interactions none noted       Recommended Medications Assessment  Bone Health  - Not Indicated    VTE prophylaxis - Not Indicated   Relevant Laboratory Values  Lab Results   Component Value Date    GLUCOSE 100 (H) 08/16/2023    GLUCOSE 102 (H) 08/16/2023    CALCIUM 9.4 08/16/2023    CALCIUM 8.8 08/16/2023     (L) 08/16/2023     (L) 08/16/2023    K 3.3 (L) 08/16/2023    K 3.5 08/16/2023    CO2 23.0 08/16/2023    CO2 24.0 08/16/2023    CL 90 (L) 08/16/2023     CL 90 (L) 08/16/2023    BUN 11 08/16/2023    BUN 11 08/16/2023    CREATININE 0.78 08/16/2023    CREATININE 0.74 (L) 08/16/2023    EGFRIFAFRI >60 05/20/2019    EGFRIFNONA 100 01/28/2022    BCR 14.1 08/16/2023    BCR 14.9 08/16/2023    ANIONGAP 14.0 08/16/2023    ANIONGAP 14.0 08/16/2023     Lab Results   Component Value Date    WBC 35.69 (C) 08/23/2023    RBC 3.09 (L) 08/23/2023    HGB 8.6 (L) 08/23/2023    HCT 27.9 (L) 08/23/2023    MCV 90.3 08/23/2023    MCH 27.8 08/23/2023    MCHC 30.8 (L) 08/23/2023    RDW 18.5 (H) 08/23/2023    RDWSD 58.3 (H) 08/23/2023    MPV 8.6 08/23/2023     (H) 08/23/2023    NEUTRORELPCT 90.2 (H) 08/23/2023    LYMPHORELPCT 3.2 (L) 08/23/2023    MONORELPCT 5.4 08/23/2023    EOSRELPCT 1.1 08/23/2023    BASORELPCT 0.1 08/23/2023    AUTOIGPER 0.4 07/07/2022    NEUTROABS 32.21 (H) 08/23/2023    LYMPHSABS 1.13 08/23/2023    MONOSABS 1.91 (H) 08/23/2023    EOSABS 0.39 08/23/2023    BASOSABS 0.05 08/23/2023    AUTOIGNUM 0.05 07/07/2022    NRBC 0.0 07/31/2023       The above labs have been reviewed. No dose adjustments are needed for the oral specialty medication(s) based on the labs.    Initial Education Provided for Specialty Medication  The patient has been provided with the following education. All questions and concerns have been addressed prior to the patient receiving the medication, and the patient has verbalized understanding of the education and any materials provided.  Additional patient education shall be provided and documented upon request by the patient, provider or payer.      Provided patient with:   Education sheets about the medication, 24 hour clinic contact information    Medication Education Sheets Provided:   Oral Specialty Medication: Tepmetko (tepotinib)  Other Education Sheets Provided: (select all that apply)  Adherence and Diarrhea    TOPICS COMMENTS   Storage and Handling of Oral Specialty Medication Store in the original container, in a dry location out of  direct sunlight, and out of reach of children or pets. and Store at room temperature.  Discussed safe handling and what to do with any unused medication.   Administration of Oral Specialty Medication Take with food at the same time(s) each day.   Adherence to Oral Specialty Regimen and Handling Missed Doses Patient is likely to have good treatment adherence; reinforced the importance of adherence. Reviewed how to address missed doses and to let us know of any missed doses.   Anemia: role of RBC, cause, s/s, ways to manage, role of transfusion Patient has baseline low hemoglobin. Reviewed the role of RBC and the use of transfusions if hemoglobin decreases too much.  Patient to notify us if they experience shortness of breath, dizziness, or palpitations.  Also let patient know they could feel more tired than usual and to try to stay active, but rest if they need to.    Neutropenia: role of WBC, cause, infection precautions, s/s of infection, when to call MD Reviewed the role of WBC, good infection prevention practices, and when to call the clinic (temperature 100.4F, sore throat, burning urination, etc)  COVID Vaccines: 2 doses plus 2 boosters  Flu Vaccine: 2022 flu vaccine received   Nutrition and Appetite Changes:  importance of maintaining healthy diet & weight, ways to manage to improve intake, dietary consult, exercise regimen, electrolyte and/or blood glucose abnormalities Patient reports significant weight loss and requesting refill on megace suspension. Instructed the patient to contact the clinic if losing weight or having difficulty eating enough to maintain energy level.   Diarrhea: causes, s/s of dehydration, ways to manage, dietary changes, when to call MD Chemotherapy : Discussed the risk of diarrhea. Emphasized the importance of calling the clinic if 4-6 episodes in 24 hours not relieved by OTC loperamide.   Constipation: causes, ways to manage, dietary changes, when to call MD Provided supplementary  handout with instructions for use of docusate and other OTC therapies to manage constipation.  Instructed to call us if medications aren't working.   Nausea/Vomiting: cause, use of antiemetics, dietary changes, when to call MD Emetic risk: Low  PRN home meds: Ondansetron  Pharmacy home meds sent to: patient reports having supply at home    Instructed the patient to take a dose of the PRN medication at the first onset of nausea and if it's not working to call us for additional medications.  Also provided non-drug measures to mitigate nausea.   Alopecia: cause, ways to manage, resources Discussed the possibility of hair loss with the patient. Informed patient that they could request a prescription for a wig if desired and most of the cost is usually covered by insurance. Recommended covering the head with a hat and/or protecting the skin on the head with SPF 30 or higher.    Organ Toxicities: cause, s/s, need for diagnostic tests, labs, when to notify MD Discussed potential effects on organ systems, monitoring, diagnostic tests, labs, and when to notify their MD. Discussed the signs/symptoms of the following: hepatotoxicity, lung changes, and nephrotoxicity   Miscellaneous Financial Issues: Financial assistance needed for medication  Lab Draws: On days 1 and 15 of cycles 1-3, then starting with cycle 4 labs on day 1 of each cycle thereafter   Infertility and Sexuality:  causes, fertility preservation options, sexuality changes, ways to manage, importance of birth control The patient is not sexually active with a woman of childbearing potential.   Home Care: how to manage bodily fluids Counseled on management of soiled linens and proper flush technique.  Discussed how to manage all the side effects at home and advised when to contact the MD office   Survivorship: distress, distress assessment, secondary malignancies, early/late effects, follow-up, social issues, social support      Adherence and  Self-Administration  Barriers to Patient Adherence and/or Self-Administration: none  Methods for Supporting Patient Adherence and/or Self-Administration:  supportive wife and patient manages his own medications  Expected duration of therapy: Until disease progression or intolerable toxicity    Goals of Therapy  Patient Goals of Therapy:   Consistently take medications as prescribed  Patient will adhere to medication regimen  Patient will report any medication side effects to healthcare provider  Clinical Goals:    Goals        Specialty Pharmacy General Goal      Clinical goal/therapeutic target: disease control, per the recent oncology clinic notes and labs.              Support patient understanding of medication regimen  Ensure patient knows the pharmacy contact information  Schedule regular follow-up to monitor the treatment serious adverse events  Schedule regular follow-up to confirm medication adherence  Schedule regular follow-up to monitor disease progression or stability    Quality of Life Assessment   The patient's current (pre-therapy) quality of life was discussed with the patient. The QOL segment of this outreach has been reviewed and updated.   Quality of Life Score: 3/10    Reassessment Plan & Follow-Up  Pharmacist to perform regular reassessments no more than (6) months from the previous assessment.  Welcome information and patient satisfaction survey to be sent by retail team with patient's initial fill.  Care Coordinator to set up future refill outreaches, coordinate prescription delivery, and escalate clinical questions to pharmacist.     Additional Plans, Therapy Recommendations or Therapy Problems to Be Addressed: Medication access     Attestation I attest the patient was actively involved in and has agreed to the above plan of care. If the prescribed therapy is at any point deemed not appropriate based on the current or future assessments, a consultation will be initiated with the patient's  specialty care provider to determine the best course of action. The revised plan of therapy will be documented along with any required assessments and/or additional patient education provided.       Alysha CURTIS, PharmD      Date and Time: 8/23/2023  15:44 EDT

## 2023-08-23 NOTE — PROGRESS NOTES
Specialty Pharmacy Note     Jc prior auth for tepmetko was denied on 8/18/23 and first appeal was also denied on 8/21/23. Will start working on getting Jc financial assistance thru manufacture [employer].    Lan Doe - CARE COORDINATOR  8/23/2023  11:31 EDT    [employer] PAP approved until 12/31/23 for Tepmetko.    Lan Doe - CARE COORDINATOR  8/28/2023  09:27 EDT

## 2023-08-24 RX ORDER — MEGESTROL ACETATE 40 MG/ML
400 SUSPENSION ORAL DAILY
Qty: 240 ML | Refills: 3 | Status: SHIPPED | OUTPATIENT
Start: 2023-08-24

## 2023-08-25 ENCOUNTER — TELEPHONE (OUTPATIENT)
Dept: ONCOLOGY | Facility: CLINIC | Age: 58
End: 2023-08-25
Payer: COMMERCIAL

## 2023-08-25 NOTE — TELEPHONE ENCOUNTER
----- Message from MULUGETA Garcia sent at 8/24/2023  4:32 PM EDT -----  His WBC is higher than what has been his normal baseline.  Please call and assess for any signs or symptoms of infection.  Thank you

## 2023-08-25 NOTE — TELEPHONE ENCOUNTER
Called pt and assessed for any signs or symptoms of infection, pt denies any fevers, cough or urinary symptoms.

## 2023-08-29 ENCOUNTER — DOCUMENTATION (OUTPATIENT)
Dept: PHARMACY | Facility: HOSPITAL | Age: 58
End: 2023-08-29
Payer: COMMERCIAL

## 2023-08-29 NOTE — PROGRESS NOTES
Specialty Pharmacy Note     Called and talked to Jc today about scheduling his tepmetko. He said that [facility] pharmacy called him yesterday and the estimated delivery date for his tepmetko is Thursday 8/31/23. Jc told me he will start his medication on Friday 9/1/23 since he does not know what time his medication will arrive to him home on Thursday.    Lan Doe - CARE COORDINATOR  8/29/2023  10:58 EDT

## 2023-08-30 ENCOUNTER — DOCUMENTATION (OUTPATIENT)
Dept: ONCOLOGY | Facility: CLINIC | Age: 58
End: 2023-08-30
Payer: COMMERCIAL

## 2023-08-30 ENCOUNTER — SPECIALTY PHARMACY (OUTPATIENT)
Dept: PHARMACY | Facility: HOSPITAL | Age: 58
End: 2023-08-30
Payer: COMMERCIAL

## 2023-08-30 ENCOUNTER — HOSPITAL ENCOUNTER (OUTPATIENT)
Dept: PET IMAGING | Facility: HOSPITAL | Age: 58
Discharge: HOME | End: 2023-08-30
Admitting: INTERNAL MEDICINE
Payer: COMMERCIAL

## 2023-08-30 ENCOUNTER — LAB (OUTPATIENT)
Dept: LAB | Facility: HOSPITAL | Age: 58
End: 2023-08-30
Payer: COMMERCIAL

## 2023-08-30 ENCOUNTER — TELEPHONE (OUTPATIENT)
Dept: ONCOLOGY | Facility: CLINIC | Age: 58
End: 2023-08-30
Payer: COMMERCIAL

## 2023-08-30 DIAGNOSIS — C34.91 NON-SMALL CELL CARCINOMA OF LUNG, RIGHT: ICD-10-CM

## 2023-08-30 DIAGNOSIS — C34.92 NSCLC OF LEFT LUNG: ICD-10-CM

## 2023-08-30 DIAGNOSIS — E87.1 HYPONATREMIA: ICD-10-CM

## 2023-08-30 DIAGNOSIS — R79.89 ELEVATED LIVER FUNCTION TESTS: ICD-10-CM

## 2023-08-30 DIAGNOSIS — E86.0 DEHYDRATION: ICD-10-CM

## 2023-08-30 DIAGNOSIS — C34.31 CANCER OF LOWER LOBE OF RIGHT LUNG: ICD-10-CM

## 2023-08-30 DIAGNOSIS — C34.31 CANCER OF LOWER LOBE OF RIGHT LUNG: Primary | ICD-10-CM

## 2023-08-30 LAB
ALBUMIN SERPL-MCNC: 2.3 G/DL (ref 3.5–5.2)
ALBUMIN/GLOB SERPL: 0.7 G/DL
ALP SERPL-CCNC: 252 U/L (ref 39–117)
ALT SERPL W P-5'-P-CCNC: 26 U/L (ref 1–41)
ANION GAP SERPL CALCULATED.3IONS-SCNC: 13 MMOL/L (ref 5–15)
AST SERPL-CCNC: 16 U/L (ref 1–40)
BASOPHILS # BLD AUTO: 0.04 10*3/MM3 (ref 0–0.2)
BASOPHILS NFR BLD AUTO: 0.1 % (ref 0–1.5)
BILIRUB SERPL-MCNC: 0.4 MG/DL (ref 0–1.2)
BUN SERPL-MCNC: 12 MG/DL (ref 6–20)
BUN/CREAT SERPL: 17.1 (ref 7–25)
CALCIUM SPEC-SCNC: 8.6 MG/DL (ref 8.6–10.5)
CHLORIDE SERPL-SCNC: 86 MMOL/L (ref 98–107)
CO2 SERPL-SCNC: 25 MMOL/L (ref 22–29)
CREAT SERPL-MCNC: 0.7 MG/DL (ref 0.76–1.27)
DEPRECATED RDW RBC AUTO: 56.2 FL (ref 37–54)
EGFRCR SERPLBLD CKD-EPI 2021: 106.1 ML/MIN/1.73
EOSINOPHIL # BLD AUTO: 0.09 10*3/MM3 (ref 0–0.4)
EOSINOPHIL NFR BLD AUTO: 0.2 % (ref 0.3–6.2)
ERYTHROCYTE [DISTWIDTH] IN BLOOD BY AUTOMATED COUNT: 18.3 % (ref 12.3–15.4)
GLOBULIN UR ELPH-MCNC: 3.4 GM/DL
GLUCOSE SERPL-MCNC: 103 MG/DL (ref 65–99)
HCT VFR BLD AUTO: 21.4 % (ref 37.5–51)
HGB BLD-MCNC: 6.7 G/DL (ref 13–17.7)
LYMPHOCYTES # BLD AUTO: 0.6 10*3/MM3 (ref 0.7–3.1)
LYMPHOCYTES NFR BLD AUTO: 1.6 % (ref 19.6–45.3)
MAGNESIUM SERPL-MCNC: 1.8 MG/DL (ref 1.6–2.6)
MCH RBC QN AUTO: 27.8 PG (ref 26.6–33)
MCHC RBC AUTO-ENTMCNC: 31.3 G/DL (ref 31.5–35.7)
MCV RBC AUTO: 88.8 FL (ref 79–97)
MONOCYTES # BLD AUTO: 1.59 10*3/MM3 (ref 0.1–0.9)
MONOCYTES NFR BLD AUTO: 4.2 % (ref 5–12)
NEUTROPHILS NFR BLD AUTO: 35.24 10*3/MM3 (ref 1.7–7)
NEUTROPHILS NFR BLD AUTO: 93.9 % (ref 42.7–76)
PLATELET # BLD AUTO: 441 10*3/MM3 (ref 140–450)
PMV BLD AUTO: 8.5 FL (ref 6–12)
POTASSIUM SERPL-SCNC: 2.9 MMOL/L (ref 3.5–5.2)
PROT SERPL-MCNC: 5.7 G/DL (ref 6–8.5)
RBC # BLD AUTO: 2.41 10*6/MM3 (ref 4.14–5.8)
SODIUM SERPL-SCNC: 124 MMOL/L (ref 136–145)
WBC NRBC COR # BLD: 37.56 10*3/MM3 (ref 3.4–10.8)

## 2023-08-30 PROCEDURE — 36415 COLL VENOUS BLD VENIPUNCTURE: CPT

## 2023-08-30 PROCEDURE — 74177 CT ABD & PELVIS W/CONTRAST: CPT

## 2023-08-30 PROCEDURE — 80053 COMPREHEN METABOLIC PANEL: CPT | Performed by: INTERNAL MEDICINE

## 2023-08-30 PROCEDURE — 25510000001 IOPAMIDOL PER 1 ML: Performed by: INTERNAL MEDICINE

## 2023-08-30 PROCEDURE — 85025 COMPLETE CBC W/AUTO DIFF WBC: CPT

## 2023-08-30 PROCEDURE — 83735 ASSAY OF MAGNESIUM: CPT | Performed by: INTERNAL MEDICINE

## 2023-08-30 RX ORDER — ONDANSETRON HYDROCHLORIDE 8 MG/1
8 TABLET, FILM COATED ORAL 3 TIMES DAILY PRN
Qty: 30 TABLET | Refills: 5 | Status: SHIPPED | OUTPATIENT
Start: 2023-08-30

## 2023-08-30 RX ADMIN — IOPAMIDOL 100 ML: 755 INJECTION, SOLUTION INTRAVENOUS at 14:01

## 2023-08-30 NOTE — PROGRESS NOTES
Called and spoke with Jc regarding his abnormal labs.  His hemoglobin is 6.7 g/dL, potassium 2.9, sodium 124.  Discussed that Dr. Gaspar would like him to go to the emergency room for evaluation.  We discussed that with a sodium level that low he could be at risk for neurological complications such as seizure.  He did voice understanding but was unsure that he would go to the ED for evaluation tonight as he did not see how it would be feasible for him to travel to 40 miles to [facility] ED.  Verbalized that it would be fine for him to report to the nearest emergency room and to do that via EMS.  He was still uncertain if he would do so and asked what to do if he did not go to the emergency room tonight.  I again reiterated that the best plan would be to go to the ER for evaluation but that if he decided not to do so he should contact our office first thing in the morning for further instruction.

## 2023-08-30 NOTE — TELEPHONE ENCOUNTER
Attempted to call pt to discuss critical lab values from today. No answer, no VM set up. Called pt's wife, no answer. Detailed message with callback number left on identifying VM.

## 2023-08-31 ENCOUNTER — TELEPHONE (OUTPATIENT)
Dept: ONCOLOGY | Facility: CLINIC | Age: 58
End: 2023-08-31
Payer: COMMERCIAL

## 2023-08-31 ENCOUNTER — SPECIALTY PHARMACY (OUTPATIENT)
Dept: PHARMACY | Facility: HOSPITAL | Age: 58
End: 2023-08-31
Payer: COMMERCIAL

## 2023-08-31 ENCOUNTER — HOSPITAL ENCOUNTER (OUTPATIENT)
Dept: INFUSION THERAPY | Facility: HOSPITAL | Age: 58
Discharge: HOME | End: 2023-08-31
Payer: COMMERCIAL

## 2023-08-31 DIAGNOSIS — D64.9 ANEMIA, UNSPECIFIED TYPE: ICD-10-CM

## 2023-08-31 DIAGNOSIS — E87.1 HYPONATREMIA: Primary | ICD-10-CM

## 2023-08-31 DIAGNOSIS — E87.6 HYPOKALEMIA: ICD-10-CM

## 2023-08-31 DIAGNOSIS — C34.91 NON-SMALL CELL CARCINOMA OF LUNG, RIGHT: ICD-10-CM

## 2023-08-31 DIAGNOSIS — Z45.2 ENCOUNTER FOR CARE RELATED TO VASCULAR ACCESS PORT: Primary | ICD-10-CM

## 2023-08-31 DIAGNOSIS — E87.1 HYPONATREMIA: ICD-10-CM

## 2023-08-31 DIAGNOSIS — C34.91 NON-SMALL CELL CARCINOMA OF LUNG, RIGHT: Primary | ICD-10-CM

## 2023-08-31 LAB
ABO GROUP BLD: NORMAL
ALBUMIN SERPL-MCNC: 2.8 G/DL (ref 3.5–5.2)
ALBUMIN/GLOB SERPL: 0.7 G/DL
ALP SERPL-CCNC: 314 U/L (ref 39–117)
ALT SERPL W P-5'-P-CCNC: 27 U/L (ref 1–41)
ANION GAP SERPL CALCULATED.3IONS-SCNC: 14 MMOL/L (ref 5–15)
ANISOCYTOSIS BLD QL: ABNORMAL
AST SERPL-CCNC: 16 U/L (ref 1–40)
BB HOLD TUBE: NORMAL
BILIRUB SERPL-MCNC: 0.4 MG/DL (ref 0–1.2)
BLD GP AB SCN SERPL QL: NEGATIVE
BUN SERPL-MCNC: 11 MG/DL (ref 6–20)
BUN/CREAT SERPL: 14.9 (ref 7–25)
CALCIUM SPEC-SCNC: 9.6 MG/DL (ref 8.6–10.5)
CHLORIDE SERPL-SCNC: 89 MMOL/L (ref 98–107)
CO2 SERPL-SCNC: 26 MMOL/L (ref 22–29)
CREAT SERPL-MCNC: 0.74 MG/DL (ref 0.76–1.27)
DEPRECATED RDW RBC AUTO: 59.5 FL (ref 37–54)
DIMORPHIC RBC: PRESENT
EGFRCR SERPLBLD CKD-EPI 2021: 104.4 ML/MIN/1.73
ERYTHROCYTE [DISTWIDTH] IN BLOOD BY AUTOMATED COUNT: 19 % (ref 12.3–15.4)
GIANT PLATELETS: ABNORMAL
GLOBULIN UR ELPH-MCNC: 3.8 GM/DL
GLUCOSE SERPL-MCNC: 97 MG/DL (ref 65–99)
HCT VFR BLD AUTO: 23.3 % (ref 37.5–51)
HGB BLD-MCNC: 7.6 G/DL (ref 13–17.7)
LYMPHOCYTES # BLD MANUAL: 0.87 10*3/MM3 (ref 0.7–3.1)
LYMPHOCYTES NFR BLD MANUAL: 3 % (ref 5–12)
MCH RBC QN AUTO: 27.4 PG (ref 26.6–33)
MCHC RBC AUTO-ENTMCNC: 32.6 G/DL (ref 31.5–35.7)
MCV RBC AUTO: 84 FL (ref 79–97)
METAMYELOCYTES NFR BLD MANUAL: 1 % (ref 0–0)
MONOCYTES # BLD: 1.31 10*3/MM3 (ref 0.1–0.9)
NEUTROPHILS # BLD AUTO: 40.89 10*3/MM3 (ref 1.7–7)
NEUTROPHILS NFR BLD MANUAL: 88 % (ref 42.7–76)
NEUTS BAND NFR BLD MANUAL: 6 % (ref 0–5)
PLATELET # BLD AUTO: 530 10*3/MM3 (ref 140–450)
PMV BLD AUTO: 6.8 FL (ref 6–12)
POIKILOCYTOSIS BLD QL SMEAR: ABNORMAL
POTASSIUM SERPL-SCNC: 3.3 MMOL/L (ref 3.5–5.2)
PROT SERPL-MCNC: 6.6 G/DL (ref 6–8.5)
RBC # BLD AUTO: 2.77 10*6/MM3 (ref 4.14–5.8)
RH BLD: POSITIVE
SCAN SLIDE: NORMAL
SMALL PLATELETS BLD QL SMEAR: ABNORMAL
SODIUM SERPL-SCNC: 129 MMOL/L (ref 136–145)
T&S EXPIRATION DATE: NORMAL
TOXIC GRANULATION: ABNORMAL
VARIANT LYMPHS NFR BLD MANUAL: 2 % (ref 19.6–45.3)
WBC NRBC COR # BLD: 43.5 10*3/MM3 (ref 3.4–10.8)

## 2023-08-31 PROCEDURE — 96360 HYDRATION IV INFUSION INIT: CPT

## 2023-08-31 PROCEDURE — 36430 TRANSFUSION BLD/BLD COMPNT: CPT

## 2023-08-31 PROCEDURE — 80053 COMPREHEN METABOLIC PANEL: CPT | Performed by: INTERNAL MEDICINE

## 2023-08-31 PROCEDURE — 36415 COLL VENOUS BLD VENIPUNCTURE: CPT

## 2023-08-31 PROCEDURE — 85025 COMPLETE CBC W/AUTO DIFF WBC: CPT | Performed by: INTERNAL MEDICINE

## 2023-08-31 PROCEDURE — G0463 HOSPITAL OUTPT CLINIC VISIT: HCPCS

## 2023-08-31 PROCEDURE — 86923 COMPATIBILITY TEST ELECTRIC: CPT

## 2023-08-31 PROCEDURE — 25010000002 HEPARIN LOCK FLUSH PER 10 UNITS: Performed by: INTERNAL MEDICINE

## 2023-08-31 PROCEDURE — P9016 RBC LEUKOCYTES REDUCED: HCPCS

## 2023-08-31 PROCEDURE — 85007 BL SMEAR W/DIFF WBC COUNT: CPT | Performed by: INTERNAL MEDICINE

## 2023-08-31 PROCEDURE — 96366 THER/PROPH/DIAG IV INF ADDON: CPT

## 2023-08-31 PROCEDURE — 86901 BLOOD TYPING SEROLOGIC RH(D): CPT | Performed by: INTERNAL MEDICINE

## 2023-08-31 PROCEDURE — 86900 BLOOD TYPING SEROLOGIC ABO: CPT | Performed by: INTERNAL MEDICINE

## 2023-08-31 PROCEDURE — 86850 RBC ANTIBODY SCREEN: CPT | Performed by: INTERNAL MEDICINE

## 2023-08-31 PROCEDURE — 86900 BLOOD TYPING SEROLOGIC ABO: CPT

## 2023-08-31 RX ORDER — SODIUM CHLORIDE 9 MG/ML
1000 INJECTION, SOLUTION INTRAVENOUS ONCE
Status: COMPLETED | OUTPATIENT
Start: 2023-08-31 | End: 2023-08-31

## 2023-08-31 RX ORDER — SODIUM CHLORIDE 9 MG/ML
1000 INJECTION, SOLUTION INTRAVENOUS ONCE
Status: CANCELLED | OUTPATIENT
Start: 2023-08-31

## 2023-08-31 RX ORDER — SODIUM CHLORIDE 0.9 % (FLUSH) 0.9 %
20 SYRINGE (ML) INJECTION AS NEEDED
Status: DISCONTINUED | OUTPATIENT
Start: 2023-08-31 | End: 2023-09-02 | Stop reason: HOSPADM

## 2023-08-31 RX ORDER — HEPARIN SODIUM (PORCINE) LOCK FLUSH IV SOLN 100 UNIT/ML 100 UNIT/ML
500 SOLUTION INTRAVENOUS AS NEEDED
OUTPATIENT
Start: 2023-08-31

## 2023-08-31 RX ORDER — SODIUM CHLORIDE 9 MG/ML
250 INJECTION, SOLUTION INTRAVENOUS AS NEEDED
Status: CANCELLED | OUTPATIENT
Start: 2023-08-31

## 2023-08-31 RX ORDER — HEPARIN SODIUM (PORCINE) LOCK FLUSH IV SOLN 100 UNIT/ML 100 UNIT/ML
500 SOLUTION INTRAVENOUS AS NEEDED
Status: DISCONTINUED | OUTPATIENT
Start: 2023-08-31 | End: 2023-09-02 | Stop reason: HOSPADM

## 2023-08-31 RX ORDER — SODIUM CHLORIDE 9 MG/ML
250 INJECTION, SOLUTION INTRAVENOUS AS NEEDED
Status: DISCONTINUED | OUTPATIENT
Start: 2023-08-31 | End: 2023-09-02 | Stop reason: HOSPADM

## 2023-08-31 RX ORDER — SODIUM CHLORIDE 0.9 % (FLUSH) 0.9 %
20 SYRINGE (ML) INJECTION AS NEEDED
OUTPATIENT
Start: 2023-08-31

## 2023-08-31 RX ADMIN — SODIUM CHLORIDE 1000 ML: 9 INJECTION, SOLUTION INTRAVENOUS at 12:06

## 2023-08-31 RX ADMIN — Medication 20 ML: at 15:21

## 2023-08-31 RX ADMIN — Medication 500 UNITS: at 15:21

## 2023-08-31 NOTE — TELEPHONE ENCOUNTER
Called pt to follow-up on critical lab values. Pt stated that the NP called him last night and advised that he go to the ER, but he refused. Pt stated that he would be agreeable to go to [facility] for blood and fluids. Spoke to Dr. Gaspar who was agreeable for the pt to get blood and fluids at ACU. Appt scheduled, appt information given to pt.

## 2023-08-31 NOTE — PROGRESS NOTES
Specialty Pharmacy Note: Tepmetko (tepotinib)    Pt has not yet started medication, but had baseline labs drawn on 8/30, then repeat labs on 8/31 with IVF and blood transfusion given lab results below:     Latest Reference Range & Units 08/30/23 14:00 08/31/23   Hemoglobin 13.0 - 17.7 g/dL 6.7 (LL) 7.6 (L)   (LL): Data is critically low  (L): Data is abnormally low       Latest Reference Range & Units 08/30/23 14:00 08/31/23   Sodium 136 - 145 mmol/L 124 (L) 129 (L)   Potassium 3.5 - 5.2 mmol/L 2.9 (L) 3.3 (L)   (L): Data is abnormally low    Per tepotinib package insert, decreased hemoglobin incidence is 27% (grade 3/4 -2%) and decreased serum sodium is 31% and decreased serum potassium is 25%. Discussed with Dr. Gaspar if pt would be okay to start oral medication tomorrow or Saturday when patient has medication delivered. Per Dr. Gaspar, pt okay to start given repeat labs tomorrow, 9/1/23 that results same day. Discussed with spouse and pt preferred to have labs in [place]; however, spoke with [place] office and they are unable to get lab results same day. Relayed information to spouse and pt will get labs drawn tomorrow AM. Discussed that someone from office would contact her or the pt tomorrow to let them know if pt can start medication over the weekend. Pt spouse verbalized understanding.      Thank you,  Mandie Alberto, Pharm.D.

## 2023-09-01 ENCOUNTER — SPECIALTY PHARMACY (OUTPATIENT)
Dept: PHARMACY | Facility: HOSPITAL | Age: 58
End: 2023-09-01
Payer: COMMERCIAL

## 2023-09-01 ENCOUNTER — TELEPHONE (OUTPATIENT)
Dept: ONCOLOGY | Facility: CLINIC | Age: 58
End: 2023-09-01
Payer: COMMERCIAL

## 2023-09-01 ENCOUNTER — HOSPITAL ENCOUNTER (OUTPATIENT)
Dept: ONCOLOGY | Facility: HOSPITAL | Age: 58
Discharge: HOME | End: 2023-09-01
Payer: COMMERCIAL

## 2023-09-01 ENCOUNTER — LAB (OUTPATIENT)
Dept: LAB | Facility: HOSPITAL | Age: 58
End: 2023-09-01
Payer: COMMERCIAL

## 2023-09-01 VITALS
HEART RATE: 99 BPM | SYSTOLIC BLOOD PRESSURE: 89 MMHG | RESPIRATION RATE: 16 BRPM | TEMPERATURE: 97 F | OXYGEN SATURATION: 100 % | DIASTOLIC BLOOD PRESSURE: 54 MMHG

## 2023-09-01 DIAGNOSIS — D64.9 ANEMIA, UNSPECIFIED TYPE: ICD-10-CM

## 2023-09-01 DIAGNOSIS — E87.6 HYPOKALEMIA: ICD-10-CM

## 2023-09-01 DIAGNOSIS — E87.1 HYPONATREMIA: ICD-10-CM

## 2023-09-01 LAB
ALBUMIN SERPL-MCNC: 2.6 G/DL (ref 3.5–5.2)
ALBUMIN/GLOB SERPL: 0.6 G/DL
ALP SERPL-CCNC: 259 U/L (ref 39–117)
ALT SERPL W P-5'-P-CCNC: 20 U/L (ref 1–41)
ANION GAP SERPL CALCULATED.3IONS-SCNC: 16 MMOL/L (ref 5–15)
AST SERPL-CCNC: 11 U/L (ref 1–40)
BASOPHILS # BLD AUTO: 0.05 10*3/MM3 (ref 0–0.2)
BASOPHILS NFR BLD AUTO: 0.1 % (ref 0–1.5)
BH BB BLOOD EXPIRATION DATE: NORMAL
BH BB BLOOD TYPE BARCODE: 6200
BH BB DISPENSE STATUS: NORMAL
BH BB PRODUCT CODE: NORMAL
BH BB UNIT NUMBER: NORMAL
BILIRUB SERPL-MCNC: 0.6 MG/DL (ref 0–1.2)
BUN SERPL-MCNC: 8 MG/DL (ref 6–20)
BUN/CREAT SERPL: 11.1 (ref 7–25)
CALCIUM SPEC-SCNC: 9.3 MG/DL (ref 8.6–10.5)
CHLORIDE SERPL-SCNC: 89 MMOL/L (ref 98–107)
CO2 SERPL-SCNC: 25 MMOL/L (ref 22–29)
CREAT SERPL-MCNC: 0.72 MG/DL (ref 0.76–1.27)
CROSSMATCH INTERPRETATION: NORMAL
DEPRECATED RDW RBC AUTO: 54.9 FL (ref 37–54)
EGFRCR SERPLBLD CKD-EPI 2021: 105.2 ML/MIN/1.73
EOSINOPHIL # BLD AUTO: 0.07 10*3/MM3 (ref 0–0.4)
EOSINOPHIL NFR BLD AUTO: 0.2 % (ref 0.3–6.2)
ERYTHROCYTE [DISTWIDTH] IN BLOOD BY AUTOMATED COUNT: 17.8 % (ref 12.3–15.4)
GLOBULIN UR ELPH-MCNC: 4.1 GM/DL
GLUCOSE SERPL-MCNC: 147 MG/DL (ref 65–99)
HCT VFR BLD AUTO: 26.1 % (ref 37.5–51)
HGB BLD-MCNC: 8.4 G/DL (ref 13–17.7)
LYMPHOCYTES # BLD AUTO: 0.59 10*3/MM3 (ref 0.7–3.1)
LYMPHOCYTES NFR BLD AUTO: 1.3 % (ref 19.6–45.3)
MCH RBC QN AUTO: 28.5 PG (ref 26.6–33)
MCHC RBC AUTO-ENTMCNC: 32.2 G/DL (ref 31.5–35.7)
MCV RBC AUTO: 88.5 FL (ref 79–97)
MONOCYTES # BLD AUTO: 1.66 10*3/MM3 (ref 0.1–0.9)
MONOCYTES NFR BLD AUTO: 3.7 % (ref 5–12)
NEUTROPHILS NFR BLD AUTO: 42.18 10*3/MM3 (ref 1.7–7)
NEUTROPHILS NFR BLD AUTO: 94.7 % (ref 42.7–76)
PLATELET # BLD AUTO: 449 10*3/MM3 (ref 140–450)
PMV BLD AUTO: 8.7 FL (ref 6–12)
POTASSIUM SERPL-SCNC: 2.8 MMOL/L (ref 3.5–5.2)
PROT SERPL-MCNC: 6.7 G/DL (ref 6–8.5)
RBC # BLD AUTO: 2.95 10*6/MM3 (ref 4.14–5.8)
SODIUM SERPL-SCNC: 130 MMOL/L (ref 136–145)
UNIT  ABO: NORMAL
UNIT  RH: NORMAL
WBC NRBC COR # BLD: 44.55 10*3/MM3 (ref 3.4–10.8)

## 2023-09-01 PROCEDURE — 85025 COMPLETE CBC W/AUTO DIFF WBC: CPT

## 2023-09-01 PROCEDURE — 36415 COLL VENOUS BLD VENIPUNCTURE: CPT

## 2023-09-01 PROCEDURE — 80053 COMPREHEN METABOLIC PANEL: CPT | Performed by: INTERNAL MEDICINE

## 2023-09-01 RX ORDER — POTASSIUM CHLORIDE 20 MEQ/1
20 TABLET, EXTENDED RELEASE ORAL DAILY
Qty: 30 TABLET | Refills: 0 | Status: SHIPPED | OUTPATIENT
Start: 2023-09-01

## 2023-09-01 RX ORDER — LEVOFLOXACIN 500 MG/1
500 TABLET, FILM COATED ORAL DAILY
Qty: 10 TABLET | Refills: 0 | Status: SHIPPED | OUTPATIENT
Start: 2023-09-01

## 2023-09-01 NOTE — TELEPHONE ENCOUNTER
Called pt to discuss rising white blood cell count. I told him that Dr. Gaspar would like to have blood cultures done and that I had already called [facility] and they would be able to draw them. Pt stated that his insurance will not pay for him to have things done at [place]. He asked if he could have it done at [facility]. I explained that his port would need to be accessed so I would have to check with the ACU. Pt stated that he would prefer to have it done on Monday due to transportation issues. Spoke to someone at ACU. She checked with management and said that they would prefer to wait until Tuesday due to the holiday. Spoke to Dr. Gaspar. She stated that pt should get cultures done over the weekend and that both cultures can be drawn peripherally if needed. She also ordered Levaquin. Pharmacist, Alysha, reached out to the pt to discuss his oral chemo and Levaquin and potassium prescriptions. She also discussed the blood cultures with him. Pt stated that he was unable to have cultures done and would have them done next week.

## 2023-09-01 NOTE — PROGRESS NOTES
Specialty Pharmacy Note: Tepotinib    Labs performed today show an increased wbc.  Patient was asked to present to ACU for blood cultures and he is willing to have cultures but cannot get them until next week due to transportation.  He is also not willing to go to the hospital at this time.  He is willing to start levofloxacin and will start taking that tonight, as well as the potassium for his hypokalemia.  Dr. Gaspar also would like for him to start the tepotinib since he is asymptomatic, and he plans to start medication on 9/2/23.    I advised patient to go to the hospital for any symptoms of infection such as sore throat, cough, malaise or for fever and patient did agree that he would go if he became symptomatic.    Thanks,    Alysha CURTIS, PharmD

## 2023-09-05 NOTE — PROGRESS NOTES
Hematology/Oncology Outpatient Follow Up    PATIENT NAME:Jc Driscoll  :1965  MRN: [id number]  PRIMARY CARE PHYSICIAN: Reshma Alvarenga MD  REFERRING PHYSICIAN: Reshma Alvarenga MD    Chief Complaint   Patient presents with    Follow-up     Non-small cell carcinoma of lung, right        HISTORY OF PRESENT ILLNESS:                                                                                                                                                                                                                                                             This is a 58 y.o. who developed left shoulder pain and for that reason he had a chest x-ray done on 19 which showed a 2.7 cm noncalcified right lower lobe nodule was identified.  For that reason a CT scan of the chest was recommended.  Review of his records shows patient had the CT scan on 19 done without contrast.  This basically showed a lobulated noncalcified mass in the posterior aspect of the right lower lobe measuring 3 cm close to the pleural surface.  There is a calcified 1.2 mm nodule in the lateral aspect of the right lower lobe consistent with a granuloma.  There were also calcified subcarinal and right hilar nodules.  There is a lower right side tracheal nodule measuring 1 cm.  Patient had a PET/CT scan done on 19 which showed increased metabolic activity on the right lower lobe mass that measures 2.5 cm with SUV of 4.4.  There was also increased metabolic activity in the subcarinal space measuring 2.8 cm in size with SUV of  3.4, increased activity in an enlarged right paratracheal lymph node that measures 1.9 cm, SUV of 5, also suspicious for metastatic adenopathy.  Patient had a CT-guided biopsy of the right lower lobe mass on 3/8/19.  This showed adenocarcinoma, TTF-1 positive.  On 3/18/19 patient underwent endoscopic ultrasound and biopsy of a subcarinal lymph node.  This was positive for malignant cells.  Patient was then taken back to surgery on 3/20/18 and had left Mediport placement by Dr. Fuchs.  He has been referred for further evaluation and management of his stage 3 adenocarcinoma of the right lung.  He was accompanied by his spouse for the appointment on 3/26/19.  Patient was scheduled to have a brain MRI for complete staging, but he could not do this due to claustrophobia.  He is willing to try with the help of angiolytics.    Patient had brain MRI done on 4/5/19.  He states it did not show any evidence of metastatic disease.  Evidence of chronic sinusitis was noted.    Patient was seen by Dr. Escalona who has recommended concurrent chemotherapy and radiation.  Patient is scheduled to begin on 4/15/19.   4/17/19 - Patient was initiated on combined chemotherapy and radiation with Cisplatin and Alimta.  Patient received cycle 1.      5/8/19 - Patient received cycle 2 of chemotherapy with Cisplatin and Alimta.   5/15/19 - Chemistry panel showed creatinine of 1.7.  WBC 1.8, hemoglobin 11.6, platelet count 72,000.   5/20/19 - BUN 10, creatinine 1.2, potassium 3.5.    5/23/19 - CT scan of the chest with contrast showed interval decrease in size of the right lower lobe pulmonary nodule now measuring 2.1 cm in size.  There was no mediastinal or hilar adenopathy identified.  6/26/2019 patient underwent right lower lobectomy with hilar and mediastinal lymph node dissection performed by Dr. Terrance Mckeon.  Pathology revealed residual residual 2.2 cm invasive moderately differentiated adenocarcinoma.  There were a total of 16 lymph  nodes evaluated that 7 had metastatic disease.  There was evidence of lymphovascular invasion, extranodal involvement.  All the surgical margins were negative and distance of the closest margin was 2.5 cm.  Logic stage is T1c N2 M0.  Patient is here today accompanied by his spouse for this appointment.  He continues to complain of some postop discomfort, numbness but his skin is intact.  There is no drainage.  Has had follow-up with Dr. Fuchs.  8/14/2019-seen by Dr. Maria at the [facility].  Dr. Maria has recommended immunotherapy with durvalumab off label use as patient has not had significant response to neoadjuvant chemotherapy and radiation.  Patient was given the option to have immunotherapy at the [facility] vs [place] and he has chosen to stay in [place]  8/21/19 - Started cycle 1 day 1 Imfinzi  9/4/2019 patient had CT scan of the chest this shows a moderate size right pleural effusion.  There are areas of groundglass opacification in the right upper lobe without any evidence of significant septal thickening.  Volume loss noted there is also moderate pleural effusion.  There is no suspicious recurrent malignancy.  There were nonenlarged residual mediastinal lymph nodes.  9/9/19 - WBC 6.23, Hgb 11.7 Platelets 257,000, , BUN 9, Cr 1.1,Vitamin B12 318, Ferritin 437  9/23/19 - received cycle 2 day 1 Imfinzi  10/9/19- Seen by Dr rodríguez with ENT for sinus disease  11/4/2019-patient received cycle 5 of Imfinzi(durvalumab)  12/2/2019 patient had cycle 7 of durvalumab  12/5/2019-patient had CT scan of the  abdomen and pelvis with small right pleural sided pleural effusion.There is no evidence of metastatic disease  12/30/2019-patient received cycle 9 of durvalumab  12/31/2019-patient had CT scan of the chest showed small to moderate left pleural effusion.  Iglesias appearing right lower paratracheal and right peribronchial soft tissue thickening without any discrete pathologically enlarged mediastinal or  hilar lymph nodes.  1/27/20-patient received cycle 11 of durvalumab  2/17/20-patient had a stress test which was negative for ischemia  2/17/2020-patient had CT of the chest which showed postop changes on the right lung, right pleural effusion but no enlarging mass was noted  3/2/2020-patient received cycle 12 of durvalumab  3/16/2020-patient had ultrasound-guided right thoracentesis.  Pathology was negative for malignancy  4/13/2020-patient received cycle 15 of immunotherapy with durvalumab  5/11/2020-patient received cycle 17 of immunotherapy with durvalumab  6/9/2020-patient had CT scan of the chest, abdomen and pelvis for lung cancer restaging.  There is stable small chronic loculated right basilar pleural effusion suggesting chronic pleuritis.  There is no evidence of metastatic disease in the abdomen or pelvis  7/20/2020 patient received cycle 22 of Durvalumab  8/17/2020 patient received cycle 24 and last cycle of durvalumab  9/24/2020 patient had CT scan of the chest, abdomen and pelvis for cancer restaging there was no evidence of local recurrence or metastatic disease within the abdomen and pelvis.  He has stable chronic small right pleural effusion.  Mild sigmoid diverticulosis was noted.  1/18/2021 patient had CT scan of the chest, abdomen and pelvis reviewed patient  5/24/2021 patient had CT scan of the chest calcified subcarinal lymph node consistent with changes of prior granulomatous disease.  Chronic small right pleural effusion is noted similar to prior exam.  Previously shown 4 mm nodule in the left lower lobe has nearly completely resolved.  The subpleural 3 mm nodule located within the posterior left lower lobe With resolved.  9/24/2021 patient had a CT scan of the chest, abdomen and pelvis there is soft tissue attenuation within the right paratracheal area which has been suggested to reflect sequela to previous treatment.  There is slight thickening of the bladder wall otherwise there is no  evidence of metastatic disease.  12/27/2021 patient had CT scan of the chest with contrast this basically showed irregular shaped noncalcified 7 mm left upper lobe pulmonary nodule.  PET CT scan was recommended to further evaluate.  1/26/2022 patient had a PET CT scan done which basically showed evidence of mild uptake corresponding to the nodule within the left upper lobe which is new worrisome for developing metastatic focus.  There was mild radiopharmaceutical activity corresponding to pleural thickening throughout the right lung base with associated pleural effusion likely related to post therapeutic changes and radiation changes in this region.  Metastatic malignancy involving the pleura could not be completely eliminated.  1/26/2022: CT-guided biopsy was aborted due to finding by the radiologist that the lung nodularity was actually a clump of tiny nodules of which the largest was 6 mm and therefore biopsy could not be completed.  Also the area was in the vicinity of multiple blood vessels with increased risk of bleeding.  Follow-up CT of the chest was recommended for continued surveillance  5/18/2022 patient had CT-guided biopsy of the left enlarging nodule, pathology was positive for invasive poorly differentiated adenocarcinoma most consistent with adenocarcinoma of pulmonary origin.  5/27/2022 patient had a PET CT scan which showed a 2.3 cm hypermetabolic left upper lobe nodule which has increased in size no convincing evidence of metastatic disease elsewhere within the chest.  5/21/2022 patient had brain MRI which did not show any evidence of intracranial metastasis  6/2/2022 patient was seen by Dr. Miryam Reyna pulmonary function test is being done to assess surgical candidacy  7/6/2022 patient underwent left heart Koska P video-assisted with upper lobectomy mediastinal lymph node dissection performed by Dr. Miryam Roach  Final pathology revealed poorly differentiated  A predominant adenocarcinoma with  solid component presenting 45% and micropapillary component 5% with extensive necrosis, invasive carcinoma measures 2.5 maximally there was focal lymphovascular space invasion all margins were negative for malignancy discussed with the closest margin was 2.5 cm pathology stage is pT1C pN1.  PD-L1 showed a tumor proportion score of 95%  8/3/2022 patient started adjuvant chemotherapy with cisplatin, Taxol  8/24/2022 patient received cycle 2 of combination chemotherapy with cisplatin and Taxol with Neulasta  10/5/2022: Patient received cycle 4-day 1 of combination chemotherapy with cisplatin and Taxol  Patient developed left-sided chest pain for that reason he had a PET/CT scan 1/20/2020 is basically revealed a 4 x 3.5 cm hypermetabolic soft tissue mass in the left chest wall between the first and second ribs anteriorly consistent with relapsed disease.  There was a small cluster of hypermetabolic lymph nodes seen in the left supraclavicular region consistent with early manoj metastasis  11/11/2022: 2D echocardiogram performed showing a left ventricular EF of 56 to 60%  11/22/2022: Cycle 1 of atezolizumab received  12/5/2022-12/9/2022: Hospitalized at [facility] for dehydration, hyponatremia, and weakness.  12/19/2020: Patient received cycle 2 of Tecentriq along with XRT  1/11/2023: Patient completed radiation.  Remains on Tecentriq every 3 weeks  1/18/2023 patient had a CT scan of the chest which basically revealed abnormal soft tissue interposed between the first and second rib with some erosive changes to the ribs this finding measures about 5 x 2.8 cm previously was 4.8 x 3.8 cm.  There is some pleural thickening about in this area nodular area within the prominent mediastinal fat in the upper chest measuring 1.7 cm more fluid as opposed to enlarged lymph nodes.  Possibility of pulmonary hypertension was also noted due to prominence of main pulmonary artery measuring 3.7 cm.  2/24/2023: CT of the chest with contrast  showed abnormal mass along the chest wall interposed between the first and second ribs that could relate to metastatic disease.  Some erosive changes to the ribs in this area.  Nodular area in the more superior mediastinal fat that looks like you to reflect more fluidlike attenuation as opposed to a pathologic lymph node.  New groundglass changes within the left upper lobe that could be secondary to inflammatory infectious process.  It is possible this may be posttreatment in nature.  There are some groundglass changes along the superior mediastinum and right upper lobe which are unchanged.  Bilateral pleural effusions noted.  This is developed on the left.  Right unchanged.  4/25/2023 CT of the chest with contrast done in short-term follow-up to reevaluate from previous scan: Increased consolidation in the upper left chest since previous study; increased size of left pleural effusion with loculation along its superior medial margin; no change in the soft tissue mass with osseous destruction of the left upper chest wall between the first and second ribs.  4/28/2023-4/30/2023: Hospitalization at [facility] for pneumonia.  7/5/2023-7/7/2023: Patient hospitalized at [facility] for hyponatremia, shortness of breath, weakness, leukocytosis-reactive to worsening left chest wall mass.  7/10/2023: Patient received cycle 1 of Enhertu    Past Medical History:   Diagnosis Date    Allergic rhinitis 07/25/2013    Overview:  4/2/2018 - still zyrtec 10 daily (if stops, gets dermatitis again).     Anxiety and depression 06/05/2012    Overview:  4/2/2018 -   C/w well 300 xr and Lito 20.  4/8/2019 -   Doing ok even with new lung cancer - lito 20 & well 300xr.     Chronic pansinusitis 04/08/2019    Overview:  4/8/2019 -   MRI showed chronic thick in frontal, maxillary, ethmoidal ---> to Dr. COLLAZO to est since abx ICC didn't help.  Does netipot bid.     Diastolic CHF 07/09/2014    Overview:  7/4/14 - impaired LV relaxation on Zhou ECHO.  EF 60%. Rest  normal    Dupuytren's contracture of both hands     Elevated cholesterol     Erosive esophagitis 07/09/2019    Overview:  EGD 2016 4/2/2018 - nexium OTC daily for few week.  Qod before that.  Now carbonation hurts, epigastric pain 4/8/2019 -   protonix 40 doing well.     Gastroesophageal reflux disease 02/21/2019    Hiatal hernia 07/09/2019    History of colon polyps     Hypertension     Lung cancer     right lung and in lymph nodes x2    Mediastinal lymphadenopathy 03/12/2019    Normocytic anemia 08/08/2019    Overview:  6/2019 - dropped to 9's postop 7/2019 - rebounded to 10's.  8/8/2019 -   Back to 9's - check ferritin, b12 and thyroid.     Prediabetes 07/09/2014    Overview:  7/4/14 - a1c 6.1 at [facility] admission    Retention of urine 06/27/2019    PSOT OP, RESOLVED       Past Surgical History:   Procedure Laterality Date    BRONCHOSCOPY  03/18/2019    EBUS MONTERO NEEDLE BX    COLONOSCOPY      DUPUYTREN CONTRACTURE RELEASE Bilateral     LUNG BIOPSY  03/08/2019    CT GUIDED    LUNG REMOVAL, PARTIAL Right     right lower    PORTACATH PLACEMENT  03/20/2019    DR LONGORIA    THORACOSCOPY Right 6/26/2019    Procedure: RIGHT VATS, OPEN LOWER LOBECTOMY WITH LYMPH NODE DISECTION, WEDGE RESECTION OF RIGHT MIDDLE LOBE;  Surgeon: Terrance Longoria MD;  Location: [facility] OR;  Service: Cardiothoracic    THORACOSCOPY VIDEO ASSISTED WITH LOBECTOMY Left 7/6/2022    Procedure: THORACOSCOPY VIDEO ASSISTED WITH LOBECTOMY;  Surgeon: Miryam Reyna MD;  Location: [facility] MAIN OR;  Service: Thoracic;  Laterality: Left;         Current Outpatient Medications:     buPROPion XL (WELLBUTRIN XL) 300 MG 24 hr tablet, Take 1 tablet by mouth Daily., Disp: , Rfl:     Constulose 10 GM/15ML solution, TAKE 30 ML BY MOUTH  EVERY 12 HOURS, Disp: 900 mL, Rfl: 0    desvenlafaxine (PRISTIQ) 50 MG 24 hr tablet, Take 1 tablet by mouth Daily., Disp: , Rfl:     dexamethasone (DECADRON) 4 MG tablet, Take 1 tablet by mouth Every 12 (Twelve) Hours., Disp:  14 tablet, Rfl: 0    folic acid (FOLVITE) 1 MG tablet, Take 1 tablet by mouth Daily., Disp: 30 tablet, Rfl: 3    gabapentin (NEURONTIN) 300 MG capsule, Take 2 capsules by mouth 3 (Three) Times a Day for 30 days. (Patient taking differently: Take 2 capsules by mouth every night at bedtime.), Disp: 180 capsule, Rfl: 2    levoFLOXacin (Levaquin) 500 MG tablet, Take 1 tablet by mouth Daily., Disp: 10 tablet, Rfl: 0    levothyroxine (SYNTHROID, LEVOTHROID) 125 MCG tablet, Take 1 tablet by mouth Every Morning., Disp: 30 tablet, Rfl: 0    liothyronine (CYTOMEL) 5 MCG tablet, Take 1 tablet by mouth Daily., Disp: , Rfl:     loratadine (CLARITIN) 10 MG tablet, Take 1 tablet by mouth Daily., Disp: , Rfl:     megestrol acetate (MEGACE) 400 MG/10ML suspension oral suspension, Take 10 mL by mouth Daily., Disp: 240 mL, Rfl: 3    ondansetron (ZOFRAN) 8 MG tablet, Take 1 tablet by mouth 3 (Three) Times a Day As Needed for Nausea or Vomiting., Disp: 30 tablet, Rfl: 5    oxyCODONE-acetaminophen (PERCOCET)  MG per tablet, Take 1 tablet by mouth Every 4 (Four) Hours As Needed for Moderate Pain., Disp: 60 tablet, Rfl: 0    pantoprazole (PROTONIX) 40 MG EC tablet, Take 1 tablet by mouth Daily., Disp: , Rfl:     potassium chloride (K-DUR,KLOR-CON) 20 MEQ CR tablet, Take 1 tablet by mouth Daily. Take two tablets today and then reduce to one tablet daily., Disp: 30 tablet, Rfl: 0    sennosides-docusate (PERICOLACE) 8.6-50 MG per tablet, Take 2 tablets by mouth 2 (Two) Times a Day., Disp: 30 tablet, Rfl: 0    Tepotinib HCl (TEPMETKO) 225 MG tablet, Take 2 tablets by mouth Daily., Disp: , Rfl:     vitamin B-12 (CYANOCOBALAMIN) 1000 MCG tablet, Take 1 tablet by mouth Daily., Disp: , Rfl:     vitamin B-6 (PYRIDOXINE) 100 MG tablet, Take 1 tablet by mouth Every 12 (Twelve) Hours., Disp: 60 tablet, Rfl: 6  No current facility-administered medications for this visit.    No Known Allergies    Family History   Problem Relation Age of Onset     Cancer Mother         cervical cancer    Cervical cancer Mother     Cancer Father         lung cancer    Lung cancer Father     Lung cancer Sister     Cancer Sister         lung cancer    Malig Hyperthermia Neg Hx        Cancer-related family history includes Cancer in his father, mother, and sister; Cervical cancer in his mother; Lung cancer in his father and sister.    Social History     Tobacco Use    Smoking status: Former     Types: Cigarettes     Quit date: 2009     Years since quittin.0    Smokeless tobacco: Never   Vaping Use    Vaping Use: Never used   Substance Use Topics    Alcohol use: Yes     Alcohol/week: 2.0 standard drinks     Types: 2 Cans of beer per week     Comment: Occasional    Drug use: No         I have reviewed and confirmed the accuracy of the patient's history: Chief complaint, HPI, ROS, and Subjective as entered by the MA/LPN/RN. Azucena Gaspar MD 23        SUBJECTIVE:    Comes in today with progressive weakness nausea and vomiting redness on the left side of his chest    Jc Driscoll reports a pain score of 0.  Given his pain assessment as noted, treatment options were discussed and the following options were decided upon as a follow-up plan to address the patient's pain: continuation of current treatment plan for pain.     REVIEW OF SYSTEMS:    Review of Systems   Constitutional: Positive for fatigue. Negative for chills and fever.   HENT: Negative for ear pain, mouth sores, nosebleeds and sore throat.    Eyes: Negative for photophobia and visual disturbance.   Respiratory: Negative for wheezing and stridor.  Exertional shortness of breath  Cardiovascular: Negative for chest pain and palpitations.   Gastrointestinal: Negative for abdominal pain, diarrhea, nausea and vomiting. He has constipation  Endocrine: Negative for cold intolerance and heat intolerance.   Genitourinary: Negative for dysuria and hematuria.   Musculoskeletal: Negative for joint swelling and  "neck stiffness.   Skin: Negative for color change and rash.   Neurological: Negative for seizures and syncope.   Hematological: Negative for adenopathy.   Psychiatric/Behavioral: Negative for agitation, confusion and hallucinations.   As stated in the subjective    OBJECTIVE:    Vitals:    09/06/23 1416   BP: 108/71   Pulse: (!) 134   Resp: 18   Temp: 97.8 °F (36.6 °C)   TempSrc: Oral   SpO2: (!) 87%   Weight: 66.7 kg (147 lb)   Height: 190.5 cm (75\")   PainSc: 0-No pain                 ECOG    Eastern Cooperative Oncology Group (ECOG, [place], World Health Organization) performance scale  0 Fully active; no performance restrictions.  1 Strenuous physical activity restricted; fully ambulatory and able to carry out light work.  2 Capable of all self-care but unable to carry out any work activities. Up and about >50% of waking hours.  3 Capable of only limited self-care; confined to bed or chair >50% of waking hours.  4 Completely disabled; cannot carry out any self-care; totally confined to bed or chair.    Physical Exam     Constitutional: He is oriented to person, place, and time. He appears well-developed. No distress.   HENT:   Head: Normocephalic and atraumatic.   Right Ear: External ear normal.   Left Ear: External ear normal.   Nose: Nose normal.   Eyes: Pupils are equal, round, and reactive to light. Conjunctivae are normal. Right eye exhibits no discharge. Left eye exhibits no discharge. No scleral icterus.   Neck: No thyromegaly present.   Cardiovascular: Normal rate, regular rhythm and normal heart sounds. Exam reveals no gallop and no friction rub.   Pulmonary/Chest: Effort normal. No stridor. No respiratory distress. He has no wheezes.  Diminished bilateral bases.  Abdominal: Soft. Bowel sounds are normal. He exhibits no mass. There is no abdominal tenderness. There is no rebound and no guarding.   Musculoskeletal: Normal range of motion. No tenderness.   Lymphadenopathy:     He has no cervical " adenopathy.   Neurological: He is alert and oriented to person, place, and time. He exhibits normal muscle tone.   Skin: Skin is warm. No rash noted. He is not diaphoretic. No erythema.   Psychiatric: His behavior is normal. Judgment and thought content normal.   Nursing note and vitals reviewed.  Erythematous cellulitic area on the left lateral chest.  The left port is swollen but not red or increase in temperature noted.  Patient appears very pale      I have reexamined the patient and the results are consistent with the previously documented exam. Azucena Gaspar MD      RECENT LABS    WBC   Date Value Ref Range Status   09/06/2023 77.96 (C) 3.40 - 10.80 10*3/mm3 Final   03/22/2022 7.84 4.5 - 11.0 10*3/uL Final     RBC   Date Value Ref Range Status   09/06/2023 1.89 (L) 4.14 - 5.80 10*6/mm3 Final   03/22/2022 4.23 (L) 4.5 - 5.9 10*6/uL Final     Hemoglobin   Date Value Ref Range Status   09/06/2023 5.5 (C) 13.0 - 17.7 g/dL Final   03/22/2022 12.9 (L) 13.5 - 17.5 g/dL Final     Hematocrit   Date Value Ref Range Status   09/06/2023 17.0 (C) 37.5 - 51.0 % Final   03/22/2022 38.8 (L) 41.0 - 53.0 % Final     MCV   Date Value Ref Range Status   09/06/2023 89.9 79.0 - 97.0 fL Final   03/22/2022 91.7 80.0 - 100.0 fL Final     MCH   Date Value Ref Range Status   09/06/2023 29.1 26.6 - 33.0 pg Final   03/22/2022 30.5 26.0 - 34.0 pg Final     MCHC   Date Value Ref Range Status   09/06/2023 32.4 31.5 - 35.7 g/dL Final   03/22/2022 33.2 31.0 - 37.0 g/dL Final     RDW   Date Value Ref Range Status   09/06/2023 17.4 (H) 12.3 - 15.4 % Final   03/22/2022 14.2 12.0 - 16.8 % Final     RDW-SD   Date Value Ref Range Status   09/06/2023 54.9 (H) 37.0 - 54.0 fl Final     MPV   Date Value Ref Range Status   09/06/2023 8.8 6.0 - 12.0 fL Final   03/22/2022 9.3 8.4 - 12.4 fL Final     Platelets   Date Value Ref Range Status   09/06/2023 451 (H) 140 - 450 10*3/mm3 Final   03/22/2022 324 140 - 440 10*3/uL Final     Neutrophil Rel %    Date Value Ref Range Status   03/22/2022 63.1 45 - 80 % Final     Neutrophil %   Date Value Ref Range Status   09/06/2023 94.7 (H) 42.7 - 76.0 % Final     Lymphocyte Rel %   Date Value Ref Range Status   03/22/2022 16.2 15 - 50 % Final     Lymphocyte %   Date Value Ref Range Status   09/06/2023 1.4 (L) 19.6 - 45.3 % Final     Monocyte Rel %   Date Value Ref Range Status   03/22/2022 13.6 0 - 15 % Final     Monocyte %   Date Value Ref Range Status   09/06/2023 3.7 (L) 5.0 - 12.0 % Final     Eosinophil %   Date Value Ref Range Status   09/06/2023 0.2 (L) 0.3 - 6.2 % Final   03/22/2022 5.5 0 - 7 % Final     Basophil Rel %   Date Value Ref Range Status   03/22/2022 1.1 0 - 2 % Final     Basophil %   Date Value Ref Range Status   09/06/2023 0.0 0.0 - 1.5 % Final     Immature Grans %   Date Value Ref Range Status   07/07/2022 0.4 0.0 - 0.5 % Final   03/22/2022 0.5 0.0 - 1.0 % Final     Neutrophils Absolute   Date Value Ref Range Status   03/22/2022 4.94 2.0 - 8.8 10*3/uL Final     Neutrophils, Absolute   Date Value Ref Range Status   09/06/2023 73.81 (H) 1.70 - 7.00 10*3/mm3 Final     Lymphocytes Absolute   Date Value Ref Range Status   03/22/2022 1.27 0.7 - 5.5 10*3/uL Final     Lymphocytes, Absolute   Date Value Ref Range Status   09/06/2023 1.09 0.70 - 3.10 10*3/mm3 Final     Monocytes Absolute   Date Value Ref Range Status   03/22/2022 1.07 0.0 - 1.7 10*3/uL Final     Monocytes, Absolute   Date Value Ref Range Status   09/06/2023 2.90 (H) 0.10 - 0.90 10*3/mm3 Final     Eosinophils Absolute   Date Value Ref Range Status   03/22/2022 0.43 0.0 - 0.8 10*3/uL Final     Eosinophils, Absolute   Date Value Ref Range Status   09/06/2023 0.14 0.00 - 0.40 10*3/mm3 Final     Basophils Absolute   Date Value Ref Range Status   03/22/2022 0.09 0.0 - 0.2 10*3/uL Final     Basophils, Absolute   Date Value Ref Range Status   09/06/2023 0.02 0.00 - 0.20 10*3/mm3 Final     Immature Grans, Absolute   Date Value Ref Range Status    07/07/2022 0.05 0.00 - 0.05 10*3/mm3 Final   03/22/2022 0.04 0.00 - 0.10 10*3/uL Final     nRBC   Date Value Ref Range Status   07/31/2023 0.0 0.0 - 0.2 /100 WBC Final       Lab Results   Component Value Date    GLUCOSE 88 09/06/2023    BUN 9 09/06/2023    CREATININE 0.76 09/06/2023    EGFRIFNONA 100 01/28/2022    EGFRIFAFRI >60 05/20/2019    BCR 11.8 09/06/2023    K 3.4 (L) 09/06/2023    CO2 22.0 09/06/2023    CALCIUM 9.1 09/06/2023    ALBUMIN 2.5 (L) 09/06/2023    LABIL2 1.5 03/22/2022    AST 25 09/06/2023    ALT 18 09/06/2023         ASSESSMENT:    Leukocytosis up to 77,000 white count, left sided chest wall cellulitis, left Mediport swelling but no redness failure to thrive, nausea or vomiting.  We will admit to hospital for possible sepsis IV antibiotics blood cultures, blood transfusions for significant anemia of 5.5 g per DL.  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  Patient received concurrent XRT with Tecentriq followed by maintenance Tecentriq.  Patient has had evidence of progressive disease therefore Tecentriq has been discontinued.  Has also progressed through [place].  For that reason we will discontinue treatments.  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  Reviewed the benefits the side effects in detail with patient.  I also given him information to review.  Clinical studies suggest up to 46% response rate with this drug  Intractable pain left shoulder: Has improved  Symptomatic anemia: We will make arrangement for blood transfusion  Poor appetite: Patient has been encouraged to begin to take Megace as recommended  Physical deconditioning: I recommended physical therapy  Poorly differentiated adenocarcinoma of the left lung status post left thoracotomy with mediastinal  lymph node dissection.  pT1 cpN1 M0 consistent with stage IIb disease, found on surveillance CT scans. Brain MRI was negative. We will recommend platinum doublet followed by Tecentriq unless he has EGFR mutation for which adjuvant osimertinib will be considered.  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  Patient was treated with cisplatin and Alimta in the adjuvant setting for his original malignancy.  He is currently on cisplatin and Taxol.  Patient is receiving cycle #4 today.  This tumor was completely resected and has negative margins and no mediastinal lymph node involvement.  Reviewed the pathology with Dr. Trevizo.  This is a new second lung primary.  Patient has received a total of 4 cycles of combination of cisplatin and Taxol completed on 10/5/2022.   Constipation: We will begin lactulose.  Patient to call me if no results  High PD-L1 expression at 95%. Reviewed foundation 1 testing results.  This showed MET amplification, ERBB2 amplification for which targeted therapy is available for including crizotinib for MET amplification, afatinib for ERBB2 but this is approved for metastatic disease as well as fam-trastuzumab.  We have extensively reviewed the literature.  He will be a candidate for ERBB2 targeted therapy but fam-trastuzumab cannot be combined with XRT due to risk of interstitial lung disease.  Also patient has ERBB2 amplification and not mutation and therefore his response rate may be around 25% as opposed to 55% if mutation was identified.    Chemotherapy-induced fatigue: This has improved  Hypomagnesemia secondary to chemotherapy: Patient has discontinued magnesium supplements  History of adenocarcinoma of the right lung, T1c N2 M0, consistent with clinical stage 3A disease in 2019.  S/P combined chemotherapy and radiation with cisplatin and Alimta  neoadjuvantly, followed by her right lower lobectomy with mediastinal and hilar lymph node dissection with residual disease pT1 cpN2 M0.  Followed by maintenance durvalumab for 1 year.   Recent diagnosis of hypothyroidism, on thyroid replacement therapy  Status post right thoracentesis with cytology negative for malignancy  History of pneumothorax following lung biopsy  Postop anemia: Reviewed  Pneumonia left upper lobe: Has completed his 10-day course of antibiotics.  Symptoms have improved.  Chronic left chest wall and left upper extremity pain: On opioid pain management with from his radiation oncologist.  Pain seems to be neuropathic in nature we will continue to increase his gabapentin, increase the frequency on his oxycodone as needed, and refer to pain management.  Has an appointment coming up 6/19/2023  Orthostatic hypotension: Related to dehydration.  Resolved.  Leukocytosis: Infectious disease work-up was performed during his recent hospitalization and was negative.  Likely leukemoid reaction secondary to progressive malignancy.  Hyponatremia  Cancer related cachexia.  Improving..  Stable.  Symptomatic normocytic anemia: Likely secondary to chemotherapy.  Has required several blood transfusions.  Currently hemoglobin 7.8 g/dL and patient with shortness of breath.  Assessment has been reviewed and updated      PLANS:    Admit to hospital for reasons listed above  Hold tepotinib  Will activate [facility] for chemo education  Check iron profile, ferritin, vitamin B12, folate, MMA  Continue Megace  Refer to physical therapy for deconditioning closer to home Timothy or Shani preferred the patient  Continue gabapentin up to 600 mg 3 times daily over the next week  Continue oxycodone to 10 mg every 4 hours as needed for pain  Continue fam-trastuzumab every 3 weeks.  Reviewed the benefits, the side effects in detail.  Reviewed.  Reviewed his PET CT scan results  2D echocardiogram in preparation for HER2 directed  treatment reviewed.  Reviewed.  EF 51-55%.  We will check every 3 months next echo will be due October 10, 2023  Continue thyroid replacement therapy through his PCP  Currently on oral B12 replacement.  Patient did have elevated methylmalonic acid level during the early phases of his treatments.  Continue the same  Follow-up in 3 weeks  All questions answered         Patient has  questions which have all been answered to the best of my abilities    I spent 40 total minutes, face-to-face, caring for Jc today. 90% of this time involved counseling and/or coordination of care as documented within this note.

## 2023-09-06 ENCOUNTER — APPOINTMENT (OUTPATIENT)
Dept: CT IMAGING | Facility: HOSPITAL | Age: 58
DRG: 180 | End: 2023-09-06
Payer: COMMERCIAL

## 2023-09-06 ENCOUNTER — OFFICE VISIT (OUTPATIENT)
Dept: ONCOLOGY | Facility: CLINIC | Age: 58
End: 2023-09-06
Payer: COMMERCIAL

## 2023-09-06 ENCOUNTER — HOSPITAL ENCOUNTER (INPATIENT)
Facility: HOSPITAL | Age: 58
LOS: 5 days | Discharge: HOME | DRG: 180 | End: 2023-09-11
Attending: EMERGENCY MEDICINE | Admitting: STUDENT IN AN ORGANIZED HEALTH CARE EDUCATION/TRAINING PROGRAM
Payer: COMMERCIAL

## 2023-09-06 ENCOUNTER — HOSPITAL ENCOUNTER (OUTPATIENT)
Dept: ONCOLOGY | Facility: HOSPITAL | Age: 58
Discharge: HOME | End: 2023-09-06
Admitting: NURSE PRACTITIONER
Payer: COMMERCIAL

## 2023-09-06 VITALS
SYSTOLIC BLOOD PRESSURE: 94 MMHG | DIASTOLIC BLOOD PRESSURE: 66 MMHG | HEIGHT: 75 IN | HEART RATE: 67 BPM | OXYGEN SATURATION: 99 % | BODY MASS INDEX: 18.28 KG/M2 | WEIGHT: 147 LBS | TEMPERATURE: 97.8 F | RESPIRATION RATE: 18 BRPM

## 2023-09-06 VITALS
DIASTOLIC BLOOD PRESSURE: 71 MMHG | RESPIRATION RATE: 18 BRPM | HEIGHT: 75 IN | TEMPERATURE: 97.8 F | WEIGHT: 147 LBS | BODY MASS INDEX: 18.28 KG/M2 | OXYGEN SATURATION: 87 % | SYSTOLIC BLOOD PRESSURE: 108 MMHG | HEART RATE: 134 BPM

## 2023-09-06 DIAGNOSIS — C34.91 NON-SMALL CELL CARCINOMA OF LUNG, RIGHT: ICD-10-CM

## 2023-09-06 DIAGNOSIS — A41.9 SEPSIS, DUE TO UNSPECIFIED ORGANISM, UNSPECIFIED WHETHER ACUTE ORGAN DYSFUNCTION PRESENT: Primary | ICD-10-CM

## 2023-09-06 DIAGNOSIS — D72.829 LEUKOCYTOSIS, UNSPECIFIED TYPE: ICD-10-CM

## 2023-09-06 DIAGNOSIS — C34.91 NON-SMALL CELL CARCINOMA OF LUNG, RIGHT: Primary | ICD-10-CM

## 2023-09-06 DIAGNOSIS — D64.9 ANEMIA, UNSPECIFIED TYPE: ICD-10-CM

## 2023-09-06 DIAGNOSIS — E86.0 DEHYDRATION: Primary | ICD-10-CM

## 2023-09-06 DIAGNOSIS — Z51.11 ENCOUNTER FOR ANTINEOPLASTIC CHEMOTHERAPY: ICD-10-CM

## 2023-09-06 PROBLEM — D72.825 BANDEMIA: Status: ACTIVE | Noted: 2023-09-06

## 2023-09-06 PROBLEM — T45.1X5A ANEMIA DUE TO CHEMOTHERAPY: Status: ACTIVE | Noted: 2023-09-06

## 2023-09-06 PROBLEM — L03.313 CELLULITIS OF CHEST WALL: Status: ACTIVE | Noted: 2023-09-06

## 2023-09-06 PROBLEM — D64.81 ANEMIA DUE TO CHEMOTHERAPY: Status: ACTIVE | Noted: 2023-09-06

## 2023-09-06 LAB
ABO GROUP BLD: NORMAL
ALBUMIN SERPL-MCNC: 2.5 G/DL (ref 3.5–5.2)
ALBUMIN SERPL-MCNC: 2.6 G/DL (ref 3.5–5.2)
ALBUMIN/GLOB SERPL: 0.6 G/DL
ALBUMIN/GLOB SERPL: 0.8 G/DL
ALP SERPL-CCNC: 252 U/L (ref 39–117)
ALP SERPL-CCNC: 270 U/L (ref 39–117)
ALT SERPL W P-5'-P-CCNC: 17 U/L (ref 1–41)
ALT SERPL W P-5'-P-CCNC: 18 U/L (ref 1–41)
ANION GAP SERPL CALCULATED.3IONS-SCNC: 16 MMOL/L (ref 5–15)
ANION GAP SERPL CALCULATED.3IONS-SCNC: 18 MMOL/L (ref 5–15)
ANISOCYTOSIS BLD QL: ABNORMAL
AST SERPL-CCNC: 24 U/L (ref 1–40)
AST SERPL-CCNC: 25 U/L (ref 1–40)
BASOPHILS # BLD AUTO: 0.02 10*3/MM3 (ref 0–0.2)
BASOPHILS NFR BLD AUTO: 0 % (ref 0–1.5)
BILIRUB SERPL-MCNC: 0.6 MG/DL (ref 0–1.2)
BILIRUB SERPL-MCNC: 0.7 MG/DL (ref 0–1.2)
BILIRUB UR QL STRIP: NEGATIVE
BLD GP AB SCN SERPL QL: NEGATIVE
BUN SERPL-MCNC: 10 MG/DL (ref 6–20)
BUN SERPL-MCNC: 9 MG/DL (ref 6–20)
BUN/CREAT SERPL: 11.8 (ref 7–25)
BUN/CREAT SERPL: 13.2 (ref 7–25)
CALCIUM SPEC-SCNC: 8.6 MG/DL (ref 8.6–10.5)
CALCIUM SPEC-SCNC: 9.1 MG/DL (ref 8.6–10.5)
CHLORIDE SERPL-SCNC: 87 MMOL/L (ref 98–107)
CHLORIDE SERPL-SCNC: 89 MMOL/L (ref 98–107)
CLARITY UR: CLEAR
CO2 SERPL-SCNC: 22 MMOL/L (ref 22–29)
CO2 SERPL-SCNC: 22 MMOL/L (ref 22–29)
COLOR UR: YELLOW
CREAT SERPL-MCNC: 0.76 MG/DL (ref 0.76–1.27)
CREAT SERPL-MCNC: 0.76 MG/DL (ref 0.76–1.27)
D-LACTATE SERPL-SCNC: 1.4 MMOL/L (ref 0.3–2)
DEPRECATED RDW RBC AUTO: 54.9 FL (ref 37–54)
DEPRECATED RDW RBC AUTO: 56.9 FL (ref 37–54)
EGFRCR SERPLBLD CKD-EPI 2021: 103.5 ML/MIN/1.73
EGFRCR SERPLBLD CKD-EPI 2021: 103.5 ML/MIN/1.73
EOSINOPHIL # BLD AUTO: 0.14 10*3/MM3 (ref 0–0.4)
EOSINOPHIL NFR BLD AUTO: 0.2 % (ref 0.3–6.2)
ERYTHROCYTE [DISTWIDTH] IN BLOOD BY AUTOMATED COUNT: 17.4 % (ref 12.3–15.4)
ERYTHROCYTE [DISTWIDTH] IN BLOOD BY AUTOMATED COUNT: 18.5 % (ref 12.3–15.4)
GLOBULIN UR ELPH-MCNC: 3.3 GM/DL
GLOBULIN UR ELPH-MCNC: 4.2 GM/DL
GLUCOSE SERPL-MCNC: 87 MG/DL (ref 65–99)
GLUCOSE SERPL-MCNC: 88 MG/DL (ref 65–99)
GLUCOSE UR STRIP-MCNC: NEGATIVE MG/DL
HCT VFR BLD AUTO: 17 % (ref 37.5–51)
HCT VFR BLD AUTO: 23.2 % (ref 37.5–51)
HGB BLD-MCNC: 5.5 G/DL (ref 13–17.7)
HGB BLD-MCNC: 7.2 G/DL (ref 13–17.7)
HGB UR QL STRIP.AUTO: NEGATIVE
HOLD SPECIMEN: NORMAL
HOLD SPECIMEN: NORMAL
KETONES UR QL STRIP: ABNORMAL
LARGE PLATELETS: ABNORMAL
LEUKOCYTE ESTERASE UR QL STRIP.AUTO: NEGATIVE
LYMPHOCYTES # BLD AUTO: 1.09 10*3/MM3 (ref 0.7–3.1)
LYMPHOCYTES # BLD MANUAL: 0 10*3/MM3 (ref 0.7–3.1)
LYMPHOCYTES NFR BLD AUTO: 1.4 % (ref 19.6–45.3)
LYMPHOCYTES NFR BLD MANUAL: 3 % (ref 5–12)
MCH RBC QN AUTO: 27.5 PG (ref 26.6–33)
MCH RBC QN AUTO: 29.1 PG (ref 26.6–33)
MCHC RBC AUTO-ENTMCNC: 31.3 G/DL (ref 31.5–35.7)
MCHC RBC AUTO-ENTMCNC: 32.4 G/DL (ref 31.5–35.7)
MCV RBC AUTO: 87.9 FL (ref 79–97)
MCV RBC AUTO: 89.9 FL (ref 79–97)
MONOCYTES # BLD AUTO: 2.9 10*3/MM3 (ref 0.1–0.9)
MONOCYTES # BLD: 1.54 10*3/MM3 (ref 0.1–0.9)
MONOCYTES NFR BLD AUTO: 3.7 % (ref 5–12)
MYELOCYTES NFR BLD MANUAL: 1 % (ref 0–0)
NEUTROPHILS # BLD AUTO: 49.15 10*3/MM3 (ref 1.7–7)
NEUTROPHILS NFR BLD AUTO: 73.81 10*3/MM3 (ref 1.7–7)
NEUTROPHILS NFR BLD AUTO: 94.7 % (ref 42.7–76)
NEUTROPHILS NFR BLD MANUAL: 81 % (ref 42.7–76)
NEUTS BAND NFR BLD MANUAL: 15 % (ref 0–5)
NEUTS VAC BLD QL SMEAR: ABNORMAL
NITRITE UR QL STRIP: NEGATIVE
OSMOLALITY SERPL: 262 MOSM/KG (ref 275–295)
OSMOLALITY UR: 297 MOSM/KG (ref 300–800)
PH UR STRIP.AUTO: 6.5 [PH] (ref 5–8)
PLATELET # BLD AUTO: 403 10*3/MM3 (ref 140–450)
PLATELET # BLD AUTO: 451 10*3/MM3 (ref 140–450)
PMV BLD AUTO: 6.7 FL (ref 6–12)
PMV BLD AUTO: 8.8 FL (ref 6–12)
POTASSIUM SERPL-SCNC: 3.4 MMOL/L (ref 3.5–5.2)
POTASSIUM SERPL-SCNC: 3.4 MMOL/L (ref 3.5–5.2)
PROCALCITONIN SERPL-MCNC: 0.6 NG/ML (ref 0–0.25)
PROT SERPL-MCNC: 5.9 G/DL (ref 6–8.5)
PROT SERPL-MCNC: 6.7 G/DL (ref 6–8.5)
PROT UR QL STRIP: ABNORMAL
RBC # BLD AUTO: 1.89 10*6/MM3 (ref 4.14–5.8)
RBC # BLD AUTO: 2.64 10*6/MM3 (ref 4.14–5.8)
RH BLD: POSITIVE
SCAN SLIDE: NORMAL
SODIUM SERPL-SCNC: 127 MMOL/L (ref 136–145)
SODIUM SERPL-SCNC: 127 MMOL/L (ref 136–145)
SODIUM UR-SCNC: 20 MMOL/L
SP GR UR STRIP: 1.03 (ref 1–1.03)
T&S EXPIRATION DATE: NORMAL
TOXIC GRANULATION: ABNORMAL
UROBILINOGEN UR QL STRIP: ABNORMAL
VARIANT LYMPHS NFR BLD MANUAL: 0 % (ref 19.6–45.3)
WBC NRBC COR # BLD: 51.2 10*3/MM3 (ref 3.4–10.8)
WBC NRBC COR # BLD: 77.96 10*3/MM3 (ref 3.4–10.8)
WHOLE BLOOD HOLD COAG: NORMAL
WHOLE BLOOD HOLD SPECIMEN: NORMAL

## 2023-09-06 PROCEDURE — 81003 URINALYSIS AUTO W/O SCOPE: CPT | Performed by: NURSE PRACTITIONER

## 2023-09-06 PROCEDURE — 99285 EMERGENCY DEPT VISIT HI MDM: CPT

## 2023-09-06 PROCEDURE — 80053 COMPREHEN METABOLIC PANEL: CPT | Performed by: EMERGENCY MEDICINE

## 2023-09-06 PROCEDURE — 36415 COLL VENOUS BLD VENIPUNCTURE: CPT

## 2023-09-06 PROCEDURE — 96375 TX/PRO/DX INJ NEW DRUG ADDON: CPT

## 2023-09-06 PROCEDURE — 83605 ASSAY OF LACTIC ACID: CPT

## 2023-09-06 PROCEDURE — 36415 COLL VENOUS BLD VENIPUNCTURE: CPT | Performed by: EMERGENCY MEDICINE

## 2023-09-06 PROCEDURE — 83930 ASSAY OF BLOOD OSMOLALITY: CPT | Performed by: NURSE PRACTITIONER

## 2023-09-06 PROCEDURE — 87040 BLOOD CULTURE FOR BACTERIA: CPT | Performed by: EMERGENCY MEDICINE

## 2023-09-06 PROCEDURE — 86850 RBC ANTIBODY SCREEN: CPT | Performed by: EMERGENCY MEDICINE

## 2023-09-06 PROCEDURE — 86900 BLOOD TYPING SEROLOGIC ABO: CPT | Performed by: EMERGENCY MEDICINE

## 2023-09-06 PROCEDURE — 0 CEFEPIME PER 500 MG: Performed by: EMERGENCY MEDICINE

## 2023-09-06 PROCEDURE — 84300 ASSAY OF URINE SODIUM: CPT | Performed by: NURSE PRACTITIONER

## 2023-09-06 PROCEDURE — 87040 BLOOD CULTURE FOR BACTERIA: CPT | Performed by: INTERNAL MEDICINE

## 2023-09-06 PROCEDURE — 84145 PROCALCITONIN (PCT): CPT | Performed by: NURSE PRACTITIONER

## 2023-09-06 PROCEDURE — 80053 COMPREHEN METABOLIC PANEL: CPT | Performed by: INTERNAL MEDICINE

## 2023-09-06 PROCEDURE — 85025 COMPLETE CBC W/AUTO DIFF WBC: CPT | Performed by: EMERGENCY MEDICINE

## 2023-09-06 PROCEDURE — 25510000001 IOPAMIDOL PER 1 ML: Performed by: STUDENT IN AN ORGANIZED HEALTH CARE EDUCATION/TRAINING PROGRAM

## 2023-09-06 PROCEDURE — 85007 BL SMEAR W/DIFF WBC COUNT: CPT | Performed by: EMERGENCY MEDICINE

## 2023-09-06 PROCEDURE — 83935 ASSAY OF URINE OSMOLALITY: CPT | Performed by: NURSE PRACTITIONER

## 2023-09-06 PROCEDURE — 25010000002 ONDANSETRON PER 1 MG: Performed by: NURSE PRACTITIONER

## 2023-09-06 PROCEDURE — 96360 HYDRATION IV INFUSION INIT: CPT

## 2023-09-06 PROCEDURE — 25010000002 VANCOMYCIN HCL IN NACL 1.5-0.9 GM/500ML-% SOLUTION: Performed by: NURSE PRACTITIONER

## 2023-09-06 PROCEDURE — 25010000002 LORAZEPAM PER 2 MG: Performed by: EMERGENCY MEDICINE

## 2023-09-06 PROCEDURE — 86901 BLOOD TYPING SEROLOGIC RH(D): CPT | Performed by: EMERGENCY MEDICINE

## 2023-09-06 PROCEDURE — 96374 THER/PROPH/DIAG INJ IV PUSH: CPT

## 2023-09-06 PROCEDURE — 86923 COMPATIBILITY TEST ELECTRIC: CPT

## 2023-09-06 PROCEDURE — 85025 COMPLETE CBC W/AUTO DIFF WBC: CPT | Performed by: INTERNAL MEDICINE

## 2023-09-06 PROCEDURE — 25010000002 METRONIDAZOLE 500 MG/100ML SOLUTION: Performed by: NURSE PRACTITIONER

## 2023-09-06 PROCEDURE — 96361 HYDRATE IV INFUSION ADD-ON: CPT

## 2023-09-06 PROCEDURE — 87641 MR-STAPH DNA AMP PROBE: CPT | Performed by: NURSE PRACTITIONER

## 2023-09-06 PROCEDURE — 71260 CT THORAX DX C+: CPT

## 2023-09-06 RX ORDER — BISACODYL 5 MG/1
5 TABLET, DELAYED RELEASE ORAL DAILY PRN
Status: DISCONTINUED | OUTPATIENT
Start: 2023-09-06 | End: 2023-09-11 | Stop reason: HOSPADM

## 2023-09-06 RX ORDER — LEVOTHYROXINE SODIUM 0.12 MG/1
125 TABLET ORAL
Status: DISCONTINUED | OUTPATIENT
Start: 2023-09-07 | End: 2023-09-11 | Stop reason: HOSPADM

## 2023-09-06 RX ORDER — ONDANSETRON 2 MG/ML
4 INJECTION INTRAMUSCULAR; INTRAVENOUS EVERY 6 HOURS PRN
Status: DISCONTINUED | OUTPATIENT
Start: 2023-09-06 | End: 2023-09-11 | Stop reason: HOSPADM

## 2023-09-06 RX ORDER — SODIUM CHLORIDE 0.9 % (FLUSH) 0.9 %
10 SYRINGE (ML) INJECTION AS NEEDED
Status: DISCONTINUED | OUTPATIENT
Start: 2023-09-06 | End: 2023-09-11 | Stop reason: HOSPADM

## 2023-09-06 RX ORDER — POTASSIUM CHLORIDE 20 MEQ/1
20 TABLET, EXTENDED RELEASE ORAL DAILY
Status: DISCONTINUED | OUTPATIENT
Start: 2023-09-07 | End: 2023-09-07

## 2023-09-06 RX ORDER — CETIRIZINE HYDROCHLORIDE 10 MG/1
10 TABLET ORAL DAILY
Status: DISCONTINUED | OUTPATIENT
Start: 2023-09-07 | End: 2023-09-11 | Stop reason: HOSPADM

## 2023-09-06 RX ORDER — LANOLIN ALCOHOL/MO/W.PET/CERES
1000 CREAM (GRAM) TOPICAL DAILY
Status: DISCONTINUED | OUTPATIENT
Start: 2023-09-07 | End: 2023-09-11 | Stop reason: HOSPADM

## 2023-09-06 RX ORDER — BUPROPION HYDROCHLORIDE 150 MG/1
300 TABLET ORAL DAILY
Status: DISCONTINUED | OUTPATIENT
Start: 2023-09-07 | End: 2023-09-11 | Stop reason: HOSPADM

## 2023-09-06 RX ORDER — ACETAMINOPHEN 650 MG/1
650 SUPPOSITORY RECTAL EVERY 4 HOURS PRN
Status: DISCONTINUED | OUTPATIENT
Start: 2023-09-06 | End: 2023-09-11 | Stop reason: HOSPADM

## 2023-09-06 RX ORDER — ALUMINA, MAGNESIA, AND SIMETHICONE 2400; 2400; 240 MG/30ML; MG/30ML; MG/30ML
15 SUSPENSION ORAL EVERY 6 HOURS PRN
Status: DISCONTINUED | OUTPATIENT
Start: 2023-09-06 | End: 2023-09-11 | Stop reason: HOSPADM

## 2023-09-06 RX ORDER — SODIUM CHLORIDE 9 MG/ML
40 INJECTION, SOLUTION INTRAVENOUS AS NEEDED
Status: DISCONTINUED | OUTPATIENT
Start: 2023-09-06 | End: 2023-09-11 | Stop reason: HOSPADM

## 2023-09-06 RX ORDER — POTASSIUM CHLORIDE 20 MEQ/1
20 TABLET, EXTENDED RELEASE ORAL DAILY
COMMUNITY

## 2023-09-06 RX ORDER — VANCOMYCIN/0.9 % SOD CHLORIDE 1.5G/250ML
1500 PLASTIC BAG, INJECTION (ML) INTRAVENOUS ONCE
Status: COMPLETED | OUTPATIENT
Start: 2023-09-06 | End: 2023-09-07

## 2023-09-06 RX ORDER — POLYETHYLENE GLYCOL 3350 17 G/17G
17 POWDER, FOR SOLUTION ORAL DAILY PRN
Status: DISCONTINUED | OUTPATIENT
Start: 2023-09-06 | End: 2023-09-11 | Stop reason: HOSPADM

## 2023-09-06 RX ORDER — DESVENLAFAXINE SUCCINATE 50 MG/1
50 TABLET, EXTENDED RELEASE ORAL DAILY
Status: DISCONTINUED | OUTPATIENT
Start: 2023-09-07 | End: 2023-09-11

## 2023-09-06 RX ORDER — ONDANSETRON 2 MG/ML
8 INJECTION INTRAMUSCULAR; INTRAVENOUS ONCE
Status: DISCONTINUED | OUTPATIENT
Start: 2023-09-06 | End: 2023-09-06 | Stop reason: HOSPADM

## 2023-09-06 RX ORDER — METRONIDAZOLE 500 MG/100ML
500 INJECTION, SOLUTION INTRAVENOUS EVERY 8 HOURS
Status: DISCONTINUED | OUTPATIENT
Start: 2023-09-06 | End: 2023-09-07

## 2023-09-06 RX ORDER — AMOXICILLIN 250 MG
2 CAPSULE ORAL 2 TIMES DAILY
Status: DISCONTINUED | OUTPATIENT
Start: 2023-09-06 | End: 2023-09-11 | Stop reason: HOSPADM

## 2023-09-06 RX ORDER — ACETAMINOPHEN 160 MG/5ML
650 SOLUTION ORAL EVERY 4 HOURS PRN
Status: DISCONTINUED | OUTPATIENT
Start: 2023-09-06 | End: 2023-09-11 | Stop reason: HOSPADM

## 2023-09-06 RX ORDER — MEGESTROL ACETATE 40 MG/ML
400 SUSPENSION ORAL DAILY
Status: DISCONTINUED | OUTPATIENT
Start: 2023-09-07 | End: 2023-09-11 | Stop reason: HOSPADM

## 2023-09-06 RX ORDER — ONDANSETRON 4 MG/1
4 TABLET, FILM COATED ORAL EVERY 6 HOURS PRN
Status: DISCONTINUED | OUTPATIENT
Start: 2023-09-06 | End: 2023-09-06 | Stop reason: SDUPTHER

## 2023-09-06 RX ORDER — LACTULOSE 10 G/15ML
20 SOLUTION ORAL 2 TIMES DAILY PRN
Status: DISCONTINUED | OUTPATIENT
Start: 2023-09-06 | End: 2023-09-11 | Stop reason: HOSPADM

## 2023-09-06 RX ORDER — LORAZEPAM 2 MG/ML
1 INJECTION INTRAMUSCULAR ONCE
Status: COMPLETED | OUTPATIENT
Start: 2023-09-06 | End: 2023-09-06

## 2023-09-06 RX ORDER — LACTULOSE 10 G/15ML
10 SOLUTION ORAL 2 TIMES DAILY
COMMUNITY

## 2023-09-06 RX ORDER — MULTIVITAMIN WITH IRON
100 TABLET ORAL EVERY 12 HOURS
Status: DISCONTINUED | OUTPATIENT
Start: 2023-09-06 | End: 2023-09-11 | Stop reason: HOSPADM

## 2023-09-06 RX ORDER — ACETAMINOPHEN 325 MG/1
650 TABLET ORAL EVERY 4 HOURS PRN
Status: DISCONTINUED | OUTPATIENT
Start: 2023-09-06 | End: 2023-09-11 | Stop reason: HOSPADM

## 2023-09-06 RX ORDER — LIOTHYRONINE SODIUM 5 UG/1
5 TABLET ORAL DAILY
Status: DISCONTINUED | OUTPATIENT
Start: 2023-09-07 | End: 2023-09-11 | Stop reason: HOSPADM

## 2023-09-06 RX ORDER — GABAPENTIN 300 MG/1
300 CAPSULE ORAL NIGHTLY
Status: DISCONTINUED | OUTPATIENT
Start: 2023-09-06 | End: 2023-09-11 | Stop reason: HOSPADM

## 2023-09-06 RX ORDER — OXYCODONE HYDROCHLORIDE 5 MG/1
10 TABLET ORAL EVERY 4 HOURS PRN
Status: DISCONTINUED | OUTPATIENT
Start: 2023-09-06 | End: 2023-09-11 | Stop reason: HOSPADM

## 2023-09-06 RX ORDER — FOLIC ACID 1 MG/1
1 TABLET ORAL DAILY
Status: DISCONTINUED | OUTPATIENT
Start: 2023-09-07 | End: 2023-09-11 | Stop reason: HOSPADM

## 2023-09-06 RX ORDER — ONDANSETRON 4 MG/1
8 TABLET, FILM COATED ORAL 3 TIMES DAILY PRN
Status: DISCONTINUED | OUTPATIENT
Start: 2023-09-06 | End: 2023-09-11 | Stop reason: HOSPADM

## 2023-09-06 RX ORDER — CHOLECALCIFEROL (VITAMIN D3) 125 MCG
5 CAPSULE ORAL NIGHTLY PRN
Status: DISCONTINUED | OUTPATIENT
Start: 2023-09-06 | End: 2023-09-11 | Stop reason: HOSPADM

## 2023-09-06 RX ORDER — GABAPENTIN 300 MG/1
600 CAPSULE ORAL 3 TIMES DAILY
COMMUNITY

## 2023-09-06 RX ORDER — BISACODYL 10 MG
10 SUPPOSITORY, RECTAL RECTAL DAILY PRN
Status: DISCONTINUED | OUTPATIENT
Start: 2023-09-06 | End: 2023-09-11 | Stop reason: HOSPADM

## 2023-09-06 RX ORDER — PANTOPRAZOLE SODIUM 40 MG/1
40 TABLET, DELAYED RELEASE ORAL
Status: DISCONTINUED | OUTPATIENT
Start: 2023-09-07 | End: 2023-09-11 | Stop reason: HOSPADM

## 2023-09-06 RX ORDER — SODIUM CHLORIDE 0.9 % (FLUSH) 0.9 %
10 SYRINGE (ML) INJECTION EVERY 12 HOURS SCHEDULED
Status: DISCONTINUED | OUTPATIENT
Start: 2023-09-06 | End: 2023-09-11 | Stop reason: HOSPADM

## 2023-09-06 RX ADMIN — SODIUM CHLORIDE 1000 ML: 9 INJECTION, SOLUTION INTRAVENOUS at 22:01

## 2023-09-06 RX ADMIN — CEFEPIME 2000 MG: 2 INJECTION, POWDER, FOR SOLUTION INTRAVENOUS at 18:16

## 2023-09-06 RX ADMIN — SENNOSIDES AND DOCUSATE SODIUM 2 TABLET: 50; 8.6 TABLET ORAL at 22:33

## 2023-09-06 RX ADMIN — Medication 1500 MG: at 22:34

## 2023-09-06 RX ADMIN — Medication 100 MG: at 22:33

## 2023-09-06 RX ADMIN — Medication 10 ML: at 22:34

## 2023-09-06 RX ADMIN — METRONIDAZOLE 500 MG: 500 INJECTION, SOLUTION INTRAVENOUS at 20:58

## 2023-09-06 RX ADMIN — LORAZEPAM 1 MG: 2 INJECTION INTRAMUSCULAR; INTRAVENOUS at 20:30

## 2023-09-06 RX ADMIN — SODIUM CHLORIDE 1000 ML: 9 INJECTION, SOLUTION INTRAVENOUS at 14:33

## 2023-09-06 RX ADMIN — OXYCODONE HYDROCHLORIDE 10 MG: 5 TABLET ORAL at 22:33

## 2023-09-06 RX ADMIN — IOPAMIDOL 100 ML: 755 INJECTION, SOLUTION INTRAVENOUS at 22:04

## 2023-09-06 RX ADMIN — GABAPENTIN 300 MG: 300 CAPSULE ORAL at 22:33

## 2023-09-06 RX ADMIN — ONDANSETRON 8 MG: 2 INJECTION, SOLUTION INTRAMUSCULAR; INTRAVENOUS at 15:05

## 2023-09-06 NOTE — Clinical Note
Level of Care: Telemetry [5]   Admitting Physician: FAZAL SALOMON [[id number]]   Attending Physician: FAZAL SALOMON [[id number]]

## 2023-09-06 NOTE — PROGRESS NOTES
Pt here for IVF and MD visit, pt c/o ongoing nausea, denies vomiting,  reviewed with A Sim NP  and new order noted for Zofran,  administered as ordered and pt reports feeling some better!

## 2023-09-06 NOTE — H&P
[facility] Medicine Services  History & Physical    Patient Name: Jc Driscoll  : 1965  MRN: [id number]  Primary Care Physician:  Reshma Alvarenga MD  Date of admission: 2023  Date and Time of Service: 2023 at 2000    Subjective      Chief Complaint: left chest swelling around port, left armpit redness and swelling    History of Present Illness: Jc Driscoll is a 58 y.o. male with metastatic lung cancer presented to [facility] ED 2023 after he was sent over by his oncologist Dr. Gaspar for anemia and leukocytosis. Labs from 1435 showed a WBC 77 and hgb 5.5. He denied any blood per rectum. He has not had a fever. He has had some swelling and pain around his port and in the left axillary region. He stated he is chronically short of breath and this is not any worse. Pt reports he received 1L of fluids at [facility].     In the ED WBC 51.2 with 15% bands, Hgb 7.2, lactate 1.4, sodium 127, potassium 134, chloride 89, anion gap 16, BUN 10, creatinine 0.76.  Blood cultures were drawn.  Patient was afebrile 97.1, pulse 106, BP 99/61, normal oxygen saturation on room air. He was started on IV cefepime. Patient could very well have a leukemoid reaction secondary to progressively malignancy; however, due to increasing leukocytosis with bandemia, borderline low BP, and port with swelling will treat as sepsis by adding vancomycin and flagyl with ID consult.       Review of Systems   Constitutional: Positive for malaise/fatigue.   HENT: Negative.     Eyes: Negative.    Cardiovascular: Negative.    Respiratory: Negative.     Endocrine: Negative.    Hematologic/Lymphatic: Negative.    Skin:         Swelling around left chest port  Redness below left axilla   Musculoskeletal: Negative.    Gastrointestinal: Negative.    Genitourinary: Negative.    Neurological: Negative.    Psychiatric/Behavioral: Negative.     Allergic/Immunologic: Negative.    All other systems reviewed and are negative.      Personal History     Past Medical History:   Diagnosis Date    Allergic rhinitis 07/25/2013    Overview:  4/2/2018 - still zyrtec 10 daily (if stops, gets dermatitis again).     Anxiety and depression 06/05/2012    Overview:  4/2/2018 -   C/w well 300 xr and Lito 20.  4/8/2019 -   Doing ok even with new lung cancer - lito 20 & well 300xr.     Chronic pansinusitis 04/08/2019    Overview:  4/8/2019 -   MRI showed chronic thick in frontal, maxillary, ethmoidal ---> to Dr. COLLAZO to est since abx ICC didn't help.  Does netipot bid.     Diastolic CHF 07/09/2014    Overview:  7/4/14 - impaired LV relaxation on [place] ECHO.  EF 60%. Rest normal    Dupuytren's contracture of both hands     Elevated cholesterol     Erosive esophagitis 07/09/2019    Overview:  EGD 2016 4/2/2018 - nexium OTC daily for few week.  Qod before that.  Now carbonation hurts, epigastric pain 4/8/2019 -   protonix 40 doing well.     Gastroesophageal reflux disease 02/21/2019    Hiatal hernia 07/09/2019    History of colon polyps     Hypertension     Lung cancer     right lung and in lymph nodes x2    Mediastinal lymphadenopathy 03/12/2019    Normocytic anemia 08/08/2019    Overview:  6/2019 - dropped to 9's postop 7/2019 - rebounded to 10's.  8/8/2019 -   Back to 9's - check ferritin, b12 and thyroid.     Prediabetes 07/09/2014    Overview:  7/4/14 - a1c 6.1 at [place] admission    Retention of urine 06/27/2019    PSOT OP, RESOLVED       Past Surgical History:   Procedure Laterality Date    BRONCHOSCOPY  03/18/2019    EBUS MONTERO NEEDLE BX    COLONOSCOPY      DUPUYTREN CONTRACTURE RELEASE Bilateral     LUNG BIOPSY  03/08/2019    CT GUIDED    LUNG REMOVAL, PARTIAL Right     right lower    PORTACATH PLACEMENT  03/20/2019    DR LONGORIA    THORACOSCOPY Right 6/26/2019    Procedure: RIGHT VATS, OPEN LOWER LOBECTOMY WITH LYMPH NODE DISECTION, WEDGE RESECTION OF RIGHT MIDDLE LOBE;  Surgeon: Terrance Longoria MD;  Location: [facility] OR;  Service: Cardiothoracic     THORACOSCOPY VIDEO ASSISTED WITH LOBECTOMY Left 7/6/2022    Procedure: THORACOSCOPY VIDEO ASSISTED WITH LOBECTOMY;  Surgeon: Miryam Reyna MD;  Location: [facility];  Service: Thoracic;  Laterality: Left;       Family History: family history includes Cancer in his father, mother, and sister; Cervical cancer in his mother; Lung cancer in his father and sister. Otherwise pertinent FHx was reviewed and not pertinent to current issue.    Social History:  reports that he quit smoking about 14 years ago. His smoking use included cigarettes. He has never used smokeless tobacco. He reports current alcohol use of about 2.0 standard drinks per week. He reports that he does not use drugs.    Home Medications:  Prior to Admission Medications       Prescriptions Last Dose Informant Patient Reported? Taking?    buPROPion XL (WELLBUTRIN XL) 300 MG 24 hr tablet   Yes No    Take 1 tablet by mouth Daily.    Constulose 10 GM/15ML solution   No No    TAKE 30 ML BY MOUTH  EVERY 12 HOURS    desvenlafaxine (PRISTIQ) 50 MG 24 hr tablet   Yes No    Take 1 tablet by mouth Daily.    dexamethasone (DECADRON) 4 MG tablet   No No    Take 1 tablet by mouth Every 12 (Twelve) Hours.    folic acid (FOLVITE) 1 MG tablet   No No    Take 1 tablet by mouth Daily.    gabapentin (NEURONTIN) 300 MG capsule   No No    Take 2 capsules by mouth 3 (Three) Times a Day for 30 days.    Patient taking differently:  Take 2 capsules by mouth every night at bedtime.    levoFLOXacin (Levaquin) 500 MG tablet   No No    Take 1 tablet by mouth Daily.    levothyroxine (SYNTHROID, LEVOTHROID) 125 MCG tablet   No No    Take 1 tablet by mouth Every Morning.    liothyronine (CYTOMEL) 5 MCG tablet   Yes No    Take 1 tablet by mouth Daily.    loratadine (CLARITIN) 10 MG tablet  Self Yes No    Take 1 tablet by mouth Daily.    megestrol acetate (MEGACE) 400 MG/10ML suspension oral suspension   No No    Take 10 mL by mouth Daily.    ondansetron (ZOFRAN) 8 MG tablet   No  No    Take 1 tablet by mouth 3 (Three) Times a Day As Needed for Nausea or Vomiting.    ondansetron (ZOFRAN) injection 8 mg   No No    oxyCODONE-acetaminophen (PERCOCET)  MG per tablet   No No    Take 1 tablet by mouth Every 4 (Four) Hours As Needed for Moderate Pain.    pantoprazole (PROTONIX) 40 MG EC tablet   Yes No    Take 1 tablet by mouth Daily.    potassium chloride (K-DUR,KLOR-CON) 20 MEQ CR tablet   No No    Take 1 tablet by mouth Daily. Take two tablets today and then reduce to one tablet daily.    sennosides-docusate (PERICOLACE) 8.6-50 MG per tablet   No No    Take 2 tablets by mouth 2 (Two) Times a Day.    Tepotinib HCl (TEPMETKO) 225 MG tablet   Yes No    Take 2 tablets by mouth Daily.    vitamin B-12 (CYANOCOBALAMIN) 1000 MCG tablet   Yes No    Take 1 tablet by mouth Daily.    vitamin B-6 (PYRIDOXINE) 100 MG tablet   No No    Take 1 tablet by mouth Every 12 (Twelve) Hours.              Allergies:  No Known Allergies    Objective      Vitals:   Temp:  [97.1 °F (36.2 °C)-97.8 °F (36.6 °C)] 97.7 °F (36.5 °C)  Heart Rate:  [] 101  Resp:  [16-24] 22  BP: ()/(61-73) 114/73    Physical Exam  Vitals and nursing note reviewed.   HENT:      Head: Normocephalic and atraumatic.   Eyes:      Extraocular Movements: Extraocular movements intact.      Pupils: Pupils are equal, round, and reactive to light.   Cardiovascular:      Rate and Rhythm: Normal rate and regular rhythm.      Pulses: Normal pulses.      Heart sounds: Normal heart sounds.   Pulmonary:      Effort: Pulmonary effort is normal.      Breath sounds: Normal breath sounds.   Abdominal:      General: Bowel sounds are normal.      Palpations: Abdomen is soft.      Tenderness: There is no abdominal tenderness.   Musculoskeletal:         General: Normal range of motion.   Skin:     General: Skin is warm and dry.      Coloration: Skin is pale.      Comments: Swelling around left port, no erythema  Erythema and swelling/induration below  left axilla, tender to palpation   Neurological:      Mental Status: He is alert and oriented to person, place, and time.   Psychiatric:         Mood and Affect: Mood normal.         Behavior: Behavior normal.        Result Review    Result Review:  I have personally reviewed the results from the time of this admission to 9/6/2023 23:02 EDT and agree with these findings:  [x]  Laboratory  []  Microbiology  [x]  Radiology  []  EKG/Telemetry   []  Cardiology/Vascular   []  Pathology  [x]  Old records      Assessment & Plan        Active Hospital Problems:  Active Hospital Problems    Diagnosis     **Sepsis     Leukocytosis     Bandemia     Cellulitis of chest wall     Anemia due to chemotherapy     NSCLC of left lung     Cancer of lower lobe of right lung     Non-small cell carcinoma of lung, right     Gastroesophageal reflux disease     Anxiety and depression     Hypertension      Assessment/Plan:     Sepsis  Leukocytosis with bandemia  Left chest wall cellulitis   -consider port infection/bacteremia  -left axilla with swelling and induration  -WBC 51.2 with 15% bands, lactate 1.4, afebrile  -pt has had leukocytosis since March 2023. His previous admission July 2023 the patient had negative blood cultures. ID team suggested leukocytosis was secondary to steroids and underlying malignancy  -check CT chest  -check procalcitonin  -follow blood cultures  -SBP borderline low 99. Received 1L IVFs at [facility], will give another liter for sepsis protocol  -IV cefepime started in ED. Will add vancomycin and flagyl  -consult ID for further recommendations     Anemia  -no hematochezia or hematemesis   -hgb 7.2 in the ED at 1729 when it was 5.5 at 1435  -check check H&H in 6 hours  -anemia secondary to metastatic disease/chemotherapy    Metastatic lung CA s/p left thoracotomy and right lower lobectomy  -followed by Dr. Gaspar on tepotinib  -cont home pain meds     Hyponatremia  -Na 127  -check urine osmo, urine sodium,  serum osmo  -pt did have hyponatremia secondary to volume depletion and underlying SIADH per nephrology back in July 2023. He was treated with IVFs during that admission    Hypothyroidism  -cont home synthroid, cytomel    Anorexia   -on megace       DVT prophylaxis:  Mechanical DVT prophylaxis orders are present.    CODE STATUS:    Level Of Support Discussed With: Patient  Code Status (Patient has no pulse and is not breathing): CPR (Attempt to Resuscitate)  Medical Interventions (Patient has pulse or is breathing): Full Support    Admission Status:  I believe this patient meets inpatient status.    I discussed the patient's findings and my recommendations with patient and nursing staff.    This patient has been examined wearing appropriate Personal Protective Equipment  09/06/23      Electronically signed by MULUGETA Garner, 09/06/23, 11:01 PM EDT.

## 2023-09-06 NOTE — ED PROVIDER NOTES
Subjective   History of Present Illness  Chief complaint: Anemia    58-year-old male with a history of metastatic lung cancer presents with anemia.  Patient states he had appointment with his oncologist today and was found to have a hemoglobin of 5.5.  His white blood cell count was also found to be elevated at 77,000.  He denies fever.  He states he has been generally weak.  He reports some pain around his port in the left upper chest as well as in the left axillary area.  He states he was sent here to be admitted to the hospital for blood transfusion and treatment of sepsis.    History provided by:  Patient    Review of Systems   Constitutional:  Negative for fever.   HENT:  Negative for congestion.    Respiratory:  Positive for shortness of breath. Negative for cough.    Cardiovascular:  Negative for chest pain.   Gastrointestinal:  Negative for abdominal pain and vomiting.   Genitourinary:  Negative for dysuria.   Musculoskeletal:  Negative for back pain.   Neurological:  Positive for weakness. Negative for headaches.   Psychiatric/Behavioral:  Negative for confusion.      Past Medical History:   Diagnosis Date    Allergic rhinitis 07/25/2013    Overview:  4/2/2018 - still zyrtec 10 daily (if stops, gets dermatitis again).     Anxiety and depression 06/05/2012    Overview:  4/2/2018 -   C/w well 300 xr and Lito 20.  4/8/2019 -   Doing ok even with new lung cancer - lito 20 & well 300xr.     Chronic pansinusitis 04/08/2019    Overview:  4/8/2019 -   MRI showed chronic thick in frontal, maxillary, ethmoidal ---> to Dr. ZAY knowles est since abx ICC didn't help.  Does netipot bid.     Diastolic CHF 07/09/2014    Overview:  7/4/14 - impaired LV relaxation on Zhou ECHO.  EF 60%. Rest normal    Dupuytren's contracture of both hands     Elevated cholesterol     Erosive esophagitis 07/09/2019    Overview:  EGD 2016 4/2/2018 - nexium OTC daily for few week.  Qod before that.  Now carbonation hurts, epigastric pain 4/8/2019 -    protonix 40 doing well.     Gastroesophageal reflux disease 2019    Hiatal hernia 2019    History of colon polyps     Hypertension     Lung cancer     right lung and in lymph nodes x2    Mediastinal lymphadenopathy 2019    Normocytic anemia 2019    Overview:  2019 - dropped to 9's postop 2019 - rebounded to 10's.  2019 -   Back to 9's - check ferritin, b12 and thyroid.     Prediabetes 2014    Overview:  14 - a1c 6.1 at [place] admission    Retention of urine 2019    PSOT OP, RESOLVED       No Known Allergies    Past Surgical History:   Procedure Laterality Date    BRONCHOSCOPY  2019    EBUS MONTERO NEEDLE BX    COLONOSCOPY      DUPUYTREN CONTRACTURE RELEASE Bilateral     LUNG BIOPSY  2019    CT GUIDED    LUNG REMOVAL, PARTIAL Right     right lower    PORTACATH PLACEMENT  2019    DR LONGORIA    THORACOSCOPY Right 2019    Procedure: RIGHT VATS, OPEN LOWER LOBECTOMY WITH LYMPH NODE DISECTION, WEDGE RESECTION OF RIGHT MIDDLE LOBE;  Surgeon: Terrance Longoria MD;  Location: [facility] MAIN OR;  Service: Cardiothoracic    THORACOSCOPY VIDEO ASSISTED WITH LOBECTOMY Left 2022    Procedure: THORACOSCOPY VIDEO ASSISTED WITH LOBECTOMY;  Surgeon: Miryam Reyna MD;  Location: [facility] MAIN OR;  Service: Thoracic;  Laterality: Left;       Family History   Problem Relation Age of Onset    Cancer Mother         cervical cancer    Cervical cancer Mother     Cancer Father         lung cancer    Lung cancer Father     Lung cancer Sister     Cancer Sister         lung cancer    Malig Hyperthermia Neg Hx        Social History     Socioeconomic History    Marital status:    Tobacco Use    Smoking status: Former     Types: Cigarettes     Quit date: 2009     Years since quittin.0    Smokeless tobacco: Never   Vaping Use    Vaping Use: Never used   Substance and Sexual Activity    Alcohol use: Yes     Alcohol/week: 2.0 standard drinks     Types: 2 Cans of  "beer per week     Comment: Occasional    Drug use: No    Sexual activity: Defer       BP 99/61   Pulse 106   Temp 97.1 °F (36.2 °C) (Axillary)   Resp 16   Ht 190.5 cm (75\")   Wt 66.7 kg (147 lb)   SpO2 100%   BMI 18.37 kg/m²       Objective   Physical Exam  Vitals and nursing note reviewed.   Constitutional:       Appearance: Normal appearance.   HENT:      Head: Normocephalic and atraumatic.      Mouth/Throat:      Mouth: Mucous membranes are moist.   Cardiovascular:      Rate and Rhythm: Regular rhythm. Tachycardia present.      Heart sounds: Normal heart sounds.   Pulmonary:      Effort: Pulmonary effort is normal. No respiratory distress.      Breath sounds: Normal breath sounds.   Abdominal:      Palpations: Abdomen is soft.      Tenderness: There is no abdominal tenderness.   Skin:     Comments: There is an area of erythema and induration to the left axilla area.  No drainable abscess.   Neurological:      Mental Status: He is alert and oriented to person, place, and time.       Procedures           ED Course      Results for orders placed or performed during the hospital encounter of 09/06/23   Comprehensive Metabolic Panel    Specimen: Blood   Result Value Ref Range    Glucose 87 65 - 99 mg/dL    BUN 10 6 - 20 mg/dL    Creatinine 0.76 0.76 - 1.27 mg/dL    Sodium 127 (L) 136 - 145 mmol/L    Potassium 3.4 (L) 3.5 - 5.2 mmol/L    Chloride 89 (L) 98 - 107 mmol/L    CO2 22.0 22.0 - 29.0 mmol/L    Calcium 8.6 8.6 - 10.5 mg/dL    Total Protein 5.9 (L) 6.0 - 8.5 g/dL    Albumin 2.6 (L) 3.5 - 5.2 g/dL    ALT (SGPT) 17 1 - 41 U/L    AST (SGOT) 24 1 - 40 U/L    Alkaline Phosphatase 270 (H) 39 - 117 U/L    Total Bilirubin 0.6 0.0 - 1.2 mg/dL    Globulin 3.3 gm/dL    A/G Ratio 0.8 g/dL    BUN/Creatinine Ratio 13.2 7.0 - 25.0    Anion Gap 16.0 (H) 5.0 - 15.0 mmol/L    eGFR 103.5 >60.0 mL/min/1.73   CBC Auto Differential    Specimen: Blood   Result Value Ref Range    WBC 51.20 (C) 3.40 - 10.80 10*3/mm3    RBC 2.64 " (L) 4.14 - 5.80 10*6/mm3    Hemoglobin 7.2 (L) 13.0 - 17.7 g/dL    Hematocrit 23.2 (L) 37.5 - 51.0 %    MCV 87.9 79.0 - 97.0 fL    MCH 27.5 26.6 - 33.0 pg    MCHC 31.3 (L) 31.5 - 35.7 g/dL    RDW 18.5 (H) 12.3 - 15.4 %    RDW-SD 56.9 (H) 37.0 - 54.0 fl    MPV 6.7 6.0 - 12.0 fL    Platelets 403 140 - 450 10*3/mm3   Scan Slide    Specimen: Blood   Result Value Ref Range    Scan Slide     Manual Differential    Specimen: Blood   Result Value Ref Range    Neutrophil % 81.0 (H) 42.7 - 76.0 %    Lymphocyte % 0.0 (L) 19.6 - 45.3 %    Monocyte % 3.0 (L) 5.0 - 12.0 %    Bands %  15.0 (H) 0.0 - 5.0 %    Myelocyte % 1.0 (H) 0.0 - 0.0 %    Neutrophils Absolute 49.15 (H) 1.70 - 7.00 10*3/mm3    Lymphocytes Absolute 0.00 (L) 0.70 - 3.10 10*3/mm3    Monocytes Absolute 1.54 (H) 0.10 - 0.90 10*3/mm3    Anisocytosis Slight/1+ None Seen    Toxic Granulation Mod/2+ None Seen    Vacuolated Neutrophils Slight/1+ None Seen    Large Platelets Slight/1+ None Seen   POC Lactate    Specimen: Blood   Result Value Ref Range    Lactate 1.4 0.3 - 2.0 mmol/L   Type & Screen    Specimen: Blood   Result Value Ref Range    ABO Type A     RH type Positive     Antibody Screen Negative     T&S Expiration Date 9/9/2023 11:59:59 PM    Green Top (Gel)   Result Value Ref Range    Extra Tube Hold for add-ons.    Lavender Top   Result Value Ref Range    Extra Tube done    Gold Top - SST   Result Value Ref Range    Extra Tube Hold for add-ons.    Light Blue Top   Result Value Ref Range    Extra Tube Hold for add-ons.                                         Medical Decision Making  Amount and/or Complexity of Data Reviewed  Labs: ordered.    Risk  Prescription drug management.      Patient had the above evaluation.  Results were discussed with the patient.  White blood cell count is 51.2 which is down from 77 earlier today.  He does have 15% bands.  Lactic acid was normal.  Blood cultures were obtained.  Patient was started on IV antibiotics.  Hemoglobin is  7.2.  This is up from 5.5 earlier today.  Transfusion of packed red blood cells was initially ordered but this was held when the hemoglobin came back greater than 7.  CMP significant for sodium of 127 which is pretty consistent with previous levels.  Patient remained hemodynamically stable in the emergency room.  I discussed with the nurse practitioner on-call for the hospitalist and the patient will be admitted for further evaluation and management.      Final diagnoses:   Sepsis, due to unspecified organism, unspecified whether acute organ dysfunction present   Anemia, unspecified type       ED Disposition  ED Disposition       ED Disposition   Decision to Admit    Condition   --    Comment   Level of Care: Telemetry [5]   Admitting Physician: FAZAL SALOMON [[id number]]   Attending Physician: FAZAL SALOMON [[id number]]                 No follow-up provider specified.       Medication List      No changes were made to your prescriptions during this visit.            Simon Barton MD  09/06/23 [id number]

## 2023-09-07 LAB
ANION GAP SERPL CALCULATED.3IONS-SCNC: 14 MMOL/L (ref 5–15)
ANISOCYTOSIS BLD QL: ABNORMAL
BASOPHILS # BLD MANUAL: 0.45 10*3/MM3 (ref 0–0.2)
BASOPHILS NFR BLD MANUAL: 1 % (ref 0–1.5)
BUN SERPL-MCNC: 9 MG/DL (ref 6–20)
BUN/CREAT SERPL: 12.7 (ref 7–25)
CALCIUM SPEC-SCNC: 8.1 MG/DL (ref 8.6–10.5)
CHLORIDE SERPL-SCNC: 94 MMOL/L (ref 98–107)
CO2 SERPL-SCNC: 20 MMOL/L (ref 22–29)
CREAT SERPL-MCNC: 0.71 MG/DL (ref 0.76–1.27)
D-LACTATE SERPL-SCNC: 0.9 MMOL/L (ref 0.5–2)
DEPRECATED RDW RBC AUTO: 56 FL (ref 37–54)
DEPRECATED RDW RBC AUTO: 57.8 FL (ref 37–54)
EGFRCR SERPLBLD CKD-EPI 2021: 105.7 ML/MIN/1.73
EOSINOPHIL # BLD MANUAL: 0.9 10*3/MM3 (ref 0–0.4)
EOSINOPHIL NFR BLD MANUAL: 2 % (ref 0.3–6.2)
ERYTHROCYTE [DISTWIDTH] IN BLOOD BY AUTOMATED COUNT: 17.9 % (ref 12.3–15.4)
ERYTHROCYTE [DISTWIDTH] IN BLOOD BY AUTOMATED COUNT: 18.6 % (ref 12.3–15.4)
GLUCOSE SERPL-MCNC: 88 MG/DL (ref 65–99)
HCT VFR BLD AUTO: 19.6 % (ref 37.5–51)
HCT VFR BLD AUTO: 23.9 % (ref 37.5–51)
HGB BLD-MCNC: 6.1 G/DL (ref 13–17.7)
HGB BLD-MCNC: 7.5 G/DL (ref 13–17.7)
HYPOCHROMIA BLD QL: ABNORMAL
LYMPHOCYTES # BLD MANUAL: 0 10*3/MM3 (ref 0.7–3.1)
LYMPHOCYTES # BLD MANUAL: 0.9 10*3/MM3 (ref 0.7–3.1)
LYMPHOCYTES NFR BLD MANUAL: 2 % (ref 5–12)
LYMPHOCYTES NFR BLD MANUAL: 3 % (ref 5–12)
MCH RBC QN AUTO: 27.2 PG (ref 26.6–33)
MCH RBC QN AUTO: 27.9 PG (ref 26.6–33)
MCHC RBC AUTO-ENTMCNC: 30.9 G/DL (ref 31.5–35.7)
MCHC RBC AUTO-ENTMCNC: 31.5 G/DL (ref 31.5–35.7)
MCV RBC AUTO: 87.9 FL (ref 79–97)
MCV RBC AUTO: 88.8 FL (ref 79–97)
MONOCYTES # BLD: 0.9 10*3/MM3 (ref 0.1–0.9)
MONOCYTES # BLD: 1.53 10*3/MM3 (ref 0.1–0.9)
MRSA DNA SPEC QL NAA+PROBE: NORMAL
NEUTROPHILS # BLD AUTO: 41.66 10*3/MM3 (ref 1.7–7)
NEUTROPHILS # BLD AUTO: 49.57 10*3/MM3 (ref 1.7–7)
NEUTROPHILS NFR BLD MANUAL: 90 % (ref 42.7–76)
NEUTROPHILS NFR BLD MANUAL: 92 % (ref 42.7–76)
NEUTS BAND NFR BLD MANUAL: 3 % (ref 0–5)
NEUTS BAND NFR BLD MANUAL: 5 % (ref 0–5)
PLAT MORPH BLD: NORMAL
PLAT MORPH BLD: NORMAL
PLATELET # BLD AUTO: 335 10*3/MM3 (ref 140–450)
PLATELET # BLD AUTO: 358 10*3/MM3 (ref 140–450)
PMV BLD AUTO: 6.8 FL (ref 6–12)
PMV BLD AUTO: 7 FL (ref 6–12)
POTASSIUM SERPL-SCNC: 3.4 MMOL/L (ref 3.5–5.2)
POTASSIUM SERPL-SCNC: 3.4 MMOL/L (ref 3.5–5.2)
POTASSIUM SERPL-SCNC: 3.5 MMOL/L (ref 3.5–5.2)
RBC # BLD AUTO: 2.23 10*6/MM3 (ref 4.14–5.8)
RBC # BLD AUTO: 2.69 10*6/MM3 (ref 4.14–5.8)
SCAN SLIDE: NORMAL
SCAN SLIDE: NORMAL
SODIUM SERPL-SCNC: 128 MMOL/L (ref 136–145)
TOXIC GRANULATION: ABNORMAL
TOXIC GRANULATION: ABNORMAL
VARIANT LYMPHS NFR BLD MANUAL: 0 % (ref 19.6–45.3)
VARIANT LYMPHS NFR BLD MANUAL: 2 % (ref 19.6–45.3)
WBC NRBC COR # BLD: 44.8 10*3/MM3 (ref 3.4–10.8)
WBC NRBC COR # BLD: 51.1 10*3/MM3 (ref 3.4–10.8)

## 2023-09-07 PROCEDURE — 25010000002 VANCOMYCIN 1 G RECONSTITUTED SOLUTION 1 EACH VIAL: Performed by: NURSE PRACTITIONER

## 2023-09-07 PROCEDURE — 36415 COLL VENOUS BLD VENIPUNCTURE: CPT | Performed by: INTERNAL MEDICINE

## 2023-09-07 PROCEDURE — 0 CEFEPIME PER 500 MG: Performed by: NURSE PRACTITIONER

## 2023-09-07 PROCEDURE — 83605 ASSAY OF LACTIC ACID: CPT | Performed by: NURSE PRACTITIONER

## 2023-09-07 PROCEDURE — 85007 BL SMEAR W/DIFF WBC COUNT: CPT | Performed by: NURSE PRACTITIONER

## 2023-09-07 PROCEDURE — 84132 ASSAY OF SERUM POTASSIUM: CPT | Performed by: STUDENT IN AN ORGANIZED HEALTH CARE EDUCATION/TRAINING PROGRAM

## 2023-09-07 PROCEDURE — 36430 TRANSFUSION BLD/BLD COMPNT: CPT

## 2023-09-07 PROCEDURE — 85025 COMPLETE CBC W/AUTO DIFF WBC: CPT | Performed by: NURSE PRACTITIONER

## 2023-09-07 PROCEDURE — 80048 BASIC METABOLIC PNL TOTAL CA: CPT | Performed by: NURSE PRACTITIONER

## 2023-09-07 PROCEDURE — 84132 ASSAY OF SERUM POTASSIUM: CPT | Performed by: INTERNAL MEDICINE

## 2023-09-07 PROCEDURE — 85007 BL SMEAR W/DIFF WBC COUNT: CPT | Performed by: STUDENT IN AN ORGANIZED HEALTH CARE EDUCATION/TRAINING PROGRAM

## 2023-09-07 PROCEDURE — 25010000002 METRONIDAZOLE 500 MG/100ML SOLUTION: Performed by: NURSE PRACTITIONER

## 2023-09-07 PROCEDURE — P9016 RBC LEUKOCYTES REDUCED: HCPCS

## 2023-09-07 PROCEDURE — 86900 BLOOD TYPING SEROLOGIC ABO: CPT

## 2023-09-07 PROCEDURE — 85025 COMPLETE CBC W/AUTO DIFF WBC: CPT | Performed by: STUDENT IN AN ORGANIZED HEALTH CARE EDUCATION/TRAINING PROGRAM

## 2023-09-07 PROCEDURE — 99222 1ST HOSP IP/OBS MODERATE 55: CPT | Performed by: NURSE PRACTITIONER

## 2023-09-07 RX ORDER — POTASSIUM CHLORIDE 20 MEQ/1
40 TABLET, EXTENDED RELEASE ORAL EVERY 4 HOURS
Status: COMPLETED | OUTPATIENT
Start: 2023-09-07 | End: 2023-09-08

## 2023-09-07 RX ORDER — POTASSIUM CHLORIDE 20 MEQ/1
40 TABLET, EXTENDED RELEASE ORAL EVERY 4 HOURS
Status: COMPLETED | OUTPATIENT
Start: 2023-09-07 | End: 2023-09-07

## 2023-09-07 RX ORDER — POTASSIUM CHLORIDE 20 MEQ/1
20 TABLET, EXTENDED RELEASE ORAL DAILY
Status: DISCONTINUED | OUTPATIENT
Start: 2023-09-08 | End: 2023-09-10

## 2023-09-07 RX ORDER — ALPRAZOLAM 0.25 MG/1
0.25 TABLET ORAL EVERY 8 HOURS PRN
Status: DISCONTINUED | OUTPATIENT
Start: 2023-09-07 | End: 2023-09-11 | Stop reason: HOSPADM

## 2023-09-07 RX ADMIN — OXYCODONE HYDROCHLORIDE 10 MG: 5 TABLET ORAL at 21:55

## 2023-09-07 RX ADMIN — CYANOCOBALAMIN TAB 1000 MCG 1000 MCG: 1000 TAB at 09:26

## 2023-09-07 RX ADMIN — GABAPENTIN 300 MG: 300 CAPSULE ORAL at 20:21

## 2023-09-07 RX ADMIN — CEFEPIME 2000 MG: 2 INJECTION, POWDER, FOR SOLUTION INTRAVENOUS at 17:19

## 2023-09-07 RX ADMIN — SENNOSIDES AND DOCUSATE SODIUM 2 TABLET: 50; 8.6 TABLET ORAL at 20:21

## 2023-09-07 RX ADMIN — VANCOMYCIN HYDROCHLORIDE 1000 MG: 1 INJECTION, POWDER, LYOPHILIZED, FOR SOLUTION INTRAVENOUS at 09:26

## 2023-09-07 RX ADMIN — FOLIC ACID 1 MG: 1 TABLET ORAL at 09:26

## 2023-09-07 RX ADMIN — Medication 10 ML: at 09:27

## 2023-09-07 RX ADMIN — Medication 10 ML: at 20:22

## 2023-09-07 RX ADMIN — METRONIDAZOLE 500 MG: 500 INJECTION, SOLUTION INTRAVENOUS at 05:00

## 2023-09-07 RX ADMIN — DESVENLAFAXINE SUCCINATE 50 MG: 50 TABLET, EXTENDED RELEASE ORAL at 09:26

## 2023-09-07 RX ADMIN — Medication 100 MG: at 10:28

## 2023-09-07 RX ADMIN — Medication 100 MG: at 21:53

## 2023-09-07 RX ADMIN — BUPROPION HYDROCHLORIDE 300 MG: 150 TABLET, EXTENDED RELEASE ORAL at 09:26

## 2023-09-07 RX ADMIN — METRONIDAZOLE 500 MG: 500 INJECTION, SOLUTION INTRAVENOUS at 13:48

## 2023-09-07 RX ADMIN — CEFEPIME 2000 MG: 2 INJECTION, POWDER, FOR SOLUTION INTRAVENOUS at 02:45

## 2023-09-07 RX ADMIN — SENNOSIDES AND DOCUSATE SODIUM 2 TABLET: 50; 8.6 TABLET ORAL at 09:26

## 2023-09-07 RX ADMIN — POTASSIUM CHLORIDE 40 MEQ: 1500 TABLET, EXTENDED RELEASE ORAL at 07:18

## 2023-09-07 RX ADMIN — POTASSIUM CHLORIDE 40 MEQ: 20 TABLET, EXTENDED RELEASE ORAL at 21:52

## 2023-09-07 RX ADMIN — POTASSIUM CHLORIDE 40 MEQ: 1500 TABLET, EXTENDED RELEASE ORAL at 03:28

## 2023-09-07 RX ADMIN — OXYCODONE HYDROCHLORIDE 10 MG: 5 TABLET ORAL at 15:35

## 2023-09-07 RX ADMIN — MEGESTROL ACETATE 400 MG: 400 SUSPENSION ORAL at 09:26

## 2023-09-07 RX ADMIN — CEFEPIME 2000 MG: 2 INJECTION, POWDER, FOR SOLUTION INTRAVENOUS at 09:26

## 2023-09-07 RX ADMIN — ALPRAZOLAM 0.25 MG: 0.5 TABLET ORAL at 17:19

## 2023-09-07 RX ADMIN — LEVOTHYROXINE SODIUM 125 MCG: 0.03 TABLET ORAL at 05:20

## 2023-09-07 RX ADMIN — CETIRIZINE HYDROCHLORIDE 10 MG: 10 TABLET, FILM COATED ORAL at 09:26

## 2023-09-07 RX ADMIN — VANCOMYCIN HYDROCHLORIDE 1000 MG: 1 INJECTION, POWDER, LYOPHILIZED, FOR SOLUTION INTRAVENOUS at 21:40

## 2023-09-07 RX ADMIN — PANTOPRAZOLE SODIUM 40 MG: 40 TABLET, DELAYED RELEASE ORAL at 07:18

## 2023-09-07 RX ADMIN — LIOTHYRONINE SODIUM 5 MCG: 5 TABLET ORAL at 09:26

## 2023-09-07 NOTE — PROGRESS NOTES
[facility] Medicine Services   Daily Progress Note    Patient Name: Jc Driscoll  : 1965  MRN: [id number]  Primary Care Physician:  Reshma Alvarenga MD  Date of admission: 2023  Date and Time of Service: 2023      Subjective      Chief Complaint: Leukocytosis erythema  Patient    Patient Reports this a pleasant gentleman who is being for the last 5 years fighting potentiated adenocarcinoma with metastasis first time the patient had resection of liver that take care he was treated with chemoradiation after that and now he is still getting chemo with Dr. Gaspar, patient was found to have severe anemia received transfusion, patient is receiving transfusion for now with hematology oncology.  Patient is complaining of anxiety, no shortness of breath no chest pain    ROS all review of systems negative except for mentioned above      Objective      Vitals:   Temp:  [97.1 °F (36.2 °C)-98.1 °F (36.7 °C)] 98.1 °F (36.7 °C)  Heart Rate:  [] 102  Resp:  [16-27] 27  BP: ()/(58-73) 101/70    Physical Exam  Constitutional:       Appearance: Normal appearance.   HENT:      Head: Normocephalic.      Right Ear: Tympanic membrane normal.      Left Ear: Tympanic membrane normal.      Nose: Nose normal.      Mouth/Throat:      Mouth: Mucous membranes are moist.   Eyes:      Pupils: Pupils are equal, round, and reactive to light.   Cardiovascular:      Rate and Rhythm: Normal rate.      Pulses: Normal pulses.   Pulmonary:      Effort: Pulmonary effort is normal.   Abdominal:      General: Abdomen is flat.   Musculoskeletal:         General: Normal range of motion.   Skin:     General: Skin is warm.      Capillary Refill: Capillary refill takes less than 2 seconds.   Neurological:      General: No focal deficit present.      Mental Status: He is alert.   Psychiatric:         Mood and Affect: Mood normal.           Result Review    Result Review:  I have personally reviewed the results from the  time of this admission to 9/7/2023 15:39 EDT and agree with these findings:  []  Laboratory  []  Microbiology  []  Radiology  []  EKG/Telemetry   []  Cardiology/Vascular   []  Pathology  []  Old records  []  Other:  Most notable findings include:     22,000 hemoglobin increased to 7.4            buPROPion XL, 300 mg, Oral, Daily  cefepime, 2,000 mg, Intravenous, Q8H  cetirizine, 10 mg, Oral, Daily  desvenlafaxine, 50 mg, Oral, Daily  folic acid, 1 mg, Oral, Daily  gabapentin, 300 mg, Oral, Nightly  levothyroxine, 125 mcg, Oral, Q AM  liothyronine, 5 mcg, Oral, Daily  megestrol acetate, 400 mg, Oral, Daily  pantoprazole, 40 mg, Oral, QAM AC  [START ON 9/8/2023] potassium chloride, 20 mEq, Oral, Daily  senna-docusate sodium, 2 tablet, Oral, BID  sodium chloride, 10 mL, Intravenous, Q12H  vancomycin, 1,000 mg, Intravenous, Q12H  vitamin B-12, 1,000 mcg, Oral, Daily  vitamin B-6, 100 mg, Oral, Q12H       Pharmacy to dose vancomycin,          Active Hospital Problems:  Active Hospital Problems    Diagnosis     **Sepsis     Leukocytosis     Bandemia     Anemia due to chemotherapy     Cellulitis of chest wall     NSCLC of left lung     Cancer of lower lobe of right lung     Non-small cell carcinoma of lung, right     Gastroesophageal reflux disease     Anxiety and depression     Hypertension      Plan:   Leukocytosis with bandemia  Left chest wall cellulitis   -consider port infection/bacteremia  -left axilla with swelling and induration  -WBC 51.2 with 15% bands, lactate 1.4, afebrile  -pt has had leukocytosis since March 2023. His previous admission July 2023 the patient had negative blood cultures. ID team suggested leukocytosis was secondary to steroids and underlying malignancy  -check CT chest  -check procalcitonin  -follow blood cultures  -SBP borderline low 99. Received 1L IVFs at [facility], will give another liter for sepsis protocol  -IV cefepime started in ED. Will add vancomycin and flagyl  -consult ID  for further recommendations      Anemia  -no hematochezia or hematemesis   -hgb 7.2 in the ED at 1729 when it was 5.5 at 1435  -check check H&H in 6 hours  -anemia secondary to metastatic disease/chemotherapy     Metastatic lung CA s/p left thoracotomy and right lower lobectomy  -followed by Dr. Gaspar on tepotinib  -cont home pain meds      Hyponatremia  -Na 127  -check urine osmo, urine sodium, serum osmo  -pt did have hyponatremia secondary to volume depletion and underlying SIADH per nephrology back in July 2023. He was treated with IVFs during that admission     Hypothyroidism  -cont home synthroid, cytomel     Anorexia   -on megace        DVT prophylaxis:  Mechanical DVT prophylaxis orders are present.    CODE STATUS:    Level Of Support Discussed With: Patient  Code Status (Patient has no pulse and is not breathing): CPR (Attempt to Resuscitate)  Medical Interventions (Patient has pulse or is breathing): Full Support      Disposition:  I expect patient to be discharged 1 days.    This patient has been examined wearing appropriate Personal Protective Equipment and discussed with hospital infection control department. 09/07/23      Electronically signed by Ever Slade MD, 09/07/23, 15:39 EDT.  [facility] Hospitalist Team

## 2023-09-07 NOTE — PAYOR COMM NOTE
"This is a clinical update for Mcbride Jc L   Reference/Auth # PENDING    INPATIENT AUTHORIZATION PENDING:     Please call or fax determination to contact below.   Thank you.    Jannette Lovett, RN, BSN  Utilization Review Nurse  [facility]  Direct & confidential phone # [phone]  Fax # [phone]      Jc Mcbride COURTNEY (58 y.o. Male)       Date of Birth   1965    Social Security Number       Address   [address] [place] [address]    Home Phone   [phone]    MRN   [id number]       Mandaen   None    Marital Status                               Admission Date   9/6/23    Admission Type   Emergency    Admitting Provider   Reshma Kingsley DO    Attending Provider   Ever Slade MD    Department, Room/Bed   [facility] EMERGENCY DEPARTMENT, 30/30       Discharge Date       Discharge Disposition       Discharge Destination                                 Attending Provider: Ever Slade MD    Allergies: No Known Allergies    Isolation: None   Infection: COVID Screen (preop/placement) (07/06/22)   Code Status: CPR    Ht: 190.5 cm (75\")   Wt: 66.7 kg (147 lb)    Admission Cmt: None   Principal Problem: Sepsis [A41.9]                   Active Insurance as of 9/6/2023       Primary Coverage       Payor Plan Insurance Group Employer/Plan Group    [facility] [employer] [facility] [employer] [facility] PPO [id number]       Payor Plan Address Payor Plan Phone Number Payor Plan Fax Number Effective Dates    [address] [phone]  1/1/2014 - None Entered    [place] [address]         Subscriber Name Subscriber Birth Date Member ID       JC MCBRIDE COURTNEY 1965 [id number]                     Emergency Contacts        (Rel.) Home Phone Work Phone Mobile Phone    VERN MCBRIDE (Spouse) [phone] -- [phone]                 History & Physical        Abril Marshall APRN at 09/06/23 1947       Attestation signed by Reshma Kingsley DO at 09/07/23 " 0355    I have reviewed this documentation and agree.    Electronically signed by Reshma Kingsley DO, 23, 3:55 AM EDT.                      [facility] Medicine Services  History & Physical    Patient Name: Jc Driscoll  : 1965  MRN: [id number]  Primary Care Physician:  Reshma Alvarenga MD  Date of admission: 2023  Date and Time of Service: 2023 at 2000    Subjective      Chief Complaint: left chest swelling around port, left armpit redness and swelling    History of Present Illness: Jc Driscoll is a 58 y.o. male with metastatic lung cancer presented to [facility] ED 2023 after he was sent over by his oncologist Dr. Gaspar for anemia and leukocytosis. Labs from 1435 showed a WBC 77 and hgb 5.5. He denied any blood per rectum. He has not had a fever. He has had some swelling and pain around his port and in the left axillary region. He stated he is chronically short of breath and this is not any worse. Pt reports he received 1L of fluids at [facility].     In the ED WBC 51.2 with 15% bands, Hgb 7.2, lactate 1.4, sodium 127, potassium 134, chloride 89, anion gap 16, BUN 10, creatinine 0.76.  Blood cultures were drawn.  Patient was afebrile 97.1, pulse 106, BP 99/61, normal oxygen saturation on room air. He was started on IV cefepime. Patient could very well have a leukemoid reaction secondary to progressively malignancy; however, due to increasing leukocytosis with bandemia, borderline low BP, and port with swelling will treat as sepsis by adding vancomycin and flagyl with ID consult.       Review of Systems   Constitutional: Positive for malaise/fatigue.   HENT: Negative.     Eyes: Negative.    Cardiovascular: Negative.    Respiratory: Negative.     Endocrine: Negative.    Hematologic/Lymphatic: Negative.    Skin:         Swelling around left chest port  Redness below left axilla   Musculoskeletal: Negative.    Gastrointestinal: Negative.    Genitourinary: Negative.     Neurological: Negative.    Psychiatric/Behavioral: Negative.     Allergic/Immunologic: Negative.    All other systems reviewed and are negative.     Personal History     Past Medical History:   Diagnosis Date    Allergic rhinitis 07/25/2013    Overview:  4/2/2018 - still zyrtec 10 daily (if stops, gets dermatitis again).     Anxiety and depression 06/05/2012    Overview:  4/2/2018 -   C/w well 300 xr and Lito 20.  4/8/2019 -   Doing ok even with new lung cancer - lito 20 & well 300xr.     Chronic pansinusitis 04/08/2019    Overview:  4/8/2019 -   MRI showed chronic thick in frontal, maxillary, ethmoidal ---> to Dr. COLLAZO to est since abx ICC didn't help.  Does netipot bid.     Diastolic CHF 07/09/2014    Overview:  7/4/14 - impaired LV relaxation on [facility] ECHO.  EF 60%. Rest normal    Dupuytren's contracture of both hands     Elevated cholesterol     Erosive esophagitis 07/09/2019    Overview:  EGD 2016 4/2/2018 - nexium OTC daily for few week.  Qod before that.  Now carbonation hurts, epigastric pain 4/8/2019 -   protonix 40 doing well.     Gastroesophageal reflux disease 02/21/2019    Hiatal hernia 07/09/2019    History of colon polyps     Hypertension     Lung cancer     right lung and in lymph nodes x2    Mediastinal lymphadenopathy 03/12/2019    Normocytic anemia 08/08/2019    Overview:  6/2019 - dropped to 9's postop 7/2019 - rebounded to 10's.  8/8/2019 -   Back to 9's - check ferritin, b12 and thyroid.     Prediabetes 07/09/2014    Overview:  7/4/14 - a1c 6.1 at [facility] admission    Retention of urine 06/27/2019    PSOT OP, RESOLVED       Past Surgical History:   Procedure Laterality Date    BRONCHOSCOPY  03/18/2019    EBUS [place]TERO NEEDLE BX    COLONOSCOPY      DUPUYTREN CONTRACTURE RELEASE Bilateral     LUNG BIOPSY  03/08/2019    CT GUIDED    LUNG REMOVAL, PARTIAL Right     right lower    PORTACATH PLACEMENT  03/20/2019    DR LONGROIA    THORACOSCOPY Right 6/26/2019    Procedure: RIGHT VATS, OPEN LOWER LOBECTOMY  WITH LYMPH NODE DISECTION, WEDGE RESECTION OF RIGHT MIDDLE LOBE;  Surgeon: Terrance Fuchs MD;  Location: [facility] MAIN OR;  Service: Cardiothoracic    THORACOSCOPY VIDEO ASSISTED WITH LOBECTOMY Left 7/6/2022    Procedure: THORACOSCOPY VIDEO ASSISTED WITH LOBECTOMY;  Surgeon: Miryam Reyna MD;  Location: [facility] MAIN OR;  Service: Thoracic;  Laterality: Left;       Family History: family history includes Cancer in his father, mother, and sister; Cervical cancer in his mother; Lung cancer in his father and sister. Otherwise pertinent FHx was reviewed and not pertinent to current issue.    Social History:  reports that he quit smoking about 14 years ago. His smoking use included cigarettes. He has never used smokeless tobacco. He reports current alcohol use of about 2.0 standard drinks per week. He reports that he does not use drugs.    Home Medications:  Prior to Admission Medications       Prescriptions Last Dose Informant Patient Reported? Taking?    buPROPion XL (WELLBUTRIN XL) 300 MG 24 hr tablet   Yes No    Take 1 tablet by mouth Daily.    Constulose 10 GM/15ML solution   No No    TAKE 30 ML BY MOUTH  EVERY 12 HOURS    desvenlafaxine (PRISTIQ) 50 MG 24 hr tablet   Yes No    Take 1 tablet by mouth Daily.    dexamethasone (DECADRON) 4 MG tablet   No No    Take 1 tablet by mouth Every 12 (Twelve) Hours.    folic acid (FOLVITE) 1 MG tablet   No No    Take 1 tablet by mouth Daily.    gabapentin (NEURONTIN) 300 MG capsule   No No    Take 2 capsules by mouth 3 (Three) Times a Day for 30 days.    Patient taking differently:  Take 2 capsules by mouth every night at bedtime.    levoFLOXacin (Levaquin) 500 MG tablet   No No    Take 1 tablet by mouth Daily.    levothyroxine (SYNTHROID, LEVOTHROID) 125 MCG tablet   No No    Take 1 tablet by mouth Every Morning.    liothyronine (CYTOMEL) 5 MCG tablet   Yes No    Take 1 tablet by mouth Daily.    loratadine (CLARITIN) 10 MG tablet  Self Yes No    Take 1 tablet by mouth  Daily.    megestrol acetate (MEGACE) 400 MG/10ML suspension oral suspension   No No    Take 10 mL by mouth Daily.    ondansetron (ZOFRAN) 8 MG tablet   No No    Take 1 tablet by mouth 3 (Three) Times a Day As Needed for Nausea or Vomiting.    ondansetron (ZOFRAN) injection 8 mg   No No    oxyCODONE-acetaminophen (PERCOCET)  MG per tablet   No No    Take 1 tablet by mouth Every 4 (Four) Hours As Needed for Moderate Pain.    pantoprazole (PROTONIX) 40 MG EC tablet   Yes No    Take 1 tablet by mouth Daily.    potassium chloride (K-DUR,KLOR-CON) 20 MEQ CR tablet   No No    Take 1 tablet by mouth Daily. Take two tablets today and then reduce to one tablet daily.    sennosides-docusate (PERICOLACE) 8.6-50 MG per tablet   No No    Take 2 tablets by mouth 2 (Two) Times a Day.    Tepotinib HCl (TEPMETKO) 225 MG tablet   Yes No    Take 2 tablets by mouth Daily.    vitamin B-12 (CYANOCOBALAMIN) 1000 MCG tablet   Yes No    Take 1 tablet by mouth Daily.    vitamin B-6 (PYRIDOXINE) 100 MG tablet   No No    Take 1 tablet by mouth Every 12 (Twelve) Hours.              Allergies:  No Known Allergies    Objective      Vitals:   Temp:  [97.1 °F (36.2 °C)-97.8 °F (36.6 °C)] 97.7 °F (36.5 °C)  Heart Rate:  [] 101  Resp:  [16-24] 22  BP: ()/(61-73) 114/73    Physical Exam  Vitals and nursing note reviewed.   HENT:      Head: Normocephalic and atraumatic.   Eyes:      Extraocular Movements: Extraocular movements intact.      Pupils: Pupils are equal, round, and reactive to light.   Cardiovascular:      Rate and Rhythm: Normal rate and regular rhythm.      Pulses: Normal pulses.      Heart sounds: Normal heart sounds.   Pulmonary:      Effort: Pulmonary effort is normal.      Breath sounds: Normal breath sounds.   Abdominal:      General: Bowel sounds are normal.      Palpations: Abdomen is soft.      Tenderness: There is no abdominal tenderness.   Musculoskeletal:         General: Normal range of motion.   Skin:      General: Skin is warm and dry.      Coloration: Skin is pale.      Comments: Swelling around left port, no erythema  Erythema and swelling/induration below left axilla, tender to palpation   Neurological:      Mental Status: He is alert and oriented to person, place, and time.   Psychiatric:         Mood and Affect: Mood normal.         Behavior: Behavior normal.        Result Review    Result Review:  I have personally reviewed the results from the time of this admission to 9/6/2023 23:02 EDT and agree with these findings:  [x]  Laboratory  []  Microbiology  [x]  Radiology  []  EKG/Telemetry   []  Cardiology/Vascular   []  Pathology  [x]  Old records      Assessment & Plan        Active Hospital Problems:  Active Hospital Problems    Diagnosis     **Sepsis     Leukocytosis     Bandemia     Cellulitis of chest wall     Anemia due to chemotherapy     NSCLC of left lung     Cancer of lower lobe of right lung     Non-small cell carcinoma of lung, right     Gastroesophageal reflux disease     Anxiety and depression     Hypertension      Assessment/Plan:     Sepsis  Leukocytosis with bandemia  Left chest wall cellulitis   -consider port infection/bacteremia  -left axilla with swelling and induration  -WBC 51.2 with 15% bands, lactate 1.4, afebrile  -pt has had leukocytosis since March 2023. His previous admission July 2023 the patient had negative blood cultures. ID team suggested leukocytosis was secondary to steroids and underlying malignancy  -check CT chest  -check procalcitonin  -follow blood cultures  -SBP borderline low 99. Received 1L IVFs at [facility], will give another liter for sepsis protocol  -IV cefepime started in ED. Will add vancomycin and flagyl  -consult ID for further recommendations     Anemia  -no hematochezia or hematemesis   -hgb 7.2 in the ED at 1729 when it was 5.5 at 1435  -check check H&H in 6 hours  -anemia secondary to metastatic disease/chemotherapy    Metastatic lung CA s/p left  thoracotomy and right lower lobectomy  -followed by Dr. Gaspar on tepotinib  -cont home pain meds     Hyponatremia  -Na 127  -check urine osmo, urine sodium, serum osmo  -pt did have hyponatremia secondary to volume depletion and underlying SIADH per nephrology back in July 2023. He was treated with IVFs during that admission    Hypothyroidism  -cont home synthroid, cytomel    Anorexia   -on megace       DVT prophylaxis:  Mechanical DVT prophylaxis orders are present.    CODE STATUS:    Level Of Support Discussed With: Patient  Code Status (Patient has no pulse and is not breathing): CPR (Attempt to Resuscitate)  Medical Interventions (Patient has pulse or is breathing): Full Support    Admission Status:  I believe this patient meets inpatient status.    I discussed the patient's findings and my recommendations with patient and nursing staff.    This patient has been examined wearing appropriate Personal Protective Equipment  09/06/23      Electronically signed by MULUGETA Garner, 09/06/23, 11:01 PM EDT.     Electronically signed by Reshma Kingsley DO at 09/07/23 0355          Emergency Department Notes        Simon Barton MD at 09/06/23 1715          Subjective   History of Present Illness  Chief complaint: Anemia    58-year-old male with a history of metastatic lung cancer presents with anemia.  Patient states he had appointment with his oncologist today and was found to have a hemoglobin of 5.5.  His white blood cell count was also found to be elevated at 77,000.  He denies fever.  He states he has been generally weak.  He reports some pain around his port in the left upper chest as well as in the left axillary area.  He states he was sent here to be admitted to the hospital for blood transfusion and treatment of sepsis.    History provided by:  Patient    Review of Systems   Constitutional:  Negative for fever.   HENT:  Negative for congestion.    Respiratory:  Positive for shortness of breath.  Negative for cough.    Cardiovascular:  Negative for chest pain.   Gastrointestinal:  Negative for abdominal pain and vomiting.   Genitourinary:  Negative for dysuria.   Musculoskeletal:  Negative for back pain.   Neurological:  Positive for weakness. Negative for headaches.   Psychiatric/Behavioral:  Negative for confusion.      Past Medical History:   Diagnosis Date    Allergic rhinitis 07/25/2013    Overview:  4/2/2018 - still zyrtec 10 daily (if stops, gets dermatitis again).     Anxiety and depression 06/05/2012    Overview:  4/2/2018 -   C/w well 300 xr and Lito 20.  4/8/2019 -   Doing ok even with new lung cancer - lito 20 & well 300xr.     Chronic pansinusitis 04/08/2019    Overview:  4/8/2019 -   MRI showed chronic thick in frontal, maxillary, ethmoidal ---> to Dr. COLLAZO to est since abx ICC didn't help.  Does netipot bid.     Diastolic CHF 07/09/2014    Overview:  7/4/14 - impaired LV relaxation on [facility] ECHO.  EF 60%. Rest normal    Dupuytren's contracture of both hands     Elevated cholesterol     Erosive esophagitis 07/09/2019    Overview:  EGD 2016 4/2/2018 - nexium OTC daily for few week.  Qod before that.  Now carbonation hurts, epigastric pain 4/8/2019 -   protonix 40 doing well.     Gastroesophageal reflux disease 02/21/2019    Hiatal hernia 07/09/2019    History of colon polyps     Hypertension     Lung cancer     right lung and in lymph nodes x2    Mediastinal lymphadenopathy 03/12/2019    Normocytic anemia 08/08/2019    Overview:  6/2019 - dropped to 9's postop 7/2019 - rebounded to 10's.  8/8/2019 -   Back to 9's - check ferritin, b12 and thyroid.     Prediabetes 07/09/2014    Overview:  7/4/14 - a1c 6.1 at [facility] admission    Retention of urine 06/27/2019    PSOT OP, RESOLVED       No Known Allergies    Past Surgical History:   Procedure Laterality Date    BRONCHOSCOPY  03/18/2019    EBUS MONTERO NEEDLE BX    COLONOSCOPY      DUPUYTREN CONTRACTURE RELEASE Bilateral     LUNG BIOPSY  03/08/2019    CT  "GUIDED    LUNG REMOVAL, PARTIAL Right     right lower    PORTACATH PLACEMENT  2019    DR LONGORIA    THORACOSCOPY Right 2019    Procedure: RIGHT VATS, OPEN LOWER LOBECTOMY WITH LYMPH NODE DISECTION, WEDGE RESECTION OF RIGHT MIDDLE LOBE;  Surgeon: Terrance Longoria MD;  Location: [facility] MAIN OR;  Service: Cardiothoracic    THORACOSCOPY VIDEO ASSISTED WITH LOBECTOMY Left 2022    Procedure: THORACOSCOPY VIDEO ASSISTED WITH LOBECTOMY;  Surgeon: Miryam Reyna MD;  Location: [facility] MAIN OR;  Service: Thoracic;  Laterality: Left;       Family History   Problem Relation Age of Onset    Cancer Mother         cervical cancer    Cervical cancer Mother     Cancer Father         lung cancer    Lung cancer Father     Lung cancer Sister     Cancer Sister         lung cancer    Malig Hyperthermia Neg Hx        Social History     Socioeconomic History    Marital status:    Tobacco Use    Smoking status: Former     Types: Cigarettes     Quit date: 2009     Years since quittin.0    Smokeless tobacco: Never   Vaping Use    Vaping Use: Never used   Substance and Sexual Activity    Alcohol use: Yes     Alcohol/week: 2.0 standard drinks     Types: 2 Cans of beer per week     Comment: Occasional    Drug use: No    Sexual activity: Defer       BP 99/61   Pulse 106   Temp 97.1 °F (36.2 °C) (Axillary)   Resp 16   Ht 190.5 cm (75\")   Wt 66.7 kg (147 lb)   SpO2 100%   BMI 18.37 kg/m²       Objective   Physical Exam  Vitals and nursing note reviewed.   Constitutional:       Appearance: Normal appearance.   HENT:      Head: Normocephalic and atraumatic.      Mouth/Throat:      Mouth: Mucous membranes are moist.   Cardiovascular:      Rate and Rhythm: Regular rhythm. Tachycardia present.      Heart sounds: Normal heart sounds.   Pulmonary:      Effort: Pulmonary effort is normal. No respiratory distress.      Breath sounds: Normal breath sounds.   Abdominal:      Palpations: Abdomen is soft.      " Tenderness: There is no abdominal tenderness.   Skin:     Comments: There is an area of erythema and induration to the left axilla area.  No drainable abscess.   Neurological:      Mental Status: He is alert and oriented to person, place, and time.       Procedures          ED Course      Results for orders placed or performed during the hospital encounter of 09/06/23   Comprehensive Metabolic Panel    Specimen: Blood   Result Value Ref Range    Glucose 87 65 - 99 mg/dL    BUN 10 6 - 20 mg/dL    Creatinine 0.76 0.76 - 1.27 mg/dL    Sodium 127 (L) 136 - 145 mmol/L    Potassium 3.4 (L) 3.5 - 5.2 mmol/L    Chloride 89 (L) 98 - 107 mmol/L    CO2 22.0 22.0 - 29.0 mmol/L    Calcium 8.6 8.6 - 10.5 mg/dL    Total Protein 5.9 (L) 6.0 - 8.5 g/dL    Albumin 2.6 (L) 3.5 - 5.2 g/dL    ALT (SGPT) 17 1 - 41 U/L    AST (SGOT) 24 1 - 40 U/L    Alkaline Phosphatase 270 (H) 39 - 117 U/L    Total Bilirubin 0.6 0.0 - 1.2 mg/dL    Globulin 3.3 gm/dL    A/G Ratio 0.8 g/dL    BUN/Creatinine Ratio 13.2 7.0 - 25.0    Anion Gap 16.0 (H) 5.0 - 15.0 mmol/L    eGFR 103.5 >60.0 mL/min/1.73   CBC Auto Differential    Specimen: Blood   Result Value Ref Range    WBC 51.20 (C) 3.40 - 10.80 10*3/mm3    RBC 2.64 (L) 4.14 - 5.80 10*6/mm3    Hemoglobin 7.2 (L) 13.0 - 17.7 g/dL    Hematocrit 23.2 (L) 37.5 - 51.0 %    MCV 87.9 79.0 - 97.0 fL    MCH 27.5 26.6 - 33.0 pg    MCHC 31.3 (L) 31.5 - 35.7 g/dL    RDW 18.5 (H) 12.3 - 15.4 %    RDW-SD 56.9 (H) 37.0 - 54.0 fl    MPV 6.7 6.0 - 12.0 fL    Platelets 403 140 - 450 10*3/mm3   Scan Slide    Specimen: Blood   Result Value Ref Range    Scan Slide     Manual Differential    Specimen: Blood   Result Value Ref Range    Neutrophil % 81.0 (H) 42.7 - 76.0 %    Lymphocyte % 0.0 (L) 19.6 - 45.3 %    Monocyte % 3.0 (L) 5.0 - 12.0 %    Bands %  15.0 (H) 0.0 - 5.0 %    Myelocyte % 1.0 (H) 0.0 - 0.0 %    Neutrophils Absolute 49.15 (H) 1.70 - 7.00 10*3/mm3    Lymphocytes Absolute 0.00 (L) 0.70 - 3.10 10*3/mm3    Monocytes  Absolute 1.54 (H) 0.10 - 0.90 10*3/mm3    Anisocytosis Slight/1+ None Seen    Toxic Granulation Mod/2+ None Seen    Vacuolated Neutrophils Slight/1+ None Seen    Large Platelets Slight/1+ None Seen   POC Lactate    Specimen: Blood   Result Value Ref Range    Lactate 1.4 0.3 - 2.0 mmol/L   Type & Screen    Specimen: Blood   Result Value Ref Range    ABO Type A     RH type Positive     Antibody Screen Negative     T&S Expiration Date 9/9/2023 11:59:59 PM    Green Top (Gel)   Result Value Ref Range    Extra Tube Hold for add-ons.    Lavender Top   Result Value Ref Range    Extra Tube done    Gold Top - SST   Result Value Ref Range    Extra Tube Hold for add-ons.    Light Blue Top   Result Value Ref Range    Extra Tube Hold for add-ons.                                         Medical Decision Making  Amount and/or Complexity of Data Reviewed  Labs: ordered.    Risk  Prescription drug management.      Patient had the above evaluation.  Results were discussed with the patient.  White blood cell count is 51.2 which is down from 77 earlier today.  He does have 15% bands.  Lactic acid was normal.  Blood cultures were obtained.  Patient was started on IV antibiotics.  Hemoglobin is 7.2.  This is up from 5.5 earlier today.  Transfusion of packed red blood cells was initially ordered but this was held when the hemoglobin came back greater than 7.  CMP significant for sodium of 127 which is pretty consistent with previous levels.  Patient remained hemodynamically stable in the emergency room.  I discussed with the nurse practitioner on-call for the hospitalist and the patient will be admitted for further evaluation and management.      Final diagnoses:   Sepsis, due to unspecified organism, unspecified whether acute organ dysfunction present   Anemia, unspecified type       ED Disposition  ED Disposition       ED Disposition   Decision to Admit    Condition   --    Comment   Level of Care: Telemetry [5]   Admitting Physician:  FAZAL SALOMON [[id number]]   Attending Physician: FAZAL SALOMON [[id number]]                 No follow-up provider specified.       Medication List      No changes were made to your prescriptions during this visit.            Simon Barton MD  09/06/23 1935      Electronically signed by Simon Barton MD at 09/06/23 1935       Vital Signs (last day)       Date/Time Temp Temp src Pulse Resp BP Patient Position SpO2    09/07/23 0640 97.6 (36.4) Oral 101 24 94/61 Lying 98    09/07/23 0514 97.7 (36.5) -- 98 18 97/63 -- 99    09/07/23 0232 97.4 (36.3) Oral -- 20 100/66 -- 99    09/07/23 0213 97.4 (36.3) Oral -- 22 93/58 Lying --    09/07/23 0145 97.4 (36.3) Oral -- 20 92/61 Lying 99    09/06/23 2300 -- -- 100 -- 103/62 -- 99    09/06/23 2154 97.7 (36.5) Oral 101 22 114/73 -- --    09/06/23 2100 -- -- 105 -- 97/64 -- 96    09/06/23 2001 -- -- 105 -- 100/67 -- 99    09/06/23 1901 -- -- 106 -- 99/61 -- 100    09/06/23 1831 -- -- 108 16 100/68 Lying 99    09/06/23 1816 -- -- 106 16 108/67 Sitting 100    09/06/23 1712 97.1 (36.2) Axillary -- -- -- -- --    09/06/23 1700 -- -- 112 24 95/62 Sitting 99    09/06/23 1658 -- Oral -- -- -- -- --          Oxygen Therapy (last day)       Date/Time SpO2 Device (Oxygen Therapy) Flow (L/min) Oxygen Concentration (%) ETCO2 (mmHg)    09/07/23 0640 98 -- -- -- --    09/07/23 0514 99 -- -- -- --    09/07/23 0232 99 -- -- -- --    09/07/23 0145 99 -- -- -- --    09/06/23 2300 99 -- -- -- --    09/06/23 2200 -- room air -- -- --    09/06/23 2154 -- room air -- -- --    09/06/23 2100 96 -- -- -- --    09/06/23 2001 99 -- -- -- --    09/06/23 1901 100 -- -- -- --    09/06/23 1831 99 -- -- -- --    09/06/23 1816 100 room air -- -- --    09/06/23 1700 99 -- -- -- --          Facility-Administered Medications as of 9/6/2023   Medication Dose Route Frequency Provider Last Rate Last Admin    acetaminophen (TYLENOL) tablet 650 mg  650 mg Oral Q4H PRN Abril Marshall, MULUGETA        Or     acetaminophen (TYLENOL) 160 MG/5ML solution 650 mg  650 mg Oral Q4H PRN Abril Marshall APRN        Or    acetaminophen (TYLENOL) suppository 650 mg  650 mg Rectal Q4H PRN Verónica Marshallle, APRN        aluminum-magnesium hydroxide-simethicone (MAALOX MAX) 400-400-40 MG/5ML suspension 15 mL  15 mL Oral Q6H PRN Abril Marshall, APRN        sennosides-docusate (PERICOLACE) 8.6-50 MG per tablet 2 tablet  2 tablet Oral BID Abril Marshall, APRN   2 tablet at 09/06/23 2233    And    polyethylene glycol (MIRALAX) packet 17 g  17 g Oral Daily PRN Abril Marshall APRN        And    bisacodyl (DULCOLAX) EC tablet 5 mg  5 mg Oral Daily PRN Abril Marshall APRN        And    bisacodyl (DULCOLAX) suppository 10 mg  10 mg Rectal Daily PRN Abril Marshall APRN        buPROPion XL (WELLBUTRIN XL) 24 hr tablet 300 mg  300 mg Oral Daily Abril Marshall, APRN        Calcium Replacement - Follow Nurse / BPA Driven Protocol   Does not apply PRN Abril Marshall APRN        [COMPLETED] cefepime 2 gm IVPB in 100 ml NS (MBP)  2,000 mg Intravenous Once Simon Barton MD   Stopped at 09/06/23 1850    cefepime 2 gm IVPB in 100 ml NS (MBP)  2,000 mg Intravenous Q8H Abril Marshall APRN   Stopped at 09/07/23 0315    cetirizine (zyrTEC) tablet 10 mg  10 mg Oral Daily Abril Marshall, APRN        desvenlafaxine (PRISTIQ) 24 hr tablet 50 mg  50 mg Oral Daily Verónica Marshallle, APRN        folic acid (FOLVITE) tablet 1 mg  1 mg Oral Daily Verónica Marshallle, APRN        gabapentin (NEURONTIN) capsule 300 mg  300 mg Oral Nightly Verónica Marshallle, APRN   300 mg at 09/06/23 2233    [COMPLETED] iopamidol (ISOVUE-370) 76 % injection 100 mL  100 mL Intravenous Once in imaging Reshma Kingsley DO   100 mL at 09/06/23 2432    lactulose (CHRONULAC) 10 GM/15ML solution 20 g  20 g Oral BID PRN Abril Marshall APRN        levothyroxine (SYNTHROID, LEVOTHROID) tablet 125 mcg  125 mcg  Oral Q AM Abril Marshall APRN   125 mcg at 09/07/23 0520    liothyronine (CYTOMEL) tablet 5 mcg  5 mcg Oral Daily Abril Marshall APRN        [COMPLETED] LORazepam (ATIVAN) injection 1 mg  1 mg Intravenous Once Simon Barton MD   1 mg at 09/06/23 2030    Magnesium Standard Dose Replacement - Follow Nurse / BPA Driven Protocol   Does not apply PRN Abril Marshall APRN        megestrol acetate (MEGACE) oral suspension 400 mg  400 mg Oral Daily Abril Marshall APRN        melatonin tablet 5 mg  5 mg Oral Nightly PRN Abril Marshall APRN        metroNIDAZOLE (FLAGYL) IVPB 500 mg  500 mg Intravenous Q8H Abril Marshall APRN   Stopped at 09/07/23 0530    ondansetron (ZOFRAN) injection 4 mg  4 mg Intravenous Q6H PRN Abril Marshall APRN        [COMPLETED] ondansetron (ZOFRAN) injection 8 mg  8 mg Intravenous Once Shayy Montemayor APRN   8 mg at 09/06/23 1505    ondansetron (ZOFRAN) tablet 8 mg  8 mg Oral TID PRN Abril Marshall APRN        oxyCODONE (ROXICODONE) immediate release tablet 10 mg  10 mg Oral Q4H PRN Abril Marshall APRN   10 mg at 09/06/23 2233    pantoprazole (PROTONIX) EC tablet 40 mg  40 mg Oral QAM AC Abril Marshall APRN   40 mg at 09/07/23 0718    Pharmacy to dose vancomycin   Does not apply Continuous PRN Abril Marshall APRN        Phosphorus Replacement - Follow Nurse / BPA Driven Protocol   Does not apply PRN Abril Marshall APRN        [START ON 9/8/2023] potassium chloride (K-DUR,KLOR-CON) CR tablet 20 mEq  20 mEq Oral Daily Abril Marshall APRN        [COMPLETED] potassium chloride (K-DUR,KLOR-CON) CR tablet 40 mEq  40 mEq Oral Q4H Reshma Kingsley DO   40 mEq at 09/07/23 0718    Potassium Replacement - Follow Nurse / BPA Driven Protocol   Does not apply PRN Abril Marshall APRN        [COMPLETED] sodium chloride 0.9 % bolus 1,000 mL  1,000 mL Intravenous Once Shayy Montemayor APRN   Stopped at  09/06/23 1638    [COMPLETED] sodium chloride 0.9 % bolus 1,000 mL  1,000 mL Intravenous Once Achterberg, Abril, APRN 333.3 mL/hr at 09/06/23 2201 1,000 mL at 09/06/23 2201    sodium chloride 0.9 % flush 10 mL  10 mL Intravenous PRN Simon Barton MD        sodium chloride 0.9 % flush 10 mL  10 mL Intravenous Q12H Achterberg, Abril, APRN   10 mL at 09/06/23 2234    sodium chloride 0.9 % flush 10 mL  10 mL Intravenous PRN Achterberg, Abril, APRN        sodium chloride 0.9 % infusion 40 mL  40 mL Intravenous PRN Achterberg, Abril, APRN        vancomycin (VANCOCIN) 1,000 mg in sodium chloride 0.9 % 250 mL IVPB-VTB  1,000 mg Intravenous Q12H Achterberg, Abril, APRN        [COMPLETED] vancomycin IVPB 1500 mg in 0.9% NaCl (Premix) 500 mL  1,500 mg Intravenous Once Achterberg, Abril, APRN 333.3 mL/hr at 09/06/23 2234 1,500 mg at 09/06/23 2234    vitamin B-12 (CYANOCOBALAMIN) tablet 1,000 mcg  1,000 mcg Oral Daily Achterberg, Abril, APRN        vitamin B-6 (PYRIDOXINE) tablet 100 mg  100 mg Oral Q12H Achterberg, Abril, APRN   100 mg at 09/06/23 2233     Lab Results (last 24 hours)       Procedure Component Value Units Date/Time    CBC Auto Differential [[id number]]  (Abnormal) Collected: 09/07/23 0737    Specimen: Blood from Hand, Left Updated: 09/07/23 0801     WBC 51.10 10*3/mm3      RBC 2.69 10*6/mm3      Hemoglobin 7.5 g/dL      Hematocrit 23.9 %      Comment: Result checked          MCV 88.8 fL      MCH 27.9 pg      MCHC 31.5 g/dL      RDW 17.9 %      RDW-SD 56.0 fl      MPV 6.8 fL      Platelets 358 10*3/mm3      nRBC 0.0 /100 WBC     Potassium [[id number]]  (Abnormal) Collected: 09/07/23 0737    Specimen: Blood from Hand, Left Updated: 09/07/23 0753     Potassium 3.4 mmol/L     CBC & Differential [[id number]]  (Abnormal) Collected: 09/07/23 0737    Specimen: Blood from Hand, Left Updated: 09/07/23 0743    Narrative:      The following orders were created for panel order CBC &  Differential.  Procedure                               Abnormality         Status                     ---------                               -----------         ------                     CBC Auto Differential[[id number]]        Abnormal            Preliminary result         Scan Slide[[id number]]                                       In process                   Please view results for these tests on the individual orders.    Scan Slide [[id number]] Collected: 09/07/23 0737    Specimen: Blood from Hand, Left Updated: 09/07/23 0743    Manual Differential [[id number]]  (Abnormal) Collected: 09/07/23 0041    Specimen: Blood Updated: 09/07/23 0221     Neutrophil % 90.0 %      Lymphocyte % 2.0 %      Monocyte % 2.0 %      Eosinophil % 2.0 %      Basophil % 1.0 %      Bands %  3.0 %      Neutrophils Absolute 41.66 10*3/mm3      Lymphocytes Absolute 0.90 10*3/mm3      Monocytes Absolute 0.90 10*3/mm3      Eosinophils Absolute 0.90 10*3/mm3      Basophils Absolute 0.45 10*3/mm3      Anisocytosis Slight/1+     Toxic Granulation Slight/1+     Platelet Morphology Normal    Scan Slide [[id number]] Collected: 09/07/23 0041    Specimen: Blood Updated: 09/07/23 0220     Scan Slide --     Comment: See Manual Differential Results       CBC Auto Differential [[id number]]  (Abnormal) Collected: 09/07/23 0041    Specimen: Blood Updated: 09/07/23 0220     WBC 44.80 10*3/mm3      RBC 2.23 10*6/mm3      Hemoglobin 6.1 g/dL      Hematocrit 19.6 %      MCV 87.9 fL      MCH 27.2 pg      MCHC 30.9 g/dL      RDW 18.6 %      RDW-SD 57.8 fl      MPV 7.0 fL      Platelets 335 10*3/mm3     Narrative:      The previously reported component NRBC is no longer being reported. Previous result was 0.0 /100 WBC (Reference Range: 0.0-0.2 /100 WBC) on 9/7/2023 at 0058 EDT.    Basic Metabolic Panel [[id number]]  (Abnormal) Collected: 09/07/23 0041    Specimen: Blood Updated: 09/07/23 0139     Glucose 88 mg/dL      BUN 9 mg/dL      Creatinine 0.71 mg/dL       Sodium 128 mmol/L      Potassium 3.5 mmol/L      Chloride 94 mmol/L      CO2 20.0 mmol/L      Calcium 8.1 mg/dL      BUN/Creatinine Ratio 12.7     Anion Gap 14.0 mmol/L      eGFR 105.7 mL/min/1.73     Narrative:      GFR Normal >60  Chronic Kidney Disease <60  Kidney Failure <15      Lactic Acid, Plasma [[id number]]  (Normal) Collected: 09/07/23 0041    Specimen: Blood Updated: 09/07/23 0136     Lactate 0.9 mmol/L     MRSA Screen, PCR (Inpatient) - Swab, Nares [[id number]]  (Normal) Collected: 09/06/23 2325    Specimen: Swab from Nares Updated: 09/07/23 0121     MRSA PCR No MRSA Detected    Narrative:      The negative predictive value of this diagnostic test is high and should only be used to consider de-escalating anti-MRSA therapy. A positive result may indicate colonization with MRSA and must be correlated clinically.    Sodium, Urine, Random - Urine, Clean Catch [[id number]] Collected: 09/06/23 2320    Specimen: Urine, Clean Catch Updated: 09/06/23 2342     Sodium, Urine 20 mmol/L     Narrative:      Reference intervals for random urine have not been established.  Clinical usage is dependent upon physician's interpretation in combination with other laboratory tests.       Osmolality, Urine - Urine, Clean Catch [[id number]]  (Abnormal) Collected: 09/06/23 2320    Specimen: Urine, Clean Catch Updated: 09/06/23 2339     Osmolality, Urine 297 mOsm/kg     Urinalysis With Culture If Indicated - Urine, Clean Catch [[id number]]  (Abnormal) Collected: 09/06/23 2320    Specimen: Urine, Clean Catch Updated: 09/06/23 2334     Color, UA Yellow     Appearance, UA Clear     pH, UA 6.5     Specific Gravity, UA 1.032     Glucose, UA Negative     Ketones, UA Trace     Bilirubin, UA Negative     Blood, UA Negative     Protein, UA Trace     Leuk Esterase, UA Negative     Nitrite, UA Negative     Urobilinogen, UA 1.0 E.U./dL    Narrative:      In absence of clinical symptoms, the presence of pyuria, bacteria, and/or nitrites on the  "urinalysis result does not correlate with infection.  Urine microscopic not indicated.    Procalcitonin [[id number]]  (Abnormal) Collected: 09/06/23 1729    Specimen: Blood Updated: 09/06/23 2322     Procalcitonin 0.60 ng/mL     Narrative:      As a Marker for Sepsis (Non-Neonates):    1. <0.5 ng/mL represents a low risk of severe sepsis and/or septic shock.  2. >2 ng/mL represents a high risk of severe sepsis and/or septic shock.    As a Marker for Lower Respiratory Tract Infections that require antibiotic therapy:    PCT on Admission    Antibiotic Therapy       6-12 Hrs later    >0.5                Strongly Recommended  >0.25 - <0.5        Recommended   0.1 - 0.25          Discouraged              Remeasure/reassess PCT  <0.1                Strongly Discouraged     Remeasure/reassess PCT    As 28 day mortality risk marker: \"Change in Procalcitonin Result\" (>80% or <=80%) if Day 0 (or Day 1) and Day 4 values are available. Refer to http://www.[email or web address] [place]pct-calculator.com    Change in PCT <=80%  A decrease of PCT levels below or equal to 80% defines a positive change in PCT test result representing a higher risk for 28-day all-cause mortality of patients diagnosed with severe sepsis for septic shock.    Change in PCT >80%  A decrease of PCT levels of more than 80% defines a negative change in PCT result representing a lower risk for 28-day all-cause mortality of patients diagnosed with severe sepsis or septic shock.       Osmolality, Serum [[id number]]  (Abnormal) Collected: 09/06/23 1729    Specimen: Blood Updated: 09/06/23 2314     Osmolality 262 mOsm/kg     [place] Draw [[id number]] Collected: 09/06/23 1729    Specimen: Blood Updated: 09/06/23 1832    Narrative:      The following orders were created for panel order [place] Draw.  Procedure                               Abnormality         Status                     ---------                               -----------         ------                     Green Top " (Gel)[[id number]]                                  Final result               Lavender Top[[id number]]                                     Final result               Gold Top - SST[[id number]]                                   Final result               Light Blue Top[[id number]]                                   Final result                 Please view results for these tests on the individual orders.    Gold Top - SST [[id number]] Collected: 09/06/23 1729    Specimen: Blood Updated: 09/06/23 1832     Extra Tube Hold for add-ons.     Comment: Auto resulted.       Green Top (Gel) [[id number]] Collected: 09/06/23 1729    Specimen: Blood Updated: 09/06/23 1832     Extra Tube Hold for add-ons.     Comment: Auto resulted.       Light Blue Top [[id number]] Collected: 09/06/23 1729    Specimen: Blood Updated: 09/06/23 1832     Extra Tube Hold for add-ons.     Comment: Auto resulted       Manual Differential [[id number]]  (Abnormal) Collected: 09/06/23 1729    Specimen: Blood Updated: 09/06/23 1825     Neutrophil % 81.0 %      Lymphocyte % 0.0 %      Monocyte % 3.0 %      Bands %  15.0 %      Myelocyte % 1.0 %      Neutrophils Absolute 49.15 10*3/mm3      Lymphocytes Absolute 0.00 10*3/mm3      Monocytes Absolute 1.54 10*3/mm3      Anisocytosis Slight/1+     Toxic Granulation Mod/2+     Vacuolated Neutrophils Slight/1+     Large Platelets Slight/1+    CBC & Differential [[id number]]  (Abnormal) Collected: 09/06/23 1729    Specimen: Blood Updated: 09/06/23 1825    Narrative:      The following orders were created for panel order CBC & Differential.  Procedure                               Abnormality         Status                     ---------                               -----------         ------                     CBC Auto Differential[[id number]]        Abnormal            Final result               Scan Slide[[id number]]                                       Final result                 Please view results for these tests on  the individual orders.    CBC Auto Differential [[id number]]  (Abnormal) Collected: 09/06/23 1729    Specimen: Blood Updated: 09/06/23 1825     WBC 51.20 10*3/mm3      RBC 2.64 10*6/mm3      Hemoglobin 7.2 g/dL      Hematocrit 23.2 %      MCV 87.9 fL      MCH 27.5 pg      MCHC 31.3 g/dL      RDW 18.5 %      RDW-SD 56.9 fl      MPV 6.7 fL      Platelets 403 10*3/mm3     Scan Slide [[id number]] Collected: 09/06/23 1729    Specimen: Blood Updated: 09/06/23 1825     Scan Slide --     Comment: See Manual Differential Results       Blood Culture - Blood, Arm, Left [[id number]] Collected: 09/06/23 1756    Specimen: Blood from Arm, Left Updated: 09/06/23 1806    Blood Culture - Blood, Arm, Right [[id number]] Collected: 09/06/23 1756    Specimen: Blood from Arm, Right Updated: 09/06/23 1806    Lavender Top [[id number]] Collected: 09/06/23 1729    Specimen: Blood Updated: 09/06/23 1757     Extra Tube done    Comprehensive Metabolic Panel [[id number]]  (Abnormal) Collected: 09/06/23 1729    Specimen: Blood Updated: 09/06/23 1756     Glucose 87 mg/dL      BUN 10 mg/dL      Creatinine 0.76 mg/dL      Sodium 127 mmol/L      Potassium 3.4 mmol/L      Chloride 89 mmol/L      CO2 22.0 mmol/L      Calcium 8.6 mg/dL      Total Protein 5.9 g/dL      Albumin 2.6 g/dL      ALT (SGPT) 17 U/L      AST (SGOT) 24 U/L      Alkaline Phosphatase 270 U/L      Total Bilirubin 0.6 mg/dL      Globulin 3.3 gm/dL      A/G Ratio 0.8 g/dL      BUN/Creatinine Ratio 13.2     Anion Gap 16.0 mmol/L      eGFR 103.5 mL/min/1.73     Narrative:      GFR Normal >60  Chronic Kidney Disease <60  Kidney Failure <15      POC Lactate [[id number]]  (Normal) Collected: 09/06/23 1738    Specimen: Blood Updated: 09/06/23 1740     Lactate 1.4 mmol/L      Comment: Serial Number: [id number]:  [id number]             Imaging Results (Last 24 Hours)       Procedure Component Value Units Date/Time    CT Chest With Contrast Diagnostic [[id number]] Collected: 09/06/23 2025      Updated: 09/06/23 2223    Narrative:      CT CHEST W CONTRAST DIAGNOSTIC    Date of Exam: 9/6/2023 9:40 PM EDT    Indication: Chest wall pain, nontraumatic, infection or inflammation suspected, xray done.    Comparison: CT scan of the chest dated 8/9/2023.    Technique: Axial CT images were obtained of the chest after the uneventful intravenous administration of 100 cc Isovue-370 iodinated contrast.  Sagittal and coronal reconstructions were performed.  Automated exposure control and iterative reconstruction   methods were used.      Findings:        There is a left-sided MediPort its tip in the superior vena cava.    There is again appreciated a left anterior chest wall mass which has increased in size since 8/9/2023 now measuring approximately 8.3 x 6.2 cm which had measured approximately 6.8 x 3.5 x 4.6 cm on prior exam. There is again appreciated destructive   changes involving the anterior left second third and fourth ribs similar to prior exam.    There are consolidative changes of the left lung apex unchanged from prior exam. This may represent partial collapse versus infiltrate.    There is a small left pleural effusion unchanged.    There are postsurgical changes of the bilateral lungs.        The heart is not enlarged. There are coronary artery calcifications.    There is dilatation of the main pulmonary artery measuring 3.3 cm which can be seen with pulmonary hypertension.    There are multiple calcified lymph nodes in the hilum.    Limited evaluation of the upper abdomen demonstrates no gross abnormality.      Impression:        Since 8/9/2023 interval increase in size of the patient's known left anterior chest wall mass now measuring approximately 8.3 x 6.2 cm.    There are stable destructive changes of the anterior left second third and fourth ribs.    Consolidative changes in the left lung apex similar to that seen on prior examination which could represent partial collapse versus a underlying  infiltrate and correlation for pneumonia is recommended.    Small left pleural effusion.    Coronary artery calcifications.    Dilatation of the main pulmonary artery which can be seen with pulmonary hypertension.                      Electronically Signed: Bud Russell MD    9/6/2023 10:21 PM EDT    Workstation ID: [id number]          Operative/Procedure Notes (all)    No notes of this type exist for this encounter.       Physician Progress Notes (all)    No notes of this type exist for this encounter.       Consult Notes (all)    No notes of this type exist for this encounter.

## 2023-09-07 NOTE — PLAN OF CARE
Problem: Pain Acute  Goal: Acceptable Pain Control and Functional Ability  Intervention: Prevent or Manage Pain  Recent Flowsheet Documentation  Taken 9/7/2023 0400 by Kellie Gooden RN  Medication Review/Management: medications reviewed  Taken 9/7/2023 0200 by Kellie Gooden RN  Medication Review/Management: medications reviewed  Taken 9/7/2023 0000 by Kellie Gooden RN  Medication Review/Management: medications reviewed  Taken 9/6/2023 2200 by Kellie Gooden RN  Medication Review/Management: medications reviewed  Intervention: Optimize Psychosocial Wellbeing  Recent Flowsheet Documentation  Taken 9/6/2023 2200 by Kellie Gooden RN  Diversional Activities: television     Problem: Anemia  Goal: Anemia Symptom Improvement  Intervention: Monitor and Manage Anemia  Recent Flowsheet Documentation  Taken 9/7/2023 0400 by Kellie Gooden RN  Safety Promotion/Fall Prevention:   activity supervised   clutter free environment maintained   fall prevention program maintained   lighting adjusted   room organization consistent   safety round/check completed  Taken 9/7/2023 0200 by Kellie Gooden RN  Safety Promotion/Fall Prevention:   activity supervised   clutter free environment maintained   fall prevention program maintained   lighting adjusted   room organization consistent   safety round/check completed  Taken 9/7/2023 0000 by Kellie Gooden RN  Safety Promotion/Fall Prevention:   activity supervised   clutter free environment maintained   fall prevention program maintained   lighting adjusted   safety round/check completed   room organization consistent  Taken 9/6/2023 2200 by Kellie Gooden RN  Safety Promotion/Fall Prevention:   fall prevention program maintained   gait belt   activity supervised   safety round/check completed   Goal Outcome Evaluation:

## 2023-09-07 NOTE — CASE MANAGEMENT/SOCIAL WORK
Discharge Planning Assessment  [facility]     Patient Name: Jc Driscoll  MRN: [id number]  Today's Date: 9/7/2023    Admit Date: 9/6/2023    Plan: Return home   Discharge Needs Assessment       Row Name 09/07/23 1124       Living Environment    People in Home spouse    Name(s) of People in Home Rachel    Current Living Arrangements home    Potentially Unsafe Housing Conditions none    Primary Care Provided by self    Provides Primary Care For no one    Family Caregiver if Needed spouse    Quality of Family Relationships helpful;involved;supportive    Able to Return to Prior Arrangements yes       Resource/Environmental Concerns    Resource/Environmental Concerns none    Transportation Concerns none  spouse       Transition Planning    Patient/Family Anticipates Transition to home;home with family       Discharge Needs Assessment    Readmission Within the Last 30 Days no previous admission in last 30 days    Equipment Currently Used at Home none    Concerns to be Addressed discharge planning                   Discharge Plan       Row Name 09/07/23 1126       Plan    Plan Return home    Plan Comments Spoke wtheath roaeint who lives at home with spouse, states IADL's,confirmed PCP and Pharmacy. Denies transportation or medicaation  coverage issues. DC Barrier: IVAB's                       Expected Discharge Date and Time       Expected Discharge Date Expected Discharge Time    Sep 9, 2023            Demographic Summary       Row Name 09/07/23 1123       General Information    Admission Type inpatient    Arrived From home    Referral Source patient    Reason for Consult discharge planning    Preferred Language English                   Functional Status       Row Name 09/07/23 1124       Functional Status    Usual Activity Tolerance good    Current Activity Tolerance good       Functional Status, IADL    Medications independent    Meal Preparation independent    Housekeeping independent    Laundry independent    Shopping  independent       Mental Status    General Appearance WDL WDL           Met with patient in room wearing PPE: mask, face shield/goggles, gloves, gown.      Maintained distance greater than six feet and spent less than 15 minutes in the room.     Yessy Payne RN

## 2023-09-07 NOTE — ADDENDUM NOTE
Encounter addended by: Tiff Sanches RN on: 9/7/2023 9:00 AM   Actions taken: MAR administration accepted

## 2023-09-07 NOTE — CONSULTS
Infectious Diseases Consult Note    Referring Provider: Ever Slade MD    Reason for Consultation: Leukocytosis and possible left chest wall cellulitis    Patient Care Team:  Reshma Alvarenga MD as PCP - General (Family Medicine)  Reshma Alvarenga MD as PCP - Family Medicine  Miryam Reyna MD as Surgeon (Thoracic Surgery)  Azucena Gaspar MD as Consulting Physician (Hematology and Oncology)  Adalberto Huffman MD as Consulting Physician (Nephrology)    Chief complaint fatigue    Subjective     History of present illness:      This is 58-year-old unfortunate male with past medical history significant for relapsed poorly differentiated adenocarcinoma of the lungs was receiving oral chemotherapy.  He has a left sided chest port.  He had a tumor known to be invading the left chest wall on the previous CTs.  He presented to the hospital with complaint of worsening leukocytosis after he was seen at cancer center.  He was noted to have some erythema at the left chest wall.  He was started on IV antibiotics with vancomycin, cefepime and Flagyl.  Patient denied having fever at home.  The patient is afebrile here.  He is hemodynamically stable.  The patient was seen by our service previously for significantly elevated white count which was attributed to malignancy and was not treated with antibiotics back in early July 2023    Review of Systems   Review of Systems   Constitutional:  Positive for fatigue.   HENT: Negative.     Eyes: Negative.    Respiratory: Negative.     Cardiovascular: Negative.    Gastrointestinal: Negative.    Genitourinary: Negative.    Musculoskeletal: Negative.    Skin: Negative.    Neurological: Negative.    Hematological: Negative.    Psychiatric/Behavioral: Negative.       Medications  (Not in a hospital admission)      History  Past Medical History:   Diagnosis Date    Allergic rhinitis 07/25/2013    Overview:  4/2/2018 - still zyrtec 10 daily (if stops, gets dermatitis  again).     Anxiety and depression 06/05/2012    Overview:  4/2/2018 -   C/w well 300 xr and Lito 20.  4/8/2019 -   Doing ok even with new lung cancer - lito 20 & well 300xr.     Chronic pansinusitis 04/08/2019    Overview:  4/8/2019 -   MRI showed chronic thick in frontal, maxillary, ethmoidal ---> to Dr. COLLAZO to est since abx ICC didn't help.  Does netipot bid.     Diastolic CHF 07/09/2014    Overview:  7/4/14 - impaired LV relaxation on [place] ECHO.  EF 60%. Rest normal    Dupuytren's contracture of both hands     Elevated cholesterol     Erosive esophagitis 07/09/2019    Overview:  EGD 2016 4/2/2018 - nexium OTC daily for few week.  Qod before that.  Now carbonation hurts, epigastric pain 4/8/2019 -   protonix 40 doing well.     Gastroesophageal reflux disease 02/21/2019    Hiatal hernia 07/09/2019    History of colon polyps     Hypertension     Lung cancer     right lung and in lymph nodes x2    Mediastinal lymphadenopathy 03/12/2019    Normocytic anemia 08/08/2019    Overview:  6/2019 - dropped to 9's postop 7/2019 - rebounded to 10's.  8/8/2019 -   Back to 9's - check ferritin, b12 and thyroid.     Prediabetes 07/09/2014    Overview:  7/4/14 - a1c 6.1 at [place] admission    Retention of urine 06/27/2019    PSOT OP, RESOLVED     Past Surgical History:   Procedure Laterality Date    BRONCHOSCOPY  03/18/2019    EBUS MONTERO NEEDLE BX    COLONOSCOPY      DUPUYTREN CONTRACTURE RELEASE Bilateral     LUNG BIOPSY  03/08/2019    CT GUIDED    LUNG REMOVAL, PARTIAL Right     right lower    PORTACATH PLACEMENT  03/20/2019    DR LONGORIA    THORACOSCOPY Right 6/26/2019    Procedure: RIGHT VATS, OPEN LOWER LOBECTOMY WITH LYMPH NODE DISECTION, WEDGE RESECTION OF RIGHT MIDDLE LOBE;  Surgeon: Terrance Longoria MD;  Location: [facility] OR;  Service: Cardiothoracic    THORACOSCOPY VIDEO ASSISTED WITH LOBECTOMY Left 7/6/2022    Procedure: THORACOSCOPY VIDEO ASSISTED WITH LOBECTOMY;  Surgeon: Miryam Reyna MD;  Location: [facility]  OR;  Service: Thoracic;  Laterality: Left;       Family History  Family History   Problem Relation Age of Onset    Cancer Mother         cervical cancer    Cervical cancer Mother     Cancer Father         lung cancer    Lung cancer Father     Lung cancer Sister     Cancer Sister         lung cancer    Malig Hyperthermia Neg Hx        Social History   reports that he quit smoking about 14 years ago. His smoking use included cigarettes. He has never used smokeless tobacco. He reports current alcohol use of about 2.0 standard drinks per week. He reports that he does not use drugs.    Allergies  Patient has no known allergies.    Objective     Vital Signs   Vital Signs (last 24 hours)         09/06 0700  09/07 0659 09/07 0700 09/07 1407   Most Recent      Temp (°F) 97.1 -  97.7      98.1     98.1 (36.7) 09/07 1100    Heart Rate 98 -  112      102     102 09/07 1100    Resp 16 -  24      27     27 09/07 1100    BP 92/61 -  114/73      101/70     101/70 09/07 1100    SpO2 (%) 96 -  100      99     99 09/07 1100            Physical Exam:  Physical Exam  Vitals and nursing note reviewed.   Constitutional:       Appearance: He is well-developed.   HENT:      Head: Normocephalic and atraumatic.   Eyes:      Pupils: Pupils are equal, round, and reactive to light.   Cardiovascular:      Rate and Rhythm: Normal rate and regular rhythm.      Heart sounds: Normal heart sounds.   Pulmonary:      Effort: Pulmonary effort is normal. No respiratory distress.      Breath sounds: Normal breath sounds. No wheezing or rales.   Abdominal:      General: Bowel sounds are normal. There is no distension.      Palpations: Abdomen is soft. There is no mass.      Tenderness: There is no abdominal tenderness. There is no guarding or rebound.   Musculoskeletal:         General: No deformity. Normal range of motion.      Cervical back: Normal range of motion and neck supple.   Skin:     General: Skin is warm.      Findings: No erythema or rash.       Comments: Indurated and thick area on lateral left chest with no fluctuance to suspect abscess.  This area was not connected to the port location.  Port appears to be normal by physical examination   Neurological:      Mental Status: He is alert and oriented to person, place, and time.      Cranial Nerves: No cranial nerve deficit.       Microbiology  Microbiology Results (last 10 days)       Procedure Component Value - Date/Time    MRSA Screen, PCR (Inpatient) - Swab, Nares [[id number]]  (Normal) Collected: 09/06/23 2325    Lab Status: Final result Specimen: Swab from Nares Updated: 09/07/23 0121     MRSA PCR No MRSA Detected    Narrative:      The negative predictive value of this diagnostic test is high and should only be used to consider de-escalating anti-MRSA therapy. A positive result may indicate colonization with MRSA and must be correlated clinically.            Laboratory  Results from last 7 days   Lab Units 09/07/23 0737   WBC 10*3/mm3 51.10*   HEMOGLOBIN g/dL 7.5*   HEMATOCRIT % 23.9*   PLATELETS 10*3/mm3 358     Results from last 7 days   Lab Units 09/07/23 0737 09/07/23 0041   SODIUM mmol/L  --  128*   POTASSIUM mmol/L 3.4* 3.5   CHLORIDE mmol/L  --  94*   CO2 mmol/L  --  20.0*   BUN mg/dL  --  9   CREATININE mg/dL  --  0.71*   GLUCOSE mg/dL  --  88   CALCIUM mg/dL  --  8.1*     Results from last 7 days   Lab Units 09/07/23 0737 09/07/23 0041   SODIUM mmol/L  --  128*   POTASSIUM mmol/L 3.4* 3.5   CHLORIDE mmol/L  --  94*   CO2 mmol/L  --  20.0*   BUN mg/dL  --  9   CREATININE mg/dL  --  0.71*   GLUCOSE mg/dL  --  88   CALCIUM mg/dL  --  8.1*                   Radiology  Imaging Results (Last 72 Hours)       Procedure Component Value Units Date/Time    CT Chest With Contrast Diagnostic [[id number]] Collected: 09/06/23 2211     Updated: 09/06/23 2223    Narrative:      CT CHEST W CONTRAST DIAGNOSTIC    Date of Exam: 9/6/2023 9:40 PM EDT    Indication: Chest wall pain, nontraumatic, infection or  inflammation suspected, xray done.    Comparison: CT scan of the chest dated 8/9/2023.    Technique: Axial CT images were obtained of the chest after the uneventful intravenous administration of 100 cc Isovue-370 iodinated contrast.  Sagittal and coronal reconstructions were performed.  Automated exposure control and iterative reconstruction   methods were used.      Findings:        There is a left-sided MediPort its tip in the superior vena cava.    There is again appreciated a left anterior chest wall mass which has increased in size since 8/9/2023 now measuring approximately 8.3 x 6.2 cm which had measured approximately 6.8 x 3.5 x 4.6 cm on prior exam. There is again appreciated destructive   changes involving the anterior left second third and fourth ribs similar to prior exam.    There are consolidative changes of the left lung apex unchanged from prior exam. This may represent partial collapse versus infiltrate.    There is a small left pleural effusion unchanged.    There are postsurgical changes of the bilateral lungs.        The heart is not enlarged. There are coronary artery calcifications.    There is dilatation of the main pulmonary artery measuring 3.3 cm which can be seen with pulmonary hypertension.    There are multiple calcified lymph nodes in the hilum.    Limited evaluation of the upper abdomen demonstrates no gross abnormality.      Impression:        Since 8/9/2023 interval increase in size of the patient's known left anterior chest wall mass now measuring approximately 8.3 x 6.2 cm.    There are stable destructive changes of the anterior left second third and fourth ribs.    Consolidative changes in the left lung apex similar to that seen on prior examination which could represent partial collapse versus a underlying infiltrate and correlation for pneumonia is recommended.    Small left pleural effusion.    Coronary artery calcifications.    Dilatation of the main pulmonary artery which can  be seen with pulmonary hypertension.                      Electronically Signed: Bud Russell MD    9/6/2023 10:21 PM EDT    Workstation ID: [id number]            Cardiology      Results Review:  I have reviewed all clinical data, test, lab, and imaging results.       Schedule Meds  buPROPion XL, 300 mg, Oral, Daily  cefepime, 2,000 mg, Intravenous, Q8H  cetirizine, 10 mg, Oral, Daily  desvenlafaxine, 50 mg, Oral, Daily  folic acid, 1 mg, Oral, Daily  gabapentin, 300 mg, Oral, Nightly  levothyroxine, 125 mcg, Oral, Q AM  liothyronine, 5 mcg, Oral, Daily  megestrol acetate, 400 mg, Oral, Daily  pantoprazole, 40 mg, Oral, QAM AC  [START ON 9/8/2023] potassium chloride, 20 mEq, Oral, Daily  senna-docusate sodium, 2 tablet, Oral, BID  sodium chloride, 10 mL, Intravenous, Q12H  vancomycin, 1,000 mg, Intravenous, Q12H  vitamin B-12, 1,000 mcg, Oral, Daily  vitamin B-6, 100 mg, Oral, Q12H        Infusion Meds  Pharmacy to dose vancomycin,         PRN Meds    acetaminophen **OR** acetaminophen **OR** acetaminophen    aluminum-magnesium hydroxide-simethicone    senna-docusate sodium **AND** polyethylene glycol **AND** bisacodyl **AND** bisacodyl    Calcium Replacement - Follow Nurse / BPA Driven Protocol    lactulose    Magnesium Standard Dose Replacement - Follow Nurse / BPA Driven Protocol    melatonin    [DISCONTINUED] ondansetron **OR** ondansetron    ondansetron    oxyCODONE    Pharmacy to dose vancomycin    Phosphorus Replacement - Follow Nurse / BPA Driven Protocol    Potassium Replacement - Follow Nurse / BPA Driven Protocol    sodium chloride    sodium chloride    sodium chloride      Assessment & Plan       Assessment    Severe reactive leukocytosis.  Most likely secondary to malignancy.  Patient is known to have reactive leukocytosis for month.    Left-sided chest indurated and erythematous area.  This is a tumor infiltrating the soft tissue and skin.  I doubt this is an infection.  CT scan confirmed malignancy  invading this area.  This area is far and not connected to the port location.  I doubt patient has infected port    History of relapsed poorly differentiated adenocarcinoma of the lungs.  Patient follows with oncology service and was receiving oral chemotherapy    Plan    Discontinue IV Flagyl  I will discontinue the remaining antibiotics tomorrow if blood cultures stay negative  Continue supportive care  Prognosis is very guarded and probably poor secondary to his malignancy  Labs in a.m.    Myron Wilson MD  09/07/23  14:07 EDT    Note is dictated utilizing voice recognition software/Dragon

## 2023-09-07 NOTE — PROGRESS NOTES
"Pharmacy Antimicrobial Dosing Service    Subjective:  Jc Driscoll is a 58 y.o.male admitted with anemia and leukocytosis. Pharmacy has been consulted to dose Vancomycin for possible sepsis.    PMH: hx of lung cancer      Assessment/Plan    1. Day #1 Vancomycin: Goal -600 mcg*h/mL. 1500mg (~22mg/kg ABW) IV x1 dose followed by 1000mg (~15mg/kg ABW) IV q12h.  Random level ordered for 9/8 at 0600.    2. Day #1 Cefepime: 2000mg IV q8h for estCrCl > 60 mL/min.    3. Day #1 Metronidazole 500mg IV q8h    Will continue to monitor drug levels, renal function, culture and sensitivities, and patient clinical status.       Objective:  Relevant clinical data and objective history reviewed:  190.5 cm (75\")   66.7 kg (147 lb)   Ideal body weight: 84.5 kg (186 lb 4.6 oz)  Body mass index is 18.37 kg/m².        Results from last 7 days   Lab Units 09/06/23  1729 09/06/23  1435 09/01/23  0811   CREATININE mg/dL 0.76 0.76 0.72*     Estimated Creatinine Clearance: 98.7 mL/min (by C-G formula based on SCr of 0.76 mg/dL).  No intake/output data recorded.    Results from last 7 days   Lab Units 09/06/23  1729 09/06/23  1435 09/01/23  0811   WBC 10*3/mm3 51.20* 77.96* 44.55*     Temperature    09/06/23 1712 09/06/23 2154   Temp: 97.1 °F (36.2 °C) 97.7 °F (36.5 °C)     Baseline culture/source/susceptibility:  Microbiology Results (last 10 days)       ** No results found for the last 240 hours. **            Jeff Doe  09/06/23 23:43 EDT    "

## 2023-09-07 NOTE — PLAN OF CARE
Problem: Pain Acute  Goal: Acceptable Pain Control and Functional Ability  Outcome: Ongoing, Not Progressing  Intervention: Optimize Psychosocial Wellbeing  Recent Flowsheet Documentation  Taken 9/7/2023 1200 by Kori Garcia RN  Diversional Activities: television  Taken 9/7/2023 0810 by Kori Garcia RN  Diversional Activities: television     Problem: Anemia  Goal: Anemia Symptom Improvement  Outcome: Ongoing, Not Progressing  Intervention: Monitor and Manage Anemia  Recent Flowsheet Documentation  Taken 9/7/2023 1600 by Kori Garcia RN  Safety Promotion/Fall Prevention:   assistive device/personal items within reach   clutter free environment maintained   safety round/check completed   nonskid shoes/slippers when out of bed   fall prevention program maintained  Taken 9/7/2023 1200 by Kori Garcia RN  Safety Promotion/Fall Prevention:   assistive device/personal items within reach   clutter free environment maintained   safety round/check completed   nonskid shoes/slippers when out of bed   fall prevention program maintained  Taken 9/7/2023 1000 by Kori Garcia RN  Safety Promotion/Fall Prevention:   assistive device/personal items within reach   clutter free environment maintained  Taken 9/7/2023 0810 by Kori Garcia RN  Safety Promotion/Fall Prevention:   assistive device/personal items within reach   clutter free environment maintained   safety round/check completed   nonskid shoes/slippers when out of bed   fall prevention program maintained     Problem: Breathing Pattern Ineffective  Goal: Effective Breathing Pattern  Outcome: Ongoing, Not Progressing  Intervention: Promote Improved Breathing Pattern  Recent Flowsheet Documentation  Taken 9/7/2023 0810 by Kori Garcia RN  Head of Bed (HOB) Positioning: HOB elevated   Goal Outcome Evaluation:

## 2023-09-08 LAB
ALBUMIN SERPL-MCNC: 2.6 G/DL (ref 3.5–5.2)
ALBUMIN/GLOB SERPL: 0.7 G/DL
ALP SERPL-CCNC: 261 U/L (ref 39–117)
ALT SERPL W P-5'-P-CCNC: 14 U/L (ref 1–41)
ANION GAP SERPL CALCULATED.3IONS-SCNC: 14 MMOL/L (ref 5–15)
ANION GAP SERPL CALCULATED.3IONS-SCNC: 14 MMOL/L (ref 5–15)
ANISOCYTOSIS BLD QL: ABNORMAL
AST SERPL-CCNC: 16 U/L (ref 1–40)
BH BB BLOOD EXPIRATION DATE: NORMAL
BH BB BLOOD TYPE BARCODE: 6200
BH BB DISPENSE STATUS: NORMAL
BH BB PRODUCT CODE: NORMAL
BH BB UNIT NUMBER: NORMAL
BILIRUB SERPL-MCNC: 0.7 MG/DL (ref 0–1.2)
BUN SERPL-MCNC: 8 MG/DL (ref 6–20)
BUN SERPL-MCNC: 9 MG/DL (ref 6–20)
BUN/CREAT SERPL: 13.1 (ref 7–25)
BUN/CREAT SERPL: 14.3 (ref 7–25)
C3 FRG RBC-MCNC: ABNORMAL
CALCIUM SPEC-SCNC: 9.1 MG/DL (ref 8.6–10.5)
CALCIUM SPEC-SCNC: 9.3 MG/DL (ref 8.6–10.5)
CHLORIDE SERPL-SCNC: 91 MMOL/L (ref 98–107)
CHLORIDE SERPL-SCNC: 92 MMOL/L (ref 98–107)
CO2 SERPL-SCNC: 19 MMOL/L (ref 22–29)
CO2 SERPL-SCNC: 21 MMOL/L (ref 22–29)
CREAT SERPL-MCNC: 0.61 MG/DL (ref 0.76–1.27)
CREAT SERPL-MCNC: 0.63 MG/DL (ref 0.76–1.27)
CROSSMATCH INTERPRETATION: NORMAL
DEPRECATED RDW RBC AUTO: 59.5 FL (ref 37–54)
EGFRCR SERPLBLD CKD-EPI 2021: 109.6 ML/MIN/1.73
EGFRCR SERPLBLD CKD-EPI 2021: 110.6 ML/MIN/1.73
ERYTHROCYTE [DISTWIDTH] IN BLOOD BY AUTOMATED COUNT: 18 % (ref 12.3–15.4)
GLOBULIN UR ELPH-MCNC: 3.6 GM/DL
GLUCOSE SERPL-MCNC: 81 MG/DL (ref 65–99)
GLUCOSE SERPL-MCNC: 89 MG/DL (ref 65–99)
HCT VFR BLD AUTO: 23.2 % (ref 37.5–51)
HGB BLD-MCNC: 7.1 G/DL (ref 13–17.7)
HYPOCHROMIA BLD QL: ABNORMAL
LYMPHOCYTES # BLD MANUAL: 1.47 10*3/MM3 (ref 0.7–3.1)
LYMPHOCYTES NFR BLD MANUAL: 4 % (ref 5–12)
MCH RBC QN AUTO: 27.1 PG (ref 26.6–33)
MCHC RBC AUTO-ENTMCNC: 30.7 G/DL (ref 31.5–35.7)
MCV RBC AUTO: 88.1 FL (ref 79–97)
METAMYELOCYTES NFR BLD MANUAL: 2 % (ref 0–0)
MONOCYTES # BLD: 2.93 10*3/MM3 (ref 0.1–0.9)
MYELOCYTES NFR BLD MANUAL: 1 % (ref 0–0)
NEUTROPHILS # BLD AUTO: 66.7 10*3/MM3 (ref 1.7–7)
NEUTROPHILS NFR BLD MANUAL: 84 % (ref 42.7–76)
NEUTS BAND NFR BLD MANUAL: 7 % (ref 0–5)
PLAT MORPH BLD: NORMAL
PLATELET # BLD AUTO: 442 10*3/MM3 (ref 140–450)
PMV BLD AUTO: 7.3 FL (ref 6–12)
POIKILOCYTOSIS BLD QL SMEAR: ABNORMAL
POTASSIUM SERPL-SCNC: 4.2 MMOL/L (ref 3.5–5.2)
POTASSIUM SERPL-SCNC: 4.3 MMOL/L (ref 3.5–5.2)
PROT SERPL-MCNC: 6.2 G/DL (ref 6–8.5)
RBC # BLD AUTO: 2.63 10*6/MM3 (ref 4.14–5.8)
SCAN SLIDE: NORMAL
SODIUM SERPL-SCNC: 125 MMOL/L (ref 136–145)
SODIUM SERPL-SCNC: 126 MMOL/L (ref 136–145)
TOXIC GRANULATION: ABNORMAL
UNIT  ABO: NORMAL
UNIT  RH: NORMAL
VANCOMYCIN SERPL-MCNC: 10.4 MCG/ML (ref 5–40)
VARIANT LYMPHS NFR BLD MANUAL: 2 % (ref 19.6–45.3)
WBC NRBC COR # BLD: 73.3 10*3/MM3 (ref 3.4–10.8)

## 2023-09-08 PROCEDURE — 36415 COLL VENOUS BLD VENIPUNCTURE: CPT | Performed by: NURSE PRACTITIONER

## 2023-09-08 PROCEDURE — 0 CEFEPIME PER 500 MG: Performed by: NURSE PRACTITIONER

## 2023-09-08 PROCEDURE — 80202 ASSAY OF VANCOMYCIN: CPT | Performed by: NURSE PRACTITIONER

## 2023-09-08 PROCEDURE — 85007 BL SMEAR W/DIFF WBC COUNT: CPT | Performed by: INTERNAL MEDICINE

## 2023-09-08 PROCEDURE — 85025 COMPLETE CBC W/AUTO DIFF WBC: CPT | Performed by: NURSE PRACTITIONER

## 2023-09-08 PROCEDURE — 99232 SBSQ HOSP IP/OBS MODERATE 35: CPT | Performed by: INTERNAL MEDICINE

## 2023-09-08 PROCEDURE — 85025 COMPLETE CBC W/AUTO DIFF WBC: CPT | Performed by: INTERNAL MEDICINE

## 2023-09-08 PROCEDURE — 85007 BL SMEAR W/DIFF WBC COUNT: CPT | Performed by: NURSE PRACTITIONER

## 2023-09-08 PROCEDURE — 80053 COMPREHEN METABOLIC PANEL: CPT | Performed by: INTERNAL MEDICINE

## 2023-09-08 RX ADMIN — Medication 10 ML: at 20:19

## 2023-09-08 RX ADMIN — CEFEPIME 2000 MG: 2 INJECTION, POWDER, FOR SOLUTION INTRAVENOUS at 08:25

## 2023-09-08 RX ADMIN — PANTOPRAZOLE SODIUM 40 MG: 40 TABLET, DELAYED RELEASE ORAL at 08:26

## 2023-09-08 RX ADMIN — SENNOSIDES AND DOCUSATE SODIUM 2 TABLET: 50; 8.6 TABLET ORAL at 20:18

## 2023-09-08 RX ADMIN — Medication 100 MG: at 22:39

## 2023-09-08 RX ADMIN — OXYCODONE HYDROCHLORIDE 10 MG: 5 TABLET ORAL at 15:30

## 2023-09-08 RX ADMIN — BUPROPION HYDROCHLORIDE 300 MG: 150 TABLET, EXTENDED RELEASE ORAL at 08:25

## 2023-09-08 RX ADMIN — Medication 100 MG: at 10:13

## 2023-09-08 RX ADMIN — OXYCODONE HYDROCHLORIDE 10 MG: 5 TABLET ORAL at 08:27

## 2023-09-08 RX ADMIN — LEVOTHYROXINE SODIUM 125 MCG: 0.03 TABLET ORAL at 05:07

## 2023-09-08 RX ADMIN — POTASSIUM CHLORIDE 40 MEQ: 20 TABLET, EXTENDED RELEASE ORAL at 02:36

## 2023-09-08 RX ADMIN — SENNOSIDES AND DOCUSATE SODIUM 2 TABLET: 50; 8.6 TABLET ORAL at 08:27

## 2023-09-08 RX ADMIN — CYANOCOBALAMIN TAB 1000 MCG 1000 MCG: 1000 TAB at 08:26

## 2023-09-08 RX ADMIN — Medication 10 ML: at 08:27

## 2023-09-08 RX ADMIN — CETIRIZINE HYDROCHLORIDE 10 MG: 10 TABLET, FILM COATED ORAL at 08:26

## 2023-09-08 RX ADMIN — GABAPENTIN 300 MG: 300 CAPSULE ORAL at 20:19

## 2023-09-08 RX ADMIN — FOLIC ACID 1 MG: 1 TABLET ORAL at 08:27

## 2023-09-08 RX ADMIN — MEGESTROL ACETATE 400 MG: 400 SUSPENSION ORAL at 08:25

## 2023-09-08 RX ADMIN — DESVENLAFAXINE SUCCINATE 50 MG: 50 TABLET, EXTENDED RELEASE ORAL at 08:26

## 2023-09-08 RX ADMIN — POTASSIUM CHLORIDE 20 MEQ: 1500 TABLET, EXTENDED RELEASE ORAL at 10:13

## 2023-09-08 RX ADMIN — OXYCODONE HYDROCHLORIDE 10 MG: 5 TABLET ORAL at 19:15

## 2023-09-08 RX ADMIN — CEFEPIME 2000 MG: 2 INJECTION, POWDER, FOR SOLUTION INTRAVENOUS at 02:02

## 2023-09-08 RX ADMIN — LIOTHYRONINE SODIUM 5 MCG: 5 TABLET ORAL at 08:26

## 2023-09-08 NOTE — PROGRESS NOTES
Infectious Diseases Progress Note      LOS: 2 days   Patient Care Team:  Reshma Alvarenga MD as PCP - General (Family Medicine)  Reshma Alvarenga MD as PCP - Family Medicine  Miryam Reyna MD as Surgeon (Thoracic Surgery)  Azucena Gaspar MD as Consulting Physician (Hematology and Oncology)  Adalberto Huffman MD as Consulting Physician (Nephrology)    Chief Complaint: Weak, left chest pain    Subjective       The patient has been afebrile for the last 24 hours.  The patient is on room air, hemodynamically stable, and is tolerating antimicrobial therapy.      Review of Systems:   Review of Systems   Constitutional:  Positive for fatigue.   HENT: Negative.     Eyes: Negative.    Respiratory: Negative.          Left chest pain   Cardiovascular: Negative.    Gastrointestinal: Negative.    Endocrine: Negative.    Genitourinary: Negative.    Musculoskeletal: Negative.    Skin: Negative.    Neurological: Negative.    Psychiatric/Behavioral: Negative.     All other systems reviewed and are negative.     Objective     Vital Signs  Temp:  [97.5 °F (36.4 °C)-98.5 °F (36.9 °C)] 98.2 °F (36.8 °C)  Heart Rate:  [107-111] 111  Resp:  [17-28] 20  BP: ()/(69-75) 98/69    Physical Exam:  Physical Exam  Vitals and nursing note reviewed.   Constitutional:       General: He is not in acute distress.     Appearance: Normal appearance. He is well-developed and normal weight. He is not diaphoretic.   HENT:      Head: Normocephalic and atraumatic.   Eyes:      Conjunctiva/sclera: Conjunctivae normal.      Pupils: Pupils are equal, round, and reactive to light.   Cardiovascular:      Rate and Rhythm: Normal rate and regular rhythm.      Heart sounds: Normal heart sounds, S1 normal and S2 normal.   Pulmonary:      Effort: Pulmonary effort is normal. No respiratory distress.      Breath sounds: Normal breath sounds. No stridor. No wheezing or rales.   Abdominal:      General: Bowel sounds are normal. There is no  distension.      Palpations: Abdomen is soft. There is no mass.      Tenderness: There is no abdominal tenderness. There is no guarding.   Musculoskeletal:         General: No deformity. Normal range of motion.      Cervical back: Neck supple.   Skin:     General: Skin is warm and dry.      Coloration: Skin is not pale.      Findings: No erythema or rash.      Comments:  Indurated and thick area on lateral left chest with no fluctuance to suspect abscess.  This area was not connected to the port location.  Port appears to be normal by physical examination    Neurological:      Mental Status: He is alert and oriented to person, place, and time.      Cranial Nerves: No cranial nerve deficit.   Psychiatric:         Mood and Affect: Mood normal.        Results Review:    I have reviewed all clinical data, test, lab, and imaging results.     Radiology  No Radiology Exams Resulted Within Past 24 Hours    Cardiology    Laboratory    Results from last 7 days   Lab Units 09/08/23  1005 09/07/23 0737 09/07/23  0041 09/06/23  1729 09/06/23  1435   WBC 10*3/mm3 73.30* 51.10* 44.80* 51.20* 77.96*   HEMOGLOBIN g/dL 7.1* 7.5* 6.1* 7.2* 5.5*   HEMATOCRIT % 23.2* 23.9* 19.6* 23.2* 17.0*   PLATELETS 10*3/mm3 442 358 335 403 451*     Results from last 7 days   Lab Units 09/08/23  1005 09/07/23  1942 09/07/23 0737 09/07/23  0041 09/06/23  1729 09/06/23  1435   SODIUM mmol/L 126*  --   --  128* 127* 127*   POTASSIUM mmol/L 4.3 3.4* 3.4* 3.5 3.4* 3.4*   CHLORIDE mmol/L 91*  --   --  94* 89* 87*   CO2 mmol/L 21.0*  --   --  20.0* 22.0 22.0   BUN mg/dL 8  --   --  9 10 9   CREATININE mg/dL 0.61*  --   --  0.71* 0.76 0.76   GLUCOSE mg/dL 89  --   --  88 87 88   ALBUMIN g/dL 2.6*  --   --   --  2.6* 2.5*   BILIRUBIN mg/dL 0.7  --   --   --  0.6 0.7   ALK PHOS U/L 261*  --   --   --  270* 252*   AST (SGOT) U/L 16  --   --   --  24 25   ALT (SGPT) U/L 14  --   --   --  17 18   CALCIUM mg/dL 9.3  --   --  8.1* 8.6 9.1                  Microbiology   Microbiology Results (last 10 days)       Procedure Component Value - Date/Time    MRSA Screen, PCR (Inpatient) - Swab, Nares [[id number]]  (Normal) Collected: 09/06/23 2325    Lab Status: Final result Specimen: Swab from Nares Updated: 09/07/23 0121     MRSA PCR No MRSA Detected    Narrative:      The negative predictive value of this diagnostic test is high and should only be used to consider de-escalating anti-MRSA therapy. A positive result may indicate colonization with MRSA and must be correlated clinically.    Blood Culture - Blood, Arm, Left [[id number]]  (Normal) Collected: 09/06/23 1756    Lab Status: Preliminary result Specimen: Blood from Arm, Left Updated: 09/07/23 1815     Blood Culture No growth at 24 hours    Blood Culture - Blood, Arm, Right [[id number]]  (Normal) Collected: 09/06/23 1756    Lab Status: Preliminary result Specimen: Blood from Arm, Right Updated: 09/07/23 1815     Blood Culture No growth at 24 hours    Narrative:      Less than seven (7) mL's of blood was collected.  Insufficient quantity may yield false negative results.    Blood Culture - Blood, Arm, Right [[id number]]  (Normal) Collected: 09/06/23 1436    Lab Status: Preliminary result Specimen: Blood from Arm, Right Updated: 09/08/23 1445     Blood Culture No growth at 2 days    Narrative:      Less than seven (7) mL's of blood was collected.  Insufficient quantity may yield false negative results.            Medication Review:       Schedule Meds  buPROPion XL, 300 mg, Oral, Daily  cetirizine, 10 mg, Oral, Daily  desvenlafaxine, 50 mg, Oral, Daily  folic acid, 1 mg, Oral, Daily  gabapentin, 300 mg, Oral, Nightly  levothyroxine, 125 mcg, Oral, Q AM  liothyronine, 5 mcg, Oral, Daily  megestrol acetate, 400 mg, Oral, Daily  pantoprazole, 40 mg, Oral, QAM AC  potassium chloride, 20 mEq, Oral, Daily  senna-docusate sodium, 2 tablet, Oral, BID  sodium chloride, 10 mL, Intravenous, Q12H  vitamin B-12, 1,000 mcg, Oral,  Daily  vitamin B-6, 100 mg, Oral, Q12H        Infusion Meds       PRN Meds    acetaminophen **OR** acetaminophen **OR** acetaminophen    ALPRAZolam    aluminum-magnesium hydroxide-simethicone    senna-docusate sodium **AND** polyethylene glycol **AND** bisacodyl **AND** bisacodyl    Calcium Replacement - Follow Nurse / BPA Driven Protocol    lactulose    Magnesium Standard Dose Replacement - Follow Nurse / BPA Driven Protocol    melatonin    [DISCONTINUED] ondansetron **OR** ondansetron    ondansetron    oxyCODONE    Phosphorus Replacement - Follow Nurse / BPA Driven Protocol    Potassium Replacement - Follow Nurse / BPA Driven Protocol    sodium chloride    sodium chloride    sodium chloride        Assessment & Plan       Antimicrobial Therapy   1.  IV cefepime IV vancomycin        2.        3.        4.        5.          Assessment     Severe reactive leukocytosis.  Most likely secondary to malignancy.  Patient is known to have reactive leukocytosis for months.  Blood cultures are negative so far     Left-sided chest indurated and erythematous area.  This is a tumor infiltrating the soft tissue and skin.  I doubt this is an infection.  CT scan confirmed malignancy invading this area.  This area is far and not connected to the port location.  I doubt patient has infected port     History of relapsed poorly differentiated adenocarcinoma of the lungs.  Patient follows with oncology service and was receiving oral chemotherapy     Plan     Discontinue IV cefepime and IV vancomycin  Monitor off antimicrobial therapy  Continue supportive care  Prognosis is very guarded and probably poor secondary to his malignancy  Not much more to add from infectious disease standpoint-we will sign off at this time-please call with any questions.        Bridget Guerra, APRN  09/08/23  15:20 EDT    Note is dictated utilizing voice recognition software/Dragon

## 2023-09-08 NOTE — PLAN OF CARE
Problem: Pain Acute  Goal: Acceptable Pain Control and Functional Ability  Outcome: Ongoing, Progressing  Intervention: Prevent or Manage Pain  Recent Flowsheet Documentation  Taken 9/8/2023 0521 by Kellie Gooden RN  Medication Review/Management: medications reviewed  Taken 9/8/2023 0400 by Kellie Gooden RN  Medication Review/Management: medications reviewed  Taken 9/8/2023 0200 by Kellie Gooden RN  Medication Review/Management: medications reviewed  Taken 9/8/2023 0000 by Kellie Gooden RN  Medication Review/Management: medications reviewed  Taken 9/7/2023 2200 by Kellie Gooden RN  Medication Review/Management: medications reviewed  Taken 9/7/2023 2000 by Kellie Gooden RN  Medication Review/Management: medications reviewed  Intervention: Optimize Psychosocial Wellbeing  Recent Flowsheet Documentation  Taken 9/8/2023 0000 by Kellie Gooden RN  Diversional Activities: television  Taken 9/7/2023 2000 by Kellie Gooden RN  Diversional Activities: television     Problem: Anemia  Goal: Anemia Symptom Improvement  Outcome: Ongoing, Progressing  Intervention: Monitor and Manage Anemia  Recent Flowsheet Documentation  Taken 9/8/2023 0521 by Kellie Gooden RN  Safety Promotion/Fall Prevention:   clutter free environment maintained   activity supervised   fall prevention program maintained   lighting adjusted   room organization consistent   safety round/check completed  Taken 9/8/2023 0400 by Kellie Gooden RN  Safety Promotion/Fall Prevention:   activity supervised   clutter free environment maintained   fall prevention program maintained   lighting adjusted   room organization consistent   safety round/check completed  Taken 9/8/2023 0200 by Kellie Gooden RN  Safety Promotion/Fall Prevention:   activity supervised   fall prevention program maintained   lighting adjusted   room organization consistent  Taken 9/8/2023 0000 by Kellie Gooden RN  Safety Promotion/Fall Prevention:    activity supervised   fall prevention program maintained   lighting adjusted   room organization consistent   safety round/check completed  Taken 9/7/2023 2200 by Kellie oGoden, RN  Safety Promotion/Fall Prevention:   activity supervised   clutter free environment maintained   fall prevention program maintained   lighting adjusted   room organization consistent   safety round/check completed  Taken 9/7/2023 2000 by Kellie Gooden, RN  Safety Promotion/Fall Prevention:   activity supervised   lighting adjusted   fall prevention program maintained   clutter free environment maintained   room organization consistent     Problem: Breathing Pattern Ineffective  Goal: Effective Breathing Pattern  Outcome: Ongoing, Progressing   Goal Outcome Evaluation:

## 2023-09-08 NOTE — PROGRESS NOTES
Hematology/Oncology Inpatient Progress Note    PATIENT NAME: Jc Driscoll  : 1965  MRN: [id number]    CHIEF COMPLAINT: Weakness    HISTORY OF PRESENT ILLNESS:      Jc Driscoll is a 58 y.o. male who presented to [facility] on 2023 with complaints of swelling and erythema to the left axilla, left chest wall, around the left chest wall port.  Past medical history significant for metastatic lung cancer.  He initially presented to his oncologist office the same day due to the above complaints along with progressive weakness, nausea and vomiting.  He was noted to have leukocytosis with a WBC of 77,000 and significant anemia with a hemoglobin of 5.5 g/dL.  For this reason he was asked to go to the emergency room for further evaluation and treatment.     Evaluation in the ED: Sodium 127, alkaline phosphatase 270, WBC 51.20, hemoglobin 7.2 g/dL, MCV 87.9, platelets 403,000, bands 15%, absolute lymphocytes 0.0.  Normal lactic acid.  Blood cultures drawn and pending.  Patient was initiated on IV antibiotics and admitted for further evaluation and treatment.     23  Hematology/Oncology was consulted on a patient known to us and established in the office with Dr. 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  The patient initially presented in  with moderately differentiated adenocarcinoma of the right lung for which he underwent concurrent chemotherapy and radiation neoadjuvantly followed by a right lower lobectomy.  This was followed by immunotherapy with durvalumab for 24 cycles ending in 2020.  In 2022, a routine surveillance CT of the chest revealed a left upper lobe pulmonary nodule for which a subsequent PET/CT was done that showed mild uptake corresponding to the nodule.  Subsequent CT-guided biopsy was attempted but aborted due to findings by the radiologist that the lung nodularity was actually a clump of tiny nodules  of which the largest was 6 mm and therefore biopsy could not be completed.  Also the area was in the vicinity of multiple blood vessels with increased risk of bleeding.  Follow-up CT of the chest was recommended for continued surveillance.  In May 2022, the patient underwent a CT-guided biopsy of the left enlarging nodule and pathology was positive for invasive poorly differentiated adenocarcinoma most consistent with an adenocarcinoma of pulmonary origin.  PET/CT showed a 2.3 cm hypermetabolic left upper lobe nodule and no convincing metastatic disease elsewhere.  Brain MRI was negative for intracranial metastasis.  On 7/6/2022 the patient underwent a left upper lobe lobectomy which was followed by adjuvant chemotherapy.  After completion of chemotherapy the patient was initiated on atezolizumab along with XRT.  Radiation was completed on 01/11/2023.  The patient was continued on Tecentriq every 3 weeks.  Due to his increasing pain in the left axilla and concern for progressive disease a PET/CT was performed on 6/5/2023, this revealed that the chest wall mass had increased in size since the previous study.  There was osseous destruction of the second rib.  Osseous metastatic changes to the left first and second rib.  Due to progressive disease Tecentriq was discontinued.  A plan to transition to adhere to was in place but was complicated due to multiple hospitalizations for pain and weakness.  On 7/10/2023 the patient was initiated on cycle 1 of Enhertu.  Due to shortness of breath the patient had a CT of the chest on 8/9/2023 which showed a significant increase in size of the left anterior chest wall mass with an increase in destruction of the adjacent ribs particularly the left third rib.  Due to progressive disease and hereto was discontinued.  His malignancy has MET amplification and therefore it was planned to begin therapy with tepotinib.          Patient with recurrent left lung cancer.  Progressed on  multiple lines of treatment.  Was recently placed on tepotinib for an MEK 2 mutation.  Patient has had progressive decline in clinical status.  He presented with nausea, failure to thrive decreased p.o. intake significant anxiety and constipation.  He was also found to have significant leukocytosis, erythematous left chest wall, slight swelling around the left Mediport but no redness or increase in warmth.  For this reason patient was admitted to the hospital to evaluate for implant infection, ID consultation was also requested     He/She  has a past medical history of Allergic rhinitis (07/25/2013), Anxiety and depression (06/05/2012), Chronic pansinusitis (04/08/2019), Diastolic CHF (07/09/2014), Dupuytren's contracture of both hands, Elevated cholesterol, Erosive esophagitis (07/09/2019), Gastroesophageal reflux disease (02/21/2019), Hiatal hernia (07/09/2019), History of colon polyps, Hypertension, Lung cancer, Mediastinal lymphadenopathy (03/12/2019), Normocytic anemia (08/08/2019), Prediabetes (07/09/2014), and Retention of urine (06/27/2019).     PCP: Reshma Alvarenga MD     Subjective     Patient denies any new issues    ROS:  Review of Systems   Constitutional:  Positive for fatigue.   Neurological:  Positive for weakness.      MEDICATIONS:    Scheduled Meds:  buPROPion XL, 300 mg, Oral, Daily  cefepime, 2,000 mg, Intravenous, Q8H  cetirizine, 10 mg, Oral, Daily  desvenlafaxine, 50 mg, Oral, Daily  folic acid, 1 mg, Oral, Daily  gabapentin, 300 mg, Oral, Nightly  levothyroxine, 125 mcg, Oral, Q AM  liothyronine, 5 mcg, Oral, Daily  megestrol acetate, 400 mg, Oral, Daily  pantoprazole, 40 mg, Oral, QAM AC  potassium chloride, 20 mEq, Oral, Daily  senna-docusate sodium, 2 tablet, Oral, BID  sodium chloride, 10 mL, Intravenous, Q12H  vancomycin, 1,000 mg, Intravenous, Q12H  vitamin B-12, 1,000 mcg, Oral, Daily  vitamin B-6, 100 mg, Oral, Q12H       Continuous Infusions:  Pharmacy to dose vancomycin,       "  PRN Meds:    acetaminophen **OR** acetaminophen **OR** acetaminophen    ALPRAZolam    aluminum-magnesium hydroxide-simethicone    senna-docusate sodium **AND** polyethylene glycol **AND** bisacodyl **AND** bisacodyl    Calcium Replacement - Follow Nurse / BPA Driven Protocol    lactulose    Magnesium Standard Dose Replacement - Follow Nurse / BPA Driven Protocol    melatonin    [DISCONTINUED] ondansetron **OR** ondansetron    ondansetron    oxyCODONE    Pharmacy to dose vancomycin    Phosphorus Replacement - Follow Nurse / BPA Driven Protocol    Potassium Replacement - Follow Nurse / BPA Driven Protocol    sodium chloride    sodium chloride    sodium chloride     ALLERGIES:  No Known Allergies    Objective    VITALS:   /75 (BP Location: Right arm, Patient Position: Lying)   Pulse 109   Temp 98.5 °F (36.9 °C) (Oral)   Resp 22   Ht 190.5 cm (75\")   Wt 66.7 kg (147 lb)   SpO2 98%   BMI 18.37 kg/m²     PHYSICAL EXAM: (performed by MD)  Physical Exam  Vitals and nursing note reviewed.   Constitutional:       General: He is not in acute distress.     Appearance: He is not diaphoretic.   HENT:      Head: Normocephalic and atraumatic.   Eyes:      General: No scleral icterus.        Right eye: No discharge.         Left eye: No discharge.      Conjunctiva/sclera: Conjunctivae normal.   Neck:      Thyroid: No thyromegaly.   Cardiovascular:      Rate and Rhythm: Normal rate and regular rhythm.      Heart sounds: Normal heart sounds.     No friction rub. No gallop.   Pulmonary:      Effort: Pulmonary effort is normal. No respiratory distress.      Breath sounds: No stridor. No wheezing.   Abdominal:      General: Bowel sounds are normal.      Palpations: Abdomen is soft. There is no mass.      Tenderness: There is no abdominal tenderness. There is no guarding or rebound.   Musculoskeletal:         General: No tenderness. Normal range of motion.      Cervical back: Normal range of motion and neck supple. "   Lymphadenopathy:      Cervical: No cervical adenopathy.   Skin:     General: Skin is warm.      Findings: No erythema or rash.   Neurological:      Mental Status: He is alert and oriented to person, place, and time.      Motor: No abnormal muscle tone.   Psychiatric:         Behavior: Behavior normal.         RECENT LABS:  Lab Results (last 24 hours)       Procedure Component Value Units Date/Time    Potassium [[id number]]  (Abnormal) Collected: 09/07/23 1942    Specimen: Blood Updated: 09/07/23 2022     Potassium 3.4 mmol/L     Blood Culture - Blood, Arm, Left [[id number]]  (Normal) Collected: 09/06/23 1756    Specimen: Blood from Arm, Left Updated: 09/07/23 1815     Blood Culture No growth at 24 hours    Blood Culture - Blood, Arm, Right [[id number]]  (Normal) Collected: 09/06/23 1756    Specimen: Blood from Arm, Right Updated: 09/07/23 1815     Blood Culture No growth at 24 hours    Narrative:      Less than seven (7) mL's of blood was collected.  Insufficient quantity may yield false negative results.    Manual Differential [[id number]]  (Abnormal) Collected: 09/07/23 0737    Specimen: Blood from Hand, Left Updated: 09/07/23 0852     Neutrophil % 92.0 %      Lymphocyte % 0.0 %      Monocyte % 3.0 %      Bands %  5.0 %      Neutrophils Absolute 49.57 10*3/mm3      Lymphocytes Absolute 0.00 10*3/mm3      Monocytes Absolute 1.53 10*3/mm3      Hypochromia Slight/1+     Toxic Granulation Slight/1+     Platelet Morphology Normal    CBC & Differential [[id number]]  (Abnormal) Collected: 09/07/23 0737    Specimen: Blood from Hand, Left Updated: 09/07/23 0852    Narrative:      The following orders were created for panel order CBC & Differential.  Procedure                               Abnormality         Status                     ---------                               -----------         ------                     CBC Auto Differential[[id number]]        Abnormal            Final result               Scan  Slide[[id number]]                                       Final result                 Please view results for these tests on the individual orders.    Scan Slide [[id number]] Collected: 09/07/23 0737    Specimen: Blood from Hand, Left Updated: 09/07/23 0852     Scan Slide --     Comment: See Manual Differential Results       CBC Auto Differential [[id number]]  (Abnormal) Collected: 09/07/23 0737    Specimen: Blood from Hand, Left Updated: 09/07/23 0852     WBC 51.10 10*3/mm3      RBC 2.69 10*6/mm3      Hemoglobin 7.5 g/dL      Hematocrit 23.9 %      Comment: Result checked          MCV 88.8 fL      MCH 27.9 pg      MCHC 31.5 g/dL      RDW 17.9 %      RDW-SD 56.0 fl      MPV 6.8 fL      Platelets 358 10*3/mm3     Narrative:      The previously reported component NRBC is no longer being reported. Previous result was 0.0 /100 WBC (Reference Range: 0.0-0.2 /100 WBC) on 9/7/2023 at 0801 EDT.            PENDING RESULTS:     IMAGING REVIEWED:  CT Chest With Contrast Diagnostic    Result Date: 9/6/2023  Since 8/9/2023 interval increase in size of the patient's known left anterior chest wall mass now measuring approximately 8.3 x 6.2 cm. There are stable destructive changes of the anterior left second third and fourth ribs. Consolidative changes in the left lung apex similar to that seen on prior examination which could represent partial collapse versus a underlying infiltrate and correlation for pneumonia is recommended. Small left pleural effusion. Coronary artery calcifications. Dilatation of the main pulmonary artery which can be seen with pulmonary hypertension. Electronically Signed: Bud Rusesll MD  9/6/2023 10:21 PM EDT  Workstation ID: [id number]     Assessment & Plan   ASSESSMENT:  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  Upon recurrence patient underwent  radiation therapy and was on Tecentriq.  Recently progressed through Enhertu.  Plan for initiating tepotinib due to MET amplification once clinically improved.  Leukocytosis with bandemia: Infectious disease following.  Believed to be reactive leukocytosis secondary to malignancy and left-sided chest erythema to be from tumor infiltration into the soft tissues of the skin.  Blood cultures are pending.  Anemia: Hemoglobin up to 7.1 g per DL  Hyponatremia: Sodium is 126    PLAN  Plan to start tepotinib once clinically stable  Monitor CBC and transfuse for hemoglobin less than 7.0 g/dL or less than 8.0 g/dL if symptomatic  Appreciate ID input  Anticipate discharge soon  Discussed with patient      Electronically signed by Azucena Gaspar MD, 09/08/23, 4:16 PM EDT.

## 2023-09-09 LAB
ANION GAP SERPL CALCULATED.3IONS-SCNC: 13 MMOL/L (ref 5–15)
ANISOCYTOSIS BLD QL: ABNORMAL
ANISOCYTOSIS BLD QL: ABNORMAL
BUN SERPL-MCNC: 10 MG/DL (ref 6–20)
BUN/CREAT SERPL: 14.9 (ref 7–25)
C3 FRG RBC-MCNC: ABNORMAL
C3 FRG RBC-MCNC: ABNORMAL
CALCIUM SPEC-SCNC: 8.9 MG/DL (ref 8.6–10.5)
CHLORIDE SERPL-SCNC: 92 MMOL/L (ref 98–107)
CO2 SERPL-SCNC: 21 MMOL/L (ref 22–29)
CREAT SERPL-MCNC: 0.67 MG/DL (ref 0.76–1.27)
DEPRECATED RDW RBC AUTO: 59.5 FL (ref 37–54)
DEPRECATED RDW RBC AUTO: 59.5 FL (ref 37–54)
EGFRCR SERPLBLD CKD-EPI 2021: 107.6 ML/MIN/1.73
ERYTHROCYTE [DISTWIDTH] IN BLOOD BY AUTOMATED COUNT: 17.9 % (ref 12.3–15.4)
ERYTHROCYTE [DISTWIDTH] IN BLOOD BY AUTOMATED COUNT: 18.1 % (ref 12.3–15.4)
GLUCOSE SERPL-MCNC: 97 MG/DL (ref 65–99)
HCT VFR BLD AUTO: 25.5 % (ref 37.5–51)
HCT VFR BLD AUTO: 26.6 % (ref 37.5–51)
HGB BLD-MCNC: 7.9 G/DL (ref 13–17.7)
HGB BLD-MCNC: 8.4 G/DL (ref 13–17.7)
LARGE PLATELETS: ABNORMAL
LYMPHOCYTES # BLD MANUAL: 0 10*3/MM3 (ref 0.7–3.1)
LYMPHOCYTES # BLD MANUAL: 1.42 10*3/MM3 (ref 0.7–3.1)
LYMPHOCYTES NFR BLD MANUAL: 4 % (ref 5–12)
LYMPHOCYTES NFR BLD MANUAL: 5 % (ref 5–12)
MCH RBC QN AUTO: 27.4 PG (ref 26.6–33)
MCH RBC QN AUTO: 28 PG (ref 26.6–33)
MCHC RBC AUTO-ENTMCNC: 30.8 G/DL (ref 31.5–35.7)
MCHC RBC AUTO-ENTMCNC: 31.5 G/DL (ref 31.5–35.7)
MCV RBC AUTO: 89 FL (ref 79–97)
MCV RBC AUTO: 89 FL (ref 79–97)
METAMYELOCYTES NFR BLD MANUAL: 1 % (ref 0–0)
MONOCYTES # BLD: 2.84 10*3/MM3 (ref 0.1–0.9)
MONOCYTES # BLD: 3.16 10*3/MM3 (ref 0.1–0.9)
MYELOCYTES NFR BLD MANUAL: 1 % (ref 0–0)
NEUTROPHILS # BLD AUTO: 58.78 10*3/MM3 (ref 1.7–7)
NEUTROPHILS # BLD AUTO: 66.65 10*3/MM3 (ref 1.7–7)
NEUTROPHILS NFR BLD MANUAL: 92 % (ref 42.7–76)
NEUTROPHILS NFR BLD MANUAL: 94 % (ref 42.7–76)
NEUTS BAND NFR BLD MANUAL: 1 % (ref 0–5)
PLAT MORPH BLD: NORMAL
PLATELET # BLD AUTO: 374 10*3/MM3 (ref 140–450)
PLATELET # BLD AUTO: 383 10*3/MM3 (ref 140–450)
PMV BLD AUTO: 7.1 FL (ref 6–12)
PMV BLD AUTO: 7.2 FL (ref 6–12)
POTASSIUM SERPL-SCNC: 4.1 MMOL/L (ref 3.5–5.2)
RBC # BLD AUTO: 2.87 10*6/MM3 (ref 4.14–5.8)
RBC # BLD AUTO: 2.99 10*6/MM3 (ref 4.14–5.8)
SCAN SLIDE: NORMAL
SCAN SLIDE: NORMAL
SMALL PLATELETS BLD QL SMEAR: ADEQUATE
SODIUM SERPL-SCNC: 126 MMOL/L (ref 136–145)
TOXIC GRANULATION: ABNORMAL
TOXIC GRANULATION: ABNORMAL
VARIANT LYMPHS NFR BLD MANUAL: 0 % (ref 19.6–45.3)
VARIANT LYMPHS NFR BLD MANUAL: 2 % (ref 19.6–45.3)
WBC NRBC COR # BLD: 63.2 10*3/MM3 (ref 3.4–10.8)
WBC NRBC COR # BLD: 70.9 10*3/MM3 (ref 3.4–10.8)

## 2023-09-09 PROCEDURE — 85025 COMPLETE CBC W/AUTO DIFF WBC: CPT | Performed by: INTERNAL MEDICINE

## 2023-09-09 PROCEDURE — 85007 BL SMEAR W/DIFF WBC COUNT: CPT | Performed by: INTERNAL MEDICINE

## 2023-09-09 PROCEDURE — 80048 BASIC METABOLIC PNL TOTAL CA: CPT | Performed by: INTERNAL MEDICINE

## 2023-09-09 PROCEDURE — 99232 SBSQ HOSP IP/OBS MODERATE 35: CPT | Performed by: INTERNAL MEDICINE

## 2023-09-09 RX ORDER — SODIUM CHLORIDE 1 G/1
1 TABLET ORAL
Status: DISCONTINUED | OUTPATIENT
Start: 2023-09-09 | End: 2023-09-11 | Stop reason: HOSPADM

## 2023-09-09 RX ADMIN — SODIUM CHLORIDE 1 G: 1 TABLET ORAL at 17:42

## 2023-09-09 RX ADMIN — OXYCODONE HYDROCHLORIDE 10 MG: 5 TABLET ORAL at 05:20

## 2023-09-09 RX ADMIN — BUPROPION HYDROCHLORIDE 300 MG: 150 TABLET, EXTENDED RELEASE ORAL at 09:42

## 2023-09-09 RX ADMIN — Medication 10 ML: at 09:42

## 2023-09-09 RX ADMIN — LEVOTHYROXINE SODIUM 125 MCG: 0.03 TABLET ORAL at 05:20

## 2023-09-09 RX ADMIN — PANTOPRAZOLE SODIUM 40 MG: 40 TABLET, DELAYED RELEASE ORAL at 07:59

## 2023-09-09 RX ADMIN — SENNOSIDES AND DOCUSATE SODIUM 2 TABLET: 50; 8.6 TABLET ORAL at 21:03

## 2023-09-09 RX ADMIN — MEGESTROL ACETATE 400 MG: 400 SUSPENSION ORAL at 09:42

## 2023-09-09 RX ADMIN — Medication 10 ML: at 21:04

## 2023-09-09 RX ADMIN — GABAPENTIN 300 MG: 300 CAPSULE ORAL at 21:03

## 2023-09-09 RX ADMIN — Medication 100 MG: at 09:41

## 2023-09-09 RX ADMIN — DESVENLAFAXINE SUCCINATE 50 MG: 50 TABLET, EXTENDED RELEASE ORAL at 09:41

## 2023-09-09 RX ADMIN — POTASSIUM CHLORIDE 20 MEQ: 1500 TABLET, EXTENDED RELEASE ORAL at 09:41

## 2023-09-09 RX ADMIN — CYANOCOBALAMIN TAB 1000 MCG 1000 MCG: 1000 TAB at 09:41

## 2023-09-09 RX ADMIN — OXYCODONE HYDROCHLORIDE 10 MG: 5 TABLET ORAL at 09:50

## 2023-09-09 RX ADMIN — Medication 100 MG: at 21:47

## 2023-09-09 RX ADMIN — OXYCODONE HYDROCHLORIDE 10 MG: 5 TABLET ORAL at 21:47

## 2023-09-09 RX ADMIN — LIOTHYRONINE SODIUM 5 MCG: 5 TABLET ORAL at 09:42

## 2023-09-09 RX ADMIN — CETIRIZINE HYDROCHLORIDE 10 MG: 10 TABLET, FILM COATED ORAL at 09:41

## 2023-09-09 RX ADMIN — SENNOSIDES AND DOCUSATE SODIUM 2 TABLET: 50; 8.6 TABLET ORAL at 09:41

## 2023-09-09 RX ADMIN — FOLIC ACID 1 MG: 1 TABLET ORAL at 09:41

## 2023-09-09 RX ADMIN — OXYCODONE HYDROCHLORIDE 10 MG: 5 TABLET ORAL at 17:42

## 2023-09-09 NOTE — CONSULTS
Nutrition Services    Patient Name: Jc Driscoll  YOB: 1965  MRN: [id number]  Admission date: 9/6/2023    Comment:    Continue current diet. Pt declined ONS.    Severe chronic disease related malnutrition related to catabolism associated with metastatic lung cancer as evidenced by PO intake meeting less than 75% of estimated energy requirement for greater than 1 month, >10% wt loss in less  than 6 months, and severe muscle and fat wasting per NFPE.    See MSA below.    PPE Documentation        PPE Worn By Provider gloves   PPE Worn By Patient  N/A     CLINICAL NUTRITION ASSESSMENT      Reason for Assessment 9/9: BMI < 19, MST: 2      H&P      Past Medical History:   Diagnosis Date    Allergic rhinitis 07/25/2013    Overview:  4/2/2018 - still zyrtec 10 daily (if stops, gets dermatitis again).     Anxiety and depression 06/05/2012    Overview:  4/2/2018 -   C/w well 300 xr and Lito 20.  4/8/2019 -   Doing ok even with new lung cancer - lito 20 & well 300xr.     Chronic pansinusitis 04/08/2019    Overview:  4/8/2019 -   MRI showed chronic thick in frontal, maxillary, ethmoidal ---> to Dr. COLLAZO to est since abx ICC didn't help.  Does netipot bid.     Diastolic CHF 07/09/2014    Overview:  7/4/14 - impaired LV relaxation on Zhou ECHO.  EF 60%. Rest normal    Dupuytren's contracture of both hands     Elevated cholesterol     Erosive esophagitis 07/09/2019    Overview:  EGD 2016 4/2/2018 - nexium OTC daily for few week.  Qod before that.  Now carbonation hurts, epigastric pain 4/8/2019 -   protonix 40 doing well.     Gastroesophageal reflux disease 02/21/2019    Hiatal hernia 07/09/2019    History of colon polyps     Hypertension     Lung cancer     right lung and in lymph nodes x2    Mediastinal lymphadenopathy 03/12/2019    Normocytic anemia 08/08/2019    Overview:  6/2019 - dropped to 9's postop 7/2019 - rebounded to 10's.  8/8/2019 -   Back to 9's - check ferritin, b12 and thyroid.     Prediabetes 07/09/2014  "   Overview:  7/4/14 - a1c 6.1 at [place] admission    Retention of urine 06/27/2019    PSOT OP, RESOLVED       Past Surgical History:   Procedure Laterality Date    BRONCHOSCOPY  03/18/2019    EBUS MONTERO NEEDLE BX    COLONOSCOPY      DUPUYTREN CONTRACTURE RELEASE Bilateral     LUNG BIOPSY  03/08/2019    CT GUIDED    LUNG REMOVAL, PARTIAL Right     right lower    PORTACATH PLACEMENT  03/20/2019    DR LONGORIA    THORACOSCOPY Right 6/26/2019    Procedure: RIGHT VATS, OPEN LOWER LOBECTOMY WITH LYMPH NODE DISECTION, WEDGE RESECTION OF RIGHT MIDDLE LOBE;  Surgeon: Terrance Longoria MD;  Location: [facility] MAIN OR;  Service: Cardiothoracic    THORACOSCOPY VIDEO ASSISTED WITH LOBECTOMY Left 7/6/2022    Procedure: THORACOSCOPY VIDEO ASSISTED WITH LOBECTOMY;  Surgeon: Miryam Reyna MD;  Location: [facility] MAIN OR;  Service: Thoracic;  Laterality: Left;        Current Problems   Sepsis  Leukocytosis  with bandemia  Left chest wall cellulitis   -ID was following but has signed off  -abx    Anemia  -secondary to metastatic dz/chemo    Metastatic lung cancer s/p L thoracotomy and R lower lobectomy     Anorexia  -megace        Encounter Information        Trending Narrative     9/9: Pt was sent over to [facility] by his oncologist for anemia and leukocytosis. Pt reports he is chronically SOB. RD visited pt at bedside. Pt reported he hasn't had much of an appetite lately but he feels like this is improving. Pt reported he drinks at least 1 [employer]  ONS daily. RD encouraged pt to choose ONS that is high kcal (~300 kcal) as opposed to high protein but low kcal ONS. NFPE completed, consistent with nutrition diagnosis of malnutrition using AND/ASPEN criteria. See MSA below.        Anthropometrics        Current Height, Weight Height: 190.5 cm (75\")  Weight: 66.7 kg (147 lb) (09/06/23 1700)       Usual Body Weight (UBW) -180#       Trending Weight Hx     This admission: 9/9: 147# - wt method not obtained              PTA: 17% wt loss x " 3.5 months per wt review    Wt Readings from Last 30 Encounters:   09/06/23 1700 66.7 kg (147 lb)   09/06/23 1413 66.7 kg (147 lb)   09/06/23 1416 66.7 kg (147 lb)   08/16/23 1505 66.2 kg (146 lb)   07/31/23 1304 68.7 kg (151 lb 8 oz)   07/31/23 1304 68.9 kg (151 lb 12.8 oz)   07/26/23 1135 67.1 kg (148 lb)   07/07/23 0544 74 kg (163 lb 2.3 oz)   07/05/23 1502 75.6 kg (166 lb 10.7 oz)   06/29/23 1410 75.3 kg (166 lb 0.1 oz)   06/27/23 0919 75.3 kg (166 lb)   06/21/23 0320 75 kg (165 lb 5.5 oz)   06/20/23 0513 74.4 kg (164 lb 0.4 oz)   06/20/23 0334 74.4 kg (164 lb 0.4 oz)   06/19/23 0519 72.7 kg (160 lb 3.2 oz)   06/18/23 1330 76.2 kg (168 lb)   06/18/23 1325 76.2 kg (168 lb)   06/05/23 1006 80.7 kg (178 lb)   05/31/23 1505 81.2 kg (179 lb)   05/24/23 1112 80.6 kg (177 lb 12.8 oz)   05/15/23 1100 80.7 kg (178 lb)   05/09/23 1353 81.4 kg (179 lb 6.4 oz)   04/27/23 1814 82.6 kg (182 lb)   04/27/23 1527 82.6 kg (182 lb)   04/24/23 1044 84.8 kg (187 lb)   04/24/23 1336 84.8 kg (187 lb)   04/03/23 1049 85.3 kg (188 lb)   03/29/23 1453 83.5 kg (184 lb)   03/13/23 1147 85.3 kg (188 lb)   03/01/23 1326 82.6 kg (182 lb)   02/20/23 1350 83 kg (183 lb)   02/06/23 1042 81.4 kg (179 lb 6.4 oz)   01/30/23 1300 82.1 kg (181 lb)   01/18/23 1538 81.2 kg (179 lb)   01/09/23 1319 79.8 kg (176 lb)   01/09/23 1522 80.4 kg (177 lb 3.2 oz)      BMI kg/m2 Body mass index is 18.37 kg/m².       Labs        Pertinent Labs    Results from last 7 days   Lab Units 09/08/23  2246 09/08/23  1005 09/07/23  1942 09/07/23  0737 09/07/23  0041 09/06/23  1729 09/06/23  1435   SODIUM mmol/L 125* 126*  --   --  128* 127* 127*   POTASSIUM mmol/L 4.2 4.3 3.4*   < > 3.5 3.4* 3.4*   CHLORIDE mmol/L 92* 91*  --   --  94* 89* 87*   CO2 mmol/L 19.0* 21.0*  --   --  20.0* 22.0 22.0   BUN mg/dL 9 8  --   --  9 10 9   CREATININE mg/dL 0.63* 0.61*  --   --  0.71* 0.76 0.76   CALCIUM mg/dL 9.1 9.3  --   --  8.1* 8.6 9.1   BILIRUBIN mg/dL  --  0.7  --   --   --  0.6  0.7   ALK PHOS U/L  --  261*  --   --   --  270* 252*   ALT (SGPT) U/L  --  14  --   --   --  17 18   AST (SGOT) U/L  --  16  --   --   --  24 25   GLUCOSE mg/dL 81 89  --   --  88 87 88    < > = values in this interval not displayed.     Results from last 7 days   Lab Units 09/08/23  2246   HEMOGLOBIN g/dL 8.4*   HEMATOCRIT % 26.6*     Lab Results   Component Value Date    HGBA1C 5.50 07/06/2023        Medications    Scheduled Medications buPROPion XL, 300 mg, Oral, Daily  cetirizine, 10 mg, Oral, Daily  desvenlafaxine, 50 mg, Oral, Daily  folic acid, 1 mg, Oral, Daily  gabapentin, 300 mg, Oral, Nightly  levothyroxine, 125 mcg, Oral, Q AM  liothyronine, 5 mcg, Oral, Daily  megestrol acetate, 400 mg, Oral, Daily  pantoprazole, 40 mg, Oral, QAM AC  potassium chloride, 20 mEq, Oral, Daily  senna-docusate sodium, 2 tablet, Oral, BID  sodium chloride, 10 mL, Intravenous, Q12H  vitamin B-12, 1,000 mcg, Oral, Daily  vitamin B-6, 100 mg, Oral, Q12H        Infusions      PRN Medications   acetaminophen **OR** acetaminophen **OR** acetaminophen    ALPRAZolam    aluminum-magnesium hydroxide-simethicone    senna-docusate sodium **AND** polyethylene glycol **AND** bisacodyl **AND** bisacodyl    Calcium Replacement - Follow Nurse / BPA Driven Protocol    lactulose    Magnesium Standard Dose Replacement - Follow Nurse / BPA Driven Protocol    melatonin    [DISCONTINUED] ondansetron **OR** ondansetron    ondansetron    oxyCODONE    Phosphorus Replacement - Follow Nurse / BPA Driven Protocol    Potassium Replacement - Follow Nurse / BPA Driven Protocol    sodium chloride    sodium chloride    sodium chloride     Physical Findings        Trending Physical   Appearance, NFPE 9/9: NFPE completed, consistent with nutrition diagnosis of malnutrition using AND/ASPEN criteria. See MSA below.      --  Edema  None documented      Bowel Function + BM on 9/6     Tubes None      Chewing/Swallowing No reported difficulty      Skin Intact       --  Current Nutrition Orders & Evaluation of Intake       Oral Nutrition     Food Allergies NKFA   Current PO Diet Diet: Regular/House Diet; Texture: Regular Texture (IDDSI 7); Fluid Consistency: Thin (IDDSI 0)   Supplement -   PO Evaluation     Trending % PO Intake 9/9: 50%   --  Nutritional Risk Screening        NRS-2002 Score          Nutrition Diagnosis         Nutrition Dx Problem 1 Severe chronic disease related malnutrition related to catabolism associated with metastatic lung cancer as evidenced by PO intake meeting less than 75% of estimated energy requirement for greater than 1 month, >10% wt loss in less  than 6 months, and severe muscle and fat wasting per NFPE.      Nutrition Dx Problem 2        Intervention Goal         Intervention Goal(s) PO intake > 50%, wt maintenance     Nutrition Intervention        RD Action Continue current diet.  NFPE completed.     Nutrition Prescription          Diet Prescription Regular    Supplement Prescription Pt declined ONS, he anticipates discharge soon and he doesn't like [employer] flavor    --  Monitor/Evaluation        Monitor Per protocol, PO intake, Pertinent labs, Weight     Malnutrition Severity Assessment      Patient meets criteria for : Severe Malnutrition  Malnutrition Type (last 8 hours)       Malnutrition Severity Assessment       Row Name 09/09/23 1046       Malnutrition Severity Assessment    Malnutrition Type Chronic Disease - Related Malnutrition      Row Name 09/09/23 1046       Insufficient Energy Intake     Insufficient Energy Intake Findings Severe    Insufficient Energy Intake  <75% of est. energy requirement for > or equal to 1 month      Row Name 09/09/23 1046       Unintentional Weight Loss     Unintentional Weight Loss Findings Severe    Unintentional Weight Loss  Weight loss greater than 10% in six months      Row Name 09/09/23 1046       Muscle Loss    Loss of Muscle Mass Findings Severe    [place] Severe - deep hollowing/scooping, lack  of muscle to touch, facial bones well defined    Clavicle Bone Region Severe - protruding prominent bone    Acromion Bone Region Severe - squared shoulders, bones, and acromion process protrusion prominent    Scapular Bone Region Severe - prominent bones, depressions easily visible between ribs, scapula, spine, shoulders    Dorsal Hand Region Severe - prominent depression    Patellar Region Severe - prominent bone, square looking, very little muscle definition    Anterior Thigh Region Severe - line/depression along thigh, obviously thin    Posterior Calf Region Severe - thin with very little definition/firmness      Row Name 09/09/23 1046       Fat Loss    Subcutaneous Fat Loss Findings Severe    Orbital Region  Severe - pronounced hollowness/depression, dark circles, loose saggy skin    Upper Arm Region Severe - mostly skin, very little space between folds, fingers touch    Thoracic & Lumbar Region Severe - ribs visible with prominent depressions, iliac crest very prominent      Row Name 09/09/23 1046       Criteria Met (Must meet criteria for severity in at least 2 of these categories: M Wasting, Fat Loss, Fluid, Secondary Signs, Wt. Status, Intake)    Patient meets criteria for  Severe Malnutrition                       Electronically signed by:  Edilia Jacobsen RD  09/09/23 07:45 EDT

## 2023-09-09 NOTE — PLAN OF CARE
Goal Outcome Evaluation:  Plan of Care Reviewed With: patient        Progress: no change  Outcome Evaluation: Pt conts to have elevated WBC. complaints of pain on his left chest. erythema noted, no drainage noted. pt looks pale and weak. no other concerns made. call light within reach

## 2023-09-09 NOTE — PROGRESS NOTES
[facility] Medicine Services   Daily Progress Note    Patient Name: Jc Driscoll  : 1965  MRN: [id number]  Primary Care Physician:  Reshma Alvarenga MD  Date of admission: 2023  Date and Time of Service: 2023      Subjective      Chief Complaint: Leukocytosis erythema  Patient    Patient Reports this a pleasant gentleman who is being for the last 5 years fighting potentiated adenocarcinoma with metastasis first time the patient had resection of liver that take care he was treated with chemoradiation after that and now he is still getting chemo with Dr. Gaspar, patient was found to have severe anemia received transfusion, patient is receiving transfusion for now with hematology oncology.  Patient is complaining of anxiety, no shortness of breath no chest pain    2023: Patient is feeling the same, white blood cell count 17,000, hemoglobin still stable at 7.9, also sodium did not improve at 126, I added sodium chloride pills, recommended by neurology oncology to evaluate 1 more day.    ROS all review of systems negative except for mentioned above      Objective      Vitals:   Temp:  [97.5 °F (36.4 °C)-98.4 °F (36.9 °C)] 98.1 °F (36.7 °C)  Heart Rate:  [108-117] 111  Resp:  [20-29] 27  BP: ()/(63-75) 101/64    Physical Exam  Constitutional:       Appearance: Normal appearance.   HENT:      Head: Normocephalic.      Right Ear: Tympanic membrane normal.      Left Ear: Tympanic membrane normal.      Nose: Nose normal.      Mouth/Throat:      Mouth: Mucous membranes are moist.   Eyes:      Pupils: Pupils are equal, round, and reactive to light.   Cardiovascular:      Rate and Rhythm: Normal rate.      Pulses: Normal pulses.   Pulmonary:      Effort: Pulmonary effort is normal.   Abdominal:      General: Abdomen is flat.   Musculoskeletal:         General: Normal range of motion.   Skin:     General: Skin is warm.      Capillary Refill: Capillary refill takes less than 2 seconds.    Neurological:      General: No focal deficit present.      Mental Status: He is alert.   Psychiatric:         Mood and Affect: Mood normal.           Result Review    Result Review:  I have personally reviewed the results from the time of this admission to 9/9/2023 16:01 EDT and agree with these findings:  []  Laboratory  []  Microbiology  []  Radiology  []  EKG/Telemetry   []  Cardiology/Vascular   []  Pathology  []  Old records  []  Other:  Most notable findings include:     22,000 hemoglobin increased to 7.4            buPROPion XL, 300 mg, Oral, Daily  cetirizine, 10 mg, Oral, Daily  desvenlafaxine, 50 mg, Oral, Daily  folic acid, 1 mg, Oral, Daily  gabapentin, 300 mg, Oral, Nightly  levothyroxine, 125 mcg, Oral, Q AM  liothyronine, 5 mcg, Oral, Daily  megestrol acetate, 400 mg, Oral, Daily  pantoprazole, 40 mg, Oral, QAM AC  potassium chloride, 20 mEq, Oral, Daily  senna-docusate sodium, 2 tablet, Oral, BID  sodium chloride, 10 mL, Intravenous, Q12H  sodium chloride, 1 g, Oral, TID With Meals  vitamin B-12, 1,000 mcg, Oral, Daily  vitamin B-6, 100 mg, Oral, Q12H               Active Hospital Problems:  Active Hospital Problems    Diagnosis     **Sepsis     Leukocytosis     Bandemia     Anemia due to chemotherapy     Cellulitis of chest wall     NSCLC of left lung     Cancer of lower lobe of right lung     Non-small cell carcinoma of lung, right     Gastroesophageal reflux disease     Anxiety and depression     Hypertension      Plan:   Leukocytosis with bandemia  Left chest wall cellulitis   -consider port infection/bacteremia  -left axilla with swelling and induration  -WBC 51.2 with 15% bands, lactate 1.4, afebrile  -pt has had leukocytosis since March 2023. His previous admission July 2023 the patient had negative blood cultures. ID team suggested leukocytosis was secondary to steroids and underlying malignancy  -check CT chest  -check procalcitonin  -follow blood cultures  -SBP borderline low 99. Received  1L IVFs at [facility], will give another liter for sepsis protocol  -IV antibiotics discontinued  -consult ID for further recommendations discussed with Dr. Wilson recommended to start IV antibiotics since yesterday since this is not infectious     Anemia  -no hematochezia or hematemesis   -hgb 7.2 in the ED at 1729 when it was 5.5 at 1435  -check check H&H in 6 hours  -anemia secondary to metastatic disease/chemotherapy     Metastatic lung CA s/p left thoracotomy and right lower lobectomy  -followed by Dr. Gaspar on tepotinib  -cont home pain meds      Hyponatremia  -Na 127  -check urine osmo, urine sodium, serum osmo  -pt did have hyponatremia secondary to volume depletion and underlying SIADH per nephrology back in July 2023. He was treated with IVFs during that admission     Hypothyroidism  -cont home synthroid, cytomel     Anorexia   -on megace        DVT prophylaxis:  Mechanical DVT prophylaxis orders are present.    CODE STATUS:    Level Of Support Discussed With: Patient  Code Status (Patient has no pulse and is not breathing): CPR (Attempt to Resuscitate)  Medical Interventions (Patient has pulse or is breathing): Full Support      Disposition:  I expect patient to be discharged 1 days.    This patient has been examined wearing appropriate Personal Protective Equipment and discussed with hospital infection control department. 09/09/23      Electronically signed by Ever Slade MD, 09/09/23, 16:01 EDT.  [facility]ist Team

## 2023-09-09 NOTE — PROGRESS NOTES
Hematology/Oncology Inpatient Progress Note    PATIENT NAME: Jc Driscoll  : 1965  MRN: [id number]    CHIEF COMPLAINT: Weakness    HISTORY OF PRESENT ILLNESS:      cJ Driscoll is a 58 y.o. male who presented to [facility] on 2023 with complaints of swelling and erythema to the left axilla, left chest wall, around the left chest wall port.  Past medical history significant for metastatic lung cancer.  He initially presented to his oncologist office the same day due to the above complaints along with progressive weakness, nausea and vomiting.  He was noted to have leukocytosis with a WBC of 77,000 and significant anemia with a hemoglobin of 5.5 g/dL.  For this reason he was asked to go to the emergency room for further evaluation and treatment.     Evaluation in the ED: Sodium 127, alkaline phosphatase 270, WBC 51.20, hemoglobin 7.2 g/dL, MCV 87.9, platelets 403,000, bands 15%, absolute lymphocytes 0.0.  Normal lactic acid.  Blood cultures drawn and pending.  Patient was initiated on IV antibiotics and admitted for further evaluation and treatment.     23  Hematology/Oncology was consulted on a patient known to us and established in the office with Dr. 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  The patient initially presented in  with moderately differentiated adenocarcinoma of the right lung for which he underwent concurrent chemotherapy and radiation neoadjuvantly followed by a right lower lobectomy.  This was followed by immunotherapy with durvalumab for 24 cycles ending in 2020.  In 2022, a routine surveillance CT of the chest revealed a left upper lobe pulmonary nodule for which a subsequent PET/CT was done that showed mild uptake corresponding to the nodule.  Subsequent CT-guided biopsy was attempted but aborted due to findings by the radiologist that the lung nodularity was actually a clump of tiny nodules  of which the largest was 6 mm and therefore biopsy could not be completed.  Also the area was in the vicinity of multiple blood vessels with increased risk of bleeding.  Follow-up CT of the chest was recommended for continued surveillance.  In May 2022, the patient underwent a CT-guided biopsy of the left enlarging nodule and pathology was positive for invasive poorly differentiated adenocarcinoma most consistent with an adenocarcinoma of pulmonary origin.  PET/CT showed a 2.3 cm hypermetabolic left upper lobe nodule and no convincing metastatic disease elsewhere.  Brain MRI was negative for intracranial metastasis.  On 7/6/2022 the patient underwent a left upper lobe lobectomy which was followed by adjuvant chemotherapy.  After completion of chemotherapy the patient was initiated on atezolizumab along with XRT.  Radiation was completed on 01/11/2023.  The patient was continued on Tecentriq every 3 weeks.  Due to his increasing pain in the left axilla and concern for progressive disease a PET/CT was performed on 6/5/2023, this revealed that the chest wall mass had increased in size since the previous study.  There was osseous destruction of the second rib.  Osseous metastatic changes to the left first and second rib.  Due to progressive disease Tecentriq was discontinued.  A plan to transition to adhere to was in place but was complicated due to multiple hospitalizations for pain and weakness.  On 7/10/2023 the patient was initiated on cycle 1 of Enhertu.  Due to shortness of breath the patient had a CT of the chest on 8/9/2023 which showed a significant increase in size of the left anterior chest wall mass with an increase in destruction of the adjacent ribs particularly the left third rib.  Due to progressive disease and hereto was discontinued.  His malignancy has MET amplification and therefore it was planned to begin therapy with tepotinib.          Patient with recurrent left lung cancer.  Progressed on  multiple lines of treatment.  Was recently placed on tepotinib for an MEK 2 mutation.  Patient has had progressive decline in clinical status.  He presented with nausea, failure to thrive decreased p.o. intake significant anxiety and constipation.  He was also found to have significant leukocytosis, erythematous left chest wall, slight swelling around the left Mediport but no redness or increase in warmth.  For this reason patient was admitted to the hospital to evaluate for implant infection, ID consultation was also requested     He/She  has a past medical history of Allergic rhinitis (07/25/2013), Anxiety and depression (06/05/2012), Chronic pansinusitis (04/08/2019), Diastolic CHF (07/09/2014), Dupuytren's contracture of both hands, Elevated cholesterol, Erosive esophagitis (07/09/2019), Gastroesophageal reflux disease (02/21/2019), Hiatal hernia (07/09/2019), History of colon polyps, Hypertension, Lung cancer, Mediastinal lymphadenopathy (03/12/2019), Normocytic anemia (08/08/2019), Prediabetes (07/09/2014), and Retention of urine (06/27/2019).     PCP: Reshma Alvarenga MD     Subjective   9/9/2023: Feeling much better. Wants to go home. Still having some pain on the anterior left chest.     ROS:  Review of Systems   Constitutional:  Positive for fatigue. Negative for activity change, appetite change, chills, diaphoresis, fever and unexpected weight change.   HENT:  Negative for congestion, dental problem, drooling, ear discharge, ear pain, facial swelling, hearing loss, mouth sores, nosebleeds, postnasal drip, rhinorrhea, sinus pressure, sinus pain, sneezing, sore throat, tinnitus, trouble swallowing and voice change.    Eyes:  Negative for photophobia, pain, discharge, redness, itching and visual disturbance.   Respiratory:  Positive for shortness of breath. Negative for apnea, cough, choking, chest tightness, wheezing and stridor.    Cardiovascular:  Positive for chest pain. Negative for palpitations  and leg swelling.   Gastrointestinal:  Negative for abdominal distention, abdominal pain, anal bleeding, blood in stool, constipation, diarrhea, nausea, rectal pain and vomiting.   Endocrine: Negative for cold intolerance, heat intolerance, polydipsia and polyuria.   Genitourinary:  Negative for decreased urine volume, difficulty urinating, dysuria, flank pain, frequency, genital sores, hematuria and urgency.   Musculoskeletal:  Negative for arthralgias, back pain, gait problem, joint swelling, myalgias, neck pain and neck stiffness.   Skin:  Negative for color change, pallor and rash.   Neurological:  Positive for weakness. Negative for dizziness, tremors, seizures, syncope, facial asymmetry, speech difficulty, light-headedness, numbness and headaches.   Hematological:  Negative for adenopathy. Does not bruise/bleed easily.   Psychiatric/Behavioral:  Negative for agitation, behavioral problems, confusion, decreased concentration, hallucinations, self-injury, sleep disturbance and suicidal ideas. The patient is not nervous/anxious.       MEDICATIONS:    Scheduled Meds:  buPROPion XL, 300 mg, Oral, Daily  cetirizine, 10 mg, Oral, Daily  desvenlafaxine, 50 mg, Oral, Daily  folic acid, 1 mg, Oral, Daily  gabapentin, 300 mg, Oral, Nightly  levothyroxine, 125 mcg, Oral, Q AM  liothyronine, 5 mcg, Oral, Daily  megestrol acetate, 400 mg, Oral, Daily  pantoprazole, 40 mg, Oral, QAM AC  potassium chloride, 20 mEq, Oral, Daily  senna-docusate sodium, 2 tablet, Oral, BID  sodium chloride, 10 mL, Intravenous, Q12H  vitamin B-12, 1,000 mcg, Oral, Daily  vitamin B-6, 100 mg, Oral, Q12H       Continuous Infusions:        PRN Meds:    acetaminophen **OR** acetaminophen **OR** acetaminophen    ALPRAZolam    aluminum-magnesium hydroxide-simethicone    senna-docusate sodium **AND** polyethylene glycol **AND** bisacodyl **AND** bisacodyl    Calcium Replacement - Follow Nurse / BPA Driven Protocol    lactulose    Magnesium Standard Dose  "Replacement - Follow Nurse / BPA Driven Protocol    melatonin    [DISCONTINUED] ondansetron **OR** ondansetron    ondansetron    oxyCODONE    Phosphorus Replacement - Follow Nurse / BPA Driven Protocol    Potassium Replacement - Follow Nurse / BPA Driven Protocol    sodium chloride    sodium chloride    sodium chloride     ALLERGIES:  No Known Allergies    Objective    VITALS:   /75 (BP Location: Right arm, Patient Position: Lying)   Pulse 117   Temp 98.2 °F (36.8 °C) (Oral)   Resp 24   Ht 190.5 cm (75\")   Wt 66.7 kg (147 lb)   SpO2 99%   BMI 18.37 kg/m²     PHYSICAL EXAM: (performed by MD)  Physical Exam  Vitals and nursing note reviewed.   Constitutional:       General: He is not in acute distress.     Appearance: He is not ill-appearing, toxic-appearing or diaphoretic.      Comments: Slender. Conversant and oriented. No jaundice. Appears ill.    HENT:      Head: Normocephalic and atraumatic.      Right Ear: External ear normal.      Left Ear: External ear normal.      Nose: Nose normal.      Mouth/Throat:      Mouth: Mucous membranes are moist.      Pharynx: Oropharynx is clear.   Eyes:      General: No scleral icterus.        Right eye: No discharge.         Left eye: No discharge.      Conjunctiva/sclera: Conjunctivae normal.      Pupils: Pupils are equal, round, and reactive to light.   Neck:      Thyroid: No thyromegaly.   Cardiovascular:      Rate and Rhythm: Normal rate and regular rhythm.      Pulses: Normal pulses.      Heart sounds: Normal heart sounds. No murmur heard.    No friction rub. No gallop.   Pulmonary:      Effort: Pulmonary effort is normal. No respiratory distress.      Breath sounds: No stridor. No wheezing, rhonchi or rales.      Comments: Breath sounds are diminished bilaterally to auscultation.   Chest:      Chest wall: No tenderness.   Abdominal:      General: Abdomen is flat. Bowel sounds are normal. There is no distension.      Palpations: Abdomen is soft. There is no " mass.      Tenderness: There is no abdominal tenderness. There is no right CVA tenderness, left CVA tenderness, guarding or rebound.   Musculoskeletal:         General: No swelling, tenderness, deformity or signs of injury. Normal range of motion.      Cervical back: Normal range of motion and neck supple. No rigidity.      Right lower leg: No edema.      Left lower leg: No edema.   Lymphadenopathy:      Cervical: No cervical adenopathy.   Skin:     General: Skin is warm and dry.      Coloration: Skin is not jaundiced.      Findings: No bruising, erythema or rash.   Neurological:      General: No focal deficit present.      Mental Status: He is alert and oriented to person, place, and time.      Motor: No abnormal muscle tone.      Gait: Gait normal.   Psychiatric:         Mood and Affect: Mood normal.         Behavior: Behavior normal.         Thought Content: Thought content normal.         Judgment: Judgment normal.     ANDREW Ba MD performed the physical exam on 9/9/2023 as documented above.     RECENT LABS:  Lab Results (last 24 hours)       Procedure Component Value Units Date/Time    Manual Differential [[id number]]  (Abnormal) Collected: 09/08/23 2246    Specimen: Blood Updated: 09/09/23 0007     Neutrophil % 92.0 %      Lymphocyte % 0.0 %      Monocyte % 5.0 %      Bands %  1.0 %      Metamyelocyte % 1.0 %      Myelocyte % 1.0 %      Neutrophils Absolute 58.78 10*3/mm3      Lymphocytes Absolute 0.00 10*3/mm3      Monocytes Absolute 3.16 10*3/mm3      Anisocytosis Slight/1+     RBC Fragments Slight/1+     Toxic Granulation Slight/1+     Platelet Morphology Normal    Scan Slide [[id number]] Collected: 09/08/23 2246    Specimen: Blood Updated: 09/09/23 0007     Scan Slide --     Comment: See Manual Differential Results       CBC Auto Differential [[id number]]  (Abnormal) Collected: 09/08/23 2246    Specimen: Blood Updated: 09/09/23 0007     WBC 63.20 10*3/mm3      RBC 2.99 10*6/mm3      Hemoglobin 8.4  g/dL      Hematocrit 26.6 %      MCV 89.0 fL      MCH 28.0 pg      MCHC 31.5 g/dL      RDW 17.9 %      RDW-SD 59.5 fl      MPV 7.2 fL      Platelets 383 10*3/mm3     Narrative:      The previously reported component NRBC is no longer being reported. Previous result was 0.0 /100 WBC (Reference Range: 0.0-0.2 /100 WBC) on 9/8/2023 at 2328 EDT.    Basic Metabolic Panel [[id number]]  (Abnormal) Collected: 09/08/23 2246    Specimen: Blood Updated: 09/08/23 2319     Glucose 81 mg/dL      BUN 9 mg/dL      Creatinine 0.63 mg/dL      Sodium 125 mmol/L      Potassium 4.2 mmol/L      Chloride 92 mmol/L      CO2 19.0 mmol/L      Calcium 9.1 mg/dL      BUN/Creatinine Ratio 14.3     Anion Gap 14.0 mmol/L      eGFR 109.6 mL/min/1.73     Narrative:      GFR Normal >60  Chronic Kidney Disease <60  Kidney Failure <15      Blood Culture - Blood, Arm, Left [[id number]]  (Normal) Collected: 09/06/23 1756    Specimen: Blood from Arm, Left Updated: 09/08/23 1815     Blood Culture No growth at 2 days    Blood Culture - Blood, Arm, Right [[id number]]  (Normal) Collected: 09/06/23 1756    Specimen: Blood from Arm, Right Updated: 09/08/23 1815     Blood Culture No growth at 2 days    Narrative:      Less than seven (7) mL's of blood was collected.  Insufficient quantity may yield false negative results.    Manual Differential [[id number]]  (Abnormal) Collected: 09/08/23 1005    Specimen: Blood Updated: 09/08/23 1126     Neutrophil % 84.0 %      Lymphocyte % 2.0 %      Monocyte % 4.0 %      Bands %  7.0 %      Metamyelocyte % 2.0 %      Myelocyte % 1.0 %      Neutrophils Absolute 66.70 10*3/mm3      Lymphocytes Absolute 1.47 10*3/mm3      Monocytes Absolute 2.93 10*3/mm3      Anisocytosis Slight/1+     Hypochromia Slight/1+     Poikilocytes Slight/1+     RBC Fragments Slight/1+     Toxic Granulation Slight/1+     Platelet Morphology Normal    CBC & Differential [[id number]]  (Abnormal) Collected: 09/08/23 1005    Specimen: Blood Updated: 09/08/23  1126    Narrative:      The following orders were created for panel order CBC & Differential.  Procedure                               Abnormality         Status                     ---------                               -----------         ------                     CBC Auto Differential[[id number]]        Abnormal            Final result               Scan Slide[[id number]]                                       Final result                 Please view results for these tests on the individual orders.    Scan Slide [[id number]] Collected: 09/08/23 1005    Specimen: Blood Updated: 09/08/23 1126     Scan Slide --     Comment: See Manual Differential Results       CBC Auto Differential [[id number]]  (Abnormal) Collected: 09/08/23 1005    Specimen: Blood Updated: 09/08/23 1126     WBC 73.30 10*3/mm3      RBC 2.63 10*6/mm3      Hemoglobin 7.1 g/dL      Hematocrit 23.2 %      MCV 88.1 fL      MCH 27.1 pg      MCHC 30.7 g/dL      RDW 18.0 %      RDW-SD 59.5 fl      MPV 7.3 fL      Platelets 442 10*3/mm3     Narrative:      The previously reported component NRBC is no longer being reported. Previous result was 0.0 /100 WBC (Reference Range: 0.0-0.2 /100 WBC) on 9/8/2023 at 1059 EDT.    Comprehensive Metabolic Panel [[id number]]  (Abnormal) Collected: 09/08/23 1005    Specimen: Blood Updated: 09/08/23 1117     Glucose 89 mg/dL      BUN 8 mg/dL      Creatinine 0.61 mg/dL      Sodium 126 mmol/L      Potassium 4.3 mmol/L      Comment: Result checked          Chloride 91 mmol/L      CO2 21.0 mmol/L      Calcium 9.3 mg/dL      Total Protein 6.2 g/dL      Albumin 2.6 g/dL      ALT (SGPT) 14 U/L      AST (SGOT) 16 U/L      Alkaline Phosphatase 261 U/L      Total Bilirubin 0.7 mg/dL      Globulin 3.6 gm/dL      A/G Ratio 0.7 g/dL      BUN/Creatinine Ratio 13.1     Anion Gap 14.0 mmol/L      eGFR 110.6 mL/min/1.73     Narrative:      GFR Normal >60  Chronic Kidney Disease <60  Kidney Failure <15      Vancomycin, Random [[id number]]   (Normal) Collected: 09/08/23 1005    Specimen: Blood Updated: 09/08/23 1108     Vancomycin Random 10.40 mcg/mL     Narrative:      Therapeutic Ranges for Vancomycin    Vancomycin Random   5.0-40.0 mcg/mL  Vancomycin Trough   5.0-20.0 mcg/mL  Vancomycin Peak     20.0-40.0 mcg/mL          IMAGING REVIEWED:  No radiology results for the last day    Assessment & Plan   ASSESSMENT:  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  Upon recurrence patient underwent radiation therapy and was on Tecentriq.  Recently progressed through Enhertu.  Plan for initiating tepotinib due to MET amplification once clinically improved.  Leukocytosis with bandemia: Persists but is improved. Likely reactive. No sign of infection. Afebrile.   Anemia: Improved after the transfusion.   Hyponatremia: Unchanged.     PLAN  Continue to follow hemoglobin. CBC tomorrow.   Continue to follow sodium.    Lowell Ba MD on 9/9/2023 at 10:48 AM.

## 2023-09-09 NOTE — PLAN OF CARE
Problem: Pain Acute  Goal: Acceptable Pain Control and Functional Ability  9/9/2023 1104 by Otto Medley RN  Outcome: Ongoing, Progressing  9/9/2023 1103 by Otto Medley RN  Outcome: Ongoing, Progressing  9/9/2023 1103 by Otto Medley RN  Outcome: Ongoing, Not Progressing  Intervention: Prevent or Manage Pain  Intervention: Develop Pain Management Plan  Intervention: Optimize Psychosocial Wellbeing     Problem: Anemia  Goal: Anemia Symptom Improvement  9/9/2023 1104 by Otto Medley RN  Outcome: Ongoing, Progressing  9/9/2023 1103 by Otto Medley RN  Outcome: Ongoing, Progressing  9/9/2023 1103 by Otto Medley RN  Outcome: Ongoing, Not Progressing  Intervention: Monitor and Manage Anemia     Problem: Breathing Pattern Ineffective  Goal: Effective Breathing Pattern  9/9/2023 1104 by Otto Medley RN  Outcome: Ongoing, Progressing  9/9/2023 1103 by Otto Medley RN  Outcome: Ongoing, Progressing  9/9/2023 1103 by Otto Medley RN  Outcome: Ongoing, Not Progressing  Intervention: Promote Improved Breathing Pattern     Problem: Adult Inpatient Plan of Care  Goal: Plan of Care Review  9/9/2023 1104 by Otto Medley RN  Outcome: Ongoing, Progressing  9/9/2023 1103 by Otto Medley RN  Outcome: Ongoing, Progressing  9/9/2023 1103 by Otto Medley RN  Outcome: Ongoing, Not Progressing  Goal: Patient-Specific Goal (Individualized)  9/9/2023 1104 by Otto Medley RN  Outcome: Ongoing, Progressing  9/9/2023 1103 by Otto Medley RN  Outcome: Ongoing, Progressing  9/9/2023 1103 by Otto Medley RN  Outcome: Ongoing, Not Progressing  Goal: Absence of Hospital-Acquired Illness or Injury  9/9/2023 1104 by Otto Medley RN  Outcome: Ongoing, Progressing  9/9/2023 1103 by Otto Medley RN  Outcome: Ongoing, Progressing  9/9/2023 1103 by Otto Medley RN  Outcome: Ongoing, Not Progressing  Intervention: Identify and Manage Fall Risk  Intervention: Prevent Skin Injury  Intervention: Prevent and Manage VTE  (Venous Thromboembolism) Risk  Intervention: Prevent Infection  Goal: Optimal Comfort and Wellbeing  9/9/2023 1104 by Otto Medley RN  Outcome: Ongoing, Progressing  9/9/2023 1103 by Otto Medley RN  Outcome: Ongoing, Progressing  9/9/2023 1103 by Otto Medley RN  Outcome: Ongoing, Not Progressing  Intervention: Monitor Pain and Promote Comfort  Intervention: Provide Person-Centered Care  Goal: Readiness for Transition of Care  9/9/2023 1104 by Otto Medley RN  Outcome: Ongoing, Progressing  9/9/2023 1103 by Otto Medley RN  Outcome: Ongoing, Progressing  9/9/2023 1103 by Otto Medley RN  Outcome: Ongoing, Not Progressing     Problem: Heart Failure Comorbidity  Goal: Maintenance of Heart Failure Symptom Control  9/9/2023 1104 by Otto Medley RN  Outcome: Ongoing, Progressing  9/9/2023 1103 by Otto Medley RN  Outcome: Ongoing, Progressing  9/9/2023 1103 by Otto Medley RN  Outcome: Ongoing, Not Progressing  Intervention: Maintain Heart Failure-Management     Problem: Hypertension Comorbidity  Goal: Blood Pressure in Desired Range  9/9/2023 1104 by Otto Medley RN  Outcome: Ongoing, Progressing  9/9/2023 1103 by Otto Medley RN  Outcome: Ongoing, Progressing  9/9/2023 1103 by Otto Medley RN  Outcome: Ongoing, Not Progressing  Intervention: Maintain Blood Pressure Management     Problem: Pain Chronic (Persistent) (Comorbidity Management)  Goal: Acceptable Pain Control and Functional Ability  9/9/2023 1104 by Otto Medley RN  Outcome: Ongoing, Progressing  9/9/2023 1103 by Otto Medley RN  Outcome: Ongoing, Progressing  9/9/2023 1103 by Otto Medley RN  Outcome: Ongoing, Not Progressing  Intervention: Manage Persistent Pain  Intervention: Develop Pain Management Plan  Intervention: Optimize Psychosocial Wellbeing     Problem: Chest Pain  Goal: Resolution of Chest Pain Symptoms  9/9/2023 1104 by Otto Medley RN  Outcome: Ongoing, Progressing  9/9/2023 1103 by Otto Medley RN  Outcome:  Ongoing, Progressing  9/9/2023 1103 by Otto Medley RN  Outcome: Ongoing, Not Progressing     Problem: Adjustment to Illness (Sepsis/Septic Shock)  Goal: Optimal Coping  9/9/2023 1104 by Otto Medley RN  Outcome: Ongoing, Progressing  9/9/2023 1103 by Otto Medley RN  Outcome: Ongoing, Progressing  9/9/2023 1103 by Otto Medley RN  Outcome: Ongoing, Not Progressing  Intervention: Optimize Psychosocial Adjustment to Illness     Problem: Bleeding (Sepsis/Septic Shock)  Goal: Absence of Bleeding  9/9/2023 1104 by Otto Medley RN  Outcome: Ongoing, Progressing  9/9/2023 1103 by Otto Medley RN  Outcome: Ongoing, Progressing  9/9/2023 1103 by Otto Medley RN  Outcome: Ongoing, Not Progressing     Problem: Glycemic Control Impaired (Sepsis/Septic Shock)  Goal: Blood Glucose Level Within Desired Range  9/9/2023 1104 by Otto Medley RN  Outcome: Ongoing, Progressing  9/9/2023 1103 by Otto Medley RN  Outcome: Ongoing, Progressing  9/9/2023 1103 by Otto Medley RN  Outcome: Ongoing, Not Progressing  Intervention: Optimize Glycemic Control     Problem: Infection Progression (Sepsis/Septic Shock)  Goal: Absence of Infection Signs and Symptoms  9/9/2023 1104 by Otto Medley RN  Outcome: Ongoing, Progressing  9/9/2023 1103 by Otto Medley RN  Outcome: Ongoing, Progressing  9/9/2023 1103 by Otto Medley RN  Outcome: Ongoing, Not Progressing  Intervention: Initiate Sepsis Management  Intervention: Promote Recovery     Problem: Nutrition Impaired (Sepsis/Septic Shock)  Goal: Optimal Nutrition Intake  9/9/2023 1104 by Otto Medley RN  Outcome: Ongoing, Progressing  9/9/2023 1103 by Otto Medley RN  Outcome: Ongoing, Progressing  9/9/2023 1103 by Otto Medley RN  Outcome: Ongoing, Not Progressing     Problem: Skin Injury Risk Increased  Goal: Skin Health and Integrity  9/9/2023 1104 by Otto Medley, JOEY  Outcome: Ongoing, Progressing  9/9/2023 1103 by Otto Medley RN  Outcome: Ongoing,  Progressing  9/9/2023 1103 by Otto Medley, JOEY  Outcome: Ongoing, Not Progressing  Intervention: Optimize Skin Protection     Problem: Malnutrition  Goal: Improved Nutritional Intake  9/9/2023 1104 by Otto Medley, JOEY  Outcome: Ongoing, Progressing  9/9/2023 1103 by Otto Medley, JOEY  Outcome: Ongoing, Progressing  9/9/2023 1103 by Otto Medley, JOEY  Outcome: Ongoing, Not Progressing   Goal Outcome Evaluation:   Reviewed pt's Lab result  with MD Slade and he re-ordered blood work to evaluate

## 2023-09-09 NOTE — PLAN OF CARE
Goal Outcome Evaluation:      Pt had oxycodone for axillary pain where he has had cellulitis. No complain other than that.

## 2023-09-10 LAB
ANION GAP SERPL CALCULATED.3IONS-SCNC: 13 MMOL/L (ref 5–15)
ANISOCYTOSIS BLD QL: ABNORMAL
BUN SERPL-MCNC: 9 MG/DL (ref 6–20)
BUN/CREAT SERPL: 15 (ref 7–25)
CALCIUM SPEC-SCNC: 8.9 MG/DL (ref 8.6–10.5)
CHLORIDE SERPL-SCNC: 89 MMOL/L (ref 98–107)
CO2 SERPL-SCNC: 21 MMOL/L (ref 22–29)
CREAT SERPL-MCNC: 0.6 MG/DL (ref 0.76–1.27)
DEPRECATED RDW RBC AUTO: 57.8 FL (ref 37–54)
EGFRCR SERPLBLD CKD-EPI 2021: 111.2 ML/MIN/1.73
ERYTHROCYTE [DISTWIDTH] IN BLOOD BY AUTOMATED COUNT: 18.4 % (ref 12.3–15.4)
GLUCOSE SERPL-MCNC: 98 MG/DL (ref 65–99)
HCT VFR BLD AUTO: 24.2 % (ref 37.5–51)
HGB BLD-MCNC: 7.7 G/DL (ref 13–17.7)
LYMPHOCYTES # BLD MANUAL: 0.57 10*3/MM3 (ref 0.7–3.1)
LYMPHOCYTES NFR BLD MANUAL: 2 % (ref 5–12)
MCH RBC QN AUTO: 28.2 PG (ref 26.6–33)
MCHC RBC AUTO-ENTMCNC: 32 G/DL (ref 31.5–35.7)
MCV RBC AUTO: 88.2 FL (ref 79–97)
MONOCYTES # BLD: 1.14 10*3/MM3 (ref 0.1–0.9)
MYELOCYTES NFR BLD MANUAL: 2 % (ref 0–0)
NEUTROPHILS # BLD AUTO: 54.34 10*3/MM3 (ref 1.7–7)
NEUTROPHILS NFR BLD MANUAL: 81 % (ref 42.7–76)
NEUTS BAND NFR BLD MANUAL: 14 % (ref 0–5)
PLAT MORPH BLD: NORMAL
PLATELET # BLD AUTO: 318 10*3/MM3 (ref 140–450)
PMV BLD AUTO: 7.2 FL (ref 6–12)
POIKILOCYTOSIS BLD QL SMEAR: ABNORMAL
POTASSIUM SERPL-SCNC: 3.4 MMOL/L (ref 3.5–5.2)
POTASSIUM SERPL-SCNC: 4 MMOL/L (ref 3.5–5.2)
RBC # BLD AUTO: 2.74 10*6/MM3 (ref 4.14–5.8)
SCAN SLIDE: NORMAL
SODIUM SERPL-SCNC: 123 MMOL/L (ref 136–145)
TOXIC GRANULATION: ABNORMAL
VARIANT LYMPHS NFR BLD MANUAL: 1 % (ref 19.6–45.3)
WBC NRBC COR # BLD: 57.2 10*3/MM3 (ref 3.4–10.8)

## 2023-09-10 PROCEDURE — 80048 BASIC METABOLIC PNL TOTAL CA: CPT | Performed by: INTERNAL MEDICINE

## 2023-09-10 PROCEDURE — 84132 ASSAY OF SERUM POTASSIUM: CPT | Performed by: INTERNAL MEDICINE

## 2023-09-10 PROCEDURE — 85007 BL SMEAR W/DIFF WBC COUNT: CPT | Performed by: INTERNAL MEDICINE

## 2023-09-10 PROCEDURE — 99232 SBSQ HOSP IP/OBS MODERATE 35: CPT | Performed by: INTERNAL MEDICINE

## 2023-09-10 PROCEDURE — 85025 COMPLETE CBC W/AUTO DIFF WBC: CPT | Performed by: INTERNAL MEDICINE

## 2023-09-10 RX ORDER — POTASSIUM CHLORIDE 20 MEQ/1
20 TABLET, EXTENDED RELEASE ORAL DAILY
Status: DISCONTINUED | OUTPATIENT
Start: 2023-09-11 | End: 2023-09-11 | Stop reason: HOSPADM

## 2023-09-10 RX ORDER — TORSEMIDE 10 MG/1
10 TABLET ORAL DAILY
Status: DISCONTINUED | OUTPATIENT
Start: 2023-09-10 | End: 2023-09-11 | Stop reason: HOSPADM

## 2023-09-10 RX ORDER — POTASSIUM CHLORIDE 20 MEQ/1
40 TABLET, EXTENDED RELEASE ORAL EVERY 4 HOURS
Status: COMPLETED | OUTPATIENT
Start: 2023-09-10 | End: 2023-09-10

## 2023-09-10 RX ADMIN — OXYCODONE HYDROCHLORIDE 10 MG: 5 TABLET ORAL at 09:44

## 2023-09-10 RX ADMIN — Medication 100 MG: at 09:41

## 2023-09-10 RX ADMIN — Medication 10 ML: at 21:01

## 2023-09-10 RX ADMIN — FOLIC ACID 1 MG: 1 TABLET ORAL at 09:41

## 2023-09-10 RX ADMIN — SODIUM CHLORIDE 1 G: 1 TABLET ORAL at 17:48

## 2023-09-10 RX ADMIN — DESVENLAFAXINE SUCCINATE 50 MG: 50 TABLET, EXTENDED RELEASE ORAL at 09:41

## 2023-09-10 RX ADMIN — POTASSIUM CHLORIDE 40 MEQ: 1500 TABLET, EXTENDED RELEASE ORAL at 05:13

## 2023-09-10 RX ADMIN — LIOTHYRONINE SODIUM 5 MCG: 5 TABLET ORAL at 09:41

## 2023-09-10 RX ADMIN — CETIRIZINE HYDROCHLORIDE 10 MG: 10 TABLET, FILM COATED ORAL at 09:41

## 2023-09-10 RX ADMIN — Medication 10 ML: at 09:41

## 2023-09-10 RX ADMIN — LEVOTHYROXINE SODIUM 125 MCG: 0.03 TABLET ORAL at 05:13

## 2023-09-10 RX ADMIN — SODIUM CHLORIDE 1 G: 1 TABLET ORAL at 09:41

## 2023-09-10 RX ADMIN — POTASSIUM CHLORIDE 40 MEQ: 1500 TABLET, EXTENDED RELEASE ORAL at 09:41

## 2023-09-10 RX ADMIN — BUPROPION HYDROCHLORIDE 300 MG: 150 TABLET, EXTENDED RELEASE ORAL at 09:41

## 2023-09-10 RX ADMIN — SENNOSIDES AND DOCUSATE SODIUM 2 TABLET: 50; 8.6 TABLET ORAL at 09:41

## 2023-09-10 RX ADMIN — CYANOCOBALAMIN TAB 1000 MCG 1000 MCG: 1000 TAB at 09:41

## 2023-09-10 RX ADMIN — PANTOPRAZOLE SODIUM 40 MG: 40 TABLET, DELAYED RELEASE ORAL at 09:41

## 2023-09-10 RX ADMIN — Medication 100 MG: at 21:31

## 2023-09-10 RX ADMIN — MEGESTROL ACETATE 400 MG: 400 SUSPENSION ORAL at 09:41

## 2023-09-10 RX ADMIN — GABAPENTIN 300 MG: 300 CAPSULE ORAL at 21:00

## 2023-09-10 RX ADMIN — OXYCODONE HYDROCHLORIDE 10 MG: 5 TABLET ORAL at 21:00

## 2023-09-10 RX ADMIN — SODIUM CHLORIDE 1 G: 1 TABLET ORAL at 13:19

## 2023-09-10 RX ADMIN — Medication 15 G: at 13:19

## 2023-09-10 RX ADMIN — SENNOSIDES AND DOCUSATE SODIUM 2 TABLET: 50; 8.6 TABLET ORAL at 21:00

## 2023-09-10 NOTE — CONSULTS
[facility] Consult Note      Patient Name: Jc Driscoll  : 1965  MRN: [id number]  Primary Care Physician:  Reshma Alvarenga MD  Referring Physician: Reshma Alvarenga MD  Date of admission: 2023    Subjective     Reason for Consult: Hyponatremia    HPI:   Jc Driscoll is a 58 y.o. 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  Oncology for his underlying malignancy.    Patient complains about fatigue weakness and marked inability to perform daily activities associated with nausea and emesis with work-up showing a worsening anemia with hemoglobin 5.5 and severe leukocytosis 77,000 reason why he was admitted further management during his admission found to have.  Worsening hyponatremia.    Nephrology consultation was requested for the management    Review of Systems:   14 point review of systems is otherwise negative except for mentioned above on HPI    Personal History     Past Medical History:   Diagnosis Date    Allergic rhinitis 2013    Overview:  2018 - still zyrtec 10 daily (if stops, gets dermatitis again).     Anxiety and depression 2012    Overview:  2018 -   C/w well 300 xr and Lito 20.  2019 -   Doing ok even with new lung cancer - lito 20 & well 300xr.     Chronic pansinusitis 2019    Overview:  2019 -   MRI showed chronic thick in frontal, maxillary, ethmoidal ---> to Dr. COLLAZO to est since abx ICC didn't help.  Does netipot bid.     Diastolic CHF 2014    Overview:   7/4/14 - impaired LV relaxation on [place] ECHO.  EF 60%. Rest normal    Dupuytren's contracture of both hands     Elevated cholesterol     Erosive esophagitis 07/09/2019    Overview:  EGD 2016 4/2/2018 - nexium OTC daily for few week.  Qod before that.  Now carbonation hurts, epigastric pain 4/8/2019 -   protonix 40 doing well.     Gastroesophageal reflux disease 02/21/2019    Hiatal hernia 07/09/2019    History of colon polyps     Hypertension     Lung cancer     right lung and in lymph nodes x2    Mediastinal lymphadenopathy 03/12/2019    Normocytic anemia 08/08/2019    Overview:  6/2019 - dropped to 9's postop 7/2019 - rebounded to 10's.  8/8/2019 -   Back to 9's - check ferritin, b12 and thyroid.     Prediabetes 07/09/2014    Overview:  7/4/14 - a1c 6.1 at [place] admission    Retention of urine 06/27/2019    PSOT OP, RESOLVED       Past Surgical History:   Procedure Laterality Date    BRONCHOSCOPY  03/18/2019    EBUS MONTERO NEEDLE BX    COLONOSCOPY      DUPUYTREN CONTRACTURE RELEASE Bilateral     LUNG BIOPSY  03/08/2019    CT GUIDED    LUNG REMOVAL, PARTIAL Right     right lower    PORTACATH PLACEMENT  03/20/2019    DR LONGORIA    THORACOSCOPY Right 6/26/2019    Procedure: RIGHT VATS, OPEN LOWER LOBECTOMY WITH LYMPH NODE DISECTION, WEDGE RESECTION OF RIGHT MIDDLE LOBE;  Surgeon: Terrance Longoria MD;  Location: [facility];  Service: Cardiothoracic    THORACOSCOPY VIDEO ASSISTED WITH LOBECTOMY Left 7/6/2022    Procedure: THORACOSCOPY VIDEO ASSISTED WITH LOBECTOMY;  Surgeon: Miryam Reyna MD;  Location: [facility];  Service: Thoracic;  Laterality: Left;       Family History: family history includes Cancer in his father, mother, and sister; Cervical cancer in his mother; Lung cancer in his father and sister.    Social History:  reports that he quit smoking about 14 years ago. His smoking use included cigarettes. He has never used smokeless tobacco. He reports current alcohol use of about 2.0 standard drinks  per week. He reports that he does not use drugs.    Home Medications:  Prior to Admission medications    Medication Sig Start Date End Date Taking? Authorizing Provider   buPROPion XL (WELLBUTRIN XL) 300 MG 24 hr tablet Take 1 tablet by mouth Daily.   Yes Lloyd Rasmussen MD   desvenlafaxine (PRISTIQ) 50 MG 24 hr tablet Take 1 tablet by mouth Daily. 12/31/19  Yes Lloyd Rasmussen MD   folic acid (FOLVITE) 1 MG tablet Take 1 tablet by mouth Daily. 8/2/23  Yes Shayy Montemayor APRN   gabapentin (NEURONTIN) 300 MG capsule Take 1 capsule by mouth Every Night.   Yes Lloyd Rasmussen MD   lactulose (CHRONULAC) 10 GM/15ML solution Take 30 mL by mouth 2 (Two) Times a Day As Needed.   Yes Lloyd Rasmussen MD   levoFLOXacin (Levaquin) 500 MG tablet Take 1 tablet by mouth Daily. 9/1/23  Yes Azucena Gaspar MD   levothyroxine (SYNTHROID, LEVOTHROID) 125 MCG tablet Take 1 tablet by mouth Every Morning. 6/23/23  Yes Trinidad Del Angel MD   liothyronine (CYTOMEL) 5 MCG tablet Take 1 tablet by mouth Daily. 3/25/22  Yes Lloyd Rasmussen MD   loratadine (CLARITIN) 10 MG tablet Take 1 tablet by mouth Daily.   Yes Lloyd Rasmussen MD   megestrol acetate (MEGACE) 400 MG/10ML suspension oral suspension Take 10 mL by mouth Daily. 8/24/23  Yes Azucena Gaspar MD   ondansetron (ZOFRAN) 8 MG tablet Take 1 tablet by mouth 3 (Three) Times a Day As Needed for Nausea or Vomiting. 8/30/23  Yes Azucena Gaspar MD   oxyCODONE-acetaminophen (PERCOCET)  MG per tablet Take 1 tablet by mouth Every 4 (Four) Hours As Needed for Moderate Pain. 8/22/23  Yes Shayy Montemayor APRN   pantoprazole (PROTONIX) 40 MG EC tablet Take 1 tablet by mouth Daily.   Yes Lloyd Rasmussen MD   potassium chloride (K-DUR,KLOR-CON) 20 MEQ CR tablet Take 1 tablet by mouth Daily.   Yes Lloyd Rasmussen MD   vitamin B-12 (CYANOCOBALAMIN) 1000 MCG tablet Take 1 tablet by mouth Daily.   Yes Valery  MD Lloyd   vitamin B-6 (PYRIDOXINE) 100 MG tablet Take 1 tablet by mouth Every 12 (Twelve) Hours. 3/1/23  Yes Azucena Gaspar MD       Allergies:  No Known Allergies    Objective     Vitals:   Temp:  [96.9 °F (36.1 °C)-98.6 °F (37 °C)] 97.2 °F (36.2 °C)  Heart Rate:  [115-127] 115  Resp:  [16-26] 25  BP: ()/(65-77) 92/66    Intake/Output Summary (Last 24 hours) at 9/10/2023 1659  Last data filed at 9/10/2023 1300  Gross per 24 hour   Intake 480 ml   Output 600 ml   Net -120 ml       Physical Exam:   Constitutional: Chronically ill and deconditioned with bilateral temporal wasting  HEENT: Sclera anicteric, no conjunctival injection  Neck: Supple, no thyromegaly, no lymphadenopathy, trachea at midline, no JVD  Respiratory: Fine crackles bibasal  Chest left upper chest Mediport in place  Cardiovascular: RRR, w HOA   Gastrointestinal: Positive bowel sounds, abdomen is soft, nontender and nondistended  : No palpable bladder  Musculoskeletal: No edema, no clubbing or cyanosis  Psychiatric: Appropriate affect, cooperative  Neurologic: Oriented x3, moving all extremities, normal speech and mental status  Skin: Warm and dry       Scheduled Meds:     buPROPion XL, 300 mg, Oral, Daily  cetirizine, 10 mg, Oral, Daily  desvenlafaxine, 50 mg, Oral, Daily  folic acid, 1 mg, Oral, Daily  gabapentin, 300 mg, Oral, Nightly  levothyroxine, 125 mcg, Oral, Q AM  liothyronine, 5 mcg, Oral, Daily  megestrol acetate, 400 mg, Oral, Daily  pantoprazole, 40 mg, Oral, QAM AC  [START ON 9/11/2023] potassium chloride, 20 mEq, Oral, Daily  senna-docusate sodium, 2 tablet, Oral, BID  sodium chloride, 10 mL, Intravenous, Q12H  sodium chloride, 1 g, Oral, TID With Meals  Urea, 15 g, Oral, BID  vitamin B-12, 1,000 mcg, Oral, Daily  vitamin B-6, 100 mg, Oral, Q12H      IV Meds:        Results Reviewed:   I have personally reviewed the results from the time of this admission to 9/10/2023 16:59 EDT     Results from last 7 days    Lab Units 09/10/23  0409 09/09/23  1116 09/08/23  2246   WBC 10*3/mm3 57.20* 70.90* 63.20*   HEMOGLOBIN g/dL 7.7* 7.9* 8.4*   HEMATOCRIT % 24.2* 25.5* 26.6*   PLATELETS 10*3/mm3 318 374 383   MONOCYTES % % 2.0* 4.0* 5.0         Lab Results   Component Value Date    GLUCOSE 98 09/10/2023    CALCIUM 8.9 09/10/2023     (L) 09/10/2023    K 4.0 09/10/2023    CO2 21.0 (L) 09/10/2023    CL 89 (L) 09/10/2023    BUN 9 09/10/2023    CREATININE 0.60 (L) 09/10/2023    EGFRIFAFRI >60 05/20/2019    EGFRIFNONA 100 01/28/2022    BCR 15.0 09/10/2023    ANIONGAP 13.0 09/10/2023      Lab Results   Component Value Date    MG 1.8 08/30/2023    PHOS 5.5 (H) 07/06/2023    ALBUMIN 2.6 (L) 09/08/2023           Assessment / Plan       Sepsis    Cancer of lower lobe of right lung    Anxiety and depression    Gastroesophageal reflux disease    Hypertension    Non-small cell carcinoma of lung, right    NSCLC of left lung    Leukocytosis    Bandemia    Anemia due to chemotherapy    Cellulitis of chest wall          ASSESSMENT:    Acute on chronic normovolemic hypotonic hyponatremia.  Reviewed the patient sodium trend since 2020 his sodium has been below 135.  With average around 130s recently with a slight drop to 123.  His previous work-up showed a serum osmolarity of 262, urine osmolarity of 297 urine sodium of 20.  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  We will attempt trial of salt tablets urea and low-dose torsemide and will continue to titrate therapy accordingly    Acute on chronic normocytic anemia, with underlying metastatic lung adenocarcinoma as per oncology management  s/p PRBC as indicated ,as per primary team     Severe leukocytosis, as per hematology infectious work-up being performed no clear evidence of infectious source.    Relapsing recurrent adenocarcinoma of the left lung status post resection chemotherapy followed by immunotherapy as per  oncology    PLAN:  -Fluid restriction, sodium chloride tablets and low-dose torsemide we will continue to follow sodium trend closely.  -We will continue surveillance labs      Thank you for involving us in the care of Jc Driscoll.  Please feel free to call with any questions.    Clarence Clarke MD  09/10/23  16:59 EDT    [facility]  [phone]      Please note that portions of this note were completed with a voice recognition program.

## 2023-09-10 NOTE — PLAN OF CARE
Problem: Pain Acute  Goal: Acceptable Pain Control and Functional Ability  Intervention: Prevent or Manage Pain  Recent Flowsheet Documentation  Taken 9/10/2023 0000 by Leslie Pathak RN  Medication Review/Management: medications reviewed  Taken 9/9/2023 2050 by Leslie Pathak RN  Sleep/Rest Enhancement: relaxation techniques promoted  Medication Review/Management: medications reviewed     Problem: Adult Inpatient Plan of Care  Goal: Plan of Care Review  Outcome: Ongoing, Progressing  Flowsheets  Taken 9/10/2023 0445  Plan of Care Reviewed With: patient  Taken 9/9/2023 0453  Progress: no change     Problem: Adult Inpatient Plan of Care  Goal: Absence of Hospital-Acquired Illness or Injury  Intervention: Prevent Infection  Recent Flowsheet Documentation  Taken 9/10/2023 0400 by Leslie Pathak RN  Infection Prevention:   single patient room provided   rest/sleep promoted   personal protective equipment utilized   hand hygiene promoted  Taken 9/10/2023 0200 by Leslie Pathak RN  Infection Prevention:   single patient room provided   rest/sleep promoted   personal protective equipment utilized   hand hygiene promoted  Taken 9/10/2023 0000 by Leslie Pathak RN  Infection Prevention:   single patient room provided   rest/sleep promoted   personal protective equipment utilized   hand hygiene promoted  Taken 9/9/2023 2217 by Leslie Pathak RN  Infection Prevention:   single patient room provided   rest/sleep promoted   personal protective equipment utilized   hand hygiene promoted   equipment surfaces disinfected  Taken 9/9/2023 2050 by Leslie Pathak RN  Infection Prevention:   single patient room provided   rest/sleep promoted   personal protective equipment utilized   hand hygiene promoted     Problem: Adult Inpatient Plan of Care  Goal: Optimal Comfort and Wellbeing  Intervention: Monitor Pain and Promote Comfort  Recent Flowsheet Documentation  Taken 9/9/2023 2147 by Leslie Pathak, RN  Pain Management  Interventions: simple massage provided  Taken 9/9/2023 2050 by Leslie Pathak RN  Pain Management Interventions: position adjusted   Goal Outcome Evaluation:  Plan of Care Reviewed With: patient        Progress: no change  Outcome Evaluation: Pt conts to have elevated WBC. complaints of pain on his left chest. erythema noted, no drainage noted. pt looks pale and weak. no other concerns made. call light within reach

## 2023-09-10 NOTE — PROGRESS NOTES
Hematology/Oncology Inpatient Progress Note    PATIENT NAME: Jc Driscoll  : 1965  MRN: [id number]    CHIEF COMPLAINT: Weakness    HISTORY OF PRESENT ILLNESS:      Jc Driscoll is a 58 y.o. male who presented to [facility] on 2023 with complaints of swelling and erythema to the left axilla, left chest wall, around the left chest wall port.  Past medical history significant for metastatic lung cancer.  He initially presented to his oncologist office the same day due to the above complaints along with progressive weakness, nausea and vomiting.  He was noted to have leukocytosis with a WBC of 77,000 and significant anemia with a hemoglobin of 5.5 g/dL.  For this reason he was asked to go to the emergency room for further evaluation and treatment.     Evaluation in the ED: Sodium 127, alkaline phosphatase 270, WBC 51.20, hemoglobin 7.2 g/dL, MCV 87.9, platelets 403,000, bands 15%, absolute lymphocytes 0.0.  Normal lactic acid.  Blood cultures drawn and pending.  Patient was initiated on IV antibiotics and admitted for further evaluation and treatment.     23  Hematology/Oncology was consulted on a patient known to us and established in the office with Dr. 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  The patient initially presented in  with moderately differentiated adenocarcinoma of the right lung for which he underwent concurrent chemotherapy and radiation neoadjuvantly followed by a right lower lobectomy.  This was followed by immunotherapy with durvalumab for 24 cycles ending in 2020.  In 2022, a routine surveillance CT of the chest revealed a left upper lobe pulmonary nodule for which a subsequent PET/CT was done that showed mild uptake corresponding to the nodule.  Subsequent CT-guided biopsy was attempted but aborted due to findings by the radiologist that the lung nodularity was actually a clump of tiny nodules  of which the largest was 6 mm and therefore biopsy could not be completed.  Also the area was in the vicinity of multiple blood vessels with increased risk of bleeding.  Follow-up CT of the chest was recommended for continued surveillance.  In May 2022, the patient underwent a CT-guided biopsy of the left enlarging nodule and pathology was positive for invasive poorly differentiated adenocarcinoma most consistent with an adenocarcinoma of pulmonary origin.  PET/CT showed a 2.3 cm hypermetabolic left upper lobe nodule and no convincing metastatic disease elsewhere.  Brain MRI was negative for intracranial metastasis.  On 7/6/2022 the patient underwent a left upper lobe lobectomy which was followed by adjuvant chemotherapy.  After completion of chemotherapy the patient was initiated on atezolizumab along with XRT.  Radiation was completed on 01/11/2023.  The patient was continued on Tecentriq every 3 weeks.  Due to his increasing pain in the left axilla and concern for progressive disease a PET/CT was performed on 6/5/2023, this revealed that the chest wall mass had increased in size since the previous study.  There was osseous destruction of the second rib.  Osseous metastatic changes to the left first and second rib.  Due to progressive disease Tecentriq was discontinued.  A plan to transition to adhere to was in place but was complicated due to multiple hospitalizations for pain and weakness.  On 7/10/2023 the patient was initiated on cycle 1 of Enhertu.  Due to shortness of breath the patient had a CT of the chest on 8/9/2023 which showed a significant increase in size of the left anterior chest wall mass with an increase in destruction of the adjacent ribs particularly the left third rib.  Due to progressive disease and hereto was discontinued.  His malignancy has MET amplification and therefore it was planned to begin therapy with tepotinib.          Patient with recurrent left lung cancer.  Progressed on  multiple lines of treatment.  Was recently placed on tepotinib for an MEK 2 mutation.  Patient has had progressive decline in clinical status.  He presented with nausea, failure to thrive decreased p.o. intake significant anxiety and constipation.  He was also found to have significant leukocytosis, erythematous left chest wall, slight swelling around the left Mediport but no redness or increase in warmth.  For this reason patient was admitted to the hospital to evaluate for implant infection, ID consultation was also requested     He/She  has a past medical history of Allergic rhinitis (07/25/2013), Anxiety and depression (06/05/2012), Chronic pansinusitis (04/08/2019), Diastolic CHF (07/09/2014), Dupuytren's contracture of both hands, Elevated cholesterol, Erosive esophagitis (07/09/2019), Gastroesophageal reflux disease (02/21/2019), Hiatal hernia (07/09/2019), History of colon polyps, Hypertension, Lung cancer, Mediastinal lymphadenopathy (03/12/2019), Normocytic anemia (08/08/2019), Prediabetes (07/09/2014), and Retention of urine (06/27/2019).     PCP: Reshma Alvarenga MD     Subjective   9/10/2023: Feeling well. Continues to have pain but not worse than usual. No more dyspnea or cough than before. Able to eat.     ROS:  Review of Systems   Constitutional:  Positive for fatigue. Negative for activity change, appetite change, chills, diaphoresis, fever and unexpected weight change.   HENT:  Negative for congestion, dental problem, drooling, ear discharge, ear pain, facial swelling, hearing loss, mouth sores, nosebleeds, postnasal drip, rhinorrhea, sinus pressure, sinus pain, sneezing, sore throat, tinnitus, trouble swallowing and voice change.    Eyes:  Negative for photophobia, pain, discharge, redness, itching and visual disturbance.   Respiratory:  Positive for shortness of breath. Negative for apnea, cough, choking, chest tightness, wheezing and stridor.    Cardiovascular:  Positive for chest pain.  Negative for palpitations and leg swelling.   Gastrointestinal:  Negative for abdominal distention, abdominal pain, anal bleeding, blood in stool, constipation, diarrhea, nausea, rectal pain and vomiting.   Endocrine: Negative for cold intolerance, heat intolerance, polydipsia and polyuria.   Genitourinary:  Negative for decreased urine volume, difficulty urinating, dysuria, flank pain, frequency, genital sores, hematuria and urgency.   Musculoskeletal:  Negative for arthralgias, back pain, gait problem, joint swelling, myalgias, neck pain and neck stiffness.   Skin:  Negative for color change, pallor and rash.   Neurological:  Positive for weakness. Negative for dizziness, tremors, seizures, syncope, facial asymmetry, speech difficulty, light-headedness, numbness and headaches.   Hematological:  Negative for adenopathy. Does not bruise/bleed easily.   Psychiatric/Behavioral:  Negative for agitation, behavioral problems, confusion, decreased concentration, hallucinations, self-injury, sleep disturbance and suicidal ideas. The patient is not nervous/anxious.       MEDICATIONS:    Scheduled Meds:  buPROPion XL, 300 mg, Oral, Daily  cetirizine, 10 mg, Oral, Daily  desvenlafaxine, 50 mg, Oral, Daily  folic acid, 1 mg, Oral, Daily  gabapentin, 300 mg, Oral, Nightly  levothyroxine, 125 mcg, Oral, Q AM  liothyronine, 5 mcg, Oral, Daily  megestrol acetate, 400 mg, Oral, Daily  pantoprazole, 40 mg, Oral, QAM AC  [START ON 9/11/2023] potassium chloride, 20 mEq, Oral, Daily  senna-docusate sodium, 2 tablet, Oral, BID  sodium chloride, 10 mL, Intravenous, Q12H  sodium chloride, 1 g, Oral, TID With Meals  vitamin B-12, 1,000 mcg, Oral, Daily  vitamin B-6, 100 mg, Oral, Q12H       Continuous Infusions:        PRN Meds:    acetaminophen **OR** acetaminophen **OR** acetaminophen    ALPRAZolam    aluminum-magnesium hydroxide-simethicone    senna-docusate sodium **AND** polyethylene glycol **AND** bisacodyl **AND** bisacodyl     "Calcium Replacement - Follow Nurse / BPA Driven Protocol    lactulose    Magnesium Standard Dose Replacement - Follow Nurse / BPA Driven Protocol    melatonin    [DISCONTINUED] ondansetron **OR** ondansetron    ondansetron    oxyCODONE    Phosphorus Replacement - Follow Nurse / BPA Driven Protocol    Potassium Replacement - Follow Nurse / BPA Driven Protocol    sodium chloride    sodium chloride    sodium chloride     ALLERGIES:  No Known Allergies    Objective    VITALS:   /72 (BP Location: Right arm, Patient Position: Lying)   Pulse 119   Temp 98 °F (36.7 °C) (Oral)   Resp 16   Ht 190.5 cm (75\")   Wt 66.7 kg (147 lb)   SpO2 97%   BMI 18.37 kg/m²     PHYSICAL EXAM: (performed by MD)  Physical Exam  Vitals and nursing note reviewed.   Constitutional:       General: He is not in acute distress.     Appearance: He is not ill-appearing, toxic-appearing or diaphoretic.      Comments: Slender. Conversant and oriented. No jaundice. Appears ill.    HENT:      Head: Normocephalic and atraumatic.      Right Ear: External ear normal.      Left Ear: External ear normal.      Nose: Nose normal.      Mouth/Throat:      Mouth: Mucous membranes are moist.      Pharynx: Oropharynx is clear.   Eyes:      General: No scleral icterus.        Right eye: No discharge.         Left eye: No discharge.      Conjunctiva/sclera: Conjunctivae normal.      Pupils: Pupils are equal, round, and reactive to light.   Neck:      Thyroid: No thyromegaly.   Cardiovascular:      Rate and Rhythm: Normal rate and regular rhythm.      Pulses: Normal pulses.      Heart sounds: Normal heart sounds. No murmur heard.    No friction rub. No gallop.   Pulmonary:      Effort: Pulmonary effort is normal. No respiratory distress.      Breath sounds: No stridor. No wheezing, rhonchi or rales.      Comments: Breath sounds are diminished bilaterally to auscultation.   Chest:      Chest wall: No tenderness.   Abdominal:      General: Abdomen is flat. " Bowel sounds are normal. There is no distension.      Palpations: Abdomen is soft. There is no mass.      Tenderness: There is no abdominal tenderness. There is no right CVA tenderness, left CVA tenderness, guarding or rebound.   Musculoskeletal:         General: No swelling, tenderness, deformity or signs of injury. Normal range of motion.      Cervical back: Normal range of motion and neck supple. No rigidity.      Right lower leg: No edema.      Left lower leg: No edema.   Lymphadenopathy:      Cervical: No cervical adenopathy.   Skin:     General: Skin is warm and dry.      Coloration: Skin is not jaundiced.      Findings: No bruising, erythema or rash.   Neurological:      General: No focal deficit present.      Mental Status: He is alert and oriented to person, place, and time.      Motor: No abnormal muscle tone.      Gait: Gait normal.   Psychiatric:         Mood and Affect: Mood normal.         Behavior: Behavior normal.         Thought Content: Thought content normal.         Judgment: Judgment normal.   ANDREW Ba MD performed the physical exam on 9/10/2023 as documented above.     RECENT LABS:  Lab Results (last 24 hours)       Procedure Component Value Units Date/Time    Manual Differential [[id number]]  (Abnormal) Collected: 09/10/23 0409    Specimen: Blood Updated: 09/10/23 0625     Neutrophil % 81.0 %      Lymphocyte % 1.0 %      Monocyte % 2.0 %      Bands %  14.0 %      Myelocyte % 2.0 %      Neutrophils Absolute 54.34 10*3/mm3      Lymphocytes Absolute 0.57 10*3/mm3      Monocytes Absolute 1.14 10*3/mm3      Anisocytosis Slight/1+     Poikilocytes Slight/1+     Toxic Granulation Slight/1+     Platelet Morphology Normal    CBC & Differential [[id number]]  (Abnormal) Collected: 09/10/23 0409    Specimen: Blood Updated: 09/10/23 0625    Narrative:      The following orders were created for panel order CBC & Differential.  Procedure                               Abnormality         Status                      ---------                               -----------         ------                     CBC Auto Differential[[id number]]        Abnormal            Final result               Scan Slide[[id number]]                                       Final result                 Please view results for these tests on the individual orders.    CBC Auto Differential [[id number]]  (Abnormal) Collected: 09/10/23 0409    Specimen: Blood Updated: 09/10/23 0625     WBC 57.20 10*3/mm3      RBC 2.74 10*6/mm3      Hemoglobin 7.7 g/dL      Hematocrit 24.2 %      MCV 88.2 fL      MCH 28.2 pg      MCHC 32.0 g/dL      RDW 18.4 %      RDW-SD 57.8 fl      MPV 7.2 fL      Platelets 318 10*3/mm3     Narrative:      The previously reported component NRBC is no longer being reported. Previous result was 0.1 /100 WBC (Reference Range: 0.0-0.2 /100 WBC) on 9/10/2023 at 0459 EDT.    Scan Slide [[id number]] Collected: 09/10/23 0409    Specimen: Blood Updated: 09/10/23 0625     Scan Slide --     Comment: See Manual Differential Results       Basic Metabolic Panel [[id number]]  (Abnormal) Collected: 09/10/23 0409    Specimen: Blood Updated: 09/10/23 0457     Glucose 98 mg/dL      BUN 9 mg/dL      Creatinine 0.60 mg/dL      Sodium 123 mmol/L      Potassium 3.4 mmol/L      Chloride 89 mmol/L      CO2 21.0 mmol/L      Calcium 8.9 mg/dL      BUN/Creatinine Ratio 15.0     Anion Gap 13.0 mmol/L      eGFR 111.2 mL/min/1.73     Narrative:      GFR Normal >60  Chronic Kidney Disease <60  Kidney Failure <15      Blood Culture - Blood, Arm, Left [[id number]]  (Normal) Collected: 09/06/23 1756    Specimen: Blood from Arm, Left Updated: 09/09/23 1815     Blood Culture No growth at 3 days    Blood Culture - Blood, Arm, Right [[id number]]  (Normal) Collected: 09/06/23 1756    Specimen: Blood from Arm, Right Updated: 09/09/23 1815     Blood Culture No growth at 3 days    Narrative:      Less than seven (7) mL's of blood was collected.  Insufficient quantity may yield  false negative results.    Manual Differential [[id number]]  (Abnormal) Collected: 09/09/23 1116    Specimen: Blood Updated: 09/09/23 1223     Neutrophil % 94.0 %      Lymphocyte % 2.0 %      Monocyte % 4.0 %      Neutrophils Absolute 66.65 10*3/mm3      Lymphocytes Absolute 1.42 10*3/mm3      Monocytes Absolute 2.84 10*3/mm3      Anisocytosis Slight/1+     RBC Fragments Slight/1+     Toxic Granulation Slight/1+     Platelet Estimate Adequate     Large Platelets Slight/1+    CBC & Differential [[id number]]  (Abnormal) Collected: 09/09/23 1116    Specimen: Blood Updated: 09/09/23 1223    Narrative:      The following orders were created for panel order CBC & Differential.  Procedure                               Abnormality         Status                     ---------                               -----------         ------                     CBC Auto Differential[[id number]]        Abnormal            Final result               Scan Slide[[id number]]                                       Final result                 Please view results for these tests on the individual orders.    Scan Slide [[id number]] Collected: 09/09/23 1116    Specimen: Blood Updated: 09/09/23 1223     Scan Slide --     Comment: See Manual Differential Results       CBC Auto Differential [[id number]]  (Abnormal) Collected: 09/09/23 1116    Specimen: Blood Updated: 09/09/23 1223     WBC 70.90 10*3/mm3      RBC 2.87 10*6/mm3      Hemoglobin 7.9 g/dL      Hematocrit 25.5 %      MCV 89.0 fL      MCH 27.4 pg      MCHC 30.8 g/dL      RDW 18.1 %      RDW-SD 59.5 fl      MPV 7.1 fL      Platelets 374 10*3/mm3     Narrative:      The previously reported component NRBC is no longer being reported. Previous result was 0.0 /100 WBC (Reference Range: 0.0-0.2 /100 WBC) on 9/9/2023 at 1150 EDT.    Basic Metabolic Panel [[id number]]  (Abnormal) Collected: 09/09/23 1116    Specimen: Blood Updated: 09/09/23 1200     Glucose 97 mg/dL      BUN 10 mg/dL      Creatinine  0.67 mg/dL      Sodium 126 mmol/L      Potassium 4.1 mmol/L      Chloride 92 mmol/L      CO2 21.0 mmol/L      Calcium 8.9 mg/dL      BUN/Creatinine Ratio 14.9     Anion Gap 13.0 mmol/L      eGFR 107.6 mL/min/1.73     Narrative:      GFR Normal >60  Chronic Kidney Disease <60  Kidney Failure <15            IMAGING REVIEWED:  No radiology results for the last day    Assessment & Plan   ASSESSMENT:  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  Upon recurrence patient underwent radiation therapy and was on Tecentriq.  Recently progressed through Enhertu.  Plan for initiating tepotinib due to MET amplification once clinically improved.  Leukocytosis with bandemia: Did not increase further. Likely a reactive phenomenon.    Anemia: Persists. No transfusion today.   Hyponatremia: Persistent in spite of treatment.     PLAN  CBC tomorrow. To start treatment once discharged.     Lowell Ba MD on 9/10/2023 at 11:01 AM.

## 2023-09-10 NOTE — PROGRESS NOTES
[facility] Medicine Services   Daily Progress Note    Patient Name: Jc Driscoll  : 1965  MRN: [id number]  Primary Care Physician:  Reshma Alvarenga MD  Date of admission: 2023      Subjective      Chief Complaint: Leukocytosis erythema  Patient    Patient Reports this a pleasant gentleman who is being for the last 5 years fighting potentiated adenocarcinoma with metastasis first time the patient had resection of liver that take care he was treated with chemoradiation after that and now he is still getting chemo with Dr. Gaspar, patient was found to have severe anemia received transfusion, patient is receiving transfusion for now with hematology oncology.  Patient is complaining of anxiety, no shortness of breath no chest pain    Patient seen and examined this morning.  Taking over care today, states she is doing okay, breathing is controlled.  No fever or chills.  White count remains elevated, CT findings also noted.  Will consult pulmonology for further evaluation.    Pertinent positives as noted in HPI/subjective.  All other systems were reviewed and are negative.      Objective      Vitals:   Temp:  [96.9 °F (36.1 °C)-98.6 °F (37 °C)] 98 °F (36.7 °C)  Heart Rate:  [111-127] 119  Resp:  [16-33] 16  BP: (100-115)/(64-77) 101/72    General: Awake, alert, NAD  Eyes: PERRL, EOMI, conjunctivae are clear  Cardiovascular: Regular rate and rhythm, no murmurs  Respiratory: Left-sided rhonchi noted, no wheezing or rales, unlabored breathing  Abdomen: Soft, nontender, positive bowel sounds, no guarding  Neurologic: A&O, CN grossly intact, moves all extremities spontaneously  Musculoskeletal: Normal range of motion, no other gross deformities  Skin: Warm, dry, intact         Result Review    Result Review:  I have personally reviewed the results from the time of this admission to 9/10/2023 10:39 EDT and agree with these findings:  [x]  Laboratory  [x]  Microbiology  [x]  Radiology  []   EKG/Telemetry   []  Cardiology/Vascular   []  Pathology  [x]  Old records  []  Other:              buPROPion XL, 300 mg, Oral, Daily  cetirizine, 10 mg, Oral, Daily  desvenlafaxine, 50 mg, Oral, Daily  folic acid, 1 mg, Oral, Daily  gabapentin, 300 mg, Oral, Nightly  levothyroxine, 125 mcg, Oral, Q AM  liothyronine, 5 mcg, Oral, Daily  megestrol acetate, 400 mg, Oral, Daily  pantoprazole, 40 mg, Oral, QAM AC  [START ON 9/11/2023] potassium chloride, 20 mEq, Oral, Daily  senna-docusate sodium, 2 tablet, Oral, BID  sodium chloride, 10 mL, Intravenous, Q12H  sodium chloride, 1 g, Oral, TID With Meals  vitamin B-12, 1,000 mcg, Oral, Daily  vitamin B-6, 100 mg, Oral, Q12H               Active Hospital Problems:  Active Hospital Problems    Diagnosis     **Sepsis     Leukocytosis     Bandemia     Anemia due to chemotherapy     Cellulitis of chest wall     NSCLC of left lung     Cancer of lower lobe of right lung     Non-small cell carcinoma of lung, right     Gastroesophageal reflux disease     Anxiety and depression     Hypertension      Plan:     Leukocytosis with bandemia  Left chest wall cellulitis   -consider port infection/bacteremia  -left axilla with swelling and induration  -pt has had leukocytosis since March 2023. His previous admission July 2023 the patient had negative blood cultures. ID team suggested leukocytosis was secondary to steroids and underlying malignancy  -Cultures so far remain negative  -Antibiotics discontinued per ID and signed off  -CT chest findings noted, pulmonology consulted    Anemia  -no hematochezia or hematemesis   -hgb 7.2 in the ED, 1 unit transfused  -anemia secondary to metastatic disease/chemotherapy  -Monitor hemoglobin, hematology following     Metastatic lung CA s/p left thoracotomy and right lower lobectomy  -followed by Dr. Gaspar on tepotinib  -CT findings noted, concern for pneumonia  -Pulmonology consulted for further management, heme-onc following     Hyponatremia  -pt  did have hyponatremia secondary to volume depletion and underlying SIADH per nephrology back in July 2023. He was treated with IVFs during that admission  -Sodium trending down  -Nephrology consulted    Hypothyroidism  -cont home synthroid, cytomel     Anorexia   -on megace      DVT prophylaxis:  Mechanical DVT prophylaxis orders are present.    CODE STATUS:    Level Of Support Discussed With: Patient  Code Status (Patient has no pulse and is not breathing): CPR (Attempt to Resuscitate)  Medical Interventions (Patient has pulse or is breathing): Full Support      Disposition: Pending clinical course    This patient has been examined wearing appropriate Personal Protective Equipment and discussed with hospital infection control department. 09/10/23      Electronically signed by Ezequiel Mccollum DO, 09/10/23, 10:39 EDT.  [facility] Hospitalist Team    Part of this note may be an electronic transcription/translation of spoken language to printed text using the Dragon Dictation System.

## 2023-09-10 NOTE — CONSULTS
Group: [facility]         CONSULT NOTE    Patient Identification:  Jc Driscoll  58 y.o.  male  1965  [id number]            Requesting physician: Attending physician    Reason for Consultation: Lung infiltrates, pneumonia versus lung cancer      History of Present Illness:  58-year-old male with history of metastatic lung carcinoma, chronic anemia, diastolic CHF, GERD and hypertension who presented 9/6/2023 from the oncologist office due to anemia with hemoglobin of 5.5, reported swelling around the port and left axillary area with chronic shortness of breath.  On presentation his WBCs were 51.2 and hemoglobin 7.2 with sodium 127.  Today WBC is 57.2, hemoglobin 7.7, sodium 123, potassium 3.4      Assessment:  Sepsis  Leukocytosis: Likely reactive due to the malignancy: Currently no evidence of active pulm infection  Metastatic poorly differentiated adenocarcinoma of the lung with a relapse: Status post chemotherapy, radiation therapy and immunotherapy: CT scan of the chest 9/6/2023 showed interval increase in the left anterior chest wall mass with bone metastasis to the ribs and consolidation left lung apex with small pleural effusion  Anemia  Hyponatremia  Hypothyroidism  Poor p.o. intake    Recommendations:  Currently off antibiotics as per ID recommendation: Malignancy is the most likely reason for the leukocytosis, no signs of acute infection  Patient is stable from pulmonary standpoint to be discharged home  Oxygen supplement and titration to maintain saturation 90 to 95%    Thyroid hormone replacement  Keep follow-up with oncologist        Review of Sytems:  Constitutional: Negative for chills, fever and malaise/fatigue.   HENT: Negative.    Eyes: Negative.    Cardiovascular: Negative.    Respiratory: Negative for cough and shortness of breath.    Skin: Negative.    Musculoskeletal: Negative.    Gastrointestinal: Negative.    Genitourinary: Negative.    Neurological: Negative.     Psychiatric/Behavioral: Negative.    Past Medical History:  Past Medical History:   Diagnosis Date    Allergic rhinitis 07/25/2013    Overview:  4/2/2018 - still zyrtec 10 daily (if stops, gets dermatitis again).     Anxiety and depression 06/05/2012    Overview:  4/2/2018 -   C/w well 300 xr and Lito 20.  4/8/2019 -   Doing ok even with new lung cancer - lito 20 & well 300xr.     Chronic pansinusitis 04/08/2019    Overview:  4/8/2019 -   MRI showed chronic thick in frontal, maxillary, ethmoidal ---> to Dr. COLLAZO to est since abx ICC didn't help.  Does netipot bid.     Diastolic CHF 07/09/2014    Overview:  7/4/14 - impaired LV relaxation on [facility].  EF 60%. Rest normal    Dupuytren's contracture of both hands     Elevated cholesterol     Erosive esophagitis 07/09/2019    Overview:  EGD 2016 4/2/2018 - nexium OTC daily for few week.  Qod before that.  Now carbonation hurts, epigastric pain 4/8/2019 -   protonix 40 doing well.     Gastroesophageal reflux disease 02/21/2019    Hiatal hernia 07/09/2019    History of colon polyps     Hypertension     Lung cancer     right lung and in lymph nodes x2    Mediastinal lymphadenopathy 03/12/2019    Normocytic anemia 08/08/2019    Overview:  6/2019 - dropped to 9's postop 7/2019 - rebounded to 10's.  8/8/2019 -   Back to 9's - check ferritin, b12 and thyroid.     Prediabetes 07/09/2014    Overview:  7/4/14 - a1c 6.1 at [facility] admission    Retention of urine 06/27/2019    PSOT OP, RESOLVED       Past Surgical History:  Past Surgical History:   Procedure Laterality Date    BRONCHOSCOPY  03/18/2019    EBUS MONTERO NEEDLE BX    COLONOSCOPY      DUPUYTREN CONTRACTURE RELEASE Bilateral     LUNG BIOPSY  03/08/2019    CT GUIDED    LUNG REMOVAL, PARTIAL Right     right lower    PORTACATH PLACEMENT  03/20/2019    DR LONGORIA    THORACOSCOPY Right 6/26/2019    Procedure: RIGHT VATS, OPEN LOWER LOBECTOMY WITH LYMPH NODE DISECTION, WEDGE RESECTION OF RIGHT MIDDLE LOBE;  Surgeon: Jef,  MD Terrance;  Location: [facility] MAIN OR;  Service: Cardiothoracic    THORACOSCOPY VIDEO ASSISTED WITH LOBECTOMY Left 7/6/2022    Procedure: THORACOSCOPY VIDEO ASSISTED WITH LOBECTOMY;  Surgeon: Miryam Reyna MD;  Location: [facility] MAIN OR;  Service: Thoracic;  Laterality: Left;        Home Meds:  Facility-Administered Medications Prior to Admission   Medication Dose Route Frequency Provider Last Rate Last Admin    [COMPLETED] ondansetron (ZOFRAN) injection 8 mg  8 mg Intravenous Once Shayy Montemayor APRN   8 mg at 09/06/23 1505     Medications Prior to Admission   Medication Sig Dispense Refill Last Dose    buPROPion XL (WELLBUTRIN XL) 300 MG 24 hr tablet Take 1 tablet by mouth Daily.       desvenlafaxine (PRISTIQ) 50 MG 24 hr tablet Take 1 tablet by mouth Daily.       folic acid (FOLVITE) 1 MG tablet Take 1 tablet by mouth Daily. 30 tablet 3     gabapentin (NEURONTIN) 300 MG capsule Take 1 capsule by mouth Every Night.       lactulose (CHRONULAC) 10 GM/15ML solution Take 30 mL by mouth 2 (Two) Times a Day As Needed.       levoFLOXacin (Levaquin) 500 MG tablet Take 1 tablet by mouth Daily. 10 tablet 0     levothyroxine (SYNTHROID, LEVOTHROID) 125 MCG tablet Take 1 tablet by mouth Every Morning. 30 tablet 0     liothyronine (CYTOMEL) 5 MCG tablet Take 1 tablet by mouth Daily.       loratadine (CLARITIN) 10 MG tablet Take 1 tablet by mouth Daily.       megestrol acetate (MEGACE) 400 MG/10ML suspension oral suspension Take 10 mL by mouth Daily. 240 mL 3     ondansetron (ZOFRAN) 8 MG tablet Take 1 tablet by mouth 3 (Three) Times a Day As Needed for Nausea or Vomiting. 30 tablet 5     oxyCODONE-acetaminophen (PERCOCET)  MG per tablet Take 1 tablet by mouth Every 4 (Four) Hours As Needed for Moderate Pain. 60 tablet 0     pantoprazole (PROTONIX) 40 MG EC tablet Take 1 tablet by mouth Daily.       potassium chloride (K-DUR,KLOR-CON) 20 MEQ CR tablet Take 1 tablet by mouth Daily.       vitamin B-12  "(CYANOCOBALAMIN) 1000 MCG tablet Take 1 tablet by mouth Daily.       vitamin B-6 (PYRIDOXINE) 100 MG tablet Take 1 tablet by mouth Every 12 (Twelve) Hours. 60 tablet 6        Allergies:  No Known Allergies    Social History:   Social History     Socioeconomic History    Marital status:    Tobacco Use    Smoking status: Former     Types: Cigarettes     Quit date: 2009     Years since quittin.0    Smokeless tobacco: Never   Vaping Use    Vaping Use: Never used   Substance and Sexual Activity    Alcohol use: Yes     Alcohol/week: 2.0 standard drinks     Types: 2 Cans of beer per week     Comment: Occasional    Drug use: No    Sexual activity: Defer       Family History:  Family History   Problem Relation Age of Onset    Cancer Mother         cervical cancer    Cervical cancer Mother     Cancer Father         lung cancer    Lung cancer Father     Lung cancer Sister     Cancer Sister         lung cancer    Malig Hyperthermia Neg Hx        Physical Exam:  /72 (BP Location: Right arm, Patient Position: Lying)   Pulse 119   Temp 98 °F (36.7 °C) (Oral)   Resp 16   Ht 190.5 cm (75\")   Wt 66.7 kg (147 lb)   SpO2 97%   BMI 18.37 kg/m²  Body mass index is 18.37 kg/m². 97% 66.7 kg (147 lb)  General Appearance:  Alert   HEENT:  Normocephalic, without obvious abnormality, Conjunctiva/corneas clear,.   Nares normal, no drainage     Neck:  Supple, symmetrical, trachea midline. No JVD.  Lungs /Chest wall:   Good air entry bilaterally with minimal left apical crackles, respirations unlabored, symmetrical wall movement.   Chest wall mass in the left anterior part  Heart:  Regular rate and rhythm, S1 S2 normal  Abdomen: Soft, non-tender, no masses, no organomegaly.    Extremities: No edema, no clubbing or cyanosis    LABS:  Lab Results   Component Value Date    CALCIUM 8.9 09/10/2023    PHOS 5.5 (H) 2023     Results from last 7 days   Lab Units 09/10/23  0409 23  1116 23  2246 23  1005 " 09/07/23 0041 09/06/23  1729 09/06/23  1435   SODIUM mmol/L 123* 126* 125* 126*   < > 127* 127*   POTASSIUM mmol/L 3.4* 4.1 4.2 4.3   < > 3.4* 3.4*   CHLORIDE mmol/L 89* 92* 92* 91*   < > 89* 87*   CO2 mmol/L 21.0* 21.0* 19.0* 21.0*   < > 22.0 22.0   BUN mg/dL 9 10 9 8   < > 10 9   CREATININE mg/dL 0.60* 0.67* 0.63* 0.61*   < > 0.76 0.76   GLUCOSE mg/dL 98 97 81 89   < > 87 88   CALCIUM mg/dL 8.9 8.9 9.1 9.3   < > 8.6 9.1   WBC 10*3/mm3 57.20* 70.90* 63.20* 73.30*   < > 51.20* 77.96*   HEMOGLOBIN g/dL 7.7* 7.9* 8.4* 7.1*   < > 7.2* 5.5*   PLATELETS 10*3/mm3 318 374 383 442   < > 403 451*   ALT (SGPT) U/L  --   --   --  14  --  17 18   AST (SGOT) U/L  --   --   --  16  --  24 25   PROCALCITONIN ng/mL  --   --   --   --   --  0.60*  --     < > = values in this interval not displayed.     Lab Results   Component Value Date    CKTOTAL 29 07/06/2023    TROPONINT 11 07/05/2023         Results from last 7 days   Lab Units 09/06/23 1756 09/06/23  1436   BLOODCX  No growth at 3 days  No growth at 3 days No growth at 3 days     Results from last 7 days   Lab Units 09/07/23  0041 09/06/23  1738 09/06/23  1729   PROCALCITONIN ng/mL  --   --  0.60*   LACTATE mmol/L 0.9 1.4  --                  Results from last 7 days   Lab Units 09/06/23  1756 09/06/23  1436   BLOODCX  No growth at 3 days  No growth at 3 days No growth at 3 days     Lab Results   Component Value Date    TSH 3.960 07/06/2023     Estimated Creatinine Clearance: 125.1 mL/min (A) (by C-G formula based on SCr of 0.6 mg/dL (L)).  Results from last 7 days   Lab Units 09/06/23  2320   NITRITE UA  Negative        Imaging:  Imaging Results (Last 24 Hours)       ** No results found for the last 24 hours. **              Current Meds:   SCHEDULE  buPROPion XL, 300 mg, Oral, Daily  cetirizine, 10 mg, Oral, Daily  desvenlafaxine, 50 mg, Oral, Daily  folic acid, 1 mg, Oral, Daily  gabapentin, 300 mg, Oral, Nightly  levothyroxine, 125 mcg, Oral, Q AM  liothyronine, 5 mcg,  Oral, Daily  megestrol acetate, 400 mg, Oral, Daily  pantoprazole, 40 mg, Oral, QAM AC  [START ON 9/11/2023] potassium chloride, 20 mEq, Oral, Daily  senna-docusate sodium, 2 tablet, Oral, BID  sodium chloride, 10 mL, Intravenous, Q12H  sodium chloride, 1 g, Oral, TID With Meals  Urea, 15 g, Oral, BID  vitamin B-12, 1,000 mcg, Oral, Daily  vitamin B-6, 100 mg, Oral, Q12H      Infusions     PRNs    acetaminophen **OR** acetaminophen **OR** acetaminophen    ALPRAZolam    aluminum-magnesium hydroxide-simethicone    senna-docusate sodium **AND** polyethylene glycol **AND** bisacodyl **AND** bisacodyl    Calcium Replacement - Follow Nurse / BPA Driven Protocol    lactulose    Magnesium Standard Dose Replacement - Follow Nurse / BPA Driven Protocol    melatonin    [DISCONTINUED] ondansetron **OR** ondansetron    ondansetron    oxyCODONE    Phosphorus Replacement - Follow Nurse / BPA Driven Protocol    Potassium Replacement - Follow Nurse / BPA Driven Protocol    sodium chloride    sodium chloride    sodium chloride        Marvel Rodriguez MD  9/10/2023  12:10 EDT      Much of this encounter note is an electronic transcription/translation of spoken language to printed text using Dragon Software.

## 2023-09-11 ENCOUNTER — READMISSION MANAGEMENT (OUTPATIENT)
Dept: CALL CENTER | Facility: HOSPITAL | Age: 58
End: 2023-09-11
Payer: COMMERCIAL

## 2023-09-11 ENCOUNTER — SPECIALTY PHARMACY (OUTPATIENT)
Dept: PHARMACY | Facility: HOSPITAL | Age: 58
End: 2023-09-11
Payer: COMMERCIAL

## 2023-09-11 VITALS
OXYGEN SATURATION: 100 % | WEIGHT: 147 LBS | DIASTOLIC BLOOD PRESSURE: 67 MMHG | BODY MASS INDEX: 18.28 KG/M2 | HEART RATE: 114 BPM | TEMPERATURE: 97 F | RESPIRATION RATE: 29 BRPM | HEIGHT: 75 IN | SYSTOLIC BLOOD PRESSURE: 103 MMHG

## 2023-09-11 LAB
ANION GAP SERPL CALCULATED.3IONS-SCNC: 12 MMOL/L (ref 5–15)
ANISOCYTOSIS BLD QL: ABNORMAL
BACTERIA SPEC AEROBE CULT: NORMAL
BUN SERPL-MCNC: 17 MG/DL (ref 6–20)
BUN/CREAT SERPL: 26.6 (ref 7–25)
C3 FRG RBC-MCNC: ABNORMAL
CALCIUM SPEC-SCNC: 9.3 MG/DL (ref 8.6–10.5)
CHLORIDE SERPL-SCNC: 92 MMOL/L (ref 98–107)
CO2 SERPL-SCNC: 22 MMOL/L (ref 22–29)
CREAT SERPL-MCNC: 0.64 MG/DL (ref 0.76–1.27)
DACRYOCYTES BLD QL SMEAR: ABNORMAL
DEPRECATED RDW RBC AUTO: 59.5 FL (ref 37–54)
EGFRCR SERPLBLD CKD-EPI 2021: 109.1 ML/MIN/1.73
ERYTHROCYTE [DISTWIDTH] IN BLOOD BY AUTOMATED COUNT: 18.5 % (ref 12.3–15.4)
GLUCOSE SERPL-MCNC: 102 MG/DL (ref 65–99)
HCT VFR BLD AUTO: 22.6 % (ref 37.5–51)
HGB BLD-MCNC: 7.1 G/DL (ref 13–17.7)
LYMPHOCYTES # BLD MANUAL: 1.2 10*3/MM3 (ref 0.7–3.1)
LYMPHOCYTES NFR BLD MANUAL: 5 % (ref 5–12)
MCH RBC QN AUTO: 28.5 PG (ref 26.6–33)
MCHC RBC AUTO-ENTMCNC: 31.5 G/DL (ref 31.5–35.7)
MCV RBC AUTO: 90.5 FL (ref 79–97)
METAMYELOCYTES NFR BLD MANUAL: 1 % (ref 0–0)
MONOCYTES # BLD: 3.01 10*3/MM3 (ref 0.1–0.9)
NEUTROPHILS # BLD AUTO: 55.29 10*3/MM3 (ref 1.7–7)
NEUTROPHILS NFR BLD MANUAL: 86 % (ref 42.7–76)
NEUTS BAND NFR BLD MANUAL: 6 % (ref 0–5)
NEUTS VAC BLD QL SMEAR: ABNORMAL
PLAT MORPH BLD: NORMAL
PLATELET # BLD AUTO: 277 10*3/MM3 (ref 140–450)
PMV BLD AUTO: 7.8 FL (ref 6–12)
POIKILOCYTOSIS BLD QL SMEAR: ABNORMAL
POTASSIUM SERPL-SCNC: 4.1 MMOL/L (ref 3.5–5.2)
RBC # BLD AUTO: 2.5 10*6/MM3 (ref 4.14–5.8)
SCAN SLIDE: NORMAL
SODIUM SERPL-SCNC: 126 MMOL/L (ref 136–145)
TOXIC GRANULATION: ABNORMAL
URATE SERPL-MCNC: 2.7 MG/DL (ref 3.4–7)
VARIANT LYMPHS NFR BLD MANUAL: 1 % (ref 0–5)
VARIANT LYMPHS NFR BLD MANUAL: 1 % (ref 19.6–45.3)
WBC NRBC COR # BLD: 60.1 10*3/MM3 (ref 3.4–10.8)

## 2023-09-11 PROCEDURE — 85007 BL SMEAR W/DIFF WBC COUNT: CPT | Performed by: INTERNAL MEDICINE

## 2023-09-11 PROCEDURE — 99221 1ST HOSP IP/OBS SF/LOW 40: CPT | Performed by: NURSE PRACTITIONER

## 2023-09-11 PROCEDURE — 85025 COMPLETE CBC W/AUTO DIFF WBC: CPT | Performed by: INTERNAL MEDICINE

## 2023-09-11 PROCEDURE — 80048 BASIC METABOLIC PNL TOTAL CA: CPT | Performed by: INTERNAL MEDICINE

## 2023-09-11 PROCEDURE — 84550 ASSAY OF BLOOD/URIC ACID: CPT | Performed by: INTERNAL MEDICINE

## 2023-09-11 RX ORDER — TORSEMIDE 10 MG/1
10 TABLET ORAL DAILY
Qty: 10 TABLET | Refills: 0 | Status: SHIPPED | OUTPATIENT
Start: 2023-09-11 | End: 2023-09-22

## 2023-09-11 RX ORDER — SODIUM CHLORIDE 1 G/1
1 TABLET ORAL
Qty: 30 TABLET | Refills: 0 | Status: SHIPPED | OUTPATIENT
Start: 2023-09-11 | End: 2023-09-22

## 2023-09-11 RX ORDER — SODIUM CHLORIDE 1 G/1
1 TABLET ORAL
Qty: 30 TABLET | Refills: 0 | Status: SHIPPED | OUTPATIENT
Start: 2023-09-11 | End: 2023-09-11 | Stop reason: SDUPTHER

## 2023-09-11 RX ADMIN — CETIRIZINE HYDROCHLORIDE 10 MG: 10 TABLET, FILM COATED ORAL at 10:52

## 2023-09-11 RX ADMIN — SODIUM CHLORIDE 1 G: 1 TABLET ORAL at 12:12

## 2023-09-11 RX ADMIN — Medication 100 MG: at 10:52

## 2023-09-11 RX ADMIN — SODIUM CHLORIDE 1 G: 1 TABLET ORAL at 10:52

## 2023-09-11 RX ADMIN — LEVOTHYROXINE SODIUM 125 MCG: 0.03 TABLET ORAL at 06:46

## 2023-09-11 RX ADMIN — FOLIC ACID 1 MG: 1 TABLET ORAL at 10:52

## 2023-09-11 RX ADMIN — CYANOCOBALAMIN TAB 1000 MCG 1000 MCG: 1000 TAB at 10:52

## 2023-09-11 RX ADMIN — OXYCODONE HYDROCHLORIDE 10 MG: 5 TABLET ORAL at 06:46

## 2023-09-11 RX ADMIN — POTASSIUM CHLORIDE 20 MEQ: 1500 TABLET, EXTENDED RELEASE ORAL at 10:52

## 2023-09-11 RX ADMIN — PANTOPRAZOLE SODIUM 40 MG: 40 TABLET, DELAYED RELEASE ORAL at 06:46

## 2023-09-11 RX ADMIN — TORSEMIDE 10 MG: 10 TABLET ORAL at 10:52

## 2023-09-11 RX ADMIN — BUPROPION HYDROCHLORIDE 300 MG: 150 TABLET, EXTENDED RELEASE ORAL at 10:52

## 2023-09-11 RX ADMIN — OXYCODONE HYDROCHLORIDE 10 MG: 5 TABLET ORAL at 11:00

## 2023-09-11 RX ADMIN — MEGESTROL ACETATE 400 MG: 400 SUSPENSION ORAL at 10:52

## 2023-09-11 RX ADMIN — LIOTHYRONINE SODIUM 5 MCG: 5 TABLET ORAL at 10:52

## 2023-09-11 NOTE — DISCHARGE SUMMARY
[facility] Medicine Services   DISCHARGE SUMMARY    Patient Name: Jc Driscoll  : 1965  MRN: [id number]    Date of Admission: 2023  Date of Discharge:  23    Primary Care Physician: Reshma Alvarenga MD      Presenting Problem:   Leukocytosis [D72.829]  Anemia, unspecified type [D64.9]  Sepsis, due to unspecified organism, unspecified whether acute organ dysfunction present [A41.9]    Active and Resolved Hospital Problems:  Active Hospital Problems    Diagnosis POA    **Sepsis [A41.9] Yes    Leukocytosis [D72.829] Yes    Bandemia [D72.825] Yes    Anemia due to chemotherapy [D64.81, T45.1X5A] Yes    Cellulitis of chest wall [L03.313] Yes    NSCLC of left lung [C34.92] Yes    Cancer of lower lobe of right lung [C34.31] Yes    Non-small cell carcinoma of lung, right [C34.91] Yes    Gastroesophageal reflux disease [K21.9] Yes    Anxiety and depression [F41.9, F32.A] Yes    Hypertension [I10] Yes      Resolved Hospital Problems   No resolved problems to display.         Hospital Course     Hospital Course:  Jc Driscoll is a 58 y.o. 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  Oncology for his underlying malignancy. Patient complains about fatigue weakness and marked inability to perform daily activities associated with nausea and emesis with work-up showing a worsening anemia with hemoglobin 5.5 and severe leukocytosis 77,000 reason why he was admitted further management.  Also noted hyponatremia.  Oncology, nephrology,  ID, and pulmonology followed.  Further details as noted below.  Overall improved, no further inpatient work-up required.  Okay to discharge per all specialties.  Plan for outpatient follow-up for further work-up and treatment per oncology and nephrology.  Patient is medically stable to discharge home with follow-up with PCP, oncology, and nephrology as an outpatient.      A/P:    Leukocytosis with bandemia  Left chest wall cellulitis   -consider port infection/bacteremia  -left axilla with swelling and induration  -pt has had leukocytosis since March 2023. His previous admission July 2023 the patient had negative blood cultures. ID team suggested leukocytosis was secondary to steroids and underlying malignancy  -Cultures so far remain negative  -Antibiotics discontinued per ID and signed off  -CT chest findings noted, pulmonology consulted, recommending no further work-up and continue outpatient monitoring     Anemia  -no hematochezia or hematemesis   -hgb 7.2 in the ED, 1 unit transfused  -anemia secondary to metastatic disease/chemotherapy  -Monitor hemoglobin, hematology following     Metastatic lung CA s/p left thoracotomy and right lower lobectomy  -followed by Dr. Gaspar on tepotinib  -CT findings noted, concern for pneumonia  -Oncology recommending further treatment as outpatient     Hyponatremia, likely SIADH  -pt did have hyponatremia secondary to volume depletion and underlying SIADH per nephrology back in July 2023. He was treated with IVFs during that admission  -Sodium stable, discussed with nephrology, continue sodium tabs and torsemide on discharge and plan to repeat sodium in 1 week as outpatient     Hypothyroidism  -cont home synthroid, cytomel     Anorexia   -on megace        DISCHARGE Follow Up Recommendations for labs and diagnostics:     Follow-up with PCP, oncology, nephrology  Repeat CBC and sodium check within 1 week    Reasons For Change In Medications and Indications for New Medications:  As  noted below    Day of Discharge     Vital Signs:  Temp:  [97.2 °F (36.2 °C)-97.9 °F (36.6 °C)] 97.7 °F (36.5 °C)  Heart Rate:  [110-115] 114  Resp:  [21-25] 24  BP: ()/(63-66) 100/65    Physical Exam:  General: Awake, alert, NAD  Eyes: PERRL, EOMI, conjunctivae are clear  Cardiovascular: Regular rate and rhythm, no murmurs  Respiratory: Left-sided rhonchi noted, no wheezing or rales, unlabored breathing  Abdomen: Soft, nontender, positive bowel sounds, no guarding  Neurologic: A&O, CN grossly intact, moves all extremities spontaneously  Musculoskeletal: Normal range of motion, no other gross deformities  Skin: Warm, dry, intact        Pertinent  and/or Most Recent Results     LAB RESULTS:      Lab 09/11/23  0546 09/10/23  0409 09/09/23  1116 09/08/23  2246 09/08/23  1005 09/07/23  0737 09/07/23  0041 09/06/23  1738 09/06/23  1729 09/06/23  1435 09/06/23  1435   WBC 60.10* 57.20* 70.90* 63.20* 73.30*   < > 44.80*  --  51.20*  --  77.96*   HEMOGLOBIN 7.1* 7.7* 7.9* 8.4* 7.1*   < > 6.1*  --  7.2*  --  5.5*   HEMATOCRIT 22.6* 24.2* 25.5* 26.6* 23.2*   < > 19.6*  --  23.2*  --  17.0*   PLATELETS 277 318 374 383 442   < > 335  --  403  --  451*   NEUTROS ABS 55.29* 54.34* 66.65* 58.78* 66.70*   < > 41.66*  --  49.15*   < > 73.81*   LYMPHS ABS  --   --   --   --   --   --   --   --   --   --  1.09   MONOS ABS  --   --   --   --   --   --   --   --   --   --  2.90*   EOS ABS  --   --   --   --   --   --  0.90*  --   --   --  0.14   MCV 90.5 88.2 89.0 89.0 88.1   < > 87.9  --  87.9  --  89.9   PROCALCITONIN  --   --   --   --   --   --   --   --  0.60*  --   --    LACTATE  --   --   --   --   --   --  0.9 1.4  --   --   --     < > = values in this interval not displayed.         Lab 09/11/23  0546 09/10/23  1504 09/10/23  0409 09/09/23  1116 09/08/23  2246 09/08/23  1005   SODIUM 126*  --  123* 126* 125* 126*   POTASSIUM 4.1 4.0 3.4* 4.1 4.2 4.3   CHLORIDE 92*  --  89* 92* 92* 91*   CO2 22.0  --  21.0* 21.0* 19.0*  21.0*   ANION GAP 12.0  --  13.0 13.0 14.0 14.0   BUN 17  --  9 10 9 8   CREATININE 0.64*  --  0.60* 0.67* 0.63* 0.61*   EGFR 109.1  --  111.2 107.6 109.6 110.6   GLUCOSE 102*  --  98 97 81 89   CALCIUM 9.3  --  8.9 8.9 9.1 9.3         Lab 09/08/23  1005 09/06/23  1729 09/06/23  1435   TOTAL PROTEIN 6.2 5.9* 6.7   ALBUMIN 2.6* 2.6* 2.5*   GLOBULIN 3.6 3.3 4.2   ALT (SGPT) 14 17 18   AST (SGOT) 16 24 25   BILIRUBIN 0.7 0.6 0.7   ALK PHOS 261* 270* 252*                 Lab 09/06/23  1729   ABO TYPING A   RH TYPING Positive   ANTIBODY SCREEN Negative         Brief Urine Lab Results  (Last result in the past 365 days)        Color   Clarity   Blood   Leuk Est   Nitrite   Protein   CREAT   Urine HCG        09/06/23 2320 Yellow   Clear   Negative   Negative   Negative   Trace                 Microbiology Results (last 10 days)       Procedure Component Value - Date/Time    MRSA Screen, PCR (Inpatient) - Swab, Nares [[id number]]  (Normal) Collected: 09/06/23 2325    Lab Status: Final result Specimen: Swab from Nares Updated: 09/07/23 0121     MRSA PCR No MRSA Detected    Narrative:      The negative predictive value of this diagnostic test is high and should only be used to consider de-escalating anti-MRSA therapy. A positive result may indicate colonization with MRSA and must be correlated clinically.    Blood Culture - Blood, Arm, Left [[id number]]  (Normal) Collected: 09/06/23 1756    Lab Status: Preliminary result Specimen: Blood from Arm, Left Updated: 09/10/23 1815     Blood Culture No growth at 4 days    Blood Culture - Blood, Arm, Right [[id number]]  (Normal) Collected: 09/06/23 1756    Lab Status: Preliminary result Specimen: Blood from Arm, Right Updated: 09/10/23 1815     Blood Culture No growth at 4 days    Narrative:      Less than seven (7) mL's of blood was collected.  Insufficient quantity may yield false negative results.    Blood Culture - Blood, Arm, Right [[id number]]  (Normal) Collected: 09/06/23 1430     Lab Status: Preliminary result Specimen: Blood from Arm, Right Updated: 09/10/23 1445     Blood Culture No growth at 4 days    Narrative:      Less than seven (7) mL's of blood was collected.  Insufficient quantity may yield false negative results.            CT Chest With Contrast Diagnostic    Result Date: 9/6/2023  Impression: Since 8/9/2023 interval increase in size of the patient's known left anterior chest wall mass now measuring approximately 8.3 x 6.2 cm. There are stable destructive changes of the anterior left second third and fourth ribs. Consolidative changes in the left lung apex similar to that seen on prior examination which could represent partial collapse versus a underlying infiltrate and correlation for pneumonia is recommended. Small left pleural effusion. Coronary artery calcifications. Dilatation of the main pulmonary artery which can be seen with pulmonary hypertension. Electronically Signed: Bud Russell MD  9/6/2023 10:21 PM EDT  Workstation ID: [id number]    CT Abdomen Pelvis With Contrast    Result Date: 8/30/2023  Impression: Impression: 1.No acute abnormality identified within the abdomen or pelvis. 2.Several tiny liver hypodensities are too small to fully characterize and appear grossly similar since the previous study. 3.Large amount of stool within the colon. 4.Trace bilateral pleural effusions with bilateral lung base atelectasis noted. Electronically Signed: Odell Woody MD  8/30/2023 4:05 PM EDT  Workstation ID: [id number]     Results for orders placed during the hospital encounter of 12/05/22    Duplex Venous Lower Extremity - Bilateral CAR    Interpretation Summary    Normal bilateral lower extremity venous duplex scan.      Results for orders placed during the hospital encounter of 12/05/22    Duplex Venous Lower Extremity - Bilateral CAR    Interpretation Summary    Normal bilateral lower extremity venous duplex scan.      Results for orders placed during the hospital  encounter of 06/29/23    Adult Transthoracic Echo Complete W/ Cont if Necessary Per Protocol    Interpretation Summary    Left ventricular systolic function is normal. Left ventricular ejection fraction appears to be 51 - 55%.    Left ventricular diastolic function is consistent with (grade I) impaired relaxation.    The right ventricular cavity is mild to moderately dilated.    The left atrial cavity is mildly dilated.    The right atrial cavity is mild to moderately  dilated.    Estimated right ventricular systolic pressure from tricuspid regurgitation is moderately elevated (45-55 mmHg).    Mild to moderate pulmonary hypertension is present.      Labs Pending at Discharge:  Pending Labs       Order Current Status    Blood Culture - Blood, Arm, Left Preliminary result    Blood Culture - Blood, Arm, Right Preliminary result            Procedures Performed           Consults:   Consults       Date and Time Order Name Status Description    9/10/2023  8:49 AM Inpatient Pulmonology Consult Completed     9/10/2023  8:48 AM Inpatient Nephrology Consult Completed     9/7/2023 10:09 AM Hematology & Oncology Inpatient Consult Completed     9/6/2023 10:31 PM Inpatient Infectious Diseases Consult Completed     9/6/2023  7:12 PM Hospitalist (on-call MD unless specified)                Discharge Details        Discharge Medications        New Medications        Instructions Start Date   sodium chloride 1 g tablet   1 g, Oral, 4 Times Daily With Meals & Nightly      torsemide 10 MG tablet  Commonly known as: DEMADEX   10 mg, Oral, Daily             Continue These Medications        Instructions Start Date   buPROPion  MG 24 hr tablet  Commonly known as: WELLBUTRIN XL   300 mg, Oral, Daily      desvenlafaxine 50 MG 24 hr tablet  Commonly known as: PRISTIQ   50 mg, Oral, Daily      folic acid 1 MG tablet  Commonly known as: FOLVITE   1 mg, Oral, Daily      gabapentin 300 MG capsule  Commonly known as: NEURONTIN   300 mg,  Oral, Nightly      lactulose 10 GM/15ML solution  Commonly known as: CHRONULAC   20 g, Oral, 2 Times Daily PRN      levothyroxine 125 MCG tablet  Commonly known as: SYNTHROID, LEVOTHROID   125 mcg, Oral, Every Early Morning      liothyronine 5 MCG tablet  Commonly known as: CYTOMEL   5 mcg, Oral, Daily      loratadine 10 MG tablet  Commonly known as: CLARITIN   10 mg, Oral, Daily      megestrol acetate 400 MG/10ML suspension oral suspension  Commonly known as: MEGACE   400 mg, Oral, Daily      ondansetron 8 MG tablet  Commonly known as: ZOFRAN   8 mg, Oral, 3 Times Daily PRN      oxyCODONE-acetaminophen  MG per tablet  Commonly known as: PERCOCET   1 tablet, Oral, Every 4 Hours PRN      pantoprazole 40 MG EC tablet  Commonly known as: PROTONIX   40 mg, Oral, Daily      potassium chloride 20 MEQ CR tablet  Commonly known as: K-DUR,KLOR-CON   20 mEq, Oral, Daily      vitamin B-12 1000 MCG tablet  Commonly known as: CYANOCOBALAMIN   1,000 mcg, Oral, Daily      vitamin B-6 100 MG tablet  Commonly known as: PYRIDOXINE   100 mg, Oral, Every 12 Hours             Stop These Medications      levoFLOXacin 500 MG tablet  Commonly known as: Levaquin              No Known Allergies      Discharge Disposition:  Home or Self Care    Diet:  Hospital:  Diet Order   Procedures    Diet: Regular/House Diet, Fluid Restriction (240 mL/tray) Diets; 1500 mL/day; Texture: Regular Texture (IDDSI 7); Fluid Consistency: Thin (IDDSI 0)         Discharge Activity:         CODE STATUS:  Code Status and Medical Interventions:   Ordered at: 09/06/23 2039     Level Of Support Discussed With:    Patient     Code Status (Patient has no pulse and is not breathing):    CPR (Attempt to Resuscitate)     Medical Interventions (Patient has pulse or is breathing):    Full Support         No future appointments.        Time spent on Discharge including face to face service:  35 minutes    Signature:    Electronically signed by Ezequiel Mccollum DO  09/11/23, 11:15 AM EDT.      Part of this note may be an electronic transcription/translation of spoken language to printed text using the Dragon Dictation System.

## 2023-09-11 NOTE — CONSULTS
Palliative Care Consultation    Patient Name: Jc Driscoll  : 1965  MRN: [id number]  Allergies: Patient has no known allergies.    Requesting clinician:  Chun  Reason for consult: Consultation for clarification of goals of care and code status.      Patient Code Status:   Code Status and Medical Interventions:   Ordered at: 23     Level Of Support Discussed With:    Patient     Code Status (Patient has no pulse and is not breathing):    CPR (Attempt to Resuscitate)     Medical Interventions (Patient has pulse or is breathing):    Full Support           Chief Complaint:    Anemia    History of Present Illness    Jc Driscoll is a 58 y.o. male who presented to [facility] ED on  from [facility] with reports of anemia. Patient found to have Hgb of 5.5 at his visit today and was directed to the ED. HE reported generalized weakness and pain at his port site. He denied nausea, vomiting, diarrhea, or shortness of breath.     Of note: Patient has lung cancer and follows with Dr. Gaspar at [facility] for treatment.     In ED: Glucose 87, Creatinine 0.76, K 3.4, Alk phos 270, Protein 5.9, Albumin 2.6, WBC 51.2, Hgb 7.2, Platelets 403    Cultures obtained. Patient started on IV antibiotics. ID consulted due to concern for sepsis. Attributed to progressing malignancy.     Nephrology consulted for hyponatremia.     Palliative consult for goals of care discussion in an acutely ill patient with cancer.    VITAL SIGNS:   Temp:  [97 °F (36.1 °C)-97.9 °F (36.6 °C)] 97 °F (36.1 °C)  Heart Rate:  [110-115] 114  Resp:  [21-29] 29  BP: ()/(63-67) 103/67       PMH: Anxiety, CHF, Lung Ca, HTN, Anemia    Past Surgical History:   Procedure Laterality Date    BRONCHOSCOPY  2019    EBUS MONTERO NEEDLE BX    COLONOSCOPY      DUPUYTREN CONTRACTURE RELEASE Bilateral     LUNG BIOPSY  2019    CT GUIDED    LUNG REMOVAL, PARTIAL Right     right lower    PORTACATH PLACEMENT  2019    DR LONGORIA     THORACOSCOPY Right 2019    Procedure: RIGHT VATS, OPEN LOWER LOBECTOMY WITH LYMPH NODE DISECTION, WEDGE RESECTION OF RIGHT MIDDLE LOBE;  Surgeon: Terrance Fuchs MD;  Location: [facility] MAIN OR;  Service: Cardiothoracic    THORACOSCOPY VIDEO ASSISTED WITH LOBECTOMY Left 2022    Procedure: THORACOSCOPY VIDEO ASSISTED WITH LOBECTOMY;  Surgeon: Miryam Reyna MD;  Location: [facility] MAIN OR;  Service: Thoracic;  Laterality: Left;       Family History   Problem Relation Age of Onset    Cancer Mother         cervical cancer    Cervical cancer Mother     Cancer Father         lung cancer    Lung cancer Father     Lung cancer Sister     Cancer Sister         lung cancer    Malig Hyperthermia Neg Hx        Social History     Tobacco Use    Smoking status: Former     Types: Cigarettes     Quit date: 2009     Years since quittin.0    Smokeless tobacco: Never   Vaping Use    Vaping Use: Never used   Substance Use Topics    Alcohol use: Yes     Alcohol/week: 2.0 standard drinks     Types: 2 Cans of beer per week     Comment: Occasional    Drug use: No           LABS:    Results from last 7 days   Lab Units 23  0546   WBC 10*3/mm3 60.10*   HEMOGLOBIN g/dL 7.1*   HEMATOCRIT % 22.6*   PLATELETS 10*3/mm3 277     Results from last 7 days   Lab Units 23  0546   SODIUM mmol/L 126*   POTASSIUM mmol/L 4.1   CHLORIDE mmol/L 92*   CO2 mmol/L 22.0   BUN mg/dL 17   CREATININE mg/dL 0.64*   GLUCOSE mg/dL 102*   CALCIUM mg/dL 9.3     Results from last 7 days   Lab Units 23  0546 23  2246 23  1005   SODIUM mmol/L 126*   < > 126*   POTASSIUM mmol/L 4.1   < > 4.3   CHLORIDE mmol/L 92*   < > 91*   CO2 mmol/L 22.0   < > 21.0*   BUN mg/dL 17   < > 8   CREATININE mg/dL 0.64*   < > 0.61*   CALCIUM mg/dL 9.3   < > 9.3   BILIRUBIN mg/dL  --   --  0.7   ALK PHOS U/L  --   --  261*   ALT (SGPT) U/L  --   --  14   AST (SGOT) U/L  --   --  16   GLUCOSE mg/dL 102*   < > 89    < > = values in this interval not  displayed.         IMAGING STUDIES:  No radiology results for the last day      I reviewed the patient's new clinical results including labs, imaging, and vitals.        Scheduled Meds:  buPROPion XL, 300 mg, Oral, Daily  cetirizine, 10 mg, Oral, Daily  folic acid, 1 mg, Oral, Daily  gabapentin, 300 mg, Oral, Nightly  levothyroxine, 125 mcg, Oral, Q AM  liothyronine, 5 mcg, Oral, Daily  megestrol acetate, 400 mg, Oral, Daily  pantoprazole, 40 mg, Oral, QAM AC  potassium chloride, 20 mEq, Oral, Daily  senna-docusate sodium, 2 tablet, Oral, BID  sodium chloride, 10 mL, Intravenous, Q12H  sodium chloride, 1 g, Oral, TID With Meals  torsemide, 10 mg, Oral, Daily  vitamin B-12, 1,000 mcg, Oral, Daily  vitamin B-6, 100 mg, Oral, Q12H      Continuous Infusions:       I have reviewed patient's current medication list.     Review of Systems   Constitutional:  Positive for fatigue.   Neurological:  Positive for weakness.   All other systems reviewed and are negative.      Physical Exam  Vitals and nursing note reviewed.   HENT:      Head: Normocephalic and atraumatic.      Nose: Nose normal.      Mouth/Throat:      Mouth: Mucous membranes are dry.      Pharynx: Oropharynx is clear.   Eyes:      Extraocular Movements: Extraocular movements intact.      Conjunctiva/sclera: Conjunctivae normal.      Pupils: Pupils are equal, round, and reactive to light.   Cardiovascular:      Rate and Rhythm: Normal rate.      Pulses: Normal pulses.   Pulmonary:      Effort: Pulmonary effort is normal.   Abdominal:      General: Abdomen is flat.   Musculoskeletal:         General: Normal range of motion.      Cervical back: Normal range of motion.   Skin:     General: Skin is dry.      Coloration: Skin is pale.   Neurological:      General: No focal deficit present.      Mental Status: He is alert and oriented to person, place, and time. Mental status is at baseline.           PROBLEM LIST:    Sepsis    Cancer of lower lobe of right lung     Anxiety and depression    Gastroesophageal reflux disease    Hypertension    Non-small cell carcinoma of lung, right    NSCLC of left lung    Leukocytosis    Bandemia    Anemia due to chemotherapy    Cellulitis of chest wall          ASSESSMENT/PLAN:    Sepsis: with elevated WBC. Cultures obtained. ID consulted. Started on IV antibiotics. Thought to be reactive leukocytosis due to malignancy.     Anemia: [facility]. Started on transfusion protocols.     Lung Ca: Follows with Dr. Gaspar in outpatient. On Tepotinib.     Hyponatremia: Nephrology consulted. Started on sodium tablets.     Hypothyroidism/Anorexia/Anxiety/CHF: chronic conditions per primary.     These illnesses and their management contribute to the need for a palliative consult and advanced care planning.      ADVANCED CARE PLANNIN/11 Discharge summary in place. Patient being discharged. Met with him at bedside this afternoon. He tells me that he is feeling much better and his current goal is to try to continue with additional chemotherapy treatment. He wants to remain a full code with full intervention and his wife would be his decision maker if he was unable to.       Advanced Directives: Patient has advance directive, copy requested  Health Care Directive on file: No  Health Care Surrogate:      Palliative Performance Scale Score:    Comments:           Decisional Capacity: yes  Patient's understanding of illness: adequate  Patient goals of care:  Full code, full interventio      Thank you for this consult and allowing us to participate in patient's plan of care. Palliative Care Team will continue to follow patient.       I spent 52 minutes reviewing providers documentation, medication records, assessing and examining patient, discussing with family, answering questions, providing guidance about a plan of care, and coordinating care with other healthcare members. More than 50% of time spent face to face discussing disease  education, current clinical status, and medication management.       Pati Malone, APRN  9/11/2023

## 2023-09-11 NOTE — PROGRESS NOTES
"PULMONARY CRITICAL CARE PROGRESS  NOTE      PATIENT IDENTIFICATION:  Name: Jc Driscoll  MRN: [id number]  :  1965     Age: 58 y.o.  Sex: male    DATE OF Note:  2023   Referring Physician: Reshma Kingsley DO                  Subjective:   Feeling better, on room air, no SOB, no chest or abdominal pain, no bowel or bladder issues reported    Objective:  tMax 24 hrs: Temp (24hrs), Av.5 °F (36.4 °C), Min:97 °F (36.1 °C), Max:97.9 °F (36.6 °C)      Vitals Ranges:   Temp:  [97 °F (36.1 °C)-97.9 °F (36.6 °C)] 97 °F (36.1 °C)  Heart Rate:  [110-115] 114  Resp:  [21-29] 29  BP: ()/(63-67) 103/67    Intake and Output Last 3 Shifts:   I/O last 3 completed shifts:  In: 720 [P.O.:720]  Out: 1500 [Urine:1500]    Exam:  /67 (BP Location: Left arm, Patient Position: Lying)   Pulse 114   Temp 97 °F (36.1 °C) (Oral)   Resp (!) 29   Ht 190.5 cm (75\")   Wt 66.7 kg (147 lb)   SpO2 100%   BMI 18.37 kg/m²     General Appearance:   Alert  HEENT:  Normocephalic, without obvious abnormality. Conjunctivae/corneas clear.  Normal external ear canals. Nares normal, no drainage     Neck:  Supple, symmetrical, trachea midline. No JVD.  Lungs /Chest wall:   Bilateral basal rhonchi, respirations unlabored, symmetrical wall movement.     Heart:  Regular rate and rhythm, systolic murmur PMI left sternal border  Abdomen: Soft, nontender, no masses, no organomegaly.    Extremities: Trace edema, no clubbing or cyanosis        Medications:  buPROPion XL, 300 mg, Oral, Daily  cetirizine, 10 mg, Oral, Daily  folic acid, 1 mg, Oral, Daily  gabapentin, 300 mg, Oral, Nightly  levothyroxine, 125 mcg, Oral, Q AM  liothyronine, 5 mcg, Oral, Daily  megestrol acetate, 400 mg, Oral, Daily  pantoprazole, 40 mg, Oral, QAM AC  potassium chloride, 20 mEq, Oral, Daily  senna-docusate sodium, 2 tablet, Oral, BID  sodium chloride, 10 mL, Intravenous, Q12H  sodium chloride, 1 g, Oral, TID With Meals  torsemide, 10 mg, Oral, " Daily  vitamin B-12, 1,000 mcg, Oral, Daily  vitamin B-6, 100 mg, Oral, Q12H        Infusion:        PRN:    acetaminophen **OR** acetaminophen **OR** acetaminophen    ALPRAZolam    aluminum-magnesium hydroxide-simethicone    senna-docusate sodium **AND** polyethylene glycol **AND** bisacodyl **AND** bisacodyl    Calcium Replacement - Follow Nurse / BPA Driven Protocol    lactulose    Magnesium Standard Dose Replacement - Follow Nurse / BPA Driven Protocol    melatonin    [DISCONTINUED] ondansetron **OR** ondansetron    ondansetron    oxyCODONE    Phosphorus Replacement - Follow Nurse / BPA Driven Protocol    Potassium Replacement - Follow Nurse / BPA Driven Protocol    sodium chloride    sodium chloride    sodium chloride  Data Review:  All labs (24hrs):   Recent Results (from the past 24 hour(s))   Potassium    Collection Time: 09/10/23  3:04 PM    Specimen: Blood   Result Value Ref Range    Potassium 4.0 3.5 - 5.2 mmol/L   Basic Metabolic Panel    Collection Time: 09/11/23  5:46 AM    Specimen: Blood   Result Value Ref Range    Glucose 102 (H) 65 - 99 mg/dL    BUN 17 6 - 20 mg/dL    Creatinine 0.64 (L) 0.76 - 1.27 mg/dL    Sodium 126 (L) 136 - 145 mmol/L    Potassium 4.1 3.5 - 5.2 mmol/L    Chloride 92 (L) 98 - 107 mmol/L    CO2 22.0 22.0 - 29.0 mmol/L    Calcium 9.3 8.6 - 10.5 mg/dL    BUN/Creatinine Ratio 26.6 (H) 7.0 - 25.0    Anion Gap 12.0 5.0 - 15.0 mmol/L    eGFR 109.1 >60.0 mL/min/1.73   CBC Auto Differential    Collection Time: 09/11/23  5:46 AM    Specimen: Blood   Result Value Ref Range    WBC 60.10 (C) 3.40 - 10.80 10*3/mm3    RBC 2.50 (L) 4.14 - 5.80 10*6/mm3    Hemoglobin 7.1 (L) 13.0 - 17.7 g/dL    Hematocrit 22.6 (L) 37.5 - 51.0 %    MCV 90.5 79.0 - 97.0 fL    MCH 28.5 26.6 - 33.0 pg    MCHC 31.5 31.5 - 35.7 g/dL    RDW 18.5 (H) 12.3 - 15.4 %    RDW-SD 59.5 (H) 37.0 - 54.0 fl    MPV 7.8 6.0 - 12.0 fL    Platelets 277 140 - 450 10*3/mm3   Scan Slide    Collection Time: 09/11/23  5:46 AM     Specimen: Blood   Result Value Ref Range    Scan Slide     Manual Differential    Collection Time: 09/11/23  5:46 AM    Specimen: Blood   Result Value Ref Range    Neutrophil % 86.0 (H) 42.7 - 76.0 %    Lymphocyte % 1.0 (L) 19.6 - 45.3 %    Monocyte % 5.0 5.0 - 12.0 %    Bands %  6.0 (H) 0.0 - 5.0 %    Metamyelocyte % 1.0 (H) 0.0 - 0.0 %    Atypical Lymphocyte % 1.0 0.0 - 5.0 %    Neutrophils Absolute 55.29 (H) 1.70 - 7.00 10*3/mm3    Lymphocytes Absolute 1.20 0.70 - 3.10 10*3/mm3    Monocytes Absolute 3.01 (H) 0.10 - 0.90 10*3/mm3    Anisocytosis Slight/1+ None Seen    Dacrocytes Slight/1+ None Seen    Poikilocytes Slight/1+ None Seen    RBC Fragments Slight/1+ None Seen    Toxic Granulation Slight/1+ None Seen    Vacuolated Neutrophils Slight/1+ None Seen    Platelet Morphology Normal Normal   Uric Acid    Collection Time: 09/11/23  5:46 AM    Specimen: Blood   Result Value Ref Range    Uric Acid 2.7 (L) 3.4 - 7.0 mg/dL        Imaging:  CT Chest With Contrast Diagnostic  Narrative: CT CHEST W CONTRAST DIAGNOSTIC    Date of Exam: 9/6/2023 9:40 PM EDT    Indication: Chest wall pain, nontraumatic, infection or inflammation suspected, xray done.    Comparison: CT scan of the chest dated 8/9/2023.    Technique: Axial CT images were obtained of the chest after the uneventful intravenous administration of 100 cc Isovue-370 iodinated contrast.  Sagittal and coronal reconstructions were performed.  Automated exposure control and iterative reconstruction   methods were used.    Findings:    There is a left-sided MediPort its tip in the superior vena cava.    There is again appreciated a left anterior chest wall mass which has increased in size since 8/9/2023 now measuring approximately 8.3 x 6.2 cm which had measured approximately 6.8 x 3.5 x 4.6 cm on prior exam. There is again appreciated destructive   changes involving the anterior left second third and fourth ribs similar to prior exam.    There are consolidative  changes of the left lung apex unchanged from prior exam. This may represent partial collapse versus infiltrate.    There is a small left pleural effusion unchanged.    There are postsurgical changes of the bilateral lungs.    The heart is not enlarged. There are coronary artery calcifications.    There is dilatation of the main pulmonary artery measuring 3.3 cm which can be seen with pulmonary hypertension.    There are multiple calcified lymph nodes in the hilum.    Limited evaluation of the upper abdomen demonstrates no gross abnormality.  Impression: Since 8/9/2023 interval increase in size of the patient's known left anterior chest wall mass now measuring approximately 8.3 x 6.2 cm.    There are stable destructive changes of the anterior left second third and fourth ribs.    Consolidative changes in the left lung apex similar to that seen on prior examination which could represent partial collapse versus a underlying infiltrate and correlation for pneumonia is recommended.    Small left pleural effusion.    Coronary artery calcifications.    Dilatation of the main pulmonary artery which can be seen with pulmonary hypertension.    Electronically Signed: Bud Russell MD    9/6/2023 10:21 PM EDT    Workstation ID: [id number]       ASSESSMENT:  Sepsis  Leukocytosis likely reactive to malignancy  Metastatic poorly differentiated adenocarcinoma of the lung with relapse s/p chemo, radiation and immunotherapy  Anemia  Hyponatremia  Hypothyroidism  Malnutrition    PLAN:  Out of bed daily  Increase use of I-S  Monitor off antibiotics  Bronchodilator  Inhaled corticosteroids  Incentive spirometer  Bowel regimen   Electrolytes/ glycemic control  No DVT and GI prophylaxis    Discussed with Dr. Jaime Shetty, MULUGETA   9/11/2023  13:06 EDT    I personally have examined  and interviewed the patient. I have reviewed the history, data, problems, assessment and plan with our NP.  Total Critical care time in direct  medical management (   ) minutes, This time specifically excludes time spent performing procedures.    Lorenzo Sandoval MD   9/11/2023  13:55 EDT

## 2023-09-11 NOTE — PROGRESS NOTES
[facility] Progress Note      Patient Name: Jc Driscoll  : 1965  MRN: [id number]  Primary Care Physician:  Reshma Alvarenga MD  Date of admission: 2023    Subjective     Interval History:   Patient was seen and examined this morning.  He was resting comfortably.  Denied any new complaint    Review of Systems:   As noted above    Objective     Vitals:   Temp:  [97.2 °F (36.2 °C)-97.9 °F (36.6 °C)] 97.7 °F (36.5 °C)  Heart Rate:  [110-115] 114  Resp:  [21-25] 24  BP: ()/(63-66) 100/65    Intake/Output Summary (Last 24 hours) at 2023 1014  Last data filed at 2023 0437  Gross per 24 hour   Intake 240 ml   Output 900 ml   Net -660 ml       Physical Exam:    General Appearance: NAD  HEENT: oral mucosa normal, nonicteric sclera  Neck: supple, no JVD  Lungs: CTA  Heart: RRR, normal S1 and S2  Abdomen: soft, nondistended  Extremities: no edema  Neuro: Awake alert and moving all extremities    Scheduled Meds:     buPROPion XL, 300 mg, Oral, Daily  cetirizine, 10 mg, Oral, Daily  desvenlafaxine, 50 mg, Oral, Daily  folic acid, 1 mg, Oral, Daily  gabapentin, 300 mg, Oral, Nightly  levothyroxine, 125 mcg, Oral, Q AM  liothyronine, 5 mcg, Oral, Daily  megestrol acetate, 400 mg, Oral, Daily  pantoprazole, 40 mg, Oral, QAM AC  potassium chloride, 20 mEq, Oral, Daily  senna-docusate sodium, 2 tablet, Oral, BID  sodium chloride, 10 mL, Intravenous, Q12H  sodium chloride, 1 g, Oral, TID With Meals  torsemide, 10 mg, Oral, Daily  vitamin B-12, 1,000 mcg, Oral, Daily  vitamin B-6, 100 mg, Oral, Q12H      IV Meds:        Results Reviewed:   I have personally reviewed the results from the time of this admission to 2023 10:14 EDT     Results from last 7 days   Lab Units 23  0546 09/10/23  1504 09/10/23  0409 23  1116 23  2246 23  1005 23  0041 23  1729 23  1435   SODIUM mmol/L 126*  --  123* 126*   < > 126*   < > 127* 127*    POTASSIUM mmol/L 4.1 4.0 3.4* 4.1   < > 4.3   < > 3.4* 3.4*   CHLORIDE mmol/L 92*  --  89* 92*   < > 91*   < > 89* 87*   CO2 mmol/L 22.0  --  21.0* 21.0*   < > 21.0*   < > 22.0 22.0   BUN mg/dL 17  --  9 10   < > 8   < > 10 9   CREATININE mg/dL 0.64*  --  0.60* 0.67*   < > 0.61*   < > 0.76 0.76   CALCIUM mg/dL 9.3  --  8.9 8.9   < > 9.3   < > 8.6 9.1   BILIRUBIN mg/dL  --   --   --   --   --  0.7  --  0.6 0.7   ALK PHOS U/L  --   --   --   --   --  261*  --  270* 252*   ALT (SGPT) U/L  --   --   --   --   --  14  --  17 18   AST (SGOT) U/L  --   --   --   --   --  16  --  24 25   GLUCOSE mg/dL 102*  --  98 97   < > 89   < > 87 88    < > = values in this interval not displayed.     Estimated Creatinine Clearance: 117.2 mL/min (A) (by C-G formula based on SCr of 0.64 mg/dL (L)).      Results from last 7 days   Lab Units 09/11/23  0546   URIC ACID mg/dL 2.7*     Results from last 7 days   Lab Units 09/11/23  0546 09/10/23  0409 09/09/23  1116 09/08/23  2246 09/08/23  1005   WBC 10*3/mm3 60.10* 57.20* 70.90* 63.20* 73.30*   HEMOGLOBIN g/dL 7.1* 7.7* 7.9* 8.4* 7.1*   PLATELETS 10*3/mm3 277 318 374 383 442           Assessment / Plan     ASSESSMENT:  1.hyponatremia.  Secondary to SIADH.  Clinically euvolemic.sodium little better at 126 this morning.  Relatively asymptomatic  2.relapsed adenocarcinoma of left lung.  3.  Severe leukocytosis.  Hematology following    PLAN:  Continue oral sodium chloride with torsemide  Repeat labs this afternoon and tomorrow morning.  I will consider low-dose Samsca if hyponatremia worsens  Discontinue desvenlafaxine    Richar Bro MD  09/11/23  10:14 EDT    [facility]  [phone]

## 2023-09-11 NOTE — PLAN OF CARE
Problem: Pain Acute  Goal: Acceptable Pain Control and Functional Ability  Intervention: Prevent or Manage Pain  Description: Evaluate pain level, effect of treatment and patient response at regular intervals.  Minimize painful stimuli; coordinate care and adjust environment (e.g., light, noise, unnecessary movement); promote sleep/rest.  Match pharmacologic analgesia to severity and type of pain mechanism (e.g., neuropathic, muscle, inflammatory); consider multimodal approach (e.g., nonopioid, opioid, adjuvant).  Provide medication at regular intervals; titrate to patient response; premedicate for painful procedures.  Manage breakthrough pain with additional doses; consider rotation or switching medication.  Monitor for signs of substance tolerance (increased dose to reach desired effect, decreased effect with same dose).  Manage medication-induced effects, such as constipation, nausea, pruritus, urinary retention, somnolence and dizziness.  Provide multimodal interventions, such as as physical activity, therapeutic exercise, yoga, TENS (transcutaneous electrical nerve stimulation) and manual therapy.  Train in functional activity modifications, such as body mechanics, posture, ergonomics, energy conservation and activity pacing.  Consider addition of complementary or alternative therapy, such as acupuncture, hypnosis or therapeutic touch.  Recent Flowsheet Documentation  Taken 9/10/2023 2000 by Valentin Delgado RN  Medication Review/Management:   medications reviewed   high-risk medications identified  Intervention: Develop Pain Management Plan  Description: Acknowledge patient as the expert in pain self-management.  Use a consistent, validated tool for pain assessment; include function and quality of life.  Evaluate risk for opioid use and dependence.  Set pain management goals; determine acceptable level of discomfort to allow for maximal functioning.  Determine [id number] pain management plan,  including both pharmacologic and nonpharmacologic measures; integrate management of chronic (persistent) pain.  Identify and integrate past successful treatment measures, if able.  Encourage patient and caregiver involvement in pain assessment, interventions and safety measures.  Re-evaluate plan regularly.  Recent Flowsheet Documentation  Taken 9/10/2023 2000 by Valentin Delgado RN  Pain Management Interventions:   see MAR   pain management plan reviewed with patient/caregiver  Intervention: Optimize Psychosocial Wellbeing  Description: Facilitate patient’s self-control over pain by providing pain information and allowing choices in treatment.  Consider and address emotional response to pain.  Explore and promote use of coping strategies; address barriers to successful coping.  Evaluate and assist with psychosocial, cultural and spiritual factors impacting pain.  Modify pain perception using techniques, such as distraction, mindfulness, guided imagery, meditation or music.  Assess for risk factors for developing chronic pain, such as depression, fear, pain avoidance and pain catastrophizing.  Consider referral for ongoing coping support, such as education, relaxation training and role of thoughts.  Recent Flowsheet Documentation  Taken 9/10/2023 2000 by Valentin Delgado RN  Diversional Activities: television     Problem: Adult Inpatient Plan of Care  Goal: Optimal Comfort and Wellbeing  Intervention: Monitor Pain and Promote Comfort  Description: Assess pain level, treatment efficacy and patient response at regular intervals using a consistent pain scale.  Consider the presence and impact of preexisting chronic pain.  Encourage patient and caregiver involvement in pain assessment, interventions and safety measures.  Recent Flowsheet Documentation  Taken 9/10/2023 2000 by Valentin Delgado RN  Pain Management Interventions:   see MAR   pain management plan reviewed with patient/caregiver  Intervention: Provide  Person-Centered Care  Description: Use a family-focused approach to care.  Develop trust and rapport by proactively providing information, encouraging questions, addressing concerns and offering reassurance.  Acknowledge emotional response to hospitalization.  Recognize and utilize personal coping strategies.  Honor spiritual and cultural preferences.  Recent Flowsheet Documentation  Taken 9/10/2023 2000 by Valentin Delgado RN  Trust Relationship/Rapport:   care explained   choices provided   emotional support provided   empathic listening provided   questions answered   questions encouraged   reassurance provided   thoughts/feelings acknowledged   Goal Outcome Evaluation:

## 2023-09-11 NOTE — PROGRESS NOTES
Hematology/Oncology Inpatient Progress Note    PATIENT NAME: Jc Driscoll  : 1965  MRN: [id number]    CHIEF COMPLAINT: Weakness    HISTORY OF PRESENT ILLNESS:      Jc Driscoll is a 58 y.o. male who presented to [facility] on 2023 with complaints of swelling and erythema to the left axilla, left chest wall, around the left chest wall port.  Past medical history significant for metastatic lung cancer.  He initially presented to his oncologist office the same day due to the above complaints along with progressive weakness, nausea and vomiting.  He was noted to have leukocytosis with a WBC of 77,000 and significant anemia with a hemoglobin of 5.5 g/dL.  For this reason he was asked to go to the emergency room for further evaluation and treatment.     Evaluation in the ED: Sodium 127, alkaline phosphatase 270, WBC 51.20, hemoglobin 7.2 g/dL, MCV 87.9, platelets 403,000, bands 15%, absolute lymphocytes 0.0.  Normal lactic acid.  Blood cultures drawn and pending.  Patient was initiated on IV antibiotics and admitted for further evaluation and treatment.     23  Hematology/Oncology was consulted on a patient known to us and established in the office with Dr. 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  The patient initially presented in  with moderately differentiated adenocarcinoma of the right lung for which he underwent concurrent chemotherapy and radiation neoadjuvantly followed by a right lower lobectomy.  This was followed by immunotherapy with durvalumab for 24 cycles ending in 2020.  In 2022, a routine surveillance CT of the chest revealed a left upper lobe pulmonary nodule for which a subsequent PET/CT was done that showed mild uptake corresponding to the nodule.  Subsequent CT-guided biopsy was attempted but aborted due to findings by the radiologist that the lung nodularity was actually a clump of tiny nodules  of which the largest was 6 mm and therefore biopsy could not be completed.  Also the area was in the vicinity of multiple blood vessels with increased risk of bleeding.  Follow-up CT of the chest was recommended for continued surveillance.  In May 2022, the patient underwent a CT-guided biopsy of the left enlarging nodule and pathology was positive for invasive poorly differentiated adenocarcinoma most consistent with an adenocarcinoma of pulmonary origin.  PET/CT showed a 2.3 cm hypermetabolic left upper lobe nodule and no convincing metastatic disease elsewhere.  Brain MRI was negative for intracranial metastasis.  On 7/6/2022 the patient underwent a left upper lobe lobectomy which was followed by adjuvant chemotherapy.  After completion of chemotherapy the patient was initiated on atezolizumab along with XRT.  Radiation was completed on 01/11/2023.  The patient was continued on Tecentriq every 3 weeks.  Due to his increasing pain in the left axilla and concern for progressive disease a PET/CT was performed on 6/5/2023, this revealed that the chest wall mass had increased in size since the previous study.  There was osseous destruction of the second rib.  Osseous metastatic changes to the left first and second rib.  Due to progressive disease Tecentriq was discontinued.  A plan to transition to adhere to was in place but was complicated due to multiple hospitalizations for pain and weakness.  On 7/10/2023 the patient was initiated on cycle 1 of Enhertu.  Due to shortness of breath the patient had a CT of the chest on 8/9/2023 which showed a significant increase in size of the left anterior chest wall mass with an increase in destruction of the adjacent ribs particularly the left third rib.  Due to progressive disease and hereto was discontinued.  His malignancy has MET amplification and therefore it was planned to begin therapy with tepotinib.          Patient with recurrent left lung cancer.  Progressed on  multiple lines of treatment.  Was recently placed on tepotinib for an MEK 2 mutation.  Patient has had progressive decline in clinical status.  He presented with nausea, failure to thrive decreased p.o. intake significant anxiety and constipation.  He was also found to have significant leukocytosis, erythematous left chest wall, slight swelling around the left Mediport but no redness or increase in warmth.  For this reason patient was admitted to the hospital to evaluate for implant infection, ID consultation was also requested     He/She  has a past medical history of Allergic rhinitis (07/25/2013), Anxiety and depression (06/05/2012), Chronic pansinusitis (04/08/2019), Diastolic CHF (07/09/2014), Dupuytren's contracture of both hands, Elevated cholesterol, Erosive esophagitis (07/09/2019), Gastroesophageal reflux disease (02/21/2019), Hiatal hernia (07/09/2019), History of colon polyps, Hypertension, Lung cancer, Mediastinal lymphadenopathy (03/12/2019), Normocytic anemia (08/08/2019), Prediabetes (07/09/2014), and Retention of urine (06/27/2019).     PCP: Reshma Alvarenga MD     Subjective     No significant changes in his symptoms      ROS:  Review of Systems   Constitutional:  Positive for fatigue. Negative for activity change, appetite change, chills, diaphoresis, fever and unexpected weight change.   HENT:  Negative for congestion, dental problem, drooling, ear discharge, ear pain, facial swelling, hearing loss, mouth sores, nosebleeds, postnasal drip, rhinorrhea, sinus pressure, sinus pain, sneezing, sore throat, tinnitus, trouble swallowing and voice change.    Eyes:  Negative for photophobia, pain, discharge, redness, itching and visual disturbance.   Respiratory:  Positive for shortness of breath. Negative for apnea, cough, choking, chest tightness, wheezing and stridor.    Cardiovascular:  Positive for chest pain. Negative for palpitations and leg swelling.   Gastrointestinal:  Negative for  abdominal distention, abdominal pain, anal bleeding, blood in stool, constipation, diarrhea, nausea, rectal pain and vomiting.   Endocrine: Negative for cold intolerance, heat intolerance, polydipsia and polyuria.   Genitourinary:  Negative for decreased urine volume, difficulty urinating, dysuria, flank pain, frequency, genital sores, hematuria and urgency.   Musculoskeletal:  Negative for arthralgias, back pain, gait problem, joint swelling, myalgias, neck pain and neck stiffness.   Skin:  Negative for color change, pallor and rash.   Neurological:  Positive for weakness. Negative for dizziness, tremors, seizures, syncope, facial asymmetry, speech difficulty, light-headedness, numbness and headaches.   Hematological:  Negative for adenopathy. Does not bruise/bleed easily.   Psychiatric/Behavioral:  Negative for agitation, behavioral problems, confusion, decreased concentration, hallucinations, self-injury, sleep disturbance and suicidal ideas. The patient is not nervous/anxious.       MEDICATIONS:    Scheduled Meds:  buPROPion XL, 300 mg, Oral, Daily  cetirizine, 10 mg, Oral, Daily  desvenlafaxine, 50 mg, Oral, Daily  folic acid, 1 mg, Oral, Daily  gabapentin, 300 mg, Oral, Nightly  levothyroxine, 125 mcg, Oral, Q AM  liothyronine, 5 mcg, Oral, Daily  megestrol acetate, 400 mg, Oral, Daily  pantoprazole, 40 mg, Oral, QAM AC  potassium chloride, 20 mEq, Oral, Daily  senna-docusate sodium, 2 tablet, Oral, BID  sodium chloride, 10 mL, Intravenous, Q12H  sodium chloride, 1 g, Oral, TID With Meals  torsemide, 10 mg, Oral, Daily  vitamin B-12, 1,000 mcg, Oral, Daily  vitamin B-6, 100 mg, Oral, Q12H       Continuous Infusions:        PRN Meds:    acetaminophen **OR** acetaminophen **OR** acetaminophen    ALPRAZolam    aluminum-magnesium hydroxide-simethicone    senna-docusate sodium **AND** polyethylene glycol **AND** bisacodyl **AND** bisacodyl    Calcium Replacement - Follow Nurse / BPA Driven Protocol    lactulose     "Magnesium Standard Dose Replacement - Follow Nurse / BPA Driven Protocol    melatonin    [DISCONTINUED] ondansetron **OR** ondansetron    ondansetron    oxyCODONE    Phosphorus Replacement - Follow Nurse / BPA Driven Protocol    Potassium Replacement - Follow Nurse / BPA Driven Protocol    sodium chloride    sodium chloride    sodium chloride     ALLERGIES:  No Known Allergies    Objective    VITALS:   /65 (BP Location: Right arm, Patient Position: Lying)   Pulse 114   Temp 97.7 °F (36.5 °C) (Oral)   Resp 24   Ht 190.5 cm (75\")   Wt 66.7 kg (147 lb)   SpO2 98%   BMI 18.37 kg/m²     PHYSICAL EXAM: (performed by MD)  Physical Exam  Vitals and nursing note reviewed.   Constitutional:       General: He is not in acute distress.     Appearance: He is not ill-appearing, toxic-appearing or diaphoretic.      Comments: Slender. Conversant and oriented. No jaundice. Appears ill.    HENT:      Head: Normocephalic and atraumatic.      Right Ear: External ear normal.      Left Ear: External ear normal.      Nose: Nose normal.      Mouth/Throat:      Mouth: Mucous membranes are moist.      Pharynx: Oropharynx is clear.   Eyes:      General: No scleral icterus.        Right eye: No discharge.         Left eye: No discharge.      Conjunctiva/sclera: Conjunctivae normal.      Pupils: Pupils are equal, round, and reactive to light.   Neck:      Thyroid: No thyromegaly.   Cardiovascular:      Rate and Rhythm: Normal rate and regular rhythm.      Pulses: Normal pulses.      Heart sounds: Normal heart sounds. No murmur heard.    No friction rub. No gallop.   Pulmonary:      Effort: Pulmonary effort is normal. No respiratory distress.      Breath sounds: No stridor. No wheezing, rhonchi or rales.      Comments: Breath sounds are diminished bilaterally to auscultation.   Chest:      Chest wall: No tenderness.   Abdominal:      General: Abdomen is flat. Bowel sounds are normal. There is no distension.      Palpations: Abdomen " is soft. There is no mass.      Tenderness: There is no abdominal tenderness. There is no right CVA tenderness, left CVA tenderness, guarding or rebound.   Musculoskeletal:         General: No swelling, tenderness, deformity or signs of injury. Normal range of motion.      Cervical back: Normal range of motion and neck supple. No rigidity.      Right lower leg: No edema.      Left lower leg: No edema.   Lymphadenopathy:      Cervical: No cervical adenopathy.   Skin:     General: Skin is warm and dry.      Coloration: Skin is not jaundiced.      Findings: No bruising, erythema or rash.   Neurological:      General: No focal deficit present.      Mental Status: He is alert and oriented to person, place, and time.      Motor: No abnormal muscle tone.      Gait: Gait normal.   Psychiatric:         Mood and Affect: Mood normal.         Behavior: Behavior normal.         Thought Content: Thought content normal.         Judgment: Judgment normal.     I have reexamined the patient and the results are consistent with the previously documented exam. Azucena Gaspar MD        RECENT LABS:      Lab Results (last 24 hours)       Procedure Component Value Units Date/Time    Manual Differential [[id number]]  (Abnormal) Collected: 09/11/23 0546    Specimen: Blood Updated: 09/11/23 0746     Neutrophil % 86.0 %      Lymphocyte % 1.0 %      Monocyte % 5.0 %      Bands %  6.0 %      Metamyelocyte % 1.0 %      Atypical Lymphocyte % 1.0 %      Neutrophils Absolute 55.29 10*3/mm3      Lymphocytes Absolute 1.20 10*3/mm3      Monocytes Absolute 3.01 10*3/mm3      Anisocytosis Slight/1+     Dacrocytes Slight/1+     Poikilocytes Slight/1+     RBC Fragments Slight/1+     Toxic Granulation Slight/1+     Vacuolated Neutrophils Slight/1+     Platelet Morphology Normal    CBC & Differential [[id number]]  (Abnormal) Collected: 09/11/23 0546    Specimen: Blood Updated: 09/11/23 0746    Narrative:      The following orders were created for panel  order CBC & Differential.  Procedure                               Abnormality         Status                     ---------                               -----------         ------                     CBC Auto Differential[[id number]]        Abnormal            Final result               Scan Slide[[id number]]                                       Final result                 Please view results for these tests on the individual orders.    Scan Slide [[id number]] Collected: 09/11/23 0546    Specimen: Blood Updated: 09/11/23 0746     Scan Slide --     Comment: See Manual Differential Results       CBC Auto Differential [[id number]]  (Abnormal) Collected: 09/11/23 0546    Specimen: Blood Updated: 09/11/23 0746     WBC 60.10 10*3/mm3      RBC 2.50 10*6/mm3      Hemoglobin 7.1 g/dL      Hematocrit 22.6 %      MCV 90.5 fL      MCH 28.5 pg      MCHC 31.5 g/dL      RDW 18.5 %      RDW-SD 59.5 fl      MPV 7.8 fL      Platelets 277 10*3/mm3     Narrative:      The previously reported component NRBC is no longer being reported. Previous result was 0.0 /100 WBC (Reference Range: 0.0-0.2 /100 WBC) on 9/11/2023 at 0636 EDT.    Basic Metabolic Panel [[id number]]  (Abnormal) Collected: 09/11/23 0546    Specimen: Blood Updated: 09/11/23 0649     Glucose 102 mg/dL      BUN 17 mg/dL      Creatinine 0.64 mg/dL      Sodium 126 mmol/L      Potassium 4.1 mmol/L      Chloride 92 mmol/L      CO2 22.0 mmol/L      Calcium 9.3 mg/dL      BUN/Creatinine Ratio 26.6     Anion Gap 12.0 mmol/L      eGFR 109.1 mL/min/1.73     Narrative:      GFR Normal >60  Chronic Kidney Disease <60  Kidney Failure <15      Blood Culture - Blood, Arm, Left [[id number]]  (Normal) Collected: 09/06/23 1756    Specimen: Blood from Arm, Left Updated: 09/10/23 1815     Blood Culture No growth at 4 days    Blood Culture - Blood, Arm, Right [[id number]]  (Normal) Collected: 09/06/23 1756    Specimen: Blood from Arm, Right Updated: 09/10/23 1815     Blood Culture No growth at 4  days    Narrative:      Less than seven (7) mL's of blood was collected.  Insufficient quantity may yield false negative results.    Potassium [[id number]]  (Normal) Collected: 09/10/23 1504    Specimen: Blood Updated: 09/10/23 1535     Potassium 4.0 mmol/L           IMAGING REVIEWED:  No radiology results for the last day    Assessment & Plan   ASSESSMENT:  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  Upon recurrence patient underwent radiation therapy and was on Tecentriq.  Recently progressed through Enhertu.  Plan for initiating tepotinib due to MET amplification once clinically improved.  Patient encouraged to restart medication  Leukocytosis with bandemia: Did not increase further. Likely a reactive phenomenon.  ID has evaluated.  No antibiotics at this time  Anemia: Persists. No transfusion today.   Hyponatremia: Persistent in spite of treatment.     PLAN  Daily CBCs  Begin tepotinib  Discussed with patient      Electronically signed by Azucena Gaspar MD, 09/17/23, 4:08 PM EDT.

## 2023-09-12 ENCOUNTER — TELEPHONE (OUTPATIENT)
Dept: ONCOLOGY | Facility: CLINIC | Age: 58
End: 2023-09-12
Payer: COMMERCIAL

## 2023-09-12 DIAGNOSIS — C34.91 NON-SMALL CELL CARCINOMA OF LUNG, RIGHT: Primary | ICD-10-CM

## 2023-09-12 NOTE — OUTREACH NOTE
Prep Survey      Flowsheet Row Responses   [facility] facility patient discharged from? [facility]   Is LACE score < 7 ? No   Eligibility Readm Mgmt   Discharge diagnosis sepsis   Does the patient have one of the following disease processes/diagnoses(primary or secondary)? Sepsis   Does the patient have Home health ordered? No   Is there a DME ordered? No   Comments regarding appointments call for apmts   Prep survey completed? Yes            Jocelyn HART - Registered Nurse

## 2023-09-12 NOTE — TELEPHONE ENCOUNTER
Received message that pt's wife had called reporting that the pt was discharged from the hospital, but is very weak and she feels like he should have gone to a facility. I spoke to Dr. Gaspar. She advised that the pt be brought in for weekly labs and follow-ups and wanted to make sure that he has restarted his oral chemo. I called the pt's wife back. She stated that he had not restarted his oral chemo and that she would make sure he restarts today. Follow-up appt scheduled. No further questions or needs at this time.

## 2023-09-12 NOTE — PROGRESS NOTES
Hematology/Oncology Outpatient Follow Up    PATIENT NAME:Jc Driscoll  :1965  MRN: [id number]  PRIMARY CARE PHYSICIAN: Reshma Alvarenga MD  REFERRING PHYSICIAN: No ref. provider found    Chief Complaint   Patient presents with    Follow-up     Hospital follow up        HISTORY OF PRESENT ILLNESS:                                                                                                                                                                                                                                                             This is a 58 y.o. who developed left shoulder pain and for that reason he had a chest x-ray done on 19 which showed a 2.7 cm noncalcified right lower lobe nodule was identified.  For that reason a CT scan of the chest was recommended.  Review of his records shows patient had the CT scan on 19 done without contrast.  This basically showed a lobulated noncalcified mass in the posterior aspect of the right lower lobe measuring 3 cm close to the pleural surface.  There is a calcified 1.2 mm nodule in the lateral aspect of the right lower lobe consistent with a granuloma.  There were also calcified subcarinal and right hilar nodules.  There is a lower right side tracheal nodule measuring 1 cm.  Patient had a PET/CT scan done on 19 which showed increased metabolic activity on the right lower lobe mass that measures 2.5 cm with SUV of 4.4.  There was also increased metabolic activity in the subcarinal space measuring 2.8 cm in size with SUV of 3.4, increased  activity in an enlarged right paratracheal lymph node that measures 1.9 cm, SUV of 5, also suspicious for metastatic adenopathy.  Patient had a CT-guided biopsy of the right lower lobe mass on 3/8/19.  This showed adenocarcinoma, TTF-1 positive.  On 3/18/19 patient underwent endoscopic ultrasound and biopsy of a subcarinal lymph node.  This was positive for malignant cells.  Patient was then taken back to surgery on 3/20/18 and had left Mediport placement by Dr. Fuchs.  He has been referred for further evaluation and management of his stage 3 adenocarcinoma of the right lung.  He was accompanied by his spouse for the appointment on 3/26/19.  Patient was scheduled to have a brain MRI for complete staging, but he could not do this due to claustrophobia.  He is willing to try with the help of angiolytics.    Patient had brain MRI done on 4/5/19.  He states it did not show any evidence of metastatic disease.  Evidence of chronic sinusitis was noted.    Patient was seen by Dr. Escalona who has recommended concurrent chemotherapy and radiation.  Patient is scheduled to begin on 4/15/19.   4/17/19 - Patient was initiated on combined chemotherapy and radiation with Cisplatin and Alimta.  Patient received cycle 1.      5/8/19 - Patient received cycle 2 of chemotherapy with Cisplatin and Alimta.   5/15/19 - Chemistry panel showed creatinine of 1.7.  WBC 1.8, hemoglobin 11.6, platelet count 72,000.   5/20/19 - BUN 10, creatinine 1.2, potassium 3.5.    5/23/19 - CT scan of the chest with contrast showed interval decrease in size of the right lower lobe pulmonary nodule now measuring 2.1 cm in size.  There was no mediastinal or hilar adenopathy identified.  6/26/2019 patient underwent right lower lobectomy with hilar and mediastinal lymph node dissection performed by Dr. Terrance Mckeon.  Pathology revealed residual residual 2.2 cm invasive moderately differentiated adenocarcinoma.  There were a total of 16 lymph nodes evaluated  that 7 had metastatic disease.  There was evidence of lymphovascular invasion, extranodal involvement.  All the surgical margins were negative and distance of the closest margin was 2.5 cm.  Logic stage is T1c N2 M0.  Patient is here today accompanied by his spouse for this appointment.  He continues to complain of some postop discomfort, numbness but his skin is intact.  There is no drainage.  Has had follow-up with Dr. Fuchs.  8/14/2019-seen by Dr. Maria at the [facility].  Dr. Maria has recommended immunotherapy with durvalumab off label use as patient has not had significant response to neoadjuvant chemotherapy and radiation.  Patient was given the option to have immunotherapy at the [facility] vs [place] and he has chosen to stay in [place]  8/21/19 - Started cycle 1 day 1 Imfinzi  9/4/2019 patient had CT scan of the chest this shows a moderate size right pleural effusion.  There are areas of groundglass opacification in the right upper lobe without any evidence of significant septal thickening.  Volume loss noted there is also moderate pleural effusion.  There is no suspicious recurrent malignancy.  There were nonenlarged residual mediastinal lymph nodes.  9/9/19 - WBC 6.23, Hgb 11.7 Platelets 257,000, , BUN 9, Cr 1.1,Vitamin B12 318, Ferritin 437  9/23/19 - received cycle 2 day 1 Imfinzi  10/9/19- Seen by Dr rodríguez with ENT for sinus disease  11/4/2019-patient received cycle 5 of Imfinzi(durvalumab)  12/2/2019 patient had cycle 7 of durvalumab  12/5/2019-patient had CT scan of the  abdomen and pelvis with small right pleural sided pleural effusion.There is no evidence of metastatic disease  12/30/2019-patient received cycle 9 of durvalumab  12/31/2019-patient had CT scan of the chest showed small to moderate left pleural effusion.  Iglesias appearing right lower paratracheal and right peribronchial soft tissue thickening without any discrete pathologically enlarged mediastinal or hilar lymph  nodes.  1/27/20-patient received cycle 11 of durvalumab  2/17/20-patient had a stress test which was negative for ischemia  2/17/2020-patient had CT of the chest which showed postop changes on the right lung, right pleural effusion but no enlarging mass was noted  3/2/2020-patient received cycle 12 of durvalumab  3/16/2020-patient had ultrasound-guided right thoracentesis.  Pathology was negative for malignancy  4/13/2020-patient received cycle 15 of immunotherapy with durvalumab  5/11/2020-patient received cycle 17 of immunotherapy with durvalumab  6/9/2020-patient had CT scan of the chest, abdomen and pelvis for lung cancer restaging.  There is stable small chronic loculated right basilar pleural effusion suggesting chronic pleuritis.  There is no evidence of metastatic disease in the abdomen or pelvis  7/20/2020 patient received cycle 22 of Durvalumab  8/17/2020 patient received cycle 24 and last cycle of durvalumab  9/24/2020 patient had CT scan of the chest, abdomen and pelvis for cancer restaging there was no evidence of local recurrence or metastatic disease within the abdomen and pelvis.  He has stable chronic small right pleural effusion.  Mild sigmoid diverticulosis was noted.  1/18/2021 patient had CT scan of the chest, abdomen and pelvis reviewed patient  5/24/2021 patient had CT scan of the chest calcified subcarinal lymph node consistent with changes of prior granulomatous disease.  Chronic small right pleural effusion is noted similar to prior exam.  Previously shown 4 mm nodule in the left lower lobe has nearly completely resolved.  The subpleural 3 mm nodule located within the posterior left lower lobe With resolved.  9/24/2021 patient had a CT scan of the chest, abdomen and pelvis there is soft tissue attenuation within the right paratracheal area which has been suggested to reflect sequela to previous treatment.  There is slight thickening of the bladder wall otherwise there is no evidence of  metastatic disease.  12/27/2021 patient had CT scan of the chest with contrast this basically showed irregular shaped noncalcified 7 mm left upper lobe pulmonary nodule.  PET CT scan was recommended to further evaluate.  1/26/2022 patient had a PET CT scan done which basically showed evidence of mild uptake corresponding to the nodule within the left upper lobe which is new worrisome for developing metastatic focus.  There was mild radiopharmaceutical activity corresponding to pleural thickening throughout the right lung base with associated pleural effusion likely related to post therapeutic changes and radiation changes in this region.  Metastatic malignancy involving the pleura could not be completely eliminated.  1/26/2022: CT-guided biopsy was aborted due to finding by the radiologist that the lung nodularity was actually a clump of tiny nodules of which the largest was 6 mm and therefore biopsy could not be completed.  Also the area was in the vicinity of multiple blood vessels with increased risk of bleeding.  Follow-up CT of the chest was recommended for continued surveillance  5/18/2022 patient had CT-guided biopsy of the left enlarging nodule, pathology was positive for invasive poorly differentiated adenocarcinoma most consistent with adenocarcinoma of pulmonary origin.  5/27/2022 patient had a PET CT scan which showed a 2.3 cm hypermetabolic left upper lobe nodule which has increased in size no convincing evidence of metastatic disease elsewhere within the chest.  5/21/2022 patient had brain MRI which did not show any evidence of intracranial metastasis  6/2/2022 patient was seen by Dr. Miryam Reyna pulmonary function test is being done to assess surgical candidacy  7/6/2022 patient underwent left heart Koska P video-assisted with upper lobectomy mediastinal lymph node dissection performed by Dr. Miryam Roach  Final pathology revealed poorly differentiated  A predominant adenocarcinoma with solid  component presenting 45% and micropapillary component 5% with extensive necrosis, invasive carcinoma measures 2.5 maximally there was focal lymphovascular space invasion all margins were negative for malignancy discussed with the closest margin was 2.5 cm pathology stage is pT1C pN1.  PD-L1 showed a tumor proportion score of 95%  8/3/2022 patient started adjuvant chemotherapy with cisplatin, Taxol  8/24/2022 patient received cycle 2 of combination chemotherapy with cisplatin and Taxol with Neulasta  10/5/2022: Patient received cycle 4-day 1 of combination chemotherapy with cisplatin and Taxol  Patient developed left-sided chest pain for that reason he had a PET/CT scan 1/20/2020 is basically revealed a 4 x 3.5 cm hypermetabolic soft tissue mass in the left chest wall between the first and second ribs anteriorly consistent with relapsed disease.  There was a small cluster of hypermetabolic lymph nodes seen in the left supraclavicular region consistent with early manoj metastasis  11/11/2022: 2D echocardiogram performed showing a left ventricular EF of 56 to 60%  11/22/2022: Cycle 1 of atezolizumab received  12/5/2022-12/9/2022: Hospitalized at [facility] for dehydration, hyponatremia, and weakness.  12/19/2020: Patient received cycle 2 of Tecentriq along with XRT  1/11/2023: Patient completed radiation.  Remains on Tecentriq every 3 weeks  1/18/2023 patient had a CT scan of the chest which basically revealed abnormal soft tissue interposed between the first and second rib with some erosive changes to the ribs this finding measures about 5 x 2.8 cm previously was 4.8 x 3.8 cm.  There is some pleural thickening about in this area nodular area within the prominent mediastinal fat in the upper chest measuring 1.7 cm more fluid as opposed to enlarged lymph nodes.  Possibility of pulmonary hypertension was also noted due to prominence of main pulmonary artery measuring 3.7 cm.  2/24/2023: CT of the chest with contrast showed  abnormal mass along the chest wall interposed between the first and second ribs that could relate to metastatic disease.  Some erosive changes to the ribs in this area.  Nodular area in the more superior mediastinal fat that looks like you to reflect more fluidlike attenuation as opposed to a pathologic lymph node.  New groundglass changes within the left upper lobe that could be secondary to inflammatory infectious process.  It is possible this may be posttreatment in nature.  There are some groundglass changes along the superior mediastinum and right upper lobe which are unchanged.  Bilateral pleural effusions noted.  This is developed on the left.  Right unchanged.  4/25/2023 CT of the chest with contrast done in short-term follow-up to reevaluate from previous scan: Increased consolidation in the upper left chest since previous study; increased size of left pleural effusion with loculation along its superior medial margin; no change in the soft tissue mass with osseous destruction of the left upper chest wall between the first and second ribs.  4/28/2023-4/30/2023: Hospitalization at [facility] for pneumonia.  7/5/2023-7/7/2023: Patient hospitalized at [facility] for hyponatremia, shortness of breath, weakness, leukocytosis-reactive to worsening left chest wall mass.  7/10/2023: Patient received cycle 1 of Enhertu  9/6/2023: CT of the chest with contrast done during [facility] hospitalization showed an interval increase since 8/9/2023 and the size of the patient's known left anterior chest wall mass now measuring approximately 8.3 x 6.2 cm.    Past Medical History:   Diagnosis Date    Allergic rhinitis 07/25/2013    Overview:  4/2/2018 - still zyrtec 10 daily (if stops, gets dermatitis again).     Anxiety and depression 06/05/2012    Overview:  4/2/2018 -   C/w well 300 xr and Lito 20.  4/8/2019 -   Doing ok even with new lung cancer - lito 20 & well 300xr.     Chronic pansinusitis 04/08/2019    Overview:  4/8/2019 -   MRI showed  chronic thick in frontal, maxillary, ethmoidal ---> to Dr. COLLAZO to est since abx ICC didn't help.  Does netipot bid.     Diastolic CHF 07/09/2014    Overview:  7/4/14 - impaired LV relaxation on [facility].  EF 60%. Rest normal    Dupuytren's contracture of both hands     Elevated cholesterol     Erosive esophagitis 07/09/2019    Overview:  EGD 2016 4/2/2018 - nexium OTC daily for few week.  Qod before that.  Now carbonation hurts, epigastric pain 4/8/2019 -   protonix 40 doing well.     Gastroesophageal reflux disease 02/21/2019    Hiatal hernia 07/09/2019    History of colon polyps     Hypertension     Lung cancer     right lung and in lymph nodes x2    Mediastinal lymphadenopathy 03/12/2019    Normocytic anemia 08/08/2019    Overview:  6/2019 - dropped to 9's postop 7/2019 - rebounded to 10's.  8/8/2019 -   Back to 9's - check ferritin, b12 and thyroid.     Prediabetes 07/09/2014    Overview:  7/4/14 - a1c 6.1 at [facility] admission    Retention of urine 06/27/2019    PSOT OP, RESOLVED       Past Surgical History:   Procedure Laterality Date    BRONCHOSCOPY  03/18/2019    EBUS MONTERO NEEDLE BX    COLONOSCOPY      DUPUYTREN CONTRACTURE RELEASE Bilateral     LUNG BIOPSY  03/08/2019    CT GUIDED    LUNG REMOVAL, PARTIAL Right     right lower    PORTACATH PLACEMENT  03/20/2019    DR LONGORIA    THORACOSCOPY Right 6/26/2019    Procedure: RIGHT VATS, OPEN LOWER LOBECTOMY WITH LYMPH NODE DISECTION, WEDGE RESECTION OF RIGHT MIDDLE LOBE;  Surgeon: Terrance Longoria MD;  Location: [facility] MAIN OR;  Service: Cardiothoracic    THORACOSCOPY VIDEO ASSISTED WITH LOBECTOMY Left 7/6/2022    Procedure: THORACOSCOPY VIDEO ASSISTED WITH LOBECTOMY;  Surgeon: Miryam Reyna MD;  Location: [facility] MAIN OR;  Service: Thoracic;  Laterality: Left;         Current Outpatient Medications:     buPROPion XL (WELLBUTRIN XL) 300 MG 24 hr tablet, Take 1 tablet by mouth Daily., Disp: , Rfl:     desvenlafaxine (PRISTIQ) 50 MG 24 hr tablet, Take 1 tablet  by mouth Daily., Disp: , Rfl:     folic acid (FOLVITE) 1 MG tablet, Take 1 tablet by mouth Daily., Disp: 30 tablet, Rfl: 3    gabapentin (NEURONTIN) 300 MG capsule, Take 1 capsule by mouth Every Night., Disp: , Rfl:     lactulose (CHRONULAC) 10 GM/15ML solution, Take 30 mL by mouth 2 (Two) Times a Day As Needed., Disp: , Rfl:     levothyroxine (SYNTHROID, LEVOTHROID) 125 MCG tablet, Take 1 tablet by mouth Every Morning., Disp: 30 tablet, Rfl: 0    liothyronine (CYTOMEL) 5 MCG tablet, Take 1 tablet by mouth Daily., Disp: , Rfl:     loratadine (CLARITIN) 10 MG tablet, Take 1 tablet by mouth Daily., Disp: , Rfl:     megestrol acetate (MEGACE) 400 MG/10ML suspension oral suspension, Take 10 mL by mouth Daily., Disp: 240 mL, Rfl: 3    ondansetron (ZOFRAN) 8 MG tablet, Take 1 tablet by mouth 3 (Three) Times a Day As Needed for Nausea or Vomiting., Disp: 30 tablet, Rfl: 5    oxyCODONE-acetaminophen (PERCOCET)  MG per tablet, Take 1 tablet by mouth Every 4 (Four) Hours As Needed for Moderate Pain., Disp: 60 tablet, Rfl: 0    pantoprazole (PROTONIX) 40 MG EC tablet, Take 1 tablet by mouth Daily., Disp: , Rfl:     potassium chloride (K-DUR,KLOR-CON) 20 MEQ CR tablet, Take 1 tablet by mouth Daily., Disp: , Rfl:     sodium chloride 1 g tablet, Take 1 tablet by mouth 4 (Four) Times a Day With Meals & at Bedtime., Disp: 30 tablet, Rfl: 0    torsemide (DEMADEX) 10 MG tablet, Take 1 tablet by mouth Daily., Disp: 10 tablet, Rfl: 0    vitamin B-12 (CYANOCOBALAMIN) 1000 MCG tablet, Take 1 tablet by mouth Daily., Disp: , Rfl:     vitamin B-6 (PYRIDOXINE) 100 MG tablet, Take 1 tablet by mouth Every 12 (Twelve) Hours., Disp: 60 tablet, Rfl: 6    hydrOXYzine (ATARAX) 25 MG tablet, Take 1 tablet by mouth 3 (Three) Times a Day As Needed for Anxiety., Disp: 30 tablet, Rfl: 1  No current facility-administered medications for this visit.    Facility-Administered Medications Ordered in Other Visits:     heparin injection 500 Units, 500  Units, Intravenous, PRN, Azucena Gaspar MD, 500 Units at 23 1613    sodium chloride 0.9 % flush 20 mL, 20 mL, Intravenous, PRNChasity Ifeoma Roseline, MD, 20 mL at 23 1613    No Known Allergies    Family History   Problem Relation Age of Onset    Cancer Mother         cervical cancer    Cervical cancer Mother     Cancer Father         lung cancer    Lung cancer Father     Lung cancer Sister     Cancer Sister         lung cancer    Malig Hyperthermia Neg Hx        Cancer-related family history includes Cancer in his father, mother, and sister; Cervical cancer in his mother; Lung cancer in his father and sister.    Social History     Tobacco Use    Smoking status: Former     Types: Cigarettes     Quit date: 2009     Years since quittin.0    Smokeless tobacco: Never   Vaping Use    Vaping Use: Never used   Substance Use Topics    Alcohol use: Yes     Alcohol/week: 2.0 standard drinks     Types: 2 Cans of beer per week     Comment: Occasional    Drug use: No         I have reviewed and confirmed the accuracy of the patient's history: Chief complaint, HPI, ROS, and Subjective as entered by the MA/LPN/RN. Shayy Montemayor, APRN 23        SUBJECTIVE:  Jc is here today for his hospital follow-up appointment.  He is accompanied by his wife at the visit.  They are both feeling very overwhelmed at home.  Jc feels very physically debilitated and very anxious.  His wife states that she worries when she has to go to work due to Jc being home alone.  Jc feels like if he could get his anxiety and his pain level controlled he would feel more mentally and physically able to do more.  He voiced that he did resume the tepotinib yesterday evening.  He reports that he had anxiety in the hospital also and they gave him benzodiazepines and he feels that this in combination with his pain medication made him confused in the hospital.  He voices that he even took his own IV sites out.      Jc  "COURTNEY Driscoll reports a pain score of 7.  Given his pain assessment as noted, treatment options were discussed and the following options were decided upon as a follow-up plan to address the patient's pain: continuation of current treatment plan for pain.     REVIEW OF SYSTEMS:    Review of Systems   Constitutional: Positive for fatigue. Negative for chills and fever.   HENT: Negative for ear pain, mouth sores, nosebleeds and sore throat.    Eyes: Negative for photophobia and visual disturbance.   Respiratory: Negative for wheezing and stridor.  Exertional shortness of breath  Cardiovascular: Negative for chest pain and palpitations.   Gastrointestinal: Negative for abdominal pain, diarrhea, nausea and vomiting. He has constipation  Endocrine: Negative for cold intolerance and heat intolerance.   Genitourinary: Negative for dysuria and hematuria.   Musculoskeletal: Negative for joint swelling and neck stiffness.   Skin: Negative for color change and rash.   Neurological: Negative for seizures and syncope.   Hematological: Negative for adenopathy.   Psychiatric/Behavioral: Negative for agitation, confusion and hallucinations.  Anxiety  As stated in the subjective    OBJECTIVE:    Vitals:    09/13/23 1251   BP: 111/76   Pulse: (!) 128   Temp: 97.6 °F (36.4 °C)   SpO2: 97%   Weight: Comment: Patient could not stand   Height: 190.5 cm (75\")   PainSc:   7   PainLoc: Arm  Comment: shoulder     ECOG    Eastern Cooperative Oncology Group (ECOG, [place], World Health Organization) performance scale  0 Fully active; no performance restrictions.  1 Strenuous physical activity restricted; fully ambulatory and able to carry out light work.  2 Capable of all self-care but unable to carry out any work activities. Up and about >50% of waking hours.  3 Capable of only limited self-care; confined to bed or chair >50% of waking hours.  4 Completely disabled; cannot carry out any self-care; totally confined to bed or chair.    Physical Exam "     Constitutional: He is oriented to person, place, and time. He appears chronically ill.  No distress.   HENT:   Head: Normocephalic and atraumatic.   Right Ear: External ear normal.   Left Ear: External ear normal.   Nose: Nose normal.   Eyes: Pupils are equal, round, and reactive to light. Conjunctivae are normal. Right eye exhibits no discharge. Left eye exhibits no discharge. No scleral icterus.   Neck: No thyromegaly present.   Cardiovascular: Normal rate, regular rhythm and normal heart sounds. Exam reveals no gallop and no friction rub.   Pulmonary/Chest: Effort normal. No stridor. No respiratory distress. He has no wheezes.  Diminished bilateral bases.  Abdominal: Soft. Bowel sounds are normal. He exhibits no mass. There is no abdominal tenderness. There is no rebound and no guarding.   Musculoskeletal: Normal range of motion. No tenderness.   Lymphadenopathy:     He has no cervical adenopathy.   Neurological: He is alert and oriented to person, place, and time. He exhibits normal muscle tone.   Skin: Skin is warm. No rash noted. He is not diaphoretic. No erythema.   Psychiatric: His behavior is normal. Judgment and thought content normal.   Nursing note and vitals reviewed.  Erythematous cellulitic area on the left lateral chest.  The left port is swollen but not red or increase in temperature noted.  Patient appears very pale      I have reexamined the patient and the results are consistent with the previously documented exam. Shayy Montemayor, MULUGETA      RECENT LABS    WBC   Date Value Ref Range Status   09/13/2023 63.49 (C) 3.40 - 10.80 10*3/mm3 Final   03/22/2022 7.84 4.5 - 11.0 10*3/uL Final     RBC   Date Value Ref Range Status   09/13/2023 2.69 (L) 4.14 - 5.80 10*6/mm3 Final   03/22/2022 4.23 (L) 4.5 - 5.9 10*6/uL Final     Hemoglobin   Date Value Ref Range Status   09/13/2023 8.0 (L) 13.0 - 17.7 g/dL Final   03/22/2022 12.9 (L) 13.5 - 17.5 g/dL Final     Hematocrit   Date Value Ref Range Status    09/13/2023 24.0 (L) 37.5 - 51.0 % Final   03/22/2022 38.8 (L) 41.0 - 53.0 % Final     MCV   Date Value Ref Range Status   09/13/2023 89.2 79.0 - 97.0 fL Final   03/22/2022 91.7 80.0 - 100.0 fL Final     MCH   Date Value Ref Range Status   09/13/2023 29.7 26.6 - 33.0 pg Final   03/22/2022 30.5 26.0 - 34.0 pg Final     MCHC   Date Value Ref Range Status   09/13/2023 33.3 31.5 - 35.7 g/dL Final   03/22/2022 33.2 31.0 - 37.0 g/dL Final     RDW   Date Value Ref Range Status   09/13/2023 17.2 (H) 12.3 - 15.4 % Final   03/22/2022 14.2 12.0 - 16.8 % Final     RDW-SD   Date Value Ref Range Status   09/13/2023 52.9 37.0 - 54.0 fl Final     MPV   Date Value Ref Range Status   09/13/2023 9.8 6.0 - 12.0 fL Final   03/22/2022 9.3 8.4 - 12.4 fL Final     Platelets   Date Value Ref Range Status   09/13/2023 260 140 - 450 10*3/mm3 Final   03/22/2022 324 140 - 440 10*3/uL Final     Neutrophil Rel %   Date Value Ref Range Status   03/22/2022 63.1 45 - 80 % Final     Neutrophil %   Date Value Ref Range Status   09/13/2023 94.7 (H) 42.7 - 76.0 % Final     Lymphocyte Rel %   Date Value Ref Range Status   03/22/2022 16.2 15 - 50 % Final     Lymphocyte %   Date Value Ref Range Status   09/13/2023 1.4 (L) 19.6 - 45.3 % Final     Monocyte Rel %   Date Value Ref Range Status   03/22/2022 13.6 0 - 15 % Final     Monocyte %   Date Value Ref Range Status   09/13/2023 3.7 (L) 5.0 - 12.0 % Final     Eosinophil %   Date Value Ref Range Status   09/13/2023 0.1 (L) 0.3 - 6.2 % Final   03/22/2022 5.5 0 - 7 % Final     Basophil Rel %   Date Value Ref Range Status   03/22/2022 1.1 0 - 2 % Final     Basophil %   Date Value Ref Range Status   09/13/2023 0.1 0.0 - 1.5 % Final     Immature Grans %   Date Value Ref Range Status   07/07/2022 0.4 0.0 - 0.5 % Final   03/22/2022 0.5 0.0 - 1.0 % Final     Neutrophils Absolute   Date Value Ref Range Status   03/22/2022 4.94 2.0 - 8.8 10*3/uL Final     Neutrophils, Absolute   Date Value Ref Range Status    09/13/2023 60.16 (H) 1.70 - 7.00 10*3/mm3 Final     Lymphocytes Absolute   Date Value Ref Range Status   03/22/2022 1.27 0.7 - 5.5 10*3/uL Final     Lymphocytes, Absolute   Date Value Ref Range Status   09/13/2023 0.87 0.70 - 3.10 10*3/mm3 Final     Monocytes Absolute   Date Value Ref Range Status   03/22/2022 1.07 0.0 - 1.7 10*3/uL Final     Monocytes, Absolute   Date Value Ref Range Status   09/13/2023 2.33 (H) 0.10 - 0.90 10*3/mm3 Final     Eosinophils Absolute   Date Value Ref Range Status   03/22/2022 0.43 0.0 - 0.8 10*3/uL Final     Eosinophils, Absolute   Date Value Ref Range Status   09/13/2023 0.08 0.00 - 0.40 10*3/mm3 Final     Basophils Absolute   Date Value Ref Range Status   03/22/2022 0.09 0.0 - 0.2 10*3/uL Final     Basophils, Absolute   Date Value Ref Range Status   09/13/2023 0.05 0.00 - 0.20 10*3/mm3 Final     Immature Grans, Absolute   Date Value Ref Range Status   07/07/2022 0.05 0.00 - 0.05 10*3/mm3 Final   03/22/2022 0.04 0.00 - 0.10 10*3/uL Final     nRBC   Date Value Ref Range Status   07/31/2023 0.0 0.0 - 0.2 /100 WBC Final       Lab Results   Component Value Date    GLUCOSE 94 09/13/2023    BUN 11 09/13/2023    CREATININE 0.66 (L) 09/13/2023    EGFRIFNONA 100 01/28/2022    EGFRIFAFRI >60 05/20/2019    BCR 16.7 09/13/2023    K 3.8 09/13/2023    CO2 21.0 (L) 09/13/2023    CALCIUM 9.2 09/13/2023    ALBUMIN 2.3 (L) 09/13/2023    LABIL2 1.5 03/22/2022    AST 24 09/13/2023    ALT 13 09/13/2023         ASSESSMENT:    Leukocytosis up to 77,000 white count, left sided chest wall cellulitis, left Mediport swelling but no redness failure to thrive, nausea or vomiting.  We will admit to hospital for possible sepsis IV antibiotics blood cultures, blood transfusions for significant anemia of 5.5 g per DL.  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  Patient received concurrent XRT with Tecentriq followed by maintenance Tecentriq.  Patient has had evidence of progressive disease therefore Tecentriq has been discontinued.  Has also progressed through [place].  For that reason we will discontinue treatments.  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  Reviewed the benefits the side effects in detail with patient.  I also given him information to review.  Clinical studies suggest up to 46% response rate with this drug  Intractable pain left shoulder: Has improved  Symptomatic anemia: We will make arrangement for blood transfusion  Poor appetite: Patient has been encouraged to begin to take Megace as recommended  Physical deconditioning: I recommended physical therapy  Poorly differentiated adenocarcinoma of the left lung status post left thoracotomy with mediastinal lymph node dissection.  pT1 cpN1 M0 consistent with stage IIb disease, found on surveillance CT scans. Brain MRI was negative. We will recommend platinum doublet followed by Tecentriq unless he has EGFR mutation for which adjuvant osimertinib will be considered.  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  Patient was treated with cisplatin and Alimta in the adjuvant setting for his original malignancy.  He is currently on cisplatin and Taxol.  Patient is receiving cycle #4 today.  This tumor was completely resected and has negative margins and no mediastinal lymph node involvement.  Reviewed the pathology with Dr. Trevizo.  This is a new second lung primary.  Patient has received a total of 4 cycles of combination of cisplatin and Taxol completed on 10/5/2022.   Constipation: We will begin lactulose.  Patient to call me if no results  High PD-L1 expression at 95%. Reviewed [facility] 1  testing results.  This showed MET amplification, ERBB2 amplification for which targeted therapy is available for including crizotinib for MET amplification, afatinib for ERBB2 but this is approved for metastatic disease as well as fam-trastuzumab.  We have extensively reviewed the literature.  He will be a candidate for ERBB2 targeted therapy but fam-trastuzumab cannot be combined with XRT due to risk of interstitial lung disease.  Also patient has ERBB2 amplification and not mutation and therefore his response rate may be around 25% as opposed to 55% if mutation was identified.    Chemotherapy-induced fatigue: This has improved  Hypomagnesemia secondary to chemotherapy: Patient has discontinued magnesium supplements  History of adenocarcinoma of the right lung, T1c N2 M0, consistent with clinical stage 3A disease in 2019.  S/P combined chemotherapy and radiation with cisplatin and Alimta neoadjuvantly, followed by her right lower lobectomy with mediastinal and hilar lymph node dissection with residual disease pT1 cpN2 M0.  Followed by maintenance durvalumab for 1 year.   Recent diagnosis of hypothyroidism, on thyroid replacement therapy  Status post right thoracentesis with cytology negative for malignancy  History of pneumothorax following lung biopsy  Postop anemia: Reviewed  Pneumonia left upper lobe: Has completed his 10-day course of antibiotics.  Symptoms have improved.  Chronic left chest wall and left upper extremity pain: On opioid pain management with from his radiation oncologist.  Pain seems to be neuropathic in nature we will continue to increase his gabapentin, increase the frequency on his oxycodone as needed, and refer to pain management.  Has an appointment coming up 6/19/2023  Orthostatic hypotension: Related to dehydration.  Resolved.  Leukocytosis: Infectious disease work-up was performed during his recent hospitalization and was negative.  Likely leukemoid reaction secondary to progressive  malignancy.  Hyponatremia  Cancer related cachexia.  Improving..  Stable.  Symptomatic normocytic anemia: Likely secondary to chemotherapy.  Has required several blood transfusions.  Hemoglobin stable at 8.0 g/dL.  Anxiety: Not well controlled on current medications of desvenlafaxine and bupropion.  Will add hydroxyzine 25 mg 3 times daily as needed.  If this is well-tolerated we will plan on transitioning desvenlafaxine to a SSRI/SNRI to see if we can get better anxiolytic effect.  Also check thyroid.  Assessment has been reviewed and updated      PLANS:    Normal saline 1 L over 2 hours today  Hydroxyzine 25 mg p.o. 3 times daily for anxiety as needed.  This also has synergistic effect with opiate pain medication to help make more effective.  Urgent consult to [facility] for skilled nursing and physical therapy evaluation  Continue tepotinib  Will activate [facility] for chemo education.  This has been completed.  Reviewed iron profile, ferritin, vitamin B12, folate, MMA  Continue Megace.  Also advised boost supplements twice daily with samples and coupons given  Continue gabapentin 600 mg 3 times  Continue oxycodone to 10 mg every 4 hours as needed for pain  Reviewed his PET CT scan results  2D echocardiogram in preparation for HER2 directed treatment reviewed.  Reviewed.  EF 51-55%.  We will check every 3 months next echo will be due October 10, 2023  Continue thyroid replacement therapy through his PCP  Currently on oral B12 replacement.  Patient did have elevated methylmalonic acid level during the early phases of his treatments.  Continue the same  Follow-up in 1 week with the nurse practitioner in 2 weeks with Dr. Gaspar.  We will see weekly for now with CBC and possible IV fluids as needed  All questions answered       Discussion: Patient and his wife are considering that inpatient physical rehab may be best at this time.  They initially wanted to proceed with home health for physical therapy.   They verbalized understanding that we will need to look into what is needed to qualify for inpatient rehabilitation since he is no longer in the hospital and also see which facilities his insurance may cover.  We will proceed with the home health referral urgently and the plan as above while we are looking into these things.  The patient and his wife were advised that if they feel he is not physically safe at home to call us and we will report to the ER.    Patient has  questions which have all been answered to the best of my abilities    I spent 40 total minutes, face-to-face, caring for Jc today. 90% of this time involved counseling and/or coordination of care as documented within this note.

## 2023-09-13 ENCOUNTER — READMISSION MANAGEMENT (OUTPATIENT)
Dept: CALL CENTER | Facility: HOSPITAL | Age: 58
End: 2023-09-13
Payer: COMMERCIAL

## 2023-09-13 ENCOUNTER — HOSPITAL ENCOUNTER (OUTPATIENT)
Dept: ONCOLOGY | Facility: HOSPITAL | Age: 58
Discharge: HOME | End: 2023-09-13
Admitting: INTERNAL MEDICINE
Payer: COMMERCIAL

## 2023-09-13 ENCOUNTER — OFFICE VISIT (OUTPATIENT)
Dept: ONCOLOGY | Facility: CLINIC | Age: 58
End: 2023-09-13
Payer: COMMERCIAL

## 2023-09-13 ENCOUNTER — LAB (OUTPATIENT)
Dept: LAB | Facility: HOSPITAL | Age: 58
End: 2023-09-13
Payer: COMMERCIAL

## 2023-09-13 ENCOUNTER — HOME HEALTH ADMISSION (OUTPATIENT)
Dept: HOME HEALTH SERVICES | Facility: HOME HEALTHCARE | Age: 58
End: 2023-09-13
Payer: COMMERCIAL

## 2023-09-13 VITALS
DIASTOLIC BLOOD PRESSURE: 76 MMHG | BODY MASS INDEX: 18.37 KG/M2 | HEART RATE: 128 BPM | HEIGHT: 75 IN | TEMPERATURE: 97.6 F | OXYGEN SATURATION: 97 % | SYSTOLIC BLOOD PRESSURE: 111 MMHG

## 2023-09-13 VITALS — HEART RATE: 81 BPM | SYSTOLIC BLOOD PRESSURE: 105 MMHG | DIASTOLIC BLOOD PRESSURE: 71 MMHG

## 2023-09-13 DIAGNOSIS — E06.3 HASHIMOTO'S THYROIDITIS: ICD-10-CM

## 2023-09-13 DIAGNOSIS — C34.91 NON-SMALL CELL CARCINOMA OF LUNG, RIGHT: Primary | ICD-10-CM

## 2023-09-13 DIAGNOSIS — D64.9 ANEMIA, UNSPECIFIED TYPE: ICD-10-CM

## 2023-09-13 DIAGNOSIS — C34.91 NON-SMALL CELL CARCINOMA OF LUNG, RIGHT: ICD-10-CM

## 2023-09-13 DIAGNOSIS — R53.81 PHYSICAL DECONDITIONING: ICD-10-CM

## 2023-09-13 DIAGNOSIS — Z45.2 ENCOUNTER FOR CARE RELATED TO VASCULAR ACCESS PORT: ICD-10-CM

## 2023-09-13 DIAGNOSIS — E86.0 DEHYDRATION: Primary | ICD-10-CM

## 2023-09-13 DIAGNOSIS — F41.9 ANXIETY: ICD-10-CM

## 2023-09-13 DIAGNOSIS — E87.1 HYPONATREMIA: Primary | ICD-10-CM

## 2023-09-13 LAB
ALBUMIN SERPL-MCNC: 2.3 G/DL (ref 3.5–5.2)
ALBUMIN/GLOB SERPL: 0.7 G/DL
ALP SERPL-CCNC: 196 U/L (ref 39–117)
ALT SERPL W P-5'-P-CCNC: 13 U/L (ref 1–41)
ANION GAP SERPL CALCULATED.3IONS-SCNC: 15 MMOL/L (ref 5–15)
AST SERPL-CCNC: 24 U/L (ref 1–40)
BASOPHILS # BLD AUTO: 0.05 10*3/MM3 (ref 0–0.2)
BASOPHILS NFR BLD AUTO: 0.1 % (ref 0–1.5)
BILIRUB SERPL-MCNC: 0.8 MG/DL (ref 0–1.2)
BUN SERPL-MCNC: 11 MG/DL (ref 6–20)
BUN/CREAT SERPL: 16.7 (ref 7–25)
CALCIUM SPEC-SCNC: 9.2 MG/DL (ref 8.6–10.5)
CHLORIDE SERPL-SCNC: 87 MMOL/L (ref 98–107)
CO2 SERPL-SCNC: 21 MMOL/L (ref 22–29)
CREAT SERPL-MCNC: 0.66 MG/DL (ref 0.76–1.27)
DEPRECATED RDW RBC AUTO: 52.9 FL (ref 37–54)
EGFRCR SERPLBLD CKD-EPI 2021: 108 ML/MIN/1.73
EOSINOPHIL # BLD AUTO: 0.08 10*3/MM3 (ref 0–0.4)
EOSINOPHIL NFR BLD AUTO: 0.1 % (ref 0.3–6.2)
ERYTHROCYTE [DISTWIDTH] IN BLOOD BY AUTOMATED COUNT: 17.2 % (ref 12.3–15.4)
GLOBULIN UR ELPH-MCNC: 3.5 GM/DL
GLUCOSE SERPL-MCNC: 94 MG/DL (ref 65–99)
HCT VFR BLD AUTO: 24 % (ref 37.5–51)
HGB BLD-MCNC: 8 G/DL (ref 13–17.7)
HOLD SPECIMEN: NORMAL
LYMPHOCYTES # BLD AUTO: 0.87 10*3/MM3 (ref 0.7–3.1)
LYMPHOCYTES NFR BLD AUTO: 1.4 % (ref 19.6–45.3)
MAGNESIUM SERPL-MCNC: 1.8 MG/DL (ref 1.6–2.6)
MCH RBC QN AUTO: 29.7 PG (ref 26.6–33)
MCHC RBC AUTO-ENTMCNC: 33.3 G/DL (ref 31.5–35.7)
MCV RBC AUTO: 89.2 FL (ref 79–97)
MONOCYTES # BLD AUTO: 2.33 10*3/MM3 (ref 0.1–0.9)
MONOCYTES NFR BLD AUTO: 3.7 % (ref 5–12)
NEUTROPHILS NFR BLD AUTO: 60.16 10*3/MM3 (ref 1.7–7)
NEUTROPHILS NFR BLD AUTO: 94.7 % (ref 42.7–76)
PLATELET # BLD AUTO: 260 10*3/MM3 (ref 140–450)
PMV BLD AUTO: 9.8 FL (ref 6–12)
POTASSIUM SERPL-SCNC: 3.8 MMOL/L (ref 3.5–5.2)
PROT SERPL-MCNC: 5.8 G/DL (ref 6–8.5)
RBC # BLD AUTO: 2.69 10*6/MM3 (ref 4.14–5.8)
SODIUM SERPL-SCNC: 123 MMOL/L (ref 136–145)
WBC NRBC COR # BLD: 63.49 10*3/MM3 (ref 3.4–10.8)

## 2023-09-13 PROCEDURE — 84479 ASSAY OF THYROID (T3 OR T4): CPT | Performed by: INTERNAL MEDICINE

## 2023-09-13 PROCEDURE — 84436 ASSAY OF TOTAL THYROXINE: CPT | Performed by: INTERNAL MEDICINE

## 2023-09-13 PROCEDURE — 80050 GENERAL HEALTH PANEL: CPT

## 2023-09-13 PROCEDURE — 96361 HYDRATE IV INFUSION ADD-ON: CPT

## 2023-09-13 PROCEDURE — 96360 HYDRATION IV INFUSION INIT: CPT

## 2023-09-13 PROCEDURE — 36415 COLL VENOUS BLD VENIPUNCTURE: CPT

## 2023-09-13 PROCEDURE — 83735 ASSAY OF MAGNESIUM: CPT | Performed by: INTERNAL MEDICINE

## 2023-09-13 PROCEDURE — 25010000002 HEPARIN LOCK FLUSH PER 10 UNITS: Performed by: INTERNAL MEDICINE

## 2023-09-13 RX ORDER — SODIUM CHLORIDE 0.9 % (FLUSH) 0.9 %
20 SYRINGE (ML) INJECTION AS NEEDED
Status: DISCONTINUED | OUTPATIENT
Start: 2023-09-13 | End: 2023-09-14 | Stop reason: HOSPADM

## 2023-09-13 RX ORDER — HYDROXYZINE HYDROCHLORIDE 25 MG/1
25 TABLET, FILM COATED ORAL 3 TIMES DAILY PRN
Qty: 30 TABLET | Refills: 1 | Status: SHIPPED | OUTPATIENT
Start: 2023-09-13

## 2023-09-13 RX ORDER — HEPARIN SODIUM (PORCINE) LOCK FLUSH IV SOLN 100 UNIT/ML 100 UNIT/ML
500 SOLUTION INTRAVENOUS AS NEEDED
OUTPATIENT
Start: 2023-09-13

## 2023-09-13 RX ORDER — SODIUM CHLORIDE 0.9 % (FLUSH) 0.9 %
20 SYRINGE (ML) INJECTION AS NEEDED
OUTPATIENT
Start: 2023-09-13

## 2023-09-13 RX ORDER — HEPARIN SODIUM (PORCINE) LOCK FLUSH IV SOLN 100 UNIT/ML 100 UNIT/ML
500 SOLUTION INTRAVENOUS AS NEEDED
Status: DISCONTINUED | OUTPATIENT
Start: 2023-09-13 | End: 2023-09-14 | Stop reason: HOSPADM

## 2023-09-13 RX ADMIN — SODIUM CHLORIDE 1000 ML: 9 INJECTION, SOLUTION INTRAVENOUS at 14:10

## 2023-09-13 RX ADMIN — Medication 20 ML: at 16:13

## 2023-09-13 RX ADMIN — HEPARIN 500 UNITS: 100 SYRINGE at 16:13

## 2023-09-13 NOTE — OUTREACH NOTE
Sepsis Week 1 Survey      Flowsheet Row Responses   [facility] patient discharged from? [facility]   Does the patient have one of the following disease processes/diagnoses(primary or secondary)? Sepsis   Week 1 attempt successful? Yes   Call start time 1512   Call end time 1514   Discharge diagnosis sepsis   Meds reviewed with patient/caregiver? Yes   Is the patient having any side effects they believe may be caused by any medication additions or changes? No   Does the patient have all medications related to this admission filled (includes all antibiotics, inhalers, nebulizers,steroids,etc.) Yes   Is the patient taking all medications as directed (includes completed medication regime)? Yes   Does the patient have a primary care provider?  Yes   Does the patient have an appointment with their PCP within 7 days of discharge? Yes   Has the patient kept scheduled appointments due by today? Yes   Has home health visited the patient within 72 hours of discharge? N/A   Psychosocial issues? No   Did the patient receive a copy of their discharge instructions? Yes   What is the patient's perception of their health status since discharge? Same   Nursing interventions Nurse provided patient education   Is the patient/caregiver able to teach back TIME? T emperature - higher or lower than normal, I nfection - may have signs and symptoms of an infection, M ental Decline - confused, sleepy, difficult to arouse, E xtremely Ill - severe pain, discomfort, shortness of breath   Is the patient/caregiver able to teach back the hierarchy of who to call/visit for symptoms/problems? PCP, Specialist, Home health nurse, Urgent Care, ED, 911 Yes   Week 1 call completed? Yes   Wrap up additional comments Pt reports he is not feeling any better and is at his PCP appt at this time.   Call end time 1514            CHERI SCHAEFER - Registered Nurse

## 2023-09-14 ENCOUNTER — HOME CARE VISIT (OUTPATIENT)
Dept: HOME HEALTH SERVICES | Facility: HOME HEALTHCARE | Age: 58
End: 2023-09-14
Payer: COMMERCIAL

## 2023-09-14 ENCOUNTER — HOME CARE VISIT (OUTPATIENT)
Dept: HOME HEALTH SERVICES | Facility: HOME HEALTHCARE | Age: 58
End: 2023-09-14

## 2023-09-14 ENCOUNTER — DOCUMENTATION (OUTPATIENT)
Dept: ONCOLOGY | Facility: CLINIC | Age: 58
End: 2023-09-14
Payer: COMMERCIAL

## 2023-09-14 ENCOUNTER — TELEPHONE (OUTPATIENT)
Dept: ONCOLOGY | Facility: CLINIC | Age: 58
End: 2023-09-14
Payer: COMMERCIAL

## 2023-09-14 DIAGNOSIS — E06.3 HASHIMOTO'S THYROIDITIS: Primary | ICD-10-CM

## 2023-09-14 LAB
T-UPTAKE NFR SERPL: 0.97 TBI (ref 0.8–1.3)
T4 SERPL-MCNC: 5.85 MCG/DL (ref 4.5–11.7)
TSH SERPL DL<=0.05 MIU/L-ACNC: 10.2 UIU/ML (ref 0.27–4.2)

## 2023-09-14 RX ORDER — LEVOTHYROXINE SODIUM 137 UG/1
125 TABLET ORAL
Qty: 30 TABLET | Refills: 2 | Status: SHIPPED | OUTPATIENT
Start: 2023-09-14

## 2023-09-14 NOTE — PROGRESS NOTES
Jc Driscoll is in need of inpatient rehabilitation due to extreme deconditioning and generalized weakness from his diagnoses of non-small cell lung cancer and failure to thrive.  He is unable to independently perform his ADLs.  He will also need to be continued on his oral targeted therapy for his non-small cell lung carcinoma which is his tepotinib 450 mg p.o. once daily.  He will be able to bring in his home supply of that medication.  Our oral chemo pharmacy can communicate with him via phone for the management of this medication.  He will need to continue this medication as part of his generalized weakness is secondary to his non-small cell lung cancer diagnosis and gaining control of this condition will assist in his ability to be able to participate in therapy.    MULUGETA Price

## 2023-09-14 NOTE — TELEPHONE ENCOUNTER
Phoned patient and let him know     that Shayy checked his thyroid function yesterday also because of his increased anxiety.  It did show that his thyroid is not in range.  Confirmed that he is taking his levothyroxine 125 mcg every morning on an empty stomach and about 30 minutes before any other food or medications. He stated that his wife has his medicines laid out for him the way  he is to take them.  Let him know that Shayy may increase his dose.  Also let him know that his sodium level was low at the visit but that the IV fluids should help with that.  He should also continue the medications that the nephrologist put him on at the hospital which were the sodium chloride tablets 4 times a day and the torsemide.  I let him know that our  is looking into the process for a inpatient rehab referral and that we will update him.  He voiced understanding of this information.

## 2023-09-14 NOTE — TELEPHONE ENCOUNTER
----- Message from MULUGETA Garcia sent at 9/14/2023  7:59 AM EDT -----  Please let Jc know that I checked his thyroid function yesterday also because of his increased anxiety.  It did show that his thyroid is not in range.  Confirm that he is taking his levothyroxine 125 mcg every morning on an empty stomach and about 30 minutes before any other food or medications.  If he is taking it correctly then I am going to increase his dose.  Also let him know that his sodium level was low at the visit but that the IV fluids should help with that.  He should also continue the medications that the nephrologist put him on at the hospital which were the sodium chloride tablets 4 times a day and the torsemide.  Also update him that our  is looking into the process for a inpatient rehab referral and that we will update him.  Thank you.

## 2023-09-15 DIAGNOSIS — C34.91 NON-SMALL CELL CARCINOMA OF LUNG, RIGHT: ICD-10-CM

## 2023-09-15 RX ORDER — OXYCODONE AND ACETAMINOPHEN 10; 325 MG/1; MG/1
1 TABLET ORAL EVERY 4 HOURS PRN
Qty: 120 TABLET | Refills: 0 | Status: SHIPPED | OUTPATIENT
Start: 2023-09-15

## 2023-09-15 NOTE — PAYOR COMM NOTE
"This is discharge notification for Jc Driscoll COURTNEY  Reference/Auth # [id number]   Pt discharged on 9/11/23    PENDING ADDITIONAL DAYS APPROVAL    Jannette Lovett, RN, BSN  Utilization Review Nurse  [facility]  Direct & confidential phone # [phone]  Fax # [phone]      Jc Driscoll COURTNEY (58 y.o. Male)       Date of Birth   1965    Social Security Number       Address   [address] [place] [facility]    Home Phone   [phone]    MRN   [id number]       Congregation   None    Marital Status                               Admission Date   9/6/23    Admission Type   Emergency    Admitting Provider   Reshma Kingsley DO    Attending Provider       Department, Room/Bed   [facility] 2D, 272/1       Discharge Date   9/11/2023    Discharge Disposition   Home or Self Care    Discharge Destination                                 Attending Provider: (none)   Allergies: No Known Allergies    Isolation: None   Infection: COVID Screen (preop/placement) (07/06/22)   Code Status: Prior    Ht: 190.5 cm (75\")   Wt: 66.7 kg (147 lb)    Admission Cmt: None   Principal Problem: Sepsis [A41.9]                   Active Insurance as of 9/6/2023       Primary Coverage       Payor Plan Insurance Group Employer/Plan Group    [facility] [employer] [facility] [employer] [facility] PPO [id number]       Payor Plan Address Payor Plan Phone Number Payor Plan Fax Number Effective Dates    [address] [phone]  1/1/2014 - None Entered    [place] [address]         Subscriber Name Subscriber Birth Date Member ID       JC DRISCOLL 1965 [id number]                     Emergency Contacts        (Rel.) Home Phone Work Phone Mobile Phone    VERN DRISCOLL (Spouse) [phone] -- [phone]              Vital Signs (last 2 days) before discharge       Date/Time Temp Temp src Pulse Resp BP Patient Position SpO2    09/11/23 1140 97 (36.1) Oral 114 29 103/67 Lying 100    09/11/23 0437 97.7 (36.5) " Oral 114 24 100/65 Lying 98    23 0400 -- Oral -- -- -- -- --    23 0019 97.9 (36.6) Oral 111 21 94/66 Lying 100    09/10/23 1937 97.8 (36.6) Oral 110 24 101/63 Lying 100    09/10/23 1748 -- -- 111 -- 95/65 -- 99    09/10/23 1604 97.2 (36.2) Oral 115 25 92/66 Lying 99    09/10/23 1224 97.3 (36.3) Oral 115 23 97/66 Lying 99    09/10/23 0935 98 (36.7) Oral 119 16 101/72 Lying 97    09/10/23 0606 96.9 (36.1) Oral 127 18 112/77 Lying 96    09/10/23 0045 97.7 (36.5) -- 120 -- 100/65 -- 96    23 1925 98.6 (37) Oral 120 20 102/66 Lying 98    23 1700 98 (36.7) Oral 120 26 115/75 Lying 98    23 1632 97.8 (36.6) Oral 122 33 103/67 Lying 100    23 1145 98.1 (36.7) Oral 111 27 101/64 Lying --    23 0517 98.2 (36.8) Oral 117 24 108/75 Lying --    23 0020 98.2 (36.8) Oral 113 20 98/65 Lying 99          Operative/Procedure Notes (last 48 hours)  Notes from 23 1029 through 09/15/23 1029   No notes of this type exist for this encounter.        Ezequiel Mccollum DO   Physician  Hospitalist     Progress Notes     Signed     Date of Service: 09/10/23 103  Creation Time: 09/10/23 103     Signed       Expand All [facility] All          [facility]   Daily Progress Note     Patient Name: Jc Driscoll  : 1965  MRN: [id number]  Primary Care Physician:  Reshma Alvarenga MD  Date of admission: 2023        Subjective       Chief Complaint: Leukocytosis erythema  Patient     Patient Reports this a pleasant gentleman who is being for the last 5 years fighting potentiated adenocarcinoma with metastasis first time the patient had resection of liver that take care he was treated with chemoradiation after that and now he is still getting chemo with Dr. Gaspar, patient was found to have severe anemia received transfusion, patient is receiving transfusion for now with hematology oncology.  Patient is complaining of anxiety, no shortness of breath no chest  pain     Patient seen and examined this morning.  Taking over care today, states she is doing okay, breathing is controlled.  No fever or chills.  White count remains elevated, CT findings also noted.  Will consult pulmonology for further evaluation.     Pertinent positives as noted in HPI/subjective.  All other systems were reviewed and are negative.        Objective       Vitals:   Temp:  [96.9 °F (36.1 °C)-98.6 °F (37 °C)] 98 °F (36.7 °C)  Heart Rate:  [111-127] 119  Resp:  [16-33] 16  BP: (100-115)/(64-77) 101/72     General: Awake, alert, NAD  Eyes: PERRL, EOMI, conjunctivae are clear  Cardiovascular: Regular rate and rhythm, no murmurs  Respiratory: Left-sided rhonchi noted, no wheezing or rales, unlabored breathing  Abdomen: Soft, nontender, positive bowel sounds, no guarding  Neurologic: A&O, CN grossly intact, moves all extremities spontaneously  Musculoskeletal: Normal range of motion, no other gross deformities  Skin: Warm, dry, intact                         Result Review       Result Review:  I have personally reviewed the results from the time of this admission to 9/10/2023 10:39 EDT and agree with these findings:  [x]  Laboratory  [x]  Microbiology  [x]  Radiology  []  EKG/Telemetry   []  Cardiology/Vascular   []  Pathology  [x]  Old records  []  Other:                    buPROPion XL, 300 mg, Oral, Daily  cetirizine, 10 mg, Oral, Daily  desvenlafaxine, 50 mg, Oral, Daily  folic acid, 1 mg, Oral, Daily  gabapentin, 300 mg, Oral, Nightly  levothyroxine, 125 mcg, Oral, Q AM  liothyronine, 5 mcg, Oral, Daily  megestrol acetate, 400 mg, Oral, Daily  pantoprazole, 40 mg, Oral, QAM AC  [START ON 9/11/2023] potassium chloride, 20 mEq, Oral, Daily  senna-docusate sodium, 2 tablet, Oral, BID  sodium chloride, 10 mL, Intravenous, Q12H  sodium chloride, 1 g, Oral, TID With Meals  vitamin B-12, 1,000 mcg, Oral, Daily  vitamin B-6, 100 mg, Oral, Q12H              Active Hospital Problems:       Active Hospital  Problems     Diagnosis      **Sepsis      Leukocytosis      Bandemia      Anemia due to chemotherapy      Cellulitis of chest wall      NSCLC of left lung      Cancer of lower lobe of right lung      Non-small cell carcinoma of lung, right      Gastroesophageal reflux disease      Anxiety and depression      Hypertension        Plan:      Leukocytosis with bandemia  Left chest wall cellulitis   -consider port infection/bacteremia  -left axilla with swelling and induration  -pt has had leukocytosis since March 2023. His previous admission July 2023 the patient had negative blood cultures. ID team suggested leukocytosis was secondary to steroids and underlying malignancy  -Cultures so far remain negative  -Antibiotics discontinued per ID and signed off  -CT chest findings noted, pulmonology consulted     Anemia  -no hematochezia or hematemesis   -hgb 7.2 in the ED, 1 unit transfused  -anemia secondary to metastatic disease/chemotherapy  -Monitor hemoglobin, hematology following     Metastatic lung CA s/p left thoracotomy and right lower lobectomy  -followed by Dr. Gaspar on tepotinib  -CT findings noted, concern for pneumonia  -Pulmonology consulted for further management, heme-onc following     Hyponatremia  -pt did have hyponatremia secondary to volume depletion and underlying SIADH per nephrology back in July 2023. He was treated with IVFs during that admission  -Sodium trending down  -Nephrology consulted     Hypothyroidism  -cont home synthroid, cytomel     Anorexia   -on megace      DVT prophylaxis:  Mechanical DVT prophylaxis orders are present.     CODE STATUS:    Level Of Support Discussed With: Patient  Code Status (Patient has no pulse and is not breathing): CPR (Attempt to Resuscitate)  Medical Interventions (Patient has pulse or is breathing): Full Support        Disposition: Pending clinical course     This patient has been examined wearing appropriate Personal Protective Equipment and discussed with  hospital infection control department. 09/10/23        Electronically signed by Ezequiel Mccollum DO, 09/10/23, 10:39 EDT.  [facility], [place], operated by [facility] Hospitalist Team     Part of this note may be an electronic transcription/translation of spoken language to printed text using the Dragon Dictation System.                                       Lowell Ba MD   Physician  Hematology     Progress Notes     Signed     Date of Service: 09/10/23 1055  Creation Time: 09/10/23 1055     Signed       Expand All Collapse All            Hematology/Oncology Inpatient Progress Note     PATIENT NAME: Jc Driscoll  : 1965  MRN: [id number]     CHIEF COMPLAINT: Weakness     HISTORY OF PRESENT ILLNESS:       Jc Driscoll is a 58 y.o. male who presented to [facility] on 2023 with complaints of swelling and erythema to the left axilla, left chest wall, around the left chest wall port.  Past medical history significant for metastatic lung cancer.  He initially presented to his oncologist office the same day due to the above complaints along with progressive weakness, nausea and vomiting.  He was noted to have leukocytosis with a WBC of 77,000 and significant anemia with a hemoglobin of 5.5 g/dL.  For this reason he was asked to go to the emergency room for further evaluation and treatment.     Evaluation in the ED: Sodium 127, alkaline phosphatase 270, WBC 51.20, hemoglobin 7.2 g/dL, MCV 87.9, platelets 403,000, bands 15%, absolute lymphocytes 0.0.  Normal lactic acid.  Blood cultures drawn and pending.  Patient was initiated on IV antibiotics and admitted for further evaluation and treatment.     23  Hematology/Oncology was consulted on a patient known to us and established in the office with Dr. 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  The patient initially presented in 2019 with moderately differentiated adenocarcinoma of the right lung for which he underwent  concurrent chemotherapy and radiation neoadjuvantly followed by a right lower lobectomy.  This was followed by immunotherapy with durvalumab for 24 cycles ending in August 2020.  In January 2022, a routine surveillance CT of the chest revealed a left upper lobe pulmonary nodule for which a subsequent PET/CT was done that showed mild uptake corresponding to the nodule.  Subsequent CT-guided biopsy was attempted but aborted due to findings by the radiologist that the lung nodularity was actually a clump of tiny nodules of which the largest was 6 mm and therefore biopsy could not be completed.  Also the area was in the vicinity of multiple blood vessels with increased risk of bleeding.  Follow-up CT of the chest was recommended for continued surveillance.  In May 2022, the patient underwent a CT-guided biopsy of the left enlarging nodule and pathology was positive for invasive poorly differentiated adenocarcinoma most consistent with an adenocarcinoma of pulmonary origin.  PET/CT showed a 2.3 cm hypermetabolic left upper lobe nodule and no convincing metastatic disease elsewhere.  Brain MRI was negative for intracranial metastasis.  On 7/6/2022 the patient underwent a left upper lobe lobectomy which was followed by adjuvant chemotherapy.  After completion of chemotherapy the patient was initiated on atezolizumab along with XRT.  Radiation was completed on 01/11/2023.  The patient was continued on Tecentriq every 3 weeks.  Due to his increasing pain in the left axilla and concern for progressive disease a PET/CT was performed on 6/5/2023, this revealed that the chest wall mass had increased in size since the previous study.  There was osseous destruction of the second rib.  Osseous metastatic changes to the left first and second rib.  Due to progressive disease Tecentriq was discontinued.  A plan to transition to adhere to was in place but was complicated due to multiple hospitalizations for pain and weakness.  On  7/10/2023 the patient was initiated on cycle 1 of Enhertu.  Due to shortness of breath the patient had a CT of the chest on 8/9/2023 which showed a significant increase in size of the left anterior chest wall mass with an increase in destruction of the adjacent ribs particularly the left third rib.  Due to progressive disease and hereto was discontinued.  His malignancy has MET amplification and therefore it was planned to begin therapy with tepotinib.          Patient with recurrent left lung cancer.  Progressed on multiple lines of treatment.  Was recently placed on tepotinib for an MEK 2 mutation.  Patient has had progressive decline in clinical status.  He presented with nausea, failure to thrive decreased p.o. intake significant anxiety and constipation.  He was also found to have significant leukocytosis, erythematous left chest wall, slight swelling around the left Mediport but no redness or increase in warmth.  For this reason patient was admitted to the hospital to evaluate for implant infection, ID consultation was also requested     He/She  has a past medical history of Allergic rhinitis (07/25/2013), Anxiety and depression (06/05/2012), Chronic pansinusitis (04/08/2019), Diastolic CHF (07/09/2014), Dupuytren's contracture of both hands, Elevated cholesterol, Erosive esophagitis (07/09/2019), Gastroesophageal reflux disease (02/21/2019), Hiatal hernia (07/09/2019), History of colon polyps, Hypertension, Lung cancer, Mediastinal lymphadenopathy (03/12/2019), Normocytic anemia (08/08/2019), Prediabetes (07/09/2014), and Retention of urine (06/27/2019).     PCP: Reshma Alvarenga MD        Subjective      9/10/2023: Feeling well. Continues to have pain but not worse than usual. No more dyspnea or cough than before. Able to eat.      ROS:  Review of Systems   Constitutional:  Positive for fatigue. Negative for activity change, appetite change, chills, diaphoresis, fever and unexpected weight change.    HENT:  Negative for congestion, dental problem, drooling, ear discharge, ear pain, facial swelling, hearing loss, mouth sores, nosebleeds, postnasal drip, rhinorrhea, sinus pressure, sinus pain, sneezing, sore throat, tinnitus, trouble swallowing and voice change.    Eyes:  Negative for photophobia, pain, discharge, redness, itching and visual disturbance.   Respiratory:  Positive for shortness of breath. Negative for apnea, cough, choking, chest tightness, wheezing and stridor.    Cardiovascular:  Positive for chest pain. Negative for palpitations and leg swelling.   Gastrointestinal:  Negative for abdominal distention, abdominal pain, anal bleeding, blood in stool, constipation, diarrhea, nausea, rectal pain and vomiting.   Endocrine: Negative for cold intolerance, heat intolerance, polydipsia and polyuria.   Genitourinary:  Negative for decreased urine volume, difficulty urinating, dysuria, flank pain, frequency, genital sores, hematuria and urgency.   Musculoskeletal:  Negative for arthralgias, back pain, gait problem, joint swelling, myalgias, neck pain and neck stiffness.   Skin:  Negative for color change, pallor and rash.   Neurological:  Positive for weakness. Negative for dizziness, tremors, seizures, syncope, facial asymmetry, speech difficulty, light-headedness, numbness and headaches.   Hematological:  Negative for adenopathy. Does not bruise/bleed easily.   Psychiatric/Behavioral:  Negative for agitation, behavioral problems, confusion, decreased concentration, hallucinations, self-injury, sleep disturbance and suicidal ideas. The patient is not nervous/anxious.        MEDICATIONS:    Scheduled Meds:  buPROPion XL, 300 mg, Oral, Daily  cetirizine, 10 mg, Oral, Daily  desvenlafaxine, 50 mg, Oral, Daily  folic acid, 1 mg, Oral, Daily  gabapentin, 300 mg, Oral, Nightly  levothyroxine, 125 mcg, Oral, Q AM  liothyronine, 5 mcg, Oral, Daily  megestrol acetate, 400 mg, Oral, Daily  pantoprazole, 40 mg,  "Oral, QAM AC  [START ON 9/11/2023] potassium chloride, 20 mEq, Oral, Daily  senna-docusate sodium, 2 tablet, Oral, BID  sodium chloride, 10 mL, Intravenous, Q12H  sodium chloride, 1 g, Oral, TID With Meals  vitamin B-12, 1,000 mcg, Oral, Daily  vitamin B-6, 100 mg, Oral, Q12H        Continuous Infusions:        PRN Meds:    acetaminophen **OR** acetaminophen **OR** acetaminophen    ALPRAZolam    aluminum-magnesium hydroxide-simethicone    senna-docusate sodium **AND** polyethylene glycol **AND** bisacodyl **AND** bisacodyl    Calcium Replacement - Follow Nurse / BPA Driven Protocol    lactulose    Magnesium Standard Dose Replacement - Follow Nurse / BPA Driven Protocol    melatonin    [DISCONTINUED] ondansetron **OR** ondansetron    ondansetron    oxyCODONE    Phosphorus Replacement - Follow Nurse / BPA Driven Protocol    Potassium Replacement - Follow Nurse / BPA Driven Protocol    sodium chloride    sodium chloride    sodium chloride      ALLERGIES:  No Known Allergies           Objective       VITALS:   /72 (BP Location: Right arm, Patient Position: Lying)   Pulse 119   Temp 98 °F (36.7 °C) (Oral)   Resp 16   Ht 190.5 cm (75\")   Wt 66.7 kg (147 lb)   SpO2 97%   BMI 18.37 kg/m²      PHYSICAL EXAM: (performed by MD)  Physical Exam  Vitals and nursing note reviewed.   Constitutional:       General: He is not in acute distress.     Appearance: He is not ill-appearing, toxic-appearing or diaphoretic.      Comments: Slender. Conversant and oriented. No jaundice. Appears ill.    HENT:      Head: Normocephalic and atraumatic.      Right Ear: External ear normal.      Left Ear: External ear normal.      Nose: Nose normal.      Mouth/Throat:      Mouth: Mucous membranes are moist.      Pharynx: Oropharynx is clear.   Eyes:      General: No scleral icterus.        Right eye: No discharge.         Left eye: No discharge.      Conjunctiva/sclera: Conjunctivae normal.      Pupils: Pupils are equal, round, and " reactive to light.   Neck:      Thyroid: No thyromegaly.   Cardiovascular:      Rate and Rhythm: Normal rate and regular rhythm.      Pulses: Normal pulses.      Heart sounds: Normal heart sounds. No murmur heard.    No friction rub. No gallop.   Pulmonary:      Effort: Pulmonary effort is normal. No respiratory distress.      Breath sounds: No stridor. No wheezing, rhonchi or rales.      Comments: Breath sounds are diminished bilaterally to auscultation.   Chest:      Chest wall: No tenderness.   Abdominal:      General: Abdomen is flat. Bowel sounds are normal. There is no distension.      Palpations: Abdomen is soft. There is no mass.      Tenderness: There is no abdominal tenderness. There is no right CVA tenderness, left CVA tenderness, guarding or rebound.   Musculoskeletal:         General: No swelling, tenderness, deformity or signs of injury. Normal range of motion.      Cervical back: Normal range of motion and neck supple. No rigidity.      Right lower leg: No edema.      Left lower leg: No edema.   Lymphadenopathy:      Cervical: No cervical adenopathy.   Skin:     General: Skin is warm and dry.      Coloration: Skin is not jaundiced.      Findings: No bruising, erythema or rash.   Neurological:      General: No focal deficit present.      Mental Status: He is alert and oriented to person, place, and time.      Motor: No abnormal muscle tone.      Gait: Gait normal.   Psychiatric:         Mood and Affect: Mood normal.         Behavior: Behavior normal.         Thought Content: Thought content normal.         Judgment: Judgment normal.   ANDREW Ba MD performed the physical exam on 9/10/2023 as documented above.      RECENT LABS:  Lab Results (last 24 hours)         Procedure Component Value Units Date/Time     Manual Differential [[id number]]  (Abnormal) Collected: 09/10/23 0409     Specimen: Blood Updated: 09/10/23 0625       Neutrophil % 81.0 %         Lymphocyte % 1.0 %         Monocyte % 2.0 %          Bands %  14.0 %         Myelocyte % 2.0 %         Neutrophils Absolute 54.34 10*3/mm3         Lymphocytes Absolute 0.57 10*3/mm3         Monocytes Absolute 1.14 10*3/mm3         Anisocytosis Slight/1+       Poikilocytes Slight/1+       Toxic Granulation Slight/1+       Platelet Morphology Normal     CBC & Differential [[id number]]  (Abnormal) Collected: 09/10/23 0409     Specimen: Blood Updated: 09/10/23 0625     Narrative:       The following orders were created for panel order CBC & Differential.  Procedure                               Abnormality         Status                     ---------                               -----------         ------                     CBC Auto Differential[[id number]]        Abnormal            Final result               Scan Slide[[id number]]                                       Final result                  Please view results for these tests on the individual orders.     CBC Auto Differential [[id number]]  (Abnormal) Collected: 09/10/23 0409     Specimen: Blood Updated: 09/10/23 0625       WBC 57.20 10*3/mm3         RBC 2.74 10*6/mm3         Hemoglobin 7.7 g/dL         Hematocrit 24.2 %         MCV 88.2 fL         MCH 28.2 pg         MCHC 32.0 g/dL         RDW 18.4 %         RDW-SD 57.8 fl         MPV 7.2 fL         Platelets 318 10*3/mm3       Narrative:       The previously reported component NRBC is no longer being reported. Previous result was 0.1 /100 WBC (Reference Range: 0.0-0.2 /100 WBC) on 9/10/2023 at 0459 EDT.     Scan Slide [[id number]] Collected: 09/10/23 0409     Specimen: Blood Updated: 09/10/23 0625       Scan Slide --       Comment: See Manual Differential Results        Basic Metabolic Panel [[id number]]  (Abnormal) Collected: 09/10/23 0409     Specimen: Blood Updated: 09/10/23 0457       Glucose 98 mg/dL         BUN 9 mg/dL         Creatinine 0.60 mg/dL         Sodium 123 mmol/L         Potassium 3.4 mmol/L         Chloride 89 mmol/L         CO2 21.0 mmol/L          Calcium 8.9 mg/dL         BUN/Creatinine Ratio 15.0       Anion Gap 13.0 mmol/L         eGFR 111.2 mL/min/1.73       Narrative:       GFR Normal >60  Chronic Kidney Disease <60  Kidney Failure <15        Blood Culture - Blood, Arm, Left [[id number]]  (Normal) Collected: 09/06/23 1756     Specimen: Blood from Arm, Left Updated: 09/09/23 1815       Blood Culture No growth at 3 days     Blood Culture - Blood, Arm, Right [[id number]]  (Normal) Collected: 09/06/23 1756     Specimen: Blood from Arm, Right Updated: 09/09/23 1815       Blood Culture No growth at 3 days     Narrative:       Less than seven (7) mL's of blood was collected.  Insufficient quantity may yield false negative results.     Manual Differential [[id number]]  (Abnormal) Collected: 09/09/23 1116     Specimen: Blood Updated: 09/09/23 1223       Neutrophil % 94.0 %         Lymphocyte % 2.0 %         Monocyte % 4.0 %         Neutrophils Absolute 66.65 10*3/mm3         Lymphocytes Absolute 1.42 10*3/mm3         Monocytes Absolute 2.84 10*3/mm3         Anisocytosis Slight/1+       RBC Fragments Slight/1+       Toxic Granulation Slight/1+       Platelet Estimate Adequate       Large Platelets Slight/1+     CBC & Differential [[id number]]  (Abnormal) Collected: 09/09/23 1116     Specimen: Blood Updated: 09/09/23 1223     Narrative:       The following orders were created for panel order CBC & Differential.  Procedure                               Abnormality         Status                     ---------                               -----------         ------                     CBC Auto Differential[[id number]]        Abnormal            Final result               Scan Slide[[id number]]                                       Final result                  Please view results for these tests on the individual orders.     Scan Slide [[id number]] Collected: 09/09/23 1116     Specimen: Blood Updated: 09/09/23 1223       Scan Slide --       Comment: See Manual  Differential Results        CBC Auto Differential [[id number]]  (Abnormal) Collected: 09/09/23 1116     Specimen: Blood Updated: 09/09/23 1223       WBC 70.90 10*3/mm3         RBC 2.87 10*6/mm3         Hemoglobin 7.9 g/dL         Hematocrit 25.5 %         MCV 89.0 fL         MCH 27.4 pg         MCHC 30.8 g/dL         RDW 18.1 %         RDW-SD 59.5 fl         MPV 7.1 fL         Platelets 374 10*3/mm3       Narrative:       The previously reported component NRBC is no longer being reported. Previous result was 0.0 /100 WBC (Reference Range: 0.0-0.2 /100 WBC) on 9/9/2023 at 1150 EDT.     Basic Metabolic Panel [[id number]]  (Abnormal) Collected: 09/09/23 1116     Specimen: Blood Updated: 09/09/23 1200       Glucose 97 mg/dL         BUN 10 mg/dL         Creatinine 0.67 mg/dL         Sodium 126 mmol/L         Potassium 4.1 mmol/L         Chloride 92 mmol/L         CO2 21.0 mmol/L         Calcium 8.9 mg/dL         BUN/Creatinine Ratio 14.9       Anion Gap 13.0 mmol/L         eGFR 107.6 mL/min/1.73       Narrative:       GFR Normal >60  Chronic Kidney Disease <60  Kidney Failure <15                IMAGING REVIEWED:  No radiology results for the last day           Assessment & Plan     ASSESSMENT:  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  Upon recurrence patient underwent radiation therapy and was on Tecentriq.  Recently progressed through Enhertu.  Plan for initiating tepotinib due to MET amplification once clinically improved.  Leukocytosis with bandemia: Did not increase further. Likely a reactive phenomenon.    Anemia: Persists. No transfusion today.   Hyponatremia: Persistent in spite of treatment.      PLAN  CBC tomorrow. To start treatment once discharged.      Lowell Ba MD on 9/10/2023 at 11:01 AM.                                              Lowell Ba,  MD   Physician  Hematology     Progress Notes     Signed     Date of Service: 09/10/23 105  Creation Time: 09/10/23 1055     Signed       Expand All Collapse All            Hematology/Oncology Inpatient Progress Note     PATIENT NAME: Jc Driscoll  : 1965  MRN: [id number]     CHIEF COMPLAINT: Weakness     HISTORY OF PRESENT ILLNESS:       Jc Driscoll is a 58 y.o. male who presented to [facility] on 2023 with complaints of swelling and erythema to the left axilla, left chest wall, around the left chest wall port.  Past medical history significant for metastatic lung cancer.  He initially presented to his oncologist office the same day due to the above complaints along with progressive weakness, nausea and vomiting.  He was noted to have leukocytosis with a WBC of 77,000 and significant anemia with a hemoglobin of 5.5 g/dL.  For this reason he was asked to go to the emergency room for further evaluation and treatment.     Evaluation in the ED: Sodium 127, alkaline phosphatase 270, WBC 51.20, hemoglobin 7.2 g/dL, MCV 87.9, platelets 403,000, bands 15%, absolute lymphocytes 0.0.  Normal lactic acid.  Blood cultures drawn and pending.  Patient was initiated on IV antibiotics and admitted for further evaluation and treatment.     23  Hematology/Oncology was consulted on a patient known to us and established in the office with Dr. 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  The patient initially presented in  with moderately differentiated adenocarcinoma of the right lung for which he underwent concurrent chemotherapy and radiation neoadjuvantly followed by a right lower lobectomy.  This was followed by immunotherapy with durvalumab for 24 cycles ending in 2020.  In 2022, a routine surveillance CT of the chest revealed a left upper lobe pulmonary nodule for which a subsequent PET/CT was done that showed mild uptake  corresponding to the nodule.  Subsequent CT-guided biopsy was attempted but aborted due to findings by the radiologist that the lung nodularity was actually a clump of tiny nodules of which the largest was 6 mm and therefore biopsy could not be completed.  Also the area was in the vicinity of multiple blood vessels with increased risk of bleeding.  Follow-up CT of the chest was recommended for continued surveillance.  In May 2022, the patient underwent a CT-guided biopsy of the left enlarging nodule and pathology was positive for invasive poorly differentiated adenocarcinoma most consistent with an adenocarcinoma of pulmonary origin.  PET/CT showed a 2.3 cm hypermetabolic left upper lobe nodule and no convincing metastatic disease elsewhere.  Brain MRI was negative for intracranial metastasis.  On 7/6/2022 the patient underwent a left upper lobe lobectomy which was followed by adjuvant chemotherapy.  After completion of chemotherapy the patient was initiated on atezolizumab along with XRT.  Radiation was completed on 01/11/2023.  The patient was continued on Tecentriq every 3 weeks.  Due to his increasing pain in the left axilla and concern for progressive disease a PET/CT was performed on 6/5/2023, this revealed that the chest wall mass had increased in size since the previous study.  There was osseous destruction of the second rib.  Osseous metastatic changes to the left first and second rib.  Due to progressive disease Tecentriq was discontinued.  A plan to transition to adhere to was in place but was complicated due to multiple hospitalizations for pain and weakness.  On 7/10/2023 the patient was initiated on cycle 1 of Enhertu.  Due to shortness of breath the patient had a CT of the chest on 8/9/2023 which showed a significant increase in size of the left anterior chest wall mass with an increase in destruction of the adjacent ribs particularly the left third rib.  Due to progressive disease and hereto was  discontinued.  His malignancy has MET amplification and therefore it was planned to begin therapy with tepotinib.          Patient with recurrent left lung cancer.  Progressed on multiple lines of treatment.  Was recently placed on tepotinib for an MEK 2 mutation.  Patient has had progressive decline in clinical status.  He presented with nausea, failure to thrive decreased p.o. intake significant anxiety and constipation.  He was also found to have significant leukocytosis, erythematous left chest wall, slight swelling around the left Mediport but no redness or increase in warmth.  For this reason patient was admitted to the hospital to evaluate for implant infection, ID consultation was also requested     He/She  has a past medical history of Allergic rhinitis (07/25/2013), Anxiety and depression (06/05/2012), Chronic pansinusitis (04/08/2019), Diastolic CHF (07/09/2014), Dupuytren's contracture of both hands, Elevated cholesterol, Erosive esophagitis (07/09/2019), Gastroesophageal reflux disease (02/21/2019), Hiatal hernia (07/09/2019), History of colon polyps, Hypertension, Lung cancer, Mediastinal lymphadenopathy (03/12/2019), Normocytic anemia (08/08/2019), Prediabetes (07/09/2014), and Retention of urine (06/27/2019).     PCP: Reshma Alvarenga MD        Subjective      9/10/2023: Feeling well. Continues to have pain but not worse than usual. No more dyspnea or cough than before. Able to eat.      ROS:  Review of Systems   Constitutional:  Positive for fatigue. Negative for activity change, appetite change, chills, diaphoresis, fever and unexpected weight change.   HENT:  Negative for congestion, dental problem, drooling, ear discharge, ear pain, facial swelling, hearing loss, mouth sores, nosebleeds, postnasal drip, rhinorrhea, sinus pressure, sinus pain, sneezing, sore throat, tinnitus, trouble swallowing and voice change.    Eyes:  Negative for photophobia, pain, discharge, redness, itching and visual  disturbance.   Respiratory:  Positive for shortness of breath. Negative for apnea, cough, choking, chest tightness, wheezing and stridor.    Cardiovascular:  Positive for chest pain. Negative for palpitations and leg swelling.   Gastrointestinal:  Negative for abdominal distention, abdominal pain, anal bleeding, blood in stool, constipation, diarrhea, nausea, rectal pain and vomiting.   Endocrine: Negative for cold intolerance, heat intolerance, polydipsia and polyuria.   Genitourinary:  Negative for decreased urine volume, difficulty urinating, dysuria, flank pain, frequency, genital sores, hematuria and urgency.   Musculoskeletal:  Negative for arthralgias, back pain, gait problem, joint swelling, myalgias, neck pain and neck stiffness.   Skin:  Negative for color change, pallor and rash.   Neurological:  Positive for weakness. Negative for dizziness, tremors, seizures, syncope, facial asymmetry, speech difficulty, light-headedness, numbness and headaches.   Hematological:  Negative for adenopathy. Does not bruise/bleed easily.   Psychiatric/Behavioral:  Negative for agitation, behavioral problems, confusion, decreased concentration, hallucinations, self-injury, sleep disturbance and suicidal ideas. The patient is not nervous/anxious.        MEDICATIONS:    Scheduled Meds:  buPROPion XL, 300 mg, Oral, Daily  cetirizine, 10 mg, Oral, Daily  desvenlafaxine, 50 mg, Oral, Daily  folic acid, 1 mg, Oral, Daily  gabapentin, 300 mg, Oral, Nightly  levothyroxine, 125 mcg, Oral, Q AM  liothyronine, 5 mcg, Oral, Daily  megestrol acetate, 400 mg, Oral, Daily  pantoprazole, 40 mg, Oral, QAM AC  [START ON 9/11/2023] potassium chloride, 20 mEq, Oral, Daily  senna-docusate sodium, 2 tablet, Oral, BID  sodium chloride, 10 mL, Intravenous, Q12H  sodium chloride, 1 g, Oral, TID With Meals  vitamin B-12, 1,000 mcg, Oral, Daily  vitamin B-6, 100 mg, Oral, Q12H        Continuous Infusions:        PRN Meds:    acetaminophen **OR**  "acetaminophen **OR** acetaminophen    ALPRAZolam    aluminum-magnesium hydroxide-simethicone    senna-docusate sodium **AND** polyethylene glycol **AND** bisacodyl **AND** bisacodyl    Calcium Replacement - Follow Nurse / BPA Driven Protocol    lactulose    Magnesium Standard Dose Replacement - Follow Nurse / BPA Driven Protocol    melatonin    [DISCONTINUED] ondansetron **OR** ondansetron    ondansetron    oxyCODONE    Phosphorus Replacement - Follow Nurse / BPA Driven Protocol    Potassium Replacement - Follow Nurse / BPA Driven Protocol    sodium chloride    sodium chloride    sodium chloride      ALLERGIES:  No Known Allergies           Objective       VITALS:   /72 (BP Location: Right arm, Patient Position: Lying)   Pulse 119   Temp 98 °F (36.7 °C) (Oral)   Resp 16   Ht 190.5 cm (75\")   Wt 66.7 kg (147 lb)   SpO2 97%   BMI 18.37 kg/m²      PHYSICAL EXAM: (performed by MD)  Physical Exam  Vitals and nursing note reviewed.   Constitutional:       General: He is not in acute distress.     Appearance: He is not ill-appearing, toxic-appearing or diaphoretic.      Comments: Slender. Conversant and oriented. No jaundice. Appears ill.    HENT:      Head: Normocephalic and atraumatic.      Right Ear: External ear normal.      Left Ear: External ear normal.      Nose: Nose normal.      Mouth/Throat:      Mouth: Mucous membranes are moist.      Pharynx: Oropharynx is clear.   Eyes:      General: No scleral icterus.        Right eye: No discharge.         Left eye: No discharge.      Conjunctiva/sclera: Conjunctivae normal.      Pupils: Pupils are equal, round, and reactive to light.   Neck:      Thyroid: No thyromegaly.   Cardiovascular:      Rate and Rhythm: Normal rate and regular rhythm.      Pulses: Normal pulses.      Heart sounds: Normal heart sounds. No murmur heard.    No friction rub. No gallop.   Pulmonary:      Effort: Pulmonary effort is normal. No respiratory distress.      Breath sounds: No " stridor. No wheezing, rhonchi or rales.      Comments: Breath sounds are diminished bilaterally to auscultation.   Chest:      Chest wall: No tenderness.   Abdominal:      General: Abdomen is flat. Bowel sounds are normal. There is no distension.      Palpations: Abdomen is soft. There is no mass.      Tenderness: There is no abdominal tenderness. There is no right CVA tenderness, left CVA tenderness, guarding or rebound.   Musculoskeletal:         General: No swelling, tenderness, deformity or signs of injury. Normal range of motion.      Cervical back: Normal range of motion and neck supple. No rigidity.      Right lower leg: No edema.      Left lower leg: No edema.   Lymphadenopathy:      Cervical: No cervical adenopathy.   Skin:     General: Skin is warm and dry.      Coloration: Skin is not jaundiced.      Findings: No bruising, erythema or rash.   Neurological:      General: No focal deficit present.      Mental Status: He is alert and oriented to person, place, and time.      Motor: No abnormal muscle tone.      Gait: Gait normal.   Psychiatric:         Mood and Affect: Mood normal.         Behavior: Behavior normal.         Thought Content: Thought content normal.         Judgment: Judgment normal.   ANDREW Ba MD performed the physical exam on 9/10/2023 as documented above.      RECENT LABS:  Lab Results (last 24 hours)         Procedure Component Value Units Date/Time     Manual Differential [[id number]]  (Abnormal) Collected: 09/10/23 0409     Specimen: Blood Updated: 09/10/23 0625       Neutrophil % 81.0 %         Lymphocyte % 1.0 %         Monocyte % 2.0 %         Bands %  14.0 %         Myelocyte % 2.0 %         Neutrophils Absolute 54.34 10*3/mm3         Lymphocytes Absolute 0.57 10*3/mm3         Monocytes Absolute 1.14 10*3/mm3         Anisocytosis Slight/1+       Poikilocytes Slight/1+       Toxic Granulation Slight/1+       Platelet Morphology Normal     CBC & Differential [[id number]]   (Abnormal) Collected: 09/10/23 0409     Specimen: Blood Updated: 09/10/23 0625     Narrative:       The following orders were created for panel order CBC & Differential.  Procedure                               Abnormality         Status                     ---------                               -----------         ------                     CBC Auto Differential[[id number]]        Abnormal            Final result               Scan Slide[[id number]]                                       Final result                  Please view results for these tests on the individual orders.     CBC Auto Differential [[id number]]  (Abnormal) Collected: 09/10/23 0409     Specimen: Blood Updated: 09/10/23 0625       WBC 57.20 10*3/mm3         RBC 2.74 10*6/mm3         Hemoglobin 7.7 g/dL         Hematocrit 24.2 %         MCV 88.2 fL         MCH 28.2 pg         MCHC 32.0 g/dL         RDW 18.4 %         RDW-SD 57.8 fl         MPV 7.2 fL         Platelets 318 10*3/mm3       Narrative:       The previously reported component NRBC is no longer being reported. Previous result was 0.1 /100 WBC (Reference Range: 0.0-0.2 /100 WBC) on 9/10/2023 at 0459 EDT.     Scan Slide [[id number]] Collected: 09/10/23 0409     Specimen: Blood Updated: 09/10/23 0625       Scan Slide --       Comment: See Manual Differential Results        Basic Metabolic Panel [[id number]]  (Abnormal) Collected: 09/10/23 0409     Specimen: Blood Updated: 09/10/23 0457       Glucose 98 mg/dL         BUN 9 mg/dL         Creatinine 0.60 mg/dL         Sodium 123 mmol/L         Potassium 3.4 mmol/L         Chloride 89 mmol/L         CO2 21.0 mmol/L         Calcium 8.9 mg/dL         BUN/Creatinine Ratio 15.0       Anion Gap 13.0 mmol/L         eGFR 111.2 mL/min/1.73       Narrative:       GFR Normal >60  Chronic Kidney Disease <60  Kidney Failure <15        Blood Culture - Blood, Arm, Left [[id number]]  (Normal) Collected: 09/06/23 1756     Specimen: Blood from Arm, Left Updated:  09/09/23 1815       Blood Culture No growth at 3 days     Blood Culture - Blood, Arm, Right [[id number]]  (Normal) Collected: 09/06/23 1756     Specimen: Blood from Arm, Right Updated: 09/09/23 1815       Blood Culture No growth at 3 days     Narrative:       Less than seven (7) mL's of blood was collected.  Insufficient quantity may yield false negative results.     Manual Differential [[id number]]  (Abnormal) Collected: 09/09/23 1116     Specimen: Blood Updated: 09/09/23 1223       Neutrophil % 94.0 %         Lymphocyte % 2.0 %         Monocyte % 4.0 %         Neutrophils Absolute 66.65 10*3/mm3         Lymphocytes Absolute 1.42 10*3/mm3         Monocytes Absolute 2.84 10*3/mm3         Anisocytosis Slight/1+       RBC Fragments Slight/1+       Toxic Granulation Slight/1+       Platelet Estimate Adequate       Large Platelets Slight/1+     CBC & Differential [[id number]]  (Abnormal) Collected: 09/09/23 1116     Specimen: Blood Updated: 09/09/23 1223     Narrative:       The following orders were created for panel order CBC & Differential.  Procedure                               Abnormality         Status                     ---------                               -----------         ------                     CBC Auto Differential[[id number]]        Abnormal            Final result               Scan Slide[[id number]]                                       Final result                  Please view results for these tests on the individual orders.     Scan Slide [[id number]] Collected: 09/09/23 1116     Specimen: Blood Updated: 09/09/23 1223       Scan Slide --       Comment: See Manual Differential Results        CBC Auto Differential [[id number]]  (Abnormal) Collected: 09/09/23 1116     Specimen: Blood Updated: 09/09/23 1223       WBC 70.90 10*3/mm3         RBC 2.87 10*6/mm3         Hemoglobin 7.9 g/dL         Hematocrit 25.5 %         MCV 89.0 fL         MCH 27.4 pg         MCHC 30.8 g/dL         RDW 18.1 %          RDW-SD 59.5 fl         MPV 7.1 fL         Platelets 374 10*3/mm3       Narrative:       The previously reported component NRBC is no longer being reported. Previous result was 0.0 /100 WBC (Reference Range: 0.0-0.2 /100 WBC) on 9/9/2023 at 1150 EDT.     Basic Metabolic Panel [[id number]]  (Abnormal) Collected: 09/09/23 1116     Specimen: Blood Updated: 09/09/23 1200       Glucose 97 mg/dL         BUN 10 mg/dL         Creatinine 0.67 mg/dL         Sodium 126 mmol/L         Potassium 4.1 mmol/L         Chloride 92 mmol/L         CO2 21.0 mmol/L         Calcium 8.9 mg/dL         BUN/Creatinine Ratio 14.9       Anion Gap 13.0 mmol/L         eGFR 107.6 mL/min/1.73       Narrative:       GFR Normal >60  Chronic Kidney Disease <60  Kidney Failure <15                IMAGING REVIEWED:  No radiology results for the last day           Assessment & Plan     ASSESSMENT:  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  Upon recurrence patient underwent radiation therapy and was on Tecentriq.  Recently progressed through Enhertu.  Plan for initiating tepotinib due to MET amplification once clinically improved.  Leukocytosis with bandemia: Did not increase further. Likely a reactive phenomenon.    Anemia: Persists. No transfusion today.   Hyponatremia: Persistent in spite of treatment.      PLAN  CBC tomorrow. To start treatment once discharged.      Lowell Ba MD on 9/10/2023 at 11:01 AM.                                             Marvel Rodriguez MD   Physician  Pulmonary Rehabilitation     Consults     Signed     Date of Service: 09/10/23 1210  Creation Time: 09/10/23 1210  Consult Orders   Inpatient Pulmonology Consult [[id number]] ordered by Ezequiel Mccollum DO at 09/10/23 0849          Signed       Expand All Collapse All    Group: [facility]                                                                  CONSULT NOTE     Patient Identification:  Jc Driscoll  58 y.o.  male  1965  [id number]             Requesting physician: Attending physician     Reason for Consultation: Lung infiltrates, pneumonia versus lung cancer        History of Present Illness:  58-year-old male with history of metastatic lung carcinoma, chronic anemia, diastolic CHF, GERD and hypertension who presented 9/6/2023 from the oncologist office due to anemia with hemoglobin of 5.5, reported swelling around the port and left axillary area with chronic shortness of breath.  On presentation his WBCs were 51.2 and hemoglobin 7.2 with sodium 127.  Today WBC is 57.2, hemoglobin 7.7, sodium 123, potassium 3.4        Assessment:  Sepsis  Leukocytosis: Likely reactive due to the malignancy: Currently no evidence of active pulm infection  Metastatic poorly differentiated adenocarcinoma of the lung with a relapse: Status post chemotherapy, radiation therapy and immunotherapy: CT scan of the chest 9/6/2023 showed interval increase in the left anterior chest wall mass with bone metastasis to the ribs and consolidation left lung apex with small pleural effusion  Anemia  Hyponatremia  Hypothyroidism  Poor p.o. intake     Recommendations:  Currently off antibiotics as per ID recommendation: Malignancy is the most likely reason for the leukocytosis, no signs of acute infection  Patient is stable from pulmonary standpoint to be discharged home  Oxygen supplement and titration to maintain saturation 90 to 95%     Thyroid hormone replacement  Keep follow-up with oncologist          Review of Sytems:  Constitutional: Negative for chills, fever and malaise/fatigue.   HENT: Negative.    Eyes: Negative.    Cardiovascular: Negative.    Respiratory: Negative for cough and shortness of breath.    Skin: Negative.    Musculoskeletal: Negative.    Gastrointestinal: Negative.    Genitourinary: Negative.    Neurological: Negative.     Psychiatric/Behavioral: Negative.     Past Medical History:  Medical History        Past Medical History:   Diagnosis Date    Allergic rhinitis 07/25/2013     Overview:  4/2/2018 - still zyrtec 10 daily (if stops, gets dermatitis again).     Anxiety and depression 06/05/2012     Overview:  4/2/2018 -   C/w well 300 xr and Lito 20.  4/8/2019 -   Doing ok even with new lung cancer - lito 20 & well 300xr.     Chronic pansinusitis 04/08/2019     Overview:  4/8/2019 -   MRI showed chronic thick in frontal, maxillary, ethmoidal ---> to Dr. COLLAZO to est since abx ICC didn't help.  Does netipot bid.     Diastolic CHF 07/09/2014     Overview:  7/4/14 - impaired LV relaxation on [place] ECHO.  EF 60%. Rest normal    Dupuytren's contracture of both hands      Elevated cholesterol      Erosive esophagitis 07/09/2019     Overview:  EGD 2016 4/2/2018 - nexium OTC daily for few week.  Qod before that.  Now carbonation hurts, epigastric pain 4/8/2019 -   protonix 40 doing well.     Gastroesophageal reflux disease 02/21/2019    Hiatal hernia 07/09/2019    History of colon polyps      Hypertension      Lung cancer       right lung and in lymph nodes x2    Mediastinal lymphadenopathy 03/12/2019    Normocytic anemia 08/08/2019     Overview:  6/2019 - dropped to 9's postop 7/2019 - rebounded to 10's.  8/8/2019 -   Back to 9's - check ferritin, b12 and thyroid.     Prediabetes 07/09/2014     Overview:  7/4/14 - a1c 6.1 at [place] admission    Retention of urine 06/27/2019     PSOT OP, RESOLVED            Past Surgical History:  Surgical History         Past Surgical History:   Procedure Laterality Date    BRONCHOSCOPY   03/18/2019     EBUS MONTERO NEEDLE BX    COLONOSCOPY        DUPUYTREN CONTRACTURE RELEASE Bilateral      LUNG BIOPSY   03/08/2019     CT GUIDED    LUNG REMOVAL, PARTIAL Right       right lower    PORTACATH PLACEMENT   03/20/2019     DR LONGORIA    THORACOSCOPY Right 6/26/2019     Procedure: RIGHT VATS, OPEN LOWER LOBECTOMY WITH  LYMPH NODE DISECTION, WEDGE RESECTION OF RIGHT MIDDLE LOBE;  Surgeon: Terrance Fuchs MD;  Location: [facility] MAIN OR;  Service: Cardiothoracic    THORACOSCOPY VIDEO ASSISTED WITH LOBECTOMY Left 7/6/2022     Procedure: THORACOSCOPY VIDEO ASSISTED WITH LOBECTOMY;  Surgeon: Miryam Reyna MD;  Location: [facility] MAIN OR;  Service: Thoracic;  Laterality: Left;            Home Meds:  Prescriptions Prior to Admission             Facility-Administered Medications Prior to Admission   Medication Dose Route Frequency Provider Last Rate Last Admin    [COMPLETED] ondansetron (ZOFRAN) injection 8 mg  8 mg Intravenous Once Sim ShayyMULUGETA Pleitez   8 mg at 09/06/23 1505              Medications Prior to Admission   Medication Sig Dispense Refill Last Dose    buPROPion XL (WELLBUTRIN XL) 300 MG 24 hr tablet Take 1 tablet by mouth Daily.          desvenlafaxine (PRISTIQ) 50 MG 24 hr tablet Take 1 tablet by mouth Daily.          folic acid (FOLVITE) 1 MG tablet Take 1 tablet by mouth Daily. 30 tablet 3      gabapentin (NEURONTIN) 300 MG capsule Take 1 capsule by mouth Every Night.          lactulose (CHRONULAC) 10 GM/15ML solution Take 30 mL by mouth 2 (Two) Times a Day As Needed.          levoFLOXacin (Levaquin) 500 MG tablet Take 1 tablet by mouth Daily. 10 tablet 0      levothyroxine (SYNTHROID, LEVOTHROID) 125 MCG tablet Take 1 tablet by mouth Every Morning. 30 tablet 0      liothyronine (CYTOMEL) 5 MCG tablet Take 1 tablet by mouth Daily.          loratadine (CLARITIN) 10 MG tablet Take 1 tablet by mouth Daily.          megestrol acetate (MEGACE) 400 MG/10ML suspension oral suspension Take 10 mL by mouth Daily. 240 mL 3      ondansetron (ZOFRAN) 8 MG tablet Take 1 tablet by mouth 3 (Three) Times a Day As Needed for Nausea or Vomiting. 30 tablet 5      oxyCODONE-acetaminophen (PERCOCET)  MG per tablet Take 1 tablet by mouth Every 4 (Four) Hours As Needed for Moderate Pain. 60 tablet 0      pantoprazole (PROTONIX) 40  "MG EC tablet Take 1 tablet by mouth Daily.          potassium chloride (K-DUR,KLOR-CON) 20 MEQ CR tablet Take 1 tablet by mouth Daily.          vitamin B-12 (CYANOCOBALAMIN) 1000 MCG tablet Take 1 tablet by mouth Daily.          vitamin B-6 (PYRIDOXINE) 100 MG tablet Take 1 tablet by mouth Every 12 (Twelve) Hours. 60 tablet 6              Allergies:  No Known Allergies     Social History:   Social History   Social History            Socioeconomic History    Marital status:    Tobacco Use    Smoking status: Former       Types: Cigarettes       Quit date: 2009       Years since quittin.0    Smokeless tobacco: Never   Vaping Use    Vaping Use: Never used   Substance and Sexual Activity    Alcohol use: Yes       Alcohol/week: 2.0 standard drinks       Types: 2 Cans of beer per week       Comment: Occasional    Drug use: No    Sexual activity: Defer            Family History:        Family History   Problem Relation Age of Onset    Cancer Mother           cervical cancer    Cervical cancer Mother      Cancer Father           lung cancer    Lung cancer Father      Lung cancer Sister      Cancer Sister           lung cancer    Malig Hyperthermia Neg Hx           Physical Exam:  /72 (BP Location: Right arm, Patient Position: Lying)   Pulse 119   Temp 98 °F (36.7 °C) (Oral)   Resp 16   Ht 190.5 cm (75\")   Wt 66.7 kg (147 lb)   SpO2 97%   BMI 18.37 kg/m²  Body mass index is 18.37 kg/m². 97% 66.7 kg (147 lb)  General Appearance:  Alert   HEENT:  Normocephalic, without obvious abnormality, Conjunctiva/corneas clear,.   Nares normal, no drainage     Neck:  Supple, symmetrical, trachea midline. No JVD.  Lungs /Chest wall:   Good air entry bilaterally with minimal left apical crackles, respirations unlabored, symmetrical wall movement.   Chest wall mass in the left anterior part  Heart:  Regular rate and rhythm, S1 S2 normal  Abdomen: Soft, non-tender, no masses, no organomegaly.    Extremities: No " edema, no clubbing or cyanosis     LABS:        Lab Results   Component Value Date     CALCIUM 8.9 09/10/2023     PHOS 5.5 (H) 07/06/2023                 Results from last 7 days   Lab Units 09/10/23  0409 09/09/23  1116 09/08/23  2246 09/08/23  1005 09/07/23  0041 09/06/23  1729 09/06/23  1435   SODIUM mmol/L 123* 126* 125* 126*   < > 127* 127*   POTASSIUM mmol/L 3.4* 4.1 4.2 4.3   < > 3.4* 3.4*   CHLORIDE mmol/L 89* 92* 92* 91*   < > 89* 87*   CO2 mmol/L 21.0* 21.0* 19.0* 21.0*   < > 22.0 22.0   BUN mg/dL 9 10 9 8   < > 10 9   CREATININE mg/dL 0.60* 0.67* 0.63* 0.61*   < > 0.76 0.76   GLUCOSE mg/dL 98 97 81 89   < > 87 88   CALCIUM mg/dL 8.9 8.9 9.1 9.3   < > 8.6 9.1   WBC 10*3/mm3 57.20* 70.90* 63.20* 73.30*   < > 51.20* 77.96*   HEMOGLOBIN g/dL 7.7* 7.9* 8.4* 7.1*   < > 7.2* 5.5*   PLATELETS 10*3/mm3 318 374 383 442   < > 403 451*   ALT (SGPT) U/L  --   --   --  14  --  17 18   AST (SGOT) U/L  --   --   --  16  --  24 25   PROCALCITONIN ng/mL  --   --   --   --   --  0.60*  --     < > = values in this interval not displayed.            Lab Results   Component Value Date     CKTOTAL 29 07/06/2023     TROPONINT 11 07/05/2023                Results from last 7 days   Lab Units 09/06/23  1756 09/06/23  1436   BLOODCX   No growth at 3 days  No growth at 3 days No growth at 3 days             Results from last 7 days   Lab Units 09/07/23  0041 09/06/23  1738 09/06/23  1729   PROCALCITONIN ng/mL  --   --  0.60*   LACTATE mmol/L 0.9 1.4  --                         Results from last 7 days   Lab Units 09/06/23  1756 09/06/23  1436   BLOODCX   No growth at 3 days  No growth at 3 days No growth at 3 days            Lab Results   Component Value Date     TSH 3.960 07/06/2023      Estimated Creatinine Clearance: 125.1 mL/min (A) (by C-G formula based on SCr of 0.6 mg/dL (L)).       Results from last 7 days   Lab Units 09/06/23  2320   NITRITE UA   Negative                    Imaging:  Imaging Results (Last 24 Hours)          ** No results found for the last 24 hours. **                   Current Meds:   SCHEDULE  buPROPion XL, 300 mg, Oral, Daily  cetirizine, 10 mg, Oral, Daily  desvenlafaxine, 50 mg, Oral, Daily  folic acid, 1 mg, Oral, Daily  gabapentin, 300 mg, Oral, Nightly  levothyroxine, 125 mcg, Oral, Q AM  liothyronine, 5 mcg, Oral, Daily  megestrol acetate, 400 mg, Oral, Daily  pantoprazole, 40 mg, Oral, QAM AC  [START ON 2023] potassium chloride, 20 mEq, Oral, Daily  senna-docusate sodium, 2 tablet, Oral, BID  sodium chloride, 10 mL, Intravenous, Q12H  sodium chloride, 1 g, Oral, TID With Meals  Urea, 15 g, Oral, BID  vitamin B-12, 1,000 mcg, Oral, Daily  vitamin B-6, 100 mg, Oral, Q12H        Infusions  PRNs    acetaminophen **OR** acetaminophen **OR** acetaminophen    ALPRAZolam    aluminum-magnesium hydroxide-simethicone    senna-docusate sodium **AND** polyethylene glycol **AND** bisacodyl **AND** bisacodyl    Calcium Replacement - Follow Nurse / BPA Driven Protocol    lactulose    Magnesium Standard Dose Replacement - Follow Nurse / BPA Driven Protocol    melatonin    [DISCONTINUED] ondansetron **OR** ondansetron    ondansetron    oxyCODONE    Phosphorus Replacement - Follow Nurse / BPA Driven Protocol    Potassium Replacement - Follow Nurse / BPA Driven Protocol    sodium chloride    sodium chloride    sodium chloride           Marvel Rodriguez MD  9/10/2023  12:10 EDT        Much of this encounter note is an electronic transcription/translation of spoken language to printed text using Dragon Software.                       Clarence Clarke MD   Physician  Nephrology     Consults     Signed     Date of Service: 09/10/23 1658  Creation Time: 09/10/23 1658  Consult Orders   Inpatient Nephrology Consult [[id number]] ordered by Ezequiel cMcollum DO at 09/10/23 0848          Signed       Expand All [facility] All       [facility] Consult Note       Patient Name: Jc Driscoll  : 1965  MRN:  [id number]  Primary Care Physician:  Reshma Alvarenga MD  Referring Physician: Reshma Alvarenga MD  Date of admission: 9/6/2023     Subjective      Reason for Consult: Hyponatremia     HPI:   Jc Driscoll is a 58 y.o. 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  Oncology for his underlying malignancy.     Patient complains about fatigue weakness and marked inability to perform daily activities associated with nausea and emesis with work-up showing a worsening anemia with hemoglobin 5.5 and severe leukocytosis 77,000 reason why he was admitted further management during his admission found to have.  Worsening hyponatremia.     Nephrology consultation was requested for the management     Review of Systems:   14 point review of systems is otherwise negative except for mentioned above on HPI     Personal History      Medical History        Past Medical History:   Diagnosis Date    Allergic rhinitis 07/25/2013     Overview:  4/2/2018 - still zyrtec 10 daily (if stops, gets dermatitis again).     Anxiety and depression 06/05/2012     Overview:  4/2/2018 -   C/w well 300 xr and Lito 20.  4/8/2019 -   Doing ok even with new lung cancer - lito 20 & well 300xr.     Chronic pansinusitis 04/08/2019     Overview:  4/8/2019 -   MRI showed chronic thick in frontal, maxillary, ethmoidal ---> to Dr. COLLAZO to est since abx ICC didn't help.  Does netipot bid.     Diastolic CHF 07/09/2014     Overview:  7/4/14 - impaired LV relaxation on Zhou ECHO.  EF 60%. Rest normal     Dupuytren's contracture of both hands      Elevated cholesterol      Erosive esophagitis 07/09/2019     Overview:  EGD 2016 4/2/2018 - nexium OTC daily for few week.  Qod before that.  Now carbonation hurts, epigastric pain 4/8/2019 -   protonix 40 doing well.     Gastroesophageal reflux disease 02/21/2019    Hiatal hernia 07/09/2019    History of colon polyps      Hypertension      Lung cancer       right lung and in lymph nodes x2    Mediastinal lymphadenopathy 03/12/2019    Normocytic anemia 08/08/2019     Overview:  6/2019 - dropped to 9's postop 7/2019 - rebounded to 10's.  8/8/2019 -   Back to 9's - check ferritin, b12 and thyroid.     Prediabetes 07/09/2014     Overview:  7/4/14 - a1c 6.1 at [place] admission    Retention of urine 06/27/2019     PSOT OP, RESOLVED            Surgical History         Past Surgical History:   Procedure Laterality Date    BRONCHOSCOPY   03/18/2019     EBUS MONTERO NEEDLE BX    COLONOSCOPY        DUPUYTREN CONTRACTURE RELEASE Bilateral      LUNG BIOPSY   03/08/2019     CT GUIDED    LUNG REMOVAL, PARTIAL Right       right lower    PORTACATH PLACEMENT   03/20/2019     DR LONGORIA    THORACOSCOPY Right 6/26/2019     Procedure: RIGHT VATS, OPEN LOWER LOBECTOMY WITH LYMPH NODE DISECTION, WEDGE RESECTION OF RIGHT MIDDLE LOBE;  Surgeon: Terrance Longoria MD;  Location: [facility];  Service: Cardiothoracic    THORACOSCOPY VIDEO ASSISTED WITH LOBECTOMY Left 7/6/2022     Procedure: THORACOSCOPY VIDEO ASSISTED WITH LOBECTOMY;  Surgeon: Miryam Reyna MD;  Location: [facility] OR;  Service: Thoracic;  Laterality: Left;            Family History: family history includes Cancer in his father, mother, and sister; Cervical cancer in his mother; Lung cancer in his father and sister.     Social History:  reports that he quit smoking about 14 years ago. His smoking use included cigarettes. He has never used smokeless tobacco. He reports current alcohol use of about 2.0 standard drinks per week.  He reports that he does not use drugs.     Home Medications:          Prior to Admission medications    Medication Sig Start Date End Date Taking? Authorizing Provider   buPROPion XL (WELLBUTRIN XL) 300 MG 24 hr tablet Take 1 tablet by mouth Daily.     Yes Lloyd Rasmussen MD   desvenlafaxine (PRISTIQ) 50 MG 24 hr tablet Take 1 tablet by mouth Daily. 12/31/19   Yes Lloyd Rasmussen MD   folic acid (FOLVITE) 1 MG tablet Take 1 tablet by mouth Daily. 8/2/23   Yes Shayy Montemayor APRN   gabapentin (NEURONTIN) 300 MG capsule Take 1 capsule by mouth Every Night.     Yes Lloyd Rasmussen MD   lactulose (CHRONULAC) 10 GM/15ML solution Take 30 mL by mouth 2 (Two) Times a Day As Needed.     Yes Lloyd Rasmussen MD   levoFLOXacin (Levaquin) 500 MG tablet Take 1 tablet by mouth Daily. 9/1/23   Yes Azucena Gaspar MD   levothyroxine (SYNTHROID, LEVOTHROID) 125 MCG tablet Take 1 tablet by mouth Every Morning. 6/23/23   Yes Trinidad Del Angel MD   liothyronine (CYTOMEL) 5 MCG tablet Take 1 tablet by mouth Daily. 3/25/22   Yes Lloyd Rasmussen MD   loratadine (CLARITIN) 10 MG tablet Take 1 tablet by mouth Daily.     Yes Lloyd Rasmussen MD   megestrol acetate (MEGACE) 400 MG/10ML suspension oral suspension Take 10 mL by mouth Daily. 8/24/23   Yes Azucena Gaspar MD   ondansetron (ZOFRAN) 8 MG tablet Take 1 tablet by mouth 3 (Three) Times a Day As Needed for Nausea or Vomiting. 8/30/23   Yes Azucena Gaspar MD   oxyCODONE-acetaminophen (PERCOCET)  MG per tablet Take 1 tablet by mouth Every 4 (Four) Hours As Needed for Moderate Pain. 8/22/23   Yes Shayy Montemayor APRN   pantoprazole (PROTONIX) 40 MG EC tablet Take 1 tablet by mouth Daily.     Yes Lloyd Rasmussen MD   potassium chloride (K-DUR,KLOR-CON) 20 MEQ CR tablet Take 1 tablet by mouth Daily.     Yes Lloyd Rasmussen MD   vitamin B-12 (CYANOCOBALAMIN) 1000 MCG tablet Take 1 tablet by mouth Daily.      Yes Provider, MD Lloyd   vitamin B-6 (PYRIDOXINE) 100 MG tablet Take 1 tablet by mouth Every 12 (Twelve) Hours. 3/1/23   Yes Azucena Gaspar MD         Allergies:  No Known Allergies     Objective      Vitals:   Temp:  [96.9 °F (36.1 °C)-98.6 °F (37 °C)] 97.2 °F (36.2 °C)  Heart Rate:  [115-127] 115  Resp:  [16-26] 25  BP: ()/(65-77) 92/66     Intake/Output Summary (Last 24 hours) at 9/10/2023 1659  Last data filed at 9/10/2023 1300      Gross per 24 hour   Intake 480 ml   Output 600 ml   Net -120 ml         Physical Exam:   Constitutional: Chronically ill and deconditioned with bilateral temporal wasting  HEENT: Sclera anicteric, no conjunctival injection  Neck: Supple, no thyromegaly, no lymphadenopathy, trachea at midline, no JVD  Respiratory: Fine crackles bibasal  Chest left upper chest Mediport in place  Cardiovascular: RRR, w HOA   Gastrointestinal: Positive bowel sounds, abdomen is soft, nontender and nondistended  : No palpable bladder  Musculoskeletal: No edema, no clubbing or cyanosis  Psychiatric: Appropriate affect, cooperative  Neurologic: Oriented x3, moving all extremities, normal speech and mental status  Skin: Warm and dry            Scheduled Meds:         buPROPion XL, 300 mg, Oral, Daily  cetirizine, 10 mg, Oral, Daily  desvenlafaxine, 50 mg, Oral, Daily  folic acid, 1 mg, Oral, Daily  gabapentin, 300 mg, Oral, Nightly  levothyroxine, 125 mcg, Oral, Q AM  liothyronine, 5 mcg, Oral, Daily  megestrol acetate, 400 mg, Oral, Daily  pantoprazole, 40 mg, Oral, QAM AC  [START ON 9/11/2023] potassium chloride, 20 mEq, Oral, Daily  senna-docusate sodium, 2 tablet, Oral, BID  sodium chloride, 10 mL, Intravenous, Q12H  sodium chloride, 1 g, Oral, TID With Meals  Urea, 15 g, Oral, BID  vitamin B-12, 1,000 mcg, Oral, Daily  vitamin B-6, 100 mg, Oral, Q12H        IV Meds:      Results Reviewed:   I have personally reviewed the results from the time of this admission to 9/10/2023 16:59  EDT             Results from last 7 days   Lab Units 09/10/23  0409 09/09/23  1116 09/08/23  2246   WBC 10*3/mm3 57.20* 70.90* 63.20*   HEMOGLOBIN g/dL 7.7* 7.9* 8.4*   HEMATOCRIT % 24.2* 25.5* 26.6*   PLATELETS 10*3/mm3 318 374 383   MONOCYTES % % 2.0* 4.0* 5.0                  Lab Results   Component Value Date     GLUCOSE 98 09/10/2023     CALCIUM 8.9 09/10/2023      (L) 09/10/2023     K 4.0 09/10/2023     CO2 21.0 (L) 09/10/2023     CL 89 (L) 09/10/2023     BUN 9 09/10/2023     CREATININE 0.60 (L) 09/10/2023     EGFRIFAFRI >60 05/20/2019     EGFRIFNONA 100 01/28/2022     BCR 15.0 09/10/2023     ANIONGAP 13.0 09/10/2023            Lab Results   Component Value Date     MG 1.8 08/30/2023     PHOS 5.5 (H) 07/06/2023     ALBUMIN 2.6 (L) 09/08/2023      Ultrasound of abdomen results from the last 21 days:             Assessment / Plan        Sepsis    Cancer of lower lobe of right lung    Anxiety and depression    Gastroesophageal reflux disease    Hypertension    Non-small cell carcinoma of lung, right    NSCLC of left lung    Leukocytosis    Bandemia    Anemia due to chemotherapy    Cellulitis of chest wall              ASSESSMENT:     Acute on chronic normovolemic hypotonic hyponatremia.  Reviewed the patient sodium trend since 2020 his sodium has been below 135.  With average around 130s recently with a slight drop to 123.  His previous work-up showed a serum osmolarity of 262, urine osmolarity of 297 urine sodium of 20.  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  We will attempt trial of salt tablets urea and low-dose torsemide and will continue to titrate therapy accordingly     Acute on chronic normocytic anemia, with underlying metastatic lung adenocarcinoma as per oncology management  s/p PRBC as indicated ,as per primary team      Severe leukocytosis, as per hematology infectious work-up being performed no clear evidence of infectious source.      Relapsing recurrent adenocarcinoma of the left lung status post resection chemotherapy followed by immunotherapy as per oncology     PLAN:  -Fluid restriction, sodium chloride tablets and low-dose torsemide we will continue to follow sodium trend closely.  -We will continue surveillance labs        Thank you for involving us in the care of Jc Driscoll.  Please feel free to call with any questions.     Clarence Clarke MD  09/10/23  16:59 EDT     [facility]  [phone]        Please note that portions of this note were completed with a voice recognition program.                 Routing History         Pati Malone, APRN   Nurse Practitioner  Palliative Care     Consults     Attested     Date of Service: 23 114  Creation Time: 23 114  Consult Orders   Inpatient Palliative Care Team Consult [[id number]] ordered by Ezequiel Mccollum DO at 09/10/23 0848          Attested           Attestation signed by Tod Barajas MD at 23 0746     I have reviewed this documentation and agree.  Palliative Care team visiting and offering emotional, educational and spiritual support.  Patient is currently a full code and aggressive measures are to be used, if needed, for treatment.  We will continue to follow and support in any way that we can.  Thank you for this consult.               Expand All Collapse All    Palliative Care Consultation     Patient Name: Jc Driscoll  : 1965  MRN: [id number]  Allergies: Patient has no known allergies.     Requesting clinician:  Chun  Reason for consult: Consultation for clarification of goals of care and code status.        Patient Code Status:       Code Status and Medical Interventions:   Ordered at: 23     Level Of Support Discussed With:     Patient     Code Status (Patient has no pulse and is not breathing):     CPR (Attempt to Resuscitate)     Medical Interventions (Patient has pulse or is breathing):     Full  Support               Chief Complaint:     Anemia     History of Present Illness     Jc Driscoll is a 58 y.o. male who [facility]. Patient found to have Hgb of 5.5 at his visit today and was directed to the ED. HE reported generalized weakness and pain at his port site. He denied nausea, vomiting, diarrhea, or shortness of breath.      Of note: Patient has lung cancer and follows with Dr. Gaspar at [facility] for treatment.      In ED: Glucose 87, Creatinine 0.76, K 3.4, Alk phos 270, Protein 5.9, Albumin 2.6, WBC 51.2, Hgb 7.2, Platelets 403     Cultures obtained. Patient started on IV antibiotics. ID consulted due to concern for sepsis. Attributed to progressing malignancy.      Nephrology consulted for hyponatremia.     9/11 Palliative consult for goals of care discussion in an acutely ill patient with cancer.     VITAL SIGNS:   Temp:  [97 °F (36.1 °C)-97.9 °F (36.6 °C)] 97 °F (36.1 °C)  Heart Rate:  [110-115] 114  Resp:  [21-29] 29  BP: ()/(63-67) 103/67         PMH: Anxiety, CHF, Lung Ca, HTN, Anemia     Surgical History         Past Surgical History:   Procedure Laterality Date    BRONCHOSCOPY   03/18/2019     EBUS MONTERO NEEDLE BX    COLONOSCOPY        DUPUYTREN CONTRACTURE RELEASE Bilateral      LUNG BIOPSY   03/08/2019     CT GUIDED    LUNG REMOVAL, PARTIAL Right       right lower    PORTACATH PLACEMENT   03/20/2019     DR LONGORIA    THORACOSCOPY Right 6/26/2019     Procedure: RIGHT VATS, OPEN LOWER LOBECTOMY WITH LYMPH NODE DISECTION, WEDGE RESECTION OF RIGHT MIDDLE LOBE;  Surgeon: Terrance Longoria MD;  Location: [facility] MAIN OR;  Service: Cardiothoracic    THORACOSCOPY VIDEO ASSISTED WITH LOBECTOMY Left 7/6/2022     Procedure: THORACOSCOPY VIDEO ASSISTED WITH LOBECTOMY;  Surgeon: Miryam Reyna MD;  Location: [facility] MAIN OR;  Service: Thoracic;  Laterality: Left;                  Family History   Problem Relation Age of Onset    Cancer  Mother           cervical cancer    Cervical cancer Mother      Cancer Father           lung cancer    Lung cancer Father      Lung cancer Sister      Cancer Sister           lung cancer    Malig Hyperthermia Neg Hx           Social History            Tobacco Use    Smoking status: Former       Types: Cigarettes       Quit date: 2009       Years since quittin.0    Smokeless tobacco: Never   Vaping Use    Vaping Use: Never used   Substance Use Topics    Alcohol use: Yes       Alcohol/week: 2.0 standard drinks       Types: 2 Cans of beer per week       Comment: Occasional    Drug use: No               LABS:          Results from last 7 days   Lab Units 23  0546   WBC 10*3/mm3 60.10*   HEMOGLOBIN g/dL 7.1*   HEMATOCRIT % 22.6*   PLATELETS 10*3/mm3 277           Results from last 7 days   Lab Units 23  0546   SODIUM mmol/L 126*   POTASSIUM mmol/L 4.1   CHLORIDE mmol/L 92*   CO2 mmol/L 22.0   BUN mg/dL 17   CREATININE mg/dL 0.64*   GLUCOSE mg/dL 102*   CALCIUM mg/dL 9.3             Results from last 7 days   Lab Units 23  0546 23  2246 23  1005   SODIUM mmol/L 126*   < > 126*   POTASSIUM mmol/L 4.1   < > 4.3   CHLORIDE mmol/L 92*   < > 91*   CO2 mmol/L 22.0   < > 21.0*   BUN mg/dL 17   < > 8   CREATININE mg/dL 0.64*   < > 0.61*   CALCIUM mg/dL 9.3   < > 9.3   BILIRUBIN mg/dL  --   --  0.7   ALK PHOS U/L  --   --  261*   ALT (SGPT) U/L  --   --  14   AST (SGOT) U/L  --   --  16   GLUCOSE mg/dL 102*   < > 89    < > = values in this interval not displayed.            IMAGING STUDIES:  No radiology results for the last day        I reviewed the patient's new clinical results including labs, imaging, and vitals.           Scheduled Meds:  buPROPion XL, 300 mg, Oral, Daily  cetirizine, 10 mg, Oral, Daily  folic acid, 1 mg, Oral, Daily  gabapentin, 300 mg, Oral, Nightly  levothyroxine, 125 mcg, Oral, Q AM  liothyronine, 5 mcg, Oral, Daily  megestrol acetate, 400 mg, Oral,  Daily  pantoprazole, 40 mg, Oral, QAM AC  potassium chloride, 20 mEq, Oral, Daily  senna-docusate sodium, 2 tablet, Oral, BID  sodium chloride, 10 mL, Intravenous, Q12H  sodium chloride, 1 g, Oral, TID With Meals  torsemide, 10 mg, Oral, Daily  vitamin B-12, 1,000 mcg, Oral, Daily  vitamin B-6, 100 mg, Oral, Q12H        Continuous Infusions:        I have reviewed patient's current medication list.      Review of Systems   Constitutional:  Positive for fatigue.   Neurological:  Positive for weakness.   All other systems reviewed and are negative.        Physical Exam  Vitals and nursing note reviewed.   HENT:      Head: Normocephalic and atraumatic.      Nose: Nose normal.      Mouth/Throat:      Mouth: Mucous membranes are dry.      Pharynx: Oropharynx is clear.   Eyes:      Extraocular Movements: Extraocular movements intact.      Conjunctiva/sclera: Conjunctivae normal.      Pupils: Pupils are equal, round, and reactive to light.   Cardiovascular:      Rate and Rhythm: Normal rate.      Pulses: Normal pulses.   Pulmonary:      Effort: Pulmonary effort is normal.   Abdominal:      General: Abdomen is flat.   Musculoskeletal:         General: Normal range of motion.      Cervical back: Normal range of motion.   Skin:     General: Skin is dry.      Coloration: Skin is pale.   Neurological:      General: No focal deficit present.      Mental Status: He is alert and oriented to person, place, and time. Mental status is at baseline.               PROBLEM LIST:    Sepsis    Cancer of lower lobe of right lung    Anxiety and depression    Gastroesophageal reflux disease    Hypertension    Non-small cell carcinoma of lung, right    NSCLC of left lung    Leukocytosis    Bandemia    Anemia due to chemotherapy    Cellulitis of chest wall              ASSESSMENT/PLAN:     Sepsis: with elevated WBC. Cultures obtained. ID consulted. Started on IV antibiotics. Thought to be reactive leukocytosis due to malignancy.      An[facility]:  [facility] Started on transfusion protocols.      Lung Ca: Follows with Dr. Gaspar in outpatient. On Tepotinib.      Hyponatremia: Nephrology consulted. Started on sodium tablets.      Hypothyroidism/Anorexia/Anxiety/CHF: chronic conditions per primary.      These illnesses and their management contribute to the need for a palliative consult and advanced care planning.       ADVANCED CARE PLANNIN/11 Discharge summary in place. Patient being discharged. Met with him at bedside this afternoon. He tells me that he is feeling much better and his current goal is to try to continue with additional chemotherapy treatment. He wants to remain a full code with full intervention and his wife would be his decision maker if he was unable to.         Advanced Directives: Patient has advance directive, copy requested  Health Care Directive on file:  No  Health Care Surrogate:      Palliative Performance Scale Score:    Comments:               Decisional Capacity: yes  Patient's understanding of illness: adequate  Patient goals of care:  Full code, full interventio        Thank you for this consult and allowing us to participate in patient's plan of care. Palliative Care Team will continue to follow patient.         I spent 52 minutes reviewing providers documentation, medication records, assessing and examining patient, discussing with family, answering questions, providing guidance about a plan of care, and coordinating care with other healthcare members. More than 50% of time spent face to face discussing disease education, current clinical status, and medication management.         Pati Malone, APRN  2023              Cosigned by: Tod Barajas MD at 23 4545     Revision History            Discharge Summary        Ezequiel Mccollum DO at 23 1118                   [facility]   DISCHARGE SUMMARY    Patient Name: Jc Driscoll  : 1965  MRN:  [id number]    Date of Admission: 9/6/2023  Date of Discharge:  09/11/23    Primary Care Physician: Reshma Alvarenga MD      Presenting Problem:   Leukocytosis [D72.829]  Anemia, unspecified type [D64.9]  Sepsis, due to unspecified organism, unspecified whether acute organ dysfunction present [A41.9]    Active and Resolved Hospital Problems:  Active Hospital Problems    Diagnosis POA    **Sepsis [A41.9] Yes    Leukocytosis [D72.829] Yes    Bandemia [D72.825] Yes    Anemia due to chemotherapy [D64.81, T45.1X5A] Yes    Cellulitis of chest wall [L03.313] Yes    NSCLC of left lung [C34.92] Yes    Cancer of lower lobe of right lung [C34.31] Yes    Non-small cell carcinoma of lung, right [C34.91] Yes    Gastroesophageal reflux disease [K21.9] Yes    Anxiety and depression [F41.9, F32.A] Yes    Hypertension [I10] Yes      Resolved Hospital Problems   No resolved problems to display.         Hospital Course     Hospital Course:  Jc Driscoll is a 58 y.o. 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  Oncology for his underlying malignancy. Patient complains about fatigue weakness and marked inability to perform daily activities associated with nausea and emesis with work-up showing a worsening anemia with hemoglobin 5.5 and severe leukocytosis 77,000 reason why he was admitted further management.  Also noted hyponatremia.  Oncology, nephrology, ID, and pulmonology followed.  Further details as noted below.  Overall improved, no further inpatient work-up required.   Okay to discharge per all specialties.  Plan for outpatient follow-up for further work-up and treatment per oncology and nephrology.  Patient is medically stable to discharge home with follow-up with PCP, oncology, and nephrology as an outpatient.      A/P:    Leukocytosis with bandemia  Left chest wall cellulitis   -consider port infection/bacteremia  -left axilla with swelling and induration  -pt has had leukocytosis since March 2023. His previous admission July 2023 the patient had negative blood cultures. ID team suggested leukocytosis was secondary to steroids and underlying malignancy  -Cultures so far remain negative  -Antibiotics discontinued per ID and signed off  -CT chest findings noted, pulmonology consulted, recommending no further work-up and continue outpatient monitoring     Anemia  -no hematochezia or hematemesis   -hgb 7.2 in the ED, 1 unit transfused  -anemia secondary to metastatic disease/chemotherapy  -Monitor hemoglobin, hematology following     Metastatic lung CA s/p left thoracotomy and right lower lobectomy  -followed by Dr. Gaspar on tepotinib  -CT findings noted, concern for pneumonia  -Oncology recommending further treatment as outpatient     Hyponatremia, likely SIADH  -pt did have hyponatremia secondary to volume depletion and underlying SIADH per nephrology back in July 2023. He was treated with IVFs during that admission  -Sodium stable, discussed with nephrology, continue sodium tabs and torsemide on discharge and plan to repeat sodium in 1 week as outpatient     Hypothyroidism  -cont home synthroid, cytomel     Anorexia   -on megace        DISCHARGE Follow Up Recommendations for labs and diagnostics:     Follow-up with PCP, oncology, nephrology  Repeat CBC and sodium check within 1 week    Reasons For Change In Medications and Indications for New Medications:  As noted below    Day of Discharge     Vital Signs:  Temp:  [97.2 °F (36.2 °C)-97.9 °F (36.6 °C)] 97.7 °F (36.5 °C)  Heart  Rate:  [110-115] 114  Resp:  [21-25] 24  BP: ()/(63-66) 100/65    Physical Exam:  General: Awake, alert, NAD  Eyes: PERRL, EOMI, conjunctivae are clear  Cardiovascular: Regular rate and rhythm, no murmurs  Respiratory: Left-sided rhonchi noted, no wheezing or rales, unlabored breathing  Abdomen: Soft, nontender, positive bowel sounds, no guarding  Neurologic: A&O, CN grossly intact, moves all extremities spontaneously  Musculoskeletal: Normal range of motion, no other gross deformities  Skin: Warm, dry, intact        Pertinent  and/or Most Recent Results     LAB RESULTS:      Lab 09/11/23  0546 09/10/23  0409 09/09/23  1116 09/08/23  2246 09/08/23  1005 09/07/23  0737 09/07/23  0041 09/06/23  1738 09/06/23  1729 09/06/23  1435 09/06/23  1435   WBC 60.10* 57.20* 70.90* 63.20* 73.30*   < > 44.80*  --  51.20*  --  77.96*   HEMOGLOBIN 7.1* 7.7* 7.9* 8.4* 7.1*   < > 6.1*  --  7.2*  --  5.5*   HEMATOCRIT 22.6* 24.2* 25.5* 26.6* 23.2*   < > 19.6*  --  23.2*  --  17.0*   PLATELETS 277 318 374 383 442   < > 335  --  403  --  451*   NEUTROS ABS 55.29* 54.34* 66.65* 58.78* 66.70*   < > 41.66*  --  49.15*   < > 73.81*   LYMPHS ABS  --   --   --   --   --   --   --   --   --   --  1.09   MONOS ABS  --   --   --   --   --   --   --   --   --   --  2.90*   EOS ABS  --   --   --   --   --   --  0.90*  --   --   --  0.14   MCV 90.5 88.2 89.0 89.0 88.1   < > 87.9  --  87.9  --  89.9   PROCALCITONIN  --   --   --   --   --   --   --   --  0.60*  --   --    LACTATE  --   --   --   --   --   --  0.9 1.4  --   --   --     < > = values in this interval not displayed.         Lab 09/11/23  0546 09/10/23  1504 09/10/23  0409 09/09/23  1116 09/08/23  2246 09/08/23  1005   SODIUM 126*  --  123* 126* 125* 126*   POTASSIUM 4.1 4.0 3.4* 4.1 4.2 4.3   CHLORIDE 92*  --  89* 92* 92* 91*   CO2 22.0  --  21.0* 21.0* 19.0* 21.0*   ANION GAP 12.0  --  13.0 13.0 14.0 14.0   BUN 17  --  9 10 9 8   CREATININE 0.64*  --  0.60* 0.67* 0.63* 0.61*    EGFR 109.1  --  111.2 107.6 109.6 110.6   GLUCOSE 102*  --  98 97 81 89   CALCIUM 9.3  --  8.9 8.9 9.1 9.3         Lab 09/08/23  1005 09/06/23  1729 09/06/23  1435   TOTAL PROTEIN 6.2 5.9* 6.7   ALBUMIN 2.6* 2.6* 2.5*   GLOBULIN 3.6 3.3 4.2   ALT (SGPT) 14 17 18   AST (SGOT) 16 24 25   BILIRUBIN 0.7 0.6 0.7   ALK PHOS 261* 270* 252*                 Lab 09/06/23  1729   ABO TYPING A   RH TYPING Positive   ANTIBODY SCREEN Negative         Brief Urine Lab Results  (Last result in the past 365 days)        Color   Clarity   Blood   Leuk Est   Nitrite   Protein   CREAT   Urine HCG        09/06/23 2320 Yellow   Clear   Negative   Negative   Negative   Trace                 Microbiology Results (last 10 days)       Procedure Component Value - Date/Time    MRSA Screen, PCR (Inpatient) - Swab, Nares [[id number]]  (Normal) Collected: 09/06/23 2325    Lab Status: Final result Specimen: Swab from Nares Updated: 09/07/23 0121     MRSA PCR No MRSA Detected    Narrative:      The negative predictive value of this diagnostic test is high and should only be used to consider de-escalating anti-MRSA therapy. A positive result may indicate colonization with MRSA and must be correlated clinically.    Blood Culture - Blood, Arm, Left [[id number]]  (Normal) Collected: 09/06/23 1756    Lab Status: Preliminary result Specimen: Blood from Arm, Left Updated: 09/10/23 1815     Blood Culture No growth at 4 days    Blood Culture - Blood, Arm, Right [[id number]]  (Normal) Collected: 09/06/23 1756    Lab Status: Preliminary result Specimen: Blood from Arm, Right Updated: 09/10/23 1815     Blood Culture No growth at 4 days    Narrative:      Less than seven (7) mL's of blood was collected.  Insufficient quantity may yield false negative results.    Blood Culture - Blood, Arm, Right [[id number]]  (Normal) Collected: 09/06/23 1436    Lab Status: Preliminary result Specimen: Blood from Arm, Right Updated: 09/10/23 1445     Blood Culture No growth at 4  days    Narrative:      Less than seven (7) mL's of blood was collected.  Insufficient quantity may yield false negative results.            CT Chest With Contrast Diagnostic    Result Date: 9/6/2023  Impression: Since 8/9/2023 interval increase in size of the patient's known left anterior chest wall mass now measuring approximately 8.3 x 6.2 cm. There are stable destructive changes of the anterior left second third and fourth ribs. Consolidative changes in the left lung apex similar to that seen on prior examination which could represent partial collapse versus a underlying infiltrate and correlation for pneumonia is recommended. Small left pleural effusion. Coronary artery calcifications. Dilatation of the main pulmonary artery which can be seen with pulmonary hypertension. Electronically Signed: Bud Russell MD  9/6/2023 10:21 PM EDT  Workstation ID: [id number]    CT Abdomen Pelvis With Contrast    Result Date: 8/30/2023  Impression: Impression: 1.No acute abnormality identified within the abdomen or pelvis. 2.Several tiny liver hypodensities are too small to fully characterize and appear grossly similar since the previous study. 3.Large amount of stool within the colon. 4.Trace bilateral pleural effusions with bilateral lung base atelectasis noted. Electronically Signed: Odell Woody MD  8/30/2023 4:05 PM EDT  Workstation ID: [id number]     Results for orders placed during the hospital encounter of 12/05/22    Duplex Venous Lower Extremity - Bilateral CAR    Interpretation Summary    Normal bilateral lower extremity venous duplex scan.      Results for orders placed during the hospital encounter of 12/05/22    Duplex Venous Lower Extremity - Bilateral CAR    Interpretation Summary    Normal bilateral lower extremity venous duplex scan.      Results for orders placed during the hospital encounter of 06/29/23    Adult Transthoracic Echo Complete W/ Cont if Necessary Per Protocol    Interpretation Summary     Left ventricular systolic function is normal. Left ventricular ejection fraction appears to be 51 - 55%.    Left ventricular diastolic function is consistent with (grade I) impaired relaxation.    The right ventricular cavity is mild to moderately dilated.    The left atrial cavity is mildly dilated.    The right atrial cavity is mild to moderately  dilated.    Estimated right ventricular systolic pressure from tricuspid regurgitation is moderately elevated (45-55 mmHg).    Mild to moderate pulmonary hypertension is present.      Labs Pending at Discharge:  Pending Labs       Order Current Status    Blood Culture - Blood, Arm, Left Preliminary result    Blood Culture - Blood, Arm, Right Preliminary result            Procedures Performed           Consults:   Consults       Date and Time Order Name Status Description    9/10/2023  8:49 AM Inpatient Pulmonology Consult Completed     9/10/2023  8:48 AM Inpatient Nephrology Consult Completed     9/7/2023 10:09 AM Hematology & Oncology Inpatient Consult Completed     9/6/2023 10:31 PM Inpatient Infectious Diseases Consult Completed     9/6/2023  7:12 PM Hospitalist (on-call MD unless specified)                Discharge Details        Discharge Medications        New Medications        Instructions Start Date   sodium chloride 1 g tablet   1 g, Oral, 4 Times Daily With Meals & Nightly      torsemide 10 MG tablet  Commonly known as: DEMADEX   10 mg, Oral, Daily             Continue These Medications        Instructions Start Date   buPROPion  MG 24 hr tablet  Commonly known as: WELLBUTRIN XL   300 mg, Oral, Daily      desvenlafaxine 50 MG 24 hr tablet  Commonly known as: PRISTIQ   50 mg, Oral, Daily      folic acid 1 MG tablet  Commonly known as: FOLVITE   1 mg, Oral, Daily      gabapentin 300 MG capsule  Commonly known as: NEURONTIN   300 mg, Oral, Nightly      lactulose 10 GM/15ML solution  Commonly known as: CHRONULAC   20 g, Oral, 2 Times Daily PRN       levothyroxine 125 MCG tablet  Commonly known as: SYNTHROID, LEVOTHROID   125 mcg, Oral, Every Early Morning      liothyronine 5 MCG tablet  Commonly known as: CYTOMEL   5 mcg, Oral, Daily      loratadine 10 MG tablet  Commonly known as: CLARITIN   10 mg, Oral, Daily      megestrol acetate 400 MG/10ML suspension oral suspension  Commonly known as: MEGACE   400 mg, Oral, Daily      ondansetron 8 MG tablet  Commonly known as: ZOFRAN   8 mg, Oral, 3 Times Daily PRN      oxyCODONE-acetaminophen  MG per tablet  Commonly known as: PERCOCET   1 tablet, Oral, Every 4 Hours PRN      pantoprazole 40 MG EC tablet  Commonly known as: PROTONIX   40 mg, Oral, Daily      potassium chloride 20 MEQ CR tablet  Commonly known as: K-DUR,KLOR-CON   20 mEq, Oral, Daily      vitamin B-12 1000 MCG tablet  Commonly known as: CYANOCOBALAMIN   1,000 mcg, Oral, Daily      vitamin B-6 100 MG tablet  Commonly known as: PYRIDOXINE   100 mg, Oral, Every 12 Hours             Stop These Medications      levoFLOXacin 500 MG tablet  Commonly known as: Levaquin              No Known Allergies      Discharge Disposition:  Home or Self Care    Diet:  Hospital:  Diet Order   Procedures    Diet: Regular/House Diet, Fluid Restriction (240 mL/tray) Diets; 1500 mL/day; Texture: Regular Texture (IDDSI 7); Fluid Consistency: Thin (IDDSI 0)         Discharge Activity:         CODE STATUS:  Code Status and Medical Interventions:   Ordered at: 09/06/23 2039     Level Of Support Discussed With:    Patient     Code Status (Patient has no pulse and is not breathing):    CPR (Attempt to Resuscitate)     Medical Interventions (Patient has pulse or is breathing):    Full Support         No future appointments.        Time spent on Discharge including face to face service:  35 minutes    Signature:    Electronically signed by Ezequiel Mccollum DO, 09/11/23, 11:15 AM EDT.      Part of this note may be an electronic transcription/translation of spoken language to  printed text using the Dragon Dictation System.      Electronically signed by Ezequiel Mccollum DO at 09/11/23 0550

## 2023-09-18 ENCOUNTER — HOME CARE VISIT (OUTPATIENT)
Dept: HOME HEALTH SERVICES | Facility: HOME HEALTHCARE | Age: 58
End: 2023-09-18

## 2023-09-19 ENCOUNTER — SPECIALTY PHARMACY (OUTPATIENT)
Dept: PHARMACY | Facility: HOSPITAL | Age: 58
End: 2023-09-19
Payer: COMMERCIAL

## 2023-09-19 ENCOUNTER — READMISSION MANAGEMENT (OUTPATIENT)
Dept: CALL CENTER | Facility: HOSPITAL | Age: 58
End: 2023-09-19
Payer: COMMERCIAL

## 2023-09-19 ENCOUNTER — HOME CARE VISIT (OUTPATIENT)
Dept: HOME HEALTH SERVICES | Facility: HOME HEALTHCARE | Age: 58
End: 2023-09-19

## 2023-09-19 NOTE — PROGRESS NOTES
Specialty Pharmacy Note: Tepmetko (tepotinib)    Spoke with pt via phone. He is tolerating medication well thus far. He denies any SE and is overall feeling better than last week. He still has some generalized weakness. He had home health coming this week. He was started on hydroxyzine for anxiety and states this has helped a lot. He reports no missed doses this week. He had no questions or concerns at this time.        Thank you,  Mandie Alberto, Pharm.D.

## 2023-09-19 NOTE — OUTREACH NOTE
Sepsis Week 2 Survey      Flowsheet Row Responses   [facility] facility patient discharged from? [facility]   Does the patient have one of the following disease processes/diagnoses(primary or secondary)? Sepsis   Week 2 attempt successful? Yes   Call start time [id number]   Revoke Change in health status-moved to LTC/SNF/Hospice  [Pt is now at [facility] for rehab]   Discharge diagnosis sepsis            Flaquita COPELAND - Licensed Nurse

## 2023-09-20 ENCOUNTER — TELEPHONE (OUTPATIENT)
Dept: ONCOLOGY | Facility: CLINIC | Age: 58
End: 2023-09-20
Payer: COMMERCIAL

## 2023-09-20 NOTE — TELEPHONE ENCOUNTER
Received message that Kimberlee with [facility] called to let us know that the pt would not be able to make his appt today and asking about IV fluids. No IV fluids were ordered so I spoke to MULUGETA Lucas who advised that they check labs and we can order fluids if needed. Returned Kimberlee's call. She stated that Nephrology saw the pt today; they started him on Urea and decided to hold the fluids for now. I asked if the pt is taking Tepotinib while at the facility and she confirmed that he is.

## 2023-09-21 ENCOUNTER — LAB REQUISITION (OUTPATIENT)
Dept: LAB | Facility: HOSPITAL | Age: 58
End: 2023-09-21

## 2023-09-21 ENCOUNTER — HOSPITAL ENCOUNTER (OUTPATIENT)
Facility: HOSPITAL | Age: 58
Setting detail: OBSERVATION
Discharge: HOSPICE | End: 2023-09-23
Attending: EMERGENCY MEDICINE | Admitting: STUDENT IN AN ORGANIZED HEALTH CARE EDUCATION/TRAINING PROGRAM
Payer: COMMERCIAL

## 2023-09-21 ENCOUNTER — APPOINTMENT (OUTPATIENT)
Dept: GENERAL RADIOLOGY | Facility: HOSPITAL | Age: 58
End: 2023-09-21
Payer: COMMERCIAL

## 2023-09-21 DIAGNOSIS — R41.82 ALTERED MENTAL STATUS, UNSPECIFIED ALTERED MENTAL STATUS TYPE: Primary | ICD-10-CM

## 2023-09-21 DIAGNOSIS — Z00.00 ENCOUNTER FOR GENERAL ADULT MEDICAL EXAMINATION WITHOUT ABNORMAL FINDINGS: ICD-10-CM

## 2023-09-21 DIAGNOSIS — R79.89 ELEVATED LACTIC ACID LEVEL: ICD-10-CM

## 2023-09-21 LAB
ALBUMIN SERPL-MCNC: 2.1 G/DL (ref 3.5–5.2)
ALBUMIN/GLOB SERPL: 0.6 G/DL
ALP SERPL-CCNC: 220 U/L (ref 39–117)
ALT SERPL W P-5'-P-CCNC: 16 U/L (ref 1–41)
ANION GAP SERPL CALCULATED.3IONS-SCNC: 12 MMOL/L (ref 5–15)
ANISOCYTOSIS BLD QL: ABNORMAL
AST SERPL-CCNC: 31 U/L (ref 1–40)
BILIRUB SERPL-MCNC: 1.8 MG/DL (ref 0–1.2)
BUN SERPL-MCNC: 30 MG/DL (ref 6–20)
BUN/CREAT SERPL: 27 (ref 7–25)
CALCIUM SPEC-SCNC: 8.7 MG/DL (ref 8.6–10.5)
CHLORIDE SERPL-SCNC: 89 MMOL/L (ref 98–107)
CO2 SERPL-SCNC: 27 MMOL/L (ref 22–29)
CREAT SERPL-MCNC: 1.11 MG/DL (ref 0.76–1.27)
D-LACTATE SERPL-SCNC: 2.2 MMOL/L (ref 0.3–2)
DEPRECATED RDW RBC AUTO: 55.1 FL (ref 37–54)
EGFRCR SERPLBLD CKD-EPI 2021: 76.5 ML/MIN/1.73
ERYTHROCYTE [DISTWIDTH] IN BLOOD BY AUTOMATED COUNT: 17.1 % (ref 12.3–15.4)
GLOBULIN UR ELPH-MCNC: 3.4 GM/DL
GLUCOSE SERPL-MCNC: 107 MG/DL (ref 65–99)
HCT VFR BLD AUTO: 21 % (ref 37.5–51)
HCT VFR BLD AUTO: 26.8 % (ref 37.5–51)
HGB BLD-MCNC: 6.7 G/DL (ref 13–17.7)
HGB BLD-MCNC: 8.3 G/DL (ref 13–17.7)
HOLD SPECIMEN: NORMAL
HOLD SPECIMEN: NORMAL
LIPASE SERPL-CCNC: 8 U/L (ref 13–60)
LYMPHOCYTES # BLD MANUAL: 1.23 10*3/MM3 (ref 0.7–3.1)
LYMPHOCYTES NFR BLD MANUAL: 3 % (ref 5–12)
MCH RBC QN AUTO: 28.3 PG (ref 26.6–33)
MCHC RBC AUTO-ENTMCNC: 31.1 G/DL (ref 31.5–35.7)
MCV RBC AUTO: 91 FL (ref 79–97)
MONOCYTES # BLD: 1.85 10*3/MM3 (ref 0.1–0.9)
NEUTROPHILS # BLD AUTO: 58.62 10*3/MM3 (ref 1.7–7)
NEUTROPHILS NFR BLD MANUAL: 95 % (ref 42.7–76)
PLATELET # BLD AUTO: 141 10*3/MM3 (ref 140–450)
PMV BLD AUTO: 9 FL (ref 6–12)
POTASSIUM SERPL-SCNC: 3.4 MMOL/L (ref 3.5–5.2)
PROT SERPL-MCNC: 5.5 G/DL (ref 6–8.5)
RBC # BLD AUTO: 2.94 10*6/MM3 (ref 4.14–5.8)
SCAN SLIDE: NORMAL
SMALL PLATELETS BLD QL SMEAR: ADEQUATE
SODIUM SERPL-SCNC: 128 MMOL/L (ref 136–145)
TROPONIN T SERPL HS-MCNC: 22 NG/L
VARIANT LYMPHS NFR BLD MANUAL: 2 % (ref 19.6–45.3)
WBC MORPH BLD: NORMAL
WBC NRBC COR # BLD: 61.7 10*3/MM3 (ref 3.4–10.8)
WHOLE BLOOD HOLD COAG: NORMAL
WHOLE BLOOD HOLD SPECIMEN: NORMAL

## 2023-09-21 PROCEDURE — 93005 ELECTROCARDIOGRAM TRACING: CPT | Performed by: EMERGENCY MEDICINE

## 2023-09-21 PROCEDURE — 85025 COMPLETE CBC W/AUTO DIFF WBC: CPT | Performed by: EMERGENCY MEDICINE

## 2023-09-21 PROCEDURE — 85014 HEMATOCRIT: CPT

## 2023-09-21 PROCEDURE — 99284 EMERGENCY DEPT VISIT MOD MDM: CPT

## 2023-09-21 PROCEDURE — 84484 ASSAY OF TROPONIN QUANT: CPT | Performed by: EMERGENCY MEDICINE

## 2023-09-21 PROCEDURE — 71045 X-RAY EXAM CHEST 1 VIEW: CPT

## 2023-09-21 PROCEDURE — 85007 BL SMEAR W/DIFF WBC COUNT: CPT | Performed by: EMERGENCY MEDICINE

## 2023-09-21 PROCEDURE — 85018 HEMOGLOBIN: CPT

## 2023-09-21 PROCEDURE — 83605 ASSAY OF LACTIC ACID: CPT

## 2023-09-21 PROCEDURE — 83690 ASSAY OF LIPASE: CPT | Performed by: EMERGENCY MEDICINE

## 2023-09-21 PROCEDURE — 80053 COMPREHEN METABOLIC PANEL: CPT | Performed by: EMERGENCY MEDICINE

## 2023-09-21 RX ORDER — SODIUM CHLORIDE 0.9 % (FLUSH) 0.9 %
10 SYRINGE (ML) INJECTION AS NEEDED
Status: DISCONTINUED | OUTPATIENT
Start: 2023-09-21 | End: 2023-09-23 | Stop reason: HOSPADM

## 2023-09-21 NOTE — Clinical Note
Level of Care: Telemetry [5]   Admitting Physician: FAZAL SALOMON [[id number]]   Attending Physician: FAZAL SALOMON [[id number]]

## 2023-09-22 ENCOUNTER — APPOINTMENT (OUTPATIENT)
Dept: MRI IMAGING | Facility: HOSPITAL | Age: 58
End: 2023-09-22
Payer: COMMERCIAL

## 2023-09-22 ENCOUNTER — APPOINTMENT (OUTPATIENT)
Dept: CT IMAGING | Facility: HOSPITAL | Age: 58
End: 2023-09-22
Payer: COMMERCIAL

## 2023-09-22 PROBLEM — R41.82 AMS (ALTERED MENTAL STATUS): Status: ACTIVE | Noted: 2023-09-22

## 2023-09-22 LAB
ARTERIAL PATENCY WRIST A: POSITIVE
ATMOSPHERIC PRESS: ABNORMAL MM[HG]
BASE EXCESS BLDA CALC-SCNC: 4.9 MMOL/L (ref 0–3)
BDY SITE: ABNORMAL
BILIRUB UR QL STRIP: NEGATIVE
CLARITY UR: CLEAR
CO2 BLDA-SCNC: 29.3 MMOL/L (ref 22–29)
COLOR UR: YELLOW
D-LACTATE SERPL-SCNC: 1.3 MMOL/L (ref 0.5–2)
D-LACTATE SERPL-SCNC: 2.1 MMOL/L (ref 0.5–2)
GLUCOSE UR STRIP-MCNC: NEGATIVE MG/DL
HCO3 BLDA-SCNC: 28.2 MMOL/L (ref 21–28)
HEMODILUTION: NO
HGB UR QL STRIP.AUTO: NEGATIVE
INHALED O2 CONCENTRATION: 21 %
KETONES UR QL STRIP: NEGATIVE
LEUKOCYTE ESTERASE UR QL STRIP.AUTO: NEGATIVE
MODALITY: ABNORMAL
NITRITE UR QL STRIP: NEGATIVE
PCO2 BLDA: 35.2 MM HG (ref 35–48)
PH BLDA: 7.51 PH UNITS (ref 7.35–7.45)
PH UR STRIP.AUTO: 5.5 [PH] (ref 5–8)
PO2 BLDA: 63.8 MM HG (ref 83–108)
PROT UR QL STRIP: ABNORMAL
QT INTERVAL: 320 MS
QTC INTERVAL: 445 MS
SAO2 % BLDCOA: 94.1 % (ref 94–98)
SP GR UR STRIP: 1.01 (ref 1–1.03)
TOTAL RATE: 26 BREATHS/MINUTE
UROBILINOGEN UR QL STRIP: ABNORMAL

## 2023-09-22 PROCEDURE — 82803 BLOOD GASES ANY COMBINATION: CPT

## 2023-09-22 PROCEDURE — 83605 ASSAY OF LACTIC ACID: CPT

## 2023-09-22 PROCEDURE — G0378 HOSPITAL OBSERVATION PER HR: HCPCS

## 2023-09-22 PROCEDURE — 0 CEFEPIME PER 500 MG: Performed by: EMERGENCY MEDICINE

## 2023-09-22 PROCEDURE — 93005 ELECTROCARDIOGRAM TRACING: CPT

## 2023-09-22 PROCEDURE — 70450 CT HEAD/BRAIN W/O DYE: CPT

## 2023-09-22 PROCEDURE — 96365 THER/PROPH/DIAG IV INF INIT: CPT

## 2023-09-22 PROCEDURE — 83605 ASSAY OF LACTIC ACID: CPT | Performed by: NURSE PRACTITIONER

## 2023-09-22 PROCEDURE — 87040 BLOOD CULTURE FOR BACTERIA: CPT | Performed by: EMERGENCY MEDICINE

## 2023-09-22 PROCEDURE — 36415 COLL VENOUS BLD VENIPUNCTURE: CPT

## 2023-09-22 PROCEDURE — 81003 URINALYSIS AUTO W/O SCOPE: CPT | Performed by: EMERGENCY MEDICINE

## 2023-09-22 PROCEDURE — 97162 PT EVAL MOD COMPLEX 30 MIN: CPT

## 2023-09-22 PROCEDURE — 70551 MRI BRAIN STEM W/O DYE: CPT

## 2023-09-22 PROCEDURE — 36600 WITHDRAWAL OF ARTERIAL BLOOD: CPT

## 2023-09-22 PROCEDURE — 36415 COLL VENOUS BLD VENIPUNCTURE: CPT | Performed by: NURSE PRACTITIONER

## 2023-09-22 RX ORDER — ALPRAZOLAM 1 MG/1
1 TABLET ORAL 2 TIMES DAILY PRN
Status: DISCONTINUED | OUTPATIENT
Start: 2023-09-22 | End: 2023-09-23 | Stop reason: HOSPADM

## 2023-09-22 RX ORDER — ONDANSETRON 2 MG/ML
4 INJECTION INTRAMUSCULAR; INTRAVENOUS EVERY 6 HOURS PRN
Status: DISCONTINUED | OUTPATIENT
Start: 2023-09-22 | End: 2023-09-23 | Stop reason: HOSPADM

## 2023-09-22 RX ORDER — SODIUM CHLORIDE 0.9 % (FLUSH) 0.9 %
10 SYRINGE (ML) INJECTION EVERY 12 HOURS SCHEDULED
Status: DISCONTINUED | OUTPATIENT
Start: 2023-09-22 | End: 2023-09-23 | Stop reason: HOSPADM

## 2023-09-22 RX ORDER — NITROGLYCERIN 0.4 MG/1
0.4 TABLET SUBLINGUAL
Status: DISCONTINUED | OUTPATIENT
Start: 2023-09-22 | End: 2023-09-23 | Stop reason: HOSPADM

## 2023-09-22 RX ORDER — POTASSIUM CHLORIDE 20 MEQ/1
20 TABLET, EXTENDED RELEASE ORAL DAILY
Status: DISCONTINUED | OUTPATIENT
Start: 2023-09-22 | End: 2023-09-23 | Stop reason: HOSPADM

## 2023-09-22 RX ORDER — TORSEMIDE 20 MG/1
10 TABLET ORAL DAILY
COMMUNITY

## 2023-09-22 RX ORDER — SODIUM CHLORIDE 0.9 % (FLUSH) 0.9 %
10 SYRINGE (ML) INJECTION AS NEEDED
Status: DISCONTINUED | OUTPATIENT
Start: 2023-09-22 | End: 2023-09-23 | Stop reason: HOSPADM

## 2023-09-22 RX ORDER — LANOLIN ALCOHOL/MO/W.PET/CERES
100 CREAM (GRAM) TOPICAL DAILY
COMMUNITY

## 2023-09-22 RX ORDER — BISACODYL 5 MG/1
5 TABLET, DELAYED RELEASE ORAL DAILY PRN
Status: DISCONTINUED | OUTPATIENT
Start: 2023-09-22 | End: 2023-09-23 | Stop reason: HOSPADM

## 2023-09-22 RX ORDER — SODIUM CHLORIDE 9 MG/ML
40 INJECTION, SOLUTION INTRAVENOUS AS NEEDED
Status: DISCONTINUED | OUTPATIENT
Start: 2023-09-22 | End: 2023-09-23 | Stop reason: HOSPADM

## 2023-09-22 RX ORDER — POLYETHYLENE GLYCOL 3350 17 G/17G
17 POWDER, FOR SOLUTION ORAL DAILY PRN
Status: DISCONTINUED | OUTPATIENT
Start: 2023-09-22 | End: 2023-09-23 | Stop reason: HOSPADM

## 2023-09-22 RX ORDER — ACETAMINOPHEN 325 MG/1
650 TABLET ORAL EVERY 4 HOURS PRN
Status: DISCONTINUED | OUTPATIENT
Start: 2023-09-22 | End: 2023-09-23 | Stop reason: HOSPADM

## 2023-09-22 RX ORDER — LORAZEPAM 2 MG/ML
1 INJECTION INTRAMUSCULAR ONCE
Status: DISCONTINUED | OUTPATIENT
Start: 2023-09-22 | End: 2023-09-22

## 2023-09-22 RX ORDER — AMOXICILLIN 250 MG
2 CAPSULE ORAL 2 TIMES DAILY
Status: DISCONTINUED | OUTPATIENT
Start: 2023-09-22 | End: 2023-09-23 | Stop reason: HOSPADM

## 2023-09-22 RX ORDER — TEPOTINIB HYDROCHLORIDE 225 MG/1
2 TABLET ORAL DAILY
COMMUNITY

## 2023-09-22 RX ORDER — LORAZEPAM 2 MG/ML
2 INJECTION INTRAMUSCULAR ONCE
Status: DISCONTINUED | OUTPATIENT
Start: 2023-09-22 | End: 2023-09-22

## 2023-09-22 RX ORDER — ONDANSETRON 4 MG/1
8 TABLET, FILM COATED ORAL 3 TIMES DAILY PRN
COMMUNITY

## 2023-09-22 RX ORDER — BISACODYL 10 MG
10 SUPPOSITORY, RECTAL RECTAL DAILY PRN
Status: DISCONTINUED | OUTPATIENT
Start: 2023-09-22 | End: 2023-09-23 | Stop reason: HOSPADM

## 2023-09-22 RX ADMIN — NYSTATIN 500000 UNITS: 100000 SUSPENSION ORAL at 21:24

## 2023-09-22 RX ADMIN — NYSTATIN 500000 UNITS: 100000 SUSPENSION ORAL at 09:52

## 2023-09-22 RX ADMIN — ALPRAZOLAM 1 MG: 1 TABLET ORAL at 11:09

## 2023-09-22 RX ADMIN — SENNOSIDES AND DOCUSATE SODIUM 2 TABLET: 50; 8.6 TABLET ORAL at 21:24

## 2023-09-22 RX ADMIN — CEFEPIME 2000 MG: 2 INJECTION, POWDER, FOR SOLUTION INTRAVENOUS at 01:03

## 2023-09-22 RX ADMIN — NYSTATIN 500000 UNITS: 100000 SUSPENSION ORAL at 18:36

## 2023-09-22 RX ADMIN — Medication 10 ML: at 21:24

## 2023-09-22 RX ADMIN — POTASSIUM CHLORIDE 20 MEQ: 1500 TABLET, EXTENDED RELEASE ORAL at 04:56

## 2023-09-22 RX ADMIN — SENNOSIDES AND DOCUSATE SODIUM 2 TABLET: 50; 8.6 TABLET ORAL at 09:52

## 2023-09-22 RX ADMIN — SODIUM CHLORIDE 500 ML: 9 INJECTION, SOLUTION INTRAVENOUS at 04:56

## 2023-09-22 RX ADMIN — Medication 10 ML: at 09:53

## 2023-09-22 NOTE — PLAN OF CARE
Problem: Adult Inpatient Plan of Care  Goal: Plan of Care Review  Outcome: Ongoing, Progressing  Flowsheets (Taken 9/22/2023 1226)  Plan of Care Reviewed With: patient  Goal: Patient-Specific Goal (Individualized)  Outcome: Ongoing, Progressing  Goal: Absence of Hospital-Acquired Illness or Injury  Outcome: Ongoing, Progressing  Intervention: Identify and Manage Fall Risk  Recent Flowsheet Documentation  Taken 9/22/2023 1145 by Samreen Delgado, RN  Safety Promotion/Fall Prevention:   safety round/check completed   room organization consistent   nonskid shoes/slippers when out of bed   lighting adjusted   clutter free environment maintained   assistive device/personal items within reach   activity supervised   fall prevention program maintained  Taken 9/22/2023 1000 by Samreen Delgado RN  Safety Promotion/Fall Prevention:   safety round/check completed   room organization consistent   nonskid shoes/slippers when out of bed   lighting adjusted   clutter free environment maintained   assistive device/personal items within reach  Taken 9/22/2023 0745 by Samreen Delgado RN  Safety Promotion/Fall Prevention:   safety round/check completed   room organization consistent   nonskid shoes/slippers when out of bed   lighting adjusted   clutter free environment maintained   assistive device/personal items within reach  Intervention: Prevent Skin Injury  Recent Flowsheet Documentation  Taken 9/22/2023 1145 by Samreen Delgado RN  Body Position: position changed independently  Taken 9/22/2023 1000 by Samreen Delgado RN  Body Position: position changed independently  Taken 9/22/2023 0745 by Samreen Delgado RN  Body Position: position changed independently  Intervention: Prevent and Manage VTE (Venous Thromboembolism) Risk  Recent Flowsheet Documentation  Taken 9/22/2023 1145 by Samreen Delgado RN  Activity Management: activity encouraged  Taken 9/22/2023 0745 by Samreen Delgado RN  Activity  Management: activity encouraged  Goal: Optimal Comfort and Wellbeing  Outcome: Ongoing, Progressing  Intervention: Provide Person-Centered Care  Recent Flowsheet Documentation  Taken 9/22/2023 0745 by Samreen Delgado, RN  Trust Relationship/Rapport:   care explained   choices provided   questions answered   questions encouraged   reassurance provided   thoughts/feelings acknowledged  Goal: Readiness for Transition of Care  Outcome: Ongoing, Progressing     Problem: Coping Ineffective (Oncology Care)  Goal: Effective Coping  Outcome: Ongoing, Progressing     Problem: Fatigue (Oncology Care)  Goal: Improved Activity Tolerance  Outcome: Ongoing, Progressing  Intervention: Promote Improved Energy  Recent Flowsheet Documentation  Taken 9/22/2023 1145 by Samreen Delgado, RN  Activity Management: activity encouraged  Taken 9/22/2023 0745 by Samreen Delgado, RN  Activity Management: activity encouraged     Problem: Oral Intake Altered (Oncology Care)  Goal: Optimal Oral Intake  Outcome: Ongoing, Progressing   Goal Outcome Evaluation:  Plan of Care Reviewed With: patient

## 2023-09-22 NOTE — SIGNIFICANT NOTE
Patient was seen and evaluated earlier this morning by night shift hospitalist team.    Patient seen again this morning on follow-up and patient states that he is feeling better but still little tachycardic and denies any chest pain or shortness of breath.  Patient remained stable and in no acute distress.    He is pending evaluation from oncology and MRI of brain.

## 2023-09-22 NOTE — CONSULTS
Hematology/Oncology Inpatient Consultation    Patient name: Jc Driscoll  : 1965  MRN: [id number]  Referring Provider: Dr. Gibson  Reason for Consultation: lung cancer    Chief complaint: altered mental status    History of present illness:    Jc Driscoll is a 58 y.o. male who presented to [facility] on 2023 with altered mental status.    Patient has metastatic non-small cell lung cancer who has progressed on chemotherapy, immunotherapy, TDxd and most recently started on tepotinib.  Patient has had a gradual worsening in performance status and has been bedbound.  He was admitted to a rehab facility and continued to worsen physical and mental status.  He was found to be significantly altered and brought to the emergency room.  MRI brain here is negative for metastatic disease,    Patient had a hemoglobin of 5.5 at facility with recheck here at 8.3, during transfer his O2 sat was low at 81% but improved on room air now    Altered mental status improved while patient was in the hospital however currently he was given Xanax for MRI and has been sleeping since not answering questions    23  Hematology/Oncology was consulted     He/She  has a past medical history of Allergic rhinitis (2013), Anxiety and depression (2012), Chronic pansinusitis (2019), Diastolic CHF (2014), Dupuytren's contracture of both hands, Elevated cholesterol, Erosive esophagitis (2019), Gastroesophageal reflux disease (2019), Hiatal hernia (2019), History of colon polyps, Hypertension, Lung cancer, Mediastinal lymphadenopathy (2019), Normocytic anemia (2019), Prediabetes (2014), and Retention of urine (2019).    PCP: Reshma Alvarenga MD    History:  Past Medical History:   Diagnosis Date    Allergic rhinitis 2013    Overview:  2018 - still zyrtec 10 daily (if stops, gets dermatitis again).     Anxiety and depression 2012     Overview:  4/2/2018 -   C/w well 300 xr and Lito 20.  4/8/2019 -   Doing ok even with new lung cancer - lito 20 & well 300xr.     Chronic pansinusitis 04/08/2019    Overview:  4/8/2019 -   MRI showed chronic thick in frontal, maxillary, ethmoidal ---> to Dr. COLLAZO to est since abx ICC didn't help.  Does netipot bid.     Diastolic CHF 07/09/2014    Overview:  7/4/14 - impaired LV relaxation on [place] ECHO.  EF 60%. Rest normal    Dupuytren's contracture of both hands     Elevated cholesterol     Erosive esophagitis 07/09/2019    Overview:  EGD 2016 4/2/2018 - nexium OTC daily for few week.  Qod before that.  Now carbonation hurts, epigastric pain 4/8/2019 -   protonix 40 doing well.     Gastroesophageal reflux disease 02/21/2019    Hiatal hernia 07/09/2019    History of colon polyps     Hypertension     Lung cancer     right lung and in lymph nodes x2    Mediastinal lymphadenopathy 03/12/2019    Normocytic anemia 08/08/2019    Overview:  6/2019 - dropped to 9's postop 7/2019 - rebounded to 10's.  8/8/2019 -   Back to 9's - check ferritin, b12 and thyroid.     Prediabetes 07/09/2014    Overview:  7/4/14 - a1c 6.1 at [place] admission    Retention of urine 06/27/2019    PSOT OP, RESOLVED   ,   Past Surgical History:   Procedure Laterality Date    BRONCHOSCOPY  03/18/2019    EBUS MONTERO NEEDLE BX    COLONOSCOPY      DUPUYTREN CONTRACTURE RELEASE Bilateral     LUNG BIOPSY  03/08/2019    CT GUIDED    LUNG REMOVAL, PARTIAL Right     right lower    PORTACATH PLACEMENT  03/20/2019    DR LONGORIA    THORACOSCOPY Right 6/26/2019    Procedure: RIGHT VATS, OPEN LOWER LOBECTOMY WITH LYMPH NODE DISECTION, WEDGE RESECTION OF RIGHT MIDDLE LOBE;  Surgeon: Terrance Longoria MD;  Location: [facility] MAIN OR;  Service: Cardiothoracic    THORACOSCOPY VIDEO ASSISTED WITH LOBECTOMY Left 7/6/2022    Procedure: THORACOSCOPY VIDEO ASSISTED WITH LOBECTOMY;  Surgeon: Miryam Reyna MD;  Location: [facility] MAIN OR;  Service: Thoracic;  Laterality: Left;   ,  "  Family History   Problem Relation Age of Onset    Cancer Mother         cervical cancer    Cervical cancer Mother     Cancer Father         lung cancer    Lung cancer Father     Lung cancer Sister     Cancer Sister         lung cancer    Malig Hyperthermia Neg Hx    ,   Social History     Tobacco Use    Smoking status: Former     Types: Cigarettes     Quit date: 2009     Years since quittin.0    Smokeless tobacco: Never   Vaping Use    Vaping Use: Never used   Substance Use Topics    Alcohol use: Yes     Alcohol/week: 2.0 standard drinks     Types: 2 Cans of beer per week     Comment: Occasional    Drug use: No   , (Not in a hospital admission)  , Scheduled Meds:  nystatin, 5 mL, Swish & Spit, 4x Daily  potassium chloride, 20 mEq, Oral, Daily  senna-docusate sodium, 2 tablet, Oral, BID  sodium chloride, 10 mL, Intravenous, Q12H    , Continuous Infusions:   , PRN Meds:    acetaminophen    ALPRAZolam    senna-docusate sodium **AND** polyethylene glycol **AND** bisacodyl **AND** bisacodyl    nitroglycerin    ondansetron    [COMPLETED] Insert Peripheral IV **AND** sodium chloride    sodium chloride    sodium chloride   Allergies:  Patient has no known allergies.    Subjective     ROS:  Review of Systems   Unable to perform ROS: Mental status change      Objective   Vital Signs:   /69   Pulse 116   Temp 98.6 °F (37 °C) (Oral)   Resp 21   Ht 190.5 cm (75\")   Wt 63.5 kg (140 lb)   SpO2 95%   BMI 17.50 kg/m²     Physical Exam: (performed by MD)  Physical Exam  Constitutional:       Comments: Appears malnourished   HENT:      Head: Normocephalic and atraumatic.      Mouth/Throat:      Mouth: Mucous membranes are moist.   Cardiovascular:      Rate and Rhythm: Normal rate.   Pulmonary:      Effort: Pulmonary effort is normal.      Breath sounds: Normal breath sounds.   Abdominal:      General: Abdomen is flat.   Musculoskeletal:         General: Normal range of motion.   Skin:     General: Skin is warm. "   Neurological:      Comments: Somnolent, not answering questions       Results Review:  Lab Results (last 48 hours)       Procedure Component Value Units Date/Time    Lactic Acid, Plasma [[id number]]  (Normal) Collected: 09/22/23 0900    Specimen: Blood Updated: 09/22/23 0927     Lactate 1.3 mmol/L     Blood Gas, Arterial - [[id number]]  (Abnormal) Collected: 09/22/23 0349    Specimen: Arterial Blood Updated: 09/22/23 0353     Site Right Brachial     Jose's Test Positive     pH, Arterial 7.511 pH units      pCO2, Arterial 35.2 mm Hg      pO2, Arterial 63.8 mm Hg      HCO3, Arterial 28.2 mmol/L      Base Excess, Arterial 4.9 mmol/L      Comment: Serial Number: [id number]  [id number]        O2 Saturation, Arterial 94.1 %      CO2 Content 29.3 mmol/L      Barometric Pressure for Blood Gas --     Comment: N/A        Modality Room Air     FIO2 21 %      Rate 26 Breaths/minute      Hemodilution No    STAT Lactic Acid, Reflex [[id number]]  (Abnormal) Collected: 09/22/23 0310    Specimen: Blood from Arm, Right Updated: 09/22/23 0343     Lactate 2.1 mmol/L     Urinalysis With Culture If Indicated - Urine, Clean Catch [[id number]]  (Abnormal) Collected: 09/22/23 0009    Specimen: Urine, Clean Catch Updated: 09/22/23 0025     Color, UA Yellow     Appearance, UA Clear     pH, UA 5.5     Specific Gravity, UA 1.013     Glucose, UA Negative     Ketones, UA Negative     Bilirubin, UA Negative     Blood, UA Negative     Protein, UA Trace     Leuk Esterase, UA Negative     Nitrite, UA Negative     Urobilinogen, UA 1.0 E.U./dL    Narrative:      In absence of clinical symptoms, the presence of pyuria, bacteria, and/or nitrites on the urinalysis result does not correlate with infection.  Urine microscopic not indicated.    Blood Culture - Blood, Arm, Left [[id number]] Collected: 09/22/23 0006    Specimen: Blood from Arm, Left Updated: 09/22/23 0013    Blood Culture - Blood, Arm, Right [[id number]] Collected: 09/22/23 0005    Specimen:  Blood from Arm, Right Updated: 09/22/23 0013    Manual Differential [[id number]]  (Abnormal) Collected: 09/21/23 2239    Specimen: Blood Updated: 09/21/23 2348     Neutrophil % 95.0 %      Lymphocyte % 2.0 %      Monocyte % 3.0 %      Neutrophils Absolute 58.62 10*3/mm3      Lymphocytes Absolute 1.23 10*3/mm3      Monocytes Absolute 1.85 10*3/mm3      Anisocytosis Slight/1+     WBC Morphology Normal     Platelet Estimate Adequate    CBC & Differential [[id number]]  (Abnormal) Collected: 09/21/23 2239    Specimen: Blood Updated: 09/21/23 2348    Narrative:      The following orders were created for panel order CBC & Differential.  Procedure                               Abnormality         Status                     ---------                               -----------         ------                     CBC Auto Differential[[id number]]        Abnormal            Final result               Scan Slide[[id number]]                                       Final result                 Please view results for these tests on the individual orders.    CBC Auto Differential [[id number]]  (Abnormal) Collected: 09/21/23 2239    Specimen: Blood Updated: 09/21/23 2348     WBC 61.70 10*3/mm3      RBC 2.94 10*6/mm3      Hemoglobin 8.3 g/dL      Hematocrit 26.8 %      MCV 91.0 fL      MCH 28.3 pg      MCHC 31.1 g/dL      RDW 17.1 %      RDW-SD 55.1 fl      MPV 9.0 fL      Platelets 141 10*3/mm3     Narrative:      The previously reported component NRBC is no longer being reported. Previous result was 0.0 /100 WBC (Reference Range: 0.0-0.2 /100 WBC) on 9/21/2023 at 2315 EDT.    Scan Slide [[id number]] Collected: 09/21/23 2239    Specimen: Blood Updated: 09/21/23 2348     Scan Slide --     Comment: See Manual Differential Results       [place] Draw [[id number]] Collected: 09/21/23 2239    Specimen: Blood Updated: 09/21/23 2345    Narrative:      The following orders were created for panel order [place] Draw.  Procedure                                Abnormality         Status                     ---------                               -----------         ------                     Green Top (Gel)[[id number]]                                  Final result               Lavender Top[[id number]]                                     Final result               Gold Top - SST[[id number]]                                   Final result               Light Blue Top[[id number]]                                   Final result                 Please view results for these tests on the individual orders.    Lavender Top [[id number]] Collected: 09/21/23 2239    Specimen: Blood Updated: 09/21/23 2345     Extra Tube hold for add-on     Comment: Auto resulted       Green Top (Gel) [[id number]] Collected: 09/21/23 2239    Specimen: Blood Updated: 09/21/23 2345     Extra Tube Hold for add-ons.     Comment: Auto resulted.       Gold Top - SST [[id number]] Collected: 09/21/23 2239    Specimen: Blood Updated: 09/21/23 2345     Extra Tube Hold for add-ons.     Comment: Auto resulted.       Light Blue Top [[id number]] Collected: 09/21/23 2239    Specimen: Blood Updated: 09/21/23 2345     Extra Tube Hold for add-ons.     Comment: Auto resulted       Comprehensive Metabolic Panel [[id number]]  (Abnormal) Collected: 09/21/23 2239    Specimen: Blood Updated: 09/21/23 2320     Glucose 107 mg/dL      BUN 30 mg/dL      Creatinine 1.11 mg/dL      Sodium 128 mmol/L      Potassium 3.4 mmol/L      Chloride 89 mmol/L      CO2 27.0 mmol/L      Calcium 8.7 mg/dL      Total Protein 5.5 g/dL      Albumin 2.1 g/dL      ALT (SGPT) 16 U/L      AST (SGOT) 31 U/L      Alkaline Phosphatase 220 U/L      Total Bilirubin 1.8 mg/dL      Globulin 3.4 gm/dL      A/G Ratio 0.6 g/dL      BUN/Creatinine Ratio 27.0     Anion Gap 12.0 mmol/L      eGFR 76.5 mL/min/1.73     Narrative:      GFR Normal >60  Chronic Kidney Disease <60  Kidney Failure <15      Lipase [[id number]]  (Abnormal) Collected: 09/21/23 2239    Specimen:  Blood Updated: 09/21/23 2320     Lipase 8 U/L     Single High Sensitivity Troponin T [[id number]]  (Abnormal) Collected: 09/21/23 2239    Specimen: Blood Updated: 09/21/23 2320     HS Troponin T 22 ng/L     Narrative:      High Sensitive Troponin T Reference Range:  <10.0 ng/L- Negative Female for AMI  <15.0 ng/L- Negative Male for AMI  >=10 - Abnormal Female indicating possible myocardial injury.  >=15 - Abnormal Male indicating possible myocardial injury.   Clinicians would have to utilize clinical acumen, EKG, Troponin, and serial changes to determine if it is an Acute Myocardial Infarction or myocardial injury due to an underlying chronic condition.         POC Lactate [[id number]]  (Abnormal) Collected: 09/21/23 2240    Specimen: Blood Updated: 09/21/23 2241     Lactate 2.2 mmol/L      Comment: Serial Number: [id number]:  [id number]                Imaging Reviewed:   CT Head Without Contrast    Result Date: 9/22/2023  Impression: No acute intracranial abnormality. Electronically Signed: Kieran Dent MD  9/22/2023 2:05 AM EDT  Workstation ID: [id number]    XR Chest 1 View    Result Date: 9/21/2023  Impression: 1.Stable large ovoid masslike density projecting over the left lateral chest with destruction of anterior ribs and diminished volume in the left lung. 2.Stable small bilateral pleural effusions and right basilar atelectasis. Electronically Signed: Kieran Dent MD  9/21/2023 11:16 PM EDT  Workstation ID: [id number]          Assessment & Plan   Patient is a 58-year-old male with metastatic non-small cell lung cancer  Progressed to multiple lines of treatment now currently on Tepotinib admitted with altered mental status    Metastatic non-small cell lung cancer  Patient is currently on Tepotinib  Hold treatment for now while his acute condition resolves  Tepotinib should not cause altered mental status    Altered mental status  Unclear etiology seems to have improved once patient was in the hospital  No  UTI, MRI brain negative for metastasis or other concerns  Neurology has been consulted  Unlikely to be related to Tepotiib    Leukocytosis  Likely inflammation related, no recent increase  Monitor    Anemia  Treatment related myelosuprresion  Transfuse for Hb <7    Electronically signed by Kwame Epstein MD, 09/22/23, 12:51 PM EDT.        Thank you for this consult. We will be happy to follow along with you.

## 2023-09-22 NOTE — THERAPY EVALUATION
Patient Name: Jc Driscoll  : 1965    MRN: [id number]                              Today's Date: 2023       Admit Date: 2023    Visit Dx:     ICD-10-CM ICD-9-CM   1. Altered mental status, unspecified altered mental status type  R41.82 780.97   2. Elevated lactic acid level  R79.89 276.2     Patient Active Problem List   Diagnosis    Cancer of lower lobe of right lung    Retention of urine    Allergic rhinitis    Anxiety and depression    Chronic pansinusitis    Diastolic CHF    Diverticulosis    Erosive esophagitis    Family history of aneurysm    Gastroesophageal reflux disease    Hiatal hernia    Hypertension    Hyperlipidemia    Mediastinal lymphadenopathy    Prediabetes    Non-small cell carcinoma of lung, right    Encounter for care related to vascular access port    Normocytic anemia    B12 deficiency    Pleural effusion    Acute chest pain    Hashimoto's thyroiditis    Pneumothorax    NSCLC of left lung    Malignant neoplasm of upper lobe of left lung    Hyponatremia    Dehydration    Weakness    Hyponatremia    Immunotherapy    Left upper lobe pneumonia    Weakness generalized    Moderate malnutrition    Leukocytosis    Sepsis    Bandemia    Anemia due to chemotherapy    Cellulitis of chest wall    AMS (altered mental status)     Past Medical History:   Diagnosis Date    Allergic rhinitis 2013    Overview:  2018 - still zyrtec 10 daily (if stops, gets dermatitis again).     Anxiety and depression 2012    Overview:  2018 -   C/w well 300 xr and Lito 20.  2019 -   Doing ok even with new lung cancer - lito 20 & well 300xr.     Chronic pansinusitis 2019    Overview:  2019 -   MRI showed chronic thick in frontal, maxillary, ethmoidal ---> to Dr. COLLAZO to est since abx ICC didn't help.  Does netipot bid.     Diastolic CHF 2014    Overview:  14 - impaired LV relaxation on Zhou ECHO.  EF 60%. Rest normal    Dupuytren's contracture of both hands     Elevated  cholesterol     Erosive esophagitis 07/09/2019    Overview:  EGD 2016 4/2/2018 - nexium OTC daily for few week.  Qod before that.  Now carbonation hurts, epigastric pain 4/8/2019 -   protonix 40 doing well.     Gastroesophageal reflux disease 02/21/2019    Hiatal hernia 07/09/2019    History of colon polyps     Hypertension     Lung cancer     right lung and in lymph nodes x2    Mediastinal lymphadenopathy 03/12/2019    Normocytic anemia 08/08/2019    Overview:  6/2019 - dropped to 9's postop 7/2019 - rebounded to 10's.  8/8/2019 -   Back to 9's - check ferritin, b12 and thyroid.     Prediabetes 07/09/2014    Overview:  7/4/14 - a1c 6.1 at [facility] admission    Retention of urine 06/27/2019    PSOT OP, RESOLVED     Past Surgical History:   Procedure Laterality Date    BRONCHOSCOPY  03/18/2019    EBUS MONTERO NEEDLE BX    COLONOSCOPY      DUPUYTREN CONTRACTURE RELEASE Bilateral     LUNG BIOPSY  03/08/2019    CT GUIDED    LUNG REMOVAL, PARTIAL Right     right lower    PORTACATH PLACEMENT  03/20/2019    DR LONGORIA    THORACOSCOPY Right 6/26/2019    Procedure: RIGHT VATS, OPEN LOWER LOBECTOMY WITH LYMPH NODE DISECTION, WEDGE RESECTION OF RIGHT MIDDLE LOBE;  Surgeon: Terrance Longoria MD;  Location: [facility] MAIN OR;  Service: Cardiothoracic    THORACOSCOPY VIDEO ASSISTED WITH LOBECTOMY Left 7/6/2022    Procedure: THORACOSCOPY VIDEO ASSISTED WITH LOBECTOMY;  Surgeon: Miryam Reyna MD;  Location: [facility] MAIN OR;  Service: Thoracic;  Laterality: Left;      General Information       Row Name 09/22/23 1318          Physical Therapy Time and Intention    Document Type evaluation  -PARTH     Mode of Treatment physical therapy  -PARTH       Row Name 09/22/23 1318          General Information    Prior Level of Function independent:;all household mobility;community mobility;gait;transfer;driving  Has been utilizing a SPC since his most recent decline, but previously independent with all mobility without an AD  -PARTH     Existing  Precautions/Restrictions fall  -     Barriers to Rehab medically complex  -       Row Name 09/22/23 1318          Living Environment    People in Home spouse  -       Row Name 09/22/23 1318          Home Main Entrance    Number of Stairs, Main Entrance none  -       Row Name 09/22/23 1318          Stairs Within Home, Primary    Stairs, Within Home, Primary multi-level home, but is able to stay on the main level if needed  -       Row Name 09/22/23 1318          Cognition    Orientation Status (Cognition) oriented x 3  -       Row Name 09/22/23 1318          Safety Issues, Functional Mobility    Impairments Affecting Function (Mobility) balance;endurance/activity tolerance;range of motion (ROM);strength  -               User Key  (r) = Recorded By, (t) = Taken By, (c) = Cosigned By      Initials Name Provider Type    Gema Vasquez, PT Physical Therapist                   Mobility       Row Name 09/22/23 1320          Bed Mobility    Bed Mobility supine-sit-supine  -PARTH     Supine-Sit-Supine [place] (Bed Mobility) minimum assist (75% patient effort)  -     Comment, (Bed Mobility) Assist at trunk  -       Row Name 09/22/23 1320          Transfers    Comment, (Transfers) Pt unable to stand this date due to fatigue/wkn sitting EOB; BP WNL upon sitting; HR elevated 115  -               User Key  (r) = Recorded By, (t) = Taken By, (c) = Cosigned By      Initials Name Provider Type    Gema Vasquez, PT Physical Therapist                   Obj/Interventions       Row Name 09/22/23 1321          Range of Motion Comprehensive    General Range of Motion bilateral lower extremity ROM WFL  -     Comment, General Range of Motion LUE limited by ~50%; pt reports it feels heavy; RUE WNL  -       Row Name 09/22/23 1321          Strength Comprehensive (MMT)    General Manual Muscle Testing (MMT) Assessment lower extremity strength deficits identified;upper extremity strength deficits identified   -     Comment, General Manual Muscle Testing (MMT) Assessment BLE grossly 3/5  -       Row Name 09/22/23 1321          Balance    Balance Assessment sitting static balance;standing static balance;sitting dynamic balance  -     Static Sitting Balance contact guard  -     Dynamic Sitting Balance minimal assist  -     Position, Sitting Balance sitting edge of bed  -     Static Standing Balance unable to assess  -       Row Name 09/22/23 1321          Sensory Assessment (Somatosensory)    Sensory Assessment (Somatosensory) other (see comments)  Pt reports h/o BUE/BLE neuropathy with tingling in BUE  -               User Key  (r) = Recorded By, (t) = Taken By, (c) = Cosigned By      Initials Name Provider Type    Gema Vasquez, PT Physical Therapist                   Goals/Plan       Row Name 09/22/23 1324          Bed Mobility Goal 1 (PT)    Activity/Assistive Device (Bed Mobility Goal 1, PT) bed mobility activities, all  -PARTH     [place] Level/Cues Needed (Bed Mobility Goal 1, PT) independent  -     Time Frame (Bed Mobility Goal 1, PT) long term goal (LTG);2 weeks  -       Row Name 09/22/23 1324          Transfer Goal 1 (PT)    Activity/Assistive Device (Transfer Goal 1, PT) sit-to-stand/stand-to-sit;bed-to-chair/chair-to-bed  -PARTH     [place] Level/Cues Needed (Transfer Goal 1, PT) modified independence  -PARTH     Time Frame (Transfer Goal 1, PT) long term goal (LTG);2 weeks  -       Row Name 09/22/23 1324          Gait Training Goal 1 (PT)    Activity/Assistive Device (Gait Training Goal 1, PT) gait (walking locomotion)  -     [place] Level (Gait Training Goal 1, PT) modified independence  -PARTH     Distance (Gait Training Goal 1, PT) 10ft  -PARTH     Time Frame (Gait Training Goal 1, PT) 1 week;short term goal (STG)  -       Row Name 09/22/23 1324          Therapy Assessment/Plan (PT)    Planned Therapy Interventions (PT) balance training;bed mobility training;gait training;home  exercise program;patient/family education;strengthening;neuromuscular re-education;transfer training;postural re-education;ROM (range of motion)  -               User Key  (r) = Recorded By, (t) = Taken By, (c) = Cosigned By      Initials Name Provider Type    Gema Vasquez, PT Physical Therapist                   Clinical Impression       Row Name 09/22/23 1322          Pain    Pain Intervention(s) Repositioned;Nursing Notified  -     Additional Documentation Pain Scale: FACES Pre/Post-Treatment (Group)  -PARTH       Row Name 09/22/23 1322          Pain Scale: FACES Pre/Post-Treatment    Pain: FACES Scale, Pretreatment 2-->hurts little bit  -PARTH     Posttreatment Pain Rating 2-->hurts little bit  -     Pain Location - Side/Orientation Left  -     Pain Location upper  -PARTH     Pain Location - extremity  -     Pre/Posttreatment Pain Comment pt reports UE feels heavy and is painful around the shoulder and in the axilla.  -PARTH       Row Name 09/22/23 1322          Plan of Care Review    Plan of Care Reviewed With patient  -PARTH     Outcome Evaluation Pt is a 59 y/o male from [facility] rehab with reports of AMS. PMH significant for tobacco abuse, depression, chronic pansinusitis, h/o eroxive esophagitis s/p upper endoscopy, hiatal hernia, HTN, recurrent adenocarcinoma of the lungs with metastasis, s/p partial L thoracotomy and R lower lobectomy, acute on chronic normocytic anemia. CXR indicates large ovoid masslike density projecting over the L lateral chest with destruction of anterior ribs and diminished L lung volume. MRI pending, CT (-). At [facility] pt reports he was independent with ambulation without an AD and driving until most recent decline in the past few weeks. He was admitted to \[facility]\"" from home due to decline and pt reports he was having a difficult time tolerating the three hours of therapy. At this time pt was able to sit EOB, but unable to tolerate long enough to get a BP reading due to dizziness and  fatigue. BP WNL upon supine, HR tachycardic at 115, SpO2 96%. He presents with L shld pain and decreased ROM/strength, decreased generalized weakness, and decreased activity tolerance and balance. Pt is well below baseline and will require SNF at discharge.  -       Row Name 09/22/23 1322          Therapy Assessment/Plan (PT)    Rehab Potential (PT) good, to achieve stated therapy goals  -     Criteria for Skilled Interventions Met (PT) yes;meets criteria  -PARTH     Therapy Frequency (PT) 3 times/wk  -PARTH     Predicted Duration of Therapy Intervention (PT) Until d/c  -PARTH       Row Name 09/22/23 1322          Vital Signs    Pre Systolic BP Rehab 93  -PARTH     Pre Treatment Diastolic BP 58  -PARTH     Intra Systolic BP Rehab 102  -PARTH     Intra Treatment Diastolic BP 69  -PARTH     Intratreatment Heart Rate (beats/min) 115  -PARTH     Posttreatment Heart Rate (beats/min) 115  -PARTH     O2 Delivery Pre Treatment room air  -PARTH     O2 Delivery Intra Treatment room air  -PARTH     Post SpO2 (%) 96  -PARTH     O2 Delivery Post Treatment room air  -       Row Name 09/22/23 1322          Positioning and Restraints    Pre-Treatment Position in bed  -PARTH     Post Treatment Position bed  -PARTH     In Bed notified nsg;fowlers;call light within reach;encouraged to call for assist;with family/caregiver  -PARTH               User Key  (r) = Recorded By, (t) = Taken By, (c) = Cosigned By      Initials Name Provider Type    Gema Vasquez, PT Physical Therapist                   Outcome Measures       Row Name 09/22/23 1325 09/22/23 0745       How much help from another person do you currently need...    Turning from your back to your side while in flat bed without using bedrails? 3  -PARTH 4  -KN    Moving from lying on back to sitting on the side of a flat bed without bedrails? 2  -PARTH 4  -KN    Moving to and from a bed to a chair (including a wheelchair)? 2  -PARTH 3  -KN    Standing up from a chair using your arms (e.g., wheelchair, bedside chair)? 2  -PARTH  3  -KN    Climbing 3-5 steps with a railing? 2  -PARTH 3  -KN    To walk in hospital room? 2  -PARTH 3  -KN    AM-PAC 6 Clicks Score (PT) 13  -PARTH 20  -KN    Highest level of mobility 4 --> Transferred to chair/commode  -PARTH 6 --> Walked 10 steps or more  -KN      Row Name 09/22/23 1325          Functional Assessment    Outcome Measure Options AM-PAC 6 Clicks Basic Mobility (PT)  -               User Key  (r) = Recorded By, (t) = Taken By, (c) = Cosigned By      Initials Name Provider Type    Samreen Contreras, RN Registered Nurse    Gema Vasquez, PT Physical Therapist                                 Physical Therapy Education       Title: PT OT SLP Therapies (In Progress)       Topic: Physical Therapy (In Progress)       Point: Mobility training (Done)       Learning Progress Summary             Patient Acceptance, E,TB, VU by  at 9/22/2023 1325                         Point: Home exercise program (Not Started)       Learner Progress:  Not documented in this visit.              Point: Body mechanics (Not Started)       Learner Progress:  Not documented in this visit.              Point: Precautions (Done)       Learning Progress Summary             Patient Acceptance, E,TB, VU by  at 9/22/2023 1325                                         User Key       Initials Effective Dates Name Provider Type Discipline     08/23/21 -  Gema Mosher, PT Physical Therapist PT                  PT Recommendation and Plan  Planned Therapy Interventions (PT): balance training, bed mobility training, gait training, home exercise program, patient/family education, strengthening, neuromuscular re-education, transfer training, postural re-education, ROM (range of motion)  Plan of Care Reviewed With: patient  Outcome Evaluation: Pt is a 59 y/o male from [facility] at the [facility] rehab with reports of AMS. PMH significant for tobacco abuse, depression, chronic pansinusitis, h/o eroxive esophagitis s/p upper endoscopy, hiatal hernia, HTN,  recurrent adenocarcinoma of the lungs with metastasis, s/p partial L thoracotomy and R lower lobectomy, acute on chronic normocytic anemia. CXR indicates large ovoid masslike density projecting over the L lateral chest with destruction of anterior ribs and diminished L lung volume. MRI pending, CT (-). At [facility] pt reports he was independent with ambulation without an AD and driving until most recent decline in the past few weeks. He was admitted to [facility] from home due to decline and pt reports he was having a difficult time tolerating the three hours of therapy. At this time pt was able to sit EOB, but unable to tolerate long enough to get a BP reading due to dizziness and fatigue. BP WNL upon supine, HR tachycardic at 115, SpO2 96%. He presents with L shld pain and decreased ROM/strength, decreased generalized weakness, and decreased activity tolerance and balance. Pt is well below baseline and will require SNF at discharge.     Time Calculation:         PT Charges       Row Name 09/22/23 1325             Time Calculation    Start Time 1047  -PARTH      Stop Time 1108  -PARTH      Time Calculation (min) 21 min  -PARTH      PT Received On 09/22/23  -PARTH      PT - Next Appointment 09/25/23  -PARTH      PT Goal Re-Cert Due Date 10/06/23  -PARTH                User Key  (r) = Recorded By, (t) = Taken By, (c) = Cosigned By      Initials Name Provider Type    Gema Vasquez, BELKIS Physical Therapist                  Therapy Charges for Today       Code Description Service Date Service Provider Modifiers Qty    [id number] HC PT EVAL MOD COMPLEXITY 4 9/22/2023 Gema Mosher, PT GP 1            PT G-Codes  Outcome Measure Options: AM-PAC 6 Clicks Basic Mobility (PT)  AM-PAC 6 Clicks Score (PT): 13  PT Discharge Summary  Anticipated Discharge Disposition (PT): skilled nursing facility    Gema Mosher PT  9/22/2023

## 2023-09-22 NOTE — H&P
"    [facility]  History & Physical    Patient Name: Jc Driscoll  : 1965  MRN: [id number]  Primary Care Physician:  Reshma Alvarenga MD  Date of admission: 2023  Date and Time of Service: 23 at 0329    Subjective      Chief Complaint: AMS    History of Present Illness: Jc Driscoll is a 58 y.o. 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  Oncology for his underlying malignancy. who presented to [facility] on 2023 with report of AMS    ED physician report that family was bedside during admission work-up and had reported patient had \"not been acting right past couple days\".  Urine culture and chest x-ray negative for acute infection process.  Chronic leukocytosis appears stable.  Patient's O2 sats 100% on room air.  No overt etiology for altered mental status.  Abnormal finding of a lactic of 2.2 unknown etiology.    I personally called over to [facility] rehab and received report from the nurse who had been caring for patient.  Per nurse report patient had received blood transfusion earlier that day for hemoglobin of 5.5 at 0910.  Repeat home hemoglobin at 1930 was 6.7.  Nurse reports received report from dayshift provider and no concerns for altered mental status.  She states she went in to assess patient around  and noted patient was acting differently and not easily aroused.  He had garbled words.  Patient would fall asleep easily and could only keep eyes open for seconds at a time.  He was excessively weak.  Nailbeds " appeared cyanotic and they were unable to get a O2 sat.  EMS was called.  Documented O2 with EMS was 81% on 2 L.  After arrival to the ED O2 sats have been 100% on room air.  Repeat hemoglobin at 2239 was 8.3.  Nurse at [facility] rehab reports patient's spouse concerned for metastatic disease.  We will get a follow-up MRI and consult oncology.      Review of Systems   All other systems reviewed and are negative.   Patient is easily aroused.  Alert and oriented x3.  Denies shortness of breath, chest pain, abdominal pain.  Has nonspecific complaint of mild tenderness under left axilla from previous wound.  Negative for edema.  Denies any overt blood loss such as hematic emesis or melena or hematochezia.  Positive for sore tongue and patient reports was being treated for thrush    Personal History     Past Medical History:   Diagnosis Date    Allergic rhinitis 07/25/2013    Overview:  4/2/2018 - still zyrtec 10 daily (if stops, gets dermatitis again).     Anxiety and depression 06/05/2012    Overview:  4/2/2018 -   C/w well 300 xr and Lito 20.  4/8/2019 -   Doing ok even with new lung cancer - lito 20 & well 300xr.     Chronic pansinusitis 04/08/2019    Overview:  4/8/2019 -   MRI showed chronic thick in frontal, maxillary, ethmoidal ---> to Dr. COLLAZO to est since abx ICC didn't help.  Does netipot bid.     Diastolic CHF 07/09/2014    Overview:  7/4/14 - impaired LV relaxation on Zhou ECHO.  EF 60%. Rest normal    Dupuytren's contracture of both hands     Elevated cholesterol     Erosive esophagitis 07/09/2019    Overview:  EGD 2016 4/2/2018 - nexium OTC daily for few week.  Qod before that.  Now carbonation hurts, epigastric pain 4/8/2019 -   protonix 40 doing well.     Gastroesophageal reflux disease 02/21/2019    Hiatal hernia 07/09/2019    History of colon polyps     Hypertension     Lung cancer     right lung and in lymph nodes x2    Mediastinal lymphadenopathy 03/12/2019    Normocytic anemia 08/08/2019    Overview:  6/2019  - dropped to 9's postop 7/2019 - rebounded to 10's.  8/8/2019 -   Back to 9's - check ferritin, b12 and thyroid.     Prediabetes 07/09/2014    Overview:  7/4/14 - a1c 6.1 at [place] admission    Retention of urine 06/27/2019    PSOT OP, RESOLVED       Past Surgical History:   Procedure Laterality Date    BRONCHOSCOPY  03/18/2019    EBUS MONTERO NEEDLE BX    COLONOSCOPY      DUPUYTREN CONTRACTURE RELEASE Bilateral     LUNG BIOPSY  03/08/2019    CT GUIDED    LUNG REMOVAL, PARTIAL Right     right lower    PORTACATH PLACEMENT  03/20/2019    DR LONGORIA    THORACOSCOPY Right 6/26/2019    Procedure: RIGHT VATS, OPEN LOWER LOBECTOMY WITH LYMPH NODE DISECTION, WEDGE RESECTION OF RIGHT MIDDLE LOBE;  Surgeon: Terrance Longoria MD;  Location: [facility];  Service: Cardiothoracic    THORACOSCOPY VIDEO ASSISTED WITH LOBECTOMY Left 7/6/2022    Procedure: THORACOSCOPY VIDEO ASSISTED WITH LOBECTOMY;  Surgeon: Miryam Reyna MD;  Location: [facility] OR;  Service: Thoracic;  Laterality: Left;       Family History: family history includes Cancer in his father, mother, and sister; Cervical cancer in his mother; Lung cancer in his father and sister. Otherwise pertinent FHx was reviewed and not pertinent to current issue.    Social History:  reports that he quit smoking about 14 years ago. His smoking use included cigarettes. He has never used smokeless tobacco. He reports current alcohol use of about 2.0 standard drinks per week. He reports that he does not use drugs.    Home Medications:  Prior to Admission Medications       Prescriptions Last Dose Informant Patient Reported? Taking?    buPROPion XL (WELLBUTRIN XL) 300 MG 24 hr tablet   Yes No    Take 1 tablet by mouth Daily.    desvenlafaxine (PRISTIQ) 50 MG 24 hr tablet   Yes No    Take 1 tablet by mouth Daily.    folic acid (FOLVITE) 1 MG tablet   No No    Take 1 tablet by mouth Daily.    gabapentin (NEURONTIN) 300 MG capsule   Yes No    Take 1 capsule by mouth Every Night.     hydrOXYzine (ATARAX) 25 MG tablet   No No    Take 1 tablet by mouth 3 (Three) Times a Day As Needed for Anxiety.    lactulose (CHRONULAC) 10 GM/15ML solution   Yes No    Take 30 mL by mouth 2 (Two) Times a Day As Needed.    levothyroxine (SYNTHROID, LEVOTHROID) 137 MCG tablet   No No    Take 1 tablet by mouth Every Morning.    liothyronine (CYTOMEL) 5 MCG tablet   Yes No    Take 1 tablet by mouth Daily.    loratadine (CLARITIN) 10 MG tablet  Self Yes No    Take 1 tablet by mouth Daily.    megestrol acetate (MEGACE) 400 MG/10ML suspension oral suspension   No No    Take 10 mL by mouth Daily.    ondansetron (ZOFRAN) 8 MG tablet   No No    Take 1 tablet by mouth 3 (Three) Times a Day As Needed for Nausea or Vomiting.    oxyCODONE-acetaminophen (PERCOCET)  MG per tablet   No No    Take 1 tablet by mouth Every 4 (Four) Hours As Needed for Moderate Pain.    pantoprazole (PROTONIX) 40 MG EC tablet   Yes No    Take 1 tablet by mouth Daily.    potassium chloride (K-DUR,KLOR-CON) 20 MEQ CR tablet   Yes No    Take 1 tablet by mouth Daily.    sodium chloride 1 g tablet   No No    Take 1 tablet by mouth 4 (Four) Times a Day With Meals & at Bedtime.    torsemide (DEMADEX) 10 MG tablet   No No    Take 1 tablet by mouth Daily.    vitamin B-12 (CYANOCOBALAMIN) 1000 MCG tablet   Yes No    Take 1 tablet by mouth Daily.    vitamin B-6 (PYRIDOXINE) 100 MG tablet   No No    Take 1 tablet by mouth Every 12 (Twelve) Hours.              Allergies:  No Known Allergies    Objective      Vitals:   Temp:  [97.7 °F (36.5 °C)] 97.7 °F (36.5 °C)  Heart Rate:  [] 111  Resp:  [18] 18  BP: ()/(60-62) 91/61  Flow (L/min):  [2] 2    Physical Exam  Eyes:      Pupils: Pupils are equal, round, and reactive to light.   Cardiovascular:      Rate and Rhythm: Normal rate and regular rhythm.   Pulmonary:      Effort: Pulmonary effort is normal.      Breath sounds: Normal breath sounds.   Abdominal:      General: Abdomen is flat.       Palpations: Abdomen is soft.   Musculoskeletal:         General: Normal range of motion.   Skin:     General: Skin is warm and dry.      Comments: Left chest port in place  Scar under left axilla    Neurological:      Mental Status: He is alert and oriented to person, place, and time.   Psychiatric:         Mood and Affect: Mood normal.         Behavior: Behavior normal.          Result Review    Result Review:  I have personally reviewed the results from the time of this admission to 9/22/2023 04:30 EDT and agree with these findings:  [x]  Laboratory  []  Microbiology  [x]  Radiology  []  EKG/Telemetry   []  Cardiology/Vascular   []  Pathology  [x]  Old records  []  Other:  Most notable findings include:   CT Head Without Contrast    Result Date: 9/22/2023  Impression: No acute intracranial abnormality. Electronically Signed: Kieran Dent MD  9/22/2023 2:05 AM EDT  Workstation ID: [id number]    XR Chest 1 View    Result Date: 9/21/2023  Impression: 1.Stable large ovoid masslike density projecting over the left lateral chest with destruction of anterior ribs and diminished volume in the left lung. 2.Stable small bilateral pleural effusions and right basilar atelectasis. Electronically Signed: Kieran Dent MD  9/21/2023 11:16 PM EDT  Workstation ID: [id number]      CBC          9/11/2023    05:46 9/13/2023    12:57 9/21/2023    09:10 9/21/2023    19:30 9/21/2023    22:39   CBC   WBC 60.10  63.49    61.70    RBC 2.50  2.69    2.94    Hemoglobin 7.1  8.0  5.5  6.7  8.3    Hematocrit 22.6  24.0  18.0  21.0  26.8    MCV 90.5  89.2    91.0    MCH 28.5  29.7    28.3    MCHC 31.5  33.3    31.1    RDW 18.5  17.2    17.1    Platelets 277  260    141       CMP          9/11/2023    05:46 9/13/2023    12:57 9/21/2023    22:39   CMP   Glucose 102  94  107    BUN 17  11  30    Creatinine 0.64  0.66  1.11    EGFR 109.1  108.0  76.5    Sodium 126  123  128    Potassium 4.1  3.8  3.4    Chloride 92  87  89    Calcium 9.3  9.2  8.7     Total Protein  5.8  5.5    Albumin  2.3  2.1    Globulin  3.5  3.4    Total Bilirubin  0.8  1.8    Alkaline Phosphatase  196  220    AST (SGOT)  24  31    ALT (SGPT)  13  16    Albumin/Globulin Ratio  0.7  0.6    BUN/Creatinine Ratio 26.6  16.7  27.0    Anion Gap 12.0  15.0  12.0       Assessment & Plan        Active Hospital Problems:  Active Hospital Problems    Diagnosis     **AMS (altered mental status)      Plan:   AMS, appears to have resolved  - Was reported cyanotic nail beds, unable to be aroused, garbled words and excessively weak but now Alert, on room air, no complaints  - unknown etiology for pt symptoms at facility  - will rule out TIA vs seizure vs metastatic disease  - CT negative. Will follow up with MRI  - noted elevated lactic of 2.2 -> 2.1  - received cefepime in ED. Will hold further antibiotics at this time and monitor  - will give 500L bolus as blood pressure low   - leukocytosis appears chronic and no change  - urine negative  - CXR impression per radiology: 1.Stable large ovoid masslike density projecting over the left lateral chest with destruction of anterior ribs and diminished volume in the left lung.  2.Stable small bilateral pleural effusions and right basilar atelectasis.  - consider neurology consult pending MRI results  - monitor closely     Metastatic lung CA s/p left thoracotomy and right lower lobectomy  -followed by Dr. Gaspar on tepotinib  -CT findings noted, concern for pneumonia  -Oncology recommending further treatment as outpatient  - Left chest port accessed. No redness noted around area  - consult oncology to follow     Hx left chest well cellulitis  - Pt reports discomfort at times under left axilla  - skin appears to have scar tissue and is firm but no erythema, edema or drainage    Leukocytosis, appears chronic  - -pt has had leukocytosis since March 2023. His previous admission July 2023 the patient had negative blood cultures. ID team suggested leukocytosis was  secondary to steroids and underlying malignancy   - monitor    Anemia of chronic disease/chemotherapy  - s/p 1 Unit PRBC on 9/21/23 for hgb 5.5- now 8.3  - monitor H&H  - transfuse <7    Hyponatremia, likely chronic due to SIADH   -pt previously on salt tabs and torsemide  - monitor     Hypotension  - appears chronic and stable      Anorexia  - on megace    DVT prophylaxis:  Mechanical DVT prophylaxis orders are present.      CODE STATUS:  Pt reports considering DNR code status.   Due to admission of AMS and reported code status of FULL CODE at rehab facility will keep full code and will need to follow up Consulted palliative care     Code Status (Patient has no pulse and is not breathing): CPR (Attempt to Resuscitate)  Medical Interventions (Patient has pulse or is breathing): Full Support    Admission Status:  I believe this patient meets observation status.    I discussed the patient's findings and my recommendations with patient.    This patient has been examined wearing appropriate Personal Protective Equipment Signature: Electronically signed by MULUGETA Tavarez, 09/22/23, 04:30 EDT.  [facility] Hospitalist Team

## 2023-09-22 NOTE — CASE MANAGEMENT/SOCIAL WORK
Discharge Planning Assessment  [facility]     Patient Name: Jc Driscoll  MRN: [id number]  Today's Date: 9/22/2023    Admit Date: 9/21/2023    Plan: Pt from [facility] rehab. PT eval pending. SNF vs IPR.   Discharge Needs Assessment       Row Name 09/22/23 1256       Living Environment    People in Home spouse    Current Living Arrangements home    Potentially Unsafe Housing Conditions none    Primary Care Provided by self    Provides Primary Care For no one    Family Caregiver if Needed spouse    Quality of Family Relationships supportive    Able to Return to Prior Arrangements yes       Resource/Environmental Concerns    Resource/Environmental Concerns none    Transportation Concerns none       Transition Planning    Patient/Family Anticipates Transition to inpatient rehabilitation facility    Patient/Family Anticipated Services at Transition skilled nursing    Transportation Anticipated family or friend will provide       Discharge Needs Assessment    Readmission Within the Last 30 Days no previous admission in last 30 days    Equipment Currently Used at Home none    Concerns to be Addressed discharge planning    Anticipated Changes Related to Illness none    Equipment Needed After Discharge none    Outpatient/Agency/Support Group Needs skilled nursing facility                   Discharge Plan       Row Name 09/22/23 0130       Plan    Plan Pt from [facility] rehab, wants to return. Precert required. PT eval pending. SNF vs IPR.    Patient/Family in Agreement with Plan yes    Plan Comments Met with pt and spouse in ED. Pt lives at home with spouse, and recently admitted to [facility] from home. Pt spouse said he took decline past couple of days at rehab. Patient would like to return to [facility] rehab if possible. Liaison notified via text. Pending PT evaluation. ECF list provided to patient and spouse to review, if SNF is recommendation. Pt spouse will provide transportation. PCP verified. Palliative and Oncology consults.      D[place]ton  Name 09/22/23 1300       Plan    Patient/Family in Agreement with Plan yes                  Continued Care and Services - Admitted Since 9/21/2023       Destination       Service Provider Request Status Selected Services Address Phone Fax Patient Preferred    [facility] Pending - Request Sent N/A [address] [place] [address] [phone] --                  Selected Continued Care - Episodes Includes continued care and service providers with selected services from the active episodes listed below      Oncology Episode start date: 8/16/2023                    Expected Discharge Date and Time       Expected Discharge Date Expected Discharge Time    Sep 24, 2023            Demographic Summary       Row Name 09/22/23 1255       General Information    Admission Type observation    Arrived From emergency department    Referral Source emergency department    Reason for Consult discharge planning    Preferred Language English                   Functional Status       Row Name 09/22/23 1256       Functional Status    Usual Activity Tolerance good    Current Activity Tolerance moderate       Functional Status, IADL    Medications independent    Meal Preparation independent    Housekeeping independent    Laundry independent    Shopping independent       Mental Status    General Appearance WDL WDL       Mental Status Summary    Recent Changes in Mental Status/Cognitive Functioning no changes                          Jacque Shea, RN

## 2023-09-22 NOTE — ED PROVIDER NOTES
Subjective   History of Present Illness  Chief complaint: altered mental status    58 year old male with history of lung cancer currently getting chemo presents with altered mental status.  Patient is currently residing in a rehab facility.  He has had significant anemia and had a blood transfusion today.  This evening patient has been difficult to arouse by the facility staff.  Family states they can tell the patient has gone downhill over the past couple days.  Patient denies any complaints of pain.  He has had no fever.  He has had continued nausea and significantly decreased appetite from the chemo.  He denies any vomiting or diarrhea.  He states he is generally weak.      History provided by:  Patient and relative    Review of Systems   Constitutional:  Positive for fatigue. Negative for fever.   HENT:  Negative for congestion.    Respiratory:  Negative for cough and shortness of breath.    Cardiovascular:  Negative for chest pain.   Gastrointestinal:  Positive for nausea. Negative for abdominal pain, diarrhea and vomiting.   Musculoskeletal:  Negative for back pain.   Skin:  Negative for rash.   Neurological:  Positive for weakness. Negative for headaches.     Past Medical History:   Diagnosis Date    Allergic rhinitis 07/25/2013    Overview:  4/2/2018 - still zyrtec 10 daily (if stops, gets dermatitis again).     Anxiety and depression 06/05/2012    Overview:  4/2/2018 -   C/w well 300 xr and Lito 20.  4/8/2019 -   Doing ok even with new lung cancer - lito 20 & well 300xr.     Chronic pansinusitis 04/08/2019    Overview:  4/8/2019 -   MRI showed chronic thick in frontal, maxillary, ethmoidal ---> to Dr. ZAY knowles est since abx ICC didn't help.  Does netipot bid.     Diastolic CHF 07/09/2014    Overview:  7/4/14 - impaired LV relaxation on Zhou ECHO.  EF 60%. Rest normal    Dupuytren's contracture of both hands     Elevated cholesterol     Erosive esophagitis 07/09/2019    Overview:  EGD 2016 4/2/2018 - nexium OTC  daily for few week.  Qod before that.  Now carbonation hurts, epigastric pain 2019 -   protonix 40 doing well.     Gastroesophageal reflux disease 2019    Hiatal hernia 2019    History of colon polyps     Hypertension     Lung cancer     right lung and in lymph nodes x2    Mediastinal lymphadenopathy 2019    Normocytic anemia 2019    Overview:  2019 - dropped to 9's postop 2019 - rebounded to 10's.  2019 -   Back to 9's - check ferritin, b12 and thyroid.     Prediabetes 2014    Overview:  14 - a1c 6.1 at [facility] admission    Retention of urine 2019    PSOT OP, RESOLVED       No Known Allergies    Past Surgical History:   Procedure Laterality Date    BRONCHOSCOPY  2019    EBUS MONTERO NEEDLE BX    COLONOSCOPY      DUPUYTREN CONTRACTURE RELEASE Bilateral     LUNG BIOPSY  2019    CT GUIDED    LUNG REMOVAL, PARTIAL Right     right lower    PORTACATH PLACEMENT  2019    DR LONGORIA    THORACOSCOPY Right 2019    Procedure: RIGHT VATS, OPEN LOWER LOBECTOMY WITH LYMPH NODE DISECTION, WEDGE RESECTION OF RIGHT MIDDLE LOBE;  Surgeon: Terrance Longoria MD;  Location: [facility] MAIN OR;  Service: Cardiothoracic    THORACOSCOPY VIDEO ASSISTED WITH LOBECTOMY Left 2022    Procedure: THORACOSCOPY VIDEO ASSISTED WITH LOBECTOMY;  Surgeon: Miryam Reyna MD;  Location: [facility] MAIN OR;  Service: Thoracic;  Laterality: Left;       Family History   Problem Relation Age of Onset    Cancer Mother         cervical cancer    Cervical cancer Mother     Cancer Father         lung cancer    Lung cancer Father     Lung cancer Sister     Cancer Sister         lung cancer    Malig Hyperthermia Neg Hx        Social History     Socioeconomic History    Marital status:    Tobacco Use    Smoking status: Former     Types: Cigarettes     Quit date: 2009     Years since quittin.0    Smokeless tobacco: Never   Vaping Use    Vaping Use: Never used   Substance and Sexual  "Activity    Alcohol use: Yes     Alcohol/week: 2.0 standard drinks     Types: 2 Cans of beer per week     Comment: Occasional    Drug use: No    Sexual activity: Defer       BP 91/61   Pulse 111   Temp 97.7 °F (36.5 °C) (Oral)   Resp 18   Ht 190.5 cm (75\")   Wt 63.5 kg (140 lb)   SpO2 99%   BMI 17.50 kg/m²       Objective   Physical Exam  Vitals and nursing note reviewed.   Constitutional:       Appearance: He is ill-appearing.   HENT:      Head: Normocephalic and atraumatic.      Mouth/Throat:      Mouth: Mucous membranes are moist.   Cardiovascular:      Rate and Rhythm: Regular rhythm. Tachycardia present.      Heart sounds: Normal heart sounds.   Pulmonary:      Effort: Pulmonary effort is normal. No respiratory distress.      Breath sounds: Normal breath sounds.   Abdominal:      General: Bowel sounds are normal.      Palpations: Abdomen is soft.      Tenderness: There is no abdominal tenderness.   Musculoskeletal:         General: No swelling.   Skin:     General: Skin is warm and dry.   Neurological:      Mental Status: He is alert.      Comments: General weakness with no focal motor or sensory deficit       Procedures           ED Course      Results for orders placed or performed during the hospital encounter of 09/21/23   Comprehensive Metabolic Panel    Specimen: Blood   Result Value Ref Range    Glucose 107 (H) 65 - 99 mg/dL    BUN 30 (H) 6 - 20 mg/dL    Creatinine 1.11 0.76 - 1.27 mg/dL    Sodium 128 (L) 136 - 145 mmol/L    Potassium 3.4 (L) 3.5 - 5.2 mmol/L    Chloride 89 (L) 98 - 107 mmol/L    CO2 27.0 22.0 - 29.0 mmol/L    Calcium 8.7 8.6 - 10.5 mg/dL    Total Protein 5.5 (L) 6.0 - 8.5 g/dL    Albumin 2.1 (L) 3.5 - 5.2 g/dL    ALT (SGPT) 16 1 - 41 U/L    AST (SGOT) 31 1 - 40 U/L    Alkaline Phosphatase 220 (H) 39 - 117 U/L    Total Bilirubin 1.8 (H) 0.0 - 1.2 mg/dL    Globulin 3.4 gm/dL    A/G Ratio 0.6 g/dL    BUN/Creatinine Ratio 27.0 (H) 7.0 - 25.0    Anion Gap 12.0 5.0 - 15.0 mmol/L    " eGFR 76.5 >60.0 mL/min/1.73   Lipase    Specimen: Blood   Result Value Ref Range    Lipase 8 (L) 13 - 60 U/L   Urinalysis With Culture If Indicated - Urine, Clean Catch    Specimen: Urine, Clean Catch   Result Value Ref Range    Color, UA Yellow Yellow, Straw    Appearance, UA Clear Clear    pH, UA 5.5 5.0 - 8.0    Specific Gravity, UA 1.013 1.005 - 1.030    Glucose, UA Negative Negative    Ketones, UA Negative Negative    Bilirubin, UA Negative Negative    Blood, UA Negative Negative    Protein, UA Trace (A) Negative    Leuk Esterase, UA Negative Negative    Nitrite, UA Negative Negative    Urobilinogen, UA 1.0 E.U./dL 0.2 - 1.0 E.U./dL   Single High Sensitivity Troponin T    Specimen: Blood   Result Value Ref Range    HS Troponin T 22 (H) <15 ng/L   CBC Auto Differential    Specimen: Blood   Result Value Ref Range    WBC 61.70 (C) 3.40 - 10.80 10*3/mm3    RBC 2.94 (L) 4.14 - 5.80 10*6/mm3    Hemoglobin 8.3 (L) 13.0 - 17.7 g/dL    Hematocrit 26.8 (L) 37.5 - 51.0 %    MCV 91.0 79.0 - 97.0 fL    MCH 28.3 26.6 - 33.0 pg    MCHC 31.1 (L) 31.5 - 35.7 g/dL    RDW 17.1 (H) 12.3 - 15.4 %    RDW-SD 55.1 (H) 37.0 - 54.0 fl    MPV 9.0 6.0 - 12.0 fL    Platelets 141 140 - 450 10*3/mm3   Scan Slide    Specimen: Blood   Result Value Ref Range    Scan Slide     Manual Differential    Specimen: Blood   Result Value Ref Range    Neutrophil % 95.0 (H) 42.7 - 76.0 %    Lymphocyte % 2.0 (L) 19.6 - 45.3 %    Monocyte % 3.0 (L) 5.0 - 12.0 %    Neutrophils Absolute 58.62 (H) 1.70 - 7.00 10*3/mm3    Lymphocytes Absolute 1.23 0.70 - 3.10 10*3/mm3    Monocytes Absolute 1.85 (H) 0.10 - 0.90 10*3/mm3    Anisocytosis Slight/1+ None Seen    WBC Morphology Normal Normal    Platelet Estimate Adequate Normal   POC Lactate    Specimen: Blood   Result Value Ref Range    Lactate 2.2 (C) 0.3 - 2.0 mmol/L   ECG 12 Lead Altered Mental Status   Result Value Ref Range    QT Interval 320 ms    QTC Interval 445 ms   Green Top (Gel)   Result Value Ref Range     Extra Tube Hold for add-ons.    Lavender Top   Result Value Ref Range    Extra Tube hold for add-on    Gold Top - SST   Result Value Ref Range    Extra Tube Hold for add-ons.    Light Blue Top   Result Value Ref Range    Extra Tube Hold for add-ons.      CT Head Without Contrast    Result Date: 9/22/2023  Impression: No acute intracranial abnormality. Electronically Signed: Kieran Dent MD  9/22/2023 2:05 AM EDT  Workstation ID: [id number]    XR Chest 1 View    Result Date: 9/21/2023  Impression: 1.Stable large ovoid masslike density projecting over the left lateral chest with destruction of anterior ribs and diminished volume in the left lung. 2.Stable small bilateral pleural effusions and right basilar atelectasis. Electronically Signed: Kieran Dent MD  9/21/2023 11:16 PM EDT  Workstation ID: [id number]             My interpretation of EKG shows sinus tachycardia, rate of 117, no ST elevation                           Medical Decision Making  Amount and/or Complexity of Data Reviewed  Labs: ordered.  Radiology: ordered.  ECG/medicine tests: ordered.    Risk  Prescription drug management.      Patient had the above evaluation.  Results were discussed with the patient.  Chest x-ray shows known lung mass and stable small bilateral pleural effusions.  CT head shows no acute intracranial abnormality.  White blood cell count is 61.7 which is down from 63.49 on 9/13/2023.  Lactic acid is mildly elevated at 2.2.  Patient was given IV antibiotics.  Blood cultures were obtained.  Urinalysis shows no UTI.  Troponin is borderline elevated at 22.  EKG does not show any acute ischemia.  CMP significant for sodium of 128.  Patient has had chronic hyponatremia.  I discussed with the nurse practitioner on-call for the hospitalist and the patient will be admitted for further evaluation and management.      Final diagnoses:   Altered mental status, unspecified altered mental status type   Elevated lactic acid level       ED  Disposition  ED Disposition       ED Disposition   Decision to Admit    Condition   --    Comment   Level of Care: Telemetry [5]   Admitting Physician: FAZAL SALOMON [[id number]]   Attending Physician: FAZAL SALOMON [[id number]]                 No follow-up provider specified.       Medication List      No changes were made to your prescriptions during this visit.            Simon Barton MD  09/22/23 0307

## 2023-09-22 NOTE — PLAN OF CARE
Goal Outcome Evaluation:     Pt is a 59 y/o male from [facility] rehab with reports of AMS. PMH significant for tobacco abuse, depression, chronic pansinusitis, h/o eroxive esophagitis s/p upper endoscopy, hiatal hernia, HTN, recurrent adenocarcinoma of the lungs with metastasis, s/p partial L thoracotomy and R lower lobectomy, acute on chronic normocytic anemia. CXR indicates large ovoid masslike density projecting over the L lateral chest with destruction of anterior ribs and diminished L lung volume. MRI pending, CT (-). At [facility] pt reports he was independent with ambulation without an AD and driving until most recent decline in the past few weeks. He was admitted to [facility] from home due to decline and pt reports he was having a difficult time tolerating the three hours of therapy. At this time pt was able to sit EOB, but unable to tolerate long enough to get a BP reading due to dizziness and fatigue. He presents with L shld pain and decreased ROM/strength, decreased generalized weakness, and decreased activity tolerance and balance. Pt is well below baseline and will require SNF at discharge.

## 2023-09-23 VITALS
WEIGHT: 142.2 LBS | TEMPERATURE: 97.7 F | DIASTOLIC BLOOD PRESSURE: 66 MMHG | RESPIRATION RATE: 29 BRPM | HEIGHT: 75 IN | OXYGEN SATURATION: 96 % | BODY MASS INDEX: 17.68 KG/M2 | SYSTOLIC BLOOD PRESSURE: 100 MMHG | HEART RATE: 120 BPM

## 2023-09-23 LAB
ANION GAP SERPL CALCULATED.3IONS-SCNC: 13 MMOL/L (ref 5–15)
ANISOCYTOSIS BLD QL: ABNORMAL
ANISOCYTOSIS BLD QL: ABNORMAL
BUN SERPL-MCNC: 17 MG/DL (ref 6–20)
BUN/CREAT SERPL: 22.1 (ref 7–25)
CALCIUM SPEC-SCNC: 8.6 MG/DL (ref 8.6–10.5)
CHLORIDE SERPL-SCNC: 91 MMOL/L (ref 98–107)
CO2 SERPL-SCNC: 27 MMOL/L (ref 22–29)
CREAT SERPL-MCNC: 0.77 MG/DL (ref 0.76–1.27)
DEPRECATED RDW RBC AUTO: 56.4 FL (ref 37–54)
DEPRECATED RDW RBC AUTO: 56.9 FL (ref 37–54)
EGFRCR SERPLBLD CKD-EPI 2021: 103.1 ML/MIN/1.73
ERYTHROCYTE [DISTWIDTH] IN BLOOD BY AUTOMATED COUNT: 17.2 % (ref 12.3–15.4)
ERYTHROCYTE [DISTWIDTH] IN BLOOD BY AUTOMATED COUNT: 17.4 % (ref 12.3–15.4)
GLUCOSE SERPL-MCNC: 94 MG/DL (ref 65–99)
HCT VFR BLD AUTO: 22.2 % (ref 37.5–51)
HCT VFR BLD AUTO: 22.7 % (ref 37.5–51)
HGB BLD-MCNC: 6.9 G/DL (ref 13–17.7)
HGB BLD-MCNC: 6.9 G/DL (ref 13–17.7)
HOLD SPECIMEN: NORMAL
LARGE PLATELETS: ABNORMAL
LARGE PLATELETS: ABNORMAL
LYMPHOCYTES # BLD MANUAL: 0 10*3/MM3 (ref 0.7–3.1)
LYMPHOCYTES # BLD MANUAL: 1.55 10*3/MM3 (ref 0.7–3.1)
LYMPHOCYTES NFR BLD MANUAL: 11 % (ref 5–12)
LYMPHOCYTES NFR BLD MANUAL: 2 % (ref 5–12)
MCH RBC QN AUTO: 27.4 PG (ref 26.6–33)
MCH RBC QN AUTO: 27.6 PG (ref 26.6–33)
MCHC RBC AUTO-ENTMCNC: 30.6 G/DL (ref 31.5–35.7)
MCHC RBC AUTO-ENTMCNC: 31 G/DL (ref 31.5–35.7)
MCV RBC AUTO: 88.9 FL (ref 79–97)
MCV RBC AUTO: 89.5 FL (ref 79–97)
METAMYELOCYTES NFR BLD MANUAL: 1 % (ref 0–0)
MONOCYTES # BLD: 1.53 10*3/MM3 (ref 0.1–0.9)
MONOCYTES # BLD: 8.53 10*3/MM3 (ref 0.1–0.9)
NEUTROPHILS # BLD AUTO: 67.43 10*3/MM3 (ref 1.7–7)
NEUTROPHILS # BLD AUTO: 74.3 10*3/MM3 (ref 1.7–7)
NEUTROPHILS NFR BLD MANUAL: 85 % (ref 42.7–76)
NEUTROPHILS NFR BLD MANUAL: 95 % (ref 42.7–76)
NEUTS BAND NFR BLD MANUAL: 2 % (ref 0–5)
NEUTS BAND NFR BLD MANUAL: 2 % (ref 0–5)
OSMOLALITY SERPL: 274 MOSM/KG (ref 275–295)
PLATELET # BLD AUTO: 100 10*3/MM3 (ref 140–450)
PLATELET # BLD AUTO: 97 10*3/MM3 (ref 140–450)
PMV BLD AUTO: 7.9 FL (ref 6–12)
PMV BLD AUTO: 8.5 FL (ref 6–12)
POTASSIUM SERPL-SCNC: 2.9 MMOL/L (ref 3.5–5.2)
QT INTERVAL: 315 MS
QTC INTERVAL: 449 MS
RBC # BLD AUTO: 2.5 10*6/MM3 (ref 4.14–5.8)
RBC # BLD AUTO: 2.54 10*6/MM3 (ref 4.14–5.8)
SCAN SLIDE: NORMAL
SCAN SLIDE: NORMAL
SMALL PLATELETS BLD QL SMEAR: ABNORMAL
SODIUM SERPL-SCNC: 131 MMOL/L (ref 136–145)
STOMATOCYTES BLD QL SMEAR: ABNORMAL
TOXIC GRANULATION: ABNORMAL
VARIANT LYMPHS NFR BLD MANUAL: 0 % (ref 19.6–45.3)
VARIANT LYMPHS NFR BLD MANUAL: 2 % (ref 19.6–45.3)
WBC MORPH BLD: NORMAL
WBC NRBC COR # BLD: 76.6 10*3/MM3 (ref 3.4–10.8)
WBC NRBC COR # BLD: 77.5 10*3/MM3 (ref 3.4–10.8)

## 2023-09-23 PROCEDURE — 85007 BL SMEAR W/DIFF WBC COUNT: CPT | Performed by: INTERNAL MEDICINE

## 2023-09-23 PROCEDURE — 80048 BASIC METABOLIC PNL TOTAL CA: CPT | Performed by: INTERNAL MEDICINE

## 2023-09-23 PROCEDURE — 96375 TX/PRO/DX INJ NEW DRUG ADDON: CPT

## 2023-09-23 PROCEDURE — 83930 ASSAY OF BLOOD OSMOLALITY: CPT | Performed by: INTERNAL MEDICINE

## 2023-09-23 PROCEDURE — G0378 HOSPITAL OBSERVATION PER HR: HCPCS

## 2023-09-23 PROCEDURE — 25010000002 MORPHINE PER 10 MG: Performed by: INTERNAL MEDICINE

## 2023-09-23 PROCEDURE — 85025 COMPLETE CBC W/AUTO DIFF WBC: CPT | Performed by: INTERNAL MEDICINE

## 2023-09-23 RX ORDER — BUPROPION HYDROCHLORIDE 150 MG/1
150 TABLET ORAL DAILY
Status: DISCONTINUED | OUTPATIENT
Start: 2023-09-23 | End: 2023-09-23 | Stop reason: HOSPADM

## 2023-09-23 RX ORDER — PANTOPRAZOLE SODIUM 40 MG/1
40 TABLET, DELAYED RELEASE ORAL DAILY
Status: DISCONTINUED | OUTPATIENT
Start: 2023-09-23 | End: 2023-09-23 | Stop reason: HOSPADM

## 2023-09-23 RX ORDER — FOLIC ACID 1 MG/1
1 TABLET ORAL DAILY
Status: DISCONTINUED | OUTPATIENT
Start: 2023-09-23 | End: 2023-09-23 | Stop reason: HOSPADM

## 2023-09-23 RX ORDER — MEGESTROL ACETATE 40 MG/ML
400 SUSPENSION ORAL DAILY
Status: DISCONTINUED | OUTPATIENT
Start: 2023-09-23 | End: 2023-09-23 | Stop reason: HOSPADM

## 2023-09-23 RX ORDER — SODIUM CHLORIDE 9 MG/ML
75 INJECTION, SOLUTION INTRAVENOUS CONTINUOUS
Status: DISCONTINUED | OUTPATIENT
Start: 2023-09-23 | End: 2023-09-23 | Stop reason: HOSPADM

## 2023-09-23 RX ORDER — LEVOTHYROXINE SODIUM 0.12 MG/1
125 TABLET ORAL
Status: DISCONTINUED | OUTPATIENT
Start: 2023-09-23 | End: 2023-09-23 | Stop reason: HOSPADM

## 2023-09-23 RX ORDER — LANOLIN ALCOHOL/MO/W.PET/CERES
1000 CREAM (GRAM) TOPICAL DAILY
Status: DISCONTINUED | OUTPATIENT
Start: 2023-09-23 | End: 2023-09-23 | Stop reason: HOSPADM

## 2023-09-23 RX ORDER — MULTIVITAMIN WITH IRON
100 TABLET ORAL DAILY
Status: DISCONTINUED | OUTPATIENT
Start: 2023-09-23 | End: 2023-09-23 | Stop reason: HOSPADM

## 2023-09-23 RX ORDER — LACTULOSE 10 G/15ML
10 SOLUTION ORAL 2 TIMES DAILY
Status: DISCONTINUED | OUTPATIENT
Start: 2023-09-23 | End: 2023-09-23 | Stop reason: HOSPADM

## 2023-09-23 RX ADMIN — BUPROPION HYDROCHLORIDE 150 MG: 150 TABLET, EXTENDED RELEASE ORAL at 08:44

## 2023-09-23 RX ADMIN — NYSTATIN 500000 UNITS: 100000 SUSPENSION ORAL at 08:46

## 2023-09-23 RX ADMIN — PANTOPRAZOLE SODIUM 40 MG: 40 TABLET, DELAYED RELEASE ORAL at 08:45

## 2023-09-23 RX ADMIN — CYANOCOBALAMIN TAB 1000 MCG 1000 MCG: 1000 TAB at 08:45

## 2023-09-23 RX ADMIN — MORPHINE SULFATE 4 MG: 4 INJECTION, SOLUTION INTRAMUSCULAR; INTRAVENOUS at 14:08

## 2023-09-23 RX ADMIN — Medication 10 ML: at 08:46

## 2023-09-23 RX ADMIN — POTASSIUM CHLORIDE 20 MEQ: 1500 TABLET, EXTENDED RELEASE ORAL at 08:46

## 2023-09-23 RX ADMIN — Medication 100 MG: at 08:45

## 2023-09-23 RX ADMIN — ALPRAZOLAM 1 MG: 1 TABLET ORAL at 02:40

## 2023-09-23 RX ADMIN — FOLIC ACID 1 MG: 1 TABLET ORAL at 08:45

## 2023-09-23 RX ADMIN — LACTULOSE 10 G: 20 SOLUTION ORAL at 08:45

## 2023-09-23 RX ADMIN — LEVOTHYROXINE SODIUM 125 MCG: 0.12 TABLET ORAL at 08:45

## 2023-09-23 RX ADMIN — SODIUM CHLORIDE 75 ML/HR: 9 INJECTION, SOLUTION INTRAVENOUS at 14:04

## 2023-09-23 NOTE — NURSING NOTE
Patient was observed to be restless for the majority of the shift and attempting to remove the medical devices that were attached to him. For patient safety, a med sitter was setup.

## 2023-09-23 NOTE — CASE MANAGEMENT/SOCIAL WORK
Continued Stay Note  BAILEY Colemanyd     Patient Name: Jc Driscoll  MRN: [id number]  Today's Date: 9/23/2023    Admit Date: 9/21/2023    Plan: home with hospice, [facility], pending acceptance   Discharge Plan       Row Name 09/23/23 1316       Plan    Plan home with hospice, [place], pending acceptance    Plan Comments CM notified of Hospice consult and desire to get home.  met briefly with spouse, patient is confused and restless.  selected [facility].  Referral sent in epic and text sent to Ewelina gaytan.  updated nurse.                      Expected Discharge Date and Time       Expected Discharge Date Expected Discharge Time    Sep 23, 2023               Leslee Yoder RN

## 2023-09-23 NOTE — PROGRESS NOTES
Hematology/Oncology Inpatient Progress Note    PATIENT NAME: Jc Driscoll  : 1965  MRN: [id number]    Chief complaint: altered mental status     History of present illness:    Jc Driscoll is a 58 y.o. male who presented to [facility] on 2023 with altered mental status.     Patient has metastatic non-small cell lung cancer who has progressed on chemotherapy, immunotherapy, TDxd and most recently started on tepotinib.  Patient has had a gradual worsening in performance status and has been bedbound.  He was admitted to a rehab facility and continued to worsen physical and mental status.  He was found to be significantly altered and brought to the emergency room.  MRI brain here is negative for metastatic disease,     Patient had a hemoglobin of 5.5 at facility with recheck here at 8.3, during transfer his O2 sat was low at 81% but improved on room air now     Altered mental status improved while patient was in the hospital however currently he was given Xanax for MRI and has been sleeping since not answering questions     23  Hematology/Oncology was consulted        Subjective   Continues to be confused overnight but improved this morning. Feels better      MEDICATIONS:    Scheduled Meds:  buPROPion XL, 150 mg, Oral, Daily  folic acid, 1 mg, Oral, Daily  lactulose, 10 g, Oral, BID  levothyroxine, 125 mcg, Oral, Q AM  megestrol acetate, 400 mg, Oral, Daily  nystatin, 5 mL, Swish & Spit, 4x Daily  pantoprazole, 40 mg, Oral, Daily  potassium chloride, 20 mEq, Oral, Daily  senna-docusate sodium, 2 tablet, Oral, BID  sodium chloride, 10 mL, Intravenous, Q12H  vitamin B-12, 1,000 mcg, Oral, Daily  vitamin B-6, 100 mg, Oral, Daily       Continuous Infusions:  sodium chloride, 75 mL/hr       PRN Meds:    acetaminophen    ALPRAZolam    senna-docusate sodium **AND** polyethylene glycol **AND** bisacodyl **AND** bisacodyl    nitroglycerin    ondansetron    [COMPLETED] Insert Peripheral IV  "**AND** sodium chloride    sodium chloride    sodium chloride     ALLERGIES:  No Known Allergies    Objective    VITALS:   /67 (BP Location: Left arm, Patient Position: Lying)   Pulse (!) 121   Temp 98.1 °F (36.7 °C) (Oral)   Resp (!) 29   Ht 190.5 cm (75\")   Wt 64.5 kg (142 lb 3.2 oz)   SpO2 96%   BMI 17.77 kg/m²     PHYSICAL EXAM: (performed by MD)  Physical Exam  Constitutional:       Comments: malnourished   HENT:      Head: Normocephalic and atraumatic.   Cardiovascular:      Rate and Rhythm: Normal rate and regular rhythm.      Pulses: Normal pulses.      Heart sounds: No murmur heard.  Pulmonary:      Effort: Pulmonary effort is normal.   Abdominal:      General: There is no distension.      Palpations: Abdomen is soft. There is no mass.      Tenderness: There is no abdominal tenderness.   Musculoskeletal:         General: Normal range of motion.   Skin:     General: Skin is warm.   Neurological:      Mental Status: He is disoriented.         RECENT LABS:  Lab Results (last 24 hours)       Procedure Component Value Units Date/Time    Basic Metabolic Panel [[id number]]  (Abnormal) Collected: 09/23/23 0924    Specimen: Blood Updated: 09/23/23 0956     Glucose 94 mg/dL      BUN 17 mg/dL      Creatinine 0.77 mg/dL      Sodium 131 mmol/L      Potassium 2.9 mmol/L      Chloride 91 mmol/L      CO2 27.0 mmol/L      Calcium 8.6 mg/dL      BUN/Creatinine Ratio 22.1     Anion Gap 13.0 mmol/L      eGFR 103.1 mL/min/1.73     Narrative:      GFR Normal >60  Chronic Kidney Disease <60  Kidney Failure <15      CBC Auto Differential [[id number]]  (Abnormal) Collected: 09/23/23 0924    Specimen: Blood Updated: 09/23/23 0948     WBC 76.60 10*3/mm3      RBC 2.50 10*6/mm3      Hemoglobin 6.9 g/dL      Hematocrit 22.2 %      MCV 88.9 fL      MCH 27.6 pg      MCHC 31.0 g/dL      RDW 17.4 %      RDW-SD 56.9 fl      MPV 8.5 fL      Platelets 100 10*3/mm3      nRBC 0.0 /100 WBC     CBC & Differential [[id number]]  (Abnormal) " Collected: 09/23/23 0924    Specimen: Blood Updated: 09/23/23 0935    Narrative:      The following orders were created for panel order CBC & Differential.  Procedure                               Abnormality         Status                     ---------                               -----------         ------                     CBC Auto Differential[[id number]]        Abnormal            Preliminary result         Scan Slide[[id number]]                                       In process                   Please view results for these tests on the individual orders.    Scan Slide [[id number]] Collected: 09/23/23 0924    Specimen: Blood Updated: 09/23/23 0935    Blood Culture - Blood, Arm, Right [[id number]]  (Normal) Collected: 09/22/23 0005    Specimen: Blood from Arm, Right Updated: 09/23/23 0015     Blood Culture No growth at 24 hours    Narrative:      Less than seven (7) mL's of blood was collected.  Insufficient quantity may yield false negative results.    Blood Culture - Blood, Arm, Left [[id number]]  (Normal) Collected: 09/22/23 0006    Specimen: Blood from Arm, Left Updated: 09/23/23 0015     Blood Culture No growth at 24 hours    Narrative:      Less than seven (7) mL's of blood was collected.  Insufficient quantity may yield false negative results.              IMAGING REVIEWED:  CT Head Without Contrast    Result Date: 9/22/2023  Impression: No acute intracranial abnormality. Electronically Signed: Kieran Dent MD  9/22/2023 2:05 AM EDT  Workstation ID: [id number]    MRI Brain Without Contrast    Result Date: 9/22/2023  Impression: Evaluation is somewhat limited due to lack of IV contrast, with decreased sensitivity for small metastatic lesions. No evidence of metastatic disease or other acute finding on current exam. Electronically Signed: Manish Clancy MD  9/22/2023 12:52 PM EDT  Workstation ID: [id number]    XR Chest 1 View    Result Date: 9/21/2023  Impression: 1.Stable large ovoid masslike density  projecting over the left lateral chest with destruction of anterior ribs and diminished volume in the left lung. 2.Stable small bilateral pleural effusions and right basilar atelectasis. Electronically Signed: Kieran Dent MD  9/21/2023 11:16 PM EDT  Workstation ID: [id number]     Assessment & Plan     Patient is a 58-year-old male with metastatic non-small cell lung cancer  Progressed to multiple lines of treatment now currently on Tepotinib admitted with altered mental status     Metastatic non-small cell lung cancer  Patient is currently on Tepotinib  Hold treatment for now while his acute condition resolves  Tepotinib should not cause altered mental status  Continue to hold.     Altered mental status  Unclear etiology seems to have improved once patient was in the hospital  No UTI, MRI brain negative for metastasis or other concerns  Unlikely to be related to Tepotinib  Could be related to pain medicine, hydroxyzine,   Monitor for improvement.      Leukocytosis  Likely inflammation related, no recent increase  Monitor     Anemia  Treatment related myelosuprresion  Transfuse for Hb <7, will need transfusion today      Patient and wife have decided to pursue hospice comfort based care. Discussed code status and he wants to be DNR, will consult hospice

## 2023-09-23 NOTE — CONSULTS
Consults  [facility] Consult Note      Patient Name: Jc Driscoll  : 1965  MRN: [id number]  Primary Care Physician:  Reshma Alvarenga MD  Referring Physician: Reshma Alvarenga MD  Date of admission: 2023    Subjective     Reason for Consult:  hyponatremia    HPI:   Jc Driscoll is a 58 y.o. male admitted with altered mental status. He was found to have a serum sodium level of 124 prompting renal consultation. He has stage IV non small cell lung ca now managed with teponitib.  He is somnolent  but arousable and not alert.  Family is at the bedside and say that he has gradually become more weak and hasn't eaten or drunk anything for days. They have decided to request evaluation by hospice.    Review of Systems:   14 point review of systems is otherwise negative except for mentioned above on HPI    Personal History     Past Medical History:   Diagnosis Date    Allergic rhinitis 2013    Overview:  2018 - still zyrtec 10 daily (if stops, gets dermatitis again).     Anxiety and depression 2012    Overview:  2018 -   C/w well 300 xr and Lito 20.  2019 -   Doing ok even with new lung cancer - lito 20 & well 300xr.     Chronic pansinusitis 2019    Overview:  2019 -   MRI showed chronic thick in frontal, maxillary, ethmoidal ---> to Dr. COLLAZO to est since abx ICC didn't help.  Does netipot bid.     Diastolic CHF 2014    Overview:  14 - impaired LV relaxation on Zhou ECHO.  EF 60%. Rest normal    Dupuytren's contracture of both hands     Elevated cholesterol     Erosive esophagitis 2019    Overview:  EGD 2016 2018 - nexium OTC daily for few week.  Qod before that.  Now carbonation hurts, epigastric pain 2019 -   protonix 40 doing well.     Gastroesophageal reflux disease 2019    Hiatal hernia 2019    History of colon polyps     Hypertension     Lung cancer     right lung and in lymph nodes x2    Mediastinal  lymphadenopathy 03/12/2019    Normocytic anemia 08/08/2019    Overview:  6/2019 - dropped to 9's postop 7/2019 - rebounded to 10's.  8/8/2019 -   Back to 9's - check ferritin, b12 and thyroid.     Prediabetes 07/09/2014    Overview:  7/4/14 - a1c 6.1 at [facility] admission    Retention of urine 06/27/2019    PSOT OP, RESOLVED       Past Surgical History:   Procedure Laterality Date    BRONCHOSCOPY  03/18/2019    EBUS MONTERO NEEDLE BX    COLONOSCOPY      DUPUYTREN CONTRACTURE RELEASE Bilateral     LUNG BIOPSY  03/08/2019    CT GUIDED    LUNG REMOVAL, PARTIAL Right     right lower    PORTACATH PLACEMENT  03/20/2019    DR LONGORIA    THORACOSCOPY Right 6/26/2019    Procedure: RIGHT VATS, OPEN LOWER LOBECTOMY WITH LYMPH NODE DISECTION, WEDGE RESECTION OF RIGHT MIDDLE LOBE;  Surgeon: Terrance Longoria MD;  Location: [facility];  Service: Cardiothoracic    THORACOSCOPY VIDEO ASSISTED WITH LOBECTOMY Left 7/6/2022    Procedure: THORACOSCOPY VIDEO ASSISTED WITH LOBECTOMY;  Surgeon: Miryam Reyna MD;  Location: [facility] OR;  Service: Thoracic;  Laterality: Left;       Family History: family history includes Cancer in his father, mother, and sister; Cervical cancer in his mother; Lung cancer in his father and sister.    Social History:  reports that he quit smoking about 14 years ago. His smoking use included cigarettes. He has never used smokeless tobacco. He reports current alcohol use of about 2.0 standard drinks per week. He reports that he does not use drugs.    Home Medications:  Prior to Admission medications    Medication Sig Start Date End Date Taking? Authorizing Provider   buPROPion XL (WELLBUTRIN XL) 150 MG 24 hr tablet Take 1 tablet by mouth 2 (Two) Times a Day.   Yes Lloyd Rasmussen MD   carbamide peroxide (DEBROX) 6.5 % otic solution Administer 1 drop into both ears 2 (Two) Times a Day.   Yes Lloyd Rasmussen MD   desvenlafaxine (PRISTIQ) 50 MG 24 hr tablet Take 1 tablet by mouth Daily. 12/31/19   Yes Lloyd Rasmussen MD   folic acid (FOLVITE) 1 MG tablet Take 1 tablet by mouth Daily. 8/2/23  Yes Shayy Montemayor APRN   gabapentin (NEURONTIN) 300 MG capsule Take 2 capsules by mouth 3 (Three) Times a Day.   Yes Lloyd Rasmussen MD   hydrOXYzine (ATARAX) 25 MG tablet Take 1 tablet by mouth 3 (Three) Times a Day As Needed for Anxiety. 9/13/23  Yes Shayy Montemayor APRN   lactulose (CHRONULAC) 10 GM/15ML solution Take 15 mL by mouth 2 (Two) Times a Day.   Yes Lloyd Rasmussen MD   levothyroxine (SYNTHROID, LEVOTHROID) 137 MCG tablet Take 1 tablet by mouth Every Morning. 9/14/23  Yes Shayy Montemayor APRN   loratadine (CLARITIN) 10 MG tablet Take 1 tablet by mouth Daily.   Yes Lloyd Rasmussen MD   megestrol acetate (MEGACE) 400 MG/10ML suspension oral suspension Take 10 mL by mouth Daily. 8/24/23  Yes Azucena Gaspar MD   nystatin (MYCOSTATIN) 100,000 unit/mL suspension Swish and swallow 5 mL 4 (Four) Times a Day.   Yes Lloyd Rasmussen MD   ondansetron (ZOFRAN) 4 MG tablet Take 2 tablets by mouth 3 (Three) Times a Day As Needed for Nausea or Vomiting.   Yes Lloyd Rasmussen MD   oxyCODONE-acetaminophen (PERCOCET)  MG per tablet Take 1 tablet by mouth Every 4 (Four) Hours As Needed for Moderate Pain. 9/15/23  Yes Shayy Montemayor APRN   pantoprazole (PROTONIX) 40 MG EC tablet Take 1 tablet by mouth Daily.   Yes Lloyd Rasmussen MD   potassium chloride (K-DUR,KLOR-CON) 20 MEQ CR tablet Take 1 tablet by mouth Daily.   Yes Lloyd Rasmussen MD   Tepotinib HCl (Tepmetko) 225 MG tablet Take 2 tablets by mouth Daily.   Yes Lloyd Rasmussen MD   torsemide (DEMADEX) 20 MG tablet Take 0.5 tablets by mouth Daily.   Yes Lloyd Rasmussen MD   Urea (URE-NA) 15 g pack packet Take 15 g by mouth 3 times a day.   Yes Lloyd Rasmussen MD   vitamin B-12 (CYANOCOBALAMIN) 1000 MCG tablet Take 1 tablet by mouth Daily.   Yes Lloyd Rasmussen MD    vitamin B-6 (PYRIDOXINE) 50 MG tablet Take 2 tablets by mouth Daily.   Yes Provider, Lloyd, MD       Allergies:  No Known Allergies    Objective     Vitals:   Temp:  [98 °F (36.7 °C)-99 °F (37.2 °C)] 98.1 °F (36.7 °C)  Heart Rate:  [119-122] 121  Resp:  [20-29] 29  BP: ()/(57-75) 111/67  Flow (L/min):  [2] 2    Intake/Output Summary (Last 24 hours) at 9/23/2023 1221  Last data filed at 9/23/2023 0400  Gross per 24 hour   Intake 240 ml   Output 600 ml   Net -360 ml       Physical Exam:    General Appearance: alert, oriented x 3, no acute distress   Skin: warm and dry  HEENT: oral mucosa normal, nonicteric sclera  Neck: supple, no JVD  Lungs: CTA  Heart: RRR, normal S1 and S2  Abdomen: soft, nontender, nondistended  : no palpable bladder  Extremities: no edema, cyanosis or clubbing  Neuro: normal speech and mental status     Scheduled Meds:     buPROPion XL, 150 mg, Oral, Daily  folic acid, 1 mg, Oral, Daily  lactulose, 10 g, Oral, BID  levothyroxine, 125 mcg, Oral, Q AM  megestrol acetate, 400 mg, Oral, Daily  pantoprazole, 40 mg, Oral, Daily  potassium chloride, 20 mEq, Oral, Daily  senna-docusate sodium, 2 tablet, Oral, BID  sodium chloride, 10 mL, Intravenous, Q12H  vitamin B-12, 1,000 mcg, Oral, Daily  vitamin B-6, 100 mg, Oral, Daily      IV Meds:   sodium chloride, 75 mL/hr        Results Reviewed:   I have personally reviewed the results from the time of this admission to 9/23/2023 12:21 EDT     Lab Results   Component Value Date    GLUCOSE 94 09/23/2023    CALCIUM 8.6 09/23/2023     (L) 09/23/2023    K 2.9 (L) 09/23/2023    CO2 27.0 09/23/2023    CL 91 (L) 09/23/2023    BUN 17 09/23/2023    CREATININE 0.77 09/23/2023    EGFRIFAFRI >60 05/20/2019    EGFRIFNONA 100 01/28/2022    BCR 22.1 09/23/2023    ANIONGAP 13.0 09/23/2023      Lab Results   Component Value Date    MG 1.8 09/13/2023    PHOS 5.5 (H) 07/06/2023    ALBUMIN 2.1 (L) 09/21/2023           Assessment / Plan      ASSESSMENT:  Hyponatremia-resolving nicely on iv saline, thus due more to hypovolemia than siadh this time  Hypokalemia-nutritional  Altered mental status-would expect this to improve by now if it were due to the hyponatremia  Stage IV non small cell ca    PLAN:  Would continue iv saline  Replete k prn  Hospice eval is pending, I defer to them about the ivf, his risk for seizures from the hyponatremia is very low at his current level    Thank you for involving us in the care of Jc Driscoll.  Please feel free to call with any questions.    Feliberto Valles MD  09/23/23  12:21 EDT    [facility]  [phone]

## 2023-09-23 NOTE — CONSULTS
LOS: 0 days   Patient Care Team:  Reshma Alvarenga MD as PCP - General (Family Medicine)  Reshma Alvarenga MD as PCP - Family Medicine  Miryam Reyna MD as Surgeon (Thoracic Surgery)  Azucena Gaspar MD as Consulting Physician (Hematology and Oncology)  Adalberto Huffman MD as Consulting Physician (Nephrology)        Subjective       Attending MD : Ezequiel Mccollum DO    Patient Complaints: confused         History of Present Illness  : 58 y.o. 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  Oncology for his underlying malignancy. who presented to [facility] on 9/21/2023 with report of AMS      Patient Denies:  NV    PMH :   AMS (altered mental status)      Review of Systems:    weak    Objective     Vital Signs  Temp:  [98 °F (36.7 °C)-99 °F (37.2 °C)] 98.1 °F (36.7 °C)  Heart Rate:  [119-122] 121  Resp:  [20-29] 29  BP: ()/(57-75) 111/67    Physical Exam:     General Appearance:    Alert, cooperative, in no acute distress   Head:    Normocephalic, without obvious abnormality, atraumatic   Eyes:            Lids and lashes normal, conjunctivae and sclerae normal, no   icterus, no pallor, corneas clear, PERRLA   Ears:    Ears appear intact with no abnormalities noted   Throat:   No oral lesions, no thrush, oral mucosa moist   Neck:   No adenopathy, supple, trachea midline, no thyromegaly, no   carotid bruit, no JVD   Back:     No kyphosis present, no scoliosis present, no skin lesions,      erythema or scars, no tenderness to percussion or                    palpation,   range of motion normal   Lungs:     Clear to auscultation,respirations regular, even and                  unlabored    Heart:    Regular rhythm and normal rate, normal S1 and S2, no            murmur, no gallop, no rub, no click   Chest Wall:    No abnormalities observed   Abdomen:     Normal bowel sounds, no masses, no organomegaly, soft        non-tender, non-distended, no guarding, no rebound                tenderness   Rectal:     Deferred   Extremities:   Moves all extremities well, no edema, no cyanosis, no             redness   Pulses:   Pulses palpable and equal bilaterally   Skin:   No bleeding, bruising or rash   Lymph nodes:   No palpable adenopathy   Neurologic:   Cranial nerves 2 - 12 grossly intact, sensation intact, DTR       present and equal bilaterally          Results Review:    Lab Results (last 72 hours)       Procedure Component Value Units Date/Time    Manual Differential [[id number]]  (Abnormal) Collected: 09/23/23 1115    Specimen: Blood Updated: 09/23/23 1219     Neutrophil % 85.0 %      Lymphocyte % 2.0 %      Monocyte % 11.0 %      Bands %  2.0 %      Neutrophils Absolute 67.43 10*3/mm3      Lymphocytes Absolute 1.55 10*3/mm3      Monocytes Absolute 8.53 10*3/mm3      Anisocytosis Slight/1+     Stomatocytes Mod/2+     WBC Morphology Normal     Platelet Estimate Decreased     Large Platelets Slight/1+    CBC & Differential [[id number]]  (Abnormal) Collected: 09/23/23 1115    Specimen: Blood Updated: 09/23/23 1219    Narrative:      The following orders were created for panel order CBC & Differential.  Procedure                               Abnormality         Status                     ---------                               -----------         ------                     CBC Auto Differential[[id number]]        Abnormal            Final result               Scan Slide[[id number]]                                       Final result                 Please view results for  these tests on the individual orders.    CBC Auto Differential [[id number]]  (Abnormal) Collected: 09/23/23 1115    Specimen: Blood Updated: 09/23/23 1219     WBC 77.50 10*3/mm3      RBC 2.54 10*6/mm3      Hemoglobin 6.9 g/dL      Hematocrit 22.7 %      MCV 89.5 fL      MCH 27.4 pg      MCHC 30.6 g/dL      RDW 17.2 %      RDW-SD 56.4 fl      MPV 7.9 fL      Platelets 97 10*3/mm3     Narrative:      The previously reported component NRBC is no longer being reported. Previous result was 0.0 /100 WBC (Reference Range: 0.0-0.2 /100 WBC) on 9/23/2023 at 1149 EDT.    Scan Slide [[id number]] Collected: 09/23/23 1115    Specimen: Blood Updated: 09/23/23 1219     Scan Slide --     Comment: See Manual Differential Results       Extra Tubes [[id number]] Collected: 09/23/23 1115    Specimen: Blood, Venous Line Updated: 09/23/23 1216    Narrative:      The following orders were created for panel order Extra Tubes.  Procedure                               Abnormality         Status                     ---------                               -----------         ------                     Green Top (Gel)[[id number]]                                  Final result                 Please view results for these tests on the individual orders.    Green Top (Gel) [[id number]] Collected: 09/23/23 1115    Specimen: Blood Updated: 09/23/23 1216     Extra Tube Hold for add-ons.     Comment: Auto resulted.       Osmolality, Serum [[id number]]  (Abnormal) Collected: 09/23/23 1115    Specimen: Blood Updated: 09/23/23 1151     Osmolality 274 mOsm/kg     Manual Differential [[id number]]  (Abnormal) Collected: 09/23/23 0924    Specimen: Blood Updated: 09/23/23 1010     Neutrophil % 95.0 %      Lymphocyte % 0.0 %      Monocyte % 2.0 %      Bands %  2.0 %      Metamyelocyte % 1.0 %      Neutrophils Absolute 74.30 10*3/mm3      Lymphocytes Absolute 0.00 10*3/mm3      Monocytes Absolute 1.53 10*3/mm3      Anisocytosis Slight/1+     Toxic Granulation Slight/1+      Large Platelets Slight/1+    CBC & Differential [[id number]]  (Abnormal) Collected: 09/23/23 0924    Specimen: Blood Updated: 09/23/23 1010    Narrative:      The following orders were created for panel order CBC & Differential.  Procedure                               Abnormality         Status                     ---------                               -----------         ------                     CBC Auto Differential[[id number]]        Abnormal            Final result               Scan Slide[[id number]]                                       Final result                 Please view results for these tests on the individual orders.    CBC Auto Differential [[id number]]  (Abnormal) Collected: 09/23/23 0924    Specimen: Blood Updated: 09/23/23 1010     WBC 76.60 10*3/mm3      RBC 2.50 10*6/mm3      Hemoglobin 6.9 g/dL      Hematocrit 22.2 %      MCV 88.9 fL      MCH 27.6 pg      MCHC 31.0 g/dL      RDW 17.4 %      RDW-SD 56.9 fl      MPV 8.5 fL      Platelets 100 10*3/mm3     Narrative:      The previously reported component NRBC is no longer being reported. Previous result was 0.0 /100 WBC (Reference Range: 0.0-0.2 /100 WBC) on 9/23/2023 at 0948 EDT.    Scan Slide [[id number]] Collected: 09/23/23 0924    Specimen: Blood Updated: 09/23/23 1010     Scan Slide --     Comment: See Manual Differential Results       Basic Metabolic Panel [[id number]]  (Abnormal) Collected: 09/23/23 0924    Specimen: Blood Updated: 09/23/23 0956     Glucose 94 mg/dL      BUN 17 mg/dL      Creatinine 0.77 mg/dL      Sodium 131 mmol/L      Potassium 2.9 mmol/L      Chloride 91 mmol/L      CO2 27.0 mmol/L      Calcium 8.6 mg/dL      BUN/Creatinine Ratio 22.1     Anion Gap 13.0 mmol/L      eGFR 103.1 mL/min/1.73     Narrative:      GFR Normal >60  Chronic Kidney Disease <60  Kidney Failure <15      Blood Culture - Blood, Arm, Right [[id number]]  (Normal) Collected: 09/22/23 0005    Specimen: Blood from Arm, Right Updated: 09/23/23 0015      Blood Culture No growth at 24 hours    Narrative:      Less than seven (7) mL's of blood was collected.  Insufficient quantity may yield false negative results.    Blood Culture - Blood, Arm, Left [[id number]]  (Normal) Collected: 09/22/23 0006    Specimen: Blood from Arm, Left Updated: 09/23/23 0015     Blood Culture No growth at 24 hours    Narrative:      Less than seven (7) mL's of blood was collected.  Insufficient quantity may yield false negative results.    Lactic Acid, Plasma [[id number]]  (Normal) Collected: 09/22/23 0900    Specimen: Blood Updated: 09/22/23 0927     Lactate 1.3 mmol/L     Blood Gas, Arterial - [[id number]]  (Abnormal) Collected: 09/22/23 0349    Specimen: Arterial Blood Updated: 09/22/23 0353     Site Right Brachial     Jose's Test Positive     pH, Arterial 7.511 pH units      pCO2, Arterial 35.2 mm Hg      pO2, Arterial 63.8 mm Hg      HCO3, Arterial 28.2 mmol/L      Base Excess, Arterial 4.9 mmol/L      Comment: Serial Number: [id number]:  [id number]        O2 Saturation, Arterial 94.1 %      CO2 Content 29.3 mmol/L      Barometric Pressure for Blood Gas --     Comment: N/A        Modality Room Air     FIO2 21 %      Rate 26 Breaths/minute      Hemodilution No    STAT Lactic Acid, Reflex [[id number]]  (Abnormal) Collected: 09/22/23 0310    Specimen: Blood from Arm, Right Updated: 09/22/23 0343     Lactate 2.1 mmol/L     Urinalysis With Culture If Indicated - Urine, Clean Catch [[id number]]  (Abnormal) Collected: 09/22/23 0009    Specimen: Urine, Clean Catch Updated: 09/22/23 0025     Color, UA Yellow     Appearance, UA Clear     pH, UA 5.5     Specific Gravity, UA 1.013     Glucose, UA Negative     Ketones, UA Negative     Bilirubin, UA Negative     Blood, UA Negative     Protein, UA Trace     Leuk Esterase, UA Negative     Nitrite, UA Negative     Urobilinogen, UA 1.0 E.U./dL    Narrative:      In absence of clinical symptoms, the presence of pyuria, bacteria, and/or nitrites on the  urinalysis result does not correlate with infection.  Urine microscopic not indicated.    Manual Differential [[id number]]  (Abnormal) Collected: 09/21/23 2239    Specimen: Blood Updated: 09/21/23 2348     Neutrophil % 95.0 %      Lymphocyte % 2.0 %      Monocyte % 3.0 %      Neutrophils Absolute 58.62 10*3/mm3      Lymphocytes Absolute 1.23 10*3/mm3      Monocytes Absolute 1.85 10*3/mm3      Anisocytosis Slight/1+     WBC Morphology Normal     Platelet Estimate Adequate    CBC & Differential [[id number]]  (Abnormal) Collected: 09/21/23 2239    Specimen: Blood Updated: 09/21/23 2348    Narrative:      The following orders were created for panel order CBC & Differential.  Procedure                               Abnormality         Status                     ---------                               -----------         ------                     CBC Auto Differential[[id number]]        Abnormal            Final result               Scan Slide[[id number]]                                       Final result                 Please view results for these tests on the individual orders.    CBC Auto Differential [[id number]]  (Abnormal) Collected: 09/21/23 2239    Specimen: Blood Updated: 09/21/23 2348     WBC 61.70 10*3/mm3      RBC 2.94 10*6/mm3      Hemoglobin 8.3 g/dL      Hematocrit 26.8 %      MCV 91.0 fL      MCH 28.3 pg      MCHC 31.1 g/dL      RDW 17.1 %      RDW-SD 55.1 fl      MPV 9.0 fL      Platelets 141 10*3/mm3     Narrative:      The previously reported component NRBC is no longer being reported. Previous result was 0.0 /100 WBC (Reference Range: 0.0-0.2 /100 WBC) on 9/21/2023 at 2315 EDT.    Scan Slide [[id number]] Collected: 09/21/23 2239    Specimen: Blood Updated: 09/21/23 2348     Scan Slide --     Comment: See Manual Differential Results       [place] Draw [[id number]] Collected: 09/21/23 2239    Specimen: Blood Updated: 09/21/23 2345    Narrative:      The following orders were created for panel order [place]  Draw.  Procedure                               Abnormality         Status                     ---------                               -----------         ------                     Green Top (Gel)[[id number]]                                  Final result               Lavender Top[[id number]]                                     Final result               Gold Top - SST[[id number]]                                   Final result               Light Blue Top[[id number]]                                   Final result                 Please view results for these tests on the individual orders.    Lavender Top [[id number]] Collected: 09/21/23 2239    Specimen: Blood Updated: 09/21/23 2345     Extra Tube hold for add-on     Comment: Auto resulted       Green Top (Gel) [[id number]] Collected: 09/21/23 2239    Specimen: Blood Updated: 09/21/23 2345     Extra Tube Hold for add-ons.     Comment: Auto resulted.       Gold Top - SST [[id number]] Collected: 09/21/23 2239    Specimen: Blood Updated: 09/21/23 2345     Extra Tube Hold for add-ons.     Comment: Auto resulted.       Light Blue Top [[id number]] Collected: 09/21/23 2239    Specimen: Blood Updated: 09/21/23 2345     Extra Tube Hold for add-ons.     Comment: Auto resulted       Comprehensive Metabolic Panel [[id number]]  (Abnormal) Collected: 09/21/23 2239    Specimen: Blood Updated: 09/21/23 2320     Glucose 107 mg/dL      BUN 30 mg/dL      Creatinine 1.11 mg/dL      Sodium 128 mmol/L      Potassium 3.4 mmol/L      Chloride 89 mmol/L      CO2 27.0 mmol/L      Calcium 8.7 mg/dL      Total Protein 5.5 g/dL      Albumin 2.1 g/dL      ALT (SGPT) 16 U/L      AST (SGOT) 31 U/L      Alkaline Phosphatase 220 U/L      Total Bilirubin 1.8 mg/dL      Globulin 3.4 gm/dL      A/G Ratio 0.6 g/dL      BUN/Creatinine Ratio 27.0     Anion Gap 12.0 mmol/L      eGFR 76.5 mL/min/1.73     Narrative:      GFR Normal >60  Chronic Kidney Disease <60  Kidney Failure <15      Lipase [[id number]]   (Abnormal) Collected: 09/21/23 2239    Specimen: Blood Updated: 09/21/23 2320     Lipase 8 U/L     Single High Sensitivity Troponin T [[id number]]  (Abnormal) Collected: 09/21/23 2239    Specimen: Blood Updated: 09/21/23 2320     HS Troponin T 22 ng/L     Narrative:      High Sensitive Troponin T Reference Range:  <10.0 ng/L- Negative Female for AMI  <15.0 ng/L- Negative Male for AMI  >=10 - Abnormal Female indicating possible myocardial injury.  >=15 - Abnormal Male indicating possible myocardial injury.   Clinicians would have to utilize clinical acumen, EKG, Troponin, and serial changes to determine if it is an Acute Myocardial Infarction or myocardial injury due to an underlying chronic condition.         POC Lactate [[id number]]  (Abnormal) Collected: 09/21/23 2240    Specimen: Blood Updated: 09/21/23 2241     Lactate 2.2 mmol/L      Comment: Serial Number: [id number]:  [id number]                   Imaging Results (Last 72 Hours)       Procedure Component Value Units Date/Time    MRI Brain Without Contrast [[id number]] Collected: 09/22/23 1242     Updated: 09/22/23 1254    Narrative:      MRI BRAIN WO CONTRAST    Date of Exam: 9/22/2023 11:58 AM EDT    Indication: Non-small cell lung cancer (NSCLC), metastatic, assess treatment response.     Comparison: 5/21/2022.    Technique:  Routine multiplanar/multisequence sequence images of the brain were obtained without contrast administration.      Findings:  No acute infarct is present on diffusion weighted sequences. Midline structures are normal and the craniocervical junction appears satisfactory. Limited noncontrast evaluation demonstrates no evidence of intracranial hemorrhage, mass or mass effect.   Stable mild generalized volume loss is present. The orbits are normal. The paranasal sinuses are grossly clear. Intracranial arterial flow voids are maintained.      Impression:      Impression:  Evaluation is somewhat limited due to lack of IV contrast, with  decreased sensitivity for small metastatic lesions. No evidence of metastatic disease or other acute finding on current exam.        Electronically Signed: Manish Clancy MD    9/22/2023 12:52 PM EDT    Workstation ID: [id number]    CT Head Without Contrast [[id number]] Collected: 09/22/23 0204     Updated: 09/22/23 0207    Narrative:      CT HEAD WO CONTRAST    Date of Exam: 9/22/2023 1:50 AM EDT    Indication: altered mental status.    Comparison: 12/6/2022.    Technique: Axial CT images were obtained of the head without contrast administration.  Coronal reconstructions were performed.  Automated exposure control and iterative reconstruction methods were used.      Findings:  Superficial soft tissues appear within normal limits. The calvarium is intact.  Paranasal sinuses and mastoid air cells appear well aerated.  Orbits are unremarkable.  There is no acute intracranial hemorrhage.  No mass effect or midline shift.  No   abnormal extra-axial collections.  Gray-white differentiation is within normal limits.  There are no focal hypoattenuating lesions.  Ventricular size and configuration is normal for age.      Impression:      Impression:  No acute intracranial abnormality.        Electronically Signed: Kieran Dent MD    9/22/2023 2:05 AM EDT    Workstation ID: [id number]    XR Chest 1 View [[id number]] Collected: 09/21/23 2313     Updated: 09/21/23 2318    Narrative:      XR CHEST 1 VW    Date of Exam: 9/21/2023 10:58 PM EDT    Indication: altered mental status    Comparison: CT chest 9/6/2023 and chest radiograph 7/5/2023.    Findings:  There is redemonstration of the large ovoid masslike density projecting over the left lateral chest with destruction of anterior ribs. There is diminished volume in the left lung. There are stable small bilateral pleural effusions and mild right basilar   atelectasis. There is stable consolidation in the left upper lung. No pneumothorax. No acute fracture or dislocation. Heart size  is normal.      Impression:      Impression:  1.Stable large ovoid masslike density projecting over the left lateral chest with destruction of anterior ribs and diminished volume in the left lung.  2.Stable small bilateral pleural effusions and right basilar atelectasis.        Electronically Signed: Kieran Dent MD    9/21/2023 11:16 PM EDT    Workstation ID: [id number]              Medication Review:      Hospital Medications (active)         Dose Frequency Start End    acetaminophen (TYLENOL) tablet 650 mg 650 mg Every 4 Hours PRN 9/22/2023     Admin Instructions: If given for fever, use fever parameter: fever greater than 100.4 °F  Based on patient request - if ordered for moderate or severe pain, provider allows for administration of a medication prescribed for a lower pain scale.    Do not exceed 4 grams of acetaminophen in a 24 hr period. Max dose of 2gm for AST/ALT greater than 120 units/L.    If given for pain, use the following pain scale:   Mild Pain = Pain Score of 1-3, CPOT 1-2  Moderate Pain = Pain Score of 4-6, CPOT 3-4  Severe Pain = Pain Score of 7-10, CPOT 5-8    Route: Oral    ALPRAZolam (XANAX) tablet 1 mg 1 mg 2 Times Daily PRN 9/22/2023 9/29/2023    Admin Instructions:  Caution: Look alike/sound alike drug alert.   Avoid grapefruit juice    Route: Oral    bisacodyl (DULCOLAX) EC tablet 5 mg 5 mg Daily PRN 9/22/2023     Admin Instructions: Use if no bowel movement after 12 hours.  Swallow whole. Do not crush, split, or chew tablet.    Route: Oral    Linked Group 1: See Hyperspace for full Linked Orders Report.        bisacodyl (DULCOLAX) suppository 10 mg 10 mg Daily PRN 9/22/2023     Admin Instructions: Use if no bowel movement after 12 hours.  Hold for diarrhea    Route: Rectal    Linked Group 1: See Hyperspace for full Linked Orders Report.        buPROPion XL (WELLBUTRIN XL) 24 hr tablet 150 mg 150 mg Daily 9/23/2023     Admin Instructions: Do not crush or chew the capsules or tablets. The  drug may not work as designed if the capsule or tablet is crushed or chewed. Swallow whole.  Caution: Look alike/sound alike drug alert. Swallow whole.  Do not crush, chew, or split tablet.    Route: Oral    folic acid (FOLVITE) tablet 1 mg 1 mg Daily 9/23/2023     Route: Oral    lactulose (CHRONULAC) 10 GM/15ML solution 10 g 10 g 2 Times Daily 9/23/2023     Admin Instructions: May be mixed with fruit juice, water, or milk.    Route: Oral    levothyroxine (SYNTHROID, LEVOTHROID) tablet 125 mcg 125 mcg Every Early Morning 9/23/2023     Admin Instructions: Take on empty stomach.    Route: Oral    megestrol acetate (MEGACE) oral suspension 400 mg 400 mg Daily 9/23/2023     Admin Instructions: Group 2 (Pink) Hazardous Drug - Reproductive Risk Only - See Handling Guide    Route: Oral    nitroglycerin (NITROSTAT) SL tablet 0.4 mg 0.4 mg Every 5 Minutes PRN 9/22/2023     Admin Instructions: If Pain Unrelieved After 3 Doses Notify MD  May administer up to 3 doses per episode.    Route: Sublingual    ondansetron (ZOFRAN) injection 4 mg 4 mg Every 6 Hours PRN 9/22/2023     Admin Instructions: If BOTH ondansetron (ZOFRAN) and promethazine (PHENERGAN) are ordered use ondansetron first and THEN promethazine IF ondansetron is ineffective.    Route: Intravenous    pantoprazole (PROTONIX) EC tablet 40 mg 40 mg Daily 9/23/2023     Admin Instructions: Do not crush or chew the capsules or tablets. The drug may not work as designed if the capsule or tablet is crushed or chewed. Swallow whole.  Swallow whole; do not crush, split, or chew.    Route: Oral    polyethylene glycol (MIRALAX) packet 17 g 17 g Daily PRN 9/22/2023     Admin Instructions: Use if no bowel movement after 12 hours. Mix in 6-8 ounces of water.  Use 4-8 ounces of water, tea, or juice for each 17 gram dose.    Route: Oral    Linked Group 1: See [place] for full Linked Orders Report.        potassium chloride (K-DUR,KLOR-CON) CR tablet 20 mEq 20 mEq Daily 9/22/2023      Admin Instructions: Do not crush or chew the capsules or tablets. The drug may not work as designed if the capsule or tablet is crushed or chewed. Swallow whole.  Take with food.    Route: Oral    sennosides-docusate (PERICOLACE) 8.6-50 MG per tablet 2 tablet 2 tablet 2 Times Daily 9/22/2023     Admin Instructions: HOLD MEDICATION IF PATIENT HAS HAD BOWEL MOVEMENT. Start bowel management regimen if patient has not had a bowel movement after 12 hours.    Route: Oral    Linked Group 1: See Hyperspace for full Linked Orders Report.        sodium chloride 0.9 % flush 10 mL 10 mL As Needed 9/21/2023     Route: Intravenous    Linked Group 2: See Hyperspace for full Linked Orders Report.        sodium chloride 0.9 % flush 10 mL 10 mL Every 12 Hours Scheduled 9/22/2023     Route: Intravenous    sodium chloride 0.9 % flush 10 mL 10 mL As Needed 9/22/2023     Route: Intravenous    sodium chloride 0.9 % infusion 40 mL 40 mL As Needed 9/22/2023     Admin Instructions: Following administration of an IV intermittent medication, flush line with 40mL NS at 100mL/hr.    Route: Intravenous    sodium chloride 0.9 % infusion 75 mL/hr Continuous 9/23/2023     Route: Intravenous    vitamin B-12 (CYANOCOBALAMIN) tablet 1,000 mcg 1,000 mcg Daily 9/23/2023     Route: Oral    vitamin B-6 (PYRIDOXINE) tablet 100 mg 100 mg Daily 9/23/2023     Route: Oral            Assessment & Plan         AMS (altered mental status)    Metastatic lung CA s/p left thoracotomy and right lower lobectomy   Hx left chest well cellulitis   Leukocytosis, appears chronic   TME  better    Plan :    Off AB  Observe  Will follow  Thank you    Vincent Borrero MD  09/23/23  12:51 EDT

## 2023-09-23 NOTE — DISCHARGE SUMMARY
[facility] Medicine Services   DISCHARGE SUMMARY    Patient Name: Jc Driscoll  : 1965  MRN: [id number]    Date of Admission: 2023  Date of Discharge:  23    Primary Care Physician: Reshma Alvarenga MD      Presenting Problem:   Elevated lactic acid level [R79.89]  Altered mental status, unspecified altered mental status type [R41.82]  AMS (altered mental status) [R41.82]    Active and Resolved Hospital Problems:  Active Hospital Problems    Diagnosis POA    **AMS (altered mental status) [R41.82] Yes      Resolved Hospital Problems   No resolved problems to display.         Hospital Course     Hospital Course:  Jc Driscoll is a 58 y.o. 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  Oncology for his underlying malignancy. who presented to [facility] on 2023 with report of confusion from his rehab facility.  Lab findings similar to previous admission.  MRI brain negative for any metastatic disease.  Evaluated by oncology and after further discussion with family patient made comfort care.  Family wants to take patient home with hospice.  Patient discharged home with hospice and declining condition.      A/P:    Acute metabolic encephalopathy  -Unclear cause at this time, MRI brain negative for any lesions or metastatic disease  -No UTI or pneumonia noted  -Leukocytosis about the same as previously  -Possible polypharmacy, hold sedating meds for now  -Patient to be transitioned to hospice at home     Leukocytosis with bandemia  -pt has  had leukocytosis since March 2023. His previous admission July 2023 the patient had negative blood cultures. ID team suggested leukocytosis was secondary to steroids and underlying malignancy  -Previously concern for chest wall cellulitis however ruled out per ID previously  -Hold off on antibiotics for now  -Consult ID for further evaluation  -Patient to be transitioned to hospice at home     Anemia  -no hematochezia or hematemesis   -Hemoglobin trending down, did receive 1 unit during last admission  -anemia secondary to metastatic disease/chemotherapy  -Monitor hemoglobin and transfuse as needed  -Patient to be transitioned to hospice at home     Metastatic lung CA s/p left thoracotomy and right lower lobectomy  -followed by Dr. Gaspar on tepotinib, on hold for now  -discussed with oncology, patient seems to be deteriorating previously was seen by palliative care  -Patient unable to make decision at this time due to his confusion,  -Patient to be transitioned to hospice at home     Hyponatremia, likely SIADH  -pt did have hyponatremia secondary to volume depletion and underlying SIADH per nephrology back in July 2023. He was treated with IVFs during that admission  -Nephrology consulted for further management  -Patient to be transitioned to hospice at home     Hypothyroidism  -cont home synthroid, cytomel  -Patient to be transitioned to hospice at home     Anorexia   -on megace  -Patient to be transitioned to hospice at home     DISCHARGE Follow Up Recommendations for labs and diagnostics:     continue with hospice    Reasons For Change In Medications and Indications for New Medications:  Continue with hospice     Day of Discharge     Vital Signs:  Temp:  [97.1 °F (36.2 °C)-99 °F (37.2 °C)] 97.1 °F (36.2 °C)  Heart Rate:  [119-123] 123  Resp:  [20-29] 29  BP: ()/(57-75) 100/59  Flow (L/min):  [2] 2    Physical Exam:  General: Awake, alert, elderly male, lying in bed, NAD  Eyes: PERRL, EOMI, conjunctivae are  clear  Cardiovascular: Regular rate and rhythm, no murmurs  Respiratory: Clear to auscultation bilaterally, no wheezing or rales, unlabored breathing  Abdomen: Soft, nontender, positive bowel sounds, no guarding  Neurologic: A&O, CN grossly intact, moves all extremities spontaneously  Musculoskeletal: Normal range of motion, no other gross deformities  Skin: Warm, dry, left chest wall port noted with mild surrounding tenderness, no erythema or warmth.        Pertinent  and/or Most Recent Results     LAB RESULTS:      Lab 09/23/23  1115 09/23/23  0924 09/22/23  0900 09/22/23  0310 09/21/23  2240 09/21/23 2239 09/21/23  1930 09/21/23  0910   WBC 77.50* 76.60*  --   --   --  61.70*  --   --    HEMOGLOBIN 6.9* 6.9*  --   --   --  8.3* 6.7* 5.5*   HEMATOCRIT 22.7* 22.2*  --   --   --  26.8* 21.0* 18.0*   PLATELETS 97* 100*  --   --   --  141  --   --    NEUTROS ABS 67.43* 74.30*  --   --   --  58.62*  --   --    MCV 89.5 88.9  --   --   --  91.0  --   --    LACTATE  --   --  1.3 2.1* 2.2*  --   --   --          Lab 09/23/23 0924 09/21/23 2239   SODIUM 131* 128*   POTASSIUM 2.9* 3.4*   CHLORIDE 91* 89*   CO2 27.0 27.0   ANION GAP 13.0 12.0   BUN 17 30*   CREATININE 0.77 1.11   EGFR 103.1 76.5   GLUCOSE 94 107*   CALCIUM 8.6 8.7         Lab 09/21/23 2239   TOTAL PROTEIN 5.5*   ALBUMIN 2.1*   GLOBULIN 3.4   ALT (SGPT) 16   AST (SGOT) 31   BILIRUBIN 1.8*   ALK PHOS 220*   LIPASE 8*         Lab 09/21/23 2239   HSTROP T 22*                 Lab 09/22/23  0349   PH, ARTERIAL 7.511*   PCO2, ARTERIAL 35.2   PO2 ART 63.8*   O2 SATURATION ART 94.1   FIO2 21   HCO3 ART 28.2*   BASE EXCESS ART 4.9*     Brief Urine Lab Results  (Last result in the past 365 days)        Color   Clarity   Blood   Leuk Est   Nitrite   Protein   CREAT   Urine HCG        09/22/23 0009 Yellow   Clear   Negative   Negative   Negative   Trace                 Microbiology Results (last 10 days)       Procedure Component Value - Date/Time    Blood Culture -  Blood, Arm, Left [[id number]]  (Normal) Collected: 09/22/23 0006    Lab Status: Preliminary result Specimen: Blood from Arm, Left Updated: 09/23/23 0015     Blood Culture No growth at 24 hours    Narrative:      Less than seven (7) mL's of blood was collected.  Insufficient quantity may yield false negative results.    Blood Culture - Blood, Arm, Right [[id number]]  (Normal) Collected: 09/22/23 0005    Lab Status: Preliminary result Specimen: Blood from Arm, Right Updated: 09/23/23 0015     Blood Culture No growth at 24 hours    Narrative:      Less than seven (7) mL's of blood was collected.  Insufficient quantity may yield false negative results.            CT Head Without Contrast    Result Date: 9/22/2023  Impression: Impression: No acute intracranial abnormality. Electronically Signed: Kieran Dent MD  9/22/2023 2:05 AM EDT  Workstation ID: [id number]    CT Chest With Contrast Diagnostic    Result Date: 9/6/2023  Impression: Since 8/9/2023 interval increase in size of the patient's known left anterior chest wall mass now measuring approximately 8.3 x 6.2 cm. There are stable destructive changes of the anterior left second third and fourth ribs. Consolidative changes in the left lung apex similar to that seen on prior examination which could represent partial collapse versus a underlying infiltrate and correlation for pneumonia is recommended. Small left pleural effusion. Coronary artery calcifications. Dilatation of the main pulmonary artery which can be seen with pulmonary hypertension. Electronically Signed: Bud Russell MD  9/6/2023 10:21 PM EDT  Workstation ID: [id number]    MRI Brain Without Contrast    Result Date: 9/22/2023  Impression: Impression: Evaluation is somewhat limited due to lack of IV contrast, with decreased sensitivity for small metastatic lesions. No evidence of metastatic disease or other acute finding on current exam. Electronically Signed: Manish Clancy MD  9/22/2023 12:52 PM EDT   Workstation ID: [id number]    CT Abdomen Pelvis With Contrast    Result Date: 8/30/2023  Impression: Impression: 1.No acute abnormality identified within the abdomen or pelvis. 2.Several tiny liver hypodensities are too small to fully characterize and appear grossly similar since the previous study. 3.Large amount of stool within the colon. 4.Trace bilateral pleural effusions with bilateral lung base atelectasis noted. Electronically Signed: Odell Woody MD  8/30/2023 4:05 PM EDT  Workstation ID: [id number]    XR Chest 1 View    Result Date: 9/21/2023  Impression: Impression: 1.Stable large ovoid masslike density projecting over the left lateral chest with destruction of anterior ribs and diminished volume in the left lung. 2.Stable small bilateral pleural effusions and right basilar atelectasis. Electronically Signed: Kieran Dent MD  9/21/2023 11:16 PM EDT  Workstation ID: [id number]     Results for orders placed during the hospital encounter of 12/05/22    Duplex Venous Lower Extremity - Bilateral CAR    Interpretation Summary    Normal bilateral lower extremity venous duplex scan.      Results for orders placed during the hospital encounter of 12/05/22    Duplex Venous Lower Extremity - Bilateral CAR    Interpretation Summary    Normal bilateral lower extremity venous duplex scan.      Results for orders placed during the hospital encounter of 06/29/23    Adult Transthoracic Echo Complete W/ Cont if Necessary Per Protocol    Interpretation Summary    Left ventricular systolic function is normal. Left ventricular ejection fraction appears to be 51 - 55%.    Left ventricular diastolic function is consistent with (grade I) impaired relaxation.    The right ventricular cavity is mild to moderately dilated.    The left atrial cavity is mildly dilated.    The right atrial cavity is mild to moderately  dilated.    Estimated right ventricular systolic pressure from tricuspid regurgitation is moderately elevated (45-55  mmHg).    Mild to moderate pulmonary hypertension is present.      Labs Pending at Discharge:  Pending Labs       Order Current Status    Blood Culture - Blood, Arm, Left Preliminary result    Blood Culture - Blood, Arm, Right Preliminary result            Procedures Performed           Consults:   Consults       Date and Time Order Name Status Description    9/23/2023  9:30 AM Inpatient Infectious Diseases Consult      9/23/2023  8:35 AM Inpatient Nephrology Consult      9/22/2023  4:23 AM Hematology & Oncology Inpatient Consult Completed     9/22/2023  2:26 AM Hospitalist (on-call MD unless specified)      9/10/2023  8:49 AM Inpatient Pulmonology Consult Completed     9/10/2023  8:48 AM Inpatient Nephrology Consult Completed     9/7/2023 10:09 AM Hematology & Oncology Inpatient Consult Completed     9/6/2023 10:31 PM Inpatient Infectious Diseases Consult Completed               Discharge Details        Discharge Medications        Continue These Medications        Instructions Start Date   buPROPion  MG 24 hr tablet  Commonly known as: WELLBUTRIN XL   150 mg, Oral, 2 Times Daily      carbamide peroxide 6.5 % otic solution  Commonly known as: DEBROX   1 drop, Both Ears, 2 Times Daily      desvenlafaxine 50 MG 24 hr tablet  Commonly known as: PRISTIQ   50 mg, Oral, Daily      folic acid 1 MG tablet  Commonly known as: FOLVITE   1 mg, Oral, Daily      gabapentin 300 MG capsule  Commonly known as: NEURONTIN   600 mg, Oral, 3 Times Daily      hydrOXYzine 25 MG tablet  Commonly known as: ATARAX   25 mg, Oral, 3 Times Daily PRN      lactulose 10 GM/15ML solution  Commonly known as: CHRONULAC   10 g, Oral, 2 Times Daily      levothyroxine 137 MCG tablet  Commonly known as: SYNTHROID, LEVOTHROID   137 mcg, Oral, Every Early Morning      loratadine 10 MG tablet  Commonly known as: CLARITIN   10 mg, Oral, Daily      megestrol acetate 400 MG/10ML suspension oral suspension  Commonly known as: MEGACE   400 mg, Oral,  Daily      nystatin 100,000 unit/mL suspension  Commonly known as: MYCOSTATIN   500,000 Units, Swish & Swallow, 4 Times Daily      ondansetron 4 MG tablet  Commonly known as: ZOFRAN   8 mg, Oral, 3 Times Daily PRN      oxyCODONE-acetaminophen  MG per tablet  Commonly known as: PERCOCET   1 tablet, Oral, Every 4 Hours PRN      pantoprazole 40 MG EC tablet  Commonly known as: PROTONIX   40 mg, Oral, Daily      potassium chloride 20 MEQ CR tablet  Commonly known as: K-DUR,KLOR-CON   20 mEq, Oral, Daily      Tepmetko 225 MG tablet  Generic drug: Tepotinib HCl   2 tablets, Oral, Daily      torsemide 20 MG tablet  Commonly known as: DEMADEX   10 mg, Oral, Daily      Urea 15 g pack packet  Commonly known as: URE-NA   15 g, Oral, 3 times daily      vitamin B-12 1000 MCG tablet  Commonly known as: CYANOCOBALAMIN   1,000 mcg, Oral, Daily      vitamin B-6 50 MG tablet  Commonly known as: PYRIDOXINE   100 mg, Oral, Daily               No Known Allergies      Discharge Disposition:  Hospice/Home    Diet:  Hospital:  Diet Order   Procedures    Diet: Regular/House Diet; Texture: Regular Texture (IDDSI 7); Fluid Consistency: Thin (IDDSI 0)         Discharge Activity:         CODE STATUS:  Code Status and Medical Interventions:   Ordered at: 09/23/23 1213     Level Of Support Discussed With:    Patient    Health Care Surrogate     Code Status (Patient has no pulse and is not breathing):    No CPR (Do Not Attempt to Resuscitate)     Medical Interventions (Patient has pulse or is breathing):    Full Support         No future appointments.        Time spent on Discharge including face to face service:  35 minutes    Signature:    Electronically signed by Ezequiel Mccollum DO, 09/23/23, 12:57 PM EDT.      Part of this note may be an electronic transcription/translation of spoken language to printed text using the Dragon Dictation System.

## 2023-09-23 NOTE — PLAN OF CARE
Goal Outcome Evaluation:    Problem: Adult Inpatient Plan of Care  Goal: Plan of Care Review  Outcome: Ongoing, Progressing  Goal: Patient-Specific Goal (Individualized)  Outcome: Ongoing, Progressing  Goal: Absence of Hospital-Acquired Illness or Injury  Outcome: Ongoing, Progressing  Intervention: Identify and Manage Fall Risk  Recent Flowsheet Documentation  Taken 9/23/2023 0400 by Cecilia Silva RN  Safety Promotion/Fall Prevention:   safety round/check completed   room organization consistent   assistive device/personal items within reach   clutter free environment maintained  Taken 9/23/2023 0200 by Cecilia Silva RN  Safety Promotion/Fall Prevention:   safety round/check completed   room organization consistent   clutter free environment maintained   assistive device/personal items within reach  Taken 9/23/2023 0000 by Cecilia Silva RN  Safety Promotion/Fall Prevention:   assistive device/personal items within reach   clutter free environment maintained   safety round/check completed   room organization consistent  Taken 9/22/2023 2200 by Cecilia Silva RN  Safety Promotion/Fall Prevention:   safety round/check completed   room organization consistent   assistive device/personal items within reach   clutter free environment maintained   fall prevention program maintained  Taken 9/22/2023 2000 by Cecilia Silva RN  Safety Promotion/Fall Prevention:   safety round/check completed   room organization consistent   assistive device/personal items within reach   clutter free environment maintained   fall prevention program maintained   nonskid shoes/slippers when out of bed  Intervention: Prevent Skin Injury  Recent Flowsheet Documentation  Taken 9/23/2023 0400 by Cecilia Silva RN  Body Position: position changed independently  Taken 9/23/2023 0200 by Cecilia Silva RN  Body Position: position changed independently  Taken 9/23/2023 0000 by Cecilia Silva RN  Body Position:  position changed independently  Taken 9/22/2023 2200 by Cecilia Silva RN  Body Position: position changed independently  Taken 9/22/2023 2000 by Cecilia Silva RN  Body Position: position changed independently  Intervention: Prevent and Manage VTE (Venous Thromboembolism) Risk  Recent Flowsheet Documentation  Taken 9/22/2023 2200 by Cecilia Silva RN  Activity Management: activity encouraged  Taken 9/22/2023 2000 by Cecilia Silva RN  Activity Management: activity encouraged  Intervention: Prevent Infection  Recent Flowsheet Documentation  Taken 9/23/2023 0400 by Cecilia Silva RN  Infection Prevention:   single patient room provided   rest/sleep promoted   personal protective equipment utilized   hand hygiene promoted   environmental surveillance performed  Taken 9/23/2023 0200 by Cecilia Silva RN  Infection Prevention:   single patient room provided   rest/sleep promoted   personal protective equipment utilized   hand hygiene promoted   environmental surveillance performed  Taken 9/23/2023 0000 by Cecilia Silva RN  Infection Prevention:   single patient room provided   rest/sleep promoted   personal protective equipment utilized   hand hygiene promoted   environmental surveillance performed  Taken 9/22/2023 2200 by Cecilia Silva RN  Infection Prevention:   single patient room provided   rest/sleep promoted   personal protective equipment utilized   hand hygiene promoted   environmental surveillance performed  Taken 9/22/2023 2000 by Cecilia Silva RN  Infection Prevention:   single patient room provided   rest/sleep promoted   personal protective equipment utilized   hand hygiene promoted   environmental surveillance performed  Goal: Optimal Comfort and Wellbeing  Outcome: Ongoing, Progressing  Intervention: Provide Person-Centered Care  Recent Flowsheet Documentation  Taken 9/23/2023 0400 by Cecilia Silva RN  Trust Relationship/Rapport: care explained  Taken  9/23/2023 0000 by Cecilia Silva RN  Trust Relationship/Rapport: care explained  Taken 9/22/2023 2000 by Cecilia Silva RN  Trust Relationship/Rapport:   care explained   choices provided   emotional support provided   empathic listening provided   questions answered   questions encouraged   reassurance provided   thoughts/feelings acknowledged  Goal: Readiness for Transition of Care  Outcome: Ongoing, Progressing     Problem: Coping Ineffective (Oncology Care)  Goal: Effective Coping  Outcome: Ongoing, Progressing  Intervention: Support and Enhance Coping Strategies  Recent Flowsheet Documentation  Taken 9/23/2023 0400 by Cecilia Silva RN  Supportive Measures: active listening utilized  Environmental Support: calm environment promoted  Family/Support System Care: support provided  Taken 9/23/2023 0000 by Cecilia Silva RN  Supportive Measures: active listening utilized  Environmental Support: calm environment promoted  Family/Support System Care: support provided  Taken 9/22/2023 2000 by Cecilia Silva RN  Supportive Measures: active listening utilized  Environmental Support: calm environment promoted  Family/Support System Care: support provided     Problem: Fatigue (Oncology Care)  Goal: Improved Activity Tolerance  Outcome: Ongoing, Progressing  Intervention: Promote Improved Energy  Recent Flowsheet Documentation  Taken 9/22/2023 2200 by Cecilia Silva RN  Activity Management: activity encouraged  Taken 9/22/2023 2000 by Cecilia Silva RN  Activity Management: activity encouraged     Problem: Oral Intake Altered (Oncology Care)  Goal: Optimal Oral Intake  Outcome: Ongoing, Progressing     Problem: Oral Mucositis (Oncology Care)  Goal: Improved Oral Mucous Membrane Integrity  Outcome: Ongoing, Progressing     Problem: Pain Acute (Oncology Care)  Goal: Optimal Pain Control  Outcome: Ongoing, Progressing  Intervention: Prevent or Manage Pain  Recent Flowsheet  Documentation  Taken 9/23/2023 0400 by Cecilia Silva RN  Medication Review/Management:   medications reviewed   high-risk medications identified  Taken 9/23/2023 0200 by Cecilia Silva RN  Medication Review/Management:   medications reviewed   high-risk medications identified  Taken 9/23/2023 0000 by Cecilia Silva RN  Medication Review/Management:   medications reviewed   high-risk medications identified  Taken 9/22/2023 2200 by Cecilia Silva RN  Medication Review/Management:   medications reviewed   high-risk medications identified  Taken 9/22/2023 2000 by Cecilia Silva RN  Medication Review/Management:   medications reviewed   high-risk medications identified  Intervention: Optimize Psychosocial Wellbeing  Recent Flowsheet Documentation  Taken 9/23/2023 0400 by Cecilia Silva RN  Supportive Measures: active listening utilized  Taken 9/23/2023 0000 by Cecilia Silva RN  Supportive Measures: active listening utilized  Taken 9/22/2023 2000 by Cecilia Silva RN  Supportive Measures: active listening utilized  Diversional Activities: television     Problem: Skin Injury Risk Increased  Goal: Skin Health and Integrity  Outcome: Ongoing, Progressing  Intervention: Optimize Skin Protection  Recent Flowsheet Documentation  Taken 9/23/2023 0400 by Cecilia Silva RN  Head of Bed (HOB) Positioning: HOB elevated  Taken 9/23/2023 0200 by Cecilia Silva RN  Head of Bed (HOB) Positioning: HOB elevated  Taken 9/23/2023 0000 by Cecilia Silva RN  Head of Bed (HOB) Positioning: HOB elevated  Taken 9/22/2023 2200 by Cecilia Silva RN  Head of Bed (HOB) Positioning: HOB elevated  Taken 9/22/2023 2000 by Cecilia Silva RN  Head of Bed (HOB) Positioning: HOB elevated     Problem: Fall Injury Risk  Goal: Absence of Fall and Fall-Related Injury  Outcome: Ongoing, Progressing  Intervention: Identify and Manage Contributors  Recent Flowsheet Documentation  Taken 9/23/2023  0400 by Cecilia Silva RN  Medication Review/Management:   medications reviewed   high-risk medications identified  Taken 9/23/2023 0200 by Cecilia Silva RN  Medication Review/Management:   medications reviewed   high-risk medications identified  Taken 9/23/2023 0000 by Cecilia Silva RN  Medication Review/Management:   medications reviewed   high-risk medications identified  Taken 9/22/2023 2200 by Cecilia Silva RN  Medication Review/Management:   medications reviewed   high-risk medications identified  Taken 9/22/2023 2000 by Cecilia Silva RN  Medication Review/Management:   medications reviewed   high-risk medications identified  Intervention: Promote Injury-Free Environment  Recent Flowsheet Documentation  Taken 9/23/2023 0400 by Cecilia Silva RN  Safety Promotion/Fall Prevention:   safety round/check completed   room organization consistent   assistive device/personal items within reach   clutter free environment maintained  Taken 9/23/2023 0200 by Cecilia Silva RN  Safety Promotion/Fall Prevention:   safety round/check completed   room organization consistent   clutter free environment maintained   assistive device/personal items within reach  Taken 9/23/2023 0000 by Cecilia Silva RN  Safety Promotion/Fall Prevention:   assistive device/personal items within reach   clutter free environment maintained   safety round/check completed   room organization consistent  Taken 9/22/2023 2200 by Cecilia Silva RN  Safety Promotion/Fall Prevention:   safety round/check completed   room organization consistent   assistive device/personal items within reach   clutter free environment maintained   fall prevention program maintained  Taken 9/22/2023 2000 by Cecilia Silva RN  Safety Promotion/Fall Prevention:   safety round/check completed   room organization consistent   assistive device/personal items within reach   clutter free environment maintained   fall  prevention program maintained   nonskid shoes/slippers when out of bed

## 2023-09-23 NOTE — CASE MANAGEMENT/SOCIAL WORK
"Physicians Statement of Medical Necessity for  Ambulance Transportation    GENERAL INFORMATION     Name: Jc Driscoll  YOB: 1965  Medicare #: [place] [employer] Subscriber ID [id number]  Transport Date: 9-23/2023 (Valid for round trips this date, or for scheduled repetitive trips for 60 days from the date signed below.)  Origin: [facility]  Destination: [address] [facility] [place] [facility]   Is the Patient's stay covered under Medicare Part A (PPS/DRG?)No   Closest appropriate facility? Yes  If this a hosp-hosp transfer? No  Is this a hospice patient? Yes, Is this transport related to patient's terminal illness? Yes    MEDICAL NECESSITY QUESTIONAIRE    Ambulance Transportation is medically necessary only if other means of transportation are contraindicated or would be potentially harmful to the patient.  To meet this requirement, the patient must be either \"bed confined\" or suffer from a condition such that transport by means other than an ambulance is contraindicated by the patient's condition.  The following questions must be answered by the healthcare professional signing below for this form to be valid:     1) Describe the MEDICAL CONDITION (physical and/or mental) of this patient AT THE TIME OF AMBULANCE TRANSPORT that requires the patient to be transported in an ambulance, and why transport by other means is contraindicated by the patient's condition: terminal Illness, altered mental status, 2L. Admitting under [facility] hospice. Metastatic non small cell Lung cancer  Past Medical History:   Diagnosis Date    Allergic rhinitis 07/25/2013    Overview:  4/2/2018 - still zyrtec 10 daily (if stops, gets dermatitis again).     Anxiety and depression 06/05/2012    Overview:  4/2/2018 -   C/w well 300 xr and Lito 20.  4/8/2019 -   Doing ok even with new lung cancer - lito 20 & well 300xr.     Chronic pansinusitis 04/08/2019    Overview:  4/8/2019 -   MRI showed chronic thick in frontal, " "maxillary, ethmoidal ---> to Dr. COLLAZO to est since abx ICC didn't help.  Does netipot bid.     Diastolic CHF 07/09/2014    Overview:  7/4/14 - impaired LV relaxation on [place] [facility] ECHO.  EF 60%. Rest normal    Dupuytren's contracture of both hands     Elevated cholesterol     Erosive esophagitis 07/09/2019    Overview:  EGD 2016 4/2/2018 - nexium OTC daily for few week.  Qod before that.  Now carbonation hurts, epigastric pain 4/8/2019 -   protonix 40 doing well.     Gastroesophageal reflux disease 02/21/2019    Hiatal hernia 07/09/2019    History of colon polyps     Hypertension     Lung cancer     right lung and in lymph nodes x2    Mediastinal lymphadenopathy 03/12/2019    Normocytic anemia 08/08/2019    Overview:  6/2019 - dropped to 9's postop 7/2019 - rebounded to 10's.  8/8/2019 -   Back to 9's - check ferritin, b12 and thyroid.     Prediabetes 07/09/2014    Overview:  7/4/14 - a1c 6.1 at [place] [facility] admission    Retention of urine 06/27/2019    PSOT OP, RESOLVED      Past Surgical History:   Procedure Laterality Date    BRONCHOSCOPY  03/18/2019    EBUS MONTERO NEEDLE BX    COLONOSCOPY      DUPUYTREN CONTRACTURE RELEASE Bilateral     LUNG BIOPSY  03/08/2019    CT GUIDED    LUNG REMOVAL, PARTIAL Right     right lower    PORTACATH PLACEMENT  03/20/2019    DR LONGORIA    THORACOSCOPY Right 6/26/2019    Procedure: RIGHT VATS, OPEN LOWER LOBECTOMY WITH LYMPH NODE DISECTION, WEDGE RESECTION OF RIGHT MIDDLE LOBE;  Surgeon: Terrance Longoria MD;  Location: [facility] MAIN OR;  Service: Cardiothoracic    THORACOSCOPY VIDEO ASSISTED WITH LOBECTOMY Left 7/6/2022    Procedure: THORACOSCOPY VIDEO ASSISTED WITH LOBECTOMY;  Surgeon: Miryam Reyna MD;  Location: [facility] MAIN OR;  Service: Thoracic;  Laterality: Left;      2) Is this patient \"bed confined\" as defined below?Yes   To be \"bed confined\" the patient must satisfy all three of the following criteria:  (1) unable to get up from bed without assistance; AND (2) unable to ambulate;  AND " (3) unable to sit in a chair or wheelchair.  3) Can this patient safely be transported by car or wheelchair van (I.e., may safely sit during transport, without an attendant or monitoring?)No   4. In addition to completing questions 1-3 above, please check any of the following conditions that apply*:          *Note: supporting documentation for any boxes checked must be maintained in the patient's medical records Patient is confused and Unable to tolerate seated position for time needed to transport      SIGNATURE OF PHYSICIAN OR OTHER AUTHORIZED HEALTHCARE PROFESSIONAL    I certify that the above information is true and correct based on my evaluation of this patient, and represent that the patient requires transport by ambulance and that other forms of transport are contraindicated.  I understand that this information will be used by the Centers for Medicare and Medicaid Services (CMS) to support the determiniation of medical necessity for ambulance services, and I represent that I have personal knowledge of the patient's condition at the time of transport.    x   If this box is checked, I also certify that the patient is physically or mentally incapable of signing the ambulance service's claim form and that the institution with which I am affiliated has furnished care, services or assistance to the patient.  My signature below is made on behalf of the patient pursuant to 42 .36(b)(4). In accordance with 42 .37, the specific reason(s) that the patient is physically or mentally incapable of signing the claim for is as follows: x    Signature of Physician or Healthcare Professional   Leslee Yoder RN Date/Time:   9/23/2023 [id number]     (For Scheduled repetitive transport, this form is not valid for transports performed more than 60 days after this date).                                                                                                                                             --------------------------------------------------------------------------------------------  Printed Name and Credentials of Physician or Authorized Healthcare Professional     *Form must be signed by patient's attending physician for scheduled, repetitive transports,.  For non-repetitive ambulance transports, if unable to obtain the signature of the attending physician, any of the following may sign (please select below):     Physician  Clinical Nurse Specialist  Registered Nurse     Physician Assistant  Discharge Planner  Licensed Practical Nurse     Nurse Practitioner x

## 2023-09-23 NOTE — DISCHARGE PLACEMENT REQUEST
"Jc Mcbride (58 y.o. Male)       Date of Birth   1965    Social Security Number       Address   [address] [place] [facility]    Home Phone   [phone]    MRN   [id number]       Amish   None    Marital Status                               Admission Date   23    Admission Type   Emergency    Admitting Provider   Reshma Kingsley DO    Attending Provider   Ezequiel Mccollum DO    Department, Room/Bed   [facility], 246/       Discharge Date       Discharge Disposition   Hospice/Home    Discharge Destination                                 Attending Provider: Ezequiel Mccollum DO    Allergies: No Known Allergies    Isolation: None   Infection: COVID Screen (preop/placement) (22)   Code Status: No CPR    Ht: 190.5 cm (75\")   Wt: 64.5 kg (142 lb 3.2 oz)    Admission Cmt: None   Principal Problem: AMS (altered mental status) [R41.82]                   Active Insurance as of 2023       Primary Coverage       Payor Plan Insurance Group Employer/Plan Group    Critical access hospital [employer] Critical access hospital [employer] [facility] PPO [id number]       Payor Plan Address Payor Plan Phone Number Payor Plan Fax Number Effective Dates    [address] [phone]  2014 - None Entered    [place] [address]         Subscriber Name Subscriber Birth Date Member ID       JC MCBRIDE 1965 [id number]                     Emergency Contacts        (Rel.) Home Phone Work Phone Mobile Phone    VERN MCBRIDE (Spouse) [phone] -- [phone]                 History & Physical        Tere Hernandez, MULUGETA at 23 0318       Attestation signed by Reshma Kingsley DO at 23 0534    I have reviewed this documentation and agree.  Electronically signed by Reshma Kingsley DO, 23, 5:34 AM EDT.                      [facility] Medicine Services  History & Physical    Patient Name: Jc Mcbride  : 1965  MRN: [id number]  Primary Care Physician:  Lyle" "Reshma Bone MD  Date of admission: 9/21/2023  Date and Time of Service: 9/22/23 at 0329    Subjective      Chief Complaint: AMS    History of Present Illness: Jc Driscoll is a 58 y.o. 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  Oncology for his underlying malignancy. who presented to [facility] on 9/21/2023 with report of AMS    ED physician report that family was bedside during admission work-up and had reported patient had \"not been acting right past couple days\".  Urine culture and chest x-ray negative for acute infection process.  Chronic leukocytosis appears stable.  Patient's O2 sats 100% on room air.  No overt etiology for altered mental status.  Abnormal finding of a lactic of 2.2 unknown etiology.    I personally called over to [facility] rehab and received report from the nurse who had been caring for patient.  Per nurse report patient had received blood transfusion earlier that day for hemoglobin of 5.5 at 0910.  Repeat home hemoglobin at 1930 was 6.7.  Nurse reports received report from dayshift provider and no concerns for altered mental status.  She states she went in to assess patient around 2030 and noted patient was acting differently and not easily aroused.  He had garbled words.  Patient would fall asleep easily and could only keep eyes open for seconds at a time.  He was excessively weak.  Nailbeds appeared cyanotic and they were unable to get a O2 sat.  EMS was called.  Documented O2 with EMS was 81% on 2 L.  After arrival to the ED O2 sats have been 100% on " room air.  Repeat hemoglobin at 2239 was 8.3.  Nurse at [facility] rehab reports patient's spouse concerned for metastatic disease.  We will get a follow-up MRI and consult oncology.      Review of Systems   All other systems reviewed and are negative.   Patient is easily aroused.  Alert and oriented x3.  Denies shortness of breath, chest pain, abdominal pain.  Has nonspecific complaint of mild tenderness under left axilla from previous wound.  Negative for edema.  Denies any overt blood loss such as hematic emesis or melena or hematochezia.  Positive for sore tongue and patient reports was being treated for thrush    Personal History     Past Medical History:   Diagnosis Date    Allergic rhinitis 07/25/2013    Overview:  4/2/2018 - still zyrtec 10 daily (if stops, gets dermatitis again).     Anxiety and depression 06/05/2012    Overview:  4/2/2018 -   C/w well 300 xr and Lito 20.  4/8/2019 -   Doing ok even with new lung cancer - lito 20 & well 300xr.     Chronic pansinusitis 04/08/2019    Overview:  4/8/2019 -   MRI showed chronic thick in frontal, maxillary, ethmoidal ---> to Dr. COLLAZO to est since abx ICC didn't help.  Does netipot bid.     Diastolic CHF 07/09/2014    Overview:  7/4/14 - impaired LV relaxation on Zhou ECHO.  EF 60%. Rest normal    Dupuytren's contracture of both hands     Elevated cholesterol     Erosive esophagitis 07/09/2019    Overview:  EGD 2016 4/2/2018 - nexium OTC daily for few week.  Qod before that.  Now carbonation hurts, epigastric pain 4/8/2019 -   protonix 40 doing well.     Gastroesophageal reflux disease 02/21/2019    Hiatal hernia 07/09/2019    History of colon polyps     Hypertension     Lung cancer     right lung and in lymph nodes x2    Mediastinal lymphadenopathy 03/12/2019    Normocytic anemia 08/08/2019    Overview:  6/2019 - dropped to 9's postop 7/2019 - rebounded to 10's.  8/8/2019 -   Back to 9's - check ferritin, b12 and thyroid.     Prediabetes 07/09/2014    Overview:  7/4/14 -  a1c 6.1 at [place] admission    Retention of urine 06/27/2019    PSOT OP, RESOLVED       Past Surgical History:   Procedure Laterality Date    BRONCHOSCOPY  03/18/2019    EBUS MONTERO NEEDLE BX    COLONOSCOPY      DUPUYTREN CONTRACTURE RELEASE Bilateral     LUNG BIOPSY  03/08/2019    CT GUIDED    LUNG REMOVAL, PARTIAL Right     right lower    PORTACATH PLACEMENT  03/20/2019    DR LONGORIA    THORACOSCOPY Right 6/26/2019    Procedure: RIGHT VATS, OPEN LOWER LOBECTOMY WITH LYMPH NODE DISECTION, WEDGE RESECTION OF RIGHT MIDDLE LOBE;  Surgeon: Terrance Longoria MD;  Location: [facility];  Service: Cardiothoracic    THORACOSCOPY VIDEO ASSISTED WITH LOBECTOMY Left 7/6/2022    Procedure: THORACOSCOPY VIDEO ASSISTED WITH LOBECTOMY;  Surgeon: Miryam Reyna MD;  Location: [facility] OR;  Service: Thoracic;  Laterality: Left;       Family History: family history includes Cancer in his father, mother, and sister; Cervical cancer in his mother; Lung cancer in his father and sister. Otherwise pertinent FHx was reviewed and not pertinent to current issue.    Social History:  reports that he quit smoking about 14 years ago. His smoking use included cigarettes. He has never used smokeless tobacco. He reports current alcohol use of about 2.0 standard drinks per week. He reports that he does not use drugs.    Home Medications:  Prior to Admission Medications       Prescriptions Last Dose Informant Patient Reported? Taking?    buPROPion XL (WELLBUTRIN XL) 300 MG 24 hr tablet   Yes No    Take 1 tablet by mouth Daily.    desvenlafaxine (PRISTIQ) 50 MG 24 hr tablet   Yes No    Take 1 tablet by mouth Daily.    folic acid (FOLVITE) 1 MG tablet   No No    Take 1 tablet by mouth Daily.    gabapentin (NEURONTIN) 300 MG capsule   Yes No    Take 1 capsule by mouth Every Night.    hydrOXYzine (ATARAX) 25 MG tablet   No No    Take 1 tablet by mouth 3 (Three) Times a Day As Needed for Anxiety.    lactulose (CHRONULAC) 10 GM/15ML solution   Yes  No    Take 30 mL by mouth 2 (Two) Times a Day As Needed.    levothyroxine (SYNTHROID, LEVOTHROID) 137 MCG tablet   No No    Take 1 tablet by mouth Every Morning.    liothyronine (CYTOMEL) 5 MCG tablet   Yes No    Take 1 tablet by mouth Daily.    loratadine (CLARITIN) 10 MG tablet  Self Yes No    Take 1 tablet by mouth Daily.    megestrol acetate (MEGACE) 400 MG/10ML suspension oral suspension   No No    Take 10 mL by mouth Daily.    ondansetron (ZOFRAN) 8 MG tablet   No No    Take 1 tablet by mouth 3 (Three) Times a Day As Needed for Nausea or Vomiting.    oxyCODONE-acetaminophen (PERCOCET)  MG per tablet   No No    Take 1 tablet by mouth Every 4 (Four) Hours As Needed for Moderate Pain.    pantoprazole (PROTONIX) 40 MG EC tablet   Yes No    Take 1 tablet by mouth Daily.    potassium chloride (K-DUR,KLOR-CON) 20 MEQ CR tablet   Yes No    Take 1 tablet by mouth Daily.    sodium chloride 1 g tablet   No No    Take 1 tablet by mouth 4 (Four) Times a Day With Meals & at Bedtime.    torsemide (DEMADEX) 10 MG tablet   No No    Take 1 tablet by mouth Daily.    vitamin B-12 (CYANOCOBALAMIN) 1000 MCG tablet   Yes No    Take 1 tablet by mouth Daily.    vitamin B-6 (PYRIDOXINE) 100 MG tablet   No No    Take 1 tablet by mouth Every 12 (Twelve) Hours.              Allergies:  No Known Allergies    Objective      Vitals:   Temp:  [97.7 °F (36.5 °C)] 97.7 °F (36.5 °C)  Heart Rate:  [] 111  Resp:  [18] 18  BP: ()/(60-62) 91/61  Flow (L/min):  [2] 2    Physical Exam  Eyes:      Pupils: Pupils are equal, round, and reactive to light.   Cardiovascular:      Rate and Rhythm: Normal rate and regular rhythm.   Pulmonary:      Effort: Pulmonary effort is normal.      Breath sounds: Normal breath sounds.   Abdominal:      General: Abdomen is flat.      Palpations: Abdomen is soft.   Musculoskeletal:         General: Normal range of motion.   Skin:     General: Skin is warm and dry.      Comments: Left chest port in  place  Scar under left axilla    Neurological:      Mental Status: He is alert and oriented to person, place, and time.   Psychiatric:         Mood and Affect: Mood normal.         Behavior: Behavior normal.          Result Review    Result Review:  I have personally reviewed the results from the time of this admission to 9/22/2023 04:30 EDT and agree with these findings:  [x]  Laboratory  []  Microbiology  [x]  Radiology  []  EKG/Telemetry   []  Cardiology/Vascular   []  Pathology  [x]  Old records  []  Other:  Most notable findings include:   CT Head Without Contrast    Result Date: 9/22/2023  Impression: No acute intracranial abnormality. Electronically Signed: Kieran Dent MD  9/22/2023 2:05 AM EDT  Workstation ID: [id number]    XR Chest 1 View    Result Date: 9/21/2023  Impression: 1.Stable large ovoid masslike density projecting over the left lateral chest with destruction of anterior ribs and diminished volume in the left lung. 2.Stable small bilateral pleural effusions and right basilar atelectasis. Electronically Signed: Kieran Dent MD  9/21/2023 11:16 PM EDT  Workstation ID: [id number]      CBC          9/11/2023    05:46 9/13/2023    12:57 9/21/2023    09:10 9/21/2023    19:30 9/21/2023    22:39   CBC   WBC 60.10  63.49    61.70    RBC 2.50  2.69    2.94    Hemoglobin 7.1  8.0  5.5  6.7  8.3    Hematocrit 22.6  24.0  18.0  21.0  26.8    MCV 90.5  89.2    91.0    MCH 28.5  29.7    28.3    MCHC 31.5  33.3    31.1    RDW 18.5  17.2    17.1    Platelets 277  260    141       CMP          9/11/2023    05:46 9/13/2023    12:57 9/21/2023    22:39   CMP   Glucose 102  94  107    BUN 17  11  30    Creatinine 0.64  0.66  1.11    EGFR 109.1  108.0  76.5    Sodium 126  123  128    Potassium 4.1  3.8  3.4    Chloride 92  87  89    Calcium 9.3  9.2  8.7    Total Protein  5.8  5.5    Albumin  2.3  2.1    Globulin  3.5  3.4    Total Bilirubin  0.8  1.8    Alkaline Phosphatase  196  220    AST (SGOT)  24  31    ALT  (SGPT)  13  16    Albumin/Globulin Ratio  0.7  0.6    BUN/Creatinine Ratio 26.6  16.7  27.0    Anion Gap 12.0  15.0  12.0       Assessment & Plan        Active Hospital Problems:  Active Hospital Problems    Diagnosis     **AMS (altered mental status)      Plan:   AMS, appears to have resolved  - Was reported cyanotic nail beds, unable to be aroused, garbled words and excessively weak but now Alert, on room air, no complaints  - unknown etiology for pt symptoms at facility  - will rule out TIA vs seizure vs metastatic disease  - CT negative. Will follow up with MRI  - noted elevated lactic of 2.2 -> 2.1  - received cefepime in ED. Will hold further antibiotics at this time and monitor  - will give 500L bolus as blood pressure low   - leukocytosis appears chronic and no change  - urine negative  - CXR impression per radiology: 1.Stable large ovoid masslike density projecting over the left lateral chest with destruction of anterior ribs and diminished volume in the left lung.  2.Stable small bilateral pleural effusions and right basilar atelectasis.  - consider neurology consult pending MRI results  - monitor closely     Metastatic lung CA s/p left thoracotomy and right lower lobectomy  -followed by Dr. Gaspar on tepotinib  -CT findings noted, concern for pneumonia  -Oncology recommending further treatment as outpatient  - Left chest port accessed. No redness noted around area  - consult oncology to follow     Hx left chest well cellulitis  - Pt reports discomfort at times under left axilla  - skin appears to have scar tissue and is firm but no erythema, edema or drainage    Leukocytosis, appears chronic  - -pt has had leukocytosis since March 2023. His previous admission July 2023 the patient had negative blood cultures. 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      CODE STATUS:  Pt reports considering DNR code status.   Due to admission of AMS and reported code status of FULL CODE at rehab facility will keep full code and will need to follow up Consulted palliative care     Code Status (Patient has no pulse and is not breathing): CPR (Attempt to Resuscitate)  Medical Interventions (Patient has pulse or is breathing): Full Support    Admission Status:  I believe this patient meets observation status.    I discussed the patient's findings and my recommendations with patient.    This patient has been examined wearing appropriate Personal Protective Equipment Signature: Electronically signed by MULUGETA Tavarez, 23, 04:30 EDT.  [facility] [place]      Electronically signed by Reshma Kingsley DO at 23 0534          Physician Progress Notes (last 24 hours)        Ezequiel Mccollum DO at 23 1017          [facility] Medicine Services   Daily Progress Note      Patient Name: Jc Driscoll  : 1965  MRN: [id number]  Primary Care Physician:  Reshma Alvarenga MD  Date of admission: 2023      Subjective      Chief Complaint: AMS    Patient seen and examined this morning.  Known to me from previous admission, he is more confused compared to last admission.  Alert and oriented to self and time but not to place.  Thinks he is at a motel.  Patient redirected and asked for any symptoms but patient stated he feels fine and denies any current symptoms.    Pertinent positives as noted in HPI/subjective.  All other systems were reviewed and are negative.      Objective      Vitals:   Temp:  [98 °F (36.7 °C)-99 °F (37.2 °C)] 98.1 °F (36.7 °C)  Heart Rate:  [116-122] 121  Resp:   [20-29] 29  BP: ()/(57-75) 111/67  Flow (L/min):  [2] 2    Physical Exam:    General: Awake, alert, elderly male, lying in bed, NAD  Eyes: PERRL, EOMI, conjunctivae are clear  Cardiovascular: Regular rate and rhythm, no murmurs  Respiratory: Clear to auscultation bilaterally, no wheezing or rales, unlabored breathing  Abdomen: Soft, nontender, positive bowel sounds, no guarding  Neurologic: A&O, CN grossly intact, moves all extremities spontaneously  Musculoskeletal: Normal range of motion, no other gross deformities  Skin: Warm, dry, left chest wall port noted with mild surrounding tenderness, no erythema or warmth.         Result Review    Result Review:  I have personally reviewed the results from the time of this admission to 9/23/2023 10:17 EDT and agree with these findings:  [x]  Laboratory  [x]  Microbiology  [x]  Radiology  [x]  EKG/Telemetry   [x]  Cardiology/Vascular   []  Pathology  [x]  Old records  []  Other:          Assessment & Plan      Brief Patient Summary:  Jc Driscoll is a 58 y.o. male who       buPROPion XL, 150 mg, Oral, Daily  folic acid, 1 mg, Oral, Daily  lactulose, 10 g, Oral, BID  levothyroxine, 125 mcg, Oral, Q AM  megestrol acetate, 400 mg, Oral, Daily  nystatin, 5 mL, Swish & Spit, 4x Daily  pantoprazole, 40 mg, Oral, Daily  potassium chloride, 20 mEq, Oral, Daily  senna-docusate sodium, 2 tablet, Oral, BID  sodium chloride, 10 mL, Intravenous, Q12H  vitamin B-12, 1,000 mcg, Oral, Daily  vitamin B-6, 100 mg, Oral, Daily       sodium chloride, 75 mL/hr         I have utilized all available, immediate resources to obtain, update, or review the patient's current medications including all prescriptions, over-the-counter products, herbals, cannabis/cannabidiol products, and vitamin.mineral/dietary (nutritional) supplements.    Active Hospital Problems:  Active Hospital Problems    Diagnosis     **AMS (altered mental status)      Plan:     Acute metabolic encephalopathy  -Unclear  cause at this time, MRI brain negative for any lesions or metastatic disease  -No UTI or pneumonia noted  -Leukocytosis about the same as previously  -Possible polypharmacy, hold sedating meds for now  -Monitor    Leukocytosis with bandemia  -pt has had leukocytosis since March 2023. His previous admission July 2023 the patient had negative blood cultures. ID team suggested leukocytosis was secondary to steroids and underlying malignancy  -Previously concern for chest wall cellulitis however ruled out per ID previously  -Hold off on antibiotics for now  -Consult ID for further evaluation     Anemia  -no hematochezia or hematemesis   -Hemoglobin trending down, did receive 1 unit during last admission  -anemia secondary to metastatic disease/chemotherapy  -Monitor hemoglobin and transfuse as needed     Metastatic lung CA s/p left thoracotomy and right lower lobectomy  -followed by Dr. Gaspar on tepotinib, on hold for now  -discussed with oncology, patient seems to be deteriorating previously was seen by palliative care  -Patient unable to make decision at this time due to his confusion,     Hyponatremia, likely SIADH  -pt did have hyponatremia secondary to volume depletion and underlying SIADH per nephrology back in July 2023. He was treated with IVFs during that admission  -Nephrology consulted for further management    Hypothyroidism  -cont home synthroid, cytomel     Anorexia   -on megace     DVT prophylaxis  -SCDs for now    CODE STATUS:    Code Status (Patient has no pulse and is not breathing): CPR (Attempt to Resuscitate)  Medical Interventions (Patient has pulse or is breathing): Full Support      Disposition: Back to facility once improved    Electronically signed by Ezequiel Mccollum DO, 09/23/23, 10:17 EDT.  [facility] Hospitalist Team      Part of this note may be an electronic transcription/translation of spoken language to printed text using the Dragon Dictation System.      Electronically signed by  Ezequiel Mccollum DO at 23 1022       Kwame Epstein MD at 23 1003                Hematology/Oncology Inpatient Progress Note    PATIENT NAME: Jc Driscoll  : 1965  MRN: [id number]    Chief complaint: altered mental status     History of present illness:    Jc Driscoll is a 58 y.o. male who presented to [facility] on [facility]23 with altered mental status.     Patient has metastatic non-small cell lung cancer who has progressed on chemotherapy, immunotherapy, TDxd and most recently started on tepotinib.  Patient has had a gradual worsening in performance status and has been bedbound.  He was admitted to a rehab facility and continued to worsen physical and mental status.  He was found to be significantly altered and brought to the emergency room.  MRI brain here is negative for metastatic disease,     Patient had a hemoglobin of 5.5 at facility with recheck here at 8.3, during transfer his O2 sat was low at 81% but improved on room air now     Altered mental status improved while patient was in the hospital however currently he was given Xanax for MRI and has been sleeping since not answering questions     23  Hematology/Oncology was consulted        Subjective   Continues to be confused overnight but improved this morning. Feels better      MEDICATIONS:    Scheduled Meds:  buPROPion XL, 150 mg, Oral, Daily  folic acid, 1 mg, Oral, Daily  lactulose, 10 g, Oral, BID  levothyroxine, 125 mcg, Oral, Q AM  megestrol acetate, 400 mg, Oral, Daily  nystatin, 5 mL, Swish & Spit, 4x Daily  pantoprazole, 40 mg, Oral, Daily  potassium chloride, 20 mEq, Oral, Daily  senna-docusate sodium, 2 tablet, Oral, BID  sodium chloride, 10 mL, Intravenous, Q12H  vitamin B-12, 1,000 mcg, Oral, Daily  vitamin B-6, 100 mg, Oral, Daily       Continuous Infusions:  sodium chloride, 75 mL/hr       PRN Meds:    acetaminophen    ALPRAZolam    senna-docusate sodium **AND** polyethylene glycol **AND** bisacodyl **AND**  "bisacodyl    nitroglycerin    ondansetron    [COMPLETED] Insert Peripheral IV **AND** sodium chloride    sodium chloride    sodium chloride     ALLERGIES:  No Known Allergies    Objective    VITALS:   /67 (BP Location: Left arm, Patient Position: Lying)   Pulse (!) 121   Temp 98.1 °F (36.7 °C) (Oral)   Resp (!) 29   Ht 190.5 cm (75\")   Wt 64.5 kg (142 lb 3.2 oz)   SpO2 96%   BMI 17.77 kg/m²     PHYSICAL EXAM: (performed by MD)  Physical Exam  Constitutional:       Comments: malnourished   HENT:      Head: Normocephalic and atraumatic.   Cardiovascular:      Rate and Rhythm: Normal rate and regular rhythm.      Pulses: Normal pulses.      Heart sounds: No murmur heard.  Pulmonary:      Effort: Pulmonary effort is normal.   Abdominal:      General: There is no distension.      Palpations: Abdomen is soft. There is no mass.      Tenderness: There is no abdominal tenderness.   Musculoskeletal:         General: Normal range of motion.   Skin:     General: Skin is warm.   Neurological:      Mental Status: He is disoriented.         RECENT LABS:  Lab Results (last 24 hours)       Procedure Component Value Units Date/Time    Basic Metabolic Panel [[id number]]  (Abnormal) Collected: 09/23/23 0924    Specimen: Blood Updated: 09/23/23 0956     Glucose 94 mg/dL      BUN 17 mg/dL      Creatinine 0.77 mg/dL      Sodium 131 mmol/L      Potassium 2.9 mmol/L      Chloride 91 mmol/L      CO2 27.0 mmol/L      Calcium 8.6 mg/dL      BUN/Creatinine Ratio 22.1     Anion Gap 13.0 mmol/L      eGFR 103.1 mL/min/1.73     Narrative:      GFR Normal >60  Chronic Kidney Disease <60  Kidney Failure <15      CBC Auto Differential [[id number]]  (Abnormal) Collected: 09/23/23 0924    Specimen: Blood Updated: 09/23/23 0948     WBC 76.60 10*3/mm3      RBC 2.50 10*6/mm3      Hemoglobin 6.9 g/dL      Hematocrit 22.2 %      MCV 88.9 fL      MCH 27.6 pg      MCHC 31.0 g/dL      RDW 17.4 %      RDW-SD 56.9 fl      MPV 8.5 fL      Platelets 100 " 10*3/mm3      nRBC 0.0 /100 WBC     CBC & Differential [[id number]]  (Abnormal) Collected: 09/23/23 0924    Specimen: Blood Updated: 09/23/23 0935    Narrative:      The following orders were created for panel order CBC & Differential.  Procedure                               Abnormality         Status                     ---------                               -----------         ------                     CBC Auto Differential[[id number]]        Abnormal            Preliminary result         Scan Slide[[id number]]                                       In process                   Please view results for these tests on the individual orders.    Scan Slide [[id number]] Collected: 09/23/23 0924    Specimen: Blood Updated: 09/23/23 0935    Blood Culture - Blood, Arm, Right [[id number]]  (Normal) Collected: 09/22/23 0005    Specimen: Blood from Arm, Right Updated: 09/23/23 0015     Blood Culture No growth at 24 hours    Narrative:      Less than seven (7) mL's of blood was collected.  Insufficient quantity may yield false negative results.    Blood Culture - Blood, Arm, Left [[id number]]  (Normal) Collected: 09/22/23 0006    Specimen: Blood from Arm, Left Updated: 09/23/23 0015     Blood Culture No growth at 24 hours    Narrative:      Less than seven (7) mL's of blood was collected.  Insufficient quantity may yield false negative results.              IMAGING REVIEWED:  CT Head Without Contrast    Result Date: 9/22/2023  Impression: No acute intracranial abnormality. Electronically Signed: Kieran Dent MD  9/22/2023 2:05 AM EDT  Workstation ID: [id number]    MRI Brain Without Contrast    Result Date: 9/22/2023  Impression: Evaluation is somewhat limited due to lack of IV contrast, with decreased sensitivity for small metastatic lesions. No evidence of metastatic disease or other acute finding on current exam. Electronically Signed: Manish Clancy MD  9/22/2023 12:52 PM EDT  Workstation ID: [id number]    XR Chest 1  View    Result Date: 9/21/2023  Impression: 1.Stable large ovoid masslike density projecting over the left lateral chest with destruction of anterior ribs and diminished volume in the left lung. 2.Stable small bilateral pleural effusions and right basilar atelectasis. Electronically Signed: Kieran Dent MD  9/21/2023 11:16 PM EDT  Workstation ID: [id number]     Assessment & Plan     Patient is a 58-year-old male with metastatic non-small cell lung cancer  Progressed to multiple lines of treatment now currently on Tepotinib admitted with altered mental status     Metastatic non-small cell lung cancer  Patient is currently on Tepotinib  Hold treatment for now while his acute condition resolves  Tepotinib should not cause altered mental status  Continue to hold.     Altered mental status  Unclear etiology seems to have improved once patient was in the hospital  No UTI, MRI brain negative for metastasis or other concerns  Unlikely to be related to Tepotinib  Could be related to pain medicine, hydroxyzine,   Monitor for improvement.      Leukocytosis  Likely inflammation related, no recent increase  Monitor     Anemia  Treatment related myelosuprresion  Transfuse for Hb <7, will need transfusion today      Patient and wife have decided to pursue hospice comfort based care. Discussed code status and he wants to be DNR, will consult hospice               Electronically signed by Kwame Epstein MD at 09/23/23 121

## 2023-09-23 NOTE — PLAN OF CARE
Problem: Adult Inpatient Plan of Care  Goal: Plan of Care Review  Outcome: Met  Goal: Patient-Specific Goal (Individualized)  Outcome: Met  Goal: Absence of Hospital-Acquired Illness or Injury  Outcome: Met  Intervention: Identify and Manage Fall Risk  Recent Flowsheet Documentation  Taken 9/23/2023 1600 by Larisa Pittman LPN  Safety Promotion/Fall Prevention: activity supervised  Taken 9/23/2023 1400 by Larisa Pittman LPN  Safety Promotion/Fall Prevention: safety round/check completed  Taken 9/23/2023 1221 by Larisa Pittman LPN  Safety Promotion/Fall Prevention: safety round/check completed  Taken 9/23/2023 1000 by Larisa Pittman LPN  Safety Promotion/Fall Prevention: safety round/check completed  Taken 9/23/2023 0738 by Larisa Pittman LPN  Safety Promotion/Fall Prevention:   activity supervised   assistive device/personal items within reach  Intervention: Prevent Skin Injury  Recent Flowsheet Documentation  Taken 9/23/2023 0738 by Larisa Pittman LPN  Body Position: sitting up in bed  Intervention: Prevent and Manage VTE (Venous Thromboembolism) Risk  Recent Flowsheet Documentation  Taken 9/23/2023 1600 by Larisa Pittman LPN  VTE Prevention/Management: sequential compression devices off  Range of Motion: active ROM (range of motion) encouraged  Taken 9/23/2023 1221 by Larisa Pittman LPN  VTE Prevention/Management: sequential compression devices off  Taken 9/23/2023 0738 by Larisa Pittman LPN  Activity Management: bedrest  VTE Prevention/Management: sequential compression devices off  Range of Motion: active ROM (range of motion) encouraged  Intervention: Prevent Infection  Recent Flowsheet Documentation  Taken 9/23/2023 1600 by Larisa Pittman LPN  Infection Prevention: environmental surveillance performed  Taken 9/23/2023 1400 by Larisa Pittman LPN  Infection Prevention: equipment surfaces disinfected  Taken 9/23/2023 1221 by Larisa Pittman LPN  Infection Prevention: environmental  surveillance performed  Taken 9/23/2023 1000 by Larisa Pittman LPN  Infection Prevention: environmental surveillance performed  Taken 9/23/2023 0738 by Larisa Pittman LPN  Infection Prevention: environmental surveillance performed  Goal: Optimal Comfort and Wellbeing  Outcome: Met  Intervention: Provide Person-Centered Care  Recent Flowsheet Documentation  Taken 9/23/2023 1600 by Larisa Pittman LPN  Trust Relationship/Rapport:   care explained   emotional support provided  Taken 9/23/2023 0738 by Larisa Pittman LPN  Trust Relationship/Rapport: care explained  Goal: Readiness for Transition of Care  Outcome: Met     Problem: Coping Ineffective (Oncology Care)  Goal: Effective Coping  Outcome: Met  Intervention: Support and Enhance Coping Strategies  Recent Flowsheet Documentation  Taken 9/23/2023 1600 by Larisa Pittman LPN  Family/Support System Care: self-care encouraged  Taken 9/23/2023 1221 by Larisa Pittman LPN  Family/Support System Care:   self-care encouraged   support provided     Problem: Fatigue (Oncology Care)  Goal: Improved Activity Tolerance  Outcome: Met  Intervention: Promote Improved Energy  Recent Flowsheet Documentation  Taken 9/23/2023 0738 by Larisa Pittman LPN  Activity Management: bedrest     Problem: Oral Intake Altered (Oncology Care)  Goal: Optimal Oral Intake  Outcome: Met     Problem: Oral Mucositis (Oncology Care)  Goal: Improved Oral Mucous Membrane Integrity  Outcome: Met     Problem: Pain Acute (Oncology Care)  Goal: Optimal Pain Control  Outcome: Met  Intervention: Prevent or Manage Pain  Recent Flowsheet Documentation  Taken 9/23/2023 1600 by Larisa Pittman LPN  Medication Review/Management: medications reviewed  Taken 9/23/2023 1400 by Larisa Pittman LPN  Medication Review/Management: medications reviewed  Taken 9/23/2023 1221 by Larisa Pittman LPN  Medication Review/Management: medications reviewed  Taken 9/23/2023 1000 by Larisa Pittman LPN  Medication  Review/Management: medications reviewed  Taken 9/23/2023 0738 by Larisa Pittman LPN  Medication Review/Management: medications reviewed  Intervention: Optimize Psychosocial Wellbeing  Recent Flowsheet Documentation  Taken 9/23/2023 1600 by Larisa Pittman LPN  Diversional Activities: television  Taken 9/23/2023 0738 by Larisa Pittman LPN  Diversional Activities: smartphone     Problem: Skin Injury Risk Increased  Goal: Skin Health and Integrity  Outcome: Met  Intervention: Optimize Skin Protection  Recent Flowsheet Documentation  Taken 9/23/2023 0738 by Larisa Pittman LPN  Head of Bed (HOB) Positioning: HOB at 30-45 degrees     Problem: Fall Injury Risk  Goal: Absence of Fall and Fall-Related Injury  Outcome: Met  Intervention: Identify and Manage Contributors  Recent Flowsheet Documentation  Taken 9/23/2023 1600 by Larisa Pittman LPN  Medication Review/Management: medications reviewed  Taken 9/23/2023 1400 by Larisa Pittman LPN  Medication Review/Management: medications reviewed  Taken 9/23/2023 1221 by Larisa Pittman LPN  Medication Review/Management: medications reviewed  Taken 9/23/2023 1000 by Larisa Pittman LPN  Medication Review/Management: medications reviewed  Taken 9/23/2023 0738 by Larisa Pittman LPN  Medication Review/Management: medications reviewed  Intervention: Promote Injury-Free Environment  Recent Flowsheet Documentation  Taken 9/23/2023 1600 by Larisa Pittman LPN  Safety Promotion/Fall Prevention: activity supervised  Taken 9/23/2023 1400 by Larisa Pittman LPN  Safety Promotion/Fall Prevention: safety round/check completed  Taken 9/23/2023 1221 by Larisa Pittman LPN  Safety Promotion/Fall Prevention: safety round/check completed  Taken 9/23/2023 1000 by Larisa Pittman LPN  Safety Promotion/Fall Prevention: safety round/check completed  Taken 9/23/2023 0738 by Larisa Pittman LPN  Safety Promotion/Fall Prevention:   activity supervised   assistive device/personal items  within reach   Goal Outcome Evaluation:

## 2023-09-23 NOTE — PROGRESS NOTES
[facility] Medicine Services   Daily Progress Note      Patient Name: Jc Driscoll  : 1965  MRN: [id number]  Primary Care Physician:  Reshma Alvarenga MD  Date of admission: 2023      Subjective      Chief Complaint: AMS    Patient seen and examined this morning.  Known to me from previous admission, he is more confused compared to last admission.  Alert and oriented to self and time but not to place.  Thinks he is at a motel.  Patient redirected and asked for any symptoms but patient stated he feels fine and denies any current symptoms.    Pertinent positives as noted in HPI/subjective.  All other systems were reviewed and are negative.      Objective      Vitals:   Temp:  [98 °F (36.7 °C)-99 °F (37.2 °C)] 98.1 °F (36.7 °C)  Heart Rate:  [116-122] 121  Resp:  [20-29] 29  BP: ()/(57-75) 111/67  Flow (L/min):  [2] 2    Physical Exam:    General: Awake, alert, elderly male, lying in bed, NAD  Eyes: PERRL, EOMI, conjunctivae are clear  Cardiovascular: Regular rate and rhythm, no murmurs  Respiratory: Clear to auscultation bilaterally, no wheezing or rales, unlabored breathing  Abdomen: Soft, nontender, positive bowel sounds, no guarding  Neurologic: A&O, CN grossly intact, moves all extremities spontaneously  Musculoskeletal: Normal range of motion, no other gross deformities  Skin: Warm, dry, left chest wall port noted with mild surrounding tenderness, no erythema or warmth.         Result Review    Result Review:  I have personally reviewed the results from the time of this admission to 2023 10:17 EDT and agree with these findings:  [x]  Laboratory  [x]  Microbiology  [x]  Radiology  [x]  EKG/Telemetry   [x]  Cardiology/Vascular   []  Pathology  [x]  Old records  []  Other:          Assessment & Plan      Brief Patient Summary:  Jc Driscoll is a 58 y.o. male who       buPROPion XL, 150 mg, Oral, Daily  folic acid, 1 mg, Oral, Daily  lactulose, 10 g, Oral, BID  levothyroxine, 125  mcg, Oral, Q AM  megestrol acetate, 400 mg, Oral, Daily  nystatin, 5 mL, Swish & Spit, 4x Daily  pantoprazole, 40 mg, Oral, Daily  potassium chloride, 20 mEq, Oral, Daily  senna-docusate sodium, 2 tablet, Oral, BID  sodium chloride, 10 mL, Intravenous, Q12H  vitamin B-12, 1,000 mcg, Oral, Daily  vitamin B-6, 100 mg, Oral, Daily       sodium chloride, 75 mL/hr         I have utilized all available, immediate resources to obtain, update, or review the patient's current medications including all prescriptions, over-the-counter products, herbals, cannabis/cannabidiol products, and vitamin.mineral/dietary (nutritional) supplements.    Active Hospital Problems:  Active Hospital Problems    Diagnosis     **AMS (altered mental status)      Plan:     Acute metabolic encephalopathy  -Unclear cause at this time, MRI brain negative for any lesions or metastatic disease  -No UTI or pneumonia noted  -Leukocytosis about the same as previously  -Possible polypharmacy, hold sedating meds for now  -Monitor    Leukocytosis with bandemia  -pt has had leukocytosis since March 2023. His previous admission July 2023 the patient had negative blood cultures. ID team suggested leukocytosis was secondary to steroids and underlying malignancy  -Previously concern for chest wall cellulitis however ruled out per ID previously  -Hold off on antibiotics for now  -Consult ID for further evaluation     Anemia  -no hematochezia or hematemesis   -Hemoglobin trending down, did receive 1 unit during last admission  -anemia secondary to metastatic disease/chemotherapy  -Monitor hemoglobin and transfuse as needed     Metastatic lung CA s/p left thoracotomy and right lower lobectomy  -followed by Dr. Gaspar on tepotinib, on hold for now  -discussed with oncology, patient seems to be deteriorating previously was seen by palliative care  -Patient unable to make decision at this time due to his confusion,     Hyponatremia, likely SIADH  -pt did have  hyponatremia secondary to volume depletion and underlying SIADH per nephrology back in July 2023. He was treated with IVFs during that admission  -Nephrology consulted for further management    Hypothyroidism  -cont home synthroid, cytomel     Anorexia   -on megace     DVT prophylaxis  -SCDs for now    CODE STATUS:    Code Status (Patient has no pulse and is not breathing): CPR (Attempt to Resuscitate)  Medical Interventions (Patient has pulse or is breathing): Full Support      Disposition: Back to facility once improved    Electronically signed by Ezequiel Mccollum DO, 09/23/23, 10:17 EDT.  [facility] Hospitalist Team      Part of this note may be an electronic transcription/translation of spoken language to printed text using the Dragon Dictation System.

## 2023-09-24 NOTE — CASE MANAGEMENT/SOCIAL WORK
Case Management Discharge Note      Final Note: home with [facility]         Selected Continued Care - Discharged on 9/23/2023 Admission date: 9/21/2023 - Discharge disposition: Hospice/Home          Home Medical Care Coordination complete.      Service Provider Selected Services Address Phone Fax Patient Preferred    [facility] [address] [place] [id number] [phone] --                 Transportation Services  Ambulance: [facility]    Final Discharge Disposition Code: 50 - home with hospice

## 2023-09-26 ENCOUNTER — SPECIALTY PHARMACY (OUTPATIENT)
Dept: PHARMACY | Facility: HOSPITAL | Age: 58
End: 2023-09-26
Payer: COMMERCIAL

## 2023-09-27 LAB
BACTERIA SPEC AEROBE CULT: NORMAL
BACTERIA SPEC AEROBE CULT: NORMAL

## 2023-10-13 LAB
QT INTERVAL: 315 MS
QTC INTERVAL: 449 MS

## (undated) DEVICE — LOU THORACIC: Brand: MEDLINE INDUSTRIES, INC.

## (undated) DEVICE — ELECTRD BLD EZ CLN MOD 6.5IN

## (undated) DEVICE — SOL IRRIG H2O 1000ML STRL

## (undated) DEVICE — SUT SILK 0 PSL 18IN 580H

## (undated) DEVICE — UNIVERSAL STAPLER: Brand: ENDO GIA ULTRA

## (undated) DEVICE — OASIS DRAIN, SINGLE, INLINE & ATS COMPATIBLE: Brand: OASIS

## (undated) DEVICE — Device: Brand: TISSUE RETRIEVAL SYSTEM

## (undated) DEVICE — VISUALIZATION SYSTEM: Brand: CLEARIFY

## (undated) DEVICE — ADHS SKIN SURG TISS VISC PREMIERPRO EXOFIN HI/VISC FAST/DRY

## (undated) DEVICE — PENCL ES MEGADINE EZ/CLEAN BUTN W/HOLSTR 10FT

## (undated) DEVICE — 3M™ STERI-STRIP™ REINFORCED ADHESIVE SKIN CLOSURES, R1547, 1/2 IN X 4 IN (12 MM X 100 MM), 6 STRIPS/ENVELOPE: Brand: 3M™ STERI-STRIP™

## (undated) DEVICE — TRAP,MUCUS SPECIMEN, 80CC: Brand: MEDLINE

## (undated) DEVICE — SMOKE EVACUATION TUBING WITH 7/8 IN TO 1/4 IN REDUCER: Brand: BUFFALO FILTER

## (undated) DEVICE — TUBING, SUCTION, 1/4" X 20', STRAIGHT: Brand: MEDLINE INDUSTRIES, INC.

## (undated) DEVICE — PK PROC TURNOVER

## (undated) DEVICE — SUT VIC 2 TP 1MS/4 27IN DYED J649G

## (undated) DEVICE — GLV SURG BIOGEL LTX PF 6

## (undated) DEVICE — PK CHST THORACOSCOPY 50

## (undated) DEVICE — TB PROSHIELD PROTECT PLSTC CLR

## (undated) DEVICE — DRSNG WND GZ PAD BORDERED 4X8IN STRL

## (undated) DEVICE — CONN TBG Y 5 IN 1 LF STRL

## (undated) DEVICE — CONTN STRL 32OZ

## (undated) DEVICE — PATIENT RETURN ELECTRODE, SINGLE-USE, CONTACT QUALITY MONITORING, ADULT, WITH 9FT CORD, FOR PATIENTS WEIGING OVER 33LBS. (15KG): Brand: MEGADYNE

## (undated) DEVICE — GLV SURG TRIUMPH LT PF LTX 8.5 STRL

## (undated) DEVICE — SPNG DRN AMD EXCILON 6PLY 4X4IN PK/2

## (undated) DEVICE — VLV SXN ENDO DISP STRL

## (undated) DEVICE — ORG INST STRIP/TS ADHS 2X10IN YEL STRL

## (undated) DEVICE — VLV PRT BRONCH LIP LTX DISP

## (undated) DEVICE — CONTAINER,SPECIMEN,OR STERILE,4OZ: Brand: MEDLINE

## (undated) DEVICE — DRSNG WND BORDR/ADHS NONADHR/GZ LF 4X4IN STRL

## (undated) DEVICE — SUT SILK 0 TIES 30IN A306H

## (undated) DEVICE — WOUND RETRACTOR AND PROTECTOR: Brand: ALEXIS WOUND PROTECTOR-RETRACTOR

## (undated) DEVICE — TOWEL,OR,DSP,ST,WHITE,DLX,4/PK,20PK/CS: Brand: MEDLINE

## (undated) DEVICE — CUFF SCD HEMOFORCE SEQ CALF STD MD

## (undated) DEVICE — ELECTRD BLD EZ CLN MOD XLNG 2.75IN

## (undated) DEVICE — ANTIBACTERIAL UNDYED BRAIDED (POLYGLACTIN 910), SYNTHETIC ABSORBABLE SUTURE: Brand: COATED VICRYL

## (undated) DEVICE — APPL CHLORAPREP HI/LITE 26ML ORNG

## (undated) DEVICE — ENDOPOUCH RETRIEVER SPECIMEN RETRIEVAL BAGS: Brand: ENDOPOUCH RETRIEVER

## (undated) DEVICE — DRP SLUSH WARMR MACH CIR 44X44IN

## (undated) DEVICE — SPNG LAP CIGARETTE KITTNER 5MM STRL PK/5

## (undated) DEVICE — TOTAL TRAY, 16FR 10ML SIL FOLEY, URN: Brand: MEDLINE

## (undated) DEVICE — UNIVERSAL PACK: Brand: CARDINAL HEALTH